# Patient Record
Sex: FEMALE | Race: WHITE
[De-identification: names, ages, dates, MRNs, and addresses within clinical notes are randomized per-mention and may not be internally consistent; named-entity substitution may affect disease eponyms.]

---

## 2019-11-21 NOTE — REP
Portable chest x-ray:  Single view.

 

History:  Chest pain.

 

Comparison chest x-ray:  January 18, 2018.

 

Findings:  There is moderate cardiac enlargement again noted.  There is some

linear fibrosis in the left perihilar region and left base unchanged.  Chronic

increased markings seen in the right base as well.  No acute infiltrate is seen.

Pulmonary vasculature is cephalized.  No pleural effusion seen.

 

Impression:

 

Moderate cardiac enlargement.  Left perihilar and bibasilar linear scarring.

Pulmonary vascular cephalization.  No evidence of pulmonary edema or pleural

effusion.

 

 

Electronically Signed by

Sp Antonio MD 11/21/2019 11:31 A

## 2019-11-21 NOTE — REP
CT pulmonary angiogram:  With IV contrast.

 

History: Chest pain and shortness of breath.

 

Comparison studies: Comparison study January 18, 2018.

 

Contrast dose:  75 ML of Isovue 370 are administered intravenously.

 

CT technique:  Helical scanning is acquired and overlapping 1.5 mm and contiguous

3 mm axial images are reformatted.  In addition, maximum intensity projection and

multiplanar re-formation images are generated in sagittal and coronal imaging

projections.

 

CT pulmonary angiographic findings: There is good opacification of the pulmonary

arterial tree.  There is no CT evidence of pulmonary embolism.  No evidence of

aortic aneurysm or dissection is seen.  There is no pleural or pericardial

effusion.  There is a band of linear atelectasis anteriorly in the left upper

lobe.  There is a similar linear band of atelectasis in the right middle lobe.

No other pulmonary opacity is seen.  There is linear fibrosis in the left lateral

pleural angle posteriorly.  There are stable bilateral hilar lymph nodes.  No

mediastinal adenopathy is seen.  There is an aorticopulmonary window region lymph

node which is unchanged showing short axis dimension of 8 mm.  No bony

destructive lesion is appreciated. The thyroid gland is moderately enlarged and

virtually envelops the trachea circumferentially above the thoracic inlet.  This

appears to be unchanged.

 

There are clips in the gallbladder fossa.  There is a low-density nodule in the

left adrenal gland measuring 3.2 x 2.2 cm.  This appears to be unchanged from

prior study of January 18, 2018.

 

Impression:

 

No CT evidence of pulmonary embolism.  Cardiomegaly is observed unchanged.  There

are bilateral discoid atelectatic changes.  Moderate goiter is seen at the

thoracic inlet nearly surrounding the trachea unchanged.  Scattered stable hilar

and mediastinal lymph nodes.  No acute infiltrate.

 

 

Electronically Signed by

Sp Antonio MD 11/21/2019 06:43 P

## 2019-11-22 NOTE — ECGEPIP
[facility] - ED

                                       

                                       Test Date:    2019

Pat Name:     JOSEPH BENTLEY            Department:   

Patient ID:   [id number]                 Room:         -

Gender:       Female                   Technician:   SVETA

:          1955               Requested By: TA CH

Order Number: [id number]     Reading MD:   Yoli Zavala

                                 Measurements

Intervals                              Axis          

Rate:         61                       P:            65

KY:           192                      QRS:          132

QRSD:         172                      T:            43

QT:           471                                    

QTc:          478                                    

                           Interpretive Statements

SINUS RHYTHM

INDETERMINATE AXIS

RIGHT BUNDLE BRANCH BLOCK

PRIOR ATRIAL FIBRILLATION 18

Electronically Signed on 2019 13:01:40 EST by Yoli Zavala

## 2019-11-22 NOTE — ED PDOC
Post-Departure Follow-Up


dr shaikh faxed cat chest for fu mlg Lundborg-Gray,Maja MD          Nov 22, 2019 10:37

## 2019-11-22 NOTE — ECGEPIP
[facility] - ED

                                       

                                       Test Date:    2019

Pat Name:     JOSEPH BENTLEY            Department:   

Patient ID:   [id number]                 Room:         -

Gender:       Female                   Technician:   CT

:          1955               Requested By: PRINCESS ROBERTS

Order Number: [id number]     Reading MD:   Yoli Zavala

                                 Measurements

Intervals                              Axis          

Rate:         58                       P:            64

W[place]:           187                      QRS:          60

QRSD:         166                      T:            3

QT:           458                                    

QTc:          451                                    

                           Interpretive Statements

SINUS BRADYCARDIA WITH OCCASIONAL VENTRICULAR PREMATURE COMPLEXES

INDETERMINATE AXIS

RIGHT BUNDLE BRANCH BLOCK

PROLONGED QTC COMPARED 19

Electronically Signed on 2019 13:07:18 EST by Yoli Zavala

## 2020-03-25 NOTE — REP
Clinical: Lower extremity pain and swelling .

 

Technique:  Gray scale and color Doppler evaluation of the bilateral lower

extremities using linear high frequency transducer.

 

Findings:

Ultrasound examination of the right and left lower extremity deep venous

structures from the common femoral vein to the popliteal vein demonstrates normal

compressibility flow and wave patterns in response to respiration and

augmentation.  There is no evidence for deep venous thrombosis.

 

Incidental right Parsons's cyst measures 5.5 x 1.8 x 4.8 cm.

 

Impression:

1.  No evidence for deep venous thrombosis bilateral lower extremities .

2.  Right Baker's cyst.

 

 

Electronically Signed by

Jeremy Goncalves MD 03/25/2020 11:40 A

## 2020-03-25 NOTE — ECGEPIP
[facility] - ED

                                       

                                       Test Date:    2020

Pat Name:     JOSEPH BENTLEY            Department:   

Patient ID:   [id number]                 Room:         -

Gender:       Female                   Technician:   [facility]

:          1955               Requested By: SARAH JOHNSTON

Order Number: [id number]     Reading MD:   Michele Nava

                                 Measurements

Intervals                              Axis          

Rate:         62                       P:            76

AL:           178                      QRS:          33

QRSD:         165                      T:            9

QT:           453                                    

QTc:          462                                    

                           Interpretive Statements

SINUS RHYTHM

INDETERMINATE AXIS

RIGHT BUNDLE BRANCH BLOCK

SIMILAR TO 19

Electronically Signed on 3- 22:29:58 EDT by Michele Nava

## 2020-06-29 NOTE — REPVR
PROCEDURE INFORMATION: 

Exam: MR Right Upper Extremity Joint Without Contrast; Shoulder 

Exam date and time: 6/29/2020 6:10 PM 

Age: 65 years old 

Clinical indication: Other: Pain and fever; Patient HX: Best images possible, 

attempted multiple times; Additional info: Pain right shoulder/fever 



TECHNIQUE: 

Imaging protocol: MR of the Right upper extremity without contrast. Exam 

focused on the shoulder. 

3D rendering: MIP and/or 3D reconstructed images were created by the 

technologist. 



COMPARISON: 

CR Shoulder 1 view 6/29/2020 4:44 PM 



FINDINGS: 

Bones and cartilage: No acute fracture. No dislocation. No high-grade chondral 

defects are seen. Minimal cystic change and marrow edema in the greater 

tuberosity.

Joint spaces: No joint effusion. 

Glenoid labrum: There is a multi directional inferior labral tear from 4:00 to 

7:00 o'clock. Posterior superior labrum is diminutive. 



Supraspinatus tendon: There is a tear of the posterior supraspinatus there is a 

full-thickness insertional tear of the mid to posterior supraspinatus tendon, 

with up to 14 mm of retraction. This tear measures 9 mm in anterior-posterior 

dimension. 

Infraspinatus tendon: Unremarkable. No evidence of tear. 

Subscapularis tendon: Unremarkable. No evidence of tear. 

Teres minor tendon: Unremarkable. No evidence of tear. 

Tendon of biceps brachii: Unremarkable. No evidence of tear. 



Glenohumeral ligaments: Edematous shoulder joint capsule. There is at least 

partial tearing of the glenoid attachment of the anterior band inferior 

glenohumeral ligament and humeral attachment of the posterior band inferior 

glenohumeral ligament.



Muscles: There is edema along the deep margin of the subscapularis muscle and 

less so within the remainder of the muscle fibers. This could be from a 

low-grade muscle strain, although myositis could also cause this appearance. 

Less extensive edema is seen in the remainder of the rotator cuff musculature. 

Minimal edema is seen in the deltoid muscle. There is also additional edema 

which is mild in the teres major and latissimus dorsi muscles, and small amount 

of fluid tracking along the fascial planes between rotator cuff musculature as 

well as around the teres major and latissimus dorsi muscles. 

Soft tissues: There is a large complex shoulder joint effusion with extensive 

debris and severe synovitis. There is also a large amount of complex fluid with 

debris and septations in the subacromial/subdeltoid bursa as well as the 

subcoracoid bursa, distending these bursae, communicating through the 

supraspinatus tendon tear. Fluid tracks in a geyser phenomenon into the 

acromioclavicular joint, and there is capsular edema at the acromioclavicular 

joint. 





IMPRESSION: 

1. Large complex shoulder joint effusion with debris and septations, with 

similar complex fluid distending the subacromial/subdeltoid and subcoracoid 

bursitis. Findings could be related to the full-thickness supraspinatus 

insertional tendon tear with intra-articular and bursal blood products. 

However, in the setting of fever and shoulder pain, concern is raised for 

septic arthritis and septic bursitis, which cannot be excluded. Recommend 

joint/bursal aspiration.

2. Multifocal muscle edema about the shoulder joint, most pronounced in the 

deep fibers of the subscapularis muscle, and also including the teres major and 

latissimus dorsi muscles. Small amount of fluid tracking along fascial plane 

surrounding these muscles. Findings could be from myositis and a component of 

nonspecific fasciitis. However, muscle strains could alternatively cause this 

appearance.

3. At least partial tears of the anterior and posterior bands of the inferior 

glenohumeral ligament. 

4. Complex inferior labral tear. 

5. Complex bursal fluid appears to track into the acromioclavicular joint with 

implied disruption of at least the inferior acromioclavicular ligament.



THIS REPORT CONTAINS FINDINGS THAT MAY BE CRITICAL TO PATIENT CARE. The 

findings were verbally communicated via telephone conference with AIXA Valdez, at 7:19 PM EDT on 6/29/2020. The findings were acknowledged and 

understood. 



Electronically signed by: Kenyatta Fields On 06/29/2020  19:43:02 PM

## 2020-06-29 NOTE — REP
Clinical:  Pain.  Trauma.

 

Technique:  Internal rotation, external rotation, and Y view of the right

shoulder.

 

Findings:

Mild/moderate arthritic degenerative changes include cortical irregularity and

spurring at the acromioclavicular joint as well as blunting and calcification to

the glenoid rim with osteophyte along the inferior border.  The subacromial space

is normal.  No acute fracture or dislocation.

 

Impression:

Mild/moderate arthritic degenerative changes.  No obvious acute fracture or

dislocation.

 

 

Electronically Signed by

Jeremy Goncalves MD 06/29/2020 01:09 P

## 2020-06-29 NOTE — REP
Clinical: Right upper extremity pain and swelling with history of deep venous

thrombosis .

 

Technique:  Gray scale and color Doppler evaluation right upper extremity using

linear high frequency transducer.

 

Findings:

Ultrasound examination of the right upper extremity deep venous structures

demonstrates normal jugular, subclavian, axillary, brachial, basilic, and

cephalic veins. There is no evidence for deep venous thrombosis.

 

Impression:

No evidence for deep venous thrombosis.

 

 

Electronically Signed by

Jeremy Goncalves MD 06/29/2020 02:34 P

## 2020-06-29 NOTE — REP
Clinical:  Trauma.

 

Technique:  Single axillary view of the right shoulder.

 

Findings:

Glenohumeral joint is in normal position.  No obvious acute fracture dislocation

identified.

 

Impression:

No acute fracture or dislocation appreciated.

 

 

Electronically Signed by

Jeremy Goncalves MD 06/29/2020 04:57 P

## 2020-06-30 NOTE — HPE
DATE OF ADMISSION:  06/29/2020

 

CHIEF COMPLAINT:  Right shoulder pain.

 

HISTORY OF PRESENT ILLNESS:  This 65-year-old female complains of five days'

history of right shoulder pain.  There is no dislocation.  There is no recent

trauma.  No recent penetrating injuries to the shoulder or breaks in the skin.

No recent other painful joints.  She feels like it is increasing pain with

movement.  It is minimal pain at rest, just a dull ache but with moving it is

quite severe, painful more in the anterior aspect of the shoulder.  She feels

otherwise well.  She did not even know that she had fevers before presenting to a

minor treatment clinic and being referred her for a fever of 100.8 apparently

according to the emergency department (ED) provider.  She feels well aside from

she had a bad migraine last Thursday which is now about four days ago.  She has

not had any other chills, nausea, vomiting, dysuria, or any other symptoms beyond

right shoulder pain.  She has never had anything like this in the past.

 

PAST MEDICAL HISTORY:  Includes hypertension, left leg deep vein thrombosis

(DVT).  She has a number of vein surgeries in the lower extremity and she is on

warfarin chronically for this.

 

MEDICATIONS:  She takes warfarin 2.5 mg three times a week and on the alternate

days 5 mg.  She is also on flecainide, furosemide.

 

ALLERGIES:  IBUPROFEN.

 

PAST SURGICAL HISTORY:  She has had what sounds like varicose vein surgery.

 

SOCIAL HISTORY:  She is a retired certified nursing assistant (CNA).  She was

born in [place].   unfortunately passed away three and a half years ago.

She smokes five cigarettes a day.  She does not use IV drugs or street drugs.

She consumes wine occasionally for alcohol.

 

PHYSICAL EXAMINATION:

This is a 65-year-old female.  She looks overall in no acute distress.

Her temperature here in the emergency department is 99.9 and 99.3.  Blood

pressure 165/92.  Pulse rate 88, highest pulse 105.  Respiratory rate 16.

Saturating 95% on room air.

Inspection of her upper extremities was normal aside from the right shoulder.

There are no other painful joints to palpation or range of motion testing.

In terms of her right upper extremity, there appears to be some mild to moderate

fullness of the anterior aspect of right shoulder.  This is painful to palpate.

This is not warm or hot to touch.  She does have some mild warmth in her right

hand.  She has no pain along the clavicle.  There is some moderate pain

posteriorly in the shoulder as well.  There is no pain down at the elbow.

Forearm compartments are soft.  She has normal sensation and motor function to

the MRU and AIN/PIN nerve distributions of the right hand.  Strong radial pulse,

pulse regular.

Active range of motion is quite painful for her in terms of the anterior aspect

of the shoulder.  There is no obvious pain with micromotion.  I am able to

internally and externally rotate the shoulder 15-20 degrees.  However, beyond 20

degrees of external rotation, it is exquisitely tender in the anterior aspect of

the shoulder.  She is not able to forward elevate.  This is also painful for

her.

 

IMAGING STUDIES:  Right shoulder AP, internal and external rotation view plus

scapular Y and axillary lateral which I have ordered in addition show what

appears to be some mild pseud subluxation inferiorly in the shoulder but no

obvious shoulder joint dislocation.  There is no obvious fracture.

 

Ultrasound was negative for DVT of the upper extremity.

 

LABORATORY DATA:  White blood cell count 13.3.  ESR over 140.  Chemistry revealed

CRP of 10.  Platelets 217.  There is no differential for the white blood cell

count unfortunately.  Her PT was 33.6 and INR was elevated at 3.31.

 

ASSESSMENT AND PLAN:  This is a 65-year-old female with increased fevers, white

count, and inflammatory markers.  The concern is certainly that she could have a

superficial or deep infection in the joint of her right shoulder.  However, there

is no obvious redness or warmth of the shoulder on examination and her

international normalized ratio (INR) is quite elevated at 3.31.  Also on the

differential would be an acute spontaneous anticoagulant-induced hematoma of the

right shoulder that could be presenting in a similar fashion either deep inside

the joint or superficially anteriorly.  Overall, I have a low to moderate

suspicion that this is truly a septic joint given my clinical examination and to

acutely intervene with an urgent irrigation and debridement with her INR being

over 3, may also be extremely dangerous in this patient.  In addition, performing

a stat aspiration of her shoulder may simply exacerbate a hematoma and cause her

more problems.  As such, my impression is that the best next investigation would

in fact be a stat MRI of her right shoulder.  I have asked the provider taking

care of her to order this test and had her put it into the computer on a stat

basis.  I will follow this test up myself as well as asking her to call me as

soon as the test has been performed so that I can review the results and see the

radiologist's interpretation as well on an urgent timeline.

## 2020-06-30 NOTE — IPN
DATE:  06/29/2020

 

CHIEF COMPLAINT:  Followup right shoulder stat MRI.

 

HISTORY OF PRESENT ILLNESS: 65-year-old female who presented with gradual

worsening of right shoulder pain.  She had elevated inflammatory markers and low

grade fever.  She also had elevated INR 3.3.  So I ordered a stat CT scan to rule

out spontaneous hematoma of her shoulder versus septic arthritis/infection.

 

MRI was read by myself as well as radiologist on call.  This shows large complex

shoulder joint effusion and debris and septations with similar complex with a

distended subacromial, subdeltoid, subcoracoid bursa.  There is also multifocal

muscle edema about the shoulder joint most pronounced in the deep fibers the

subscapularis muscle traction also including the teres major and the latissimus

dorsi muscle.  Findings could be from myositis of bone with nonspecific

fasciitis.

 

ASSESSMENT/PLAN:   This 65-year-old female I am quite concerned that she could

have a septic arthritis.  I think it is wise, despite her elevated INR, to go

ahead with stat emergency irrigation debridement and to minimize the potential

risk further hematoma or bleeding or her shoulder.  I have communicated this to

Dr. Mcgregor, the anesthetist on call.  He has reviewed her chart.  She does have

history of multiple pulmonary embolus in the past.  I also spoke to the

pharmacist on call, Zach, about potentially considering Kcentra prothrombin

complex concentrate for immediate reversal of her high INR.  It was decided in

consultation with the anesthetists and the pharmacist on call that given her past

history of multiple pulmonary embolisms and the procoagulant effect of this

medication, this would likely be unwise and more prudent to proceed with IV

vitamin K 5 mg which I have ordered on STAT timeline.  We will recheck the INR

prior to surgery.  I have let the operating room nurses know that this is a stat

emergency case. I have given them the name.  I have talked to the patient.   I

explained pros, cons, risks, benefits of doing nothing versus going to the

surgery for irrigation and debridement of her shoulder.   She will need cultures

drawn,  admission the hospital, possible  intravenous antibiotics.  Risks of

surgery include but not limited to infection pain, stiffness, weakness, damage to

surrounding structures, neurovascular injury, higher than normal rate of

bleeding, anesthetic complications, blood clots, death and other risks as well as

need for further surgery.   She wished to go ahead and signed a consent for

surgery and we will proceed to the operating room (OR).

## 2020-06-30 NOTE — RO
DATE OF PROCEDURE:   06/29/2020

 

PREOPERATIVE DIAGNOSIS:  Right shoulder infection.

 

POSTOPERATIVE DIAGNOSIS:  Right shoulder infection.

 

PLANNED PROCEDURE:  Right shoulder arthroscopic irrigation, debridement and

perioperative cultures.

 

PROCEDURE:   Right shoulder arthroscopic irrigation, debridement and

perioperative cultures.

 

SURGEON:  Dr. Lorenzo Anton

 

ASSISTANT:

 

ANESTHESIA:

 

OPERATIVE PREAMBLE:   This is a 65-year-old female who had right shoulder pain

increasing over 5 days.  She had a low grade fever and positive white count

inflammatory markers.  MRI demonstrated intra-articular fluid collection.  She

had elevated INR.  We gave vitamin K IV 5 mg and proceeded to surgery.  I

reiterated the risks in preoperative holding, marked upper extremity and

proceeded to surgery.

 

DESCRIPTION OF PROCEDURE:  Patient was brought to operating theater.  Antibiotics

were held until after all cultures were obtained and sent.  The patient was

administered general anesthetic.  They placed her right lateral decubitus with a

beanbag positioner.  Axillary roll was used.  All bony prominences were padded.

Sequential compression devices (SCDs) were used.   The limb was prepped  and

draped in the usual sterile fashion allowing over 3 minutes prep solution drying

time.  15 pounds tourniquet traction was used at the arm at 45 degrees of

abduction.  Preoperative time-out was performed to confirm the patient site and

the surgery.

 

I began by using an 18-gauge spinal needle to obtain culture fluid through the

rotator interval intra-articularly in the joint.  I obtained about 7 mL of

viscous straw-colored fluid with some small aspects of white material.  This was

sent for  stat Gram stain, culture and sensitivities, aerobics and anaerobics and

cell count.  These were in three separate bottles.

 

I then inserted the arthroscope into the intra-articular portion the right

shoulder.  Thorough diagnostic arthroscopy was performed.  There is moderate

synovitis in the rotator interval.  The culture tissue samples taken from the

rotator interval.  There is moderate osteoarthritis glenohumeral joint.  Moderate

fraying of the labrum.  Biceps tendon appeared intact.  There is undersurface

anterior tearing of the supraspinatus tendon, full-thickness partial with for

length of about 1.5 cm.  Subscapularis tendon appeared normal.  Rotator interval

was completely debrided as was some mild to moderate synovitis just superior to

the biceps tendon origin.

 

I then inserted the arthroscope in the subacromial space.  Again there was

moderate bursitis.  This was thoroughly debrided.  I brought in the shaver

through a lateral portal.  I performed a complete bursectomy.  I sent bursal

tissue, tissue samples.

 

Scope was withdrawn.  Wound cleaned with wet and dry dressing.  Portal sites

closed with interrupted #3-0 Monocryl sutures and Steri-Strips applied with

nonstick dressing,  4 x 8 gauze, ABD and overwrapped with cloth tape.  The

patient's upper extremity was placed into a sling.

 

The patient was woken up from general anesthetic, transferred off the operating

table and taken postanesthetic care unit stable condition.  Two grams IV Ancef

administered after cultures were sent.

 

PLAN:  The patient is being admitted to the hospital by the hospitalist service.

Consult infectious disease.  Followup on the cultures.  We will continue on

intravenous Ancef every 8 hours 2 grams until cultures return.

## 2020-07-01 NOTE — HPEPDOC
[facility] Medical History & Physical


Date of Admission


2020


Date of Service:  2020


Attending Physician:  Radha Quick MD





History and Physical





CONSULT: Medical management 





HISTORY OF PRESENT ILLNESS:


Patient is a 65-year-old female with past medical history of hypertension, 

atrial fibrillation, GERD, history of pulmonary emboli, history of deep vein 

thrombosis who presented to [facility] emergency room on 2020 after having 

worsening right shoulder pain for 5 days. Pain was described as constant, sharp 

, 10/10, new to patient.  She attempted to place hot and cold packs to help; 

however, nothing relieved the pain at home. Pain was worse with movement, 

improved with rest. The patient denied any recent trauma to the shoulder, other 

tender joints or history of joint pain like this. She went to urgent care first 

who then sent her to the emergency room for a fever 100.8F.  In the ER, VS 

showed showed she was hypertensive and fever as high as 100.5F. Shoulder MRI 

showed large complex shoulder joint effusion with debris and septations, with  

similar complex fluid distending the subacromial/subdeltoid and subcoracoid 

bursitis. Findings were possibly related to the full-thickness supraspinatus 


insertional tendon tear with intra-articular and bursal blood products. But in 

the setting of fever and shoulder pain, concern was raised for septic arthritis 

and septic bursitis. Case was discussed with orthopedic surgery and patient was 

taken to OR for right shoulder arthroscopic irregation, debridement. Cultures 

and pathology was sent. Medicine was consulted to help manage. 





Upon initial evaluation of patient's labs on post-op day 2, she was found to 

have Gram positive cocci in clusters in 2 sets of blood cultures. HR was 

slightly elevated at 108, afebrile since admission. Patient's CRP and ESR were 

slightly improved from day of admission at 10 and 127. Post operative pain was 

controlled. The patient denied fevers, chills, n/v but admitted to continued 

lethargy. On exam, no murmur was appreciated on exam. Echocardiogram was ordered

and patient was started on Vancomycin while all culture results were pending. 

She was switched to telemetry floor. 





ROS: 


Negative except for what is mentioned above. 





PAST MEDICAL HISTORY:


1. HTN


2. atrial fibrillation 


3. GERD


4. Hx of PE


5. Hx of DVTs


6. obesity 


7. Osteoarthritis 





PAST SURGICAL HISTORY:


1. Right shoulder surgery 


2. Vein stripping in bilateral lower ext 


3. Cholecystectomy 


4. Tonsillectomy 





SOCIAL HISTORY:


Smoker 6-7 cigarettes/day, 40 years. Social alcohol use, denies illicit drug 

use. Lives locally alone. Uses a walker to ambulate, she has no stairs in home. 

Full code. 





FAMILY HISTORY:


Father: Lung disease.  at 74 y/o 


Mother: Angina, CVA, HTN.  74 y/o 





ALLERGIES: Please see below.





CURRENT MEDICATIONS: 


Please see below 





PHYSICAL EXAMINATION: 


VITAL SIGNS: Please see below 


CONSTITUTIONAL: No acute distress, resting comfortably, AAO x 3


EYES: PERRLA, EOM intact


HENT, MOUTH: Normocephalic, atraumatic, moist mucous membranes 


NECK: SUPPLE, no JVD, no lymphadenopathy, no carotid bruit


CV: tachycardic, irregularly irregular rhythm, S1S2 normal, no 

murmurs/rubs/gallops


RESPIRATORY: Clear to auscultation bilaterally, no rales/rhonchi/wheezes


GI:  obese abdomen, BS positive in 4 quadrants, soft, nontender, nondistended, 

no rebound or guarding, no organomegaly


: Deferred


MUSCULOSKELETAL:Decreased ROM of right shoulder, painful to touch, multiple 

bruised areas with well healed incision sites- almost unnoticeable.  No 

cyanosis, clubbing,  joint deformity, extremity edema


INTEGUMENTARY: Discoloration of bilateral lower ext, chronic.  Intact, no 

rashes, no lesions, no erythema


NEUROLOGIC: Cranial Nerves II-XII are intact, no focal deficits


PSYCHIATRIC: Mood and affect are normal 





LABORATORY DATA: 


Please see below 





IMAGIN/29/20: 


1. Large complex shoulder joint effusion with debris and septations, with 


similar complex fluid distending the subacromial/subdeltoid and subcoracoid 


bursitis. Findings could be related to the full-thickness supraspinatus 


insertional tendon tear with intra-articular and bursal blood products. 


However, in the setting of fever and shoulder pain, concern is raised for 


septic arthritis and septic bursitis, which cannot be excluded. Recommend 


joint/bursal aspiration.


2. Multifocal muscle edema about the shoulder joint, most pronounced in the 


deep fibers of the subscapularis muscle, and also including the teres major and 


latissimus dorsi muscles. Small amount of fluid tracking along fascial plane 


surrounding these muscles. Findings could be from myositis and a component of 


nonspecific fasciitis. However, muscle strains could alternatively cause this 


appearance.


3. At least partial tears of the anterior and posterior bands of the inferior 


glenohumeral ligament. 


4. Complex inferior labral tear. 


5. Complex bursal fluid appears to track into the acromioclavicular joint with 


implied disruption of at least the inferior acromioclavicular ligament.





ASSESSMENT: 66 y/o F admitted for right shoulder pain ruling out septic joint, 

sepsis, gram positive cocci bacteremia. 





PLAN:  


1. Right shoulder pain r/o septic joint, post-op day 2 arthroscopic irrigation, 

debridement


-Pain mod-severe with movement of shoulder


-ESR, CRP slightly improved 


-Shoulder fluid cultures (bacterial and fungal) pending. Blood cultures: gram + 

cocci in clusters. 


-Switched to Vancomycin IV, f/u culture results and sensitivities. 


-Pain regimen PRN, PT/OT, can remove sling


-Ortho primary 





2. Gram positive cocci bacteremia, resolved sepsis.


-HR >100, WBC on admission elevated. LA wnl


-At this time, UA neg, no open skin ulcers/wounds and no respiratory concerns. 

Cannot r/o right shoulder as primary source of infection.  


-F/u results on cultures and sensitivities, echocardiogram to r/o endocarditis


-C/w vancomycin for now. 





3. Atrial fibrillation, controlled. 


-restarting home coumadin, -Bridge with lovenox 1 mg/kg Q12 hrs, c/w BB 





4. HTN. 


-C/w home medications. 





5. Hx of DVT, PE


-INR subtherapeutic today


-Resuming home coumadin, bridging with lovenox Q12H


-Daily INR 





6. GERD. 


-C/w home med





7. DVT px. 


-Lovenox, coumadin.





Vital Signs





Vital Signs








  Date Time  Temp Pulse Resp B/P (MAP) Pulse Ox O2 Delivery O2 Flow Rate FiO2


 


20 11:21 97.3 71 16 150/72 (98) 97 Room Air  


 


20 07:44       1.0 











Laboratory Data


Labs 24H


Laboratory Tests 2


20 22:06: 


Prothrombin Time 16.8H, Prothromb Time International Ratio 1.39


20 05:29: 


Prothrombin Time 16.4H, Prothromb Time International Ratio 1.35


20 09:34: 


Immature Granulocyte % (Auto) 0.4, Neutrophils (%) (Auto) 77.8H, Lymphocytes (%)

(Auto) 13.1L, Monocytes (%) (Auto) 8.3H, Eosinophils (%) (Auto) 0.2, Basophils 

(%) (Auto) 0.2, Neutrophils # (Auto) 7.3, Lymphocytes # (Auto) 1.2L, Monocytes #

(Auto) 0.8, Eosinophils # (Auto) 0.0, Basophils # (Auto) 0.0, Nucleated Red 

Blood Cells % (auto) 0.0, Erythrocyte Sedimentation Rate 127H, Anion Gap 6L, 

Glomerular Filtration Rate > 60.0, Calcium Level 9.8, Total Bilirubin 0.5, 

Aspartate Amino Transf (AST/SGOT) 26, Alanine Aminotransferase (ALT/SGPT) 18, 

Alkaline Phosphatase 95, C-Reactive Protein, Quantitative 9.88H, Total Protein 

6.8, Albumin 2.2L, Albumin/Globulin Ratio 0.5L


20 09:51: Lactic Acid Level 1.7


CBC/BMP


Laboratory Tests


20 09:34








Microbiology





Microbiology


20 Anaerobic Culture, Received


          Pending


20 Gram Stain - Final, Resulted


          


20 Body Fluid Culture, Resulted


          Pending


20 Respiratory Virus Panel (PCR) (OMAR) - Final, Complete


          


20 Blood Culture - Preliminary, Resulted


          


20 Blood Culture - Preliminary, Resulted





Home Medications


Scheduled


Aspirin (Ecotrin) 81 Mg Tablet.dr, 81 MG PO DAILY


Atorvastatin Calcium (Atorvastatin Calcium) 20 Mg Tablet, 20 MG PO QPM


Digoxin (Digoxin) 250 Mcg Tablet, 250 MCG PO QPM


Famotidine (Famotidine) 40 Mg Tablet, 40 MG PO QPM


Flecainide Acetate (Flecainide Acetate) 100 Mg Tablet, 100 MG PO BID


Fluoxetine Hcl (Fluoxetine HCl) 20 Mg Capsule, 20 MG PO DAILY


Furosemide (Furosemide) 20 Mg Tablet, 20 MG PO DAILY


Guaifenesin (Mucus Relief) 400 Mg Tablet, 400 MG PO QHS


Hydrochlorothiazide (Hydrochlorothiazide) 25 Mg Tablet, 12.5 MG PO DAILY


Methimazole (Methimazole) 10 Mg Tablet, 10 MG PO DAILY


Metoprolol Tartrate (Metoprolol Tartrate) 25 Mg Tablet, 25 MG PO BID


Multivitamins (Thera M Plus Tablet) 1 Each Tablet, 1 TAB PO DAILY


Ranitidine Hcl (Ranitidine HCl) 150 Mg Tablet, 1 TAB PO BID


Vitamin E (Vitamin E) 400 Unit Capsule, 400 UNIT PO DAILY


Warfarin Sodium (Warfarin Sodium) 5 Mg Tablet, 5 MG PO 3XW


   MONDAY, WEDNESDAY AND FRIDAY AT QPM 





Scheduled PRN


Acetaminophen (Tylenol Extra Strength) 500 Mg Tablet, 1,000 MG PO Q6H PRN for 

PAIN / FEVER





Miscellaneous Medications


Warfarin Sodium (Warfarin Sodium) 5 Mg Tablet, 2.5 MG PO


   TUESDAY, THURSDAY, SATURDAY AND  AT QPM 





Allergies


Coded Allergies:  


     ibuprofen (Verified  Adverse Reaction, Mild, GI, 20)





A-FIB/CHADSVASC


A-FIB History


Current/History of A-Fib/PAF?:  Yes


Current PO Anticoag Therapy:  Yes





Age/Risk Factor Scoring


CHADSVASC:  








CHADSVASC Response (Comments) Value


 


Age Risk Factor Age 65-74 years old 1


 


Gender Risk Factor Female 1


 


Hx of CHF No 0


 


Hx of HTN Yes 1


 


Hx of Stroke/TIA/or VTE No 0


 


Hx of Diabetes No 0


 


Hx of Vascular Disease No 0


 


Total  3











Treatment


Treatment ordered:  Warfarin











Radha Quick MD             2020 13:01

## 2020-07-01 NOTE — IPN
DATE:  07/01/2020

 

CHIEF COMPLAINT:

Postoperative day 1.5 right shoulder irrigation and debridement for possible

infection.

 

HISTORY OF PRESENT ILLNESS::

This 65-year-old female presented with painful and swollen right shoulder.  She

had an elevated white blood cell count and inflammatory markers.  It was decided

to go ahead with irrigation and debridement for risk of a septic shoulder.  She

is doing well on the de jesus.  No concerns or complaints from her or the nursing

staff.  There is still pain to the right shoulder with movement and difficulty

lifting the arm.

 

PHYSICAL EXAMINATION:

Well-appearing 65-year-old female.  She is sitting upright.  She appears

comfortable.  Bulky dressing was taken down.  Portal sites are well apposed with

the Steri-Strips.  No redness, swelling or drainage.  There is still pain with

internal and external rotation of the arm.  Normal sensation and motor function

if the hand and MRU and AIN/PIN nerve distributions.  Strong radial pulse.  Hand

is warm and well perfused.

 

LABORATORY EXAMINATION:

Repeat white blood cell count is pending as are inflammatory markers.  INR was

1.35.

 

Microbiology:  Her blood cultures were positive for gram-positive cocci in

clusters.  Gram stain shows a few WBCs, a few RBCs.  No organisms seen.  Lab

results due in 2-7 days.  Anaerobic culture is pending.

 

Pathology:  The tissue samples are also pending.

 

ASSESSMENT/PLAN:

65-year-old female.  We will keep her on intravenous cefazolin until final

cultures are back or until her inflammatory markers are trending down.  Will

repeat the CBC with white blood cell count and differential as well as CRP today

to ensure clinical response.  We will follow up on final cultures and pathology.

We will start her back on warfarin.  I have asked again the hospitalist service

to assess this patient.  I did talk to Dr. Valadez initially within 20 minutes

of completing the operation.  However, unfortunately, they have not yet put any

notes in the chart, so again I have asked Leonor, our nurse practitioner, to reach

out to them today this morning.  She has had pulmonary embolisms in the past so

she is at high risk and we will place her back on her anticoagulation as soon as

possible.  Overall, my clinical sense is that this is still a moderate chance of

having been an infection, but there is still also a chance this could have been a

spontaneous hematoma in her shoulder that resulted in a synovitis and acute

irritation of the shoulder.  The patient can discontinue the sling.  They should

not shower over top of the incision.  They will keep it clean and dry and I

changed the bulky dressing myself today.  Additionally, ideally one would be able

to obtain an infectious disease consult for this woman; however, Dr. Richards is away

this week and we will contact her at earliest possible time for her opinion as

well.

## 2020-07-02 NOTE — ECHO
DATE OF PROCEDURE:  07/01/2020

 

HEIGHT:  66 inches.

 

WEIGHT: 222 pounds.

 

BODY SURFACE AREA:  2.1 meters squared.

 

REFERRING PHYSICIAN:  Dr. Radha Quick.

 

INDICATIONS:  Sepsis.

 

MEASUREMENTS:

2D Measurements:

RV - 5.2 cm

LV - 5.3 cm

Septum - 1.3 cm

Posterior wall - 1.3 cm

Aortic root - 3.5 cm

LA - 4.2 cm

LVEF - 70%

 

Doppler Measurements:

AV - 1.91 meters per second

LVOT - 1.12 - meters per second

LVOT diameter 2.1 cm

MV - E, 85, A - 93, E/A ratio 0.9

Early mitral deceleration time -180 milliseconds

E prime medial  - 5

A prime medial - 7.8

E prime lateral -15

Average E/E prime ratio - 8.5/PCWP - 12.4 mmHg

PV - 1.0 meters per second

Pulmonary artery acceleration time - 124 milliseconds

PASP 26 mmHg

IVC - 2.0 cm

 

COMMENTS:

Normal sinus rhythm with occasional frequent PACs.  Right bundle branch block.

 

Slightly challenging study in light of the patient's body habitus but

diagnostically useful information was still obtained.

 

M-mode and two-dimensional echocardiography was performed with pulsed, continuous

wave, color flow and tissue Doppler studies.

 

Normal left ventricular size with mild symmetrical hypertrophy.  Somewhat

hypokinetic to akinetic septal motion yet preserved global resting systolic

function.

 

Mildly dilated left atrium with grade 1 LV diastolic dysfunction but current

estimated mean left atrial pressure within normal limits.

 

At least mild to moderately dilated right heart chambers with normal wall motion

and current estimated pulmonary arterial pressure upper limits of normal.

 

Normal IVC size and collapse against an elevated central venous pressure.

 

Normal aortic diameters.

 

Mild aortic valvular sclerosis without apparent functional abnormality.

 

Mild degenerative changes of the mitral valvular apparatus with no more than

trace insufficiency.

 

Normal appearing tricuspid valve with trace insufficiency.

 

No separate intracardiac mass or pedunculated vegetation could be visualized.

Could not rule out a small sessile vegetation on her mitral or aortic valves.

 

No pericardial effusion.

 

If cardiac source of her infection is seriously suspect, a transesophageal

echocardiogram would be advised to further define mitral aortic valvular

appearance.

## 2020-07-02 NOTE — IPNPDOC
Date Seen


The patient was seen on 7/2/20.





Progress Note


SUBJECTIVE: 


No acute complaints overnight. Staph hominis sensitive to Vancomycin growing 

from 1 set blood cultures, second set pending. Afebrile, WBC wnl. Denies chest 

pain, fevers, chills, n/v/d, shortness of breath. 





OBJECTIVE: 





VITAL SIGNS: 


Please see below





PHYSICAL EXAMINATION: 


VITAL SIGNS: Please see below 


CONSTITUTIONAL: No acute distress, resting comfortably, AAO x 3


EYES: PERRLA, EOM intact


HENT, MOUTH: Normocephalic, atraumatic, moist mucous membranes 


NECK: SUPPLE, no JVD, no lymphadenopathy, no carotid bruit


CV: tachycardic, irregularly irregular rhythm, S1S2 normal, no mu

rmurs/rubs/gallops


RESPIRATORY: Clear to auscultation bilaterally, no rales/rhonchi/wheezes


GI:  obese abdomen, BS positive in 4 quadrants, soft, nontender, nondistended, 

no rebound or guarding, no organomegaly


: Deferred


MUSCULOSKELETAL:Decreased ROM of right shoulder, painful to touch, multiple 

bruised areas with well healed incision sites- almost unnoticeable.  No 

cyanosis, clubbing,  joint deformity, extremity edema


INTEGUMENTARY: Discoloration of bilateral lower ext, chronic.  Intact, no 

rashes, no lesions, no erythema


NEUROLOGIC: Cranial Nerves II-XII are intact, no focal deficits


PSYCHIATRIC: Mood and affect are normal 





LABORATORY DATA: 


Please see below 





MICROBIOLOGY:


Right shoulder intraarticular fluid aerobic GS: No organisms seen 


Right shoulder intraarticular fluid aerobic Cx:  No growth 





Right shoulder intraarticular fluid anaerobic GS  and Cx: pending 





BCx- Gram pos cocci in clusters 


BCx- Staph hominis sensitive to Vancomycin 





IMAGING: 





Echo: 


EF 70%


Normal sinus rhythm with occasional frequent PACs.  Right bundle branch block.





Slightly challenging study in light of the patient's body habitus but


diagnostically useful information was still obtained.


 


M-mode and two-dimensional echocardiography was performed with pulsed, 

continuous


wave, color flow and tissue Doppler studies.


 


Normal left ventricular size with mild symmetrical hypertrophy.  Somewhat


hypokinetic to akinetic septal motion yet preserved global resting systolic


function.


 


Mildly dilated left atrium with grade 1 LV diastolic dysfunction but current


estimated mean left atrial pressure within normal limits.


 


At least mild to moderately dilated right heart chambers with normal wall motion


and current estimated pulmonary arterial pressure upper limits of normal.


 


Normal IVC size and collapse against an elevated central venous pressure.


 


Normal aortic diameters.


 


Mild aortic valvular sclerosis without apparent functional abnormality.


 


Mild degenerative changes of the mitral valvular apparatus with no more than


trace insufficiency.


 


Normal appearing tricuspid valve with trace insufficiency.


 


No separate intracardiac mass or pedunculated vegetation could be visualized.


Could not rule out a small sessile vegetation on her mitral or aortic valves.


 


No pericardial effusion.


 


If cardiac source of her infection is seriously suspect, a transesophageal


echocardiogram would be advised to further define mitral aortic valvular


appearance.





ASSESSMENT: 64 y/o F admitted for right shoulder pain ruling out septic joint, 

sepsis, gram positive cocci bacteremia. 





PLAN:  


1. Right shoulder pain r/o septic joint, post-op day 3 arthroscopic irrigation, 

debridement


-Pain mod-severe with movement of shoulder


-ESR, CRP further improved 


-Blood culture x1 : Staph hominis sensitive to Vancomycin. Other micro above 

PENDING 


-C/w Vancomycin IV (Day 2)


-Pain regimen PRN, PT/OT


-Ortho primary 





2. Gram positive cocci bacteremia. Cannot r/o right shoulder as primary source 

of infection and cannot r/o endocarditis with abnormal echo above.


-One culture Staph Hominis sensitive to Vancomycin (Day 2) 


-HR wnl, WBC wnl


-At this time, UA neg, no open skin ulcers/wounds and no respiratory concerns. 


-Echo above. 


-Discussed echo with Dr. Chahal who suggests discussing OLLIE with Dr. Lutz 

after weekend when he returns. At that time infectious disease will hopefully be

back and we can touch base with them as well to discuss case (already consulted 

by primary ortho team)


-F/u results on cultures and sensitivities, repeating blood cultures 7/3/20 (48 

hrs after initiation of Vancomycin)


-C/w vancomycin for now. 





3. Atrial fibrillation, controlled. 


-INR 1.9, goal 2-3


-Bridge with lovenox 1 mg/kg Q12 hrs until INR therapeutic, C/w coumadin,  BB 





4. HTN. 


-C/w home medications. 





5. Hx of DVT, PE


-INR subtherapeutic today


-C/w coumadin, bridging with lovenox Q12H


-Daily INR 





6. GERD. 


-C/w home med





7. DVT px. 


-Lovenox, coumadin.





VS, I&O, 24H, Fishbone


Vital Signs/I&O





Vital Signs








  Date Time  Temp Pulse Resp B/P (MAP) Pulse Ox O2 Delivery O2 Flow Rate FiO2


 


7/2/20 14:00 98.0 73 18 135/81 (99) 100 Room Air  


 


6/30/20 07:44       1.0 














I&O- Last 24 Hours up to 6 AM 


 


 7/2/20





 06:00


 


Intake Total 1740 ml


 


Output Total 300 ml


 


Balance 1440 ml











Laboratory Data


24H LABS


Laboratory Tests 2


7/2/20 06:02: 


Nucleated Red Blood Cells % (auto) 0.0, Erythrocyte Sedimentation Rate 126H, 

Anion Gap 5L, Glomerular Filtration Rate > 60.0, Calcium Level 9.7, Total 

Bilirubin 0.9#, Aspartate Amino Transf (AST/SGOT) 28, Alanine Aminotransferase 

(ALT/SGPT) 18, Alkaline Phosphatase 97, C-Reactive Protein, Quantitative 7.65H, 

Total Protein 6.7, Albumin 2.2L, Albumin/Globulin Ratio 0.5L


7/2/20 09:52: Vancomycin Level Trough 20.7H


7/2/20 11:51: 


Prothrombin Time 21.7H, Prothromb Time International Ratio 1.92


CBC/BMP


Laboratory Tests


7/2/20 06:02








Microbiology





Microbiology


6/29/20 Anaerobic Culture, Received


          Pending


6/29/20 Gram Stain - Final, Complete


          


6/29/20 Body Fluid Culture - Final, Complete


          


6/29/20 Respiratory Virus Panel (PCR) ([facility]) - Final, Complete


          


6/29/20 Blood Culture - Final, Complete


          Staphylococcus Hominis Ssp Guero


6/29/20 Blood Culture - Preliminary, Resulted





Current Medications





Current Medications








 Medications


  (Trade)  Dose


 Ordered  Sig/Roro


 Route


 PRN Reason  Start Time


 Stop Time Status Last Admin


Dose Admin


 


 Acetaminophen


  (Tylenol Tab)  650 mg  Q4HP  PRN


 PO


 MILD PAIN (PS 1-4)  6/30/20 00:30


    7/2/20 08:15





 


 Aspirin


  (Ecotrin)  81 mg  DAILY


 PO


   7/1/20 09:00


    7/2/20 08:15





 


 Atorvastatin


 Calcium


  (Lipitor)  20 mg  DAILY@1800


 PO


   6/29/20 18:00


    7/1/20 17:41





 


 Cefazolin Sodium/


 Dextrose 2 gm/IV


 Miscellaneous


 Supplies  50 ml @ 75


 mls/hr  Q8H


 IV


   6/30/20 06:00


 7/1/20 09:32 DC 7/1/20 05:29





 


 Docusate Sodium


  (Colace)  100 mg  BID


 PO


   7/2/20 09:00


     





 


 Emollient Cream


  (Vanicream)  to legs


 and feet  BID


 TOP


   7/2/20 09:00


    7/2/20 12:41





 


 Enoxaparin Sodium


  (Lovenox)  100 mg  Q12H


 SC


   7/2/20 08:00


    7/2/20 08:15





 


 Fentanyl Citrate


  (Sublimaze)  25 mcg  Q5MP  PRN


 IV


 PAIN LEVEL 5-10  6/30/20 00:30


 6/30/20 01:29 DC  





 


 Furosemide


  (Lasix)  20 mg  DAILY


 PO


   6/30/20 09:00


    7/2/20 08:16





 


 Home Med


  (Med Rec


 Complete!)    ASDIRECTED


 XX


   6/29/20 21:00


 6/29/20 21:01 DC  





 


 Hydrochlorothiazide


  (Hydrodiuril)  12.5 mg  DAILY


 PO


   6/30/20 09:00


    7/2/20 08:16





 


 Lactated Ringer's  1,000 ml @ 


 100 mls/hr  Q10H


 IV


   6/30/20 00:30


 6/30/20 01:29 DC  





 


 Lactated Ringer's  1,000 ml @ 


 125 mls/hr  Q8H


 IV


   6/30/20 00:30


 6/30/20 06:08 DC 6/30/20 05:51





 


 Methimazole


  (Tapazole)  10 mg  DAILY


 PO


   6/30/20 09:00


    7/2/20 08:16





 


 Metoprolol


 Tartrate


  (Lopressor)  25 mg  BID


 PO


   6/29/20 21:00


 6/30/20 02:31 DC  





 


 Metoprolol


 Tartrate


  (Lopressor)  25 mg  BID


 PO


   6/30/20 09:00


    7/2/20 08:16





 


 Morphine Sulfate


  (Morphine


 Sulfate Inj)  2 mg  Q4HP  PRN


 IV


 MODERATE PAIN (PS 5-7)  6/30/20 00:30


    7/1/20 23:57





 


 Multivitamins


  (Theragram-M)  1 tab  DAILY


 PO


   6/30/20 09:00


    7/2/20 08:16





 


 Nystatin


  (Mycostatin


 Powder, Nystop)  to groin


 and under


 breasts  BID


 TOP


   7/2/20 09:00


    7/2/20 12:41





 


 Ondansetron HCl


  (ZOFRAN


 INJection)  4 mg  Q4HP  PRN


 IV


 NAUSEA OR VOMITING  6/30/20 00:30


 6/30/20 01:29 DC  





 


 Ondansetron HCl


  (ZOFRAN


 INJection)  4 mg  Q4HP  PRN


 IV


 NAUSEA  6/30/20 00:30


     





 


 Oxycodone HCl


  (Roxicodone,


 Oxyir)  5 mg  ASDIRECTED  PRN


 PO


 PAIN LEVEL 1-4  6/30/20 00:30


 6/30/20 01:29 DC  





 


 Oxycodone/


 Acetaminophen


  (Percocet 5mg/


 325mg Tablet)  1 tab  Q4HP  PRN


 PO


 SEVERE PAIN (PS 8-10)  6/30/20 00:30


    7/1/20 21:19





 


 Phytonadione 5 mg/


 Sodium Chloride  50.5 ml @ 


 101 mls/hr  STAT  STAT


 IV


   6/29/20 20:40


 6/29/20 21:09 DC 6/29/20 21:16





 


 Senna


  (Senokot)  2 tab  QHS


 PO


   7/2/20 21:00


     





 


 Vancomycin HCl


 500 mg/Dextrose  110 ml @ 


 110 mls/hr  Q12H


 IV


   7/2/20 14:00


    7/2/20 13:53





 


 Vancomycin HCl


 750 mg/IV


 Miscellaneous


 Supplies 1 each/


 Dextrose  275 ml @ 


 275 mls/hr  Q12H


 IV


   7/2/20 13:00


    7/2/20 12:41





 


 Vancomycin HCl


 1000 mg/IV


 Miscellaneous


 Supplies 1 each/


 Dextrose  270 ml @ 


 270 mls/hr  Q8H


 IV


   7/1/20 11:00


 7/2/20 10:38 DC 7/2/20 03:18





 


 Warfarin Sodium


  (Coumadin)  2.5 mg  SuTuThSa@1700


 PO


   7/2/20 17:00


     





 


 Warfarin Sodium


  (Coumadin)  5 mg  MoWeFr@1700


 PO


   7/1/20 17:00


    7/1/20 17:40














Allergies


Coded Allergies:  


     ibuprofen (Verified  Adverse Reaction, Mild, GI, 6/29/20)











Radha Quick MD             Jul 2, 2020 16:48

## 2020-07-03 NOTE — PHACANCOPD
PHARMACY VANCOMYCIN DOSING


Pt Demographics


Demographics


Patient Age:65 , Weight:104.200  , Gender: female


Adjusted Body Weight


Date: 7/3/20, Adjusted Body Weight:  Kg





Vancomycin


Vancomycin indication:  GRAM + BACTEREMIA


Vancomycin Target Ranges:  15-20 mcg/ml


Vancomycin Load Y/N:  Yes


Load Dose Date Time


Vancomycin Load Dose:         Date:         Time:


Vancomycin Dose


Date: 7/3/20. Current Vancomycin Dose:


Intermittent Dosing?:  No





Labs


Labs











Item Value  Date Time


 


White Blood Count 6.9 10^3/uL 7/2/20 0602


 


White Blood Count 9.4 10^3/uL 7/1/20 0934


 


White Blood Count 6.0 10^3/uL 7/3/20 0631


 


Creatinine 0.80 MG/DL 7/1/20 0934


 


Creatinine 0.65 MG/DL 7/2/20 0602


 


Creatinine 0.67 MG/DL 7/3/20 0631


 


Vancomycin Level Trough 20.7 UG/ML H 7/2/20 0952


 


Vancomycin Level Trough 15.9 UG/ML 7/3/20 1205








Micro





Microbiology


7/3/20 Blood Culture, Received


         Pending


7/3/20 Blood Culture, Received


         Pending


6/29/20 Anaerobic Culture, Received


          Pending


6/29/20 Gram Stain - Final, Complete


          


6/29/20 Body Fluid Culture - Final, Complete


          


6/29/20 Respiratory Virus Panel (PCR) ([facility]) - Final, Complete


          


6/29/20 Blood Culture - Final, Complete


          Staphylococcus Hominis Ssp Guero


6/29/20 Blood Culture - Final, Complete


          Micrococcus Luteus


Creatinine Clearance


Date:7/3/20. Creatinine Clearance: [83.6ML/MIN].





Assessment and Plan


Maintaining Current Dose?:  Yes


Reason for dose change:  No Dose Change





Pharmacist Note


Pharmacist Note


Date: 7/3/20. Pharmacist note: Pt trough came back @ 15.9mcg/ml. We will 

continue 1.25g vancomycin IV every 12 hours. We will continue to monitor and 

adjust the dose as needed.











[facility]      Jul 3, 2020 13:00

## 2020-07-03 NOTE — IPN
DATE:  07/03/2020

 

CHIEF COMPLAINT:

Postoperative day four right shoulder irrigation and debridement.

 

HISTORY OF PRESENT ILLNESS:

65-year-old female who had spontaneous onset of right shoulder pain.  I performed

irrigation and debridement  and sent fluid and tissue cultures.  Those have been

negative so far.  The plan is for possible transesophageal echocardiogram (OLLIE)

as transthoracic echocardiogram was unable to rule out infective endocarditis.

She feels well today.  Pain is settling down.

 

PHYSICAL EXAMINATION:

Vital signs stable.  Temperature 98.1.

She is awake and alert.  She looks well.

Dressings is on her right shoulder.  No strike through.  Normal sensation and

strong radial pulse.  There is still some pain with motion of the shoulder.

 

LABORATORY DATA:

White blood cell count yesterday 6.0.  .  Anaerobic culture of the

shoulder pending.  Blood culture growing Staphylococcus hominis.  Gram stain of

the shoulder fluid is negative.

 

ASSESSMENT/PLAN:

This 65-year-old female seems to be having a good response to vancomycin.  Per

the hospitalist managing this patient, they recommend staying on the vancomycin

as her blood cultures have been positive and the TTE was unable to rule out

infected carditis.  The patient will remain in the hospital on IV antibiotics

until at least Monday of next week, likely requiring a OLLIE as well as

consultation from Dr. Richards, the infectious disease specialist.  I will follow her

along intermittently.  I also recommend physical therapy for active-assisted and

passive range of motion of her right shoulder.  I instructed the patient on

different exercises that she can do as well.

## 2020-07-03 NOTE — IPNPDOC
Date Seen


The patient was seen on 7/3/20.





Progress Note


SUBJECTIVE: 


No acute complaints overnight. Staph hominis, micrococcus now growing in second 

set. Repeat BCx drawn this AM after 48 hrs on Vancomycin. Afebrile, WBC wnl. 

Denies chest pain, fevers, chills, n/v/d, shortness of breath. 





OBJECTIVE: 





VITAL SIGNS: 


Please see below





PHYSICAL EXAMINATION: 


VITAL SIGNS: Please see below 


CONSTITUTIONAL: No acute distress, resting comfortably, AAO x 3


EYES: PERRLA, EOM intact


HENT, MOUTH: Normocephalic, atraumatic, moist mucous membranes 


NECK: SUPPLE, no JVD, no lymphadenopathy, no carotid bruit


CV: tachycardic, irregularly irregular rhythm, S1S2 normal, no 

murmurs/rubs/gallops


RESPIRATORY: Clear to auscultation bilaterally, no rales/rhonchi/wheezes


GI:  obese abdomen, BS positive in 4 quadrants, soft, nontender, nondistended, 

no rebound or guarding, no organomegaly


: Deferred


MUSCULOSKELETAL:Decreased ROM of right shoulder, painful to touch, multiple 

bruised areas with well healed incision sites- almost unnoticeable.  No 

cyanosis, clubbing,  joint deformity, extremity edema


INTEGUMENTARY: Discoloration of bilateral lower ext, chronic.  Intact, no 

rashes, no lesions, no erythema


NEUROLOGIC: Cranial Nerves II-XII are intact, no focal deficits


PSYCHIATRIC: Mood and affect are normal 





LABORATORY DATA: 


Please see below 





MICROBIOLOGY:


Right shoulder intraarticular fluid aerobic GS: No organisms seen 


Right shoulder intraarticular fluid aerobic Cx:  NG





Right shoulder intraarticular fluid anaerobic GS  and Cx: pending 





BCx- Micrococcus sp


BCx- Staph hominis sensitive to Vancomycin 





IMAGING: 





Echo: 


EF 70%


Normal sinus rhythm with occasional frequent PACs.  Right bundle branch block.





Slightly challenging study in light of the patient's body habitus but


diagnostically useful information was still obtained.


 


M-mode and two-dimensional echocardiography was performed with pulsed, 

continuous


wave, color flow and tissue Doppler studies.


 


Normal left ventricular size with mild symmetrical hypertrophy.  Somewhat


hypokinetic to akinetic septal motion yet preserved global resting systolic


function.


 


Mildly dilated left atrium with grade 1 LV diastolic dysfunction but current


estimated mean left atrial pressure within normal limits.


 


At least mild to moderately dilated right heart chambers with normal wall motion


and current estimated pulmonary arterial pressure upper limits of normal.


 


Normal IVC size and collapse against an elevated central venous pressure.


 


Normal aortic diameters.


 


Mild aortic valvular sclerosis without apparent functional abnormality.


 


Mild degenerative changes of the mitral valvular apparatus with no more than


trace insufficiency.


 


Normal appearing tricuspid valve with trace insufficiency.


 


No separate intracardiac mass or pedunculated vegetation could be visualized.


Could not rule out a small sessile vegetation on her mitral or aortic valves.


 


No pericardial effusion.


 


If cardiac source of her infection is seriously suspect, a transesophageal


echocardiogram would be advised to further define mitral aortic valvular


appearance.





ASSESSMENT: 66 y/o F admitted for right shoulder pain ruling out septic joint, 

sepsis, gram positive cocci bacteremia. 





PLAN:  


1. Right shoulder pain r/o septic joint, post-op day 3 arthroscopic irrigation, 

debridement


-Pain mod-severe with movement of shoulder


-ESR, CRP further improved 


-Blood culture x1 : Staph hominis, micrococcus sensitive to Vancomycin. 


-C/w Vancomycin IV (Day 2)


-Pain regimen PRN, PT/OT


-Ortho primary 





2. Micrococcus, Staph hominis bacteremia. Cannot r/o right shoulder as primary 

source of infection and cannot r/o endocarditis with abnormal echo above.


-One culture Staph Hominis sensitive to Vancomycin (Day 2) 


-HR wnl, WBC wnl


-At this time, UA neg, no open skin ulcers/wounds and no respiratory concerns. 


-Echo above. 


-Discussed echo with Dr. Chahal who suggests discussing OLLIE with Dr. Lutz 

after weekend when he returns. At that time infectious disease will hopefully be

back and we can touch base with them as well to discuss case (already consulted 

by primary ortho team)


-F/u results on cultures and sensitivities, repeating blood cultures 7/3/20 (48 

hrs after initiation of Vancomycin)


-C/w vancomycin for now. 


-ID consulted to see next week 





3. Atrial fibrillation, controlled. 


-INR 2.04 today, goal 2-3


-D/c lovenox 1 mg/kg Q12 hrs


-C/w coumadin,  BB 





4. HTN. 


-C/w home medications. 





5. Hx of DVT, PE


-INR therapeutic today


-C/w coumadin


-Daily INR 





6. GERD. 


-C/w home med





7. DVT px. 


-Lovenox, coumadin.





VS, I&O, 24H, Fishbone


Vital Signs/I&O





Vital Signs








  Date Time  Temp Pulse Resp B/P (MAP) Pulse Ox O2 Delivery O2 Flow Rate FiO2


 


7/3/20 14:00 98.0 76 17 106/64 (78) 99 Room Air  


 


6/30/20 07:44       1.0 














I&O- Last 24 Hours up to 6 AM 


 


 7/3/20





 06:00


 


Intake Total 2360 ml


 


Output Total 1550 ml


 


Balance 810 ml











Laboratory Data


24H LABS


Laboratory Tests 2


7/3/20 06:31: 


Nucleated Red Blood Cells % (auto) 0.0, Erythrocyte Sedimentation Rate 126H, 

Anion Gap 7L, Glomerular Filtration Rate > 60.0, Calcium Level 9.5, Total Biliru

bin 0.4#, Aspartate Amino Transf (AST/SGOT) 22, Alanine Aminotransferase 

(ALT/SGPT) 17, Alkaline Phosphatase 97, C-Reactive Protein, Quantitative 4.92H, 

Total Protein 6.6, Albumin 2.2L, Albumin/Globulin Ratio 0.5L


7/3/20 08:46: 


Prothrombin Time 22.8H, Prothromb Time International Ratio 2.04


7/3/20 12:05: Vancomycin Level Trough 15.9


CBC/BMP


Laboratory Tests


7/3/20 06:31








Microbiology





Microbiology


7/3/20 Blood Culture, Received


         Pending


7/3/20 Blood Culture, Received


         Pending


6/29/20 Anaerobic Culture, Received


          Pending


6/29/20 Gram Stain - Final, Complete


          


6/29/20 Body Fluid Culture - Final, Complete


          


6/29/20 Respiratory Virus Panel (PCR) ([facility]) - Final, Complete


          


6/29/20 Blood Culture - Final, Complete


          Staphylococcus Hominis Ssp Guero


6/29/20 Blood Culture - Final, Complete


          Micrococcus Luteus





Current Medications





Current Medications








 Medications


  (Trade)  Dose


 Ordered  Sig/Roro


 Route


 PRN Reason  Start Time


 Stop Time Status Last Admin


Dose Admin


 


 Acetaminophen


  (Tylenol Tab)  650 mg  Q4HP  PRN


 PO


 MILD PAIN (PS 1-4)  6/30/20 00:30


    7/2/20 08:15





 


 Aspirin


  (Ecotrin)  81 mg  DAILY


 PO


   7/1/20 09:00


    7/3/20 10:04





 


 Atorvastatin


 Calcium


  (Lipitor)  20 mg  DAILY@1800


 PO


   6/29/20 18:00


    7/3/20 17:00





 


 Cefazolin Sodium/


 Dextrose 2 gm/IV


 Miscellaneous


 Supplies  50 ml @ 75


 mls/hr  Q8H


 IV


   6/30/20 06:00


 7/1/20 09:32 DC 7/1/20 05:29





 


 Docusate Sodium


  (Colace)  100 mg  BID


 PO


   7/2/20 09:00


    7/3/20 10:04





 


 Emollient Cream


  (Vanicream)  to legs


 and feet  BID


 TOP


   7/2/20 09:00


    7/3/20 10:07





 


 Enoxaparin Sodium


  (Lovenox)  100 mg  Q12H


 SC


   7/2/20 08:00


    7/3/20 10:22





 


 Fentanyl Citrate


  (Sublimaze)  25 mcg  Q5MP  PRN


 IV


 PAIN LEVEL 5-10  6/30/20 00:30


 6/30/20 01:29 DC  





 


 Furosemide


  (Lasix)  20 mg  DAILY


 PO


   6/30/20 09:00


    7/3/20 10:06





 


 Home Med


  (Med Rec


 Complete!)    ASDIRECTED


 XX


   6/29/20 21:00


 6/29/20 21:01 DC  





 


 Hydrochlorothiazide


  (Hydrodiuril)  12.5 mg  DAILY


 PO


   6/30/20 09:00


    7/3/20 10:05





 


 Lactated Ringer's  1,000 ml @ 


 100 mls/hr  Q10H


 IV


   6/30/20 00:30


 6/30/20 01:29 DC  





 


 Lactated Ringer's  1,000 ml @ 


 125 mls/hr  Q8H


 IV


   6/30/20 00:30


 6/30/20 06:08 DC 6/30/20 05:51





 


 Methimazole


  (Tapazole)  10 mg  DAILY


 PO


   6/30/20 09:00


    7/3/20 10:06





 


 Metoprolol


 Tartrate


  (Lopressor)  25 mg  BID


 PO


   6/29/20 21:00


 6/30/20 02:31 DC  





 


 Metoprolol


 Tartrate


  (Lopressor)  25 mg  BID


 PO


   6/30/20 09:00


    7/3/20 10:05





 


 Morphine Sulfate


  (Morphine


 Sulfate Inj)  2 mg  Q4HP  PRN


 IV


 MODERATE PAIN (PS 5-7)  6/30/20 00:30


    7/2/20 21:45





 


 Multivitamins


  (Theragram-M)  1 tab  DAILY


 PO


   6/30/20 09:00


    7/3/20 10:04





 


 Nystatin


  (Mycostatin


 Powder, Nystop)  to groin


 and under


 breasts  BID


 TOP


   7/2/20 09:00


    7/3/20 10:07





 


 Ondansetron HCl


  (ZOFRAN


 INJection)  4 mg  Q4HP  PRN


 IV


 NAUSEA OR VOMITING  6/30/20 00:30


 6/30/20 01:29 DC  





 


 Ondansetron HCl


  (ZOFRAN


 INJection)  4 mg  Q4HP  PRN


 IV


 NAUSEA  6/30/20 00:30


     





 


 Oxycodone HCl


  (Roxicodone,


 Oxyir)  5 mg  ASDIRECTED  PRN


 PO


 PAIN LEVEL 1-4  6/30/20 00:30


 6/30/20 01:29 DC  





 


 Oxycodone/


 Acetaminophen


  (Percocet 5mg/


 325mg Tablet)  1 tab  Q4HP  PRN


 PO


 SEVERE PAIN (PS 8-10)  6/30/20 00:30


    7/2/20 20:39





 


 Phytonadione 5 mg/


 Sodium Chloride  50.5 ml @ 


 101 mls/hr  STAT  STAT


 IV


   6/29/20 20:40


 6/29/20 21:09 DC 6/29/20 21:16





 


 Senna


  (Senokot)  2 tab  QHS


 PO


   7/2/20 21:00


    7/2/20 20:36





 


 Vancomycin HCl


 500 mg/Dextrose  110 ml @ 


 110 mls/hr  Q12H


 IV


   7/2/20 14:00


    7/3/20 15:10





 


 Vancomycin HCl


 750 mg/IV


 Miscellaneous


 Supplies 1 each/


 Dextrose  275 ml @ 


 275 mls/hr  Q12H


 IV


   7/2/20 13:00


    7/3/20 14:09





 


 Vancomycin HCl


 1000 mg/IV


 Miscellaneous


 Supplies 1 each/


 Dextrose  270 ml @ 


 270 mls/hr  Q8H


 IV


   7/1/20 11:00


 7/2/20 10:38 DC 7/2/20 03:18





 


 Warfarin Sodium


  (Coumadin)  2.5 mg  SuTuThSa@1700


 PO


   7/2/20 17:00


    7/2/20 17:19





 


 Warfarin Sodium


  (Coumadin)  5 mg  MoWeFr@1700


 PO


   7/1/20 17:00


    7/3/20 16:59














Allergies


Coded Allergies:  


     ibuprofen (Verified  Adverse Reaction, Mild, GI, 6/29/20)











Radha Quick MD             Jul 3, 2020 17:33

## 2020-07-04 NOTE — IPNPDOC
Date Seen


The patient was seen on 7/4/20.





Progress Note


SUBJECTIVE: 


No acute complaints overnight. BP elevated at 190-180 on multiple readings this 

afternoon, given tylenol and hydralazine 20 mg. PT states to have a lot of 

stress at home. Afebrile, WBC wnl. Denies chest pain, fevers, chills, n/v/d, 

shortness of breath. 





OBJECTIVE: 





VITAL SIGNS: 


Please see below





PHYSICAL EXAMINATION: 


VITAL SIGNS: Please see below 


CONSTITUTIONAL: No acute distress, resting comfortably, AAO x 3


EYES: PERRLA, EOM intact


HENT, MOUTH: Normocephalic, atraumatic, moist mucous membranes 


NECK: SUPPLE, no JVD, no lymphadenopathy, no carotid bruit


CV: tachycardic, irregularly irregular rhythm, S1S2 normal, no 

murmurs/rubs/gallops


RESPIRATORY: Clear to auscultation bilaterally, no rales/rhonchi/wheezes


GI:  obese abdomen, BS positive in 4 quadrants, soft, nontender, nondistended, 

no rebound or guarding, no organomegaly


: Deferred


MUSCULOSKELETAL:Decreased ROM of right shoulder, painful to touch still but 

decreased compared to 7/3/20, multiple bruised areas with well healed incision 

sites- almost unnoticeable.  No cyanosis, clubbing,  joint deformity, extremity 

edema


INTEGUMENTARY: Discoloration of bilateral lower ext, chronic.  Intact, no 

rashes, no lesions, no erythema


NEUROLOGIC: Cranial Nerves II-XII are intact, no focal deficits


PSYCHIATRIC: Mood and affect are normal 





LABORATORY DATA: 


Please see below 





MICROBIOLOGY:


Right shoulder intraarticular fluid aerobic GS: No organisms seen 


Right shoulder intraarticular fluid aerobic Cx:  NG





Right shoulder intraarticular fluid anaerobic GS  and Cx: pending 





BCx #1- Micrococcus sp


BCx #2- Staph hominis sensitive to Vancomycin 





Repeat BCx x 2 sets 7/3/20: NG at 24 hrs 





IMAGING: 





Echo: 


EF 70%


Normal sinus rhythm with occasional frequent PACs.  Right bundle branch block.





Slightly challenging study in light of the patient's body habitus but


diagnostically useful information was still obtained.


 


M-mode and two-dimensional echocardiography was performed with pulsed, 

continuous


wave, color flow and tissue Doppler studies.


 


Normal left ventricular size with mild symmetrical hypertrophy.  Somewhat


hypokinetic to akinetic septal motion yet preserved global resting systolic


function.


 


Mildly dilated left atrium with grade 1 LV diastolic dysfunction but current


estimated mean left atrial pressure within normal limits.


 


At least mild to moderately dilated right heart chambers with normal wall motion


and current estimated pulmonary arterial pressure upper limits of normal.


 


Normal IVC size and collapse against an elevated central venous pressure.


 


Normal aortic diameters.


 


Mild aortic valvular sclerosis without apparent functional abnormality.


 


Mild degenerative changes of the mitral valvular apparatus with no more than


trace insufficiency.


 


Normal appearing tricuspid valve with trace insufficiency.


 


No separate intracardiac mass or pedunculated vegetation could be visualized.


Could not rule out a small sessile vegetation on her mitral or aortic valves.


 


No pericardial effusion.


 


If cardiac source of her infection is seriously suspect, a transesophageal


echocardiogram would be advised to further define mitral aortic valvular


appearance.





ASSESSMENT: 64 y/o F admitted for right shoulder pain ruling out septic joint, 

sepsis, gram positive cocci bacteremia. 





PLAN:  


1. Right shoulder pain r/o septic joint, post-op day 6 arthroscopic irrigation, 

debridement


-Pain mod-severe with movement of shoulder


-ESR, CRP further improved 


-Blood culture x1 : Staph hominis, micrococcus sensitive to Vancomycin. 


-C/w Vancomycin IV (Day 3)


-Pain regimen PRN, PT/OT


-Ortho primary 





2. Micrococcus, Staph hominis bacteremia. Cannot r/o right shoulder as primary 

source of infection and cannot r/o endocarditis with abnormal echo above.


-Repeat BCx from 7/3/20 NG at 24 hrs


-See "Micro" above 


-Echo above and abnormal


-Discussed echo with Dr. Chahal who suggests discussing OLLIE with Dr. Lutz 

after July 4th holiday weekend when he returns. At that time infectious disease 

will hopefully be back and we can touch base with Dr. Richards as well to discuss 

case (already consulted by primary ortho team)


-F/u results on cultures and sensitivities


-C/w vancomycin (Day 3) for now. 


-ID consulted





3. Atrial fibrillation, controlled. 


-INR 2.34 today, goal 2-3


-C/w coumadin,  BB 





4. HTN. 


-C/w home medications. 





5. Hx of DVT, PE


-INR therapeutic today


-C/w coumadin


-Daily INR 





6. GERD. 


-C/w home med





7. DVT px. 


-Lovenox, coumadin.





VS, I&O, 24H, Fishbone


Vital Signs/I&O





Vital Signs








  Date Time  Temp Pulse Resp B/P (MAP) Pulse Ox O2 Delivery O2 Flow Rate FiO2


 


7/4/20 15:39    182/90    


 


7/4/20 14:00 97.8 77 18  99 Room Air  


 


6/30/20 07:44       1.0 














I&O- Last 24 Hours up to 6 AM 


 


 7/4/20





 06:00


 


Intake Total 2230 ml


 


Output Total 3420 ml


 


Balance -1190 ml











Laboratory Data


24H LABS


Laboratory Tests 2


7/4/20 06:05: 


Nucleated Red Blood Cells % (auto) 0.0, Erythrocyte Sedimentation Rate 126H, 

Prothrombin Time 25.5H, Prothromb Time International Ratio 2.34, Anion Gap 6L, 

Glomerular Filtration Rate > 60.0, Calcium Level 10.0, Total Bilirubin 0.5, 

Aspartate Amino Transf (AST/SGOT) 24, Alanine Aminotransferase (ALT/SGPT) 19, 

Alkaline Phosphatase 97, C-Reactive Protein, Quantitative 3.72H, Total Protein 

6.9, Albumin 2.3L, Albumin/Globulin Ratio 0.5L


CBC/BMP


Laboratory Tests


7/4/20 06:05








Microbiology





Microbiology


7/3/20 Blood Culture - Preliminary, Resulted


         No growth after 24 hours . All specim...


7/3/20 Blood Culture - Preliminary, Resulted


         No growth after 24 hours . All specim...


6/29/20 Anaerobic Culture - Final, Complete


          


6/29/20 Gram Stain - Final, Complete


          


6/29/20 Body Fluid Culture - Final, Complete


          


6/29/20 Respiratory Virus Panel (PCR) ([facility]) - Final, Complete


          


6/29/20 Blood Culture - Final, Complete


          Staphylococcus Hominis Ssp Guero


6/29/20 Blood Culture - Final, Complete


          Micrococcus Luteus





Current Medications





Current Medications








 Medications


  (Trade)  Dose


 Ordered  Sig/Roro


 Route


 PRN Reason  Start Time


 Stop Time Status Last Admin


Dose Admin


 


 Acetaminophen


  (Tylenol Tab)  650 mg  Q4HP  PRN


 PO


 MILD PAIN (PS 1-4)  6/30/20 00:30


    7/4/20 15:20





 


 Aspirin


  (Ecotrin)  81 mg  DAILY


 PO


   7/1/20 09:00


    7/4/20 09:07





 


 Atorvastatin


 Calcium


  (Lipitor)  20 mg  DAILY@1800


 PO


   6/29/20 18:00


    7/3/20 17:00





 


 Cefazolin Sodium/


 Dextrose 2 gm/IV


 Miscellaneous


 Supplies  50 ml @ 75


 mls/hr  Q8H


 IV


   6/30/20 06:00


 7/1/20 09:32 DC 7/1/20 05:29





 


 Docusate Sodium


  (Colace)  100 mg  BID


 PO


   7/2/20 09:00


    7/4/20 09:06





 


 Emollient Cream


  (Vanicream)  to legs


 and feet  BID


 TOP


   7/2/20 09:00


    7/4/20 09:05





 


 Enoxaparin Sodium


  (Lovenox)  100 mg  Q12H


 SC


   7/2/20 08:00


 7/3/20 17:34 DC 7/3/20 10:22





 


 Fentanyl Citrate


  (Sublimaze)  25 mcg  Q5MP  PRN


 IV


 PAIN LEVEL 5-10  6/30/20 00:30


 6/30/20 01:29 DC  





 


 Furosemide


  (Lasix)  20 mg  DAILY


 PO


   6/30/20 09:00


    7/4/20 09:07





 


 Home Med


  (Med Rec


 Complete!)    ASDIRECTED


 XX


   6/29/20 21:00


 6/29/20 21:01 DC  





 


 Hydrochlorothiazide


  (Hydrodiuril)  12.5 mg  DAILY


 PO


   6/30/20 09:00


    7/4/20 09:06





 


 Lactated Ringer's  1,000 ml @ 


 100 mls/hr  Q10H


 IV


   6/30/20 00:30


 6/30/20 01:29 DC  





 


 Lactated Ringer's  1,000 ml @ 


 125 mls/hr  Q8H


 IV


   6/30/20 00:30


 6/30/20 06:08 DC 6/30/20 05:51





 


 Methimazole


  (Tapazole)  10 mg  DAILY


 PO


   6/30/20 09:00


    7/4/20 09:06





 


 Metoprolol


 Tartrate


  (Lopressor)  25 mg  BID


 PO


   6/29/20 21:00


 6/30/20 02:31 DC  





 


 Metoprolol


 Tartrate


  (Lopressor)  25 mg  BID


 PO


   6/30/20 09:00


    7/4/20 09:07





 


 Morphine Sulfate


  (Morphine


 Sulfate Inj)  2 mg  Q4HP  PRN


 IV


 MODERATE PAIN (PS 5-7)  6/30/20 00:30


    7/2/20 21:45





 


 Multivitamins


  (Theragram-M)  1 tab  DAILY


 PO


   6/30/20 09:00


    7/4/20 09:06





 


 Nystatin


  (Mycostatin


 Powder, Nystop)  to groin


 and under


 breasts  BID


 TOP


   7/2/20 09:00


    7/4/20 09:06





 


 Ondansetron HCl


  (ZOFRAN


 INJection)  4 mg  Q4HP  PRN


 IV


 NAUSEA OR VOMITING  6/30/20 00:30


 6/30/20 01:29 DC  





 


 Ondansetron HCl


  (ZOFRAN


 INJection)  4 mg  Q4HP  PRN


 IV


 NAUSEA  6/30/20 00:30


     





 


 Oxycodone HCl


  (Roxicodone,


 Oxyir)  5 mg  ASDIRECTED  PRN


 PO


 PAIN LEVEL 1-4  6/30/20 00:30


 6/30/20 01:29 DC  





 


 Oxycodone/


 Acetaminophen


  (Percocet 5mg/


 325mg Tablet)  1 tab  Q4HP  PRN


 PO


 SEVERE PAIN (PS 8-10)  6/30/20 00:30


    7/2/20 20:39





 


 Phytonadione 5 mg/


 Sodium Chloride  50.5 ml @ 


 101 mls/hr  STAT  STAT


 IV


   6/29/20 20:40


 6/29/20 21:09 DC 6/29/20 21:16





 


 Senna


  (Senokot)  2 tab  QHS


 PO


   7/2/20 21:00


    7/2/20 20:36





 


 Vancomycin HCl


 500 mg/Dextrose  110 ml @ 


 110 mls/hr  Q12H


 IV


   7/2/20 14:00


    7/4/20 14:41





 


 Vancomycin HCl


 750 mg/IV


 Miscellaneous


 Supplies 1 each/


 Dextrose  275 ml @ 


 275 mls/hr  Q12H


 IV


   7/2/20 13:00


    7/4/20 13:13





 


 Vancomycin HCl


 1000 mg/IV


 Miscellaneous


 Supplies 1 each/


 Dextrose  270 ml @ 


 270 mls/hr  Q8H


 IV


   7/1/20 11:00


 7/2/20 10:38 DC 7/2/20 03:18





 


 Warfarin Sodium


  (Coumadin)  2.5 mg  SuTuThSa@1700


 PO


   7/2/20 17:00


    7/2/20 17:19





 


 Warfarin Sodium


  (Coumadin)  5 mg  MoWeFr@1700


 PO


   7/1/20 17:00


    7/3/20 16:59














Allergies


Coded Allergies:  


     ibuprofen (Verified  Adverse Reaction, Mild, GI, 6/29/20)











Radha Quick MD             Jul 4, 2020 16:05

## 2020-07-05 NOTE — IPNPDOC
Text Note


Date of Service


The patient was seen on 7/5/20.





NOTE


SUBJECTIVE: Complained of headache this am after waking up which had resolved by

late morning with tylenol and ibuprofen. BP better controlled today Afebrile, 

WBC wnl. Denies chest pain, fevers, chills, n/v/d, shortness of breath. 





PHYSICAL EXAMINATION: 


VITAL SIGNS: Please see below 


CONSTITUTIONAL: No acute distress, resting comfortably, AAO x 3


EYES: PERRLA, EOM intact


HENT, MOUTH: Normocephalic, atraumatic, moist mucous membranes 


NECK: SUPPLE, no JVD, no lymphadenopathy, no carotid bruit


CV: tachycardic, irregularly irregular rhythm, S1S2 normal, no murmurs/rubs/gal

lops


RESPIRATORY: Clear to auscultation bilaterally, no rales/rhonchi/wheezes


GI:  obese abdomen, BS positive in 4 quadrants, soft, nontender, nondistended, 

no rebound or guarding, no organomegaly


MUSCULOSKELETAL:Decreased ROM of right shoulder, painful to touch still but 

better, multiple bruised areas with well healed incision sites- almost 

unnoticeable.  No cyanosis, clubbing,  joint deformity, extremity edema


INTEGUMENTARY: Discoloration of bilateral lower ext, chronic statsis changes.  

Intact, no rashes, no lesions, no erythema


NEUROLOGIC: Cranial Nerves II-XII are intact, no focal deficits


PSYCHIATRIC: Mood and affect are normal 





LABORATORY DATA: 


Please see below 





MICROBIOLOGY:


Right shoulder intraarticular fluid aerobic GS: No organisms seen 


Right shoulder intraarticular fluid aerobic Cx:  NG





Right shoulder intraarticular fluid anaerobic GS  and Cx: pending 





BCx #1- Micrococcus sp


BCx #2- Staph hominis sensitive to Vancomycin 





Repeat BCx x 2 sets 7/3/20: NG at 24 hrs 





IMAGING: 





Echo: 


EF 70%


Normal sinus rhythm with occasional frequent PACs.  Right bundle branch block.





Slightly challenging study in light of the patient's body habitus but


diagnostically useful information was still obtained.


 


M-mode and two-dimensional echocardiography was performed with pulsed, c

ontinuous


wave, color flow and tissue Doppler studies.


 


Normal left ventricular size with mild symmetrical hypertrophy.  Somewhat


hypokinetic to akinetic septal motion yet preserved global resting systolic


function.


 


Mildly dilated left atrium with grade 1 LV diastolic dysfunction but current


estimated mean left atrial pressure within normal limits.


 


At least mild to moderately dilated right heart chambers with normal wall motion


and current estimated pulmonary arterial pressure upper limits of normal.


 


Normal IVC size and collapse against an elevated central venous pressure.


 


Normal aortic diameters.


 


Mild aortic valvular sclerosis without apparent functional abnormality.


 


Mild degenerative changes of the mitral valvular apparatus with no more than


trace insufficiency.


 


Normal appearing tricuspid valve with trace insufficiency.


 


No separate intracardiac mass or pedunculated vegetation could be visualized.


Could not rule out a small sessile vegetation on her mitral or aortic valves.


 


No pericardial effusion.


 


If cardiac source of her infection is seriously suspect, a transesophageal


echocardiogram would be advised to further define mitral aortic valvular


appearance.





ASSESSMENT:  F65-year-old female with past medical history of hypertension, 

atrial fibrillation, GERD, history of pulmonary emboli, history of deep vein 

thrombosis who presented to [facility] emergency room on 06/30/2020 after having 

worsening right shoulder pain for 5 days.  Admitted for right shoulder pain 

ruling out septic joint, sepsis, gram positive cocci bacteremia. 





Right shoulder pain r/o septic joint, post-op day 7 arthroscopic irrigation, 

debridement


-Pain mod-with movement of shoulder able to move more doing shoulder exercises. 


-ESR, CRP further improved 


-Blood culture x1 : Staph hominis, micrococcus sensitive to Vancomycin. 


-C/w Vancomycin IV (Day 4)


-Pain regimen PRN, PT/OT


-Ortho 





Micrococcus, Staph hominis bacteremia. Cannot r/o right shoulder as primary 

source of infection and cannot r/o endocarditis with abnormal echo above.


-Repeat BCx from 7/3/20 NG at 24 hrs


-See "Micro" above 


-Echo above and abnormal


-Discussed echo with Dr. Chahal who suggests discussing OLLIE with Dr. Lutz 

after July 4th holiday weekend when he returns. At that time infectious disease 

will hopefully be back and we can touch base with Dr. Richards as well to discuss 

case (already consulted by primary ortho team)


-F/u results on cultures and sensitivities


-C/w vancomycin (Day 3) for now. 


-ID consulted





Atrial fibrillation, controlled. 


-INR 2.34 today, goal 2-3


-C/w coumadin, flecainide and digoxin,  BB 





HTN. 


-C/w metoprolol. 





Hx of DVT, PE


-INR therapeutic today


-C/w coumadin


-Daily INR 





GERD. 


-C/w home med





DVT px. 


-Lovenox, coumadin.





VS,Fishbone, I+O


VS, Fishbone, I+O


Laboratory Tests


7/5/20 06:26











Vital Signs








  Date Time  Temp Pulse Resp B/P (MAP) Pulse Ox O2 Delivery O2 Flow Rate FiO2


 


7/5/20 09:59  83  163/82    


 


7/5/20 06:00 98.7  17  96 Room Air  


 


6/30/20 07:44       1.0 














I&O- Last 24 Hours up to 6 AM 


 


 7/5/20





 06:00


 


Intake Total 900 ml


 


Output Total 3000 ml


 


Balance -2100 ml

















VIVI MERCEDES MD                    Jul 5, 2020 12:33

## 2020-07-07 NOTE — CR
DATE OF CONSULTATION:  2020

 

Asked to consult by hospitalist for management of antibiotic in a patient with

right shoulder pain and bursitis.

 

HISTORY OF PRESENT ILLNESS:  Mrs. Becerril is a 65-year-old, morbidly obese female

who presented to [facility] Emergency Room on 2020 with

worsening right shoulder pain for 5 days duration.  The patient denied any trauma

to the right shoulder.  She has never had surgery to the right shoulder and no

foreign bodies.  The patient attempted to place hot and cold compresses and took

some pain medication without improvement.

 

On arrival to the emergency room (ER), she was noted to have a fever of 100.5.

Shoulder MRI showed a large complex joint effusion with debris and septation with

similar fluid down into subacromial, subdeltoid, and subcoracoid bursa.  There

was also a full-thickness supraspinatus insertion tendon tear.  The patient was

taken to the operating room (OR) by Dr. Anton as there was some concern of

septic arthritis, although clinically the shoulder did not look like it was a

septic joint.

 

Cultures were sent and were negative aerobically and anaerobically.  She had two

sets of blood cultures.  One had Staphylococcus hominis and the second one had

micrococcus which were both contaminants.  The patient was treated with 24 hours

of IV cefazolin perioperatively and then has been on vancomycin since then.

 

Her C-reactive protein (CRP) has improved from 10 to 2.80.  Erythrocyte

sedimentation rate (ESR) was over 140.  She had an echocardiogram, which showed

no vegetation.

 

Intraoperatively, the report was reviewed and there was description of

straw-colored fluid that was drained from the joint and sent for culture.  There

was moderate osteoarthritis and bursitis and therefore, a bursectomy was done by

Dr. Anton.

 

The patient is anxious to go home to take care of her cat and dog.  She feels

much better.

 

PAST MEDICAL HISTORY:  Significant for hypertension, atrial fibrillation,

gastroesophageal reflux disease, history of pulmonary embolism and deep venous

thrombosis (DVT) on Coumadin, obesity, severe osteoarthritis of both knees,

severe varicose veins of both knees.

 

PAST SURGICAL HISTORY:  Right shoulder surgery done by Dr. Anton, vein

stripping in bilateral lower extremities, cholecystectomy, tonsillectomy.

 

SOCIAL HISTORY:  Smokes seven cigarettes per day for the past 40 years.  Social

alcohol use.  Denies illicit drug use.  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

to retire due to varicose veins.  She is a , lost her  about 3 years

ago.  She has a cat and a dog.  Has never been able to have any children.

 

FAMILY HISTORY:  Father with lung disease,  at 75.  Mother with heart

disease,  at 75.

 

ALLERGIES:  IBUPROFEN.

 

MEDICATIONS:

- vancomycin 1 gram IV every 12 hours

- Tylenol 1000 mg by mouth twice a day

- aspirin 325 mg by mouth daily

- Colace 100 mg by mouth twice a day

- warfarin 2.5 alternating with 5 mg every other day

- hydrochlorothiazide 12.5 mg by mouth daily

- atorvastatin 20 mg by mouth daily

- metoprolol 25 mg by mouth twice a day

- nystatin to groin topical twice a day

- famotidine 40 mg by mouth every evening

- Percocet 2 tablets by mouth every 6 as needed

- Pneumovax was given on 2020

- lisinopril 20 mg by mouth given once

 

LABORATORY DATA:  White count 8.3, hemoglobin 10.6, hematocrit 33.1, platelets

238.  , down from over 140.

 

Sodium 140, potassium 4.1, chloride 105, bicarbonate 28, BUN 16, creatinine 0.81,

glucose 99, uric acid 4.9, calcium 10.6.  CRP 2.84, down from 10.

 

Microbiology:  Blood culture on 2020 at 14:13 was Micrococcus luteus, on

2020 at 14:40 it was Staphylococcus hominis.  Two more sets done on

2020 were no growth after 72 hours.  Aerobic and anaerobic cultures of the

right shoulder were negative.  Cell count was not done and crystals were not

sent.

 

PHYSICAL EXAMINATION:

She is a pleasant, healthy-looking female, obese, in no acute distress,

ambulating independently.

Heart:  Normal S1, S2.  No murmurs, rubs or gallops.

Lungs are clear.  No wheezes, rales or rhonchi.

Abdomen:  Morbidly obese, soft, nontender.

Back:  No costovertebral angle (CVA) or lumbosacral tenderness.

Extremities:  Large varicose vein tortuous with bilateral venous stasis changes

and no open ulcers.  No clubbing, cyanosis, +1 ankle edema.

Right shoulder has ecchymosis at the incision site, but she has at least 85

degrees range of motion on her own.  There is bruising but no warmth or

cellulitis.  Laparoscopic surgical scars are noted.

 

IMAGING:  Shoulder MRI done on 2020 showed a tear of the posterior

supraspinatus, large complex shoulder joint effusion with extensive debris and

severe sinusitis, large amount of septation, complex labral tear, complex bursal

fluid tracking to the acromioclavicular joint, at least partial tears of anterior

and posterior bands of the inferior glenohumeral segment.  Multifocal muscle

edema about the shoulder joint.  This is most consistent with myositis.  Muscle

strain could also be this appearance.

 

IMPRESSION:  65-year-old female admitted with severe right shoulder pain and MRI

finding consistent with a large effusion.  Cultures were negative.  The patient

had slightly elevated inflammatory markers, including high ESR and CRP which have

clinically improved.  She had a low grade temperature of 100.5 for 1 day and

white count of 13.21 on admission but these have resolved.  The patient has been

on IV vancomycin since 2020 for 6 days and 24 hours of IV cefazolin

perioperatively.  Clinically, this is not consistent with a septic joint.  Blood

cultures were not consistent with septic arthritis.  These are two different

pathogens that are skin contaminants.  The fact that the joint fluid did not grow

any bacteria makes it also unlikely that it was a septic joint.  The patient does

not have risk for Lyme disease.  She has an indoor cat and a dog but she does not

walk to the dog.  So, Lyme disease would be unlikely.  She could have a

mechanical trauma with a large extremity joint effusion versus a crystal induced

bursitis, arthritis.

 

PLAN:  Discontinue IV vancomycin.  I would suggest switching her to doxycycline

100 mg by mouth twice a day for 2 weeks.  If joint pain worsens, may consider a

short course of prednisone for 5 days.  The patient will followup in my office in

2 weeks.  I have ordered for Lyme serology just for completeness, although my

suspicion is very low.  The case has been discussed with Dr. Hutchinson, who agrees on

discharging the patient home today, to followup in clinic in 2 weeks.

## 2020-07-18 NOTE — DSES
DATE OF ADMISSION:  06/30/2020

DATE OF DISCHARGE:  07/06/2020

 

ADMITTING DIAGNOSIS:

Possible right shoulder joint infection.

 

OTHER DIAGNOSES:

1.  Hypertension.

2.  History of left leg deep vein thrombosis.

3.  A number of surgeries to the venous system in the lower extremities.

The patient taking Coumadin.

 

DISCHARGE DIAGNOSIS:

Severe right shoulder pain, status post irrigation and debridement with

perioperative cultures that were negative.

 

HISTORY:  The patient is a 65-year-old female who complained of right shoulder

pain for 5 days without an inciting injury.  Pain was increasing with movement.

She did present to the emergency room, where it was noted that she had a

low-grade fever with elevated inflammatory markers.  She consented for an

elective irrigation and debridement of her right shoulder with perioperative

labs.

 

OPERATION PERFORMED:  Right shoulder irrigation and debridement with

perioperative labs.

 

HOSPITAL COURSE:  The patient underwent a right shoulder arthroscopic irrigation

and debridement with perioperative cultures for suspected right shoulder

infection.  Surgery was done under general anesthesia and was uneventful.  The

patient was hospitalized for intravenous (IV) antibiotics and close followup.

She was discharged on oral pain medications and will resume her preoperative

medications and diet.  She has been consulted by the infectious disease doctor

and switched from intravenous (IV) antibiotics to oral antibiotics.  Therapy was

recommended for active assisted and passive range of motion of the right

shoulder.  She will followup in our office in approximately 7-10 days for

re-evaluation, sooner if there is any increase in symptoms.  In the meantime, 
she

will be followed by infectious disease.  Please see medical records for

additional details.

ESSIE

## 2020-12-11 NOTE — REP
INDICATION:

pain and swelling, history of dvt



COMPARISON:

Bilateral lower extremity deep vein ultrasound dated 03/25/2020.



TECHNIQUE:

Multiple real-time and Doppler ultrasonographic images.



FINDINGS:

The deep veins demonstrate normal compression, normal Doppler color flow and normal

Doppler waveforms with respiration augmentation from the popliteal vein to the common

femoral vein.  Evaluation of the distal femoral vein is technically difficult because

of patient pain.



IMPRESSION:

There is no deep vein thrombus in the  left lower extremity





<Electronically signed by Madhu Valles > 12/11/20 [id number]

## 2021-02-01 ENCOUNTER — HOSPITAL ENCOUNTER (OUTPATIENT)
Dept: HOSPITAL 53 - M LAB REF | Age: 66
End: 2021-02-01
Attending: INTERNAL MEDICINE
Payer: MEDICARE

## 2021-02-01 DIAGNOSIS — E83.52: Primary | ICD-10-CM

## 2021-02-22 ENCOUNTER — HOSPITAL ENCOUNTER (EMERGENCY)
Dept: HOSPITAL 53 - M ED | Age: 66
Discharge: HOME | End: 2021-02-22
Payer: MEDICARE

## 2021-02-22 VITALS — WEIGHT: 222.67 LBS | BODY MASS INDEX: 35.79 KG/M2 | HEIGHT: 66 IN

## 2021-02-22 VITALS — SYSTOLIC BLOOD PRESSURE: 173 MMHG | DIASTOLIC BLOOD PRESSURE: 97 MMHG

## 2021-02-22 DIAGNOSIS — Z88.6: ICD-10-CM

## 2021-02-22 DIAGNOSIS — Z86.718: ICD-10-CM

## 2021-02-22 DIAGNOSIS — F32.9: ICD-10-CM

## 2021-02-22 DIAGNOSIS — F41.9: ICD-10-CM

## 2021-02-22 DIAGNOSIS — M54.5: ICD-10-CM

## 2021-02-22 DIAGNOSIS — R07.9: ICD-10-CM

## 2021-02-22 DIAGNOSIS — I80.01: Primary | ICD-10-CM

## 2021-02-22 DIAGNOSIS — I48.91: ICD-10-CM

## 2021-02-22 DIAGNOSIS — Z79.899: ICD-10-CM

## 2021-02-22 DIAGNOSIS — K21.9: ICD-10-CM

## 2021-02-22 DIAGNOSIS — G43.909: ICD-10-CM

## 2021-02-22 DIAGNOSIS — F17.200: ICD-10-CM

## 2021-02-22 DIAGNOSIS — Z79.01: ICD-10-CM

## 2021-02-22 DIAGNOSIS — I10: ICD-10-CM

## 2021-02-22 DIAGNOSIS — I83.813: ICD-10-CM

## 2021-02-22 LAB
APTT BLD: 35.2 SECONDS (ref 24.2–38.5)
BASOPHILS # BLD AUTO: 0 10^3/UL (ref 0–0.2)
BASOPHILS NFR BLD AUTO: 0.5 % (ref 0–1)
BUN SERPL-MCNC: 29 MG/DL (ref 7–18)
CALCIUM SERPL-MCNC: 10.8 MG/DL (ref 8.8–10.2)
CHLORIDE SERPL-SCNC: 105 MEQ/L (ref 98–107)
CO2 SERPL-SCNC: 26 MEQ/L (ref 21–32)
CREAT SERPL-MCNC: 0.92 MG/DL (ref 0.55–1.3)
CRP SERPL-MCNC: 0.52 MG/DL (ref 0–0.3)
EOSINOPHIL # BLD AUTO: 0.1 10^3/UL (ref 0–0.5)
EOSINOPHIL NFR BLD AUTO: 1.4 % (ref 0–3)
ERYTHROCYTE [SEDIMENTATION RATE] IN BLOOD BY WESTERGREN METHOD: 108 MM/HR (ref 0–30)
GFR SERPL CREATININE-BSD FRML MDRD: > 60 ML/MIN/{1.73_M2} (ref 45–?)
GLUCOSE SERPL-MCNC: 99 MG/DL (ref 70–100)
HCT VFR BLD AUTO: 39.3 % (ref 36–47)
HGB BLD-MCNC: 12.4 G/DL (ref 12–15.5)
INR PPP: 1.05
LYMPHOCYTES # BLD AUTO: 1.2 10^3/UL (ref 1.5–5)
LYMPHOCYTES NFR BLD AUTO: 27.8 % (ref 24–44)
MCH RBC QN AUTO: 31.3 PG (ref 27–33)
MCHC RBC AUTO-ENTMCNC: 31.6 G/DL (ref 32–36.5)
MCV RBC AUTO: 99.2 FL (ref 80–96)
MONOCYTES # BLD AUTO: 0.3 10^3/UL (ref 0–0.8)
MONOCYTES NFR BLD AUTO: 7.4 % (ref 2–8)
NEUTROPHILS # BLD AUTO: 2.7 10^3/UL (ref 1.5–8.5)
NEUTROPHILS NFR BLD AUTO: 62.7 % (ref 36–66)
PLATELET # BLD AUTO: 193 10^3/UL (ref 150–450)
POTASSIUM SERPL-SCNC: 4.9 MEQ/L (ref 3.5–5.1)
PROTHROMBIN TIME: 13.9 SECONDS (ref 12.5–14.3)
RBC # BLD AUTO: 3.96 10^6/UL (ref 4–5.4)
SODIUM SERPL-SCNC: 139 MEQ/L (ref 136–145)
WBC # BLD AUTO: 4.3 10^3/UL (ref 4–10)

## 2021-02-22 NOTE — CCD
Summarization Of Episode

                             Created on: 2021



JUANY BENTLEY

External Reference #: [id number]

: 1955

Sex: Female



Demographics





                          Address                   [address]

[place]  [address]

 

                          Home Phone                [phone]

 

                          Preferred Language        English

 

                          Marital Status            

 

                          Islam Affiliation     [place]

 

                          Race                      White

 

                          Ethnic Group              Not  or 





Author





                          Author                    [facility]

 

                          Organization              [facility]

 

                          Address                   Unknown

 

                          Phone                     Unavailable







Support





                Name            Relationship    Address         Phone

 

                    MARJAN BIRD     Next Of Kin         [address]

[place]  [address]                    [phone]

 

                DISABLED        Next Of Kin     Unknown         Unavailable

 

                    ALFRED MOSLEY    Next Of Kin         [address]  [address]                    [phone]







Care Team Providers





                    Care Team Member Name Role                Phone

 

                    David,  Hansa FNP Unavailable         Unavailable

 

                    David,  Hansa FNP Unavailable         Unavailable

 

                    David,  Hansa FNP Unavailable         Unavailable

 

                    David,  Hansa FNP Unavailable         Unavailable

 

                    David,  Hansa FNP Unavailable         Unavailable

 

                    David,  Hansa FNP Unavailable         Unavailable

 

                    David,  Hansa FNP Unavailable         Unavailable

 

                    David,  Hansa FNP Unavailable         Unavailable

 

                    David,  Hansa FNP Unavailable         Unavailable

 

                    David,  Hansa FNP Unavailable         Unavailable

 

                    David,  Hansa FNP Unavailable         Unavailable

 

                    David,  Hansa FNP Unavailable         Unavailable

 

                    David,  Hansa FNP Unavailable         Unavailable

 

                    David,  Hansa FNP Unavailable         Unavailable

 

                    David,  Hansa FNP Unavailable         Unavailable

 

                    David,  Hansa FNP Unavailable         Unavailable

 

                    David,  Hansa FNP Unavailable         Unavailable

 

                    David,  Hansa FNP Unavailable         Unavailable

 

                    David,  Hansa FNP Unavailable         Unavailable

 

                    David,  Hansa FNP Unavailable         Unavailable

 

                    David,  Hansa FNP Unavailable         Unavailable

 

                    David,  Hansa FNP Unavailable         Unavailable

 

                    David,  Hansa FNP Unavailable         Unavailable

 

                    David,  Hansa FNP Unavailable         Unavailable

 

                    David,  Hansa FNP Unavailable         Unavailable

 

                    David,  Hansa FNP Unavailable         Unavailable

 

                    David,  Hansa FNP Unavailable         Unavailable

 

                    ABELARDO Regalado MD Unavailable         Unavailable

 

                    ABELARDO Regalado MD Unavailable         Unavailable

 

                    ABELARDO Regalado MD Unavailable         Unavailable

 

                    ABELARDO Regalado MD Unavailable         Unavailable

 

                    ABELARDO Regalado MD Unavailable         Unavailable

 

                    SabineABELARDO MD Unavailable         Unavailable

 

                    SabineABELARDO fucsh MD Unavailable         Unavailable

 

                    SabineABELARDO MD Unavailable         Unavailable

 

                    SabineABELARDO MD Unavailable         Unavailable

 

                    SabineABELARDO MD Unavailable         Unavailable

 

                    SabineABELARDO MD Unavailable         Unavailable

 

                    SabineABELARDO MD Unavailable         Unavailable

 

                    SabineABELARDO MD Unavailable         Unavailable

 

                    SabineABELARDO MD Unavailable         Unavailable

 

                    SabineABELARDO xavier MD Unavailable         Unavailable

 

                    SabineABELARDO MD Unavailable         Unavailable

 

                    SabineABELARDO MD Unavailable         Unavailable

 

                    SabineABELARDO MD Unavailable         Unavailable

 

                    SabineABELARDO fuchs MD Unavailable         Unavailable

 

                    SabineABELARDO MD Unavailable         Unavailable

 

                    SabineABELARDO MD Unavailable         Unavailable

 

                    SabineABELARDO MD Unavailable         Unavailable

 

                    SabineABELARDO xavier MD Unavailable         Unavailable

 

                    SabineABELARDO MD Unavailable         Unavailable

 

                    ABELARDO Regalado MD Unavailable         Unavailable

 

                    SabineABELARDO fuchs MD Unavailable         Unavailable

 

                    ABELARDO Regalado MD Unavailable         Unavailable

 

                    ABELARDO Regalado MD Unavailable         Unavailable

 

                    ABELARDO Regalado MD Unavailable         Unavailable

 

                    ABELARDO Regalado MD Unavailable         Unavailable

 

                    ABELARDO Regalado MD Unavailable         Unavailable

 

                    ABELARDO Regalado MD Unavailable         Unavailable

 

                    ABELARDO Regalado MD Unavailable         Unavailable

 

                    ABELARDO Regalado MD Unavailable         Unavailable

 

                    ABELARDO Regalado MD Unavailable         Unavailable

 

                    ABELARDO Regalado MD Unavailable         Unavailable

 

                    ABELARDO Regalado MD Unavailable         Unavailable

 

                    ABELARDO Regalado MD Unavailable         Unavailable

 

                    ABELARDO Regalado MD Unavailable         Unavailable

 

                    ABELARDO Regalado MD Unavailable         Unavailable

 

                    ABELARDO Regalado MD Unavailable         Unavailable

 

                    ABELARDO Regalado MD Unavailable         Unavailable

 

                    ABELARDO Regalado MD Unavailable         Unavailable

 

                    ABELARDO Regalado MD Unavailable         Unavailable

 

                    ABELARDO Regalado MD Unavailable         Unavailable

 

                    ABELARDO Regalado MD Unavailable         Unavailable

 

                    ABELARDO Regalado MD Unavailable         Unavailable

 

                    ABELARDO Regalado MD Unavailable         Unavailable

 

                    ABELARDO Regalado MD Unavailable         Unavailable

 

                    ABELARDO Regalado MD Unavailable         Unavailable

 

                    ABELARDO Regalado MD Unavailable         Unavailable

 

                    ABELARDO Regalado MD Unavailable         Unavailable

 

                    ABELARDO Regalado MD Unavailable         Unavailable

 

                    ABELARDO Regalado MD Unavailable         Unavailable

 

                    ABELARDO Regalado MD Unavailable         Unavailable

 

                    Sabine, M Charmaine MD Unavailable         Unavailable

 

                    ABELARDO Regalado MD Unavailable         Unavailable

 

                    ABELARDO Regalado MD Unavailable         Unavailable

 

                    ABELARDO Regalado MD Unavailable         Unavailable

 

                    ABELARDO Regalado MD Unavailable         Unavailable

 

                    ABELARDO Regalado MD Unavailable         Unavailable

 

                    ABELARDO Regalado MD Unavailable         Unavailable

 

                    ABELARDO Regalado MD Unavailable         Unavailable

 

                    ABELARDO Regalado MD Unavailable         Unavailable

 

                    ABELARDO Regalado MD Unavailable         Unavailable

 

                    ABELARDO Regalado MD Unavailable         Unavailable

 

                    ABELARDO Regalado MD Unavailable         Unavailable

 

                    ABELARDO Regalado MD Unavailable         Unavailable

 

                    ABELARDO Regalado MD Unavailable         Unavailable

 

                    ABELARDO Regalado MD Unavailable         Unavailable

 

                    ABELARDO Regalado MD Unavailable         Unavailable

 

                    ABELARDO Regalado MD Unavailable         Unavailable

 

                    ABELARDO Regalado MD Unavailable         Unavailable

 

                    ABELARDO Regalado MD Unavailable         Unavailable

 

                    ABELARDO Regalado MD Unavailable         Unavailable

 

                    ABELARDO Regalado MD Unavailable         Unavailable

 

                    ABELARDO Regalado MD Unavailable         Unavailable

 

                    ABELARDO Regalado MD Unavailable         Unavailable

 

                    Alireza, L Mony PA Unavailable         Unavailable

 

                    Alireza, L Mony PA Unavailable         Unavailable

 

                    Alireza, L Mony PA Unavailable         Unavailable

 

                    Alireza, L Mony PA Unavailable         Unavailable

 

                    Alireza, L Mony PA Unavailable         Unavailable

 

                    Alireza, L Mony PA Unavailable         Unavailable

 

                    Alireza, L Mony PA Unavailable         Unavailable

 

                    Alireza, L Mony PA Unavailable         Unavailable

 

                    Alireza, L Mony PA Unavailable         Unavailable

 

                    Alireza, L Mony PA Unavailable         Unavailable

 

                    Alireza, L Mony PA Unavailable         Unavailable

 

                    Alireza, L Mony PA Unavailable         Unavailable

 

                    Alireza, L Mony PA Unavailable         Unavailable

 

                    Alireza, L Mony PA Unavailable         Unavailable

 

                    Alireza, L Mony PA Unavailable         Unavailable

 

                    Alireza, L Mony PA Unavailable         Unavailable

 

                    Alireza, L Mony PA Unavailable         Unavailable

 

                    Alireza, L Mony PA Unavailable         Unavailable

 

                    Alireza, L Mony PA Unavailable         Unavailable

 

                    Alireza, L Mony PA Unavailable         Unavailable

 

                    Alireza, L Mony PA Unavailable         Unavailable

 

                    Alireza, L Mony PA Unavailable         Unavailable

 

                    Alireza, L Mony PA Unavailable         Unavailable

 

                    EMILY Huitron MD   Unavailable         Unavailable

 

                    EMILY Huitron MD   Unavailable         Unavailable

 

                    EMILY Huitron MD   Unavailable         Unavailable

 

                    EMILY Huitron MD   Unavailable         Unavailable

 

                    EMILY Huitron MD   Unavailable         Unavailable

 

                    EMILY Huitron MD   Unavailable         Unavailable

 

                    EMILY Huitron MD   Unavailable         Unavailable

 

                    EMILY Huitron MD   Unavailable         Unavailable

 

                    EMILY Huitron MD   Unavailable         Unavailable

 

                    MEILY Huitron MD   Unavailable         Unavailable

 

                    Fish, B Uzair PURCELL   Unavailable         Unavailable

 

                    Fish, B Uzair PURCELL   Unavailable         Unavailable

 

                    Fish, B Uzair PURCELL   Unavailable         Unavailable

 

                    Fish, B Uzair PURCELL   Unavailable         Unavailable

 

                    Fish, B Uzair PURCELL   Unavailable         Unavailable

 

                    Fish, B Uzair PURCELL   Unavailable         Unavailable

 

                    Fish, B Uzair PURCELL   Unavailable         Unavailable

 

                    Fish, B Uzair PURCELL   Unavailable         Unavailable

 

                    Fish, B Uzair PURCELL   Unavailable         Unavailable

 

                    Fish, B Uzair PURCELL   Unavailable         Unavailable

 

                    Fish, B Uzair PURCELL   Unavailable         Unavailable

 

                    Fish, B Uzair PURCELL   Unavailable         Unavailable

 

                    Fish, B Uzair PURCELL   Unavailable         Unavailable

 

                    Fish, B Uzair PURCELL   Unavailable         Unavailable

 

                    Fish, B Uzair PURCELL   Unavailable         Unavailable

 

                    Fish, B Uzair PURCELL   Unavailable         Unavailable

 

                    Fish, B Uzair PURCELL   Unavailable         Unavailable

 

                    Fish, B Uzair PURCELL   Unavailable         Unavailable

 

                    Fish, B Uzair PURCELL   Unavailable         Unavailable

 

                    Fish, B Uzair PURCELL   Unavailable         Unavailable

 

                    Fish, B Uzair PURCELL   Unavailable         Unavailable

 

                    Fish, B Uzair PURCELL   Unavailable         Unavailable

 

                    Fish, B Uzair PURCELL   Unavailable         Unavailable

 

                    Fish, B Uzair PURCELL   Unavailable         Unavailable

 

                    Fish, B Uzair PURCELL   Unavailable         Unavailable

 

                    Fish, B Uzair PURCELL   Unavailable         Unavailable

 

                    Fish, B Uzair PURCELL   Unavailable         Unavailable

 

                    Fish, B Uzair PURCELL   Unavailable         Unavailable

 

                    Fish, B Uzair PURCELL   Unavailable         Unavailable

 

                    Fish, B Uzair PURCELL   Unavailable         Unavailable

 

                    Fish, B Uzair PURCELL   Unavailable         Unavailable

 

                    Fish, B Uzair PURCELL   Unavailable         Unavailable

 

                    Fish, B Uzair PURCELL   Unavailable         Unavailable

 

                    Fish, B Uzair PURCELL   Unavailable         Unavailable

 

                    Fish, B Uzair PURCELL   Unavailable         Unavailable

 

                    Fish, B Uzair PURCELL   Unavailable         Unavailable

 

                    Fish, B Uzair PURCELL   Unavailable         Unavailable

 

                    Fish, B Uzair PURCELL   Unavailable         Unavailable

 

                    Fish, B Uzair PURCELL   Unavailable         Unavailable

 

                    Fish, B Uzair PURCELL   Unavailable         Unavailable

 

                    Fish, B Uzair PURCELL   Unavailable         Unavailable

 

                    Fish, B Uzair PURCELL   Unavailable         Unavailable

 

                    Fish, B Uzair PURCELL   Unavailable         Unavailable

 

                    DORA DE LEON MD  Unavailable         Unavailable

 

                    DORA DE LEON MD  Unavailable         Unavailable

 

                    DORA DE LEON MD  Unavailable         Unavailable

 

                    DORA DE LEON MD  Unavailable         Unavailable

 

                    DORA DE LEON MD  Unavailable         Unavailable

 

                    DORA DE LEON MD  Unavailable         Unavailable

 

                    DORA DE LEON MD  Unavailable         Unavailable

 

                    DORA DE LEON MD  Unavailable         Unavailable

 

                    DORA DE LEON MD  Unavailable         Unavailable

 

                    DORA DE LEON MD  Unavailable         Unavailable

 

                    DORA DE LEON MD  Unavailable         Unavailable

 

                    DORA DE LEON MD  Unavailable         Unavailable

 

                    DORA DE LEON MD  Unavailable         Unavailable

 

                    DORA DE LEON MD  Unavailable         Unavailable

 

                    DORA DE LEON MD  Unavailable         Unavailable

 

                    DORA DE LEON MD  Unavailable         Unavailable

 

                    DORA DE LEON MD  Unavailable         Unavailable

 

                    DORA DE LEON MD  Unavailable         Unavailable

 

                    DORA DE LEON MD  Unavailable         Unavailable

 

                    DORA DE LEON MD  Unavailable         Unavailable

 

                    DE LEONDORA RENE MD  Unavailable         Unavailable

 

                    DORA DE LEON MD  Unavailable         Unavailable

 

                    DE LEONDORA RENE MD  Unavailable         Unavailable

 

                    DE LEONDORA RENE MD  Unavailable         Unavailable

 

                    DE LEONDORA RENE MD  Unavailable         Unavailable

 

                    DE LEONDORA RENE MD  Unavailable         Unavailable

 

                    DE LEONDORA RENE MD  Unavailable         Unavailable

 

                    DE LEONDORA RENE MD  Unavailable         Unavailable

 

                    DE LEONDORA MD  Unavailable         Unavailable

 

                    DORA DE LEON MD  Unavailable         Unavailable

 

                    DE LEONDORA RENE MD  Unavailable         Unavailable

 

                    DE LEONDORA RENE MD  Unavailable         Unavailable

 

                    DE LEONDORA RENE MD  Unavailable         Unavailable

 

                    DE LEONDORA RENE MD  Unavailable         Unavailable

 

                    DE LEONDORA RENE MD  Unavailable         Unavailable

 

                    DE LEONDORA RENE MD  Unavailable         Unavailable

 

                    DE LEONDORA RENE MD  Unavailable         Unavailable

 

                    DE LEONDORA RENE MD  Unavailable         Unavailable

 

                    DE LEONDORA RENE MD  Unavailable         Unavailable

 

                    DE LEONDORA RENE MD  Unavailable         Unavailable

 

                    DE LEONDORA RENE MD  Unavailable         Unavailable

 

                    DE LEONDORA RENE MD  Unavailable         Unavailable

 

                    DE LEONDORA RENE MD  Unavailable         Unavailable

 

                    DE LEONDORA RENE MD  Unavailable         Unavailable

 

                    DORA DE LEON MD  Unavailable         Unavailable

 

                    DE LEONDORA RENE MD  Unavailable         Unavailable

 

                    DE LEONDORA RENE MD  Unavailable         Unavailable

 

                    DE LEONDORA RENE MD  Unavailable         Unavailable

 

                    DE LEONDORA RENE MD  Unavailable         Unavailable

 

                    DORA DE LEON MD  Unavailable         Unavailable

 

                    DE LEONDORA RENE MD  Unavailable         Unavailable

 

                    DORA DE LEON MD  Unavailable         Unavailable

 

                    DORA DE LEON MD  Unavailable         Unavailable

 

                    DE LEONDORA RENE MD  Unavailable         Unavailable

 

                    DE LEONDORA RENE MD  Unavailable         Unavailable

 

                    DE LEONDORA RENE MD  Unavailable         Unavailable

 

                    DE LEONDORA RENE MD  Unavailable         Unavailable

 

                    Carlton,  Juany FNP Unavailable         Unavailable

 

                    Carlton,  Juany FNP Unavailable         Unavailable

 

                    Carlton,  Juany FNP Unavailable         Unavailable

 

                    Carlton,  Juany FNP Unavailable         Unavailable

 

                    Carlton,  Juany FNP Unavailable         Unavailable

 

                    Carlton,  Juany FNP Unavailable         Unavailable

 

                    Carlton,  Juany FNP Unavailable         Unavailable

 

                    Carlton,  Juany FNP Unavailable         Unavailable

 

                    Carlton,  Juany FNP Unavailable         Unavailable

 

                    Carlton,  Juany FNP Unavailable         Unavailable

 

                    Carlton,  Juany FNP Unavailable         Unavailable

 

                    Carlton,  Juany FNP Unavailable         Unavailable

 

                    Carlton,  Juany FNP Unavailable         Unavailable

 

                    Carlton,  Juany FNP Unavailable         Unavailable

 

                    Carlton,  Juany FNP Unavailable         Unavailable

 

                    Carlton,  Juany FNP Unavailable         Unavailable

 

                    Carlton,  Juany FNP Unavailable         Unavailable

 

                    Carlton,  Juany FNP Unavailable         Unavailable

 

                    Carlton,  Juany FNP Unavailable         Unavailable

 

                    Carlton,  Juany FNP Unavailable         Unavailable

 

                    Carlton,  Juany FNP Unavailable         Unavailable

 

                    Carlton,  Juany FNP Unavailable         Unavailable

 

                    Carlton,  Juany FNP Unavailable         Unavailable

 

                    Carlton,  Juany FNP Unavailable         Unavailable

 

                    Carlton,  Juany FNP Unavailable         Unavailable

 

                    Carlton,  Juany FNP Unavailable         Unavailable

 

                    Carlton,  Juany FNP Unavailable         Unavailable

 

                    Carlton,  Juany FNP Unavailable         Unavailable

 

                    Carlton,  Juany FNP Unavailable         Unavailable

 

                    Carlton,  Juany FNP Unavailable         Unavailable

 

                    Carlton,  Juany FNP Unavailable         Unavailable

 

                    Carlton,  Juany FNP Unavailable         Unavailable

 

                    Carlton,  Juany FNP Unavailable         Unavailable

 

                    Carlton,  Juany FNP Unavailable         Unavailable

 

                    Carlton,  Juany FNP Unavailable         Unavailable

 

                    Carlton,  Juany FNP Unavailable         Unavailable

 

                    Carlton,  Juany FNP Unavailable         Unavailable

 

                    Carlton,  Juany FNP Unavailable         Unavailable

 

                    Carlton,  Juany FNP Unavailable         Unavailable

 

                    Carlton,  Juany FNP Unavailable         Unavailable

 

                    Carlton,  Juany FNP Unavailable         Unavailable

 

                    Carlton,  Juany FNP Unavailable         Unavailable

 

                    Carlton,  Juany FNP Unavailable         Unavailable

 

                    Carlton,  Juany FNP Unavailable         Unavailable

 

                    Carlton,  Juany FNP Unavailable         Unavailable

 

                    Carlton,  Juany FNP Unavailable         Unavailable

 

                    Carlton,  Juany FNP Unavailable         Unavailable

 

                    Carlton,  Juany FNP Unavailable         Unavailable

 

                    Carlton,  Juany FNP Unavailable         Unavailable

 

                    Carlton,  Juany FNP Unavailable         Unavailable

 

                    Carlton,  Juany FNP Unavailable         Unavailable

 

                    Carlton,  Juany FNP Unavailable         Unavailable

 

                    Carlton,  Juany FNP Unavailable         Unavailable



                                  



Re-disclosure Warning

          The records that you are about to access may contain information from 
federally-assisted alcohol or drug abuse programs. If such information is 
present, then the following federally mandated warning applies: This information
has been disclosed to you from records protected by federal confidentiality 
rules (42 CFR part 2). The federal rules prohibit you from making any further 
disclosure of this information unless further disclosure is expressly permitted 
by the written consent of the person to whom it pertains or as otherwise 
permitted by 42 CFR part 2. A general authorization for the release of medical 
or other information is NOT sufficient for this purpose. The Federal rules 
restrict any use of the information to criminally investigate or prosecute any 
alcohol or drug abuse patient.The records that you are about to access may 
contain highly sensitive health information, the redisclosure of which is 
protected by Article 27-F of the [facility] law. If you 
continue you may have access to information: Regarding HIV / AIDS; Provided by 
facilities licensed or operated by the [facility] Office of Mental Health; 
or Provided by the [facility] Office for People With Developmental 
Disabilities. If such information is present, then the following [place] 
mandated warning applies: This information has been disclosed to you from 
confidential records which are protected by state law. State law prohibits you 
from making any further disclosure of this information without the specific 
written consent of the person to whom it pertains, or as otherwise permitted by 
law. Any unauthorized further disclosure in violation of state law may result in
a fine or FDC sentence or both. A general authorization for the release of 
medical or other information is NOT sufficient authorization for further disc
losure.                                                                         
    



Family History

          



             Family Member Name Family Member Gender Family Member Status Date o

f Status 

Description                             Data Source(s)

 

           Unknown    Male       Problem                          [facility] ([facility] )

 

           Unknown    Male       Problem                          [facility] ([facility])



                                                                                
                 



Encounters

          



           Encounter  Providers  Location   Date       Indications Data Source(s

)

 

                Outpatient      Attender: Charmaine Melchor  12:00:00 PM 

EST                                                 [facility] ([facility]

)

 

                Outpatient      Attender: Charmaine Melchor 10

/ 10:30:00 AM 

EDT                                                 [facility] ([facility]

)

 

                Outpatient      Attender: Hansa Melchor  10:00:00 AM 

EDT                                                 [facility] ([facility]

)

 

           Outpatient Attender: Mony KRUSE Main Office 2020 12:45:0

0 PM EDT            

[facility] ([facility])

 

                Outpatient      Attender: Charmaine Melchor  11:00:00 AM 

EDT                                                 [facility] ([facility]

)

 

             Outpatient   Attender: Uzair Huitron MD Physical Therapy 2020 0

4:00:00 PM EDT 

                                        [facility] ([facility])

 

                Outpatient      Attender: Charmaine Melchor  02:30:00 PM 

EDT                                                 [facility] ([facility]

)

 

                Outpatient      Attender: Charmaine Matiasall  09:00:00 AM 

EDT                                                 [facility] ([facility]

)

 

           Outpatient Attender: KARTIK DE LEON MD Main Office 2020 12:45:00 

PM EDT            

[facility] ([facility])

 

                Outpatient      Attender: Juany [facility] Evelina Melchor 0

2020 09:30:00 AM

EST                                                 [facility] ([facility]

)



                                                                                
                                                                                
                



Immunizations

          



             Vaccine      Date         Status       Description  Data Source(s)

 

                          Influenza, injectable, MDCK, preservative free, bruno

valent 10/15/2020 12:10:00

PM EDT              completed                               [facility])

 

                          Shingrix Zoster Vaccine (HZV), Recombinant, Subunit, A

djuvanted 2020 

12:55:00 PM EST     completed                               [facility] ([facility])

 

                          VARICELLA-ZOSTER VIRUS GLYCOPROTEIN E,REC/AS01B ADJUVA

NT/PF 2020 12:00:00 

AM EST              completed                               [facility]



                                                                                
                           



Medications

          



          Medication Brand Name Start Date Product Form Dose      Route     Admi

nistrative 

Instructions Pharmacy Instructions Status     Indications Reaction   Description

 Data 

Source(s)

 

                    24 HR Bupropion Hydrochloride 150 MG Extended Release Oral T

ablet BUPROPION HCL       

2021 12:00:00 AM EST tablet extended release 24 hr 30                     

         TAKE ONE TABLET BY

MOUTH EVERY MORNING TAKE ONE TABLET BY MOUTH EVERY MORNING SOLD: 2021     

                      

                                        [facility]

 

          5 mg                2021 12:00:00 AM EST tablet    60           

       TAKE ONE TABLET BY MOUTH TWICE A 

DAY        TAKE ONE TABLET BY MOUTH TWICE A DAY SOLD: 2021                

                  [facility]

 

                          24 HR Bupropion Hydrochloride 150 MG Extended Release 

Oral Tablet Bupropion 

Hydrochloride ER (XL) 2021 12:00:00 AM EST             ORAL              a

ctive                   

[facility] ([facility])

 

          5 mg                2020 12:00:00 AM EST tablet    30           

       TAKE ONE TABLET BY MOUTH TWICE A 

DAY        TAKE ONE TABLET BY MOUTH TWICE A DAY SOLD: 2020                

                  [facility]

 

          5 mg                2020 12:00:00 AM EST tablet    30           

       TAKE ONE TABLET BY MOUTH TWICE A 

DAY        TAKE ONE TABLET BY MOUTH TWICE A DAY SOLD: 2021                

                  [facility]

 

           Cephalexin 500 MG Oral Capsule CEPHALEXIN 2020 12:00:00 AM EST 

capsule    30         

                          TAKE ONE CAPSULE BY MOUTH THREE TIMES A DAY TAKE ONE C

APSULE BY MOUTH THREE 

TIMES A DAY  SOLD: 2020                                        [facility]

s

 

          apixaban 5 MG Oral Tablet [Eliquis] Eliquis   2020 12:00:00 AM E

ST                     ORAL      

                      active                                      [facility]

 

       Administration Of Flu Vaccine        10/15/2020 12:00:00 AM EDT          

                          completed  

                                                            [facility]

[facility])

 

                                        Medication administered onsite 

 

          20 mg               2020 12:00:00 AM EDT tablet    90           

       TAKE ONE TABLET BY MOUTH EVERY 

MORNING    TAKE ONE TABLET BY MOUTH EVERY MORNING SOLD: 2021              

                    [facility]

 

          5 mg                2020 12:00:00 AM EDT tablet    30           

       TAKE ONE-HALF TABLET BY MOUTH 

EVERY DAY  TAKE ONE-HALF TABLET BY MOUTH EVERY DAY SOLD: 2020             

                     [facility]

 

          20 mg               2020 12:00:00 AM EDT tablet    90           

       TAKE ONE TABLET BY MOUTH EVERY 

MORNING    TAKE ONE TABLET BY MOUTH EVERY MORNING SOLD: 2020              

                    [facility]

 

          5 mg                2020 12:00:00 AM EDT tablet    30           

       TAKE 1/2 TABLET BY MOUTH ONCE 

DAILY      TAKE 1/2 TABLET BY MOUTH ONCE DAILY SOLD: 2020                 

                 [facility]

 

          5 mg                2020 12:00:00 AM EDT tablet    30           

       TAKE 1/2 TABLET BY MOUTH ONCE 

DAILY      TAKE 1/2 TABLET BY MOUTH ONCE DAILY SOLD: 10/24/2020                 

                 [facility]

 

          100 mg              2020 12:00:00 AM EDT capsule   28           

       TAKE ONE CAPSULE BY MOUTH TWICE

 A DAY FOR 14 DAYS        TAKE ONE CAPSULE BY MOUTH TWICE A DAY FOR 14 DAYS SOLD

: 

2020                                                      [facility]

 

          40 mg               2020 12:00:00 AM EDT tablet    30           

       TAKE ONE TABLET BY MOUTH AT 

BEDTIME    TAKE ONE TABLET BY MOUTH AT BEDTIME SOLD: 10/24/2020                 

                 [facility]

 

          40 mg               2020 12:00:00 AM EDT tablet    30           

       TAKE ONE TABLET BY MOUTH AT 

BEDTIME    TAKE ONE TABLET BY MOUTH AT BEDTIME SOLD: 2021                 

                 [facility]

 

          40 mg               2020 12:00:00 AM EDT tablet    30           

       TAKE ONE TABLET BY MOUTH AT 

BEDTIME    TAKE ONE TABLET BY MOUTH AT BEDTIME SOLD: 2020                 

                 [facility]

 

          20 mg               2020 12:00:00 AM EDT tablet    30           

       TAKE ONE TABLET BY MOUTH EVERY 

MORNING    TAKE ONE TABLET BY MOUTH EVERY MORNING SOLD: 2020              

                    [facility]

 

        Furosemide 20 MG Oral Tablet Furosemide 2020 12:00:00 AM EDT      

           ORAL                    

active                                                          [facility] ([facility])

 

          20 mg               2020 12:00:00 AM EDT tablet    60           

       TAKE TWO TABLETS BY MOUTH AT 

BEDTIME    TAKE TWO TABLETS BY MOUTH AT BEDTIME SOLD: 2020                

                  [facility]

 

                    Digoxin 0.25 MG Oral Tablet 250 mcg (0.25 mg) DIGOXIN       

      2020 12:00:00 AM EDT

             tablet       90                        TAKE ONE TABLET BY MOUTH JAMES

 DAY TAKE ONE TABLET BY MOUTH EVERY 

DAY          SOLD: 2021                                        [facility]

s

 

          250 mcg (0.25 mg)           2020 12:00:00 AM EDT tablet    90   

               TAKE ONE TABLET BY 

MOUTH EVERY DAY TAKE ONE TABLET BY MOUTH EVERY DAY SOLD: 2020             

                     [facility]

 

                    Digoxin 0.25 MG Oral Tablet 250 mcg (0.25 mg) DIGOXIN       

      2020 12:00:00 AM EDT

             tablet       90                        TAKE ONE TABLET BY MOUTH JAMES

 DAY TAKE ONE TABLET BY MOUTH EVERY 

DAY          SOLD: 2020                                        [facility]

[facility]

 

        Methimazole 5 MG Oral Tablet Methimazole 2020 12:00:00 AM EDT     

            ORAL             

             completed                                           [facility] ([facility]

[facility])

 

          5 mg                2020 12:00:00 AM EDT tablet    40           

       TAKE 1 TABLET BY MOUTH DAILY 

EXCEPT MONDAY, WEDNESDAY & FRIDAY TAKE 2 TABLETS TAKE 1 TABLET BY MOUTH DAILY 

EXCEPT MONDAY, WEDNESDAY & FRIDAY TAKE 2 TABLETS SOLD: 2020               

                         [facility]

 

        Methimazole 5 MG Oral Tablet Methimazole 2020 12:00:00 AM EDT     

            ORAL             

             active                                              [facility] ([facility]

 

                    Hydrochlorothiazide 25 MG Oral Tablet Hydrochlorothiazide  12:00:00 AM

 EDT                 ORAL                 completed                      [facility] 

([facility])

 

          25 mg               2020 12:00:00 AM EDT tablet    30           

       TAKE ONE TABLET BY MOUTH EVERY 

DAY        TAKE ONE TABLET BY MOUTH EVERY DAY SOLD: 2020                  

                [facility]

 

     Shingrix Shingrix 2020 12:00:00 AM EST                          activ

e                [facility] 

([facility])

 

        Digoxin 0.25 MG Oral Tablet [Digox] Digox   2020 12:00:00 AM EST  

               ORAL            

             active                                              [facility] ([facility])

 

          5 mg                2020 12:00:00 AM EST tablet    23           

       TAKE ONE TABLET BY MOUTH EVERY DAY

 EXCEPT 1/2 TABLET ON TUESDAY, THURSDAY, SATURDAY AND  TAKE ONE TABLET BY 

MOUTH EVERY DAY EXCEPT 1/2 TABLET ON TUESDAY, THURSDAY, SATURDAY AND  

SOLD: 2020                                                 [facility]

 

          40 mg               2019 12:00:00 AM EST tablet    30           

       TAKE ONE TABLET BY MOUTH EVERY 

DAY AT BEDTIME  TAKE ONE TABLET BY MOUTH EVERY DAY AT BEDTIME SOLD: 2020  

               

                                                    [facility]

 

          40 mg               2019 12:00:00 AM EST tablet    30           

       TAKE ONE TABLET BY MOUTH EVERY 

DAY AT BEDTIME  TAKE ONE TABLET BY MOUTH EVERY DAY AT BEDTIME SOLD: 2020  

               

                                                    [facility]

 

          10 mg               2019 12:00:00 AM EST tablet    90           

       TAKE ONE TABLET BY MOUTH EVERY 

DAY        TAKE ONE TABLET BY MOUTH EVERY DAY SOLD: 10/24/2020                  

                [facility]

 

          10 mg               2019 12:00:00 AM EST tablet    90           

       TAKE ONE TABLET BY MOUTH EVERY 

DAY        TAKE ONE TABLET BY MOUTH EVERY DAY SOLD: 03/10/2020                  

                [facility]

 

                atorvastatin 20 MG Oral Tablet ATORVASTATIN CALCIUM 2019 1

2:00:00 AM EST 

tablet          90                              TAKE ONE TABLET BY MOUTH EVERY D

AY TAKE ONE TABLET BY MOUTH EVERY 

DAY          SOLD: 2020                                        [facility]

s

 

                atorvastatin 20 MG Oral Tablet ATORVASTATIN CALCIUM 2019 1

2:00:00 AM EST 

tablet          90                              TAKE ONE TABLET BY MOUTH EVERY D

AY TAKE ONE TABLET BY MOUTH EVERY 

DAY          SOLD: 2020                                        [facility]

s

 

          100 mg              2019 12:00:00 AM EST tablet    180          

       TAKE ONE TABLET BY MOUTH TWICE 

A DAY      TAKE ONE TABLET BY MOUTH TWICE A DAY SOLD: 10/24/2020                

                  [facility]

 

          25 mg               2019 12:00:00 AM EST tablet    180          

       TAKE ONE TABLET BY MOUTH TWICE A

 DAY       TAKE ONE TABLET BY MOUTH TWICE A DAY SOLD: 03/10/2020                

                  [facility]

 

          25 mg               2019 12:00:00 AM EST tablet    180          

       TAKE ONE TABLET BY MOUTH TWICE A

 DAY       TAKE ONE TABLET BY MOUTH TWICE A DAY SOLD: 10/24/2020                

                  [facility]

 

          25 mg               2019 12:00:00 AM EST tablet    180          

       TAKE ONE TABLET BY MOUTH TWICE A

 DAY       TAKE ONE TABLET BY MOUTH TWICE A DAY SOLD: 2020                

                  [facility]

 

          100 mg              2019 12:00:00 AM EST tablet    180          

       TAKE ONE TABLET BY MOUTH TWICE 

A DAY      TAKE ONE TABLET BY MOUTH TWICE A DAY SOLD: 2020                

                  [facility]

 

          100 mg              2019 12:00:00 AM EST tablet    180          

       TAKE ONE TABLET BY MOUTH TWICE 

A DAY      TAKE ONE TABLET BY MOUTH TWICE A DAY SOLD: 03/10/2020                

                  [facility]

 

          5 mg                09/10/2019 12:00:00 AM EDT tablet    30           

       TAKE 1 TABLET BY MOUTH DAILY ON 

MONDAY, WEDNESDAY AND FRIDAY AND 1/2 TABLET DAILY ON TUESDAY, THURSDAY, SATURDAY
 AND                              TAKE 1 TABLET BY MOUTH DAILY ON MONDAY, 

WEDNESDAY AND FRIDAY AND 1/2

 TABLET DAILY ON TUESDAY, THURSDAY, SATURDAY AND  SOLD: 06/15/2020        

                                

[facility]

 

          5 mg                09/10/2019 12:00:00 AM EDT tablet    30           

       TAKE 1 TABLET BY MOUTH DAILY ON 

MONDAY, WEDNESDAY AND FRIDAY AND 1/2 TABLET DAILY ON TUESDAY, THURSDAY, SATURDAY
 AND                              TAKE 1 TABLET BY MOUTH DAILY ON MONDAY, 

WEDNESDAY AND FRIDAY AND 1/2

 TABLET DAILY ON TUESDAY, THURSDAY, SATURDAY AND  SOLD: 2020        

                                

[facility]

 

          5 mg                09/10/2019 12:00:00 AM EDT tablet    30           

       TAKE 1 TABLET BY MOUTH DAILY ON 

MONDAY, WEDNESDAY AND FRIDAY AND 1/2 TABLET DAILY ON TUESDAY, THURSDAY, SATURDAY
 AND                              TAKE 1 TABLET BY MOUTH DAILY ON MONDAY, 

WEDNESDAY AND FRIDAY AND 1/2

 TABLET DAILY ON TUESDAY, THURSDAY, SATURDAY AND  SOLD: 2020        

                                

[facility]



                                                                                
                                                                                
                                                                                
                                                                                
                                                                                
                                                                                
                                      



Insurance Providers

          



             Payer name   Policy type / Coverage type Policy ID    Covered party

 ID Covered 

party's relationship to [place] Policy [place]             Plan Information

 

          MEDICARE            [id number]           SP                  [id number]

R49

 

           FOR LIFE           [id number]           [facility]                 [id number]

[id number]

 

          MEDICARE  C         [id number]           S                   [id number]

R49

 

           FOR LIFE O         [id number]           S                   [id number]

[id number]

 

          [employer]  For Life Medigap Part B [id number]           Family Depende

nt           [id number]

 

          Medicare [employer] Medicare Primary [id number]           Self 

               [id number]

 

          [place] [employer] Medigap Part B [id number]           Family Dep

endent           [id number]

 

          [place]are [place] Primary [id number]           Self          

      [id number]

 

          [place] [employer] Medigap Part B [id number]           Family Dep

endent           [id number]

 

          [place] Medicare Primary [id number]           Self          

      [id number]

 

          [employer]  For Life Medigap Part B [id number]           Family Depende

nt           [id number]

 

          Medicare [employer] Medicare Primary [id number]           Self 

               [id number]

 

          [place] [employer] Medigap Part B [id number]           Family Dep

endent           [id number]

 

          Medicare [place] Medicare Primary [id number]           Self          

      [id number]

 

          [employer]  For Life Medigap Part B [id number]           Family Depende

nt           [id number]

 

          Medicare [employer] Medicare Primary [id number]           Self 

               [id number]

 

          [employer]PS  For Life Medigap Part B [id number]           Family Depende

nt           [id number]

 

          Medicare [employer] Medicare Primary [id number]           Self 

               [id number]

 

          [employer]  For Life Medigap Part B [id number]           Family Depende

nt           [id number]

 

          Medicare [employer] Medicare Primary [id number]           Self 

               [id number]

 

          [employer]PS  For Life Medigap Part B [id number]           Family Depende

nt           [id number]

 

          Medicare [employer] Medicare Primary [id number]           Self 

               [id number]

 

          [employer]PS  For Life Medigap Part B [id number]           Family Depende

nt           [id number]

 

          Medicare [employer] Medicare Primary [id number]           Self 

               [id number]

 

          [employer]PS  For Life Medigap Part B [id number]           Family Depende

nt           [id number]

 

          Medicare [employer] Medicare Primary [id number]           Self 

               [id number]

 

          [employer]  For Life Medigap Part B [id number]           Family Depende

nt           [id number]

 

          Medicare [employer] Medicare Primary [id number]           Self  

              [id number]

 

          MEDICARE            [id number]           SP                  [id number]

A

 

          MEDICARE  C         [id number]           S                   [id number]

A

 

          [employer] C         [id number]           S              

     [id number]

 

          WPS  For Life Medigap Part B [id number]           Family Depende

nt           [id number]

 

          Medicare [employer] Medicare Primary [id number]           Self  

              [id number]

 

          [employer]PS  For Life Medigap Part B [id number]           Family Depende

nt           [id number]

 

          Medicare [employer] Medicare Primary [id number]           Self  

              [id number]

 

          [employer]PS  For Life Medigap Part B [id number]           Family Depende

nt           [id number]

 

          Medicare [employer] Medicare Primary [id number]           Self  

              [id number]

 

          WPS  For Life Medigap Part B [id number]           Family Depende

nt           [id number]

 

          Medicare [employer] Medicare Primary [id number]           Self  

              [id number]



                                                                                
                                                                                
                                                                                
                                                                                
                                                                                
                                                                    



Problems, Conditions, and Diagnoses

          



           Code       Display Name Description Problem Type Effective Dates Data

 Source(s)

 

             [id number]     Essential hypertension Essential hypertension Problem 

     2020 

12:00:00 AM EDT                         [facility] ([facility] )

 

             [id number]     Mitral valve disorder Mitral valve disorder Problem   

   2020 12:00:00

 AM EDT                                 [facility] ([facility])

 

                    [id number]           Benign hypertensive heart disease with c

ongestive cardiac failure 

Benign hypertensive heart disease with congestive cardiac failure Problem       

            

2020 12:00:00 AM EDT              [facility] ([facility])

 

                [id number]        Chronic pulmonary heart disease Chronic pulmonar

y heart disease Problem

                          2020 12:00:00 AM EDT [facility]

 

                [id number]       Chronic diastolic heart failure Chronic diastoli

c heart failure 

Problem                   2020 12:00:00 AM EDT [facility] ([facility])



                                                                                
                                                          



Surgeries/Procedures

          



             Procedure    Description  Date         Indications  Data Source(s)

 

                    Brief Emotional/Behav Assessment W/ Scoring Doc Per Standard

 Inst                     10/29/2020 

12:00:00 AM EDT                                     [facility] ([facility]

)

 

             ECG ROUTINE ECG W/LEAST 12 LDS W/I&R              2020 12:00:

00 AM EDT              [facility] 

([facility])

 

             ARTHROCENTESIS ASPIR&/INJECTION MAJOR JT/BURSA               12:00:00 AM EDT              

[facility] ([facility] )

 

             ARTHROCENTESIS ASPIR&/INJECTION MAJOR JT/BURSA              05/15/2

020 12:00:00 AM EDT              

[facility] ([facility])

 

             ARTHROCENTESIS ASPIR&/INJECTION MAJOR JT/BURSA               12:00:00 AM EDT              

[facility] ([facility] )

 

             ECG ROUTINE ECG W/LEAST 12 LDS W/I&R              2020 12:00:

00 AM EDT              [facility] 

([facility])

 

             ECHO TTHRC R-T 2D W/WOM-MODE COMPL SPEC&COLR DOP               12:00:00 AM EST              

[facility] ([facility])



                                                                                
                                                                    



Results

          



                    ID                  Date                Data Source

 

                    [id number]          2021 01:39:00 PM EST [facility] ([facility])









          Name      Value     Range     Interpretation Code Description Data Milly

rce(s) Supporting 

Document(s)

 

           Parathyrin.intact [Mass/volume] in Serum or Plasma 26.3 pg/mL 18.5-88

.0                        

[facility] ([facility]            









                    ID                  Date                Data Source

 

                    [id number]          2021 01:39:00 PM EST [facility] ([facility])









          Name      Value     Range     Interpretation Code Description Data Milly

rce(s) Supporting 

Document(s)

 

           Calcidiol [Mass/volume] in Serum or Plasma 32.7       24.0-80.0      

                  [facility]                             

 

                                        This test was performed using [employer] I

P Vitamin D immunoassay kit.  Values 

obtained with different assay methods should not be used interchangeably.

 









                    ID                  Date                Data Source

 

                    [id number]          2021 01:39:00 PM EST [facility] ([facility])









          Name      Value     Range     Interpretation Code Description Data Milly

rce(s) Supporting 

Document(s)

 

                          Thyrotropin [Units/volume] in Serum or Plasma by Detec

tion limit <= 0.05 mIU/L 

0.54 uIU/mL  0.36-3.74                              [facility] ([facility]

)  









                    ID                  Date                Data Source

 

                    [id number]          2021 01:39:00 PM EST [facility] ([facility])









          Name      Value     Range     Interpretation Code Description Data Milly

rce(s) Supporting 

Document(s)

 

           Glucose [Mass/volume] in Serum or Plasma 98 mg/dL   74-99            

                [facility] ([facility])                              

 

                                        100-125 mg/dL     PRE-DIABETES/FASTING

>126 mg/dL          DIABETES/FASTING

 

 

           Urea nitrogen [Mass/volume] in Serum or Plasma 49 mg/dL   7-18       

                      [facility] 

([facility])                   

 

                                        NOTE:

BUN,CALCIUM VERIFIED





 

 

          Creatinine 1.2 mg/dL 0.6-1.3                       [facility] ([facility]

[facility])  

 

           Sodium [Moles/volume] in Serum or Plasma 142 meq/L  136-145          

                [facility] ([facility])                             

 

           Chloride [Moles/volume] in Serum or Plasma 104 meq/L           

                  [facility] 

([facility])                   

 

           Potassium [Moles/volume] in Serum or Plasma 4.5 meq/L  3.5-5.1       

                   [facility] 

([facility])                   

 

           Carbon dioxide, total [Moles/volume] in Serum or Plasma 23 meq/L   21

-32                            

[facility] ([facility])            

 

                                        Glomerular filtration rate/1.73 sq M pre

dicted among non-blacks [Volume 

Rate/Area] in Serum or Plasma by Creatinine-based formula (MDRD) 45 mL/min      

                                  

                          [facility] ([facility])  

 

           Calcium [Mass/volume] in Serum or Plasma 10.6 mg/dL 8.5-10.1         

                [facility] 

([facility])                   

 

                                        Glomerular filtration rate/1.73 sq M pre

dicted among blacks [Volume Rate/Area] 

in Serum or Plasma by Creatinine-based formula (MDRD) 55 mL/min                 

                          [facility] 

([facility])                   

 

                                        <content>CHRONIC KIDNEY DISEASE STAGING 

PER 

NKF</content><br/><content></content><br/><content>STAGE I & II      GFR >= 60  
     NORMAL TO MILDLY DECREASED</content><br/><content>STAGE III          GFR 
30-59          MODERATELY DECREASED</content><br/><content>STAGE IV           
GFR 15-29         SEVERELY DECREASED</content><br/><content>STAGE V            
GFR <15            VERY LITTLE GFR LEFT</content><br/><content>ESRD             
   GFR <15            ON RRT</content><br/><content></content> 









                    ID                  Date                Data Source

 

                    [id number]          2021 01:39:00 PM EST [facility] ([facility])









          Name      Value     Range     Interpretation Code Description Data Milly

rce(s) Supporting 

Document(s)

 

           Leukocytes [#/volume] in Blood by Automated count 5.8 x10*3/UL 4.1-10

.9                         

[facility] ([facility])            

 

           Erythrocytes [#/volume] in Blood by Automated count 4.01 x10*6/UL 4.2

0-6.30                        

[facility] ([facility])            

 

           Hemoglobin [Mass/volume] in Blood 13.1 g/dL  12.0-18.0               

         [facility] ([facility])                              

 

           Hematocrit [Volume Fraction] of Blood by Automated count 37.5 %     3

7.0-51.0                        

[facility] ([facility])            

 

          MCH       32.6 pg   26.0-32.0                     [facility])  

 

          MCHC      34.9 g/dL 31.0-38.0                     [facility] ([facility])  

 

          MCV       93.4 fL   80.0-97.0                     [facility] ([facility])  

 

           Platelets [#/volume] in Blood by Automated count 191 x10*3/-440

                          [facility]

 ([facility])                  

 

          MPV       8.6 FL    7.8-11.0                      [facility] ([facility])  

 

           Erythrocyte distribution width [Ratio] by Automated count 13.8 %     

11.6-13.7                        

[facility] ([facility])            

 

          Mid %     6.7 %     1.7-9.3                       [facility] ([facility])  

 

          Lymph %   24.4 %    10.0-58.5                     [facility] ([facility])  

 

          Mid #     0.4 x10*3/UL 0.1-0.6                       [facility] ([facility])  

 

          Neut %    68.9 %    37.0-92.0                     [facility] ([facility])  

 

          Lymph #   1.4 x10*3/UL 0.6-4.1                       [facility] ([facility])  

 

          Neut #    4.0 x10*3/UL 2.0-7.8                       [facility] ([facility])  









                    ID                  Date                Data Source

 

                    [id number]          2020 11:57:00 AM EST [facility] ([facility])









          Name      Value     Range     Interpretation Code Description Data Milly

rce(s) Supporting 

Document(s)

 

          Inr       6.49                Above upper panic limits           [facility]

T ([facility]  

 

                                        THERAPUTIC HUMAN INR VALUES

INDICATIONS                      NORMAL RANGES

PROPHYLAXIS/TREATMENT OF:

VENOUS THROMBOSIS                2.0-3.0

PULMONARY EMBOLISM               2.0-3.0

PREVENTION OF SYSTEMIC EMBOLISM FROM:

TISSUE HEART VALVES              2.0-3.0

ACUTE MYOCARDIAL INFARCTION      2.0-3.0

VALVULAR HEART DISEASE           2.0-3.0

ATRIAL FIBRILLATION              2.0-3.0

MECHANICAL VALVES(HIGH RISK)     2.5-3.5

RECURRENT MYOCARDIAL INFARCTION  2.5-3.5

 

 

          Prothrombin Time 58.4 s    12.5-14.3                     [facility] ([facility])  









                    ID                  Date                Data Source

 

                    [id number]          2020 11:56:00 AM EST [facility] ([facility])









          Name      Value     Range     Interpretation Code Description Data Milly

rce(s) Supporting 

Document(s)

 

           Leukocytes [#/volume] in Blood by Automated count 6.0 x10*3/UL 4.1-10

.9                         

[facility] ([facility])            

 

           Hemoglobin [Mass/volume] in Blood 13.0 g/dL  12.0-18.0               

         [facility] ([facility])                              

 

           Hematocrit [Volume Fraction] of Blood by Automated count 37.5 %     3

7.0-51.0                        

[facility] ([facility])            

 

           Erythrocytes [#/volume] in Blood by Automated count 4.11 x10*6/UL 4.2

0-6.30                        

[facility] ([facility])            

 

          MCV       91.1 fL   80.0-97.0                     [facility] ([facility])  

 

          MCH       31.6 pg   26.0-32.0                     [facility] ([facility])  

 

          MCHC      34.7 g/dL 31.0-38.0                     [facility] ([facility])  

 

           Platelets [#/volume] in Blood by Automated count 243 x10*3/-440

                          [facility]

 ([facility])                  

 

          MPV       8.3 FL    7.8-11.0                      [facility] ([facility])  

 

           Erythrocyte distribution width [Ratio] by Automated count 14.2 %     

11.6-13.7                        

[facility] ([facility])            

 

          Lymph %   21.2 %    10.0-58.5                     [facility] ([facility])  

 

          Mid %     5.8 %     1.7-9.3                       [facility] ([facility])  

 

          Neut %    73.0 %    37.0-92.0                     [facility] ([facility])  

 

          Neut #    4.4 x10*3/UL 2.0-7.8                       [facility] ([facility])  

 

          Lymph #   1.2 x10*3/UL 0.6-4.1                       [facility] ([facility])  

 

          Mid #     0.4 x10*3/UL 0.1-0.6                       [facility] ([facility])  









                    ID                  Date                Data Source

 

                    [id number]          2020 11:29:00 AM EST [facility] ([facility])









          Name      Value     Range     Interpretation Code Description Data Milly

rce(s) Supporting 

Document(s)

 

           INR in Platelet poor plasma by Coagulation assay 7.9                 

                        [facility] ([facility]                              









                    ID                  Date                Data Source

 

                    [id number]          2020 10:58:00 AM EST [facility] ([facility])









          Name      Value     Range     Interpretation Code Description Data Milly

rce(s) Supporting 

Document(s)

 

           Glucose [Mass/volume] in Serum or Plasma 100 mg/dL  74-99            

                [facility] ([facility])                              

 

                                        100-125 mg/dL     PRE-DIABETES/FASTING

>126 mg/dL          DIABETES/FASTING

 

 

           Urea nitrogen [Mass/volume] in Serum or Plasma 17 mg/dL   7-18       

                      [facility] 

([facility]                   

 

          Creatinine 0.7 mg/dL 0.6-1.3                       [facility] ([facility]

[facility])  

 

           Sodium [Moles/volume] in Serum or Plasma 141 meq/L  136-145          

                [facility] ([facility])                             

 

           Carbon dioxide, total [Moles/volume] in Serum or Plasma 24 meq/L   21

-32                            

[facility] ([facility])            

 

           Potassium [Moles/volume] in Serum or Plasma 4.4 meq/L  3.5-5.1       

                   [facility] 

([facility])                   

 

           Chloride [Moles/volume] in Serum or Plasma 103 meq/L           

                  [facility] 

([facility]                   

 

                                        Glomerular filtration rate/1.73 sq M pre

dicted among non-blacks [Volume 

Rate/Area] in Serum or Plasma by Creatinine-based formula (MDRD) Laboratory test

result                                              [facility] ([facility]

)  

 

                                        Glomerular filtration rate/1.73 sq M pre

dicted among blacks [Volume Rate/Area] 

in Serum or Plasma by Creatinine-based formula (MDRD) Laboratory test result    

                  

                                        [facility] ([facility])  

 

                                        <content>CHRONIC KIDNEY DISEASE STAGING 

PER 

NKF</content><br/><content></content><br/><content>STAGE I & II      GFR >= 60  
     NORMAL TO MILDLY DECREASED</content><br/><content>STAGE III          GFR 
30-59          MODERATELY DECREASED</content><br/><content>STAGE IV           
GFR 15-29         SEVERELY DECREASED</content><br/><content>STAGE V            
GFR <15            VERY LITTLE GFR LEFT</content><br/><content>ESRD             
   GFR <15            ON RRT</content><br/><content></content> 

 

           Calcium [Mass/volume] in Serum or Plasma 10.2 mg/dL 8.5-10.1         

                [facility] 

([facility]                   

 

                                        NOTE:

RESULT VERIFIED.





 









                    ID                  Date                Data Source

 

                    [id number]          2020 11:20:00 AM EST [facility] ([facility])









          Name      Value     Range     Interpretation Code Description Data Milly

rce(s) Supporting 

Document(s)

 

           INR in Platelet poor plasma by Coagulation assay 3.7                 

                        [facility] ([facility])                              









                    ID                  Date                Data Source

 

                    [id number]          10/29/2020 01:07:00 PM EDT [facility] ([facility])









          Name      Value     Range     Interpretation Code Description Data Milly

rce(s) Supporting 

Document(s)

 

           INR in Platelet poor plasma by Coagulation assay 2.7                 

                        [facility] ([facility])                              









                    ID                  Date                Data Source

 

                    [id number]          10/29/2020 11:26:00 AM EDT [facility] ([facility])









          Name      Value     Range     Interpretation Code Description Data Milly

rce(s) Supporting 

Document(s)

 

           Digoxin [Mass/volume] in Serum or Plasma 0.6 ng/mL  0.5-2.0          

                [facility] ([facility])                             









                    ID                  Date                Data Source

 

                    [id number]            10/29/2020 11:26:00 AM EDT [facility] ([facility])









          Name      Value     Range     Interpretation Code Description Data Milly

rce(s) Supporting 

Document(s)

 

           Digoxin [Mass/volume] in Serum or Plasma 0.6 ng/mL  0.5-2.0          

                [facility] 

([facility])           









                    ID                  Date                Data Source

 

                    [id number]          10/29/2020 11:25:00 AM EDT [facility] ([facility])









          Name      Value     Range     Interpretation Code Description Data Milly

rce(s) Supporting 

Document(s)

 

           Hemoglobin [Mass/volume] in Blood 13.8 g/dL  12.0-18.0               

         [facility]                              

 

           Erythrocytes [#/volume] in Blood by Automated count 4.36 x10*6/UL 4.2

0-6.30                        

[facility] ([facility]            

 

           Leukocytes [#/volume] in Blood by Automated count 6.2 x10*3/UL 4.1-10

.9                         

[facility] ([facility])            

 

          MCH       31.6 pg   26.0-32.0                     [facility] ([facility])  

 

           Hematocrit [Volume Fraction] of Blood by Automated count 40.7 %     3

7.0-51.0                        

[facility] ([facility]            

 

          MCV       93.2 fL   80.0-97.0                     [facility] ([facility])  

 

          MCHC      34.0 g/dL 31.0-38.0                     [facility] ([facility])  

 

           Erythrocyte distribution width [Ratio] by Automated count 14.5 %     

11.6-13.7                        

[facility] ([facility])            

 

          Lymph %   19.8 %    10.0-58.5                     [facility] ([facility])  

 

           Platelets [#/volume] in Blood by Automated count 236 x10*3/-440

                          [facility]

 ([facility])                  

 

          MPV       7.8 FL    7.8-11.0                      [facility] ([facility])  

 

          Lymph #   1.2 x10*3/UL 0.6-4.1                       [facility] ([facility])  

 

          Mid %     5.7 %     1.7-9.3                       [facility] ([facility])  

 

          Neut %    74.5 %    37.0-92.0                     [facility] ([facility])  

 

          Neut #    4.6 x10*3/UL 2.0-7.8                       [facility] ([facility])  

 

          Mid #     0.4 x10*3/UL 0.1-0.6                       [facility] ([facility])  









                    ID                  Date                Data Source

 

                    [id number]          10/29/2020 11:25:00 AM EDT [facility] ([facility])









          Name      Value     Range     Interpretation Code Description Data Milly

rce(s) Supporting 

Document(s)

 

          Magnesium 2.1 mg/dL 1.8-2.4                       [facility] ([facility])  









                    ID                  Date                Data Source

 

                    [id number]          10/29/2020 11:25:00 AM EDT [facility] ([facility])









          Name      Value     Range     Interpretation Code Description Data Milly

rce(s) Supporting 

Document(s)

 

           Glucose [Mass/volume] in Serum or Plasma 101 mg/dL  74-99            

                [facility] ([facility])                              

 

                                        100-125 mg/dL     PRE-DIABETES/FASTING

>126 mg/dL          DIABETES/FASTING

 

 

          Creatinine 0.7 mg/dL 0.6-1.3                       [facility] ([facility])  

 

           Urea nitrogen [Mass/volume] in Serum or Plasma 17 mg/dL   7-18       

                      [facility] 

([facility])                   

 

           Chloride [Moles/volume] in Serum or Plasma 104 meq/L           

                  [facility] 

([facility])                   

 

           Potassium [Moles/volume] in Serum or Plasma 4.0 meq/L  3.5-5.1       

                   [facility] 

([facility])                   

 

           Sodium [Moles/volume] in Serum or Plasma 143 meq/L  136-145          

                [facility] ([facility])                             

 

           Calcium [Mass/volume] in Serum or Plasma 10.7 mg/dL 8.5-10.1         

                [facility] 

([facility]                   

 

                                        NOTE:

RESULT VERIFIED.





 

 

                                        Glomerular filtration rate/1.73 sq M pre

dicted among non-blacks [Volume 

Rate/Area] in Serum or Plasma by Creatinine-based formula (MDRD) Laboratory test

result                                              [facility] ([facility]

)  

 

           Carbon dioxide, total [Moles/volume] in Serum or Plasma 30 meq/L   21

-32                            

[facility] ([facility])            

 

                                        Glomerular filtration rate/1.73 sq M pre

dicted among blacks [Volume Rate/Area] 

in Serum or Plasma by Creatinine-based formula (MDRD) Laboratory test result    

                  

                                        [facility] ([facility])  

 

                                        <content>CHRONIC KIDNEY DISEASE STAGING 

PER 

NKF</content><br/><content></content><br/><content>STAGE I & II      GFR >= 60  
     NORMAL TO MILDLY DECREASED</content><br/><content>STAGE III          GFR 
30-59          MODERATELY DECREASED</content><br/><content>STAGE IV           
GFR 15-29         SEVERELY DECREASED</content><br/><content>STAGE V            
GFR <15            VERY LITTLE GFR LEFT</content><br/><content>ESRD             
   GFR <15            ON RRT</content><br/><content></content> 









                    ID                  Date                Data Source

 

                    [id number]          10/29/2020 11:25:00 AM EDT [facility]









          Name      Value     Range     Interpretation Code Description Data Milly

rce(s) Supporting 

Document(s)

 

                          Thyrotropin [Units/volume] in Serum or Plasma by Detec

tion limit <= 0.05 mIU/L 

0.16 uIU/mL  0.36-3.74                              [facility] ([facility]

)  









                    ID                  Date                Data Source

 

                    [id number]          10/29/2020 11:25:00 AM EDT [facility])









          Name      Value     Range     Interpretation Code Description Data Milly

rce(s) Supporting 

Document(s)

 

          Urine Color Laboratory test result                               [facility]

T ([facility])  

 

          Urine PH  6.5 units 5.0-9.0                       [facility])  

 

          Urine Appearance Laboratory test result                               

[facility] ([facility])  

 

          Urine Leukocytes Laboratory test result                               

[facility] ([facility])  

 

          Specific gravity of Urine 1.010     1.005-1.030                     [facility]

[facility] ([facility])  

 

          Urine Blood Laboratory test result                               [facility]

T ([facility])  

 

           Glucose [Presence] in Urine Laboratory test result                   

               [facility] ([facility])                              

 

          Urine Protein Laboratory test result 0-0                           MED

ENT ([facility])  

 

          Urine Nitrite Laboratory test result                               MED

ENT ([facility])  

 

          Urine Ketone Laboratory test result                               [facility]

NT ([facility])  

 

          Urine Urobilinogen 0.2 mg/dL 0.2-1.0                       [facility] ([facility])  

 

           Bilirubin.total [Mass/volume] in Serum or Plasma Laboratory test resu

lt                                  

[facility] ([facility])            









                    ID                  Date                Data Source

 

                    [id number]            10/29/2020 11:25:00 AM EDT [facility] ([facility])









          Name      Value     Range     Interpretation Code Description Data Milly

rce(s) Supporting 

Document(s)

 

           Leukocytes [#/volume] in Blood by Automated count 6.2 x10*3/UL 4.1-10

.9                         

[facility] ([facility])    

 

           Erythrocytes [#/volume] in Blood by Automated count 4.36 x10*6/UL 4.2

0-6.30                        

[facility] ([facility])    

 

           Hemoglobin [Mass/volume] in Blood 13.8 g/dL  12.0-18.0               

         [facility] ([facility])                       

 

          MCV       93.2 fL   80.0-97.0                     [facility] ([facility]  

 

           Hematocrit [Volume Fraction] of Blood by Automated count 40.7 %     3

7.0-51.0                        

[facility] ([facility])    

 

          MCH       31.6 pg   26.0-32.0                     [facility] ([facility]

[facility])  

 

          MCHC      34.0 g/dL 31.0-38.0                     [facility] ([facility]

[facility]  

 

           Erythrocyte distribution width [Ratio] by Automated count 14.5 %     

11.6-13.7                        

[facility] ([facility])    

 

           Platelets [#/volume] in Blood by Automated count 236 x10*3/-440

                          [facility]

 ([facility])          

 

          Mid %     5.7 %     1.7-9.3                       [facility] ([facility]

[facility])  

 

           Lymphocytes/100 leukocytes in Blood by Automated count 19.8 %     10.

0-58.5                        

[facility] ([facility])    

 

             Platelet mean volume [Entitic volume] in Blood by Archie 7.8 FL

       7.8-11.0                   

                          [facility] ([facility])  

 

          Neut %    74.5 %    37.0-92.0                     [facility] ([facility]

[facility])  

 

          Mid #     0.4 x10*3/UL 0.1-0.6                       [facility]  

 

          Lymph #   1.2 x10*3/UL 0.6-4.1                       [facility])  

 

           Neutrophils [#/volume] in Semen by Manual count 4.6 x10*3/UL 2.0-7.8 

                         [facility] 

([facility])           









                    ID                  Date                Data Source

 

                    [id number]          10/29/2020 11:24:00 AM EDT [facility] ([facility])









          Name      Value     Range     Interpretation Code Description Data Milly

rce(s) Supporting 

Document(s)

 

           Thyroxine (T4) free [Mass/volume] in Serum or Plasma 1.33 ng/dL 0.76-

1.46                        

[facility] ([facility])            









                    ID                  Date                Data Source

 

                    [id number]          10/26/2020 11:44:00 AM EDT [facility] ([facility])









          Name      Value     Range     Interpretation Code Description Data Milly

rce(s) Supporting 

Document(s)

 

           INR in Platelet poor plasma by Coagulation assay 1.2                 

                        [facility] ([facility])                              









                    ID                  Date                Data Source

 

                    [id number]          10/22/2020 12:43:00 PM EDT [facility] ([facility])









          Name      Value     Range     Interpretation Code Description Data Milly

rce(s) Supporting 

Document(s)

 

           INR in Platelet poor plasma by Coagulation assay 7.2                 

                        [facility] ([facility])                              









                    ID                  Date                Data Source

 

                    [id number]          10/22/2020 11:33:00 AM EDT [facility] ([facility])









          Name      Value     Range     Interpretation Code Description Data Milly

rce(s) Supporting 

Document(s)

 

          Prothrombin Time 49.4 s    12.5-14.3                     [facility])  

 

          Inr       5.24                Above upper panic limits           [facility]

T ([facility])  

 

                                        THERAPUTIC HUMAN INR VALUES

INDICATIONS                      NORMAL RANGES

PROPHYLAXIS/TREATMENT OF:

VENOUS THROMBOSIS                2.0-3.0

PULMONARY EMBOLISM               2.0-3.0

PREVENTION OF SYSTEMIC EMBOLISM FROM:

TISSUE HEART VALVES              2.0-3.0

ACUTE MYOCARDIAL INFARCTION      2.0-3.0

VALVULAR HEART DISEASE           2.0-3.0

ATRIAL FIBRILLATION              2.0-3.0

MECHANICAL VALVES(HIGH RISK)     2.5-3.5

RECURRENT MYOCARDIAL INFARCTION  2.5-3.5

 









                    ID                  Date                Data Source

 

                    [id number]          10/22/2020 11:33:00 AM EDT [facility] ([facility])









          Name      Value     Range     Interpretation Code Description Data Milly

rce(s) Supporting 

Document(s)

 

           Erythrocytes [#/volume] in Blood by Automated count 4.37 x10*6/UL 4.2

0-6.30                        

[facility] ([facility])            

 

           Leukocytes [#/volume] in Blood by Automated count 6.6 x10*3/UL 4.1-10

.9                         

[facility] ([facility])            

 

          MCV       91.9 fL   80.0-97.0                     [facility] ([facility]

[facility])  

 

           Hemoglobin [Mass/volume] in Blood 13.7 g/dL  12.0-18.0               

         [facility]                              

 

           Hematocrit [Volume Fraction] of Blood by Automated count 40.2 %     3

7.0-51.0                        

[facility] ([facility]            

 

           Erythrocyte distribution width [Ratio] by Automated count 13.7 %     

11.6-13.7                        

[facility] ([facility])            

 

          MCHC      34.2 g/dL 31.0-38.0                     [facility] ([facility]

[facility])  

 

          MCH       31.4 pg   26.0-32.0                     [facility] ([facility]

[facility])  

 

          Lymph %   16.8 %    10.0-58.5                     [facility] ([facility])  

 

           Platelets [#/volume] in Blood by Automated count 251 x10*3/-440

                          [facility]

 ([facility])                  

 

          MPV       8.3 FL    7.8-11.0                      [facility] ([facility]

[facility])  

 

          Lymph #   1.1 x10*3/UL 0.6-4.1                       [facility] ([facility])  

 

          Mid %     4.7 %     1.7-9.3                       [facility]  

 

          Neut %    78.5 %    37.0-92.0                     [facility] ([facility]

[facility])  

 

          Neut #    5.2 x10*3/UL 2.0-7.8                       [facility] ([facility])  

 

          Mid #     0.3 x10*3/UL 0.1-0.6                       [facility] ([facility])  









                    ID                  Date                Data Source

 

                    [id number]          10/15/2020 12:07:00 PM EDT [facility] ([facility])









          Name      Value     Range     Interpretation Code Description Data Milly

rce(s) Supporting 

Document(s)

 

           INR in Platelet poor plasma by Coagulation assay 1.3                 

                        [facility] ([facility])                              









                    ID                  Date                Data Source

 

                    [id number]          2020 12:44:00 PM EDT [facility] ([facility])









          Name      Value     Range     Interpretation Code Description Data Milly

rce(s) Supporting 

Document(s)

 

           INR in Platelet poor plasma by Coagulation assay 3.7                 

                        [facility] ([facility])                              









                    ID                  Date                Data Source

 

                    [id number]          2020 03:27:00 PM EDT [facility] ([facility])









          Name      Value     Range     Interpretation Code Description Data Milly

rce(s) Supporting 

Document(s)

 

           INR in Platelet poor plasma by Coagulation assay 1.3                 

                        [facility] ([facility])                              









                    ID                  Date                Data Source

 

                    [id number]          2020 01:48:00 PM EDT [facility] ([facility])









          Name      Value     Range     Interpretation Code Description Data Milly

rce(s) Supporting 

Document(s)

 

           INR in Platelet poor plasma by Coagulation assay 2.6                 

                        [facility] ([facility])                              









                    ID                  Date                Data Source

 

                    [id number]          2020 11:57:00 AM [facility]









          Name      Value     Range     Interpretation Code Description Data Milly

rce(s) Supporting 

Document(s)

 

          Inr       4.98                                    [facility] (Former[facility] named [facility])  

 

                                        THERAPUTIC HUMAN INR VALUES

INDICATIONS                      NORMAL RANGES

PROPHYLAXIS/TREATMENT OF:

VENOUS THROMBOSIS                2.0-3.0

PULMONARY EMBOLISM               2.0-3.0

PREVENTION OF SYSTEMIC EMBOLISM FROM:

TISSUE HEART VALVES              2.0-3.0

ACUTE MYOCARDIAL INFARCTION      2.0-3.0

VALVULAR HEART DISEASE           2.0-3.0

ATRIAL FIBRILLATION              2.0-3.0

MECHANICAL VALVES(HIGH RISK)     2.5-3.5

RECURRENT MYOCARDIAL INFARCTION  2.5-3.5

 

 

          Prothrombin Time 47.4 s    12.5-14.3                     [facility] ([facility])  









                    ID                  Date                Data Source

 

                    [id number]          2020 11:56:00 AM EDT [facility] ([facility])









          Name      Value     Range     Interpretation Code Description Data Milly

rce(s) Supporting 

Document(s)

 

           Hematocrit [Volume Fraction] of Blood by Automated count 28.7 %     3

7.0-51.0                        

[facility] ([facility]            

 

           Erythrocytes [#/volume] in Blood by Automated count 3.00 x10*6/UL 4.2

0-6.30                        

[facility] ([facility])            

 

           Hemoglobin [Mass/volume] in Blood 10.1 g/dL  12.0-18.0               

         [facility] ([facility])                              

 

                                        NOTE:

RESULT VERIFIED.





 

 

           Leukocytes [#/volume] in Blood by Automated count 8.4 x10*3/UL 4.1-10

.9                         

[facility] ([facility])            

 

          MCH       33.8 pg   26.0-32.0                     [facility] ([facility])  

 

          MCHC      35.3 g/dL 31.0-38.0                     [facility] ([facility])  

 

          MCV       95.7 fL   80.0-97.0                     [facility] ([facility])  

 

           Platelets [#/volume] in Blood by Automated count 289 x10*3/-440

                          [facility]

 ([facility]                  

 

          MPV       7.4 FL    7.8-11.0                      [facility] ([facility]

[facility])  

 

           Erythrocyte distribution width [Ratio] by Automated count 13.2 %     

11.6-13.7                        

[facility] ([facility])            

 

          Neut %    79.2 %    37.0-92.0                     [facility] ([facility]

[facility])  

 

          Mid %     6.2 %     1.7-9.3                       [facility]  

 

          Lymph #   1.2 x10*3/UL 0.6-4.1                       [facility] ([facility])  

 

          Lymph %   14.6 %    10.0-58.5                     [facility] ([facility]

[facility])  

 

          Mid #     0.6 x10*3/UL 0.1-0.6                       [facility] ([facility])  

 

          Neut #    6.6 x10*3/UL 2.0-7.8                       [facility] ([facility])  









                    ID                  Date                Data Source

 

                    [id number]          2020 10:42:00 AM EDT [facility]









          Name      Value     Range     Interpretation Code Description Data Milly

rce(s) Supporting 

Document(s)

 

           INR in Platelet poor plasma by Coagulation assay 8.0                 

                        [facility]                              









                    ID                  Date                Data Source

 

                    [id number]          2020 10:31:00 AM EDT [facility]









          Name      Value     Range     Interpretation Code Description Data Milly

rce(s) Supporting 

Document(s)

 

          Prothrombin Time 59.3 s    11.8-14.0                     [facility]  

 

          Inr       6.73                Above upper panic limits           [facility]

T ([facility])  

 

                                        THERAPUTIC HUMAN INR VALUES

INDICATIONS                      NORMAL RANGES

PROPHYLAXIS/TREATMENT OF:

VENOUS THROMBOSIS                2.0-3.0

PULMONARY EMBOLISM               2.0-3.0

PREVENTION OF SYSTEMIC EMBOLISM FROM:

TISSUE HEART VALVES              2.0-3.0

ACUTE MYOCARDIAL INFARCTION      2.0-3.0

VALVULAR HEART DISEASE           2.0-3.0

ATRIAL FIBRILLATION              2.0-3.0

MECHANICAL VALVES(HIGH RISK)     2.5-3.5

RECURRENT MYOCARDIAL INFARCTION  2.5-3.5

 









                    ID                  Date                Data Source

 

                    [id number]          2020 10:06:00 AM EDT [facility]









          Name      Value     Range     Interpretation Code Description Data Milly

rce(s) Supporting 

Document(s)

 

                          Thyrotropin [Units/volume] in Serum or Plasma by Detec

tion limit <= 0.05 mIU/L 

0.16 uIU/mL  0.36-3.74                              [facility] ([facility]

)  









                    ID                  Date                Data Source

 

                    [id number]          2020 10:06:00 AM EDT [facility] ([facility])









          Name      Value     Range     Interpretation Code Description Data Milly

rce(s) Supporting 

Document(s)

 

           Glucose [Mass/volume] in Serum or Plasma 99 mg/dL   74-99            

                [facility] ([facility])                              

 

                                        100-125 mg/dL     PRE-DIABETES/FASTING

>126 mg/dL          DIABETES/FASTING

 

 

           Urea nitrogen [Mass/volume] in Serum or Plasma 25 mg/dL   7-18       

                      [facility] 

([facility])                   

 

           Chloride [Moles/volume] in Serum or Plasma 107 meq/L           

                  [facility] 

([facility])                   

 

          Creatinine 0.8 mg/dL 0.6-1.3                       [facility] ([facility])  

 

           Potassium [Moles/volume] in Serum or Plasma 4.6 meq/L  3.5-5.1       

                   [facility] 

([facility])                   

 

           Sodium [Moles/volume] in Serum or Plasma 143 meq/L  136-145          

                [facility] ([facility])                             

 

           Carbon dioxide, total [Moles/volume] in Serum or Plasma 26 meq/L   21

-32                            

[facility] ([facility])            

 

                                        Glomerular filtration rate/1.73 sq M pre

dicted among non-blacks [Volume 

Rate/Area] in Serum or Plasma by Creatinine-based formula (MDRD) Laboratory test

result                                              [facility] ([facility]

)  

 

           Calcium [Mass/volume] in Serum or Plasma 10.0 mg/dL 8.5-10.1         

                [facility] 

([facility])                   

 

                                        Glomerular filtration rate/1.73 sq M pre

dicted among blacks [Volume Rate/Area] 

in Serum or Plasma by Creatinine-based formula (MDRD) Laboratory test result    

                  

                                        [facility] ([facility])  

 

                                        <content>CHRONIC KIDNEY DISEASE STAGING 

PER 

NKF</content><br/><content></content><br/><content>STAGE I & II      GFR >= 60  
     NORMAL TO MILDLY DECREASED</content><br/><content>STAGE III          GFR 
30-59          MODERATELY DECREASED</content><br/><content>STAGE IV           
GFR 15-29         SEVERELY DECREASED</content><br/><content>STAGE V            
GFR <15            VERY LITTLE GFR LEFT</content><br/><content>ESRD             
   GFR <15            ON RRT</content><br/><content></content> 









                    ID                  Date                Data Source

 

                    [id number]          2020 10:06:00 AM EDT [facility] ([facility])









          Name      Value     Range     Interpretation Code Description Data Milly

rce(s) Supporting 

Document(s)

 

           Leukocytes [#/volume] in Blood by Automated count 8.1 x10*3/UL 4.1-10

.9                         

[facility] ([facility])            

 

           Erythrocytes [#/volume] in Blood by Automated count 2.84 x10*6/UL 4.2

0-6.30                        

[facility] ([facility])            

 

          MCV       96.3 fL   80.0-97.0                     [facility] ([facility]

[facility])  

 

           Hematocrit [Volume Fraction] of Blood by Automated count 27.3 %     3

7.0-51.0                        

[facility] ([facility])            

 

           Hemoglobin [Mass/volume] in Blood 9.5 g/dL   12.0-18.0               

         [facility] ([facility]                              

 

                                        NOTE:

RESULT VERIFIED.





 

 

           Erythrocyte distribution width [Ratio] by Automated count 13.4 %     

11.6-13.7                        

[facility] ([facility])            

 

          MCHC      34.9 g/dL 31.0-38.0                     [facility] ([facility]

[facility])  

 

          MCH       33.6 pg   26.0-32.0                     [facility] ([facility]

[facility])  

 

          MPV       7.9 FL    7.8-11.0                      [facility] ([facility]

[facility])  

 

          Lymph %   14.4 %    10.0-58.5                     [facility] ([facility]

[facility])  

 

           Platelets [#/volume] in Blood by Automated count 281 x10*3/-440

                          [facility]

 ([facility])                  

 

          Lymph #   1.1 x10*3/UL 0.6-4.1                       [facility] ([facility])  

 

          Mid #     0.5 x10*3/UL 0.1-0.6                       [facility] ([facility])  

 

          Neut %    80.1 %    37.0-92.0                     [facility] ([facility]

[facility])  

 

          Mid %     5.5 %     1.7-9.3                       [facility] ([facility]

[facility])  

 

          Neut #    6.5 x10*3/UL 2.0-7.8                       [facility] ([facility])  









                    ID                  Date                Data Source

 

                    [id number]          2020 08:39:00 PM EDT [facility]









          Name      Value     Range     Interpretation Code Description Data Milly

rce(s) Supporting 

Document(s)

 

          Respiratory Panel Laboratory test result                              

 [facility]  

 

                                        This respiratory PCR panel detects Influ

chalo A H1, H3 and

                                        2009 H1 viruses, Influenza B virus, Resp

iratory Syncytial Virus,

Human metapneumovirus, Parainfluenza virus 1, 2, 3 and 4, Adenovirus,

Rhinovirus/Enterovirus, Coronavirus HKU1, NL63, OC43, 229E and

SARS-CoV-2 (COVID 19), Bordetella pertussis, Bordetella parapertussis,

Mycoplasma pneumoniae and Chlamydia pneumoniae.



NEGATIVE by MULTIPLEXED NUCLEIC ACID PCR

SARS-CoV-2 (COVID 19)         NEGATIVE - SARS-CoV-2 (COVID19)



 









                    ID                  Date                Data Source

 

                    [id number]          2020 11:38:00 AM [facility]T [facility]









          Name      Value     Range     Interpretation Code Description Data Milly

rce(s) Supporting 

Document(s)

 

           INR in Platelet poor plasma by Coagulation assay 2.9                 

                        [facility] ([facility])                              









                    ID                  Date                Data Source

 

                    [id number]          2020 04:00:00 PM EDT [facility]









          Name      Value     Range     Interpretation Code Description Data Milly

rce(s) Supporting 

Document(s)

 

           INR in Platelet poor plasma by Coagulation assay 2.7                 

                        [facility] ([facility]                              









                    ID                  Date                Data Source

 

                    [id number]          2020 02:20:00 PM EDT [facility]









          Name      Value     Range     Interpretation Code Description Data Milly

rce(s) Supporting 

Document(s)

 

          Magnesium 2.0 mg/dL 1.8-2.4                       [facility]  









                    ID                  Date                Data Source

 

                    [id number]          2020 02:20:00 PM EDT [facility]









          Name      Value     Range     Interpretation Code Description Data Milly

rce(s) Supporting 

Document(s)

 

           Digoxin [Mass/volume] in Serum or Plasma 0.4 ng/mL  0.5-2.0          

                [facility] ([facility])                             









                    ID                  Date                Data Source

 

                    [id number]          2020 02:20:00 PM EDT [facility] ([facility])









          Name      Value     Range     Interpretation Code Description Data Milly

rce(s) Supporting 

Document(s)

 

                          Thyrotropin [Units/volume] in Serum or Plasma by Detec

tion limit <= 0.05 mIU/L 

3.16 uIU/mL  0.36-3.74                              [facility] ([facility]

)  









                    ID                  Date                Data Source

 

                    [id number]          2020 02:20:00 PM EDT [facility] ([facility])









          Name      Value     Range     Interpretation Code Description Data Milly

rce(s) Supporting 

Document(s)

 

           Glucose [Mass/volume] in Serum or Plasma 110 mg/dL  74-99            

                [facility] ([facility]                              

 

                                        100-125 mg/dL     PRE-DIABETES/FASTING

>126 mg/dL          DIABETES/FASTING

 

 

           Urea nitrogen [Mass/volume] in Serum or Plasma 29 mg/dL   7-18       

                      [facility] 

([facility])                   

 

           Potassium [Moles/volume] in Serum or Plasma 4.9 meq/L  3.5-5.1       

                   [facility] 

([facility])                   

 

          Creatinine 1.1 mg/dL 0.6-1.3                       [facility] ([facility]

[facility])  

 

           Sodium [Moles/volume] in Serum or Plasma 139 meq/L  136-145          

                [facility] ([facility]                             

 

           Chloride [Moles/volume] in Serum or Plasma 101 meq/L           

                  [facility] 

([facility])                   

 

           Carbon dioxide, total [Moles/volume] in Serum or Plasma 25 meq/L   21

-32                            

[facility] ([facility])            

 

                                        Glomerular filtration rate/1.73 sq M pre

dicted among non-blacks [Volume 

Rate/Area] in Serum or Plasma by Creatinine-based formula (MDRD) 50 mL/min      

                                  

                          [facility] ([facility])  

 

           Calcium [Mass/volume] in Serum or Plasma 10.5 mg/dL 8.5-10.1         

                [facility] 

([facility])                   

 

                                        NOTE:

RESULT VERIFIED.





 

 

                                        Glomerular filtration rate/1.73 sq M pre

dicted among blacks [Volume Rate/Area] 

in Serum or Plasma by Creatinine-based formula (MDRD) Laboratory test result    

                  

                                        [facility] ([facility])  

 

                                        <content>CHRONIC KIDNEY DISEASE STAGING 

PER 

NKF</content><br/><content></content><br/><content>STAGE I & II      GFR >= 60  
     NORMAL TO MILDLY DECREASED</content><br/><content>STAGE III          GFR 
30-59          MODERATELY DECREASED</content><br/><content>STAGE IV           
GFR 15-29         SEVERELY DECREASED</content><br/><content>STAGE V            
GFR <15            VERY LITTLE GFR LEFT</content><br/><content>ESRD             
   GFR <15            ON RRT</content><br/><content></content> 









                    ID                  Date                Data Source

 

                    [id number]            2020 08:05:00 AM EDT [facility]









          Name      Value     Range     Interpretation Code Description Data Milly

rce(s) Supporting 

Document(s)

 

          Glucose   110                               [facility]  

 

          Creatinine 1.1       0.6-1.3                       [facility]  

 

          Blood Urea Nitrogen 29        7-18                          [facility] ([facility])  

 

          Sodium    139       136-145                       [facility]  

 

          Potassium 4.9       3.5-5.1                       [facility]  

 

          Carbon Dioxide 25        21-32                         [facility] ([facility]  

 

          Chloride  101                               [facility]  

 

                                        Glomerular filtration rate/1.73 sq M.pre

dicted [Volume Rate/Area] in Serum or 

Plasma by Creatinine-based formula (MDRD) 50                                    

              [facility] ([facility]                       

 

          Calcium   10.5      8.5-10.1                      [facility] ([facility]  









                    ID                  Date                Data Source

 

                    [id number]          2020 02:36:00 PM EDT [facility] ([facility])









          Name      Value     Range     Interpretation Code Description Data Milly

rce(s) Supporting 

Document(s)

 

           INR in Platelet poor plasma by Coagulation assay 3.4                 

                        [facility] ([facility]                              









                    ID                  Date                Data Source

 

                    [id number]          2020 02:08:00 PM EDT [facility] ([facility])









          Name      Value     Range     Interpretation Code Description Data Milly

rce(s) Supporting 

Document(s)

 

           INR in Platelet poor plasma by Coagulation assay 2.9                 

                        [facility] ([facility]                              









                    ID                  Date                Data Source

 

                    [id number]          2020 [facility]47[facility]









          Name      Value     Range     Interpretation Code Description Data Milly

rce(s) Supporting 

Document(s)

 

           INR in Platelet poor plasma by Coagulation assay 1.3                 

                        [facility]                              









                    ID                  Date                Data Source

 

                    [id number]          2020 10:59:00 [facility]









          Name      Value     Range     Interpretation Code Description Data Milly

rce(s) Supporting 

Document(s)

 

                          C reactive protein [Mass/volume] in Serum or Plasma by

 High sensitivity method 

Laboratory test result 0.00-0.30                              [facility]  









                    ID                  Date                Data Source

 

                    [id number]          2020 10:59:00 AM [facility] ([facility])









          Name      Value     Range     Interpretation Code Description Data Milly

rce(s) Supporting 

Document(s)

 

          Glucose, Fasting 91 mg/dL                          [facility]  

 

          Creatinine For GFR 0.64 mg/dL 0.55-1.30                     [facility]  

 

          Blood Urea Nitrogen 16 mg/dL  7-18                          [facility]  

 

           Glomerular Filtration Rate Laboratory test result                    

              [facility] ([facility])                              

 

                                        <content>Units are mL/min/1.73 

m2</content><br/><content></content><br/><content>Chronic Kidney Disease Staging
 per NKF:</content><br/><content></content><br/><content>Stage I & II   GFR >=60
       Normal to Mildly Decreased</content><br/><content>Stage III      GFR 30-
59      Moderately Decreased</content><br/><content>Stage IV       GFR 15-29    
  Severely Decreased</content><br/><content>Stage V        GFR <15        Very 
Little GFR Left</content><br/><content>ESRD           GFR <15 on 
RRT</content><br/><content></content> 

 

          Sodium Level 142 meq/L 136-145                       [facility])  

 

          Potassium Serum 4.7 meq/L 3.5-5.1                       [facility] ([facility])  

 

          Carbon Dioxide Level 26 meq/L  21-32                         [facility]  

 

          Chloride Level 108 meq/L                         [facility] ([facility])  

 

          Anion Gap 8 meq/L   8-16                          [facility])  

 

          Calcium Level 9.8 mg/dL 8.8-10.2                      [facility] ([facility])  









                    ID                  Date                Data Source

 

                    [id number]          2020 10:59:00 AM EDT [facility] ([facility])









          Name      Value     Range     Interpretation Code Description Data Milly

rce(s) Supporting 

Document(s)

 

           Erythrocyte sedimentation rate by Westergren method 78 mm/hr   0-30  

                           [facility] 

([facility])                   









                    ID                  Date                Data Source

 

                    [id number]          2020 10:59:00 AM EDT [facility] ([facility])









          Name      Value     Range     Interpretation Code Description Data Milly

rce(s) Supporting 

Document(s)

 

          Red Blood Count 3.74 10   4.00-5.40                     [facility] ([facility])  

 

          Hemoglobin 12.1 g/dL 12.0-15.5                     [facility] ([facility])  

 

          White Blood Count 5.4 10    4.0-10.0                      [facility] ([facility])  

 

          Mean Corpuscular Volume 102.7 fl  80.0-96.0                     [facility]

 ([facility])  

 

          Mean Corpuscular HGB Conc 31.5 g/dL 32.0-36.5                     [facility]

[facility] ([facility]) 

 

 

          Mean Corpuscular Hemoglobin 32.4 pg   27.0-33.0                     [facility]

[facility] ([facility]) 

 

 

          Hematocrit 38.4 %    36.0-47.0                     [facility]

[facility])  

 

          Neutrophils % 66.1 %    36.0-66.0                     [facility] ([facility])  

 

          Platelet Count, Automated 204 10    150-450                       [facility]

[facility] ([facility])  

 

          Red Cell Distribution Width 13.4 %    11.5-14.5                     [facility]

[facility] ([facility])  

 

          Baso %    0.4 %     0.0-1.0                       [facility]  

 

          Mono %    7.7 %     0.0-5.0                       [facility])  

 

          Eos %     2.2 %     0.0-3.0                       [facility])  

 

          Lymph %   23.2 %    24.0-44.0                     [facility])  

 

          Nucleated Red Blood Cell % 0.0 %     0-0                           [facility]

[facility] ([facility])  

 

          Immature Granulocyte % 0.4 %     0-3.0                         [facility] 

([facility])  

 

          Neutrophils # 3.5 10    1.5-8.5                       [facility] ([facility])  

 

          Eos #     0.1 10    0.0-0.5                       [facility])  

 

          Lymph #   1.2 10    1.5-5.0                       [facility])  

 

          Mono #    0.4 10    0.0-0.8                       [facility])  

 

          Baso #    0.0 10    0.0-0.2                       [facility] ([facility])  









                    ID                  Date                Data Source

 

                    [id number]          2020 10:59:00 AM EDT [facility] ([facility])









          Name      Value     Range     Interpretation Code Description Data Milly

rce(s) Supporting 

Document(s)

 

           aPTT in Blood by Coagulation assay 43.9 s     25.0-38.4              

          [facility]                              









                    ID                  Date                Data Source

 

                    [id number]          2020 10:59:00 AM EDT [facility] ([facility])









          Name      Value     Range     Interpretation Code Description Data Milly

rce(s) Supporting 

Document(s)

 

          Inr       3.68                                    [facility] ([facility])  

 

                                        THERAPUTIC HUMAN INR VALUES

INDICATIONS                      NORMAL RANGES

PROPHYLAXIS/TREATMENT OF:

VENOUS THROMBOSIS                2.0-3.0

PULMONARY EMBOLISM               2.0-3.0

PREVENTION OF SYSTEMIC EMBOLISM FROM:

TISSUE HEART VALVES              2.0-3.0

ACUTE MYOCARDIAL INFARCTION      2.0-3.0

VALVULAR HEART DISEASE           2.0-3.0

ATRIAL FIBRILLATION              2.0-3.0

MECHANICAL VALVES(HIGH RISK)     2.5-3.5

RECURRENT MYOCARDIAL INFARCTION  2.5-3.5

 

 

          Prothrombin Time 36.6 s    11.8-14.0                     [facility]  









                    ID                  Date                Data Source

 

                    [id number]            2020 01:54:00 PM EDT [facility]









          Name      Value     Range     Interpretation Code Description Data Milly

rce(s) Supporting 

Document(s)

 

                          Natriuretic peptide.B prohormone N-Terminal [Mass/volu

me] in Serum or Plasma 388

 pg/mL       0-287                                  [facility]  

 

                                        <content>The following cut-points have b

een suggested for 

the</content><br/><content>use of proBNP for the diagnostic evaluation of 
heart</content><br/><content>failure (HF) in patients with acute dy
spnea:</content><br/><content>Modality                     Age           Optimal
 Cut</content><br/><content>(years)            
Point</content><br/><content>---------------------------------------------------

---</content><br/><content>Diagnosis (rule in HF)        <50            450 
pg/mL</content><br/><content>50 - 75            900 
pg/mL</content><br/><content>>75           1800 pg/mL</content><br/><content>
Exclusion (rule out HF)  Age independent     300 
pg/mL</content><br/><content></content> 

 

           Flecainide [Mass/volume] in Serum or Plasma 0.21 ug/mL 0.20-1.00     

                   [facility] 

([facility])           

 

                                        This test was developed and its performa

nce characteristics

determined by [facility]. It has not been cleared or approved

by the Food and Drug Administration.

Detection Limit = 0.10

 

 

           Laboratory test finding (navigational concept) Laboratory test result

                                  

[facility] ([facility])    









                    ID                  Date                Data Source

 

                    [id number]         2020 02:05:00 PM EDT [facility]









          Name      Value     Range     Interpretation Code Description Data Milly

rce(s) Supporting 

Document(s)

 

          NT-proBNP 388 pg/mL 0-287     Above high normal           [facility]    

 

                                                       The following cut-points 

have been suggested for the             

  use of proBNP for the diagnostic evaluation of heart               failure 
(HF) in patients with acute dyspnea:                Modality                    
 Age           Optimal Cut                                          (years)     
       Point               
------------------------------------------------------               Diagnosis 
(rule in HF)        <50            450 pg/mL                                    
     50 - 75            900 pg/mL                                             >
75           1800 pg/mL               Exclusion (rule out HF)  Age independent  
   300 pg/mL 









                    ID                  Date                Data Source

 

                    [id number]         2020 08:05:00 PM EDT [facility]









          Name      Value     Range     Interpretation Code Description Data Milly

rce(s) Supporting 

Document(s)

 

          Flecainide (Tambocor(TM)), S 0.21 ug/mL 0.20-1.00                     

[facility]    

 

                                        This test was developed and its performa

nce characteristicsdetermined by 

[facility]. It has not been cleared or approvedby the Food and Drug Administration.
                                               Detection Limit = 0.10 









                    ID                  Date                Data Source

 

                    [id number]          2020 11:23:00 AM EST [facility] ([facility])









          Name      Value     Range     Interpretation Code Description Data Milly

rce(s) Supporting 

Document(s)

 

           INR in Platelet poor plasma by Coagulation assay 2.6                 

                        [facility] ([facility]                              









                    ID                  Date                Data Source

 

                    [id number]          2020 11:33:00 AM EST [facility] ([facility])









          Name      Value     Range     Interpretation Code Description Data Milly

rce(s) Supporting 

Document(s)

 

           INR in Platelet poor plasma by Coagulation assay 2.2                 

                        [facility] ([facility]                              









                    ID                  Date                Data Source

 

                    [id number]          2020 11:32:00 AM EST [facility] ([facility])









          Name      Value     Range     Interpretation Code Description Data Milly

rce(s) Supporting 

Document(s)

 

           INR in Platelet poor plasma by Coagulation assay 1.9                 

                        [facility] ([facility]                              









                    ID                  Date                Data Source

 

                    [id number]          2020 11:31:00 AM EST [facility] ([facility])









          Name      Value     Range     Interpretation Code Description Data Milly

rce(s) Supporting 

Document(s)

 

           INR in Platelet poor plasma by Coagulation assay 1.7                 

                        [facility] ([facility]                              









                    ID                  Date                Data Source

 

                    [id number]          2020 01:15:00 PM EST [facility] ([facility])









          Name      Value     Range     Interpretation Code Description Data Milly

rce(s) Supporting 

Document(s)

 

           INR in Platelet poor plasma by Coagulation assay 1.8                 

                        [facility] ([facility]                              









                    ID                  Date                Data Source

 

                    [id number]          2020 11:41:00 AM EST [facility] ([facility])









          Name      Value     Range     Interpretation Code Description Data Milly

rce(s) Supporting 

Document(s)

 

                          Thyrotropin [Units/volume] in Serum or Plasma by Detec

tion limit <= 0.05 mIU/L 

1.37 uIU/mL  0.36-3.74                              [facility] ([facility]

)  

 

           Thyroxine (T4) free [Mass/volume] in Serum or Plasma 1.32 ng/dL 0.76-

1.46                        

[facility] ([facility])            









                    ID                  Date                Data Source

 

                    [id number]          2020 11:41:00 AM EST [facility] ([facility])









          Name      Value     Range     Interpretation Code Description Data Milly

rce(s) Supporting 

Document(s)

 

           Triglyceride [Mass/volume] in Serum or Plasma 63 mg/dL         

                     [facility] 

([facility])                   

 

           Cholesterol [Mass/volume] in Serum or Plasma 130 mg/dL  131-200      

                    [facility] 

([facility])                   

 

                    Cholesterol in LDL [Mass/volume] in Serum or Plasma by calcu

lation 59 CALC             

                                          [facility] ([facility])  

 

           Cholesterol in HDL [Mass/volume] in Serum or Plasma 58 mg/dL   35-60 

                           [facility] 

([facility])                   









                    ID                  Date                Data Source

 

                    [id number]          2020 11:41:00 AM EST [facility] ([facility])









          Name      Value     Range     Interpretation Code Description Data Milly

rce(s) Supporting 

Document(s)

 

          Magnesium 2.1 mg/dL 1.8-2.4                       [facility] ([facility])  









                    ID                  Date                Data Source

 

                    [id number]          2020 11:41:00 AM EST [facility] ([facility])









          Name      Value     Range     Interpretation Code Description Data Milly

rce(s) Supporting 

Document(s)

 

           Glucose [Mass/volume] in Serum or Plasma 90 mg/dL   74-99            

                [facility] ([facility])                              

 

                                        100-125 mg/dL     PRE-DIABETES/FASTING

>126 mg/dL          DIABETES/FASTING

 

 

          Creatinine 0.8 mg/dL 0.6-1.3                       [facility] ([facility]

[facility])  

 

           Urea nitrogen [Mass/volume] in Serum or Plasma 17 mg/dL   7-18       

                      [facility] 

([facility])                   

 

           Carbon dioxide, total [Moles/volume] in Serum or Plasma 28 meq/L   21

-32                            

[facility] ([facility])            

 

           Chloride [Moles/volume] in Serum or Plasma 104 meq/L           

                  [facility] 

([facility])                   

 

           Sodium [Moles/volume] in Serum or Plasma 142 meq/L  136-145          

                [facility] ([facility])                             

 

           Potassium [Moles/volume] in Serum or Plasma 4.9 meq/L  3.5-5.1       

                   [facility] 

([facility])                   

 

           Calcium [Mass/volume] in Serum or Plasma 10.7 mg/dL 8.5-10.1         

                [facility] 

([facility])                   

 

                                        NOTE:

RESULT VERIFIED.





 

 

          Total Bilirubin 0.5 mg/dL 0.2-1.0                       [facility]  

 

                    Alkaline phosphatase isoenzyme [Units/volume] in Serum or Pl

asma 82 mg/dL            

                                                [facility] ([facility])  

 

                          Aspartate aminotransferase [Enzymatic activity/volume]

 in Serum or Plasma 23 U/L

             15-37                                  [facility]

)  

 

           Albumin [Mass/volume] in Serum or Plasma 3.3 g/dL   3.4-5.0          

                [facility] ([facility])                              

 

                                        NOTE:

RESULT VERIFIED.





 

 

                    Alanine aminotransferase [Enzymatic activity/volume] in Seru

m or Plasma 20 U/L              

12-78                                           [facility]  

 

                                        Glomerular filtration rate/1.73 sq M pre

dicted among blacks [Volume Rate/Area] 

in Serum or Plasma by Creatinine-based formula (MDRD) Laboratory test result    

                  

                                        [facility]  

 

                                        <content>CHRONIC KIDNEY DISEASE STAGING 

PER 

NKF</content><br/><content></content><br/><content>STAGE I & II      GFR >= 60  
     NORMAL TO MILDLY DECREASED</content><br/><content>STAGE III          GFR 
30-59          MODERATELY DECREASED</content><br/><content>STAGE IV           
GFR 15-29         SEVERELY DECREASED</content><br/><content>STAGE V            
GFR <15            VERY LITTLE GFR LEFT</content><br/><content>ESRD             
   GFR <15            ON RRT</content><br/><content></content> 

 

                                        Glomerular filtration rate/1.73 sq M pre

dicted among non-blacks [Volume 

Rate/Area] in Serum or Plasma by Creatinine-based formula (MDRD) Laboratory test

result                                              [facility] ([facility]

)  

 

           Proteinase 3 Ab [Units/volume] in Serum 8.3 g/dL   6.4-8.2           

               [facility]                              

 

                                        NOTE:

RESULT VERIFIED.





 

 

          A/G Ratio 0.66 CALC 1.00-1.90                     [facility] ([facility])  









                    ID                  Date                Data Source

 

                    [id number]          2020 11:40:00 AM EST [facility] ([facility]









          Name      Value     Range     Interpretation Code Description Data Milly

rce(s) Supporting 

Document(s)

 

           Parathyrin.intact [Mass/volume] in Serum or Plasma 26.6 pg/mL 18.5-88

.0                        

[facility] ([facility]            









                    ID                  Date                Data Source

 

                    [id number]          2020 11:40:00 AM EST [facility]









          Name      Value     Range     Interpretation Code Description Data Milly

rce(s) Supporting 

Document(s)

 

           Calcidiol [Mass/volume] in Serum or Plasma 33.5       24.0-80.0      

                  [facility]                             

 

                                        This test was performed using [employer] I

P Vitamin D immunoassay kit.  Values 

obtained with different assay methods should not be used interchangeably.

 









                    ID                  Date                Data Source

 

                    [id number]          2020 10:40:00 [facility]









          Name      Value     Range     Interpretation Code Description Data Milly

rce(s) Supporting 

Document(s)

 

           INR in Platelet poor plasma by Coagulation assay 3.2                 

                        [facility]                              









                    ID                  Date                Data Source

 

                    [id number]          2020 11:19:00 AM EST [facility]









          Name      Value     Range     Interpretation Code Description Data Milly

rce(s) Supporting 

Document(s)

 

           INR in Platelet poor plasma by Coagulation assay 3.1                 

                        [facility] ([facility])                              









                    ID                  Date                Data Source

 

                    [id number]          2019 02:33:00 PM EST [facility] ([facility])









          Name      Value     Range     Interpretation Code Description Data Milly

rce(s) Supporting 

Document(s)

 

           INR in Platelet poor plasma by Coagulation assay 1.6                 

                        [facility] ([facility])                              







                                        Procedure

 

                                          



                                                                                
                                                                                
                                                                                
                                                                                
                                                                                
                                                                                
                                                                                
                                                                                
                                                                                
                                                                    



Vital Signs

          



                    ID                  Date                Data Source

 

                    UNK                                      









           Name       Value      Range      Interpretation Code Description Data

 Source(s)

 

           Body mass index (BMI) [Ratio] 35.7 kg/m2                       35.7 k

g/m2 [facility] ([facility]

 

           Body weight 221.00 [lb_av]                       221.00 [lb_av] [facility]

T ([facility])

 

           Body height 66 [in_i]                        66 [in_i]  [facility]

 

                                        5'6" 

 

           Heart rate 60 /min                          60 /min    [facility] ([facility])

 

           Diastolic blood pressure 70 mm[Hg]                        70 mm[Hg]  

[facility] ([facility])

 

                                        RT Arm 

 

           Systolic blood pressure 148 mm[Hg]                       148 mm[Hg] [facility] ([facility])

 

                                        RT Arm 

 

           Body weight 226.00 [lb_av]                       226.00 [facility]av[facility]

 

           Body mass index (BMI) [Ratio] 36.5 kg/m2                       36.5 k

g/m2 [facility] ([facility]

 

           Body weight 226.00 [lb_av]                       226.00 [lb_av] [facility] ([facility]

 

           Body height 66 [in_i]                        66 [in_i]  [facility]

 

                                        5'6" 

 

           Diastolic blood pressure 90 mm[Hg]                        90 mm[Hg]  

[facility] ([facility])

 

           Systolic blood pressure 138 mm[Hg]                       138 mm[Hg] [facility] ([facility])

 

           Body weight 217.00 [lb_av]                       217.00 [lb_av] [facility]

T ([facility])

 

           Body weight 220.00 [lb_av]                       220.00 [lb_av] [facility]

T ([facility])

 

           Body weight 216.00 [lb_av]                       216.00 [lb_av] [facility]

T ([facility])

 

           Body weight 222.00 [lb_av]                       222.00 [lb_av] [facility]

T ([facility])

 

           Body mass index (BMI) [Ratio] 37.6 kg/m2                       37.6 k

g/m2 [facility]

 

           Body weight 222.50 [lb_av]                       222.50 [lb_av] [facility]

T ([facility])

 

           Body height 64.50 [in_i]                       64.50 [in_i] [facility]

 

                                        5'4.50" 

 

           Heart rate 76 /min                          76 /min    [facility]

 

           Diastolic blood pressure 90 mm[Hg]                        90 mm[Hg]  

[facility] ([facility])

 

                                        LT Arm 

 

           Systolic blood pressure 132 mm[Hg]                       132 mm[Hg] [facility] ([facility])

 

                                        LT Arm 

 

           Diastolic blood pressure--sitting 74 mm[Hg]                        74

 mm[Hg]  [facility] ([facility])

 

                                        large cuff, Ra 

 

           Systolic blood pressure--sitting 126 mm[Hg]                       126

 mm[Hg] [facility] ([facility]

 

                                        large cuff, Ra 

 

           Heart rate 67 /min                          67 /min    [facility]

 

           Body mass index (BMI) [Ratio] 38.1 kg/m2                       38.1 k

g/m2 [facility]

 

           Body height 65 [in_i]                        65 [in_i]  [facility]

 

                                        5'5" 

 

           Body weight 229.00 [lb_av]                       229.00 [lb_av] [facility]

T ([facility])

 

           Oxygen saturation in Arterial blood by Pulse oximetry 98 %           

                  98 %       [facility]

 

           Body weight 231.00 [lb_av]                       231.00 [lb_av] [facility]

T ([facility])

 

           Heart rate 69 /min                          69 /min    [facility]

 

           Diastolic blood pressure 72 mm[Hg]                        72 mm[Hg]  

[facility]

 

           Systolic blood pressure 138 mm[Hg]                       138 mm[Hg] [facility] ([facility])

 

           Body weight 231.00 [lb_av]                       231.00 [lb_av] MED

T ([facility])

 

           Body mass index (BMI) [Ratio] 38.4 kg/m2                       38.4 k

g/m2 [facility] ([facility]

 

           Body weight 227.12 [lb_av]                       227.12 [lb_av] [facility]

T ([facility])

 

           Body height 64.50 [in_i]                       64.50 [in_i] [facility] ([facility]

[facility])

 

                                        5'4.50" 

 

           Heart rate 68 /min                          68 /min    [facility] ([facility])

 

           Diastolic blood pressure 56 mm[Hg]                        56 mm[Hg]  

[facility] ([facility])

 

                                        RT Arm 

 

           Systolic blood pressure 114 mm[Hg]                       114 mm[Hg] [facility]

 

                                        RT Arm 

 

           Body mass index (BMI) [Ratio] 38.5 kg/m2                       38.5 k

g/m2 [facility] ([facility]

 

           Body weight 228.00 [lb_av]                       228.00 [lb_av] [facility]

T ([facility])

 

           Body height 64.50 [in_i]                       64.50 [in_i] [facility] ([facility]

[facility])

 

                                        5'4.50" 

 

           Body mass index (BMI) [Ratio] 39.3 kg/m2                       39.3 k

g/m2 [facility] ([facility]

 

           Body weight 232.38 [lb_av]                       232.38 [lb_av] [facility]

T ([facility])

 

           Body height 64.50 [in_i]                       64.50 [in_i] [facility] ([facility]

[facility])

 

                                        5'4.50" 

 

           Heart rate 56 /min                          56 /min    [facility] ([facility])

 

           Diastolic blood pressure 80 mm[Hg]                        80 mm[Hg]  

[facility]

 

                                        RT Arm 

 

           Systolic blood pressure 122 mm[Hg]                       122 mm[Hg] [facility]

[facility]

 

                                        RT Arm 

 

           Diastolic blood pressure--supine 78 mm[Hg]                        78 

mm[Hg]  [facility] ([facility]

 

           Systolic blood pressure--supine 144 mm[Hg]                       144 

mm[Hg] [facility] ([facility]

 

           Diastolic blood pressure--sitting 76 mm[Hg]                        76

 mm[Hg]  [facility] ([facility])

 

                                        large cuff, Ra 

 

           Systolic blood pressure--sitting 142 mm[Hg]                       142

 mm[Hg] [facility] ([facility])

 

                                        large cuff, Ra 

 

           Respiratory rate 18 /min                          18 /min    [facility] (

[facility])

 

           Heart rate 66 /min                          66 /min    [facility] ([facility])

 

                                        regular with occasional irregularity 

 

           Body mass index (BMI) [Ratio] 38.1 kg/m2                       38.1 k

g/m2 [facility] ([facility])

 

           Body height 65 [in_i]                        65 [in_i]  [facility] ([facility])

 

                                        5'5" 

 

           Body weight 229.00 [lb_av]                       229.00 [lb_av] [facility]

T ([facility])

 

           Body weight 229.00 [lb_av]                       229.00 [lb_av] [facility]

T ([facility])

 

           Body weight 227.00 [lb_av]                       227.00 [lb_av] [facility]

T ([facility])

 

           Body weight 231.00 [lb_av]                       231.00 [lb_av] [facility]

T ([facility])

 

           Heart rate 66 /min                          66 /min    [facility] ([facility])

 

           Diastolic blood pressure 80 mm[Hg]                        80 mm[Hg]  

[facility] ([facility])

 

           Systolic blood pressure 122 mm[Hg]                       122 mm[Hg] M

[facility] ([facility])

 

           Body weight 233.00 [lb_av]                       233.00 [lb_av] [facility]

T ([facility])

## 2021-02-22 NOTE — CCD
Continuity of Care Document (CCD)

                             Created on: 2021



Juany Becerril

External Reference #: [id number]

: 1955

Sex: Female



Demographics





                          Address                   [address]

[place]  [address]

 

                          Home Phone                [phone]

 

                          Preferred Language        Unknown

 

                          Marital Status            Unknown

 

                          Nondenominational Affiliation     Unknown

 

                          Race                      White

 

                          Ethnic Group              Not  or 





Author





                          Author                    Juany Regalado M.D.

 

                          Organization              Unknown

 

                          Address                   [address]

[place]  [address]



 

                          Phone                     [phone]







Care Team Providers





                    Care Team Member Name Role                Phone

 

                    Yuri Maier MD       AUTM                [phone]

 

                    [facility]  AUTM                [phone]

 

                    John Lutz MD AUTM                Unavailable







Problems





                    Active Problems     Provider            Date

 

                    Chronic atrial fibrillation Protime             Onset: 

 

                    Essential hypertension Juany Vincent FNP Onset: 2017

 

                    Deep venous thrombosis of lower extremity Juany Vincent FN

P Onset: 2017

 

                    Pure hypercholesterolemia Juany Vincent FNP Onset: 

017

 

                    Anxiety state       Juany Vincent FNP Onset: 2017

 

                    Chronic pulmonary embolism Juany Vincent FNP Onset: 2017

 

                    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 

2017

 

                    Gastroesophageal reflux disease Juany Vincent FNP Onset: 1

2017

 

                    Scoliosis deformity of spine Juany Vincent FNP Onset: 2017

 

                    Chronic tension-type headache Juany Vincent FNP Onset: 2017

 

                    Varicose veins of lower extremity Jauny Vincent FNP Onset:

 2017

 

                    Tobacco user        Juany Vincent FNP Onset: 2017

 

                    Hypercalcemia       Juany Vincent FNP Onset: 2017







Social History





                Type            Date            Description     Comments

 

                Birth Sex                       Unknown          

 

                ETOH Use                        Consumes 3 glasses of wine per w

Kiana  

 

                Tobacco Use     Start: Unknown  Patient is a current smoker, smo

kes every day STARTED

AGE 30 1-3 CIGARETTES A DAY 







Allergies, Adverse Reactions, Alerts





             Active Allergies Reaction     Severity     Comments     Date

 

             Ibuprofen    STOMACH UPSET HEADACHE              motrin       10/17

/2017

 

             Latex        rash, itches Mild                      10/17/2018







Medications





           Active Medications SIG        Qnty       Indications Ordering Provide

r Date

 

                                        Bupropion Hydrochloride ER (XL)         

            150mg Tablets ER 24HR       

                take 1 tablet by mouth in the morning 30tabs                    

      Charmaine Regalado M.D.                                    2021

 

                          Eliquis                     5mg Tablets               

    take one tablet by 

mouth twice a day 60tabs                          Charmaine Regalado M.D. 2020

 

                          Furosemide                     20mg Tablets           

        1 by mouth every 

am              90tabs                          Charmaine Regalado M.D. 20

20

 

                          Methimazole                     5mg Tablets           

        1 by mouth every 

day except m w f take 2 40tabs                          Charmaine Regalado M.D. 

2020

 

                          Shingrix                     50mcg/0.5ML Suspension Re

c                   

administer at pharmacy 1units                          Juany Vincent FNP 

 

                          Famotidine                     40mg Tablets           

        1 by mouth every 

day             30tabs                          Juany Vincent FNP 2019

 

                          Voltaren                     1% Gel                   

apply up to 4 grams three 

times a day to affected knee as needed 100gm                           ARDEN Vincent FNP 2018

 

                          Flecainide Acetate                     100mg Tablets  

                 Take One 

Tablet By Mouth Twice A Day 180tabs                         Juany Vincent FNP 

10/17/2017

 

                          Atorvastatin Calcium                     20mg Tablets 

                  take one

tablet by mouth every day 90tabs                          Juany Vincent FNP 10

/

 

                          Metoprolol Tartrate                     25mg Tablets  

                 Take One 

Tablet By Mouth Twice A Day 180tabs                         Juany Vincent FNP 

10/17/2017

 

                                        Womens 50+ Multi Vitamin & Mineral Formu

la                      Tablets         

             1 every day PO Vit D=[id number] RZ=817                           Juany Vincent FNP 10/17/2017

 

                                        Calcium 600/Vitamin D3                  

   600-800mg-Unit Tablets               

             1 by mouth qd                           Juany Vincent FNP 10/17/2

017

 

                          Mucus Relief DM                     20-400mg Tablets  

                 1 Q 4HRS 

prn                                             Juany Vincent FNP 10/17/2017

 

                                        Acetaminophen Extra Strength            

         500mg Tablets                  

             take 2 tabs every 6 hours as needed                           Juany Fung FNP 10/17/2017

 

                          Vitamin E                     400Unit Capsules        

           1 by mouth 

every day                                       Juany Vincent FNP 10/17/2017

 

                          Aspirin Adult Low Dose                     81mg Tablet

s                    1 

by mouth every day                                 Juany Vincent FNP 10/17/201

7

 

             Digoxin                     250mcg Tablets                   1 po q

d                                Unknown

                                        







Medications Administered in Office





           Medication SIG        Qnty       Indications Ordering Provider Date

 

                          Administration Of Flu Vaccine                      Inj

ection                    

                                                Charmaine Regalado M.D. 10/15/20

20

 

                          Administration Of Flu Vaccine                      Inj

ection                    

                                                Juany Vincent FNP 10/17/2019

 

                          Administration Of Flu Vaccine                      Inj

ection                    

                                                Juany Vincent FNP 10/17/2018

 

                          Administration Of Flu Vaccine                      Inj

ection                    

                                                Juany Vincent FNP 10/17/2017







Immunizations





             CPT Code     Status       Date         Vaccine      Lot #

 

                [id number]           Given           10/15/2020      Influenza Vaccin

e Quadrivalent Preser/Antibiotic Free Im 

Use                                     [id number]

 

                [id number]           Given           2020      Shingrix Zoster 

Vaccine (HZV), Recombinant, Subunit, 

Adjuvanted                               

 

                [id number]           Given           10/17/2019      Influenza Vaccin

e Quadrivalent Preser/Antibiotic Free Im 

Use                                     [id number]

 

                [id number]           Given           10/17/2018      Influenza Virus 

Vaccine, Quadrivalent (Cciiv4), Derived 

From Cell                               [id number]

 

             50015        Given        2018   Pneumovax 23 [id number]

 

                [id number]           Given           10/17/2017      Influenza Vaccin

e Quadrivalent Preser/Antibiotic Free Im 

Use                                     [id number]







Vital Signs





                Date            Vital           Result          Comment

 

                2021  1:08pm BP Systolic     148 mmHg        RT Arm

 

                    BP Diastolic        70 mmHg             RT Arm

 

                    Heart Rate          60 /min              

 

                    Height              66 inches           5'6"

 

                    Weight              221.00 lb            

 

                    BMI (Body Mass Index) 35.7 kg/m2           

 

                2020 11:19am Weight          226.00 lb        







Results





        Test    Acquired Date Facility Test    Result  H/L     Range   Note

 

                    Complete Blood Count 2021          [facility]

s, pc

: Dr Simon Hoyt

[place], [place] [address] [phone] WBC        5.8 x10*3/UL            4.1 - 10.9  

 

             RBC          4.01 x10*6/UL Low          4.20 - 6.30   

 

             Hemoglobin   13.1 g/dL                 12.0 - 18.0   

 

             Hematocrit   37.5 %                    37.0 - 51.0   

 

             MCV          93.4 fL                   80.0 - 97.0   

 

             MCH          32.6 pg      High         26.0 - 32.0   

 

             MCHC         34.9 g/dL                 31.0 - 38.0   

 

             RDW          13.8 %       High         11.6 - 13.7   

 

             PLT          191 x10*3/UL              140 - 440     

 

             MPV          8.6 FL                    7.8 - 11.0    

 

             Lymph %      24.4 %                    10.0 - 58.5   

 

             Mid %        6.7 %                     1.7 - 9.3     

 

             Neut %       68.9 %                    37.0 - 92.0   

 

             Lymph #      1.4 x10*3/UL              0.6 - 4.1     

 

             Mid #        0.4 x10*3/UL              0.1 - 0.6     

 

             Neut #       4.0 x10*3/UL              2.0 - 7.8     

 

                    Basic Metabolic Panel 2021          [facility]

, pc

: Dr Simon Hoyt

[place] [address] [phone] Glucose    98 mg/dL              74 - 99    1

 

             BUN          49 mg/dL     High         7 - 18       2

 

             Creatinine   1.2 mg/dL                 0.6 - 1.3     

 

             Sodium       142 mEq/L                 136 - 145     

 

             Potassium    4.5 mEq/L                 3.5 - 5.1     

 

             Chloride     104 mEq/L                 98 - 107      

 

             Carbon Dioxide 23 mEq/L                  21 - 32       

 

             Calcium      10.6 mg/dL   High         8.5 - 10.1    

 

             GFR  45 mL/min    Low          >60           

 

             GFR African American 55 mL/min    Low          >60          3

 

                    Laboratory test finding 2021          [facility]

[facility], 

: Dr Simon Hoyt

[place] [address] [phone] Thyroid Stimulating Hormone 0.54 uIU/mL            0.3

6 - 3.74  

 

                    Laboratory test finding 2021          [facility]

[facility], 

: Dr Simon Hoyt

[place] [address] [phone] Vitamin D 25-Hydroxy 32.7                  24.0 - 80.0

 4

 

                    Prothrombin Time/Inr 2020          [facility]

enter

                                        [address] [place], [place] [address] [phone] Prothrombin Time 58.4 seconds High       12.5-14.3   

 

             Inr          6.49         Critical high              5

 

                    Complete Blood Count 2020          [facility]

s, pc

: Dr Simon Hoyt

[place] [address]

           [phone] WBC        6.0 x10*3/UL            4.1 - 10.9  

 

             RBC          4.11 x10*6/UL Low          4.20 - 6.30   

 

             Hemoglobin   13.0 g/dL                 12.0 - 18.0   

 

             Hematocrit   37.5 %                    37.0 - 51.0   

 

             MCV          91.1 fL                   80.0 - 97.0   

 

             MCH          31.6 pg                   26.0 - 32.0   

 

             MCHC         34.7 g/dL                 31.0 - 38.0   

 

             RDW          14.2 %       High         11.6 - 13.7   

 

             PLT          243 x10*3/UL              140 - 440     

 

             MPV          8.3 FL                    7.8 - 11.0    

 

             Lymph %      21.2 %                    10.0 - 58.5   

 

             Mid %        5.8 %                     1.7 - 9.3     

 

             Neut %       73.0 %                    37.0 - 92.0   

 

             Lymph #      1.2 x10*3/UL              0.6 - 4.1     

 

             Mid #        0.4 x10*3/UL              0.1 - 0.6     

 

             Neut #       4.4 x10*3/UL              2.0 - 7.8     

 

                    Laboratory test finding 2020          [facility]

             Inr          7.9                                     

 

                    Basic Metabolic Panel 2020          [facility]

ts pc

: Dr Simon Hoyt

[place] [address]

           [phone] Glucose    100 mg/dL  High       74 - 99    6

 

             BUN          17 mg/dL                  7 - 18        

 

             Creatinine   0.7 mg/dL                 0.6 - 1.3     

 

             Sodium       141 mEq/L                 136 - 145     

 

             Potassium    4.4 mEq/L                 3.5 - 5.1     

 

             Chloride     103 mEq/L                 98 - 107      

 

             Carbon Dioxide 24 mEq/L                  21 - 32       

 

             Calcium      10.2 mg/dL   High         8.5 - 10.1   7

 

             GFR  >= 60 mL/min              >60           

 

             GFR African American >= 60 mL/min              >60          8

 

                    Laboratory test finding 2020          [facility]

             Inr          3.7                                     

 

                    Laboratory test finding 10/29/2020          [facility]

ists, 

: Dr Simon Hoyt

[place], [place] [id number] [phone] T4 Free    1.33 ng/dL            0.76 - 1.46  

 

                    Ua Dipstick Only    10/29/2020          [facility]

, 

: Dr Simon Hoyt

[place], [place] [id number] [phone] Urine Color YELLOW                Yellow      

 

             Urine Appearance CLEAR                     Clear         

 

             Urine PH     6.5 units                 5.0 - 9.0     

 

             Urine Specific Gravity 1.010                     1.005 - 1.030  

 

             Urine Leukocytes NEGATIVE                  Negative      

 

             Urine Blood  NEGATIVE                  Negative      

 

             Urine Protein TRACE                     Negative -Trace  

 

             Urine Glucose NEGATIVE mg/dL              Negative      

 

             Urine Nitrite NEGATIVE                  Negative      

 

             Urine Ketone NEGATIVE mg/dL              Negative      

 

             Urine Bilirubin NEGATIVE                  Negative      

 

             Urine Urobilinogen 0.2 mg/dL                 0.2 - 1.0     

 

                    Laboratory test finding 10/29/2020          [facility]

[facility] 

: Dr Simon Hoyt

[place] [address] [phone] Thyroid Stimulating Hormone 0.16 uIU/mL Low        0.3

6 - 3.74  

 

                    Basic Metabolic Panel 10/29/2020          [facility]

ts, pc

: Dr Simon Hoyt

[place] [id number] [phone] Glucose    101 mg/dL  High       74 - 99    9

 

             BUN          17 mg/dL                  7 - 18        

 

             Creatinine   0.7 mg/dL                 0.6 - 1.3     

 

             Sodium       143 mEq/L                 136 - 145     

 

             Potassium    4.0 mEq/L                 3.5 - 5.1     

 

             Chloride     104 mEq/L                 98 - 107      

 

             Carbon Dioxide 30 mEq/L                  21 - 32       

 

             Calcium      10.7 mg/dL   High         8.5 - 10.1   10

 

             GFR  >= 60 mL/min              >60           

 

             GFR African American >= 60 mL/min              >60          11

 

                    Laboratory test finding 10/29/2020          [facility] [facility]

[facility] 

: Dr Simon Hoyt

[place] [address] [phone] Magnesium  2.1 mg/dL             1.8 - 2.4   

 

                    Complete Blood Count 10/29/2020          [facility]

s, 

: Dr Simon Hoyt

[place] [address] [phone] WBC        6.2 x10*3/UL            4.1 - 10.9  

 

             RBC          4.36 x10*6/UL              4.20 - 6.30   

 

             Hemoglobin   13.8 g/dL                 12.0 - 18.0   

 

             Hematocrit   40.7 %                    37.0 - 51.0   

 

             MCV          93.2 fL                   80.0 - 97.0   

 

             MCH          31.6 pg                   26.0 - 32.0   

 

             MCHC         34.0 g/dL                 31.0 - 38.0   

 

             RDW          14.5 %       High         11.6 - 13.7   

 

             PLT          236 x10*3/UL              140 - 440     

 

             MPV          7.8 FL                    7.8 - 11.0    

 

             Lymph %      19.8 %                    10.0 - 58.5   

 

             Mid %        5.7 %                     1.7 - 9.3     

 

             Neut %       74.5 %                    37.0 - 92.0   

 

             Lymph #      1.2 x10*3/UL              0.6 - 4.1     

 

             Mid #        0.4 x10*3/UL              0.1 - 0.6     

 

             Neut #       4.6 x10*3/UL              2.0 - 7.8     

 

                    Laboratory test finding 10/29/2020          [facility]

                                        [address] [place] [address]

           [phone] Digoxin Level 0.6 NG/ML  Normal     0.5-2.0     

 

                    Laboratory test finding 10/29/2020          [facility]

             Inr          2.7                                     

 

                    Laboratory test finding 10/26/2020          [facility]

             Inr          1.2                                     

 

                    Laboratory test finding 10/22/2020          [facility]

             Inr          7.2                                     

 

                    Complete Blood Count 10/22/2020          [facility]

s pc

: Dr Simon Hoyt

[place] [address]

           [phone] WBC        6.6 x10*3/UL            4.1 - 10.9  

 

             RBC          4.37 x10*6/UL              4.20 - 6.30   

 

             Hemoglobin   13.7 g/dL                 12.0 - 18.0   

 

             Hematocrit   40.2 %                    37.0 - 51.0   

 

             MCV          91.9 fL                   80.0 - 97.0   

 

             MCH          31.4 pg                   26.0 - 32.0   

 

             MCHC         34.2 g/dL                 31.0 - 38.0   

 

             RDW          13.7 %                    11.6 - 13.7   

 

             PLT          251 x10*3/UL              140 - 440     

 

             MPV          8.3 FL                    7.8 - 11.0    

 

             Lymph %      16.8 %                    10.0 - 58.5   

 

             Mid %        4.7 %                     1.7 - 9.3     

 

             Neut %       78.5 %                    37.0 - 92.0   

 

             Lymph #      1.1 x10*3/UL              0.6 - 4.1     

 

             Mid #        0.3 x10*3/UL              0.1 - 0.6     

 

             Neut #       5.2 x10*3/UL              2.0 - 7.8     

 

                    Prothrombin Time/Inr 10/22/2020          [facility]

enter

                                        [address] [place] [id number] [phone] Prothrombin Time 49.4 seconds High       12.5-14.3   

 

             Inr          5.24         Critical high              12

 

                    Laboratory test finding 10/15/2020          [facility]

             Inr          1.3                                     

 

                    Laboratory test finding 2020          [facility]

             Inr          3.7                                     

 

                    Laboratory test finding 2020          [facility]

             Inr          1.3                                     







                          1                         100-125 mg/dL     PRE-DIABET

ES/FASTING

>126 mg/dL          DIABETES/FASTING



 

                          2                         NOTE:

BUN,CALCIUM VERIFIED







 

                          3                         CHRONIC KIDNEY DISEASE STAGI

NG PER NKF



STAGE I & II      GFR >= 60        NORMAL TO MILDLY DECREASED

STAGE III          GFR 30-59          MODERATELY DECREASED

STAGE IV           GFR 15-29         SEVERELY DECREASED

STAGE V            GFR <15            VERY LITTLE GFR LEFT

ESRD                 GFR <15            ON RRT



 

                          4                         This test was performed IES Vitamin D immunoassay kit.  Values 

obtained with different assay methods should not be used interchangeably.



 

                          5                         THERAPUTIC HUMAN INR VALUES

INDICATIONS                      NORMAL RANGES

PROPHYLAXIS/TREATMENT OF:

VENOUS THROMBOSIS                2.0-3.0

PULMONARY EMBOLISM               2.0-3.0

PREVENTION OF SYSTEMIC EMBOLISM FROM:

TISSUE HEART VALVES              2.0-3.0

ACUTE MYOCARDIAL INFARCTION      2.0-3.0

VALVULAR HEART DISEASE           2.0-3.0

ATRIAL FIBRILLATION              2.0-3.0

MECHANICAL VALVES(HIGH RISK)     2.5-3.5

RECURRENT MYOCARDIAL INFARCTION  2.5-3.5



 

                          6                         100-125 mg/dL     PRE-DIABET

ES/FASTING

>126 mg/dL          DIABETES/FASTING



 

                          7                         NOTE:

RESULT VERIFIED.







 

                          8                         CHRONIC KIDNEY DISEASE STAGI

NG PER NKF



STAGE I & II      GFR >= 60        NORMAL TO MILDLY DECREASED

STAGE III          GFR 30-59          MODERATELY DECREASED

STAGE IV           GFR 15-29         SEVERELY DECREASED

STAGE V            GFR <15            VERY LITTLE GFR LEFT

ESRD                 GFR <15            ON RRT



 

                          9                         100-125 mg/dL     PRE-DIABET

ES/FASTING

>126 mg/dL          DIABETES/FASTING



 

                          10                        NOTE:

RESULT VERIFIED.







 

                          11                        CHRONIC KIDNEY DISEASE STAGI

NG PER NKF



STAGE I & II      GFR >= 60        NORMAL TO MILDLY DECREASED

STAGE III          GFR 30-59          MODERATELY DECREASED

STAGE IV           GFR 15-29         SEVERELY DECREASED

STAGE V            GFR <15            VERY LITTLE GFR LEFT

ESRD                 GFR <15            ON RRT



 

                          12                        THERAPUTIC HUMAN INR VALUES

INDICATIONS                      NORMAL RANGES

PROPHYLAXIS/TREATMENT OF:

VENOUS THROMBOSIS                2.0-3.0

PULMONARY EMBOLISM               2.0-3.0

PREVENTION OF SYSTEMIC EMBOLISM FROM:

TISSUE HEART VALVES              2.0-3.0

ACUTE MYOCARDIAL INFARCTION      2.0-3.0

VALVULAR HEART DISEASE           2.0-3.0

ATRIAL FIBRILLATION              2.0-3.0

MECHANICAL VALVES(HIGH RISK)     2.5-3.5

RECURRENT MYOCARDIAL INFARCTION  2.5-3.5









Procedures





                Date            Code            Description     Status

 

                    10/29/2020          15832               Brief Emotional/Beha

v Assessment W/ Scoring Doc Per Standard 

Inst                                    Completed







Medical Devices





                                        Description

 

                                        No Information Available







Encounters





           Type       Date       Location   Provider   Dx         Diagnosis

 

                Office Visit    2021  1:00p [place] [facility]                      I48.20                    Chronic atrial fibrillation,

 unspecified

 

                          Z79.01                    Long term (current) use of a

nticoagulants

 

                          I10                       Essential (primary) hyperten

darci

 

                          E05.90                    Thyrotoxicosis, unsp without

 thyrotoxic crisis or storm

 

                          K21.9                     Gastro-esophageal reflux dis

ease without esophagitis

 

                          I82.502                   Chronic embolism and thombos

 unsp deep veins of l low extrem

 

                          M15.9                     Polyosteoarthritis, unspecif

ied

 

                          F17.210                   Nicotine dependence, cigaret

svitlana, uncomplicated

 

                          R32                       Unspecified urinary incontin

ence

 

                          G31.84                    Mild cognitive impairment, s

o stated

 

                          E78.00                    Pure hypercholesterolemia, u

nspecified

 

                          E55.9                     Vitamin D deficiency, unspec

ified

 

                          J44.9                     Chronic obstructive pulmonar

y disease, unspecified

 

                Office Visit    10/29/2020 10:30a [place] [facility] M[facility]                      I48.20                    Chronic atrial fibrillation,

 unspecified

 

                          Z79.01                    Long term (current) use of a

nticoagulants

 

                          F32.89                    Other specified depressive e

pisodes

 

                          I10                       Essential (primary) hyperten

darci

 

                          E05.90                    Thyrotoxicosis, unsp without

 thyrotoxic crisis or storm

 

                          K21.9                     Gastro-esophageal reflux dis

ease without esophagitis

 

                          I82.502                   Chronic embolism and thombos

 unsp deep veins of l low extrem

 

                          M19.90                    Unspecified osteoarthritis, 

unspecified site

 

                          F17.210                   Nicotine dependence, cigaret

svitlana, uncomplicated

 

                          J44.9                     Chronic obstructive pulmonar

y disease, unspecified

 

                          R32                       Unspecified urinary incontin

ence

 

                          G31.84                    Mild cognitive impairment, s

o stated







Assessments





                Date            Code            Description     Provider

 

                2021      I48.20          Chronic atrial fibrillation, uns

pecified Charmaine Regalado M.D.

 

                2021      Z79.01          Long term (current) use of antic

oagulants Charmaine Regalado M.D.

 

                2021      I10             Essential (primary) hypertension

 Charmaine Regalado M.D.

 

                2021      E05.90          Thyrotoxicosis, unspecified with

out thyrotoxic crisis or sto 

Charmaine Regalado M.D.

 

                2021      K21.9           Gastro-esophageal reflux disease

 without esophagitis Charmaine Regalado M.D.

 

                    2021          I82.502             Chronic embolism and

 thrombosis of unspecified deep veins of 

left lower extremity                    Charmaine Regalado M.D.

 

                2021      M15.9           Polyosteoarthritis, unspecified 

Charmaine Regalado M.D.

 

                2021      F17.210         Nicotine dependence, cigarettes,

 uncomplicated Charmaine Regalado M.D.

 

                2021      R32             Unspecified urinary incontinence

 Charmaine Regalado M.D.

 

                2021      G31.84          Mild cognitive impairment, so st

ated Charmaine Regalado M.D.

 

                2021      E78.00          Pure hypercholesterolemia, unspe

cified Charmaine Regalado M.D.

 

                2021      E55.9           Vitamin D deficiency, unspecifie

d Charmaine Regalado M.D.

 

                2021      J44.9           Chronic obstructive pulmonary di

sease, unspecified Charmaine Regalado M.D.

 

                2020      I10             Essential (primary) hypertension

 Charmaine Regalado M.D.

 

                2020      K21.9           Gastro-esophageal reflux disease

 without esophagitis Charmaine Regalado M.D.

 

                2020      F17.210         Nicotine dependence, cigarettes,

 uncomplicated Charmaine Regalado M.D.

 

                2020      J44.9           Chronic obstructive pulmonary di

sease, unspecified Charmaine Regalado M.D.

 

                2020      Z51.81          Encounter for therapeutic drug l

evel monitoring Ashley Irwin, 

[facility]

 

                2020      Z51.81          Encounter for therapeutic drug l

evel monitoring Protime

 

                2020      I48.20          Chronic atrial fibrillation, uns

pecified Ashley Irwin, [facility]

 

                2020      I48.20          Chronic atrial fibrillation, uns

pecified Protime

 

                2020      Z79.01          Long term (current) use of antic

oagulants Ashley Irwin, [facility]

 

                2020      Z79.01          Long term (current) use of antic

oagulants Protime

 

                2020      I48.20          Chronic atrial fibrillation, uns

pecminoo Regalado M.D.

 

                2020      I48.20          Chronic atrial fibrillation, uns

pecified Lab Schedule

 

                2020      I10             Essential (primary) hypertension

 Charmaine Regalado M.D.

 

                2020      I10             Essential (primary) hypertension

 Lab Schedule

 

                2020      Z51.81          Encounter for therapeutic drug l

evel monitoring Ashley Irwin 

[facility]

 

                2020      Z51.81          Encounter for therapeutic drug l

evel monitoring Charmaine Regalado M.D.

 

                2020      Z51.81          Encounter for therapeutic drug l

evel monitoring Protime

 

                2020      I48.20          Chronic atrial fibrillation, uns

pecified Ashley Irwin [facility]

 

                2020      I48.20          Chronic atrial fibrillation, uns

pecminoo Regalado M.D.

 

                2020      Z51.81          Encounter for therapeutic drug l

evel monitoring Lab Schedule

 

                2020      Z79.01          Long term (current) use of antic

oagulants Ashley Irwin [facility]

 

                2020      Z79.01          Long term (current) use of antic

oagulants Charmaine Regalado M.D.

 

                2020      I48.20          Chronic atrial fibrillation, uns

pecified Protime

 

                2020      I48.20          Chronic atrial fibrillation, uns

pecified Lab Schedule

 

                2020      Z79.01          Long term (current) use of antic

oagulants Lab Schedule

 

                2020      I10             Essential (primary) hypertension

 Charmaine Regalado M.D.

 

                2020      K21.9           Gastro-esophageal reflux disease

 without esophagitis Charmaine Regalado M.D.

 

                2020      F17.210         Nicotine dependence, cigarettes,

 uncomplicated Charmaine Regalado M.D.

 

                2020      J44.9           Chronic obstructive pulmonary di

sease, unspecified Charmaine Regalado M.D.

 

                2020      Z51.81          Encounter for therapeutic drug l

evel monitoring Ashley Irwin, 

[facility]

 

                2020      Z51.81          Encounter for therapeutic drug l

evel monitoring Protime

 

                2020      I48.20          Chronic atrial fibrillation, uns

pecified Ashley Irwin, [facility]

 

                2020      I48.20          Chronic atrial fibrillation, uns

pecified Protime

 

                2020      Z79.01          Long term (current) use of antic

oagulants Ashley Irwin, [facility]

 

                2020      Z79.01          Long term (current) use of antic

oagulants Protime

 

                10/29/2020      Z51.81          Encounter for therapeutic drug l

evel monitoring Ashley Irwin, 

[facility]

 

                10/29/2020      I48.20          Chronic atrial fibrillation, uns

pecified Charmaine Regalado M.D.

 

                10/29/2020      Z51.81          Encounter for therapeutic drug l

evel monitoring Protime

 

                10/29/2020      Z79.01          Long term (current) use of antic

oagulants Charmaine Regalado M.D.

 

                10/29/2020      I48.20          Chronic atrial fibrillation, uns

pecified VICKIE Martinez

 

                10/29/2020      F32.89          Other specified depressive episo

emil Charmaine Regalado M.D.

 

                10/29/2020      I10             Essential (primary) hypertension

 Charmaine Regalado M.D.

 

                10/29/2020      Z79.01          Long term (current) use of antic

oagulants VICKIE Martinez

 

                10/29/2020      E05.90          Thyrotoxicosis, unspecified with

out thyrotoxic crisis or sto 

Charmaine Regalado M.D.

 

                10/29/2020      I48.20          Chronic atrial fibrillation, uns

pecified Protime

 

                10/29/2020      K21.9           Gastro-esophageal reflux disease

 without esophagitis Charmaine Regalado M.D.

 

                    10/29/2020          I82.502             Chronic embolism and

 thrombosis of unspecified deep veins of 

left lower extremity                    Charmaine Regalado M.D.

 

                10/29/2020      Z79.01          Long term (current) use of antic

oagulants Protime

 

                10/29/2020      M19.90          Unspecified osteoarthritis, unsp

ecified site Charmaine Regalado M.D.

 

                10/29/2020      F17.210         Nicotine dependence, cigarettes,

 uncomplicated Charmaine Regalado M.D.

 

                10/29/2020      J44.9           Chronic obstructive pulmonary di

sease, unspecified Charmaine Regalado M.D.

 

                10/29/2020      R32             Unspecified urinary incontinence

 Charmaine Regalado M.D.

 

                10/29/2020      G31.84          Mild cognitive impairment, so st

ated Charmaine Regalado M.D.

 

                10/26/2020      Z51.81          Encounter for therapeutic drug l

evel monitoring Ashley Irwin 

[facility]

 

                10/26/2020      Z51.81          Encounter for therapeutic drug l

evel monitoring Protime

 

                10/26/2020      I48.20          Chronic atrial fibrillation, uns

pecified Ashley Irwin [facility]

 

                10/26/2020      I48.20          Chronic atrial fibrillation, uns

pecified Protime

 

                10/26/2020      Z79.01          Long term (current) use of antic

oagulants Ashley Irwin [facility]

 

                10/26/2020      Z79.01          Long term (current) use of antic

oagulants Protime

 

                10/23/2020      I10             Essential (primary) hypertension

 Charmaine Regalado M.D.

 

                10/23/2020      K21.9           Gastro-esophageal reflux disease

 without esophagitis Charmaine Regalado M.D.

 

                10/23/2020      F17.210         Nicotine dependence, cigarettes,

 uncomplicated Charmaine Regalado M.D.

 

                10/23/2020      J44.9           Chronic obstructive pulmonary di

sease, unspecified Charmaine Regalado M.D.

 

                10/22/2020      Z79.01          Long term (current) use of antic

oagulants Charmaine Regalado M.D.

 

                10/22/2020      Z79.01          Long term (current) use of antic

oagulants Lab Schedule

 

                10/22/2020      I48.20          Chronic atrial fibrillation, uns

pecified Charmaine Regalado M.D.

 

                10/22/2020      Z51.81          Encounter for therapeutic drug l

evel monitoring Ashley Oriana Yates, 

FNP

 

                10/22/2020      I48.20          Chronic atrial fibrillation, uns

pecified Lab Schedule

 

                10/22/2020      Z51.81          Encounter for therapeutic drug l

evel monitoring Protime

 

                10/22/2020      I48.20          Chronic atrial fibrillation, uns

pecified Ashley Oriana Yates, [facility]

 

                10/22/2020      I48.20          Chronic atrial fibrillation, uns

pecified Protime

 

                10/22/2020      Z79.01          Long term (current) use of antic

oagulants Ashley Oriana Yates, FNP

 

                10/22/2020      Z79.01          Long term (current) use of antic

oagulants Protime

 

                10/15/2020      Z51.81          Encounter for therapeutic drug l

evel monitoring Ashley Oriana Yates, 

P

 

                10/15/2020      Z51.81          Encounter for therapeutic drug l

evel monitoring Protime

 

                10/15/2020      I48.20          Chronic atrial fibrillation, uns

pecified Ashley Oriana Pine, P

 

                10/15/2020      I48.20          Chronic atrial fibrillation, uns

pecified Protime

 

                10/15/2020      Z79.01          Long term (current) use of antic

oagulants Ashley Irwin, FNP

 

                10/15/2020      Z79.01          Long term (current) use of antic

oagulants Protime

 

                10/15/2020      Z23             Encounter for immunization Charmaine Regalado M.D.

 

                10/15/2020      Z23             Encounter for immunization Proti

me

 

                2020      I48.20          Chronic atrial fibrillation, uns

pecified Ashley Oriana Yates, [facility]

 

                2020      I48.20          Chronic atrial fibrillation, uns

pecified Protime

 

                2020      Z79.01          Long term (current) use of antic

oagulants Ashley Irwin, FN

 

                2020      Z79.01          Long term (current) use of antic

oagulants Protime

 

                2020      Z51.81          Encounter for therapeutic drug l

evel monitoring Ashley Irwin, 

FN

 

                2020      Z51.81          Encounter for therapeutic drug l

evel monitoring Protime

 

                2020      I10             Essential (primary) hypertension

 Charmaine Regalado M.D.

 

                2020      F17.210         Nicotine dependence, cigarettes,

 uncomplicated Charmaine Regalado M.D.

 

                2020      E78.00          Pure hypercholesterolemia, unspe

cified Charmaine Regalado M.D.

 

                2020      I48.20          Chronic atrial fibrillation, uns

pecified Charmaine Regalado M.D.

 

                2020      I48.20          Chronic atrial fibrillation, uns

pecified Ashley Irwin, [facility]

 

                2020      I48.20          Chronic atrial fibrillation, uns

pecified Protime

 

                2020      Z79.01          Long term (current) use of antic

oagulants Ashley Irwin, [facility]

 

                2020      Z79.01          Long term (current) use of antic

oagulants Protime

 

                2020      Z51.81          Encounter for therapeutic drug l

evel monitoring Ashley Irwin, 

[facility]

 

                2020      Z51.81          Encounter for therapeutic drug l

evel monitoring Protime

 

                2020      I10             Essential (primary) hypertension

 Charmaine Regalado M.D.

 

                2020      F17.210         Nicotine dependence, cigarettes,

 uncomplicated Charmaine Regalado M.D.

 

                2020      E78.00          Pure hypercholesterolemia, unspe

cified Charmaine Regalado M.D.

 

                2020      I48.20          Chronic atrial fibrillation, uns

pecified Charmaine Regalado M.D.







Plan of Treatment

Future Appointment(s):* 2021  2:30 pm - [facility] [facility]

2021 - Charmaine Regalado M.D.* I48.20 Chronic atrial fibrillation, 
  unspecified

* Z79.01 Long term (current) use of anticoagulants

* I10 Essential (primary) hypertension

* E05.90 Thyrotoxicosis, unspecified without thyrotoxic crisis or sto

* K21.9 Gastro-esophageal reflux disease without esophagitis

* I82.502 Chronic embolism and thrombosis of unspecified deep veins of left 
  lower extremity

* M15.9 Polyosteoarthritis, unspecified

* F17.210 Nicotine dependence, cigarettes, uncomplicated

* R32 Unspecified urinary incontinence

* G31.84 Mild cognitive impairment, so stated

* E78.00 Pure hypercholesterolemia, unspecified

* E55.9 Vitamin D deficiency, unspecified

* J44.9 Chronic obstructive pulmonary disease, unspecified

* All * New Medication:* Bupropion Hydrochloride ER (XL) 150 mg - take 1 tablet 
  by mouth in the morning



* Comments:* 13. Depression. Patient given phone number for [facility]. I have also placed her on Bupropion  daily. I will do a 
  follow up in 6 weeks. Support offered, encouragement given.14. Health 
  Maintenance. COVID vaccine safety reviewed. Mammogram and bone density order 
  is given with instructions to call phone number.  Because of difficulty with 
  her compliance with scripts I have asked her to try to use a Mail order which 
  she believes she has through [facility].  She will contact us with 
  that phone number.









Functional Status





                                        Description

 

                                        No Information Available







Mental Status





                                        Description

 

                                        No Information Available







Referrals





                Refer to      Reason for Referral Status          Appt Date

 

                    [facility] CONSULT FOR BILAT KNEE PAIN A

[employer]  

Created                                 

 

                                        [address] [place] [address]

 

                                        [phone]

 

                                          

 

                Rosey Vidal MD CONSULT FOR URINARY INCONTINENCE  Sent      

      

 

                                        [facility]

 

                                        [address] [address]

 

                                        [phone]

 

                                          

 

                [facility] CONSULT FOR RT TRIGGER THUMB  Sen

t            

 

                                        [address] [place] [address]

 

                                        [phone]

## 2021-02-22 NOTE — CCD
Summarization Of Episode

                             Created on: 2021



JUANY BENTLEY

External Reference #: [id number]

: 1955

Sex: Female



Demographics





                          Address                   [address]

[place]  [address]

 

                          Home Phone                [phone]

 

                          Preferred Language        English

 

                          Marital Status            

 

                          Confucianist Affiliation     [place]

 

                          Race                      White

 

                          Ethnic Group              Not  or 





Author





                          Author                    [facility]

 

                          Organization              [facility]

 

                          Address                   Unknown

 

                          Phone                     Unavailable







Support





                Name            Relationship    Address         Phone

 

                    MARJAN BIRD     Next Of Kin         [address]

[place]  [address]                    [phone]

 

                DISABLED        Next Of Kin     Unknown         Unavailable

 

                    ALFRED MOSLEY    Next Of Kin         [address] [place]  [address]                    [phone]







Care Team Providers





                    Care Team Member Name Role                Phone

 

                    David,  Hansa FNP Unavailable         Unavailable

 

                    David,  Hansa FNP Unavailable         Unavailable

 

                    David,  Hansa FNP Unavailable         Unavailable

 

                    David,  Hansa FNP Unavailable         Unavailable

 

                    David,  Hansa FNP Unavailable         Unavailable

 

                    David,  Hansa FNP Unavailable         Unavailable

 

                    David,  Hansa FNP Unavailable         Unavailable

 

                    David,  Hansa FNP Unavailable         Unavailable

 

                    David,  Hansa FNP Unavailable         Unavailable

 

                    David,  Hansa FNP Unavailable         Unavailable

 

                    David,  Hansa FNP Unavailable         Unavailable

 

                    David,  Hansa FNP Unavailable         Unavailable

 

                    David,  Hansa FNP Unavailable         Unavailable

 

                    David,  Hansa FNP Unavailable         Unavailable

 

                    David,  Hansa FNP Unavailable         Unavailable

 

                    David,  Hansa FNP Unavailable         Unavailable

 

                    David,  Hansa FNP Unavailable         Unavailable

 

                    David,  Hansa FNP Unavailable         Unavailable

 

                    David,  Hansa FNP Unavailable         Unavailable

 

                    David,  Hansa FNP Unavailable         Unavailable

 

                    David,  Hansa FNP Unavailable         Unavailable

 

                    David,  Hansa FNP Unavailable         Unavailable

 

                    David,  Hansa FNP Unavailable         Unavailable

 

                    David,  Hansa FNP Unavailable         Unavailable

 

                    David,  Hansa FNP Unavailable         Unavailable

 

                    David,  Hansa FNP Unavailable         Unavailable

 

                    David,  Hansa FNP Unavailable         Unavailable

 

                    ABELARDO Regalado MD Unavailable         Unavailable

 

                    ABELARDO Regalado MD Unavailable         Unavailable

 

                    ABELARDO Regalado MD Unavailable         Unavailable

 

                    ABELARDO Regalado MD Unavailable         Unavailable

 

                    ABELARDO Regalado MD Unavailable         Unavailable

 

                    SabineABELARDO fuchs MD Unavailable         Unavailable

 

                    SabineABELARDO fuchs MD Unavailable         Unavailable

 

                    SabineABELARDO fuchs MD Unavailable         Unavailable

 

                    SabineABELARDO MD Unavailable         Unavailable

 

                    SabineABELARDO MD Unavailable         Unavailable

 

                    SabineABELARDO MD Unavailable         Unavailable

 

                    SabineABELARDO MD Unavailable         Unavailable

 

                    ABELARDO Regalado MD Unavailable         Unavailable

 

                    SabineABELARDO xavier MD Unavailable         Unavailable

 

                    ABELARDO Regalado MD Unavailable         Unavailable

 

                    SabineABELARDO MD Unavailable         Unavailable

 

                    SabineABELARDO MD Unavailable         Unavailable

 

                    SabineABELARDO MD Unavailable         Unavailable

 

                    ABELARDO Regalado MD Unavailable         Unavailable

 

                    SabineABELARDO MD Unavailable         Unavailable

 

                    SabineABELARDO xavier MD Unavailable         Unavailable

 

                    SabineABELARDO MD Unavailable         Unavailable

 

                    SabineABELARDO xavier MD Unavailable         Unavailable

 

                    SabineABELARDO xavier MD Unavailable         Unavailable

 

                    ABELARDO Regalado MD Unavailable         Unavailable

 

                    ABELARDO Regalado MD Unavailable         Unavailable

 

                    ABELARDO Regalado MD Unavailable         Unavailable

 

                    ABELARDO Regalado MD Unavailable         Unavailable

 

                    ABELARDO Regalado MD Unavailable         Unavailable

 

                    ABELARDO Regalado MD Unavailable         Unavailable

 

                    ABELARDO Regalado MD Unavailable         Unavailable

 

                    ABELARDO Regalado MD Unavailable         Unavailable

 

                    ABELARDO Regalado MD Unavailable         Unavailable

 

                    ABELARDO Regalado MD Unavailable         Unavailable

 

                    ABELARDO Regalado MD Unavailable         Unavailable

 

                    ABELARDO Regalado MD Unavailable         Unavailable

 

                    ABELARDO Regalado MD Unavailable         Unavailable

 

                    ABELARDO Regalado MD Unavailable         Unavailable

 

                    ABELARDO Regalado MD Unavailable         Unavailable

 

                    ABELARDO Regalado MD Unavailable         Unavailable

 

                    ABELARDO Regalado MD Unavailable         Unavailable

 

                    ABELARDO Regalado MD Unavailable         Unavailable

 

                    ABELARDO Regalado MD Unavailable         Unavailable

 

                    ABELARDO Regalado MD Unavailable         Unavailable

 

                    ABELARDO Regalado MD Unavailable         Unavailable

 

                    ABELARDO Regalado MD Unavailable         Unavailable

 

                    ABELARDO Regalado MD Unavailable         Unavailable

 

                    ABELARDO Regalado MD Unavailable         Unavailable

 

                    ABELARDO Regalado MD Unavailable         Unavailable

 

                    ABELARDO Regalado MD Unavailable         Unavailable

 

                    ABELARDO Regalado MD Unavailable         Unavailable

 

                    ABELARDO Regalado MD Unavailable         Unavailable

 

                    ABELARDO Regalado MD Unavailable         Unavailable

 

                    ABELARDO Regalado MD Unavailable         Unavailable

 

                    ABELARDO Regalado MD Unavailable         Unavailable

 

                    Sabine, M Charmaine MD Unavailable         Unavailable

 

                    ABELARDO Regalado MD Unavailable         Unavailable

 

                    ABELARDO Regalado MD Unavailable         Unavailable

 

                    ABELARDO Regalado MD Unavailable         Unavailable

 

                    ABELARDO Regalado MD Unavailable         Unavailable

 

                    ABELARDO Regalado MD Unavailable         Unavailable

 

                    ABELARDO Regalado MD Unavailable         Unavailable

 

                    ABELARDO Regalado MD Unavailable         Unavailable

 

                    ABELARDO Regalado MD Unavailable         Unavailable

 

                    ABELARDO Regalado MD Unavailable         Unavailable

 

                    ABELARDO Regalado MD Unavailable         Unavailable

 

                    ABELARDO Regalado MD Unavailable         Unavailable

 

                    ABELARDO Regalado MD Unavailable         Unavailable

 

                    ABELARDO Regalado MD Unavailable         Unavailable

 

                    ABELARDO Regalado MD Unavailable         Unavailable

 

                    ABELARDO Regalado MD Unavailable         Unavailable

 

                    ABELARDO Regalado MD Unavailable         Unavailable

 

                    ABELARDO Regalado MD Unavailable         Unavailable

 

                    ABELARDO Regalado MD Unavailable         Unavailable

 

                    ABELARDO Regalado MD Unavailable         Unavailable

 

                    ABELARDO Regalado MD Unavailable         Unavailable

 

                    ABELARDO Regalado MD Unavailable         Unavailable

 

                    ABELARDO Regalado MD Unavailable         Unavailable

 

                    Alireza, L Mony PA Unavailable         Unavailable

 

                    Alireza, L Mony PA Unavailable         Unavailable

 

                    Alireza, L Mony PA Unavailable         Unavailable

 

                    Alireza, L Mony PA Unavailable         Unavailable

 

                    Alireza, L Mony PA Unavailable         Unavailable

 

                    Alireza, L Mony PA Unavailable         Unavailable

 

                    Alireza, L Mnoy PA Unavailable         Unavailable

 

                    Alireza, L Mony PA Unavailable         Unavailable

 

                    Alireza, L Mony PA Unavailable         Unavailable

 

                    Alireza, L Mony PA Unavailable         Unavailable

 

                    Alireza, L Mony PA Unavailable         Unavailable

 

                    Alireza, L Mony PA Unavailable         Unavailable

 

                    Alireza, L Mony PA Unavailable         Unavailable

 

                    Alireza, L Mony PA Unavailable         Unavailable

 

                    Alireza, L Mony PA Unavailable         Unavailable

 

                    Alireza, L Mony PA Unavailable         Unavailable

 

                    Alireza, L Mony PA Unavailable         Unavailable

 

                    Alireza, L Mony PA Unavailable         Unavailable

 

                    Alireza, L Mony PA Unavailable         Unavailable

 

                    Alireza, L Mony PA Unavailable         Unavailable

 

                    Alireza, L Mony PA Unavailable         Unavailable

 

                    Alireza, L Mony PA Unavailable         Unavailable

 

                    Alireza, L Mony PA Unavailable         Unavailable

 

                    EMILY Huitron MD   Unavailable         Unavailable

 

                    EMILY Huitron MD   Unavailable         Unavailable

 

                    EMILY Huitron MD   Unavailable         Unavailable

 

                    EMILY Huitron MD   Unavailable         Unavailable

 

                    EMILY Huitron MD   Unavailable         Unavailable

 

                    EMILY Huitron MD   Unavailable         Unavailable

 

                    EMILY Huitron MD   Unavailable         Unavailable

 

                    EMILY Huitron MD   Unavailable         Unavailable

 

                    EMILY Huitron MD   Unavailable         Unavailable

 

                    EMILY Huitron MD   Unavailable         Unavailable

 

                    Fish, B Uzair PURCELL   Unavailable         Unavailable

 

                    Fish, B Uzair PURCELL   Unavailable         Unavailable

 

                    Fish, B Uzair PURCELL   Unavailable         Unavailable

 

                    Fish, B Uzair PURCELL   Unavailable         Unavailable

 

                    Fish, B Uzair PURCELL   Unavailable         Unavailable

 

                    Fish, B Uzair PURCELL   Unavailable         Unavailable

 

                    Fish, B Uzair PURCELL   Unavailable         Unavailable

 

                    Fish, B Uzair PURCELL   Unavailable         Unavailable

 

                    Fish, B Uzair PURCELL   Unavailable         Unavailable

 

                    Fish, B Uzair PURCELL   Unavailable         Unavailable

 

                    Fish, B Uzair PURCELL   Unavailable         Unavailable

 

                    Fish, B Uzair PURCELL   Unavailable         Unavailable

 

                    Fish, B Uzair PURCELL   Unavailable         Unavailable

 

                    Fish, B Uzair PURCELL   Unavailable         Unavailable

 

                    Fish, B Uzair PURCELL   Unavailable         Unavailable

 

                    Fish, B Uzair PURCELL   Unavailable         Unavailable

 

                    Fish, B Uzair PURCELL   Unavailable         Unavailable

 

                    Fish, B Uzair PURCELL   Unavailable         Unavailable

 

                    Fish, B Uzair PURCELL   Unavailable         Unavailable

 

                    Fish, B Uzair PURCELL   Unavailable         Unavailable

 

                    Fish, B Uzair PURCELL   Unavailable         Unavailable

 

                    Fish, B Uzair PURCELL   Unavailable         Unavailable

 

                    Fish, B Uzair PURCELL   Unavailable         Unavailable

 

                    Fish, B Uzair PURCELL   Unavailable         Unavailable

 

                    Fish, B Uzair PURCELL   Unavailable         Unavailable

 

                    Fish, B Uzair PURCELL   Unavailable         Unavailable

 

                    Fish, B Uzair PURCELL   Unavailable         Unavailable

 

                    Fish, B Uzair PURCELL   Unavailable         Unavailable

 

                    Fish, B Uzair PURCELL   Unavailable         Unavailable

 

                    Fish, B Uzair PURCELL   Unavailable         Unavailable

 

                    Fish, B Uzair PURCELL   Unavailable         Unavailable

 

                    Fish, B Uzair PURCELL   Unavailable         Unavailable

 

                    Fish, B Uzair PURCELL   Unavailable         Unavailable

 

                    Fish, B Uzair PURCELL   Unavailable         Unavailable

 

                    Fish, B Uzair PURCELL   Unavailable         Unavailable

 

                    Fish, B Uzair PURCELL   Unavailable         Unavailable

 

                    Fish, B Uzair PURCELL   Unavailable         Unavailable

 

                    Fish, B Uzair PURCELL   Unavailable         Unavailable

 

                    Fish, B Uzair PURCELL   Unavailable         Unavailable

 

                    Fish, B Uazir PURCELL   Unavailable         Unavailable

 

                    Fish, B Uzair PURCELL   Unavailable         Unavailable

 

                    Fish, B Uzair PURCELL   Unavailable         Unavailable

 

                    Fish, B Uzair PURCELL   Unavailable         Unavailable

 

                    DORA DE LEON MD  Unavailable         Unavailable

 

                    DORA DE LEON MD  Unavailable         Unavailable

 

                    DORA DE LEON MD  Unavailable         Unavailable

 

                    DORA DE LEON MD  Unavailable         Unavailable

 

                    DORA DE LEON MD  Unavailable         Unavailable

 

                    DORA DE LEON MD  Unavailable         Unavailable

 

                    DORA DE LEON MD  Unavailable         Unavailable

 

                    DORA DE LEON MD  Unavailable         Unavailable

 

                    DORA DE LEON MD  Unavailable         Unavailable

 

                    DORA DE LEON MD  Unavailable         Unavailable

 

                    DORA DE LEON MD  Unavailable         Unavailable

 

                    DORA DE LEON MD  Unavailable         Unavailable

 

                    DORA DE LEON MD  Unavailable         Unavailable

 

                    DORA DE LEON MD  Unavailable         Unavailable

 

                    DORA DE LEON MD  Unavailable         Unavailable

 

                    DORA DE LEON MD  Unavailable         Unavailable

 

                    DORA DE LEON MD  Unavailable         Unavailable

 

                    DORA DE LEON MD  Unavailable         Unavailable

 

                    DORA DE LEON MD  Unavailable         Unavailable

 

                    DORA DE LEON MD  Unavailable         Unavailable

 

                    DE LEONDORA RENE MD  Unavailable         Unavailable

 

                    DE LEONDORA RENE MD  Unavailable         Unavailable

 

                    DE LEONDORA RENE MD  Unavailable         Unavailable

 

                    DE LEONDORA RENE MD  Unavailable         Unavailable

 

                    DE LEONDORA RENE MD  Unavailable         Unavailable

 

                    DE LEONDORA RENE MD  Unavailable         Unavailable

 

                    DE LEONDORA RENE MD  Unavailable         Unavailable

 

                    DE LEONDORA RENE MD  Unavailable         Unavailable

 

                    DE LEONDORA MD  Unavailable         Unavailable

 

                    DE LEONDORA RENE MD  Unavailable         Unavailable

 

                    DE LEONDORA RENE MD  Unavailable         Unavailable

 

                    DE LEONDORA RENE MD  Unavailable         Unavailable

 

                    DE LEONDORA RENE MD  Unavailable         Unavailable

 

                    DE LEONDORA RENE MD  Unavailable         Unavailable

 

                    DE LEONDORA RENE MD  Unavailable         Unavailable

 

                    DE LEONDORA RENE MD  Unavailable         Unavailable

 

                    DE LEONDORA RENE MD  Unavailable         Unavailable

 

                    DE LEONDORA RENE MD  Unavailable         Unavailable

 

                    DE ELONDORA RENE MD  Unavailable         Unavailable

 

                    DE LEONDORA RENE MD  Unavailable         Unavailable

 

                    DE LEONDORA RENE MD  Unavailable         Unavailable

 

                    DE LEONDORA RENE MD  Unavailable         Unavailable

 

                    DE LEONDORA RENE MD  Unavailable         Unavailable

 

                    DE LEONDORA RENE MD  Unavailable         Unavailable

 

                    DORA DE LEON MD  Unavailable         Unavailable

 

                    DE LEONDORA RENE MD  Unavailable         Unavailable

 

                    DE LEONDORA RENE MD  Unavailable         Unavailable

 

                    DE LEONDORA RENE MD  Unavailable         Unavailable

 

                    DE LEONDORA RENE MD  Unavailable         Unavailable

 

                    DORA DE LEON MD  Unavailable         Unavailable

 

                    DE LEONDORA RENE MD  Unavailable         Unavailable

 

                    DORA DE LEON MD  Unavailable         Unavailable

 

                    DORA DE LEON MD  Unavailable         Unavailable

 

                    DE LEONDORA RENE MD  Unavailable         Unavailable

 

                    DE LEONDORA RENE MD  Unavailable         Unavailable

 

                    DE LEONDORA RENE MD  Unavailable         Unavailable

 

                    DE LEONDORA RENE MD  Unavailable         Unavailable

 

                    Carlton,  Juany FNP Unavailable         Unavailable

 

                    Carlton,  Juany FNP Unavailable         Unavailable

 

                    Carlton,  Juany FNP Unavailable         Unavailable

 

                    Carlton,  Juany FNP Unavailable         Unavailable

 

                    Carlton,  Juany FNP Unavailable         Unavailable

 

                    Carlton,  Juany FNP Unavailable         Unavailable

 

                    Carlton,  Juany FNP Unavailable         Unavailable

 

                    Carlton,  Juany FNP Unavailable         Unavailable

 

                    Carlton,  Juany FNP Unavailable         Unavailable

 

                    Carlton,  Juany FNP Unavailable         Unavailable

 

                    Carlton,  Juany FNP Unavailable         Unavailable

 

                    Carlton,  Juany FNP Unavailable         Unavailable

 

                    Carlton,  Juany FNP Unavailable         Unavailable

 

                    Carlton,  Juany FNP Unavailable         Unavailable

 

                    Carlton,  Juany FNP Unavailable         Unavailable

 

                    Carlton,  Juany FNP Unavailable         Unavailable

 

                    Carlton,  Juany FNP Unavailable         Unavailable

 

                    Carlton,  Juany FNP Unavailable         Unavailable

 

                    Carlton,  Juany FNP Unavailable         Unavailable

 

                    Carlton,  Juany FNP Unavailable         Unavailable

 

                    Carlton,  Juany FNP Unavailable         Unavailable

 

                    Carlton,  Juany FNP Unavailable         Unavailable

 

                    Carlton,  Juany FNP Unavailable         Unavailable

 

                    Carlton,  Juany FNP Unavailable         Unavailable

 

                    Carlton,  Juany FNP Unavailable         Unavailable

 

                    Carlton,  Juany FNP Unavailable         Unavailable

 

                    Carlton,  Juany FNP Unavailable         Unavailable

 

                    Carlton,  Juany FNP Unavailable         Unavailable

 

                    Carlton,  Juany FNP Unavailable         Unavailable

 

                    Carlton,  Juany FNP Unavailable         Unavailable

 

                    Carlton,  Juany FNP Unavailable         Unavailable

 

                    Carlton,  Juany FNP Unavailable         Unavailable

 

                    Carlton,  Juany FNP Unavailable         Unavailable

 

                    Carlton,  Juany FNP Unavailable         Unavailable

 

                    Carlton,  Juany FNP Unavailable         Unavailable

 

                    Carlton,  Juany FNP Unavailable         Unavailable

 

                    Carlton,  Juany FNP Unavailable         Unavailable

 

                    Carlton,  Juany FNP Unavailable         Unavailable

 

                    Carlton,  Juany FNP Unavailable         Unavailable

 

                    Carlton,  Juany FNP Unavailable         Unavailable

 

                    Carlton,  Juany FNP Unavailable         Unavailable

 

                    Carlton,  Juany FNP Unavailable         Unavailable

 

                    Carlton,  Juany FNP Unavailable         Unavailable

 

                    Carlton,  Juany FNP Unavailable         Unavailable

 

                    Carlton,  Juany FNP Unavailable         Unavailable

 

                    Carlton,  Juany FNP Unavailable         Unavailable

 

                    Carlton,  Juany FNP Unavailable         Unavailable

 

                    Carlton,  Juany FNP Unavailable         Unavailable

 

                    Carlton,  Juany FNP Unavailable         Unavailable

 

                    Carlton,  Juany FNP Unavailable         Unavailable

 

                    Carlton,  Juany FNP Unavailable         Unavailable

 

                    Carlton,  Juany FNP Unavailable         Unavailable

 

                    Carlton,  Juany FNP Unavailable         Unavailable



                                  



Re-disclosure Warning

          The records that you are about to access may contain information from 
federally-assisted alcohol or drug abuse programs. If such information is 
present, then the following federally mandated warning applies: This information
has been disclosed to you from records protected by federal confidentiality 
rules (42 CFR part 2). The federal rules prohibit you from making any further 
disclosure of this information unless further disclosure is expressly permitted 
by the written consent of the person to whom it pertains or as otherwise 
permitted by 42 CFR part 2. A general authorization for the release of medical 
or other information is NOT sufficient for this purpose. The Federal rules 
restrict any use of the information to criminally investigate or prosecute any 
alcohol or drug abuse patient.The records that you are about to access may 
contain highly sensitive health information, the redisclosure of which is 
protected by Article 27-F of the [facility] Public Health law. If you 
continue you may have access to information: Regarding HIV / AIDS; Provided by 
facilities licensed or operated by the [facility] Office of Mental Health; 
or Provided by the [facility] Office for People With Developmental 
Disabilities. If such information is present, then the following [place] 
mandated warning applies: This information has been disclosed to you from 
confidential records which are protected by state law. State law prohibits you 
from making any further disclosure of this information without the specific 
written consent of the person to whom it pertains, or as otherwise permitted by 
law. Any unauthorized further disclosure in violation of state law may result in
a fine or [facility] sentence or both. A general authorization for the release of 
medical or other information is NOT sufficient authorization for further disc
losure.                                                                         
    



Family History

          



             Family Member Name Family Member Gender Family Member Status Date o

f Status 

Description                             Data Source(s)

 

           Unknown    Male       Problem                          [facility] ([facility] )

 

           Unknown    Male       Problem                          [facility] ([facility])



                                                                                
                 



Encounters

          



           Encounter  Providers  Location   Date       Indications Data Source(s

)

 

                Outpatient      Attender: Charmaine Melchor  12:00:00 PM 

EST                                                 [facility] ([facility]

)

 

                Outpatient      Attender: Charmaine Melchor 10

/ 10:30:00 AM 

EDT                                                 [facility] ([facility]

)

 

                Outpatient      Attender: Hansa Melchor  10:00:00 AM 

EDT                                                 [facility] ([facility]

)

 

           Outpatient Attender: Mony KRUSE Main Office 2020 12:45:0

0 PM EDT            

[facility] ([facility])

 

                Outpatient      Attender: Charmaine Melchor  11:00:00 AM 

EDT                                                 [facility] ([facility]

)

 

             Outpatient   Attender: Uzair Huitron MD Physical Therapy 2020 0

4:00:00 PM EDT 

                                        [facility] ([facility])

 

                Outpatient      Attender: Charmaine Melchor  02:30:00 PM 

EDT                                                 [facility] ([facility]

)

 

                Outpatient      Attender: Charmaine Melchor  09:00:00 AM 

EDT                                                 [facility] ([facility]

)

 

           Outpatient Attender: KARTIK DE LEON MD Main Office 2020 12:45:00 

PM EDT            

[facility] ([facility])

 

                Outpatient      Attender: [facility] Evelina Melchor 0

2020 09:30:00 AM

EST                                                 [facility] ([facility]

)



                                                                                
                                                                                
                



Immunizations

          



             Vaccine      Date         Status       Description  Data Source(s)

 

                          Influenza, injectable, MDCK, preservative free, bruno

valent 10/15/2020 12:10:00

PM EDT              completed                               [facility] ([facility]

[facility])

 

                          Shingrix Zoster Vaccine (HZV), Recombinant, Subunit, A

djuvanted 2020 

12:55:00 PM EST     completed                               [facility] ([facility])

 

                          VARICELLA-ZOSTER VIRUS GLYCOPROTEIN E,REC/AS01B ADJUVA

NT/PF 2020 12:00:00 

AM EST              completed                               [facility]



                                                                                
                           



Medications

          



          Medication Brand Name Start Date Product Form Dose      Route     Admi

nistrative 

Instructions Pharmacy Instructions Status     Indications Reaction   Description

 Data 

Source(s)

 

                    24 HR Bupropion Hydrochloride 150 MG Extended Release Oral T

ablet BUPROPION HCL       

2021 12:00:00 AM EST tablet extended release 24 hr 30                     

         TAKE ONE TABLET BY

MOUTH EVERY MORNING TAKE ONE TABLET BY MOUTH EVERY MORNING SOLD: 2021     

                      

                                        [facility]

 

          5 mg                2021 12:00:00 AM EST tablet    60           

       TAKE ONE TABLET BY MOUTH TWICE A 

DAY        TAKE ONE TABLET BY MOUTH TWICE A DAY SOLD: 2021                

                  [facility]

 

                          24 HR Bupropion Hydrochloride 150 MG Extended Release 

Oral Tablet Bupropion 

Hydrochloride ER (XL) 2021 12:00:00 AM EST             ORAL              a

ctive                   

[facility] ([facility])

 

          5 mg                2020 12:00:00 AM EST tablet    30           

       TAKE ONE TABLET BY MOUTH TWICE A 

DAY        TAKE ONE TABLET BY MOUTH TWICE A DAY SOLD: 2020                

                  [facility]

 

          5 mg                2020 12:00:00 AM EST tablet    30           

       TAKE ONE TABLET BY MOUTH TWICE A 

DAY        TAKE ONE TABLET BY MOUTH TWICE A DAY SOLD: 2021                

                  [facility]

 

           Cephalexin 500 MG Oral Capsule CEPHALEXIN 2020 12:00:00 AM EST 

capsule    30         

                          TAKE ONE CAPSULE BY MOUTH THREE TIMES A DAY TAKE ONE C

APSULE BY MOUTH THREE 

TIMES A DAY  SOLD: 2020                                        [facility]

s

 

          apixaban 5 MG Oral Tablet [Eliquis] Eliquis   2020 12:00:00 AM E

ST                     ORAL      

                      active                                      [facility] ([facility]

 

       Administration Of Flu Vaccine        10/15/2020 12:00:00 AM EDT          

                          completed  

                                                            [facility] ([facility]

[facility])

 

                                        Medication administered onsite 

 

          20 mg               2020 12:00:00 AM EDT tablet    90           

       TAKE ONE TABLET BY MOUTH EVERY 

MORNING    TAKE ONE TABLET BY MOUTH EVERY MORNING SOLD: 2021              

                    [facility]

 

          5 mg                2020 12:00:00 AM EDT tablet    30           

       TAKE ONE-HALF TABLET BY MOUTH 

EVERY DAY  TAKE ONE-HALF TABLET BY MOUTH EVERY DAY SOLD: 2020             

                     [facility]

 

          20 mg               2020 12:00:00 AM EDT tablet    90           

       TAKE ONE TABLET BY MOUTH EVERY 

MORNING    TAKE ONE TABLET BY MOUTH EVERY MORNING SOLD: 2020              

                    [facility]

 

          5 mg                2020 12:00:00 AM EDT tablet    30           

       TAKE 1/2 TABLET BY MOUTH ONCE 

DAILY      TAKE 1/2 TABLET BY MOUTH ONCE DAILY SOLD: 2020                 

                 [facility]

 

          5 mg                2020 12:00:00 AM EDT tablet    30           

       TAKE 1/2 TABLET BY MOUTH ONCE 

DAILY      TAKE 1/2 TABLET BY MOUTH ONCE DAILY SOLD: 10/24/2020                 

                 [facility]

 

          100 mg              2020 12:00:00 AM EDT capsule   28           

       TAKE ONE CAPSULE BY MOUTH TWICE

 A DAY FOR 14 DAYS        TAKE ONE CAPSULE BY MOUTH TWICE A DAY FOR 14 DAYS SOLD

: 

2020                                                      [facility]

 

          40 mg               2020 12:00:00 AM EDT tablet    30           

       TAKE ONE TABLET BY MOUTH AT 

BEDTIME    TAKE ONE TABLET BY MOUTH AT BEDTIME SOLD: 10/24/2020                 

                 [facility]

 

          40 mg               2020 12:00:00 AM EDT tablet    30           

       TAKE ONE TABLET BY MOUTH AT 

BEDTIME    TAKE ONE TABLET BY MOUTH AT BEDTIME SOLD: 2021                 

                 [facility]

 

          40 mg               2020 12:00:00 AM EDT tablet    30           

       TAKE ONE TABLET BY MOUTH AT 

BEDTIME    TAKE ONE TABLET BY MOUTH AT BEDTIME SOLD: 2020                 

                 [facility]

 

          20 mg               2020 12:00:00 AM EDT tablet    30           

       TAKE ONE TABLET BY MOUTH EVERY 

MORNING    TAKE ONE TABLET BY MOUTH EVERY MORNING SOLD: 2020              

                    [facility]

 

        Furosemide 20 MG Oral Tablet Furosemide 2020 12:00:00 AM EDT      

           ORAL                    

active                                                          [facility] ([facility]

[facility])

 

          20 mg               2020 12:00:00 AM EDT tablet    60           

       TAKE TWO TABLETS BY MOUTH AT 

BEDTIME    TAKE TWO TABLETS BY MOUTH AT BEDTIME SOLD: 2020                

                  [facility]

 

                    Digoxin 0.25 MG Oral Tablet 250 mcg (0.25 mg) DIGOXIN       

      2020 12:00:00 AM EDT

             tablet       90                        TAKE ONE TABLET BY MOUTH JAMES

 DAY TAKE ONE TABLET BY MOUTH EVERY 

DAY          SOLD: 2021                                        [facility]

s

 

          250 mcg (0.25 mg)           2020 12:00:00 AM EDT tablet    90   

               TAKE ONE TABLET BY 

MOUTH EVERY DAY TAKE ONE TABLET BY MOUTH EVERY DAY SOLD: 2020             

                     [facility]

 

                    Digoxin 0.25 MG Oral Tablet 250 mcg (0.25 mg) DIGOXIN       

      2020 12:00:00 AM EDT

             tablet       90                        TAKE ONE TABLET BY MOUTH JAMES

 DAY TAKE ONE TABLET BY MOUTH EVERY 

DAY          SOLD: 2020                                        [facility]

s

 

        Methimazole 5 MG Oral Tablet Methimazole 2020 12:00:00 AM EDT     

            ORAL             

             completed                                           [facility] ([facility]

[facility])

 

          5 mg                2020 12:00:00 AM EDT tablet    40           

       TAKE 1 TABLET BY MOUTH DAILY 

EXCEPT MONDAY, WEDNESDAY & FRIDAY TAKE 2 TABLETS TAKE 1 TABLET BY MOUTH DAILY 

EXCEPT MONDAY, WEDNESDAY & FRIDAY TAKE 2 TABLETS SOLD: 2020               

                         [facility]

 

        Methimazole 5 MG Oral Tablet Methimazole 2020 12:00:00 AM EDT     

            ORAL             

             active                                              [facility] ([facility]

[facility])

 

                    Hydrochlorothiazide 25 MG Oral Tablet Hydrochlorothiazide  12:00:00 AM

 EDT                 ORAL                 completed                      [facility] 

([facility])

 

          25 mg               2020 12:00:00 AM EDT tablet    30           

       TAKE ONE TABLET BY MOUTH EVERY 

DAY        TAKE ONE TABLET BY MOUTH EVERY DAY SOLD: 2020                  

                [facility]

 

     Shingrix Shingrix 2020 12:00:00 AM EST                          activ

e                [facility] 

([facility])

 

        Digoxin 0.25 MG Oral Tablet [Digox] Digox   2020 12:00:00 AM EST  

               ORAL            

             active                                              [facility]

 

          5 mg                2020 12:00:00 AM EST tablet    23           

       TAKE ONE TABLET BY MOUTH EVERY DAY

 EXCEPT 1/2 TABLET ON TUESDAY, THURSDAY, SATURDAY AND  TAKE ONE TABLET BY 

MOUTH EVERY DAY EXCEPT 1/2 TABLET ON TUESDAY, THURSDAY, SATURDAY AND  

SOLD: 2020                                                 [facility]

 

          40 mg               2019 12:00:00 AM EST tablet    30           

       TAKE ONE TABLET BY MOUTH EVERY 

DAY AT BEDTIME  TAKE ONE TABLET BY MOUTH EVERY DAY AT BEDTIME SOLD: 2020  

               

                                                    [facility]

 

          40 mg               2019 12:00:00 AM EST tablet    30           

       TAKE ONE TABLET BY MOUTH EVERY 

DAY AT BEDTIME  TAKE ONE TABLET BY MOUTH EVERY DAY AT BEDTIME SOLD: 2020  

               

                                                    [facility]

 

          10 mg               2019 12:00:00 AM EST tablet    90           

       TAKE ONE TABLET BY MOUTH EVERY 

DAY        TAKE ONE TABLET BY MOUTH EVERY DAY SOLD: 10/24/2020                  

                [facility]

 

          10 mg               2019 12:00:00 AM EST tablet    90           

       TAKE ONE TABLET BY MOUTH EVERY 

DAY        TAKE ONE TABLET BY MOUTH EVERY DAY SOLD: 03/10/2020                  

                [facility]

 

                atorvastatin 20 MG Oral Tablet ATORVASTATIN CALCIUM 2019 1

2:00:00 AM EST 

tablet          90                              TAKE ONE TABLET BY MOUTH EVERY D

AY TAKE ONE TABLET BY MOUTH EVERY 

DAY          SOLD: 2020                                        [facility]

s

 

                atorvastatin 20 MG Oral Tablet ATORVASTATIN CALCIUM 2019 1

2:00:00 AM EST 

tablet          90                              TAKE ONE TABLET BY MOUTH EVERY D

AY TAKE ONE TABLET BY MOUTH EVERY 

DAY          SOLD: 2020                                        [facility]

s

 

          100 mg              2019 12:00:00 AM EST tablet    180          

       TAKE ONE TABLET BY MOUTH TWICE 

A DAY      TAKE ONE TABLET BY MOUTH TWICE A DAY SOLD: 10/24/2020                

                  [facility]

 

          25 mg               2019 12:00:00 AM EST tablet    180          

       TAKE ONE TABLET BY MOUTH TWICE A

 DAY       TAKE ONE TABLET BY MOUTH TWICE A DAY SOLD: 03/10/2020                

                  [facility]

 

          25 mg               2019 12:00:00 AM EST tablet    180          

       TAKE ONE TABLET BY MOUTH TWICE A

 DAY       TAKE ONE TABLET BY MOUTH TWICE A DAY SOLD: 10/24/2020                

                  [facility]

 

          25 mg               2019 12:00:00 AM EST tablet    180          

       TAKE ONE TABLET BY MOUTH TWICE A

 DAY       TAKE ONE TABLET BY MOUTH TWICE A DAY SOLD: 2020                

                  [facility]

 

          100 mg              2019 12:00:00 AM EST tablet    180          

       TAKE ONE TABLET BY MOUTH TWICE 

A DAY      TAKE ONE TABLET BY MOUTH TWICE A DAY SOLD: 2020                

                  [facility]

 

          100 mg              2019 12:00:00 AM EST tablet    180          

       TAKE ONE TABLET BY MOUTH TWICE 

A DAY      TAKE ONE TABLET BY MOUTH TWICE A DAY SOLD: 03/10/2020                

                  [facility]

 

          5 mg                09/10/2019 12:00:00 AM EDT tablet    30           

       TAKE 1 TABLET BY MOUTH DAILY ON 

MONDAY, WEDNESDAY AND FRIDAY AND 1/2 TABLET DAILY ON TUESDAY, THURSDAY, SATURDAY
 AND                              TAKE 1 TABLET BY MOUTH DAILY ON MONDAY, 

WEDNESDAY AND FRIDAY AND 1/2

 TABLET DAILY ON TUESDAY, THURSDAY, SATURDAY AND  SOLD: 06/15/2020        

                                

[facility]

 

          5 mg                09/10/2019 12:00:00 AM EDT tablet    30           

       TAKE 1 TABLET BY MOUTH DAILY ON 

MONDAY, WEDNESDAY AND FRIDAY AND 1/2 TABLET DAILY ON TUESDAY, THURSDAY, SATURDAY
 AND                              TAKE 1 TABLET BY MOUTH DAILY ON MONDAY, 

WEDNESDAY AND FRIDAY AND 1/2

 TABLET DAILY ON TUESDAY, THURSDAY, SATURDAY AND  SOLD: 2020        

                                

[facility]

 

          5 mg                09/10/2019 12:00:00 AM EDT tablet    30           

       TAKE 1 TABLET BY MOUTH DAILY ON 

MONDAY, WEDNESDAY AND FRIDAY AND 1/2 TABLET DAILY ON TUESDAY, THURSDAY, SATURDAY
 AND                              TAKE 1 TABLET BY MOUTH DAILY ON MONDAY, 

WEDNESDAY AND FRIDAY AND 1/2

 TABLET DAILY ON TUESDAY, THURSDAY, SATURDAY AND  SOLD: 2020        

                                

[facility]



                                                                                
                                                                                
                                                                                
                                                                                
                                                                                
                                                                                
                                      



Insurance Providers

          



             Payer name   Policy type / Coverage type Policy ID    Covered party

 ID Covered 

party's relationship to [place] Policy [place]             Plan Information

 

          MEDICARE            [id number]           SP                  [id number]

R49

 

           FOR LIFE           [id number]           [facility]                 [phone]

[id number]

 

          MEDICARE  C         [id number]           S                   [id number]

[id number]

 

           FOR LIFE O         [id number]           S                   [id number]

[id number]

 

          [employer]  For Life Medigap Part B [id number]           Family Depende

nt           [id number]

 

          Medicare [employer] Medicare Primary [id number]           Self 

               [id number]

 

          [place] [employer] Medigap Part B [id number]           Family Dep

endent           [id number]

 

          [place]are [place] Primary [id number]           Self          

      [id number]

 

          [place] [employer] Medigap Part B [id number]           Family Dep

endent           [id number]

 

          [place] Medicare Primary [id number]           Self          

      [id number]

 

          [employer]  For Life Medigap Part B [id number]           Family Depende

nt           [id number]

 

          Medicare [employer] Medicare Primary [id number]           Self 

               [id number]

 

          [place] [employer] Medigap Part B [id number]           Family Dep

endent           [id number]

 

          [place]are [place] Medicare Primary [id number]           Self          

      [id number]

 

          [employer]  For Life Medigap Part B [id number]           Family Depende

nt           [id number]

 

          Medicare [employer] Medicare Primary [id number]           Self 

               [id number]

 

          [employer]  For Life Medigap Part B [id number]           Family Depende

nt           [id number]

 

          Medicare [employer] Medicare Primary [id number]           Self 

               [id number]

 

          [employer]  For Life Medigap Part B [id number]           Family Depende

nt           [id number]

 

          Medicare [employer] Medicare Primary [id number]           Self 

               [id number]

 

          [employer]  For Life Medigap Part B [id number]           Family Depende

nt           [id number]

 

          Medicare [employer] Medicare Primary [id number]           Self 

               [id number]

 

          [employer]  For Life Medigap Part B [id number]           Family Depende

nt           [id number]

 

          Medicare [employer] Medicare Primary [id number]           Self 

               [id number]

 

          [employer]PS  For Life Medigap Part B [id number]           Family Depende

nt           [id number]

 

          Medicare [employer] Medicare Primary [id number]           Self 

               [id number]

 

          [employer]  For Life Medigap Part B [id number]           Family Depende

nt           [id number]

 

          Medicare [employer] Medicare Primary [id number]           Self  

              [id number]

 

          MEDICARE            [id number]           SP                  [id number]

A

 

          MEDICARE  C         [id number]           S                   [id number]

A

 

          [employer] C         [id number]           S              

     [id number]

 

          WPS  For Life Medigap Part B [id number]           Family Depende

nt           [id number]

 

          Medicare [employer] Medicare Primary [id number]           Self  

              [id number]

 

          [employer]  For Life Medigap Part B [id number]           Family Depende

nt           [id number]

 

          Medicare [employer] Medicare Primary [id number]           Self  

              [id number]

 

          [employer]PS  For Life Medigap Part B [id number]           Family Depende

nt           [id number]

 

          Medicare [employer] Medicare Primary [id number]           Self  

              [id number]

 

          WPS  For Life Medigap Part B [id number]           Family Depende

nt           [id number]

 

          Medicare [employer] Medicare Primary [id number]           Self  

              [id number]



                                                                                
                                                                                
                                                                                
                                                                                
                                                                                
                                                                    



Problems, Conditions, and Diagnoses

          



           Code       Display Name Description Problem Type Effective Dates Data

 Source(s)

 

             [id number]     Essential hypertension Essential hypertension Problem 

     2020 

12:00:00 AM EDT                         [facility] ([facility] )

 

             [id number]     Mitral valve disorder Mitral valve disorder Problem   

   2020 12:00:00

 AM EDT                                 [facility] ([facility])

 

                    [id number]           Benign hypertensive heart disease with c

ongestive cardiac failure 

Benign hypertensive heart disease with congestive cardiac failure Problem       

            

2020 12:00:00 AM EDT              [facility] ([facility])

 

                [id number]        Chronic pulmonary heart disease Chronic pulmonar

y heart disease Problem

                          2020 12:00:00 AM EDT [facility]

 

                [id number]       Chronic diastolic heart failure Chronic diastoli

c heart failure 

Problem                   2020 12:00:00 AM EDT [facility]



                                                                                
                                                          



Surgeries/Procedures

          



             Procedure    Description  Date         Indications  Data Source(s)

 

                    Brief Emotional/Behav Assessment W/ Scoring Doc Per Standard

 Inst                     10/29/2020 

12:00:00 AM EDT                                     [facility] ([facility]

)

 

             ECG ROUTINE ECG W/LEAST 12 LDS W/I&R              2020 12:00:

00 AM EDT              [facility] 

([facility])

 

             ARTHROCENTESIS ASPIR&/INJECTION MAJOR JT/BURSA               12:00:00 AM EDT              

[facility] ([facility] )

 

             ARTHROCENTESIS ASPIR&/INJECTION MAJOR JT/BURSA              05/15/2

020 12:00:00 AM EDT              

[facility] ([facility])

 

             ARTHROCENTESIS ASPIR&/INJECTION MAJOR JT/BURSA               12:00:00 AM EDT              

[facility] ([facility] )

 

             ECG ROUTINE ECG W/LEAST 12 LDS W/I&R              2020 12:00:

00 AM EDT              [facility] 

([facility])

 

             ECHO TTHRC R-T 2D W/WOM-MODE COMPL SPEC&COLR DOP               12:00:00 AM EST              

[facility] ([facility])



                                                                                
                                                                    



Results

          



                    ID                  Date                Data Source

 

                    [id number]          2021 01:39:00 PM [facility]









          Name      Value     Range     Interpretation Code Description Data Milly

rce(s) Supporting 

Document(s)

 

           Parathyrin.intact [Mass/volume] in Serum or Plasma 26.3 pg/mL 18.5-88

.0                        

[facility] ([facility])            









                    ID                  Date                Data Source

 

                    [id number]          2021 01:39:00 PM EST [facility] ([facility])









          Name      Value     Range     Interpretation Code Description Data Milly

rce(s) Supporting 

Document(s)

 

           Calcidiol [Mass/volume] in Serum or Plasma 32.7       24.0-80.0      

                  [facility] ([facility])                             

 

                                        This test was performed using [employer] I

P Vitamin D immunoassay kit.  Values 

obtained with different assay methods should not be used interchangeably.

 









                    ID                  Date                Data Source

 

                    [id number]          2021 01:39:00 PM EST [facility] ([facility])









          Name      Value     Range     Interpretation Code Description Data Milly

rce(s) Supporting 

Document(s)

 

                          Thyrotropin [Units/volume] in Serum or Plasma by Detec

tion limit <= 0.05 mIU/L 

0.54 uIU/mL  0.36-3.74                              [facility] ([facility]

)  









                    ID                  Date                Data Source

 

                    [id number]          2021 01:39:00 PM EST [facility] ([facility])









          Name      Value     Range     Interpretation Code Description Data Milly

rce(s) Supporting 

Document(s)

 

           Glucose [Mass/volume] in Serum or Plasma 98 mg/dL   74-99            

                [facility] ([facility])                              

 

                                        100-125 mg/dL     PRE-DIABETES/FASTING

>126 mg/dL          DIABETES/FASTING

 

 

           Urea nitrogen [Mass/volume] in Serum or Plasma 49 mg/dL   7-18       

                      [facility] 

([facility])                   

 

                                        NOTE:

BUN,CALCIUM VERIFIED





 

 

          Creatinine 1.2 mg/dL 0.6-1.3                       [facility]  

 

           Sodium [Moles/volume] in Serum or Plasma 142 meq/L  136-145          

                [facility] ([facility])                             

 

           Chloride [Moles/volume] in Serum or Plasma 104 meq/L           

                  [facility] 

([facility])                   

 

           Potassium [Moles/volume] in Serum or Plasma 4.5 meq/L  3.5-5.1       

                   [facility] 

([facility])                   

 

           Carbon dioxide, total [Moles/volume] in Serum or Plasma 23 meq/L   21

-32                            

[facility] ([facility])            

 

                                        Glomerular filtration rate/1.73 sq M pre

dicted among non-blacks [Volume 

Rate/Area] in Serum or Plasma by Creatinine-based formula (MDRD) 45 mL/min      

                                  

                          [facility] ([facility])  

 

           Calcium [Mass/volume] in Serum or Plasma 10.6 mg/dL 8.5-10.1         

                [facility] 

([facility])                   

 

                                        Glomerular filtration rate/1.73 sq M pre

dicted among blacks [Volume Rate/Area] 

in Serum or Plasma by Creatinine-based formula (MDRD) 55 mL/min                 

                          [facility] 

([facility])                   

 

                                        <content>CHRONIC KIDNEY DISEASE STAGING 

PER 

NKF</content><br/><content></content><br/><content>STAGE I & II      GFR >= 60  
     NORMAL TO MILDLY DECREASED</content><br/><content>STAGE III          GFR 
30-59          MODERATELY DECREASED</content><br/><content>STAGE IV           
GFR 15-29         SEVERELY DECREASED</content><br/><content>STAGE V            
GFR <15            VERY LITTLE GFR LEFT</content><br/><content>ESRD             
   GFR <15            ON RRT</content><br/><content></content> 









                    ID                  Date                Data Source

 

                    [id number]          2021 01:39:00 PM EST [facility] ([facility])









          Name      Value     Range     Interpretation Code Description Data Milly

rce(s) Supporting 

Document(s)

 

           Leukocytes [#/volume] in Blood by Automated count 5.8 x10*3/UL 4.1-10

.9                         

[facility] ([facility])            

 

           Erythrocytes [#/volume] in Blood by Automated count 4.01 x10*6/UL 4.2

0-6.30                        

[facility] ([facility])            

 

           Hemoglobin [Mass/volume] in Blood 13.1 g/dL  12.0-18.0               

         [facility] ([facility])                              

 

           Hematocrit [Volume Fraction] of Blood by Automated count 37.5 %     3

7.0-51.0                        

[facility] ([facility])            

 

          MCH       32.6 pg   26.0-32.0                     [facility] ([facility])  

 

          MCHC      34.9 g/dL 31.0-38.0                     [facility] ([facility])  

 

          MCV       93.4 fL   80.0-97.0                     [facility] ([facility])  

 

           Platelets [#/volume] in Blood by Automated count 191 x10*3/-440

                          [facility]

 ([facility])                  

 

          MPV       8.6 FL    7.8-11.0                      [facility] ([facility]

[facility])  

 

           Erythrocyte distribution width [Ratio] by Automated count 13.8 %     

11.6-13.7                        

[facility] ([facility])            

 

          Mid %     6.7 %     1.7-9.3                       [facility] ([facility]

[facility])  

 

          Lymph %   24.4 %    10.0-58.5                     [facility] ([facility])  

 

          Mid #     0.4 x10*3/UL 0.1-0.6                       [facility] ([facility])  

 

          Neut %    68.9 %    37.0-92.0                     [facility] ([facility])  

 

          Lymph #   1.4 x10*3/UL 0.6-4.1                       [facility] ([facility])  

 

          Neut #    4.0 x10*3/UL 2.0-7.8                       [facility] ([facility])  









                    ID                  Date                Data Source

 

                    [id number]          2020 11:57:00 AM EST [facility] ([facility])









          Name      Value     Range     Interpretation Code Description Data Milly

rce(s) Supporting 

Document(s)

 

          Inr       6.49                Above upper panic limits           [facility]

T ([facility])  

 

                                        THERAPUTIC HUMAN INR VALUES

INDICATIONS                      NORMAL RANGES

PROPHYLAXIS/TREATMENT OF:

VENOUS THROMBOSIS                2.0-3.0

PULMONARY EMBOLISM               2.0-3.0

PREVENTION OF SYSTEMIC EMBOLISM FROM:

TISSUE HEART VALVES              2.0-3.0

ACUTE MYOCARDIAL INFARCTION      2.0-3.0

VALVULAR HEART DISEASE           2.0-3.0

ATRIAL FIBRILLATION              2.0-3.0

MECHANICAL VALVES(HIGH RISK)     2.5-3.5

RECURRENT MYOCARDIAL INFARCTION  2.5-3.5

 

 

          Prothrombin Time 58.4 s    12.5-14.3                     [facility] ([facility])  









                    ID                  Date                Data Source

 

                    [id number]          2020 11:56:00 AM EST [facility] ([facility])









          Name      Value     Range     Interpretation Code Description Data Milly

rce(s) Supporting 

Document(s)

 

           Leukocytes [#/volume] in Blood by Automated count 6.0 x10*3/UL 4.1-10

.9                         

[facility] ([facility])            

 

           Hemoglobin [Mass/volume] in Blood 13.0 g/dL  12.0-18.0               

         [facility] ([facility])                              

 

           Hematocrit [Volume Fraction] of Blood by Automated count 37.5 %     3

7.0-51.0                        

[facility] ([facility])            

 

           Erythrocytes [#/volume] in Blood by Automated count 4.11 x10*6/UL 4.2

0-6.30                        

[facility] ([facility])            

 

          MCV       91.1 fL   80.0-97.0                     [facility]  

 

          MCH       31.6 pg   26.0-32.0                     [facility] ([facility]

[facility])  

 

          MCHC      34.7 g/dL 31.0-38.0                     [facility] ([facility])  

 

           Platelets [#/volume] in Blood by Automated count 243 x10*3/-440

                          [facility]

 ([facility])                  

 

          MPV       8.3 FL    7.8-11.0                      [facility] ([facility])  

 

           Erythrocyte distribution width [Ratio] by Automated count 14.2 %     

11.6-13.7                        

[facility] ([facility])            

 

          Lymph %   21.2 %    10.0-58.5                     [facility] ([facility]

[facility])  

 

          Mid %     5.8 %     1.7-9.3                       [facility] ([facility])  

 

          Neut %    73.0 %    37.0-92.0                     [facility] ([facility])  

 

          Neut #    4.4 x10*3/UL 2.0-7.8                       [facility] ([facility])  

 

          Lymph #   1.2 x10*3/UL 0.6-4.1                       [facility] ([facility])  

 

          Mid #     0.4 x10*3/UL 0.1-0.6                       [facility] ([facility])  









                    ID                  Date                Data Source

 

                    [id number]          2020 11:29:00 AM EST [facility] ([facility])









          Name      Value     Range     Interpretation Code Description Data Milly

rce(s) Supporting 

Document(s)

 

           INR in Platelet poor plasma by Coagulation assay 7.9                 

                        [facility] ([facility]                              









                    ID                  Date                Data Source

 

                    [id number]          2020 10:58:00 AM EST [facility] ([facility])









          Name      Value     Range     Interpretation Code Description Data Milly

rce(s) Supporting 

Document(s)

 

           Glucose [Mass/volume] in Serum or Plasma 100 mg/dL  74-99            

                [facility] ([facility])                              

 

                                        100-125 mg/dL     PRE-DIABETES/FASTING

>126 mg/dL          DIABETES/FASTING

 

 

           Urea nitrogen [Mass/volume] in Serum or Plasma 17 mg/dL   7-18       

                      [facility] 

([facility])                   

 

          Creatinine 0.7 mg/dL 0.6-1.3                       [facility]  

 

           Sodium [Moles/volume] in Serum or Plasma 141 meq/L  136-145          

                [facility] ([facility])                             

 

           Carbon dioxide, total [Moles/volume] in Serum or Plasma 24 meq/L   21

-32                            

[facility] ([facility])            

 

           Potassium [Moles/volume] in Serum or Plasma 4.4 meq/L  3.5-5.1       

                   [facility] 

([facility])                   

 

           Chloride [Moles/volume] in Serum or Plasma 103 meq/L           

                  [facility] 

([facility])                   

 

                                        Glomerular filtration rate/1.73 sq M pre

dicted among non-blacks [Volume 

Rate/Area] in Serum or Plasma by Creatinine-based formula (MDRD) Laboratory test

result                                              [facility] ([facility]

)  

 

                                        Glomerular filtration rate/1.73 sq M pre

dicted among blacks [Volume Rate/Area] 

in Serum or Plasma by Creatinine-based formula (MDRD) Laboratory test result    

                  

                                        [facility] ([facility])  

 

                                        <content>CHRONIC KIDNEY DISEASE STAGING 

PER 

NKF</content><br/><content></content><br/><content>STAGE I & II      GFR >= 60  
     NORMAL TO MILDLY DECREASED</content><br/><content>STAGE III          GFR 
30-59          MODERATELY DECREASED</content><br/><content>STAGE IV           
GFR 15-29         SEVERELY DECREASED</content><br/><content>STAGE V            
GFR <15            VERY LITTLE GFR LEFT</content><br/><content>ESRD             
   GFR <15            ON RRT</content><br/><content></content> 

 

           Calcium [Mass/volume] in Serum or Plasma 10.2 mg/dL 8.5-10.1         

                [facility] 

([facility])                   

 

                                        NOTE:

RESULT VERIFIED.





 









                    ID                  Date                Data Source

 

                    [id number]          2020 11:20:00 AM EST [facility] ([facility])









          Name      Value     Range     Interpretation Code Description Data Milly

rce(s) Supporting 

Document(s)

 

           INR in Platelet poor plasma by Coagulation assay 3.7                 

                        [facility] ([facility])                              









                    ID                  Date                Data Source

 

                    [id number]          10/29/2020 01:07:00 PM EDT [facility] ([facility])









          Name      Value     Range     Interpretation Code Description Data Milly

rce(s) Supporting 

Document(s)

 

           INR in Platelet poor plasma by Coagulation assay 2.7                 

                        [facility] ([facility])                              









                    ID                  Date                Data Source

 

                    [id number]          10/29/2020 11:26:00 AM EDT [facility] ([facility])









          Name      Value     Range     Interpretation Code Description Data Milly

rce(s) Supporting 

Document(s)

 

           Digoxin [Mass/volume] in Serum or Plasma 0.6 ng/mL  0.5-2.0          

                [facility] ([facility])                             









                    ID                  Date                Data Source

 

                    [id number]            10/29/2020 11:26:00 AM EDT [facility] ([facility])









          Name      Value     Range     Interpretation Code Description Data Milly

rce(s) Supporting 

Document(s)

 

           Digoxin [Mass/volume] in Serum or Plasma 0.6 ng/mL  0.5-2.0          

                [facility] 

([facility])           









                    ID                  Date                Data Source

 

                    [id number]          10/29/2020 11:25:00 AM EDT [facility] ([facility])









          Name      Value     Range     Interpretation Code Description Data Milly

rce(s) Supporting 

Document(s)

 

           Hemoglobin [Mass/volume] in Blood 13.8 g/dL  12.0-18.0               

         [facility] ([facility]                              

 

           Erythrocytes [#/volume] in Blood by Automated count 4.36 x10*6/UL 4.2

0-6.30                        

[facility] ([facility])            

 

           Leukocytes [#/volume] in Blood by Automated count 6.2 x10*3/UL 4.1-10

.9                         

[facility] ([facility])            

 

          MCH       31.6 pg   26.0-32.0                     [facility] ([facility])  

 

           Hematocrit [Volume Fraction] of Blood by Automated count 40.7 %     3

7.0-51.0                        

[facility] ([facility])            

 

          MCV       93.2 fL   80.0-97.0                     [facility] ([facility])  

 

          MCHC      34.0 g/dL 31.0-38.0                     [facility] ([facility])  

 

           Erythrocyte distribution width [Ratio] by Automated count 14.5 %     

11.6-13.7                        

[facility] ([facility])            

 

          Lymph %   19.8 %    10.0-58.5                     [facility] ([facility]

[facility])  

 

           Platelets [#/volume] in Blood by Automated count 236 x10*3/-440

                          [facility]

 ([facility])                  

 

          MPV       7.8 FL    7.8-11.0                      [facility] ([facility])  

 

          Lymph #   1.2 x10*3/UL 0.6-4.1                       [facility] ([facility])  

 

          Mid %     5.7 %     1.7-9.3                       [facility] ([facility]

[facility])  

 

          Neut %    74.5 %    37.0-92.0                     [facility] ([facility])  

 

          Neut #    4.6 x10*3/UL 2.0-7.8                       [facility] ([facility])  

 

          Mid #     0.4 x10*3/UL 0.1-0.6                       [facility] ([facility])  









                    ID                  Date                Data Source

 

                    [id number]          10/29/2020 11:25:00 AM EDT [facility] ([facility])









          Name      Value     Range     Interpretation Code Description Data Milly

rce(s) Supporting 

Document(s)

 

          Magnesium 2.1 mg/dL 1.8-2.4                       [facility] ([facility])  









                    ID                  Date                Data Source

 

                    [id number]          10/29/2020 11:25:00 AM EDT [facility] ([facility])









          Name      Value     Range     Interpretation Code Description Data Milly

rce(s) Supporting 

Document(s)

 

           Glucose [Mass/volume] in Serum or Plasma 101 mg/dL  74-99            

                [facility] ([facility]                              

 

                                        100-125 mg/dL     PRE-DIABETES/FASTING

>126 mg/dL          DIABETES/FASTING

 

 

          Creatinine 0.7 mg/dL 0.6-1.3                       [facility] ([facility])  

 

           Urea nitrogen [Mass/volume] in Serum or Plasma 17 mg/dL   7-18       

                      [facility] 

([facility])                   

 

           Chloride [Moles/volume] in Serum or Plasma 104 meq/L           

                  [facility] 

([facility])                   

 

           Potassium [Moles/volume] in Serum or Plasma 4.0 meq/L  3.5-5.1       

                   [facility] 

([facility])                   

 

           Sodium [Moles/volume] in Serum or Plasma 143 meq/L  136-145          

                [facility] ([facility]                             

 

           Calcium [Mass/volume] in Serum or Plasma 10.7 mg/dL 8.5-10.1         

                [facility] 

([facility])                   

 

                                        NOTE:

RESULT VERIFIED.





 

 

                                        Glomerular filtration rate/1.73 sq M pre

dicted among non-blacks [Volume 

Rate/Area] in Serum or Plasma by Creatinine-based formula (MDRD) Laboratory test

result                                              [facility] ([facility]

)  

 

           Carbon dioxide, total [Moles/volume] in Serum or Plasma 30 meq/L   21

-32                            

[facility] ([facility])            

 

                                        Glomerular filtration rate/1.73 sq M pre

dicted among blacks [Volume Rate/Area] 

in Serum or Plasma by Creatinine-based formula (MDRD) Laboratory test result    

                  

                                        [facility] ([facility])  

 

                                        <content>CHRONIC KIDNEY DISEASE STAGING 

PER 

NKF</content><br/><content></content><br/><content>STAGE I & II      GFR >= 60  
     NORMAL TO MILDLY DECREASED</content><br/><content>STAGE III          GFR 
30-59          MODERATELY DECREASED</content><br/><content>STAGE IV           
GFR 15-29         SEVERELY DECREASED</content><br/><content>STAGE V            
GFR <15            VERY LITTLE GFR LEFT</content><br/><content>ESRD             
   GFR <15            ON RRT</content><br/><content></content> 









                    ID                  Date                Data Source

 

                    [id number]          10/29/2020 11:25:00 AM EDT [facility] ([facility])









          Name      Value     Range     Interpretation Code Description Data Milly

rce(s) Supporting 

Document(s)

 

                          Thyrotropin [Units/volume] in Serum or Plasma by Detec

tion limit <= 0.05 mIU/L 

0.16 uIU/mL  0.36-3.74                              [facility] ([facility]

)  









                    ID                  Date                Data Source

 

                    [id number]          10/29/2020 11:25:00 AM EDT [facility])









          Name      Value     Range     Interpretation Code Description Data Milly

rce(s) Supporting 

Document(s)

 

          Urine Color Laboratory test result                               [facility]

T ([facility])  

 

          Urine PH  6.5 units 5.0-9.0                       [facility])  

 

          Urine Appearance Laboratory test result                               

[facility] ([facility])  

 

          Urine Leukocytes Laboratory test result                               

[facility] ([facility])  

 

          Specific gravity of Urine 1.010     1.005-1.030                     ME

DENT ([facility])  

 

          Urine Blood Laboratory test result                               MEDEN

T ([facility])  

 

           Glucose [Presence] in Urine Laboratory test result                   

               [facility] ([facility])                              

 

          Urine Protein Laboratory test result 0-0                           MED

ENT ([facility])  

 

          Urine Nitrite Laboratory test result                               MED

ENT ([facility])  

 

          Urine Ketone Laboratory test result                               MEDE

NT ([facility])  

 

          Urine Urobilinogen 0.2 mg/dL 0.2-1.0                       [facility] ([facility])  

 

           Bilirubin.total [Mass/volume] in Serum or Plasma Laboratory test resu

lt                                  

[facility] ([facility])            









                    ID                  Date                Data Source

 

                    [id number]            10/29/2020 11:25:00 AM EDT [facility] ([facility])









          Name      Value     Range     Interpretation Code Description Data Milly

rce(s) Supporting 

Document(s)

 

           Leukocytes [#/volume] in Blood by Automated count 6.2 x10*3/UL 4.1-10

.9                         

[facility] ([facility])    

 

           Erythrocytes [#/volume] in Blood by Automated count 4.36 x10*6/UL 4.2

0-6.30                        

[facility] ([facility])    

 

           Hemoglobin [Mass/volume] in Blood 13.8 g/dL  12.0-18.0               

         [facility] ([facility])                       

 

          MCV       93.2 fL   80.0-97.0                     [facility] ([facility]

[facility])  

 

           Hematocrit [Volume Fraction] of Blood by Automated count 40.7 %     3

7.0-51.0                        

[facility] ([facility])    

 

          MCH       31.6 pg   26.0-32.0                     [facility] ([facility]

[facility])  

 

          MCHC      34.0 g/dL 31.0-38.0                     [facility] ([facility]  

 

           Erythrocyte distribution width [Ratio] by Automated count 14.5 %     

11.6-13.7                        

[facility] ([facility])    

 

           Platelets [#/volume] in Blood by Automated count 236 x10*3/-440

                          MED[facility]

 ([facility])          

 

          Mid %     5.7 %     1.7-9.3                       [facility] ([facility]iology A

[facility])  

 

           Lymphocytes/100 leukocytes in Blood by Automated count 19.8 %     10.

0-58.5                        

[facility] ([facility])    

 

             Platelet mean volume [Entitic volume] in Blood by Archie 7.8 FL

       7.8-11.0                   

                          [facility] ([facility])  

 

          Neut %    74.5 %    37.0-92.0                     [facility] ([facility]iology [facility]

[facility])  

 

          Mid #     0.4 x10*3/UL 0.1-0.6                       [facility]  

 

          Lymph #   1.2 x10*3/UL 0.6-4.1                       [facility]

[facility])  

 

           Neutrophils [#/volume] in [place] by Manual count 4.6 x10*3/UL 2.0-7.8 

                         [facility] 

([facility])           









                    ID                  Date                Data Source

 

                    [id number]          10/29/2020 11:24:00 AM EDT [facility] ([facility])









          Name      Value     Range     Interpretation Code Description Data Milly

rce(s) Supporting 

Document(s)

 

           Thyroxine (T4) free [Mass/volume] in Serum or Plasma 1.33 ng/dL 0.76-

1.46                        

[facility] ([facility])            









                    ID                  Date                Data Source

 

                    [id number]          10/26/2020 11:44:00 AM EDT [facility] ([facility])









          Name      Value     Range     Interpretation Code Description Data Milly

rce(s) Supporting 

Document(s)

 

           INR in Platelet poor plasma by Coagulation assay 1.2                 

                        [facility] ([facility])                              









                    ID                  Date                Data Source

 

                    [id number]          10/22/2020 12:43:00 PM EDT [facility] ([facility])









          Name      Value     Range     Interpretation Code Description Data Milly

rce(s) Supporting 

Document(s)

 

           INR in Platelet poor plasma by Coagulation assay 7.2                 

                        [facility] ([facility])                              









                    ID                  Date                Data Source

 

                    [id number]          10/22/2020 11:33:00 AM EDT [facility] ([facility])









          Name      Value     Range     Interpretation Code Description Data Milly

rce(s) Supporting 

Document(s)

 

          Prothrombin Time 49.4 s    12.5-14.3                     [facility] ([facility])  

 

          Inr       5.24                Above upper panic limits           MEDEN

T ([facility])  

 

                                        THERAPUTIC HUMAN INR VALUES

INDICATIONS                      NORMAL RANGES

PROPHYLAXIS/TREATMENT OF:

VENOUS THROMBOSIS                2.0-3.0

PULMONARY EMBOLISM               2.0-3.0

PREVENTION OF SYSTEMIC EMBOLISM FROM:

TISSUE HEART VALVES              2.0-3.0

ACUTE MYOCARDIAL INFARCTION      2.0-3.0

VALVULAR HEART DISEASE           2.0-3.0

ATRIAL FIBRILLATION              2.0-3.0

MECHANICAL VALVES(HIGH RISK)     2.5-3.5

RECURRENT MYOCARDIAL INFARCTION  2.5-3.5

 









                    ID                  Date                Data Source

 

                    [id number]          10/22/2020 11:33:00 AM EDT [facility] ([facility])









          Name      Value     Range     Interpretation Code Description Data Milly

rce(s) Supporting 

Document(s)

 

           Erythrocytes [#/volume] in Blood by Automated count 4.37 x10*6/UL 4.2

0-6.30                        

[facility] ([facility]            

 

           Leukocytes [#/volume] in Blood by Automated count 6.6 x10*3/UL 4.1-10

.9                         

[facility] ([facility])            

 

          MCV       91.9 fL   80.0-97.0                     [facility] ([facility]

[facility])  

 

           Hemoglobin [Mass/volume] in Blood 13.7 g/dL  12.0-18.0               

         [facility] ([facility]                              

 

           Hematocrit [Volume Fraction] of Blood by Automated count 40.2 %     3

7.0-51.0                        

[facility] ([facility]            

 

           Erythrocyte distribution width [Ratio] by Automated count 13.7 %     

11.6-13.7                        

[facility] ([facility])            

 

          MCHC      34.2 g/dL 31.0-38.0                     [facility] ([facility]

[facility])  

 

          MCH       31.4 pg   26.0-32.0                     [facility] ([facility]

[facility])  

 

          Lymph %   16.8 %    10.0-58.5                     [facility] ([facility])  

 

           Platelets [#/volume] in Blood by Automated count 251 x10*3/-440

                          MED[facility]

 ([facility])                  

 

          MPV       8.3 FL    7.8-11.0                      [facility] ([facility]

[facility]  

 

          Lymph #   1.1 x10*3/UL 0.6-4.1                       [facility] ([facility])  

 

          Mid %     4.7 %     1.7-9.3                       [facility] ([facility]  

 

          Neut %    78.5 %    37.0-92.0                     [facility] ([facility]  

 

          Neut #    5.2 x10*3/UL 2.0-7.8                       [facility] ([facility])  

 

          Mid #     0.3 x10*3/UL 0.1-0.6                       [facility] ([facility])  









                    ID                  Date                Data Source

 

                    [id number]          10/15/2020 12:07:00 PM EDT [facility] ([facility])









          Name      Value     Range     Interpretation Code Description Data Milly

rce(s) Supporting 

Document(s)

 

           INR in Platelet poor plasma by Coagulation assay 1.3                 

                        [facility] ([facility])                              









                    ID                  Date                Data Source

 

                    [id number]          2020 12:44:00 PM EDT [facility] ([facility])









          Name      Value     Range     Interpretation Code Description Data Milly

rce(s) Supporting 

Document(s)

 

           INR in Platelet poor plasma by Coagulation assay 3.7                 

                        [facility] ([facility])                              









                    ID                  Date                Data Source

 

                    [id number]          2020 03:27:00 PM EDT [facility]









          Name      Value     Range     Interpretation Code Description Data Milly

rce(s) Supporting 

Document(s)

 

           INR in Platelet poor plasma by Coagulation assay 1.3                 

                        [facility] ([facility]                              









                    ID                  Date                Data Source

 

                    [id number]          2020 01:48:00 PM EDT [facility] ([facility])









          Name      Value     Range     Interpretation Code Description Data Milly

rce(s) Supporting 

Document(s)

 

           INR in Platelet poor plasma by Coagulation assay 2.6                 

                        [facility] ([facility]                              









                    ID                  Date                Data Source

 

                    [id number]          2020 11:57:00 AM EDT [facility] ([facility])









          Name      Value     Range     Interpretation Code Description Data Milly

rce(s) Supporting 

Document(s)

 

          Inr       4.98                                    [facility] ([facility]

[facility])  

 

                                        THERAPUTIC HUMAN INR VALUES

INDICATIONS                      NORMAL RANGES

PROPHYLAXIS/TREATMENT OF:

VENOUS THROMBOSIS                2.0-3.0

PULMONARY EMBOLISM               2.0-3.0

PREVENTION OF SYSTEMIC EMBOLISM FROM:

TISSUE HEART VALVES              2.0-3.0

ACUTE MYOCARDIAL INFARCTION      2.0-3.0

VALVULAR HEART DISEASE           2.0-3.0

ATRIAL FIBRILLATION              2.0-3.0

MECHANICAL VALVES(HIGH RISK)     2.5-3.5

RECURRENT MYOCARDIAL INFARCTION  2.5-3.5

 

 

          Prothrombin Time 47.4 s    12.5-14.3                     [facility] ([facility])  









                    ID                  Date                Data Source

 

                    [id number]          2020 11:56:00 AM EDT [facility] ([facility])









          Name      Value     Range     Interpretation Code Description Data Milly

rce(s) Supporting 

Document(s)

 

           Hematocrit [Volume Fraction] of Blood by Automated count 28.7 %     3

7.0-51.0                        

[facility] ([facility]            

 

           Erythrocytes [#/volume] in Blood by Automated count 3.00 x10*6/UL 4.2

0-6.30                        

[facility] ([facility]            

 

           Hemoglobin [Mass/volume] in Blood 10.1 g/dL  12.0-18.0               

         [facility] ([facility]                              

 

                                        NOTE:

RESULT VERIFIED.





 

 

           Leukocytes [#/volume] in Blood by Automated count 8.4 x10*3/UL 4.1-10

.9                         

[facility] ([facility]            

 

          MCH       33.8 pg   26.0-32.0                     [facility] ([facility])  

 

          MCHC      35.3 g/dL 31.0-38.0                     [facility] ([facility])  

 

          MCV       95.7 fL   80.0-97.0                     [facility] ([facility])  

 

           Platelets [#/volume] in Blood by Automated count 289 x10*3/-440

                          [facility]

 ([facility])                  

 

          MPV       7.4 FL    7.8-11.0                      [facility] ([facility])  

 

           Erythrocyte distribution width [Ratio] by Automated count 13.2 %     

11.6-13.7                        

[facility] ([facility]            

 

          Neut %    79.2 %    37.0-92.0                     [facility] ([facility]

[facility])  

 

          Mid %     6.2 %     1.7-9.3                       [facility]  

 

          Lymph #   1.2 x10*3/UL 0.6-4.1                       [facility] ([facility])  

 

          Lymph %   14.6 %    10.0-58.5                     [facility] ([facility]

[facility]  

 

          Mid #     0.6 x10*3/UL 0.1-0.6                       [facility] ([facility])  

 

          Neut #    6.6 x10*3/UL 2.0-7.8                       [facility] ([facility])  









                    ID                  Date                Data Source

 

                    [id number]          2020 10:42:00 AM EDT [facility]









          Name      Value     Range     Interpretation Code Description Data Milly

rce(s) Supporting 

Document(s)

 

           INR in Platelet poor plasma by Coagulation assay 8.0                 

                        [facility] ([facility]                              









                    ID                  Date                Data Source

 

                    [id number]          2020 10:31:00 AM EDT [facility] ([facility])









          Name      Value     Range     Interpretation Code Description Data Milly

rce(s) Supporting 

Document(s)

 

          Prothrombin Time 59.3 s    11.8-14.0                     [facility] ([facility]  

 

          Inr       6.73                Above upper panic limits           MEDEN

T ([facility])  

 

                                        THERAPUTIC HUMAN INR VALUES

INDICATIONS                      NORMAL RANGES

PROPHYLAXIS/TREATMENT OF:

VENOUS THROMBOSIS                2.0-3.0

PULMONARY EMBOLISM               2.0-3.0

PREVENTION OF SYSTEMIC EMBOLISM FROM:

TISSUE HEART VALVES              2.0-3.0

ACUTE MYOCARDIAL INFARCTION      2.0-3.0

VALVULAR HEART DISEASE           2.0-3.0

ATRIAL FIBRILLATION              2.0-3.0

MECHANICAL VALVES(HIGH RISK)     2.5-3.5

RECURRENT MYOCARDIAL INFARCTION  2.5-3.5

 









                    ID                  Date                Data Source

 

                    [id number]          2020 10:06:00 AM EDT [facility]









          Name      Value     Range     Interpretation Code Description Data Milly

rce(s) Supporting 

Document(s)

 

                          Thyrotropin [Units/volume] in Serum or Plasma by Detec

tion limit <= 0.05 mIU/L 

0.16 uIU/mL  0.36-3.74                              [facility] ([facility]

)  









                    ID                  Date                Data Source

 

                    [id number]          2020 10:06:00 AM EDT [facility] ([facility])









          Name      Value     Range     Interpretation Code Description Data Milly

rce(s) Supporting 

Document(s)

 

           Glucose [Mass/volume] in Serum or Plasma 99 mg/dL   74-99            

                [facility] ([facility])                              

 

                                        100-125 mg/dL     PRE-DIABETES/FASTING

>126 mg/dL          DIABETES/FASTING

 

 

           Urea nitrogen [Mass/volume] in Serum or Plasma 25 mg/dL   7-18       

                      [facility] 

([facility])                   

 

           Chloride [Moles/volume] in Serum or Plasma 107 meq/L           

                  [facility] 

([facility])                   

 

          Creatinine 0.8 mg/dL 0.6-1.3                       [facility] ([facility]  

 

           Potassium [Moles/volume] in Serum or Plasma 4.6 meq/L  3.5-5.1       

                   [facility] 

([facility])                   

 

           Sodium [Moles/volume] in Serum or Plasma 143 meq/L  136-145          

                [facility] ([facility]                             

 

           Carbon dioxide, total [Moles/volume] in Serum or Plasma 26 meq/L   21

-32                            

[facility] ([facility])            

 

                                        Glomerular filtration rate/1.73 sq M pre

dicted among non-blacks [Volume 

Rate/Area] in Serum or Plasma by Creatinine-based formula (MDRD) Laboratory test

result                                              [facility] ([facility]

)  

 

           Calcium [Mass/volume] in Serum or Plasma 10.0 mg/dL 8.5-10.1         

                [facility] 

([facility])                   

 

                                        Glomerular filtration rate/1.73 sq M pre

dicted among blacks [Volume Rate/Area] 

in Serum or Plasma by Creatinine-based formula (MDRD) Laboratory test result    

                  

                                        [facility] ([facility])  

 

                                        <content>CHRONIC KIDNEY DISEASE STAGING 

PER 

NKF</content><br/><content></content><br/><content>STAGE I & II      GFR >= 60  
     NORMAL TO MILDLY DECREASED</content><br/><content>STAGE III          GFR 
30-59          MODERATELY DECREASED</content><br/><content>STAGE IV           
GFR 15-29         SEVERELY DECREASED</content><br/><content>STAGE V            
GFR <15            VERY LITTLE GFR LEFT</content><br/><content>ESRD             
   GFR <15            ON RRT</content><br/><content></content> 









                    ID                  Date                Data Source

 

                    [id number]          2020 10:06:00 AM EDT [facility] ([facility])









          Name      Value     Range     Interpretation Code Description Data Milly

rce(s) Supporting 

Document(s)

 

           Leukocytes [#/volume] in Blood by Automated count 8.1 x10*3/UL 4.1-10

.9                         

[facility] ([facility])            

 

           Erythrocytes [#/volume] in Blood by Automated count 2.84 x10*6/UL 4.2

0-6.30                        

[facility] ([facility])            

 

          MCV       96.3 fL   80.0-97.0                     [facility] ([facility]

[facility])  

 

           Hematocrit [Volume Fraction] of Blood by Automated count 27.3 %     3

7.0-51.0                        

[facility] ([facility])            

 

           Hemoglobin [Mass/volume] in Blood 9.5 g/dL   12.0-18.0               

         [facility] ([facility]                              

 

                                        NOTE:

RESULT VERIFIED.





 

 

           Erythrocyte distribution width [Ratio] by Automated count 13.4 %     

11.6-13.7                        

[facility] ([facility])            

 

          MCHC      34.9 g/dL 31.0-38.0                     [facility] ([facility]  

 

          MCH       33.6 pg   26.0-32.0                     [facility] ([facility]

[facility])  

 

          MPV       7.9 FL    7.8-11.0                      [facility] ([facility]  

 

          Lymph %   14.4 %    10.0-58.5                     [facility] ([facility]

[facility])  

 

           Platelets [#/volume] in Blood by Automated count 281 x10*3/-440

                          [facility]

 ([facility])                  

 

          Lymph #   1.1 x10*3/UL 0.6-4.1                       [facility] ([facility])  

 

          Mid #     0.5 x10*3/UL 0.1-0.6                       [facility] ([facility])  

 

          Neut %    80.1 %    37.0-92.0                     [facility] ([facility]

[facility]  

 

          Mid %     5.5 %     1.7-9.3                       [facility] ([facility]  

 

          Neut #    6.5 x10*3/UL 2.0-7.8                       [facility] ([facility])  









                    ID                  Date                Data Source

 

                    [id number]          2020 08:39:00 PM EDT [facility]









          Name      Value     Range     Interpretation Code Description Data Milly

rce(s) Supporting 

Document(s)

 

          Respiratory Panel Laboratory test result                              

 [facility] ([facility]  

 

                                        This respiratory PCR panel detects Influ

chalo A H1, H3 and

                                        2009 H1 viruses, Influenza B virus, Resp

iratory Syncytial Virus,

Human metapneumovirus, Parainfluenza virus 1, 2, 3 and 4, Adenovirus,

Rhinovirus/Enterovirus, Coronavirus HKU1, NL63, OC43, 229E and

SARS-CoV-2 (COVID 19), Bordetella pertussis, Bordetella parapertussis,

Mycoplasma pneumoniae and Chlamydia pneumoniae.



NEGATIVE by MULTIPLEXED NUCLEIC ACID PCR

SARS-CoV-2 (COVID 19)         NEGATIVE - SARS-CoV-2 (COVID19)



 









                    ID                  Date                Data Source

 

                    [id number]          2020 11:38:00 AM EDT [facility]









          Name      Value     Range     Interpretation Code Description Data Milly

rce(s) Supporting 

Document(s)

 

           INR in Platelet poor plasma by Coagulation assay 2.9                 

                        [facility] ([facility]                              









                    ID                  Date                Data Source

 

                    [id number]          2020 04:00:00 PM EDT [facility]









          Name      Value     Range     Interpretation Code Description Data Milly

rce(s) Supporting 

Document(s)

 

           INR in Platelet poor plasma by Coagulation assay 2.7                 

                        [facility] ([facility]                              









                    ID                  Date                Data Source

 

                    [id number]          2020 02:20:00 PM EDT [facility]









          Name      Value     Range     Interpretation Code Description Data Milly

rce(s) Supporting 

Document(s)

 

          Magnesium 2.0 mg/dL 1.8-2.4                       [facility]  









                    ID                  Date                Data Source

 

                    [id number]          2020 02:20:00 PM EDT [facility]









          Name      Value     Range     Interpretation Code Description Data Milly

rce(s) Supporting 

Document(s)

 

           Digoxin [Mass/volume] in Serum or Plasma 0.4 ng/mL  0.5-2.0          

                [facility] ([facility])                             









                    ID                  Date                Data Source

 

                    [id number]          2020 02:20:00 PM EDT [facility] ([facility])









          Name      Value     Range     Interpretation Code Description Data Milly

rce(s) Supporting 

Document(s)

 

                          Thyrotropin [Units/volume] in Serum or Plasma by Detec

tion limit <= 0.05 mIU/L 

3.16 uIU/mL  0.36-3.74                              [facility] ([facility]

)  









                    ID                  Date                Data Source

 

                    [id number]          2020 02:20:00 PM EDT [facility] ([facility])









          Name      Value     Range     Interpretation Code Description Data Milly

rce(s) Supporting 

Document(s)

 

           Glucose [Mass/volume] in Serum or Plasma 110 mg/dL  74-99            

                [facility] ([facility])                              

 

                                        100-125 mg/dL     PRE-DIABETES/FASTING

>126 mg/dL          DIABETES/FASTING

 

 

           Urea nitrogen [Mass/volume] in Serum or Plasma 29 mg/dL   7-18       

                      [facility] 

([facility])                   

 

           Potassium [Moles/volume] in Serum or Plasma 4.9 meq/L  3.5-5.1       

                   [facility] 

([facility])                   

 

          Creatinine 1.1 mg/dL 0.6-1.3                       [facility]  

 

           Sodium [Moles/volume] in Serum or Plasma 139 meq/L  136-145          

                [facility] ([facility])                             

 

           Chloride [Moles/volume] in Serum or Plasma 101 meq/L           

                  [facility] 

([facility])                   

 

           Carbon dioxide, total [Moles/volume] in Serum or Plasma 25 meq/L   21

-32                            

[facility] ([facility])            

 

                                        Glomerular filtration rate/1.73 sq M pre

dicted among non-blacks [Volume 

Rate/Area] in Serum or Plasma by Creatinine-based formula (MDRD) 50 mL/min      

                                  

                          [facility] ([facility])  

 

           Calcium [Mass/volume] in Serum or Plasma 10.5 mg/dL 8.5-10.1         

                [facility] 

([facility])                   

 

                                        NOTE:

RESULT VERIFIED.





 

 

                                        Glomerular filtration rate/1.73 sq M pre

dicted among blacks [Volume Rate/Area] 

in Serum or Plasma by Creatinine-based formula (MDRD) Laboratory test result    

                  

                                        [facility] ([facility])  

 

                                        <content>CHRONIC KIDNEY DISEASE STAGING 

PER 

NKF</content><br/><content></content><br/><content>STAGE I & II      GFR >= 60  
     NORMAL TO MILDLY DECREASED</content><br/><content>STAGE III          GFR 
30-59          MODERATELY DECREASED</content><br/><content>STAGE IV           
GFR 15-29         SEVERELY DECREASED</content><br/><content>STAGE V            
GFR <15            VERY LITTLE GFR LEFT</content><br/><content>ESRD             
   GFR <15            ON RRT</content><br/><content></content> 









                    ID                  Date                Data Source

 

                    [id number]            2020 08:05:00 AM EDT [facility] ([facility])









          Name      Value     Range     Interpretation Code Description Data Milly

rce(s) Supporting 

Document(s)

 

          Glucose   110                               [facility] ([facility]  

 

          Creatinine 1.1       0.6-1.3                       [facility] ([facility]  

 

          Blood Urea Nitrogen 29        7-18                          [facility] ([facility]  

 

          Sodium    139       136-145                       [facility] ([facility]  

 

          Potassium 4.9       3.5-5.1                       [facility] ([facility]  

 

          Carbon Dioxide 25        21-32                         [facility] ([facility]  

 

          Chloride  101                               [facility] ([facility]  

 

                                        Glomerular filtration rate/1.73 sq M.pre

dicted [Volume Rate/Area] in Serum or 

Plasma by Creatinine-based formula (MDRD) 50                                    

              [facility] ([facility])                       

 

          Calcium   10.5      8.5-10.1                      [facility] ([facility]  









                    ID                  Date                Data Source

 

                    [id number]          2020 02:36:00 PM EDT [facility] ([facility])









          Name      Value     Range     Interpretation Code Description Data Milly

rce(s) Supporting 

Document(s)

 

           INR in Platelet poor plasma by Coagulation assay 3.4                 

                        [facility] ([facility])                              









                    ID                  Date                Data Source

 

                    [id number]          2020 02:08:00 PM EDT [facility] ([facility])









          Name      Value     Range     Interpretation Code Description Data Milly

rce(s) Supporting 

Document(s)

 

           INR in Platelet poor plasma by Coagulation assay 2.9                 

                        [facility] ([facility])                              









                    ID                  Date                Data Source

 

                    [id number]          [facility]









          Name      Value     Range     Interpretation Code Description Data Milly

rce(s) Supporting 

Document(s)

 

           INR in Platelet poor plasma by Coagulation assay 1.3                 

                        [facility] ([facility] 

[facility]                              









                    ID                  Date                Data Source

 

                    [id number]          2020 10:59:00 AM EDT [facility]









          Name      Value     Range     Interpretation Code Description Data Milly

rce(s) Supporting 

Document(s)

 

                          C reactive protein [Mass/volume] in Serum or Plasma by

 High sensitivity method 

Laboratory test result 0.00-0.30                              [facility] ([facility])  









                    ID                  Date                Data Source

 

                    [id number]          [facility]59[facility]









          Name      Value     Range     Interpretation Code Description Data Milly

rce(s) Supporting 

Document(s)

 

          Glucose, Fasting 91 mg/dL                          [facility] ([place]

[place] [facility]  

 

          Creatinine For GFR 0.64 mg/dL 0.55-1.30                     [facility]  

 

          Blood Urea Nitrogen 16 mg/dL  7-18                          [facility]  

 

           Glomerular Filtration Rate Laboratory test result                    

              [facility] ([facility])                              

 

                                        <content>Units are mL/min/1.73 

m2</content><br/><content></content><br/><content>Chronic Kidney Disease Staging
 per NKF:</content><br/><content></content><br/><content>Stage I & II   GFR >=60
       Normal to Mildly Decreased</content><br/><content>Stage III      GFR 30-
59      Moderately Decreased</content><br/><content>Stage IV       GFR 15-29    
  Severely Decreased</content><br/><content>Stage V        GFR <15        Very 
Little GFR Left</content><br/><content>ESRD           GFR <15 on 
RRT</content><br/><content></content> 

 

          Sodium Level 142 meq/L 136-145                       [facility] ([facility])  

 

          Potassium Serum 4.7 meq/L 3.5-5.1                       [facility]  

 

          Carbon Dioxide Level 26 meq/L  21-32                         [facility]  

 

          Chloride Level 108 meq/L                         [facility] ([facility])  

 

          Anion Gap 8 meq/L   8-16                          [facility] ([place] [facility]

[facility])  

 

          Calcium Level 9.8 mg/dL 8.8-10.2                      [facility] ([facility])  









                    ID                  Date                Data Source

 

                    [id number]          2020 10:59:00 AM EDT [facility] ([facility])









          Name      Value     Range     Interpretation Code Description Data Milly

rce(s) Supporting 

Document(s)

 

           Erythrocyte sedimentation rate by Westergren method 78 mm/hr   0-30  

                           [facility] 

([facility])                   









                    ID                  Date                Data Source

 

                    [id number]          2020 10:59:00 AM EDT [facility] ([facility])









          Name      Value     Range     Interpretation Code Description Data Milly

rce(s) Supporting 

Document(s)

 

          Red Blood Count 3.74 10   4.00-5.40                     [facility] ([facility])  

 

          Hemoglobin 12.1 g/dL 12.0-15.5                     [facility] ([place] [facility]

[facility])  

 

          White Blood Count 5.4 10    4.0-10.0                      [facility] ([facility]  

 

          Mean Corpuscular Volume 102.7 fl  80.0-96.0                     [facility]

 ([facility])  

 

          Mean Corpuscular HGB Conc 31.5 g/dL 32.0-36.5                     [facility]

[facility] ([facility]) 

 

 

          Mean Corpuscular Hemoglobin 32.4 pg   27.0-33.0                     ME

[facility] ([facility]) 

 

 

          Hematocrit 38.4 %    36.0-47.0                     [facility] ([place] [facility]

[facility])  

 

          Neutrophils % 66.1 %    36.0-66.0                     [facility] ([facility])  

 

          Platelet Count, Automated 204 10    150-450                       [facility]

[facility] ([facility])  

 

          Red Cell Distribution Width 13.4 %    11.5-14.5                     ME

[facility] ([facility])  

 

          Baso %    0.4 %     0.0-1.0                       [facility] ([place] [facility]

[facility])  

 

          Mono %    7.7 %     0.0-5.0                       [facility] ([place] [facility]

[facility])  

 

          Eos %     2.2 %     0.0-3.0                       [facility] ([place] [facility]

[facility])  

 

          Lymph %   23.2 %    24.0-44.0                     [facility] ([place] [facility]

[facility])  

 

          Nucleated Red Blood Cell % 0.0 %     0-0                           MED

[facility] ([facility])  

 

          Immature Granulocyte % 0.4 %     0-3.0                         [facility] 

([facility])  

 

          Neutrophils # 3.5 10    1.5-8.5                       [facility] ([facility])  

 

          Eos #     0.1 10    0.0-0.5                       [facility] ([place] [facility]

[facility])  

 

          Lymph #   1.2 10    1.5-5.0                       [facility] ([place] [facility]

[facility])  

 

          Mono #    0.4 10    0.0-0.8                       [facility] ([place] [facility]

[facility])  

 

          Baso #    0.0 10    0.0-0.2                       [facility] ([facility])  









                    ID                  Date                Data Source

 

                    [id number]          2020 10:59:00 AM EDT [facility] ([facility])









          Name      Value     Range     Interpretation Code Description Data Milly

rce(s) Supporting 

Document(s)

 

           aPTT in Blood by Coagulation assay 43.9 s     25.0-38.4              

          [facility] ([place] 

[facility]                              









                    ID                  Date                Data Source

 

                    [id number]          2020 10:59:00 AM EDT [facility] ([facility])









          Name      Value     Range     Interpretation Code Description Data Milly

rce(s) Supporting 

Document(s)

 

          Inr       3.68                                    [facility] ([facility])  

 

                                        THERAPUTIC HUMAN INR VALUES

INDICATIONS                      NORMAL RANGES

PROPHYLAXIS/TREATMENT OF:

VENOUS THROMBOSIS                2.0-3.0

PULMONARY EMBOLISM               2.0-3.0

PREVENTION OF SYSTEMIC EMBOLISM FROM:

TISSUE HEART VALVES              2.0-3.0

ACUTE MYOCARDIAL INFARCTION      2.0-3.0

VALVULAR HEART DISEASE           2.0-3.0

ATRIAL FIBRILLATION              2.0-3.0

MECHANICAL VALVES(HIGH RISK)     2.5-3.5

RECURRENT MYOCARDIAL INFARCTION  2.5-3.5

 

 

          Prothrombin Time 36.6 s    11.8-14.0                     [facility] ([place]

[place] [facility])  









                    ID                  Date                Data Source

 

                    [id number]            2020 01:54:00 PM EDT [facility]









          Name      Value     Range     Interpretation Code Description Data Milly

rce(s) Supporting 

Document(s)

 

                          Natriuretic peptide.B prohormone N-Terminal [Mass/volu

me] in Serum or Plasma 388

 pg/mL       0-287                                  [facility] ([facility]  

 

                                        <content>The following cut-points have b

een suggested for 

the</content><br/><content>use of proBNP for the diagnostic evaluation of 
heart</content><br/><content>failure (HF) in patients with acute dy
spnea:</content><br/><content>Modality                     Age           Optimal
 Cut</content><br/><content>(years)            
Point</content><br/><content>---------------------------------------------------

---</content><br/><content>Diagnosis (rule in HF)        <50            450 
pg/mL</content><br/><content>50 - 75            900 
pg/mL</content><br/><content>>75           1800 pg/mL</content><br/><content>
Exclusion (rule out HF)  Age independent     300 
pg/mL</content><br/><content></content> 

 

           Flecainide [Mass/volume] in Serum or Plasma 0.21 ug/mL 0.20-1.00     

                   [facility] 

([facility])           

 

                                        This test was developed and its performa

nce characteristics

determined by [facility]. It has not been cleared or approved

by the Food and Drug Administration.

Detection Limit = 0.10

 

 

           Laboratory test finding (navigational concept) Laboratory test result

                                  

[facility] ([facility])    









                    ID                  Date                Data Source

 

                    [id number]         2020 02:05:00 PM EDT [facility]









          Name      Value     Range     Interpretation Code Description Data Milly

rce(s) Supporting 

Document(s)

 

          NT-proBNP 388 pg/mL 0-287     Above high normal           [facility]    

 

                                                       The following cut-points 

have been suggested for the             

  use of proBNP for the diagnostic evaluation of heart               failure 
(HF) in patients with acute dyspnea:                Modality                    
 Age           Optimal Cut                                          (years)     
       Point               
------------------------------------------------------               Diagnosis 
(rule in HF)        <50            450 pg/mL                                    
     50 - 75            900 pg/mL                                             >
75           1800 pg/mL               Exclusion (rule out HF)  Age independent  
   300 pg/mL 









                    ID                  Date                Data Source

 

                    [id number]         2020 08:05:00 PM EDT [facility]









          Name      Value     Range     Interpretation Code Description Data Milly

rce(s) Supporting 

Document(s)

 

          Flecainide (Tambocor(TM)), S 0.21 ug/mL 0.20-1.00                     

[facility]    

 

                                        This test was developed and its performa

nce characteristicsdetermined by 

[facility]. It has not been cleared or approvedby the Food and Drug Administration.
                                               Detection Limit = 0.10 









                    ID                  Date                Data Source

 

                    [id number]          2020 11:23:00 AM EST [facility] ([facility])









          Name      Value     Range     Interpretation Code Description Data Milly

rce(s) Supporting 

Document(s)

 

           INR in Platelet poor plasma by Coagulation assay 2.6                 

                        [facility] ([facility])                              









                    ID                  Date                Data Source

 

                    [id number]          2020 11:33:00 AM EST [facility] ([facility])









          Name      Value     Range     Interpretation Code Description Data Milly

rce(s) Supporting 

Document(s)

 

           INR in Platelet poor plasma by Coagulation assay 2.2                 

                        [facility] ([facility])                              









                    ID                  Date                Data Source

 

                    [id number]          2020 11:32:00 AM EST [facility] ([facility])









          Name      Value     Range     Interpretation Code Description Data Milly

rce(s) Supporting 

Document(s)

 

           INR in Platelet poor plasma by Coagulation assay 1.9                 

                        [facility] ([facility]                              









                    ID                  Date                Data Source

 

                    [id number]          2020 11:31:00 AM EST [facility] ([facility])









          Name      Value     Range     Interpretation Code Description Data Milly

rce(s) Supporting 

Document(s)

 

           INR in Platelet poor plasma by Coagulation assay 1.7                 

                        [facility] ([facility]                              









                    ID                  Date                Data Source

 

                    [id number]          2020 01:15:00 PM EST [facility] ([facility])









          Name      Value     Range     Interpretation Code Description Data Milly

rce(s) Supporting 

Document(s)

 

           INR in Platelet poor plasma by Coagulation assay 1.8                 

                        [facility] ([facility]                              









                    ID                  Date                Data Source

 

                    [id number]          2020 11:41:00 AM EST [facility] ([facility])









          Name      Value     Range     Interpretation Code Description Data Milly

rce(s) Supporting 

Document(s)

 

                          Thyrotropin [Units/volume] in Serum or Plasma by Detec

tion limit <= 0.05 mIU/L 

1.37 uIU/mL  0.36-3.74                              [facility] ([facility]

)  

 

           Thyroxine (T4) free [Mass/volume] in Serum or Plasma 1.32 ng/dL 0.76-

1.46                        

[facility] ([facility])            









                    ID                  Date                Data Source

 

                    [id number]          2020 11:41:00 AM EST [facility] ([facility])









          Name      Value     Range     Interpretation Code Description Data Milly

rce(s) Supporting 

Document(s)

 

           Triglyceride [Mass/volume] in Serum or Plasma 63 mg/dL         

                     [facility] 

([facility])                   

 

           Cholesterol [Mass/volume] in Serum or Plasma 130 mg/dL  131-200      

                    [facility] 

([facility])                   

 

                    Cholesterol in LDL [Mass/volume] in Serum or Plasma by calcu

lation 59 CALC             

                                          [facility] ([facility])  

 

           Cholesterol in HDL [Mass/volume] in Serum or Plasma 58 mg/dL   35-60 

                           [facility] 

([facility])                   









                    ID                  Date                Data Source

 

                    [id number]          2020 11:41:00 AM EST [facility] ([facility])









          Name      Value     Range     Interpretation Code Description Data Milly

rce(s) Supporting 

Document(s)

 

          Magnesium 2.1 mg/dL 1.8-2.4                       [facility] ([place] [facility])  









                    ID                  Date                Data Source

 

                    [id number]          2020 11:41:00 AM EST [facility] ([facility])









          Name      Value     Range     Interpretation Code Description Data Milly

rce(s) Supporting 

Document(s)

 

           Glucose [Mass/volume] in Serum or Plasma 90 mg/dL   74-99            

                [facility] ([facility]                              

 

                                        100-125 mg/dL     PRE-DIABETES/FASTING

>126 mg/dL          DIABETES/FASTING

 

 

          Creatinine 0.8 mg/dL 0.6-1.3                       [facility] ([facility]

[facility])  

 

           Urea nitrogen [Mass/volume] in Serum or Plasma 17 mg/dL   7-18       

                      [facility] 

([facility])                   

 

           Carbon dioxide, total [Moles/volume] in Serum or Plasma 28 meq/L   21

-32                            

[facility] ([facility])            

 

           Chloride [Moles/volume] in Serum or Plasma 104 meq/L           

                  [facility] 

([facility])                   

 

           Sodium [Moles/volume] in Serum or Plasma 142 meq/L  136-145          

                [facility] ([facility]                             

 

           Potassium [Moles/volume] in Serum or Plasma 4.9 meq/L  3.5-5.1       

                   [facility] 

([facility])                   

 

           Calcium [Mass/volume] in Serum or Plasma 10.7 mg/dL 8.5-10.1         

                [facility] 

([facility])                   

 

                                        NOTE:

RESULT VERIFIED.





 

 

          Total Bilirubin 0.5 mg/dL 0.2-1.0                       [facility] ([facility])  

 

                    Alkaline phosphatase isoenzyme [Units/volume] in Serum or Pl

asma 82 mg/dL            

                                                [facility] ([facility])  

 

                          Aspartate aminotransferase [Enzymatic activity/volume]

 in Serum or Plasma 23 U/L

             15-37                                  [facility] ([facility]

)  

 

           Albumin [Mass/volume] in Serum or Plasma 3.3 g/dL   3.4-5.0          

                [facility] ([facility])                              

 

                                        NOTE:

RESULT VERIFIED.





 

 

                    Alanine aminotransferase [Enzymatic activity/volume] in Seru

m or Plasma 20 U/L              

12-78                                           [facility] ([facility])  

 

                                        Glomerular filtration rate/1.73 sq M pre

dicted among blacks [Volume Rate/Area] 

in Serum or Plasma by Creatinine-based formula (MDRD) Laboratory test result    

                  

                                        [facility] ([facility])  

 

                                        <content>CHRONIC KIDNEY DISEASE STAGING 

PER 

NKF</content><br/><content></content><br/><content>STAGE I & II      GFR >= 60  
     NORMAL TO MILDLY DECREASED</content><br/><content>STAGE III          GFR 
30-59          MODERATELY DECREASED</content><br/><content>STAGE IV           
GFR 15-29         SEVERELY DECREASED</content><br/><content>STAGE V            
GFR <15            VERY LITTLE GFR LEFT</content><br/><content>ESRD             
   GFR <15            ON RRT</content><br/><content></content> 

 

                                        Glomerular filtration rate/1.73 sq M pre

dicted among non-blacks [Volume 

Rate/Area] in Serum or Plasma by Creatinine-based formula (MDRD) Laboratory test

result                                              [facility] ([facility]

)  

 

           Proteinase 3 Ab [Units/volume] in Serum 8.3 g/dL   6.4-8.2           

               [facility] ([facility]                              

 

                                        NOTE:

RESULT VERIFIED.





 

 

          A/G Ratio 0.66 CALC 1.00-1.90                     [facility]  









                    ID                  Date                Data Source

 

                    [id number]          2020 11:40:00 AM EST [facility] ([facility])









          Name      Value     Range     Interpretation Code Description Data Milly

rce(s) Supporting 

Document(s)

 

           Parathyrin.intact [Mass/volume] in Serum or Plasma 26.6 pg/mL 18.5-88

.0                        

[facility] ([facility])            









                    ID                  Date                Data Source

 

                    [id number]          2020 11:40:00 AM EST [facility] ([facility])









          Name      Value     Range     Interpretation Code Description Data Milly

rce(s) Supporting 

Document(s)

 

           Calcidiol [Mass/volume] in Serum or Plasma 33.5       24.0-80.0      

                  [facility] ([facility]                             

 

                                        This test was performed using [employer] I

P Vitamin D immunoassay kit.  Values 

obtained with different assay methods should not be used interchangeably.

 









                    ID                  Date                Data Source

 

                    [id number]          2020 10:40:00 [facility]









          Name      Value     Range     Interpretation Code Description Data Milly

rce(s) Supporting 

Document(s)

 

           INR in Platelet poor plasma by Coagulation assay 3.2                 

                        [facility] ([facility]                              









                    ID                  Date                Data Source

 

                    [id number]          2020 11:19:00 AM EST [facility] ([facility])









          Name      Value     Range     Interpretation Code Description Data Milly

rce(s) Supporting 

Document(s)

 

           INR in Platelet poor plasma by Coagulation assay 3.1                 

                        [facility] ([facility])                              









                    ID                  Date                Data Source

 

                    [id number]          2019 02:33:00 PM EST [facility] ([facility])









          Name      Value     Range     Interpretation Code Description Data Milly

rce(s) Supporting 

Document(s)

 

           INR in Platelet poor plasma by Coagulation assay 1.6                 

                        [facility] ([facility])                              







                                        Procedure

 

                                          



                                                                                
                                                                                
                                                                                
                                                                                
                                                                                
                                                                                
                                                                                
                                                                                
                                                                                
                                                                    



Vital Signs

          



                    ID                  Date                Data Source

 

                    UNK                                      









           Name       Value      Range      Interpretation Code Description Data

 Source(s)

 

           Body mass index (BMI) [Ratio] 35.7 kg/m2                       35.7 k

g/m2 [facility] ([facility])

 

           Body weight 221.00 [lb_av]                       221.00 [lb_av] [facility]

T ([facility])

 

           Body height 66 [in_i]                        66 [in_i]  [facility] ([place]

[facility]

 

                                        5'6" 

 

           Heart rate 60 /min                          60 /min    [facility] ([facility])

 

           Diastolic blood pressure 70 mm[Hg]                        70 mm[Hg]  

[facility] ([facility])

 

                                        RT Arm 

 

           Systolic blood pressure 148 mm[Hg]                       148 mm[Hg] [facility] ([facility])

 

                                        RT Arm 

 

           Body weight 226.00 [lb_av]                       226.00 [lb_av] [facility]

 

           Body mass index (BMI) [Ratio] 36.5 kg/m2                       36.5 k

g/m2 [facility] ([facility])

 

           Body weight 226.00 [lb_av]                       226.00 [lb_av] [facility] ([facility]

 

           Body height 66 [in_i]                        66 [in_i]  [facility] ([place]

[facility]

 

                                        5'6" 

 

           Diastolic blood pressure 90 mm[Hg]                        90 mm[Hg]  

[facility] ([facility])

 

           Systolic blood pressure 138 mm[Hg]                       138 mm[Hg] [facility] ([facility])

 

           Body weight 217.00 [lb_av]                       217.00 [lb_av] [facility]

T ([facility])

 

           Body weight 220.00 [lb_av]                       220.00 [lb_av] [facility]

T ([facility])

 

           Body weight 216.00 [lb_av]                       216.00 [lb_av] [facility]

T ([facility])

 

           Body weight 222.00 [lb_av]                       222.00 [lb_av] [facility]

T ([facility])

 

           Body mass index (BMI) [Ratio] 37.6 kg/m2                       37.6 k

g/m2 [facility]

 

           Body weight 222.50 [lb_av]                       222.50 [lb_av] [facility]

T ([facility])

 

           Body height 64.50 [in_i]                       64.50 [in_i] [facility] ([facility])

 

                                        5'4.50" 

 

           Heart rate 76 /min                          76 /min    [facility]

 

           Diastolic blood pressure 90 mm[Hg]                        90 mm[Hg]  

[facility] ([facility])

 

                                        LT Arm 

 

           Systolic blood pressure 132 mm[Hg]                       132 mm[Hg] [facility] ([facility])

 

                                        LT Arm 

 

           Diastolic blood pressure--sitting 74 mm[Hg]                        74

 mm[Hg]  [facility] ([facility])

 

                                        large cuff, Ra 

 

           Systolic blood pressure--sitting 126 mm[Hg]                       126

 mm[Hg] [facility] ([facility])

 

                                        large cuff, Ra 

 

           Heart rate 67 /min                          67 /min    [facility]

 

           Body mass index (BMI) [Ratio] 38.1 kg/m2                       38.1 k

g/m2 [facility]

 

           Body height 65 [in_i]                        65 [in_i]  [facility]

[facility])

 

                                        5'5" 

 

           Body weight 229.00 [lb_av]                       229.00 [lb_av] [facility]

T ([facility])

 

           Oxygen saturation in Arterial blood by Pulse oximetry 98 %           

                  98 %       [facility] 

([facility]

 

           Body weight 231.00 [lb_av]                       231.00 [lb_av] [facility]

T ([facility])

 

           Heart rate 69 /min                          69 /min    [facility]

 

           Diastolic blood pressure 72 mm[Hg]                        72 mm[Hg]  

[facility]

 

           Systolic blood pressure 138 mm[Hg]                       138 mm[Hg] [facility] ([facility])

 

           Body weight 231.00 [lb_av]                       231.00 [lb_av] [facility]

T ([facility])

 

           Body mass index (BMI) [Ratio] 38.4 kg/m2                       38.4 k

g/m2 [facility] ([facility])

 

           Body weight 227.12 [lb_av]                       227.12 [lb_av] [facility]

T ([facility])

 

           Body height 64.50 [in_i]                       64.50 [in_i] [facility] ([facility]

[facility])

 

                                        5'4.50" 

 

           Heart rate 68 /min                          68 /min    [facility] ([facility])

 

           Diastolic blood pressure 56 mm[Hg]                        56 mm[Hg]  

[facility] ([facility])

 

                                        RT Arm 

 

           Systolic blood pressure 114 mm[Hg]                       114 mm[Hg] M

[facility]

 

                                        RT Arm 

 

           Body mass index (BMI) [Ratio] 38.5 kg/m2                       38.5 k

g/m2 [facility] ([facility]

 

           Body weight 228.00 [lb_av]                       228.00 [lb_av] [facility]

T ([facility])

 

           Body height 64.50 [in_i]                       64.50 [in_i] [facility] ([facility]

[facility])

 

                                        5'4.50" 

 

           Body mass index (BMI) [Ratio] 39.3 kg/m2                       39.3 k

g/m2 [facility] ([facility])

 

           Body weight 232.38 [lb_av]                       232.38 [lb_av] [facility]

T ([facility])

 

           Body height 64.50 [in_i]                       64.50 [in_i] [facility] ([facility]

[facility])

 

                                        5'4.50" 

 

           Heart rate 56 /min                          56 /min    [facility] ([facility])

 

           Diastolic blood pressure 80 mm[Hg]                        80 mm[Hg]  

[facility]

 

                                        RT Arm 

 

           Systolic blood pressure 122 mm[Hg]                       122 mm[Hg] M

[facility]

 

                                        RT Arm 

 

           Diastolic blood pressure--supine 78 mm[Hg]                        78 

mm[Hg]  [facility] ([facility]

 

           Systolic blood pressure--supine 144 mm[Hg]                       144 

mm[Hg] [facility] ([facility]

 

           Diastolic blood pressure--sitting 76 mm[Hg]                        76

 mm[Hg]  [facility] ([facility])

 

                                        large cuff, Ra 

 

           Systolic blood pressure--sitting 142 mm[Hg]                       142

 mm[Hg] [facility] ([facility])

 

                                        large cuff, Ra 

 

           Respiratory rate 18 /min                          18 /min    [facility] (

[facility])

 

           Heart rate 66 /min                          66 /min    [facility] ([facility])

 

                                        regular with occasional irregularity 

 

           Body mass index (BMI) [Ratio] 38.1 kg/m2                       38.1 k

g/m2 [facility] ([facility])

 

           Body height 65 [in_i]                        65 [in_i]  [facility] ([facility]

[facility])

 

                                        5'5" 

 

           Body weight 229.00 [lb_av]                       229.00 [lb_av] [facility]

T ([facility])

 

           Body weight 229.00 [lb_av]                       229.00 [lb_av] [facility]

T ([facility])

 

           Body weight 227.00 [lb_av]                       227.00 [lb_av] [facility]

T ([facility])

 

           Body weight 231.00 [lb_av]                       231.00 [lb_av] [facility]

T ([facility])

 

           Heart rate 66 /min                          66 /min    [facility] ([facility])

 

           Diastolic blood pressure 80 mm[Hg]                        80 mm[Hg]  

[facility] ([facility])

 

           Systolic blood pressure 122 mm[Hg]                       122 mm[Hg] M

[facility] ([facility])

 

           Body weight 233.00 [lb_av]                       233.00 [lb_av] [facility]

T ([facility])

## 2021-02-22 NOTE — REP
INDICATION:

R/O DVT



COMPARISON:

None.



TECHNIQUE:

Real time compression and duplex Doppler interrogation of the left lower extremity

deep venous system is performed.



FINDINGS:

The left common femoral, superficial femoral and popliteal veins are fully

compressible with transducer pressure and demonstrate normal spontaneous and phasic

flow, without evidence of deep venous thrombosis.



IMPRESSION:

No evidence of deep venous thrombosis of the left lower extremity femoral popliteal

venous system.





<Electronically signed by Madhu Victor > 02/22/21 0822

## 2021-02-22 NOTE — CCD
Continuity of Care Document (CCD)

                             Created on: 2021



Juany Becerril

External Reference #: [id number]

: 1955

Sex: Female



Demographics





                          Address                   [address]

[place]  [address]

 

                          Home Phone                [phone]

 

                          Preferred Language        Unknown

 

                          Marital Status            Unknown

 

                          Taoism Affiliation     Unknown

 

                          Race                      White

 

                          Ethnic Group              Not  or 





Author





                          Author                    Juany Regalado M.D.

 

                          Organization              Unknown

 

                          Address                   [address] [facility] [address]

[place]  [address]



 

                          Phone                     [phone]







Care Team Providers





                    Care Team Member Name Role                Phone

 

                    Yuri Maier MD       AUTM                [phone]

 

                    [facility]  AUTM                [phone]

 

                    John Lutz MD AUTM                Unavailable







Problems





                    Active Problems     Provider            Date

 

                    Chronic atrial fibrillation Protime             Onset: 

 

                    Essential hypertension Juany Vincent FNP Onset: 2017

 

                    Deep venous thrombosis of lower extremity Juany Vincent FN

P Onset: 2017

 

                    Pure hypercholesterolemia Juany Vincent FNP Onset: 

017

 

                    Anxiety state       Juany Vincent FNP Onset: 2017

 

                    Chronic pulmonary embolism Juany Vincent FNP Onset: 2017

 

                    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 

2017

 

                    Gastroesophageal reflux disease Juany Vincent FNP Onset: 1

2017

 

                    Scoliosis deformity of spine Juany Vincent FNP Onset: 2017

 

                    Chronic tension-type headache Juany Vincent FNP Onset: 2017

 

                    Varicose veins of lower extremity Juany Vincent FNP Onset:

 2017

 

                    Tobacco user        Juany Vincent FNP Onset: 2017

 

                    Hypercalcemia       Juany Vincent FNP Onset: 2017







Social History





                Type            Date            Description     Comments

 

                Birth Sex                       Unknown          

 

                ETOH Use                        Consumes 3 glasses of wine per w

[place]  

 

                Tobacco Use     Start: Unknown  Patient is a current smoker, smo

kes every day STARTED

AGE 30 1-3 CIGARETTES A DAY 







Allergies, Adverse Reactions, Alerts





             Active Allergies Reaction     Severity     Comments     Date

 

             Ibuprofen    STOMACH UPSET HEADACHE              motrin       10/17

/2017

 

             Latex        rash, itches Mild                      10/17/2018







Medications





           Active Medications SIG        Qnty       Indications Ordering Provide

r Date

 

                                        Bupropion Hydrochloride ER (XL)         

            150mg Tablets ER 24HR       

                take 1 tablet by mouth in the morning 30tabs                    

      Charmaine Regalado M.D.                                    2021

 

                          Eliquis                     5mg Tablets               

    take one tablet by 

mouth twice a day 60tabs                          Charmaine Regalado M.D. 2020

 

                          Furosemide                     20mg Tablets           

        1 by mouth every 

am              90tabs                          Charmaine Regalado M.D. 20

20

 

                          Methimazole                     5mg Tablets           

        1 by mouth every 

day except m w f take 2 40tabs                          Charmaine Regalado M.D. 

2020

 

                          Shingrix                     50mcg/0.5ML Suspension Re

c                   

administer at pharmacy 1units                          Juany Vincent FNP 

 

                          Famotidine                     40mg Tablets           

        1 by mouth every 

day             30tabs                          Juany Vincent FNP 2019

 

                          Voltaren                     1% Gel                   

apply up to 4 grams three 

times a day to affected knee as needed 100gm                           ARDEN Vincent FNP 2018

 

                          Flecainide Acetate                     100mg Tablets  

                 Take One 

Tablet By Mouth Twice A Day 180tabs                         Juany Vincent FNP 

10/17/2017

 

                          Atorvastatin Calcium                     20mg Tablets 

                  take one

tablet by mouth every day 90tabs                          Jauny Vincent FNP 10

/

 

                          Metoprolol Tartrate                     25mg Tablets  

                 Take One 

Tablet By Mouth Twice A Day 180tabs                         Juany Vincent FNP 

10/17/2017

 

                                        Womens 50+ Multi Vitamin & Mineral Formu

la                      Tablets         

             1 every day PO Vit V=1867Fr GI=517                           Juany Vincent FNP 10/17/2017

 

                                        Calcium 600/Vitamin D3                  

   600-800mg-Unit Tablets               

             1 by mouth qd                           Juany Vincent FNP 10/17/2

017

 

                          Mucus Relief DM                     20-400mg Tablets  

                 1 Q 4HRS 

prn                                             Juany Vincent FNP 10/17/2017

 

                                        Acetaminophen Extra Strength            

         500mg Tablets                  

             take 2 tabs every 6 hours as needed                           Juany Fung FNP 10/17/2017

 

                          Vitamin E                     400Unit Capsules        

           1 by mouth 

every day                                       Juany Vincent FNP 10/17/2017

 

                          Aspirin Adult Low Dose                     81mg Tablet

s                    1 

by mouth every day                                 Juany Vincent FNP 10/17/201

7

 

             Digoxin                     250mcg Tablets                   1 po q

d                                Unknown

                                        







Medications Administered in Office





           Medication SIG        Qnty       Indications Ordering Provider Date

 

                          Administration Of Flu Vaccine                      Inj

ection                    

                                                Charmaine Regalado M.D. 10/15/20

20

 

                          Administration Of Flu Vaccine                      Inj

ection                    

                                                Juany Vincent FNP 10/17/2019

 

                          Administration Of Flu Vaccine                      Inj

ection                    

                                                Juany Vincent FNP 10/17/2018

 

                          Administration Of Flu Vaccine                      Inj

ection                    

                                                Juany Vincent FNP 10/17/2017







Immunizations





             CPT Code     Status       Date         Vaccine      Lot #

 

                [id number]           Given           10/15/2020      Influenza Vaccin

e Quadrivalent Preser/Antibiotic Free Im 

Use                                     [id number]

 

                [id number]           Given           2020      Shingrix Zoster 

Vaccine (HZV), Recombinant, Subunit, 

Adjuvanted                               

 

                [id number]           Given           10/17/2019      Influenza Vaccin

e Quadrivalent Preser/Antibiotic Free Im 

Use                                     [id number]

 

                [id number]           Given           10/17/2018      Influenza Virus 

Vaccine, Quadrivalent (Cciiv4), Derived 

From Cell                               [id number]

 

             42298        Given        2018   Pneumovax 23 [id number]

 

                30136           Given           10/17/2017      Influenza Vaccin

e Quadrivalent Preser/Antibiotic Free Im 

Use                                     [id number]







Vital Signs





                Date            Vital           Result          Comment

 

                2021  1:08pm BP Systolic     148 mmHg        RT Arm

 

                    BP Diastolic        70 mmHg             RT Arm

 

                    Heart Rate          60 /min              

 

                    Height              66 inches           5'6"

 

                    Weight              221.00 lb            

 

                    BMI (Body Mass Index) 35.7 kg/m2           

 

                2020 11:19am Weight          226.00 lb        







Results





        Test    Acquired Date Facility Test    Result  H/L     Range   Note

 

                    Laboratory test finding 2021          [facility]

pia meza

: Dr Simon Hoyt

[place] [id number] [phone] TSH        <pending>                         

 

                    Prothrombin Time/Inr 2020          [facility]

enter

                                        [address] [place] [address]

           [phone] Prothrombin Time 58.4 seconds High       12.5-14.3   

 

             Inr          6.49         Critical high              1

 

                    Complete Blood Count 2020          [facility]

s, pc

: Dr Simon Hoyt

[place] [address]

           [phone] WBC        6.0 x10*3/UL            4.1 - 10.9  

 

             RBC          4.11 x10*6/UL Low          4.20 - 6.30   

 

             Hemoglobin   13.0 g/dL                 12.0 - 18.0   

 

             Hematocrit   37.5 %                    37.0 - 51.0   

 

             MCV          91.1 fL                   80.0 - 97.0   

 

             MCH          31.6 pg                   26.0 - 32.0   

 

             MCHC         34.7 g/dL                 31.0 - 38.0   

 

             RDW          14.2 %       High         11.6 - 13.7   

 

             PLT          243 x10*3/UL              140 - 440     

 

             MPV          8.3 FL                    7.8 - 11.0    

 

             Lymph %      21.2 %                    10.0 - 58.5   

 

             Mid %        5.8 %                     1.7 - 9.3     

 

             Neut %       73.0 %                    37.0 - 92.0   

 

             Lymph #      1.2 x10*3/UL              0.6 - 4.1     

 

             Mid #        0.4 x10*3/UL              0.1 - 0.6     

 

             Neut #       4.4 x10*3/UL              2.0 - 7.8     

 

                    Laboratory test finding 2020          [facility]

             Inr          7.9                                     

 

                    Basic Metabolic Panel 2020          [facility]

[facility]

: Dr Simon Hoyt

[place] [address]

           [phone] Glucose    100 mg/dL  High       74 - 99    2

 

             BUN          17 mg/dL                  7 - 18        

 

             Creatinine   0.7 mg/dL                 0.6 - 1.3     

 

             Sodium       141 mEq/L                 136 - 145     

 

             Potassium    4.4 mEq/L                 3.5 - 5.1     

 

             Chloride     103 mEq/L                 98 - 107      

 

             Carbon Dioxide 24 mEq/L                  21 - 32       

 

             Calcium      10.2 mg/dL   High         8.5 - 10.1   3

 

             GFR  >= 60 mL/min              >60           

 

             GFR African American >= 60 mL/min              >60          4

 

                    Laboratory test finding 2020          [facility]

             Inr          3.7                                     

 

                    Laboratory test finding 10/29/2020          [facility]

der[facility] 

: Dr Simon Hoyt

[place] [address] [phone] T4 Free    1.33 ng/dL            0.76 - 1.46  

 

                    Ua Dipstick Only    [facility]29/2020          [facility]

, 

: Dr Simon Hoyt

[place] [address] [phone] Urine Color YELLOW                Yellow      

 

             Urine Appearance CLEAR                     Clear         

 

             Urine PH     6.5 units                 5.0 - 9.0     

 

             Urine Specific Gravity 1.010                     1.005 - 1.030  

 

             Urine Leukocytes NEGATIVE                  Negative      

 

             Urine Blood  NEGATIVE                  Negative      

 

             Urine Protein TRACE                     Negative -Trace  

 

             Urine Glucose NEGATIVE mg/dL              Negative      

 

             Urine Nitrite NEGATIVE                  Negative      

 

             Urine Ketone NEGATIVE mg/dL              Negative      

 

             Urine Bilirubin NEGATIVE                  Negative      

 

             Urine Urobilinogen 0.2 mg/dL                 0.2 - 1.0     

 

                    Laboratory test finding 10/29/2020          [facility]

der[facility] 

: Dr Simon Hoyt

[place] [address] [phone] Thyroid Stimulating Hormone 0.16 uIU/mL Low        0.3

6 - 3.74  

 

                    Basic Metabolic Panel 10/29/2020          [facility]

ts, pc

: Dr Simon Hoyt

[place] [address] [phone] Glucose    101 mg/dL  High       74 - 99    5

 

             BUN          17 mg/dL                  7 - 18        

 

             Creatinine   0.7 mg/dL                 0.6 - 1.3     

 

             Sodium       143 mEq/L                 136 - 145     

 

             Potassium    4.0 mEq/L                 3.5 - 5.1     

 

             Chloride     104 mEq/L                 98 - 107      

 

             Carbon Dioxide 30 mEq/L                  21 - 32       

 

             Calcium      10.7 mg/dL   High         8.5 - 10.1   6

 

             GFR  >= 60 mL/min              >60           

 

             GFR African American >= 60 mL/min              >60          7

 

                    Laboratory test finding 10/29/2020          [facility]

[facility] 

: Dr Simon Hoyt

[place] [address] [phone] Magnesium  2.1 mg/dL             1.8 - 2.4   

 

                    Complete Blood Count 10/29/2020          [facility]

s, [facility]

: [place] [address] [phone] WBC        6.2 x10*3/UL            4.1 - 10.9  

 

             RBC          4.36 x10*6/UL              4.20 - 6.30   

 

             Hemoglobin   13.8 g/dL                 12.0 - 18.0   

 

             Hematocrit   40.7 %                    37.0 - 51.0   

 

             MCV          93.2 fL                   80.0 - 97.0   

 

             MCH          31.6 pg                   26.0 - 32.0   

 

             MCHC         34.0 g/dL                 31.0 - 38.0   

 

             RDW          14.5 %       High         11.6 - 13.7   

 

             PLT          236 x10*3/UL              140 - 440     

 

             MPV          7.8 FL                    7.8 - 11.0    

 

             Lymph %      19.8 %                    10.0 - 58.5   

 

             Mid %        5.7 %                     1.7 - 9.3     

 

             Neut %       74.5 %                    37.0 - 92.0   

 

             Lymph #      1.2 x10*3/UL              0.6 - 4.1     

 

             Mid #        0.4 x10*3/UL              0.1 - 0.6     

 

             Neut #       4.6 x10*3/UL              2.0 - 7.8     

 

                    Laboratory test finding 10/29/2020          [facility]

                                        [address] [place] [address]

           [phone] Digoxin Level 0.6 NG/ML  Normal     0.5-2.0     

 

                    Laboratory test finding 10/29/2020          [facility]

             Inr          2.7                                     

 

                    Laboratory test finding 10/26/2020          [facility]

             Inr          1.2                                     

 

                    Laboratory test finding 10/22/2020          [facility]

             Inr          7.2                                     

 

                    Complete Blood Count 10/22/2020          [facility]

s, pc

: Dr Simon Hoyt

[place] [address]

           [phone] WBC        6.6 x10*3/UL            4.1 - 10.9  

 

             RBC          4.37 x10*6/UL              4.20 - 6.30   

 

             Hemoglobin   13.7 g/dL                 12.0 - 18.0   

 

             Hematocrit   40.2 %                    37.0 - 51.0   

 

             MCV          91.9 fL                   80.0 - 97.0   

 

             MCH          31.4 pg                   26.0 - 32.0   

 

             MCHC         34.2 g/dL                 31.0 - 38.0   

 

             RDW          13.7 %                    11.6 - 13.7   

 

             PLT          251 x10*3/UL              140 - 440     

 

             MPV          8.3 FL                    7.8 - 11.0    

 

             Lymph %      16.8 %                    10.0 - 58.5   

 

             Mid %        4.7 %                     1.7 - 9.3     

 

             Neut %       78.5 %                    37.0 - 92.0   

 

             Lymph #      1.1 x10*3/UL              0.6 - 4.1     

 

             Mid #        0.3 x10*3/UL              0.1 - 0.6     

 

             Neut #       5.2 x10*3/UL              2.0 - 7.8     

 

                    Prothrombin Time/Inr 10/22/2020          [facility]

enter

                                        [address] [place] [address] [phone] Prothrombin Time 49.4 seconds High       12.5-14.3   

 

             Inr          5.24         Critical high              8

 

                    Laboratory test finding 10/15/2020          [facility]

             Inr          1.3                                     

 

                    Laboratory test finding 2020          [facility]

             Inr          3.7                                     

 

                    Laboratory test finding 2020          [facility]

             Inr          1.3                                     







                          1                         THERAPUTIC HUMAN INR VALUES

INDICATIONS                      NORMAL RANGES

PROPHYLAXIS/TREATMENT OF:

VENOUS THROMBOSIS                2.0-3.0

PULMONARY EMBOLISM               2.0-3.0

PREVENTION OF SYSTEMIC EMBOLISM FROM:

TISSUE HEART VALVES              2.0-3.0

ACUTE MYOCARDIAL INFARCTION      2.0-3.0

VALVULAR HEART DISEASE           2.0-3.0

ATRIAL FIBRILLATION              2.0-3.0

MECHANICAL VALVES(HIGH RISK)     2.5-3.5

RECURRENT MYOCARDIAL INFARCTION  2.5-3.5



 

                          2                         100-125 mg/dL     PRE-DIABET

ES/FASTING

>126 mg/dL          DIABETES/FASTING



 

                          3                         NOTE:

RESULT VERIFIED.







 

                          4                         CHRONIC KIDNEY DISEASE STAGI

NG PER NKF



STAGE I & II      GFR >= 60        NORMAL TO MILDLY DECREASED

STAGE III          GFR 30-59          MODERATELY DECREASED

STAGE IV           GFR 15-29         SEVERELY DECREASED

STAGE V            GFR <15            VERY LITTLE GFR LEFT

ESRD                 GFR <15            ON RRT



 

                          5                         100-125 mg/dL     PRE-DIABET

ES/FASTING

>126 mg/dL          DIABETES/FASTING



 

                          6                         NOTE:

RESULT VERIFIED.







 

                          7                         CHRONIC KIDNEY DISEASE STAGI

NG PER NKF



STAGE I & II      GFR >= 60        NORMAL TO MILDLY DECREASED

STAGE III          GFR 30-59          MODERATELY DECREASED

STAGE IV           GFR 15-29         SEVERELY DECREASED

STAGE V            GFR <15            VERY LITTLE GFR LEFT

ESRD                 GFR <15            ON RRT



 

                          8                         THERAPUTIC HUMAN INR VALUES

INDICATIONS                      NORMAL RANGES

PROPHYLAXIS/TREATMENT OF:

VENOUS THROMBOSIS                2.0-3.0

PULMONARY EMBOLISM               2.0-3.0

PREVENTION OF SYSTEMIC EMBOLISM FROM:

TISSUE HEART VALVES              2.0-3.0

ACUTE MYOCARDIAL INFARCTION      2.0-3.0

VALVULAR HEART DISEASE           2.0-3.0

ATRIAL FIBRILLATION              2.0-3.0

MECHANICAL VALVES(HIGH RISK)     2.5-3.5

RECURRENT MYOCARDIAL INFARCTION  2.5-3.5









Procedures





                Date            Code            Description     Status

 

                    10/29/2020          50450               Brief Emotional/Beha

v Assessment W/ Scoring Doc Per Standard 

Inst                                    Completed







Medical Devices





                                        Description

 

                                        No Information Available







Encounters





           Type       Date       Location   Provider   Dx         Diagnosis

 

                Office Visit    10/29/2020 10:30a [facility] Neal Regalado M.D.                      I48.20                    Chronic atrial fibrillation,

 unspecified

 

                          Z79.01                    Long term (current) use of a

nticoagulants

 

                          F32.89                    Other specified depressive e

pisodes

 

                          I10                       Essential (primary) hyperten

darci

 

                          E05.90                    Thyrotoxicosis, unsp without

 thyrotoxic crisis or storm

 

                          K21.9                     Gastro-esophageal reflux dis

ease without esophagitis

 

                          I82.502                   Chronic embolism and thombos

 unsp deep veins of l low extrem

 

                          M19.90                    Unspecified osteoarthritis, 

unspecified site

 

                          F17.210                   Nicotine dependence, cigaret

svitlana, uncomplicated

 

                          J44.9                     Chronic obstructive pulmonar

y disease, unspecified

 

                          R32                       Unspecified urinary incontin

ence

 

                          G31.84                    Mild cognitive impairment, s

o stated







Assessments





                Date            Code            Description     Provider

 

                2020      I10             Essential (primary) hypertension

 Charmaine Regalado M.D.

 

                2020      K21.9           Gastro-esophageal reflux disease

 without esophagitis Charmaine Regalado M.D.

 

                2020      F17.210         Nicotine dependence, cigarettes,

 uncomplicated Charmaine Regalado M.D.

 

                2020      J44.9           Chronic obstructive pulmonary di

sease, unspecified Charmaine Regalado M.D.

 

                2020      Z51.81          Encounter for therapeutic drug l

evel monitoring VICKIE Martinez

 

                2020      Z51.81          Encounter for therapeutic drug l

evel monitoring Protime

 

                2020      I48.20          Chronic atrial fibrillation, uns

pecified VICKIE Martinez

 

                2020      I48.20          Chronic atrial fibrillation, uns

pecified Protime

 

                2020      Z79.01          Long term (current) use of antic

oagulants VICKIE Martinez

 

                2020      Z79.01          Long term (current) use of antic

oagulants Protime

 

                2020      I48.20          Chronic atrial fibrillation, uns

pecified Charmaine Regalado M.D.

 

                2020      I48.20          Chronic atrial fibrillation, uns

pecified Lab Schedule

 

                2020      I10             Essential (primary) hypertension

 Charmaine Regalado M.D.

 

                2020      I10             Essential (primary) hypertension

 Lab Schedule

 

                2020      Z51.81          Encounter for therapeutic drug l

evel monitoring Ashley Irwin, 

[facility]

 

                2020      Z51.81          Encounter for therapeutic drug l

evel monitoring Charmaine Regalado M.D.

 

                2020      Z51.81          Encounter for therapeutic drug l

evel monitoring Protime

 

                2020      I48.20          Chronic atrial fibrillation, uns

pecified Ashley Irwin, [facility]

 

                2020      I48.20          Chronic atrial fibrillation, uns

pecified Charmaine Regalado M.D.

 

                2020      Z51.81          Encounter for therapeutic drug l

evel monitoring Lab Schedule

 

                2020      Z79.01          Long term (current) use of antic

oagulants Ashley Irwin, [facility]

 

                2020      Z79.01          Long term (current) use of antic

oagulants Charmaine Regalado M.D.

 

                2020      I48.20          Chronic atrial fibrillation, uns

pecified Protime

 

                2020      I48.20          Chronic atrial fibrillation, uns

pecified Lab Schedule

 

                2020      Z79.01          Long term (current) use of antic

oagulants Lab Schedule

 

                2020      I10             Essential (primary) hypertension

 Charmaine Regalado M.D.

 

                2020      K21.9           Gastro-esophageal reflux disease

 without esophagitis Charmaine Regalado M.D.

 

                2020      F17.210         Nicotine dependence, cigarettes,

 uncomplicated Charmaine Regalado M.D.

 

                2020      J44.9           Chronic obstructive pulmonary di

sease, unspecified Charmaine Regalado M.D.

 

                2020      Z51.81          Encounter for therapeutic drug l

evel monitoring Ashley Irwin, 

[facility]

 

                2020      Z51.81          Encounter for therapeutic drug l

evel monitoring Protime

 

                2020      I48.20          Chronic atrial fibrillation, uns

pecified Ashley Irwin, [facility]

 

                2020      I48.20          Chronic atrial fibrillation, uns

pecified Protime

 

                2020      Z79.01          Long term (current) use of antic

oagulants Ashley Irwin, [facility]

 

                2020      Z79.01          Long term (current) use of antic

oagulants Protime

 

                10/29/2020      Z51.81          Encounter for therapeutic drug l

evel monitoring Ashley Irwin, 

[facility]

 

                10/29/2020      I48.20          Chronic atrial fibrillation, uns

pecified Charmaine Regalado M.D.

 

                10/29/2020      Z51.81          Encounter for therapeutic drug l

evel monitoring Protime

 

                10/29/2020      Z79.01          Long term (current) use of antic

oagulants Charmaine Regalado M.D.

 

                10/29/2020      I48.20          Chronic atrial fibrillation, uns

pecified Ashley Irwin, [facility]

 

                10/29/2020      F32.89          Other specified depressive episo

emil Charmaine Regalado M.D.

 

                10/29/2020      I10             Essential (primary) hypertension

 Charmaine Regalado M.D.

 

                10/29/2020      Z79.01          Long term (current) use of antic

oagulants Ashley Irwin, [facility]

 

                10/29/2020      E05.90          Thyrotoxicosis, unspecified with

out thyrotoxic crisis or sto 

Charmaine Regalado M.D.

 

                10/29/2020      I48.20          Chronic atrial fibrillation, uns

pecified Protime

 

                10/29/2020      K21.9           Gastro-esophageal reflux disease

 without esophagitis Charmaine Regalado M.D.

 

                    10/29/2020          I82.502             Chronic embolism and

 thrombosis of unspecified deep veins of 

left lower extremity                    Charmaine Regalado M.D.

 

                10/29/2020      Z79.01          Long term (current) use of antic

oagulants Protime

 

                10/29/2020      M19.90          Unspecified osteoarthritis, unsp

ecified site Charmaine Regalado M.D.

 

                10/29/2020      F17.210         Nicotine dependence, cigarettes,

 uncomplicated Charmaine Regalaod M.D.

 

                10/29/2020      J44.9           Chronic obstructive pulmonary di

sease, unspecified Charmaine Regalado M.D.

 

                10/29/2020      R32             Unspecified urinary incontinence

 Charmaine Regalado M.D.

 

                10/29/2020      G31.84          Mild cognitive impairment, so st

ated Charmaine Regalado M.D.

 

                10/26/2020      Z51.81          Encounter for therapeutic drug l

evel monitoring Ashley Irwin, 

[facility]

 

                10/26/2020      Z51.81          Encounter for therapeutic drug l

evel monitoring Protime

 

                10/26/2020      I48.20          Chronic atrial fibrillation, uns

pecified Ashley Irwin, [facility]

 

                10/26/2020      I48.20          Chronic atrial fibrillation, uns

pecified Protime

 

                10/26/2020      Z79.01          Long term (current) use of antic

oagulants Ashley Irwin, [facility]

 

                10/26/2020      Z79.01          Long term (current) use of antic

oagulants Protime

 

                10/23/2020      I10             Essential (primary) hypertension

 Charmaine Regalado M.D.

 

                10/23/2020      K21.9           Gastro-esophageal reflux disease

 without esophagitis Charmaine Regalado M.D.

 

                10/23/2020      F17.210         Nicotine dependence, cigarettes,

 uncomplicated Charmaine Regalado M.D.

 

                10/23/2020      J44.9           Chronic obstructive pulmonary di

sease, unspecified Charmaine Regalado M.D.

 

                10/22/2020      Z79.01          Long term (current) use of antic

oagulants Charmaine Regalado M.D.

 

                10/22/2020      Z79.01          Long term (current) use of antic

oagulants Lab Schedule

 

                10/22/2020      I48.20          Chronic atrial fibrillation, uns

pecminoo Regalado M.D.

 

                10/22/2020      Z51.81          Encounter for therapeutic drug l

evel monitoring Ashley Irwin, 

[facility]

 

                10/22/2020      I48.20          Chronic atrial fibrillation, uns

pecified Lab Schedule

 

                10/22/2020      Z51.81          Encounter for therapeutic drug l

evel monitoring Protime

 

                10/22/2020      I48.20          Chronic atrial fibrillation, uns

pecified Ashley Irwin [facility]

 

                10/22/2020      I48.20          Chronic atrial fibrillation, uns

pecified Protime

 

                10/22/2020      Z79.01          Long term (current) use of antic

oagulants Ashley Le [place], FNP

 

                10/22/2020      Z79.01          Long term (current) use of antic

oagulants Protime

 

                10/15/2020      Z51.81          Encounter for therapeutic drug l

evel monitoring Ashley Le [place], 

FNP

 

                10/15/2020      Z51.81          Encounter for therapeutic drug l

evel monitoring Protime

 

                10/15/2020      I48.20          Chronic atrial fibrillation, uns

pecified Ashley [place], FNP

 

                10/15/2020      I48.20          Chronic atrial fibrillation, uns

pecified Protime

 

                10/15/2020      Z79.01          Long term (current) use of antic

oagulants Ashley Le Cycl[place], [facility]

 

                10/15/2020      Z79.01          Long term (current) use of antic

oagulants Protime

 

                10/15/2020      Z23             Encounter for immunization Charmaine Regalado M.D.

 

                10/15/2020      Z23             Encounter for immunization Proti

me

 

                2020      I48.20          Chronic atrial fibrillation, uns

pecified Ashley [place], [facility]

 

                2020      I48.20          Chronic atrial fibrillation, uns

pecified Protime

 

                2020      Z79.01          Long term (current) use of antic

oagulants Ashley Le [place], [facility]

 

                2020      Z79.01          Long term (current) use of antic

oagulants Protime

 

                2020      Z51.81          Encounter for therapeutic drug l

evel monitoring Ashley [place], 

[facility]

 

                2020      Z51.81          Encounter for therapeutic drug l

evel monitoring Protime

 

                2020      I10             Essential (primary) hypertension

 Charmaine Regalado M.D.

 

                2020      F17.210         Nicotine dependence, cigarettes,

 uncomplicated Charmaine Regalado M.D.

 

                2020      E78.00          Pure hypercholesterolemia, unspe

cified Charmaine Regalado M.D.

 

                2020      I48.20          Chronic atrial fibrillation, uns

pecminoo Regalado M.D.

 

                2020      I48.20          Chronic atrial fibrillation, uns

pecified Ashley Irwin, FN

 

                2020      I48.20          Chronic atrial fibrillation, uns

pecified Protime

 

                2020      Z79.01          Long term (current) use of antic

oagulants Ashley Irwin, FN

 

                2020      Z79.01          Long term (current) use of antic

oagulants Protime

 

                2020      Z51.81          Encounter for therapeutic drug l

evel monitoring Ashley Irwin, 

[facility]

 

                2020      Z51.81          Encounter for therapeutic drug l

evel monitoring Protime

 

                2020      I10             Essential (primary) hypertension

 Charmaine Regalado M.D.

 

                2020      F17.210         Nicotine dependence, cigarettes,

 uncomplicated Charmaine Regalado M.D.

 

                2020      E78.00          Pure hypercholesterolemia, unspe

cified Charmaine Regalado M.D.

 

                2020      I48.20          Chronic atrial fibrillation, uns

pecified Charmaine Regalado M.D.







Plan of Treatment

10/29/2020 - Charmaine Regalado M.D.* I48.20 Chronic atrial fibrillation, 
  unspecified

* Z79.01 Long term (current) use of anticoagulants

* F32.89 Other specified depressive episodes

* I10 Essential (primary) hypertension

* E05.90 Thyrotoxicosis, unspecified without thyrotoxic crisis or sto

* K21.9 Gastro-esophageal reflux disease without esophagitis

* I82.502 Chronic embolism and thrombosis of unspecified deep veins of left 
  lower extremity

* M19.90 Unspecified osteoarthritis, unspecified site

* F17.210 Nicotine dependence, cigarettes, uncomplicated

* J44.9 Chronic obstructive pulmonary disease, unspecified

* R32 Unspecified urinary incontinence

* G31.84 Mild cognitive impairment, so stated

* All * Comments:* 13. Hyperlipidemia. Continues on Atorvastatin.14. Depression.
  Onset with loss of her , lack of support. 15. Health Maintenance. 
  Medicare Wellness paperwork reviewed. She is current smoker, Check list 
  reviewed. CAGE reviewed. PHQ9, Cognitive screen, [facility]. She has had the flu shot. She will update the tetanus with injury. I
  have encouraged her to get her second Shingrix shot. She has had a pneumonia 
  shot in 2018. Will need a Booster 5 years out. Also consider Prevnar. She has 
  never had a mammogram, bone density very remote. Both are ordered









Functional Status





                                        Description

 

                                        No Information Available







Mental Status





                                        Description

 

                                        No Information Available







Referrals





                Refer to      Reason for Referral Status          Appt Date

 

                Rosey Vidal MD CONSULT FOR URINARY INCONTINENCE  Sent      

      

 

                                        [facility]

 

                                        [address], [place] [address] [phone]

 

                                          

 

                [facility] CONSULT FOR RT TRIGGER THUMB  Sen

t            

 

                                        [address] [place]  [address] [phone]

## 2021-02-22 NOTE — CCD
Continuity of Care Document (CCD)

                             Created on: 2021



Juany Becerril

External Reference #: [id number]

: 1955

Sex: Female



Demographics





                          Address                   [address] [address]

[place]  [address]

 

                          Home Phone                [phone]

 

                          Preferred Language        Unknown

 

                          Marital Status            Unknown

 

                          Caodaism Affiliation     Unknown

 

                          Race                      White

 

                          Ethnic Group              Not  or 





Author





                          Author                    Juany Regalado M.D.

 

                          Organization              Unknown

 

                          Address                   [address] [facility] [address]

[place]  [address]



 

                          Phone                     [phone]







Care Team Providers





                    Care Team Member Name Role                Phone

 

                    Yuri Maier MD       AUTM                [phone]

 

                    [facility]  AUTM                [phone]

 

                    John Lutz MD AUTM                Unavailable







Problems





                    Active Problems     Provider            Date

 

                    Chronic atrial fibrillation Protime             Onset: 

 

                    Essential hypertension Juany Vincent FNP Onset: 2017

 

                    Deep venous thrombosis of lower extremity Juany Vincent FN

P Onset: 2017

 

                    Pure hypercholesterolemia Juany Vincent FNP Onset: 

017

 

                    Anxiety state       Juany Vincent FNP Onset: 2017

 

                    Chronic pulmonary embolism Juany Vincent FNP Onset: 2017

 

                    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 

2017

 

                    Gastroesophageal reflux disease Juany Vincent FNP Onset: 1

2017

 

                    Scoliosis deformity of spine Juany Vincent FNP Onset: 2017

 

                    Chronic tension-type headache Juany Vincent FNP Onset: 2017

 

                    Varicose veins of lower extremity Juany Vincent FNP Onset:

 2017

 

                    Tobacco user        Juany Vincent FNP Onset: 2017

 

                    Hypercalcemia       Juany Vincent FNP Onset: 2017







Social History





                Type            Date            Description     Comments

 

                Birth Sex                       Unknown          

 

                ETOH Use                        Consumes 3 glasses of wine per w

[place]  

 

                Tobacco Use     Start: Unknown  Patient is a current smoker, smo

kes every day STARTED

AGE 30 1-3 CIGARETTES A DAY 







Allergies, Adverse Reactions, Alerts





             Active Allergies Reaction     Severity     Comments     Date

 

             Ibuprofen    STOMACH UPSET HEADACHE              motrin       10/17

/2017

 

             Latex        rash, itches Mild                      10/17/2018







Medications





           Active Medications SIG        Qnty       Indications Ordering Provide

r Date

 

                                        Bupropion Hydrochloride ER (XL)         

            150mg Tablets ER 24HR       

                take 1 tablet by mouth in the morning 30tabs                    

      Charmaine Regalado M.D.                                    2021

 

                          Eliquis                     5mg Tablets               

    take one tablet by 

mouth twice a day 60tabs                          Charmaine Regalado M.D. 2020

 

                          Furosemide                     20mg Tablets           

        1 by mouth every 

am              90tabs                          Charmaine Regalado M.D. 20

20

 

                          Methimazole                     5mg Tablets           

        1 by mouth every 

day except m w f take 2 40tabs                          Charmaine Regalado M.D. 

2020

 

                          Shingrix                     50mcg/0.5ML Suspension Re

c                   

administer at pharmacy 1units                          Juany Vincent FNP 

 

                          Famotidine                     40mg Tablets           

        1 by mouth every 

day             30tabs                          Juany Vincent FNP 2019

 

                          Voltaren                     1% Gel                   

apply up to 4 grams three 

times a day to affected knee as needed 100gm                           ARDEN Vincent FNP 2018

 

                          Flecainide Acetate                     100mg Tablets  

                 Take One 

Tablet By Mouth Twice A Day 180tabs                         Juany Vincent FNP 

10/17/2017

 

                          Atorvastatin Calcium                     20mg Tablets 

                  take one

tablet by mouth every day 90tabs                          Juany Vincent FNP 10

/

 

                          Metoprolol Tartrate                     25mg Tablets  

                 Take One 

Tablet By Mouth Twice A Day 180tabs                         Juany Vincent FNP 

10/17/2017

 

                                        Womens 50+ Multi Vitamin & Mineral Formu

la                      Tablets         

             1 every day PO Vit S=1513Hv FX=607                           Juany Vincent FNP 10/17/2017

 

                                        Calcium 600/Vitamin D3                  

   600-800mg-Unit Tablets               

             1 by mouth qd                           Juany Vincent FNP 10/17/2

017

 

                          Mucus Relief DM                     20-400mg Tablets  

                 1 Q 4HRS 

prn                                             Juany Vincent FNP 10/17/2017

 

                                        Acetaminophen Extra Strength            

         500mg Tablets                  

             take 2 tabs every 6 hours as needed                           Juany Fung FNP 10/17/2017

 

                          Vitamin E                     400Unit Capsules        

           1 by mouth 

every day                                       Juany Vincent FNP 10/17/2017

 

                          Aspirin Adult Low Dose                     81mg Tablet

s                    1 

by mouth every day                                 Juany Vincent FNP 10/17/201

7

 

             Digoxin                     250mcg Tablets                   1 po q

d                                Unknown

                                        







Medications Administered in Office





           Medication SIG        Qnty       Indications Ordering Provider Date

 

                          Administration Of Flu Vaccine                      Inj

ection                    

                                                Charmaine Regalado M.D. 10/15/20

20

 

                          Administration Of Flu Vaccine                      Inj

ection                    

                                                Juany Vincent FNP 10/17/2019

 

                          Administration Of Flu Vaccine                      Inj

ection                    

                                                Juany Vincent FNP 10/17/2018

 

                          Administration Of Flu Vaccine                      Inj

ection                    

                                                Juany Vincent FNP 10/17/2017







Immunizations





             CPT Code     Status       Date         Vaccine      Lot #

 

                [id number]           Given           10/15/2020      Influenza Vaccin

e Quadrivalent Preser/Antibiotic Free Im 

Use                                     [id number]

 

                [id number]           Given           2020      Shingrix Zoster 

Vaccine (HZV), Recombinant, Subunit, 

Adjuvanted                               

 

                [id number]           Given           10/17/2019      Influenza Vaccin

e Quadrivalent Preser/Antibiotic Free Im 

Use                                     [id number]

 

                [id number]           Given           10/17/2018      Influenza Virus 

Vaccine, Quadrivalent (Cciiv4), Derived 

From Cell                               [id number]

 

             20289        Given        2018   Pneumovax 23 [id number]

 

                32170           Given           10/17/2017      Influenza Vaccin

e Quadrivalent Preser/Antibiotic Free Im 

Use                                     [id number]







Vital Signs





                Date            Vital           Result          Comment

 

                2021  1:08pm BP Systolic     148 mmHg        RT Arm

 

                    BP Diastolic        70 mmHg             RT Arm

 

                    Heart Rate          60 /min              

 

                    Height              66 inches           5'6"

 

                    Weight              221.00 lb            

 

                    BMI (Body Mass Index) 35.7 kg/m2           

 

                2020 11:19am Weight          226.00 lb        







Results





        Test    Acquired Date Facility Test    Result  H/L     Range   Note

 

                    Complete Blood Count 2021          [facility]

s, pc

: Dr Simon Hoyt

[place], [place] [id number] [phone] WBC        5.8 x10*3/UL            4.1 - 10.9  

 

             RBC          4.01 x10*6/UL Low          4.20 - 6.30   

 

             Hemoglobin   13.1 g/dL                 12.0 - 18.0   

 

             Hematocrit   37.5 %                    37.0 - 51.0   

 

             MCV          93.4 fL                   80.0 - 97.0   

 

             MCH          32.6 pg      High         26.0 - 32.0   

 

             MCHC         34.9 g/dL                 31.0 - 38.0   

 

             RDW          13.8 %       High         11.6 - 13.7   

 

             PLT          191 x10*3/UL              140 - 440     

 

             MPV          8.6 FL                    7.8 - 11.0    

 

             Lymph %      24.4 %                    10.0 - 58.5   

 

             Mid %        6.7 %                     1.7 - 9.3     

 

             Neut %       68.9 %                    37.0 - 92.0   

 

             Lymph #      1.4 x10*3/UL              0.6 - 4.1     

 

             Mid #        0.4 x10*3/UL              0.1 - 0.6     

 

             Neut #       4.0 x10*3/UL              2.0 - 7.8     

 

                    Basic Metabolic Panel 2021          [facility]

, pc

: Dr Simon Hoyt

[place] [address] [phone] Glucose    98 mg/dL              74 - 99    1

 

             BUN          49 mg/dL     High         7 - 18       2

 

             Creatinine   1.2 mg/dL                 0.6 - 1.3     

 

             Sodium       142 mEq/L                 136 - 145     

 

             Potassium    4.5 mEq/L                 3.5 - 5.1     

 

             Chloride     104 mEq/L                 98 - 107      

 

             Carbon Dioxide 23 mEq/L                  21 - 32       

 

             Calcium      10.6 mg/dL   High         8.5 - 10.1    

 

             GFR  45 mL/min    Low          >60           

 

             GFR African American 55 mL/min    Low          >60          3

 

                    Laboratory test finding 2021          [place] [facility]

[facility], 

: Dr Simon Hoyt

[place] [address] [phone] Thyroid Stimulating Hormone 0.54 uIU/mL            0.3

6 - 3.74  

 

                    Laboratory test finding 2021          [facility] [place] [facility]

[facility], 

: Dr Simon Hoyt

[place] [address] [phone] Vitamin D 25-Hydroxy 32.7                  24.0 - 80.0

 4

 

                    Laboratory test finding 2021          [facility]

                                        [address] [place] [address] [phone] PTH Intact 26.3 pg/mL Normal     18.5-88.0   

 

                    Prothrombin Time/Inr 2020          [facility]

enter

                                        [address] [place] [address] [phone] Prothrombin Time 58.4 seconds High       12.5-14.3   

 

             Inr          6.49         Critical high              5

 

                    Complete Blood Count 2020          [facility] [place] [facility]

s, pc

: Dr Simon Hoyt

[place] [address] [phone] WBC        6.0 x10*3/UL            4.1 - 10.9  

 

             RBC          4.11 x10*6/UL Low          4.20 - 6.30   

 

             Hemoglobin   13.0 g/dL                 12.0 - 18.0   

 

             Hematocrit   37.5 %                    37.0 - 51.0   

 

             MCV          91.1 fL                   80.0 - 97.0   

 

             MCH          31.6 pg                   26.0 - 32.0   

 

             MCHC         34.7 g/dL                 31.0 - 38.0   

 

             RDW          14.2 %       High         11.6 - 13.7   

 

             PLT          243 x10*3/UL              140 - 440     

 

             MPV          8.3 FL                    7.8 - 11.0    

 

             Lymph %      21.2 %                    10.0 - 58.5   

 

             Mid %        5.8 %                     1.7 - 9.3     

 

             Neut %       73.0 %                    37.0 - 92.0   

 

             Lymph #      1.2 x10*3/UL              0.6 - 4.1     

 

             Mid #        0.4 x10*3/UL              0.1 - 0.6     

 

             Neut #       4.4 x10*3/UL              2.0 - 7.8     

 

                    Laboratory test finding 2020          [facility]

             Inr          7.9                                     

 

                    Basic Metabolic Panel 2020          [facility] [place] [facility]

ts, pc

: Dr Simon Hoyt

[place] [address] [phone] Glucose    100 mg/dL  High       74 - 99    6

 

             BUN          17 mg/dL                  7 - 18        

 

             Creatinine   0.7 mg/dL                 0.6 - 1.3     

 

             Sodium       141 mEq/L                 136 - 145     

 

             Potassium    4.4 mEq/L                 3.5 - 5.1     

 

             Chloride     103 mEq/L                 98 - 107      

 

             Carbon Dioxide 24 mEq/L                  21 - 32       

 

             Calcium      10.2 mg/dL   High         8.5 - 10.1   7

 

             GFR  >= 60 mL/min              >60           

 

             GFR African American >= 60 mL/min              >60          8

 

                    Laboratory test finding 2020          [facility]

             Inr          3.7                                     

 

                    Laboratory test finding 10/29/2020          [facility], 

: Dr Simon Hoyt

[place] [id number] [phone] T4 Free    1.33 ng/dL            0.76 - 1.46  

 

                    Ua Dipstick Only    10/29/2020          [facility]

, 

: Dr Simon Hoyt

[place] [id number] [phone] Urine Color YELLOW                Yellow      

 

             Urine Appearance CLEAR                     Clear         

 

             Urine PH     6.5 units                 5.0 - 9.0     

 

             Urine Specific Gravity 1.010                     1.005 - 1.030  

 

             Urine Leukocytes NEGATIVE                  Negative      

 

             Urine Blood  NEGATIVE                  Negative      

 

             Urine Protein TRACE                     Negative -Trace  

 

             Urine Glucose NEGATIVE mg/dL              Negative      

 

             Urine Nitrite NEGATIVE                  Negative      

 

             Urine Ketone NEGATIVE mg/dL              Negative      

 

             Urine Bilirubin NEGATIVE                  Negative      

 

             Urine Urobilinogen 0.2 mg/dL                 0.2 - 1.0     

 

                    Laboratory test finding 10/29/2020          [facility]

: Dr Simon Hoyt

[place] [id number] [phone] Thyroid Stimulating Hormone 0.16 uIU/mL Low        0.3

6 - 3.74  

 

                    Basic Metabolic Panel 10/29/2020          [facility], 

: Dr Simon Hoyt

[place] [id number] [phone] Glucose    101 mg/dL  High       74 - 99    9

 

             BUN          17 mg/dL                  7 - 18        

 

             Creatinine   0.7 mg/dL                 0.6 - 1.3     

 

             Sodium       143 mEq/L                 136 - 145     

 

             Potassium    4.0 mEq/L                 3.5 - 5.1     

 

             Chloride     104 mEq/L                 98 - 107      

 

             Carbon Dioxide 30 mEq/L                  21 - 32       

 

             Calcium      10.7 mg/dL   High         8.5 - 10.1   10

 

             GFR  >= 60 mL/min              >60           

 

             GFR African American >= 60 mL/min              >60          11

 

                    Laboratory test finding 10/29/2020          [facility]

: Dr Simon Hoyt

[place] [id number] [phone] Magnesium  2.1 mg/dL             1.8 - 2.4   

 

                    Complete Blood Count 10/29/2020          [facility] [place] [facility]

s, pc

: Dr Simon Hoyt

[place] [address] [phone] WBC        6.2 x10*3/UL            4.1 - 10.9  

 

             RBC          4.36 x10*6/UL              4.20 - 6.30   

 

             Hemoglobin   13.8 g/dL                 12.0 - 18.0   

 

             Hematocrit   40.7 %                    37.0 - 51.0   

 

             MCV          93.2 fL                   80.0 - 97.0   

 

             MCH          31.6 pg                   26.0 - 32.0   

 

             MCHC         34.0 g/dL                 31.0 - 38.0   

 

             RDW          14.5 %       High         11.6 - 13.7   

 

             PLT          236 x10*3/UL              140 - 440     

 

             MPV          7.8 FL                    7.8 - 11.0    

 

             Lymph %      19.8 %                    10.0 - 58.5   

 

             Mid %        5.7 %                     1.7 - 9.3     

 

             Neut %       74.5 %                    37.0 - 92.0   

 

             Lymph #      1.2 x10*3/UL              0.6 - 4.1     

 

             Mid #        0.4 x10*3/UL              0.1 - 0.6     

 

             Neut #       4.6 x10*3/UL              2.0 - 7.8     

 

                    Laboratory test finding 10/29/2020          [facility]

                                        [address] [place] [address] [phone] Digoxin Level 0.6 NG/ML  Normal     0.5-2.0     

 

                    Laboratory test finding 10/29/2020          [facility]

             Inr          2.7                                     

 

                    Laboratory test finding 10/26/2020          [facility]

             Inr          1.2                                     

 

                    Laboratory test finding 10/22/2020          [facility]

             Inr          7.2                                     

 

                    Complete Blood Count 10/22/2020          [facility] [place] [facility]

s, pc

: Dr Simon Hoyt

[place] [address] [phone] WBC        6.6 x10*3/UL            4.1 - 10.9  

 

             RBC          4.37 x10*6/UL              4.20 - 6.30   

 

             Hemoglobin   13.7 g/dL                 12.0 - 18.0   

 

             Hematocrit   40.2 %                    37.0 - 51.0   

 

             MCV          91.9 fL                   80.0 - 97.0   

 

             MCH          31.4 pg                   26.0 - 32.0   

 

             MCHC         34.2 g/dL                 31.0 - 38.0   

 

             RDW          13.7 %                    11.6 - 13.7   

 

             PLT          251 x10*3/UL              140 - 440     

 

             MPV          8.3 FL                    7.8 - 11.0    

 

             Lymph %      16.8 %                    10.0 - 58.5   

 

             Mid %        4.7 %                     1.7 - 9.3     

 

             Neut %       78.5 %                    37.0 - 92.0   

 

             Lymph #      1.1 x10*3/UL              0.6 - 4.1     

 

             Mid #        0.3 x10*3/UL              0.1 - 0.6     

 

             Neut #       5.2 x10*3/UL              2.0 - 7.8     

 

                    Prothrombin Time/Inr 10/22/2020          [facility]

enter

                                        [address] [place] [address] [phone] Prothrombin Time 49.4 seconds High       12.5-14.3   

 

             Inr          5.24         Critical high              12

 

                    Laboratory test finding 10/15/2020          [facility]

             Inr          1.3                                     

 

                    Laboratory test finding 2020          [facility]

             Inr          3.7                                     

 

                    Laboratory test finding 2020          [facility]

             Inr          1.3                                     







                          1                         100-125 mg/dL     PRE-DIABET

ES/FASTING

>126 mg/dL          DIABETES/FASTING



 

                          2                         NOTE:

BUN,CALCIUM VERIFIED







 

                          3                         CHRONIC KIDNEY DISEASE STAGI

NG PER NKF



STAGE I & II      GFR >= 60        NORMAL TO MILDLY DECREASED

STAGE III          GFR 30-59          MODERATELY DECREASED

STAGE IV           GFR 15-29         SEVERELY DECREASED

STAGE V            GFR <15            VERY LITTLE GFR LEFT

ESRD                 GFR <15            ON RRT



 

                          4                         This test was performed Emme E2MSin

g "[employer] Vitamin D immunoassay kit.  Values 

obtained with different assay methods should not be used interchangeably.



 

                          5                         THERAPUTIC HUMAN INR VALUES

INDICATIONS                      NORMAL RANGES

PROPHYLAXIS/TREATMENT OF:

VENOUS THROMBOSIS                2.0-3.0

PULMONARY EMBOLISM               2.0-3.0

PREVENTION OF SYSTEMIC EMBOLISM FROM:

TISSUE HEART VALVES              2.0-3.0

ACUTE MYOCARDIAL INFARCTION      2.0-3.0

VALVULAR HEART DISEASE           2.0-3.0

ATRIAL FIBRILLATION              2.0-3.0

MECHANICAL VALVES(HIGH RISK)     2.5-3.5

RECURRENT MYOCARDIAL INFARCTION  2.5-3.5



 

                          6                         100-125 mg/dL     PRE-DIABET

ES/FASTING

>126 mg/dL          DIABETES/FASTING



 

                          7                         NOTE:

RESULT VERIFIED.







 

                          8                         CHRONIC KIDNEY DISEASE STAGI

NG PER NKF



STAGE I & II      GFR >= 60        NORMAL TO MILDLY DECREASED

STAGE III          GFR 30-59          MODERATELY DECREASED

STAGE IV           GFR 15-29         SEVERELY DECREASED

STAGE V            GFR <15            VERY LITTLE GFR LEFT

ESRD                 GFR <15            ON RRT



 

                          9                         100-125 mg/dL     PRE-DIABET

ES/FASTING

>126 mg/dL          DIABETES/FASTING



 

                          10                        NOTE:

RESULT VERIFIED.







 

                          11                        CHRONIC KIDNEY DISEASE STAGI

NG PER NKF



STAGE I & II      GFR >= 60        NORMAL TO MILDLY DECREASED

STAGE III          GFR 30-59          MODERATELY DECREASED

STAGE IV           GFR 15-29         SEVERELY DECREASED

STAGE V            GFR <15            VERY LITTLE GFR LEFT

ESRD                 GFR <15            ON RRT



 

                          12                        THERAPUTIC HUMAN INR VALUES

INDICATIONS                      NORMAL RANGES

PROPHYLAXIS/TREATMENT OF:

VENOUS THROMBOSIS                2.0-3.0

PULMONARY EMBOLISM               2.0-3.0

PREVENTION OF SYSTEMIC EMBOLISM FROM:

TISSUE HEART VALVES              2.0-3.0

ACUTE MYOCARDIAL INFARCTION      2.0-3.0

VALVULAR HEART DISEASE           2.0-3.0

ATRIAL FIBRILLATION              2.0-3.0

MECHANICAL VALVES(HIGH RISK)     2.5-3.5

RECURRENT MYOCARDIAL INFARCTION  2.5-3.5









Procedures





                Date            Code            Description     Status

 

                    10/29/2020          52579               Brief Emotional/Beha

v Assessment W/ Scoring Doc Per Standard 

Inst                                    Completed







Medical Devices





                                        Description

 

                                        No Information Available







Encounters





           Type       Date       Location   Provider   Dx         Diagnosis

 

                Office Visit    2021  1:00p [place]                      I48.20                    Chronic atrial fibrillation,

 unspecified

 

                          Z79.01                    Long term (current) use of a

nticoagulants

 

                          I10                       Essential (primary) hyperten

darci

 

                          E05.90                    Thyrotoxicosis, unsp without

 thyrotoxic crisis or storm

 

                          K21.9                     Gastro-esophageal reflux dis

ease without esophagitis

 

                          I82.502                   Chronic embolism and thombos

 unsp deep veins of l low extrem

 

                          M15.9                     Polyosteoarthritis, unspecif

ied

 

                          F17.210                   Nicotine dependence, cigaret

svitlana, uncomplicated

 

                          R32                       Unspecified urinary incontin

ence

 

                          G31.84                    Mild cognitive impairment, s

o stated

 

                          E78.00                    Pure hypercholesterolemia, u

nspecified

 

                          E55.9                     Vitamin D deficiency, unspec

ified

 

                          J44.9                     Chronic obstructive pulmonar

y disease, unspecified

 

                Office Visit    10/29/2020 10:30a [place] [place] [facility] M.D.                      I48.20                    Chronic atrial fibrillation,

 unspecified

 

                          Z79.01                    Long term (current) use of a

nticoagulants

 

                          F32.89                    Other specified depressive e

pisodes

 

                          I10                       Essential (primary) hyperten

darci

 

                          E05.90                    Thyrotoxicosis, unsp without

 thyrotoxic crisis or storm

 

                          K21.9                     Gastro-esophageal reflux dis

ease without esophagitis

 

                          I82.502                   Chronic embolism and thombos

 unsp deep veins of l low extrem

 

                          M19.90                    Unspecified osteoarthritis, 

unspecified site

 

                          F17.210                   Nicotine dependence, cigaret

svitlana, uncomplicated

 

                          J44.9                     Chronic obstructive pulmonar

y disease, unspecified

 

                          R32                       Unspecified urinary incontin

ence

 

                          G31.84                    Mild cognitive impairment, s

o stated







Assessments





                Date            Code            Description     Provider

 

                2021      I48.20          Chronic atrial fibrillation, uns

pecified Charmaine Regalado M.D.

 

                2021      Z79.01          Long term (current) use of antic

oagulants Charmaine Regalado M.D.

 

                2021      I10             Essential (primary) hypertension

 Charmaine Regalado M.D.

 

                2021      E05.90          Thyrotoxicosis, unspecified with

out thyrotoxic crisis or sto 

hCarmaine Regalado M.D.

 

                2021      K21.9           Gastro-esophageal reflux disease

 without esophagitis Charmaine Regalado M.D.

 

                    2021          I82.502             Chronic embolism and

 thrombosis of unspecified deep veins of 

left lower extremity                    Charmaine Regalado M.D.

 

                2021      M15.9           Polyosteoarthritis, unspecified 

Charmaine Regalado M.D.

 

                2021      F17.210         Nicotine dependence, cigarettes,

 uncomplicated Charmaine Regalado M.D.

 

                2021      R32             Unspecified urinary incontinence

 Charmaine Regalado M.D.

 

                2021      G31.84          Mild cognitive impairment, so st

ated Charmaine Regalado M.D.

 

                2021      E78.00          Pure hypercholesterolemia, unspe

cified Charmaine Regalado M.D.

 

                2021      E55.9           Vitamin D deficiency, unspecifie

d Charmaine Regalado M.D.

 

                2021      J44.9           Chronic obstructive pulmonary di

sease, unspecified Charmaine Regalado M.D.

 

                2020      I10             Essential (primary) hypertension

 Charmaine Regalado M.D.

 

                2020      K21.9           Gastro-esophageal reflux disease

 without esophagitis Charmaine Regalado M.D.

 

                2020      F17.210         Nicotine dependence, cigarettes,

 uncomplicated Charmaine Regalado M.D.

 

                2020      J44.9           Chronic obstructive pulmonary di

sease, unspecified Charmaine Regalado M.D.

 

                2020      Z51.81          Encounter for therapeutic drug l

evel monitoring Ashley Irwin 

[facility]

 

                2020      Z51.81          Encounter for therapeutic drug l

evel monitoring Protime

 

                2020      I48.20          Chronic atrial fibrillation, uns

pecified Ashley Irwin, [facility]

 

                2020      I48.20          Chronic atrial fibrillation, uns

pecified Protime

 

                2020      Z79.01          Long term (current) use of antic

oagulants Ashley Irwin, [facility]

 

                2020      Z79.01          Long term (current) use of antic

oagulants Protime

 

                2020      I48.20          Chronic atrial fibrillation, uns

pecified Charmaine Regalado M.D.

 

                2020      I48.20          Chronic atrial fibrillation, uns

pecified Lab Schedule

 

                2020      I10             Essential (primary) hypertension

 Charmaine Regalado M.D.

 

                2020      I10             Essential (primary) hypertension

 Lab Schedule

 

                2020      Z51.81          Encounter for therapeutic drug l

evel monitoring Ashley Irwin 

[facility]

 

                2020      Z51.81          Encounter for therapeutic drug l

evel monitoring Charmaine Regalado M.D.

 

                2020      Z51.81          Encounter for therapeutic drug l

evel monitoring Protime

 

                2020      I48.20          Chronic atrial fibrillation, uns

pecified Ashley Irwin, [facility]

 

                2020      I48.20          Chronic atrial fibrillation, uns

pecminoo Regalado M.D.

 

                2020      Z51.81          Encounter for therapeutic drug l

evel monitoring Lab Schedule

 

                2020      Z79.01          Long term (current) use of antic

oagulants Ashley Irwin, [facility]

 

                2020      Z79.01          Long term (current) use of antic

oagulants Charmaine Regalado M.D.

 

                2020      I48.20          Chronic atrial fibrillation, uns

pecified Protime

 

                2020      I48.20          Chronic atrial fibrillation, uns

pecified Lab Schedule

 

                2020      Z79.01          Long term (current) use of antic

oagulants Lab Schedule

 

                2020      I10             Essential (primary) hypertension

 Charmaine Regalado M.D.

 

                2020      K21.9           Gastro-esophageal reflux disease

 without esophagitis Charmaine Regalado M.D.

 

                2020      F17.210         Nicotine dependence, cigarettes,

 uncomplicated Charmaine Regalado M.D.

 

                2020      J44.9           Chronic obstructive pulmonary di

sease, unspecified Charmaine Regalado M.D.

 

                2020      Z51.81          Encounter for therapeutic drug l

evel monitoring Ashley Irwin, 

[facility]

 

                2020      Z51.81          Encounter for therapeutic drug l

evel monitoring Protime

 

                2020      I48.20          Chronic atrial fibrillation, uns

pecified Ashley Irwin, [facility]

 

                2020      I48.20          Chronic atrial fibrillation, uns

pecified Protime

 

                2020      Z79.01          Long term (current) use of antic

oagulants Ashley Irwin, [facility]

 

                2020      Z79.01          Long term (current) use of antic

oagulants Protime

 

                10/29/2020      Z51.81          Encounter for therapeutic drug l

evel monitoring Ashley Irwin, 

[facility]

 

                10/29/2020      I48.20          Chronic atrial fibrillation, uns

pecified Charmaine Regalado M.D.

 

                10/29/2020      Z51.81          Encounter for therapeutic drug l

evel monitoring Protime

 

                10/29/2020      Z79.01          Long term (current) use of antic

oagulants Charmaine Regalado M.D.

 

                10/29/2020      I48.20          Chronic atrial fibrillation, uns

pecified Ashley Irwin, [facility]

 

                10/29/2020      F32.89          Other specified depressive episo

emil Charmaine Regalado M.D.

 

                10/29/2020      I10             Essential (primary) hypertension

 Charmaine Regalado M.D.

 

                10/29/2020      Z79.01          Long term (current) use of antic

oagulants ANITA Martinez

 

                10/29/2020      E05.90          Thyrotoxicosis, unspecified with

out thyrotoxic crisis or sto 

Charmaine Regalado M.D.

 

                10/29/2020      I48.20          Chronic atrial fibrillation, uns

pecified Protime

 

                10/29/2020      K21.9           Gastro-esophageal reflux disease

 without esophagitis Charmaine Regalado M.D.

 

                    10/29/2020          I82.502             Chronic embolism and

 thrombosis of unspecified deep veins of 

left lower extremity                    Charmaine Regalado M.D.

 

                10/29/2020      Z79.01          Long term (current) use of antic

oagulants Protime

 

                10/29/2020      M19.90          Unspecified osteoarthritis, unsp

ecified site Charmaine Regalado M.D.

 

                10/29/2020      F17.210         Nicotine dependence, cigarettes,

 uncomplicated Charmaine Regalado M.D.

 

                10/29/2020      J44.9           Chronic obstructive pulmonary di

sease, unspecified Charmaine Regalado M.D.

 

                10/29/2020      R32             Unspecified urinary incontinence

 Charmaine Regalado M.D.

 

                10/29/2020      G31.84          Mild cognitive impairment, so st

ated Charmaine Regalado M.D.

 

                10/26/2020      Z51.81          Encounter for therapeutic drug l

evel monitoring VICKIE Martinez

 

                10/26/2020      Z51.81          Encounter for therapeutic drug l

evel monitoring Protime

 

                10/26/2020      I48.20          Chronic atrial fibrillation, uns

pecified VICKIE Martinez

 

                10/26/2020      I48.20          Chronic atrial fibrillation, uns

pecified Protime

 

                10/26/2020      Z79.01          Long term (current) use of antic

oagulants VICKIE Martinez

 

                10/26/2020      Z79.01          Long term (current) use of antic

oagulants Protime

 

                10/23/2020      I10             Essential (primary) hypertension

 Charmaine Regalado M.D.

 

                10/23/2020      K21.9           Gastro-esophageal reflux disease

 without esophagitis Charmaine Regalado M.D.

 

                10/23/2020      F17.210         Nicotine dependence, cigarettes,

 uncomplicated Charmaine Regalado M.D.

 

                10/23/2020      J44.9           Chronic obstructive pulmonary di

sease, unspecified Charmaine Regalado M.D.

 

                10/22/2020      Z79.01          Long term (current) use of antic

oagulants Charmaine Regalado M.D.

 

                10/22/2020      Z79.01          Long term (current) use of antic

oagulants Lab Schedule

 

                10/22/2020      I48.20          Chronic atrial fibrillation, uns

pecified Charmaine Regalado M.D.

 

                10/22/2020      Z51.81          Encounter for therapeutic drug l

evel monitoring Ashley Oriana Yates, 

[facility]

 

                10/22/2020      I48.20          Chronic atrial fibrillation, uns

pecified Lab Schedule

 

                10/22/2020      Z51.81          Encounter for therapeutic drug l

evel monitoring Protime

 

                10/22/2020      I48.20          Chronic atrial fibrillation, uns

pecified Ashley Oriana Yates, [facility]

 

                10/22/2020      I48.20          Chronic atrial fibrillation, uns

pecified Protime

 

                10/22/2020      Z79.01          Long term (current) use of antic

oagulants Ashley Irwin, [facility]

 

                10/22/2020      Z79.01          Long term (current) use of antic

oagulants Protime

 

                10/15/2020      Z51.81          Encounter for therapeutic drug l

evel monitoring Ashley Oriana Yates, 

[facility]

 

                10/15/2020      Z51.81          Encounter for therapeutic drug l

evel monitoring Protime

 

                10/15/2020      I48.20          Chronic atrial fibrillation, uns

pecified Ashley Oriana Yates, [facility]

 

                10/15/2020      I48.20          Chronic atrial fibrillation, uns

pecified Protime

 

                10/15/2020      Z79.01          Long term (current) use of antic

oagulants Ashley Irwin, [facility]

 

                10/15/2020      Z79.01          Long term (current) use of antic

oagulants Protime

 

                10/15/2020      Z23             Encounter for immunization Charmaine Regalado M.D.

 

                10/15/2020      Z23             Encounter for immunization Proti

me

 

                2020      I48.20          Chronic atrial fibrillation, uns

pecified Ashley Irwin, [facility]

 

                2020      I48.20          Chronic atrial fibrillation, uns

pecified Protime

 

                2020      Z79.01          Long term (current) use of antic

oagulants Ashley Gastelum Trudy, [facility]

 

                2020      Z79.01          Long term (current) use of antic

oagulants Protime

 

                2020      Z51.81          Encounter for therapeutic drug l

evel monitoring Ashley Irwin, 

[facility]

 

                2020      Z51.81          Encounter for therapeutic drug l

evel monitoring Protime

 

                2020      I10             Essential (primary) hypertension

 Charmaine Regalado M.D.

 

                2020      F17.210         Nicotine dependence, cigarettes,

 uncomplicated Charmaine Regalado M.D.

 

                2020      E78.00          Pure hypercholesterolemia, unspe

cified Charmaine Regalado M.D.

 

                2020      I48.20          Chronic atrial fibrillation, uns

pecminoo Regalado M.D.

 

                2020      I48.20          Chronic atrial fibrillation, uns

pecified Ashley Irwin, [facility]

 

                2020      I48.20          Chronic atrial fibrillation, uns

pecified Protime

 

                2020      Z79.01          Long term (current) use of antic

oagulants Ashley Irwin, [facility]

 

                2020      Z79.01          Long term (current) use of antic

oagulants Protime

 

                2020      Z51.81          Encounter for therapeutic drug l

evel monitoring Ashley Irwin, 

[facility]

 

                2020      Z51.81          Encounter for therapeutic drug l

evel monitoring Protime

 

                2020      I10             Essential (primary) hypertension

 Charmaine Regalado M.D.

 

                2020      F17.210         Nicotine dependence, cigarettes,

 uncomplicated Charmaine Regalado M.D.

 

                2020      E78.00          Pure hypercholesterolemia, unspe

cified Charmaine Regalado M.D.

 

                2020      I48.20          Chronic atrial fibrillation, uns

pecminoo Regalado M.D.







Plan of Treatment

Future Appointment(s):* 2021  2:30 pm - Charmaine Regalado M.D. at 
  [facility]

2021 - Charmaine Regalado M.D.* I48.20 Chronic atrial fibrillation, 
  unspecified

* Z79.01 Long term (current) use of anticoagulants

* I10 Essential (primary) hypertension

* E05.90 Thyrotoxicosis, unspecified without thyrotoxic crisis or sto

* K21.9 Gastro-esophageal reflux disease without esophagitis

* I82.502 Chronic embolism and thrombosis of unspecified deep veins of left 
  lower extremity

* M15.9 Polyosteoarthritis, unspecified

* F17.210 Nicotine dependence, cigarettes, uncomplicated

* R32 Unspecified urinary incontinence

* G31.84 Mild cognitive impairment, so stated

* E78.00 Pure hypercholesterolemia, unspecified

* E55.9 Vitamin D deficiency, unspecified

* J44.9 Chronic obstructive pulmonary disease, unspecified

* All * New Medication:* Bupropion Hydrochloride ER (XL) 150 mg - take 1 tablet 
  by mouth in the morning



* Comments:* 13. Depression. Patient given phone number for [facility]. I have also placed her on Bupropion  daily. I will do a 
  follow up in 6 weeks. Support offered, encouragement given.14. Health 
  Maintenance. COVID vaccine safety reviewed. Mammogram and bone density order 
  is given with instructions to call phone number.  Because of difficulty with 
  her compliance with scripts I have asked her to try to use a Mail order which 
  she believes she has through [facility].  She will contact us with 
  that phone number.









Functional Status





                                        Description

 

                                        No Information Available







Mental Status





                                        Description

 

                                        No Information Available







Referrals





                Refer to Dr     Reason for Referral Status          Appt Date

 

                    [facility] CONSULT FOR BILAT KNEE PAIN A

[employer]  

Created                                 

 

                                        [address] [place] [address]

 

                                        [phone]

 

                                          

 

                Rosey Vidal MD CONSULT FOR URINARY INCONTINENCE  Sent      

      

 

                                        [facility]

 

                                        [address] [address]

 

                                        [phone]

 

                                          

 

                [facility] CONSULT FOR RT TRIGGER THUMB  Sen

t            

 

                                        [address] [place] [address]

 

                                        [phone]

## 2021-02-22 NOTE — CCD
Continuity of Care Document (CCD)

                             Created on: 2021



Juany Becerril

External Reference #: [id number]

: 1955

Sex: Female



Demographics





                          Address                   [address]

[place]  [address]

 

                          Home Phone                [phone]

 

                          Preferred Language        Unknown

 

                          Marital Status            Unknown

 

                          Yarsanism Affiliation     Unknown

 

                          Race                      White

 

                          Ethnic Group              Not  or 





Author





                          Author                    Juany Regalado M.D.

 

                          Organization              Unknown

 

                          Address                   [address]

[place]  [address]



 

                          Phone                     [phone]







Care Team Providers





                    Care Team Member Name Role                Phone

 

                    Yuri Maier MD       AUTM                [phone]

 

                    [facility]  AUTM                [phone]

 

                    John Lutz MD AUTM                Unavailable







Problems





                    Active Problems     Provider            Date

 

                    Chronic atrial fibrillation Protime             Onset: 

 

                    Essential hypertension Juany Vincent FNP Onset: 2017

 

                    Deep venous thrombosis of lower extremity Juany Vincent FN

P Onset: 2017

 

                    Pure hypercholesterolemia Juany Vincent FNP Onset: 

017

 

                    Anxiety state       Juany Vincent FNP Onset: 2017

 

                    Chronic pulmonary embolism Juany Vincent FNP Onset: 2017

 

                    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 

2017

 

                    Gastroesophageal reflux disease Juany Vincent FNP Onset: 1

2017

 

                    Scoliosis deformity of spine Juany Vincent FNP Onset: 2017

 

                    Chronic tension-type headache Juany Vincent FNP Onset: 2017

 

                    Varicose veins of lower extremity Juany Vincent FNP Onset:

 2017

 

                    Tobacco user        Juany Vincent FNP Onset: 2017

 

                    Hypercalcemia       Juany Vincent FNP Onset: 2017







Social History





                Type            Date            Description     Comments

 

                Birth Sex                       Unknown          

 

                ETOH Use                        Consumes 3 glasses of wine per w

[place]  

 

                Tobacco Use     Start: Unknown  Patient is a current smoker, smo

kes every day STARTED

AGE 30 1-3 CIGARETTES A DAY 







Allergies, Adverse Reactions, Alerts





             Active Allergies Reaction     Severity     Comments     Date

 

             Ibuprofen    STOMACH UPSET HEADACHE              motrin       10/17

/2017

 

             Latex        rash, itches Mild                      10/17/2018







Medications





           Active Medications SIG        Qnty       Indications Ordering Provide

r Date

 

                                        Bupropion Hydrochloride ER (XL)         

            150mg Tablets ER 24HR       

                take 1 tablet by mouth in the morning 30tabs                    

      Charmaine Regaaldo M.D.                                    2021

 

                          Eliquis                     5mg Tablets               

    take one tablet by 

mouth twice a day 60tabs                          Charmaine Regalado M.D. 2020

 

                          Furosemide                     20mg Tablets           

        1 by mouth every 

am              90tabs                          Charmaine Regalado M.D. 20

20

 

                          Methimazole                     5mg Tablets           

        1 by mouth every 

day except m w f take 2 40tabs                          Charmaine Regalado M.D. 

2020

 

                          Shingrix                     50mcg/0.5ML Suspension Re

c                   

administer at pharmacy 1units                          Juany Vincent FNP 

 

                          Famotidine                     40mg Tablets           

        1 by mouth every 

day             30tabs                          Juany Vincent FNP 2019

 

                          Voltaren                     1% Gel                   

apply up to 4 grams three 

times a day to affected knee as needed 100gm                           ARDEN Vincent FNP 2018

 

                          Flecainide Acetate                     100mg Tablets  

                 Take One 

Tablet By Mouth Twice A Day 180tabs                         Juany Vincent FNP 

10/17/2017

 

                          Atorvastatin Calcium                     20mg Tablets 

                  take one

tablet by mouth every day 90tabs                          Juany Vincent FNP 10

/

 

                          Metoprolol Tartrate                     25mg Tablets  

                 Take One 

Tablet By Mouth Twice A Day 180tabs                         Juany Vincent FNP 

10/17/2017

 

                                        Womens 50+ Multi Vitamin & Mineral Formu

la                      Tablets         

             1 every day PO Vit M=[id number] OP=816                           Juany Vincent FNP 10/17/2017

 

                                        Calcium 600/Vitamin D3                  

   600-800mg-Unit Tablets               

             1 by mouth qd                           Juany Vincent FNP 10/17/2

017

 

                          Mucus Relief DM                     20-400mg Tablets  

                 1 Q 4HRS 

prn                                             Juany Vicnent FNP 10/17/2017

 

                                        Acetaminophen Extra Strength            

         500mg Tablets                  

             take 2 tabs every 6 hours as needed                           Juany Fung FNP 10/17/2017

 

                          Vitamin E                     400Unit Capsules        

           1 by mouth 

every day                                       Juany Vincent FNP 10/17/2017

 

                          Aspirin Adult Low Dose                     81mg Tablet

s                    1 

by mouth every day                                 Juany Vincent FNP 10/17/201

7

 

             Digoxin                     250mcg Tablets                   1 po q

d                                Unknown

                                        







Medications Administered in Office





           Medication SIG        Qnty       Indications Ordering Provider Date

 

                          Administration Of Flu Vaccine                      Inj

ection                    

                                                Charmaine Regalado M.D. 10/15/20

20

 

                          Administration Of Flu Vaccine                      Inj

ection                    

                                                Juany Vincent FNP 10/17/2019

 

                          Administration Of Flu Vaccine                      Inj

ection                    

                                                Juany Vincent FNP 10/17/2018

 

                          Administration Of Flu Vaccine                      Inj

ection                    

                                                Juany Vincent FNP 10/17/2017







Immunizations





             CPT Code     Status       Date         Vaccine      Lot #

 

                [id number]           Given           10/15/2020      Influenza Vaccin

e Quadrivalent Preser/Antibiotic Free Im 

Use                                     [id number]

 

                [id number]           Given           2020      Shingrix Zoster 

Vaccine (HZV), Recombinant, Subunit, 

Adjuvanted                               

 

                80649           Given           10/17/2019      Influenza Vaccin

e Quadrivalent Preser/Antibiotic Free Im 

Use                                     [id number]

 

                [id number]           Given           10/17/2018      Influenza Virus 

Vaccine, Quadrivalent (Cciiv4), Derived 

From Cell                               162922

 

             17877        Given        2018   Pneumovax 23 [id number]

 

                79320           Given           10/17/2017      Influenza Vaccin

e Quadrivalent Preser/Antibiotic Free Im 

Use                                     [id number]







Vital Signs





                Date            Vital           Result          Comment

 

                2021  1:08pm BP Systolic     148 mmHg        RT Arm

 

                    BP Diastolic        70 mmHg             RT Arm

 

                    Heart Rate          60 /min              

 

                    Height              66 inches           5'6"

 

                    Weight              221.00 lb            

 

                    BMI (Body Mass Index) 35.7 kg/m2           

 

                2020 11:19am Weight          226.00 lb        







Results





        Test    Acquired Date Facility Test    Result  H/L     Range   Note

 

                    Complete Blood Count 2021          [facility]

s, pc

: Dr Simon Hoyt

[place], [place] [id number] [phone] WBC        5.8 x10*3/UL            4.1 - 10.9  

 

             RBC          4.01 x10*6/UL Low          4.20 - 6.30   

 

             Hemoglobin   13.1 g/dL                 12.0 - 18.0   

 

             Hematocrit   37.5 %                    37.0 - 51.0   

 

             MCV          93.4 fL                   80.0 - 97.0   

 

             MCH          32.6 pg      High         26.0 - 32.0   

 

             MCHC         34.9 g/dL                 31.0 - 38.0   

 

             RDW          13.8 %       High         11.6 - 13.7   

 

             PLT          191 x10*3/UL              140 - 440     

 

             MPV          8.6 FL                    7.8 - 11.0    

 

             Lymph %      24.4 %                    10.0 - 58.5   

 

             Mid %        6.7 %                     1.7 - 9.3     

 

             Neut %       68.9 %                    37.0 - 92.0   

 

             Lymph #      1.4 x10*3/UL              0.6 - 4.1     

 

             Mid #        0.4 x10*3/UL              0.1 - 0.6     

 

             Neut #       4.0 x10*3/UL              2.0 - 7.8     

 

                    Basic Metabolic Panel 2021          [place] [facility]

, pc

: Dr Simon Hoyt

[place] [address] [phone] Glucose    98 mg/dL              74 - 99    1

 

             BUN          49 mg/dL     High         7 - 18       2

 

             Creatinine   1.2 mg/dL                 0.6 - 1.3     

 

             Sodium       142 mEq/L                 136 - 145     

 

             Potassium    4.5 mEq/L                 3.5 - 5.1     

 

             Chloride     104 mEq/L                 98 - 107      

 

             Carbon Dioxide 23 mEq/L                  21 - 32       

 

             Calcium      10.6 mg/dL   High         8.5 - 10.1    

 

             GFR  45 mL/min    Low          >60           

 

             GFR African American 55 mL/min    Low          >60          3

 

                    Laboratory test finding 2021          [place] [facility]

[facility], 

: Dr Simon Hoyt

[place] [address] [phone] Thyroid Stimulating Hormone 0.54 uIU/mL            0.3

6 - 3.74  

 

                    Laboratory test finding 2021          [place] [facility]

[facility], 

: Dr Simon Hoyt

[place] [address] [phone] Vitamin D 25-Hydroxy 32.7                  24.0 - 80.0

 4

 

                    Laboratory test finding 2021          [facility]

                                        [address] [place] [address] [phone] PTH Intact 26.3 pg/mL Normal     18.5-88.0   

 

                    Prothrombin Time/Inr 2020          [facility]

enter

                                        [address] [place] [address] [phone] Prothrombin Time 58.4 seconds High       12.5-14.3   

 

             Inr          6.49         Critical high              5

 

                    Complete Blood Count 2020          [place] [facility]

s, pc

: Dr Simon Hoyt

[place] [address] [phone] WBC        6.0 x10*3/UL            4.1 - 10.9  

 

             RBC          4.11 x10*6/UL Low          4.20 - 6.30   

 

             Hemoglobin   13.0 g/dL                 12.0 - 18.0   

 

             Hematocrit   37.5 %                    37.0 - 51.0   

 

             MCV          91.1 fL                   80.0 - 97.0   

 

             MCH          31.6 pg                   26.0 - 32.0   

 

             MCHC         34.7 g/dL                 31.0 - 38.0   

 

             RDW          14.2 %       High         11.6 - 13.7   

 

             PLT          243 x10*3/UL              140 - 440     

 

             MPV          8.3 FL                    7.8 - 11.0    

 

             Lymph %      21.2 %                    10.0 - 58.5   

 

             Mid %        5.8 %                     1.7 - 9.3     

 

             Neut %       73.0 %                    37.0 - 92.0   

 

             Lymph #      1.2 x10*3/UL              0.6 - 4.1     

 

             Mid #        0.4 x10*3/UL              0.1 - 0.6     

 

             Neut #       4.4 x10*3/UL              2.0 - 7.8     

 

                    Laboratory test finding 2020          [facility]

             Inr          7.9                                     

 

                    Basic Metabolic Panel 2020          [place] [facility]

ts, pc

: Dr Simon Hoyt

[place] [address] [phone] Glucose    100 mg/dL  High       74 - 99    6

 

             BUN          17 mg/dL                  7 - 18        

 

             Creatinine   0.7 mg/dL                 0.6 - 1.3     

 

             Sodium       141 mEq/L                 136 - 145     

 

             Potassium    4.4 mEq/L                 3.5 - 5.1     

 

             Chloride     103 mEq/L                 98 - 107      

 

             Carbon Dioxide 24 mEq/L                  21 - 32       

 

             Calcium      10.2 mg/dL   High         8.5 - 10.1   7

 

             GFR  >= 60 mL/min              >60           

 

             GFR African American >= 60 mL/min              >60          8

 

                    Laboratory test finding 2020          [facility]

             Inr          3.7                                     

 

                    Laboratory test finding 10/29/2020          [facility], 

: Dr Simon Hoyt

[place] [id number] [phone] T4 Free    1.33 ng/dL            0.76 - 1.46  

 

                    Ua Dipstick Only    10/29/2020          [facility]

, 

: Dr Simon Hoyt

[place] [id number] [phone] Urine Color YELLOW                Yellow      

 

             Urine Appearance CLEAR                     Clear         

 

             Urine PH     6.5 units                 5.0 - 9.0     

 

             Urine Specific Gravity 1.010                     1.005 - 1.030  

 

             Urine Leukocytes NEGATIVE                  Negative      

 

             Urine Blood  NEGATIVE                  Negative      

 

             Urine Protein TRACE                     Negative -Trace  

 

             Urine Glucose NEGATIVE mg/dL              Negative      

 

             Urine Nitrite NEGATIVE                  Negative      

 

             Urine Ketone NEGATIVE mg/dL              Negative      

 

             Urine Bilirubin NEGATIVE                  Negative      

 

             Urine Urobilinogen 0.2 mg/dL                 0.2 - 1.0     

 

                    Laboratory test finding 10/29/2020          [facility]

: Dr Simon Hoyt

[place] [id number] [phone] Thyroid Stimulating Hormone 0.16 uIU/mL Low        0.3

6 - 3.74  

 

                    Basic Metabolic Panel 10/29/2020          [facility] [place]note 2], 

: Dr Simon Hoyt

[place] [id number] [phone] Glucose    101 mg/dL  High       74 - 99    9

 

             BUN          17 mg/dL                  7 - 18        

 

             Creatinine   0.7 mg/dL                 0.6 - 1.3     

 

             Sodium       143 mEq/L                 136 - 145     

 

             Potassium    4.0 mEq/L                 3.5 - 5.1     

 

             Chloride     104 mEq/L                 98 - 107      

 

             Carbon Dioxide 30 mEq/L                  21 - 32       

 

             Calcium      10.7 mg/dL   High         8.5 - 10.1   10

 

             GFR  >= 60 mL/min              >60           

 

             GFR African American >= 60 mL/min              >60          11

 

                    Laboratory test finding 10/29/2020          [facility]

: Dr Simon Hoyt

[place] [id number] [phone] Magnesium  2.1 mg/dL             1.8 - 2.4   

 

                    Complete Blood Count 10/29/2020          [place] [facility]

s, pc

: Dr Simon Hoyt

[place] [address] [phone] WBC        6.2 x10*3/UL            4.1 - 10.9  

 

             RBC          4.36 x10*6/UL              4.20 - 6.30   

 

             Hemoglobin   13.8 g/dL                 12.0 - 18.0   

 

             Hematocrit   40.7 %                    37.0 - 51.0   

 

             MCV          93.2 fL                   80.0 - 97.0   

 

             MCH          31.6 pg                   26.0 - 32.0   

 

             MCHC         34.0 g/dL                 31.0 - 38.0   

 

             RDW          14.5 %       High         11.6 - 13.7   

 

             PLT          236 x10*3/UL              140 - 440     

 

             MPV          7.8 FL                    7.8 - 11.0    

 

             Lymph %      19.8 %                    10.0 - 58.5   

 

             Mid %        5.7 %                     1.7 - 9.3     

 

             Neut %       74.5 %                    37.0 - 92.0   

 

             Lymph #      1.2 x10*3/UL              0.6 - 4.1     

 

             Mid #        0.4 x10*3/UL              0.1 - 0.6     

 

             Neut #       4.6 x10*3/UL              2.0 - 7.8     

 

                    Laboratory test finding 10/29/2020          [facility]

                                        [address] [place] [address] [phone] Digoxin Level 0.6 NG/ML  Normal     0.5-2.0     

 

                    Laboratory test finding 10/29/2020          [facility]

             Inr          2.7                                     

 

                    Laboratory test finding 10/26/2020          [facility]

             Inr          1.2                                     

 

                    Laboratory test finding 10/22/2020          [facility]

             Inr          7.2                                     

 

                    Complete Blood Count 10/22/2020          [place] [facility]

s, pc

: Dr Simon Hoyt

[place] [address] [phone] WBC        6.6 x10*3/UL            4.1 - 10.9  

 

             RBC          4.37 x10*6/UL              4.20 - 6.30   

 

             Hemoglobin   13.7 g/dL                 12.0 - 18.0   

 

             Hematocrit   40.2 %                    37.0 - 51.0   

 

             MCV          91.9 fL                   80.0 - 97.0   

 

             MCH          31.4 pg                   26.0 - 32.0   

 

             MCHC         34.2 g/dL                 31.0 - 38.0   

 

             RDW          13.7 %                    11.6 - 13.7   

 

             PLT          251 x10*3/UL              140 - 440     

 

             MPV          8.3 FL                    7.8 - 11.0    

 

             Lymph %      16.8 %                    10.0 - 58.5   

 

             Mid %        4.7 %                     1.7 - 9.3     

 

             Neut %       78.5 %                    37.0 - 92.0   

 

             Lymph #      1.1 x10*3/UL              0.6 - 4.1     

 

             Mid #        0.3 x10*3/UL              0.1 - 0.6     

 

             Neut #       5.2 x10*3/UL              2.0 - 7.8     

 

                    Prothrombin Time/Inr 10/22/2020          [facility]

enter

                                        [address] [place] [address] [phone] Prothrombin Time 49.4 seconds High       12.5-14.3   

 

             Inr          5.24         Critical high              12

 

                    Laboratory test finding 10/15/2020          [facility]

             Inr          1.3                                     

 

                    Laboratory test finding 2020          [facility]

             Inr          3.7                                     

 

                    Laboratory test finding 2020          [facility]

             Inr          1.3                                     







                          1                         100-125 mg/dL     PRE-DIABET

ES/FASTING

>126 mg/dL          DIABETES/FASTING



 

                          2                         NOTE:

BUN,CALCIUM VERIFIED







 

                          3                         CHRONIC KIDNEY DISEASE STAGI

NG PER NKF



STAGE I & II      GFR >= 60        NORMAL TO MILDLY DECREASED

STAGE III          GFR 30-59          MODERATELY DECREASED

STAGE IV           GFR 15-29         SEVERELY DECREASED

STAGE V            GFR <15            VERY LITTLE GFR LEFT

ESRD                 GFR <15            ON RRT



 

                          4                         This test was performed [employer]

g [employer] Vitamin D immunoassay kit.  Values 

obtained with different assay methods should not be used interchangeably.



 

                          5                         THERAPUTIC HUMAN INR VALUES

INDICATIONS                      NORMAL RANGES

PROPHYLAXIS/TREATMENT OF:

VENOUS THROMBOSIS                2.0-3.0

PULMONARY EMBOLISM               2.0-3.0

PREVENTION OF SYSTEMIC EMBOLISM FROM:

TISSUE HEART VALVES              2.0-3.0

ACUTE MYOCARDIAL INFARCTION      2.0-3.0

VALVULAR HEART DISEASE           2.0-3.0

ATRIAL FIBRILLATION              2.0-3.0

MECHANICAL VALVES(HIGH RISK)     2.5-3.5

RECURRENT MYOCARDIAL INFARCTION  2.5-3.5



 

                          6                         100-125 mg/dL     PRE-DIABET

ES/FASTING

>126 mg/dL          DIABETES/FASTING



 

                          7                         NOTE:

RESULT VERIFIED.







 

                          8                         CHRONIC KIDNEY DISEASE STAGI

NG PER NKF



STAGE I & II      GFR >= 60        NORMAL TO MILDLY DECREASED

STAGE III          GFR 30-59          MODERATELY DECREASED

STAGE IV           GFR 15-29         SEVERELY DECREASED

STAGE V            GFR <15            VERY LITTLE GFR LEFT

ESRD                 GFR <15            ON RRT



 

                          9                         100-125 mg/dL     PRE-DIABET

ES/FASTING

>126 mg/dL          DIABETES/FASTING



 

                          10                        NOTE:

RESULT VERIFIED.







 

                          11                        CHRONIC KIDNEY DISEASE STAGI

NG PER NKF



STAGE I & II      GFR >= 60        NORMAL TO MILDLY DECREASED

STAGE III          GFR 30-59          MODERATELY DECREASED

STAGE IV           GFR 15-29         SEVERELY DECREASED

STAGE V            GFR <15            VERY LITTLE GFR LEFT

ESRD                 GFR <15            ON RRT



 

                          12                        THERAPUTIC HUMAN INR VALUES

INDICATIONS                      NORMAL RANGES

PROPHYLAXIS/TREATMENT OF:

VENOUS THROMBOSIS                2.0-3.0

PULMONARY EMBOLISM               2.0-3.0

PREVENTION OF SYSTEMIC EMBOLISM FROM:

TISSUE HEART VALVES              2.0-3.0

ACUTE MYOCARDIAL INFARCTION      2.0-3.0

VALVULAR HEART DISEASE           2.0-3.0

ATRIAL FIBRILLATION              2.0-3.0

MECHANICAL VALVES(HIGH RISK)     2.5-3.5

RECURRENT MYOCARDIAL INFARCTION  2.5-3.5









Procedures





                Date            Code            Description     Status

 

                    10/29/2020          07276               Brief Emotional/Beha

v Assessment W/ Scoring Doc Per Standard 

Inst                                    Completed







Medical Devices





                                        Description

 

                                        No Information Available







Encounters





           Type       Date       Location   Provider   Dx         Diagnosis

 

                Office Visit    2021  1:00p [place] [facility] M.D.                      I48.20                    Chronic atrial fibrillation,

 unspecified

 

                          Z79.01                    Long term (current) use of a

nticoagulants

 

                          I10                       Essential (primary) hyperten

darci

 

                          E05.90                    Thyrotoxicosis, unsp without

 thyrotoxic crisis or storm

 

                          K21.9                     Gastro-esophageal reflux dis

ease without esophagitis

 

                          I82.502                   Chronic embolism and thombos

 unsp deep veins of l low extrem

 

                          M15.9                     Polyosteoarthritis, unspecif

ied

 

                          F17.210                   Nicotine dependence, cigaret

svitlana, uncomplicated

 

                          R32                       Unspecified urinary incontin

ence

 

                          G31.84                    Mild cognitive impairment, s

o stated

 

                          E78.00                    Pure hypercholesterolemia, u

nspecified

 

                          E55.9                     Vitamin D deficiency, unspec

ified

 

                          J44.9                     Chronic obstructive pulmonar

y disease, unspecified

 

                Office Visit    10/29/2020 10:30a [place] [facility] me[facility] M.D.                      I48.20                    Chronic atrial fibrillation,

 unspecified

 

                          Z79.01                    Long term (current) use of a

nticoagulants

 

                          F32.89                    Other specified depressive e

pisodes

 

                          I10                       Essential (primary) hyperten

darci

 

                          E05.90                    Thyrotoxicosis, unsp without

 thyrotoxic crisis or storm

 

                          K21.9                     Gastro-esophageal reflux dis

ease without esophagitis

 

                          I82.502                   Chronic embolism and thombos

 unsp deep veins of l low extrem

 

                          M19.90                    Unspecified osteoarthritis, 

unspecified site

 

                          F17.210                   Nicotine dependence, cigaret

svitlana, uncomplicated

 

                          J44.9                     Chronic obstructive pulmonar

y disease, unspecified

 

                          R32                       Unspecified urinary incontin

ence

 

                          G31.84                    Mild cognitive impairment, s

o stated







Assessments





                Date            Code            Description     Provider

 

                2021      I48.20          Chronic atrial fibrillation, uns

pecified Charmaine Regalado M.D.

 

                2021      Z79.01          Long term (current) use of antic

oagulants Charmaine Regalado M.D.

 

                2021      I10             Essential (primary) hypertension

 Charmaine Regalado M.D.

 

                2021      E05.90          Thyrotoxicosis, unspecified with

out thyrotoxic crisis or sto 

Charmaine Regalado M.D.

 

                2021      K21.9           Gastro-esophageal reflux disease

 without esophagitis Charmaine Regalado M.D.

 

                    2021          I82.502             Chronic embolism and

 thrombosis of unspecified deep veins of 

left lower extremity                    Charmaine Regalado M.D.

 

                2021      M15.9           Polyosteoarthritis, unspecified 

Charmaine Regalado M.D.

 

                2021      F17.210         Nicotine dependence, cigarettes,

 uncomplicated Charmaine Regalado M.D.

 

                2021      R32             Unspecified urinary incontinence

 Charmaine Regalado M.D.

 

                2021      G31.84          Mild cognitive impairment, so st

ated Charmaine Regalado M.D.

 

                2021      E78.00          Pure hypercholesterolemia, unspe

cified Charmaine Regalado M.D.

 

                2021      E55.9           Vitamin D deficiency, unspecifie

d Charmaine Regalado M.D.

 

                2021      J44.9           Chronic obstructive pulmonary di

sease, unspecified Charmaine Regalado M.D.

 

                2020      I10             Essential (primary) hypertension

 Charmaine Regalado M.D.

 

                2020      K21.9           Gastro-esophageal reflux disease

 without esophagitis Charmaine Regalado M.D.

 

                2020      F17.210         Nicotine dependence, cigarettes,

 uncomplicated Charmaine Regalado M.D.

 

                2020      J44.9           Chronic obstructive pulmonary di

sease, unspecified Charmaine Regalado M.D.

 

                2020      Z51.81          Encounter for therapeutic drug l

evel monitoring Ashley Irwin 

[facility]

 

                2020      Z51.81          Encounter for therapeutic drug l

evel monitoring Protime

 

                2020      I48.20          Chronic atrial fibrillation, uns

pecified Ashley Irwin, [facility]

 

                2020      I48.20          Chronic atrial fibrillation, uns

pecified Protime

 

                2020      Z79.01          Long term (current) use of antic

oagulants Ashley Irwin, [facility]

 

                2020      Z79.01          Long term (current) use of antic

oagulants Protime

 

                2020      I48.20          Chronic atrial fibrillation, uns

pecified Charmaine Regalado M.D.

 

                2020      I48.20          Chronic atrial fibrillation, uns

pecified Lab Schedule

 

                2020      I10             Essential (primary) hypertension

 Charmaine Regalado M.D.

 

                2020      I10             Essential (primary) hypertension

 Lab Schedule

 

                2020      Z51.81          Encounter for therapeutic drug l

evel monitoring Ashley Irwin 

[facility]

 

                2020      Z51.81          Encounter for therapeutic drug l

evel monitoring Charmaine Regalado M.D.

 

                2020      Z51.81          Encounter for therapeutic drug l

evel monitoring Protime

 

                2020      I48.20          Chronic atrial fibrillation, uns

pecified Ashley Irwin, [facility]

 

                2020      I48.20          Chronic atrial fibrillation, uns

pecminoo Regalado M.D.

 

                2020      Z51.81          Encounter for therapeutic drug l

evel monitoring Lab Schedule

 

                2020      Z79.01          Long term (current) use of antic

oagulants Ashley Irwin, [facility]

 

                2020      Z79.01          Long term (current) use of antic

oagulants Charmaine Regalado M.D.

 

                2020      I48.20          Chronic atrial fibrillation, uns

pecified Protime

 

                2020      I48.20          Chronic atrial fibrillation, uns

pecified Lab Schedule

 

                2020      Z79.01          Long term (current) use of antic

oagulants Lab Schedule

 

                2020      I10             Essential (primary) hypertension

 Charmaine Regalado M.D.

 

                2020      K21.9           Gastro-esophageal reflux disease

 without esophagitis Charmaine Regalado M.D.

 

                2020      F17.210         Nicotine dependence, cigarettes,

 uncomplicated Charmaine Regalado M.D.

 

                2020      J44.9           Chronic obstructive pulmonary di

sease, unspecified Charmaine Regalado M.D.

 

                2020      Z51.81          Encounter for therapeutic drug l

evel monitoring Ashley Irwin, 

[facility]

 

                2020      Z51.81          Encounter for therapeutic drug l

evel monitoring Protime

 

                2020      I48.20          Chronic atrial fibrillation, uns

pecified Ashley Irwin, [facility]

 

                2020      I48.20          Chronic atrial fibrillation, uns

pecified Protime

 

                2020      Z79.01          Long term (current) use of antic

oagulants Ashley Irwin, [facility]

 

                2020      Z79.01          Long term (current) use of antic

oagulants Protime

 

                10/29/2020      Z51.81          Encounter for therapeutic drug l

evel monitoring Ashley Irwin, 

[facility]

 

                10/29/2020      I48.20          Chronic atrial fibrillation, uns

pecified Charmaine Regalado M.D.

 

                10/29/2020      Z51.81          Encounter for therapeutic drug l

evel monitoring Protime

 

                10/29/2020      Z79.01          Long term (current) use of antic

oagulants Charmaine Regalado M.D.

 

                10/29/2020      I48.20          Chronic atrial fibrillation, uns

pecified Ashley Irwin, [facility]

 

                10/29/2020      F32.89          Other specified depressive episo

emil Charmaine Regalado M.D.

 

                10/29/2020      I10             Essential (primary) hypertension

 Charmaine Regalado M.D.

 

                10/29/2020      Z79.01          Long term (current) use of antic

oagulants ANITA Martinez

 

                10/29/2020      E05.90          Thyrotoxicosis, unspecified with

out thyrotoxic crisis or sto 

Charmaine Regalado M.D.

 

                10/29/2020      I48.20          Chronic atrial fibrillation, uns

pecified Protime

 

                10/29/2020      K21.9           Gastro-esophageal reflux disease

 without esophagitis Charmaine Regalado M.D.

 

                    10/29/2020          I82.502             Chronic embolism and

 thrombosis of unspecified deep veins of 

left lower extremity                    Charmaine Regalado M.D.

 

                10/29/2020      Z79.01          Long term (current) use of antic

oagulants Protime

 

                10/29/2020      M19.90          Unspecified osteoarthritis, unsp

ecified site Charmaine Regalado M.D.

 

                10/29/2020      F17.210         Nicotine dependence, cigarettes,

 uncomplicated Charmaine Regalado M.D.

 

                10/29/2020      J44.9           Chronic obstructive pulmonary di

sease, unspecified Charmaine Regalado M.D.

 

                10/29/2020      R32             Unspecified urinary incontinence

 Charmaine Regalado M.D.

 

                10/29/2020      G31.84          Mild cognitive impairment, so st

ated Charmaine Regalado M.D.

 

                10/26/2020      Z51.81          Encounter for therapeutic drug l

evel monitoring VICKIE Martinez

 

                10/26/2020      Z51.81          Encounter for therapeutic drug l

evel monitoring Protime

 

                10/26/2020      I48.20          Chronic atrial fibrillation, uns

pecified VICKIE Martinez

 

                10/26/2020      I48.20          Chronic atrial fibrillation, uns

pecified Protime

 

                10/26/2020      Z79.01          Long term (current) use of antic

oagulants VICKIE Martinez

 

                10/26/2020      Z79.01          Long term (current) use of antic

oagulants Protime

 

                10/23/2020      I10             Essential (primary) hypertension

 Charmaine Regalado M.D.

 

                10/23/2020      K21.9           Gastro-esophageal reflux disease

 without esophagitis Charmaine Regalado M.D.

 

                10/23/2020      F17.210         Nicotine dependence, cigarettes,

 uncomplicated Charmaine Regalado M.D.

 

                10/23/2020      J44.9           Chronic obstructive pulmonary di

sease, unspecified Charmaine Regalado M.D.

 

                10/22/2020      Z79.01          Long term (current) use of antic

oagulants Charmaine Regalado M.D.

 

                10/22/2020      Z79.01          Long term (current) use of antic

oagulants Lab Schedule

 

                10/22/2020      I48.20          Chronic atrial fibrillation, uns

pecified Charmaine Regalado M.D.

 

                10/22/2020      Z51.81          Encounter for therapeutic drug l

evel monitoring Ashley Oriana Yates, 

[facility]

 

                10/22/2020      I48.20          Chronic atrial fibrillation, uns

pecified Lab Schedule

 

                10/22/2020      Z51.81          Encounter for therapeutic drug l

evel monitoring Protime

 

                10/22/2020      I48.20          Chronic atrial fibrillation, uns

pecified Ashley Oriana Yates, [facility]

 

                10/22/2020      I48.20          Chronic atrial fibrillation, uns

pecified Protime

 

                10/22/2020      Z79.01          Long term (current) use of antic

oagulants Ashley Irwin, [facility]

 

                10/22/2020      Z79.01          Long term (current) use of antic

oagulants Protime

 

                10/15/2020      Z51.81          Encounter for therapeutic drug l

evel monitoring Ashley Oriana Yates, 

[facility]

 

                10/15/2020      Z51.81          Encounter for therapeutic drug l

evel monitoring Protime

 

                10/15/2020      I48.20          Chronic atrial fibrillation, uns

pecified Ashley Oriana Yates, [facility]

 

                10/15/2020      I48.20          Chronic atrial fibrillation, uns

pecified Protime

 

                10/15/2020      Z79.01          Long term (current) use of antic

oagulants Ashley Irwin, [facility]

 

                10/15/2020      Z79.01          Long term (current) use of antic

oagulants Protime

 

                10/15/2020      Z23             Encounter for immunization Charmaine Regalado M.D.

 

                10/15/2020      Z23             Encounter for immunization Proti

me

 

                2020      I48.20          Chronic atrial fibrillation, uns

pecified Ashley Irwin, [facility]

 

                2020      I48.20          Chronic atrial fibrillation, uns

pecified Protime

 

                2020      Z79.01          Long term (current) use of antic

oagulants Ashley Gastelum Trudy, [facility]

 

                2020      Z79.01          Long term (current) use of antic

oagulants Protime

 

                2020      Z51.81          Encounter for therapeutic drug l

evel monitoring Ashley Irwin, 

[facility]

 

                2020      Z51.81          Encounter for therapeutic drug l

evel monitoring Protime

 

                2020      I10             Essential (primary) hypertension

 Charmaine Regalado M.D.

 

                2020      F17.210         Nicotine dependence, cigarettes,

 uncomplicated Charmaine Regalado M.D.

 

                2020      E78.00          Pure hypercholesterolemia, unspe

cified Charmaine Regalado M.D.

 

                2020      I48.20          Chronic atrial fibrillation, uns

pecminoo Regalado M.D.

 

                2020      I48.20          Chronic atrial fibrillation, uns

pecified Ashley Irwin, [facility]

 

                2020      I48.20          Chronic atrial fibrillation, uns

pecified Protime

 

                2020      Z79.01          Long term (current) use of antic

oagulants Ashley Irwin, [facility]

 

                2020      Z79.01          Long term (current) use of antic

oagulants Protime

 

                2020      Z51.81          Encounter for therapeutic drug l

evel monitoring Ashley Irwin, 

[facility]

 

                2020      Z51.81          Encounter for therapeutic drug l

evel monitoring Protime

 

                2020      I10             Essential (primary) hypertension

 Charmaine Regalado M.D.

 

                2020      F17.210         Nicotine dependence, cigarettes,

 uncomplicated Charmaine Regalado M.D.

 

                2020      E78.00          Pure hypercholesterolemia, unspe

cified Charmaine Regalado M.D.

 

                2020      I48.20          Chronic atrial fibrillation, uns

pecminoo Regalado M.D.







Plan of Treatment

Future Appointment(s):* 2021  2:30 pm - Charmaine Regalado M.D. at 
  [facility]

2021 - Charmaine Regalado M.D.* I48.20 Chronic atrial fibrillation, 
  unspecified

* Z79.01 Long term (current) use of anticoagulants

* I10 Essential (primary) hypertension

* E05.90 Thyrotoxicosis, unspecified without thyrotoxic crisis or sto

* K21.9 Gastro-esophageal reflux disease without esophagitis

* I82.502 Chronic embolism and thrombosis of unspecified deep veins of left 
  lower extremity

* M15.9 Polyosteoarthritis, unspecified

* F17.210 Nicotine dependence, cigarettes, uncomplicated

* R32 Unspecified urinary incontinence

* G31.84 Mild cognitive impairment, so stated

* E78.00 Pure hypercholesterolemia, unspecified

* E55.9 Vitamin D deficiency, unspecified

* J44.9 Chronic obstructive pulmonary disease, unspecified

* All * New Medication:* Bupropion Hydrochloride ER (XL) 150 mg - take 1 tablet 
  by mouth in the morning



* Comments:* 13. Depression. Patient given phone number for [facility]. I have also placed her on Bupropion  daily. I will do a 
  follow up in 6 weeks. Support offered, encouragement given.14. Health 
  Maintenance. COVID vaccine safety reviewed. Mammogram and bone density order 
  is given with instructions to call phone number.  Because of difficulty with 
  her compliance with scripts I have asked her to try to use a Mail order which 
  she believes she has through [facility].  She will contact us with 
  that phone number.









Functional Status





                                        Description

 

                                        No Information Available







Mental Status





                                        Description

 

                                        No Information Available







Referrals





                Refer to Dr     Reason for Referral Status          Appt Date

 

                    [facility] CONSULT FOR BILAT KNEE PAIN A

ND TRIGGER [employer]INGERS  

Created                                 

 

                                        [address] [place] [address]

 

                                        [phone]

 

                                          

 

                Rosey Vidal MD CONSULT FOR URINARY INCONTINENCE  Sent      

      

 

                                        [facility]

 

                                        [address] [address]

 

                                        [phone]

 

                                          

 

                [facility] CONSULT FOR RT TRIGGER THUMB  Sen

t            

 

                                        [address] [place] [address]

 

                                        [phone]

## 2021-03-02 ENCOUNTER — HOSPITAL ENCOUNTER (OUTPATIENT)
Dept: HOSPITAL 53 - M RAD | Age: 66
End: 2021-03-02
Attending: STUDENT IN AN ORGANIZED HEALTH CARE EDUCATION/TRAINING PROGRAM
Payer: MEDICARE

## 2021-03-02 DIAGNOSIS — I65.29: Primary | ICD-10-CM

## 2021-03-02 NOTE — REP
INDICATION:

STENOSIS



COMPARISON:

None.



TECHNIQUE:

Gray scale and color Doppler evaluation using linear high frequency transducer

Findings:



FINDINGS:

Two-dimensional gray scale and color images demonstrate minimal intimal thickening and

trace calcified atherosclerotic plaquing with essentially normal arterial lumen and no

evidence of discrete stenosis or occlusion.  Normal laminar flow is maintained.  Color

Doppler interrogation demonstrates arterial wave patterns with elements of spectral

broadening.  Normal flow direction is appreciated in the bilateral vertebral arteries.



ICA peak systolic velocity:  Right 54.1 cm/s; Left 64.0 cm/s

ICA diastolic velocity:  Right 15.8 cm/s; Left 17.5 cm/s

ECA peak systolic velocity:  Right 61.7 cm/s; Left 66.1 cm/s

CCA peak systolic velocity:  Right 67.2 cm/s; Left 71.4 cm/s

ICA/CCA ratio:  Right 0.81 cm/s; Left 0.90 cm/s



IMPRESSION:

No hemodynamically significant areas of narrowing or stenosis appreciated.

Based on set standards narrowing falls within the less than 50% range.





<Electronically signed by Jeremy Goncalves > 03/02/21 [id number]

## 2021-03-16 ENCOUNTER — HOSPITAL ENCOUNTER (OUTPATIENT)
Dept: HOSPITAL 53 - M LAB REF | Age: 66
End: 2021-03-16
Attending: INTERNAL MEDICINE
Payer: MEDICARE

## 2021-03-16 DIAGNOSIS — E83.52: Primary | ICD-10-CM

## 2021-03-16 LAB — PROT SERPL-MCNC: 8.2 GM/DL (ref 6.4–8.2)

## 2021-03-17 LAB
ALBUMIN MFR UR ELPH: 41.3 % (ref 55.8–66.1)
ALBUMIN SERPL-MCNC: 3.39 GM/DL (ref 3.29–5.55)
ALPHA1 GLOB MFR SERPL ELPH: 3.8 % (ref 2.9–4.9)
ALPHA1 GLOB SERPL ELPH-MCNC: 0.31 GM/DL (ref 0.17–0.41)
ALPHA2 GLOB SERPL ELPH-MCNC: 0.73 GM/DL (ref 0.42–0.99)
ALPHA2 GLOB SERPL ELPH-MCNC: 8.9 % (ref 7.1–11.8)
B-GLOBULIN SERPL ELPH-MCNC: 0.48 GM/DL (ref 0.28–0.6)
BETA1 GLOB MFR SERPL ELPH: 5.8 % (ref 4.7–7.2)
BETA2 GLOB MFR SERPL ELPH: 35.2 % (ref 3.2–6.5)
BETA2 GLOB SERPL ELPH-MCNC: 2.89 GM/DL (ref 0.19–0.55)
GAMMA GLOB 24H MFR UR ELPH: 5 % (ref 11.1–18.8)
GAMMA GLOB SERPL ELPH-MCNC: 0.41 GM/DL (ref 0.65–1.58)
PROT PATTERN SERPL ELPH-IMP: (no result)

## 2021-03-18 ENCOUNTER — HOSPITAL ENCOUNTER (OUTPATIENT)
Dept: HOSPITAL 53 - M WHC | Age: 66
End: 2021-03-18
Attending: INTERNAL MEDICINE
Payer: MEDICARE

## 2021-03-18 DIAGNOSIS — Z12.31: Primary | ICD-10-CM

## 2021-03-18 DIAGNOSIS — M85.89: ICD-10-CM

## 2021-03-18 NOTE — DEXAMM
INDICATION:

M85.80 DISORDER OF BONE.



COMPARISON:

None.



TECHNIQUE:

Bone density was measured using dual-energy x-ray absorptionmetry (DEXA).



FINDINGS:

AP SPINE L1-L4

BMD 1.259 g/cm2

Young Adult T-Score 0.5

Age Matched Z-Score 2.1.



LT FEMUR, TOTAL

BMD 0.835 g/cm2

Young Adult T-Score -1.4

Age Matched Z-Score -0.1.



LT NECK

BMD 0.990 g/cm2

Young Adult T-Score -0.3

Age Matched Z-Score 1.1.



RT FEMUR, TOTAL

BMD 0.819 g/cm2

Young Adult T-Score -1.5

Age Matched Z-Score -0.3.



RT NECK

BMD 0.901 g/cm2

Young Adult T-Score -1.0

Age Matched Z-Score 0.5.



IMPRESSION:

There is normal bone density of the spine.



There is low bone density of the left hip.





There is low bone density of the right hip.



FOLLOW-UP:

Recommendation for the next bone density exam: 2 years.





<Electronically signed by Barrington Antonio > 03/18/21 [id number]

## 2021-03-19 LAB
IGA SERPL-MCNC: (no result) MG/DL
IMMUNOTYPING SERUM LAMBDA: (no result)
IT SERUM INTERPRETATION: (no result)

## 2021-04-15 ENCOUNTER — HOSPITAL ENCOUNTER (EMERGENCY)
Dept: HOSPITAL 53 - M ED | Age: 66
Discharge: HOME | End: 2021-04-15
Payer: MEDICARE

## 2021-04-15 VITALS — HEIGHT: 66 IN | WEIGHT: 208.34 LBS | BODY MASS INDEX: 33.48 KG/M2

## 2021-04-15 VITALS — DIASTOLIC BLOOD PRESSURE: 72 MMHG | SYSTOLIC BLOOD PRESSURE: 160 MMHG

## 2021-04-15 DIAGNOSIS — Z88.6: ICD-10-CM

## 2021-04-15 DIAGNOSIS — K21.9: ICD-10-CM

## 2021-04-15 DIAGNOSIS — I48.91: ICD-10-CM

## 2021-04-15 DIAGNOSIS — I45.10: Primary | ICD-10-CM

## 2021-04-15 DIAGNOSIS — Z86.718: ICD-10-CM

## 2021-04-15 DIAGNOSIS — R42: ICD-10-CM

## 2021-04-15 DIAGNOSIS — Z79.899: ICD-10-CM

## 2021-04-15 DIAGNOSIS — E07.9: ICD-10-CM

## 2021-04-15 DIAGNOSIS — I10: ICD-10-CM

## 2021-04-15 DIAGNOSIS — Z79.82: ICD-10-CM

## 2021-04-15 DIAGNOSIS — F17.200: ICD-10-CM

## 2021-04-15 LAB
ALBUMIN SERPL BCG-MCNC: 2.9 GM/DL (ref 3.2–5.2)
ALT SERPL W P-5'-P-CCNC: 40 IU/L (ref 0–32)
BASOPHILS # BLD AUTO: 0 10^3/UL (ref 0–0.2)
BASOPHILS NFR BLD AUTO: 0.3 % (ref 0–1)
BILIRUB CONJ SERPL-MCNC: 0.2 MG/DL (ref 0–0.2)
BILIRUB SERPL-MCNC: 0.4 MG/DL (ref 0.2–1)
BUN SERPL-MCNC: 26 MG/DL (ref 7–18)
CALCIUM SERPL-MCNC: 10.1 MG/DL (ref 8.8–10.2)
CHLORIDE SERPL-SCNC: 108 MEQ/L (ref 98–107)
CK MB CFR.DF SERPL CALC: 4.54
CK MB SERPL-MCNC: < 1 NG/ML (ref ?–3.6)
CK SERPL-CCNC: 22 U/L (ref 26–192)
CO2 SERPL-SCNC: 31 MMOL/L (ref 20–29)
CREAT SERPL-MCNC: 0.94 MG/DL (ref 0.55–1.3)
DIGOXIN SERPL-MCNC: 1.6 NG/ML (ref 0.5–2)
EOSINOPHIL # BLD AUTO: 0.1 10^3/UL (ref 0–0.5)
EOSINOPHIL NFR BLD AUTO: 2 % (ref 0–3)
GFR SERPL CREATININE-BSD FRML MDRD: > 60 ML/MIN/{1.73_M2} (ref 45–?)
GLUCOSE SERPL-MCNC: 99 MG/DL (ref 70–100)
HCT VFR BLD AUTO: 41.1 % (ref 36–47)
HGB BLD-MCNC: 12.9 G/DL (ref 12–15.5)
LIPASE SERPL-CCNC: 128 U/L (ref 73–393)
LYMPHOCYTES # BLD AUTO: 1.3 10^3/UL (ref 1.5–5)
LYMPHOCYTES NFR BLD AUTO: 21.1 % (ref 24–44)
MCH RBC QN AUTO: 32.3 PG (ref 27–33)
MCHC RBC AUTO-ENTMCNC: 31.4 G/DL (ref 32–36.5)
MCV RBC AUTO: 102.8 FL (ref 80–96)
MONOCYTES # BLD AUTO: 0.5 10^3/UL (ref 0–0.8)
MONOCYTES NFR BLD AUTO: 7.8 % (ref 2–8)
NEUTROPHILS # BLD AUTO: 4.1 10^3/UL (ref 1.5–8.5)
NEUTROPHILS NFR BLD AUTO: 68.5 % (ref 36–66)
NT-PRO BNP: 340 PG/ML (ref ?–125)
PLATELET # BLD AUTO: 163 10^3/UL (ref 150–450)
POTASSIUM SERPL-SCNC: 4.5 MEQ/L (ref 3.5–5.1)
PROT SERPL-MCNC: 7.8 GM/DL (ref 6.4–8.2)
RBC # BLD AUTO: 4 10^6/UL (ref 4–5.4)
SODIUM SERPL-SCNC: 142 MEQ/L (ref 136–145)
T4 FREE SERPL-MCNC: 1.25 NG/DL (ref 0.76–1.46)
TROPONIN I SERPL-MCNC: < 0.02 NG/ML (ref ?–0.1)
TSH SERPL DL<=0.005 MIU/L-ACNC: 0.19 UIU/ML (ref 0.36–3.74)
WBC # BLD AUTO: 6 10^3/UL (ref 4–10)

## 2021-04-16 LAB
25(OH)D3 SERPL-MCNC: 35.3 NG/ML (ref 30–100)
PTH-INTACT SERPL-MCNC: 45.4 PG/ML (ref 18.5–88)

## 2021-04-16 NOTE — ECGEPIP
[facility] - ED

                                       

                                       Test Date:    2021-04-15

Pat Name:     JOSEPH BENTLEY            Department:   

Patient ID:   [id number]                 Room:         -

Gender:       Female                   Technician:   rs

:          1955               Requested By: TA JOHNSTON

Order Number: [id number]     Reading MD:   Yoli Zavala

                                 Measurements

Intervals                              Axis          

Rate:         66                       P:            49

LA:           200                      QRS:          86

QRSD:         170                      T:            0

QT:           446                                    

QTc:          467                                    

                           Interpretive Statements

Normal sinus rhythm

Right bundle branch block

similar 3/25/20

Electronically Signed on 2021 19:46:57 EDT by Yoli Zavala

## 2021-04-22 ENCOUNTER — HOSPITAL ENCOUNTER (OUTPATIENT)
Dept: HOSPITAL 53 - M LAB REF | Age: 66
End: 2021-04-22
Attending: INTERNAL MEDICINE
Payer: MEDICARE

## 2021-04-22 DIAGNOSIS — I48.20: Primary | ICD-10-CM

## 2021-05-13 ENCOUNTER — HOSPITAL ENCOUNTER (OUTPATIENT)
Dept: HOSPITAL 53 - M RAD | Age: 66
End: 2021-05-13
Attending: INTERNAL MEDICINE
Payer: MEDICARE

## 2021-05-13 DIAGNOSIS — I82.90: Primary | ICD-10-CM

## 2021-05-13 DIAGNOSIS — E04.2: ICD-10-CM

## 2021-05-13 DIAGNOSIS — I65.23: ICD-10-CM

## 2021-05-13 DIAGNOSIS — E27.9: ICD-10-CM

## 2021-05-13 DIAGNOSIS — M89.8X8: ICD-10-CM

## 2021-05-13 DIAGNOSIS — K57.30: ICD-10-CM

## 2021-05-13 DIAGNOSIS — M50.30: ICD-10-CM

## 2021-05-13 NOTE — REPVR
PROCEDURE INFORMATION: 

Exam: CT Neck Without Contrast 

Exam date and time: 5/13/2021 6:07 PM 

Age: 65 years old 

Clinical indication: Other: Cea increasing myeloma 



TECHNIQUE: 

Imaging protocol: Computed tomography images of the neck without contrast. 

Radiation optimization: All CT scans at this facility use at least one of these 

dose optimization techniques: automated exposure control; mA and/or kV 

adjustment per patient size (includes targeted exams where dose is matched to 

clinical indication); or iterative reconstruction. 



COMPARISON: 

US Duplex,carotid (complete) 3/2/2021 2:48 PM 



FINDINGS: 

Limitations: Evaluation of the neck is limited without IV contrast. 



Nasopharynx: Unremarkable. 

Oropharynx: Unremarkable. No significant tonsillar enlargement. 

Hypopharynx: Unremarkable. 

Larynx: Unremarkable. Normal epiglottis. 

Retropharyngeal space: Unremarkable. 

Submandibular/Parotid glands: Normal. Glands are normal in size. 

Thyroid: An enlarged multinodular goiter is present. 

Lymph nodes: Unremarkable. No lymphadenopathy. 



Trachea: Visualized trachea is unremarkable. 

Lungs: Unremarkable as visualized. 

Bones/joints: Moderate degenerative changes of the cervical spine are present. 

There is no moderate/severe spinal canal stenosis at C4-C5 and C5-C6. There is 

mild reversal of the normal cervical lordosis. Minor anterolisthesis of C2 on 

C3 is noted. 

Vasculature: Atherosclerotic calcifications are present at the carotid 

bifurcations. 

Soft tissues: Unremarkable. No significant soft tissue swelling. 



IMPRESSION: 

1. Limited examination of the neck without IV contrast 

2. No acute abnormality. 

3. Enlarged multinodular goiter 

4. Chronic findings as discussed above. 



Electronically signed by: Kodi Lopez On 05/13/2021  18:41:39 PM

## 2021-05-14 NOTE — REP
INDICATION:

CEA INCREASING MYELOMA



COMPARISON:

11/21/2019 the latest prior



TECHNIQUE:

Limited noncontrast enhanced standard helical technique



FINDINGS:

There is bulky enlargement of the thyroid gland status quo.  Borderline but stable

mediastinal lymph nodes are again noted.  Borderline or mildly enlarged hilar lymph

nodes cannot be ruled out since no intravenous contrast was administered.  There are

no pleural or pericardial effusions.  There is no significant change in appearance of

the imaged osseous structures.



Evaluation of the lung fields shows no new abnormal nodules, masses, or opacities.

Asymmetric pleural thickening is again seen in the left lower lobe status quo.



IMPRESSION:

Stable CT examination the chest as described above.





<Electronically signed by Urban Washington > 05/14/21 1038

## 2021-05-14 NOTE — REP
INDICATION:

CEA INCREASING MYELOMA.



COMPARISON:

None



TECHNIQUE:

Standard helical technique without intravenous contrast administration or oral bowel

preparatory contrast administration



FINDINGS:

Limited evaluation of the solid intra-abdominal organs show no gross abnormalities.

Hepatic and splenic densities are within normal limits.  The patient is status post

cholecystectomy.  Limited evaluation of the pancreas shows no gross abnormalities.

There is a 3.5 cm sized low-density oval-shaped mass arising from the left adrenal

gland which has consistently low Hounsfield unit readings consistent with benignity.

There is mild bilateral perinephric stranding.  Seen arising from the inferior pole of

the lateral cortex of the right kidney there is a 1.3 cm sized smoothly marginated

area of increased density likely a hyperdense cyst.  Limited evaluation of the

abdominal aorta and para-aortic regions show no gross abnormalities.  Limited

evaluation of the bowel loops and the mesenteries shows descending colon and sigmoid

colon diverticulosis.  There is no free fluid or free air.



Bone window technique throughout the examination shows a sclerotic but somewhat

irregular 1.8 cm sized lesion in the L3 vertebral body.  Impression



IMPRESSION:

1. L3 vertebral body bone lesion as described above. Pre and post gadolinium enhanced

MRI is recommended.

2. Benign left adrenal gland mass as described above.

3. Likely benign renal findings as described above, however, since are no priors

comparison follow-up with pre and post contrast enhanced examination is recommended.

4. Other findings as described above





<Electronically signed by Urban Washington > 05/14/21 1011

## 2021-06-21 ENCOUNTER — HOSPITAL ENCOUNTER (OUTPATIENT)
Dept: HOSPITAL 53 - M RAD | Age: 66
End: 2021-06-21
Attending: PHYSICIAN ASSISTANT
Payer: MEDICARE

## 2021-06-21 DIAGNOSIS — I83.813: Primary | ICD-10-CM

## 2021-06-21 DIAGNOSIS — I87.2: ICD-10-CM

## 2021-06-21 NOTE — REP
INDICATION:

VARICOSE VEIN JOHANA LEG W/ PAIN VVI.



COMPARISON:

None.



TECHNIQUE:

Multiple ultrasonographic images of the deep venous structures of the bilateral lower

extremity were obtained from the inguinal ligament to the ankle. Venous compression

techniques, color doppler imaging, and augmentation techniques were also obtained

where appropriate.  As per the ACR guidelines the anterior tibial vein can not be

effectively evaluated.  Only compression techniques in the calf on the peroneal and

posterior tibial veins was attempted/performed.



FINDINGS:

There is no abnormal echogenic material seen within any of the visualized deep venous

structures that would suggest acute thrombosis.  Coaptation is unremarkable

throughout.  Doppler interrogation shows an expected response to respiratory

variability and augmentation in the thigh.  Compression techniques in the calf were

obtainable.  The color flow images show what appears to be a normal vascular pattern

throughout the thigh.



On the right:



Reflux was seen in the common femoral vein.

An anterior accessory greater saphenous vein was present with reflux of 4.2 seconds

duration

Reflux was seen in the greater saphenous vein at the saphenous femoral junction the AP

dimension of which measured 9.8 mm

Secondary to a vein stripping procedure the greater saphenous vein was not

identifiable in the mid thigh or knee

Reflux was seen in the superficial femoral vein proximally and in the midportion

No reflux was seen in the distal superficial femoral vein

No reflux was seen in the popliteal vein

No reflux was seen in the lesser saphenous vein the AP dimension of which measured 3.4

mm.



On the left:



Reflux was seen in the common femoral vein.

An anterior accessory greater saphenous vein is present and reflux was noted of 2.5

seconds duration the AP dimension of which measured between 9 and 4 mm

Reflux was seen in the greater saphenous vein at the saphenous femoral junction the AP

dimension of which measured 8.5 mm

Secondary to previous vein stripping procedure the greater saphenous vein was removed

at the mid thigh and knee

Reflux was seen in the proximal and mid superficial femoral vein

No reflux was seen in the distal superficial femoral vein

No reflux was seen in the popliteal vein

No reflux was seen in the lesser saphenous vein



IMPRESSION:

There is no ultrasonographic evidence of deep venous thrombosis involving any of the

visualized deep venous structures of the bilateral  lower extremity as described

above.  Due to technical parameters calf vein DVT can not be ruled out.



Bilateral deep vein reflux and other findings as described above.





<Electronically signed by Urban Washington > 06/21/21 [id number] as above

## 2021-07-02 ENCOUNTER — HOSPITAL ENCOUNTER (OUTPATIENT)
Dept: HOSPITAL 53 - M LAB REF | Age: 66
End: 2021-07-02
Attending: INTERNAL MEDICINE
Payer: MEDICARE

## 2021-07-02 DIAGNOSIS — C90.00: Primary | ICD-10-CM

## 2021-07-14 ENCOUNTER — HOSPITAL ENCOUNTER (OUTPATIENT)
Dept: HOSPITAL 53 - M IRPRO | Age: 66
End: 2021-07-14
Attending: INTERNAL MEDICINE
Payer: MEDICARE

## 2021-07-14 VITALS — SYSTOLIC BLOOD PRESSURE: 154 MMHG | DIASTOLIC BLOOD PRESSURE: 72 MMHG

## 2021-07-14 DIAGNOSIS — C90.00: Primary | ICD-10-CM

## 2021-07-14 LAB
BASOPHILS # BLD AUTO: 0 10^3/UL (ref 0–0.2)
BASOPHILS NFR BLD AUTO: 0.3 % (ref 0–1)
EOSINOPHIL # BLD AUTO: 0.1 10^3/UL (ref 0–0.5)
EOSINOPHIL NFR BLD AUTO: 1.5 % (ref 0–3)
HCT VFR BLD AUTO: 39.4 % (ref 36–47)
HGB BLD-MCNC: 12.7 G/DL (ref 12–15.5)
LYMPHOCYTES # BLD AUTO: 1.4 10^3/UL (ref 1.5–5)
LYMPHOCYTES NFR BLD AUTO: 19.9 % (ref 24–44)
MCH RBC QN AUTO: 33 PG (ref 27–33)
MCHC RBC AUTO-ENTMCNC: 32.2 G/DL (ref 32–36.5)
MCV RBC AUTO: 102.3 FL (ref 80–96)
MONOCYTES # BLD AUTO: 0.4 10^3/UL (ref 0–0.8)
MONOCYTES NFR BLD AUTO: 6 % (ref 2–8)
NEUTROPHILS # BLD AUTO: 4.9 10^3/UL (ref 1.5–8.5)
NEUTROPHILS NFR BLD AUTO: 72.2 % (ref 36–66)
PLATELET # BLD AUTO: 187 10^3/UL (ref 150–450)
RBC # BLD AUTO: 3.85 10^6/UL (ref 4–5.4)
WBC # BLD AUTO: 6.8 10^3/UL (ref 4–10)

## 2021-07-14 NOTE — REP
INDICATION:

EVAL FOR MULTIPLE MYELOMA.



COMPARISON:

None.



TECHNIQUE:

The procedure was performed under the direct supervision of Dr. Antonio.



The risks and benefits of the procedure were explained to the patient and informed

consent was obtained.



The left iliac bone was localized using CT guidance.  The skin was prepped and draped

in a sterile fashion.  1% lidocaine was used as a local anesthetic.  Using CT guidance

an 11 gauge bone biopsy system was inserted and 9 cc of marrow fluid was withdrawn.

One core sample was then obtained.



The patient tolerated the procedure well and there were no immediate complications.

After the appropriate amount to monitor convalescence the patient was discharged from

the department.



FINDINGS:

None



IMPRESSION:

CT-guided left iliac bone marrow biopsy.



<Electronically signed by Stanislaw Robles > 07/14/21 1622

<Electronically signed by Barrington Antonio > 07/14/21 162

## 2021-07-30 ENCOUNTER — HOSPITAL ENCOUNTER (OUTPATIENT)
Dept: HOSPITAL 53 - M RAD | Age: 66
End: 2021-07-30
Attending: INTERNAL MEDICINE
Payer: MEDICARE

## 2021-07-30 DIAGNOSIS — M51.26: ICD-10-CM

## 2021-07-30 DIAGNOSIS — M47.816: ICD-10-CM

## 2021-07-30 DIAGNOSIS — C90.00: Primary | ICD-10-CM

## 2021-07-30 DIAGNOSIS — M51.36: ICD-10-CM

## 2021-07-30 DIAGNOSIS — M89.8X8: ICD-10-CM

## 2021-07-30 PROCEDURE — 72158 MRI LUMBAR SPINE W/O & W/DYE: CPT

## 2021-07-30 NOTE — REPVR
PROCEDURE INFORMATION: 

Exam: MR Lumbar Spine Without and With Contrast 

Exam date and time: 7/30/2021 6:49 PM 

Age: 66 years old 

Clinical indication: Condition or disease; Bone lesion, lumbosacral; Patient 

HX: Vertebral lesions 



TECHNIQUE: 

Imaging protocol: Multiplanar magnetic resonance images of the lumbar spine 

without and with intravenous contrast. 

Contrast material: PROHANCE; Contrast volume: 20 ml; Contrast route: 

INTRAVENOUS (IV);  



COMPARISON: 

CT ABD PELVIS W/O CONTRAST 5/13/2021 6:05 PM 



FINDINGS: 



Vertebrae: Degenerative disc disease and facet arthrosis is present throughout 

the lumbar spine. Small vertebral hemangiomas within T12 , L3 and S1. There is 

a low signal/signal void lesion measuring approximately 1.5 cm long by 1.5 cm 

AP x 1.1 cm transverse within the right side of the L3 vertebral body. This 

corresponds to the densely sclerotic lesion seen on prior CT scan performed on 

05/13/2021.  The findings are consistent with a large bone island. A sclerotic 

metastatic lesion is unlikely. If the patient has a known underlying malignancy 

with a propensity to metastasized to bone then further evaluation with 

radionuclide bone scan is recommended. If the patient has no underlying known 

malignancy then follow-up imaging (MRI or CT) in 6 months is recommended to 

confirm stability. 



Spinal cord: The lower thoracic cord has a normal appearance and the conus 

terminates at the level of the T12 inferior endplate. 



L1-L2: Minimal retrolisthesis of L1. Mild posterior broad-based disc bulge. 

Bilateral facet hypertrophy and ligamentum flavum thickening. Mild spinal 

stenosis. 

L2-L3: Minimal retrolisthesis of L2. Posterior broad-based disc protrusion. 

Bilateral facet hypertrophy and ligamentum flavum thickening. Moderate severe 

spinal stenosis. 

L3-L4: Posterior broad-based disc protrusion, bilateral facet hypertrophy and 

ligamentum flavum thickening. Moderate spinal stenosis. 

L4-L5: Grade 1 anterolisthesis of L4 with mild disc uncovering. No focal disc 

protrusion. Bilateral facet hypertrophy and ligamentum flavum thickening. Mild 

to moderate spinal stenosis. 

L5-S1: No disc protrusion or spinal stenosis. Bilateral facet hypertrophy. 



Soft tissues: Unremarkable. 



IMPRESSION: 

1. Degenerative spondylosis of the lumbar spine with varying degrees of spinal 

stenosis as described above. 

2. Low signal/signal void lesion measuring 15 mm within the L3 vertebral body 

consistent with a large bone island. A sclerotic metastatic lesion is unlikely. 

If the patient has a known underlying malignancy with a propensity to 

metastasized to bone then further evaluation with radionuclide bone scan is 

recommended. If the patient has no underlying known malignancy then follow-up 

imaging (MRI or CT) in 6 months is recommended to confirm stability. 



Electronically signed by: Tarik Buenrostro On 07/30/2021  20:33:03 PM

## 2021-10-06 ENCOUNTER — HOSPITAL ENCOUNTER (OUTPATIENT)
Dept: HOSPITAL 53 - M LAB REF | Age: 66
End: 2021-10-06
Attending: PHYSICIAN ASSISTANT
Payer: MEDICARE
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
## 2021-10-06 DIAGNOSIS — S81.802A: Primary | ICD-10-CM
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
## 2021-10-25 ENCOUNTER — HOSPITAL ENCOUNTER (EMERGENCY)
Dept: HOSPITAL 53 - M ED | Age: 66
Discharge: HOME | End: 2021-10-25
Payer: MEDICARE

## 2021-10-25 VITALS — DIASTOLIC BLOOD PRESSURE: 97 MMHG | SYSTOLIC BLOOD PRESSURE: 128 MMHG

## 2021-10-25 VITALS — WEIGHT: 220.46 LBS | BODY MASS INDEX: 35.43 KG/M2 | HEIGHT: 66 IN

## 2021-10-25 DIAGNOSIS — I50.9: ICD-10-CM

## 2021-10-25 DIAGNOSIS — F32.9: ICD-10-CM

## 2021-10-25 DIAGNOSIS — F17.200: ICD-10-CM

## 2021-10-25 DIAGNOSIS — I45.10: ICD-10-CM

## 2021-10-25 DIAGNOSIS — Z79.82: ICD-10-CM

## 2021-10-25 DIAGNOSIS — Z79.01: ICD-10-CM

## 2021-10-25 DIAGNOSIS — I25.2: ICD-10-CM

## 2021-10-25 DIAGNOSIS — I48.91: Primary | ICD-10-CM

## 2021-10-25 DIAGNOSIS — K21.9: ICD-10-CM

## 2021-10-25 DIAGNOSIS — I10: ICD-10-CM

## 2021-10-25 DIAGNOSIS — Z79.899: ICD-10-CM

## 2021-10-25 DIAGNOSIS — Z88.6: ICD-10-CM

## 2021-10-25 DIAGNOSIS — I51.7: ICD-10-CM

## 2021-10-25 LAB
ALBUMIN SERPL BCG-MCNC: 2.8 GM/DL (ref 3.2–5.2)
ALT SERPL W P-5'-P-CCNC: 28 U/L (ref 12–78)
BASOPHILS # BLD AUTO: 0 10^3/UL (ref 0–0.2)
BASOPHILS NFR BLD AUTO: 0.3 % (ref 0–1)
BILIRUB CONJ SERPL-MCNC: 0.4 MG/DL (ref 0–0.2)
BILIRUB SERPL-MCNC: 0.8 MG/DL (ref 0.2–1)
BUN SERPL-MCNC: 18 MG/DL (ref 7–18)
CALCIUM SERPL-MCNC: 10.7 MG/DL (ref 8.8–10.2)
CHLORIDE SERPL-SCNC: 111 MEQ/L (ref 98–107)
CK MB CFR.DF SERPL CALC: 3.85
CK MB SERPL-MCNC: 1 NG/ML (ref ?–3.6)
CK SERPL-CCNC: 26 U/L (ref 26–192)
CO2 SERPL-SCNC: 22 MEQ/L (ref 21–32)
CREAT SERPL-MCNC: 0.94 MG/DL (ref 0.55–1.3)
DIGOXIN SERPL-MCNC: 0.5 NG/ML (ref 0.5–2)
EOSINOPHIL # BLD AUTO: 0.1 10^3/UL (ref 0–0.5)
EOSINOPHIL NFR BLD AUTO: 1.1 % (ref 0–3)
GFR SERPL CREATININE-BSD FRML MDRD: > 60 ML/MIN/{1.73_M2} (ref 45–?)
GLUCOSE SERPL-MCNC: 106 MG/DL (ref 70–100)
HCT VFR BLD AUTO: 41.3 % (ref 36–47)
HGB BLD-MCNC: 12.9 G/DL (ref 12–15.5)
LYMPHOCYTES # BLD AUTO: 0.9 10^3/UL (ref 1.5–5)
LYMPHOCYTES NFR BLD AUTO: 13.8 % (ref 24–44)
MCH RBC QN AUTO: 31.3 PG (ref 27–33)
MCHC RBC AUTO-ENTMCNC: 31.2 G/DL (ref 32–36.5)
MCV RBC AUTO: 100.2 FL (ref 80–96)
MONOCYTES # BLD AUTO: 0.3 10^3/UL (ref 0–0.8)
MONOCYTES NFR BLD AUTO: 4.8 % (ref 2–8)
NEUTROPHILS # BLD AUTO: 5 10^3/UL (ref 1.5–8.5)
NEUTROPHILS NFR BLD AUTO: 79.5 % (ref 36–66)
NT-PRO BNP: 5817 PG/ML (ref ?–125)
PLATELET # BLD AUTO: 202 10^3/UL (ref 150–450)
POTASSIUM SERPL-SCNC: 4.6 MEQ/L (ref 3.5–5.1)
PROT SERPL-MCNC: 7.9 GM/DL (ref 6.4–8.2)
RBC # BLD AUTO: 4.12 10^6/UL (ref 4–5.4)
RSV RNA NPH QL NAA+PROBE: NEGATIVE
SODIUM SERPL-SCNC: 140 MEQ/L (ref 136–145)
T4 SERPL-MCNC: 10.1 UG/DL (ref 4.5–12)
TROPONIN I SERPL-MCNC: 0.02 NG/ML (ref ?–0.1)
TSH SERPL DL<=0.005 MIU/L-ACNC: 0.05 UIU/ML (ref 0.36–3.74)
WBC # BLD AUTO: 6.3 10^3/UL (ref 4–10)

## 2021-10-25 NOTE — CCD
Continuity of Care Document (CCD)

                             Created on: 2021



Juany Becerril

External Reference #: [id number]

: 1955

Sex: Female



Demographics





                          Address                   [address] [place] [address]  [address]

 

                          Home Phone                [phone]

 

                          Preferred Language        Unknown

 

                          Marital Status            

 

                          Jewish Affiliation     Unknown

 

                          Race                      White

 

                          Ethnic Group              Not  or 





Author





                          Organization              Unknown

 

                          Address                   Unknown

 

                          Phone                     Unavailable







Care Team Providers





                    Care Team Member Name Role                Phone

 

                    Charmaine Regalado MD  AUTM                [phone]

 

                    Lupe Arredondo MD AUTM                [phone]







Problems





                    Active Problems     Provider            Date

 

                    Precordial pain     Jermaine Chahal MD  Onset: 2019

 

                    Dyspnea             Jermaine Chahal MD  Onset: 2019

 

                    Palpitations        Jermaine Chahal MD  Onset: 2019

 

                    Paroxysmal atrial fibrillation Jermaine Chahal MD  Onset: 

 

                    Electrocardiogram abnormal Jermaine Chahal MD  Onset: 2019

 

                    Cardiomegaly        Jermaine Chahal MD  Onset: 2019

 

                    Right bundle branch block Jermaine Chahal MD  Onset: 

019

 

                    Dietary management surveillance Jermaine Chahal MD  Onset: 1

2019

 

                    Obstructive sleep apnea syndrome Jermaine Chahal MD  Onset: 

2019

 

                    Syncope and collapse Jermaine Chahal MD  Onset: 2019

 

                    Chronic diastolic heart failure Jermaine Chahal MD  Onset: 0

3/09/2020

 

                    Chronic pulmonary heart disease Jermaine Chahal MD  Onset: 0

3/09/2020

 

                          Benign hypertensive heart disease with congestive card

iac failure Jermaine Chahal MD                              Onset: 2020

 

                    Mitral valve disorder Jermaine Chahal MD  Onset: 2020

 

                    Carotid artery occlusion [facility] Onset: 

 

                    Mixed hyperlipidemia [facility] Onset: 

 

                    Deep venous thrombosis of lower extremity [facility] Onset: 

2021







Social History





                Type            Date            Description     Comments

 

                Birth Sex                       Unknown          

 

                ETOH Use                        Occasionally consumes wine  

 

                Tobacco Use     Start: Unknown  Patient is a current smoker, smo

kes every day Started

smoking at age 27, smokes 3 cigarettes a day 

 

                Smoking Status  Reviewed: 21 Patient is a current smoker, 

smokes every day 

Started smoking at age 27, smokes 3 cigarettes a day 

 

                Exercise Type/Frequency                 Walks daily     in good 

weather  

 

                Exercise Type/Frequency                 Does housework daily  

 

                Exercise Limitations                 Back Pain        

 

                Exercise Limitations                 Orthopedic Problem Knee gary

n 

 

                Exercise Limitations                 Claudication     







Allergies, Adverse Reactions, Alerts





             Active Allergies Criticality  Reaction | Severity Comments     Date

 

             Motrin       Unable to assess criticality              GI Upset    

 2019







Medications





           Active Medications SIG        Qnty       Indications Ordering Provide

r Date

 

                          Eliquis                     5mg Tablets               

    1 by mouth twice a day

                                                Charmaine Regalado MD 2021

 

                    Digox                     250mcg Tablets                   1

 by mouth every day 

90tabs                                  Jermaine Chahal MD  2020

 

                          Famotidine                     40mg Tablets           

        1 tablet daily at 

bedtime         30tabs          R07.2           Jermaine Chahal MD 2019

 

                          Acetaminophen                     500mg Tablets       

            1-2 by mouth 

every 6 hours as needed                                 Unknown         20

19

 

                          Atorvastatin Calcium                     20mg Tablets 

                  1 by 

mouth every night at bedtime                                 Unknown         2019

 

                One Daily                      Tablets                   1 by mo

uth every day                 

                          Unknown                   2019

 

                          Methimazole                     10mg Tablets          

         1 by mouth every 

morning                                         Unknown         2019

 

                          Vitamin E Blend                     400Unit Capsules  

                 1 by 

mouth every day                                 Unknown         2019

 

                          Flecainide Acetate                     100mg Tablets  

                 take one 

tablet by mouth twice a day 180tabs                         Jermaine Chahal MD 1

2019

 

                          Aspirin 81                     81mg Tablets DR        

           1 by mouth 

every day                                       Unknown         2019

 

                          Metoprolol Tartrate                     25mg Tablets  

                 1 by 

mouth twice a day                                 Unknown         2019

 

                          Mucus Relief DM                     20-400mg Tablets  

                 1 tab po 

at bedtime                                      Unknown         2019

 

                          Furosemide                     20mg Tablets           

        1 by mouth once a 

day                                             Unknown         







Immunizations





                                        Description

 

                                        No Information Available







Vital Signs





                Date            Vital           Result          Comment

 

                2021  1:02pm Weight          220.00 lb        

 

                    Height              65 inches           5'5"

 

                    BMI (Body Mass Index) 36.6 kg/m2           

 

                    Heart Rate          65 /min              

 

                    BP Systolic Sitting 136 mmHg            Ra, large cuff

 

                    BP Diastolic Sitting 84 mmHg             Ra, large cuff

 

                2020 12:43pm Weight          229.00 lb        

 

                    Height              65 inches           5'5"

 

                    BMI (Body Mass Index) 38.1 kg/m2           

 

                    Heart Rate          67 /min              

 

                    BP Systolic Sitting 126 mmHg            large cuff, Ra

 

                    BP Diastolic Sitting 74 mmHg             large cuff, Ra







Results





        Test    Acquired Date Facility Test    Result  H/L     Range   Note

 

                    CBC without Differential 2021          [facility] - not inter

faced

           (315)-   - White Blood Count 6.8                   5.0-10.0    

 

             Red Blood Count 3.85         Low          4.00-5.40     

 

             Platelets    187                       172-450       

 

             Hemoglobin   12.7                                    

 

             Hematocrit   39.4                                    

 

                    CBC without Differential 2021          [facility] - not inter

faced

           (315)-   - White Blood Count 6.2                   5.0-10.0    

 

             Red Blood Count 3.76         Low          4.00-5.40     

 

             Platelets    199                       172-450       

 

             Hemoglobin   12.5                                    

 

             Hematocrit   39.0                                    

 

                    CMP                 2021          [facility] - not interfaced

           (315)-   - Albumin Serum/Plasma 3.0                               

 

             Alt - SGPT   19                                      

 

             Calcium Ser/Plasma Mass/Vol 10.2                                   

 

 

             Carbon Dioxide Ser/Plasm 27                                      

 

             Chloride Serum/Plasma 107                                     

 

             Alkaline Phosphatase 77                                      

 

             Potassium    4.7                                     

 

             Protein Total 8.3                                     

 

             Sodium       137                                     

 

             Ast - Sgot   23                                      

 

             BUN - Urea Nitrogen 28                                      

 

             Glucose      98                                

 

             Creatinine For GFR 0.90                                    

 

                    Complete Blood Count 2021          [facility]

rt

             WBC          8.4 x10*3/UL              4.1-10.9      

 

             RBC          4.20 x10*6/UL              4.20-6.30     

 

             Hemoglobin   14.0 g/dL                 12.0-18.0     

 

             Hematocrit   40.3 %                    37.0-51.0     

 

             MCV          95.8 fL                   80.0-97.0     

 

             MCH          33.3 pg      High         26.0-32.0     

 

             MCHC         34.7 g/dL                 31.0-38.0     

 

             RDW          13.3 %                    11.6-13.7     

 

             PLT          207 x10*3/UL              140-440       

 

             MPV          8.0 FL                    7.8-11.0      

 

             Lymph %      17.5 %                    10.0-58.5     

 

             Mid %        4.8 %                     1.7-9.3       

 

             Neut %       77.7 %                    37.0-92.0     

 

             Lymph #      1.4 x10*3/UL              0.6-4.1       

 

             Mid #        0.5 x10*3/UL              0.1-0.6       

 

             Neut #       6.5 x10*3/UL              2.0-7.8       

 

                    Basic Metabolic Panel 2021          [facility]

ort

             Glucose      70 mg/dL     Low          74-99        1

 

             BUN          23 mg/dL     High         7-18          

 

             Creatinine   0.9 mg/dL                 0.6-1.3       

 

             Sodium       144 mEq/L                 136-145       

 

             Potassium    3.8 mEq/L                 3.5-5.1       

 

             Chloride     104 mEq/L                         

 

             Carbon Dioxide 32 mEq/L                  21-32         

 

             Calcium      10.1 mg/dL                8.5-10.1      

 

             GFR  >= 60 mL/min                             

 

             GFR African American >= 60 mL/min                            2

 

                    Lipid Profile       2021          [facility]

t

             Cholesterol  132 mg/dL                 131-200       

 

             Triglycerides 72 mg/dL                          

 

             HDL Cholesterol 54 mg/dL                  35-60         

 

             LDL (Calculated) 64 CALC                           

 

                    Laboratory test finding 2021          [facility] I

mport

             Thyroid Stimulating Hormone 0.32 uIU/mL  Low          0.36-3.74    

 

 

             Digoxin Level 0.3 ng/mL    Low          0.5-2.0      3







                          1                         100-125 mg/dL     PRE-DIABET

ES/FASTING

>126 mg/dL          DIABETES/FASTING





 

                          2                         CHRONIC KIDNEY DISEASE STAGI

NG PER NKF



STAGE I & II      GFR >= 60        NORMAL TO MILDLY DECREASED

STAGE III          GFR 30-59          MODERATELY DECREASED

STAGE IV           GFR 15-29         SEVERELY DECREASED

STAGE V            GFR <15            VERY LITTLE GFR LEFT

ESRD                 GFR <15            ON RRT





 

                          3                         note:<nlbl:demographic_chang

ed>











Procedures





                Date            Code            Description     Status

 

                2021      63138           Office/Outpatient Established Lo

w MDM 20-29 Min Completed

 

                2021      99765           ECG 12-Lead     Completed







Medical Devices





                                        Description

 

                                        No Information Available







Encounters





           Type       Date       Location   Provider   Dx         Diagnosis

 

           Office Visit 2021 12:45p Main Office [facility] I48

.0      

Paroxysmal atrial fibrillation

 

                          I50.32                    Chronic diastolic (congestiv

e) heart failure

 

                          I11.0                     Hypertensive heart disease w

ith heart failure

 

                          I27.81                    Cor pulmonale (chronic)

 

                          I65.29                    Occlusion and stenosis of un

specified carotid artery

 

                          I34.0                     Nonrheumatic mitral (valve) 

insufficiency

 

                          E78.2                     Mixed hyperlipidemia

 

                          I82.503                   Chronic emblsm and thombos u

nsp deep veins of low extrm, bi

 

                          R94.31                    Abnormal electrocardiogram [

ECG] [EKG]

 

                          Z71.3                     Dietary counseling and surve

illance







Assessments





                Date            Code            Description     Provider

 

                2021      I48.0           Paroxysmal atrial fibrillation C

[facility]

 

                2021      I50.32          Chronic diastolic (congestive) h

eart failure [facility]

 

                2021      I11.0           Hypertensive heart disease with 

heart failure [facility]

 

                2021      I27.81          Cor pulmonale (chronic) [facility]

 

                2021      I65.29          Occlusion and stenosis of unspec

ified carotid artery [facility]

 

                2021      I34.0           Nonrheumatic mitral (valve) insu

fficiency [facility]

 

                2021      E78.2           Mixed hyperlipidemia [facility]

 

                    2021          I82.503             Chronic embolism and

 thrombosis of unspecified deep veins of 

lower extremity, bilateral              [facility]

 

                2021      R94.31          Abnormal electrocardiogram [ECG]

 [EKG] [facility]

 

                2021      Z71.3           Dietary counseling and surveilla

nce [facility]







Plan of Treatment

Future Appointment(s):* 2021  1:30 pm - [facility] at Main 
  Office

2021 - [facility]* I48.0 Paroxysmal atrial fibrillation* 
  Recommendations:* Continue Eliquis, digoxin, flecainide, and metoprolol at the
   current dosages Patient agreeable to contact us with more frequent episodes 
  of tachycardia or palpitations, discussed Holter monitor therapy with patient 
  at this time she would like to refrain further evaluation





* I50.32 Chronic diastolic (congestive) heart failure* Recommendations:* 
  Continue digoxin, furosemide, and metoprolol at the current dosages Please 
  alert our office with a weight gain of more than 3 pounds, onset of shortness 
  of breath, or lower extremity edema





* I11.0 Hypertensive heart disease with heart failure* New Labs:* BMP, Ordered: 
  21



* Recommendations:* Continue metoprolol at the current dosage Advised patient to
   monitor blood pressures at home and to alert our office with readings >140/>
  90





* I27.81 Cor pulmonale (chronic)* Recommendations:* No medication changes made 
  today Patient agreeable to contact us with the onset of any shortness of 
  breath or edema





* I65.29 Occlusion and stenosis of unspecified carotid artery* Recommendations:
  * Carotid ultrasound ordered for further evaluation Advised patient to seek 
  emergency medical attention if she develops any further lateralizing 
  neurologic symptoms





* I34.0 Nonrheumatic mitral (valve) insufficiency* Recommendations:* No further 
  evaluation is needed at this time





* E78.2 Mixed hyperlipidemia* New Labs:* Lipid Panel, Ordered: 21



* Recommendations:* Please obtain fasting labs Continue atorvastatin at the 
  current dosage





* I82.503 Chronic embolism and thrombosis of unspecified deep veins of lower 
  extremity, bilateral* New Labs:* Protein C & S Activity Panel, Ordered: 
  21

* Factor V Leiden, Ordered: 21

* CBC W/Auto Differential, Ordered: 21



* Referral:* Lupe Arredondo MD, Hematology & Oncology/Phy



* Recommendations:* Referral made to Dr. Arredondo for further evaluation of 
  patient's probable coagulopathy Please obtain labs, to patient's knowledge she
   has never been assessed for protein C or protein S deficiency or for factor V
   Leiden mutation Advised patient to seek emergency medical attention if she 
  does believe she is having another DVT  or if she develops symptoms of 
  shortness of breath, hemoptysis, or chest pain





* R94.31 Abnormal electrocardiogram [ECG] [EKG]* Recommendations:* No further 
  evaluation is needed at this time.





* Z71.3 Dietary counseling and surveillance* Recommendations:* Recommended for 
  patient to follow a more whole food diet. Advised patient to avoid overly 
  processed foods and packaged foods. Advised patient to avoid sodas, juices and
   other liquid calories. Recommended at least 30 minutes of exercise 3 days a 
  week.





* All * Follow up:* Follow up in 6 months









Functional Status





                Functional Condition Comment         Date            Status

 

                Independent with all ADL's                                 Activ

e

 

                Requires assistance with ambulating                             

    Active







Mental Status





                                        Description

 

                                        No Information Available







Referrals





                Refer to      Reason for Referral Status          Appt Date

 

                          Lupe Arredondo MD    Please evaluate and treat th

is patient for an extensive 

history of repetitive, unprovoked deep vein thrombosis.  To her knowledge, she 
has never been evaluated by a hematologist in the past.  Patient is relatively 
new to [facility] and may have seen a provider in [place].  I have ordered 
basic labs, including BMP, CBC, protein C&S, as well as factor V Leiden that we 
will forward to your office as they become available to us.  Thank you for your 
care of this patient.     Patient Declined          2021

 

                                        [facility] - Oncology

 

                                        [address] [place], [place] [id number] [phone]

## 2021-10-25 NOTE — CCD
Continuity of Care Document (CCD)

                             Created on: 2021



Juany Becerril

External Reference #: [id number]

: 1955

Sex: Female



Demographics





                          Address                   [address] [place] [address]  [id number]

 

                          Home Phone                [phone]

 

                          Preferred Language        Unknown

 

                          Marital Status            

 

                          Jewish Affiliation     Unknown

 

                          Race                      White

 

                          Ethnic Group              Not  or 





Author





                          Author                    Juany CACERES PA

 

                          Organization              Unknown

 

                          Address                   [address] [place] [address], [address]

[place]  [address]



 

                          Phone                     [phone]







Care Team Providers





                    Care Team Member Name Role                Phone

 

                    Charmaine Regalado MD  AUTM                [phone]

 

                    Lupe Arredondo MD AUTM                [phone]







Problems





                    Active Problems     Provider            Date

 

                    Precordial pain     Jermaine Chahal MD  Onset: 2019

 

                    Dyspnea             Jermaine Chahal MD  Onset: 2019

 

                    Palpitations        Jermaine Chahal MD  Onset: 2019

 

                    Paroxysmal atrial fibrillation Jermaine Chahal MD  Onset: 

 

                    Electrocardiogram abnormal Jermaine Chahal MD  Onset: 2019

 

                    Cardiomegaly        Jermaine Chahal MD  Onset: 2019

 

                    Right bundle branch block Jermaine Chahal MD  Onset: 

019

 

                    Dietary management surveillance Jermaine Chahal MD  Onset: 1

2019

 

                    Obstructive sleep apnea syndrome Jermaine Chahal MD  Onset: 

2019

 

                    Syncope and collapse Jermaine Chahal MD  Onset: 2019

 

                    Chronic diastolic heart failure Jermaine Chahal MD  Onset: 0

3/09/2020

 

                    Chronic pulmonary heart disease Jermaine Chahal MD  Onset: 0

3/09/2020

 

                          Benign hypertensive heart disease with congestive card

iac failure Jermaine Chahal MD                              Onset: 2020

 

                    Mitral valve disorder Jermaine Chahal MD  Onset: 2020

 

                    Chronic cor pulmonale AIXA Dennis Onset: 20

21

 

                    Mixed hyperlipidemia AIXA Dennis Onset: 

1

 

                    Deep venous thrombosis of lower extremity AIXA Dennis Onset: 

2021

 

                    Carotid artery occlusion AIXA Dennis Onset: 







Social History





                Type            Date            Description     Comments

 

                Birth Sex                       Unknown          

 

                ETOH Use                        Occasionally consumes wine  

 

                Tobacco Use     Start: Unknown  Patient is a current smoker, smo

kes every day Started

smoking at age 27, smokes 3 cigarettes a day 

 

                Smoking Status  Reviewed: 21 Patient is a current smoker, 

smokes every day 

Started smoking at age 27, smokes 3 cigarettes a day 

 

                Exercise Type/Frequency                 Walks daily     in good 

weather  

 

                Exercise Type/Frequency                 Does housework daily  

 

                Exercise Limitations                 Back Pain        

 

                Exercise Limitations                 Orthopedic Problem Knee gary

n 

 

                Exercise Limitations                 Claudication     







Allergies, Adverse Reactions, Alerts





             Active Allergies Criticality  Reaction | Severity Comments     Date

 

             Motrin       Unable to assess criticality              GI Upset    

 2019







Medications





           Active Medications SIG        Qnty       Indications Ordering Provide

r Date

 

                          Eliquis                     5mg Tablets               

    1 by mouth twice a day

                                                Charmaine Regalado MD 2021

 

                    Digox                     250mcg Tablets                   1

 by mouth every day 

90tabs                                  Jermaine Chahal MD  2020

 

                          Famotidine                     40mg Tablets           

        1 tablet daily at 

bedtime         30tabs          R07.2           Jermaine Chahal MD 2019

 

                          Acetaminophen                     500mg Tablets       

            1-2 by mouth 

every 6 hours as needed                                 Unknown         20

19

 

                          Atorvastatin Calcium                     20mg Tablets 

                  1 by 

mouth every night at bedtime                                 Unknown         2019

 

                One Daily                      Tablets                   1 by mo

uth every day                 

                          Unknown                   2019

 

                          Methimazole                     10mg Tablets          

         1 by mouth every 

morning                                         Unknown         2019

 

                          Vitamin E Blend                     400Unit Capsules  

                 1 by 

mouth every day                                 Unknown         2019

 

                          Flecainide Acetate                     100mg Tablets  

                 take one 

tablet by mouth twice a day 180tabs                         Jermaine Chahal MD 1

2019

 

                          Aspirin 81                     81mg Tablets DR        

           1 by mouth 

every day                                       Unknown         2019

 

                          Metoprolol Tartrate                     25mg Tablets  

                 1 by 

mouth twice a day                                 Unknown         2019

 

                          Mucus Relief DM                     20-400mg Tablets  

                 1 tab po 

at bedtime                                      Unknown         2019

 

                          Furosemide                     20mg Tablets           

        1 by mouth once a 

day                                             Unknown         







Immunizations





                                        Description

 

                                        No Information Available







Vital Signs





                Date            Vital           Result          Comment

 

                2021  1:02pm Weight          220.00 lb        

 

                    Height              65 inches           5'5"

 

                    BMI (Body Mass Index) 36.6 kg/m2           

 

                    Heart Rate          65 /min              

 

                    BP Systolic Sitting 136 mmHg            Ra, large cuff

 

                    BP Diastolic Sitting 84 mmHg             Ra, large cuff

 

                2020 12:43pm Weight          229.00 lb        

 

                    Height              65 inches           5'5"

 

                    BMI (Body Mass Index) 38.1 kg/m2           

 

                    Heart Rate          67 /min              

 

                    BP Systolic Sitting 126 mmHg            large cuff, Ra

 

                    BP Diastolic Sitting 74 mmHg             large cuff, Ra







Results





        Test    Acquired Date Facility Test    Result  H/L     Range   Note

 

                    CBC without Differential 2021          [facility] - not inter

faced

           (315)-   - White Blood Count 6.8                   5.0-10.0    

 

             Red Blood Count 3.85         Low          4.00-5.40     

 

             Platelets    187                       172-450       

 

             Hemoglobin   12.7                                    

 

             Hematocrit   39.4                                    

 

                    CBC without Differential 2021          [facility] - not inter

faced

           (315)-   - White Blood Count 6.2                   5.0-10.0    

 

             Red Blood Count 3.76         Low          4.00-5.40     

 

             Platelets    199                       172-450       

 

             Hemoglobin   12.5                                    

 

             Hematocrit   39.0                                    

 

                    CMP                 2021          [facility] - not interfaced

           (315)-   - Albumin Serum/Plasma 3.0                               

 

             Alt - SGPT   19                                      

 

             Calcium Ser/Plasma Mass/Vol 10.2                                   

 

 

             Carbon Dioxide Ser/Plasm 27                                      

 

             Chloride Serum/Plasma 107                                     

 

             Alkaline Phosphatase 77                                      

 

             Potassium    4.7                                     

 

             Protein Total 8.3                                     

 

             Sodium       137                                     

 

             Ast - Sgot   23                                      

 

             BUN - Urea Nitrogen 28                                      

 

             Glucose      98                                

 

             Creatinine For GFR 0.90                                    

 

                    Complete Blood Count 2021          [facility]

rt

             WBC          8.4 x10*3/UL              4.1-10.9      

 

             RBC          4.20 x10*6/UL              4.20-6.30     

 

             Hemoglobin   14.0 g/dL                 12.0-18.0     

 

             Hematocrit   40.3 %                    37.0-51.0     

 

             MCV          95.8 fL                   80.0-97.0     

 

             MCH          33.3 pg      High         26.0-32.0     

 

             MCHC         34.7 g/dL                 31.0-38.0     

 

             RDW          13.3 %                    11.6-13.7     

 

             PLT          207 x10*3/UL              140-440       

 

             MPV          8.0 FL                    7.8-11.0      

 

             Lymph %      17.5 %                    10.0-58.5     

 

             Mid %        4.8 %                     1.7-9.3       

 

             Neut %       77.7 %                    37.0-92.0     

 

             Lymph #      1.4 x10*3/UL              0.6-4.1       

 

             Mid #        0.5 x10*3/UL              0.1-0.6       

 

             Neut #       6.5 x10*3/UL              2.0-7.8       

 

                    Basic Metabolic Panel 2021          [facility]

ort

             Glucose      70 mg/dL     Low          74-99        1

 

             BUN          23 mg/dL     High         7-18          

 

             Creatinine   0.9 mg/dL                 0.6-1.3       

 

             Sodium       144 mEq/L                 136-145       

 

             Potassium    3.8 mEq/L                 3.5-5.1       

 

             Chloride     104 mEq/L                         

 

             Carbon Dioxide 32 mEq/L                  21-32         

 

             Calcium      10.1 mg/dL                8.5-10.1      

 

             GFR  >= 60 mL/min                             

 

             GFR African American >= 60 mL/min                            2

 

                    Lipid Profile       2021          [facility]

t

             Cholesterol  132 mg/dL                 131-200       

 

             Triglycerides 72 mg/dL                          

 

             HDL Cholesterol 54 mg/dL                  35-60         

 

             LDL (Calculated) 64 CALC                           

 

                    Laboratory test finding 2021          [facility] I

mport

             Thyroid Stimulating Hormone 0.32 uIU/mL  Low          0.36-3.74    

 

 

             Digoxin Level 0.3 ng/mL    Low          0.5-2.0      3







                          1                         100-125 mg/dL     PRE-DIABET

ES/FASTING

>126 mg/dL          DIABETES/FASTING





 

                          2                         CHRONIC KIDNEY DISEASE STAGI

NG PER NKF



STAGE I & II      GFR >= 60        NORMAL TO MILDLY DECREASED

STAGE III          GFR 30-59          MODERATELY DECREASED

STAGE IV           GFR 15-29         SEVERELY DECREASED

STAGE V            GFR <15            VERY LITTLE GFR LEFT

ESRD                 GFR <15            ON RRT





 

                          3                         note:<nlbl:demographic_chang

ed>











Procedures





                Date            Code            Description     Status

 

                2021      12387           Office/Outpatient Established Mo

d MDM 30-39 Min Completed

 

                2021      98167           ECG 12-Lead     Completed

 

                2021      48226           Office/Outpatient Established Mo

d MDM 30-39 Min Completed

 

                2021      27678           ECG 12-Lead     Completed







Medical Devices





                                        Description

 

                                        No Information Available







Encounters





                                        Description

 

                                        No Information Available







Assessments





                Date            Code            Description     Provider

 

                2021      I48.0           Paroxysmal atrial fibrillation C

AIXA Pablo

 

                2021      I50.32          Chronic diastolic (congestive) h

eart failure AIXA Dennis

 

                2021      I11.0           Hypertensive heart disease with 

heart failure Hortencia Pruett, PA

 

                2021      I27.81          Cor pulmonale (chronic) Isabelr

kang Pruett, PA

 

                2021      I65.29          Occlusion and stenosis of unspec

ified carotid artery Hortencia Pruett, PA

 

                2021      I34.0           Nonrheumatic mitral (valve) insu

fficiency Hortencia Pruett, 

PA

 

                2021      E78.2           Mixed hyperlipidemia Hortencia Pruett, PA

 

                    2021          I82.503             Chronic embolism and

 thrombosis of unspecified deep veins of 

lower extremity, bilateral              Hortencia Pruett, PA

 

                2021      R94.31          Abnormal electrocardiogram [ECG]

 [EKG] Hortencia Pruett, PA

 

                2021      Z71.3           Dietary counseling and surveilla

nce Hortencia Pruett PA

 

                2021      I48.0           Paroxysmal atrial fibrillation C

rick Pruett, PA

 

                2021      I50.32          Chronic diastolic (congestive) h

eart failure Hortencia Pruett, PA

 

                2021      I11.0           Hypertensive heart disease with 

heart failure Hortencia Pruett, PA

 

                2021      I27.81          Cor pulmonale (chronic) Miguel Pruett, PA

 

                2021      I65.29          Occlusion and stenosis of unspec

ified carotid artery Hortencia Pruett, PA

 

                2021      I34.0           Nonrheumatic mitral (valve) insu

fficiency Hortencia Pruett, 

[place]

 

                2021      E78.2           Mixed hyperlipidemia Hortencia Pruett, PA

 

                    2021          I82.503             Chronic embolism and

 thrombosis of unspecified deep veins of 

lower extremity, bilateral              Hortencia Pruett, PA

 

                2021      R94.31          Abnormal electrocardiogram [ECG]

 [EKG] Hortencia Pruett, PA

 

                2021      Z71.3           Dietary counseling and surveilla

AIXA Cervantes







Plan of Treatment

2021 - AIXA Dennis* I48.0 Paroxysmal atrial fibrillation* 
  Recommendations:* Continue Eliquis, digoxin, flecainide, and metoprolol at the
   current dosages Patient agreeable to contact us with more frequent episodes 
  of tachycardia or palpitations, discussed Holter monitor therapy with patient 
  at this time she would like to refrain further evaluation





* I50.32 Chronic diastolic (congestive) heart failure* Recommendations:* 
  Continue digoxin, furosemide, and metoprolol at the current dosages Please 
  alert our office with a weight gain of more than 3 pounds, onset of shortness 
  of breath, or lower extremity edema





* I11.0 Hypertensive heart disease with heart failure* Recommendations:* 
  Continue metoprolol at the current dosage Advised patient to monitor blood 
  pressures at home and to alert our office with readings >140/>90





* I27.81 Cor pulmonale (chronic)* Recommendations:* No medication changes made 
  today Patient agreeable to contact us with the onset of any shortness of 
  breath or edema





* I65.29 Occlusion and stenosis of unspecified carotid artery* Recommendations:
  * Carotid ultrasound ordered for further evaluation Advised patient to seek 
  emergency medical attention if she develops any further lateralizing 
  neurologic symptoms





* I34.0 Nonrheumatic mitral (valve) insufficiency* Recommendations:* No further 
  evaluation is needed at this time





* E78.2 Mixed hyperlipidemia* Recommendations:* Please obtain fasting labs 
  Continue atorvastatin at the current dosage





* I82.503 Chronic embolism and thrombosis of unspecified deep veins of lower 
  extremity, bilateral* Referral:* Lupe Arredondo MD, Hematology & 
  Oncology/y



* Recommendations:* Referral made to Dr. Arredondo for further evaluation of 
  patient's probable coagulopathy Please obtain labs, to patient's knowledge she
   has never been assessed for protein C or protein S deficiency or for factor V
   Leiden mutation Advised patient to seek emergency medical attention if she 
  does believe she is having another DVT  or if she develops symptoms of 
  shortness of breath, hemoptysis, or chest pain





* R94.31 Abnormal electrocardiogram [ECG] [EKG]* Recommendations:* No further 
  evaluation is needed at this time.





* Z71.3 Dietary counseling and surveillance* Recommendations:* Recommended for 
  patient to follow a more whole food diet. Advised patient to avoid overly 
  processed foods and packaged foods. Advised patient to avoid sodas, juices and
   other liquid calories. Recommended at least 30 minutes of exercise 3 days a 
  week.





* All * Follow up:* Follow up in 6 months









Functional Status





                Functional Condition Comment         Date            Status

 

                Independent with all ADL's                                 Activ

e

 

                Requires assistance with ambulating                             

    Active







Mental Status





                                        Description

 

                                        No Information Available







Referrals





                                        Description

 

                                        No Information Available

## 2021-10-25 NOTE — CCD
Summarization Of Episode

                             Created on: 10/25/2021



BENTLEYJOSEPH LINA

External Reference #: [id number]

: 1955

Sex: Undifferentiated



Demographics





                          Address                   [address]

[place]  [address]

 

                          Home Phone                [phone]

 

                          Preferred Language        English

 

                          Marital Status            Unknown

 

                          Pentecostalism Affiliation     Unknown

 

                          Race                      Unknown

 

                          Ethnic Group              Not  or 





Author





                          Author                    [facility]

 

                          Organization              [facility]

 

                          Address                   Unknown

 

                          Phone                     Unavailable







Support





                Name            Relationship    Address         Phone

 

                RETIRED         Next Of Kin     Unknown         [phone]

 

                    ABBCESS,  DREAD     Next Of Kin         [address]

[place]  [address]                    [phone]

 

                    MARJAN VALLADARES     Next Of Kin         [address]

[place]  [address]                    [phone]

 

                    MARJAN BIRD     Next Of Kin         [address]

[place]  [address]                    [phone]

 

                DISABLED        Next Of Kin     Unknown         Unavailable

 

                    ALFRED MOSLEY    Next Of Kin         [address] [place]  [address]                    [phone]

 

                    Abbcess,  Dread     ECON                [address]

[place]  [address]                    Unavailable







Care Team Providers





                    Care Team Member Name Role                Phone

 

                    JHONNY Meeks MD Unavailable         Unavailable

 

                    JHONNY Meeks MD Unavailable         Unavailable

 

                    JHONNY Meeks MD Unavailable         Unavailable

 

                    JHONNY Meeks MD Unavailable         Unavailable

 

                    JHONNY Meeks MD Unavailable         Unavailable

 

                    JHONNY Meeks MD Unavailable         Unavailable

 

                    JHONNY Meeks MD Unavailable         Unavailable

 

                    JHONNY Meeks MD Unavailable         Unavailable

 

                    JHONNY Meeks MD Unavailable         Unavailable

 

                    JHONNY Meeks MD Unavailable         Unavailable

 

                    JHONNY Meeks MD Unavailable         Unavailable

 

                    Fish, B Uzair PURCELL   Unavailable         Unavailable

 

                    Fish, B Uzair PURCELL   Unavailable         Unavailable

 

                    Fish, B Uzair PURCELL   Unavailable         Unavailable

 

                    Fish, B Uzair PURCELL   Unavailable         Unavailable

 

                    Fish, B Uzair PURCELL   Unavailable         Unavailable

 

                    Fish, B Uzair PURCELL   Unavailable         Unavailable

 

                    Fish, B Uzair PURCELL   Unavailable         Unavailable

 

                    Fish, B Uzair PURCELL   Unavailable         Unavailable

 

                    Fish, B Uzair PURCELL   Unavailable         Unavailable

 

                    Fish, B Uzair PURCELL   Unavailable         Unavailable

 

                    Fish, B Uzair PURCELL   Unavailable         Unavailable

 

                    Fish, B Uzair PURCELL   Unavailable         Unavailable

 

                    Fish, B Uzair PURCELL   Unavailable         Unavailable

 

                    Fish, B Uzair PURCELL   Unavailable         Unavailable

 

                    Fish, B Uzair PURCELL   Unavailable         Unavailable

 

                    Fish, B Uzair PURCELL   Unavailable         Unavailable

 

                    Fish, B Uzair PURCELL   Unavailable         Unavailable

 

                    Fish, B Uzair PURCELL   Unavailable         Unavailable

 

                    Fish, B Uzair PURCELL   Unavailable         Unavailable

 

                    Fish, B Uzair PURCELL   Unavailable         Unavailable

 

                    Fish, B Uzair PURCELL   Unavailable         Unavailable

 

                    Fish, B Uzair PURCELL   Unavailable         Unavailable

 

                    Fish, B Uzair PURCELL   Unavailable         Unavailable

 

                    Fish, B Uzair PURCELL   Unavailable         Unavailable

 

                    Fish, B Uzair PURCELL   Unavailable         Unavailable

 

                    Fish, B Uzair PURCELL   Unavailable         Unavailable

 

                    Fish, B Uzair PURCELL   Unavailable         Unavailable

 

                    Fish, B Uzair PURCELL   Unavailable         Unavailable

 

                    Fish, B Uzair PURCELL   Unavailable         Unavailable

 

                    Fish, B Uzair PURCELL   Unavailable         Unavailable

 

                    Fish, B Uzair PURCELL   Unavailable         Unavailable

 

                    Fish, B Uzair PURCELL   Unavailable         Unavailable

 

                    Fish, B Uzair PURCELL   Unavailable         Unavailable

 

                    Fish, B Uzair PURCELL   Unavailable         Unavailable

 

                    Fish, B Uzair PURCELL   Unavailable         Unavailable

 

                    Fish, B Uzair PURCELL   Unavailable         Unavailable

 

                    Fish, B Uzair PURCELL   Unavailable         Unavailable

 

                    Fish, B Uzair PURCELL   Unavailable         Unavailable

 

                    Fish, B Uzair PURCELL   Unavailable         Unavailable

 

                    Fish, B Uzair PURCELL   Unavailable         Unavailable

 

                    Fish, B Uzair PURCELL   Unavailable         Unavailable

 

                    Fish, B Uzair PURCELL   Unavailable         Unavailable

 

                    Fish, B Uzair PURCELL   Unavailable         Unavailable

 

                    Fish, B Uzair PURCELL   Unavailable         Unavailable

 

                    Fish, B Uzair PURCELL   Unavailable         Unavailable

 

                    Fish, B Uzair PURCELL   Unavailable         Unavailable

 

                    Fish, B Uzair PURCELL   Unavailable         Unavailable

 

                    Fish, B Uzair PURCELL   Unavailable         Unavailable

 

                    Fish, B Uzair PURCELL   Unavailable         Unavailable

 

                    Fish, B Uzair PURCELL   Unavailable         Unavailable

 

                    Fish, B Uzair PURCELL   Unavailable         Unavailable

 

                    Fish, B Uzair PURCELL   Unavailable         Unavailable

 

                    Fish, B Uzair PURCELL   Unavailable         Unavailable

 

                    Fish, B Uzair PURCELL   Unavailable         Unavailable

 

                    Fish, B Uzair PURCELL   Unavailable         Unavailable

 

                    Fish, B Uzair PURCELL   Unavailable         Unavailable

 

                    Fish, Lina Josefina MPAS, PA-C Unavailable         Unavailabl

e

 

                    Fish, Lina Josefina MPAS, PA-C Unavailable         Unavailabl

e

 

                    Fish, Lina Josefina MPAS, PA-C Unavailable         Unavailabl

e

 

                    Fish, Lina Josefina MPAS, PA-C Unavailable         Unavailabl

e

 

                    Fish, Lina Josefina MPAS, PA-C Unavailable         Unavailabl

e

 

                    Fish, Lina Josefina MPAS, PA-C Unavailable         Unavailabl

e

 

                    Fish, Lina Josefina MPAS, PA-C Unavailable         Unavailabl

e

 

                    Fish, Lina Josefina MPAS, PA-C Unavailable         Unavailabl

e

 

                    Fish, Lina Josefina MPAS, PA-C Unavailable         Unavailabl

e

 

                    Fish, Lina Josefina MPAS, PA-C Unavailable         Unavailabl

e

 

                    Fish, Lina Josefina MPAS, PA-C Unavailable         Unavailabl

e

 

                    Fish, Lina Josefina MPAS, PA-C Unavailable         Unavailabl

e

 

                    Fish, Lina Josefina MPAS, PA-C Unavailable         Unavailabl

e

 

                    Fish, Lina Josefina MPAS, PA-C Unavailable         Unavailabl

e

 

                    Fish, Lina Josefina MPAS, PA-C Unavailable         Unavailabl

e

 

                    Fish, Lina Josefina MPAS, PA-C Unavailable         Unavailabl

e

 

                    Fish, Lina Josefina MPAS, PA-C Unavailable         Unavailabl

e

 

                    Fish, Lina Josefina MPAS, PA-C Unavailable         Unavailabl

e

 

                    Fish, Lina Josefina MPAS, PA-C Unavailable         Unavailabl

e

 

                    Fish, Lina Josefina MPAS, PA-C Unavailable         Unavailabl

e

 

                    Fish, Lina Josefina MPAS, PA-C Unavailable         Unavailabl

e

 

                    Fish, Lina Josefina MPAS, PA-C Unavailable         Unavailabl

e

 

                    Fish, Lina Josefina MPAS, PA-C Unavailable         Unavailabl

e

 

                    Fish, Lina Josefina MPAS, PA-C Unavailable         Unavailabl

e

 

                    Fish, Lina Josefina MPAS, PA-C Unavailable         Unavailabl

e

 

                    Fish, Lina Josefina MPAS, PA-C Unavailable         Unavailabl

e

 

                    Fish, Lina Josefina MPAS, PA-C Unavailable         Unavailabl

e

 

                    Fish, Lina Josefina MPAS, PA-C Unavailable         Unavailabl

e

 

                    Fish, Lina Josefina MPAS, PA-C Unavailable         Unavailabl

e

 

                    Fish, Lina Josefina MPAS, PA-C Unavailable         Unavailabl

e

 

                    Fish, Lina Josefina MPAS, PA-C Unavailable         Unavailabl

e

 

                    Fish, Lina Josefina MPAS, PA-C Unavailable         Unavailabl

e

 

                    Fish, Lina Josefina MPAS, PA-C Unavailable         Unavailabl

e

 

                    Fish, Lina Josefina MPAS, PA-C Unavailable         Unavailabl

e

 

                    Fish, Lina Josefina MPAS, PA-C Unavailable         Unavailabl

e

 

                    Fish, Lina Josefina MPAS, PA-C Unavailable         Unavailabl

e

 

                    PICKJHONNY EVANS JR, PA-C Unavailable         Unavailable

 

                    PICKERAL JHONNY HERNANDEZ PA-C Unavailable         Unavailable

 

                    PICKERAL JHONNY HERNANDEZ PA-C Unavailable         Unavailable

 

                    PICKERAL JRJHONNY PA-C Unavailable         Unavailable

 

                    PICKERAL JHONNY HERNANDEZ PA-C Unavailable         Unavailable

 

                    JHONNY ESQUEDA JR, PA-C Unavailable         Unavailable

 

                    JHONNY ESQUEDA JR, PA-C Unavailable         Unavailable

 

                    PICKERAL JRJHONNY PA-C Unavailable         Unavailable

 

                    PICKERAL JRJHONNY PA-C Unavailable         Unavailable

 

                    PICKERAL JR, J ALBERTO PA-C Unavailable         Unavailable

 

                    PICKERAL JR, J ALBERTO PA-C Unavailable         Unavailable

 

                    PICKERAL JR, J ALBERTO PA-C Unavailable         Unavailable

 

                    PICKERAL JR, J ALBERTO PA-C Unavailable         Unavailable

 

                    PICKERAL JR, J ALBERTO PA-C Unavailable         Unavailable

 

                    PICKERAL JR, J ALBERTO PA-C Unavailable         Unavailable

 

                    PICKERAL JR, J ALBERTO PA-C Unavailable         Unavailable

 

                    PICKERAL JR, J ALBERTO PA-C Unavailable         Unavailable

 

                    PICKERAL JR, J ALBERTO PA-C Unavailable         Unavailable

 

                    PICKERAL JR, J ALBERTO PA-C Unavailable         Unavailable

 

                    PICKERAL JR, J ALBERTO PA-C Unavailable         Unavailable

 

                    PICKERAL JR, J ALBERTO PA-C Unavailable         Unavailable

 

                    PICKERAL JR, J ALBERTO PA-C Unavailable         Unavailable

 

                    PICKERAL JR, J ALBERTO PA-C Unavailable         Unavailable

 

                    PICKERAL JR, J ALBERTO PA-C Unavailable         Unavailable

 

                    PICKERAL JR, J ALBERTO PA-C Unavailable         Unavailable

 

                    PICKERAL JR, J ALBERTO PA-C Unavailable         Unavailable

 

                    PICKERAL JR, J ALBERTO PA-C Unavailable         Unavailable

 

                    ABELARDO Regalado MD Unavailable         Unavailable

 

                    ABELARDO Regalado MD Unavailable         Unavailable

 

                    ABELARDO Regalado MD Unavailable         Unavailable

 

                    ABELARDO Regalado MD Unavailable         Unavailable

 

                    ABELARDO Regalado MD Unavailable         Unavailable

 

                    ABELARDO Regalado MD Unavailable         Unavailable

 

                    ABELARDO Regalado MD Unavailable         Unavailable

 

                    ABELARDO Regalado MD Unavailable         Unavailable

 

                    ABELARDO Regalado MD Unavailable         Unavailable

 

                    ABELARDO Regalado MD Unavailable         Unavailable

 

                    ABELARDO Regalado MD Unavailable         Unavailable

 

                    ABELARDO Regalado MD Unavailable         Unavailable

 

                    ABELARDO Regalado MD Unavailable         Unavailable

 

                    ABELARDO Regalado MD Unavailable         Unavailable

 

                    ABELARDO Regalado MD Unavailable         Unavailable

 

                    ABELARDO Regalado MD Unavailable         Unavailable

 

                    ABELARDO Regalado MD Unavailable         Unavailable

 

                    ABELARDO Regalado MD Unavailable         Unavailable

 

                    ABELARDO Regalado MD Unavailable         Unavailable

 

                    ABELARDO Regalado MD Unavailable         Unavailable

 

                    ABELARDO Regalado MD Unavailable         Unavailable

 

                    ABELARDO Regalado MD Unavailable         Unavailable

 

                    ABELARDO Regalado MD Unavailable         Unavailable

 

                    ABELARDO Regalado MD Unavailable         Unavailable

 

                    ABELARDO Regalado MD Unavailable         Unavailable

 

                    ABELARDO Regalado MD Unavailable         Unavailable

 

                    ABELARDO Regaldao MD Unavailable         Unavailable

 

                    ABELARDO Regalado MD Unavailable         Unavailable

 

                    ABELARDO Regalado MD Unavailable         Unavailable

 

                    ABELARDO Regalado MD Unavailable         Unavailable

 

                    ABELARDO Regalado MD Unavailable         Unavailable

 

                    SabineABELARDO MD Unavailable         Unavailable

 

                    SabineABELARDO fuchs MD Unavailable         Unavailable

 

                    SabineABELARDO MD Unavailable         Unavailable

 

                    SabineABELARDO MD Unavailable         Unavailable

 

                    SabineABELARDO MD Unavailable         Unavailable

 

                    SabineABELARDO MD Unavailable         Unavailable

 

                    SabineABELARDO MD Unavailable         Unavailable

 

                    SabineABELARDO MD Unavailable         Unavailable

 

                    SabineABELARDO MD Unavailable         Unavailable

 

                    SabineABELARDO xavier MD Unavailable         Unavailable

 

                    SabineABELARDO MD Unavailable         Unavailable

 

                    SabineABELARDO MD Unavailable         Unavailable

 

                    SabineABELARDO MD Unavailable         Unavailable

 

                    SabineABELARDO fuchs MD Unavailable         Unavailable

 

                    SabineABELARDO MD Unavailable         Unavailable

 

                    SabineABELARDO MD Unavailable         Unavailable

 

                    SabineABELARDO MD Unavailable         Unavailable

 

                    SabineABELARDO xavier MD Unavailable         Unavailable

 

                    SabineABELARDO MD Unavailable         Unavailable

 

                    ABELARDO Regalado MD Unavailable         Unavailable

 

                    SabineABELARDO fuchs MD Unavailable         Unavailable

 

                    ABELARDO Regalado MD Unavailable         Unavailable

 

                    ABELARDO Regalado MD Unavailable         Unavailable

 

                    ABELARDO Regalado MD Unavailable         Unavailable

 

                    ABELARDO Regalado MD Unavailable         Unavailable

 

                    ABELARDO Regalado MD Unavailable         Unavailable

 

                    ABELARDO Regalado MD Unavailable         Unavailable

 

                    ABELARDO Regalado MD Unavailable         Unavailable

 

                    ABELARDO Regalado MD Unavailable         Unavailable

 

                    ABELARDO Regalado MD Unavailable         Unavailable

 

                    ABELARDO Regalado MD Unavailable         Unavailable

 

                    ABELARDO Regalado MD Unavailable         Unavailable

 

                    ABELARDO Regalado MD Unavailable         Unavailable

 

                    ABELARDO Regalado MD Unavailable         Unavailable

 

                    ABELARDO Regalado MD Unavailable         Unavailable

 

                    ABELARDO Regalado MD Unavailable         Unavailable

 

                    ABELARDO Regalado MD Unavailable         Unavailable

 

                    ABELARDO Regalado MD Unavailable         Unavailable

 

                    ABELARDO Regalado MD Unavailable         Unavailable

 

                    ABELARDO Regalado MD Unavailable         Unavailable

 

                    ABELARDO Regalado MD Unavailable         Unavailable

 

                    ABELARDO Regalado MD Unavailable         Unavailable

 

                    ABELARDO Regalado MD Unavailable         Unavailable

 

                    ABELARDO Regalado MD Unavailable         Unavailable

 

                    ABELARDO Regalado MD Unavailable         Unavailable

 

                    ABELARDO Regalado MD Unavailable         Unavailable

 

                    ABELARDO Regalado MD Unavailable         Unavailable

 

                    ABELARDO Regalado MD Unavailable         Unavailable

 

                    ABELARDO Regalado MD Unavailable         Unavailable

 

                    ABELARDO Regalado MD Unavailable         Unavailable

 

                    Sabine, ABELARDO Montano MD Unavailable         Unavailable

 

                    Sabine, ABELARDO Montano MD Unavailable         Unavailable

 

                    Sabine, ABELARDO Montano MD Unavailable         Unavailable

 

                    AFSHAN, L ERICK PA Unavailable         Unavailable

 

                    AFSHAN, L ERICK PA Unavailable         Unavailable

 

                    AFSHAN, L ERICK PA Unavailable         Unavailable

 

                    AFSHAN, L ERICK PA Unavailable         Unavailable

 

                    AFSHAN, L ERICK PA Unavailable         Unavailable

 

                    AFSHAN, L ERICK PA Unavailable         Unavailable

 

                    AFSHAN, L ERICK PA Unavailable         Unavailable

 

                    AFSHAN, L ERICK PA Unavailable         Unavailable

 

                    AFSHAN, L ERICK PA Unavailable         Unavailable

 

                    AFSHAN, L ERICK PA Unavailable         Unavailable

 

                    AFSHAN, L ERICK PA Unavailable         Unavailable

 

                    AFSHAN, L ERICK PA Unavailable         Unavailable

 

                    AFSHAN, L ERICK PA Unavailable         Unavailable

 

                    AFSHAN, L ERICK PA Unavailable         Unavailable

 

                    AFSHAN, L ERICK PA Unavailable         Unavailable

 

                    AFSHAN, L ERICK PA Unavailable         Unavailable

 

                    ADJAPONG,  ROSA    Unavailable         Unavailable

 

                    Jimenez, L Kanchan RPA Unavailable         Unavailable

 

                    Jimenez, L Kanchan RPA Unavailable         Unavailable

 

                    Jimenez, L Kanchan RPA Unavailable         Unavailable

 

                    Jimenez, L Kanchan RPA Unavailable         Unavailable

 

                    Jimenez, L Kanchan RPA Unavailable         Unavailable

 

                    Jimenez, L Kanchan RPA Unavailable         Unavailable

 

                    Jimenez, L Kanchan RPA Unavailable         Unavailable

 

                    Jimenez, L Kanchan RPA Unavailable         Unavailable

 

                    Jimenez, L Kanchan RPA Unavailable         Unavailable

 

                    Jimenez, L Kanchan RPA Unavailable         Unavailable

 

                    Jimenez, L Kanchan RPA Unavailable         Unavailable

 

                    Jimenez, L Kanchan RPA Unavailable         Unavailable

 

                    Jimenez, L Kanchan RPA Unavailable         Unavailable

 

                    Jimenez, L Kanchan RPA Unavailable         Unavailable

 

                    Jimenez, L Kanchan RPA Unavailable         Unavailable

 

                    Jimenez, L Kanchan RPA Unavailable         Unavailable

 

                    Jimenez, L Kanchan RPA Unavailable         Unavailable

 

                    Jimenez, L Kanchan RPA Unavailable         Unavailable

 

                    Jimenez, L Kanchan RPA Unavailable         Unavailable

 

                    Jimenez, L Kanchan RPA Unavailable         Unavailable

 

                    Jimenez, L Kanchan RPA Unavailable         Unavailable

 

                    Jimenez, L Kanchan RPA Unavailable         Unavailable

 

                    Jimenez, L Kanchan RPA Unavailable         Unavailable

 

                    Jimenez, L Kanchan RPA Unavailable         Unavailable

 

                    Jimenez, L Kanchan RPA Unavailable         Unavailable

 

                    Jimenez, L Kanchan RPA Unavailable         Unavailable

 

                    Jimenez, L Kanchan RPA Unavailable         Unavailable

 

                    Jimenez, L Kanchan RPA Unavailable         Unavailable

 

                    Jimenez, L Kanchan RPA Unavailable         Unavailable

 

                    Jimenez, L Kanchan RPA Unavailable         Unavailable

 

                    Jimenez, L Kanchan RPA Unavailable         Unavailable

 

                    Jimenez, L Kanchan RPA Unavailable         Unavailable



                                  



Re-disclosure Warning

    



Allergies and Adverse Reactions

          



           Type       Description Substance  Reaction   Status     Data Source(s

)

 

           Drug Allergy Drug Allergy NKDA                             [facility] ([facility] )



                                                                                
       



Family History

          



             Family Member Name Family Member Gender Family Member Status Date o

f Status 

Description                             Data Source(s)

 

           Unknown    Male       Problem                          [facility] ([facility]

 

           Unknown    Male       Problem                          [facility] ([facility])



                                                                                
                 



Encounters

          



           Encounter  Providers  Location   Date       Indications Data Source(s

)

 

                Unknown                         [address] [facility], 

[place] [address] 10/21/2021 12:00:00 AM 

EDT                                                 eCW1 ([facility])

 

                Outpatient      Attender: ALBERTO Melchor 1

 02:20:00 PM

EDT                                                 MEDENT ([facility]

)

 

                Outpatient      Admitter: ROSA ADJAPONGReferrer: ROSA ADJAPONG

                 2021 

12:00:00 AM EDT           Multiple myeloma not having achieved remission [facility]

 

                                        Multiple myeloma not having achieved rem

ission 

 

                Outpatient      Attender: Kanchan Jimenez RPA Sabrina/[place]/Ravindra/R

eindl 2021 

10:15:00 AM EDT                                     MEDENT ([facility]

 

                Outpatient      Attender: Charmaine Melchor  01:30:00 PM 

EDT                                                 MEDENT ([facility]

)

 

                Outpatient      Attender: Kanchan Jimenez RPA Sabrina/[place]/Ravindra/R

eindl 06/10/2021 

10:45:00 AM EDT                                     MEDENT ([facility]

 

                Outpatient      Attender: Charmaine Melchor  01:30:00 PM 

EDT                                                 MEDENT ([facility]

)

 

                Outpatient      Attender: Ra Meeks MD Physical Therapy  10:30:00 AM 

EDT                                                 MEDENT ([facility]

 

                Office Visit    Attender: Josefina VIGIL PA-C Physical Therapy

 2021 

01:15:00 PM EDT                                     MEDENT ([facility])

 

             Outpatient   Attender: Uzair Huitron MD Physical Therapy 2021 1

2:45:00 PM EDT 

                                        MEDENT ([facility])

 

                Outpatient      Attender: Josefina VIGIL PA-C Physical Therapy

 2021 11:15:00 

AM EDT                                              MEDENT ([facility]

 

                Outpatient      Attender: Charmaine Melchor  02:30:00 PM 

EDT                                                 MEDENT ([facility]

)

 

                Outpatient      Attender: Kanchan Jimenez RPA [place]/Ravindra/R

eindl 2021 

09:15:00 AM EST                                     MEDENT ([facility]

 

             Outpatient   Attender: ERICK KRUSE Main Office  2021 1

1:45:00 AM EST  

                                        MEDENT ([facility])

 

                Outpatient      Attender: Charmaine Melchor  12:00:00 PM 

EST                                                 MEDENT ([facility]

)

 

                Outpatient      Attender: Charmaine Melchor 10

/ 10:30:00 AM 

EDT                                                 MEDENT ([facility]

)



                                                                                
                                                                                
                                                                            



Immunizations

          



             Vaccine      Date         Status       Description  Data Source(s)

 

                          Influenza, injectable, MDCK, preservative free, bruno

valent 10/06/2021 02:31:00

PM EDT              completed                               [facility])

 

                          Influenza, injectable, MDCK, preservative free, bruno

valent 10/15/2020 12:10:00

PM EDT              completed                               MEDENT ([facility])



                                                                                
                 



Medications

          



          Medication Brand Name Start Date Product Form Dose      Route     Admi

nistrative 

Instructions Pharmacy Instructions Status     Indications Reaction   Description

 Data 

Source(s)

 

           Cephalexin 500 MG Oral Capsule CEPHALEXIN 10/20/2021 12:00:00 AM EDT 

capsule    21         

                          TAKE ONE CAPSULE BY MOUTH THREE TIMES A DAY FOR 7 DAYS

 TAKE ONE CAPSULE BY 

MOUTH THREE TIMES A DAY FOR 7 DAYS SOLD: 10/20/2021                             

           [facility]

 

           Cephalexin 500 MG Oral Capsule CEPHALEXIN 10/06/2021 12:00:00 AM EDT 

capsule    21         

                          TAKE ONE CAPSULE BY MOUTH THREE TIMES A DAY FOR 7 DAYS

 TAKE ONE CAPSULE BY 

MOUTH THREE TIMES A DAY FOR 7 DAYS SOLD: 10/06/2021                             

           [facility]

 

        Cephalexin 500 MG Oral Capsule Cephalexin 10/06/2021 12:00:00 AM EDT    

             ORAL            

             active                                              MEDENT ([facility])

 

       Administration Of Flu Vaccine        10/06/2021 12:00:00 AM EDT          

                          completed  

                                                            MEDENT ([facility])

 

                                        Medication administered onsite 

 

                    2 ML Sodium Hyaluronate 10 MG/ML Prefilled Syringe [Euflexxa

] Euflexxa            

2021 12:00:00 AM EDT                                    active            

          MEDENT ([facility]

 

                atorvastatin 20 MG Oral Tablet ATORVASTATIN CALCIUM 2021 1

2:00:00 AM EDT 

tablet          90                              TAKE ONE TABLET BY MOUTH EVERY D

AY TAKE ONE TABLET BY MOUTH EVERY 

DAY          SOLD: 2021                                        [facility]

s

 

          25 mg               2021 12:00:00 AM EDT tablet    180          

       TAKE ONE TABLET BY MOUTH TWICE A

 DAY       TAKE ONE TABLET BY MOUTH TWICE A DAY SOLD: 10/22/2021                

                  [facility]

 

                atorvastatin 20 MG Oral Tablet ATORVASTATIN CALCIUM 2021 1

2:00:00 AM EDT 

tablet          90                              TAKE ONE TABLET BY MOUTH EVERY D

AY TAKE ONE TABLET BY MOUTH EVERY 

DAY          SOLD: 10/22/2021                                        [facility]

s

 

          25 mg               2021 12:00:00 AM EDT tablet    180          

       TAKE ONE TABLET BY MOUTH TWICE A

 DAY       TAKE ONE TABLET BY MOUTH TWICE A DAY SOLD: 2021                

                  [facility]

 

          100 mg              2021 12:00:00 AM EDT tablet    180          

       TAKE ONE TABLET BY MOUTH TWICE 

A DAY      TAKE ONE TABLET BY MOUTH TWICE A DAY SOLD: 2021                

                  [facility]

 

          Famotidine 20 MG Oral Tablet FAMOTIDINE 2021 12:00:00 AM EDT tab

let    90                  

TAKE ONE TABLET BY MOUTH EVERY DAY TAKE ONE TABLET BY MOUTH EVERY DAY SOLD: 

10/22/2021                                                      [facility]

 

                          Acetaminophen 250 MG / Aspirin 250 MG / Caffeine 65 MG

 Oral Tablet [Excedrin] 

Excedrin Migraine 2021 12:00:00 AM EDT                               activ

e                   MEDENT 

([facility])

 

          20 mg               2021 12:00:00 AM EDT tablet    180          

       TAKE ONE TABLET BY MOUTH TWICE A

 DAY       TAKE ONE TABLET BY MOUTH TWICE A DAY SOLD: 10/22/2021                

                  [facility]

 

          20 mg               2021 12:00:00 AM EDT tablet    90           

       TAKE ONE TABLET BY MOUTH EVERY 

DAY        TAKE ONE TABLET BY MOUTH EVERY DAY SOLD: 2021                  

                [facility]

 

          20 mg               2021 12:00:00 AM EDT tablet    180          

       TAKE ONE TABLET BY MOUTH TWICE A

 DAY       TAKE ONE TABLET BY MOUTH TWICE A DAY SOLD: 2021                

                  [facility]

 

          1 %                 2021 12:00:00 AM EDT gel       100          

       APPLY UP TO 4 GRAMS THREE TIMES A DAY

 TO AFFECTED KNEE AS NEEDED             APPLY UP TO 4 GRAMS THREE TIMES A DAY TO

 AFFECTED 

KNEE AS NEEDED SOLD: 2021                                        [facility]

 

        Famotidine 20 MG Oral Tablet Famotidine 2021 12:00:00 AM EDT      

           ORAL                    

active                                                          MEDENT ([facility])

 

        Furosemide 20 MG Oral Tablet Furosemide 2021 12:00:00 AM EDT      

           ORAL                    

active                                                          MEDENT ([facility])

 

          5 mg                03/15/2021 12:00:00 AM EDT tablet    40           

       TAKE ONE TABLET BY MOUTH EVERY 

DAY, EXCEPT TAKE 2 TABLETS ON MONDAY, WEDNESDAY AND FRIDAY TAKE ONE TABLET BY 

MOUTH EVERY DAY, EXCEPT TAKE 2 TABLETS ON MONDAY, WEDNESDAY AND FRIDAY SOLD: 

2021                                                      [facility]

 

          apixaban 5 MG Oral Tablet [Eliquis] Eliquis   2021 12:00:00 AM E

ST                     ORAL      

                      active                                      MEDENT ([facility]

 

                    24 HR Bupropion Hydrochloride 150 MG Extended Release Oral T

ablet BUPROPION HCL       

2021 12:00:00 AM EST tablet extended release 24 hr 30                     

         TAKE ONE TABLET BY

 MOUTH EVERY MORNING TAKE ONE TABLET BY MOUTH EVERY MORNING SOLD: 2021    

              

                                                    [facility]

 

          5 mg                2021 12:00:00 AM EST tablet    60           

       TAKE ONE TABLET BY MOUTH TWICE A 

DAY        TAKE ONE TABLET BY MOUTH TWICE A DAY SOLD: 2021                

                  [facility]

 

                    24 HR Bupropion Hydrochloride 150 MG Extended Release Oral T

ablet BUPROPION HCL       

2021 12:00:00 AM EST tablet extended release 24 hr 30                     

         TAKE ONE TABLET BY

 MOUTH EVERY MORNING TAKE ONE TABLET BY MOUTH EVERY MORNING SOLD: 2021    

              

                                                    [facility]

 

          5 mg                2021 12:00:00 AM EST tablet    60           

       TAKE ONE TABLET BY MOUTH TWICE A 

DAY        TAKE ONE TABLET BY MOUTH TWICE A DAY SOLD: 2021                

                  [facility]

 

                          24 HR Bupropion Hydrochloride 150 MG Extended Release 

Oral Tablet Bupropion 

Hydrochloride ER (XL) 2021 12:00:00 AM EST             ORAL              a

ctive                   

MEDENT ([facility])

 

                          24 HR Bupropion Hydrochloride 150 MG Extended Release 

Oral Tablet Bupropion 

Hydrochloride ER (XL) 2021 12:00:00 AM EST             ORAL              a

ctive                   

MEDENT ([facility])

 

          5 mg                2020 12:00:00 AM EST tablet    30           

       TAKE ONE TABLET BY MOUTH TWICE A 

DAY        TAKE ONE TABLET BY MOUTH TWICE A DAY SOLD: 2020                

                  [facility]

 

          5 mg                2020 12:00:00 AM EST tablet    30           

       TAKE ONE TABLET BY MOUTH TWICE A 

DAY        TAKE ONE TABLET BY MOUTH TWICE A DAY SOLD: 2021                

                  [facility]

 

           Cephalexin 500 MG Oral Capsule CEPHALEXIN 2020 12:00:00 AM EST 

capsule    30         

                          TAKE ONE CAPSULE BY MOUTH THREE TIMES A DAY TAKE ONE C

APSULE BY MOUTH THREE 

TIMES A DAY  SOLD: 2020                                        [facility]

s

 

          apixaban 5 MG Oral Tablet [Eliquis] Eliquis   2020 12:00:00 AM E

ST                     ORAL      

                      active                                      MEDENT ([facility])

 

          apixaban 5 MG Oral Tablet [Eliquis] Eliquis   2020 12:00:00 AM E

ST                     ORAL      

                      active                                      MEDENT ([facility])

 

       Administration Of Flu Vaccine        10/15/2020 12:00:00 AM EDT          

                          completed  

                                                            [facility])

 

                                        Medication administered onsite 

 

          20 mg               2020 12:00:00 AM EDT tablet    90           

       TAKE ONE TABLET BY MOUTH EVERY 

MORNING    TAKE ONE TABLET BY MOUTH EVERY MORNING SOLD: 2021              

                    [facility]

 

          5 mg                2020 12:00:00 AM EDT tablet    30           

       TAKE 1/2 TABLET BY MOUTH ONCE 

DAILY      TAKE 1/2 TABLET BY MOUTH ONCE DAILY SOLD: 10/24/2020                 

                 [facility]

 

          40 mg               2020 12:00:00 AM EDT tablet    30           

       TAKE ONE TABLET BY MOUTH AT 

BEDTIME    TAKE ONE TABLET BY MOUTH AT BEDTIME SOLD: 10/24/2020                 

                 [facility]

 

          40 mg               2020 12:00:00 AM EDT tablet    30           

       TAKE ONE TABLET BY MOUTH AT 

BEDTIME    TAKE ONE TABLET BY MOUTH AT BEDTIME SOLD: 2021                 

                 [facility]

 

          Famotidine 40 MG Oral Tablet FAMOTIDINE 2020 12:00:00 AM EDT tab

let    30                  

TAKE ONE TABLET BY MOUTH AT BEDTIME TAKE ONE TABLET BY MOUTH AT BEDTIME SOLD: 

2021                                                      [facility]

 

                    Digoxin 0.25 MG Oral Tablet 250 mcg (0.25 mg) DIGOXIN       

      2020 12:00:00 AM EDT

             tablet       90                        TAKE ONE TABLET BY MOUTH JAMES

 DAY TAKE ONE TABLET BY MOUTH EVERY 

DAY          SOLD: 2021                                        [facility]

s

 

          10 mg               2019 12:00:00 AM EST tablet    90           

       TAKE ONE TABLET BY MOUTH EVERY 

DAY        TAKE ONE TABLET BY MOUTH EVERY DAY SOLD: 10/24/2020                  

                [facility]

 

          100 mg              2019 12:00:00 AM EST tablet    180          

       TAKE ONE TABLET BY MOUTH TWICE 

A DAY      TAKE ONE TABLET BY MOUTH TWICE A DAY SOLD: 10/24/2020                

                  [facility]

 

          25 mg               2019 12:00:00 AM EST tablet    180          

       TAKE ONE TABLET BY MOUTH TWICE A

 DAY       TAKE ONE TABLET BY MOUTH TWICE A DAY SOLD: 10/24/2020                

                  [facility]



                                                                                
                                                                                
                                                                                
                                                                                
                                                                                
                                                                    



Insurance Providers

          



             Payer name   Policy type / Coverage type Policy ID    Covered party

 ID Covered 

party's relationship to morataya Policy [place]             Plan Information

 

                [employer] (hospitals) [facility] Part B  [id number]       

[id number] Family Dependent                        

[id number]

 

                [employer] (hospitals) [facility] Part B  [id number]       

[id number] Family Dependent                        

[id number]

 

                [employer] [facility]) [facility] Part B  [id number]       

[id number] Family Dependent                        

[id number]

 

                Medicare [employer]l [employer] Medicare Primary [id number]      

[id number] Self                                    

[id number]

 

                Medicare Natl Govt [facility] Medicare Primary [id number]     

[id number] Self                                    

[id number]

 

                Medicare [employer] Medicare Primary [id number]     

[id number] Self                                    

[id number]

 

                Medicare Counts include 234 beds at the [facility] Govt [facility] Medicare Primary [id number]     

[id number] Self                                    

[id number]

 

          MEDICARE  A         [id number]           Self                [id number]

[id number]

 

                Medicare Natl Govt [facility] Medicare Primary [id number]      

[id number] Self                                    

[id number]

 

                Medicare Natl Govt Serv Medicare Primary [id number]     

[id number] Self                                    

[id number]

 

                Medicare [employer]l Gov[employer] Medicare Primary [id number]     

[id number] Self                                    

[id number]

 

                [employer]are [employer] Medicare Primary [id number]     

[id number] Self                                    

[id number]

 

                Medicare Nat[facility] Medicare Primary [id number]     

[id number] Self                                    

[id number]

 

                Medicare Nat[facility] Medicare Primary [id number]      

[id number] Self                                    

[id number]

 

                Medicare Nat[employer] [facility] Medicare Primary [id number]     

[id number] Self                                    

[id number]

 

                Medicare Nat[facility] Medicare Primary [id number]      

[id number] Self                                    

[id number]

 

                Medicare Natl [facility] Medicare Primary [id number]     

[id number] Self                              

      [id number]

 

                Medicare Natl [employer]t Serv[employer] Medicare Primary [id number]      

[id number] Self                                    

[id number]

 

                Medicare Nat[facility] Medicare Primary [id number]     

[id number] Self                                    

[id number]

 

                Medicare [employer] Medicare Primary [id number]     

[id number] Self                                    

[id number]

 

           FOR LIFE U         [id number]           Self                [id number]

[id number]

 

                [employer]  [employer] Life Medigap Part B  [id number]       

[id number] Family Dependent                        

[id number]

 

           FOR LIFE           [id number]           SP                  [id number]

[id number]

 

          MEDICARE  C         [id number] S                   [id number]

[id number]

 

           FOR LIFE O         [id number] S                   [id number]

[id number]

 

                [employer]  For Life Medigap Part B  [id number]       

[id number] Family Dependent                  

      [id number]

 

                [employer]  For Life Medigap Part B  [id number]       

[id number] Family Dependent                        

[id number]

 

                [employer]  For Life Medigap Part B  [id number]       

[id number] Family Dependent                        

[id number]

 

                [employer]  For Life Medigap Part B  [id number]       

[id number] Family Dependent                        

[id number]

 

                [employer]  For Life Medigap Part B  [id number]       

[id number] Family Dependent                        

[id number]

 

                [employer]  For Life Medigap Part B  [id number]       

[id number] Family Dependent                        

[id number]

 

                [employer]  For Life Medigap Part B  [id number]       

[id number] Family Dependent                        

[id number]

 

                [employer]  For Life Medigap Part B  [id number]       

[id number] Family Dependent                        

[id number]

 

                [employer]  For Life Medigap Part B  [id number]       

[id number] Family Dependent                        

[id number]

 

          MEDICARE            [id number]           SP                  [id number]

A

 

          MEDICARE  C         [id number] S                   [id number]

A

 

          S [employer], [facility] C         [id number] S              

     [id number]

 

                [employer]  For Life Medigap Part B  [id number]       

[id number] Family Dependent                        

[id number]

 

          MEDICARE            [id number]           SP                  [id number]

[id number]

 

                [employer]PS  For Life Medigap Part B  [id number]       

[id number] Family Dependent                        

[id number]

 

                [employer]PS  For Life Medigap Part B  [id number]       

[id number] Family Dependent                        

[id number]



                                                                                
                                                                                
                                                                                
                                                                                
                                                                                
                                                                                
        



Problems, Conditions, and Diagnoses

          



           Code       Display Name Description Problem Type Effective Dates Data

 Source(s)

 

                    C90.00              Multiple myeloma not having achieved rem

ission Multiple myeloma not 

having achieved remission Diagnosis           2021 01:43:00 PM EDT [facility]

 

                    I87.312             [id number]     Chronic venous hyper

tension (idiopathic) with ulcer of 

left lower extremity Problem             10/21/2021 12:00:00 AM EDT eCW1 ([facility])

 

                    L97.822             [id number]            Non-pressure chronic

 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

 fat layer exposed  Problem             10/21/2021 12:00:00 AM EDT eCW1 ([facility])

 

             I27.81       Chronic cor pulmonale Chronic cor pulmonale Problem   

   2021 12:00:00 

AM EDT                                  MEDENT ([facility])

 

             I65.29       Carotid artery occlusion Carotid artery occlusion Prob

sam      2021 

12:00:00 AM EST                         MEDENT ([facility])

 

                    I82.503             Deep venous thrombosis of lower extremit

y Deep venous thrombosis of 

lower extremity     Problem             2021 12:00:00 AM EST MEDENT ([facility]

[facility]

 [facility])

 

             E78.2        Mixed hyperlipidemia Mixed hyperlipidemia Problem     

 2021 12:00:00 AM 

EST                                     [facility] ([facility])



                                                                                
                                                                              



Surgeries/Procedures

          



             Procedure    Description  Date         Indications  Data Source(s)

 

             OFFICE OUTPATIENT VISIT 15 MINUTES              10/06/2021 12:00:00

 AM EDT              MEDENT 

([facility])

 

             ECG ROUTINE ECG W/LEAST 12 LDS W/I&R              2021 12:00:

00 AM EDT              [facility] 

([facility])

 

             OFFICE OUTPATIENT VISIT 25 MINUTES              2021 12:00:00

 AM EDT              [facility] 

([facility])

 

             ECG ROUTINE ECG W/LEAST 12 LDS W/I&R              2021 12:00:

00 AM EDT              [facility] 

([facility])

 

             OFFICE OUTPATIENT VISIT 25 MINUTES              2021 12:00:00

 AM EDT              [facility] 

([facility])

 

             Chronic Care Management Services Ea Addl 20 Min              2021 12:00:00 AM EDT              

MED[facility] ([facility])

 

                    Chronic Care MGMT 20 Mins Clinical Staff Time Per Calendar M

[facility]                     2021 

12:00:00 AM EDT                                     [facility] ([facility]

)

 

             Complex Chronic Care Management SVC 1St 60 Min              

021 12:00:00 AM EDT              

MED[facility] ([facility])

 

             Complex Chronic Care MGMT Service Ea Addl 30 Min               12:00:00 AM EDT              

MED[facility] ([facility])

 

             OFFICE OUTPATIENT VISIT 25 MINUTES              2021 12:00:00

 AM EDT              MEDENT 

([facility], )

 

             Complex Chronic Care Management SVC 1St 60 Min              

021 12:00:00 AM EDT              

MED[facility] ([facility])

 

             Complex Chronic Care MGMT Service Ea Addl 30 Min               12:00:00 AM EDT              

MED[facility] ([facility])

 

             OFFICE OUTPATIENT VISIT 25 MINUTES              2021 12:00:00

 AM EDT              MED[facility] 

([facility])

 

             OFFICE OUTPATIENT VISIT 25 MINUTES              06/10/2021 12:00:00

 AM EDT              MED[facility] 

([facility], )

 

             Complex Chronic Care Management SVC 1St 60 Min              

021 12:00:00 AM EDT              

MED[facility] ([facility])

 

             Complex Chronic Care MGMT Service Ea Addl 30 Min               12:00:00 AM EDT              

[facility] ([facility])

 

             Complex Chronic Care Management SVC 1St 60 Min              

021 12:00:00 AM EDT              

[facility] ([facility])

 

             Complex Chronic Care MGMT Service Ea Addl 30 Min               12:00:00 AM EDT              

MED[facility] ([facility])

 

             OFFICE OUTPATIENT VISIT 25 MINUTES              2021 12:00:00

 AM EDT              MED[facility] 

([facility])

 

             ARTHROCENTESIS ASPIR&/INJECTION MAJOR JT/BURSA              

021 12:00:00 AM EDT              

MED[facility] ([facility] )

 

             RADEX SHOULDER COMPLETE MINIMUM 2 VIEWS              2021 12:

00:00 AM EDT              MEDENT 

([facility] )

 

             ARTHROCENTESIS ASPIR&/INJECTION MAJOR JT/BURSA              

021 12:00:00 AM EDT              

MED[facility] ([facility])

 

             Complex Chronic Care Management SVC 1St 60 Min              

021 12:00:00 AM EDT              

MED[facility] ([facility])

 

             Complex Chronic Care MGMT Service Ea Addl 30 Min               12:00:00 AM EDT              

MED[facility] ([facility])

 

             INJECTION 1 TENDON SHEATH/LIGAMENT APONEUROSIS              

021 12:00:00 AM EDT              

MED[facility] ([facility])

 

             ARTHROCENTESIS ASPIR&/INJECTION MAJOR JT/BURSA              

021 12:00:00 AM EDT              

MED[facility] ([facility])

 

             RADIOLOGIC EXAM KNEE COMPLETE 4/MORE VIEWS              2021 

12:00:00 AM EDT              MED[facility]

 ([facility])

 

             RADIOLOGIC EXAM KNEE COMPLETE 4/MORE VIEWS              2021 

12:00:00 AM EDT              MED[facility]

 ([facility])

 

             Mammogram                 2021 12:00:00 AM EDT              M

EDENT ([facility])

 

             Bone Mineral Density Test              2021 12:00:00 AM EDT  

            [facility] ([facility])

 

                    Brief Emotional/Behav Assessment W/ Scoring Doc Per Standard

 Inst                     2021 

12:00:00 AM EDT                                     [facility] ([facility]

)

 

             OFFICE OUTPATIENT VISIT 25 MINUTES              2021 12:00:00

 AM EDT              [facility] 

([facility])

 

             OFFICE OUTPATIENT NEW 45 MINUTES              2021 12:00:00 A

M EST              MEDENT 

([facility], )

 

             ECG ROUTINE ECG W/LEAST 12 LDS W/I&R              2021 12:00:

00 AM EST              [facility] 

([facility])

 

             OFFICE OUTPATIENT VISIT 15 MINUTES              2021 12:00:00

 AM EST              [facility] 

([facility])

 

             Complex Chronic Care Management SVC 1St 60 Min              

021 12:00:00 AM EST              

[facility] ([facility])

 

             Complex Chronic Care MGMT Service Ea Addl 30 Min               12:00:00 AM EST              

[facility] ([facility])

 

             OFFICE OUTPATIENT VISIT 25 MINUTES              2021 12:00:00

 AM EST              [facility] 

([facility])

 

             Complex Chronic Care Management SVC 1St 60 Min              

020 12:00:00 AM EST              

[facility] ([facility])

 

             Complex Chronic Care MGMT Service Ea Addl 30 Min               12:00:00 AM EST              

[facility] ([facility])

 

                    Brief Emotional/Behav Assessment W/ Scoring Doc Per Standard

 Inst                     10/29/2020 

12:00:00 AM EDT                                     [facility] ([facility]

)



                                                                                
                                                                                
                                                                                
                                                                                
                                                                                
                                                                                
        



Results

          



                    ID                  Date                Data Source

 

                    [id number]          10/06/2021 02:57:00 PM EDT [facility] ([facility])









          Name      Value     Range     Interpretation Code Description Data Milly

rce(s) Supporting 

Document(s)

 

                Bacteria identified in Wound shallow by [facility] culture Laborator

y test result                 

                                        MED[facility] ([facility])  









                    ID                  Date                Data Source

 

                    [id number]            2021 12:29:00 PM EDT [facility] ([facility]

[facility])









          Name      Value     Range     Interpretation Code Description Data Milly

rce(s) Supporting 

Document(s)

 

          White Blood Count 6.8       5.0-10.0                      [facility] ([facility])  

 

          Platelets 187       172-450                       [facility] ([facility]

[facility])  

 

          Red Blood Count 3.85      4.00-5.40                     [facility] ([facility]

[facility])  

 

          Hemoglobin 12.7                                    [facility] ([facility]  

 

          Hematocrit 39.4                                    [facility] ([facility]  









                    ID                  Date                Data Source

 

                    [id number]          2021 12:21:00 PM EDT [facility] ([facility])









          Name      Value     Range     Interpretation Code Description Data Milly

rce(s) Supporting 

Document(s)

 

          White Blood Count 6.8 10    4.0-10.0                      [facility] ([facility])  

 

          Red Blood Count 3.85 10   4.00-5.40                     [facility] ([facility])  

 

          Mean Corpuscular Volume 102.3 fl  80.0-96.0                     MED[facility]

 ([facility])  

 

          Hemoglobin 12.7 g/dL 12.0-15.5                     [facility]  

 

          Hematocrit 39.4 %    36.0-47.0                     [facility]  

 

          Mean Corpuscular Hemoglobin 33.0 pg   27.0-33.0                     ME

DENT ([facility]) 

 

 

          Mean Corpuscular HGB Conc 32.2 g/dL 32.0-36.5                     MEDE

NT ([facility]) 

 

 

          Red Cell Distribution Width 12.3 %    11.5-14.5                     ME

DENT ([facility])  

 

          Platelet Count, Automated 187 10    150-450                       MED[facility]

NT ([facility])  

 

          Neutrophils % 72.2 %    36.0-66.0                     [facility] ([facility])  

 

          Lymph %   19.9 %    24.0-44.0                     [facility]  

 

          Mono %    6.0 %     2.0-8.0                       [facility]  

 

          Eos %     1.5 %     0.0-3.0                       [facility]  

 

          Baso %    0.3 %     0.0-1.0                       [facility]  

 

          Immature Granulocyte % 0.1 %     0-3.0                         [facility] 

([facility])  

 

          Nucleated Red Blood Cell % 0.0 %     0-0                           MED

ENT ([facility])  

 

          Lymph #   1.4 10    1.5-5.0                       [facility]  

 

          Neutrophils # 4.9 10    1.5-8.5                       [facility] ([facility]  

 

          Mono #    0.4 10    0.0-0.8                       [facility]  

 

          Eos #     0.1 10    0.0-0.5                       [facility]  

 

          Baso #    0.0 10    0.0-0.2                       [facility]  









                    ID                  Date                Data Source

 

                    [id number]           2021 05:25:00 PM EDT [facility]

 

                                        Hematopathology Report See Addendum [facility]

wName: JOSEPH BENTLEYMRN: 

[id number] Number: [id number] Date: 2021 00:00Received Date: 
7/15/2021 13:57Physician(s): ROSA ROCHA MD ADJAPONG, OPOKU,[facility] 
[facility]) ReceivedA: Bone Marrow, Flow Cytometry; 
RECEIVED 1 GREEN TOP BM, 2 ASP AND 1 PBSMEAR (1 EXTRA EDTA BM SENT TO 
MOLECULAR)Clinical HistoryMultiple myeloma.TEST REQUESTED/PERFORMED: Flow 
cytometry analysis DiagnosisFlow cytometry of bone marrow: Dilute specimen with 
small population oflambda restricted plasma cells (1% on bone marrow aspirate), 
consistentwith plasma cell neoplasm. Correlation with full bone marrow biopsy 
isrecommended. FISH study is pending. Halie Gentile M.D.;Resident 
PathologistElectronically Signed By ROSALINDA MARIE M.D. Attending Pathologist  
117:25:03The attending pathologist named above attests that he/she has 
personallyreviewed the relevant preparation(s) for the specimen(s) and rendered 
thefinal diagnosis. Addendum     2021     FISH identified gain of 1q, 
trisomy 5, and monosomy 13 in a plasmacell-enriched population. FISH was 
negative for gain/loss of chromosome 7and 9, deletion of IgH and TP53, and an 
IgH rearrangement. See [id number] of 1q and deletion of 13q are associated 
with progression. [id number] is generally associated with an intermediate risk 
and 1q gain isassociated with high risk.     Addendum Electronically Signed By: 
         Nadege Eddy M.D.          2021 11:16  ProceduresFlow Cytometry 
    Date Ordered:7/15/2021     Status:   Signed Out2021 
InterpretationPERIPHERAL BLOOD: [facility] 
(21)WBC           6.8     K/uLRBC          *3.85     M/uLHgb           12.7
     g/dLHct           39.4     %MCV          *102.3     fLMCH           33.0   
  pgMCHC           32.2     g/dLRDW           12.3     %Platelets      187     
K/ulDifferential Count (automated):72.2  % Neutrophils 1.5  % Eosinophils 0.3  %
 Jjvbdygcm33.9  % Lymphocytes 0.1  % Atypical Lymphocytes 6.0  % 
Monocytes-------100.0 %     A peripheral blood film is reviewed.  BONE MARROW 
ASPIRATE:Differential Count (100 cells): 6  % Erythroid Precursors 2  % N. 
Myelocytes 1  % N. Metamyelocytes and Band Forms80  % Neutrophils 4  % 
Eosinophils 1  % Basophils 5  % Lymphocytes 1  % Plasma Cells--------100 %  
Morphology: Dilute sample.Lymphoid Panel: Jone Harrington 21 1829 [id number] 
following markers were assayed: CD45 (gate), CD2, CD3, CD4, CD5, CD7,CD8, CD10, 
CD19, CD20, CD33, CD34, CD38, CD56, CD57, CD64, , ,HLA-DR, Kappa, and 
Lambda.#events: [id number]: 98%Flow Cytometry Differential 
(CD45/SSC)Lymphocyte [place]: 15%CD45 dim [place]: 1%Monocyte [place]: 4%Granulocyte 
[place]: 73%Nucleated/Erythroid [place]: 2%The lymphocyte gate showsB-cells (CD19): 
14%T-cells (CD3): 75%NK-cells (CD3-/CD56+): 9%Kappa/Lambda Ratio: 2.9CD4/CD8 
Ratio: 1.8Results: (expressed as % of lymphocyte gate)T-cell Markers: CD2 = 80, 
CD3 = 75, CD3/CD4 = 48, CD3/CD8 = 27, CD5 = 74,CD7 = 75, CD3/57 = 8B-cell yelena
ers: Kappa = 9, Lambda = 3, CD19 = 14, CD20 = 16, CD19/10 = 1,CD19/CD5 = 3, 
CD38/CD20 = 15Light chain as % of B-Cells: CD19/Kappa = 54, CD19/Lambda = 
17CD19/CD5/Kappa = 4, CD19/CD5/Lambda = 5CD19/CD10/Kappa = 7, CD19/CD10/Lambda =
 1NK cell Markers: CD56 = 12, CD57 = 13Other Markers: CD10 = 1, CD38 = 
63Results: (expressed as % of CD45 dim gate)T-cell Markers: CD2 = 27, CD3 = 8, 
CD3/CD4 = 2, CD3/CD8 = 4, CD5 = 10, CD7= 26, CD3/57 = 2B-cell markers: Kappa = 
29, Lambda = 0, CD19 = 16, CD20 = 14, CD19/10 =12, CD19/CD5 = 3, CD38/CD20 = 
11Light chain as % of B-Cells: CD19/Kappa = 0, CD19/Lambda = 0CD19/CD10/Kappa = 
6, CD19/CD10/Lambda = 2NK cell Markers: CD56 = 16, CD57 = 13Basophil Markers: 
 (HLA-DR-) = 19Other Markers: CD10 = 25, CD38 = 85, CD33 = 47, CD34 = 23, 
CD64 = 20, = 5,  = 56, HLA-DR = 57Myeloma Panel for Jone Harrington  
 [id number] following markers were assayed: CD45 (cell gate),  
(plasma cellgate), CD19, CD20, CD38, CD56, cytoplasmic Kappa, and cytoplasmic La
mbda.(Please note, that Cytoplasmic Kappa and Cytoplasmic Lambda are used 
todetect plasma cells, while surface Kappa and Lambda are for 
lymphocytes).#events: [id number]: Routinely a minimum of 500,000 events are 
collected in each paneltube. Due to sample cellularity and/or processing this 
number was notachievable for this sample.Flow Cytometry Differential:Lymphocyte 
[place]: 13%CD45 dim [place]: 2%Monocyte [place]: 4%Granulocyte [place]: 74%NRBC [place]: 
3% Plasma Cell [place]: 0%Results: (expressed as % of lymphocyte gate)B-Cells 
(CD19): 14% CD19 = 14, CD20 = 14Light chain as % of B-Cells: Cytoplasmic Kappa/
CD20 = 49, CytoplasmicLambda/CD20 = 26Results: (expressed as % of total + 
plasma cell gate)CD38 = 64, CD56 = 40, CD38/56 = 37, CD19 = 12, CD20 = 
5Cytoplasmic Kappa/ = 0, Cytoplasmic Lambda/ = 67    Results-
CommentsLymphocytes consist predominantly of T cells with normal expression of 
panT-cell markers and a normal CD4/CD8 ratio, normal proportions of NK 
andcytotoxic T cells, and polyclonal B cells.CD34+ blasts comprise fewer than 1%
 of cells studied. Blasts, monocytes,and granulocytes show no definitive 
immunophenotypic aberrancies.Plasma cell-associated markers show very small 
population of lambdarestricted plasma cells comprising less than 1% of cells, 
mostly negativefor CD19 and positive for CD56. Procedure Electronically Signed 
By:ROSALINDA MARIE M.D.2021 This report may include one or more 
immunohistochemical stain/fluorochromeconjugated monoclonal antibody results 
that use analyte specific reagents.All positive and negative controls have been 
reviewed by the attendingpathologist and are satisfactory. The tests were 
developed and theirperformance characteristics determined by [facility] 
department.They have not been cleared or approved by the US Food and DrugAdminis
tration. The FDA has determined that such clearance or approval isnot necessary.
 









          Name      Value     Range     Interpretation Code Description Data Milly

rce(s) Supporting 

Document(s)

 

                                                                       









                    ID                  Date                Data Source

 

                    [id number]            2021 01:04:00 PM [facility]

 

                                        Cytogenetics ReportName: JOSEPH BENTLEYMRN: [id number] Number: [id number]

[id number] Date: 2021 00:00Received Date: 7/15/2021 13:50Physician(s): 
ROSA ROCHA MD ADJAPONG, OPOKU, MDSpecimen(s) ReceivedA: Bone Marrow - Karyo 
and Multiple Myel  FISHClinical History66-year-old patient with multiple 
myeloma.TEST REQUESTED/PERFORMED:  Chromosome analysis and fluorescence in 
situhybridization (FISH)  DiagnosisFISH identified 11% of nuclei with gain of 
1q; 11% with trisomy 5; and 10%with monosomy 13 in this plasma cell-enriched 
population of cells. FISHwas negative for gains or losses of chromosomes 7 and 
9; deletions ww93k33.3 (IgH), and 17p13 (TP53); and an IgH rearrangement.Gains 
of chromosome 1q in plasma cell dyscrasia/multiple myeloma occur inup to 40% of 
cases, are secondary aberrations and are associated withprogression. 13q 
deletions are detected in approximately 50% of MM and arealso secondary events, 
although their presence in monoclonal gammopathy ofuncertain significance (MGUS)
 suggests they occur early in diseaseprogression. While deletion 13q is 
generally associated with anintermediate risk, 1q gain is often characterized as
 high risk (1-3).Please correlate with the concurrent Hematopathology report, 
[id number]. References:1.          Kailee GALLEGOS. 1q rearrangements in multiple 
myeloma. AtlasGenet Cytogenet Oncol Haematol. 
1998;2(3):86-87.http://AtlasGeneticsOncology.org/Anomalies/5jKL4911YL0456.html. 
Lastvisited .2.     Maryam & Blake. Mature B- and T-cell neoplasms 
and Hodgkinlymphoma. In Cancer Cytogenetics: Chromosomal and Molecular 
GeneticAberrations of Tumor Cells, 4th Edition. Nataliya & Ty, eds. [employer] 
[employer]. 2015. 3.     Wilda LOVE. Multiple myeloma: 2020 update on 
diagnosis,risk-stratification and management. Am J Hematol. 2020 95(11):1444.  
Electronically Signed By Lorenzo Robles, PhD Attending Pathologist 2021 
13:04:45Gross DescriptionFluorescence in situ Hybridization (FISH)multiple 
myeloma FISH panel:nucish(STHR7Hn0,CKS1Bx
3)[],(5p15.31,EGR1)x3[],(K63S662,13q34)x1[10/100],(IgHx2)[],(T

P53,D17Z1)x2[98/100]     CEP 7/Y7C790 (7q31):nuc maggie(D7Z1,F6Y986)x2[98/100] CEP 
9:nuc maggie(D9Z1x2)[98/100]DescriptionA plasma cell-enriched population evaluated 
by FISH: gain of 1q (11%);trisomy 5 (11%); monosomy 13 (10%); negative for gains
 or losses ofchromosomes 7 and 9; deletions IgH, and TP53; and IgH 
rearrangement.         
________________________________________________________________________________

_____________Test Data:Fluorescence in situ Hybridization (FISH):  Enriched 
Plasma Cells     Probe Normal Cut-off Nuclei  Analyzed FISH SIGNAL PATTERNS     
    Normal Abnormal NKCS      *LSI CDKN2C (1p22.3) G  LSI CKS1B (1q21.3) O 3% 
100 2G2O:  84% 2G3O:  11% 5%       *LSI 5p15.31 G  LSI EGR1 (5q31.2) R 3% 100 
2G2R:  89% 3G3R:  11% 0%  *CEP 7 (D7Z1) G  LSI T6I191 (7q31) R 3% 100 2G2R:  98%
 0% 2%       **CEP 9 (D9Z1) G 3%100   2% 0% 2%  *LSI Z79A017 (13q14.2) O  
     LSI 13q34 G 3% 100     2O2% 1O1G:   10% 3%       *LSI IgH(14q32) Y   
     BA 3% 100    2Y:  97% 0% 3%       *LSI TP53 (17p13.1) O  CEP 17 (D17Z1) G 
3% 100 2O2% 0% 2% Vendor:  *[employer]  Probes:  LSI: Locus Specific 
Probe;  CEP: Centromeric Probe;  BA: BreakApartFluorochromes/ Signals:  G - 
Green;  O  orange;  R  Red;  Y - yellow(fusion) NKCS:  Signals with No Known 
Clinical SignificanceDisclaimer: The plasma cells evaluated in this study were 
isolated fromthe bone marrow mixed cell population utilizing immunomagnetic 
cellseparation. This technology significantly enriches the test cellpopulation 
for plasma cells, thereby improving FISH detection of anomaliesassociated with 
plasma cell myeloma. However, as this is a selectivepopulation, the true in vivo
 frequencies of the anomalies identifiedcannot be determined. The FISH test was 
developed and its performance wasvalidated by the Cytogenetics section of the 
Department of ClinicalPathology. This test has not been cleared or approved by 
the U. S. Foodand Drug Administration (FDA). The FDA has determined that such 
approvalis not necessary. However, the procedure is considered 
investigational,and should not be used as the sole criteria for diagnosis.   









          Name      Value     Range     Interpretation Code Description Data Milly

rce(s) Supporting 

Document(s)

 

                                                                       









                    ID                  Date                Data Source

 

                    [id number]            2021 12:28:00 PM EDT [facility] ([facility])









          Name      Value     Range     Interpretation Code Description Data Milly

rce(s) Supporting 

Document(s)

 

           Albumin [Mass/volume] in Serum or Plasma 3.0                         

                [facility] ([facility])                       

 

           Calcium [Mass/volume] in Serum or Plasma 10.2                        

                [facility] ([facility]                       

 

             Alanine aminotransferase [Enzymatic activity/volume] in Serum or Pl

asma 19                                     

                          [facility] ([facility])  

 

           Carbon dioxide, total [Moles/volume] in Serum or Plasma 27           

                               [facility] 

([facility])           

 

           Chloride [Moles/volume] in Serum or Plasma 107                       

                  [facility] ([facility])                       

 

           Potassium [Moles/volume] in Serum or Plasma 4.7                      

                   [facility] ([facility] [facility])                       

 

           Alkaline phosphatase [Enzymatic activity/volume] in Serum or Plasma 7

7                                          

[facility] ([facility])    

 

          Sodium    137                                     [facility] ([facility]

[facility])  

 

           Protein [Mass/volume] in Serum or Plasma 8.3                         

                [facility] ([facility])                       

 

                Aspartate aminotransferase [Enzymatic activity/volume] in Serum 

or Plasma 23                              

                                        [facility] ([facility])  

 

           Urea nitrogen [Mass/volume] in Serum or Plasma 28                    

                      [facility] ([facility])                       

 

          Creatinine For GFR 0.90                                    [facility] ([facility]

[facility])  

 

          Glucose   98                                [facility]  









                    ID                  Date                Data Source

 

                    [id number]            2021 12:28:00 PM EDT [facility] ([facility]i

[facility])









          Name      Value     Range     Interpretation Code Description Data Milly

rce(s) Supporting 

Document(s)

 

          White Blood Count 6.2       5.0-10.0                      [facility] ([facility])  

 

          Red Blood Count 3.76      4.00-5.40                     [facility] ([facility]io

[facility])  

 

          Hemoglobin 12.5                                    [facility] ([facility])  

 

          Platelets 199       172-450                       [facility]  

 

          Hematocrit 39.0                                    [facility] ([facility])  









                    ID                  Date                Data Source

 

                    [id number]          2021 12:03:00 PM EDT [facility] ([facility])









          Name      Value     Range     Interpretation Code Description Data Milly

rce(s) Supporting 

Document(s)

 

          Inr       1.04                                    [facility] [facility]  

 

                                        THERAPUTIC HUMAN INR VALUES

INDICATIONS                      NORMAL RANGES

PROPHYLAXIS/TREATMENT OF:

VENOUS THROMBOSIS                2.0-3.0

PULMONARY EMBOLISM               2.0-3.0

PREVENTION OF SYSTEMIC EMBOLISM FROM:

TISSUE HEART VALVES              2.0-3.0

ACUTE MYOCARDIAL INFARCTION      2.0-3.0

VALVULAR HEART DISEASE           2.0-3.0

ATRIAL FIBRILLATION              2.0-3.0

MECHANICAL VALVES(HIGH RISK)     2.5-3.5

RECURRENT MYOCARDIAL INFARCTION  2.5-3.5

 

 

          Prothrombin Time 13.8 s    12.5-14.3                     [facility] ([facility])  

 

          Partial Thromboplastin Time 30.6 s    24.2-38.5                     ME

DENT ([facility])  









                    ID                  Date                Data Source

 

                    [id number]          2021 10:38:00 AM EDT [facility] ([facility])









          Name      Value     Range     Interpretation Code Description Data Milly

rce(s) Supporting 

Document(s)

 

          Blood Urea Nitrogen 28 mg/dL  7-18                          [facility] ([facility])  

 

          Glucose, Fasting 98 mg/dL                          [facility] ([facility])  

 

          Creatinine For GFR 0.90 mg/dL 0.55-1.30                     [facility] ([facility])  

 

          Sodium Level 137 meq/L 136-145                       [facility] ([place]

 [facility])  

 

           Glomerular Filtration Rate Laboratory test result                    

              [facility] ([place] 

[facility])                              

 

                                        <content>Units are mL/min/1.73 

m2</content><br/><content></content><br/><content>Chronic Kidney Disease Staging
 per NKF:</content><br/><content></content><br/><content>Stage I & II   GFR >=60
       Normal to Mildly Decreased</content><br/><content>Stage III      GFR 30-
59      Moderately Decreased</content><br/><content>Stage IV       GFR 15-29    
  Severely Decreased</content><br/><content>Stage V        GFR <15        Very 
Little GFR Left</content><br/><content>ESRD           GFR <15 on 
RRT</content><br/><content></content> 

 

          Potassium Serum 4.7 meq/L 3.5-5.1                       [facility] ([facility])  

 

          Anion Gap 3 meq/L   8-16                          [facility] ([facility])  

 

          Carbon Dioxide Level 27 meq/L  21-32                         [facility] ([facility])  

 

          Chloride Level 107 meq/L                         [facility] ([facility])  

 

          Ast/Sgot  23 U/L    7-37                          [facility] ([facility])  

 

          Calcium Level 10.2 mg/dL 8.8-10.2                      [facility] ([facility])  

 

          Bilirubin,Total 0.5 mg/dL 0.2-1.0                       [facility] ([facility])  

 

          Alkaline Phosphatase 77 U/L                            [facility] ([facility])  

 

          Alt/SGPT  19 U/L    12-78                         [facility] ([facility])  

 

          Albumin   3.0 GM/DL 3.2-5.2                       [facility]  

 

          Total Protein 8.3 GM/DL 6.4-8.2                       [facility] ([facility])  

 

          Albumin/Globulin Ratio 0.6       1.2-2.2                       [facility] 

([place] [place] [facility])  









                    ID                  Date                Data Source

 

                    [id number]          2021 10:38:00 AM EDT [facility]









          Name      Value     Range     Interpretation Code Description Data Milly

rce(s) Supporting 

Document(s)

 

          White Blood Count 6.2 10    4.0-10.0                      [facility] ([facility])  

 

          Red Blood Count 3.76 10   4.00-5.40                     [facility] ([facility])  

 

          Mean Corpuscular Volume 103.7 fl  80.0-96.0                     [facility]

 ([facility])  

 

          Hemoglobin 12.5 g/dL 12.0-15.5                     [facility] ([place] [facility]

[facility])  

 

          Hematocrit 39.0 %    36.0-47.0                     [facility] ([place] [facility])  

 

          Mean Corpuscular Hemoglobin 33.2 pg   27.0-33.0                     ME

DENT ([facility]) 

 

 

          Mean Corpuscular HGB Conc 32.1 g/dL 32.0-36.5                     [facility]

NT ([facility] [place] [facility]) 

 

 

          Red Cell Distribution Width 12.9 %    11.5-14.5                     ME

DENT ([facility])  

 

          Neutrophils % 74.5 %    36.0-66.0                     [facility] ([facility]

[facility])  

 

          Lymph %   16.4 %    24.0-44.0                     [facility] ([place] [facility])  

 

          Platelet Count, Automated 199 10    150-450                       [facility]

NT ([facility] [place] [facility])  

 

          Mono %    7.0 %     2.0-8.0                       [facility] ([place] [facility])  

 

          Eos %     1.1 %     0.0-3.0                       [facility] ([place] [facility])  

 

          Nucleated Red Blood Cell % 0.0 %     0-0                           MED

ENT ([facility])  

 

          Immature Granulocyte % 0.5 %     0-3.0                         [facility] 

([facility] [place] [facility])  

 

          Baso %    0.5 %     0.0-1.0                       [facility] ([place] [facility]

[facility])  

 

          Neutrophils # 4.6 10    1.5-8.5                       [facility] ([facility]

[facility])  

 

          Mono #    0.4 10    0.0-0.8                       [facility] ([place] [facility]

[facility])  

 

          Lymph #   1.0 10    1.5-5.0                       [facility] ([place] [facility])  

 

          Eos #     0.1 10    0.0-0.5                       [facility] ([place] [facility])  

 

          Baso #    0.0 10    0.0-0.2                       [facility] ([place] [facility]

[facility])  









                    ID                  Date                Data Source

 

                    [id number]          2021 02:06:00 PM EDT [facility] ([facility])









          Name      Value     Range     Interpretation Code Description Data Milly

rce(s) Supporting 

Document(s)

 

                          Thyrotropin [Units/volume] in Serum or Plasma by Detec

tion limit <= 0.05 mIU/L 

0.19 uIU/mL  0.36-3.74                              [facility] ([facility] [place] [facility]

)  









                    ID                  Date                Data Source

 

                    [id number]          2021 02:06:00 PM EDT [facility] ([facility])









          Name      Value     Range     Interpretation Code Description Data Milly

rce(s) Supporting 

Document(s)

 

           Glucose [Mass/volume] in Serum or Plasma 100 mg/dL  74-99            

                [facility] ([place] 

[facility])                              

 

                                        100-125 mg/dL     PRE-DIABETES/FASTING

>126 mg/dL          DIABETES/FASTING

 

 

           Urea nitrogen [Mass/volume] in Serum or Plasma 18 mg/dL   7-18       

                      [facility] 

([facility])                   

 

           Sodium [Moles/volume] in Serum or Plasma 141 meq/L  136-145          

                [facility] ([place]

 [facility])                             

 

          Creatinine 0.8 mg/dL 0.6-1.3                       [facility] ([facility]  

 

           Carbon dioxide, total [Moles/volume] in Serum or Plasma 29 meq/L   21

-32                            

[facility] ([facility])            

 

           Potassium [Moles/volume] in Serum or Plasma 3.8 meq/L  3.5-5.1       

                   [facility] 

([facility])                   

 

           Chloride [Moles/volume] in Serum or Plasma 104 meq/L           

                  [facility] 

([facility])                   

 

                                        Glomerular filtration rate/1.73 sq M pre

dicted among non-blacks [Volume 

Rate/Area] in Serum or Plasma by Creatinine-based formula (MDRD) Laboratory test

result                                              [facility] ([facility]

)  

 

           Calcium [Mass/volume] in Serum or Plasma 10.8 mg/dL 8.5-10.1         

                [facility] 

([facility] [place] [facility])                   

 

                                        NOTE:

CALCIUM,TSH VERIFIED





 

 

                                        Glomerular filtration rate/1.73 sq M pre

dicted among blacks [Volume Rate/Area] 

in Serum or Plasma by Creatinine-based formula (MDRD) Laboratory test result    

                  

                                        [facility] ([place] [facility]  

 

                                        <content>CHRONIC KIDNEY DISEASE STAGING 

PER 

NKF</content><br/><content></content><br/><content>STAGE I & II      GFR >= 60  
     NORMAL TO MILDLY DECREASED</content><br/><content>STAGE III          GFR 
30-59          MODERATELY DECREASED</content><br/><content>STAGE IV           
GFR 15-29         SEVERELY DECREASED</content><br/><content>STAGE V            
GFR <15            VERY LITTLE GFR LEFT</content><br/><content>ESRD             
   GFR <15            ON RRT</content><br/><content></content> 









                    ID                  Date                Data Source

 

                    [id number]          2021 02:06:00 PM EDT [facility] ([facility])









          Name      Value     Range     Interpretation Code Description Data Milly

rce(s) Supporting 

Document(s)

 

           Erythrocyte sedimentation rate by Westergren method 25 mm/hr   0-15  

                           [facility] 

([place] [facility]                   









                    ID                  Date                Data Source

 

                    [id number]          2021 02:06:00 PM EDT [facility]









          Name      Value     Range     Interpretation Code Description Data Milly

rce(s) Supporting 

Document(s)

 

           Erythrocytes [#/volume] in Blood by Automated count 4.11 x10*6/UL 4.2

0-6.30                        

[facility] [facility]            

 

           Leukocytes [#/volume] in Blood by Automated count 5.9 x10*3/UL 4.1-10

.9                         

[facility] ([facility] [facility])            

 

           Hemoglobin [Mass/volume] in Blood 13.6 g/dL  12.0-18.0               

         [facility] ([place] 

[facility])                              

 

          MCV       97.4 fL   80.0-97.0                     [facility] [facility])  

 

           Hematocrit [Volume Fraction] of Blood by Automated count 40.0 %     3

7.0-51.0                        

[facility] ([place] [facility])            

 

          MCH       33.1 pg   26.0-32.0                     [facility] [facility])  

 

           Erythrocyte distribution width [Ratio] by Automated count 13.2 %     

11.6-13.7                        

[facility] ([facility] [place] [facility])            

 

          MCHC      34.0 g/dL 31.0-38.0                     [facility] ([place] [facility]

[facility])  

 

          MPV       8.1 FL    7.8-11.0                      [facility] ([place] [facility]

[facility])  

 

          Lymph %   25.6 %    10.0-58.5                     [facility] ([facility])  

 

           Platelets [#/volume] in Blood by Automated count 209 x10*3/-440

                          [facility]

 ([facility] [place] [facility])                  

 

          Mid %     7.5 %     1.7-9.3                       [facility] ([place] [facility]

[facility])  

 

          Lymph #   1.5 x10*3/UL 0.6-4.1                       [facility] ([place]

 [facility])  

 

          Neut %    66.9 %    37.0-92.0                     [facility] ([place] [facility]

[facility])  

 

          Neut #    3.9 x10*3/UL 2.0-7.8                       [facility] ([place]

 [facility])  

 

          Mid #     0.5 x10*3/UL 0.1-0.6                       [facility] ([place]

 [facility])  









                    ID                  Date                Data Source

 

                    [id number]          2021 01:37:00 PM EDT [facility] ([facility])









          Name      Value     Range     Interpretation Code Description Data Milly

rce(s) Supporting 

Document(s)

 

           Digoxin [Mass/volume] in Serum or Plasma 0.3 ng/mL  0.5-2.0          

                [facility] ([place]

 [facility])                             

 

                                        <content>note:<nlbl:[email or web address]_changed>

</content><br/><content></content> 









                    ID                  Date                Data Source

 

                    [id number]          2021 01:37:00 PM EDT [facility] ([facility])









          Name      Value     Range     Interpretation Code Description Data Milly

rce(s) Supporting 

Document(s)

 

                          Thyrotropin [Units/volume] in Serum or Plasma by Detec

tion limit <= 0.05 mIU/L 

0.32 uIU/mL  0.36-3.74                              [facility] ([facility] [place] [facility]

)  









                    ID                  Date                Data Source

 

                    [id number]          2021 01:37:00 PM EDT [facility] ([facility])









          Name      Value     Range     Interpretation Code Description Data Milly

rce(s) Supporting 

Document(s)

 

           Cholesterol [Mass/volume] in Serum or Plasma 132 mg/dL  131-200      

                    [facility] 

([place] [facility])                   

 

           Cholesterol in HDL [Mass/volume] in Serum or Plasma 54 mg/dL   35-60 

                           [facility] 

([place] [facility])                   

 

                    Cholesterol in LDL [Mass/volume] in Serum or Plasma by calcu

lation 64 CALC             

                                          [facility] ([place] [facility])  

 

           Triglyceride [Mass/volume] in Serum or Plasma 72 mg/dL         

                     [facility] 

([place] [facility])                   









                    ID                  Date                Data Source

 

                    [id number]          2021 01:37:00 PM EDT [facility] ([facility])









          Name      Value     Range     Interpretation Code Description Data Milly

rce(s) Supporting 

Document(s)

 

           Glucose [Mass/volume] in Serum or Plasma 70 mg/dL   74-99            

                [facility] ([place] 

[facility])                              

 

                                        100-125 mg/dL     PRE-DIABETES/FASTING

>126 mg/dL          DIABETES/FASTING

 

 

           Urea nitrogen [Mass/volume] in Serum or Plasma 23 mg/dL   7-18       

                      [facility] 

([place] [facility])                   

 

           Sodium [Moles/volume] in Serum or Plasma 144 meq/L  136-145          

                [facility] ([place]

 [facility])                             

 

          Creatinine 0.9 mg/dL 0.6-1.3                       [facility] ([facility]

[facility])  

 

           Carbon dioxide, total [Moles/volume] in Serum or Plasma 32 meq/L   21

-32                            

[facility] ([place] [place] [facility])            

 

           Potassium [Moles/volume] in Serum or Plasma 3.8 meq/L  3.5-5.1       

                   [facility] 

([place] [place] [facility])                   

 

           Chloride [Moles/volume] in Serum or Plasma 104 meq/L           

                  [facility] 

([place] [facility])                   

 

                                        Glomerular filtration rate/1.73 sq M pre

dicted among blacks [Volume Rate/Area] 

in Serum or Plasma by Creatinine-based formula (MDRD) Laboratory test result    

                  

                                        [facility] ([place] [facility]  

 

                                        <content>CHRONIC KIDNEY DISEASE STAGING 

PER 

NKF</content><br/><content></content><br/><content>STAGE I & II      GFR >= 60  
     NORMAL TO MILDLY DECREASED</content><br/><content>STAGE III          GFR 
30-59          MODERATELY DECREASED</content><br/><content>STAGE IV           
GFR 15-29         SEVERELY DECREASED</content><br/><content>STAGE V            
GFR <15            VERY LITTLE GFR LEFT</content><br/><content>ESRD             
   GFR <15            ON RRT</content><br/><content></content> 

 

                                        Glomerular filtration rate/1.73 sq M pre

dicted among non-blacks [Volume 

Rate/Area] in Serum or Plasma by Creatinine-based formula (MDRD) Laboratory test

result                                              [facility] ([place] [facility]

)  

 

           Calcium [Mass/volume] in Serum or Plasma 10.1 mg/dL 8.5-10.1         

                [facility] 

([place] [facility]                   









                    ID                  Date                Data Source

 

                    [id number]          2021 01:37:00 PM EDT [facility]









          Name      Value     Range     Interpretation Code Description Data Milly

rce(s) Supporting 

Document(s)

 

           Erythrocyte sedimentation rate by Westergren method 45 mm/hr   0-15  

                           [facility] 

([place] [facility]                   

 

          Magnesium 1.8 mg/dL 1.8-2.4                       [facility] [facility])  









                    ID                  Date                Data Source

 

                    [id number]          2021 01:37:00 PM EDT [facility]









          Name      Value     Range     Interpretation Code Description Data Milly

rce(s) Supporting 

Document(s)

 

           Leukocytes [#/volume] in Blood by Automated count 8.4 x10*3/UL 4.1-10

.9                         

[facility] ([place] [facility]            

 

           Erythrocytes [#/volume] in Blood by Automated count 4.20 x10*6/UL 4.2

0-6.30                        

[facility] ([facility] [place] [facility])            

 

           Hemoglobin [Mass/volume] in Blood 14.0 g/dL  12.0-18.0               

         [facility] 

[facility]                              

 

           Hematocrit [Volume Fraction] of Blood by Automated count 40.3 %     3

7.0-51.0                        

[facility] ([place] [facility])            

 

          MCV       95.8 fL   80.0-97.0                     [facility] ([place] [facility]

[facility])  

 

          MCH       33.3 pg   26.0-32.0                     [facility] ([place] [facility]

[facility])  

 

           Erythrocyte distribution width [Ratio] by Automated count 13.3 %     

11.6-13.7                        

[facility] ([place] [facility])            

 

          MCHC      34.7 g/dL 31.0-38.0                     [facility] ([place] [facility]  

 

          Lymph %   17.5 %    10.0-58.5                     [facility] [facility]  

 

           Platelets [#/volume] in Blood by Automated count 207 x10*3/-440

                          [facility]

 ([facility] [place] [facility])                  

 

          MPV       8.0 FL    7.8-11.0                      [facility] [facility]  

 

          Neut %    77.7 %    37.0-92.0                     [facility] [facility]  

 

          Mid %     4.8 %     1.7-9.3                       [facility] ([place] [facility]

[facility])  

 

          Lymph #   1.4 x10*3/UL 0.6-4.1                       [facility] ([place]

 [facility])  

 

          Mid #     0.5 x10*3/UL 0.1-0.6                       [facility] ([facility])  

 

          Neut #    6.5 x10*3/UL 2.0-7.8                       [facility] ([facility])  









                    ID                  Date                Data Source

 

                    [id number]            2021 01:37:00 PM EDT [facility]









          Name      Value     Range     Interpretation Code Description Data Milly

rce(s) Supporting 

Document(s)

 

                          Thyrotropin [Units/volume] in Serum or Plasma by Detec

tion limit <= 0.05 mIU/L 

0.32 uIU/mL  0.36-3.74                              [facility] ([facility]

[facility])  

 

           Digoxin [Mass/volume] in Serum or Plasma 0.3 ng/mL  0.5-2.0          

                [facility] 

([facility])           

 

                                        

<content>note:<nlbl:demographic_changed></content><br/><content></content><br/><

content></content> 









                    ID                  Date                Data Source

 

                    [id number]            2021 01:37:00 PM EDT [facility] ([facility])









          Name      Value     Range     Interpretation Code Description Data Milly

rce(s) Supporting 

Document(s)

 

           Cholesterol [Mass/volume] in Serum or Plasma 132 mg/dL  131-200      

                    [facility] 

([facility])           

 

           Triglyceride [Mass/volume] in Serum or Plasma 72 mg/dL         

                     [facility] 

([facility])           

 

           Cholesterol in HDL [Mass/volume] in Serum or Plasma 54 mg/dL   35-60 

                           [facility] 

([facility])           

 

                    Cholesterol in LDL [Mass/volume] in Serum or Plasma by calcu

lation 64 CALC             

                                          [facility] ([facility])  









                    ID                  Date                Data Source

 

                    [id number]            2021 01:37:00 PM EDT [facility]









          Name      Value     Range     Interpretation Code Description Data Milly

rce(s) Supporting 

Document(s)

 

           Glucose [Mass/volume] in Serum or Plasma 70 mg/dL   74-99            

                [facility] ([facility])                       

 

                                        100-125 mg/dL     PRE-DIABETES/FASTING

>126 mg/dL          DIABETES/FASTING



 

 

           Urea nitrogen [Mass/volume] in Serum or Plasma 23 mg/dL   7-18       

                      [facility] 

([facility])           

 

          Creatinine 0.9 mg/dL 0.6-1.3                       [facility] ([facility])  

 

           Sodium [Moles/volume] in Serum or Plasma 144 meq/L  136-145          

                [facility] 

([facility])           

 

           Potassium [Moles/volume] in Serum or Plasma 3.8 meq/L  3.5-5.1       

                   [facility] 

([facility])           

 

           Carbon dioxide, total [Moles/volume] in Serum or Plasma 32 meq/L   21

-32                            

[facility] ([facility])    

 

           Chloride [Moles/volume] in Serum or Plasma 104 meq/L           

                  [facility] 

([facility])           

 

           Calcium [Mass/volume] in Serum or Plasma 10.1 mg/dL 8.5-10.1         

                [facility] 

([facility])           

 

                                        Glomerular filtration rate/1.73 sq M pre

dicted among non-blacks [Volume 

Rate/Area] in Serum or Plasma by Creatinine-based formula (MDRD) Laboratory test

result                                              [facility] ([facility])  

 

                                        Glomerular filtration rate/1.73 sq M pre

dicted among blacks [Volume Rate/Area] 

in Serum or Plasma by Creatinine-based formula (MDRD) Laboratory test result    

                  

                                        [facility] ([facility])  

 

                                        <content>CHRONIC KIDNEY DISEASE STAGING 

PER 

NKF</content><br/><content></content><br/><content>STAGE I & II      GFR >= 60  
     NORMAL TO MILDLY DECREASED</content><br/><content>STAGE III          GFR 
30-59          MODERATELY DECREASED</content><br/><content>STAGE IV           
GFR 15-29         SEVERELY DECREASED</content><br/><content>STAGE V            
GFR <15            VERY LITTLE GFR LEFT</content><br/><content>ESRD             
   GFR <15            ON 
RRT</content><br/><content></content><br/><content></content> 









                    ID                  Date                Data Source

 

                    [id number]            2021 01:37:00 PM EDT [facility] ([facility])









          Name      Value     Range     Interpretation Code Description Data Milly

rce(s) Supporting 

Document(s)

 

           Leukocytes [#/volume] in Blood by Automated count 8.4 x10*3/UL 4.1-10

.9                         

[facility] ([facility])    

 

           Erythrocytes [#/volume] in Blood by Automated count 4.20 x10*6/UL 4.2

0-6.30                        

[facility] ([facility])    

 

           Hemoglobin [Mass/volume] in Blood 14.0 g/dL  12.0-18.0               

         [facility] ([facility])                       

 

           Hematocrit [Volume Fraction] of Blood by Automated count 40.3 %     3

7.0-51.0                        

[facility] ([facility])    

 

          MCV       95.8 fL   80.0-97.0                     [facility] ([facility]

[facility])  

 

          MCH       33.3 pg   26.0-32.0                     [facility]  

 

           Erythrocyte distribution width [Ratio] by Automated count 13.3 %     

11.6-13.7                        

[facility] ([facility])    

 

          MCHC      34.7 g/dL 31.0-38.0                     [facility]  

 

           Platelets [#/volume] in Blood by Automated count 207 x10*3/-440

                          [facility]

 ([facility])          

 

             Platelet mean volume [Entitic volume] in Blood by Archie 8.0 FL

       7.8-11.0                   

                          [facility] ([facility])  

 

           Lymphocytes/100 leukocytes in Blood by Automated count 17.5 %     10.

0-58.5                        

[facility] ([facility])    

 

          Mid %     4.8 %     1.7-9.3                       [facility] ([facility]

[facility])  

 

          Lymph #   1.4 x10*3/UL 0.6-4.1                       [facility] ([facility]

y [facility])  

 

          Neut %    77.7 %    37.0-92.0                     [facility] ([facility]

[facility])  

 

           Neutrophils [#/volume] in Semen by Manual count 6.5 x10*3/UL 2.0-7.8 

                         [facility] 

([facility])           

 

          Mid #     0.5 x10*3/UL 0.1-0.6                       [facility] ([facility]

[facility])  









                    ID                  Date                Data Source

 

                    [id number]          2021 02:59:00 PM EDT [facility] ([facility])









          Name      Value     Range     Interpretation Code Description Data Milly

rce(s) Supporting 

Document(s)

 

           Free Lambda Light Chains Serum 517.2 mg/L 5.7-26.3                   

      [facility] ([facility])                              

 

           Free Kappa Light Chains Serum 6.4 mg/L   3.3-19.4                    

     [facility] ([facility])                              

 

          Kappa/Lambda Ratio Serum 0.01      0.26-1.65                     MED

T ([facility])  









                    ID                  Date                Data Source

 

                    [id number]          2021 02:59:00 PM EDT [facility]









          Name      Value     Range     Interpretation Code Description Data Milly

rce(s) Supporting 

Document(s)

 

           Ferritin [Mass/volume] in Serum or Plasma 128 ng/mL  8-252           

                 [facility]                              

 

                                        <content>note:<nlbl:demographic_changed>

</content><br/><content></content> 

 

           Beta-2-Microglobulin [Mass/volume] in Serum or Plasma 3.7 mg/L   0.6-

2.4                          

[facility] ([facility])            

 

                                        Siemens Immulite 2000 Immunochemiluminom

etric assay (ICMA)

                                        .

Values obtained with different assay methods or kits cannot

be used interchangeably. Results cannot be interpreted as

absolute evidence of the presence or absence of malignant

disease.

Performed at:   - [facility]  [id number]

: Araceli B Reyes MD, Phone:  [phone]

Performed at:  [address]  [id number]

[id number]

: Barak Archibald MD, Phone:  [phone]

 









                    ID                  Date                Data Source

 

                    [id number]          2021 02:59:00 PM EDT [facility] ([facility])









          Name      Value     Range     Interpretation Code Description Data Milly

rce(s) Supporting 

Document(s)

 

           Immunotyping Serum Iga Laboratory test result                        

          [facility] ([facility])                              

 

           Immunotyping Serum Lambda Laboratory test result                     

             [facility] ([facility])                              

 

           Laboratory test finding (navigational concept) Laboratory test result

                                  

[facility] ([facility])            

 

                                        REV'D BY CHARLES ROCHA

 

 

           It Serum Interpretation Laboratory test result                       

           [facility] ([facility])                              

 

                                        MONOCLONAL IGA,LAMBDA

 









                    ID                  Date                Data Source

 

                    [id number]          2021 02:59:00 PM EDT [facility] ([facility])









          Name      Value     Range     Interpretation Code Description Data Milly

rce(s) Supporting 

Document(s)

 

          Immunoglobulin G 510 mg/dL [phone]                      [facility] ([facility])  

 

          Immunoglobulin A 2640.0 mg/dL                         [facility]  

 

           Immunoglobulin M Laboratory test result                        

    [facility] ([facility])

                                         









                    ID                  Date                Data Source

 

                    [id number]          2021 02:59:00 PM EDT [facility] ([facility])









          Name      Value     Range     Interpretation Code Description Data Milly

rce(s) Supporting 

Document(s)

 

          Iron (Fe) 131 ug/dL                         [facility] ([facility])  

 

          Total Iron Binding Capacity 305 ug/dL 250-450                       ME

DENT ([facility]) 

 

 

          Percent Saturation 43.0 %    13.2-45.0                     [facility] ([facility])  









                    ID                  Date                Data Source

 

                    [id number]          2021 02:59:00 PM EDT [facility]









          Name      Value     Range     Interpretation Code Description Data Milly

rce(s) Supporting 

Document(s)

 

          Glucose, Fasting 97 mg/dL                          [facility]  

 

          Blood Urea Nitrogen 24 mg/dL  7-18                          [facility] ([facility])  

 

          Creatinine For GFR 0.85 mg/dL 0.55-1.30                     [facility] ([facility])  

 

           Glomerular Filtration Rate Laboratory test result                    

              [facility] ([facility])                              

 

                                        <content>Units are mL/min/1.73 

m2</content><br/><content></content><br/><content>Chronic Kidney Disease Staging
 per NKF:</content><br/><content></content><br/><content>Stage I & II   GFR >=60
       Normal to Mildly Decreased</content><br/><content>Stage III      GFR 30-
59      Moderately Decreased</content><br/><content>Stage IV       GFR 15-29    
  Severely Decreased</content><br/><content>Stage V        GFR <15        Very 
Little GFR Left</content><br/><content>ESRD           GFR <15 on 
RRT</content><br/><content></content> 

 

          Sodium Level 139 meq/L 136-145                       [facility] ([facility])  

 

          Carbon Dioxide Level 26 meq/L  21-32                         [facility] ([facility])  

 

          Potassium Serum 4.6 meq/L 3.5-5.1                       [facility] ([facility])  

 

          Chloride Level 106 meq/L                         [facility] ([facility])  

 

          Calcium Level 11.8 mg/dL 8.8-10.2                      [facility] ([facility])  

 

          Anion Gap 7 meq/L   8-16                          [facility] ([place] [facility])  

 

          Alt/SGPT  44 U/L    12-78                         [facility] ([place] [facility]

[facility])  

 

          Alkaline Phosphatase 116 U/L                           [facility] ([facility])  

 

          Ast/Sgot  33 U/L    7-37                          [facility] ([place] [facility])  

 

          Bilirubin,Total 0.6 mg/dL 0.2-1.0                       [facility] ([facility])  

 

          Total Protein 8.9 GM/DL 6.4-8.2                       [facility] ([facility])  

 

          Albumin   3.2 GM/DL 3.2-5.2                       [facility] ([place] [facility])  

 

          Albumin/Globulin Ratio 0.6       1.2-2.2                       [facility] 

([facility])  









                    ID                  Date                Data Source

 

                    [id number]          2021 02:59:00 PM EDT [facility] ([facility])









          Name      Value     Range     Interpretation Code Description Data Milly

rce(s) Supporting 

Document(s)

 

           Erythrocyte sedimentation rate by Westergren method 68 mm/hr   0-30  

                           [facility] 

([facility])                   









                    ID                  Date                Data Source

 

                    [id number]          2021 02:59:00 PM EDT [facility] ([facility])









          Name      Value     Range     Interpretation Code Description Data Milly

rce(s) Supporting 

Document(s)

 

          White Blood Count 7.6 10    4.0-10.0                      [facility] ([facility])  

 

          Red Blood Count 4.29 10   4.00-5.40                     [facility] ([facility])  

 

          Hemoglobin 13.9 g/dL 12.0-15.5                     [facility] [facility]  

 

          Hematocrit 43.3 %    36.0-47.0                     [facility] ([place] [facility]

[facility])  

 

          Mean Corpuscular Hemoglobin 32.4 pg   27.0-33.0                     ME

DENT ([facility]) 

 

 

          Mean Corpuscular HGB Conc 32.1 g/dL 32.0-36.5                     MEDE

NT ([facility]) 

 

 

          Mean Corpuscular Volume 100.9 fl  80.0-96.0                     [facility]

 ([facility])  

 

          Red Cell Distribution Width 13.1 %    11.5-14.5                     ME

DENT ([facility])  

 

          Platelet Count, Automated 175 10    150-450                       MEDE

NT ([facility])  

 

          Neutrophils % 73.1 %    36.0-66.0                     [facility] ([facility])  

 

          Lymph %   18.1 %    24.0-44.0                     [facility] ([facility] [place] [facility]

[facility])  

 

          Eos %     1.3 %     0.0-3.0                       [facility] ([facility] [place] [facility])  

 

          Mono %    6.8 %     2.0-8.0                       [facility] ([facility] [place] [facility]  

 

          Baso %    0.3 %     0.0-1.0                       [facility] ([facility] [place] [facility]

[facility])  

 

          Immature Granulocyte % 0.4 %     0-3.0                         [facility] 

([facility])  

 

          Nucleated Red Blood Cell % 0.0 %     0-0                           MED

ENT ([facility])  

 

          Neutrophils # 5.6 10    1.5-8.5                       [facility] ([facility])  

 

          Lymph #   1.4 10    1.5-5.0                       [facility] ([facility] [facility]

[facility])  

 

          Mono #    0.5 10    0.0-0.8                       [facility] ([facility] [place] [facility])  

 

          Eos #     0.1 10    0.0-0.5                       [facility] ([place] [facility])  

 

          Baso #    0.0 10    0.0-0.2                       [facility] ([place] [facility]

[facility])  









                    ID                  Date                Data Source

 

                    [id number]          2021 02:59:00 PM EDT [facility] ([facility])









          Name      Value     Range     Interpretation Code Description Data Milly

rce(s) Supporting 

Document(s)

 

          Appearance, Urine Laboratory test result                              

 [facility]  

 

          PH,Urine  5.0 units 5.0-9.0                       [facility] ([place] [facility]

[facility])  

 

          Color, Urine Laboratory test result                               MEDE

NT ([facility])  

 

          Protein, Urine Auto Laboratory test result                            

   [facility]  

 

           Specific Gravity Urine Auto 1.028      1.002-1.035                   

    [facility]

                                         

 

          Ketone, Urine Auto Laboratory test result                             

  [facility]  

 

           Glucose, Urine (Ua) Auto Laboratory test result                      

            [facility]                              

 

          Urobilinogen, Urine Auto 4.0 mg/dL 0.0-2.0                       [facility]

T ([facility]  

 

           Leukocyte Esterase, Urine Auto Laboratory test result                

                  [facility] ([facility])                              

 

          Nitrite, Urine Auto Laboratory test result                            

   [facility] ([facility])  

 

           Bilirubin, Urine Auto Laboratory test result                         

         [facility] ([facility])

                                         

 

          Blood, Urine Blood Laboratory test result                             

  [facility]  

 

          WBC, Urine Auto 2 /HPF    0-3                           MED[facility] ([facility])  

 

          Squamous Epithelial Cell Ur AU 0 /HPF    0-6                          

 MED[facility] ([facility])  

 

          RBC, Urine Auto 2 /HPF    0-3                           [facility] ([facility])  

 

          Bacteria, Urine Auto Laboratory test result                           

    [facility] ([facility]) 

 

 

          Mucus, Urine Laboratory test result                               MEDE

NT ([facility])  

 

          Hyaline Cast, Urine Auto 0 /LPF    0-1                           MEDEN

T ([facility])  









                    ID                  Date                Data Source

 

                    [id number]              2021 02:43:00 PM EDT [facility] ([facility])









          Name      Value     Range     Interpretation Code Description Data Milly

rce(s) Supporting 

Document(s)

 

           3D Bi-Lateral Mammogram Laboratory test result                       

           [facility] ([facility])                              

 

          Dexa/Bone Density Laboratory test result                              

 [facility] ([facility])  









                    ID                  Date                Data Source

 

                    [id number]          2021 02:26:00 PM EDT [facility] ([facility])









          Name      Value     Range     Interpretation Code Description Data Milly

rce(s) Supporting 

Document(s)

 

          Albumin % 41.3 %    55.8-66.1                     [facility] ([place] [facility]

[facility])  

 

          Alpha-1-Globulin % 3.8 %     2.9-4.9                       [facility]  

 

          Alpha-2-Globulins % 8.9 %     7.1-11.8                      [facility] ([facility])  

 

          Beta-1-Globulins % 5.8 %     4.7-7.2                       [facility]  

 

          Beta-2-Globulins % 35.2 %    3.2-6.5                       [facility] ([facility])  

 

          Gamma Globulin % 5.0 %     11.1-18.8                     [facility] ([place]

[place] [facility]  

 

          Albumin   3.39 GM/DL 3.29-5.55                     [facility] ([place] [facility]

[facility])  

 

          Alpha-1-Globulins 0.31 GM/DL 0.17-0.41                     [facility] [facility])  

 

          Alpha-2-Globulins 0.73 GM/DL 0.42-0.99                     [facility] ([facility]  

 

          Beta-1-Globulins 0.48 GM/DL 0.28-0.60                     [facility] ([facility]  

 

          Beta-2-Globulins 2.89 GM/DL 0.19-0.55                     [facility] ([facility])  

 

          Total Protein 8.2 GM/DL 6.4-8.2                       [facility] ([facility])  

 

          Gamma Globulins 0.41 GM/DL 0.65-1.58                     [facility] ([place]

[facility])  

 

          Spep Interpretation Laboratory test result                            

   [facility] ([facility])  

 

                                        M-SPIKE NOTED IN BETA 2 REGION.

CONCENTRATION =   2.85    GM/DL

SUGGEST SERUM AND URINE IMMUNOTYPING.

 

 

           Laboratory test finding (navigational concept) Laboratory test result

                                  

[facility] ([facility])            

 

                                        REV'D BY O AJ

 









                    ID                  Date                Data Source

 

                    [id number]          2021 02:26:00 PM EDT [facility]









          Name      Value     Range     Interpretation Code Description Data Milly

rce(s) Supporting 

Document(s)

 

           Immunotyping Serum Iga Laboratory test result                        

          [facility] ([facility])                              

 

           Immunotyping Serum Lambda Laboratory test result                     

             [facility] ([facility])                              

 

           It Serum Interpretation Laboratory test result                       

           [facility] [facility]                              

 

                                        MONOCLONAL IGA LAMBDA

 

 

           Laboratory test finding (navigational concept) Laboratory test result

                                  

[facility] ([facility]            









                    ID                  Date                Data Source

 

                    [id number]          2021 02:25:00 PM EDT [facility] ([facility]









          Name      Value     Range     Interpretation Code Description Data Milly

rce(s) Supporting 

Document(s)

 

           Glucose [Mass/volume] in Serum or Plasma 124 mg/dL  74-99            

                [facility] ([facility])                              

 

                                        100-125 mg/dL     PRE-DIABETES/FASTING

>126 mg/dL          DIABETES/FASTING

 

 

           Urea nitrogen [Mass/volume] in Serum or Plasma 19 mg/dL   7-18       

                      [facility] 

([facility])                   

 

          Creatinine 0.7 mg/dL 0.6-1.3                       [facility] [facility])  

 

           Sodium [Moles/volume] in Serum or Plasma 143 meq/L  136-145          

                [facility] ([facility])                             

 

           Chloride [Moles/volume] in Serum or Plasma 103 meq/L           

                  [facility] 

([facility])                   

 

           Potassium [Moles/volume] in Serum or Plasma 3.8 meq/L  3.5-5.1       

                   [facility] 

([facility])                   

 

           Carbon dioxide, total [Moles/volume] in Serum or Plasma 29 meq/L   21

-32                            

[facility] ([facility])            

 

           Calcium [Mass/volume] in Serum or Plasma 10.2 mg/dL 8.5-10.1         

                [facility] 

([facility])                   

 

                                        NOTE:

RESULT VERIFIED.





 

 

                                        Glomerular filtration rate/1.73 sq M pre

dicted among non-blacks [Volume 

Rate/Area] in Serum or Plasma by Creatinine-based formula (MDRD) Laboratory test

result                                              [facility] ([facility]

)  

 

                                        Glomerular filtration rate/1.73 sq M pre

dicted among blacks [Volume Rate/Area] 

in Serum or Plasma by Creatinine-based formula (MDRD) Laboratory test result    

                  

                                        [facility] ([facility])  

 

                                        <content>CHRONIC KIDNEY DISEASE STAGING 

PER 

NKF</content><br/><content></content><br/><content>STAGE I & II      GFR >= 60  
     NORMAL TO MILDLY DECREASED</content><br/><content>STAGE III          GFR 
30-59          MODERATELY DECREASED</content><br/><content>STAGE IV           
GFR 15-29         SEVERELY DECREASED</content><br/><content>STAGE V            
GFR <15            VERY LITTLE GFR LEFT</content><br/><content>ESRD             
   GFR <15            ON RRT</content><br/><content></content> 









                    ID                  Date                Data Source

 

                    [id number]          2021 11:17:00 AM EST [facility] ([facility])









          Name      Value     Range     Interpretation Code Description Data Milly

rce(s) Supporting 

Document(s)

 

           Erythrocyte sedimentation rate by Westergren method 108 mm/hr  0-30  

                           [facility] 

([facility])                   









                    ID                  Date                Data Source

 

                    [id number]          2021 11:17:00 AM EST [facility] ([facility])









          Name      Value     Range     Interpretation Code Description Data Milly

rce(s) Supporting 

Document(s)

 

          White Blood Count 4.3 10    4.0-10.0                      [facility] ([facility])  

 

          Hematocrit 39.3 %    36.0-47.0                     [facility]  

 

          Hemoglobin 12.4 g/dL 12.0-15.5                     [facility] ([facility])  

 

          Red Blood Count 3.96 10   4.00-5.40                     [facility] ([facility])  

 

          Mean Corpuscular Hemoglobin 31.3 pg   27.0-33.0                     [facility]

[facility] ([facility]) 



 

          Mean Corpuscular Volume 99.2 fl   80.0-96.0                     [facility]

 ([facility])  

 

          Mean Corpuscular HGB Conc 31.6 g/dL 32.0-36.5                     [facility]

[facility] ([facility]) 



 

          Red Cell Distribution Width 13.2 %    11.5-14.5                     [facility]

[facility] ([facility])  

 

          Platelet Count, Automated 193 10    150-450                       [facility]

[facility] ([facility])  

 

          Neutrophils % 62.7 %    36.0-66.0                     [facility] ([facility])  

 

          Mono %    7.4 %     2.0-8.0                       [facility] [facility]  

 

          Lymph %   27.8 %    24.0-44.0                     [facility] [facility]  

 

          Baso %    0.5 %     0.0-1.0                       [facility] [facility]  

 

          Eos %     1.4 %     0.0-3.0                       [facility] [facility]  

 

          Neutrophils # 2.7 10    1.5-8.5                       [facility] ([facility])  

 

          Nucleated Red Blood Cell % 0.0 %     0-0                           MED

ENT ([facility])  

 

          Immature Granulocyte % 0.2 %     0-3.0                         [facility] 

([facility])  

 

          Mono #    0.3 10    0.0-0.8                       [facility] [facility]  

 

          Lymph #   1.2 10    1.5-5.0                       [facility] [facility]  

 

          Baso #    0.0 10    0.0-0.2                       [facility] [facility]  

 

          Eos #     0.1 10    0.0-0.5                       [facility] [facility]  









                    ID                  Date                Data Source

 

                    [id number]          2021 11:17:00 AM EST [facility] ([facility])









          Name      Value     Range     Interpretation Code Description Data Milly

rce(s) Supporting 

Document(s)

 

                          C reactive protein [Mass/volume] in Serum or Plasma by

 High sensitivity method 

0.52 mg/dL   0.00-0.30                              [facility] ([facility]

)  









                    ID                  Date                Data Source

 

                    [id number]          2021 11:17:00 AM EST [facility] ([facility])









          Name      Value     Range     Interpretation Code Description Data Milly

rce(s) Supporting 

Document(s)

 

          Glucose, Fasting 99 mg/dL                          [facility] ([place]

[place] [facility])  

 

          Creatinine For GFR 0.92 mg/dL 0.55-1.30                     [facility] ([facility])  

 

          Blood Urea Nitrogen 29 mg/dL  7-18                          [facility] ([facility])  

 

          Sodium Level 139 meq/L 136-145                       [facility] ([facility])  

 

           Glomerular Filtration Rate Laboratory test result                    

              [facility] ([facility])                              

 

                                        <content>Units are mL/min/1.73 

m2</content><br/><content></content><br/><content>Chronic Kidney Disease Staging
 per NKF:</content><br/><content></content><br/><content>Stage I & II   GFR >=60
       Normal to Mildly Decreased</content><br/><content>Stage III      GFR 30-
59      Moderately Decreased</content><br/><content>Stage IV       GFR 15-29    
  Severely Decreased</content><br/><content>Stage V        GFR <15        Very 
Little GFR Left</content><br/><content>ESRD           GFR <15 on 
RRT</content><br/><content></content> 

 

          Potassium Serum 4.9 meq/L 3.5-5.1                       [facility] ([facility])  

 

          Chloride Level 105 meq/L                         [facility] ([facility])  

 

          Carbon Dioxide Level 26 meq/L  21-32                         [facility] ([facility])  

 

          Anion Gap 8 meq/L   8-16                          [facility] [facility]  

 

          Calcium Level 10.8 mg/dL 8.8-10.2                      [facility] ([facility])  









                    ID                  Date                Data Source

 

                    [id number]          2021 11:17:00 AM EST [facility] ([facility])









          Name      Value     Range     Interpretation Code Description Data Milly

rce(s) Supporting 

Document(s)

 

           aPTT in Blood by Coagulation assay 35.2 s     24.2-38.5              

          [facility] ([facility])                              









                    ID                  Date                Data Source

 

                    [id number]          2021 11:17:00 AM EST [facility] ([facility])









          Name      Value     Range     Interpretation Code Description Data Milly

rce(s) Supporting 

Document(s)

 

          Inr       1.05                                    [facility] [facility]  

 

                                        THERAPUTIC HUMAN INR VALUES

INDICATIONS                      NORMAL RANGES

PROPHYLAXIS/TREATMENT OF:

VENOUS THROMBOSIS                2.0-3.0

PULMONARY EMBOLISM               2.0-3.0

PREVENTION OF SYSTEMIC EMBOLISM FROM:

TISSUE HEART VALVES              2.0-3.0

ACUTE MYOCARDIAL INFARCTION      2.0-3.0

VALVULAR HEART DISEASE           2.0-3.0

ATRIAL FIBRILLATION              2.0-3.0

MECHANICAL VALVES(HIGH RISK)     2.5-3.5

RECURRENT MYOCARDIAL INFARCTION  2.5-3.5

 

 

          Prothrombin Time 13.9 s    12.5-14.3                     [facility] ([place]

[facility])  









                    ID                  Date                Data Source

 

                    [id number]          2021 01:39:00 PM EST [facility] ([facility])









          Name      Value     Range     Interpretation Code Description Data Milly

rce(s) Supporting 

Document(s)

 

           Parathyrin.intact [Mass/volume] in Serum or Plasma 26.3 pg/mL 18.5-88

.0                        

[facility] ([facility])            









                    ID                  Date                Data Source

 

                    [id number]          2021 01:39:00 PM EST [facility] ([facility])









          Name      Value     Range     Interpretation Code Description Data Milly

rce(s) Supporting 

Document(s)

 

           Calcidiol [Mass/volume] in Serum or Plasma 32.7       24.0-80.0      

                  [facility] ([place]

 [facility]                             

 

                                        This test was performed using [employer] I

P Vitamin D immunoassay kit.  Values 

obtained with different assay methods should not be used interchangeably.

 









                    ID                  Date                Data Source

 

                    [id number]          2021 01:39:00 PM EST [facility] ([facility])









          Name      Value     Range     Interpretation Code Description Data Milly

rce(s) Supporting 

Document(s)

 

                          Thyrotropin [Units/volume] in Serum or Plasma by Detec

tion limit <= 0.05 mIU/L 

0.54 uIU/mL  0.36-3.74                              [facility] ([facility]

)  









                    ID                  Date                Data Source

 

                    [id number]          2021 01:39:00 PM EST [facility] ([facility])









          Name      Value     Range     Interpretation Code Description Data Milly

rce(s) Supporting 

Document(s)

 

           Glucose [Mass/volume] in Serum or Plasma 98 mg/dL   74-99            

                [facility] ([facility])                              

 

                                        100-125 mg/dL     PRE-DIABETES/FASTING

>126 mg/dL          DIABETES/FASTING

 

 

           Urea nitrogen [Mass/volume] in Serum or Plasma 49 mg/dL   7-18       

                      [facility] 

([facility])                   

 

                                        NOTE:

BUN,CALCIUM VERIFIED





 

 

           Potassium [Moles/volume] in Serum or Plasma 4.5 meq/L  3.5-5.1       

                   [facility] 

([facility])                   

 

          Creatinine 1.2 mg/dL 0.6-1.3                       [facility] ([facility]

[facility])  

 

           Sodium [Moles/volume] in Serum or Plasma 142 meq/L  136-145          

                [facility] ([place]

 [facility])                             

 

           Chloride [Moles/volume] in Serum or Plasma 104 meq/L           

                  [facility] 

([facility])                   

 

           Carbon dioxide, total [Moles/volume] in Serum or Plasma 23 meq/L   21

-32                            

[facility] ([facility])            

 

                                        Glomerular filtration rate/1.73 sq M pre

dicted among non-blacks [Volume 

Rate/Area] in Serum or Plasma by Creatinine-based formula (MDRD) 45 mL/min      

                                  

                          [facility] ([facility])  

 

           Calcium [Mass/volume] in Serum or Plasma 10.6 mg/dL 8.5-10.1         

                [facility] 

([facility])                   

 

                                        Glomerular filtration rate/1.73 sq M pre

dicted among blacks [Volume Rate/Area] 

in Serum or Plasma by Creatinine-based formula (MDRD) 55 mL/min                 

                          [facility] 

([facility]                   

 

                                        <content>CHRONIC KIDNEY DISEASE STAGING 

PER 

NKF</content><br/><content></content><br/><content>STAGE I & II      GFR >= 60  
     NORMAL TO MILDLY DECREASED</content><br/><content>STAGE III          GFR 
30-59          MODERATELY DECREASED</content><br/><content>STAGE IV           
GFR 15-29         SEVERELY DECREASED</content><br/><content>STAGE V            
GFR <15            VERY LITTLE GFR LEFT</content><br/><content>ESRD             
   GFR <15            ON RRT</content><br/><content></content> 









                    ID                  Date                Data Source

 

                    [id number]          2021 01:39:00 PM EST [facility] ([facility])









          Name      Value     Range     Interpretation Code Description Data Milly

rce(s) Supporting 

Document(s)

 

           Hemoglobin [Mass/volume] in Blood 13.1 g/dL  12.0-18.0               

         [facility] ([facility])                              

 

           Leukocytes [#/volume] in Blood by Automated count 5.8 x10*3/UL 4.1-10

.9                         

[facility] ([facility])            

 

           Erythrocytes [#/volume] in Blood by Automated count 4.01 x10*6/UL 4.2

0-6.30                        

[facility] ([facility])            

 

           Hematocrit [Volume Fraction] of Blood by Automated count 37.5 %     3

7.0-51.0                        

[facility] ([facility])            

 

          MCV       93.4 fL   80.0-97.0                     [facility] ([place] [facility]

[facility])  

 

          MCH       32.6 pg   26.0-32.0                     [facility] ([facility])  

 

          MCHC      34.9 g/dL 31.0-38.0                     [facility] ([facility])  

 

           Platelets [#/volume] in Blood by Automated count 191 x10*3/-440

                          [facility]

 ([facility])                  

 

          MPV       8.6 FL    7.8-11.0                      [facility] ([facility])  

 

           Erythrocyte distribution width [Ratio] by Automated count 13.8 %     

11.6-13.7                        

[facility] ([facility])            

 

          Mid %     6.7 %     1.7-9.3                       [facility] ([place] [facility]

[facility])  

 

          Lymph %   24.4 %    10.0-58.5                     [facility] ([facility])  

 

          Mid #     0.4 x10*3/UL 0.1-0.6                       [facility] ([place]

 [facility])  

 

          Neut %    68.9 %    37.0-92.0                     [facility] ([place] [facility]

[facility])  

 

          Lymph #   1.4 x10*3/UL 0.6-4.1                       [facility] ([place] [place]

 [facility])  

 

          Neut #    4.0 x10*3/UL 2.0-7.8                       [facility] ([place]

 [facility])  









                    ID                  Date                Data Source

 

                    [id number]          2020 11:57:00 AM EST [facility] ([facility])









          Name      Value     Range     Interpretation Code Description Data Milly

rce(s) Supporting 

Document(s)

 

          Inr       6.49                Above upper panic limits           [facility]

T ([facility]  

 

                                        THERAPUTIC HUMAN INR VALUES

INDICATIONS                      NORMAL RANGES

PROPHYLAXIS/TREATMENT OF:

VENOUS THROMBOSIS                2.0-3.0

PULMONARY EMBOLISM               2.0-3.0

PREVENTION OF SYSTEMIC EMBOLISM FROM:

TISSUE HEART VALVES              2.0-3.0

ACUTE MYOCARDIAL INFARCTION      2.0-3.0

VALVULAR HEART DISEASE           2.0-3.0

ATRIAL FIBRILLATION              2.0-3.0

MECHANICAL VALVES(HIGH RISK)     2.5-3.5

RECURRENT MYOCARDIAL INFARCTION  2.5-3.5

 

 

          Prothrombin Time 58.4 s    12.5-14.3                     [facility] ([place]

[facility])  









                    ID                  Date                Data Source

 

                    [id number]          2020 11:56:00 AM EST [facility] ([facility])









          Name      Value     Range     Interpretation Code Description Data Milly

rce(s) Supporting 

Document(s)

 

           Leukocytes [#/volume] in Blood by Automated count 6.0 x10*3/UL 4.1-10

.9                         

[facility] ([facility])            

 

           Hemoglobin [Mass/volume] in Blood 13.0 g/dL  12.0-18.0               

         [facility] ([facility]                              

 

           Erythrocytes [#/volume] in Blood by Automated count 4.11 x10*6/UL 4.2

0-6.30                        

[facility] ([facility])            

 

          MCV       91.1 fL   80.0-97.0                     [facility] ([place] [facility]

[facility])  

 

           Hematocrit [Volume Fraction] of Blood by Automated count 37.5 %     3

7.0-51.0                        

[facility] ([facility])            

 

          MCH       31.6 pg   26.0-32.0                     [facility] ([place] [facility]

[facility])  

 

          MCHC      34.7 g/dL 31.0-38.0                     [facility] ([facility])  

 

           Erythrocyte distribution width [Ratio] by Automated count 14.2 %     

11.6-13.7                        

[facility] ([facility])            

 

           Platelets [#/volume] in Blood by Automated count 243 x10*3/-440

                          [facility]

 ([facility])                  

 

          MPV       8.3 FL    7.8-11.0                      [facility] ([place] [facility])  

 

          Lymph %   21.2 %    10.0-58.5                     [facility] ([place] [facility])  

 

          Mid %     5.8 %     1.7-9.3                       [facility] [facility])  

 

          Lymph #   1.2 x10*3/UL 0.6-4.1                       [facility] ([place]

 [facility]  

 

          Neut %    73.0 %    37.0-92.0                     [facility] [facility])  

 

          Neut #    4.4 x10*3/UL 2.0-7.8                       [facility] ([place] [place]

 [facility])  

 

          Mid #     0.4 x10*3/UL 0.1-0.6                       [facility] ([place]

 [facility])  









                    ID                  Date                Data Source

 

                    [id number]          2020 11:29:00 AM EST [facility] ([facility])









          Name      Value     Range     Interpretation Code Description Data Milly

rce(s) Supporting 

Document(s)

 

           INR in Platelet poor plasma by Coagulation assay 7.9                 

                        [facility] ([facility] [place] 

[facility])                              









                    ID                  Date                Data Source

 

                    [id number]          2020 10:58:00 AM EST [facility] ([facility])









          Name      Value     Range     Interpretation Code Description Data Milly

rce(s) Supporting 

Document(s)

 

           Glucose [Mass/volume] in Serum or Plasma 100 mg/dL  74-99            

                [facility] ([facility])                              

 

                                        100-125 mg/dL     PRE-DIABETES/FASTING

>126 mg/dL          DIABETES/FASTING

 

 

           Urea nitrogen [Mass/volume] in Serum or Plasma 17 mg/dL   7-18       

                      [facility] 

([facility])                   

 

          Creatinine 0.7 mg/dL 0.6-1.3                       [facility] ([facility]

n[facility])  

 

           Sodium [Moles/volume] in Serum or Plasma 141 meq/L  136-145          

                [facility] ([facility])                             

 

           Potassium [Moles/volume] in Serum or Plasma 4.4 meq/L  3.5-5.1       

                   [facility] 

([facility])                   

 

           Chloride [Moles/volume] in Serum or Plasma 103 meq/L           

                  [facility] 

([facility])                   

 

           Carbon dioxide, total [Moles/volume] in Serum or Plasma 24 meq/L   21

-32                            

[facility] ([facility])            

 

           Calcium [Mass/volume] in Serum or Plasma 10.2 mg/dL 8.5-10.1         

                [facility] 

([facility]                   

 

                                        NOTE:

RESULT VERIFIED.





 

 

                                        Glomerular filtration rate/1.73 sq M pre

dicted among non-blacks [Volume 

Rate/Area] in Serum or Plasma by Creatinine-based formula (MDRD) Laboratory test

result                                              [facility] ([facility]

)  

 

                                        Glomerular filtration rate/1.73 sq M pre

dicted among blacks [Volume Rate/Area] 

in Serum or Plasma by Creatinine-based formula (MDRD) Laboratory test result    

                  

                                        [facility] ([facility])  

 

                                        <content>CHRONIC KIDNEY DISEASE STAGING 

PER 

NKF</content><br/><content></content><br/><content>STAGE I & II      GFR >= 60  
     NORMAL TO MILDLY DECREASED</content><br/><content>STAGE III          GFR 
30-59          MODERATELY DECREASED</content><br/><content>STAGE IV           
GFR 15-29         SEVERELY DECREASED</content><br/><content>STAGE V            
GFR <15            VERY LITTLE GFR LEFT</content><br/><content>ESRD             
   GFR <15            ON RRT</content><br/><content></content> 









                    ID                  Date                Data Source

 

                    [id number]          2020 11:20:00 AM EST [facility]









          Name      Value     Range     Interpretation Code Description Data Milly

rce(s) Supporting 

Document(s)

 

           INR in Platelet poor plasma by Coagulation assay 3.7                 

                        [facility]                              









                    ID                  Date                Data Source

 

                    [id number]          10/29/2020 01:07:00 PM EDT [facility]









          Name      Value     Range     Interpretation Code Description Data Milly

rce(s) Supporting 

Document(s)

 

           INR in Platelet poor plasma by Coagulation assay 2.7                 

                        [facility]                              









                    ID                  Date                Data Source

 

                    [id number]          10/29/2020 11:26:00 AM EDT [facility]









          Name      Value     Range     Interpretation Code Description Data Milly

rce(s) Supporting 

Document(s)

 

           Digoxin [Mass/volume] in Serum or Plasma 0.6 ng/mL  0.5-2.0          

                [facility] ([facility])                             









                    ID                  Date                Data Source

 

                    [id number]            10/29/2020 11:26:00 AM EDT [facility] ([facility])









          Name      Value     Range     Interpretation Code Description Data Milly

rce(s) Supporting 

Document(s)

 

           Digoxin [Mass/volume] in Serum or Plasma 0.6 ng/mL  0.5-2.0          

                [facility] 

([facility])           









                    ID                  Date                Data Source

 

                    [id number]          10/29/2020 11:25:00 AM EDT [facility] ([facility]









          Name      Value     Range     Interpretation Code Description Data Milly

rce(s) Supporting 

Document(s)

 

          Urine PH  6.5 units 5.0-9.0                       [facility] ([facility] [facility])  

 

          Urine Appearance Laboratory test result                               

[facility] ([facility])  

 

          Urine Color Laboratory test result                               [facility]

T ([facility]  

 

          Urine Leukocytes Laboratory test result                               

[facility] ([facility]  

 

          Specific gravity of Urine 1.010     1.005-1.030                     ME

[facility] ([facility]  

 

          Urine Protein Laboratory test result 0-0                           MED

ENT ([facility])  

 

          Urine Blood Laboratory test result                               [facility]

T ([facility])  

 

          Urine Nitrite Laboratory test result                               MED

ENT ([facility])  

 

           Glucose [Presence] in Urine Laboratory test result                   

               [facility] ([facility])                              

 

          Urine Ketone Laboratory test result                               [facility]

NT ([facility]  

 

          Urine Urobilinogen 0.2 mg/dL 0.2-1.0                       [facility] ([facility])  

 

           Bilirubin.total [Mass/volume] in Serum or Plasma Laboratory test resu

lt                                  

[facility] ([facility])            









                    ID                  Date                Data Source

 

                    [id number]          10/29/2020 11:25:00 AM EDT [facility] ([facility])









          Name      Value     Range     Interpretation Code Description Data Milly

rce(s) Supporting 

Document(s)

 

                          Thyrotropin [Units/volume] in Serum or Plasma by Detec

tion limit <= 0.05 mIU/L 

0.16 uIU/mL  0.36-3.74                              [facility] ([facility]

)  









                    ID                  Date                Data Source

 

                    [id number]          10/29/2020 11:25:00 AM EDT [facility] ([facility])









          Name      Value     Range     Interpretation Code Description Data Milly

rce(s) Supporting 

Document(s)

 

           Urea nitrogen [Mass/volume] in Serum or Plasma 17 mg/dL   7-18       

                      [facility] 

([facility])                   

 

           Glucose [Mass/volume] in Serum or Plasma 101 mg/dL  74-99            

                [facility] ([facility])                              

 

                                        100-125 mg/dL     PRE-DIABETES/FASTING

>126 mg/dL          DIABETES/FASTING

 

 

          Creatinine 0.7 mg/dL 0.6-1.3                       [facility] ([facility]

[facility])  

 

           Sodium [Moles/volume] in Serum or Plasma 143 meq/L  136-145          

                [facility] ([facility])                             

 

           Chloride [Moles/volume] in Serum or Plasma 104 meq/L           

                  [facility] 

([facility])                   

 

           Potassium [Moles/volume] in Serum or Plasma 4.0 meq/L  3.5-5.1       

                   [facility] 

([facility])                   

 

           Carbon dioxide, total [Moles/volume] in Serum or Plasma 30 meq/L   21

-32                            

[facility] ([facility])            

 

           Calcium [Mass/volume] in Serum or Plasma 10.7 mg/dL 8.5-10.1         

                [facility] 

([facility])                   

 

                                        NOTE:

RESULT VERIFIED.





 

 

                                        Glomerular filtration rate/1.73 sq M pre

dicted among non-blacks [Volume 

Rate/Area] in Serum or Plasma by Creatinine-based formula (MDRD) Laboratory test

result                                              [facility] ([facility]

)  

 

                                        Glomerular filtration rate/1.73 sq M pre

dicted among blacks [Volume Rate/Area] 

in Serum or Plasma by Creatinine-based formula (MDRD) Laboratory test result    

                  

                                        [facility] ([facility]  

 

                                        <content>CHRONIC KIDNEY DISEASE STAGING 

PER 

NKF</content><br/><content></content><br/><content>STAGE I & II      GFR >= 60  
     NORMAL TO MILDLY DECREASED</content><br/><content>STAGE III          GFR 
30-59          MODERATELY DECREASED</content><br/><content>STAGE IV           
GFR 15-29         SEVERELY DECREASED</content><br/><content>STAGE V            
GFR <15            VERY LITTLE GFR LEFT</content><br/><content>ESRD             
   GFR <15            ON RRT</content><br/><content></content> 









                    ID                  Date                Data Source

 

                    [id number]          10/29/2020 11:25:00 AM EDT [facility] ([facility])









          Name      Value     Range     Interpretation Code Description Data Milly

rce(s) Supporting 

Document(s)

 

          Magnesium 2.1 mg/dL 1.8-2.4                       [facility] ([place] [facility]

[facility])  









                    ID                  Date                Data Source

 

                    [id number]          10/29/2020 11:25:00 AM EDT [facility] ([facility])









          Name      Value     Range     Interpretation Code Description Data Milly

rce(s) Supporting 

Document(s)

 

           Erythrocytes [#/volume] in Blood by Automated count 4.36 x10*6/UL 4.2

0-6.30                        

[facility] ([facility])            

 

           Leukocytes [#/volume] in Blood by Automated count 6.2 x10*3/UL 4.1-10

.9                         

[facility] ([facility])            

 

           Hemoglobin [Mass/volume] in Blood 13.8 g/dL  12.0-18.0               

         [facility] ([facility])                              

 

           Hematocrit [Volume Fraction] of Blood by Automated count 40.7 %     3

7.0-51.0                        

[facility] ([facility])            

 

          MCH       31.6 pg   26.0-32.0                     [facility]  

 

          MCV       93.2 fL   80.0-97.0                     [facility] [facility]  

 

          MCHC      34.0 g/dL 31.0-38.0                     [facility] [place] [facility]  

 

           Platelets [#/volume] in Blood by Automated count 236 x10*3/-440

                          [facility]

 ([facility])                  

 

           Erythrocyte distribution width [Ratio] by Automated count 14.5 %     

11.6-13.7                        

[facility] ([facility])            

 

          Lymph %   19.8 %    10.0-58.5                     [facility] [place] [facility]  

 

          MPV       7.8 FL    7.8-11.0                      [facility]  

 

          Mid %     5.7 %     1.7-9.3                       [facility] [place] [facility]  

 

          Neut %    74.5 %    37.0-92.0                     [facility] ([place] [facility]

[facility])  

 

          Lymph #   1.2 x10*3/UL 0.6-4.1                       [facility] ([facility])  

 

          Neut #    4.6 x10*3/UL 2.0-7.8                       [facility] ([facility])  

 

          Mid #     0.4 x10*3/UL 0.1-0.6                       [facility] ([facility])  









                    ID                  Date                Data Source

 

                    [id number]            10/29/2020 11:25:00 AM EDT [facility] ([facility])









          Name      Value     Range     Interpretation Code Description Data Milly

rce(s) Supporting 

Document(s)

 

           Leukocytes [#/volume] in Blood by Automated count 6.2 x10*3/UL 4.1-10

.9                         

[facility] ([facility])    

 

           Hematocrit [Volume Fraction] of Blood by Automated count 40.7 %     3

7.0-51.0                        

[facility] ([facility])    

 

           Erythrocytes [#/volume] in Blood by Automated count 4.36 x10*6/UL 4.2

0-6.30                        

[facility] ([facility])    

 

           Hemoglobin [Mass/volume] in Blood 13.8 g/dL  12.0-18.0               

         [facility] ([facility])                       

 

          MCHC      34.0 g/dL 31.0-38.0                     [facility] ([facility]  

 

          MCV       93.2 fL   80.0-97.0                     [facility] ([facility]  

 

          MCH       31.6 pg   26.0-32.0                     [facility] ([facility]  

 

           Erythrocyte distribution width [Ratio] by Automated count 14.5 %     

11.6-13.7                        

[facility] ([facility])    

 

           Platelets [#/volume] in Blood by Automated count 236 x10*3/-440

                          [facility]

 ([facility])          

 

             Platelet mean volume [Entitic volume] in Blood by Archie 7.8 FL

       7.8-11.0                   

                          [facility] ([facility])  

 

          Neut %    74.5 %    37.0-92.0                     [facility] ([facility]  

 

          Mid %     5.7 %     1.7-9.3                       [facility] ([facility]  

 

           Lymphocytes/100 leukocytes in Blood by Automated count 19.8 %     10.

0-58.5                        

[facility] ([facility])    

 

           Neutrophils [#/volume] in Semen by Manual count 4.6 x10*3/UL 2.0-7.8 

                         [facility] 

([facility])           

 

          Mid #     0.4 x10*3/UL 0.1-0.6                       [facility] ([facility]

[facility])  

 

          Lymph #   1.2 x10*3/UL 0.6-4.1                       [facility] ([facility]

[facility])  









                    ID                  Date                Data Source

 

                    [id number]          10/29/2020 11:24:00 AM EDT [facility] ([facility])









          Name      Value     Range     Interpretation Code Description Data Milly

rce(s) Supporting 

Document(s)

 

           Thyroxine (T4) free [Mass/volume] in Serum or Plasma 1.33 ng/dL 0.76-

1.46                        

[facility] ([facility]            









                    ID                  Date                Data Source

 

                    [id number]          10/26/2020 11:44:00 AM EDT [facility] ([facility])









          Name      Value     Range     Interpretation Code Description Data Milly

rce(s) Supporting 

Document(s)

 

           INR in Platelet poor plasma by Coagulation assay 1.2                 

                        [facility] ([facility])                              









                    ID                  Date                Data Source

 

                    [id number]          10/22/2020 12:43:00 PM EDT [facility] ([facility])









          Name      Value     Range     Interpretation Code Description Data Milly

rce(s) Supporting 

Document(s)

 

           INR in Platelet poor plasma by Coagulation assay 7.2                 

                        [facility] ([facility]                              









                    ID                  Date                Data Source

 

                    [id number]          10/22/2020 11:33:00 AM EDT [facility] ([facility]









          Name      Value     Range     Interpretation Code Description Data Milly

rce(s) Supporting 

Document(s)

 

          Prothrombin Time 49.4 s    12.5-14.3                     [facility] ([place]

[facility]  

 

          Inr       5.24                Above upper panic limits           [facility]

T ([facility]  

 

                                        THERAPUTIC HUMAN INR VALUES

INDICATIONS                      NORMAL RANGES

PROPHYLAXIS/TREATMENT OF:

VENOUS THROMBOSIS                2.0-3.0

PULMONARY EMBOLISM               2.0-3.0

PREVENTION OF SYSTEMIC EMBOLISM FROM:

TISSUE HEART VALVES              2.0-3.0

ACUTE MYOCARDIAL INFARCTION      2.0-3.0

VALVULAR HEART DISEASE           2.0-3.0

ATRIAL FIBRILLATION              2.0-3.0

MECHANICAL VALVES(HIGH RISK)     2.5-3.5

RECURRENT MYOCARDIAL INFARCTION  2.5-3.5

 









                    ID                  Date                Data Source

 

                    [id number]          10/22/2020 11:33:00 AM EDT [facility] ([facility])









          Name      Value     Range     Interpretation Code Description Data Milly

rce(s) Supporting 

Document(s)

 

           Erythrocytes [#/volume] in Blood by Automated count 4.37 x10*6/UL 4.2

0-6.30                        

[facility] ([facility])            

 

           Leukocytes [#/volume] in Blood by Automated count 6.6 x10*3/UL 4.1-10

.9                         

[facility] ([facility])            

 

           Hemoglobin [Mass/volume] in Blood 13.7 g/dL  12.0-18.0               

         [facility] ([facility])                              

 

           Hematocrit [Volume Fraction] of Blood by Automated count 40.2 %     3

7.0-51.0                        

[facility] ([facility])            

 

          MCV       91.9 fL   80.0-97.0                     [facility] ([place] [facility]

[facility])  

 

          MCH       31.4 pg   26.0-32.0                     [facility] ([facility])  

 

          MCHC      34.2 g/dL 31.0-38.0                     [facility] ([facility])  

 

           Erythrocyte distribution width [Ratio] by Automated count 13.7 %     

11.6-13.7                        

[facility] ([facility])            

 

           Platelets [#/volume] in Blood by Automated count 251 x10*3/-440

                          [facility]

 ([facility])                  

 

          MPV       8.3 FL    7.8-11.0                      [facility] ([place] [facility]

[facility])  

 

          Lymph %   16.8 %    10.0-58.5                     [facility] ([facility])  

 

          Mid %     4.7 %     1.7-9.3                       [facility] ([place] [facility]

[facility])  

 

          Lymph #   1.1 x10*3/UL 0.6-4.1                       [facility] ([facility])  

 

          Neut %    78.5 %    37.0-92.0                     [facility] ([place] [facility]

[facility])  

 

          Mid #     0.3 x10*3/UL 0.1-0.6                       [facility] ([facility])  

 

          Neut #    5.2 x10*3/UL 2.0-7.8                       [facility] ([facility])  









                    ID                  Date                Data Source

 

                    [id number]          10/15/2020 12:07:[facility]









          Name      Value     Range     Interpretation Code Description Data Milly

rce(s) Supporting 

Document(s)

 

           INR in Platelet poor plasma by Coagulation assay 1.3                 

                        [facility] ([facility])                              









                    ID                  Date                Data Source

 

                    [id number]          2020 12:44:00 PM [facility]









          Name      Value     Range     Interpretation Code Description Data Milly

rce(s) Supporting 

Document(s)

 

           INR in Platelet poor plasma by Coagulation assay 3.7                 

                        [facility] ([facility])                              









                    ID                  Date                Data Source

 

                    [id number]          2020 03:27:00 PM [facility]T [facility]









          Name      Value     Range     Interpretation Code Description Data Milly

rce(s) Supporting 

Document(s)

 

           INR in Platelet poor plasma by Coagulation assay 1.3                 

                        [facility] ([facility])                              







                                        Procedure

 

                                          



                                                                                
                                                                                
                                                                                
                                                                                
                                                                                
                                                                                
                                                                                
                                                                                
                                                                                
                                                                    



Social History

          



           Code       Duration   Value      Status     Description Data Source(s

)

 

           Smoking    10/21/2021 12:00:00 AM EDT Current Smoker completed  Curre

nt Smoker [id number] 

([facility])



                                                                                
                  



Vital Signs

          



                    ID                  Date                Data Source

 

                    UNK                                      









           Name       Value      Range      Interpretation Code Description Data

 Source(s)

 

           Systolic blood pressure 122 mm[Hg]                       122 mm[Hg] [facility] ([facility])

 

           Diastolic blood pressure 76 mm[Hg]                        76 mm[Hg]  

[facility] ([facility])

 

           Heart rate 86 /min                          86 /min    [facility] ([facility])

 

           Body height 66 [in_i]                        66 [in_i]  [facility] ([facility]

 

                                        5'6" 

 

           Body weight 223.00 [lb_av]                       223.00 [lb_av] [facility]

T ([facility])

 

           Body mass index (BMI) [Ratio] 36.0 kg/m2                       36.0 k

g/m2 [facility] ([facility]

 

           Systolic blood pressure 124 mm[Hg]                       124 mm[Hg] [facility] ([facility])

 

           Diastolic blood pressure 68 mm[Hg]                        68 mm[Hg]  

[facility] ([facility])

 

           Heart rate 86 /min                          86 /min    [facility] ([facility])

 

           Body height 66 [in_i]                        66 [in_i]  [facility] ([facility]

 

                                        5'6" 

 

           Body weight 217.00 [lb_av]                       217.00 [lb_av] [facility]

T ([facility])

 

           Body mass index (BMI) [Ratio] 35.0 kg/m2                       35.0 k

g/m2 [facility] ([facility]

 

           Systolic blood pressure 114 mm[Hg]                       114 mm[Hg] [facility] ([facility])

 

           Diastolic blood pressure 76 mm[Hg]                        76 mm[Hg]  

[facility] ([facility])

 

           Body height 66 [in_i]                        66 [in_i]  [facility] ([facility])

 

                                        5'6" 

 

           Body weight 221.50 [lb_av]                       221.50 [lb_av] [facility]

T ([facility])

 

           Body mass index (BMI) [Ratio] 35.7 kg/m2                       35.7 k

g/m2 [facility] ([facility])

 

           Ideal body weight 130 [lb_av]                       130 [facility]

 

           Body weight 100.472 kg                       100.472 kg [facility] ([facility])

 

           Body surface area Derived from formula 2.09 m2                       

   2.09 m2    [facility] ([facility])

 

           Diastolic blood pressure 88 mm[Hg]                        88 mm[facility]Hg[facility]  

[facility]

 

                                        RT Arm 

 

           Systolic blood pressure 136 mm[Hg]                       136 mm[Hg] M

[facility]

 

                                        RT Arm 

 

           Heart rate 120 /min                         120 /min   [facility] ([facility])

 

           Body height 66 [in_i]                        66 [in_i]  [facility] ([facility]

 

                                        5'6" 

 

           Body weight 223.50 [lb_av]                       223.50 [lb_av] [facility]

T ([facility])

 

           Body mass index (BMI) [Ratio] 36.1 kg/m2                       36.1 k

g[facility]m2 [facility]

 

           Ideal body weight 130 [lb_av]                       130 [lb_av] [facility]

T ([facility])

 

           Body height 66 [in_i]                        66 [in_i]  [facility] ([facility])

 

                                        5'6" 

 

           Body weight 102.060 kg                       102.060 kg [facility] ([facility])

 

           Body mass index (BMI) [Ratio] 36.3 kg/m2                       36.3 k

g/m2 [facility] ([facility])

 

           Body surface area Derived from formula 2.10 m2                       

   2.10 m2    [facility] ([facility])

 

           Diastolic blood pressure 77 mm[Hg]                        77 mm[Hg]  

[facility] ([facility])

 

           Body weight 225.00 [lb_av]                       225.00 [lb_av] [facility]

T ([facility])

 

           Systolic blood pressure 145 mm[Hg]                       145 mm[Hg] M

[facility]

 

           Systolic blood pressure 148 mm[Hg]                       148 mm[Hg] M

[facility]

 

                                        RT Arm 

 

           Diastolic blood pressure 72 mm[Hg]                        72 mm[Hg]  

[facility] ([facility])

 

                                        RT Arm 

 

           Systolic blood pressure 140 mm[Hg]                       140 mm[Hg] M

[facility] ([facility])

 

           Diastolic blood pressure 70 mm[Hg]                        70 mm[Hg]  

[facility] ([facility])

 

           Heart rate 80 /min                          80 /min    [facility] ([facility])

 

           Body height 66 [in_i]                        66 [in_i]  [facility] ([facility]

 

                                        5'6" 

 

           Body weight 219.00 [lb_av]                       219.00 [lb_av] [facility]

T ([facility])

 

           Body mass index (BMI) [Ratio] 35.3 kg/m2                       35.3 k

g[facility]m2 [facility]

 

           Body height 66 [in_i]                        66 [in_i]  [facility] ([facility] )

 

                                        5'6" 

 

           Body weight 190.00 [lb_av]                       190.00 [lb_av] [facility]

T ([facility] 

)

 

           Body mass index (BMI) [Ratio] 30.7 kg/m2                       30.7 k

g/m2 [facility] ([facility]

 

           Body mass index (BMI) [Ratio] 35.7 kg/m2                       35.7 k

g/m2 [facility] ([facility]

 

           Body temperature 96.1 [degF]                       96.1 [degF] [facility]

 ([facility] 

)

 

           Body height 64.5 [in_i]                       64.5 [in_i] [facility] ([facility]

 

                                        5'4.50" 

 

           Body weight 211.50 [lb_av]                       211.50 [lb_av] [facility]

T ([facility] 

)

 

           Systolic blood pressure 142 mm[Hg]                       142 mm[Hg] M

[facility] ([facility])

 

           Body weight 222.00 [lb_av]                       222.00 [lb_av] [facility]

T ([facility])

 

           Systolic blood pressure 154 mm[Hg]                       154 mm[Hg] M

[facility] ([facility])

 

                                        RT Arm 

 

           Diastolic blood pressure 78 mm[Hg]                        78 mm[Hg]  

[facility] ([facility])

 

                                        RT Arm 

 

           Diastolic blood pressure 80 mm[Hg]                        80 mm[Hg]  

[facility] ([facility])

 

           Heart rate 96 /min                          96 /min    [facility] ([facility])

 

           Body height 66 [in_i]                        66 [in_i]  [facility]

 

                                        5'6" 

 

           Body mass index (BMI) [Ratio] 35.8 kg/m2                       35.8 k

g/m2 [facility] ([facility]

 

           Systolic blood pressure 164 mm[Hg]                       164 mm[Hg] M

[facility]

 

           Diastolic blood pressure 90 mm[Hg]                        90 mm[Hg]  

[facility] ([facility], )

 

           Body height 66 [in_i]                        66 [in_i]  [facility]

 

                                        5'6" 

 

           Body weight 225.00 [lb_av]                       225.00 [lb_av] [facility]

T ([facility])

 

           Body mass index (BMI) [Ratio] 36.3 kg/m2                       36.3 k

g/m2 [facility]

 

           Ideal body weight 130 [lb_av]                       130 [lb_av] [facility]

T ([facility])

 

           Body weight 102.060 kg                       102.060 kg [facility] ([facility])

 

           Body surface area Derived from formula 2.10 m2                       

   2.10 m2    [facility] ([facility], )

 

           Body weight 220.00 [lb_av]                       220.00 [lb_av] [facility]

T ([facility])

 

           Body height 65 [in_i]                        65 [in_i]  [facility]

 

                                        5'5" 

 

           Body mass index (BMI) [Ratio] 36.6 kg/m2                       36.6 k

g/m2 [facility] ([facility]

 

           Heart rate 65 /min                          65 /min    [facility] ([facility]

 

           Systolic blood pressure--sitting 136 mm[Hg]                       136

 mm[Hg] [facility] ([facility]

 

                                        Ra, large cuff 

 

           Diastolic blood pressure--sitting 84 mm[Hg]                        84

 mm[Hg]  [facility] ([facility]

 

                                        Ra, large cuff 

 

           Body mass index (BMI) [Ratio] 35.7 kg/m2                       35.7 k

g/m2 [facility] ([facility]

 

           Body height 66 [in_i]                        66 [in_i]  [facility] ([facility])

 

                                        5'6" 

 

           Body weight 221.00 [lb_av]                       221.00 [lb_av] [facility]

T ([facility])

 

           Diastolic blood pressure 70 mm[Hg]                        70 mm[Hg]  

[facility] ([facility])

 

                                        RT Arm 

 

           Heart rate 60 /min                          60 /min    [facility] ([facility])

 

           Body height 66 [in_i]                        66 [in_i]  [facility]

 

                                        5'6" 

 

           Body mass index (BMI) [Ratio] 35.7 kg/m2                       35.7 k

g/m2 [facility] ([facility]

 

           Systolic blood pressure 148 mm[Hg]                       148 mm[Hg] 

[facility]

 

                                        RT Arm 

 

           Body weight 221.00 [lb_av]                       221.00 [lb_av] [facility]

T ([facility])

 

           Body weight 226.00 [lb_av]                       226.00 [lb_av] [facility]

T ([facility])

 

           Body mass index (BMI) [Ratio] 36.5 kg/m2                       36.5 k

g/m2 [facility] ([facility]

 

           Systolic blood pressure 138 mm[Hg]                       138 mm[Hg] 

[facility] ([facility])

 

           Body weight 226.00 [lb_av]                       226.00 [lb_av] [facility]

T ([facility])

 

           Diastolic blood pressure 90 mm[Hg]                        90 mm[Hg]  

[facility] ([facility])

 

           Body height 66 [in_i]                        66 [in_i]  [facility]

 

                                        5'6" 

 

           Body weight 217.00 [lb_av]                       217.00 [lb_av] [facility]

T ([facility])

 

           Body weight 220.00 [lb_av]                       220.00 [lb_av] [facility]

T ([facility])

 

           Body weight 216.00 [lb_av]                       216.00 [lb_av] [facility]

T ([facility])

## 2021-10-25 NOTE — CCD
Continuity of Care Document (CCD)

                             Created on: 10/06/2021



Juany Beecrril

External Reference #: [id number]

: 1955

Sex: Female



Demographics





                          Address                   [address]

[place]  [address]

 

                          Home Phone                [phone]

 

                          Preferred Language        Unknown

 

                          Marital Status            Unknown

 

                          Jain Affiliation     Unknown

 

                          Race                      White

 

                          Ethnic Group              Not  or 





Author





                          Author                    Juany ESQUEDA PA

 

                          Organization              Unknown

 

                          Address                   [address]

[place]  [address]



 

                          Phone                     [phone]







Care Team Providers





                    Care Team Member Name Role                Phone

 

                    Yuri Maier MD       AUTM                [phone]

 

                    [facility]  AUTM                [phone]

 

                    John Lutz MD AUTM                Unavailable

 

                    [facility] AUTM                [phone]

 

                    Hansa Hernandez FNP  AUTM                [phone]







Problems





                    Active Problems     Provider            Date

 

                    Chronic atrial fibrillation Protime             Onset: 

 

                    Essential hypertension Juany Vincent FNP Onset: 2017

 

                    Deep venous thrombosis of lower extremity Juany Vincent FN P Onset: 2017

 

                    Pure hypercholesterolemia Juany Vincent FNP Onset: 

017

 

                    Anxiety state       Juany Vincent FNP Onset: 2017

 

                    Chronic pulmonary embolism Juany Vincent FNP Onset: 2017

 

                    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 

2017

 

                    Gastroesophageal reflux disease Juany Vincent FNP Onset: 1

2017

 

                    Scoliosis deformity of spine Juany Vincent FNP Onset: 2017

 

                    Chronic tension-type headache Juany Vincent FNP Onset: 2017

 

                    Varicose veins of lower extremity Juany Vincent FNP Onset:

 2017

 

                    Tobacco user        Juany Vincent FNP Onset: 2017

 

                    Hypercalcemia       Juany Vincent FNP Onset: 2017







Social History





                Type            Date            Description     Comments

 

                Birth Sex                       Unknown          

 

                ETOH Use                        Consumes 3 glasses of wine per w

[place]  

 

                Tobacco Use     Start: Unknown  Patient is a current smoker, smo

kes every day STARTED

AGE 30 1-3 CIGARETTES A DAY 







Allergies and adverse reactions





             Active Allergies Criticality  Reaction | Severity Comments     Date

 

             Ibuprofen    Unable to assess criticality STOMACH UPSET HEADACHE mo

david       10/17/2017

 

             Latex        Unable to assess criticality rash, itches | Mild      

        10/17/2018







Medications





           Active Medications SIG        Qnty       Indications Ordering Provide

r Date

 

                          Cephalexin                     500mg Capsules         

          take one tablet 

by mouth 3 times daily x 7 days 21caps                          Julio kothari JR PA 10/06/2021

 

                          Excedrin Migraine                     [phone] Tab

lets                   1 

as needed h/a as needed mdd=1                                 Charmaine Regalado M.D. 2021

 

                          Famotidine                     20mg Tablets           

        1 by mouth every 

day             90tabs                          Charmaine Regalado M.D. 20

21

 

                          Furosemide                     20mg Tablets           

        1 by mouth twice a

day             180tabs                         Charmaine Regalado M.D. 20

21

 

                                        Bupropion Hydrochloride ER (XL)         

            150mg Tablets ER 24HR       

                take 1 tablet by mouth in the morning 90tabs                    

      Charmaine Regalado M.D.                                    2021

 

                          Eliquis                     5mg Tablets               

    take one tablet by 

mouth twice a day 180tabs                         Charmaine Regalado M.D. 2020

 

                          Methimazole                     5mg Tablets           

        2 every day by 

mouth           180tabs                         Charmaine Regalado M.D. 20

20

 

                          Shingrix                     50mcg/0.5ML Suspension Re

c                   

administer at pharmacy 1units                          Juany Vincent FNP 

 

                          Voltaren                     1% Gel                   

apply up to 4 grams three 

times a day to affected knee as needed 100gm                           Charmaine Regalado M.D. 

2018

 

                          Aspirin Adult Low Dose                     81mg Tablet

s DR                   1 

by mouth every day                                 Juany Vincent FNP 10/17/201

7

 

                                        Acetaminophen Extra Strength            

         500mg Tablets                  

             take 2 tabs every 6 hours as needed                           Juany Fung FNP 10/17/2017

 

                                        Womens 50+ Multi Vitamin & Mineral Formu

la                      Tablets         

             1 every day PO Vit [id number]                           Juany Vincent FNP 10/17/2017

 

                          Metoprolol Tartrate                     25mg Tablets  

                 Take One 

Tablet By Mouth Twice A Day 180tabs                         ABELARDO Saleem 10/17/2017

 

                          Atorvastatin Calcium                     20mg Tablets 

                  take one

tablet by mouth every day 90tabs                          JANE Saleem 10/17/2017

 

                          Flecainide Acetate                     100mg Tablets  

                 take one 

tablet by mouth twice a day 180tabs                         ABELARDO Saleem 10/17/2017

 

                          Digoxin                     250mcg Tablets            

       1 by mouth every 

day             90tabs                          Charmaine Regalado M.D. 







Medications Administered in Office





           Medication SIG        Qnty       Indications Ordering Provider Date

 

                          Administration Of Flu Vaccine                      Inj

diyaion                    

                                                Charmaine Regalado M.D. 10/15/20

20

 

                          Administration Of Flu Vaccine                      Inj

ection                    

                                                Juany Vincent FNP 10/17/2019

 

                          Administration Of Flu Vaccine                      Inj

ection                    

                                                Juany Vincent FNP 10/17/2018

 

                          Administration Of Flu Vaccine                      Inj

ection                    

                                                Juany Vincent FNP 10/17/2017







Immunizations





             CPT Code     Status       Date         Vaccine      Lot #

 

                [id number]           Given           10/06/2021      Influenza Vaccin

e Quadrivalent Preser/Antibiotic Free Im 

Use                                     332274

 

                [id number]           Given           10/15/2020      Influenza Vaccin

e Quadrivalent Preser/Antibiotic Free Im 

Use                                     [id number]

 

                [id number]           Given           2020      Shingrix Zoster 

Vaccine (HZV), Recombinant, Subunit, 

Adjuvanted                               

 

                [id number]           Given           10/17/2019      Influenza Vaccin

e Quadrivalent Preser/Antibiotic Free Im 

Use                                     [id number]

 

                [id number]           Given           10/17/2018      Influenza Virus 

Vaccine, Quadrivalent (Cciiv4), Derived 

From Cell                               [id number]

 

             38353        Given        2018   Pneumovax 23 [id number]

 

                48537           Given           10/17/2017      Influenza Vaccin

e Quadrivalent Preser/Antibiotic Free Im 

Use                                     [id number]







Vital Signs





                Date            Vital           Result          Comment

 

                10/06/2021  2:17pm BP Systolic     124 mmHg         

 

                    BP Diastolic        68 mmHg              

 

                    Heart Rate          86 /min              

 

                    Height              66 inches           5'6"

 

                    Weight              217.00 lb            

 

                    BMI (Body Mass Index) 35.0 kg/m2           

 

                2021  1:31pm BP Systolic     136 mmHg        RT Arm

 

                    BP Diastolic        88 mmHg             RT Arm

 

                    Heart Rate          120 /min             

 

                    Height              66 inches           5'6"

 

                    Weight              223.50 lb            

 

                    BMI (Body Mass Index) 36.1 kg/m2           







Results





        Test    Acquired Date Facility Test    Result  H/L     Range   Note

 

                    Laboratory test finding 10/06/2021          [facility]

                                        [address] [place] [address] [phone] Wound Culture <pending>                         

 

                    CBC With Differential 2021          [facility]

                                        [address] [place] [address] [phone] White Blood Count 6.8 10     Normal     4.0-10.0    

 

             Red Blood Count 3.85 10      Low          4.00-5.40     

 

             Hemoglobin   12.7 g/dL    Normal       12.0-15.5     

 

             Hematocrit   39.4 %       Normal       36.0-47.0     

 

             Mean Corpuscular Volume 102.3 fl     High         80.0-96.0     

 

             Mean Corpuscular Hemoglobin 33.0 pg      Normal       27.0-33.0    

 

 

             Mean Corpuscular HGB Conc 32.2 g/dL    Normal       32.0-36.5     

 

             Red Cell Distribution Width 12.3 %       Normal       11.5-14.5    

 

 

             Platelet Count, Automated 187 10       Normal       150-450       

 

             Neutrophils % 72.2 %       High         36.0-66.0     

 

             Lymph %      19.9 %       Low          24.0-44.0     

 

             Mono %       6.0 %        Normal       2.0-8.0       

 

             Eos %        1.5 %        Normal       0.0-3.0       

 

             Baso %       0.3 %        Normal       0.0-1.0       

 

             Immature Granulocyte % 0.1 %        Normal       0-3.0         

 

             Nucleated Red Blood Cell % 0.0 %        Normal       0-0           

 

             Neutrophils # 4.9 10       Normal       1.5-8.5       

 

             Lymph #      1.4 10       Low          1.5-5.0       

 

             Mono #       0.4 10       Normal       0.0-0.8       

 

             Eos #        0.1 10       Normal       0.0-0.5       

 

             Baso #       0.0 10       Normal       0.0-0.2       

 

                    PT & Aptt           2021          [facility]

nter

                                        [address] [place] [address] [phone] Prothrombin Time 13.8 seconds Normal     12.5-14.3   

 

             Inr          1.04         Normal                    1

 

             Partial Thromboplastin Time 30.6 seconds Normal       24.2-38.5    

 

 

                    CBC With Differential 2021          [address] [address] [phone] White Blood Count 6.2 10     Normal     4.0-10.0    

 

             Red Blood Count 3.76 10      Low          4.00-5.40     

 

             Hemoglobin   12.5 g/dL    Normal       12.0-15.5     

 

             Hematocrit   39.0 %       Normal       36.0-47.0     

 

             Mean Corpuscular Volume 103.7 fl     High         80.0-96.0     

 

             Mean Corpuscular Hemoglobin 33.2 pg      High         27.0-33.0    

 

 

             Mean Corpuscular HGB Conc 32.1 g/dL    Normal       32.0-36.5     

 

             Red Cell Distribution Width 12.9 %       Normal       11.5-14.5    

 

 

             Platelet Count, Automated 199 10       Normal       150-450       

 

             Neutrophils % 74.5 %       High         36.0-66.0     

 

             Lymph %      16.4 %       Low          24.0-44.0     

 

             Mono %       7.0 %        Normal       2.0-8.0       

 

             Eos %        1.1 %        Normal       0.0-3.0       

 

             Baso %       0.5 %        Normal       0.0-1.0       

 

             Immature Granulocyte % 0.5 %        Normal       0-3.0         

 

             Nucleated Red Blood Cell % 0.0 %        Normal       0-0           

 

             Neutrophils # 4.6 10       Normal       1.5-8.5       

 

             Lymph #      1.0 10       Low          1.5-5.0       

 

             Mono #       0.4 10       Normal       0.0-0.8       

 

             Eos #        0.1 10       Normal       0.0-0.5       

 

             Baso #       0.0 10       Normal       0.0-0.2       

 

                    Comprehensive Metabolic Profil 2021          [address] [address] [phone] Glucose, Fasting 98 mg/dL   Normal           

 

             Blood Urea Nitrogen 28 mg/dL     High         7-18          

 

             Creatinine For GFR 0.90 mg/dL   Normal       0.55-1.30     

 

             Glomerular Filtration Rate > 60.0       Normal       >45          2

 

             Sodium Level 137 mEq/L    Normal       136-145       

 

             Potassium Serum 4.7 mEq/L    Normal       3.5-5.1       

 

             Chloride Level 107 mEq/L    Normal               

 

             Carbon Dioxide Level 27 mEq/L     Normal       21-32         

 

             Anion Gap    3 mEq/L      Low          8-16          

 

             Calcium Level 10.2 mg/dL   Normal       8.8-10.2      

 

             Ast/Sgot     23 U/L       Normal       7-37          

 

             Alt/SGPT     19 U/L       Normal       12-78         

 

             Alkaline Phosphatase 77 U/L       Normal               

 

             Bilirubin,Total 0.5 mg/dL    Normal       0.2-1.0       

 

             Total Protein 8.3 GM/DL    High         6.4-8.2       

 

             Albumin      3.0 GM/DL    Low          3.2-5.2       

 

             Albumin/Globulin Ratio 0.6          Low          1.2-2.2       

 

                    Complete Blood Count 2021          [facility]

s, [facility]

: Dr Simon Hoyt

[place] [address] [phone] WBC        5.9 x10*3/UL            4.1 - 10.9  

 

             RBC          4.11 x10*6/UL Low          4.20 - 6.30   

 

             Hemoglobin   13.6 g/dL                 12.0 - 18.0   

 

             Hematocrit   40.0 %                    37.0 - 51.0   

 

             MCV          97.4 fL      High         80.0 - 97.0   

 

             MCH          33.1 pg      High         26.0 - 32.0   

 

             MCHC         34.0 g/dL                 31.0 - 38.0   

 

             RDW          13.2 %                    11.6 - 13.7   

 

             PLT          209 x10*3/UL              140 - 440     

 

             MPV          8.1 FL                    7.8 - 11.0    

 

             Lymph %      25.6 %                    10.0 - 58.5   

 

             Mid %        7.5 %                     1.7 - 9.3     

 

             Neut %       66.9 %                    37.0 - 92.0   

 

             Lymph #      1.5 x10*3/UL              0.6 - 4.1     

 

             Mid #        0.5 x10*3/UL              0.1 - 0.6     

 

             Neut #       3.9 x10*3/UL              2.0 - 7.8     

 

                    Laboratory test finding 2021          [facility] [place]

[facility], [facility]

: Dr Simon Hoyt

[place] [address] [phone] Sed Rate   25 mm/hr   High       0 - 15      

 

                    Basic Metabolic Panel 2021          [facility]

ts, pc

: Dr Simon Hoyt

[place] [address] [phone] Glucose    100 mg/dL  High       74 - 99    3

 

             BUN          18 mg/dL                  7 - 18        

 

             Creatinine   0.8 mg/dL                 0.6 - 1.3     

 

             Sodium       141 mEq/L                 136 - 145     

 

             Potassium    3.8 mEq/L                 3.5 - 5.1     

 

             Chloride     104 mEq/L                 98 - 107      

 

             Carbon Dioxide 29 mEq/L                  21 - 32       

 

             Calcium      10.8 mg/dL   High         8.5 - 10.1   4

 

             GFR  >= 60 mL/min              >60           

 

             GFR African American >= 60 mL/min              >60          5

 

                    Laboratory test finding 2021          [place] [facility]

pia me[facility]

: Dr Simon Hoyt

[place] [address]

           [phone] Thyroid Stimulating Hormone 0.19 uIU/mL Low        0.3

6 - 3.74  

 

                    [facility]id Profile       2021          [facility]

: Dr Simon Hoyt

[place] [address]

           [phone] Cholesterol 132 mg/dL             131 - 200   

 

             Triglycerides 72 mg/dL                  30 - 150      

 

             HDL Cholesterol 54 mg/dL                  35 - 60       

 

             LDL (Calculated) 64 CALC                   50 - 159      

 

                    Laboratory test finding 2021          [address] [id number] [phone] Digoxin Level 0.3 NG/ML  Low        0.5-2.0    6

 

                    Laboratory test finding 2021          [place] [facility]

pia me[facility]

: Dr Simon Hoyt

[place], [place] [id number] [phone] Thyroid Stimulating Hormone 0.32 uIU/mL Low        0.3

6 - 3.74  

 

                    Basic Metabolic Panel 2021          [facility]

ts, pc

: Dr Simon Hoyt

[place], [place] [id number] [phone] Glucose    70 mg/dL   Low        74 - 99    7

 

             BUN          23 mg/dL     High         7 - 18        

 

             Creatinine   0.9 mg/dL                 0.6 - 1.3     

 

             Sodium       144 mEq/L                 136 - 145     

 

             Potassium    3.8 mEq/L                 3.5 - 5.1     

 

             Chloride     104 mEq/L                 98 - 107      

 

             Carbon Dioxide 32 mEq/L                  21 - 32       

 

             Calcium      10.1 mg/dL                8.5 - 10.1    

 

             GFR  >= 60 mL/min              >60           

 

             GFR African American >= 60 mL/min              >60          8

 

                    Laboratory test finding 2021          [facility] [place] [facility]

pia [facility]

: Dr Simon Hoyt

[place] [address] [phone] Sed Rate   45 mm/hr   High       0 - 15      

 

             Magnesium    1.8 mg/dL                 1.8 - 2.4     

 

                    Complete Blood Count 2021          [facility]

: Dr Simon Hoyt

[place] [address] [phone] WBC        8.4 x10*3/UL            4.1 - 10.9  

 

             RBC          4.20 x10*6/UL              4.20 - 6.30   

 

             Hemoglobin   14.0 g/dL                 12.0 - 18.0   

 

             Hematocrit   40.3 %                    37.0 - 51.0   

 

             MCV          95.8 fL                   80.0 - 97.0   

 

             MCH          33.3 pg      High         26.0 - 32.0   

 

             MCHC         34.7 g/dL                 31.0 - 38.0   

 

             RDW          13.3 %                    11.6 - 13.7   

 

             PLT          207 x10*3/UL              140 - 440     

 

             MPV          8.0 FL                    7.8 - 11.0    

 

             Lymph %      17.5 %                    10.0 - 58.5   

 

             Mid %        4.8 %                     1.7 - 9.3     

 

             Neut %       77.7 %                    37.0 - 92.0   

 

             Lymph #      1.4 x10*3/UL              0.6 - 4.1     

 

             Mid #        0.5 x10*3/UL              0.1 - 0.6     

 

             Neut #       6.5 x10*3/UL              2.0 - 7.8     

 

                    Ua Routine          2021          [facility]

nter

                                        [address] [place] [address] [phone] Appearance, Urine CLEAR      Normal     Clear       

 

             Color, Urine ABHAY        Normal       Yellow        

 

             PH,Urine     5.0 units    Normal       5.0-9.0       

 

             Specific Gravity Urine Auto 1.028        Normal       1.002-1.035  

 

 

             Protein, Urine Auto 2+ mg/dL     High         Negative      

 

             Glucose, Urine (Ua) Auto NEGATIVE mg/dL Normal       Negative      

 

             Ketone, Urine Auto TRACE mg/dL  High         Negative      

 

             Urobilinogen, Urine Auto 4.0 mg/dL    High         0.0-2.0       

 

             Bilirubin, Urine Auto NEGATIVE     Normal       Negative      

 

             Nitrite, Urine Auto NEGATIVE     Normal       Negative      

 

             Leukocyte Esterase, Urine Auto NEGATIVE     Normal       Negative  

    

 

             Blood, Urine Blood NEGATIVE     Normal       Negative      

 

             WBC, Urine Auto 2 /HPF       Normal       0-3           

 

             RBC, Urine Auto 2 /HPF       Normal       0-3           

 

             Bacteria, Urine Auto 1+           High         Negative      

 

             Squamous Epithelial Cell Ur AU 0 /HPF       Normal       0-6       

    

 

             Mucus, Urine SMALL        Normal       Negative      

 

             Hyaline Cast, Urine Auto 0 /LPF       Normal       0-1           

 

                    CBC With Differential 2021          [address] [address] [phone] White Blood Count 7.6 10     Normal     4.0-10.0    

 

             Red Blood Count 4.29 10      Normal       4.00-5.40     

 

             Hemoglobin   13.9 g/dL    Normal       12.0-15.5     

 

             Hematocrit   43.3 %       Normal       36.0-47.0     

 

             Mean Corpuscular Volume 100.9 fl     High         80.0-96.0     

 

             Mean Corpuscular Hemoglobin 32.4 pg      Normal       27.0-33.0    

 

 

             Mean Corpuscular HGB Conc 32.1 g/dL    Normal       32.0-36.5     

 

             Red Cell Distribution Width 13.1 %       Normal       11.5-14.5    

 

 

             Platelet Count, Automated 175 10       Normal       150-450       

 

             Neutrophils % 73.1 %       High         36.0-66.0     

 

             Lymph %      18.1 %       Low          24.0-44.0     

 

             Mono %       6.8 %        Normal       2.0-8.0       

 

             Eos %        1.3 %        Normal       0.0-3.0       

 

             Baso %       0.3 %        Normal       0.0-1.0       

 

             Immature Granulocyte % 0.4 %        Normal       0-3.0         

 

             Nucleated Red Blood Cell % 0.0 %        Normal       0-0           

 

             Neutrophils # 5.6 10       Normal       1.5-8.5       

 

             Lymph #      1.4 10       Low          1.5-5.0       

 

             Mono #       0.5 10       Normal       0.0-0.8       

 

             Eos #        0.1 10       Normal       0.0-0.5       

 

             Baso #       0.0 10       Normal       0.0-0.2       

 

                    Laboratory test finding 2021          [facility]

                                        [address] [place] [address] [phone] Erythrocyte Sedimentation Rate 68 mm/hr   High       0

-30        

 

                    Comprehensive Metabolic Profil 2021          [address] [place] [address] [phone] Glucose, Fasting 97 mg/dL   Normal           

 

             Blood Urea Nitrogen 24 mg/dL     High         7-18          

 

             Creatinine For GFR 0.85 mg/dL   Normal       0.55-1.30     

 

             Glomerular Filtration Rate > 60.0       Normal       >45          9

 

             Sodium Level 139 mEq/L    Normal       136-145       

 

             Potassium Serum 4.6 mEq/L    Normal       3.5-5.1       

 

             Chloride Level 106 mEq/L    Normal               

 

             Carbon Dioxide Level 26 mEq/L     Normal       21-32         

 

             Anion Gap    7 mEq/L      Low          8-16          

 

             Calcium Level 11.8 mg/dL   High         8.8-10.2      

 

             Ast/Sgot     33 U/L       Normal       7-37          

 

             Alt/SGPT     44 U/L       Normal       12-78         

 

             Alkaline Phosphatase 116 U/L      Normal               

 

             Bilirubin,Total 0.6 mg/dL    Normal       0.2-1.0       

 

             Total Protein 8.9 GM/DL    High         6.4-8.2       

 

             Albumin      3.2 GM/DL    Normal       3.2-5.2       

 

             Albumin/Globulin Ratio 0.6          Low          1.2-2.2       

 

                    Total Iron Binding Capacit 2021          [id number] [phone] Iron (Fe)  131 g/dL Normal           

 

             Total Iron Binding Capacity 305 g/dL   Normal       250-450      

 

 

             Percent Saturation 43.0 %       Normal       13.2-45.0     

 

                    Immunoglobulin G,A,M 2021          [address] [address] [phone] Immunoglobulin A 2640.0 mg/dL High             

 

             Immunoglobulin G 510 mg/dL    Low          681-1648      

 

             Immunoglobulin M < 5.3 mg/dL  Low                  

 

                    Immunotyping (Immunofixation) Serum  (If 2021         

 [address] [address] [phone] Immunotyping Serum Iga ABNORMAL   High       Normal   

   

 

             Immunotyping Serum Lambda ABNORMAL     High         Normal        

 

             It Serum Interpretation SEE COMMENT  Normal                    10

 

             Its Pathologist Review REV'D BY O ADJAP <SEE NOTE>  Normal         

           11

 

                    Laboratory test finding 2021          [address] [address] [phone] Ferritin   128 NG/ML  Normal     8-252      12

 

             Beta 2 Microglobulin 3.7 mg/L     High         0.6-2.4      13

 

                    Free Kappa & Lambda LT Chains 2021          [id number] [place] [id number] [phone] Free Dubberly Light Chains Serum 6.4 mg/L   Normal     3.

3-19.4    

 

             Free Lambda Light Chains Serum 517.2 mg/L   High         5.7-26.3  

    

 

             Kappa/Lambda Ratio Serum 0.01         Low          0.26-1.65     







                          1                         THERAPUTIC HUMAN INR VALUES

INDICATIONS                      NORMAL RANGES

PROPHYLAXIS/TREATMENT OF:

VENOUS THROMBOSIS                2.0-3.0

PULMONARY EMBOLISM               2.0-3.0

PREVENTION OF SYSTEMIC EMBOLISM FROM:

TISSUE HEART VALVES              2.0-3.0

ACUTE MYOCARDIAL INFARCTION      2.0-3.0

VALVULAR HEART DISEASE           2.0-3.0

ATRIAL FIBRILLATION              2.0-3.0

MECHANICAL VALVES(HIGH RISK)     2.5-3.5

RECURRENT MYOCARDIAL INFARCTION  2.5-3.5



 

                          2                         Units are mL/min/1.73 m2



Chronic Kidney Disease Staging per NKF:



Stage I & II   GFR >=60       Normal to Mildly Decreased

Stage III      GFR 30-59      Moderately Decreased

Stage IV       GFR 15-29      Severely Decreased

Stage V        GFR <15        Very Little GFR Left

ESRD           GFR <15 on RRT



 

                          3                         100-125 mg/dL     PRE-DIABET

ES/FASTING

>126 mg/dL          DIABETES/FASTING



 

                          4                         NOTE:

CALCIUM,TSH VERIFIED







 

                          5                         CHRONIC KIDNEY DISEASE STAGI

NG PER NKF



STAGE I & II      GFR >= 60        NORMAL TO MILDLY DECREASED

STAGE III          GFR 30-59          MODERATELY DECREASED

STAGE IV           GFR 15-29         SEVERELY DECREASED

STAGE V            GFR <15            VERY LITTLE GFR LEFT

ESRD                 GFR <15            ON RRT



 

                          6                         note:<nlbl:demographic_chang

ed>



 

                          7                         100-125 mg/dL     PRE-DIABET

ES/FASTING

>126 mg/dL          DIABETES/FASTING



 

                          8                         CHRONIC KIDNEY DISEASE STAGI

NG PER NKF



STAGE I & II      GFR >= 60        NORMAL TO MILDLY DECREASED

STAGE III          GFR 30-59          MODERATELY DECREASED

STAGE IV           GFR 15-29         SEVERELY DECREASED

STAGE V            GFR <15            VERY LITTLE GFR LEFT

ESRD                 GFR <15            ON RRT



 

                          9                         Units are mL/min/1.73 m2



Chronic Kidney Disease Staging per NKF:



Stage I & II   GFR >=60       Normal to Mildly Decreased

Stage III      GFR 30-59      Moderately Decreased

Stage IV       GFR 15-29      Severely Decreased

Stage V        GFR <15        Very Little GFR Left

ESRD           GFR <15 on RRT



 

                          10                        MONOCLONAL IGA,LAMBDA



 

                          11                        REV'D BY CHARLES ROCHA



 

                          12                        note:<nlbl:demographic_chang

ed>



 

                          13                        [employer] Immulite 2000 Immuno

chemiluminometric assay (ICMA)

                                        .

Values obtained with different assay methods or kits cannot

be used interchangeably. Results cannot be interpreted as

absolute evidence of the presence or absence of malignant

disease.

Performed at:   - [facility] [address]  [id number]

: Araceli B Reyes MD, Phone:  [phone]

Performed at:   - [facility] [address]  [id number]

61

: Barak Archibald MD, Phone:  [phone]









Procedures





                Date            Code            Description     Status

 

                    2021          94119               Chronic Care MGMT 20

 Mins Clinical Staff Time Per Calendar 

Month                                   Completed

 

                2021      96778           Chronic Care Management Services

 Ea Addl 20 Min Completed

 

                2021      40345           Complex Chronic Care MGMT Servic

e Ea Addl 30 Min Completed

 

                2021      46770           Complex Chronic Care Management 

SVC 1St 60 Min Completed

 

                2021      87124           Complex Chronic Care MGMT Servic

e Ea Addl 30 Min Completed

 

                2021      87030           Complex Chronic Care Management 

SVC [address] 60 Min Completed

 

                2021      17446           Office/Outpatient Established Mo

d MDM 30-39 Min Completed

 

                2021      33254           Complex Chronic Care MGMT Servic

e Ea Addl 30 Min Completed

 

                2021      47584           Complex Chronic Care Management 

SVC [address] 60 Min Completed

 

                2021      03362           Complex Chronic Care MGMT Servic

e Ea Addl 30 Min Completed

 

                2021      75557           Complex Chronic Care Management 

SVC [address] 60 Min Completed

 

                2021      61189           Office/Outpatient Established Mo

d MDM 30-39 Min Completed

 

                2021      [id number]       Bone Mineral Density Test Comple

veronika

 

                2021      [id number]        Mammogram       Completed







[employer]





                                        Description

 

                                        No Information Available







Encounters





           Type       Date       Location   Provider   Dx         Diagnosis

 

                Office Visit    2021  1:30p [facility] Regalado M.[facility].                      I48.20                    Chronic atrial fibrillation,

 unspecified

 

                          Z79.01                    Long term (current) use of a

nticoagulants

 

                          R60.9                     Edema, unspecified

 

                          I10                       Essential (primary) hyperten

darci

 

                          E05.90                    Thyrotoxicosis, unsp without

 thyrotoxic crisis or storm

 

                          I82.502                   Chronic embolism and thombos

 unsp deep veins of l low extrem

 

                          F17.210                   Nicotine dependence, cigaret

svitlana, uncomplicated

 

                          F32.89                    Other specified depressive e

pisodes

 

                          D47.2                     Monoclonal gammopathy

 

                          K21.9                     Gastro-esophageal reflux dis

ease without esophagitis

 

                          M19.90                    Unspecified osteoarthritis, 

unspecified site

 

                          E78.00                    Pure hypercholesterolemia, u

nspecified

 

                Office Visit    2021  1:30p [facility] Neal Regalado M.D.                      I48.20                    Chronic atrial fibrillation,

 unspecified

 

                          I10                       Essential (primary) hyperten

darci

 

                          E05.90                    Thyrotoxicosis, unsp without

 thyrotoxic crisis or storm

 

                          I82.502                   Chronic embolism and thombos

 unsp deep veins of l low extrem

 

                          Z79.01                    Long term (current) use of a

nticoagulants

 

                          F17.210                   Nicotine dependence, cigaret

svitlana, uncomplicated

 

                          F32.89                    Other specified depressive e

pisodes

 

                          E83.52                    Hypercalcemia

 

                          D47.2                     Monoclonal gammopathy

 

                          K21.9                     Gastro-esophageal reflux dis

ease without esophagitis

 

                          M15.9                     Polyosteoarthritis, unspecif

ied

 

                          E78.00                    Pure hypercholesterolemia, u

nspecified







Assessments





                Date            Code            Description     Provider

 

                10/06/2021      S81.802A        Unspecified open wound, left low

er leg, initial encounter 

AIXA Hoffmann JR

 

                2021      I10             Essential (primary) hypertension

 Charmaine Regalado M.D.

 

                2021      K21.9           Gastro-esophageal reflux disease

 without esophagitis Charmaine Regalado M.D.

 

                2021      E78.00          Pure hypercholesterolemia, unspe

cified Charmaine Regalado M.D.

 

                2021      I10             Essential (primary) hypertension

 Charmaine Regalado M.D.

 

                2021      K21.9           Gastro-esophageal reflux disease

 without esophagitis Charmaine Regalado M.D.

 

                2021      E78.00          Pure hypercholesterolemia, unspe

cified Charmaine Regalado M.D.

 

                2021      M15.9           Polyosteoarthritis, unspecified 

Charmaine Regalado M.D.

 

                2021      I10             Essential (primary) hypertension

 Charmaine Regalado M.D.

 

                2021      K21.9           Gastro-esophageal reflux disease

 without esophagitis Charmaine Regalado M.D.

 

                    2021          I82.502             Chronic embolism and

 thrombosis of unspecified deep veins of 

left lower extremity                    Charmaine Regalado M.D.

 

                2021      I48.20          Chronic atrial fibrillation, uns

pecified Charmaine Regalado M.D.

 

                2021      Z79.01          Long term (current) use of antic

oagulants Charmaine Regalado M.D.

 

                2021      R60.9           Edema, unspecified Charmaine guido M.D.

 

                2021      I10             Essential (primary) hypertension

 Charmaine Regalado M.D.

 

                2021      E05.90          Thyrotoxicosis, unspecified with

out thyrotoxic crisis or sto 

Charmaine Regalado M.D.

 

                    2021          I82.502             Chronic embolism and

 thrombosis of unspecified deep veins of 

left lower extremity                    Charmaine Regalado M.D.

 

                2021      F17.210         Nicotine dependence, cigarettes,

 uncomplicated Charmaine Regalado M.D.

 

                2021      F32.89          Other specified depressive episo

emil Charmaine Regalado M.D.

 

                2021      D47.2           Monoclonal gammopathy Charmaine duron M.D.

 

                2021      K21.9           Gastro-esophageal reflux disease

 without esophagitis Charmaine Regalado M.D.

 

                2021      M19.90          Unspecified osteoarthritis, unsp

ecified site Charmaine Regalado M.D.

 

                2021      E78.00          Pure hypercholesterolemia, unspe

cified Charmaine Regalado M.D.

 

                2021      I10             Essential (primary) hypertension

 Charmaine Regalado M.D.

 

                2021      K21.9           Gastro-esophageal reflux disease

 without esophagitis Charmaine Regalado M.D.

 

                2021      E78.00          Pure hypercholesterolemia, unspe

cified Charmaine Regalado M.D.

 

                2021      I48.20          Chronic atrial fibrillation, uns

pecminoo Regalado M.D.

 

                2021      F17.210         Nicotine dependence, cigarettes,

 uncomplicated Charmaine Regalado M.D.

 

                2021      K21.9           Gastro-esophageal reflux disease

 without esophagitis Charmaine Regalado M.D.

 

                2021      E78.00          Pure hypercholesterolemia, unspe

cisonia Regalado M.D.

 

                2021      I48.20          Chronic atrial fibrillation, uns

el Regalado M.D.

 

                2021      I10             Essential (primary) hypertension

 Charmaine Regalado M.D.

 

                2021      E05.90          Thyrotoxicosis, unspecified with

out thyrotoxic crisis or sto 

Charmaine Regalado M.D.

 

                    2021          I82.502             Chronic embolism and

 thrombosis of unspecified deep veins of 

left lower extremity                    Charmaine Regalado M.D.

 

                2021      Z79.01          Long term (current) use of antic

oagulants Charmaine Regalado M.D.

 

                2021      F17.210         Nicotine dependence, cigarettes,

 uncomplicated Charmaine Regalado M.D.

 

                2021      F32.89          Other specified depressive episo

emil Charmaine Regalado M.D.

 

                2021      E83.52          Hypercalcemia   Charmaine xavier M.D.

 

                2021      D47.2           Monoclonal gammopathy Charmaine duron M.D.

 

                2021      K21.9           Gastro-esophageal reflux disease

 without esophagitis Charmaine Regalado M.D.

 

                2021      M15.9           Polyosteoarthritis, unspecified 

Charmaine Regalado M.D.

 

                2021      E78.00          Pure hypercholesterolemia, unspe

cified Charmaine Regalado M.D.







Plan of Treatment

Future Appointment(s):* 2021  3:15 pm - [facility]

10/06/2021 - AIXA Hoffmann JR* S81.802A Unspecified open wound, left 
  lower leg, initial encounter

* All * New Medication:* Cephalexin 500 mg - take one tablet by mouth 3 times 
  daily x 7 days









Functional Status





                                        Description

 

                                        No Information Available







Mental Status





                                        Description

 

                                        No Information Available







Referrals





                                        Description

 

                                        No Information Available

## 2021-10-25 NOTE — CCD
Continuity of Care Document (CCD)

                             Created on: 2021



Juany Becerril

External Reference #: [id number]

: 1955

Sex: Female



Demographics





                          Address                   [address] [place] [address]  [id number]

 

                          Home Phone                [phone]

 

                          Preferred Language        Unknown

 

                          Marital Status            

 

                          Mormonism Affiliation     Unknown

 

                          Race                      White

 

                          Ethnic Group              Not  or 





Author





                          Organization              Unknown

 

                          Address                   Unknown

 

                          Phone                     Unavailable







Care Team Providers





                    Care Team Member Name Role                Phone

 

                    Charmaine Regalado MD  AUTM                [phone]

 

                    Lupe Arredondo MD AUTM                [phone]







Problems





                    Active Problems     Provider            Date

 

                    Precordial pain     Jermaine Chahal MD  Onset: 2019

 

                    Dyspnea             Jermaine Chahal MD  Onset: 2019

 

                    Palpitations        Jermaine Chahal MD  Onset: 2019

 

                    Paroxysmal atrial fibrillation Jermaine Chahal MD  Onset: 

 

                    Electrocardiogram abnormal Jermaine Chahal MD  Onset: 2019

 

                    Cardiomegaly        Jermaine Chahal MD  Onset: 2019

 

                    Right bundle branch block Jermaine Chahal MD  Onset: 

019

 

                    Dietary management surveillance Jermaine Chahal MD  Onset: 1

2019

 

                    Obstructive sleep apnea syndrome Jermaine Chahal MD  Onset: 

2019

 

                    Syncope and collapse Jermaine Chahal MD  Onset: 2019

 

                    Chronic diastolic heart failure Jermaine Chahal MD  Onset: 0

3/09/2020

 

                    Chronic pulmonary heart disease Jermaine Chahal MD  Onset: 0

3/09/2020

 

                          Benign hypertensive heart disease with congestive card

iac failure Jermaine Chahal MD                              Onset: 2020

 

                    Mitral valve disorder Jermaine Chahal MD  Onset: 2020

 

                    Carotid artery occlusion [facility] Onset: 

 

                    Mixed hyperlipidemia [facility] Onset: 

 

                    Deep venous thrombosis of lower extremity [facility] Onset: 

2021







Social History





                Type            Date            Description     Comments

 

                Birth Sex                       Unknown          

 

                ETOH Use                        Occasionally consumes wine  

 

                Tobacco Use     Start: Unknown  Patient is a current smoker, smo

kes every day Started

smoking at age 27, smokes 3 cigarettes a day 

 

                Smoking Status  Reviewed: 21 Patient is a current smoker, 

smokes every day 

Started smoking at age 27, smokes 3 cigarettes a day 

 

                Exercise Type/Frequency                 Walks daily     in good 

weather  

 

                Exercise Type/Frequency                 Does housework daily  

 

                Exercise Limitations                 Back Pain        

 

                Exercise Limitations                 Orthopedic Problem Knee gary

n 

 

                Exercise Limitations                 Claudication     







Allergies, Adverse Reactions, Alerts





             Active Allergies Criticality  Reaction | Severity Comments     Date

 

             Motrin       Unable to assess criticality              GI Upset    

 2019







Medications





           Active Medications SIG        Qnty       Indications Ordering Provide

r Date

 

                          Eliquis                     5mg Tablets               

    1 by mouth twice a day

                                                Charmaine Regalado MD 2021

 

                    Digox                     250mcg Tablets                   1

 by mouth every day 

90tabs                                  Jermaine Chahal MD  2020

 

                          Famotidine                     40mg Tablets           

        1 tablet daily at 

bedtime         30tabs          R07.2           Jermaine Chahal MD 2019

 

                          Acetaminophen                     500mg Tablets       

            1-2 by mouth 

every 6 hours as needed                                 Unknown         20

19

 

                          Atorvastatin Calcium                     20mg Tablets 

                  1 by 

mouth every night at bedtime                                 Unknown         2019

 

                One Daily                      Tablets                   1 by mo

uth every day                 

                          Unknown                   2019

 

                          Methimazole                     10mg Tablets          

         1 by mouth every 

morning                                         Unknown         2019

 

                          Vitamin E Blend                     400Unit Capsules  

                 1 by 

mouth every day                                 Unknown         2019

 

                          Flecainide Acetate                     100mg Tablets  

                 take one 

tablet by mouth twice a day 180tabs                         Jermaine Chahal MD 1

2019

 

                          Aspirin 81                     81mg Tablets DR        

           1 by mouth 

every day                                       Unknown         2019

 

                          Metoprolol Tartrate                     25mg Tablets  

                 1 by 

mouth twice a day                                 Unknown         2019

 

                          Mucus Relief DM                     20-400mg Tablets  

                 1 tab po 

at bedtime                                      Unknown         2019

 

                          Furosemide                     20mg Tablets           

        1 by mouth once a 

day                                             Unknown         







Immunizations





                                        Description

 

                                        No Information Available







Vital Signs





                Date            Vital           Result          Comment

 

                2021  1:02pm Weight          220.00 lb        

 

                    Height              65 inches           5'5"

 

                    BMI (Body Mass Index) 36.6 kg/m2           

 

                    Heart Rate          65 /min              

 

                    BP Systolic Sitting 136 mmHg            Ra, large cuff

 

                    BP Diastolic Sitting 84 mmHg             Ra, large cuff

 

                2020 12:43pm Weight          229.00 lb        

 

                    Height              65 inches           5'5"

 

                    BMI (Body Mass Index) 38.1 kg/m2           

 

                    Heart Rate          67 /min              

 

                    BP Systolic Sitting 126 mmHg            large cuff, Ra

 

                    BP Diastolic Sitting 74 mmHg             large cuff, Ra







Results





        Test    Acquired Date Facility Test    Result  H/L     Range   Note

 

                    CBC without Differential 2021          [facility] - not inter

faced

           (315)-   - White Blood Count 6.8                   5.0-10.0    

 

             Red Blood Count 3.85         Low          4.00-5.40     

 

             Platelets    187                       172-450       

 

             Hemoglobin   12.7                                    

 

             Hematocrit   39.4                                    

 

                    CBC without Differential 2021          [facility] - not inter

faced

           (315)-   - White Blood Count 6.2                   5.0-10.0    

 

             Red Blood Count 3.76         Low          4.00-5.40     

 

             Platelets    199                       172-450       

 

             Hemoglobin   12.5                                    

 

             Hematocrit   39.0                                    

 

                    CMP                 2021          [facility] - not interfaced

           (315)-   - Albumin Serum/Plasma 3.0                               

 

             Alt - SGPT   19                                      

 

             Calcium Ser/Plasma Mass/Vol 10.2                                   

 

 

             Carbon Dioxide Ser/Plasm 27                                      

 

             Chloride Serum/Plasma 107                                     

 

             Alkaline Phosphatase 77                                      

 

             Potassium    4.7                                     

 

             Protein Total 8.3                                     

 

             Sodium       137                                     

 

             Ast - Sgot   23                                      

 

             BUN - Urea Nitrogen 28                                      

 

             Glucose      98                                

 

             Creatinine For GFR 0.90                                    

 

                    Complete Blood Count 2021          [facility]

rt

             WBC          8.4 x10*3/UL              4.1-10.9      

 

             RBC          4.20 x10*6/UL              4.20-6.30     

 

             Hemoglobin   14.0 g/dL                 12.0-18.0     

 

             Hematocrit   40.3 %                    37.0-51.0     

 

             MCV          95.8 fL                   80.0-97.0     

 

             MCH          33.3 pg      High         26.0-32.0     

 

             MCHC         34.7 g/dL                 31.0-38.0     

 

             RDW          13.3 %                    11.6-13.7     

 

             PLT          207 x10*3/UL              140-440       

 

             MPV          8.0 FL                    7.8-11.0      

 

             Lymph %      17.5 %                    10.0-58.5     

 

             Mid %        4.8 %                     1.7-9.3       

 

             Neut %       77.7 %                    37.0-92.0     

 

             Lymph #      1.4 x10*3/UL              0.6-4.1       

 

             Mid #        0.5 x10*3/UL              0.1-0.6       

 

             Neut #       6.5 x10*3/UL              2.0-7.8       

 

                    Basic Metabolic Panel 2021          [facility]

ort

             Glucose      70 mg/dL     Low          74-99        1

 

             BUN          23 mg/dL     High         7-18          

 

             Creatinine   0.9 mg/dL                 0.6-1.3       

 

             Sodium       144 mEq/L                 136-145       

 

             Potassium    3.8 mEq/L                 3.5-5.1       

 

             Chloride     104 mEq/L                         

 

             Carbon Dioxide 32 mEq/L                  21-32         

 

             Calcium      10.1 mg/dL                8.5-10.1      

 

             GFR  >= 60 mL/min                             

 

             GFR African American >= 60 mL/min                            2

 

                    Lipid Profile       2021          [facility]

t

             Cholesterol  132 mg/dL                 131-200       

 

             Triglycerides 72 mg/dL                          

 

             HDL Cholesterol 54 mg/dL                  35-60         

 

             LDL (Calculated) 64 CALC                           

 

                    Laboratory test finding 2021          [facility] I

mport

             Thyroid Stimulating Hormone 0.32 uIU/mL  Low          0.36-3.74    

 

 

             Digoxin Level 0.3 ng/mL    Low          0.5-2.0      3







                          1                         100-125 mg/dL     PRE-DIABET

ES/FASTING

>126 mg/dL          DIABETES/FASTING





 

                          2                         CHRONIC KIDNEY DISEASE STAGI

NG PER NKF



STAGE I & II      GFR >= 60        NORMAL TO MILDLY DECREASED

STAGE III          GFR 30-59          MODERATELY DECREASED

STAGE IV           GFR 15-29         SEVERELY DECREASED

STAGE V            GFR <15            VERY LITTLE GFR LEFT

ESRD                 GFR <15            ON RRT





 

                          3                         note:<nlbl:demographic_chang

ed>











Procedures





                Date            Code            Description     Status

 

                2021      72831           Office/Outpatient Established Lo

w MDM 20-29 Min Completed

 

                2021      51279           ECG 12-Lead     Completed







Medical Devices





                                        Description

 

                                        No Information Available







Encounters





           Type       Date       Location   Provider   Dx         Diagnosis

 

           Office Visit 2021 12:45p Main Office [facility] I[facility]

.0      

Paroxysmal atrial fibrillation

 

                          I50.32                    Chronic diastolic (congestiv

e) heart failure

 

                          I11.0                     Hypertensive heart disease w

ith heart failure

 

                          I27.81                    Cor pulmonale (chronic)

 

                          I65.29                    Occlusion and stenosis of un

specified carotid artery

 

                          I34.0                     Nonrheumatic mitral (valve) 

insufficiency

 

                          E78.2                     Mixed hyperlipidemia

 

                          I82.503                   Chronic emblsm and thombos u

nsp deep veins of low extrm, bi

 

                          R94.31                    Abnormal electrocardiogram [

ECG] [EKG]

 

                          Z71.3                     Dietary counseling and surve

illance







Assessments





                Date            Code            Description     Provider

 

                2021      I48.0           Paroxysmal atrial fibrillation C

[facility]

 

                2021      I50.32          Chronic diastolic (congestive) h

eart failure [facility]

 

                2021      I11.0           Hypertensive heart disease with 

heart failure [facility]

 

                2021      I27.81          Cor pulmonale (chronic) [facility]

 

                2021      I65.29          Occlusion and stenosis of unspec

ified carotid artery [facility]

 

                2021      I34.0           Nonrheumatic mitral (valve) insu

fficiency [facility]

 

                2021      E78.2           Mixed hyperlipidemia [facility]

 

                    2021          I82.503             Chronic embolism and

 thrombosis of unspecified deep veins of 

lower extremity, bilateral              [facility]

 

                2021      R94.31          Abnormal electrocardiogram [ECG]

 [EKG] [facility]

 

                2021      Z71.3           Dietary counseling and surveilla

nce [facility]







Plan of Treatment

Future Appointment(s):* 2021  1:30 pm - [facility] at Main 
  Office

2021 - [facility]* I48.0 Paroxysmal atrial fibrillation* 
  Recommendations:* Continue Eliquis, digoxin, flecainide, and metoprolol at the
   current dosages Patient agreeable to contact us with more frequent episodes 
  of tachycardia or palpitations, discussed Holter monitor therapy with patient 
  at this time she would like to refrain further evaluation





* I50.32 Chronic diastolic (congestive) heart failure* Recommendations:* 
  Continue digoxin, furosemide, and metoprolol at the current dosages Please 
  alert our office with a weight gain of more than 3 pounds, onset of shortness 
  of breath, or lower extremity edema





* I11.0 Hypertensive heart disease with heart failure* New Labs:* BMP, Ordered: 
  21



* Recommendations:* Continue metoprolol at the current dosage Advised patient to
   monitor blood pressures at home and to alert our office with readings >140/>
  90





* I27.81 Cor pulmonale (chronic)* Recommendations:* No medication changes made 
  today Patient agreeable to contact us with the onset of any shortness of 
  breath or edema





* I65.29 Occlusion and stenosis of unspecified carotid artery* Recommendations:
  * Carotid ultrasound ordered for further evaluation Advised patient to seek 
  emergency medical attention if she develops any further lateralizing 
  neurologic symptoms





* I34.0 Nonrheumatic mitral (valve) insufficiency* Recommendations:* No further 
  evaluation is needed at this time





* E78.2 Mixed hyperlipidemia* New Labs:* Lipid Panel, Ordered: 21



* Recommendations:* Please obtain fasting labs Continue atorvastatin at the 
  current dosage





* I82.503 Chronic embolism and thrombosis of unspecified deep veins of lower 
  extremity, bilateral* New Labs:* Protein C & S Activity Panel, Ordered: 
  21

* Factor V Leiden, Ordered: 21

* CBC W/Auto Differential, Ordered: 21



* Referral:* Lupe Arredondo MD, Hematology & Oncology/Phy



* Recommendations:* Referral made to Dr. Arredondo for further evaluation of 
  patient's probable coagulopathy Please obtain labs, to patient's knowledge she
   has never been assessed for protein C or protein S deficiency or for factor V
   Leiden mutation Advised patient to seek emergency medical attention if she 
  does believe she is having another DVT  or if she develops symptoms of 
  shortness of breath, hemoptysis, or chest pain





* R94.31 Abnormal electrocardiogram [ECG] [EKG]* Recommendations:* No further 
  evaluation is needed at this time.





* Z71.3 Dietary counseling and surveillance* Recommendations:* Recommended for 
  patient to follow a more whole food diet. Advised patient to avoid overly 
  processed foods and packaged foods. Advised patient to avoid sodas, juices and
   other liquid calories. Recommended at least 30 minutes of exercise 3 days a 
  week.





* All * Follow up:* Follow up in 6 months









Functional Status





                Functional Condition Comment         Date            Status

 

                Independent with all ADL's                                 Activ

e

 

                Requires assistance with ambulating                             

    Active







Mental Status





                                        Description

 

                                        No Information Available







Referrals





                Refer to      Reason for Referral Status          Appt Date

 

                          Lupe Arredondo MD    Please evaluate and treat th

is patient for an extensive 

history of repetitive, unprovoked deep vein thrombosis.  To her knowledge, she 
has never been evaluated by a hematologist in the past.  Patient is relatively 
new to [facility] and may have seen a provider in [place].  I have ordered 
basic labs, including BMP, CBC, protein C&S, as well as factor V Leiden that we 
will forward to your office as they become available to us.  Thank you for your 
care of this patient.     Patient Declined          2021

 

                                        [facility] - On[facility]

 

                                        [address] [place], [place] [address] [phone]

## 2021-10-25 NOTE — CCD
Summarization of Episode Note

                             Created on: 10/22/2021



Miss. JOSEPH BENTLEY

External Reference #: [id number]

: 1955

Sex: Female



Demographics





                          Address                   [address]

[place]  [address]

 

                          Home Phone                [phone]

 

                          Preferred Language        Unknown

 

                          Marital Status            Unknown

 

                          Congregational Affiliation     Unknown

 

                          Race                      White

 

                          Ethnic Group              Not  or 





Author





                          Author                    [facility]

ems

 

                          Organization              [facility]

ems

 

                          Address                   Unknown

 

                          Phone                     Unavailable







Support





                Name            Relationship    Address         Phone

 

                    JOSEPH BENTLEY      GUAR                [address]

[place]  [address]                    [phone]

 

                    Abbcess, Ketty      ECON                [address]

[place]  [address]                    [phone]







Care Team Providers





                    Care Team Member Name Role                Phone

 

                    Nainyanni  Jermaine  Unavailable         [phone]







PROBLEMS





          Type      Condition ICD9-CM Code TEO16-MT Code Onset Dates Condition S

tatus W/U 

Status              Risk                SNOMED Code         Notes

 

                          Problem                   Non-pressure chronic ulcer o

f other part of left lower leg with fat 

layer exposed         L97.822         Active  confirmed         [id number]  

 

                          Problem                   Chronic venous hypertension 

(idiopathic) with ulcer of left lower 

extremity         I87.312         Active  confirmed         [id number]  







ALLERGIES





                    Allergen (clinical drug ingredient) Drug/Non Drug Allergy do

cumented on EMR 

Reaction            Allergy Type        Onset Date          Status

 

                      Motrin     Dyspnea    Drug Allergy            Active







ENCOUNTERS from 1955 to 2021-10-21





             Encounter    Location     Date         Provider     Diagnosis

 

                    [facility]     [address] [phone] [place] [phone] 21 Oct, 

2021                      James Stillerman           







IMMUNIZATIONS

No Information



SOCIAL HISTORY

Tobacco Use:



                    Social History Observation Description         Date

 

                    Details (start date - stop date) Current Smoker       



Sex Assigned At Birth:



                          Social History Observation Description

 

                          Sex Assigned At Birth     Unknown



Education:



                    Question            Answer              Notes

 

                    Level of Education: Finished High School  



Hinduism:



                    Question            Answer              Notes

 

                    Hinduism                                Religious



Sexual Hx:



                    Question            Answer              Notes

 

                    Had sex in the last 12 months (vaginal, oral, or anal)? No  

                 

 

                    LMP:                                  

 

                    Have you ever had an STD? No                   



Tobacco Use:



                    Question            Answer              Notes

 

                    Are you a:          current smoker       

 

                    How many cigarettes a day do you smoke?                     

2-3 CIGARETTES/DAY







REASON FOR REFERRAL

No Information



VITAL SIGNS

No information



MEDICATIONS





           Medication SIG (Take, Route, Frequency, Duration) Notes      Start Da

te End Date   

Status

 

                    Metoprolol Tartrate 100 MG 1 tablet with food Orally Twice a

 day for 30 day(s)  

                                                            Active

 

           Cephalexin 500 MG 1 tablet Orally Four times a day for 5 day(s)      

                            Active

 

           Eliquis 5 MG as directed Orally                                  Acti

ve







PROCEDURES

No Information



RESULTS

No Results



REASON FOR VISIT

tachycardia



MEDICAL (GENERAL) HISTORY





                    Type                Description         Date

 

                    Medical History     ATRIAL FIBRILLATION  

 

                    Medical History     DVT IN LEFT LEG      

 

                    Medical History     x3 PULMONARY EMBOLISM, MOST RECENT  

 

 

                    Medical History     HYPERTENSION         

 

                    Surgical History    GALLBLADDER REMOVAL  

 

                    Surgical History    STRIPPING LIGATION IN BILATERAL LEGS  

 

                    Surgical History    TONSILLECTOMY        

 

                    Surgical History    RIGHT SHOULDER SURGERY  

 

                    Hospitalization History HOSPITALIZATION AFTER SHOULDER SURGE

RY  







Goals Section

No Information



Health Concerns

No Information



MEDICAL EQUIPMENT

No Information



MENTAL STATUS

No Information



FUNCTIONAL STATUS

No Information



ASSESSMENTS

No Information



PLAN OF TREATMENT

Next Appt



                                        Details

 

                                        Provider Name:Roya Bonds, 2021-

10-28 09:45:00 AM, [address], 

[phone], [place], [address], [phone]







Insurance Providers





             Payer Name   Payer Address Payer Phone  Insured Name Patient Relati

onship to 

Insured                   Coverage Start Date       Coverage End Date

 

                 [employer] [address]  [facility] [id number] [phone]

    JOSEPH BENTLEY              self                                     

 

                MEDICARE Part A and B [address]  [facility] [id number] 

[phone]    

JOSEPH BENTLEY self

## 2021-10-25 NOTE — REP
INDICATION:

DYSPNEA/COUGH.



COMPARISON:

11/21/2019.



TECHNIQUE:

Single portable AP view of the chest was performed.



FINDINGS:

There is stable moderate cardiomegaly.  There is stable cephalization of the pulmonary

vasculature with stable prominence of interstitial markings.  There is no new

infiltrate.  The mediastinal silhouette is unchanged.



IMPRESSION:

No acute pulmonary disease.Stable cardiomegaly and parenchymal changes.





<Electronically signed by Madhu Victor > 10/25/21 [id number]

## 2021-10-25 NOTE — CCD
Continuity of Care Document (CCD)

                             Created on: 10/22/2021



Juany Becerirl

External Reference #: [id number]

: 1955

Sex: Female



Demographics





                          Address                   [address] [facility]

[place]  [address]

 

                          Home Phone                [phone]

 

                          Preferred Language        Unknown

 

                          Marital Status            Unknown

 

                          Church Affiliation     Unknown

 

                          Race                      White

 

                          Ethnic Group              Not  or 





Author





                          Author                    Juany ESQUEDA PA

 

                          Organization              Unknown

 

                          Address                   [address] [facility] [address]

[place]  [address]



 

                          Phone                     [phone]







Care Team Providers





                    Care Team Member Name Role                Phone

 

                    Yuri Maier MD       AUTM                [phone]

 

                    [facility]  AUTM                [phone]

 

                    John Lutz MD AUTM                Unavailable

 

                    [facility] Hematology/Oncology AUTM                [phone]

 

                    Hansa Hernandez FNP  AUTM                [phone]







Problems





                    Active Problems     Provider            Date

 

                    Chronic atrial fibrillation Protime             Onset: 

 

                    Essential hypertension Juany Vincent FNP Onset: 2017

 

                    Deep venous thrombosis of lower extremity Juany Vincent FN P Onset: 2017

 

                    Pure hypercholesterolemia Juany Vincent FNP Onset: 

017

 

                    Anxiety state       Juany Vincent FNP Onset: 2017

 

                    Chronic pulmonary embolism Juany Vincent FNP Onset: 2017

 

                    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 

2017

 

                    Gastroesophageal reflux disease Juany Vincent FNP Onset: 1

2017

 

                    Scoliosis deformity of spine Juany Vincent FNP Onset: 2017

 

                    Chronic tension-type headache Juany Vincent FNP Onset: 2017

 

                    Varicose veins of lower extremity Juany Vincent FNP Onset:

 2017

 

                    Tobacco user        Juany Vincent FNP Onset: 2017

 

                    Hypercalcemia       Juany Vincent FNP Onset: 2017







Social History





                Type            Date            Description     Comments

 

                Birth Sex                       Unknown          

 

                ETOH Use                        Consumes 3 glasses of wine per w

[place]  

 

                Tobacco Use     Start: Unknown  Patient is a current smoker, smo

kes every day STARTED

AGE 30 1-3 CIGARETTES A DAY 







Allergies and adverse reactions





             Active Allergies Criticality  Reaction | Severity Comments     Date

 

             Ibuprofen    Unable to assess criticality STOMACH UPSET HEADACHE mo

david       10/17/2017

 

             Latex        Unable to assess criticality rash, itches | Mild      

        10/17/2018







Medications





           Active Medications SIG        Qnty       Indications Ordering Provide

r Date

 

                          Cephalexin                     500mg Capsules         

          take one tablet 

by mouth 3 times daily x 7 days 21caps                          Julio kothari JR PA 10/06/2021

 

                          Excedrin Migraine                     [id number] Tab

lets                   1 

as needed h/a as needed mdd=1                                 Charmaine Regalado M.D. 2021

 

                          Famotidine                     20mg Tablets           

        1 by mouth every 

day             90tabs                          Charmaine Regalado M.D. 20

21

 

                          Furosemide                     20mg Tablets           

        1 by mouth twice a

day             180tabs                         Charmaine Regalado M.D. 20

21

 

                                        Bupropion Hydrochloride ER (XL)         

            150mg Tablets ER 24HR       

                take 1 tablet by mouth in the morning 90tabs                    

      Charmaine Regalado M.D.                                    2021

 

                          Eliquis                     5mg Tablets               

    take one tablet by 

mouth twice a day 180tabs                         Charmaine Regalado M.D. 2020

 

                          Methimazole                     5mg Tablets           

        2 every day by 

mouth           180tabs                         Charmaine Regalado M.D. 20

20

 

                          Shingrix                     50mcg/0.5ML Suspension Re

c                   

administer at pharmacy 1units                          Juany Vincent FNP 

 

                          Voltaren                     1% Gel                   

apply up to 4 grams three 

times a day to affected knee as needed 100gm                           Charmaine Regalado M.D. 

2018

 

                          Aspirin Adult Low Dose                     81mg Tablet

s DR                   1 

by mouth every day                                 Juany Vincent FNP 10/17/201

7

 

                                        Acetaminophen Extra Strength            

         500mg Tablets                  

             take 2 tabs every 6 hours as needed                           Juany Fung FNP 10/17/2017

 

                                        Womens 50+ Multi Vitamin & Mineral Formu

la                      Tablets         

             1 every day PO Vit [id number] DB=588                           Juany Vincent FNP 10/17/2017

 

                          Metoprolol Tartrate                     25mg Tablets  

                 Take One 

Tablet By Mouth Twice A Day 180tabs                         ABELARDO Saleem 10/17/2017

 

                          Atorvastatin Calcium                     20mg Tablets 

                  take one

tablet by mouth every day 90tabs                          JANE Saleem 10/17/2017

 

                          Flecainide Acetate                     100mg Tablets  

                 take one 

tablet by mouth twice a day 180tabs                         ABELARDO Saleem 10/17/2017

 

                          Digoxin                     250mcg Tablets            

       1 by mouth every 

day             90tabs                          Charmaine Regalado M.D. 







Medications Administered in Office





           Medication SIG        Qnty       Indications Ordering Provider Date

 

                          Administration Of Flu Vaccine                      Inj

ection                    

                                                AIXA Hoffmann JR 10/06/2

021

 

                          Administration Of Flu Vaccine                      Inj

karen Regalado M.D. 10/15/20

20

 

                          Administration Of Flu Vaccine                      Inj

diyaion                    

                                                Juany Vincent FNP 10/17/2019

 

                          Administration Of Flu Vaccine                      Inj

ection                    

                                                Juany Vincent FNP 10/17/2018

 

                          Administration Of Flu Vaccine                      Inj

Juany Alonso FNP 10/17/2017







Immunizations





             CPT Code     Status       Date         Vaccine      Lot #

 

                [id number]           Given           10/06/2021      Influenza Vaccin

e Quadrivalent Preser/Antibiotic Free Im 

Use                                     [id number]

 

                11799           Given           10/15/2020      Influenza Vaccin

e Quadrivalent Preser/Antibiotic Free Im 

Use                                     [id number]

 

                [id number]           Given           2020      Shingrix Zoster 

Vaccine (HZV), Recombinant, Subunit, 

Adjuvanted                               

 

                49861           Given           10/17/2019      Influenza Vaccin

e Quadrivalent Preser/Antibiotic Free Im 

Use                                     [id number]

 

                06964           Given           10/17/2018      Influenza Virus 

Vaccine, Quadrivalent (Cciiv4), Derived 

From Cell                               [id number]

 

             50322        Given        2018   Pneumovax 23 [id number]

 

                60095           Given           10/17/2017      Influenza Vaccin

e Quadrivalent Preser/Antibiotic Free Im 

Use                                     [id number]







Vital Signs





                Date            Vital           Result          Comment

 

                10/19/2021  3:12pm BP Systolic     122 mmHg         

 

                    BP Diastolic        76 mmHg              

 

                    Heart Rate          86 /min              

 

                    Height              66 inches           5'6"

 

                    Weight              223.00 lb            

 

                    BMI (Body Mass Index) 36.0 kg/m2           

 

                10/06/2021  2:17pm BP Systolic     124 mmHg         

 

                    BP Diastolic        68 mmHg              

 

                    Heart Rate          86 /min              

 

                    Height              66 inches           5'6"

 

                    Weight              217.00 lb            

 

                    BMI (Body Mass Index) 35.0 kg/m2           







Results





        Test    Acquired Date Facility Test    Result  H/L     Range   Note

 

                    CBC With Differential 2021          [facility]

                                        [address] [place] [address] [phone] White Blood Count 6.8 10     Normal     4.0-10.0    

 

             Red Blood Count 3.85 10      Low          4.00-5.40     

 

             Hemoglobin   12.7 g/dL    Normal       12.0-15.5     

 

             Hematocrit   39.4 %       Normal       36.0-47.0     

 

             Mean Corpuscular Volume 102.3 fl     High         80.0-96.0     

 

             Mean Corpuscular Hemoglobin 33.0 pg      Normal       27.0-33.0    

 

 

             Mean Corpuscular HGB Conc 32.2 g/dL    Normal       32.0-36.5     

 

             Red Cell Distribution Width 12.3 %       Normal       11.5-14.5    

 

 

             Platelet Count, Automated 187 10       Normal       150-450       

 

             Neutrophils % 72.2 %       High         36.0-66.0     

 

             Lymph %      19.9 %       Low          24.0-44.0     

 

             Mono %       6.0 %        Normal       2.0-8.0       

 

             Eos %        1.5 %        Normal       0.0-3.0       

 

             Baso %       0.3 %        Normal       0.0-1.0       

 

             Immature Granulocyte % 0.1 %        Normal       0-3.0         

 

             Nucleated Red Blood Cell % 0.0 %        Normal       0-0           

 

             Neutrophils # 4.9 10       Normal       1.5-8.5       

 

             Lymph #      1.4 10       Low          1.5-5.0       

 

             Mono #       0.4 10       Normal       0.0-0.8       

 

             Eos #        0.1 10       Normal       0.0-0.5       

 

             Baso #       0.0 10       Normal       0.0-0.2       

 

                    PT & Aptt           2021          [facility]

nter

                                        [address], [place] [id number] [phone] Prothrombin Time 13.8 seconds Normal     12.5-14.3   

 

             Inr          1.04         Normal                    1

 

             Partial Thromboplastin Time 30.6 seconds Normal       24.2-38.5    

 

 

                    CBC With Differential 2021          [address] [id number] [phone] White Blood Count 6.2 10     Normal     4.0-10.0    

 

             Red Blood Count 3.76 10      Low          4.00-5.40     

 

             Hemoglobin   12.5 g/dL    Normal       12.0-15.5     

 

             Hematocrit   39.0 %       Normal       36.0-47.0     

 

             Mean Corpuscular Volume 103.7 fl     High         80.0-96.0     

 

             Mean Corpuscular Hemoglobin 33.2 pg      High         27.0-33.0    

 

 

             Mean Corpuscular HGB Conc 32.1 g/dL    Normal       32.0-36.5     

 

             Red Cell Distribution Width 12.9 %       Normal       11.5-14.5    

 

 

             Platelet Count, Automated 199 10       Normal       150-450       

 

             Neutrophils % 74.5 %       High         36.0-66.0     

 

             Lymph %      16.4 %       Low          24.0-44.0     

 

             Mono %       7.0 %        Normal       2.0-8.0       

 

             Eos %        1.1 %        Normal       0.0-3.0       

 

             Baso %       0.5 %        Normal       0.0-1.0       

 

             Immature Granulocyte % 0.5 %        Normal       0-3.0         

 

             Nucleated Red Blood Cell % 0.0 %        Normal       0-0           

 

             Neutrophils # 4.6 10       Normal       1.5-8.5       

 

             Lymph #      1.0 10       Low          1.5-5.0       

 

             Mono #       0.4 10       Normal       0.0-0.8       

 

             Eos #        0.1 10       Normal       0.0-0.5       

 

             Baso #       0.0 10       Normal       0.0-0.2       

 

                    Comprehensive Metabolic Profil 2021          [address] [id number] [phone] Glucose, Fasting 98 mg/dL   Normal           

 

             Blood Urea Nitrogen 28 mg/dL     High         7-18          

 

             Creatinine For GFR 0.90 mg/dL   Normal       0.55-1.30     

 

             Glomerular Filtration Rate > 60.0       Normal       >45          2

 

             Sodium Level 137 mEq/L    Normal       136-145       

 

             Potassium Serum 4.7 mEq/L    Normal       3.5-5.1       

 

             Chloride Level 107 mEq/L    Normal               

 

             Carbon Dioxide Level 27 mEq/L     Normal       21-32         

 

             Anion Gap    3 mEq/L      Low          8-16          

 

             Calcium Level 10.2 mg/dL   Normal       8.8-10.2      

 

             Ast/Sgot     23 U/L       Normal       7-37          

 

             Alt/SGPT     19 U/L       Normal       12-78         

 

             Alkaline Phosphatase 77 U/L       Normal               

 

             Bilirubin,Total 0.5 mg/dL    Normal       0.2-1.0       

 

             Total Protein 8.3 GM/DL    High         6.4-8.2       

 

             Albumin      3.0 GM/DL    Low          3.2-5.2       

 

             Albumin/Globulin Ratio 0.6          Low          1.2-2.2       

 

                    Complete Blood Count 2021          [facility]

[facility], [facility]

: Dr Simon Hoyt

[place] [address] [phone] WBC        5.9 x10*3/UL            4.1 - 10.9  

 

             RBC          4.11 x10*6/UL Low          4.20 - 6.30   

 

             Hemoglobin   13.6 g/dL                 12.0 - 18.0   

 

             Hematocrit   40.0 %                    37.0 - 51.0   

 

             MCV          97.4 fL      High         80.0 - 97.0   

 

             MCH          33.1 pg      High         26.0 - 32.0   

 

             MCHC         34.0 g/dL                 31.0 - 38.0   

 

             RDW          13.2 %                    11.6 - 13.7   

 

             PLT          209 x10*3/UL              140 - 440     

 

             MPV          8.1 FL                    7.8 - 11.0    

 

             Lymph %      25.6 %                    10.0 - 58.5   

 

             Mid %        7.5 %                     1.7 - 9.3     

 

             Neut %       66.9 %                    37.0 - 92.0   

 

             Lymph #      1.5 x10*3/UL              0.6 - 4.1     

 

             Mid #        0.5 x10*3/UL              0.1 - 0.6     

 

             Neut #       3.9 x10*3/UL              2.0 - 7.8     

 

                    Laboratory test finding 2021          [facility]

[facility]

: Dr Simon Hoyt

[place] [address] [phone] Sed Rate   25 mm/hr   High       0 - 15      

 

                    Basic Metabolic Panel 2021          [facility]

[facility], [facility]

: Dr Simon Hoyt

[place] [address] [phone] Glucose    100 mg/dL  High       74 - 99    3

 

             BUN          18 mg/dL                  7 - 18        

 

             Creatinine   0.8 mg/dL                 0.6 - 1.3     

 

             Sodium       141 mEq/L                 136 - 145     

 

             Potassium    3.8 mEq/L                 3.5 - 5.1     

 

             Chloride     104 mEq/L                 98 - 107      

 

             Carbon Dioxide 29 mEq/L                  21 - 32       

 

             Calcium      10.8 mg/dL   High         8.5 - 10.1   4

 

             GFR  >= 60 mL/min              >60           

 

             GFR African American >= 60 mL/min              >60          5

 

                    Laboratory test finding 2021          [facility]

[facility]

: Dr Simon Hoyt

[place] [id number] [phone] Thyroid Stimulating Hormone 0.19 uIU/mL Low        0.3

6 - 3.74  







                          1                         THERAPUTIC HUMAN INR VALUES

INDICATIONS                      NORMAL RANGES

PROPHYLAXIS/TREATMENT OF:

VENOUS THROMBOSIS                2.0-3.0

PULMONARY EMBOLISM               2.0-3.0

PREVENTION OF SYSTEMIC EMBOLISM FROM:

TISSUE HEART VALVES              2.0-3.0

ACUTE MYOCARDIAL INFARCTION      2.0-3.0

VALVULAR HEART DISEASE           2.0-3.0

ATRIAL FIBRILLATION              2.0-3.0

MECHANICAL VALVES(HIGH RISK)     2.5-3.5

RECURRENT MYOCARDIAL INFARCTION  2.5-3.5



 

                          2                         Units are mL/min/1.73 m2



Chronic Kidney Disease Staging per [facility]:



Stage I & II   GFR >=60       Normal to Mildly Decreased

Stage III      GFR 30-59      Moderately Decreased

Stage IV       GFR 15-29      Severely Decreased

Stage V        GFR <15        Very Little GFR Left

ESRD           GFR <15 on RRT



 

                          3                         100-125 mg/dL     PRE-DIABET

ES/FASTING

>126 mg/dL          DIABETES/FASTING



 

                          4                         NOTE:

CALCIUM,TSH VERIFIED







 

                          5                         CHRONIC KIDNEY DISEASE STAGI

NG PER NK[facility]



STAGE I & II      GFR >= 60        NORMAL TO MILDLY DECREASED

STAGE III          GFR 30-59          MODERATELY DECREASED

STAGE IV           GFR 15-29         SEVERELY DECREASED

STAGE V            GFR <15            VERY LITTLE GFR LEFT

ESRD                 GFR <15            ON RRT









Procedures





                Date            Code            Description     Status

 

                10/06/2021      66423           Office/Outpatient Established Lo

w MDM 20-29 Min Completed

 

                    2021          65435               Chronic Care MGMT 20

 Mins Clinical Staff Time Per Calendar 

Month                                   Completed

 

                2021      29923           Chronic Care Management Services

 Ea Addl 20 Min Completed

 

                2021      78792           Complex Chronic Care MGMT Servic

e Ea Addl 30 Min Completed

 

                2021      54194           Complex Chronic Care Management 

SVC 1St 60 Min Completed

 

                2021      80097           Complex Chronic Care MGMT Servic

e Ea Addl 30 Min Completed

 

                2021      19019           Complex Chronic Care Management 

SVC 1St 60 Min Completed

 

                2021      02930           Office/Outpatient Established Mo

d MDM 30-39 Min Completed

 

                2021      26573           Complex Chronic Care MGMT Servic

e Ea Addl 30 Min Completed

 

                2021      18320           Complex Chronic Care Management 

SVC 1St 60 Min Completed

 

                2021      32876           Complex Chronic Care MGMT Servic

e Ea Addl 30 Min Completed

 

                2021      19597           Complex Chronic Care Management 

SVC 1St 60 Min Completed

 

                2021      [id number]       Bone Mineral Density Test Comple

veronika

 

                2021      [id number]        Mammogram       Completed







Medical Devices





                                        Description

 

                                        No Information Available







Encounters





           Type       Date       Location   Provider   Dx         Diagnosis

 

                Office Visit    10/06/2021  2:20p [facility]CAIXA Harkins JR                        S81.802A                  Unspecified open wound, left

 lower leg, initial encounter

 

                          Z23                       Encounter for immunization

 

                Office Visit    2021  1:30p [facility]au Regalado M.D.                      I48.20                    Chronic atrial fibrillation,

 unspecified

 

                          Z79.01                    Long term (current) use of a

nticoagulants

 

                          R60.9                     Edema, unspecified

 

                          I10                       Essential (primary) hyperten

darci

 

                          E05.90                    Thyrotoxicosis, unsp without

 thyrotoxic crisis or storm

 

                          I82.502                   Chronic embolism and thombos

 unsp deep veins of l low extrem

 

                          F17.210                   Nicotine dependence, cigaret

svitlana, uncomplicated

 

                          F32.89                    Other specified depressive e

pisodes

 

                          D47.2                     Monoclonal gammopathy

 

                          K21.9                     Gastro-esophageal reflux dis

ease without esophagitis

 

                          M19.90                    Unspecified osteoarthritis, 

unspecified site

 

                          E78.00                    Pure hypercholesterolemia, u

nspecified







Assessments





                Date            Code            Description     Provider

 

                10/06/2021      S81.802A        Unspecified open wound, left low

er leg, initial encounter 

AIXA Hoffmann JR

 

                10/06/2021      Z23             Encounter for immunization AIXA Joseph JR

 

                2021      I10             Essential (primary) hypertension

 Charmaine Regalado M.D.

 

                2021      K21.9           Gastro-esophageal reflux disease

 without esophagitis Charmaine Regalado M.D.

 

                2021      E78.00          Pure hypercholesterolemia, unspe

cified Charmaine Regalado M.D.

 

                2021      I10             Essential (primary) hypertension

 Charmaine Regalado M.D.

 

                2021      K21.9           Gastro-esophageal reflux disease

 without esophagitis Charmaine Regalado M.D.

 

                2021      E78.00          Pure hypercholesterolemia, unspe

cified Charmaine Regalado M.D.

 

                2021      M15.9           Polyosteoarthritis, unspecified 

Charmaine Regalado M.D.

 

                2021      I10             Essential (primary) hypertension

 Charmaine Regalado M.D.

 

                2021      K21.9           Gastro-esophageal reflux disease

 without esophagitis Charmaine Regalado M.D.

 

                    2021          I82.502             Chronic embolism and

 thrombosis of unspecified deep veins of 

left lower extremity                    Charmaine Regalado M.D.

 

                2021      I48.20          Chronic atrial fibrillation, uns

pecminoo Regalado M.D.

 

                2021      Z79.01          Long term (current) use of antic

oagulants Charmaine Regalado M.D.

 

                2021      R60.9           Edema, unspecified Charmaine guido M.D.

 

                2021      I10             Essential (primary) hypertension

 Charmaine Regalado M.D.

 

                2021      E05.90          Thyrotoxicosis, unspecified with

out thyrotoxic crisis or sto 

Charmaine Regalado M.D.

 

                    2021          I82.502             Chronic embolism and

 thrombosis of unspecified deep veins of 

left lower extremity                    Charmaine Regalado M.D.

 

                2021      F17.210         Nicotine dependence, cigarettes,

 uncomplicated Charmaine Regalado M.D.

 

                2021      F32.89          Other specified depressive episo

emil Charmaine Regalado M.D.

 

                2021      D47.2           Monoclonal gammopathy Charmaine duron M.D.

 

                2021      K21.9           Gastro-esophageal reflux disease

 without esophagitis Charmaine Regalado M.D.

 

                2021      M19.90          Unspecified osteoarthritis, unsp

ecified site Charmaine Regalado M.D.

 

                2021      E78.00          Pure hypercholesterolemia, unspe

cified Charmaine Regalado M.D.

 

                2021      I10             Essential (primary) hypertension

 Charmaine Regalado M.D.

 

                2021      K21.9           Gastro-esophageal reflux disease

 without esophagitis Charmaine Regalado M.D.

 

                2021      E78.00          Pure hypercholesterolemia, unspe

cified Charmaine Regalado M.D.

 

                2021      I48.20          Chronic atrial fibrillation, uns

pecified Charmaine Regalado M.D.

 

                2021      F17.210         Nicotine dependence, cigarettes,

 uncomplicated Charmaine Regalado M.D.

 

                2021      K21.9           Gastro-esophageal reflux disease

 without esophagitis Charmaine Regalado M.D.

 

                2021      E78.00          Pure hypercholesterolemia, unspe

cified Charmaine Regalado M.D.







Plan of Treatment

Future Appointment(s):* 2021  3:15 pm - Charmaine Regalado M.D. at 
  [facility]





Functional Status





                                        Description

 

                                        No Information Available







Mental Status





                                        Description

 

                                        No Information Available







Referrals





                Refer to Dr     Reason for Referral Status          Appt Date

 

                          Stillerman,James MD       STAT CONSULT FOR OPEN WOUND 

LT LEG PLEASE LET OFFICE KNOW 

WHEN APPT IS MADE         Created                   

 

                                        [facility]

 

                                        [address] [place]  [address]

 

                                        [phone]

 

                                          

 

                Rock Werner MD CONSULT FOR PVD WITH OPEN WOUND LT LEG  Patient

 Declined 

10/20/2021

 

                                        [facility] [address] ,[address]

 

                                        [facility] [address]

 

                                        [phone]

## 2021-10-25 NOTE — ECGEPIP
[facility] - ED

                                       

                                       Test Date:    2021-10-25

Pat Name:     JOSEPH BENTLEY            Department:   

Patient ID:   [id number]                 Room:         -

Gender:       Female                   Technician:   SIDNEYDOMINIC

:          1955               Requested By: TA JOHNSTON

Order Number: [id number]     Reading MD:   Yoli Zavala

                                 Measurements

Intervals                              Axis          

Rate:         96                       P:            

FL:                                    QRS:          94

QRSD:         150                      T:            -24

QT:           384                                    

QTc:          485                                    

                           Interpretive Statements

Atrial fibrillation

Right bundle branch block

T wave abnormality, consider ischemia

increased rate/rhythm change 4/15/21

Electronically Signed on 10- 18:30:12 EDT by Yoli Zavala

## 2021-10-25 NOTE — CCD
Continuity of Care Document (CCD)

                             Created on: 2021



Juany Becerril

External Reference #: [id number]

: 1955

Sex: Female



Demographics





                          Address                   [address] [place] [address]  [id number]

 

                          Home Phone                [phone]

 

                          Preferred Language        Unknown

 

                          Marital Status            

 

                          Protestant Affiliation     Unknown

 

                          Race                      White

 

                          Ethnic Group              Not  or 





Author





                          Organization              Unknown

 

                          Address                   Unknown

 

                          Phone                     Unavailable







Care Team Providers





                    Care Team Member Name Role                Phone

 

                    Charmaine Regalado MD  AUTM                [phone]

 

                    Lupe Arredondo MD AUTM                [phone]







Problems





                    Active Problems     Provider            Date

 

                    Precordial pain     Jermaine Chahal MD  Onset: 2019

 

                    Dyspnea             Jermaine Chahal MD  Onset: 2019

 

                    Palpitations        Jermaine Chahal MD  Onset: 2019

 

                    Paroxysmal atrial fibrillation Jermaine Chahal MD  Onset: 

 

                    Electrocardiogram abnormal Jermaine Chahal MD  Onset: 2019

 

                    Cardiomegaly        Jermaine Chahal MD  Onset: 2019

 

                    Right bundle branch block Jermaine Chahal MD  Onset: 

019

 

                    Dietary management surveillance Jermaine Chahal MD  Onset: 1

2019

 

                    Obstructive sleep apnea syndrome Jermaine Chahal MD  Onset: 

2019

 

                    Syncope and collapse Jermaine Chahal MD  Onset: 2019

 

                    Chronic diastolic heart failure Jermaine Chahal MD  Onset: 0

3/09/2020

 

                    Chronic pulmonary heart disease Jermaine Chahal MD  Onset: 0

3/09/2020

 

                          Benign hypertensive heart disease with congestive card

iac failure Jermaine Chahal MD                              Onset: 2020

 

                    Mitral valve disorder Jermaine Chahal MD  Onset: 2020

 

                    Carotid artery occlusion [facility] Onset: 

 

                    Mixed hyperlipidemia [facility] Onset: 

 

                    Deep venous thrombosis of lower extremity [facility] Onset: 

2021







Social History





                Type            Date            Description     Comments

 

                Birth Sex                       Unknown          

 

                ETOH Use                        Occasionally consumes wine  

 

                Tobacco Use     Start: Unknown  Patient is a current smoker, smo

kes every day Started

smoking at age 27, smokes 3 cigarettes a day 

 

                Smoking Status  Reviewed: 21 Patient is a current smoker, 

smokes every day 

Started smoking at age 27, smokes 3 cigarettes a day 

 

                Exercise Type/Frequency                 Walks daily     in good 

weather  

 

                Exercise Type/Frequency                 Does housework daily  

 

                Exercise Limitations                 Back Pain        

 

                Exercise Limitations                 Orthopedic Problem Knee gary

n 

 

                Exercise Limitations                 Claudication     







Allergies, Adverse Reactions, Alerts





             Active Allergies Criticality  Reaction | Severity Comments     Date

 

             Motrin       Unable to assess criticality              GI Upset    

 2019







Medications





           Active Medications SIG        Qnty       Indications Ordering Provide

r Date

 

                          Eliquis                     5mg Tablets               

    1 by mouth twice a day

                                                Charmaine Regalado MD 2021

 

                    Digox                     250mcg Tablets                   1

 by mouth every day 

90tabs                                  Jermaine Chahal MD  2020

 

                          Famotidine                     40mg Tablets           

        1 tablet daily at 

bedtime         30tabs          R07.2           Jermaine Chahal MD 2019

 

                          Acetaminophen                     500mg Tablets       

            1-2 by mouth 

every 6 hours as needed                                 Unknown         20

19

 

                          Atorvastatin Calcium                     20mg Tablets 

                  1 by 

mouth every night at bedtime                                 Unknown         2019

 

                One Daily                      Tablets                   1 by mo

uth every day                 

                          Unknown                   2019

 

                          Methimazole                     10mg Tablets          

         1 by mouth every 

morning                                         Unknown         2019

 

                          Vitamin E Blend                     400Unit Capsules  

                 1 by 

mouth every day                                 Unknown         2019

 

                          Flecainide Acetate                     100mg Tablets  

                 take one 

tablet by mouth twice a day 180tabs                         Jermaine Chahal MD 1

2019

 

                          Aspirin 81                     81mg Tablets DR        

           1 by mouth 

every day                                       Unknown         2019

 

                          Metoprolol Tartrate                     25mg Tablets  

                 1 by 

mouth twice a day                                 Unknown         2019

 

                          Mucus Relief DM                     20-400mg Tablets  

                 1 tab po 

at bedtime                                      Unknown         2019

 

                          Furosemide                     20mg Tablets           

        1 by mouth once a 

day                                             Unknown         







Immunizations





                                        Description

 

                                        No Information Available







Vital Signs





                Date            Vital           Result          Comment

 

                2021  1:02pm Weight          220.00 lb        

 

                    Height              65 inches           5'5"

 

                    BMI (Body Mass Index) 36.6 kg/m2           

 

                    Heart Rate          65 /min              

 

                    BP Systolic Sitting 136 mmHg            Ra, large cuff

 

                    BP Diastolic Sitting 84 mmHg             Ra, large cuff

 

                2020 12:43pm Weight          229.00 lb        

 

                    Height              65 inches           5'5"

 

                    BMI (Body Mass Index) 38.1 kg/m2           

 

                    Heart Rate          67 /min              

 

                    BP Systolic Sitting 126 mmHg            large cuff, Ra

 

                    BP Diastolic Sitting 74 mmHg             large cuff, Ra







Results





        Test    Acquired Date Facility Test    Result  H/L     Range   Note

 

                    CBC without Differential 2021          [facility] - not inter

faced

           (315)-   - White Blood Count 6.8                   5.0-10.0    

 

             Red Blood Count 3.85         Low          4.00-5.40     

 

             Platelets    187                       172-450       

 

             Hemoglobin   12.7                                    

 

             Hematocrit   39.4                                    

 

                    CBC without Differential 2021          [facility] - not inter

faced

           (315)-   - White Blood Count 6.2                   5.0-10.0    

 

             Red Blood Count 3.76         Low          4.00-5.40     

 

             Platelets    199                       172-450       

 

             Hemoglobin   12.5                                    

 

             Hematocrit   39.0                                    

 

                    CMP                 2021          [facility] - not interfaced

           (315)-   - Albumin Serum/Plasma 3.0                               

 

             Alt - SGPT   19                                      

 

             Calcium Ser/Plasma Mass/Vol 10.2                                   

 

 

             Carbon Dioxide Ser/Plasm 27                                      

 

             Chloride Serum/Plasma 107                                     

 

             Alkaline Phosphatase 77                                      

 

             Potassium    4.7                                     

 

             Protein Total 8.3                                     

 

             Sodium       137                                     

 

             Ast - Sgot   23                                      

 

             BUN - Urea Nitrogen 28                                      

 

             Glucose      98                                

 

             Creatinine For GFR 0.90                                    

 

                    Complete Blood Count 2021          [facility]

rt

             WBC          8.4 x10*3/UL              4.1-10.9      

 

             RBC          4.20 x10*6/UL              4.20-6.30     

 

             Hemoglobin   14.0 g/dL                 12.0-18.0     

 

             Hematocrit   40.3 %                    37.0-51.0     

 

             MCV          95.8 fL                   80.0-97.0     

 

             MCH          33.3 pg      High         26.0-32.0     

 

             MCHC         34.7 g/dL                 31.0-38.0     

 

             RDW          13.3 %                    11.6-13.7     

 

             PLT          207 x10*3/UL              140-440       

 

             MPV          8.0 FL                    7.8-11.0      

 

             Lymph %      17.5 %                    10.0-58.5     

 

             Mid %        4.8 %                     1.7-9.3       

 

             Neut %       77.7 %                    37.0-92.0     

 

             Lymph #      1.4 x10*3/UL              0.6-4.1       

 

             Mid #        0.5 x10*3/UL              0.1-0.6       

 

             Neut #       6.5 x10*3/UL              2.0-7.8       

 

                    Basic Metabolic Panel 2021          [facility]

ort

             Glucose      70 mg/dL     Low          74-99        1

 

             BUN          23 mg/dL     High         7-18          

 

             Creatinine   0.9 mg/dL                 0.6-1.3       

 

             Sodium       144 mEq/L                 136-145       

 

             Potassium    3.8 mEq/L                 3.5-5.1       

 

             Chloride     104 mEq/L                         

 

             Carbon Dioxide 32 mEq/L                  21-32         

 

             Calcium      10.1 mg/dL                8.5-10.1      

 

             GFR  >= 60 mL/min                             

 

             GFR African American >= 60 mL/min                            2

 

                    Lipid Profile       2021          [facility]

t

             Cholesterol  132 mg/dL                 131-200       

 

             Triglycerides 72 mg/dL                          

 

             HDL Cholesterol 54 mg/dL                  35-60         

 

             LDL (Calculated) 64 CALC                           

 

                    Laboratory test finding 2021          [facility] I

mport

             Thyroid Stimulating Hormone 0.32 uIU/mL  Low          0.36-3.74    

 

 

             Digoxin Level 0.3 ng/mL    Low          0.5-2.0      3







                          1                         100-125 mg/dL     PRE-DIABET

ES/FASTING

>126 mg/dL          DIABETES/FASTING





 

                          2                         CHRONIC KIDNEY DISEASE STAGI

NG PER NKF



STAGE I & II      GFR >= 60        NORMAL TO MILDLY DECREASED

STAGE III          GFR 30-59          MODERATELY DECREASED

STAGE IV           GFR 15-29         SEVERELY DECREASED

STAGE V            GFR <15            VERY LITTLE GFR LEFT

ESRD                 GFR <15            ON RRT





 

                          3                         note:<nlbl:demographic_chang

ed>











Procedures





                Date            Code            Description     Status

 

                2021      25967           Office/Outpatient Established Lo

w MDM 20-29 Min Completed

 

                2021      12981           ECG 12-Lead     Completed







Medical Devices





                                        Description

 

                                        No Information Available







Encounters





           Type       Date       Location   Provider   Dx         Diagnosis

 

           Office Visit 2021 12:45p Main Office [facility] I48

.0      

Paroxysmal atrial fibrillation

 

                          I50.32                    Chronic diastolic (congestiv

e) heart failure

 

                          I11.0                     Hypertensive heart disease w

ith heart failure

 

                          I27.81                    Cor pulmonale (chronic)

 

                          I65.29                    Occlusion and stenosis of un

specified carotid artery

 

                          I34.0                     Nonrheumatic mitral (valve) 

insufficiency

 

                          E78.2                     Mixed hyperlipidemia

 

                          I82.503                   Chronic emblsm and thombos u

nsp deep veins of low extrm, bi

 

                          R94.31                    Abnormal electrocardiogram [

ECG] [EKG]

 

                          Z71.3                     Dietary counseling and surve

illance







Assessments





                Date            Code            Description     Provider

 

                2021      I48.0           Paroxysmal atrial fibrillation C

[facility] Pablo

 

                2021      I50.32          Chronic diastolic (congestive) h

eart failure [facility]

 

                2021      I11.0           Hypertensive heart disease with 

heart failure [facility]

 

                2021      I27.81          Cor pulmonale (chronic) [facility]en

 

                2021      I65.29          Occlusion and stenosis of unspec

ified carotid artery [facility] Dennis

 

                2021      I34.0           Nonrheumatic mitral (valve) insu

fficiency [facility] Dennis

 

                2021      E78.2           Mixed hyperlipidemia [facility] Centeno

 

                    2021          I82.503             Chronic embolism and

 thrombosis of unspecified deep veins of 

lower extremity, bilateral              [facility]

 

                2021      R94.31          Abnormal electrocardiogram [ECG]

 [EKG] [facility]

 

                2021      Z71.3           Dietary counseling and surveilla

nce [facility]







Plan of Treatment

Future Appointment(s):* 2021  1:30 pm - [facility] at Main 
  Office

2021 - [facility]* I48.0 Paroxysmal atrial fibrillation* 
  Recommendations:* Continue Eliquis, digoxin, flecainide, and metoprolol at the
   current dosages Patient agreeable to contact us with more frequent episodes 
  of tachycardia or palpitations, discussed Holter monitor therapy with patient 
  at this time she would like to refrain further evaluation





* I50.32 Chronic diastolic (congestive) heart failure* Recommendations:* 
  Continue digoxin, furosemide, and metoprolol at the current dosages Please 
  alert our office with a weight gain of more than 3 pounds, onset of shortness 
  of breath, or lower extremity edema





* I11.0 Hypertensive heart disease with heart failure* New Labs:* BMP, Ordered: 
  21



* Recommendations:* Continue metoprolol at the current dosage Advised patient to
   monitor blood pressures at home and to alert our office with readings >140/>
  90





* I27.81 Cor pulmonale (chronic)* Recommendations:* No medication changes made 
  today Patient agreeable to contact us with the onset of any shortness of 
  breath or edema





* I65.29 Occlusion and stenosis of unspecified carotid artery* Recommendations:
  * Carotid ultrasound ordered for further evaluation Advised patient to seek 
  emergency medical attention if she develops any further lateralizing 
  neurologic symptoms





* I34.0 Nonrheumatic mitral (valve) insufficiency* Recommendations:* No further 
  evaluation is needed at this time





* E78.2 Mixed hyperlipidemia* New Labs:* Lipid Panel, Ordered: 21



* Recommendations:* Please obtain fasting labs Continue atorvastatin at the 
  current dosage





* I82.503 Chronic embolism and thrombosis of unspecified deep veins of lower 
  extremity, bilateral* New Labs:* Protein C & S Activity Panel, Ordered: 
  21

* Factor V Leiden, Ordered: 21

* CBC W/Auto Differential, Ordered: 21



* Referral:* Lupe Arredondo MD, Hematology & Oncology/Phy



* Recommendations:* Referral made to Dr. Arredondo for further evaluation of 
  patient's probable coagulopathy Please obtain labs, to patient's knowledge she
   has never been assessed for protein C or protein S deficiency or for factor V
   Leiden mutation Advised patient to seek emergency medical attention if she 
  does believe she is having another DVT  or if she develops symptoms of 
  shortness of breath, hemoptysis, or chest pain





* R94.31 Abnormal electrocardiogram [ECG] [EKG]* Recommendations:* No further 
  evaluation is needed at this time.





* Z71.3 Dietary counseling and surveillance* Recommendations:* Recommended for 
  patient to follow a more whole food diet. Advised patient to avoid overly 
  processed foods and packaged foods. Advised patient to avoid sodas, juices and
   other liquid calories. Recommended at least 30 minutes of exercise 3 days a 
  week.





* All * Follow up:* Follow up in 6 months









Functional Status





                Functional Condition Comment         Date            Status

 

                Independent with all ADL's                                 Activ

e

 

                Requires assistance with ambulating                             

    Active







Mental Status





                                        Description

 

                                        No Information Available







Referrals





                Refer to      Reason for Referral Status          Appt Date

 

                          Lupe Arredondo MD    Please evaluate and treat th

is patient for an extensive 

history of repetitive, unprovoked deep vein thrombosis.  To her knowledge, she 
has never been evaluated by a hematologist in the past.  Patient is relatively 
new to [facility] and may have seen a provider in [place].  I have ordered 
basic labs, including BMP, CBC, protein C&S, as well as factor V Leiden that we 
will forward to your office as they become available to us.  Thank you for your 
care of this patient.     Patient Declined          2021

 

                                        [facility] On[facility]

 

                                        [address] [place], [place] [id number] [phone]

## 2021-10-25 NOTE — CCD
Summarization Of Episode

                             Created on: 10/25/2021



BENTLEYJOSEPH LINA

External Reference #: [id number]

: 1955

Sex: Undifferentiated



Demographics





                          Address                   [address]

[place]  [address]

 

                          Home Phone                [phone]

 

                          Preferred Language        English

 

                          Marital Status            Unknown

 

                          [employer]ian Affiliation     Unknown

 

                          Race                      Unknown

 

                          Ethnic Group              Not  or 





Author





                          Author                    [facility]

 

                          Organization              [facility]

 

                          Address                   Unknown

 

                          Phone                     Unavailable







Support





                Name            Relationship    Address         Phone

 

                RETIRED         Next Of Kin     Unknown         [phone]

 

                    ABBCESS,  DREAD     Next Of Kin         [address]

[place]  [address]                    [phone]

 

                    MARJAN VALLADARES     Next Of Kin         [address]

[place]  [address]                    [phone]

 

                    MARJAN BIRD     Next Of Kin         [address]

[place]  [address]                    [phone]

 

                DISABLED        Next Of Kin     Unknown         Unavailable

 

                    ALFRED MOSLEY    Next Of Kin         [address] [place]  [address]                    [phone]

 

                    Abbcess,  Dread     ECON                [address]

[place]  [address]                    Unavailable







Care Team Providers





                    Care Team Member Name Role                Phone

 

                    JHONNY Meeks MD Unavailable         Unavailable

 

                    JHONNY Meeks MD Unavailable         Unavailable

 

                    JHONNY Meeks MD Unavailable         Unavailable

 

                    JHONNY Meeks MD Unavailable         Unavailable

 

                    JHONNY Meeks MD Unavailable         Unavailable

 

                    JHONNY Meeks MD Unavailable         Unavailable

 

                    JHONNY Meeks MD Unavailable         Unavailable

 

                    JHONNY Meeks MD Unavailable         Unavailable

 

                    JHONNY Meeks MD Unavailable         Unavailable

 

                    JHONNY Meeks MD Unavailable         Unavailable

 

                    JHONNY Meeks MD Unavailable         Unavailable

 

                    Fish, B Uzair PURCELL   Unavailable         Unavailable

 

                    Fish, B Uzair PURCELL   Unavailable         Unavailable

 

                    Fish, B Uzair PURCELL   Unavailable         Unavailable

 

                    Fish, B Uzair PURCELL   Unavailable         Unavailable

 

                    Fish, B Uzair PURCELL   Unavailable         Unavailable

 

                    Fish, B Uzair PURCELL   Unavailable         Unavailable

 

                    Fish, B Uzair PURCELL   Unavailable         Unavailable

 

                    Fish, B Uzair PURCELL   Unavailable         Unavailable

 

                    Fish, B Uzair PURCELL   Unavailable         Unavailable

 

                    Fish, B Uzair PURCELL   Unavailable         Unavailable

 

                    Fish, B Uzair PURCELL   Unavailable         Unavailable

 

                    Fish, B Uzair PURCELL   Unavailable         Unavailable

 

                    Fish, B Uzair PURCELL   Unavailable         Unavailable

 

                    Fish, B Uzair PURCELL   Unavailable         Unavailable

 

                    Fish, B Uzair PURCELL   Unavailable         Unavailable

 

                    Fish, B Uzair PURCELL   Unavailable         Unavailable

 

                    Fish, B Uzair PURCELL   Unavailable         Unavailable

 

                    Fish, B Uzair PURCELL   Unavailable         Unavailable

 

                    Fish, B Uzair PURCELL   Unavailable         Unavailable

 

                    Fish, B Uzair PURCELL   Unavailable         Unavailable

 

                    Fish, B Uzair PURCELL   Unavailable         Unavailable

 

                    Fish, B Uzair PURCELL   Unavailable         Unavailable

 

                    Fish, B Uzair PURCELL   Unavailable         Unavailable

 

                    Fish, B Uzair PURCELL   Unavailable         Unavailable

 

                    Fish, B Uzair PURCELL   Unavailable         Unavailable

 

                    Fish, B Uzair PURCELL   Unavailable         Unavailable

 

                    Fish, B Uzair PURCELL   Unavailable         Unavailable

 

                    Fish, B Uzair PURCELL   Unavailable         Unavailable

 

                    Fish, B Uzair PURCELL   Unavailable         Unavailable

 

                    Fish, B Uzair PURCELL   Unavailable         Unavailable

 

                    Fish, B Uzair PURCELL   Unavailable         Unavailable

 

                    Fish, B Uzair PURCELL   Unavailable         Unavailable

 

                    Fish, B Uzair PURCELL   Unavailable         Unavailable

 

                    Fish, B Uzair PURCELL   Unavailable         Unavailable

 

                    Fish, B Uzair PURCELL   Unavailable         Unavailable

 

                    Fish, B Uzair PURCELL   Unavailable         Unavailable

 

                    Fish, B Uzair PURCELL   Unavailable         Unavailable

 

                    Fish, B Uzair PURCELL   Unavailable         Unavailable

 

                    Fish, B Uzair PURCELL   Unavailable         Unavailable

 

                    Fish, B Uzair PURCELL   Unavailable         Unavailable

 

                    Fish, B Uzair PURCELL   Unavailable         Unavailable

 

                    Fish, B Uzair PURCELL   Unavailable         Unavailable

 

                    Fish, B Uzair PURCELL   Unavailable         Unavailable

 

                    Fish, B Uzair PURCELL   Unavailable         Unavailable

 

                    Fish, B Uzair PURCELL   Unavailable         Unavailable

 

                    Fish, B Uzair PURCELL   Unavailable         Unavailable

 

                    Fish, B Uzair PURCELL   Unavailable         Unavailable

 

                    Fish, B Uzair PURCELL   Unavailable         Unavailable

 

                    Fish, B Uzair PURCELL   Unavailable         Unavailable

 

                    Fish, B Uzair PURCELL   Unavailable         Unavailable

 

                    Fish, B Uzair PURCELL   Unavailable         Unavailable

 

                    Fish, B Uzair PURCELL   Unavailable         Unavailable

 

                    Fish, B Uzair PURCELL   Unavailable         Unavailable

 

                    Fish, B Uzair PURCELL   Unavailable         Unavailable

 

                    Fish, B Uzair PURCELL   Unavailable         Unavailable

 

                    Fish, B Uzair PURCELL   Unavailable         Unavailable

 

                    Fish, Lina Josefina MPAS, PA-C Unavailable         Unavailabl

e

 

                    Fish, Lnia Josefina MPAS, PA-C Unavailable         Unavailabl

e

 

                    Fish, Lina Josefina MPAS, PA-C Unavailable         Unavailabl

e

 

                    Fish, Lina Josefina MPAS, PA-C Unavailable         Unavailabl

e

 

                    Fish, Lina Josefina MPAS, PA-C Unavailable         Unavailabl

e

 

                    Fish, Lina Josefina MPAS, PA-C Unavailable         Unavailabl

e

 

                    Fish, Lina Josefina MPAS, PA-C Unavailable         Unavailabl

e

 

                    Fish, Lina Josefina MPAS, PA-C Unavailable         Unavailabl

e

 

                    Fish, Lina Josefina MPAS, PA-C Unavailable         Unavailabl

e

 

                    Fish, Lina Josefina MPAS, PA-C Unavailable         Unavailabl

e

 

                    Fish, Lina Josefina MPAS, PA-C Unavailable         Unavailabl

e

 

                    Fish, Lina Josefina MPAS, PA-C Unavailable         Unavailabl

e

 

                    Fish, Lina Josefina MPAS, PA-C Unavailable         Unavailabl

e

 

                    Fish, Lina Josefina MPAS, PA-C Unavailable         Unavailabl

e

 

                    Fish, Lina Josefina MPAS, PA-C Unavailable         Unavailabl

e

 

                    Fish, Lina Josefina MPAS, PA-C Unavailable         Unavailabl

e

 

                    Fish, Lina Josefina MPAS, PA-C Unavailable         Unavailabl

e

 

                    Fish, Lina Josefina MPAS, PA-C Unavailable         Unavailabl

e

 

                    Fish, Lina Josefina MPAS, PA-C Unavailable         Unavailabl

e

 

                    Fish, Lina Josefina MPAS, PA-C Unavailable         Unavailabl

e

 

                    Fish, Lina Josefina MPAS, PA-C Unavailable         Unavailabl

e

 

                    Fish, Lina Josefina MPAS, PA-C Unavailable         Unavailabl

e

 

                    Fish, Lina Josefina MPAS, PA-C Unavailable         Unavailabl

e

 

                    Fish, Lina Josefina MPAS, PA-C Unavailable         Unavailabl

e

 

                    Fish, Lina Josefina MPAS, PA-C Unavailable         Unavailabl

e

 

                    Fish, Lina Josefnia MPAS, PA-C Unavailable         Unavailabl

e

 

                    Fish, Lina Josefina MPAS, PA-C Unavailable         Unavailabl

e

 

                    Fish, Lina Josefina MPAS, PA-C Unavailable         Unavailabl

e

 

                    Fish, Lina Josefina MPAS, PA-C Unavailable         Unavailabl

e

 

                    Fish, Lina Josefina MPAS, PA-C Unavailable         Unavailabl

e

 

                    Fish, Lina Josefina MPAS, PA-C Unavailable         Unavailabl

e

 

                    Fish, Lina Josefina MPAS, PA-C Unavailable         Unavailabl

e

 

                    Fish, Lina Josefina MPAS, PA-C Unavailable         Unavailabl

e

 

                    Fish, Lina Josefina MPAS, PA-C Unavailable         Unavailabl

e

 

                    Fish, Lina Josefina MPAS, PA-C Unavailable         Unavailabl

e

 

                    Fish, Lina Josefina MPAS, PA-C Unavailable         Unavailabl

e

 

                    PICKJHONNY EVANS JR, PA-C Unavailable         Unavailable

 

                    PICKERAL JHONNY HERNANDEZ PA-C Unavailable         Unavailable

 

                    PICKERAL JHONNY HERNANDEZ PA-C Unavailable         Unavailable

 

                    PICKERAL JRJHONNY PA-C Unavailable         Unavailable

 

                    PICKERAL JHONNY HERNANDEZ PA-C Unavailable         Unavailable

 

                    JHONNY ESQUEDA JR, PA-C Unavailable         Unavailable

 

                    JHONNY ESQUEDA JR, PA-C Unavailable         Unavailable

 

                    PICKERAL JRJHONNY PA-C Unavailable         Unavailable

 

                    PICKERAL JRJHONNY PA-C Unavailable         Unavailable

 

                    PICKERAL JR, J ALBERTO PA-C Unavailable         Unavailable

 

                    PICKERAL JR, J ALBERTO PA-C Unavailable         Unavailable

 

                    PICKERAL JR, J ALBERTO PA-C Unavailable         Unavailable

 

                    PICKERAL JR, J ALBERTO PA-C Unavailable         Unavailable

 

                    PICKERAL JR, J ALBERTO PA-C Unavailable         Unavailable

 

                    PICKERAL JR, J ALBERTO PA-C Unavailable         Unavailable

 

                    PICKERAL JR, J ALBERTO PA-C Unavailable         Unavailable

 

                    PICKERAL JR, J ALBERTO PA-C Unavailable         Unavailable

 

                    PICKERAL JR, J ALBERTO PA-C Unavailable         Unavailable

 

                    PICKERAL JR, J ALBERTO PA-C Unavailable         Unavailable

 

                    PICKERAL JR, J ALBERTO PA-C Unavailable         Unavailable

 

                    PICKERAL JR, J ALBERTO PA-C Unavailable         Unavailable

 

                    PICKERAL JR, J ALBERTO PA-C Unavailable         Unavailable

 

                    PICKERAL JR, J ALBERTO PA-C Unavailable         Unavailable

 

                    PICKERAL JR, J ALBERTO PA-C Unavailable         Unavailable

 

                    PICKERAL JR, J ALBERTO PA-C Unavailable         Unavailable

 

                    PICKERAL JR, J ALBERTO PA-C Unavailable         Unavailable

 

                    PICKERAL JR, J ALBERTO PA-C Unavailable         Unavailable

 

                    ABELARDO Regalado MD Unavailable         Unavailable

 

                    ABELARDO Regalado MD Unavailable         Unavailable

 

                    ABELARDO Regalado MD Unavailable         Unavailable

 

                    ABELARDO Regalado MD Unavailable         Unavailable

 

                    ABELARDO Regalado MD Unavailable         Unavailable

 

                    ABELARDO Regalado MD Unavailable         Unavailable

 

                    ABELARDO Regalado MD Unavailable         Unavailable

 

                    ABELARDO Regalado MD Unavailable         Unavailable

 

                    ABELARDO Regalado MD Unavailable         Unavailable

 

                    ABELARDO Regalado MD Unavailable         Unavailable

 

                    ABELARDO Regalado MD Unavailable         Unavailable

 

                    ABELARDO Regalado MD Unavailable         Unavailable

 

                    ABELARDO Regalado MD Unavailable         Unavailable

 

                    ABELARDO Regalado MD Unavailable         Unavailable

 

                    ABELARDO Regalado MD Unavailable         Unavailable

 

                    ABELARDO Regalado MD Unavailable         Unavailable

 

                    ABELARDO Regalado MD Unavailable         Unavailable

 

                    ABELARDO Regalado MD Unavailable         Unavailable

 

                    ABELARDO Regalado MD Unavailable         Unavailable

 

                    ABELARDO Regalado MD Unavailable         Unavailable

 

                    ABELARDO Regalado MD Unavailable         Unavailable

 

                    ABELARDO Regalado MD Unavailable         Unavailable

 

                    ABELARDO Regalado MD Unavailable         Unavailable

 

                    ABELARDO Regalado MD Unavailable         Unavailable

 

                    ABELARDO Regalado MD Unavailable         Unavailable

 

                    ABELARDO Regalado MD Unavailable         Unavailable

 

                    ABELARDO Regalado MD Unavailable         Unavailable

 

                    ABELARDO Regalado MD Unavailable         Unavailable

 

                    ABELARDO Regalado MD Unavailable         Unavailable

 

                    ABELARDO Regalado MD Unavailable         Unavailable

 

                    ABELARDO Regalado MD Unavailable         Unavailable

 

                    SabineABELARDO MD Unavailable         Unavailable

 

                    SabineABELARDO fuchs MD Unavailable         Unavailable

 

                    SabineABELARDO MD Unavailable         Unavailable

 

                    SabineABELARDO MD Unavailable         Unavailable

 

                    SabineABELARDO MD Unavailable         Unavailable

 

                    SabineABELARDO MD Unavailable         Unavailable

 

                    SabineABELARDO MD Unavailable         Unavailable

 

                    SabineABELARDO MD Unavailable         Unavailable

 

                    SabineABELARDO MD Unavailable         Unavailable

 

                    SabineABELARDO xavier MD Unavailable         Unavailable

 

                    SabineABELARDO MD Unavailable         Unavailable

 

                    SabineABELARDO MD Unavailable         Unavailable

 

                    SabineABELARDO MD Unavailable         Unavailable

 

                    SabineABELARDO fuchs MD Unavailable         Unavailable

 

                    SabineABELARDO MD Unavailable         Unavailable

 

                    SabineABELARDO MD Unavailable         Unavailable

 

                    SabineABELARDO MD Unavailable         Unavailable

 

                    SabineABELARDO xavier MD Unavailable         Unavailable

 

                    SabineABELARDO MD Unavailable         Unavailable

 

                    ABELARDO Regalado MD Unavailable         Unavailable

 

                    SabineABELARDO fuchs MD Unavailable         Unavailable

 

                    ABELARDO Regalado MD Unavailable         Unavailable

 

                    ABELARDO Regalado MD Unavailable         Unavailable

 

                    ABELARDO Regalado MD Unavailable         Unavailable

 

                    ABELARDO Regalado MD Unavailable         Unavailable

 

                    ABELARDO Regalado MD Unavailable         Unavailable

 

                    ABELARDO Regalado MD Unavailable         Unavailable

 

                    ABELARDO Regalado MD Unavailable         Unavailable

 

                    ABELARDO Regalado MD Unavailable         Unavailable

 

                    ABELARDO Regalado MD Unavailable         Unavailable

 

                    ABELARDO Regalado MD Unavailable         Unavailable

 

                    ABELARDO Regalado MD Unavailable         Unavailable

 

                    ABELARDO Regalado MD Unavailable         Unavailable

 

                    ABELARDO Regalado MD Unavailable         Unavailable

 

                    ABELARDO Regalado MD Unavailable         Unavailable

 

                    ABELARDO Regalado MD Unavailable         Unavailable

 

                    ABELARDO Regalado MD Unavailable         Unavailable

 

                    ABELARDO Regalado MD Unavailable         Unavailable

 

                    ABELARDO Regalado MD Unavailable         Unavailable

 

                    ABELARDO Rgealado MD Unavailable         Unavailable

 

                    ABELARDO Regalado MD Unavailable         Unavailable

 

                    ABELARDO Regalado MD Unavailable         Unavailable

 

                    ABELARDO Regalado MD Unavailable         Unavailable

 

                    ABELARDO Regalado MD Unavailable         Unavailable

 

                    ABELARDO Regalado MD Unavailable         Unavailable

 

                    ABELARDO Regalado MD Unavailable         Unavailable

 

                    ABELARDO Regalado MD Unavailable         Unavailable

 

                    ABELARDO Regalado MD Unavailable         Unavailable

 

                    ABELARDO Regalado MD Unavailable         Unavailable

 

                    ABELARDO Regalado MD Unavailable         Unavailable

 

                    Sabine, ABELARDO Montano MD Unavailable         Unavailable

 

                    Sabine, ABELARDO Montano MD Unavailable         Unavailable

 

                    Sabine, ABELARDO Montano MD Unavailable         Unavailable

 

                    AFSHAN, L ERICK PA Unavailable         Unavailable

 

                    AFSHAN, L ERICK PA Unavailable         Unavailable

 

                    AFSHAN, L ERICK PA Unavailable         Unavailable

 

                    AFSHAN, L ERICK PA Unavailable         Unavailable

 

                    AFSHAN, L ERICK PA Unavailable         Unavailable

 

                    AFSHAN, L ERICK PA Unavailable         Unavailable

 

                    AFSHAN, L ERICK PA Unavailable         Unavailable

 

                    AFSHAN, L ERICK PA Unavailable         Unavailable

 

                    AFSHAN, L ERICK PA Unavailable         Unavailable

 

                    AFSHAN, L ERICK PA Unavailable         Unavailable

 

                    AFSHAN, L ERICK PA Unavailable         Unavailable

 

                    AFSHAN, L ERICK PA Unavailable         Unavailable

 

                    AFSHAN, L ERICK PA Unavailable         Unavailable

 

                    AFSHAN, L ERICK PA Unavailable         Unavailable

 

                    AFSHAN, L ERICK PA Unavailable         Unavailable

 

                    AFSHAN, L ERICK PA Unavailable         Unavailable

 

                    ADJAPONG,  ROSA    Unavailable         Unavailable

 

                    Jimenez, L Kanchan RPA Unavailable         Unavailable

 

                    Jimenez, L Kanchan RPA Unavailable         Unavailable

 

                    Jimenez, L Kanchan RPA Unavailable         Unavailable

 

                    Jimenez, L Kanchan RPA Unavailable         Unavailable

 

                    Jimenez, L Kanchan RPA Unavailable         Unavailable

 

                    Jimenez, L Kanchan RPA Unavailable         Unavailable

 

                    Jimenez, L Kanchan RPA Unavailable         Unavailable

 

                    Jimenez, L Kanchan RPA Unavailable         Unavailable

 

                    Jimenez, L Kanchan RPA Unavailable         Unavailable

 

                    Jimenez, L Kanchan RPA Unavailable         Unavailable

 

                    Jimenez, L Kanchan RPA Unavailable         Unavailable

 

                    Jimenez, L Kanchan RPA Unavailable         Unavailable

 

                    Jimenez, L Kanchan RPA Unavailable         Unavailable

 

                    Jimenez, L Kanchan RPA Unavailable         Unavailable

 

                    Jimenez, L Kanchan RPA Unavailable         Unavailable

 

                    Jimenez, L Kanchan RPA Unavailable         Unavailable

 

                    Jimenez, L Kanchan RPA Unavailable         Unavailable

 

                    Jimenez, L Kanchan RPA Unavailable         Unavailable

 

                    Jimenez, L Kanchan RPA Unavailable         Unavailable

 

                    Jimenez, L Kanchan RPA Unavailable         Unavailable

 

                    Jimenez, L Kanchan RPA Unavailable         Unavailable

 

                    Jimenez, L Kanchan RPA Unavailable         Unavailable

 

                    Jimenez, L Kanchan RPA Unavailable         Unavailable

 

                    Jimenez, L Kanchan RPA Unavailable         Unavailable

 

                    Jimenez, L Kanchan RPA Unavailable         Unavailable

 

                    Jimenez, L Kanchan RPA Unavailable         Unavailable

 

                    Jimenez, L Kanchan RPA Unavailable         Unavailable

 

                    Jimenez, L Kanchan RPA Unavailable         Unavailable

 

                    Jimenez, L Kanchan RPA Unavailable         Unavailable

 

                    Jimenez, L Kanchan RPA Unavailable         Unavailable

 

                    Jimenez, L Kanchan RPA Unavailable         Unavailable

 

                    Jimenez, L Kanchan RPA Unavailable         Unavailable



                                  



Re-disclosure Warning

          The records that you are about to access may contain information from 
federally-assisted alcohol or drug abuse programs. If such information is 
present, then the following federally mandated warning applies: This information
has been disclosed to you from records protected by federal confidentiality 
rules (42 CFR part 2). The federal rules prohibit you from making any further 
disclosure of this information unless further disclosure is expressly permitted 
by the written consent of the person to whom it pertains or as otherwise 
permitted by 42 CFR part 2. A general authorization for the release of medical 
or other information is NOT sufficient for this purpose. The Federal rules 
restrict any use of the information to criminally investigate or prosecute any 
alcohol or drug abuse patient.The records that you are about to access may 
contain highly sensitive health information, the redisclosure of which is 
protected by Article 27-F of the [facility] Public Health law. If you 
continue you may have access to information: Regarding HIV / AIDS; Provided by 
facilities licensed or operated by the [facility] Office of Mental Health; 
or Provided by the [facility] Office for People With Developmental 
Disabilities. If such information is present, then the following [place] 
mandated warning applies: This information has been disclosed to you from 
confidential records which are protected by state law. State law prohibits you 
from making any further disclosure of this information without the specific 
written consent of the person to whom it pertains, or as otherwise permitted by 
law. Any unauthorized further disclosure in violation of state law may result in
a fine or intermediate sentence or both. A general authorization for the release of 
medical or other information is NOT sufficient authorization for further disc
losure.                                                                         
    



Allergies and Adverse Reactions

          



           Type       Description Substance  Reaction   Status     Data Source(s

)

 

           Drug Allergy Drug Allergy NKDA                             [facility] ([facility] )



                                                                                
       



Family History

          



             Family Member Name Family Member Gender Family Member Status Date o

f Status 

Description                             Data Source(s)

 

           Unknown    Male       Problem                          [facility] ([facility]

 

           Unknown    Male       Problem                          [facility] ([facility])



                                                                                
                 



Encounters

          



           Encounter  Providers  Location   Date       Indications Data Source(s

)

 

                Unknown                         [address] [facility], 

[place] [address] 10/21/2021 12:00:00 AM 

EDT                                                 eCW1 ([facility])

 

                Outpatient      Attender: ALBERTO Melchor 1

 02:20:00 PM

EDT                                                 [facility] ([facility]

)

 

                Outpatient      Admitter: ROSA ADJAPONGReferrer: ROSA ADJAPONG

                 2021 

12:00:00 AM EDT           Multiple myeloma not having achieved remission [facility]

 

                                        Multiple myeloma not having achieved rem

ission 

 

                Outpatient      Attender: Kanchan Jimenez RPA Sabrina/Waldo/Ravindra/R

eindl 2021 

10:15:00 AM EDT                                     [facility] ([facility]

 

                Outpatient      Attender: Charmaine Melchor  01:30:00 PM 

EDT                                                 [facility] ([facility]

)

 

                Outpatient      Attender: Kanchan Jimenez RPA Sabrina/Waldo/Ravindra/R

eindl 06/10/2021 

10:45:00 AM EDT                                     [facility] ([facility]

 

                Outpatient      Attender: Charmaine Melchor  01:30:00 PM 

EDT                                                 [facility] ([facility]

)

 

                Outpatient      Attender: Ra Meeks MD Physical Therapy  10:30:00 AM 

EDT                                                 [facility] ([facility]

 

                Office Visit    Attender: Josefina VIGIL PA-C Physical Therapy

 2021 

01:15:00 PM EDT                                     [facility] ([facility]

 

             Outpatient   Attender: Uzair Huitron MD Physical Therapy 2021 1

2:45:00 PM EDT 

                                        [facility] ([facility])

 

                Outpatient      Attender: Josefina VIGIL PA-C Physical Therapy

 2021 11:15:00 

AM EDT                                              [facility] ([facility]

 

                Outpatient      Attender: Charmaine Melchor  02:30:00 PM 

EDT                                                 [facility] ([facility]

)

 

                Outpatient      Attender: Kanchan Jimenez RPA Sabrina/Waldo/Ravindra/R

eindl 2021 

09:15:00 AM EST                                     [facility] ([facility]

 

             Outpatient   Attender: ERICK KRUSE Main Office  2021 1

1:45:00 AM EST  

                                        [facility] ([facility])

 

                Outpatient      Attender: Charmaine Melchor  12:00:00 PM 

EST                                                 [facility] ([facility]

)

 

                Outpatient      Attender: Charmaine Melchor 10

/ 10:30:00 AM 

EDT                                                 [facility] ([facility]

)



                                                                                
                                                                                
                                                                            



Immunizations

          



             Vaccine      Date         Status       Description  Data Source(s)

 

                          Influenza, injectable, MDCK, preservative free, bruno

valent 10/06/2021 02:31:00

PM EDT              completed                               [facility])

 

                          Influenza, injectable, MDCK, preservative free, bruno

valent 10/15/2020 12:10:00

PM EDT              completed                               [facility] ([facility])



                                                                                
                 



Medications

          



          Medication Brand Name Start Date Product Form Dose      Route     Admi

nistrative 

Instructions Pharmacy Instructions Status     Indications Reaction   Description

 Data 

Source(s)

 

           Cephalexin 500 MG Oral Capsule CEPHALEXIN 10/20/2021 12:00:00 AM EDT 

capsule    21         

                          TAKE ONE CAPSULE BY MOUTH THREE TIMES A DAY FOR 7 DAYS

 TAKE ONE CAPSULE BY 

MOUTH THREE TIMES A DAY FOR 7 DAYS SOLD: 10/20/2021                             

           [facility]

 

           Cephalexin 500 MG Oral Capsule CEPHALEXIN 10/06/2021 12:00:00 AM EDT 

capsule    21         

                          TAKE ONE CAPSULE BY MOUTH THREE TIMES A DAY FOR 7 DAYS

 TAKE ONE CAPSULE BY 

MOUTH THREE TIMES A DAY FOR 7 DAYS SOLD: 10/06/2021                             

           [facility]

 

        Cephalexin 500 MG Oral Capsule Cephalexin 10/06/2021 12:00:00 AM EDT    

             ORAL            

             active                                              [facility] ([facility])

 

       Administration Of Flu Vaccine        10/06/2021 12:00:00 AM EDT          

                          completed  

                                                            [facility] ([facility])

 

                                        Medication administered onsite 

 

                    2 ML Sodium Hyaluronate 10 MG/ML Prefilled Syringe [Euflexxa

] Euflexxa            

2021 12:00:00 AM EDT                                    active            

          [facility] ([facility]

 

                atorvastatin 20 MG Oral Tablet ATORVASTATIN CALCIUM 2021 1

2:00:00 AM EDT 

tablet          90                              TAKE ONE TABLET BY MOUTH EVERY D

AY TAKE ONE TABLET BY MOUTH EVERY 

DAY          SOLD: 2021                                        [facility]

s

 

          25 mg               2021 12:00:00 AM EDT tablet    180          

       TAKE ONE TABLET BY MOUTH TWICE A

 DAY       TAKE ONE TABLET BY MOUTH TWICE A DAY SOLD: 10/22/2021                

                  [facility]

 

                atorvastatin 20 MG Oral Tablet ATORVASTATIN CALCIUM 2021 1

2:00:00 AM EDT 

tablet          90                              TAKE ONE TABLET BY MOUTH EVERY D

AY TAKE ONE TABLET BY MOUTH EVERY 

DAY          SOLD: 10/22/2021                                        [facility]

[facility]

 

          25 mg               2021 12:00:00 AM EDT tablet    180          

       TAKE ONE TABLET BY MOUTH TWICE A

 DAY       TAKE ONE TABLET BY MOUTH TWICE A DAY SOLD: 2021                

                  [facility]

 

          100 mg              2021 12:00:00 AM EDT tablet    180          

       TAKE ONE TABLET BY MOUTH TWICE 

A DAY      TAKE ONE TABLET BY MOUTH TWICE A DAY SOLD: 2021                

                  [facility]

 

          Famotidine 20 MG Oral Tablet FAMOTIDINE 2021 12:00:00 AM EDT tab

let    90                  

TAKE ONE TABLET BY MOUTH EVERY DAY TAKE ONE TABLET BY MOUTH EVERY DAY SOLD: 

10/22/2021                                                      [facility]

 

                          Acetaminophen 250 MG / Aspirin 250 MG / Caffeine 65 MG

 Oral Tablet [Excedrin] 

Excedrin Migraine 2021 12:00:00 AM EDT                               activ

e                   [facility] 

([facility])

 

          20 mg               2021 12:00:00 AM EDT tablet    180          

       TAKE ONE TABLET BY MOUTH TWICE A

 DAY       TAKE ONE TABLET BY MOUTH TWICE A DAY SOLD: 10/22/2021                

                  [facility]

 

          20 mg               2021 12:00:00 AM EDT tablet    90           

       TAKE ONE TABLET BY MOUTH EVERY 

DAY        TAKE ONE TABLET BY MOUTH EVERY DAY SOLD: 2021                  

                [facility]

 

          20 mg               2021 12:00:00 AM EDT tablet    180          

       TAKE ONE TABLET BY MOUTH TWICE A

 DAY       TAKE ONE TABLET BY MOUTH TWICE A DAY SOLD: 2021                

                  [facility]

 

          1 %                 2021 12:00:00 AM EDT gel       100          

       APPLY UP TO 4 GRAMS THREE TIMES A DAY

 TO AFFECTED KNEE AS NEEDED             APPLY UP TO 4 GRAMS THREE TIMES A DAY TO

 AFFECTED 

KNEE AS NEEDED SOLD: 2021                                        [facility]

 

        Famotidine 20 MG Oral Tablet Famotidine 2021 12:00:00 AM EDT      

           ORAL                    

active                                                          [facility] ([facility])

 

        Furosemide 20 MG Oral Tablet Furosemide 2021 12:00:00 AM EDT      

           ORAL                    

active                                                          [facility] ([facility])

 

          5 mg                03/15/2021 12:00:00 AM EDT tablet    40           

       TAKE ONE TABLET BY MOUTH EVERY 

DAY, EXCEPT TAKE 2 TABLETS ON MONDAY, WEDNESDAY AND FRIDAY TAKE ONE TABLET BY 

MOUTH EVERY DAY, EXCEPT TAKE 2 TABLETS ON MONDAY, WEDNESDAY AND FRIDAY SOLD: 

2021                                                      [facility]

 

          apixaban 5 MG Oral Tablet [Eliquis] Eliquis   2021 12:00:00 AM E

ST                     ORAL      

                      active                                      [facility] ([facility]

 

                    24 HR Bupropion Hydrochloride 150 MG Extended Release Oral T

ablet BUPROPION HCL       

2021 12:00:00 AM EST tablet extended release 24 hr 30                     

         TAKE ONE TABLET BY

 MOUTH EVERY MORNING TAKE ONE TABLET BY MOUTH EVERY MORNING SOLD: 2021    

              

                                                    [facility]

 

          5 mg                2021 12:00:00 AM EST tablet    60           

       TAKE ONE TABLET BY MOUTH TWICE A 

DAY        TAKE ONE TABLET BY MOUTH TWICE A DAY SOLD: 2021                

                  [facility]

 

                    24 HR Bupropion Hydrochloride 150 MG Extended Release Oral T

ablet BUPROPION HCL       

2021 12:00:00 AM EST tablet extended release 24 hr 30                     

         TAKE ONE TABLET BY

 MOUTH EVERY MORNING TAKE ONE TABLET BY MOUTH EVERY MORNING SOLD: 2021    

              

                                                    [facility]

 

          5 mg                2021 12:00:00 AM EST tablet    60           

       TAKE ONE TABLET BY MOUTH TWICE A 

DAY        TAKE ONE TABLET BY MOUTH TWICE A DAY SOLD: 2021                

                  [facility]

 

                          24 HR Bupropion Hydrochloride 150 MG Extended Release 

Oral Tablet Bupropion 

Hydrochloride ER (XL) 2021 12:00:00 AM EST             ORAL              a

ctive                   

[facility] ([facility])

 

                          24 HR Bupropion Hydrochloride 150 MG Extended Release 

Oral Tablet Bupropion 

Hydrochloride ER (XL) 2021 12:00:00 AM EST             ORAL              a

ctive                   

[facility] ([facility])

 

          5 mg                2020 12:00:00 AM EST tablet    30           

       TAKE ONE TABLET BY MOUTH TWICE A 

DAY        TAKE ONE TABLET BY MOUTH TWICE A DAY SOLD: 2020                

                  [facility]

 

          5 mg                2020 12:00:00 AM EST tablet    30           

       TAKE ONE TABLET BY MOUTH TWICE A 

DAY        TAKE ONE TABLET BY MOUTH TWICE A DAY SOLD: 2021                

                  [facility]

 

           Cephalexin 500 MG Oral Capsule CEPHALEXIN 2020 12:00:00 AM EST 

capsule    30         

                          TAKE ONE CAPSULE BY MOUTH THREE TIMES A DAY TAKE ONE C

APSULE BY MOUTH THREE 

TIMES A DAY  SOLD: 2020                                        [facility]

[facility]

 

          apixaban 5 MG Oral Tablet [Eliquis] Eliquis   2020 12:00:00 AM E

ST                     ORAL      

                      active                                      [facility] ([facility])

 

          apixaban 5 MG Oral Tablet [Eliquis] Eliquis   2020 12:00:00 AM E

ST                     ORAL      

                      active                                      [facility] ([facility])

 

       Administration Of Flu Vaccine        10/15/2020 12:00:00 AM EDT          

                          completed  

                                                            [facility] ([facility])

 

                                        Medication administered onsite 

 

          20 mg               2020 12:00:00 AM EDT tablet    90           

       TAKE ONE TABLET BY MOUTH EVERY 

MORNING    TAKE ONE TABLET BY MOUTH EVERY MORNING SOLD: 2021              

                    [facility]

 

          5 mg                2020 12:00:00 AM EDT tablet    30           

       TAKE 1/2 TABLET BY MOUTH ONCE 

DAILY      TAKE 1/2 TABLET BY MOUTH ONCE DAILY SOLD: 10/24/2020                 

                 [facility]

 

          40 mg               2020 12:00:00 AM EDT tablet    30           

       TAKE ONE TABLET BY MOUTH AT 

BEDTIME    TAKE ONE TABLET BY MOUTH AT BEDTIME SOLD: 10/24/2020                 

                 [facility]

 

          40 mg               2020 12:00:00 AM EDT tablet    30           

       TAKE ONE TABLET BY MOUTH AT 

BEDTIME    TAKE ONE TABLET BY MOUTH AT BEDTIME SOLD: 2021                 

                 [facility]

 

          Famotidine 40 MG Oral Tablet FAMOTIDINE 2020 12:00:00 AM EDT tab

let    30                  

TAKE ONE TABLET BY MOUTH AT BEDTIME TAKE ONE TABLET BY MOUTH AT BEDTIME SOLD: 

2021                                                      [facility]

 

                    Digoxin 0.25 MG Oral Tablet 250 mcg (0.25 mg) DIGOXIN       

      2020 12:00:00 AM EDT

             tablet       90                        TAKE ONE TABLET BY MOUTH JAMES

 DAY TAKE ONE TABLET BY MOUTH EVERY 

DAY          SOLD: 2021                                        [facility]

[facility]

 

          10 mg               2019 12:00:00 AM EST tablet    90           

       TAKE ONE TABLET BY MOUTH EVERY 

DAY        TAKE ONE TABLET BY MOUTH EVERY DAY SOLD: 10/24/2020                  

                [facility]

 

          100 mg              2019 12:00:00 AM EST tablet    180          

       TAKE ONE TABLET BY MOUTH TWICE 

A DAY      TAKE ONE TABLET BY MOUTH TWICE A DAY SOLD: 10/24/2020                

                  [facility]

 

          25 mg               2019 12:00:00 AM EST tablet    180          

       TAKE ONE TABLET BY MOUTH TWICE A

 DAY       TAKE ONE TABLET BY MOUTH TWICE A DAY SOLD: 10/24/2020                

                  [facility]



                                                                                
                                                                                
                                                                                
                                                                                
                                                                                
                                                                    



Insurance Providers

          



             Payer name   Policy type / Coverage type Policy ID    Covered party

 ID Covered 

party's relationship to morataya Policy [place]             Plan Information

 

                [place]) [facility] Part B  [id number]       

[id number] Family Dependent                        

[id number]

 

                [place] [employer] ([facility]) [facility] Part B  [id number]       

[id number] Family Dependent                        

[id number]

 

                [place] [employer] [facility]) [facility] Part B  [id number]       

[id number] Family Dependent                        

[id number]

 

                Medicare [employer] Medicare Primary [id number]      

[id number] Self                                    

[id number]

 

                Medicare Natl Govt [facility] Medicare Primary [id number]     

[id number] Self                                    

[id number]

 

                Medicare [employer] Medicare Primary [id number]     

[id number] Self                                    

[id number]

 

                Medicare [facility] Govt [facility] Medicare Primary [id number]     

[id number] Self                                    

[id number]

 

          MEDICARE  A         [id number]           Self                [id number]

[id number]

 

                Medicare Natl Govt [facility] Medicare Primary [id number]      

[id number] Self                                    

[id number]

 

                Medicare Natl Govt Serv Medicare Primary [id number]     

[id number] Self                                    

[id number]

 

                Medicare Natl Govt Serv Medicare Primary [id number]     

[id number] Self                                    

[id number]

 

                Medicare [employer] Medicare Primary [id number]     

[id number] Self                                    

[id number]

 

                [facility] Primary [id number]     

[id number] Self                                    

[id number]

 

                [facility] Medicare Primary [id number]      

[id number] Self                                    

[id number]

 

                [facility]are [facility] Medicare Primary [id number]     

[id number] Self                                    

[id number]

 

                Medicare [facility] Medicare Primary [id number]      

[id number] Self                                    

[id number]

 

                Medicare [facility] Medicare Primary [id number]     

[id number] Self                              

      [id number]

 

                Medicare Natl Govt Servic Medicare Primary [id number]      

[id number] Self                                    

[id number]

 

                Medicare [facility] Medicare Primary [id number]     

[id number] Self                                    

[id number]

 

                Medicare [employer] Medicare Primary [id number]     

[id number] Self                                    

[id number]

 

           FOR LIFE U         [id number]           Self                [id number]

[id number]

 

                [employer]  For Life Medigap Part B  [id number]       

[id number] Family Dependent                        

[id number]

 

           FOR LIFE           [id number]           SP                  [id number]

[id number]

 

          MEDICARE  C         [id number] S                   [id number]

[id number]

 

           FOR LIFE O         [id number] S                   [id number]

[id number]

 

                [employer]  For Life Medigap Part B  [id number]       

[id number] Family Dependent                  

      [id number]

 

                [employer]  For Life Medigap Part B  [id number]       

[id number] Family Dependent                        

[id number]

 

                [employer]  For Life Medigap Part B  [id number]       

[id number] Family Dependent                        

[id number]

 

                [employer]  For Life Medigap Part B  [id number]       

[id number] Family Dependent                        

[id number]

 

                [employer]  For Life Medigap Part B  [id number]       

[id number] Family Dependent                        

[id number]

 

                [employer]  For Life Medigap Part B  [id number]       

[id number] Family Dependent                        

[id number]

 

                [employer]  For Life Medigap Part B  [id number]       

[id number] Family Dependent                        

[id number]

 

                [employer]  For Life Medigap Part B  [id number]       

[id number] Family Dependent                        

[id number]

 

                [employer]  For Life Medigap Part B  [id number]       

[id number] Family Dependent                        

[id number]

 

          MEDICARE            [id number]           SP                  [id number]

A

 

          MEDICARE  C         [id number] S                   [id number]

A

 

          S [employer], [facility] C         [id number] S              

     [id number]

 

                [employer]  For Life Medigap Part B  [id number]       

[id number] Family Dependent                        

[id number]

 

          MEDICARE            [id number]           SP                  [id number]

[id number]

 

                [employer]  For Life Medigap Part B  [id number]       

[id number] Family Dependent                        

[id number]

 

                [employer]  For Life Medigap Part B  [id number]       

[id number] Family Dependent                        

[id number]



                                                                                
                                                                                
                                                                                
                                                                                
                                                                                
                                                                                
        



Problems, Conditions, and Diagnoses

          



           Code       Display Name Description Problem Type Effective Dates Data

 Source(s)

 

                    C90.00              Multiple myeloma not having achieved rem

ission Multiple myeloma not 

having achieved remission Diagnosis           2021 01:43:00 PM EDT [facility]

 

                    I87.312             [id number]     Chronic venous hyper

tension (idiopathic) with ulcer of 

left lower extremity Problem             10/21/2021 12:00:00 AM EDT eCW1 ([facility])

 

                    L97.822             [id number]            Non-pressure chronic

 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

 fat layer exposed  Problem             10/21/2021 12:00:00 AM EDT eCW1 ([facility])

 

             I27.81       Chronic cor pulmonale Chronic cor pulmonale Problem   

   2021 12:00:00 

AM EDT                                  MEDENT ([facility])

 

             I65.29       Carotid artery occlusion Carotid artery occlusion Prob

sam      2021 

12:00:00 AM EST                         MED[facility] ([facility])

 

                    I82.503             Deep venous thrombosis of lower extremit

y Deep venous thrombosis of 

lower extremity     Problem             2021 12:00:00 AM EST [facility] ([facility])

 

             E78.2        Mixed hyperlipidemia Mixed hyperlipidemia Problem     

 2021 12:00:00 AM 

EST                                     MEDENT ([facility])



                                                                                
                                                                              



Surgeries/Procedures

          



             Procedure    Description  Date         Indications  Data Source(s)

 

             OFFICE OUTPATIENT VISIT 15 MINUTES              10/06/2021 12:00:00

 AM EDT              MEDENT 

([facility])

 

             ECG ROUTINE ECG W/LEAST 12 LDS W/I&R              2021 12:00:

00 AM EDT              MEDENT 

([facility])

 

             OFFICE OUTPATIENT VISIT 25 MINUTES              2021 12:00:00

 AM EDT              MEDENT 

([facility])

 

             ECG ROUTINE ECG W/LEAST 12 LDS W/I&R              2021 12:00:

00 AM EDT              MEDENT 

([facility])

 

             OFFICE OUTPATIENT VISIT 25 MINUTES              2021 12:00:00

 AM EDT              MEDENT 

([facility])

 

             Chronic Care Management Services Ea Addl 20 Min              2021 12:00:00 AM EDT              

MEDENT ([facility])

 

                    Chronic Care MGMT 20 Mins Clinical Staff Time Per Calendar M

[facility]                     2021 

12:00:00 AM EDT                                     MEDENT ([facility]

)

 

             Complex Chronic Care Management SVC 1St 60 Min              

021 12:00:00 AM EDT              

MEDENT ([facility])

 

             Complex Chronic Care MGMT Service Ea Addl 30 Min               12:00:00 AM EDT              

MEDENT ([facility])

 

             OFFICE OUTPATIENT VISIT 25 MINUTES              2021 12:00:00

 AM EDT              MEDENT 

([facility], )

 

             Complex Chronic Care Management SVC 1St 60 Min              

021 12:00:00 AM EDT              

MEDENT ([facility])

 

             Complex Chronic Care MGMT Service Ea Addl 30 Min               12:00:00 AM EDT              

MEDENT ([facility])

 

             OFFICE OUTPATIENT VISIT 25 MINUTES              2021 12:00:00

 AM EDT              MEDENT 

([facility])

 

             OFFICE OUTPATIENT VISIT 25 MINUTES              06/10/2021 12:00:00

 AM EDT              MEDENT 

([facility], )

 

             Complex Chronic Care Management SVC 1St 60 Min              

021 12:00:00 AM EDT              

MEDENT ([facility])

 

             Complex Chronic Care MGMT Service Ea Addl 30 Min               12:00:00 AM EDT              

MEDENT ([facility])

 

             Complex Chronic Care Management SVC 1St 60 Min              

021 12:00:00 AM EDT              

MEDENT ([facility])

 

             Complex Chronic Care MGMT Service Ea Addl 30 Min               12:00:00 AM EDT              

MEDENT ([facility])

 

             OFFICE OUTPATIENT VISIT 25 MINUTES              2021 12:00:00

 AM EDT              MEDENT 

([facility])

 

             ARTHROCENTESIS ASPIR&/INJECTION MAJOR JT/BURSA              

021 12:00:00 AM EDT              

MEDENT ([facility] )

 

             RADEX SHOULDER COMPLETE MINIMUM 2 VIEWS              2021 12:

00:00 AM EDT              MEDENT 

([facility] )

 

             ARTHROCENTESIS ASPIR&/INJECTION MAJOR JT/BURSA              

021 12:00:00 AM EDT              

MEDENT ([facility])

 

             Complex Chronic Care Management SVC 1St 60 Min              

021 12:00:00 AM EDT              

MEDENT ([facility])

 

             Complex Chronic Care MGMT Service Ea Addl 30 Min               12:00:00 AM EDT              

MEDENT ([facility])

 

             INJECTION 1 TENDON SHEATH/LIGAMENT APONEUROSIS              

021 12:00:00 AM EDT              

MEDENT ([facility])

 

             ARTHROCENTESIS ASPIR&/INJECTION MAJOR JT/BURSA              

021 12:00:00 AM EDT              

MEDENT ([facility])

 

             RADIOLOGIC EXAM KNEE COMPLETE 4/MORE VIEWS              2021 

12:00:00 AM EDT              MEDENT

 ([facility])

 

             RADIOLOGIC EXAM KNEE COMPLETE 4/MORE VIEWS              2021 

12:00:00 AM EDT              MEDENT

 ([facility])

 

             Mammogram                 2021 12:00:00 AM EDT              M

EDENT ([facility])

 

             Bone Mineral Density Test              2021 12:00:00 AM EDT  

            MEDENT ([facility])

 

                    Brief Emotional/Behav Assessment W/ Scoring Doc Per Standard

 Inst                     2021 

12:00:00 AM EDT                                     MEDENT ([facility]

)

 

             OFFICE OUTPATIENT VISIT 25 MINUTES              2021 12:00:00

 AM EDT              MEDENT 

([facility])

 

             OFFICE OUTPATIENT NEW 45 MINUTES              2021 12:00:00 A

M EST              MEDENT 

([facility], )

 

             ECG ROUTINE ECG W/LEAST 12 LDS W/I&R              2021 12:00:

00 AM EST              MEDENT 

([facility])

 

             OFFICE OUTPATIENT VISIT 15 MINUTES              2021 12:00:00

 AM EST              MEDENT 

([facility])

 

             Complex Chronic Care Management SVC 1St 60 Min              

021 12:00:00 AM EST              

MEDENT ([facility])

 

             Complex Chronic Care MGMT Service Ea Addl 30 Min               12:00:00 AM EST              

MEDENT ([facility])

 

             OFFICE OUTPATIENT VISIT 25 MINUTES              2021 12:00:00

 AM EST              MEDENT 

([facility])

 

             Complex Chronic Care Management SVC 1St 60 Min              

020 12:00:00 AM EST              

MEDENT ([facility])

 

             Complex Chronic Care MGMT Service Ea Addl 30 Min               12:00:00 AM EST              

MEDENT ([facility])

 

                    Brief Emotional/Behav Assessment W/ Scoring Doc Per Standard

 Inst                     10/29/2020 

12:00:00 AM EDT                                     MEDENT ([facility]

)



                                                                                
                                                                                
                                                                                
                                                                                
                                                                                
                                                                                
        



Results

          



                    ID                  Date                Data Source

 

                    [id number]          10/06/2021 02:57:00 PM EDT MED[facility] ([facility])









          Name      Value     Range     Interpretation Code Description Data Milly

rce(s) Supporting 

Document(s)

 

                Bacteria identified in Wound shallow by Aerobe culture Laborator

y test result                 

                                        MEDENT ([facility])  









                    ID                  Date                Data Source

 

                    [id number]            2021 12:29:00 PM EDT [facility] ([facility])









          Name      Value     Range     Interpretation Code Description Data Milly

rce(s) Supporting 

Document(s)

 

          White Blood Count 6.8       5.0-10.0                      MED[facility] ([facility])  

 

          Platelets 187       172-450                       [facility] ([facility]  

 

          Red Blood Count 3.85      4.00-5.40                     [facility] ([facility])  

 

          Hemoglobin 12.7                                    [facility] ([facility]  

 

          Hematocrit 39.4                                    [facility] ([facility]  









                    ID                  Date                Data Source

 

                    [id number]          2021 12:21:00 PM EDT MED[facility] ([facility])









          Name      Value     Range     Interpretation Code Description Data Milly

rce(s) Supporting 

Document(s)

 

          White Blood Count 6.8 10    4.0-10.0                      MEDENT ([facility])  

 

          Red Blood Count 3.85 10   4.00-5.40                     MEDENT ([facility])  

 

          Mean Corpuscular Volume 102.3 fl  80.0-96.0                     MEDENT

 ([facility])  

 

          Hemoglobin 12.7 g/dL 12.0-15.5                     [facility]  

 

          Hematocrit 39.4 %    36.0-47.0                     [facility]  

 

          Mean Corpuscular Hemoglobin 33.0 pg   27.0-33.0                     ME

DENT ([facility]) 

 

 

          Mean Corpuscular HGB Conc 32.2 g/dL 32.0-36.5                     MEDE

NT ([facility]) 

 

 

          Red Cell Distribution Width 12.3 %    11.5-14.5                     ME

DENT ([facility])  

 

          Platelet Count, Automated 187 10    150-450                       MEDE

NT ([facility])  

 

          Neutrophils % 72.2 %    36.0-66.0                     MEDENT ([facility])  

 

          Lymph %   19.9 %    24.0-44.0                     [facility]  

 

          Mono %    6.0 %     2.0-8.0                       [facility]  

 

          Eos %     1.5 %     0.0-3.0                       [facility]  

 

          Baso %    0.3 %     0.0-1.0                       [facility]  

 

          Immature Granulocyte % 0.1 %     0-3.0                         MED[facility] 

([facility])  

 

          Nucleated Red Blood Cell % 0.0 %     0-0                           MED

ENT ([facility])  

 

          Lymph #   1.4 10    1.5-5.0                       [facility]  

 

          Neutrophils # 4.9 10    1.5-8.5                       [facility] ([facility]  

 

          Mono #    0.4 10    0.0-0.8                       [facility]  

 

          Eos #     0.1 10    0.0-0.5                       [facility]  

 

          Baso #    0.0 10    0.0-0.2                       [facility]  









                    ID                  Date                Data Source

 

                    [id number]           2021 05:25:00 PM EDT [facility]

 

                                        Hematopathology Report See Addendum [facility]

wName: JOSEPH BENTLEYMRN: 

[id number] Number: [id number] Date: 2021 00:00Received Date: 
7/15/2021 13:57Physician(s): ROSA ROCHA MD ADJAPONG, OPOKU,[facility] 
[facility]) ReceivedA: Bone Marrow, Flow Cytometry; 
RECEIVED 1 GREEN TOP BM, 2 ASP AND 1 PBSMEAR (1 EXTRA EDTA BM SENT TO 
MOLECULAR)Clinical HistoryMultiple myeloma.TEST REQUESTED/PERFORMED: Flow 
cytometry analysis DiagnosisFlow cytometry of bone marrow: Dilute specimen with 
small population oflambda restricted plasma cells (1% on bone marrow aspirate), 
consistentwith plasma cell neoplasm. Correlation with full bone marrow biopsy 
isrecommended. FISH study is pending. Halie Gentile M.D.;Resident 
PathologistElectronically Signed By ROSALINDA MARIE M.D. Attending Pathologist  
117:25:03The attending pathologist named above attests that he/she has 
personallyreviewed the relevant preparation(s) for the specimen(s) and rendered 
thefinal diagnosis. Addendum     2021     FISH identified gain of 1q, 
trisomy 5, and monosomy 13 in a plasmacell-enriched population. FISH was 
negative for gain/loss of chromosome 7and 9, deletion of IgH and TP53, and an 
IgH rearrangement. See [id number] of 1q and deletion of 13q are associated 
with progression. Ipsdxocv15a is generally associated with an intermediate risk 
and 1q gain isassociated with high risk.     Addendum Electronically Signed By: 
         Nadege Eddy M.D.          2021 11:16  ProceduresFlow Cytometry 
    Date Ordered:7/15/2021     Status:   Signed Out2021 
InterpretationPERIPHERAL BLOOD: CBC performed at [facility] 
(21)WBC           6.8     K/uLRBC          *3.85     M/uLHgb           12.7
     g/dLHct           39.4     %MCV          *102.3     fLMCH           33.0   
  pgMCHC           32.2     g/dLRDW           12.3     %Platelets      187     
K/ulDifferential Count (automated):72.2  % Neutrophils 1.5  % Eosinophils 0.3  %
 Dxzogrbkd06.9  % Lymphocytes 0.1  % Atypical Lymphocytes 6.0  % 
Monocytes-------100.0 %     A peripheral blood film is reviewed.  BONE MARROW 
ASPIRATE:Differential Count (100 cells): 6  % Erythroid Precursors 2  % N. 
Myelocytes 1  % N. Metamyelocytes and Band Forms80  % Neutrophils 4  % 
Eosinophils 1  % Basophils 5  % Lymphocytes 1  % Plasma Cells--------100 %  
Morphology: Dilute sample.Lymphoid Panel: Jone Harrington 21 1829 [id number] 
following markers were assayed: CD45 (gate), CD2, CD3, CD4, CD5, CD7,CD8, CD10, 
CD19, CD20, CD33, CD34, CD38, CD56, CD57, CD64, , ,HLA-DR, Kappa, and 
Lambda.#events: [id number]: 98%Flow Cytometry Differential 
(CD45/SSC)Lymphocyte Rugby: 15%CD45 dim Rugby: 1%Monocyte Rugby: 4%Granulocyte 
Rugby: 73%Nucleated/Erythroid Rugby: 2%The lymphocyte gate showsB-cells (CD19): 
14%T-cells (CD3): 75%NK-cells (CD3-/CD56+): 9%Kappa/Lambda Ratio: 2.9CD4/CD8 
Ratio: 1.8Results: (expressed as % of lymphocyte gate)T-cell Markers: CD2 = 80, 
CD3 = 75, CD3/CD4 = 48, CD3/CD8 = 27, CD5 = 74,CD7 = 75, CD3/57 = 8B-cell yelena
ers: Kappa = 9, Lambda = 3, CD19 = 14, CD20 = 16, CD19/10 = 1,CD19/CD5 = 3, 
CD38/CD20 = 15Light chain as % of B-Cells: CD19/Kappa = 54, CD19/Lambda = 
17CD19/CD5/Kappa = 4, CD19/CD5/Lambda = 5CD19/CD10/Kappa = 7, CD19/CD10/Lambda =
 1NK cell Markers: CD56 = 12, CD57 = 13Other Markers: CD10 = 1, CD38 = 
63Results: (expressed as % of CD45 dim gate)T-cell Markers: CD2 = 27, CD3 = 8, 
CD3/CD4 = 2, CD3/CD8 = 4, CD5 = 10, CD7= 26, CD3/57 = 2B-cell markers: Kappa = 
29, Lambda = 0, CD19 = 16, CD20 = 14, CD19/10 =12, CD19/CD5 = 3, CD38/CD20 = 
11Light chain as % of B-Cells: CD19/Kappa = 0, CD19/Lambda = 0CD19/CD10/Kappa = 
6, CD19/CD10/Lambda = 2NK cell Markers: CD56 = 16, CD57 = 13Basophil Markers: 
 (HLA-DR-) = 19Other Markers: CD10 = 25, CD38 = 85, CD33 = 47, CD34 = 23, 
CD64 = 20, = 5,  = 56, HLA-DR = 57Myeloma Panel for Jone Harrington  
 [id number] following markers were assayed: CD45 (cell gate),  
(plasma cellgate), CD19, CD20, CD38, CD56, cytoplasmic Kappa, and cytoplasmic La
mbda.(Please note, that Cytoplasmic Kappa and Cytoplasmic Lambda are used 
todetect plasma cells, while surface Kappa and Lambda are for 
lymphocytes).#events: [id number]: Routinely a minimum of 500,000 events are 
collected in each paneltube. Due to sample cellularity and/or processing this 
number was notachievable for this sample.Flow Cytometry Differential:Lymphocyte 
Rugby: 13%CD45 dim Rugby: 2%Monocyte Rugby: 4%Granulocyte Rugby: 74%NRBC Rugby: 
3% Plasma Cell Rugby: 0%Results: (expressed as % of lymphocyte gate)B-Cells 
(CD19): 14% CD19 = 14, CD20 = 14Light chain as % of B-Cells: Cytoplasmic Kappa/
CD20 = 49, CytoplasmicLambda/CD20 = 26Results: (expressed as % of total + 
plasma cell gate)CD38 = 64, CD56 = 40, CD38/56 = 37, CD19 = 12, CD20 = 
5Cytoplasmic Kappa/ = 0, Cytoplasmic Lambda/ = 67    Results-
CommentsLymphocytes consist predominantly of T cells with normal expression of 
panT-cell markers and a normal CD4/CD8 ratio, normal proportions of NK 
andcytotoxic T cells, and polyclonal B cells.CD34+ blasts comprise fewer than 1%
 of cells studied. Blasts, monocytes,and granulocytes show no definitive 
immunophenotypic aberrancies.Plasma cell-associated markers show very small 
population of lambdarestricted plasma cells comprising less than 1% of cells, 
mostly negativefor CD19 and positive for CD56. Procedure Electronically Signed 
By:ROSALINDA MARIE M.D.2021 This report may include one or more 
immunohistochemical stain/fluorochromeconjugated monoclonal antibody results 
that use analyte specific reagents.All positive and negative controls have been 
reviewed by the attendingpathologist and are satisfactory. The tests were 
developed and theirperformance characteristics determined by [facility]ology 
department.They have not been cleared or approved by the US Food and DrugAdminis
tration. The FDA has determined that such clearance or approval isnot necessary.
 









          Name      Value     Range     Interpretation Code Description Data Milly

rce(s) Supporting 

Document(s)

 

                                                                       









                    ID                  Date                Data Source

 

                    [id number]            2021 01:04:00 PM [facility]

 

                                        Cytogenetics ReportName: JOSEPH BENTLEYMRN: [id number] Number: [id number]

[id number] Date: 2021 00:00Received Date: 7/15/2021 13:50Physician(s): 
ROSA ROCHA MD ADJAPONG, OPOKU, MDSpecimen(s) ReceivedA: Bone Marrow - Karyo 
and Multiple Myel  FISHClinical History66-year-old patient with multiple 
myeloma.TEST REQUESTED/PERFORMED:  Chromosome analysis and fluorescence in 
situhybridization (FISH)  DiagnosisFISH identified 11% of nuclei with gain of 
1q; 11% with trisomy 5; and 10%with monosomy 13 in this plasma cell-enriched 
population of cells. FISHwas negative for gains or losses of chromosomes 7 and 
9; deletions id97v26.3 (IgH), and 17p13 (TP53); and an IgH rearrangement.Gains 
of chromosome 1q in plasma cell dyscrasia/multiple myeloma occur inup to 40% of 
cases, are secondary aberrations and are associated withprogression. 13q 
deletions are detected in approximately 50% of MM and arealso secondary events, 
although their presence in monoclonal gammopathy ofuncertain significance (MGUS)
 suggests they occur early in diseaseprogression. While deletion 13q is 
generally associated with anintermediate risk, 1q gain is often characterized as
 high risk (1-3).Please correlate with the concurrent Hematopathology report, 
[id number]. References:1.          Kailee GALLEGOS. 1q rearrangements in multiple 
myeloma. AtlasGenet Cytogenet Oncol Haematol. 
1998;2(3):86-87.http://[email or web address]/[email or web address].html. 
Lastvisited .2.     Maryam & Blake. Mature B- and T-cell neoplasms 
and Hodgkinlymphoma. In Cancer Cytogenetics: Chromosomal and Molecular 
GeneticAberrations of Tumor Cells, 4th Edition. Nataliya & Ty, eds. [employer] 
[employer]. 2015. 3.     Wilda LOVE. Multiple myeloma: 2020 update on 
diagnosis,risk-stratification and management. Am J Hematol. 2020 95(11):1444.  
Electronically Signed By Lorenzo Robles, PhD Attending Pathologist 2021 
13:04:45Gross DescriptionFluorescence in situ Hybridization (FISH)multiple 
myeloma FISH panel:nucish(XSOD2Fk4,CKS1Bx
3)[],(5p15.31,EGR1)x3[],(G58Y198,13q34)x1[10/100],(IgHx2)[],(T

P53,D17Z1)x2[98/100]     CEP 7/H0H737 (7q31):nuc maggie(D7Z1,G4N649)x2[98/100] CEP 
9:nuc maggie(D9Z1x2)[98/100]DescriptionA plasma cell-enriched population evaluated 
by FISH: gain of 1q (11%);trisomy 5 (11%); monosomy 13 (10%); negative for gains
 or losses ofchromosomes 7 and 9; deletions IgH, and TP53; and IgH 
rearrangement.         
________________________________________________________________________________

_____________Test Data:Fluorescence in situ Hybridization (FISH):  Enriched 
Plasma Cells     Probe Normal Cut-off Nuclei  Analyzed FISH SIGNAL PATTERNS     
    Normal Abnormal NKCS      *LSI CDKN2C (1p22.3) G  LSI CKS1B (1q21.3) O 3% 
100 2G2O:  84% 2G3O:  11% 5%       *LSI 5p15.31 G  LSI EGR1 (5q31.2) R 3% 100 
2G2R:  89% 3G3R:  11% 0%  *CEP 7 (D7Z1) G  LSI S6Y467 (7q31) R 3% 100 2G2R:  98%
 0% 2%       **CEP 9 (D9Z1) G 3%100   2% 0% 2%  *LSI B33N923 (13q14.2) O  
     LSI 13q34 G 3% 100     2O2% 1O1G:   10% 3%       *LSI IgH(14q32) Y   
     BA 3% 100    2Y:  97% 0% 3%       *LSI TP53 (17p13.1) O  CEP 17 (D17Z1) G 
3% 100 2O2% 0% 2% Vendor:  *[employer]  Probes:  LSI: Locus Specific 
Probe;  CEP: Centromeric Probe;  BA: BreakApartFluorochromes/ Signals:  G - 
Green;  O  orange;  R  Red;  Y - yellow(fusion) NKCS:  Signals with No Known 
Clinical SignificanceDisclaimer: The plasma cells evaluated in this study were 
isolated fromthe bone marrow mixed cell population utilizing immunomagnetic 
cellseparation. This technology significantly enriches the test cellpopulation 
for plasma cells, thereby improving FISH detection of anomaliesassociated with 
plasma cell myeloma. However, as this is a selectivepopulation, the true in vivo
 frequencies of the anomalies identifiedcannot be determined. The FISH test was 
developed and its performance wasvalidated by the Cytogenetics section of the 
Department of ClinicalPathology. This test has not been cleared or approved by 
the U. S. Foodand Drug Administration (FDA). The FDA has determined that such 
approvalis not necessary. However, the procedure is considered 
investigational,and should not be used as the sole criteria for diagnosis.   









          Name      Value     Range     Interpretation Code Description Data Milly

rce(s) Supporting 

Document(s)

 

                                                                       









                    ID                  Date                Data Source

 

                    [id number]            2021 12:28:00 PM EDT [facility] ([facility])









          Name      Value     Range     Interpretation Code Description Data Milly

rce(s) Supporting 

Document(s)

 

           Albumin [Mass/volume] in Serum or Plasma 3.0                         

                [facility] ([facility])                       

 

           Calcium [Mass/volume] in Serum or Plasma 10.2                        

                [facility] ([facility])                       

 

             Alanine aminotransferase [Enzymatic activity/volume] in Serum or Pl

asma 19                                     

                          [facility] ([facility])  

 

           Carbon dioxide, total [Moles/volume] in Serum or Plasma 27           

                               [facility] 

([facility])           

 

           Chloride [Moles/volume] in Serum or Plasma 107                       

                  [facility] ([facility])                       

 

           Potassium [Moles/volume] in Serum or Plasma 4.7                      

                   [facility] ([facility])                       

 

           Alkaline phosphatase [Enzymatic activity/volume] in Serum or Plasma 7

7                                          

[facility] ([facility])    

 

          Sodium    137                                     [facility] ([facility]

[facility])  

 

           Protein [Mass/volume] in Serum or Plasma 8.3                         

                [facility] ([facility])                       

 

                Aspartate aminotransferase [Enzymatic activity/volume] in Serum 

or Plasma 23                              

                                        [facility] ([facility])  

 

           Urea nitrogen [Mass/volume] in Serum or Plasma 28                    

                      [facility] ([facility])                       

 

          Creatinine For GFR 0.90                                    [facility] ([facility]

[facility])  

 

          Glucose   98                                [facility]  









                    ID                  Date                Data Source

 

                    [id number]            2021 12:28:00 PM EDT [facility] ([facility]i

[facility])









          Name      Value     Range     Interpretation Code Description Data Milly

rce(s) Supporting 

Document(s)

 

          White Blood Count 6.2       5.0-10.0                      [facility] (Card

[facility])  

 

          Red Blood Count 3.76      4.00-5.40                     [facility] ([facility]io

[facility])  

 

          Hemoglobin 12.5                                    [facility] ([facility])  

 

          Platelets 199       172-450                       [facility] ([facility]  

 

          Hematocrit 39.0                                    [facility] ([facility])  









                    ID                  Date                Data Source

 

                    [id number]          2021 12:03:00 PM EDT [facility] ([facility])









          Name      Value     Range     Interpretation Code Description Data Milly

rce(s) Supporting 

Document(s)

 

          Inr       1.04                                    [facility]  

 

                                        THERAPUTIC HUMAN INR VALUES

INDICATIONS                      NORMAL RANGES

PROPHYLAXIS/TREATMENT OF:

VENOUS THROMBOSIS                2.0-3.0

PULMONARY EMBOLISM               2.0-3.0

PREVENTION OF SYSTEMIC EMBOLISM FROM:

TISSUE HEART VALVES              2.0-3.0

ACUTE MYOCARDIAL INFARCTION      2.0-3.0

VALVULAR HEART DISEASE           2.0-3.0

ATRIAL FIBRILLATION              2.0-3.0

MECHANICAL VALVES(HIGH RISK)     2.5-3.5

RECURRENT MYOCARDIAL INFARCTION  2.5-3.5

 

 

          Prothrombin Time 13.8 s    12.5-14.3                     [facility] ([facility])  

 

          Partial Thromboplastin Time 30.6 s    24.2-38.5                     ME

DENT ([facility])  









                    ID                  Date                Data Source

 

                    [id number]          2021 10:38:00 AM EDT [facility] ([facility])









          Name      Value     Range     Interpretation Code Description Data Milly

rce(s) Supporting 

Document(s)

 

          Blood Urea Nitrogen 28 mg/dL  7-18                          [facility] ([facility])  

 

          Glucose, Fasting 98 mg/dL                          [facility] ([facility])  

 

          Creatinine For GFR 0.90 mg/dL 0.55-1.30                     [facility] ([facility])  

 

          Sodium Level 137 meq/L 136-145                       [facility] ([facility])  

 

           Glomerular Filtration Rate Laboratory test result                    

              [facility] ([facility])                              

 

                                        <content>Units are mL/min/1.73 

m2</content><br/><content></content><br/><content>Chronic Kidney Disease Staging
 per NKF:</content><br/><content></content><br/><content>Stage I & II   GFR >=60
       Normal to Mildly Decreased</content><br/><content>Stage III      GFR 30-
59      Moderately Decreased</content><br/><content>Stage IV       GFR 15-29    
  Severely Decreased</content><br/><content>Stage V        GFR <15        Very 
Little GFR Left</content><br/><content>ESRD           GFR <15 on 
RRT</content><br/><content></content> 

 

          Potassium Serum 4.7 meq/L 3.5-5.1                       [facility] ([facility])  

 

          Anion Gap 3 meq/L   8-16                          [facility] ([facility])  

 

          Carbon Dioxide Level 27 meq/L  21-32                         [facility] ([facility])  

 

          Chloride Level 107 meq/L                         [facility] ([facility])  

 

          Ast/Sgot  23 U/L    7-37                          [facility] ([facility])  

 

          Calcium Level 10.2 mg/dL 8.8-10.2                      [facility] ([facility])  

 

          Bilirubin,Total 0.5 mg/dL 0.2-1.0                       [facility] ([facility])  

 

          Alkaline Phosphatase 77 U/L                            [facility] ([facility])  

 

          Alt/SGPT  19 U/L    12-78                         [facility] ([facility])  

 

          Albumin   3.0 GM/DL 3.2-5.2                       [facility]  

 

          Total Protein 8.3 GM/DL 6.4-8.2                       [facility] ([facility])  

 

          Albumin/Globulin Ratio 0.6       1.2-2.2                       [facility] 

([facility])  









                    ID                  Date                Data Source

 

                    [id number]          2021 10:38:00 AM EDT [facility]









          Name      Value     Range     Interpretation Code Description Data Milly

rce(s) Supporting 

Document(s)

 

          White Blood Count 6.2 10    4.0-10.0                      [facility] ([facility])  

 

          Red Blood Count 3.76 10   4.00-5.40                     [facility] ([facility])  

 

          Mean Corpuscular Volume 103.7 fl  80.0-96.0                     [facility]

 ([facility])  

 

          Hemoglobin 12.5 g/dL 12.0-15.5                     [facility]  

 

          Hematocrit 39.0 %    36.0-47.0                     [facility] [facility]  

 

          Mean Corpuscular Hemoglobin 33.2 pg   27.0-33.0                     ME

DENT ([facility]) 

 

 

          Mean Corpuscular HGB Conc 32.1 g/dL 32.0-36.5                     [facility]

[facility] ([facility]) 

 

 

          Red Cell Distribution Width 12.9 %    11.5-14.5                     ME

DENT ([facility])  

 

          Neutrophils % 74.5 %    36.0-66.0                     [facility] ([facility]

[facility])  

 

          Lymph %   16.4 %    24.0-44.0                     [facility] ([place] [facility])  

 

          Platelet Count, Automated 199 10    150-450                       [facility]

NT ([facility])  

 

          Mono %    7.0 %     2.0-8.0                       [facility] [facility])  

 

          Eos %     1.1 %     0.0-3.0                       [facility] [facility]  

 

          Nucleated Red Blood Cell % 0.0 %     0-0                           MED

ENT ([facility])  

 

          Immature Granulocyte % 0.5 %     0-3.0                         [facility] 

([facility])  

 

          Baso %    0.5 %     0.0-1.0                       [facility] [facility])  

 

          Neutrophils # 4.6 10    1.5-8.5                       [facility] ([facility]

[facility])  

 

          Mono #    0.4 10    0.0-0.8                       [facility] ([place] [facility])  

 

          Lymph #   1.0 10    1.5-5.0                       [facility] [facility])  

 

          Eos #     0.1 10    0.0-0.5                       [facility] [facility])  

 

          Baso #    0.0 10    0.0-0.2                       [facility] ([place] [facility])  









                    ID                  Date                Data Source

 

                    [id number]          2021 02:06:00 PM EDT [facility] ([facility])









          Name      Value     Range     Interpretation Code Description Data Milly

rce(s) Supporting 

Document(s)

 

                          Thyrotropin [Units/volume] in Serum or Plasma by Detec

tion limit <= 0.05 mIU/L 

0.19 uIU/mL  0.36-3.74                              [facility] ([facility]

)  









                    ID                  Date                Data Source

 

                    [id number]          2021 02:06:00 PM EDT [facility] ([facility])









          Name      Value     Range     Interpretation Code Description Data Milly

rce(s) Supporting 

Document(s)

 

           Glucose [Mass/volume] in Serum or Plasma 100 mg/dL  74-99            

                [facility] ([facility])                              

 

                                        100-125 mg/dL     PRE-DIABETES/FASTING

>126 mg/dL          DIABETES/FASTING

 

 

           Urea nitrogen [Mass/volume] in Serum or Plasma 18 mg/dL   7-18       

                      [facility] 

([facility])                   

 

           Sodium [Moles/volume] in Serum or Plasma 141 meq/L  136-145          

                [facility] ([facility])                             

 

          Creatinine 0.8 mg/dL 0.6-1.3                       [facility]  

 

           Carbon dioxide, total [Moles/volume] in Serum or Plasma 29 meq/L   21

-32                            

[facility] ([facility])            

 

           Potassium [Moles/volume] in Serum or Plasma 3.8 meq/L  3.5-5.1       

                   [facility] 

([facility])                   

 

           Chloride [Moles/volume] in Serum or Plasma 104 meq/L           

                  [facility] 

([facility])                   

 

                                        Glomerular filtration rate/1.73 sq M pre

dicted among non-blacks [Volume 

Rate/Area] in Serum or Plasma by Creatinine-based formula (MDRD) Laboratory test

result                                              [facility] ([facility]

)  

 

           Calcium [Mass/volume] in Serum or Plasma 10.8 mg/dL 8.5-10.1         

                [facility] 

([facility])                   

 

                                        NOTE:

CALCIUM,TSH VERIFIED





 

 

                                        Glomerular filtration rate/1.73 sq M pre

dicted among blacks [Volume Rate/Area] 

in Serum or Plasma by Creatinine-based formula (MDRD) Laboratory test result    

                  

                                        [facility]  

 

                                        <content>CHRONIC KIDNEY DISEASE STAGING 

PER 

NKF</content><br/><content></content><br/><content>STAGE I & II      GFR >= 60  
     NORMAL TO MILDLY DECREASED</content><br/><content>STAGE III          GFR 
30-59          MODERATELY DECREASED</content><br/><content>STAGE IV           
GFR 15-29         SEVERELY DECREASED</content><br/><content>STAGE V            
GFR <15            VERY LITTLE GFR LEFT</content><br/><content>ESRD             
   GFR <15            ON RRT</content><br/><content></content> 









                    ID                  Date                Data Source

 

                    [id number]          2021 02:06:00 PM EDT [facility] ([facility])









          Name      Value     Range     Interpretation Code Description Data Milly

rce(s) Supporting 

Document(s)

 

           Erythrocyte sedimentation rate by Westergren method 25 mm/hr   0-15  

                           [facility] 

([facility]                   









                    ID                  Date                Data Source

 

                    [id number]          2021 02:06:00 PM [facility]T [facility]









          Name      Value     Range     Interpretation Code Description Data Milly

rce(s) Supporting 

Document(s)

 

           Erythrocytes [#/volume] in Blood by Automated count 4.11 x10*6/UL 4.2

0-6.30                        

[facility]            

 

           Leukocytes [#/volume] in Blood by Automated count 5.9 x10*3/UL 4.1-10

.9                         

[facility] ([facility])            

 

           Hemoglobin [Mass/volume] in Blood 13.6 g/dL  12.0-18.0               

         [facility] ([facility])                              

 

          MCV       97.4 fL   80.0-97.0                     [facility] [facility]  

 

           Hematocrit [Volume Fraction] of Blood by Automated count 40.0 %     3

7.0-51.0                        

[facility] ([facility])            

 

          MCH       33.1 pg   26.0-32.0                     [facility] [facility]  

 

           Erythrocyte distribution width [Ratio] by Automated count 13.2 %     

11.6-13.7                        

[facility] ([facility])            

 

          MCHC      34.0 g/dL 31.0-38.0                     [facility] ([place] [facility])  

 

          MPV       8.1 FL    7.8-11.0                      [facility] ([place] [facility])  

 

          Lymph %   25.6 %    10.0-58.5                     [facility] ([facility])  

 

           Platelets [#/volume] in Blood by Automated count 209 x10*3/-440

                          [facility]

 ([facility])                  

 

          Mid %     7.5 %     1.7-9.3                       [facility] ([place] [facility])  

 

          Lymph #   1.5 x10*3/UL 0.6-4.1                       [facility] ([facility])  

 

          Neut %    66.9 %    37.0-92.0                     [facility] ([place] [facility])  

 

          Neut #    3.9 x10*3/UL 2.0-7.8                       [facility] ([facility])  

 

          Mid #     0.5 x10*3/UL 0.1-0.6                       [facility] ([facility])  









                    ID                  Date                Data Source

 

                    [id number]          2021 01:37:00 PM EDT [facility] ([facility])









          Name      Value     Range     Interpretation Code Description Data Milly

rce(s) Supporting 

Document(s)

 

           Digoxin [Mass/volume] in Serum or Plasma 0.3 ng/mL  0.5-2.0          

                [facility] ([facility])                             

 

                                        <content>note:<nlbl:demographic_changed>

</content><br/><content></content> 









                    ID                  Date                Data Source

 

                    [id number]          2021 01:37:00 PM EDT [facility] ([facility])









          Name      Value     Range     Interpretation Code Description Data Milly

rce(s) Supporting 

Document(s)

 

                          Thyrotropin [Units/volume] in Serum or Plasma by Detec

tion limit <= 0.05 mIU/L 

0.32 uIU/mL  0.36-3.74                              [facility] ([facility]

)  









                    ID                  Date                Data Source

 

                    [id number]          2021 01:37:00 PM EDT [facility] ([facility])









          Name      Value     Range     Interpretation Code Description Data Milly

rce(s) Supporting 

Document(s)

 

           Cholesterol [Mass/volume] in Serum or Plasma 132 mg/dL  131-200      

                    [facility] 

([facility])                   

 

           Cholesterol in HDL [Mass/volume] in Serum or Plasma 54 mg/dL   35-60 

                           [facility] 

([facility])                   

 

                    Cholesterol in LDL [Mass/volume] in Serum or Plasma by calcu

lation 64 CALC             

                                          [facility] ([facility])  

 

           Triglyceride [Mass/volume] in Serum or Plasma 72 mg/dL         

                     [facility] 

([facility])                   









                    ID                  Date                Data Source

 

                    [id number]          2021 01:37:00 PM EDT [facility] ([facility])









          Name      Value     Range     Interpretation Code Description Data Milly

rce(s) Supporting 

Document(s)

 

           Glucose [Mass/volume] in Serum or Plasma 70 mg/dL   74-99            

                [facility] ([facility])                              

 

                                        100-125 mg/dL     PRE-DIABETES/FASTING

>126 mg/dL          DIABETES/FASTING

 

 

           Urea nitrogen [Mass/volume] in Serum or Plasma 23 mg/dL   7-18       

                      [facility] 

([facility])                   

 

           Sodium [Moles/volume] in Serum or Plasma 144 meq/L  136-145          

                [facility] ([facility])                             

 

          Creatinine 0.9 mg/dL 0.6-1.3                       [facility] ([facility]

[facility])  

 

           Carbon dioxide, total [Moles/volume] in Serum or Plasma 32 meq/L   21

-32                            

[facility] ([facility])            

 

           Potassium [Moles/volume] in Serum or Plasma 3.8 meq/L  3.5-5.1       

                   [facility] 

([facility])                   

 

           Chloride [Moles/volume] in Serum or Plasma 104 meq/L           

                  [facility] 

([facility])                   

 

                                        Glomerular filtration rate/1.73 sq M pre

dicted among blacks [Volume Rate/Area] 

in Serum or Plasma by Creatinine-based formula (MDRD) Laboratory test result    

                  

                                        [facility]  

 

                                        <content>CHRONIC KIDNEY DISEASE STAGING 

PER 

NKF</content><br/><content></content><br/><content>STAGE I & II      GFR >= 60  
     NORMAL TO MILDLY DECREASED</content><br/><content>STAGE III          GFR 
30-59          MODERATELY DECREASED</content><br/><content>STAGE IV           
GFR 15-29         SEVERELY DECREASED</content><br/><content>STAGE V            
GFR <15            VERY LITTLE GFR LEFT</content><br/><content>ESRD             
   GFR <15            ON RRT</content><br/><content></content> 

 

                                        Glomerular filtration rate/1.73 sq M pre

dicted among non-blacks [Volume 

Rate/Area] in Serum or Plasma by Creatinine-based formula (MDRD) Laboratory test

result                                              [facility] ([facility]

)  

 

           Calcium [Mass/volume] in Serum or Plasma 10.1 mg/dL 8.5-10.1         

                [facility] 

([facility]                   









                    ID                  Date                Data Source

 

                    [id number]          2021 01:37:00 PM EDT [facility]









          Name      Value     Range     Interpretation Code Description Data Milly

rce(s) Supporting 

Document(s)

 

           Erythrocyte sedimentation rate by Westergren method 45 mm/hr   0-15  

                           [facility] 

([facility]                   

 

          Magnesium 1.8 mg/dL 1.8-2.4                       [facility] [facility]  









                    ID                  Date                Data Source

 

                    [id number]          2021 01:37:00 PM EDT [facility]









          Name      Value     Range     Interpretation Code Description Data Milly

rce(s) Supporting 

Document(s)

 

           Leukocytes [#/volume] in Blood by Automated count 8.4 x10*3/UL 4.1-10

.9                         

[facility] ([facility]            

 

           Erythrocytes [#/volume] in Blood by Automated count 4.20 x10*6/UL 4.2

0-6.30                        

[facility] ([facility])            

 

           Hemoglobin [Mass/volume] in Blood 14.0 g/dL  12.0-18.0               

         [facility]                              

 

           Hematocrit [Volume Fraction] of Blood by Automated count 40.3 %     3

7.0-51.0                        

[facility] ([facility])            

 

          MCV       95.8 fL   80.0-97.0                     [facility] ([place] [facility]

[facility])  

 

          MCH       33.3 pg   26.0-32.0                     [facility] ([facility]

[facility])  

 

           Erythrocyte distribution width [Ratio] by Automated count 13.3 %     

11.6-13.7                        

MEDENT ([facility])            

 

          MCHC      34.7 g/dL 31.0-38.0                     [facility] ([facility]

[facility]  

 

          Lymph %   17.5 %    10.0-58.5                     [facility] ([facility]

[facility]  

 

           Platelets [#/volume] in Blood by Automated count 207 x10*3/-440

                          MEDENT

 ([facility])                  

 

          MPV       8.0 FL    7.8-11.0                      [facility] ([facility]

[facility]  

 

          Neut %    77.7 %    37.0-92.0                     [facility] ([facility]

[facility]  

 

          Mid %     4.8 %     1.7-9.3                       MEDENT ([facility]

[facility])  

 

          Lymph #   1.4 x10*3/UL 0.6-4.1                       [facility] ([facility])  

 

          Mid #     0.5 x10*3/UL 0.1-0.6                       [facility] ([facility])  

 

          Neut #    6.5 x10*3/UL 2.0-7.8                       [facility] ([facility])  









                    ID                  Date                Data Source

 

                    [id number]            2021 01:37:00 PM EDT [facility] ([facility])









          Name      Value     Range     Interpretation Code Description Data Milly

rce(s) Supporting 

Document(s)

 

                          Thyrotropin [Units/volume] in Serum or Plasma by Detec

tion limit <= 0.05 mIU/L 

0.32 uIU/mL  0.36-3.74                              MEDENT ([facility]

[facility])  

 

           Digoxin [Mass/volume] in Serum or Plasma 0.3 ng/mL  0.5-2.0          

                MEDENT 

([facility])           

 

                                        

<content>note:<nlbl:demographic_changed></content><br/><content></content><br/><

content></content> 









                    ID                  Date                Data Source

 

                    [id number]            2021 01:37:00 PM EDT MEDENT ([facility])









          Name      Value     Range     Interpretation Code Description Data Milly

rce(s) Supporting 

Document(s)

 

           Cholesterol [Mass/volume] in Serum or Plasma 132 mg/dL  131-200      

                    MEDENT 

([facility])           

 

           Triglyceride [Mass/volume] in Serum or Plasma 72 mg/dL         

                     MEDENT 

([facility])           

 

           Cholesterol in HDL [Mass/volume] in Serum or Plasma 54 mg/dL   35-60 

                           MEDENT 

([facility])           

 

                    Cholesterol in LDL [Mass/volume] in Serum or Plasma by calcu

lation 64 CALC             

                                          MEDENT ([facility])  









                    ID                  Date                Data Source

 

                    [id number]            2021 01:37:00 PM EDT MED[facility] ([facility])









          Name      Value     Range     Interpretation Code Description Data Milly

rce(s) Supporting 

Document(s)

 

           Glucose [Mass/volume] in Serum or Plasma 70 mg/dL   74-99            

                MEDENT ([facility])                       

 

                                        100-125 mg/dL     PRE-DIABETES/FASTING

>126 mg/dL          DIABETES/FASTING



 

 

           Urea nitrogen [Mass/volume] in Serum or Plasma 23 mg/dL   7-18       

                      MEDENT 

([facility])           

 

          Creatinine 0.9 mg/dL 0.6-1.3                       MEDENT ([facility])  

 

           Sodium [Moles/volume] in Serum or Plasma 144 meq/L  136-145          

                MEDENT 

([facility])           

 

           Potassium [Moles/volume] in Serum or Plasma 3.8 meq/L  3.5-5.1       

                   MEDENT 

([facility])           

 

           Carbon dioxide, total [Moles/volume] in Serum or Plasma 32 meq/L   21

-32                            

MEDENT ([facility])    

 

           Chloride [Moles/volume] in Serum or Plasma 104 meq/L           

                  MEDENT 

([facility])           

 

           Calcium [Mass/volume] in Serum or Plasma 10.1 mg/dL 8.5-10.1         

                MEDENT 

([facility])           

 

                                        Glomerular filtration rate/1.73 sq M pre

dicted among non-blacks [Volume 

Rate/Area] in Serum or Plasma by Creatinine-based formula (MDRD) Laboratory test

result                                              MEDENT ([facility])  

 

                                        Glomerular filtration rate/1.73 sq M pre

dicted among blacks [Volume Rate/Area] 

in Serum or Plasma by Creatinine-based formula (MDRD) Laboratory test result    

                  

                                        MEDENT ([facility])  

 

                                        <content>CHRONIC KIDNEY DISEASE STAGING 

PER 

NKF</content><br/><content></content><br/><content>STAGE I & II      GFR >= 60  
     NORMAL TO MILDLY DECREASED</content><br/><content>STAGE III          GFR 
30-59          MODERATELY DECREASED</content><br/><content>STAGE IV           
GFR 15-29         SEVERELY DECREASED</content><br/><content>STAGE V            
GFR <15            VERY LITTLE GFR LEFT</content><br/><content>ESRD             
   GFR <15            ON 
RRT</content><br/><content></content><br/><content></content> 









                    ID                  Date                Data Source

 

                    [id number]            2021 01:37:00 PM EDT MEDENT ([facility]

[facility])









          Name      Value     Range     Interpretation Code Description Data Milly

rce(s) Supporting 

Document(s)

 

           Leukocytes [#/volume] in Blood by Automated count 8.4 x10*3/UL 4.1-10

.9                         

MEDENT ([facility])    

 

           Erythrocytes [#/volume] in Blood by Automated count 4.20 x10*6/UL 4.2

0-6.30                        

MEDENT ([facility])    

 

           Hemoglobin [Mass/volume] in Blood 14.0 g/dL  12.0-18.0               

         MEDENT ([facility])                       

 

           Hematocrit [Volume Fraction] of Blood by Automated count 40.3 %     3

7.0-51.0                        

MEDENT ([facility])    

 

          MCV       95.8 fL   80.0-97.0                     MEDENT ([facility]

[facility])  

 

          MCH       33.3 pg   26.0-32.0                     MED[facility] ([facility]  

 

           Erythrocyte distribution width [Ratio] by Automated count 13.3 %     

11.6-13.7                        

MEDENT ([facility])    

 

          MCHC      34.7 g/dL 31.0-38.0                     MED[facility] ([facility]  

 

           Platelets [#/volume] in Blood by Automated count 207 x10*3/-440

                          MEDENT

 ([facility])          

 

             Platelet mean volume [Entitic volume] in Blood by Archie 8.0 FL

       7.8-11.0                   

                          MEDENT ([facility])  

 

           Lymphocytes/100 leukocytes in Blood by Automated count 17.5 %     10.

0-58.5                        

MEDENT ([facility])    

 

          Mid %     4.8 %     1.7-9.3                       MEDENT ([facility]

[facility])  

 

          Lymph #   1.4 x10*3/UL 0.6-4.1                       MEDENT ([facility]

[facility])  

 

          Neut %    77.7 %    37.0-92.0                     MEDENT ([facility])  

 

           Neutrophils [#/volume] in Semen by Manual count 6.5 x10*3/UL 2.0-7.8 

                         MEDENT 

([facility])           

 

          Mid #     0.5 x10*3/UL 0.1-0.6                       MEDENT ([facility]

[facility])  









                    ID                  Date                Data Source

 

                    [id number]          2021 02:59:00 PM EDT MEDENT ([facility])









          Name      Value     Range     Interpretation Code Description Data Milly

rce(s) Supporting 

Document(s)

 

           Free Lambda Light Chains Serum 517.2 mg/L 5.7-26.3                   

      MED[facility] ([facility])                              

 

           Free Kappa Light Chains Serum 6.4 mg/L   3.3-19.4                    

     MED[facility] ([facility])                              

 

          Kappa/Lambda Ratio Serum 0.01      0.26-1.65                     MED

T ([facility])  









                    ID                  Date                Data Source

 

                    [id number]          2021 02:59:00 PM EDT [facility]









          Name      Value     Range     Interpretation Code Description Data Milly

rce(s) Supporting 

Document(s)

 

           Ferritin [Mass/volume] in Serum or Plasma 128 ng/mL  8-252           

                 [facility]                              

 

                                        <content>note:<nlbl:demographic_changed>

</content><br/><content></content> 

 

           Beta-2-Microglobulin [Mass/volume] in Serum or Plasma 3.7 mg/L   0.6-

2.4                          

MEDENT ([facility])            

 

                                        Siemens Immulite 2000 Immunochemiluminom

etric assay (ICMA)

                                        .

Values obtained with different assay methods or kits cannot

be used interchangeably. Results cannot be interpreted as

absolute evidence of the presence or absence of malignant

disease.

Performed at:   - [facility]  [id number]

: Araceli B Reyes MD, Phone:  [phone]

Performed at:  [address]  [id number]

[id number]

: Barak Archibald MD, Phone:  [phone]

 









                    ID                  Date                Data Source

 

                    [id number]          2021 02:59:00 PM EDT MEDENT ([facility])









          Name      Value     Range     Interpretation Code Description Data Milly

rce(s) Supporting 

Document(s)

 

           Immunotyping Serum Iga Laboratory test result                        

          MED[facility] ([facility])                              

 

           Immunotyping Serum Lambda Laboratory test result                     

             [facility] ([facility]                              

 

           Laboratory test finding (navigational concept) Laboratory test result

                                  

MEDENT ([facility])            

 

                                        REV'D BY CHARLES ROCHA

 

 

           It Serum Interpretation Laboratory test result                       

           [facility] ([facility])                              

 

                                        MONOCLONAL IGA,LAMBDA

 









                    ID                  Date                Data Source

 

                    [id number]          2021 02:59:00 PM EDT MEDENT ([facility])









          Name      Value     Range     Interpretation Code Description Data Milly

rce(s) Supporting 

Document(s)

 

          Immunoglobulin G 510 mg/dL [phone]                      [facility] ([facility])  

 

          Immunoglobulin A 2640.0 mg/dL                         [facility]  

 

           Immunoglobulin M Laboratory test result                        

    MEDENT ([facility])

                                         









                    ID                  Date                Data Source

 

                    [id number]          2021 02:59:00 PM EDT MEDENT ([facility])









          Name      Value     Range     Interpretation Code Description Data Milly

rce(s) Supporting 

Document(s)

 

          Iron (Fe) 131 ug/dL                         [facility] ([facility])  

 

          Total Iron Binding Capacity 305 ug/dL 250-450                       ME

DENT ([facility]) 

 

 

          Percent Saturation 43.0 %    13.2-45.0                     MEDENT ([facility])  









                    ID                  Date                Data Source

 

                    [id number]          2021 02:59:00 PM EDT [facility]









          Name      Value     Range     Interpretation Code Description Data Milly

rce(s) Supporting 

Document(s)

 

          Glucose, Fasting 97 mg/dL                          [facility] ([facility])  

 

          Blood Urea Nitrogen 24 mg/dL  7-18                          MEDENT ([facility])  

 

          Creatinine For GFR 0.85 mg/dL 0.55-1.30                     MEDENT ([facility])  

 

           Glomerular Filtration Rate Laboratory test result                    

              [facility] ([facility])                              

 

                                        <content>Units are mL/min/1.73 

m2</content><br/><content></content><br/><content>Chronic Kidney Disease Staging
 per NKF:</content><br/><content></content><br/><content>Stage I & II   GFR >=60
       Normal to Mildly Decreased</content><br/><content>Stage III      GFR 30-
59      Moderately Decreased</content><br/><content>Stage IV       GFR 15-29    
  Severely Decreased</content><br/><content>Stage V        GFR <15        Very 
Little GFR Left</content><br/><content>ESRD           GFR <15 on 
RRT</content><br/><content></content> 

 

          Sodium Level 139 meq/L 136-145                       [facility] ([facility])  

 

          Carbon Dioxide Level 26 meq/L  21-32                         MEDENT ([facility])  

 

          Potassium Serum 4.6 meq/L 3.5-5.1                       MEDENT ([facility])  

 

          Chloride Level 106 meq/L                         MEDENT ([facility])  

 

          Calcium Level 11.8 mg/dL 8.8-10.2                      MEDENT ([facility])  

 

          Anion Gap 7 meq/L   8-16                          [facility] ([place] [facility]

[facility])  

 

          Alt/SGPT  44 U/L    12-78                         [facility] ([facility] [facility]

[facility])  

 

          Alkaline Phosphatase 116 U/L                           MEDENT ([facility])  

 

          Ast/Sgot  33 U/L    7-37                          MED[facility] ([place] [facility]

[facility])  

 

          Bilirubin,Total 0.6 mg/dL 0.2-1.0                       MEDENT ([facility])  

 

          Total Protein 8.9 GM/DL 6.4-8.2                       MEDENT ([facility])  

 

          Albumin   3.2 GM/DL 3.2-5.2                       [facility] ([place] [facility]

[facility])  

 

          Albumin/Globulin Ratio 0.6       1.2-2.2                       MEDENT 

([facility])  









                    ID                  Date                Data Source

 

                    [id number]          2021 02:59:00 PM EDT MEDENT ([facility])









          Name      Value     Range     Interpretation Code Description Data Milly

rce(s) Supporting 

Document(s)

 

           Erythrocyte sedimentation rate by Westergren method 68 mm/hr   0-30  

                           MEDENT 

([facility])                   









                    ID                  Date                Data Source

 

                    [id number]          2021 02:59:00 PM EDT MEDENT ([facility])









          Name      Value     Range     Interpretation Code Description Data Milly

rce(s) Supporting 

Document(s)

 

          White Blood Count 7.6 10    4.0-10.0                      MEDENT ([facility])  

 

          Red Blood Count 4.29 10   4.00-5.40                     MEDENT ([facility])  

 

          Hemoglobin 13.9 g/dL 12.0-15.5                     [facility]  

 

          Hematocrit 43.3 %    36.0-47.0                     [facility] ([facility] [facility]

[facility])  

 

          Mean Corpuscular Hemoglobin 32.4 pg   27.0-33.0                     ME

DENT ([facility]) 

 

 

          Mean Corpuscular HGB Conc 32.1 g/dL 32.0-36.5                     MEDE

NT ([facility]) 

 

 

          Mean Corpuscular Volume 100.9 fl  80.0-96.0                     MEDENT

 ([facility])  

 

          Red Cell Distribution Width 13.1 %    11.5-14.5                     ME

DENT ([facility])  

 

          Platelet Count, Automated 175 10    150-450                       MEDE

NT ([facility])  

 

          Neutrophils % 73.1 %    36.0-66.0                     MEDENT ([facility])  

 

          Lymph %   18.1 %    24.0-44.0                     [facility] ([facility])  

 

          Eos %     1.3 %     0.0-3.0                       [facility] ([facility])  

 

          Mono %    6.8 %     2.0-8.0                       [facility] ([facility])  

 

          Baso %    0.3 %     0.0-1.0                       [facility] ([facility])  

 

          Immature Granulocyte % 0.4 %     0-3.0                         MEDENT 

([facility])  

 

          Nucleated Red Blood Cell % 0.0 %     0-0                           MED

ENT ([facility])  

 

          Neutrophils # 5.6 10    1.5-8.5                       MEDENT ([facility])  

 

          Lymph #   1.4 10    1.5-5.0                       [facility] ([facility] [facility]

[facility])  

 

          Mono #    0.5 10    0.0-0.8                       [facility] ([facility]

[facility])  

 

          Eos #     0.1 10    0.0-0.5                       [facility] ([facility]

[facility])  

 

          Baso #    0.0 10    0.0-0.2                       [facility] ([facility]

[facility])  









                    ID                  Date                Data Source

 

                    [id number]          2021 02:59:00 PM EDT MEDENT ([facility])









          Name      Value     Range     Interpretation Code Description Data Milly

rce(s) Supporting 

Document(s)

 

          Appearance, Urine Laboratory test result                              

 [facility]  

 

          PH,Urine  5.0 units 5.0-9.0                       [facility] ([facility] [facility]

[facility])  

 

          Color, Urine Laboratory test result                               MEDE

NT ([facility])  

 

          Protein, Urine Auto Laboratory test result                            

   [facility]  

 

           Specific Gravity Urine Auto 1.028      1.002-1.035                   

    [facility]

                                         

 

          Ketone, Urine Auto Laboratory test result                             

  [facility]  

 

           Glucose, Urine (Ua) Auto Laboratory test result                      

            [facility]                              

 

          Urobilinogen, Urine Auto 4.0 mg/dL 0.0-2.0                       MEDEN

[facility]  

 

           Leukocyte Esterase, Urine Auto Laboratory test result                

                  [facility] ([facility]                              

 

          Nitrite, Urine Auto Laboratory test result                            

   MEDENT ([facility])  

 

           Bilirubin, Urine Auto Laboratory test result                         

         MEDENT ([facility])

                                         

 

          Blood, Urine Blood Laboratory test result                             

  [facility]  

 

          WBC, Urine Auto 2 /HPF    0-3                           MEDENT ([facility])  

 

          Squamous Epithelial Cell Ur AU 0 /HPF    0-6                          

 MEDENT ([facility])  

 

          RBC, Urine Auto 2 /HPF    0-3                           MEDENT ([facility])  

 

          Bacteria, Urine Auto Laboratory test result                           

    MEDENT ([facility]) 

 

 

          Mucus, Urine Laboratory test result                               MEDE

NT ([facility])  

 

          Hyaline Cast, Urine Auto 0 /LPF    0-1                           MEDEN

T ([facility])  









                    ID                  Date                Data Source

 

                    [id number]              2021 02:43:00 PM EDT MEDENT ([facility])









          Name      Value     Range     Interpretation Code Description Data Milly

rce(s) Supporting 

Document(s)

 

           3D Bi-Lateral Mammogram Laboratory test result                       

           [facility] ([facility]                              

 

          Dexa/Bone Density Laboratory test result                              

 MED[facility] ([facility])  









                    ID                  Date                Data Source

 

                    [id number]          2021 02:26:00 PM EDT MEDENT ([facility])









          Name      Value     Range     Interpretation Code Description Data Milly

rce(s) Supporting 

Document(s)

 

          Albumin % 41.3 %    55.8-66.1                     [facility] ([facility])  

 

          Alpha-1-Globulin % 3.8 %     2.9-4.9                       MEDENT ([facility]  

 

          Alpha-2-Globulins % 8.9 %     7.1-11.8                      [facility] ([facility])  

 

          Beta-1-Globulins % 5.8 %     4.7-7.2                       [facility] ([facility]  

 

          Beta-2-Globulins % 35.2 %    3.2-6.5                       [facility] ([facility])  

 

          Gamma Globulin % 5.0 %     11.1-18.8                     [facility] ([place]

[place] [facility])  

 

          Albumin   3.39 GM/DL 3.29-5.55                     [facility] ([facility])  

 

          Alpha-1-Globulins 0.31 GM/DL 0.17-0.41                     [facility] ([facility]  

 

          Alpha-2-Globulins 0.73 GM/DL 0.42-0.99                     [facility] ([facility]  

 

          Beta-1-Globulins 0.48 GM/DL 0.28-0.60                     [facility] ([facility]  

 

          Beta-2-Globulins 2.89 GM/DL 0.19-0.55                     [facility] ([facility])  

 

          Total Protein 8.2 GM/DL 6.4-8.2                       [facility] ([facility])  

 

          Gamma Globulins 0.41 GM/DL 0.65-1.58                     [facility] ([place]

[place] [facility])  

 

          Spep Interpretation Laboratory test result                            

   [facility] ([facility])  

 

                                        M-SPIKE NOTED IN BETA 2 REGION.

CONCENTRATION =   2.85    GM/DL

SUGGEST SERUM AND URINE IMMUNOTYPING.

 

 

           Laboratory test finding (navigational concept) Laboratory test result

                                  

[facility] ([facility])            

 

                                        REV'D BY O AJ

 









                    ID                  Date                Data Source

 

                    [id number]          2021 02:26:00 PM EDT [facility]









          Name      Value     Range     Interpretation Code Description Data Milly

rce(s) Supporting 

Document(s)

 

           Immunotyping Serum Iga Laboratory test result                        

          [facility] ([facility])                              

 

           Immunotyping Serum Lambda Laboratory test result                     

             [facility] ([facility])                              

 

           It Serum Interpretation Laboratory test result                       

           [facility]                              

 

                                        MONOCLONAL IGA LAMBDA

 

 

           Laboratory test finding (navigational concept) Laboratory test result

                                  

[facility] ([facility]            









                    ID                  Date                Data Source

 

                    [id number]          2021 02:25:00 PM EDT [facility] ([facility]









          Name      Value     Range     Interpretation Code Description Data Milly

rce(s) Supporting 

Document(s)

 

           Glucose [Mass/volume] in Serum or Plasma 124 mg/dL  74-99            

                [facility] ([facility])                              

 

                                        100-125 mg/dL     PRE-DIABETES/FASTING

>126 mg/dL          DIABETES/FASTING

 

 

           Urea nitrogen [Mass/volume] in Serum or Plasma 19 mg/dL   7-18       

                      [facility] 

([facility])                   

 

          Creatinine 0.7 mg/dL 0.6-1.3                       [facility])  

 

           Sodium [Moles/volume] in Serum or Plasma 143 meq/L  136-145          

                [facility] ([facility])                             

 

           Chloride [Moles/volume] in Serum or Plasma 103 meq/L           

                  [facility] 

([facility])                   

 

           Potassium [Moles/volume] in Serum or Plasma 3.8 meq/L  3.5-5.1       

                   [facility] 

([facility])                   

 

           Carbon dioxide, total [Moles/volume] in Serum or Plasma 29 meq/L   21

-32                            

[facility] ([facility])            

 

           Calcium [Mass/volume] in Serum or Plasma 10.2 mg/dL 8.5-10.1         

                [facility] 

([facility])                   

 

                                        NOTE:

RESULT VERIFIED.





 

 

                                        Glomerular filtration rate/1.73 sq M pre

dicted among non-blacks [Volume 

Rate/Area] in Serum or Plasma by Creatinine-based formula (MDRD) Laboratory test

result                                              [facility] ([facility]

)  

 

                                        Glomerular filtration rate/1.73 sq M pre

dicted among blacks [Volume Rate/Area] 

in Serum or Plasma by Creatinine-based formula (MDRD) Laboratory test result    

                  

                                        [facility] ([facility])  

 

                                        <content>CHRONIC KIDNEY DISEASE STAGING 

PER 

NKF</content><br/><content></content><br/><content>STAGE I & II      GFR >= 60  
     NORMAL TO MILDLY DECREASED</content><br/><content>STAGE III          GFR 
30-59          MODERATELY DECREASED</content><br/><content>STAGE IV           
GFR 15-29         SEVERELY DECREASED</content><br/><content>STAGE V            
GFR <15            VERY LITTLE GFR LEFT</content><br/><content>ESRD             
   GFR <15            ON RRT</content><br/><content></content> 









                    ID                  Date                Data Source

 

                    [id number]          2021 11:17:00 AM EST [facility] ([facility])









          Name      Value     Range     Interpretation Code Description Data Milly

rce(s) Supporting 

Document(s)

 

           Erythrocyte sedimentation rate by Westergren method 108 mm/hr  0-30  

                           [facility] 

([facility])                   









                    ID                  Date                Data Source

 

                    [id number]          2021 11:17:00 AM EST [facility] ([facility])









          Name      Value     Range     Interpretation Code Description Data Milly

rce(s) Supporting 

Document(s)

 

          White Blood Count 4.3 10    4.0-10.0                      [facility] ([facility])  

 

          Hematocrit 39.3 %    36.0-47.0                     [facility]  

 

          Hemoglobin 12.4 g/dL 12.0-15.5                     [facility] ([facility])  

 

          Red Blood Count 3.96 10   4.00-5.40                     [facility] ([facility])  

 

          Mean Corpuscular Hemoglobin 31.3 pg   27.0-33.0                     [facility]

[facility] ([facility]) 



 

          Mean Corpuscular Volume 99.2 fl   80.0-96.0                     [facility]

 ([facility])  

 

          Mean Corpuscular HGB Conc 31.6 g/dL 32.0-36.5                     [facility]

[facility] ([facility]) 



 

          Red Cell Distribution Width 13.2 %    11.5-14.5                     [facility]

[facility] ([facility])  

 

          Platelet Count, Automated 193 10    150-450                       [facility]

[facility] ([facility])  

 

          Neutrophils % 62.7 %    36.0-66.0                     [facility] ([facility])  

 

          Mono %    7.4 %     2.0-8.0                       [facility] [facility])  

 

          Lymph %   27.8 %    24.0-44.0                     [facility] [facility]  

 

          Baso %    0.5 %     0.0-1.0                       [facility] [facility])  

 

          Eos %     1.4 %     0.0-3.0                       [facility] [facility]  

 

          Neutrophils # 2.7 10    1.5-8.5                       [facility] ([facility])  

 

          Nucleated Red Blood Cell % 0.0 %     0-0                           [facility]

[facility] ([facility])  

 

          Immature Granulocyte % 0.2 %     0-3.0                         [facility] 

([facility])  

 

          Mono #    0.3 10    0.0-0.8                       [facility])  

 

          Lymph #   1.2 10    1.5-5.0                       [facility] [facility]  

 

          Baso #    0.0 10    0.0-0.2                       [facility] [facility]  

 

          Eos #     0.1 10    0.0-0.5                       [facility] [facility]  









                    ID                  Date                Data Source

 

                    [id number]          2021 11:17:00 AM EST [facility] ([facility])









          Name      Value     Range     Interpretation Code Description Data Milly

rce(s) Supporting 

Document(s)

 

                          C reactive protein [Mass/volume] in Serum or Plasma by

 High sensitivity method 

0.52 mg/dL   0.00-0.30                              [facility] ([facility]

)  









                    ID                  Date                Data Source

 

                    [id number]          2021 11:17:00 AM EST [facility] ([facility])









          Name      Value     Range     Interpretation Code Description Data Milly

rce(s) Supporting 

Document(s)

 

          Glucose, Fasting 99 mg/dL                          [facility] ([place]

[place] [facility])  

 

          Creatinine For GFR 0.92 mg/dL 0.55-1.30                     [facility] ([facility])  

 

          Blood Urea Nitrogen 29 mg/dL  7-18                          [facility] ([facility])  

 

          Sodium Level 139 meq/L 136-145                       [facility] ([facility])  

 

           Glomerular Filtration Rate Laboratory test result                    

              [facility] ([facility])                              

 

                                        <content>Units are mL/min/1.73 

m2</content><br/><content></content><br/><content>Chronic Kidney Disease Staging
 per NKF:</content><br/><content></content><br/><content>Stage I & II   GFR >=60
       Normal to Mildly Decreased</content><br/><content>Stage III      GFR 30-
59      Moderately Decreased</content><br/><content>Stage IV       GFR 15-29    
  Severely Decreased</content><br/><content>Stage V        GFR <15        Very 
Little GFR Left</content><br/><content>ESRD           GFR <15 on 
RRT</content><br/><content></content> 

 

          Potassium Serum 4.9 meq/L 3.5-5.1                       [facility] ([facility])  

 

          Chloride Level 105 meq/L                         [facility] ([facility])  

 

          Carbon Dioxide Level 26 meq/L  21-32                         [facility] ([facility])  

 

          Anion Gap 8 meq/L   8-16                          [facility] [facility])  

 

          Calcium Level 10.8 mg/dL 8.8-10.2                      [facility] ([facility])  









                    ID                  Date                Data Source

 

                    [id number]          2021 11:17:00 AM EST [facility] ([facility])









          Name      Value     Range     Interpretation Code Description Data Milly

rce(s) Supporting 

Document(s)

 

           aPTT in Blood by Coagulation assay 35.2 s     24.2-38.5              

          [facility] ([facility])                              









                    ID                  Date                Data Source

 

                    [id number]          2021 11:17:00 AM EST [facility] ([facility])









          Name      Value     Range     Interpretation Code Description Data Milly

rce(s) Supporting 

Document(s)

 

          Inr       1.05                                    [facility] ([place] [facility]  

 

                                        THERAPUTIC HUMAN INR VALUES

INDICATIONS                      NORMAL RANGES

PROPHYLAXIS/TREATMENT OF:

VENOUS THROMBOSIS                2.0-3.0

PULMONARY EMBOLISM               2.0-3.0

PREVENTION OF SYSTEMIC EMBOLISM FROM:

TISSUE HEART VALVES              2.0-3.0

ACUTE MYOCARDIAL INFARCTION      2.0-3.0

VALVULAR HEART DISEASE           2.0-3.0

ATRIAL FIBRILLATION              2.0-3.0

MECHANICAL VALVES(HIGH RISK)     2.5-3.5

RECURRENT MYOCARDIAL INFARCTION  2.5-3.5

 

 

          Prothrombin Time 13.9 s    12.5-14.3                     [facility] ([place]

[place] [facility])  









                    ID                  Date                Data Source

 

                    [id number]          2021 01:39:00 PM EST [facility] ([facility])









          Name      Value     Range     Interpretation Code Description Data Milly

rce(s) Supporting 

Document(s)

 

           Parathyrin.intact [Mass/volume] in Serum or Plasma 26.3 pg/mL 18.5-88

.0                        

[facility] ([facility])            









                    ID                  Date                Data Source

 

                    [id number]          2021 01:39:00 PM EST [facility] ([facility])









          Name      Value     Range     Interpretation Code Description Data Milly

rce(s) Supporting 

Document(s)

 

           Calcidiol [Mass/volume] in Serum or Plasma 32.7       24.0-80.0      

                  [facility] ([facility]                             

 

                                        This test was performed using [employer] I

P Vitamin D immunoassay kit.  Values 

obtained with different assay methods should not be used interchangeably.

 









                    ID                  Date                Data Source

 

                    [id number]          2021 01:39:00 PM EST [facility] ([facility])









          Name      Value     Range     Interpretation Code Description Data Milly

rce(s) Supporting 

Document(s)

 

                          Thyrotropin [Units/volume] in Serum or Plasma by Detec

tion limit <= 0.05 mIU/L 

0.54 uIU/mL  0.36-3.74                              [facility] ([facility]

)  









                    ID                  Date                Data Source

 

                    [id number]          2021 01:39:00 PM EST [facility] ([facility])









          Name      Value     Range     Interpretation Code Description Data Milly

rce(s) Supporting 

Document(s)

 

           Glucose [Mass/volume] in Serum or Plasma 98 mg/dL   74-99            

                [facility] ([facility])                              

 

                                        100-125 mg/dL     PRE-DIABETES/FASTING

>126 mg/dL          DIABETES/FASTING

 

 

           Urea nitrogen [Mass/volume] in Serum or Plasma 49 mg/dL   7-18       

                      [facility] 

([facility])                   

 

                                        NOTE:

BUN,CALCIUM VERIFIED





 

 

           Potassium [Moles/volume] in Serum or Plasma 4.5 meq/L  3.5-5.1       

                   [facility] 

([facility])                   

 

          Creatinine 1.2 mg/dL 0.6-1.3                       [facility] ([facility]

[facility])  

 

           Sodium [Moles/volume] in Serum or Plasma 142 meq/L  136-145          

                [facility] ([facility]

 [facility])                             

 

           Chloride [Moles/volume] in Serum or Plasma 104 meq/L           

                  [facility] 

([facility])                   

 

           Carbon dioxide, total [Moles/volume] in Serum or Plasma 23 meq/L   21

-32                            

[facility] ([facility])            

 

                                        Glomerular filtration rate/1.73 sq M pre

dicted among non-blacks [Volume 

Rate/Area] in Serum or Plasma by Creatinine-based formula (MDRD) 45 mL/min      

                                  

                          [facility] ([facility])  

 

           Calcium [Mass/volume] in Serum or Plasma 10.6 mg/dL 8.5-10.1         

                [facility] 

([facility])                   

 

                                        Glomerular filtration rate/1.73 sq M pre

dicted among blacks [Volume Rate/Area] 

in Serum or Plasma by Creatinine-based formula (MDRD) 55 mL/min                 

                          [facility] 

[facility]                   

 

                                        <content>CHRONIC KIDNEY DISEASE STAGING 

PER 

NKF</content><br/><content></content><br/><content>STAGE I & II      GFR >= 60  
     NORMAL TO MILDLY DECREASED</content><br/><content>STAGE III          GFR 
30-59          MODERATELY DECREASED</content><br/><content>STAGE IV           
GFR 15-29         SEVERELY DECREASED</content><br/><content>STAGE V            
GFR <15            VERY LITTLE GFR LEFT</content><br/><content>ESRD             
   GFR <15            ON RRT</content><br/><content></content> 









                    ID                  Date                Data Source

 

                    [id number]          2021 01:39:00 PM EST [facility] ([facility])









          Name      Value     Range     Interpretation Code Description Data Milly

rce(s) Supporting 

Document(s)

 

           Hemoglobin [Mass/volume] in Blood 13.1 g/dL  12.0-18.0               

         [facility] ([facility])                              

 

           Leukocytes [#/volume] in Blood by Automated count 5.8 x10*3/UL 4.1-10

.9                         

[facility] ([facility])            

 

           Erythrocytes [#/volume] in Blood by Automated count 4.01 x10*6/UL 4.2

0-6.30                        

[facility] ([facility])            

 

           Hematocrit [Volume Fraction] of Blood by Automated count 37.5 %     3

7.0-51.0                        

[facility] ([facility])            

 

          MCV       93.4 fL   80.0-97.0                     [facility] ([place] [facility]

[facility])  

 

          MCH       32.6 pg   26.0-32.0                     [facility] ([facility])  

 

          MCHC      34.9 g/dL 31.0-38.0                     [facility] ([facility])  

 

           Platelets [#/volume] in Blood by Automated count 191 x10*3/-440

                          [facility]

 ([facility])                  

 

          MPV       8.6 FL    7.8-11.0                      [facility] ([facility])  

 

           Erythrocyte distribution width [Ratio] by Automated count 13.8 %     

11.6-13.7                        

[facility] ([facility])            

 

          Mid %     6.7 %     1.7-9.3                       [facility] ([place] [facility]

[facility])  

 

          Lymph %   24.4 %    10.0-58.5                     [facility] ([facility])  

 

          Mid #     0.4 x10*3/UL 0.1-0.6                       [facility] ([facility])  

 

          Neut %    68.9 %    37.0-92.0                     [facility] ([place] [facility]

[facility])  

 

          Lymph #   1.4 x10*3/UL 0.6-4.1                       [facility] ([facility])  

 

          Neut #    4.0 x10*3/UL 2.0-7.8                       [facility] ([facility])  









                    ID                  Date                Data Source

 

                    [id number]          2020 11:57:00 AM EST [facility] ([facility])









          Name      Value     Range     Interpretation Code Description Data Milly

rce(s) Supporting 

Document(s)

 

          Inr       6.49                Above upper panic limits           [facility]

[facility] ([facility]  

 

                                        THERAPUTIC HUMAN INR VALUES

INDICATIONS                      NORMAL RANGES

PROPHYLAXIS/TREATMENT OF:

VENOUS THROMBOSIS                2.0-3.0

PULMONARY EMBOLISM               2.0-3.0

PREVENTION OF SYSTEMIC EMBOLISM FROM:

TISSUE HEART VALVES              2.0-3.0

ACUTE MYOCARDIAL INFARCTION      2.0-3.0

VALVULAR HEART DISEASE           2.0-3.0

ATRIAL FIBRILLATION              2.0-3.0

MECHANICAL VALVES(HIGH RISK)     2.5-3.5

RECURRENT MYOCARDIAL INFARCTION  2.5-3.5

 

 

          Prothrombin Time 58.4 s    12.5-14.3                     [facility] ([place]

[place] [facility])  









                    ID                  Date                Data Source

 

                    [id number]          2020 11:56:00 AM EST [facility] ([facility])









          Name      Value     Range     Interpretation Code Description Data Milly

rce(s) Supporting 

Document(s)

 

           Leukocytes [#/volume] in Blood by Automated count 6.0 x10*3/UL 4.1-10

.9                         

[facility] ([facility])            

 

           Hemoglobin [Mass/volume] in Blood 13.0 g/dL  12.0-18.0               

         [facility] ([facility])                              

 

           Erythrocytes [#/volume] in Blood by Automated count 4.11 x10*6/UL 4.2

0-6.30                        

[facility] ([facility])            

 

          MCV       91.1 fL   80.0-97.0                     [facility] ([place] [facility]

[facility])  

 

           Hematocrit [Volume Fraction] of Blood by Automated count 37.5 %     3

7.0-51.0                        

[facility] ([facility])            

 

          MCH       31.6 pg   26.0-32.0                     [facility] ([place] [facility]

[facility])  

 

          MCHC      34.7 g/dL 31.0-38.0                     [facility] ([facility])  

 

           Erythrocyte distribution width [Ratio] by Automated count 14.2 %     

11.6-13.7                        

[facility] ([facility])            

 

           Platelets [#/volume] in Blood by Automated count 243 x10*3/-440

                          [facility]

 ([facility])                  

 

          MPV       8.3 FL    7.8-11.0                      [facility] ([place] [facility]

[facility])  

 

          Lymph %   21.2 %    10.0-58.5                     [facility] [facility])  

 

          Mid %     5.8 %     1.7-9.3                       [facility] [facility])  

 

          Lymph #   1.2 x10*3/UL 0.6-4.1                       [facility])  

 

          Neut %    73.0 %    37.0-92.0                     [facility] [facility])  

 

          Neut #    4.4 x10*3/UL 2.0-7.8                       [facility] ([facility])  

 

          Mid #     0.4 x10*3/UL 0.1-0.6                       [facility] ([facility])  









                    ID                  Date                Data Source

 

                    [id number]          2020 11:29:00 AM EST [facility] ([facility])









          Name      Value     Range     Interpretation Code Description Data Milly

rce(s) Supporting 

Document(s)

 

           INR in Platelet poor plasma by Coagulation assay 7.9                 

                        [facility] ([facility])                              









                    ID                  Date                Data Source

 

                    [id number]          2020 10:58:00 AM EST [facility] ([facility])









          Name      Value     Range     Interpretation Code Description Data Milly

rce(s) Supporting 

Document(s)

 

           Glucose [Mass/volume] in Serum or Plasma 100 mg/dL  74-99            

                [facility] ([facility])                              

 

                                        100-125 mg/dL     PRE-DIABETES/FASTING

>126 mg/dL          DIABETES/FASTING

 

 

           Urea nitrogen [Mass/volume] in Serum or Plasma 17 mg/dL   7-18       

                      [facility] 

([facility])                   

 

          Creatinine 0.7 mg/dL 0.6-1.3                       [facility] ([facility]

[facility])  

 

           Sodium [Moles/volume] in Serum or Plasma 141 meq/L  136-145          

                [facility] ([facility])                             

 

           Potassium [Moles/volume] in Serum or Plasma 4.4 meq/L  3.5-5.1       

                   [facility] 

([facility])                   

 

           Chloride [Moles/volume] in Serum or Plasma 103 meq/L           

                  [facility] 

([facility])                   

 

           Carbon dioxide, total [Moles/volume] in Serum or Plasma 24 meq/L   21

-32                            

[facility] ([facility])            

 

           Calcium [Mass/volume] in Serum or Plasma 10.2 mg/dL 8.5-10.1         

                [facility] 

([facility]                   

 

                                        NOTE:

RESULT VERIFIED.





 

 

                                        Glomerular filtration rate/1.73 sq M pre

dicted among non-blacks [Volume 

Rate/Area] in Serum or Plasma by Creatinine-based formula (MDRD) Laboratory test

result                                              [facility] ([facility]

)  

 

                                        Glomerular filtration rate/1.73 sq M pre

dicted among blacks [Volume Rate/Area] 

in Serum or Plasma by Creatinine-based formula (MDRD) Laboratory test result    

                  

                                        [facility] ([facility])  

 

                                        <content>CHRONIC KIDNEY DISEASE STAGING 

PER 

NKF</content><br/><content></content><br/><content>STAGE I & II      GFR >= 60  
     NORMAL TO MILDLY DECREASED</content><br/><content>STAGE III          GFR 
30-59          MODERATELY DECREASED</content><br/><content>STAGE IV           
GFR 15-29         SEVERELY DECREASED</content><br/><content>STAGE V            
GFR <15            VERY LITTLE GFR LEFT</content><br/><content>ESRD             
   GFR <15            ON RRT</content><br/><content></content> 









                    ID                  Date                Data Source

 

                    [id number]          2020 11:20:00 AM EST [facility]









          Name      Value     Range     Interpretation Code Description Data Milly

rce(s) Supporting 

Document(s)

 

           INR in Platelet poor plasma by Coagulation assay 3.7                 

                        [facility] 

[facility]                              









                    ID                  Date                Data Source

 

                    [id number]          10/29/2020 01:07:00 PM EDT [facility]









          Name      Value     Range     Interpretation Code Description Data Milly

rce(s) Supporting 

Document(s)

 

           INR in Platelet poor plasma by Coagulation assay 2.7                 

                        [facility] 

[facility]                              









                    ID                  Date                Data Source

 

                    [id number]          10/29/2020 11:26:00 AM EDT [facility]









          Name      Value     Range     Interpretation Code Description Data Milly

rce(s) Supporting 

Document(s)

 

           Digoxin [Mass/volume] in Serum or Plasma 0.6 ng/mL  0.5-2.0          

                [facility] ([facility])                             









                    ID                  Date                Data Source

 

                    [id number]            10/29/2020 11:26:00 AM EDT [facility] ([facility])









          Name      Value     Range     Interpretation Code Description Data Milly

rce(s) Supporting 

Document(s)

 

           Digoxin [Mass/volume] in Serum or Plasma 0.6 ng/mL  0.5-2.0          

                [facility] 

([facility])           









                    ID                  Date                Data Source

 

                    [id number]          10/29/2020 11:25:00 AM EDT [facility] ([facility]









          Name      Value     Range     Interpretation Code Description Data Milly

rce(s) Supporting 

Document(s)

 

          Urine PH  6.5 units 5.0-9.0                       [facility]  

 

          Urine Appearance Laboratory test result                               

[facility] ([facility])  

 

          Urine Color Laboratory test result                               [facility]

T ([facility]  

 

          Urine Leukocytes Laboratory test result                               

[facility] ([facility]  

 

          Specific gravity of Urine 1.010     1.005-1.030                     [facility]

[facility] ([facility]  

 

          Urine Protein Laboratory test result 0-0                           MED

ENT ([facility])  

 

          Urine Blood Laboratory test result                               [facility]

T ([facility])  

 

          Urine Nitrite Laboratory test result                               MED

ENT ([facility])  

 

           Glucose [Presence] in Urine Laboratory test result                   

               [facility] ([facility])                              

 

          Urine Ketone Laboratory test result                               [facility]

[facility] ([facility]  

 

          Urine Urobilinogen 0.2 mg/dL 0.2-1.0                       [facility] ([facility])  

 

           Bilirubin.total [Mass/volume] in Serum or Plasma Laboratory test resu

lt                                  

[facility] ([facility])            









                    ID                  Date                Data Source

 

                    [id number]          10/29/2020 11:25:00 AM EDT [facility] ([facility])









          Name      Value     Range     Interpretation Code Description Data Milly

rce(s) Supporting 

Document(s)

 

                          Thyrotropin [Units/volume] in Serum or Plasma by Detec

tion limit <= 0.05 mIU/L 

0.16 uIU/mL  0.36-3.74                              [facility] ([facility]

)  









                    ID                  Date                Data Source

 

                    [id number]          10/29/2020 11:25:00 AM EDT [facility] ([facility])









          Name      Value     Range     Interpretation Code Description Data Milly

rce(s) Supporting 

Document(s)

 

           Urea nitrogen [Mass/volume] in Serum or Plasma 17 mg/dL   7-18       

                      [facility] 

([facility])                   

 

           Glucose [Mass/volume] in Serum or Plasma 101 mg/dL  74-99            

                [facility] ([facility])                              

 

                                        100-125 mg/dL     PRE-DIABETES/FASTING

>126 mg/dL          DIABETES/FASTING

 

 

          Creatinine 0.7 mg/dL 0.6-1.3                       [facility] ([facility]

[facility])  

 

           Sodium [Moles/volume] in Serum or Plasma 143 meq/L  136-145          

                [facility] ([facility])                             

 

           Chloride [Moles/volume] in Serum or Plasma 104 meq/L           

                  [facility] 

([facility])                   

 

           Potassium [Moles/volume] in Serum or Plasma 4.0 meq/L  3.5-5.1       

                   [facility] 

([facility])                   

 

           Carbon dioxide, total [Moles/volume] in Serum or Plasma 30 meq/L   21

-32                            

[facility] ([facility])            

 

           Calcium [Mass/volume] in Serum or Plasma 10.7 mg/dL 8.5-10.1         

                [facility] 

([facility])                   

 

                                        NOTE:

RESULT VERIFIED.





 

 

                                        Glomerular filtration rate/1.73 sq M pre

dicted among non-blacks [Volume 

Rate/Area] in Serum or Plasma by Creatinine-based formula (MDRD) Laboratory test

result                                              [facility] ([facility]

)  

 

                                        Glomerular filtration rate/1.73 sq M pre

dicted among blacks [Volume Rate/Area] 

in Serum or Plasma by Creatinine-based formula (MDRD) Laboratory test result    

                  

                                        [facility] ([facility]  

 

                                        <content>CHRONIC KIDNEY DISEASE STAGING 

PER 

NKF</content><br/><content></content><br/><content>STAGE I & II      GFR >= 60  
     NORMAL TO MILDLY DECREASED</content><br/><content>STAGE III          GFR 
30-59          MODERATELY DECREASED</content><br/><content>STAGE IV           
GFR 15-29         SEVERELY DECREASED</content><br/><content>STAGE V            
GFR <15            VERY LITTLE GFR LEFT</content><br/><content>ESRD             
   GFR <15            ON RRT</content><br/><content></content> 









                    ID                  Date                Data Source

 

                    [id number]          10/29/2020 11:25:00 AM EDT [facility] ([facility])









          Name      Value     Range     Interpretation Code Description Data Milly

rce(s) Supporting 

Document(s)

 

          Magnesium 2.1 mg/dL 1.8-2.4                       [facility])  









                    ID                  Date                Data Source

 

                    [id number]          10/29/2020 11:25:00 AM EDT [facility] ([facility])









          Name      Value     Range     Interpretation Code Description Data Milly

rce(s) Supporting 

Document(s)

 

           Erythrocytes [#/volume] in Blood by Automated count 4.36 x10*6/UL 4.2

0-6.30                        

[facility] ([facility])            

 

           Leukocytes [#/volume] in Blood by Automated count 6.2 x10*3/UL 4.1-10

.9                         

[facility] ([facility])            

 

           Hemoglobin [Mass/volume] in Blood 13.8 g/dL  12.0-18.0               

         [facility] ([facility])                              

 

           Hematocrit [Volume Fraction] of Blood by Automated count 40.7 %     3

7.0-51.0                        

[facility] ([facility])            

 

          MCH       31.6 pg   26.0-32.0                     [facility]  

 

          MCV       93.2 fL   80.0-97.0                     [facility]  

 

          MCHC      34.0 g/dL 31.0-38.0                     [facility]  

 

           Platelets [#/volume] in Blood by Automated count 236 x10*3/-440

                          [facility]

 ([facility])                  

 

           Erythrocyte distribution width [Ratio] by Automated count 14.5 %     

11.6-13.7                        

[facility] ([facility])            

 

          Lymph %   19.8 %    10.0-58.5                     [facility]  

 

          MPV       7.8 FL    7.8-11.0                      [facility]  

 

          Mid %     5.7 %     1.7-9.3                       [facility]  

 

          Neut %    74.5 %    37.0-92.0                     [facility])  

 

          Lymph #   1.2 x10*3/UL 0.6-4.1                       [facility] ([facility])  

 

          Neut #    4.6 x10*3/UL 2.0-7.8                       [facility] ([facility])  

 

          Mid #     0.4 x10*3/UL 0.1-0.6                       [facility] ([facility])  









                    ID                  Date                Data Source

 

                    [id number]            10/29/2020 11:25:00 AM EDT [facility] ([facility])









          Name      Value     Range     Interpretation Code Description Data Milly

rce(s) Supporting 

Document(s)

 

           Leukocytes [#/volume] in Blood by Automated count 6.2 x10*3/UL 4.1-10

.9                         

[facility] ([facility])    

 

           Hematocrit [Volume Fraction] of Blood by Automated count 40.7 %     3

7.0-51.0                        

[facility] ([facility])    

 

           Erythrocytes [#/volume] in Blood by Automated count 4.36 x10*6/UL 4.2

0-6.30                        

[facility] ([facility])    

 

           Hemoglobin [Mass/volume] in Blood 13.8 g/dL  12.0-18.0               

         [facility] ([facility])                       

 

          MCHC      34.0 g/dL 31.0-38.0                     [facility] ([facility]  

 

          MCV       93.2 fL   80.0-97.0                     [facility] ([facility]  

 

          MCH       31.6 pg   26.0-32.0                     [facility] ([facility]  

 

           Erythrocyte distribution width [Ratio] by Automated count 14.5 %     

11.6-13.7                        

[facility] ([facility])    

 

           Platelets [#/volume] in Blood by Automated count 236 x10*3/-440

                          [facility]

 ([facility])          

 

             Platelet mean volume [Entitic volume] in Blood by Archie 7.8 FL

       7.8-11.0                   

                          [facility] ([facility])  

 

          Neut %    74.5 %    37.0-92.0                     [facility] ([facility]  

 

          Mid %     5.7 %     1.7-9.3                       [facility] ([facility])  

 

           Lymphocytes/100 leukocytes in Blood by Automated count 19.8 %     10.

0-58.5                        

[facility] ([facility])    

 

           Neutrophils [#/volume] in [place]en by Manual count 4.6 x10*3/UL 2.0-7.8 

                         [facility] 

([facility])           

 

          Mid #     0.4 x10*3/UL 0.1-0.6                       [facility] (Cardiolog

y [facility])  

 

          Lymph #   1.2 x10*3/UL 0.6-4.1                       [facility] ([facility]

[facility])  









                    ID                  Date                Data Source

 

                    [id number]          10/29/2020 11:24:00 AM EDT [facility] ([facility])









          Name      Value     Range     Interpretation Code Description Data Milly

rce(s) Supporting 

Document(s)

 

           Thyroxine (T4) free [Mass/volume] in Serum or Plasma 1.33 ng/dL 0.76-

1.46                        

[facility] ([facility]            









                    ID                  Date                Data Source

 

                    [id number]          10/26/2020 11:44:00 AM EDT [facility] ([facility])









          Name      Value     Range     Interpretation Code Description Data Milly

rce(s) Supporting 

Document(s)

 

           INR in Platelet poor plasma by Coagulation assay 1.2                 

                        [facility] ([facility])                              









                    ID                  Date                Data Source

 

                    [id number]          10/22/2020 12:43:00 PM EDT [facility] ([facility])









          Name      Value     Range     Interpretation Code Description Data Milly

rce(s) Supporting 

Document(s)

 

           INR in Platelet poor plasma by Coagulation assay 7.2                 

                        [facility] ([facility]                              









                    ID                  Date                Data Source

 

                    [id number]          10/22/2020 11:33:00 AM EDT [facility] ([facility]









          Name      Value     Range     Interpretation Code Description Data Milly

rce(s) Supporting 

Document(s)

 

          Prothrombin Time 49.4 s    12.5-14.3                     [facility] ([facility])  

 

          Inr       5.24                Above upper panic limits           [facility]

T ([facility]  

 

                                        THERAPUTIC HUMAN INR VALUES

INDICATIONS                      NORMAL RANGES

PROPHYLAXIS/TREATMENT OF:

VENOUS THROMBOSIS                2.0-3.0

PULMONARY EMBOLISM               2.0-3.0

PREVENTION OF SYSTEMIC EMBOLISM FROM:

TISSUE HEART VALVES              2.0-3.0

ACUTE MYOCARDIAL INFARCTION      2.0-3.0

VALVULAR HEART DISEASE           2.0-3.0

ATRIAL FIBRILLATION              2.0-3.0

MECHANICAL VALVES(HIGH RISK)     2.5-3.5

RECURRENT MYOCARDIAL INFARCTION  2.5-3.5

 









                    ID                  Date                Data Source

 

                    [id number]          10/22/2020 11:33:00 AM EDT [facility] ([facility])









          Name      Value     Range     Interpretation Code Description Data Milly

rce(s) Supporting 

Document(s)

 

           Erythrocytes [#/volume] in Blood by Automated count 4.37 x10*6/UL 4.2

0-6.30                        

[facility] ([facility])            

 

           Leukocytes [#/volume] in Blood by Automated count 6.6 x10*3/UL 4.1-10

.9                         

[facility] ([facility])            

 

           Hemoglobin [Mass/volume] in Blood 13.7 g/dL  12.0-18.0               

         [facility] ([facility])                              

 

           Hematocrit [Volume Fraction] of Blood by Automated count 40.2 %     3

7.0-51.0                        

[facility] ([facility])            

 

          MCV       91.9 fL   80.0-97.0                     [facility] ([facility]

[facility])  

 

          MCH       31.4 pg   26.0-32.0                     [facility] ([facility])  

 

          MCHC      34.2 g/dL 31.0-38.0                     [facility] ([facility])  

 

           Erythrocyte distribution width [Ratio] by Automated count 13.7 %     

11.6-13.7                        

[facility] ([facility])            

 

           Platelets [#/volume] in Blood by Automated count 251 x10*3/-440

                          [facility]

 ([facility])                  

 

          MPV       8.3 FL    7.8-11.0                      [facility] ([facility]

[facility])  

 

          Lymph %   16.8 %    10.0-58.5                     [facility] ([facility])  

 

          Mid %     4.7 %     1.7-9.3                       [facility] ([facility]

[facility])  

 

          Lymph #   1.1 x10*3/UL 0.6-4.1                       [facility] ([facility])  

 

          Neut %    78.5 %    37.0-92.0                     [facility] ([facility]

[facility])  

 

          Mid #     0.3 x10*3/UL 0.1-0.6                       [facility] ([facility])  

 

          Neut #    5.2 x10*3/UL 2.0-7.8                       [facility] ([facility])  









                    ID                  Date                Data Source

 

                    [id number]          10/15/2020 12:07:00 PM [facility]









          Name      Value     Range     Interpretation Code Description Data Milly

rce(s) Supporting 

Document(s)

 

           INR in Platelet poor plasma by Coagulation assay 1.3                 

                        [facility] ([facility]                              









                    ID                  Date                Data Source

 

                    [id number]          2020 12:44:00 PM [facility]









          Name      Value     Range     Interpretation Code Description Data Milly

rce(s) Supporting 

Document(s)

 

           INR in Platelet poor plasma by Coagulation assay 3.7                 

                        [facility] ([facility]                              









                    ID                  Date                Data Source

 

                    [id number]          2020 03:27:00 PM [facility]T [facility]









          Name      Value     Range     Interpretation Code Description Data Milly

rce(s) Supporting 

Document(s)

 

           INR in Platelet poor plasma by Coagulation assay 1.3                 

                        [facility] ([facility])                              







                                        Procedure

 

                                          



                                                                                
                                                                                
                                                                                
                                                                                
                                                                                
                                                                                
                                                                                
                                                                                
                                                                                
                                                                    



Social History

          



           Code       Duration   Value      Status     Description Data Source(s

)

 

           Smoking    10/21/2021 12:00:00 AM EDT Current Smoker completed  Curre

nt Smoker [id number] 

([facility])



                                                                                
                  



Vital Signs

          



                    ID                  Date                Data Source

 

                    UNK                                      









           Name       Value      Range      Interpretation Code Description Data

 Source(s)

 

           Systolic blood pressure 122 mm[Hg]                       122 mm[Hg] [facility] ([facility])

 

           Diastolic blood pressure 76 mm[Hg]                        76 mm[Hg]  

[facility] ([facility])

 

           Heart rate 86 /min                          86 /min    [facility] ([facility])

 

           Body height 66 [in_i]                        66 [in_i]  [facility] ([facility]

 

                                        5'6" 

 

           Body weight 223.00 [lb_av]                       223.00 [lb_av] [facility]

T ([facility])

 

           Body mass index (BMI) [Ratio] 36.0 kg/m2                       36.0 k

g/m2 [facility] ([facility]

 

           Systolic blood pressure 124 mm[Hg]                       124 mm[Hg] [facility] ([facility])

 

           Diastolic blood pressure 68 mm[Hg]                        68 mm[Hg]  

[facility] ([facility])

 

           Heart rate 86 /min                          86 /min    [facility] ([facility])

 

           Body height 66 [in_i]                        66 [in_i]  [facility] ([facility]

 

                                        5'6" 

 

           Body weight 217.00 [lb_av]                       217.00 [lb_av] [facility]

T ([facility])

 

           Body mass index (BMI) [Ratio] 35.0 kg/m2                       35.0 k

g/m2 [facility] ([facility]

 

           Systolic blood pressure 114 mm[Hg]                       114 mm[Hg] M

[facility] ([facility])

 

           Diastolic blood pressure 76 mm[Hg]                        76 mm[Hg]  

[facility] ([facility])

 

           Body height 66 [in_i]                        66 [in_i]  [facility] ([facility])

 

                                        5'6" 

 

           Body weight 221.50 [lb_av]                       221.50 [lb_av] [facility]

T ([facility])

 

           Body mass index (BMI) [Ratio] 35.7 kg/m2                       35.7 k

g/m2 [facility] ([facility])

 

           I[facility] body weight 130 [facility]]                       [facility]

 

           Body weight 100.472 kg                       100.472 kg [facility] ([facility])

 

           Body surface area Derived from formula 2.09 m2                       

   2.09 m2    [facility] ([facility])

 

           Systolic blood pressure 136 mm[Hg]                       136 mm[Hg] M

[facility] ([facility])

 

                                        RT Arm 

 

           Diastolic blood pressure 88 mm[Hg]                        88 mm[Hg]  

[facility] ([facility])

 

                                        RT Arm 

 

           Heart rate 120 /min                         120 /min   [facility] ([facility])

 

           Body height 66 [in_i]                        66 [in_i]  [facility] ([facility]

 

                                        5'6" 

 

           Body weight 223.50 [lb_av]                       223.50 [lb_av] [facility]

T ([facility])

 

           Body mass index (BMI) [Ratio] 36.1 kg/m2                       36.1 k

g/m2 [facility] ([facility]

 

           Body height 66 [in_i]                        66 [in_i]  [facility] ([facility])

 

                                        5'6" 

 

           Ideal body weight 130 [lb_av]                       130 [lb_av] [facility]

T ([facility])

 

           Body weight 102.060 kg                       102.060 kg [facility] ([facility])

 

           Diastolic blood pressure 77 mm[Hg]                        77 mm[Hg]  

[facility] ([facility])

 

           Body weight 225.00 [lb_av]                       225.00 [lb_av] [facility]

T ([facility])

 

           Body mass index (BMI) [Ratio] 36.3 kg/m2                       36.3 k

g/m2 [facility] ([facility])

 

           Body surface area Derived from formula 2.10 m2                       

   2.10 m2    [facility] ([facility])

 

           Systolic blood pressure 145 mm[Hg]                       145 mm[Hg] M

[facility] ([facility])

 

           Systolic blood pressure 148 mm[Hg]                       148 [facility]

 

                                        RT Arm 

 

           Diastolic blood pressure 72 mm[Hg]                        72 mm[facility]Hg[facility]  

[facility]

 

                                        RT Arm 

 

           Systolic blood pressure 140 mm[Hg]                       140 mm[Hg] [facility]

 

           Diastolic blood pressure 70 mm[Hg]                        70 mm[Hg]  

[facility] ([facility])

 

           Heart rate 80 /min                          80 /min    [facility] ([facility])

 

           Body height 66 [in_i]                        66 [in_i]  [facility] ([facility]

 

                                        5'6" 

 

           Body weight 219.00 [lb_av]                       219.00 [lb_av] [facility]

T ([facility])

 

           Body mass index (BMI) [Ratio] 35.3 kg/m2                       35.3 k

g/m2 [facility] ([facility])

 

           Body height 66 [in_i]                        66 [in_i]  [facility] ([facility] )

 

                                        5'6" 

 

           Body weight 190.00 [lb_av]                       190.00 [lb_av] [facility]

T ([facility] 

)

 

           Body mass index (BMI) [Ratio] 30.7 kg/m2                       30.7 k

g/m2 [facility] ([facility]

 

           Body mass index (BMI) [Ratio] 35.7 kg/m2                       35.7 k

g/m2 [facility] ([facility]

 

           Body temperature 96.1 [degF]                       96.1 [degF] [facility]

 ([facility] 

)

 

           Body height 64.5 [in_i]                       64.5 [in_i] [facility] ([facility] )

 

                                        5'4.50" 

 

           Body weight 211.50 [lb_av]                       211.50 [lb_av] [facility]

T ([facility] 

)

 

           Systolic blood pressure 142 mm[Hg]                       142 mm[Hg] M

[facility] ([facility])

 

           Body weight 222.00 [lb_av]                       222.00 [lb_av] [facility]

T ([facility])

 

           Systolic blood pressure 154 mm[Hg]                       154 mm[Hg] M

[facility] ([facility])

 

                                        RT Arm 

 

           Diastolic blood pressure 78 mm[Hg]                        78 mm[Hg]  

[facility] ([facility])

 

                                        RT Arm 

 

           Diastolic blood pressure 80 mm[Hg]                        80 mm[Hg]  

[facility] ([facility])

 

           Heart rate 96 /min                          96 /min    [facility] ([facility])

 

           Body height 66 [in_i]                        66 [in_i]  [facility]

 

                                        5'6" 

 

           Body mass index (BMI) [Ratio] 35.8 kg/m2                       35.8 k

g/m2 [facility] ([facility]

 

           Systolic blood pressure 164 mm[Hg]                       164 mm[facility]Hg] [facility]

 

           Diastolic blood pressure 90 mm[Hg]                        90 mm[Hg]  

[facility] ([facility] )

 

           Body height 66 [in_i]                        66 [in_i]  [facility]

 

                                        5'6" 

 

           Body weight 225.00 [lb_av]                       225.00 [lb_av] [facility]

T ([facility])

 

           Body mass index (BMI) [Ratio] 36.3 kg/m2                       36.3 k

g/m[facility]

 

           Ideal body weight 130 [lb_av]                       130 [lb_av] [facility]

T ([facility])

 

           Body weight 102.060 kg                       102.060 kg [facility] ([facility])

 

           Body surface area Derived from formula 2.10 m2                       

   2.10 m2    [facility] ([facility] )

 

           Body weight 220.00 [lb_av]                       220.00 [lb_av] [facility]

T ([facility])

 

           Body height 65 [in_i]                        65 [in_i]  [facility] ([facility])

 

                                        5'5" 

 

           Body mass index (BMI) [Ratio] 36.6 kg/m2                       36.6 k

g/m2 [facility] ([facility])

 

           Heart rate 65 /min                          65 /min    [facility] ([facility])

 

           Systolic blood pressure--sitting 136 mm[Hg]                       136

 mm[Hg] [facility] ([facility])

 

                                        Ra, large cuff 

 

           Diastolic blood pressure--sitting 84 mm[Hg]                        84

 mm[Hg]  [facility] ([facility])

 

                                        Ra, large cuff 

 

           Body height 66 [in_i]                        66 [in_i]  [facility] ([facility] )

 

                                        5'6" 

 

           Body weight 221.00 [lb_av]                       221.00 [lb_av] [facility]

T ([facility] 

)

 

           Body mass index (BMI) [Ratio] 35.7 kg/m2                       35.7 k

g/m2 [facility] ([facility]

 

           Diastolic blood pressure 70 mm[Hg]                        70 mm[Hg]  

[facility] ([facility])

 

                                        RT Arm 

 

           Heart rate 60 /min                          60 /min    [facility] ([facility])

 

           Body height 66 [in_i]                        66 [in_i]  [facility]

 

                                        5'6" 

 

           Body mass index (BMI) [Ratio] 35.7 kg/m2                       35.7 k

g/m2 [facility] ([facility])

 

           Systolic blood pressure 148 mm[Hg]                       148 mm[Hg] M

[facility] ([facility])

 

                                        RT Arm 

 

           Body weight 221.00 [lb_av]                       221.00 [lb_av] [facility]

T ([facility])

 

           Body weight 226.00 [lb_av]                       226.00 [lb_av] [facility]

T ([facility])

 

           Body mass index (BMI) [Ratio] 36.5 kg/m2                       36.5 k

g/m2 [facility] ([facility]

 

           Systolic blood pressure 138 mm[Hg]                       138 mm[Hg] M

[facility] ([facility])

 

           Diastolic blood pressure 90 mm[Hg]                        90 mm[Hg]  

[facility] ([facility])

 

           Body height 66 [in_i]                        66 [in_i]  [facility]

 

                                        5'6" 

 

           Body weight 226.00 [lb_av]                       226.00 [lb_av] [facility]

T ([facility])

 

           Body weight 217.00 [lb_av]                       217.00 [lb_av] [facility]

T ([facility])

 

           Body weight 220.00 [lb_av]                       220.00 [lb_av] [facility]

T ([facility])

 

           Body weight 216.00 [lb_av]                       216.00 [lb_av] [facility]

T ([facility])

## 2021-10-25 NOTE — CCD
Continuity of Care Document (CCD)

                             Created on: 10/19/2021



Juany Becerril

External Reference #: [id number]

: 1955

Sex: Female



Demographics





                          Address                   [address]

[place]  [address]

 

                          Home Phone                [phone]

 

                          Preferred Language        Unknown

 

                          Marital Status            Unknown

 

                          Sabianism Affiliation     Unknown

 

                          Race                      White

 

                          Ethnic Group              Not  or 





Author





                          Author                    Juany ESQUEDA PA

 

                          Organization              Unknown

 

                          Address                   [address] [address]

[place]  [address]



 

                          Phone                     [phone]







Care Team Providers





                    Care Team Member Name Role                Phone

 

                    Yuri Maier MD       AUTM                [phone]

 

                    [facility]  AUTM                [phone]

 

                    John Lutz MD AUTM                Unavailable

 

                    [facility] Hematology/Oncology AUTM                [phone]

 

                    Hansa Hernandez FNP  AUTM                [phone]







Problems





                    Active Problems     Provider            Date

 

                    Chronic atrial fibrillation Protime             Onset: 

 

                    Essential hypertension Juany Vincent FNP Onset: 2017

 

                    Deep venous thrombosis of lower extremity Juany Vincent FN P Onset: 2017

 

                    Pure hypercholesterolemia Juany Vincent FNP Onset: 

017

 

                    Anxiety state       Juany Vincent FNP Onset: 2017

 

                    Chronic pulmonary embolism Juany Vincent FNP Onset: 2017

 

                    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 

2017

 

                    Gastroesophageal reflux disease Juany Vincent FNP Onset: 1

2017

 

                    Scoliosis deformity of spine Juany Vincent FNP Onset: 2017

 

                    Chronic tension-type headache Juany Vincent FNP Onset: 2017

 

                    Varicose veins of lower extremity Juany Vincent FNP Onset:

 2017

 

                    Tobacco user        Juany Vincent FNP Onset: 2017

 

                    Hypercalcemia       Juany Vincent FNP Onset: 2017







Social History





                Type            Date            Description     Comments

 

                Birth Sex                       Unknown          

 

                ETOH Use                        Consumes 3 glasses of wine per w

[place]  

 

                Tobacco Use     Start: Unknown  Patient is a current smoker, smo

kes every day STARTED

AGE 30 1-3 CIGARETTES A DAY 







Allergies and adverse reactions





             Active Allergies Criticality  Reaction | Severity Comments     Date

 

             Ibuprofen    Unable to assess criticality STOMACH UPSET HEADACHE mo

david       10/17/2017

 

             Latex        Unable to assess criticality rash, itches | Mild      

        10/17/2018







Medications





           Active Medications SIG        Qnty       Indications Ordering Provide

r Date

 

                          Cephalexin                     500mg Capsules         

          take one tablet 

by mouth 3 times daily x 7 days 21caps                          Julio kothari JR PA 10/06/2021

 

                          Excedrin Migraine                     [phone] Tab

lets                   1 

as needed h/a as needed mdd=1                                 Charmaine Regalado M.D. 2021

 

                          Famotidine                     20mg Tablets           

        1 by mouth every 

day             90tabs                          Charmaine Regalado M.D. 20

21

 

                          Furosemide                     20mg Tablets           

        1 by mouth twice a

day             180tabs                         Charmaine Regalado M.D. 20

21

 

                                        Bupropion Hydrochloride ER (XL)         

            150mg Tablets ER 24HR       

                take 1 tablet by mouth in the morning 90tabs                    

      Charmaine Regalado M.D.                                    2021

 

                          Eliquis                     5mg Tablets               

    take one tablet by 

mouth twice a day 180tabs                         Charmaine Regalado M.D. 2020

 

                          Methimazole                     5mg Tablets           

        2 every day by 

mouth           180tabs                         Charmaine Regalado M.D. 20

20

 

                          Shingrix                     50mcg/0.5ML Suspension Re

c                   

administer at pharmacy 1units                          Juany Vincent FNP 

 

                          Voltaren                     1% Gel                   

apply up to 4 grams three 

times a day to affected knee as needed 100gm                           Charmaine Regalado M.D. 

2018

 

                          Aspirin Adult Low Dose                     81mg Tablet

s DR                   1 

by mouth every day                                 Juany Vincent FNP 10/17/201

7

 

                                        Acetaminophen Extra Strength            

         500mg Tablets                  

             take 2 tabs every 6 hours as needed                           Juany Fung FNP 10/17/2017

 

                                        Womens 50+ Multi Vitamin & Mineral Formu

la                      Tablets         

             1 every day PO Vit K=1429Qx OZ=290                           Juany Vincent FNP 10/17/2017

 

                          Metoprolol Tartrate                     25mg Tablets  

                 Take One 

Tablet By Mouth Twice A Day 180tabs                         ABELARDO Saleem 10/17/2017

 

                          Atorvastatin Calcium                     20mg Tablets 

                  take one

tablet by mouth every day 90tabs                          JANE Saleem 10/17/2017

 

                          Flecainide Acetate                     100mg Tablets  

                 take one 

tablet by mouth twice a day 180tabs                         ABELARDO Saleem 10/17/2017

 

                          Digoxin                     250mcg Tablets            

       1 by mouth every 

day             90tabs                          Charmaine Regalado M.D. 







Medications Administered in Office





           Medication SIG        Qnty       Indications Ordering Provider Date

 

                          Administration Of Flu Vaccine                      Inj

ection                    

                                                AIXA Hoffmann JR 10/06/2

021

 

                          Administration Of Flu Vaccine                      Inj

karen Regalado M.D. 10/15/20

20

 

                          Administration Of Flu Vaccine                      Inj

diyaion                    

                                                Juany Vincent FNP 10/17/2019

 

                          Administration Of Flu Vaccine                      Inj

ection                    

                                                Juany Vincent FNP 10/17/2018

 

                          Administration Of Flu Vaccine                      Inj

Juany Alonso FNP 10/17/2017







Immunizations





             CPT Code     Status       Date         Vaccine      Lot #

 

                [id number]           Given           10/06/2021      Influenza Vaccin

e Quadrivalent Preser/Antibiotic Free Im 

Use                                     [id number]

 

                [id number]           Given           10/15/2020      Influenza Vaccin

e Quadrivalent Preser/Antibiotic Free Im 

Use                                     [id number]

 

                [id number]           Given           2020      Shingrix Zoster 

Vaccine (HZV), Recombinant, Subunit, 

Adjuvanted                               

 

                63969           Given           10/17/2019      Influenza Vaccin

e Quadrivalent Preser/Antibiotic Free Im 

Use                                     592003

 

                83780           Given           10/17/2018      Influenza Virus 

Vaccine, Quadrivalent (Cciiv4), Derived 

From Cell                               [id number]

 

             57649        Given        2018   Pneumovax 23 [id number]

 

                86981           Given           10/17/2017      Influenza Vaccin

e Quadrivalent Preser/Antibiotic Free Im 

Use                                     [id number]







Vital Signs





                Date            Vital           Result          Comment

 

                10/19/2021  3:12pm BP Systolic     122 mmHg         

 

                    BP Diastolic        76 mmHg              

 

                    Heart Rate          86 /min              

 

                    Height              66 inches           5'6"

 

                    Weight              223.00 lb            

 

                    BMI (Body Mass Index) 36.0 kg/m2           

 

                10/06/2021  2:17pm BP Systolic     124 mmHg         

 

                    BP Diastolic        68 mmHg              

 

                    Heart Rate          86 /min              

 

                    Height              66 inches           5'6"

 

                    Weight              217.00 lb            

 

                    BMI (Body Mass Index) 35.0 kg/m2           







Results





        Test    Acquired Date Facility Test    Result  H/L     Range   Note

 

                    Laboratory test finding 10/06/2021          [facility]

                                        [address] [place] [address] [phone] Wound Culture <pending>                         

 

                    CBC With Differential 2021          [facility]

                                        [address] [place] [address] [phone] White Blood Count 6.8 10     Normal     4.0-10.0    

 

             Red Blood Count 3.85 10      Low          4.00-5.40     

 

             Hemoglobin   12.7 g/dL    Normal       12.0-15.5     

 

             Hematocrit   39.4 %       Normal       36.0-47.0     

 

             Mean Corpuscular Volume 102.3 fl     High         80.0-96.0     

 

             Mean Corpuscular Hemoglobin 33.0 pg      Normal       27.0-33.0    

 

 

             Mean Corpuscular HGB Conc 32.2 g/dL    Normal       32.0-36.5     

 

             Red Cell Distribution Width 12.3 %       Normal       11.5-14.5    

 

 

             Platelet Count, Automated 187 10       Normal       150-450       

 

             Neutrophils % 72.2 %       High         36.0-66.0     

 

             Lymph %      19.9 %       Low          24.0-44.0     

 

             Mono %       6.0 %        Normal       2.0-8.0       

 

             Eos %        1.5 %        Normal       0.0-3.0       

 

             Baso %       0.3 %        Normal       0.0-1.0       

 

             Immature Granulocyte % 0.1 %        Normal       0-3.0         

 

             Nucleated Red Blood Cell % 0.0 %        Normal       0-0           

 

             Neutrophils # 4.9 10       Normal       1.5-8.5       

 

             Lymph #      1.4 10       Low          1.5-5.0       

 

             Mono #       0.4 10       Normal       0.0-0.8       

 

             Eos #        0.1 10       Normal       0.0-0.5       

 

             Baso #       0.0 10       Normal       0.0-0.2       

 

                    PT & Aptt           2021          [facility]

nter

                                        [address] [place] [address] [phone] Prothrombin Time 13.8 seconds Normal     12.5-14.3   

 

             Inr          1.04         Normal                    1

 

             Partial Thromboplastin Time 30.6 seconds Normal       24.2-38.5    

 

 

                    CBC With Differential 2021          [address] [address] [phone] White Blood Count 6.2 10     Normal     4.0-10.0    

 

             Red Blood Count 3.76 10      Low          4.00-5.40     

 

             Hemoglobin   12.5 g/dL    Normal       12.0-15.5     

 

             Hematocrit   39.0 %       Normal       36.0-47.0     

 

             Mean Corpuscular Volume 103.7 fl     High         80.0-96.0     

 

             Mean Corpuscular Hemoglobin 33.2 pg      High         27.0-33.0    

 

 

             Mean Corpuscular HGB Conc 32.1 g/dL    Normal       32.0-36.5     

 

             Red Cell Distribution Width 12.9 %       Normal       11.5-14.5    

 

 

             Platelet Count, Automated 199 10       Normal       150-450       

 

             Neutrophils % 74.5 %       High         36.0-66.0     

 

             Lymph %      16.4 %       Low          24.0-44.0     

 

             Mono %       7.0 %        Normal       2.0-8.0       

 

             Eos %        1.1 %        Normal       0.0-3.0       

 

             Baso %       0.5 %        Normal       0.0-1.0       

 

             Immature Granulocyte % 0.5 %        Normal       0-3.0         

 

             Nucleated Red Blood Cell % 0.0 %        Normal       0-0           

 

             Neutrophils # 4.6 10       Normal       1.5-8.5       

 

             Lymph #      1.0 10       Low          1.5-5.0       

 

             Mono #       0.4 10       Normal       0.0-0.8       

 

             Eos #        0.1 10       Normal       0.0-0.5       

 

             Baso #       0.0 10       Normal       0.0-0.2       

 

                    Comprehensive Metabolic Profil 2021          [address] [address] [phone] Glucose, Fasting 98 mg/dL   Normal           

 

             Blood Urea Nitrogen 28 mg/dL     High         7-18          

 

             Creatinine For GFR 0.90 mg/dL   Normal       0.55-1.30     

 

             Glomerular Filtration Rate > 60.0       Normal       >45          2

 

             Sodium Level 137 mEq/L    Normal       136-145       

 

             Potassium Serum 4.7 mEq/L    Normal       3.5-5.1       

 

             Chloride Level 107 mEq/L    Normal               

 

             Carbon Dioxide Level 27 mEq/L     Normal       21-32         

 

             Anion Gap    3 mEq/L      Low          8-16          

 

             Calcium Level 10.2 mg/dL   Normal       8.8-10.2      

 

             Ast/Sgot     23 U/L       Normal       7-37          

 

             Alt/SGPT     19 U/L       Normal       12-78         

 

             Alkaline Phosphatase 77 U/L       Normal               

 

             Bilirubin,Total 0.5 mg/dL    Normal       0.2-1.0       

 

             Total Protein 8.3 GM/DL    High         6.4-8.2       

 

             Albumin      3.0 GM/DL    Low          3.2-5.2       

 

             Albumin/Globulin Ratio 0.6          Low          1.2-2.2       

 

                    Complete Blood Count 2021          [facility]

[facility], [facility]

: Dr Simon Hoyt

[place] [address] [phone] WBC        5.9 x10*3/UL            4.1 - 10.9  

 

             RBC          4.11 x10*6/UL Low          4.20 - 6.30   

 

             Hemoglobin   13.6 g/dL                 12.0 - 18.0   

 

             Hematocrit   40.0 %                    37.0 - 51.0   

 

             MCV          97.4 fL      High         80.0 - 97.0   

 

             MCH          33.1 pg      High         26.0 - 32.0   

 

             MCHC         34.0 g/dL                 31.0 - 38.0   

 

             RDW          13.2 %                    11.6 - 13.7   

 

             PLT          209 x10*3/UL              140 - 440     

 

             MPV          8.1 FL                    7.8 - 11.0    

 

             Lymph %      25.6 %                    10.0 - 58.5   

 

             Mid %        7.5 %                     1.7 - 9.3     

 

             Neut %       66.9 %                    37.0 - 92.0   

 

             Lymph #      1.5 x10*3/UL              0.6 - 4.1     

 

             Mid #        0.5 x10*3/UL              0.1 - 0.6     

 

             Neut #       3.9 x10*3/UL              2.0 - 7.8     

 

                    Laboratory test finding 2021          [facility]

[facility]

: [place] [address] [phone] Sed Rate   25 mm/hr   High       0 - 15      

 

                    Basic Metabolic Panel 2021          [facility]

[facility]

: [place] [address] [phone] Glucose    100 mg/dL  High       74 - 99    3

 

             BUN          18 mg/dL                  7 - 18        

 

             Creatinine   0.8 mg/dL                 0.6 - 1.3     

 

             Sodium       141 mEq/L                 136 - 145     

 

             Potassium    3.8 mEq/L                 3.5 - 5.1     

 

             Chloride     104 mEq/L                 98 - 107      

 

             Carbon Dioxide 29 mEq/L                  21 - 32       

 

             Calcium      10.8 mg/dL   High         8.5 - 10.1   4

 

             GFR  >= 60 mL/min              >60           

 

             GFR African American >= 60 mL/min              >60          5

 

                    Laboratory test finding 2021          [facility]

[facility]

: Dr Simon Hoyt

[place] [address] [phone] Thyroid Stimulating Hormone 0.19 uIU/mL Low        0.3

6 - 3.74  

 

                    Complete Blood Count 2021          [facility]

[facility], [facility]

: Dr Simon Hoyt

[place] [address] [phone] WBC        8.4 x10*3/UL            4.1 - 10.9  

 

             RBC          4.20 x10*6/UL              4.20 - 6.30   

 

             Hemoglobin   14.0 g/dL                 12.0 - 18.0   

 

             Hematocrit   40.3 %                    37.0 - 51.0   

 

             MCV          95.8 fL                   80.0 - 97.0   

 

             MCH          33.3 pg      High         26.0 - 32.0   

 

             MCHC         34.7 g/dL                 31.0 - 38.0   

 

             RDW          13.3 %                    11.6 - 13.7   

 

             PLT          207 x10*3/UL              140 - 440     

 

             MPV          8.0 FL                    7.8 - 11.0    

 

             Lymph %      17.5 %                    10.0 - 58.5   

 

             Mid %        4.8 %                     1.7 - 9.3     

 

             Neut %       77.7 %                    37.0 - 92.0   

 

             Lymph #      1.4 x10*3/UL              0.6 - 4.1     

 

             Mid #        0.5 x10*3/UL              0.1 - 0.6     

 

             Neut #       6.5 x10*3/UL              2.0 - 7.8     

 

                    Laboratory test finding 2021          [facility]

[facility]

: Dr Simon Hoyt

[place] [address] [phone] Sed Rate   45 mm/hr   High       0 - 15      

 

             Magnesium    1.8 mg/dL                 1.8 - 2.4     

 

                    Basic Metabolic Panel 2021          [facility]

[facility]

: Dr Simon Hoyt

[place] [address] [phone] Glucose    70 mg/dL   Low        74 - 99    6

 

             BUN          23 mg/dL     High         7 - 18        

 

             Creatinine   0.9 mg/dL                 0.6 - 1.3     

 

             Sodium       144 mEq/L                 136 - 145     

 

             Potassium    3.8 mEq/L                 3.5 - 5.1     

 

             Chloride     104 mEq/L                 98 - 107      

 

             Carbon Dioxide 32 mEq/L                  21 - 32       

 

             Calcium      10.1 mg/dL                8.5 - 10.1    

 

             GFR  >= 60 mL/min              >60           

 

             GFR African American >= 60 mL/min              >60          7

 

                    Lipid Profile       2021          [facility]

, pc

: Dr Simon Hoyt

[place] [address] [phone] Cholesterol 132 mg/dL             131 - 200   

 

             Triglycerides 72 mg/dL                  30 - 150      

 

             HDL Cholesterol 54 mg/dL                  35 - 60       

 

             LDL (Calculated) 64 CALC                   50 - 159      

 

                    Laboratory test finding 2021          [facility]

[facility]

: Dr Simon Hoyt

[place] [address] [phone] Thyroid Stimulating Hormone 0.32 uIU/mL Low        0.3

6 - 3.74  

 

                    Laboratory test finding 2021          [facility]

                                        [address] [place] [address] [phone] Digoxin Level 0.3 NG/ML  Low        0.5-2.0    8







                          1                         THERAPUTIC HUMAN INR VALUES

INDICATIONS                      NORMAL RANGES

PROPHYLAXIS/TREATMENT OF:

VENOUS THROMBOSIS                2.0-3.0

PULMONARY EMBOLISM               2.0-3.0

PREVENTION OF SYSTEMIC EMBOLISM FROM:

TISSUE HEART VALVES              2.0-3.0

ACUTE MYOCARDIAL INFARCTION      2.0-3.0

VALVULAR HEART DISEASE           2.0-3.0

ATRIAL FIBRILLATION              2.0-3.0

MECHANICAL VALVES(HIGH RISK)     2.5-3.5

RECURRENT MYOCARDIAL INFARCTION  2.5-3.5



 

                          2                         Units are mL/min/1.73 m2



Chronic Kidney Disease Staging per NKF:



Stage I & II   GFR >=60       Normal to Mildly Decreased

Stage III      GFR 30-59      Moderately Decreased

Stage IV       GFR 15-29      Severely Decreased

Stage V        GFR <15        Very Little GFR Left

ESRD           GFR <15 on RRT



 

                          3                         100-125 mg/dL     PRE-DIABET

ES/FASTING

>126 mg/dL          DIABETES/FASTING



 

                          4                         NOTE:

CALCIUM,TSH VERIFIED







 

                          5                         CHRONIC KIDNEY DISEASE STAGI

NG PER NKF



STAGE I & II      GFR >= 60        NORMAL TO MILDLY DECREASED

STAGE III          GFR 30-59          MODERATELY DECREASED

STAGE IV           GFR 15-29         SEVERELY DECREASED

STAGE V            GFR <15            VERY LITTLE GFR LEFT

ESRD                 GFR <15            ON RRT



 

                          6                         100-125 mg/dL     PRE-DIABET

ES/FASTING

>126 mg/dL          DIABETES/FASTING



 

                          7                         CHRONIC KIDNEY DISEASE STAGI

NG PER NKF



STAGE I & II      GFR >= 60        NORMAL TO MILDLY DECREASED

STAGE III          GFR 30-59          MODERATELY DECREASED

STAGE IV           GFR 15-29         SEVERELY DECREASED

STAGE V            GFR <15            VERY LITTLE GFR LEFT

ESRD                 GFR <15            ON RRT



 

                          8                         note:<nlbl:[email or web address]

ed>









Procedures





                Date            Code            Description     Status

 

                10/06/2021      14750           Office/Outpatient Established Lo

w MDM 20-29 Min Completed

 

                    2021          98681               Chronic Care MGMT 20

 Mins Clinical Staff Time Per Calendar 

Month                                   Completed

 

                2021      45124           Chronic Care Management Services

 Ea Addl 20 Min Completed

 

                2021      58904           Complex Chronic Care MGMT Servic

e Ea Addl 30 Min Completed

 

                2021      92907           Complex Chronic Care Management 

SVC 1St 60 Min Completed

 

                2021      57833           Complex Chronic Care MGMT Servic

e Ea Addl 30 Min Completed

 

                2021      09200           [facility] 

SVC 1St 60 Min Completed

 

                2021      11599           Office/Outpatient Established Mo

d MDM 30-39 Min Completed

 

                2021      62557           [facility]

e Ea Addl 30 Min Completed

 

                2021      89071           [facility] 

SVC 1St 60 Min Completed

 

                2021      20655           [facility]

e Ea Addl 30 Min Completed

 

                2021      50468           [facility] 

SVC 1St 60 Min Completed

 

                2021      35111           Office/Outpatient Established Mo

d MDM 30-39 Min Completed

 

                2021      [id number]       Bone Mineral Density Test Comple

veronika

 

                2021      [id number]        Mammogram       Completed







Medical Devices





                                        Description

 

                                        No Information Available







Encounters





           Type       Date       Location   Provider   Dx         Diagnosis

 

                Office Visit    10/06/2021  2:20p [place] [facility]                        S81.802A                  Unspecified open wound, left

 lower leg, initial encounter

 

                          Z23                       Encounter for immunization

 

                Office Visit    2021  1:30p [place] HEIDY Jeffers M.D.                      I48.20                    Chronic atrial fibrillation,

 unspecified

 

                          Z79.01                    Long term (current) use of a

nticoagulants

 

                          R60.9                     Edema, unspecified

 

                          I10                       Essential (primary) hyperten

darci

 

                          E05.90                    Thyrotoxicosis, unsp without

 thyrotoxic crisis or storm

 

                          I82.502                   Chronic embolism and thombos

 unsp deep veins of l low extrem

 

                          F17.210                   Nicotine dependence, cigaret

svitlana, uncomplicated

 

                          F32.89                    Other specified depressive e

pisodes

 

                          D47.2                     Monoclonal gammopathy

 

                          K21.9                     Gastro-esophageal reflux dis

ease without esophagitis

 

                          M19.90                    Unspecified osteoarthritis, 

unspecified site

 

                          E78.00                    Pure hypercholesterolemia, u

nspecified

 

                Office Visit    2021  1:30p [place] [facility] meza M.D.                      I48.20                    Chronic atrial fibrillation,

 unspecified

 

                          I10                       Essential (primary) hyperten

darci

 

                          E05.90                    Thyrotoxicosis, unsp without

 thyrotoxic crisis or storm

 

                          I82.502                   Chronic embolism and thombos

 unsp deep veins of l low extrem

 

                          Z79.01                    Long term (current) use of a

nticoagulants

 

                          F17.210                   Nicotine dependence, cigaret

svitlana, uncomplicated

 

                          F32.89                    Other specified depressive e

pisodes

 

                          E83.52                    Hypercalcemia

 

                          D47.2                     Monoclonal gammopathy

 

                          K21.9                     Gastro-esophageal reflux dis

ease without esophagitis

 

                          M15.9                     Polyosteoarthritis, unspecif

ied

 

                          E78.00                    Pure hypercholesterolemia, u

nspecified







Assessments





                Date            Code            Description     Provider

 

                10/06/2021      S81.802A        Unspecified open wound, left low

er leg, initial encounter 

AIXA Hoffmann JR

 

                10/06/2021      Z23             Encounter for immunization AIXA Joseph JR

 

                2021      I10             Essential (primary) hypertension

 Charmaine Regalado M.D.

 

                2021      K21.9           Gastro-esophageal reflux disease

 without esophagitis Charmaine Regalado M.D.

 

                2021      E78.00          Pure hypercholesterolemia, unspe

cified Charmaine Regalado M.D.

 

                2021      I10             Essential (primary) hypertension

 Charmaine Regalado M.D.

 

                2021      K21.9           Gastro-esophageal reflux disease

 without esophagitis Charmaine Regalado M.D.

 

                2021      E78.00          Pure hypercholesterolemia, unspe

cified Charmaine Regalado M.D.

 

                2021      M15.9           Polyosteoarthritis, unspecified 

Charmaine Regalado M.D.

 

                2021      I10             Essential (primary) hypertension

 Charmaine Regalado M.D.

 

                2021      K21.9           Gastro-esophageal reflux disease

 without esophagitis Charmaine Regalado M.D.

 

                    2021          I82.502             Chronic embolism and

 thrombosis of unspecified deep veins of 

left lower extremity                    Charmaine Regalado M.D.

 

                2021      I48.20          Chronic atrial fibrillation, uns

pecminoo Regalado M.D.

 

                2021      Z79.01          Long term (current) use of antic

oagulants Charmaine Regalado M.D.

 

                2021      R60.9           Edema, unspecified Charmaine guido M.D.

 

                2021      I10             Essential (primary) hypertension

 Charmaine Regalado M.D.

 

                2021      E05.90          Thyrotoxicosis, unspecified with

out thyrotoxic crisis or sto 

Charmaine Regalado M.D.

 

                    2021          I82.502             Chronic embolism and

 thrombosis of unspecified deep veins of 

left lower extremity                    Charmaine Regalado M.D.

 

                2021      F17.210         Nicotine dependence, cigarettes,

 uncomplicated Charmaine Regalado M.D.

 

                2021      F32.89          Other specified depressive episo

emil Charmaine Regalado M.D.

 

                2021      D47.2           Monoclonal gammopathy Charmaine duron M.D.

 

                2021      K21.9           Gastro-esophageal reflux disease

 without esophagitis Charmaine Regalado M.D.

 

                2021      M19.90          Unspecified osteoarthritis, unsp

ecified site Charmaine Regalado M.D.

 

                2021      E78.00          Pure hypercholesterolemia, unspe

cified Charmaine Regalado M.D.

 

                2021      I10             Essential (primary) hypertension

 Charmaine Regalado M.D.

 

                2021      K21.9           Gastro-esophageal reflux disease

 without esophagitis Charmaine Regalado M.D.

 

                2021      E78.00          Pure hypercholesterolemia, unspe

cified Charmaine Regalado M.D.

 

                2021      I48.20          Chronic atrial fibrillation, uns

pecified Charmaine Regalado M.D.

 

                2021      F17.210         Nicotine dependence, cigarettes,

 uncomplicated Charmaine Regalado M.D.

 

                2021      K21.9           Gastro-esophageal reflux disease

 without esophagitis Charmaine Regalado M.D.

 

                2021      E78.00          Pure hypercholesterolemia, unspe

cisonia Regalado M.D.

 

                2021      I48.20          Chronic atrial fibrillation, uns

pecminoo Regalado M.D.

 

                2021      I10             Essential (primary) hypertension

 Charmaine Regalado M.D.

 

                2021      E05.90          Thyrotoxicosis, unspecified with

out thyrotoxic crisis or sto 

Charmaine Regalado M.D.

 

                    2021          I82.502             Chronic embolism and

 thrombosis of unspecified deep veins of 

left lower extremity                    Charmaine Regalado M.D.

 

                2021      Z79.01          Long term (current) use of antic

oagulants Charmaine Regalado M.D.

 

                2021      F17.210         Nicotine dependence, cigarettes,

 uncomplicated Charmaine Regalado M.D.

 

                2021      F32.89          Other specified depressive episo

emil Charmaine Regalado M.D.

 

                2021      E83.52          Hypercalcemia   Charmaine xavier M.D.

 

                2021      D47.2           Monoclonal gammopathy Charmaine duron M.D.

 

                2021      K21.9           Gastro-esophageal reflux disease

 without esophagitis Charmaine Regalado M.D.

 

                2021      M15.9           Polyosteoarthritis, unspecified 

Charmaine Regalado M.D.

 

                2021      E78.00          Pure hypercholesterolemia, unspe

cified Charmaine Regalado M.D.







Plan of Treatment

Future Appointment(s):* 2021  3:15 pm - Charmaine Regalado M.D. at 
  [facility]





Functional Status





                                        Description

 

                                        No Information Available







Mental Status





                                        Description

 

                                        No Information Available







Referrals





                Refer to Dr     Reason for Referral Status          Appt Date

 

                          Stillerman,James MD       STAT CONSULT FOR OPEN WOUND 

LT LEG PLEASE LET OFFICE KNOW 

WHEN APPT IS MADE         Created                   

 

                                        [facility]

 

                                        [address]

 

                                        [place]  [address]

 

                                        [phone]

 

                                          

 

                Rock Werner MD CONSULT FOR PVD WITH OPEN WOUND LT LEG  Patient

 Declined 

10/20/2021

 

                                        [facility]

 

                                        [address]

 

                                        [facility] [id number]

 

                                        [phone]

## 2021-10-25 NOTE — CCD
Continuity of Care Document (CCD)

                             Created on: 10/19/2021



Juany Becerril

External Reference #: [id number]

: 1955

Sex: Female



Demographics





                          Address                   [address]

[place]  [address]

 

                          Home Phone                [phone]

 

                          Preferred Language        Unknown

 

                          Marital Status            Unknown

 

                          Mormonism Affiliation     Unknown

 

                          Race                      White

 

                          Ethnic Group              Not  or 





Author





                          Author                    Juany ESQUEDA PA

 

                          Organization              Unknown

 

                          Address                   [address] [facility] [address]

[place]  [address]



 

                          Phone                     [phone]







Care Team Providers





                    Care Team Member Name Role                Phone

 

                    Yuri Maier MD       AUTM                [phone]

 

                    [facility]  AUTM                [phone]

 

                    John Lutz MD AUTM                Unavailable

 

                    [facility] Hematology/Oncology AUTM                [phone]

 

                    Hansa Hernandez FNP  AUTM                [phone]







Problems





                    Active Problems     Provider            Date

 

                    Chronic atrial fibrillation Protime             Onset: 

 

                    Essential hypertension Juany Vincent FNP Onset: 2017

 

                    Deep venous thrombosis of lower extremity Juany Vincent FN P Onset: 2017

 

                    Pure hypercholesterolemia Juany Vincent FNP Onset: 

017

 

                    Anxiety state       Juany Vincent FNP Onset: 2017

 

                    Chronic pulmonary embolism Juany Vincent FNP Onset: 2017

 

                    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 

2017

 

                    Gastroesophageal reflux disease Juany Vincent FNP Onset: 1

2017

 

                    Scoliosis deformity of spine Juany Vincent FNP Onset: 2017

 

                    Chronic tension-type headache Juany Vincent FNP Onset: 2017

 

                    Varicose veins of lower extremity Juany Vincent FNP Onset:

 2017

 

                    Tobacco user        Juany Vincent FNP Onset: 2017

 

                    Hypercalcemia       Juany Vincent FNP Onset: 2017







Social History





                Type            Date            Description     Comments

 

                Birth Sex                       Unknown          

 

                ETOH Use                        Consumes 3 glasses of wine per w

[place]  

 

                Tobacco Use     Start: Unknown  Patient is a current smoker, smo

kes every day STARTED

AGE 30 1-3 CIGARETTES A DAY 







Allergies and adverse reactions





             Active Allergies Criticality  Reaction | Severity Comments     Date

 

             Ibuprofen    Unable to assess criticality STOMACH UPSET HEADACHE mo

david       10/17/2017

 

             Latex        Unable to assess criticality rash, itches | Mild      

        10/17/2018







Medications





           Active Medications SIG        Qnty       Indications Ordering Provide

r Date

 

                          Cephalexin                     500mg Capsules         

          take one tablet 

by mouth 3 times daily x 7 days 21caps                          Julio kothari JR PA 10/06/2021

 

                          Excedrin Migraine                     341-253-22ml Tab

lets                   1 

as needed h/a as needed mdd=1                                 Charmaine Regalado M.D. 2021

 

                          Famotidine                     20mg Tablets           

        1 by mouth every 

day             90tabs                          Charmaine Regalado M.D. 20

21

 

                          Furosemide                     20mg Tablets           

        1 by mouth twice a

day             180tabs                         Charmaine Regalado M.D. 20

21

 

                                        Bupropion Hydrochloride ER (XL)         

            150mg Tablets ER 24HR       

                take 1 tablet by mouth in the morning 90tabs                    

      Charmaine Regalado M.D.                                    2021

 

                          Eliquis                     5mg Tablets               

    take one tablet by 

mouth twice a day 180tabs                         Charmaine Regalado M.D. 2020

 

                          Methimazole                     5mg Tablets           

        2 every day by 

mouth           180tabs                         Charmaine Regalado M.D. 20

20

 

                          Shingrix                     50mcg/0.5ML Suspension Re

c                   

administer at pharmacy 1units                          Juany Vincent FNP 

 

                          Voltaren                     1% Gel                   

apply up to 4 grams three 

times a day to affected knee as needed 100gm                           Charmaine Regalado M.D. 

2018

 

                          Aspirin Adult Low Dose                     81mg Tablet

s DR                   1 

by mouth every day                                 Juany Vincent FNP 10/17/201

7

 

                                        Acetaminophen Extra Strength            

         500mg Tablets                  

             take 2 tabs every 6 hours as needed                           Juany Fung FNP 10/17/2017

 

                                        Womens 50+ Multi Vitamin & Mineral Formu

la                      Tablets         

             1 every day PO Vit S=0688Ma LE=545                           Juany Vincent FNP 10/17/2017

 

                          Metoprolol Tartrate                     25mg Tablets  

                 Take One 

Tablet By Mouth Twice A Day 180tabs                         ABELARDO Saleem 10/17/2017

 

                          Atorvastatin Calcium                     20mg Tablets 

                  take one

tablet by mouth every day 90tabs                          JANE Saleem 10/17/2017

 

                          Flecainide Acetate                     100mg Tablets  

                 take one 

tablet by mouth twice a day 180tabs                         ABELARDO aSleem 10/17/2017

 

                          Digoxin                     250mcg Tablets            

       1 by mouth every 

day             90tabs                          Charmaine Regalado M.D. 







Medications Administered in Office





           Medication SIG        Qnty       Indications Ordering Provider Date

 

                          Administration Of Flu Vaccine                      Inj

ection                    

                                                AIXA Hoffmann JR 10/06/2

021

 

                          Administration Of Flu Vaccine                      Inj

karen Regalado M.D. 10/15/20

20

 

                          Administration Of Flu Vaccine                      Inj

diyaion                    

                                                Juany Vincent FNP 10/17/2019

 

                          Administration Of Flu Vaccine                      Inj

ection                    

                                                Juany Vincent FNP 10/17/2018

 

                          Administration Of Flu Vaccine                      Inj

Juany Alonso FNP 10/17/2017







Immunizations





             CPT Code     Status       Date         Vaccine      Lot #

 

                [id number]           Given           10/06/2021      Influenza Vaccin

e Quadrivalent Preser/Antibiotic Free Im 

Use                                     [id number]

 

                [id number]           Given           10/15/2020      Influenza Vaccin

e Quadrivalent Preser/Antibiotic Free Im 

Use                                     [id number]

 

                [id number]           Given           2020      Shingrix Zoster 

Vaccine (HZV), Recombinant, Subunit, 

Adjuvanted                               

 

                [id number]           Given           10/17/2019      Influenza Vaccin

e Quadrivalent Preser/Antibiotic Free Im 

Use                                     [id number]

 

                29758           Given           10/17/2018      Influenza Virus 

Vaccine, Quadrivalent (Cciiv4), Derived 

From Cell                               [id number]

 

             57285        Given        2018   Pneumovax 23 [id number]

 

                15044           Given           10/17/2017      Influenza Vaccin

e Quadrivalent Preser/Antibiotic Free Im 

Use                                     [id number]







Vital Signs





                Date            Vital           Result          Comment

 

                10/06/2021  2:17pm BP Systolic     124 mmHg         

 

                    BP Diastolic        68 mmHg              

 

                    Heart Rate          86 /min              

 

                    Height              66 inches           5'6"

 

                    Weight              217.00 lb            

 

                    BMI (Body Mass Index) 35.0 kg/m2           

 

                2021  1:31pm BP Systolic     136 mmHg        RT Arm

 

                    BP Diastolic        88 mmHg             RT Arm

 

                    Heart Rate          120 /min             

 

                    Height              66 inches           5'6"

 

                    Weight              223.50 lb            

 

                    BMI (Body Mass Index) 36.1 kg/m2           







Results





        Test    Acquired Date Facility Test    Result  H/L     Range   Note

 

                    Laboratory test finding 10/06/2021          [facility]

                                        [address] [place] [address] [phone] Wound Culture <pending>                         

 

                    CBC With Differential 2021          [facility]

                                        [address] [place] [phone] [phone] White Blood Count 6.8 10     Normal     4.0-10.0    

 

             Red Blood Count 3.85 10      Low          4.00-5.40     

 

             Hemoglobin   12.7 g/dL    Normal       12.0-15.5     

 

             Hematocrit   39.4 %       Normal       36.0-47.0     

 

             Mean Corpuscular Volume 102.3 fl     High         80.0-96.0     

 

             Mean Corpuscular Hemoglobin 33.0 pg      Normal       27.0-33.0    

 

 

             Mean Corpuscular HGB Conc 32.2 g/dL    Normal       32.0-36.5     

 

             Red Cell Distribution Width 12.3 %       Normal       11.5-14.5    

 

 

             Platelet Count, Automated 187 10       Normal       150-450       

 

             Neutrophils % 72.2 %       High         36.0-66.0     

 

             Lymph %      19.9 %       Low          24.0-44.0     

 

             Mono %       6.0 %        Normal       2.0-8.0       

 

             Eos %        1.5 %        Normal       0.0-3.0       

 

             Baso %       0.3 %        Normal       0.0-1.0       

 

             Immature Granulocyte % 0.1 %        Normal       0-3.0         

 

             Nucleated Red Blood Cell % 0.0 %        Normal       0-0           

 

             Neutrophils # 4.9 10       Normal       1.5-8.5       

 

             Lymph #      1.4 10       Low          1.5-5.0       

 

             Mono #       0.4 10       Normal       0.0-0.8       

 

             Eos #        0.1 10       Normal       0.0-0.5       

 

             Baso #       0.0 10       Normal       0.0-0.2       

 

                    PT & Aptt           2021          [facility]

nter

                                        [address] [place] [address] [phone] Prothrombin Time 13.8 seconds Normal     12.5-14.3   

 

             Inr          1.04         Normal                    1

 

             Partial Thromboplastin Time 30.6 seconds Normal       24.2-38.5    

 

 

                    CBC With Differential 2021          [address] [address] [phone] White Blood Count 6.2 10     Normal     4.0-10.0    

 

             Red Blood Count 3.76 10      Low          4.00-5.40     

 

             Hemoglobin   12.5 g/dL    Normal       12.0-15.5     

 

             Hematocrit   39.0 %       Normal       36.0-47.0     

 

             Mean Corpuscular Volume 103.7 fl     High         80.0-96.0     

 

             Mean Corpuscular Hemoglobin 33.2 pg      High         27.0-33.0    

 

 

             Mean Corpuscular HGB Conc 32.1 g/dL    Normal       32.0-36.5     

 

             Red Cell Distribution Width 12.9 %       Normal       11.5-14.5    

 

 

             Platelet Count, Automated 199 10       Normal       150-450       

 

             Neutrophils % 74.5 %       High         36.0-66.0     

 

             Lymph %      16.4 %       Low          24.0-44.0     

 

             Mono %       7.0 %        Normal       2.0-8.0       

 

             Eos %        1.1 %        Normal       0.0-3.0       

 

             Baso %       0.5 %        Normal       0.0-1.0       

 

             Immature Granulocyte % 0.5 %        Normal       0-3.0         

 

             Nucleated Red Blood Cell % 0.0 %        Normal       0-0           

 

             Neutrophils # 4.6 10       Normal       1.5-8.5       

 

             Lymph #      1.0 10       Low          1.5-5.0       

 

             Mono #       0.4 10       Normal       0.0-0.8       

 

             Eos #        0.1 10       Normal       0.0-0.5       

 

             Baso #       0.0 10       Normal       0.0-0.2       

 

                    Comprehensive Metabolic Profil 2021          [address] [address] [phone] Glucose, Fasting 98 mg/dL   Normal           

 

             Blood Urea Nitrogen 28 mg/dL     High         7-18          

 

             Creatinine For GFR 0.90 mg/dL   Normal       0.55-1.30     

 

             Glomerular Filtration Rate > 60.0       Normal       >45          2

 

             Sodium Level 137 mEq/L    Normal       136-145       

 

             Potassium Serum 4.7 mEq/L    Normal       3.5-5.1       

 

             Chloride Level 107 mEq/L    Normal               

 

             Carbon Dioxide Level 27 mEq/L     Normal       21-32         

 

             Anion Gap    3 mEq/L      Low          8-16          

 

             Calcium Level 10.2 mg/dL   Normal       8.8-10.2      

 

             Ast/Sgot     23 U/L       Normal       7-37          

 

             Alt/SGPT     19 U/L       Normal       12-78         

 

             Alkaline Phosphatase 77 U/L       Normal               

 

             Bilirubin,Total 0.5 mg/dL    Normal       0.2-1.0       

 

             Total Protein 8.3 GM/DL    High         6.4-8.2       

 

             Albumin      3.0 GM/DL    Low          3.2-5.2       

 

             Albumin/Globulin Ratio 0.6          Low          1.2-2.2       

 

                    Complete Blood Count 2021          [facility]

s, pc

: Dr Simon Hoyt

[place] [address] [phone] WBC        5.9 x10*3/UL            4.1 - 10.9  

 

             RBC          4.11 x10*6/UL Low          4.20 - 6.30   

 

             Hemoglobin   13.6 g/dL                 12.0 - 18.0   

 

             Hematocrit   40.0 %                    37.0 - 51.0   

 

             MCV          97.4 fL      High         80.0 - 97.0   

 

             MCH          33.1 pg      High         26.0 - 32.0   

 

             MCHC         34.0 g/dL                 31.0 - 38.0   

 

             RDW          13.2 %                    11.6 - 13.7   

 

             PLT          209 x10*3/UL              140 - 440     

 

             MPV          8.1 FL                    7.8 - 11.0    

 

             Lymph %      25.6 %                    10.0 - 58.5   

 

             Mid %        7.5 %                     1.7 - 9.3     

 

             Neut %       66.9 %                    37.0 - 92.0   

 

             Lymph #      1.5 x10*3/UL              0.6 - 4.1     

 

             Mid #        0.5 x10*3/UL              0.1 - 0.6     

 

             Neut #       3.9 x10*3/UL              2.0 - 7.8     

 

                    Laboratory test finding 2021          [facility]

[facility]

: Dr Simon Hoyt

[place] [address] [phone] Sed Rate   25 mm/hr   High       0 - 15      

 

                    Basic Metabolic Panel 2021          [facility]

ts, pc

: Dr Simon Hoyt

[place] [address] [phone] Glucose    100 mg/dL  High       74 - 99    3

 

             BUN          18 mg/dL                  7 - 18        

 

             Creatinine   0.8 mg/dL                 0.6 - 1.3     

 

             Sodium       141 mEq/L                 136 - 145     

 

             Potassium    3.8 mEq/L                 3.5 - 5.1     

 

             Chloride     104 mEq/L                 98 - 107      

 

             Carbon Dioxide 29 mEq/L                  21 - 32       

 

             Calcium      10.8 mg/dL   High         8.5 - 10.1   4

 

             GFR  >= 60 mL/min              >60           

 

             GFR African American >= 60 mL/min              >60          5

 

                    Laboratory test finding 2021          [facility]

[facility]

: Dr Simon Hoyt

[place] [address] [phone] Thyroid Stimulating Hormone 0.19 uIU/mL Low        0.3

6 - 3.74  

 

                    Complete Blood Count 2021          [facility]

[facility], pc

: Dr Simon Hoyt

[place] [id number] [phone] WBC        8.4 x10*3/UL            4.1 - 10.9  

 

             RBC          4.20 x10*6/UL              4.20 - 6.30   

 

             Hemoglobin   14.0 g/dL                 12.0 - 18.0   

 

             Hematocrit   40.3 %                    37.0 - 51.0   

 

             MCV          95.8 fL                   80.0 - 97.0   

 

             MCH          33.3 pg      High         26.0 - 32.0   

 

             MCHC         34.7 g/dL                 31.0 - 38.0   

 

             RDW          13.3 %                    11.6 - 13.7   

 

             PLT          207 x10*3/UL              140 - 440     

 

             MPV          8.0 FL                    7.8 - 11.0    

 

             Lymph %      17.5 %                    10.0 - 58.5   

 

             Mid %        4.8 %                     1.7 - 9.3     

 

             Neut %       77.7 %                    37.0 - 92.0   

 

             Lymph #      1.4 x10*3/UL              0.6 - 4.1     

 

             Mid #        0.5 x10*3/UL              0.1 - 0.6     

 

             Neut #       6.5 x10*3/UL              2.0 - 7.8     

 

                    Laboratory test finding 2021          [facility]

[facility]

: Dr Simon Hoyt

[place] [address] [phone] Sed Rate   45 mm/hr   High       0 - 15      

 

             Magnesium    1.8 mg/dL                 1.8 - 2.4     

 

                    Basic Metabolic Panel 2021          [facility]

[facility], [facility]

: Dr Simon Hoyt

[place] [address] [phone] Glucose    70 mg/dL   Low        74 - 99    6

 

             BUN          23 mg/dL     High         7 - 18        

 

             Creatinine   0.9 mg/dL                 0.6 - 1.3     

 

             Sodium       144 mEq/L                 136 - 145     

 

             Potassium    3.8 mEq/L                 3.5 - 5.1     

 

             Chloride     104 mEq/L                 98 - 107      

 

             Carbon Dioxide 32 mEq/L                  21 - 32       

 

             Calcium      10.1 mg/dL                8.5 - 10.1    

 

             GFR  >= 60 mL/min              >60           

 

             GFR African American >= 60 mL/min              >60          7

 

                    Lipid Profile       2021          [facility]

, pc

: Dr Simon Hoyt

[place] [address] [phone] Cholesterol 132 mg/dL             131 - 200   

 

             Triglycerides 72 mg/dL                  30 - 150      

 

             HDL Cholesterol 54 mg/dL                  35 - 60       

 

             LDL (Calculated) 64 CALC                   50 - 159      

 

                    Laboratory test finding 2021          [facility]

[facility]

: Dr Simon Hoyt

[place] [address] [phone] Thyroid Stimulating Hormone 0.32 uIU/mL Low        0.3

6 - 3.74  

 

                    Laboratory test finding 2021          [facility]

                                        [address] [place] [address] [phone] Digoxin Level 0.3 NG/ML  Low        0.5-2.0    8







                          1                         THERAPUTIC HUMAN INR VALUES

INDICATIONS                      NORMAL RANGES

PROPHYLAXIS/TREATMENT OF:

VENOUS THROMBOSIS                2.0-3.0

PULMONARY EMBOLISM               2.0-3.0

PREVENTION OF SYSTEMIC EMBOLISM FROM:

TISSUE HEART VALVES              2.0-3.0

ACUTE MYOCARDIAL INFARCTION      2.0-3.0

VALVULAR HEART DISEASE           2.0-3.0

ATRIAL FIBRILLATION              2.0-3.0

MECHANICAL VALVES(HIGH RISK)     2.5-3.5

RECURRENT MYOCARDIAL INFARCTION  2.5-3.5



 

                          2                         Units are mL/min/1.73 m2



Chronic Kidney Disease Staging per NKF:



Stage I & II   GFR >=60       Normal to Mildly Decreased

Stage III      GFR 30-59      Moderately Decreased

Stage IV       GFR 15-29      Severely Decreased

Stage V        GFR <15        Very Little GFR Left

ESRD           GFR <15 on RRT



 

                          3                         100-125 mg/dL     PRE-DIABET

ES/FASTING

>126 mg/dL          DIABETES/FASTING



 

                          4                         NOTE:

CALCIUM,TSH VERIFIED







 

                          5                         CHRONIC KIDNEY DISEASE STAGI

NG PER NKF



STAGE I & II      GFR >= 60        NORMAL TO MILDLY DECREASED

STAGE III          GFR 30-59          MODERATELY DECREASED

STAGE IV           GFR 15-29         SEVERELY DECREASED

STAGE V            GFR <15            VERY LITTLE GFR LEFT

ESRD                 GFR <15            ON RRT



 

                          6                         100-125 mg/dL     PRE-DIABET

ES/FASTING

>126 mg/dL          DIABETES/FASTING



 

                          7                         CHRONIC KIDNEY DISEASE STAGI

NG PER NKF



STAGE I & II      GFR >= 60        NORMAL TO MILDLY DECREASED

STAGE III          GFR 30-59          MODERATELY DECREASED

STAGE IV           GFR 15-29         SEVERELY DECREASED

STAGE V            GFR <15            VERY LITTLE GFR LEFT

ESRD                 GFR <15            ON RRT



 

                          8                         note:<nlbl:[email or web address]

ed>









Procedures





                Date            Code            Description     Status

 

                10/06/2021      39779           Office/Outpatient Established Lo

w MDM 20-29 Min Completed

 

                    2021          19451               Chronic Care MGMT 20

 Mins Clinical Staff Time Per Calendar 

Month                                   Completed

 

                2021      81143           Chronic Care Management Services

 Ea Addl 20 Min Completed

 

                2021      94408           Complex Chronic Care MGMT Servic

e Ea Addl 30 Min Completed

 

                2021      65588           Complex Chronic Care Management 

SVC 1[address] 60 Min Completed

 

                2021      78428           Complex Chronic Care MGMT Servic

e Ea Addl 30 Min Completed

 

                2021      58487           Complex Chronic Care Management 

SVC [address] 60 Min Completed

 

                2021      93007           Office/Outpatient Established Mo

d MDM 30-39 Min Completed

 

                2021      02373           [facility]

e Ea Addl 30 Min Completed

 

                2021      19668           Complex Chronic Care Management 

SVC 1St 60 Min Completed

 

                2021      87653           [facility]

e Ea Addl 30 Min Completed

 

                2021      00662           Complex Chronic Care Management 

SVC 1St 60 Min Completed

 

                2021      23815           Office/Outpatient Established Mo

d MDM 30-39 Min Completed

 

                2021      [id number]       Bone Mineral Density Test Comple

veronika

 

                2021      [id number]        Mammogram       Completed







Medical Devices





                                        Description

 

                                        No Information Available







Encounters





           Type       Date       Location   Provider   Dx         Diagnosis

 

                Office Visit    [facility]06/[facility]  [facility]                        S81.802A                  Unspecified open wound, left

 lower leg, initial encounter

 

                          Z23                       Encounter for immunization

 

                Office Visit    2021  1[facility]30p [facility]                      I48.20                    Chronic atrial fibrillation,

 unspecified

 

                          Z79.01                    Long term (current) use of a

nticoagulants

 

                          R60.9                     Edema, unspecified

 

                          I10                       Essential (primary) hyperten

darci

 

                          E05.90                    Thyrotoxicosis, unsp without

 thyrotoxic crisis or storm

 

                          I82.502                   Chronic embolism and thombos

 unsp deep veins of l low extrem

 

                          F17.210                   Nicotine dependence, cigaret

svitlana, uncomplicated

 

                          F32.89                    Other specified depressive e

pisodes

 

                          D47.2                     Monoclonal gammopathy

 

                          K21.9                     Gastro-esophageal reflux dis

ease without esophagitis

 

                          M19.90                    Unspecified osteoarthritis, 

unspecified site

 

                          E78.00                    Pure hypercholesterolemia, u

nspecified

 

                Office Visit    2021  [facility]                      I48.20                    Chronic atrial fibrillation,

 unspecified

 

                          I10                       Essential (primary) hyperten

darci

 

                          E05.90                    Thyrotoxicosis, unsp without

 thyrotoxic crisis or storm

 

                          I82.502                   Chronic embolism and thombos

 unsp deep veins of l low extrem

 

                          Z79.01                    Long term (current) use of a

nticoagulants

 

                          F17.210                   Nicotine dependence, cigaret

svitlana, uncomplicated

 

                          F32.89                    Other specified depressive e

pisodes

 

                          E83.52                    Hypercalcemia

 

                          D47.2                     Monoclonal gammopathy

 

                          K21.9                     Gastro-esophageal reflux dis

ease without esophagitis

 

                          M15.9                     Polyosteoarthritis, unspecif

ied

 

                          E78.00                    Pure hypercholesterolemia, u

nspecified







Assessments





                Date            Code            Description     Provider

 

                10/06/2021      S81.802A        Unspecified open wound, left low

er leg, initial encounter 

AIXA Hoffmann JR

 

                10/06/2021      Z23             Encounter for immunization AIXA Joseph JR

 

                2021      I10             Essential (primary) hypertension

 Charmaine Regalado M.D.

 

                2021      K21.9           Gastro-esophageal reflux disease

 without esophagitis Charmaine Regalado M.D.

 

                2021      E78.00          Pure hypercholesterolemia, unspe

cified Charmaine Regalado M.D.

 

                2021      I10             Essential (primary) hypertension

 Charmaine Regalado M.D.

 

                2021      K21.9           Gastro-esophageal reflux disease

 without esophagitis Charmaine Regalado M.D.

 

                2021      E78.00          Pure hypercholesterolemia, unspe

cified Charmaine Regalado M.D.

 

                2021      M15.9           Polyosteoarthritis, unspecified 

Charmaine Regalado M.D.

 

                2021      I10             Essential (primary) hypertension

 Charmaine Regalado M.D.

 

                2021      K21.9           Gastro-esophageal reflux disease

 without esophagitis Charmaine Regalado M.D.

 

                    2021          I82.502             Chronic embolism and

 thrombosis of unspecified deep veins of 

left lower extremity                    Charmaine Regalado M.D.

 

                2021      I48.20          Chronic atrial fibrillation, uns

pecified Charmaine Regalado M.D.

 

                2021      Z79.01          Long term (current) use of antic

oagulants Charmaine Regalado M.D.

 

                2021      R60.9           Edema, unspecified Charmaine guido M.D.

 

                2021      I10             Essential (primary) hypertension

 Charmaine Regalado M.D.

 

                2021      E05.90          Thyrotoxicosis, unspecified with

out thyrotoxic crisis or sto 

Charmaine Regalado M.D.

 

                    2021          I82.502             Chronic embolism and

 thrombosis of unspecified deep veins of 

left lower extremity                    Charmaine Regalado M.D.

 

                2021      F17.210         Nicotine dependence, cigarettes,

 uncomplicated Charmaine Regalado M.D.

 

                2021      F32.89          Other specified depressive episo

emil Charmaine Regalado M.D.

 

                2021      D47.2           Monoclonal gammopathy Charmaine duron M.D.

 

                2021      K21.9           Gastro-esophageal reflux disease

 without esophagitis Charmaine Regalado M.D.

 

                2021      M19.90          Unspecified osteoarthritis, unsp

ecified site Charmaine Regalado M.D.

 

                2021      E78.00          Pure hypercholesterolemia, unspe

cified Charamine Regalado M.D.

 

                2021      I10             Essential (primary) hypertension

 Charmaine Regalado M.D.

 

                2021      K21.9           Gastro-esophageal reflux disease

 without esophagitis Charmaine Regalado M.D.

 

                2021      E78.00          Pure hypercholesterolemia, unspe

cisonia Regalado M.D.

 

                2021      I48.20          Chronic atrial fibrillation, uns

pecified Charmaine Regalado M.D.

 

                2021      F17.210         Nicotine dependence, cigarettes,

 uncomplicated Charmaine Regalado M.D.

 

                2021      K21.9           Gastro-esophageal reflux disease

 without esophagitis Charmaine Regalado M.D.

 

                2021      E78.00          Pure hypercholesterolemia, unspe

cisonia Regalado M.D.

 

                2021      I48.20          Chronic atrial fibrillation, uns

pecminoo Regalado M.D.

 

                2021      I10             Essential (primary) hypertension

 Charmaine Regalado M.D.

 

                2021      E05.90          Thyrotoxicosis, unspecified with

out thyrotoxic crisis or sto 

Charmaine Regalado M.D.

 

                    2021          I82.502             Chronic embolism and

 thrombosis of unspecified deep veins of 

left lower extremity                    Charmaine Regalado M.D.

 

                2021      Z79.01          Long term (current) use of antic

oagulants Charmaine Regalado M.D.

 

                2021      F17.210         Nicotine dependence, cigarettes,

 uncomplicated Charmaine Regalado M.D.

 

                2021      F32.89          Other specified depressive episo

emil Charmaine Regalado M.D.

 

                2021      E83.52          Hypercalcemia   Charmaine xavier M.D.

 

                2021      D47.2           Monoclonal gammopathy Charmaine duron M.D.

 

                2021      K21.9           Gastro-esophageal reflux disease

 without esophagitis Charmaine Regalado M.D.

 

                2021      M15.9           Polyosteoarthritis, unspecified 

Charmaine Regalado M.D.

 

                2021      E78.00          Pure hypercholesterolemia, unspe

cified Charmaine Regalado M.D.







Plan of Treatment

Future Appointment(s):* 2021  3:15 pm - [facility]

10/06/2021 - AIXA Hoffmann JR* S81.802A Unspecified open wound, left 
  lower leg, initial encounter

* Z23 Encounter for immunization

* All * New Medication:* Cephalexin 500 mg - take one tablet by mouth 3 times 
  daily x 7 days



* Comments:* Total time spent was 43 minutes. Most of this time was spent 
  counseling the patient and dressing the wound.  She verbalized understanding 
  and agreement, She was accompanied by a friend who also verbalized 
  understanding and agreement.Flu shot was given.









Functional Status





                                        Description

 

                                        No Information Available







Mental Status





                                        Description

 

                                        No Information Available







Referrals





                Refer to Dr     Reason for Referral Status          Appt Date

 

                          Stillerman,James MD       STAT CONSULT FOR OPEN WOUND 

LT LEG PLEASE LET OFFICE KNOW 

WHEN APPT IS MADE         Created                   

 

                                        [facility]

 

                                        [address] [place]  [address]

 

                                        [phone]

 

                                          

 

                Rock Werner MD CONSULT FOR PVD WITH OPEN WOUND LT LEG  Patient

 Declined 

10/20/2021

 

                                        [facility]

 

                                        [address] ,[address] [place] [id number]

 

                                        [phone]

## 2021-10-25 NOTE — CCD
Continuity of Care Document (CCD)

                             Created on: 10/06/2021



Juany Becerril

External Reference #: [id number]

: 1955

Sex: Female



Demographics





                          Address                   [address]

[place]  [address]

 

                          Home Phone                [phone]

 

                          Preferred Language        Unknown

 

                          Marital Status            Unknown

 

                          Evangelical Affiliation     Unknown

 

                          Race                      White

 

                          Ethnic Group              Not  or 





Author





                          Author                    Juany ESQUEDA PA

 

                          Organization              Unknown

 

                          Address                   [address] [facility] [facility] [address]

[place]  [address]



 

                          Phone                     [phone]







Care Team Providers





                    Care Team Member Name Role                Phone

 

                    Yuri Maier MD       AUTM                [phone]

 

                    [facility]  AUTM                [phone]

 

                    John Lutz MD AUTM                Unavailable

 

                    [facility] Hematology/Oncology AUTM                [phone]

 

                    Hansa Hernandez FNP  AUTM                [phone]







Problems





                    Active Problems     Provider            Date

 

                    Chronic atrial fibrillation Protime             Onset: 

 

                    Essential hypertension Juany Vincent FNP Onset: 2017

 

                    Deep venous thrombosis of lower extremity Juany Vincent FN P Onset: 2017

 

                    Pure hypercholesterolemia Juany Vincent FNP Onset: 

017

 

                    Anxiety state       Juany Vincent FNP Onset: 2017

 

                    Chronic pulmonary embolism Juany Vincent FNP Onset: 2017

 

                    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 

2017

 

                    Gastroesophageal reflux disease Juany Vincent FNP Onset: 1

2017

 

                    Scoliosis deformity of spine Juany Vincent FNP Onset: 2017

 

                    Chronic tension-type headache Juany Vincent FNP Onset: 2017

 

                    Varicose veins of lower extremity Juany Vincent FNP Onset:

 2017

 

                    Tobacco user        Juany Vincent FNP Onset: 2017

 

                    Hypercalcemia       Juany Vincent FNP Onset: 2017







Social History





                Type            Date            Description     Comments

 

                Birth Sex                       Unknown          

 

                ETOH Use                        Consumes 3 glasses of wine per w

[place]  

 

                Tobacco Use     Start: Unknown  Patient is a current smoker, smo

kes every day STARTED

AGE 30 1-3 CIGARETTES A DAY 







Allergies and adverse reactions





             Active Allergies Criticality  Reaction | Severity Comments     Date

 

             Ibuprofen    Unable to assess criticality STOMACH UPSET HEADACHE mo

david       10/17/2017

 

             Latex        Unable to assess criticality rash, itches | Mild      

        10/17/2018







Medications





           Active Medications SIG        Qnty       Indications Ordering Provide

r Date

 

                          Cephalexin                     500mg Capsules         

          take one tablet 

by mouth 3 times daily x 7 days 21caps                          Julio kothari JR PA 10/06/2021

 

                          Excedrin Migraine                     [phone] Tab

lets                   1 

as needed h/a as needed mdd=1                                 Charmaine Regalado M.D. 2021

 

                          Famotidine                     20mg Tablets           

        1 by mouth every 

day             90tabs                          Charmaine Regalado M.D. 20

21

 

                          Furosemide                     20mg Tablets           

        1 by mouth twice a

day             180tabs                         Charmaine Regalado M.D. 20

21

 

                                        Bupropion Hydrochloride ER (XL)         

            150mg Tablets ER 24HR       

                take 1 tablet by mouth in the morning 90tabs                    

      Charmaine Regalado M.D.                                    2021

 

                          Eliquis                     5mg Tablets               

    take one tablet by 

mouth twice a day 180tabs                         Charmaine Regalado M.D. 2020

 

                          Methimazole                     5mg Tablets           

        2 every day by 

mouth           180tabs                         Charmaine Regalado M.D. 20

20

 

                          Shingrix                     50mcg/0.5ML Suspension Re

c                   

administer at pharmacy 1units                          Juany Vincent FNP 

 

                          Voltaren                     1% Gel                   

apply up to 4 grams three 

times a day to affected knee as needed 100gm                           Charmaine Regalado M.D. 

2018

 

                          Aspirin Adult Low Dose                     81mg Tablet

s DR                   1 

by mouth every day                                 Juany Vincent FNP 10/17/201

7

 

                                        Acetaminophen Extra Strength            

         500mg Tablets                  

             take 2 tabs every 6 hours as needed                           Juany Fung FNP 10/17/2017

 

                                        Womens 50+ Multi Vitamin & Mineral Formu

la                      Tablets         

             1 every day PO Vit D=3366Ho TK=542                           Juany Vincent FNP 10/17/2017

 

                          Metoprolol Tartrate                     25mg Tablets  

                 Take One 

Tablet By Mouth Twice A Day 180tabs                         ABELARDO Saleem 10/17/2017

 

                          Atorvastatin Calcium                     20mg Tablets 

                  take one

tablet by mouth every day 90tabs                          JANE Saleem 10/17/2017

 

                          Flecainide Acetate                     100mg Tablets  

                 take one 

tablet by mouth twice a day 180tabs                         ABELARDO Saleem 10/17/2017

 

                          Digoxin                     250mcg Tablets            

       1 by mouth every 

day             90tabs                          Charmaine Regalado M.D. 







Medications Administered in Office





           Medication SIG        Qnty       Indications Ordering Provider Date

 

                          Administration Of Flu Vaccine                      Inj

diyaion                    

                                                Charmaine Regalado M.D. 10/15/20

20

 

                          Administration Of Flu Vaccine                      Inj

ection                    

                                                Juany Vincent FNP 10/17/2019

 

                          Administration Of Flu Vaccine                      Inj

ection                    

                                                Juany Vincent FNP 10/17/2018

 

                          Administration Of Flu Vaccine                      Inj

ection                    

                                                Juany Vincent FNP 10/17/2017







Immunizations





             CPT Code     Status       Date         Vaccine      Lot #

 

                [id number]           Given           10/06/2021      Influenza Vaccin

e Quadrivalent Preser/Antibiotic Free Im 

Use                                     390253

 

                [id number]           Given           10/15/2020      Influenza Vaccin

e Quadrivalent Preser/Antibiotic Free Im 

Use                                     [id number]

 

                [id number]           Given           2020      Shingrix Zoster 

Vaccine (HZV), Recombinant, Subunit, 

Adjuvanted                               

 

                55537           Given           10/17/2019      Influenza Vaccin

e Quadrivalent Preser/Antibiotic Free Im 

Use                                     044985

 

                84506           Given           10/17/2018      Influenza Virus 

Vaccine, Quadrivalent (Cciiv4), Derived 

From Cell                               [id number]

 

             52048        Given        2018   Pneumovax 23 [id number]

 

                54873           Given           10/17/2017      Influenza Vaccin

e Quadrivalent Preser/Antibiotic Free Im 

Use                                     [id number]







Vital Signs





                Date            Vital           Result          Comment

 

                10/06/2021  2:17pm BP Systolic     124 mmHg         

 

                    BP Diastolic        68 mmHg              

 

                    Heart Rate          86 /min              

 

                    Height              66 inches           5'6"

 

                    Weight              217.00 lb            

 

                    BMI (Body Mass Index) 35.0 kg/m2           

 

                2021  1:31pm BP Systolic     136 mmHg        RT Arm

 

                    BP Diastolic        88 mmHg             RT Arm

 

                    Heart Rate          120 /min             

 

                    Height              66 inches           5'6"

 

                    Weight              223.50 lb            

 

                    BMI (Body Mass Index) 36.1 kg/m2           







Results





        Test    Acquired Date Facility Test    Result  H/L     Range   Note

 

                    Laboratory test finding 10/06/2021          [facility]

                                        [address] [place] [address] [phone] Wound Culture <pending>                         

 

                    CBC With Differential 2021          [facility]

                                        [address] [place] [address] [phone] White Blood Count 6.8 10     Normal     4.0-10.0    

 

             Red Blood Count 3.85 10      Low          4.00-5.40     

 

             Hemoglobin   12.7 g/dL    Normal       12.0-15.5     

 

             Hematocrit   39.4 %       Normal       36.0-47.0     

 

             Mean Corpuscular Volume 102.3 fl     High         80.0-96.0     

 

             Mean Corpuscular Hemoglobin 33.0 pg      Normal       27.0-33.0    

 

 

             Mean Corpuscular HGB Conc 32.2 g/dL    Normal       32.0-36.5     

 

             Red Cell Distribution Width 12.3 %       Normal       11.5-14.5    

 

 

             Platelet Count, Automated 187 10       Normal       150-450       

 

             Neutrophils % 72.2 %       High         36.0-66.0     

 

             Lymph %      19.9 %       Low          24.0-44.0     

 

             Mono %       6.0 %        Normal       2.0-8.0       

 

             Eos %        1.5 %        Normal       0.0-3.0       

 

             Baso %       0.3 %        Normal       0.0-1.0       

 

             Immature Granulocyte % 0.1 %        Normal       0-3.0         

 

             Nucleated Red Blood Cell % 0.0 %        Normal       0-0           

 

             Neutrophils # 4.9 10       Normal       1.5-8.5       

 

             Lymph #      1.4 10       Low          1.5-5.0       

 

             Mono #       0.4 10       Normal       0.0-0.8       

 

             Eos #        0.1 10       Normal       0.0-0.5       

 

             Baso #       0.0 10       Normal       0.0-0.2       

 

                    PT & Aptt           2021          [facility]

nter

                                        [address] [place] [address] [phone] Prothrombin Time 13.8 seconds Normal     12.5-14.3   

 

             Inr          1.04         Normal                    1

 

             Partial Thromboplastin Time 30.6 seconds Normal       24.2-38.5    

 

 

                    CBC With Differential 2021          [address] [address] [phone] White Blood Count 6.2 10     Normal     4.0-10.0    

 

             Red Blood Count 3.76 10      Low          4.00-5.40     

 

             Hemoglobin   12.5 g/dL    Normal       12.0-15.5     

 

             Hematocrit   39.0 %       Normal       36.0-47.0     

 

             Mean Corpuscular Volume 103.7 fl     High         80.0-96.0     

 

             Mean Corpuscular Hemoglobin 33.2 pg      High         27.0-33.0    

 

 

             Mean Corpuscular HGB Conc 32.1 g/dL    Normal       32.0-36.5     

 

             Red Cell Distribution Width 12.9 %       Normal       11.5-14.5    

 

 

             Platelet Count, Automated 199 10       Normal       150-450       

 

             Neutrophils % 74.5 %       High         36.0-66.0     

 

             Lymph %      16.4 %       Low          24.0-44.0     

 

             Mono %       7.0 %        Normal       2.0-8.0       

 

             Eos %        1.1 %        Normal       0.0-3.0       

 

             Baso %       0.5 %        Normal       0.0-1.0       

 

             Immature Granulocyte % 0.5 %        Normal       0-3.0         

 

             Nucleated Red Blood Cell % 0.0 %        Normal       0-0           

 

             Neutrophils # 4.6 10       Normal       1.5-8.5       

 

             Lymph #      1.0 10       Low          1.5-5.0       

 

             Mono #       0.4 10       Normal       0.0-0.8       

 

             Eos #        0.1 10       Normal       0.0-0.5       

 

             Baso #       0.0 10       Normal       0.0-0.2       

 

                    Comprehensive Metabolic Profil 2021          [address] [address] [phone] Glucose, Fasting 98 mg/dL   Normal           

 

             Blood Urea Nitrogen 28 mg/dL     High         7-18          

 

             Creatinine For GFR 0.90 mg/dL   Normal       0.55-1.30     

 

             Glomerular Filtration Rate > 60.0       Normal       >45          2

 

             Sodium Level 137 mEq/L    Normal       136-145       

 

             Potassium Serum 4.7 mEq/L    Normal       3.5-5.1       

 

             Chloride Level 107 mEq/L    Normal               

 

             Carbon Dioxide Level 27 mEq/L     Normal       21-32         

 

             Anion Gap    3 mEq/L      Low          8-16          

 

             Calcium Level 10.2 mg/dL   Normal       8.8-10.2      

 

             Ast/Sgot     23 U/L       Normal       7-37          

 

             Alt/SGPT     19 U/L       Normal       12-78         

 

             Alkaline Phosphatase 77 U/L       Normal               

 

             Bilirubin,Total 0.5 mg/dL    Normal       0.2-1.0       

 

             Total Protein 8.3 GM/DL    High         6.4-8.2       

 

             Albumin      3.0 GM/DL    Low          3.2-5.2       

 

             Albumin/Globulin Ratio 0.6          Low          1.2-2.2       

 

                    Complete Blood Count 2021          [facility]

s, pc

: Dr Simon Hoyt

[place] [address] [phone] WBC        5.9 x10*3/UL            4.1 - 10.9  

 

             RBC          4.11 x10*6/UL Low          4.20 - 6.30   

 

             Hemoglobin   13.6 g/dL                 12.0 - 18.0   

 

             Hematocrit   40.0 %                    37.0 - 51.0   

 

             MCV          97.4 fL      High         80.0 - 97.0   

 

             MCH          33.1 pg      High         26.0 - 32.0   

 

             MCHC         34.0 g/dL                 31.0 - 38.0   

 

             RDW          13.2 %                    11.6 - 13.7   

 

             PLT          209 x10*3/UL              140 - 440     

 

             MPV          8.1 FL                    7.8 - 11.0    

 

             Lymph %      25.6 %                    10.0 - 58.5   

 

             Mid %        7.5 %                     1.7 - 9.3     

 

             Neut %       66.9 %                    37.0 - 92.0   

 

             Lymph #      1.5 x10*3/UL              0.6 - 4.1     

 

             Mid #        0.5 x10*3/UL              0.1 - 0.6     

 

             Neut #       3.9 x10*3/UL              2.0 - 7.8     

 

                    Laboratory test finding 2021          [facility]

[facility]

: Dr Simon Hoyt

[place] [address] [phone] Sed Rate   25 mm/hr   High       0 - 15      

 

                    Basic Metabolic Panel 2021          [facility]

[facility], pc

: Dr Simon Hoyt

[place] [address] [phone] Glucose    100 mg/dL  High       74 - 99    3

 

             BUN          18 mg/dL                  7 - 18        

 

             Creatinine   0.8 mg/dL                 0.6 - 1.3     

 

             Sodium       141 mEq/L                 136 - 145     

 

             Potassium    3.8 mEq/L                 3.5 - 5.1     

 

             Chloride     104 mEq/L                 98 - 107      

 

             Carbon Dioxide 29 mEq/L                  21 - 32       

 

             Calcium      10.8 mg/dL   High         8.5 - 10.1   4

 

             GFR  >= 60 mL/min              >60           

 

             GFR African American >= 60 mL/min              >60          5

 

                    Laboratory test finding 2021          [facility]

pia me[facility]

: Dr Simon Hoyt

[place] [address]

           [phone] Thyroid Stimulating Hormone 0.19 uIU/mL Low        0.3

6 - 3.74  

 

                    Lipid Profile       2021          [facility]

, [facility]

: Dr Simon Hoyt

[place] [address]

           [phone] Cholesterol 132 mg/dL             131 - 200   

 

             Triglycerides 72 mg/dL                  30 - 150      

 

             HDL Cholesterol 54 mg/dL                  35 - 60       

 

             LDL (Calculated) 64 CALC                   50 - 159      

 

                    Laboratory test finding 2021          [address] [id number] [phone] Digoxin Level 0.3 NG/ML  Low        0.5-2.0    6

 

                    Laboratory test finding 2021          [facility]

pia meza

: Dr Simon Hoyt

[place], [place] [id number] [phone] Thyroid Stimulating Hormone 0.32 uIU/mL Low        0.3

6 - 3.74  

 

                    Basic Metabolic Panel 2021          [facility]

[facility], [facility]

: Dr Simon Hoyt

[place], [place] [id number] [phone] Glucose    70 mg/dL   Low        74 - 99    7

 

             BUN          23 mg/dL     High         7 - 18        

 

             Creatinine   0.9 mg/dL                 0.6 - 1.3     

 

             Sodium       144 mEq/L                 136 - 145     

 

             Potassium    3.8 mEq/L                 3.5 - 5.1     

 

             Chloride     104 mEq/L                 98 - 107      

 

             Carbon Dioxide 32 mEq/L                  21 - 32       

 

             Calcium      10.1 mg/dL                8.5 - 10.1    

 

             GFR  >= 60 mL/min              >60           

 

             GFR African American >= 60 mL/min              >60          8

 

                    Laboratory test finding 2021          [facility]

pia me[facility]

: Dr Simon Hoyt

[place] [address] [phone] Sed Rate   45 mm/hr   High       0 - 15      

 

             Magnesium    1.8 mg/dL                 1.8 - 2.4     

 

                    Complete Blood Count 2021          [facility]

: Dr Simon Hoyt

[place] [address] [phone] WBC        8.4 x10*3/UL            4.1 - 10.9  

 

             RBC          4.20 x10*6/UL              4.20 - 6.30   

 

             Hemoglobin   14.0 g/dL                 12.0 - 18.0   

 

             Hematocrit   40.3 %                    37.0 - 51.0   

 

             MCV          95.8 fL                   80.0 - 97.0   

 

             MCH          33.3 pg      High         26.0 - 32.0   

 

             MCHC         34.7 g/dL                 31.0 - 38.0   

 

             RDW          13.3 %                    11.6 - 13.7   

 

             PLT          207 x10*3/UL              140 - 440     

 

             MPV          8.0 FL                    7.8 - 11.0    

 

             Lymph %      17.5 %                    10.0 - 58.5   

 

             Mid %        4.8 %                     1.7 - 9.3     

 

             Neut %       77.7 %                    37.0 - 92.0   

 

             Lymph #      1.4 x10*3/UL              0.6 - 4.1     

 

             Mid #        0.5 x10*3/UL              0.1 - 0.6     

 

             Neut #       6.5 x10*3/UL              2.0 - 7.8     

 

                    Ua Routine          2021          [facility]

nter

                                        [address] [place] [address] [phone] Appearance, Urine CLEAR      Normal     Clear       

 

             Color, Urine ABHAY        Normal       Yellow        

 

             PH,Urine     5.0 units    Normal       5.0-9.0       

 

             Specific Gravity Urine Auto 1.028        Normal       1.002-1.035  

 

 

             Protein, Urine Auto 2+ mg/dL     High         Negative      

 

             Glucose, Urine (Ua) Auto NEGATIVE mg/dL Normal       Negative      

 

             Ketone, Urine Auto TRACE mg/dL  High         Negative      

 

             Urobilinogen, Urine Auto 4.0 mg/dL    High         0.0-2.0       

 

             Bilirubin, Urine Auto NEGATIVE     Normal       Negative      

 

             Nitrite, Urine Auto NEGATIVE     Normal       Negative      

 

             Leukocyte Esterase, Urine Auto NEGATIVE     Normal       Negative  

    

 

             Blood, Urine Blood NEGATIVE     Normal       Negative      

 

             WBC, Urine Auto 2 /HPF       Normal       0-3           

 

             RBC, Urine Auto 2 /HPF       Normal       0-3           

 

             Bacteria, Urine Auto 1+           High         Negative      

 

             Squamous Epithelial Cell Ur AU 0 /HPF       Normal       0-6       

    

 

             Mucus, Urine SMALL        Normal       Negative      

 

             Hyaline Cast, Urine Auto 0 /LPF       Normal       0-1           

 

                    CBC With Differential 2021          [address] [address] [phone] White Blood Count 7.6 10     Normal     4.0-10.0    

 

             Red Blood Count 4.29 10      Normal       4.00-5.40     

 

             Hemoglobin   13.9 g/dL    Normal       12.0-15.5     

 

             Hematocrit   43.3 %       Normal       36.0-47.0     

 

             Mean Corpuscular Volume 100.9 fl     High         80.0-96.0     

 

             Mean Corpuscular Hemoglobin 32.4 pg      Normal       27.0-33.0    

 

 

             Mean Corpuscular HGB Conc 32.1 g/dL    Normal       32.0-36.5     

 

             Red Cell Distribution Width 13.1 %       Normal       11.5-14.5    

 

 

             Platelet Count, Automated 175 10       Normal       150-450       

 

             Neutrophils % 73.1 %       High         36.0-66.0     

 

             Lymph %      18.1 %       Low          24.0-44.0     

 

             Mono %       6.8 %        Normal       2.0-8.0       

 

             Eos %        1.3 %        Normal       0.0-3.0       

 

             Baso %       0.3 %        Normal       0.0-1.0       

 

             Immature Granulocyte % 0.4 %        Normal       0-3.0         

 

             Nucleated Red Blood Cell % 0.0 %        Normal       0-0           

 

             Neutrophils # 5.6 10       Normal       1.5-8.5       

 

             Lymph #      1.4 10       Low          1.5-5.0       

 

             Mono #       0.5 10       Normal       0.0-0.8       

 

             Eos #        0.1 10       Normal       0.0-0.5       

 

             Baso #       0.0 10       Normal       0.0-0.2       

 

                    Laboratory test finding 2021          [facility]

                                        [address] [place] [address] [phone] Erythrocyte Sedimentation Rate 68 mm/hr   High       0

-30        

 

                    Comprehensive Metabolic Profil 2021          [address] [place] [address] [phone] Glucose, Fasting 97 mg/dL   Normal           

 

             Blood Urea Nitrogen 24 mg/dL     High         7-18          

 

             Creatinine For GFR 0.85 mg/dL   Normal       0.55-1.30     

 

             Glomerular Filtration Rate > 60.0       Normal       >45          9

 

             Sodium Level 139 mEq/L    Normal       136-145       

 

             Potassium Serum 4.6 mEq/L    Normal       3.5-5.1       

 

             Chloride Level 106 mEq/L    Normal               

 

             Carbon Dioxide Level 26 mEq/L     Normal       21-32         

 

             Anion Gap    7 mEq/L      Low          8-16          

 

             Calcium Level 11.8 mg/dL   High         8.8-10.2      

 

             Ast/Sgot     33 U/L       Normal       7-37          

 

             Alt/SGPT     44 U/L       Normal       12-78         

 

             Alkaline Phosphatase 116 U/L      Normal               

 

             Bilirubin,Total 0.6 mg/dL    Normal       0.2-1.0       

 

             Total Protein 8.9 GM/DL    High         6.4-8.2       

 

             Albumin      3.2 GM/DL    Normal       3.2-5.2       

 

             Albumin/Globulin Ratio 0.6          Low          1.2-2.2       

 

                    Total Iron Binding Capacit 2021          [address] [id number] [phone] Iron (Fe)  131 g/dL Normal           

 

             Total Iron Binding Capacity 305 g/dL   Normal       250-450      

 

 

             Percent Saturation 43.0 %       Normal       13.2-45.0     

 

                    Immunoglobulin G,A,M 2021          [address] [address] [phone] Immunoglobulin A 2640.0 mg/dL High             

 

             Immunoglobulin G 510 mg/dL    Low          681-1648      

 

             Immunoglobulin M < 5.3 mg/dL  Low                  

 

                    Immunotyping (Immunofixation) Serum  (If 2021         

 [address] [address] [phone] Immunotyping Serum Iga ABNORMAL   High       Normal   

   

 

             Immunotyping Serum Lambda ABNORMAL     High         Normal        

 

             It Serum Interpretation SEE COMMENT  Normal                    10

 

             Its Pathologist Review REV'D BY O ADJAP <SEE NOTE>  Normal         

           11

 

                    Laboratory test finding 2021          [address] [address] [phone] Ferritin   128 NG/ML  Normal     8-252      12

 

             Beta 2 Microglobulin 3.7 mg/L     High         0.6-2.4      13

 

                    Free Kappa & Lambda LT Chains 2021          [address] [place] [id number] [phone] Free [place] Light Chains Serum 6.4 mg/L   Normal     3.

3-19.4    

 

             Free Lambda Light Chains Serum 517.2 mg/L   High         5.7-26.3  

    

 

             Kappa/Lambda Ratio Serum 0.01         Low          0.26-1.65     







                          1                         THERAPUTIC HUMAN INR VALUES

INDICATIONS                      NORMAL RANGES

PROPHYLAXIS/TREATMENT OF:

VENOUS THROMBOSIS                2.0-3.0

PULMONARY EMBOLISM               2.0-3.0

PREVENTION OF SYSTEMIC EMBOLISM FROM:

TISSUE HEART VALVES              2.0-3.0

ACUTE MYOCARDIAL INFARCTION      2.0-3.0

VALVULAR HEART DISEASE           2.0-3.0

ATRIAL FIBRILLATION              2.0-3.0

MECHANICAL VALVES(HIGH RISK)     2.5-3.5

RECURRENT MYOCARDIAL INFARCTION  2.5-3.5



 

                          2                         Units are mL/min/1.73 m2



Chronic Kidney Disease Staging per NKF:



Stage I & II   GFR >=60       Normal to Mildly Decreased

Stage III      GFR 30-59      Moderately Decreased

Stage IV       GFR 15-29      Severely Decreased

Stage V        GFR <15        Very Little GFR Left

ESRD           GFR <15 on RRT



 

                          3                         100-125 mg/dL     PRE-DIABET

ES/FASTING

>126 mg/dL          DIABETES/FASTING



 

                          4                         NOTE:

CALCIUM,TSH VERIFIED







 

                          5                         CHRONIC KIDNEY DISEASE STAGI

NG PER NKF



STAGE I & II      GFR >= 60        NORMAL TO MILDLY DECREASED

STAGE III          GFR 30-59          MODERATELY DECREASED

STAGE IV           GFR 15-29         SEVERELY DECREASED

STAGE V            GFR <15            VERY LITTLE GFR LEFT

ESRD                 GFR <15            ON RRT



 

                          6                         note:<nlbl:demographic_chang

ed>



 

                          7                         100-125 mg/dL     PRE-DIABET

ES/FASTING

>126 mg/dL          DIABETES/FASTING



 

                          8                         CHRONIC KIDNEY DISEASE STAGI

NG PER NKF



STAGE I & II      GFR >= 60        NORMAL TO MILDLY DECREASED

STAGE III          GFR 30-59          MODERATELY DECREASED

STAGE IV           GFR 15-29         SEVERELY DECREASED

STAGE V            GFR <15            VERY LITTLE GFR LEFT

ESRD                 GFR <15            ON RRT



 

                          9                         Units are mL/min/1.73 m2



Chronic Kidney Disease Staging per NKF:



Stage I & II   GFR >=60       Normal to Mildly Decreased

Stage III      GFR 30-59      Moderately Decreased

Stage IV       GFR 15-29      Severely Decreased

Stage V        GFR <15        Very Little GFR Left

ESRD           GFR <15 on RRT



 

                          10                        MONOCLONAL IGA,LAMBDA



 

                          11                        REV'D BY CHARLES ROCHA



 

                          12                        note:<nlbl:demographic_chang

ed>



 

                          13                        [employer] Immulite 2000 Immuno

chemiluminometric assay (ICMA)

                                        .

Values obtained with different assay methods or kits cannot

be used interchangeably. Results cannot be interpreted as

absolute evidence of the presence or absence of malignant

disease.

Performed at:   - [facility] [address]  [id number]

: Araceli B Reyes MD, Phone:  [phone]

Performed at:   - [facility] [address]  [id number]

61

: Barak Archibald MD, Phone:  [phone]









Procedures





                Date            Code            Description     Status

 

                    2021          03858               Chronic Care MGMT 20

 Mins Clinical Staff Time Per Calendar 

Month                                   Completed

 

                2021      17275           Chronic Care Management Services

 Ea Addl 20 Min Completed

 

                2021      09586           Complex Chronic Care MGMT Servic

e Ea Addl 30 Min Completed

 

                2021      37345           Complex Chronic Care Management 

SVC 1[address] 60 Min Completed

 

                2021      64919           Complex Chronic Care MGMT Servic

e Ea Addl 30 Min Completed

 

                2021      90375           Complex Chronic Care Management 

SVC [address] 60 Min Completed

 

                2021      99669           Office/Outpatient Established Mo

d MDM 30-39 Min Completed

 

                2021      07129           Complex Chronic Care MGMT Servic

e Ea Addl 30 Min Completed

 

                2021      00909           Complex Chronic Care Management 

SVC [address] 60 Min Completed

 

                2021      81531           Complex Chronic Care MGMT Servic

e Ea Addl 30 Min Completed

 

                2021      49677           Complex Chronic Care Management 

SVC [address] 60 Min Completed

 

                2021      97914           Office/Outpatient Established Mo

d MDM 30-39 Min Completed

 

                2021      [id number]       Bone Mineral Density Test Comple

veronika

 

                2021      [id number]        Mammogram       Completed







Medical Devices





                                        Description

 

                                        No Information Available







Encounters





           Type       Date       Location   Provider   Dx         Diagnosis

 

                Office Visit    2021  1:30p [facility] M.[facility]                      I48.20                    Chronic atrial fibrillation,

 unspecified

 

                          Z79.01                    Long term (current) use of a

nticoagulants

 

                          R60.9                     Edema, unspecified

 

                          I10                       Essential (primary) hyperten

darci

 

                          E05.90                    Thyrotoxicosis, unsp without

 thyrotoxic crisis or storm

 

                          I82.502                   Chronic embolism and thombos

 unsp deep veins of l low extrem

 

                          F17.210                   Nicotine dependence, cigaret

svitlana, uncomplicated

 

                          F32.89                    Other specified depressive e

pisodes

 

                          D47.2                     Monoclonal gammopathy

 

                          K21.9                     Gastro-esophageal reflux dis

ease without esophagitis

 

                          M19.90                    Unspecified osteoarthritis, 

unspecified site

 

                          E78.00                    Pure hypercholesterolemia, u

nspecified

 

                Office Visit    2021  1:30p [facility] Neal Regalado M.D.                      I48.20                    Chronic atrial fibrillation,

 unspecified

 

                          I10                       Essential (primary) hyperten

darci

 

                          E05.90                    Thyrotoxicosis, unsp without

 thyrotoxic crisis or storm

 

                          I82.502                   Chronic embolism and thombos

 unsp deep veins of l low extrem

 

                          Z79.01                    Long term (current) use of a

nticoagulants

 

                          F17.210                   Nicotine dependence, cigaret

svitlana, uncomplicated

 

                          F32.89                    Other specified depressive e

pisodes

 

                          E83.52                    Hypercalcemia

 

                          D47.2                     Monoclonal gammopathy

 

                          K21.9                     Gastro-esophageal reflux dis

ease without esophagitis

 

                          M15.9                     Polyosteoarthritis, unspecif

ied

 

                          E78.00                    Pure hypercholesterolemia, u

nspecified







Assessments





                Date            Code            Description     Provider

 

                10/06/2021      S81.802A        Unspecified open wound, left low

er leg, initial encounter 

AIXA Hoffmann JR

 

                2021      I10             Essential (primary) hypertension

 Charmaine Regalado M.D.

 

                2021      K21.9           Gastro-esophageal reflux disease

 without esophagitis Charmaine Regalado M.D.

 

                2021      E78.00          Pure hypercholesterolemia, unspe

cified Charmaine Regalado M.D.

 

                2021      I10             Essential (primary) hypertension

 Charmaine Regalado M.D.

 

                2021      K21.9           Gastro-esophageal reflux disease

 without esophagitis Charmaine Regalado M.D.

 

                2021      E78.00          Pure hypercholesterolemia, unspe

cified Charmaine Regalado M.D.

 

                2021      M15.9           Polyosteoarthritis, unspecified 

Charmaine Regalado M.D.

 

                2021      I10             Essential (primary) hypertension

 Charmaine Regalado M.D.

 

                2021      K21.9           Gastro-esophageal reflux disease

 without esophagitis Charmaine Regalado M.D.

 

                    2021          I82.502             Chronic embolism and

 thrombosis of unspecified deep veins of 

left lower extremity                    Charmaine Regalado M.D.

 

                2021      I48.20          Chronic atrial fibrillation, uns

pecified Charmaine Regalado M.D.

 

                2021      Z79.01          Long term (current) use of antic

oagulants Charmaine Regalado M.D.

 

                2021      R60.9           Edema, unspecified Charmaine guido M.D.

 

                2021      I10             Essential (primary) hypertension

 Chamraine Regalado M.D.

 

                2021      E05.90          Thyrotoxicosis, unspecified with

out thyrotoxic crisis or sto 

Charmaine Regalado M.D.

 

                    2021          I82.502             Chronic embolism and

 thrombosis of unspecified deep veins of 

left lower extremity                    Charmaine Regalado M.D.

 

                2021      F17.210         Nicotine dependence, cigarettes,

 uncomplicated Charmaine Regalado M.D.

 

                2021      F32.89          Other specified depressive episo

emil Charmaine Regalado M.D.

 

                2021      D47.2           Monoclonal gammopathy Charmaine duron M.D.

 

                2021      K21.9           Gastro-esophageal reflux disease

 without esophagitis Charmaine Regalado M.D.

 

                2021      M19.90          Unspecified osteoarthritis, unsp

ecified site Charmaine Regalado M.D.

 

                2021      E78.00          Pure hypercholesterolemia, unspe

cified Charmanie Regalado M.D.

 

                2021      I10             Essential (primary) hypertension

 Charmaine Regalado M.D.

 

                2021      K21.9           Gastro-esophageal reflux disease

 without esophagitis Charmaine Regalado M.D.

 

                2021      E78.00          Pure hypercholesterolemia, unspe

cified Charmaine Regalado M.D.

 

                2021      I48.20          Chronic atrial fibrillation, uns

pecminoo Regalado M.D.

 

                2021      F17.210         Nicotine dependence, cigarettes,

 uncomplicated Charmaine Regalado M.D.

 

                2021      K21.9           Gastro-esophageal reflux disease

 without esophagitis Charmaine Regalado M.D.

 

                2021      E78.00          Pure hypercholesterolemia, unspe

cisonia Regalado M.D.

 

                2021      I48.20          Chronic atrial fibrillation, uns

el Regalado M.D.

 

                2021      I10             Essential (primary) hypertension

 Charmaine Regalado M.D.

 

                2021      E05.90          Thyrotoxicosis, unspecified with

out thyrotoxic crisis or sto 

Charmaine Regalado M.D.

 

                    2021          I82.502             Chronic embolism and

 thrombosis of unspecified deep veins of 

left lower extremity                    Charmaine Regalado M.D.

 

                2021      Z79.01          Long term (current) use of antic

oagulants Charmaine Regalado M.D.

 

                2021      F17.210         Nicotine dependence, cigarettes,

 uncomplicated Charmaine Reglaado M.D.

 

                2021      F32.89          Other specified depressive episo

emil Charmaine Regalado M.D.

 

                2021      E83.52          Hypercalcemia   Charmaine xavier M.D.

 

                2021      D47.2           Monoclonal gammopathy Charmaine duron M.D.

 

                2021      K21.9           Gastro-esophageal reflux disease

 without esophagitis Charmaine Regalado M.D.

 

                2021      M15.9           Polyosteoarthritis, unspecified 

Charmaine Regalado M.D.

 

                2021      E78.00          Pure hypercholesterolemia, unspe

cified Charmaine Regalado M.D.







Plan of Treatment

Future Appointment(s):* 2021  3:15 pm - [facility]

10/06/2021 - AIXA Hoffmann JR* S81.802A Unspecified open wound, left 
  lower leg, initial encounter

* All * New Medication:* Cephalexin 500 mg - take one tablet by mouth 3 times 
  daily x 7 days









Functional Status





                                        Description

 

                                        No Information Available







Mental Status





                                        Description

 

                                        No Information Available







Referrals





                                        Description

 

                                        No Information Available

## 2021-10-25 NOTE — CCD
Continuity of Care Document (CCD)

                             Created on: 2021



Juany Becerril

External Reference #: [id number]

: 1955

Sex: Female



Demographics





                          Address                   [address] [place] [address]  [address]

 

                          Home Phone                [phone]

 

                          Preferred Language        Unknown

 

                          Marital Status            

 

                          Adventism Affiliation     Unknown

 

                          Race                      White

 

                          Ethnic Group              Not  or 





Author





                          Author                    Juany PRUETT PA

 

                          Organization              Unknown

 

                          Address                   [address] [place] [address], [address]

[place]  [address]



 

                          Phone                     [phone]







Care Team Providers





                    Care Team Member Name Role                Phone

 

                    Charmaine Regalado MD  AUTM                [phone]

 

                    Lupe Arredondo MD AUTM                [phone]







Problems





                    Active Problems     Provider            Date

 

                    Precordial pain     Jermaine Chahal MD  Onset: 2019

 

                    Dyspnea             Jermaine Chahal MD  Onset: 2019

 

                    Palpitations        Jermaine Chahal MD  Onset: 2019

 

                    Paroxysmal atrial fibrillation Jermaine Chahal MD  Onset: 

 

                    Electrocardiogram abnormal Jermaine Chahal MD  Onset: 2019

 

                    Cardiomegaly        Jermaine Chahal MD  Onset: 2019

 

                    Right bundle branch block Jermaine Chahal MD  Onset: 

019

 

                    Dietary management surveillance Jermaine Chahal MD  Onset: 1

2019

 

                    Obstructive sleep apnea syndrome Jermaine Chahal MD  Onset: 

2019

 

                    Syncope and collapse Jermaine Chahal MD  Onset: 2019

 

                    Chronic diastolic heart failure Jermaine Chahal MD  Onset: 0

3/09/2020

 

                    Chronic pulmonary heart disease Jermaine Chahal MD  Onset: 0

3/09/2020

 

                          Benign hypertensive heart disease with congestive card

iac failure Jermaine Chahal MD                              Onset: 2020

 

                    Mitral valve disorder Jermaine Chahal MD  Onset: 2020

 

                    Chronic cor pulmonale AIXA Dennis Onset: 20

21

 

                    Mixed hyperlipidemia AIXA Dennis Onset: 

1

 

                    Deep venous thrombosis of lower extremity AIXA Dennis Onset: 

2021

 

                    Carotid artery occlusion AIXA Dennis Onset: 







Social History





                Type            Date            Description     Comments

 

                Birth Sex                       Unknown          

 

                ETOH Use                        Occasionally consumes wine  

 

                Tobacco Use     Start: Unknown  Patient is a current smoker, smo

kes every day Started

smoking at age 27, smokes 3 cigarettes a day 

 

                Smoking Status  Reviewed: 21 Patient is a current smoker, 

smokes every day 

Started smoking at age 27, smokes 3 cigarettes a day 

 

                Exercise Type/Frequency                 Walks daily     in good 

weather  

 

                Exercise Type/Frequency                 Does housework daily  

 

                Exercise Limitations                 Back Pain        

 

                Exercise Limitations                 Orthopedic Problem Knee gary

n 

 

                Exercise Limitations                 Claudication     







Allergies, Adverse Reactions, Alerts





             Active Allergies Criticality  Reaction | Severity Comments     Date

 

             Motrin       Unable to assess criticality              GI Upset    

 2019







Medications





           Active Medications SIG        Qnty       Indications Ordering Provide

r Date

 

                          Eliquis                     5mg Tablets               

    1 by mouth twice a day

                                                Charmaine Regalado MD 2021

 

                    Digox                     250mcg Tablets                   1

 by mouth every day 

90tabs                                  Jermaine Chahal MD  2020

 

                          Famotidine                     40mg Tablets           

        1 tablet daily at 

bedtime         30tabs          R07.2           Jermaine Chahal MD 2019

 

                          Acetaminophen                     500mg Tablets       

            1-2 by mouth 

every 6 hours as needed                                 Unknown         20

19

 

                          Atorvastatin Calcium                     20mg Tablets 

                  1 by 

mouth every night at bedtime                                 Unknown         2019

 

                One Daily                      Tablets                   1 by mo

uth every day                 

                          Unknown                   2019

 

                          Methimazole                     10mg Tablets          

         1 by mouth every 

morning                                         Unknown         2019

 

                          Vitamin E Blend                     400Unit Capsules  

                 1 by 

mouth every day                                 Unknown         2019

 

                          Flecainide Acetate                     100mg Tablets  

                 take one 

tablet by mouth twice a day 180tabs                         Jermaine Chahal MD 1

2019

 

                          Aspirin 81                     81mg Tablets DR        

           1 by mouth 

every day                                       Unknown         2019

 

                          Metoprolol Tartrate                     25mg Tablets  

                 1 by 

mouth twice a day                                 Unknown         2019

 

                          Mucus Relief DM                     20-400mg Tablets  

                 1 tab po 

at bedtime                                      Unknown         2019

 

                          Furosemide                     20mg Tablets           

        1 by mouth once a 

day                                             Unknown         







Immunizations





                                        Description

 

                                        No Information Available







Vital Signs





                Date            Vital           Result          Comment

 

                2021  1:02pm Weight          220.00 lb        

 

                    Height              65 inches           5'5"

 

                    BMI (Body Mass Index) 36.6 kg/m2           

 

                    Heart Rate          65 /min              

 

                    BP Systolic Sitting 136 mmHg            Ra, large cuff

 

                    BP Diastolic Sitting 84 mmHg             Ra, large cuff

 

                2020 12:43pm Weight          229.00 lb        

 

                    Height              65 inches           5'5"

 

                    BMI (Body Mass Index) 38.1 kg/m2           

 

                    Heart Rate          67 /min              

 

                    BP Systolic Sitting 126 mmHg            large cuff, Ra

 

                    BP Diastolic Sitting 74 mmHg             large cuff, Ra







Results





        Test    Acquired Date Facility Test    Result  H/L     Range   Note

 

                    CBC without Differential 2021          [facility] - not inter

faced

           (315)-   - White Blood Count 6.8                   5.0-10.0    

 

             Red Blood Count 3.85         Low          4.00-5.40     

 

             Platelets    187                       172-450       

 

             Hemoglobin   12.7                                    

 

             Hematocrit   39.4                                    

 

                    CBC without Differential 2021          [facility] - not inter

faced

           (315)-   - White Blood Count 6.2                   5.0-10.0    

 

             Red Blood Count 3.76         Low          4.00-5.40     

 

             Platelets    199                       172-450       

 

             Hemoglobin   12.5                                    

 

             Hematocrit   39.0                                    

 

                    CMP                 2021          [facility] - not interfaced

           (315)-   - Albumin Serum/Plasma 3.0                               

 

             Alt - SGPT   19                                      

 

             Calcium Ser/Plasma Mass/Vol 10.2                                   

 

 

             Carbon Dioxide Ser/Plasm 27                                      

 

             Chloride Serum/Plasma 107                                     

 

             Alkaline Phosphatase 77                                      

 

             Potassium    4.7                                     

 

             Protein Total 8.3                                     

 

             Sodium       137                                     

 

             Ast - Sgot   23                                      

 

             BUN - Urea Nitrogen 28                                      

 

             Glucose      98                                

 

             Creatinine For GFR 0.90                                    

 

                    Complete Blood Count 2021          [facility]

rt

             WBC          8.4 x10*3/UL              4.1-10.9      

 

             RBC          4.20 x10*6/UL              4.20-6.30     

 

             Hemoglobin   14.0 g/dL                 12.0-18.0     

 

             Hematocrit   40.3 %                    37.0-51.0     

 

             MCV          95.8 fL                   80.0-97.0     

 

             MCH          33.3 pg      High         26.0-32.0     

 

             MCHC         34.7 g/dL                 31.0-38.0     

 

             RDW          13.3 %                    11.6-13.7     

 

             PLT          207 x10*3/UL              140-440       

 

             MPV          8.0 FL                    7.8-11.0      

 

             Lymph %      17.5 %                    10.0-58.5     

 

             Mid %        4.8 %                     1.7-9.3       

 

             Neut %       77.7 %                    37.0-92.0     

 

             Lymph #      1.4 x10*3/UL              0.6-4.1       

 

             Mid #        0.5 x10*3/UL              0.1-0.6       

 

             Neut #       6.5 x10*3/UL              2.0-7.8       

 

                    Basic Metabolic Panel 2021          [facility]

ort

             Glucose      70 mg/dL     Low          74-99        1

 

             BUN          23 mg/dL     High         7-18          

 

             Creatinine   0.9 mg/dL                 0.6-1.3       

 

             Sodium       144 mEq/L                 136-145       

 

             Potassium    3.8 mEq/L                 3.5-5.1       

 

             Chloride     104 mEq/L                         

 

             Carbon Dioxide 32 mEq/L                  21-32         

 

             Calcium      10.1 mg/dL                8.5-10.1      

 

             GFR  >= 60 mL/min                             

 

             GFR African American >= 60 mL/min                            2

 

                    Lipid Profile       2021          [facility]

t

             Cholesterol  132 mg/dL                 131-200       

 

             Triglycerides 72 mg/dL                          

 

             HDL Cholesterol 54 mg/dL                  35-60         

 

             LDL (Calculated) 64 CALC                           

 

                    Laboratory test finding 2021          [facility] I

mport

             Thyroid Stimulating Hormone 0.32 uIU/mL  Low          0.36-3.74    

 

 

             Digoxin Level 0.3 ng/mL    Low          0.5-2.0      3







                          1                         100-125 mg/dL     PRE-DIABET

ES/FASTING

>126 mg/dL          DIABETES/FASTING





 

                          2                         CHRONIC KIDNEY DISEASE STAGI

NG PER NKF



STAGE I & II      GFR >= 60        NORMAL TO MILDLY DECREASED

STAGE III          GFR 30-59          MODERATELY DECREASED

STAGE IV           GFR 15-29         SEVERELY DECREASED

STAGE V            GFR <15            VERY LITTLE GFR LEFT

ESRD                 GFR <15            ON RRT





 

                          3                         note:<nlbl:demographic_chang

ed>











Procedures





                Date            Code            Description     Status

 

                2021      16226           Office/Outpatient Established Mo

d MDM 30-39 Min Completed

 

                2021      74678           ECG 12-Lead     Completed

 

                2021      04231           Office/Outpatient Established Mo

d MDM 30-39 Min Completed

 

                2021      97002           ECG 12-Lead     Completed







Medical Devices





                                        Description

 

                                        No Information Available







Encounters





                                        Description

 

                                        No Information Available







Assessments





                Date            Code            Description     Provider

 

                2021      I48.0           Paroxysmal atrial fibrillation C

AIXA Pablo

 

                2021      I50.32          Chronic diastolic (congestive) h

eart failure AIXA Dennis

 

                2021      I11.0           Hypertensive heart disease with 

heart failure Hortencia Pruett, PA

 

                2021      I27.81          Cor pulmonale (chronic) Isabelr

kang Pruett, PA

 

                2021      I65.29          Occlusion and stenosis of unspec

ified carotid artery Hortencia Pruett, PA

 

                2021      I34.0           Nonrheumatic mitral (valve) insu

fficiency Hortencia Pruett, 

PA

 

                2021      E78.2           Mixed hyperlipidemia Hortencia Pruett, PA

 

                    2021          I82.503             Chronic embolism and

 thrombosis of unspecified deep veins of 

lower extremity, bilateral              Hortencia Pruett, PA

 

                2021      R94.31          Abnormal electrocardiogram [ECG]

 [EKG] Hortencia Pruett, PA

 

                2021      Z71.3           Dietary counseling and surveilla

nce Hortencia Pruett PA

 

                2021      I48.0           Paroxysmal atrial fibrillation C

rick Pruett, PA

 

                2021      I50.32          Chronic diastolic (congestive) h

eart failure Hortencia Pruett, PA

 

                2021      I11.0           Hypertensive heart disease with 

heart failure Hortencia Pruett, PA

 

                2021      I27.81          Cor pulmonale (chronic) Miguel Pruett, PA

 

                2021      I65.29          Occlusion and stenosis of unspec

ified carotid artery Hortencia Pruett, PA

 

                2021      I34.0           Nonrheumatic mitral (valve) insu

fficiency Hortencia Pruett, 

PA

 

                2021      E78.2           Mixed hyperlipidemia Hortencia Pruett, PA

 

                    2021          I82.503             Chronic embolism and

 thrombosis of unspecified deep veins of 

lower extremity, bilateral              Hortencia Pruett, PA

 

                2021      R94.31          Abnormal electrocardiogram [ECG]

 [EKG] Hrotencia Pruett, PA

 

                2021      Z71.3           Dietary counseling and surveilla

AIXA Cervantes







Plan of Treatment

2021 - AIXA Dennis* I48.0 Paroxysmal atrial fibrillation* 
  Recommendations:* Continue Eliquis, digoxin, flecainide, and metoprolol at the
   current dosages Patient agreeable to contact us with more frequent episodes 
  of tachycardia or palpitations, discussed Holter monitor therapy with patient 
  at this time she would like to refrain further evaluation





* I50.32 Chronic diastolic (congestive) heart failure* Recommendations:* 
  Continue digoxin, furosemide, and metoprolol at the current dosages Please 
  alert our office with a weight gain of more than 3 pounds, onset of shortness 
  of breath, or lower extremity edema





* I11.0 Hypertensive heart disease with heart failure* Recommendations:* 
  Continue metoprolol at the current dosage Advised patient to monitor blood 
  pressures at home and to alert our office with readings >140/>90





* I27.81 Cor pulmonale (chronic)* Recommendations:* No medication changes made 
  today Patient agreeable to contact us with the onset of any shortness of 
  breath or edema





* I65.29 Occlusion and stenosis of unspecified carotid artery* Recommendations:
  * Carotid ultrasound ordered for further evaluation Advised patient to seek 
  emergency medical attention if she develops any further lateralizing 
  neurologic symptoms





* I34.0 Nonrheumatic mitral (valve) insufficiency* Recommendations:* No further 
  evaluation is needed at this time





* E78.2 Mixed hyperlipidemia* Recommendations:* Please obtain fasting labs 
  Continue atorvastatin at the current dosage





* I82.503 Chronic embolism and thrombosis of unspecified deep veins of lower 
  extremity, bilateral* Referral:* Lupe Arredondo MD, Hematology & 
  Oncology/y



* Recommendations:* Referral made to Dr. Arredondo for further evaluation of 
  patient's probable coagulopathy Please obtain labs, to patient's knowledge she
   has never been assessed for protein C or protein S deficiency or for factor V
   Leiden mutation Advised patient to seek emergency medical attention if she 
  does believe she is having another DVT  or if she develops symptoms of 
  shortness of breath, hemoptysis, or chest pain





* R94.31 Abnormal electrocardiogram [ECG] [EKG]* Recommendations:* No further 
  evaluation is needed at this time.





* Z71.3 Dietary counseling and surveillance* Recommendations:* Recommended for 
  patient to follow a more whole food diet. Advised patient to avoid overly 
  processed foods and packaged foods. Advised patient to avoid sodas, juices and
   other liquid calories. Recommended at least 30 minutes of exercise 3 days a 
  week.





* All * Follow up:* Follow up in 6 months









Functional Status





                Functional Condition Comment         Date            Status

 

                Independent with all ADL's                                 Activ

e

 

                Requires assistance with ambulating                             

    Active







Mental Status





                                        Description

 

                                        No Information Available







Referrals





                                        Description

 

                                        No Information Available

## 2021-10-25 NOTE — CCD
Continuity of Care Document (CCD)

                             Created on: 10/06/2021



Juany Becerril

External Reference #: [id number]

: 1955

Sex: Female



Demographics





                          Address                   [address]

[place]  [address]

 

                          Home Phone                [phone]

 

                          Preferred Language        Unknown

 

                          Marital Status            Unknown

 

                          Moravian Affiliation     Unknown

 

                          Race                      White

 

                          Ethnic Group              Not  or 





Author





                          Author                    Juany ESQUEDA PA

 

                          Organization              Unknown

 

                          Address                   [address]

[place]  [address]



 

                          Phone                     [phone]







Care Team Providers





                    Care Team Member Name Role                Phone

 

                    Yuri Maier MD       AUTM                [phone]

 

                    [facility]  AUTM                [phone]

 

                    John Lutz MD AUTM                Unavailable

 

                    [facility] Hematology/Oncology AUTM                [phone]

 

                    Hansa Hernandez FNP  AUTM                [phone]







Problems





                    Active Problems     Provider            Date

 

                    Chronic atrial fibrillation Protime             Onset: 

 

                    Essential hypertension Juany Vincent FNP Onset: 2017

 

                    Deep venous thrombosis of lower extremity Juany Vincent FN P Onset: 2017

 

                    Pure hypercholesterolemia Juany Vincent FNP Onset: 

017

 

                    Anxiety state       Juany Vincent FNP Onset: 2017

 

                    Chronic pulmonary embolism Juany Vincent FNP Onset: 2017

 

                    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 

2017

 

                    Gastroesophageal reflux disease Juany Vincent FNP Onset: 1

2017

 

                    Scoliosis deformity of spine Juany Vincent FNP Onset: 2017

 

                    Chronic tension-type headache Juany Vincent FNP Onset: 2017

 

                    Varicose veins of lower extremity Juany Vincent FNP Onset:

 2017

 

                    Tobacco user        Juany Vincent FNP Onset: 2017

 

                    Hypercalcemia       Juany Vincent FNP Onset: 2017







Social History





                Type            Date            Description     Comments

 

                Birth Sex                       Unknown          

 

                ETOH Use                        Consumes 3 glasses of wine per w

[place]  

 

                Tobacco Use     Start: Unknown  Patient is a current smoker, smo

kes every day STARTED

AGE 30 1-3 CIGARETTES A DAY 







Allergies and adverse reactions





             Active Allergies Criticality  Reaction | Severity Comments     Date

 

             Ibuprofen    Unable to assess criticality STOMACH UPSET HEADACHE mo

david       10/17/2017

 

             Latex        Unable to assess criticality rash, itches | Mild      

        10/17/2018







Medications





           Active Medications SIG        Qnty       Indications Ordering Provide

r Date

 

                          Cephalexin                     500mg Capsules         

          take one tablet 

by mouth 3 times daily x 7 days 21caps                          Julio kothari JR PA 10/06/2021

 

                          Excedrin Migraine                     [id number] Tab

lets                   1 

as needed h/a as needed mdd=1                                 Charmaine Regalado M.D. 2021

 

                          Famotidine                     20mg Tablets           

        1 by mouth every 

day             90tabs                          Charmaine Regalado M.D. 20

21

 

                          Furosemide                     20mg Tablets           

        1 by mouth twice a

day             180tabs                         Charmaine Regalado M.D. 20

21

 

                                        Bupropion Hydrochloride ER (XL)         

            150mg Tablets ER 24HR       

                take 1 tablet by mouth in the morning 90tabs                    

      Charmaine Regalado M.D.                                    2021

 

                          Eliquis                     5mg Tablets               

    take one tablet by 

mouth twice a day 180tabs                         Charmaine Regalado M.D. 2020

 

                          Methimazole                     5mg Tablets           

        2 every day by 

mouth           180tabs                         Charmaine Regalado M.D. 20

20

 

                          Shingrix                     50mcg/0.5ML Suspension Re

c                   

administer at pharmacy 1units                          Juany Vincent FNP 

 

                          Voltaren                     1% Gel                   

apply up to 4 grams three 

times a day to affected knee as needed 100gm                           Charmaine Regalado M.D. 

2018

 

                          Aspirin Adult Low Dose                     81mg Tablet

s DR                   1 

by mouth every day                                 Juany Vincent FNP 10/17/201

7

 

                                        Acetaminophen Extra Strength            

         500mg Tablets                  

             take 2 tabs every 6 hours as needed                           Juany Fung FNP 10/17/2017

 

                                        Womens 50+ Multi Vitamin & Mineral Formu

la                      Tablets         

             1 every day PO Vit [id number] OQ=859                           Juany Vincent FNP 10/17/2017

 

                          Metoprolol Tartrate                     25mg Tablets  

                 Take One 

Tablet By Mouth Twice A Day 180tabs                         ABELARDO Saleem 10/17/2017

 

                          Atorvastatin Calcium                     20mg Tablets 

                  take one

tablet by mouth every day 90tabs                          JANE Saleem 10/17/2017

 

                          Flecainide Acetate                     100mg Tablets  

                 take one 

tablet by mouth twice a day 180tabs                         ABELARDO Saleem 10/17/2017

 

                          Digoxin                     250mcg Tablets            

       1 by mouth every 

day             90tabs                          Charmaine Regalado M.D. 







Medications Administered in Office





           Medication SIG        Qnty       Indications Ordering Provider Date

 

                          Administration Of Flu Vaccine                      Inj

diyaion                    

                                                Charmaine Regalado M.D. 10/15/20

20

 

                          Administration Of Flu Vaccine                      Inj

ection                    

                                                Juany Vicnent FNP 10/17/2019

 

                          Administration Of Flu Vaccine                      Inj

ection                    

                                                Juany Vincent FNP 10/17/2018

 

                          Administration Of Flu Vaccine                      Inj

ection                    

                                                Juany Vincent FNP 10/17/2017







Immunizations





             CPT Code     Status       Date         Vaccine      Lot #

 

                [id number]           Given           10/06/2021      Influenza Vaccin

e Quadrivalent Preser/Antibiotic Free Im 

Use                                     [id number]

 

                [id number]           Given           10/15/2020      Influenza Vaccin

e Quadrivalent Preser/Antibiotic Free Im 

Use                                     [id number]

 

                [id number]           Given           2020      Shingrix Zoster 

Vaccine (HZV), Recombinant, Subunit, 

Adjuvanted                               

 

                [id number]           Given           10/17/2019      Influenza Vaccin

e Quadrivalent Preser/Antibiotic Free Im 

Use                                     [id number]

 

                17701           Given           10/17/2018      Influenza Virus 

Vaccine, Quadrivalent (Cciiv4), Derived 

From Cell                               [id number]

 

             [id number]        Given        2018   Pneumovax 23 [id number]

 

                [id number]           Given           10/17/2017      Influenza Vaccin

e Quadrivalent Preser/Antibiotic Free Im 

Use                                     [id number]







Vital Signs





                Date            Vital           Result          Comment

 

                10/06/2021  2:17pm BP Systolic     124 mmHg         

 

                    BP Diastolic        68 mmHg              

 

                    Heart Rate          86 /min              

 

                    Height              66 inches           5'6"

 

                    Weight              217.00 lb            

 

                    BMI (Body Mass Index) 35.0 kg/m2           

 

                2021  1:31pm BP Systolic     136 mmHg        RT Arm

 

                    BP Diastolic        88 mmHg             RT Arm

 

                    Heart Rate          120 /min             

 

                    Height              66 inches           5'6"

 

                    Weight              223.50 lb            

 

                    BMI (Body Mass Index) 36.1 kg/m2           







Results





        Test    Acquired Date Facility Test    Result  H/L     Range   Note

 

                    Laboratory test finding 10/06/2021          [facility]

                                        [address] [place] [address] [phone] Wound Culture <pending>                         

 

                    CBC With Differential 2021          [facility]

                                        [address] [place] [address] [phone] White Blood Count 6.8 10     Normal     4.0-10.0    

 

             Red Blood Count 3.85 10      Low          4.00-5.40     

 

             Hemoglobin   12.7 g/dL    Normal       12.0-15.5     

 

             Hematocrit   39.4 %       Normal       36.0-47.0     

 

             Mean Corpuscular Volume 102.3 fl     High         80.0-96.0     

 

             Mean Corpuscular Hemoglobin 33.0 pg      Normal       27.0-33.0    

 

 

             Mean Corpuscular HGB Conc 32.2 g/dL    Normal       32.0-36.5     

 

             Red Cell Distribution Width 12.3 %       Normal       11.5-14.5    

 

 

             Platelet Count, Automated 187 10       Normal       150-450       

 

             Neutrophils % 72.2 %       High         36.0-66.0     

 

             Lymph %      19.9 %       Low          24.0-44.0     

 

             Mono %       6.0 %        Normal       2.0-8.0       

 

             Eos %        1.5 %        Normal       0.0-3.0       

 

             Baso %       0.3 %        Normal       0.0-1.0       

 

             Immature Granulocyte % 0.1 %        Normal       0-3.0         

 

             Nucleated Red Blood Cell % 0.0 %        Normal       0-0           

 

             Neutrophils # 4.9 10       Normal       1.5-8.5       

 

             Lymph #      1.4 10       Low          1.5-5.0       

 

             Mono #       0.4 10       Normal       0.0-0.8       

 

             Eos #        0.1 10       Normal       0.0-0.5       

 

             Baso #       0.0 10       Normal       0.0-0.2       

 

                    PT & Aptt           2021          [facility]

nter

                                        [address] [place] [address] [phone] Prothrombin Time 13.8 seconds Normal     12.5-14.3   

 

             Inr          1.04         Normal                    1

 

             Partial Thromboplastin Time 30.6 seconds Normal       24.2-38.5    

 

 

                    CBC With Differential 2021          [address] [address] [phone] White Blood Count 6.2 10     Normal     4.0-10.0    

 

             Red Blood Count 3.76 10      Low          4.00-5.40     

 

             Hemoglobin   12.5 g/dL    Normal       12.0-15.5     

 

             Hematocrit   39.0 %       Normal       36.0-47.0     

 

             Mean Corpuscular Volume 103.7 fl     High         80.0-96.0     

 

             Mean Corpuscular Hemoglobin 33.2 pg      High         27.0-33.0    

 

 

             Mean Corpuscular HGB Conc 32.1 g/dL    Normal       32.0-36.5     

 

             Red Cell Distribution Width 12.9 %       Normal       11.5-14.5    

 

 

             Platelet Count, Automated 199 10       Normal       150-450       

 

             Neutrophils % 74.5 %       High         36.0-66.0     

 

             Lymph %      16.4 %       Low          24.0-44.0     

 

             Mono %       7.0 %        Normal       2.0-8.0       

 

             Eos %        1.1 %        Normal       0.0-3.0       

 

             Baso %       0.5 %        Normal       0.0-1.0       

 

             Immature Granulocyte % 0.5 %        Normal       0-3.0         

 

             Nucleated Red Blood Cell % 0.0 %        Normal       0-0           

 

             Neutrophils # 4.6 10       Normal       1.5-8.5       

 

             Lymph #      1.0 10       Low          1.5-5.0       

 

             Mono #       0.4 10       Normal       0.0-0.8       

 

             Eos #        0.1 10       Normal       0.0-0.5       

 

             Baso #       0.0 10       Normal       0.0-0.2       

 

                    Comprehensive Metabolic Profil 2021          [address] [address] [phone] Glucose, Fasting 98 mg/dL   Normal           

 

             Blood Urea Nitrogen 28 mg/dL     High         7-18          

 

             Creatinine For GFR 0.90 mg/dL   Normal       0.55-1.30     

 

             Glomerular Filtration Rate > 60.0       Normal       >45          2

 

             Sodium Level 137 mEq/L    Normal       136-145       

 

             Potassium Serum 4.7 mEq/L    Normal       3.5-5.1       

 

             Chloride Level 107 mEq/L    Normal               

 

             Carbon Dioxide Level 27 mEq/L     Normal       21-32         

 

             Anion Gap    3 mEq/L      Low          8-16          

 

             Calcium Level 10.2 mg/dL   Normal       8.8-10.2      

 

             Ast/Sgot     23 U/L       Normal       7-37          

 

             Alt/SGPT     19 U/L       Normal       12-78         

 

             Alkaline Phosphatase 77 U/L       Normal               

 

             Bilirubin,Total 0.5 mg/dL    Normal       0.2-1.0       

 

             Total Protein 8.3 GM/DL    High         6.4-8.2       

 

             Albumin      3.0 GM/DL    Low          3.2-5.2       

 

             Albumin/Globulin Ratio 0.6          Low          1.2-2.2       

 

                    Complete Blood Count 2021          [facility] [place] [facility]

s, pc

: Dr Simon Hoyt

[place] [address] [phone] WBC        5.9 x10*3/UL            4.1 - 10.9  

 

             RBC          4.11 x10*6/UL Low          4.20 - 6.30   

 

             Hemoglobin   13.6 g/dL                 12.0 - 18.0   

 

             Hematocrit   40.0 %                    37.0 - 51.0   

 

             MCV          97.4 fL      High         80.0 - 97.0   

 

             MCH          33.1 pg      High         26.0 - 32.0   

 

             MCHC         34.0 g/dL                 31.0 - 38.0   

 

             RDW          13.2 %                    11.6 - 13.7   

 

             PLT          209 x10*3/UL              140 - 440     

 

             MPV          8.1 FL                    7.8 - 11.0    

 

             Lymph %      25.6 %                    10.0 - 58.5   

 

             Mid %        7.5 %                     1.7 - 9.3     

 

             Neut %       66.9 %                    37.0 - 92.0   

 

             Lymph #      1.5 x10*3/UL              0.6 - 4.1     

 

             Mid #        0.5 x10*3/UL              0.1 - 0.6     

 

             Neut #       3.9 x10*3/UL              2.0 - 7.8     

 

                    Laboratory test finding 2021          [facility] [place] [facility]

[facility], [facility]

: Dr Simon Hoyt

[place] [address] [phone] Sed Rate   25 mm/hr   High       0 - 15      

 

                    Basic Metabolic Panel 2021          [facility] [place] [facility]

ts, pc

: Dr Simon Hoyt

[place] [address] [phone] Glucose    100 mg/dL  High       74 - 99    3

 

             BUN          18 mg/dL                  7 - 18        

 

             Creatinine   0.8 mg/dL                 0.6 - 1.3     

 

             Sodium       141 mEq/L                 136 - 145     

 

             Potassium    3.8 mEq/L                 3.5 - 5.1     

 

             Chloride     104 mEq/L                 98 - 107      

 

             Carbon Dioxide 29 mEq/L                  21 - 32       

 

             Calcium      10.8 mg/dL   High         8.5 - 10.1   4

 

             GFR  >= 60 mL/min              >60           

 

             GFR African American >= 60 mL/min              >60          5

 

                    Laboratory test finding 2021          [facility] [place] [facility]

pia meza

: Dr Simon Hoyt

[place] [address]

           [phone] Thyroid Stimulating Hormone 0.19 uIU/mL Low        0.3

6 - 3.74  

 

                    Lipid Profile       2021          [facility] [place] [facility]

: Dr Simon Hoyt

[place] [address]

           [phone] Cholesterol 132 mg/dL             131 - 200   

 

             Triglycerides 72 mg/dL                  30 - 150      

 

             HDL Cholesterol 54 mg/dL                  35 - 60       

 

             LDL (Calculated) 64 CALC                   50 - 159      

 

                    Laboratory test finding 2021          [address] [id number] [phone] Digoxin Level 0.3 NG/ML  Low        0.5-2.0    6

 

                    Laboratory test finding 2021          [place] [facility]

pia meza

: Dr Simon Hoyt

[place], [place] [id number] [phone] Thyroid Stimulating Hormone 0.32 uIU/mL Low        0.3

6 - 3.74  

 

                    Basic Metabolic Panel 2021          [facility] [place] [facility]

ts, pc

: Dr Simon Hoyt

[place], [place] [id number] [phone] Glucose    70 mg/dL   Low        74 - 99    7

 

             BUN          23 mg/dL     High         7 - 18        

 

             Creatinine   0.9 mg/dL                 0.6 - 1.3     

 

             Sodium       144 mEq/L                 136 - 145     

 

             Potassium    3.8 mEq/L                 3.5 - 5.1     

 

             Chloride     104 mEq/L                 98 - 107      

 

             Carbon Dioxide 32 mEq/L                  21 - 32       

 

             Calcium      10.1 mg/dL                8.5 - 10.1    

 

             GFR  >= 60 mL/min              >60           

 

             GFR African American >= 60 mL/min              >60          8

 

                    Laboratory test finding 2021          [place] [facility]

pia meza

: Dr Simon Hoyt

[place] [address] [phone] Sed Rate   45 mm/hr   High       0 - 15      

 

             Magnesium    1.8 mg/dL                 1.8 - 2.4     

 

                    Complete Blood Count 2021          [facility]

: Dr Simon Hoyt

[place] [address] [phone] WBC        8.4 x10*3/UL            4.1 - 10.9  

 

             RBC          4.20 x10*6/UL              4.20 - 6.30   

 

             Hemoglobin   14.0 g/dL                 12.0 - 18.0   

 

             Hematocrit   40.3 %                    37.0 - 51.0   

 

             MCV          95.8 fL                   80.0 - 97.0   

 

             MCH          33.3 pg      High         26.0 - 32.0   

 

             MCHC         34.7 g/dL                 31.0 - 38.0   

 

             RDW          13.3 %                    11.6 - 13.7   

 

             PLT          207 x10*3/UL              140 - 440     

 

             MPV          8.0 FL                    7.8 - 11.0    

 

             Lymph %      17.5 %                    10.0 - 58.5   

 

             Mid %        4.8 %                     1.7 - 9.3     

 

             Neut %       77.7 %                    37.0 - 92.0   

 

             Lymph #      1.4 x10*3/UL              0.6 - 4.1     

 

             Mid #        0.5 x10*3/UL              0.1 - 0.6     

 

             Neut #       6.5 x10*3/UL              2.0 - 7.8     

 

                    Ua Routine          2021          [facility]

nter

                                        [address] [place] [address] [phone] Appearance, Urine CLEAR      Normal     Clear       

 

             Color, Urine ABHAY        Normal       Yellow        

 

             PH,Urine     5.0 units    Normal       5.0-9.0       

 

             Specific Gravity Urine Auto 1.028        Normal       1.002-1.035  

 

 

             Protein, Urine Auto 2+ mg/dL     High         Negative      

 

             Glucose, Urine (Ua) Auto NEGATIVE mg/dL Normal       Negative      

 

             Ketone, Urine Auto TRACE mg/dL  High         Negative      

 

             Urobilinogen, Urine Auto 4.0 mg/dL    High         0.0-2.0       

 

             Bilirubin, Urine Auto NEGATIVE     Normal       Negative      

 

             Nitrite, Urine Auto NEGATIVE     Normal       Negative      

 

             Leukocyte Esterase, Urine Auto NEGATIVE     Normal       Negative  

    

 

             Blood, Urine Blood NEGATIVE     Normal       Negative      

 

             WBC, Urine Auto 2 /HPF       Normal       0-3           

 

             RBC, Urine Auto 2 /HPF       Normal       0-3           

 

             Bacteria, Urine Auto 1+           High         Negative      

 

             Squamous Epithelial Cell Ur AU 0 /HPF       Normal       0-6       

    

 

             Mucus, Urine SMALL        Normal       Negative      

 

             Hyaline Cast, Urine Auto 0 /LPF       Normal       0-1           

 

                    CBC With Differential 2021          [address] [address] [phone] White Blood Count 7.6 10     Normal     4.0-10.0    

 

             Red Blood Count 4.29 10      Normal       4.00-5.40     

 

             Hemoglobin   13.9 g/dL    Normal       12.0-15.5     

 

             Hematocrit   43.3 %       Normal       36.0-47.0     

 

             Mean Corpuscular Volume 100.9 fl     High         80.0-96.0     

 

             Mean Corpuscular Hemoglobin 32.4 pg      Normal       27.0-33.0    

 

 

             Mean Corpuscular HGB Conc 32.1 g/dL    Normal       32.0-36.5     

 

             Red Cell Distribution Width 13.1 %       Normal       11.5-14.5    

 

 

             Platelet Count, Automated 175 10       Normal       150-450       

 

             Neutrophils % 73.1 %       High         36.0-66.0     

 

             Lymph %      18.1 %       Low          24.0-44.0     

 

             Mono %       6.8 %        Normal       2.0-8.0       

 

             Eos %        1.3 %        Normal       0.0-3.0       

 

             Baso %       0.3 %        Normal       0.0-1.0       

 

             Immature Granulocyte % 0.4 %        Normal       0-3.0         

 

             Nucleated Red Blood Cell % 0.0 %        Normal       0-0           

 

             Neutrophils # 5.6 10       Normal       1.5-8.5       

 

             Lymph #      1.4 10       Low          1.5-5.0       

 

             Mono #       0.5 10       Normal       0.0-0.8       

 

             Eos #        0.1 10       Normal       0.0-0.5       

 

             Baso #       0.0 10       Normal       0.0-0.2       

 

                    Laboratory test finding 2021          [facility]

                                        [address] [place] [address] [phone] Erythrocyte Sedimentation Rate 68 mm/hr   High       0

-30        

 

                    Comprehensive Metabolic Profil 2021          [id number] [place] [address] [phone] Glucose, Fasting 97 mg/dL   Normal           

 

             Blood Urea Nitrogen 24 mg/dL     High         7-18          

 

             Creatinine For GFR 0.85 mg/dL   Normal       0.55-1.30     

 

             Glomerular Filtration Rate > 60.0       Normal       >45          9

 

             Sodium Level 139 mEq/L    Normal       136-145       

 

             Potassium Serum 4.6 mEq/L    Normal       3.5-5.1       

 

             Chloride Level 106 mEq/L    Normal               

 

             Carbon Dioxide Level 26 mEq/L     Normal       21-32         

 

             Anion Gap    7 mEq/L      Low          8-16          

 

             Calcium Level 11.8 mg/dL   High         8.8-10.2      

 

             Ast/Sgot     33 U/L       Normal       7-37          

 

             Alt/SGPT     44 U/L       Normal       12-78         

 

             Alkaline Phosphatase 116 U/L      Normal               

 

             Bilirubin,Total 0.6 mg/dL    Normal       0.2-1.0       

 

             Total Protein 8.9 GM/DL    High         6.4-8.2       

 

             Albumin      3.2 GM/DL    Normal       3.2-5.2       

 

             Albumin/Globulin Ratio 0.6          Low          1.2-2.2       

 

                    Total Iron Binding Capacit 2021          [place] [id number] [phone] Iron (Fe)  131 g/dL Normal           

 

             Total Iron Binding Capacity 305 g/dL   Normal       250-450      

 

 

             Percent Saturation 43.0 %       Normal       13.2-45.0     

 

                    Immunoglobulin G,A,M 2021          [address] [address] [phone] Immunoglobulin A 2640.0 mg/dL High             

 

             Immunoglobulin G 510 mg/dL    Low          681-1648      

 

             Immunoglobulin M < 5.3 mg/dL  Low                  

 

                    Immunotyping (Immunofixation) Serum  (If 2021         

 [address] [address] [phone] Immunotyping Serum Iga ABNORMAL   High       Normal   

   

 

             Immunotyping Serum Lambda ABNORMAL     High         Normal        

 

             It Serum Interpretation SEE COMMENT  Normal                    10

 

             Its Pathologist Review REV'D BY O ADJAP <SEE NOTE>  Normal         

           11

 

                    Laboratory test finding 2021          [address] [address] [phone] Ferritin   128 NG/ML  Normal     8-252      12

 

             Beta 2 Microglobulin 3.7 mg/L     High         0.6-2.4      13

 

                    Free Kappa & Lambda LT Chains 2021          [id number] [place] [id number] [phone] Free [place] Light Chains Serum 6.4 mg/L   Normal     3.

3-19.4    

 

             Free Lambda Light Chains Serum 517.2 mg/L   High         5.7-26.3  

    

 

             Kappa/Lambda Ratio Serum 0.01         Low          0.26-1.65     







                          1                         THERAPUTIC HUMAN INR VALUES

INDICATIONS                      NORMAL RANGES

PROPHYLAXIS/TREATMENT OF:

VENOUS THROMBOSIS                2.0-3.0

PULMONARY EMBOLISM               2.0-3.0

PREVENTION OF SYSTEMIC EMBOLISM FROM:

TISSUE HEART VALVES              2.0-3.0

ACUTE MYOCARDIAL INFARCTION      2.0-3.0

VALVULAR HEART DISEASE           2.0-3.0

ATRIAL FIBRILLATION              2.0-3.0

MECHANICAL VALVES(HIGH RISK)     2.5-3.5

RECURRENT MYOCARDIAL INFARCTION  2.5-3.5



 

                          2                         Units are mL/min/1.73 m2



Chronic Kidney Disease Staging per NKF:



Stage I & II   GFR >=60       Normal to Mildly Decreased

Stage III      GFR 30-59      Moderately Decreased

Stage IV       GFR 15-29      Severely Decreased

Stage V        GFR <15        Very Little GFR Left

ESRD           GFR <15 on RRT



 

                          3                         100-125 mg/dL     PRE-DIABET

ES/FASTING

>126 mg/dL          DIABETES/FASTING



 

                          4                         NOTE:

CALCIUM,TSH VERIFIED







 

                          5                         CHRONIC KIDNEY DISEASE STAGI

NG PER NKF



STAGE I & II      GFR >= 60        NORMAL TO MILDLY DECREASED

STAGE III          GFR 30-59          MODERATELY DECREASED

STAGE IV           GFR 15-29         SEVERELY DECREASED

STAGE V            GFR <15            VERY LITTLE GFR LEFT

ESRD                 GFR <15            ON RRT



 

                          6                         note:<nlbl:demographic_chang

ed>



 

                          7                         100-125 mg/dL     PRE-DIABET

ES/FASTING

>126 mg/dL          DIABETES/FASTING



 

                          8                         CHRONIC KIDNEY DISEASE STAGI

NG PER NKF



STAGE I & II      GFR >= 60        NORMAL TO MILDLY DECREASED

STAGE III          GFR 30-59          MODERATELY DECREASED

STAGE IV           GFR 15-29         SEVERELY DECREASED

STAGE V            GFR <15            VERY LITTLE GFR LEFT

ESRD                 GFR <15            ON RRT



 

                          9                         Units are mL/min/1.73 m2



Chronic Kidney Disease Staging per NKF:



Stage I & II   GFR >=60       Normal to Mildly Decreased

Stage III      GFR 30-59      Moderately Decreased

Stage IV       GFR 15-29      Severely Decreased

Stage V        GFR <15        Very Little GFR Left

ESRD           GFR <15 on RRT



 

                          10                        MONOCLONAL IGA,LAMBDA



 

                          11                        REV'D BY CHARLES ROCHA



 

                          12                        note:<nlbl:demographic_chang

ed>



 

                          13                        [employer] Immulite 2000 Immuno

chemiluminometric assay (ICMA)

                                        .

Values obtained with different assay methods or kits cannot

be used interchangeably. Results cannot be interpreted as

absolute evidence of the presence or absence of malignant

disease.

Performed at:   - [facility] [address]  [id number]

: Araceli B Reyes MD, Phone:  [phone]

Performed at:   - [facility] [address]  [id number]

61

: Barak Archibald MD, Phone:  [phone]









Procedures





                Date            Code            Description     Status

 

                    2021          81022               Chronic Care MGMT 20

 Mins Clinical Staff Time Per Calendar 

Month                                   Completed

 

                2021      63445           Chronic Care Management Services

 Ea Addl 20 Min Completed

 

                2021      49407           Complex Chronic Care MGMT Servic

e Ea Addl 30 Min Completed

 

                2021      39627           Complex Chronic Care Management 

SVC 1[address] 60 Min Completed

 

                2021      96113           Complex Chronic Care MGMT Servic

e Ea Addl 30 Min Completed

 

                2021      32698           Complex Chronic Care Management 

SVC [address] 60 Min Completed

 

                2021      67153           Office/Outpatient Established Mo

d MDM 30-39 Min Completed

 

                2021      42099           Complex Chronic Care MGMT Servic

e Ea Addl 30 Min Completed

 

                2021      65745           Complex Chronic Care Management 

SVC [address] 60 Min Completed

 

                2021      33819           Complex Chronic Care MGMT Servic

e Ea Addl 30 Min Completed

 

                2021      98266           Complex Chronic Care Management 

SVC [address] 60 Min Completed

 

                2021      38249           Office/Outpatient Established Mo

d MDM 30-39 Min Completed

 

                2021      [id number]       Bone Mineral Density Test Comple

veronika

 

                2021      [id number]        Mammogram       Completed







[employer] Devices





                                        Description

 

                                        No Information Available







Encounters





           Type       Date       Location   Provider   Dx         Diagnosis

 

                Office Visit    2021  1:30p [facility]                      I48.20                    Chronic atrial fibrillation,

 unspecified

 

                          Z79.01                    Long term (current) use of a

nticoagulants

 

                          R60.9                     Edema, unspecified

 

                          I10                       Essential (primary) hyperten

darci

 

                          E05.90                    Thyrotoxicosis, unsp without

 thyrotoxic crisis or storm

 

                          I82.502                   Chronic embolism and thombos

 unsp deep veins of l low extrem

 

                          F17.210                   Nicotine dependence, cigaret

svitlana, uncomplicated

 

                          F32.89                    Other specified depressive e

pisodes

 

                          D47.2                     Monoclonal gammopathy

 

                          K21.9                     Gastro-esophageal reflux dis

ease without esophagitis

 

                          M19.90                    Unspecified osteoarthritis, 

unspecified site

 

                          E78.00                    Pure hypercholesterolemia, u

nspecified

 

                Office Visit    2021  1:30p [facility] Neal Regalado M.D.                      I48.20                    Chronic atrial fibrillation,

 unspecified

 

                          I10                       Essential (primary) hyperten

darci

 

                          E05.90                    Thyrotoxicosis, unsp without

 thyrotoxic crisis or storm

 

                          I82.502                   Chronic embolism and thombos

 unsp deep veins of l low extrem

 

                          Z79.01                    Long term (current) use of a

nticoagulants

 

                          F17.210                   Nicotine dependence, cigaret

svitlana, uncomplicated

 

                          F32.89                    Other specified depressive e

pisodes

 

                          E83.52                    Hypercalcemia

 

                          D47.2                     Monoclonal gammopathy

 

                          K21.9                     Gastro-esophageal reflux dis

ease without esophagitis

 

                          M15.9                     Polyosteoarthritis, unspecif

ied

 

                          E78.00                    Pure hypercholesterolemia, u

nspecified







Assessments





                Date            Code            Description     Provider

 

                10/06/2021      S81.802A        Unspecified open wound, left low

er leg, initial encounter 

AIXA Hoffmann JR

 

                2021      I10             Essential (primary) hypertension

 Charmaine Regalado M.D.

 

                2021      K21.9           Gastro-esophageal reflux disease

 without esophagitis Charmaine Regalado M.D.

 

                2021      E78.00          Pure hypercholesterolemia, unspe

cified Charmaine Regalado M.D.

 

                2021      I10             Essential (primary) hypertension

 Charmaine Regalado M.D.

 

                2021      K21.9           Gastro-esophageal reflux disease

 without esophagitis Charmaine Regalado M.D.

 

                2021      E78.00          Pure hypercholesterolemia, unspe

cified Charmaine Regalado M.D.

 

                2021      M15.9           Polyosteoarthritis, unspecified 

Charmaine Regalado M.D.

 

                2021      I10             Essential (primary) hypertension

 Charmaine Regalado M.D.

 

                2021      K21.9           Gastro-esophageal reflux disease

 without esophagitis Charmaine Regalado M.D.

 

                    2021          I82.502             Chronic embolism and

 thrombosis of unspecified deep veins of 

left lower extremity                    Charmaine Regalado M.D.

 

                2021      I48.20          Chronic atrial fibrillation, uns

pecified Charmaine Regalado M.D.

 

                2021      Z79.01          Long term (current) use of antic

oagulants Charmaine Regalado M.D.

 

                2021      R60.9           Edema, unspecified Charmaine guido M.D.

 

                2021      I10             Essential (primary) hypertension

 Charmaine Regalado M.D.

 

                2021      E05.90          Thyrotoxicosis, unspecified with

out thyrotoxic crisis or sto 

Charmaine Regalado M.D.

 

                    2021          I82.502             Chronic embolism and

 thrombosis of unspecified deep veins of 

left lower extremity                    Charmaine Regalado M.D.

 

                2021      F17.210         Nicotine dependence, cigarettes,

 uncomplicated Charmaine Regalado M.D.

 

                2021      F32.89          Other specified depressive episo

emil Charmaine Regalado M.D.

 

                2021      D47.2           Monoclonal gammopathy Charmaine duron M.D.

 

                2021      K21.9           Gastro-esophageal reflux disease

 without esophagitis Charmaine Regalado M.D.

 

                2021      M19.90          Unspecified osteoarthritis, unsp

ecified site Charmaine Regalado M.D.

 

                2021      E78.00          Pure hypercholesterolemia, unspe

cified Charmaine Regalado M.D.

 

                2021      I10             Essential (primary) hypertension

 Charmaine Regalado M.D.

 

                2021      K21.9           Gastro-esophageal reflux disease

 without esophagitis Charmaine Regalado M.D.

 

                2021      E78.00          Pure hypercholesterolemia, unspe

cified Charmaine Regalado M.D.

 

                2021      I48.20          Chronic atrial fibrillation, uns

pecminoo Regalado M.D.

 

                2021      F17.210         Nicotine dependence, cigarettes,

 uncomplicated Charmaine Regalado M.D.

 

                2021      K21.9           Gastro-esophageal reflux disease

 without esophagitis Charmaine Regalado M.D.

 

                2021      E78.00          Pure hypercholesterolemia, unspe

cisonia Regalado M.D.

 

                2021      I48.20          Chronic atrial fibrillation, uns

el Regalado M.D.

 

                2021      I10             Essential (primary) hypertension

 Charmaine Regalado M.D.

 

                2021      E05.90          Thyrotoxicosis, unspecified with

out thyrotoxic crisis or sto 

Charmaine Regalado M.D.

 

                    2021          I82.502             Chronic embolism and

 thrombosis of unspecified deep veins of 

left lower extremity                    Charmaine Regalado M.D.

 

                2021      Z79.01          Long term (current) use of antic

oagulants Charmaine Regalado M.D.

 

                2021      F17.210         Nicotine dependence, cigarettes,

 uncomplicated Charmaine Regalado M.D.

 

                2021      F32.89          Other specified depressive episo

emil Charmaine Regalado M.D.

 

                2021      E83.52          Hypercalcemia   Charmaine xavier M.D.

 

                2021      D47.2           Monoclonal gammopathy Charmaine duron M.D.

 

                2021      K21.9           Gastro-esophageal reflux disease

 without esophagitis Charmaine Regalado M.D.

 

                2021      M15.9           Polyosteoarthritis, unspecified 

Charmaine Regalado M.D.

 

                2021      E78.00          Pure hypercholesterolemia, unspe

cified Charmaine Regalado M.D.







Plan of Treatment

Future Appointment(s):* 2021  3:15 pm - [facility]

10/06/2021 - AIXA Hoffmann JR* S81.802A Unspecified open wound, left 
  lower leg, initial encounter

* All * New Medication:* Cephalexin 500 mg - take one tablet by mouth 3 times 
  daily x 7 days









Functional Status





                                        Description

 

                                        No Information Available







Mental Status





                                        Description

 

                                        No Information Available







Referrals





                                        Description

 

                                        No Information Available

## 2021-10-26 ENCOUNTER — HOSPITAL ENCOUNTER (INPATIENT)
Dept: HOSPITAL 53 - M ED | Age: 66
LOS: 9 days | Discharge: HOME HEALTH | DRG: 291 | End: 2021-11-04
Attending: GENERAL PRACTICE | Admitting: INTERNAL MEDICINE
Payer: MEDICARE

## 2021-10-26 VITALS — SYSTOLIC BLOOD PRESSURE: 131 MMHG | DIASTOLIC BLOOD PRESSURE: 93 MMHG

## 2021-10-26 DIAGNOSIS — I50.33: ICD-10-CM

## 2021-10-26 DIAGNOSIS — E66.9: ICD-10-CM

## 2021-10-26 DIAGNOSIS — K21.9: ICD-10-CM

## 2021-10-26 DIAGNOSIS — F17.210: ICD-10-CM

## 2021-10-26 DIAGNOSIS — Z79.01: ICD-10-CM

## 2021-10-26 DIAGNOSIS — Z88.6: ICD-10-CM

## 2021-10-26 DIAGNOSIS — Z86.711: ICD-10-CM

## 2021-10-26 DIAGNOSIS — Z79.82: ICD-10-CM

## 2021-10-26 DIAGNOSIS — R41.0: ICD-10-CM

## 2021-10-26 DIAGNOSIS — M19.90: ICD-10-CM

## 2021-10-26 DIAGNOSIS — I11.0: Primary | ICD-10-CM

## 2021-10-26 DIAGNOSIS — I48.20: ICD-10-CM

## 2021-10-26 DIAGNOSIS — Z86.718: ICD-10-CM

## 2021-10-26 DIAGNOSIS — Z79.899: ICD-10-CM

## 2021-10-26 DIAGNOSIS — N17.9: ICD-10-CM

## 2021-10-26 DIAGNOSIS — I25.10: ICD-10-CM

## 2021-10-26 DIAGNOSIS — E05.90: ICD-10-CM

## 2021-10-26 DIAGNOSIS — E78.5: ICD-10-CM

## 2021-10-26 LAB
ALBUMIN SERPL BCG-MCNC: 2.7 GM/DL (ref 3.2–5.2)
ALT SERPL W P-5'-P-CCNC: 27 U/L (ref 12–78)
BILIRUB CONJ SERPL-MCNC: 0.3 MG/DL (ref 0–0.2)
BILIRUB SERPL-MCNC: 0.6 MG/DL (ref 0.2–1)
BUN SERPL-MCNC: 28 MG/DL (ref 7–18)
CALCIUM SERPL-MCNC: 10.7 MG/DL (ref 8.8–10.2)
CHLORIDE SERPL-SCNC: 109 MEQ/L (ref 98–107)
CK MB CFR.DF SERPL CALC: 2.94
CK MB SERPL-MCNC: < 1 NG/ML (ref ?–3.6)
CK SERPL-CCNC: 34 U/L (ref 26–192)
CO2 SERPL-SCNC: 24 MEQ/L (ref 21–32)
CREAT SERPL-MCNC: 1.28 MG/DL (ref 0.55–1.3)
DIGOXIN SERPL-MCNC: 0.4 NG/ML (ref 0.5–2)
EOSINOPHIL NFR BLD MANUAL: 3 % (ref 0–3)
GFR SERPL CREATININE-BSD FRML MDRD: 44.4 ML/MIN/{1.73_M2} (ref 45–?)
GLUCOSE SERPL-MCNC: 85 MG/DL (ref 70–100)
HCT VFR BLD AUTO: 41.2 % (ref 36–47)
HGB BLD-MCNC: 13 G/DL (ref 12–15.5)
LIPASE SERPL-CCNC: 110 U/L (ref 73–393)
LYMPHOCYTES NFR BLD MANUAL: 29 % (ref 16–44)
MCH RBC QN AUTO: 31.3 PG (ref 27–33)
MCHC RBC AUTO-ENTMCNC: 31.6 G/DL (ref 32–36.5)
MCV RBC AUTO: 99.3 FL (ref 80–96)
MONOCYTES NFR BLD MANUAL: 5 % (ref 0–5)
NEUTROPHILS NFR BLD MANUAL: 61 % (ref 28–66)
NT-PRO BNP: 8578 PG/ML (ref ?–125)
PLATELET # BLD AUTO: 215 10^3/UL (ref 150–450)
PLATELET BLD QL SMEAR: NORMAL
POTASSIUM SERPL-SCNC: 4.2 MEQ/L (ref 3.5–5.1)
PROT SERPL-MCNC: 8.4 GM/DL (ref 6.4–8.2)
RBC # BLD AUTO: 4.15 10^6/UL (ref 4–5.4)
SODIUM SERPL-SCNC: 140 MEQ/L (ref 136–145)
TROPONIN I SERPL-MCNC: < 0.02 NG/ML (ref ?–0.1)
TSH SERPL DL<=0.005 MIU/L-ACNC: 0.03 UIU/ML (ref 0.36–3.74)
VARIANT LYMPHS NFR BLD MANUAL: 2 % (ref 0–5)
WBC # BLD AUTO: 6 10^3/UL (ref 4–10)
WBC TOXIC VACUOLES BLD QL SMEAR: (no result)

## 2021-10-26 RX ADMIN — ATORVASTATIN CALCIUM SCH MG: 20 TABLET, FILM COATED ORAL at 21:02

## 2021-10-26 RX ADMIN — FLECAINIDE ACETATE SCH MG: 50 TABLET ORAL at 21:11

## 2021-10-26 RX ADMIN — DIGOXIN SCH MG: 250 TABLET ORAL at 21:01

## 2021-10-26 RX ADMIN — METOPROLOL TARTRATE SCH MG: 25 TABLET, FILM COATED ORAL at 21:02

## 2021-10-26 RX ADMIN — APIXABAN SCH MG: 5 TABLET, FILM COATED ORAL at 21:02

## 2021-10-26 RX ADMIN — FAMOTIDINE SCH MG: 20 TABLET, FILM COATED ORAL at 21:02

## 2021-10-26 NOTE — CCD
Summarization Of Episode

                             Created on: 10/26/2021



BECERRILJUANY LINA

External Reference #: [id number]

: 1955

Sex: Undifferentiated



Demographics





                          Address                   [address]

[place]  [address]

 

                          Home Phone                [phone]

 

                          Preferred Language        English

 

                          Marital Status            Unknown

 

                          Jew Affiliation     Unknown

 

                          Race                      Unknown

 

                          Ethnic Group              Not  or 





Author





                          Author                    [facility]

 

                          Organization              [facility]

 

                          Address                   Unknown

 

                          Phone                     Unavailable







Support





                Name            Relationship    Address         Phone

 

                RE              Next Of Kin     Unknown         Unavailable

 

                RETIRED         Next Of Kin     Unknown         [phone]

 

                    ABBCESS,  DREAD     Next Of Kin         [address]

[place]  [address]                    [phone]

 

                    MARJAN VALLADARES     Next Of Kin         [address]

[place]  [address]                    [phone]

 

                    ELMARJAN WALLS     Next Of Kin         [address]

[place]  [address]                    [phone]

 

                DISABLED        Next Of Kin     Unknown         Unavailable

 

                    ALFRED MOSLEY    Next Of Kin         [address] [place]  [address]                    [phone]

 

                    Abbcess,  Dread     ECON                [address]

[place]  [address]                    Unavailable







Care Team Providers





                    Care Team Member Name Role                Phone

 

                    JHONNY Meeks MD Unavailable         Unavailable

 

                    JHONNY Meeks MD Unavailable         Unavailable

 

                    JHONNY Meeks MD Unavailable         Unavailable

 

                    JHONNY Meeks MD Unavailable         Unavailable

 

                    JHONNY Meeks MD Unavailable         Unavailable

 

                    JHONNY Meeks MD Unavailable         Unavailable

 

                    JHONNY Meeks MD Unavailable         Unavailable

 

                    JHONNY Meeks MD Unavailable         Unavailable

 

                    JHONNY Meeks MD Unavailable         Unavailable

 

                    JHONNY Meeks MD Unavailable         Unavailable

 

                    JHONNY Meeks MD Unavailable         Unavailable

 

                    Fish, B Uzair PURCELL   Unavailable         Unavailable

 

                    Fish, B Uzair PURCELL   Unavailable         Unavailable

 

                    Fish, B Uzair PURCELL   Unavailable         Unavailable

 

                    Fish, B Uzair PURCELL   Unavailable         Unavailable

 

                    Fish, B Uzair PURCELL   Unavailable         Unavailable

 

                    Fish, B Uzair PURCELL   Unavailable         Unavailable

 

                    Fish, B Uzair PURCELL   Unavailable         Unavailable

 

                    Fish, B Uzair PURCELL   Unavailable         Unavailable

 

                    Fish, B Uzair PURCELL   Unavailable         Unavailable

 

                    Fish, B Uzair PURCELL   Unavailable         Unavailable

 

                    Fish, B Uzair PURCELL   Unavailable         Unavailable

 

                    Fish, B Uzair PURCELL   Unavailable         Unavailable

 

                    Fish, B Uzair PURCELL   Unavailable         Unavailable

 

                    Fish, B Uzair PURCELL   Unavailable         Unavailable

 

                    Fish, B Uzair PURCELL   Unavailable         Unavailable

 

                    Fish, B Uzair PURCELL   Unavailable         Unavailable

 

                    Fish, B Uzair PURCELL   Unavailable         Unavailable

 

                    Fish, B Uzair PURCELL   Unavailable         Unavailable

 

                    Fish, B Uzair PURCELL   Unavailable         Unavailable

 

                    Fish, B Uzair PURCELL   Unavailable         Unavailable

 

                    Fish, B Uzair PURCELL   Unavailable         Unavailable

 

                    Fish, B Uzair PURCELL   Unavailable         Unavailable

 

                    Fish, B Uzair PURCELL   Unavailable         Unavailable

 

                    Fish, B Uzair PURCELL   Unavailable         Unavailable

 

                    Fish, B Uzair PURCELL   Unavailable         Unavailable

 

                    Fish, B Uzair PURCELL   Unavailable         Unavailable

 

                    Fish, B Uzair PURCELL   Unavailable         Unavailable

 

                    Fish, B Uzair PURCELL   Unavailable         Unavailable

 

                    Fish, B Uzair PURCELL   Unavailable         Unavailable

 

                    Fish, B Uzair PURCELL   Unavailable         Unavailable

 

                    Fish, B Uzair PURCELL   Unavailable         Unavailable

 

                    Fish, B Uzair PURCELL   Unavailable         Unavailable

 

                    Fish, B Uzair PURCELL   Unavailable         Unavailable

 

                    Fish, B Uzair PURCELL   Unavailable         Unavailable

 

                    Fish, B Uzair PURCELL   Unavailable         Unavailable

 

                    Fish, B Uzair PURCELL   Unavailable         Unavailable

 

                    Fish, B Uzair PURCELL   Unavailable         Unavailable

 

                    Fish, B Uzair PURCELL   Unavailable         Unavailable

 

                    Fish, B Uzair PURCELL   Unavailable         Unavailable

 

                    Fish, B Uzair PURCELL   Unavailable         Unavailable

 

                    Fish, B Uzair PURCELL   Unavailable         Unavailable

 

                    Fish, B Uzair PURCELL   Unavailable         Unavailable

 

                    Fish, B Uzair PURCELL   Unavailable         Unavailable

 

                    Fish, B Uzair PURCELL   Unavailable         Unavailable

 

                    Fish, B Uzair PURCELL   Unavailable         Unavailable

 

                    Fish, B Uzair PURCELL   Unavailable         Unavailable

 

                    Fish, B Uzair PURCELL   Unavailable         Unavailable

 

                    Fish, B Uzair PURCELL   Unavailable         Unavailable

 

                    Fish, B Uzair PURCELL   Unavailable         Unavailable

 

                    Fish, B Uzair PURCELL   Unavailable         Unavailable

 

                    Fish, B Uzair PURCELL   Unavailable         Unavailable

 

                    Fish, B Uzair PURCELL   Unavailable         Unavailable

 

                    Fish, B Uzair PURCELL   Unavailable         Unavailable

 

                    Fish, B Uzair PURCELL   Unavailable         Unavailable

 

                    Fish, B Uzair PURCELL   Unavailable         Unavailable

 

                    Fish, B Uzair PURCELL   Unavailable         Unavailable

 

                    Fish, Lina Josefina MPAS, PA-C Unavailable         Unavailabl

e

 

                    Fish, Lina Josefina MPAS, PA-C Unavailable         Unavailabl

e

 

                    Fish, Lina Josefina MPAS, PA-C Unavailable         Unavailabl

e

 

                    Fish, Lina Josefina MPAS, PA-C Unavailable         Unavailabl

e

 

                    Fish, Lina Josefina MPAS, PA-C Unavailable         Unavailabl

e

 

                    Fish, Lina Josefina MPAS, PA-C Unavailable         Unavailabl

e

 

                    Fish, Lina Josefina MPAS, PA-C Unavailable         Unavailabl

e

 

                    Fish, Lina Josefina MPAS, PA-C Unavailable         Unavailabl

e

 

                    Fish, Lina Josefina MPAS, PA-C Unavailable         Unavailabl

e

 

                    Fish, Lina Josefina MPAS, PA-C Unavailable         Unavailabl

e

 

                    Fish, Lina Josefina MPAS, PA-C Unavailable         Unavailabl

e

 

                    Fish, Lina Josefina MPAS, PA-C Unavailable         Unavailabl

e

 

                    Fish, Lina Josefina MPAS, PA-C Unavailable         Unavailabl

e

 

                    Fish, Lina Josefina MPAS, PA-C Unavailable         Unavailabl

e

 

                    Fish, Lina Josefina MPAS, PA-C Unavailable         Unavailabl

e

 

                    Fish, Lina Josefina MPAS, PA-C Unavailable         Unavailabl

e

 

                    Fish, Lina Josefina MPAS, PA-C Unavailable         Unavailabl

e

 

                    Fish, Lina Josefina MPAS, PA-C Unavailable         Unavailabl

e

 

                    Fish, Lina Josefina MPAS, PA-C Unavailable         Unavailabl

e

 

                    Fish, Lina Josefina MPAS, PA-C Unavailable         Unavailabl

e

 

                    Fish, Lina Josefina MPAS, PA-C Unavailable         Unavailabl

e

 

                    Fish, Lina Josefina MPAS, PA-C Unavailable         Unavailabl

e

 

                    Fish, Lina Josefina MPAS, PA-C Unavailable         Unavailabl

e

 

                    Fish, Lina Josefina MPAS, PA-C Unavailable         Unavailabl

e

 

                    Fish, Lina Josefina MPAS, PA-C Unavailable         Unavailabl

e

 

                    Fish, Lina Josefina MPAS, PA-C Unavailable         Unavailabl

e

 

                    Fish, Lina Josefina MPAS, PA-C Unavailable         Unavailabl

e

 

                    Fish, Lina Josefina MPAS, PA-C Unavailable         Unavailabl

e

 

                    Fish, Lina Josefina MPAS, PA-C Unavailable         Unavailabl

e

 

                    Fish, Lina Josefina MPAS, PA-C Unavailable         Unavailabl

e

 

                    Fish, Lina Josefina MPAS, PA-C Unavailable         Unavailabl

e

 

                    Fish, Lina Josefina MPAS, PA-C Unavailable         Unavailabl

e

 

                    Fish, Lina Josefina MPAS, PA-C Unavailable         Unavailabl

e

 

                    Fish, Lina Josefina MPAS, PA-C Unavailable         Unavailabl

e

 

                    Fish, Lina Josefina MPAS, PA-C Unavailable         Unavailabl

e

 

                    Fish, Lina Josefina MPAS, PA-C Unavailable         Unavailabl

e

 

                    JHONNY ESQUEDA JR, PA-C Unavailable         Unavailable

 

                    PICKERAL JHONNY HERNANDEZ PA-C Unavailable         Unavailable

 

                    PICKERAL JRJHONNY PA-C Unavailable         Unavailable

 

                    PICKERAL JHONNY HERNANDEZ PA-C Unavailable         Unavailable

 

                    JHONNY ESQUEDA JR, PA-C Unavailable         Unavailable

 

                    PICKJHONNY EVANS JR, PA-C Unavailable         Unavailable

 

                    PICKJHONNY EVANS JR, PA-C Unavailable         Unavailable

 

                    PICKERAL JHONNY HERNANDEZ PA-C Unavailable         Unavailable

 

                    PICKERAL JR, J ALBERTO PA-C Unavailable         Unavailable

 

                    PICKERAL JR, J ALBERTO PA-C Unavailable         Unavailable

 

                    PICKERAL JR, J ALBERTO PA-C Unavailable         Unavailable

 

                    PICKERAL JR, J ALBERTO PA-C Unavailable         Unavailable

 

                    PICKERAL JR, J ALBERTO PA-C Unavailable         Unavailable

 

                    PICKERAL JR, J ALBERTO PA-C Unavailable         Unavailable

 

                    PICKERAL JR, J ALBERTO PA-C Unavailable         Unavailable

 

                    PICKERAL JR, J ALBERTO PA-C Unavailable         Unavailable

 

                    PICKERAL JR, J ALBERTO PA-C Unavailable         Unavailable

 

                    PICKERAL JR, J ALBERTO PA-C Unavailable         Unavailable

 

                    PICKERAL JR, J ALBERTO PA-C Unavailable         Unavailable

 

                    PICKERAL JR, J ALBERTO PA-C Unavailable         Unavailable

 

                    PICKERAL JR, J ALBERTO PA-C Unavailable         Unavailable

 

                    PICKERAL JR, J ALBERTO PA-C Unavailable         Unavailable

 

                    PICKERAL JR, J ALBERTO PA-C Unavailable         Unavailable

 

                    PICKERAL JR, J ALBERTO PA-C Unavailable         Unavailable

 

                    PICKERAL JR, J ALBERTO PA-C Unavailable         Unavailable

 

                    PICKERAL JR, J ALBERTO PA-C Unavailable         Unavailable

 

                    PICKERAL JR, J ALBERTO PA-C Unavailable         Unavailable

 

                    ABELARDO Regalado MD Unavailable         Unavailable

 

                    ABELARDO Regalado MD Unavailable         Unavailable

 

                    ABELARDO Regalado MD Unavailable         Unavailable

 

                    ABELARDO Regalado MD Unavailable         Unavailable

 

                    ABELARDO Regalado MD Unavailable         Unavailable

 

                    ABELARDO Regalado MD Unavailable         Unavailable

 

                    ABELARDO Regalado MD Unavailable         Unavailable

 

                    ABELARDO Regalado MD Unavailable         Unavailable

 

                    ABELARDO Regalado MD Unavailable         Unavailable

 

                    ABELARDO Regalado MD Unavailable         Unavailable

 

                    ABELARDO Regalado MD Unavailable         Unavailable

 

                    ABELARDO Regalado MD Unavailable         Unavailable

 

                    ABELARDO Regalado MD Unavailable         Unavailable

 

                    ABELARDO Regalado MD Unavailable         Unavailable

 

                    ABELARDO Regalado MD Unavailable         Unavailable

 

                    ABELARDO Regalado MD Unavailable         Unavailable

 

                    ABELARDO Regalado MD Unavailable         Unavailable

 

                    ABELARDO Regaldao MD Unavailable         Unavailable

 

                    ABELARDO Regalado MD Unavailable         Unavailable

 

                    ABELARDO Regalado MD Unavailable         Unavailable

 

                    ABELARDO Regalado MD Unavailable         Unavailable

 

                    ABELARDO Regalado MD Unavailable         Unavailable

 

                    ABELARDO Regalado MD Unavailable         Unavailable

 

                    ABELARDO Regalado MD Unavailable         Unavailable

 

                    ABELARDO Regalado MD Unavailable         Unavailable

 

                    ABELARDO Regalado MD Unavailable         Unavailable

 

                    ABELARDO Regalado MD Unavailable         Unavailable

 

                    ABELARDO Regalado MD Unavailable         Unavailable

 

                    ABELARDO Regalado MD Unavailable         Unavailable

 

                    ABELARDO Regalado MD Unavailable         Unavailable

 

                    SabineABELARDO MD Unavailable         Unavailable

 

                    SabineABELARDO MD Unavailable         Unavailable

 

                    SabineABELARDO MD Unavailable         Unavailable

 

                    SabineABELARDO MD Unavailable         Unavailable

 

                    SabineABELARDO MD Unavailable         Unavailable

 

                    SabineABELARDO MD Unavailable         Unavailable

 

                    SabineABELARDO MD Unavailable         Unavailable

 

                    SabineABELARDO MD Unavailable         Unavailable

 

                    SabineABELARDO MD Unavailable         Unavailable

 

                    SabineABELARDO MD Unavailable         Unavailable

 

                    SabineABELARDO MD Unavailable         Unavailable

 

                    SabineABELARDO MD Unavailable         Unavailable

 

                    SabineABELARDO MD Unavailable         Unavailable

 

                    SabineABELARDO MD Unavailable         Unavailable

 

                    SabineABELARDO MD Unavailable         Unavailable

 

                    SabineABELARDO MD Unavailable         Unavailable

 

                    SabineABELARDO MD Unavailable         Unavailable

 

                    SabineABELARDO MD Unavailable         Unavailable

 

                    SabineABELARDO MD Unavailable         Unavailable

 

                    SabineAEBLARDO MD Unavailable         Unavailable

 

                    ABELARDO Regalado MD Unavailable         Unavailable

 

                    ABELARDO Regalado MD Unavailable         Unavailable

 

                    ABELARDO Regalado MD Unavailable         Unavailable

 

                    SabineABELARDO xavier MD Unavailable         Unavailable

 

                    ABELARDO Regalado MD Unavailable         Unavailable

 

                    ABELARDO Regalado MD Unavailable         Unavailable

 

                    ABELARDO Regalado MD Unavailable         Unavailable

 

                    ABELARDO Regalado MD Unavailable         Unavailable

 

                    ABELARDO Regalado MD Unavailable         Unavailable

 

                    ABELARDO Regalado MD Unavailable         Unavailable

 

                    ABELARDO Regalado MD Unavailable         Unavailable

 

                    ABELARDO Regalado MD Unavailable         Unavailable

 

                    ABELARDO Reaglado MD Unavailable         Unavailable

 

                    ABELARDO Regalado MD Unavailable         Unavailable

 

                    ABELARDO Regalado MD Unavailable         Unavailable

 

                    ABELARDO Regalado MD Unavailable         Unavailable

 

                    ABELARDO Regalado MD Unavailable         Unavailable

 

                    ABELARDO Regalado MD Unavailable         Unavailable

 

                    ABELARDO Regalado MD Unavailable         Unavailable

 

                    ABELARDO Regalado MD Unavailable         Unavailable

 

                    ABELARDO Regalado MD Unavailable         Unavailable

 

                    ABELARDO Regalado MD Unavailable         Unavailable

 

                    ABELARDO Regalado MD Unavailable         Unavailable

 

                    ABELARDO Regalado MD Unavailable         Unavailable

 

                    SabineABELARDO MD Unavailable         Unavailable

 

                    ABELARDO Regalado MD Unavailable         Unavailable

 

                    ABELARDO Regalado MD Unavailable         Unavailable

 

                    ABELARDO Regalado MD Unavailable         Unavailable

 

                    ABELARDO Regalado MD Unavailable         Unavailable

 

                    SabineABELARDO MD Unavailable         Unavailable

 

                    SabineABELARDO MD Unavailable         Unavailable

 

                    ABELARDO Regalado MD Unavailable         Unavailable

 

                    SabineABELARDO MD Unavailable         Unavailable

 

                    SabineABELARDO MD Unavailable         Unavailable

 

                    AFSHAN, L ERICK PA Unavailable         Unavailable

 

                    AFSHAN, L ERICK PA Unavailable         Unavailable

 

                    AFSHAN, L ERICK PA Unavailable         Unavailable

 

                    AFSHAN, L ERICK PA Unavailable         Unavailable

 

                    AFSHAN, L ERICK PA Unavailable         Unavailable

 

                    AFSHAN, L ERICK PA Unavailable         Unavailable

 

                    AFSHAN, L ERICK PA Unavailable         Unavailable

 

                    AFSHAN, L ERICK PA Unavailable         Unavailable

 

                    AFSHAN, L ERICK PA Unavailable         Unavailable

 

                    AFSHAN, L ERICK PA Unavailable         Unavailable

 

                    AFSHAN, L ERICK PA Unavailable         Unavailable

 

                    AFSHAN, L ERICK PA Unavailable         Unavailable

 

                    AFSHAN, L ERICK PA Unavailable         Unavailable

 

                    AFSHAN, L ERICK PA Unavailable         Unavailable

 

                    AFSHAN, L ERICK PA Unavailable         Unavailable

 

                    AFSHAN, L ERICK PA Unavailable         Unavailable

 

                    ADJAPONG,  ROSA    Unavailable         Unavailable

 

                    Jimenez, L Kanchan RPA Unavailable         Unavailable

 

                    Jimenez, L Kanchan RPA Unavailable         Unavailable

 

                    Jimenez, L Kanchan RPA Unavailable         Unavailable

 

                    Jimenez, L Kanchan RPA Unavailable         Unavailable

 

                    Jimenez, L Kanchan RPA Unavailable         Unavailable

 

                    Jimenez, L Kanchan RPA Unavailable         Unavailable

 

                    Jimenez, L Kanchan RPA Unavailable         Unavailable

 

                    Jimenez, L Kanchna RPA Unavailable         Unavailable

 

                    Jimenez, L Kanchan RPA Unavailable         Unavailable

 

                    Jimenez, L Kanchan RPA Unavailable         Unavailable

 

                    Jimenez, L Kanchan RPA Unavailable         Unavailable

 

                    Jimenez, L Kanchan RPA Unavailable         Unavailable

 

                    Jimenez, L Kanchan RPA Unavailable         Unavailable

 

                    Jimenez, L Kanchan RPA Unavailable         Unavailable

 

                    Jimenez, L Kanchan RPA Unavailable         Unavailable

 

                    Jimenez, L Kanchan RPA Unavailable         Unavailable

 

                    Jimenez, L Kanchan RPA Unavailable         Unavailable

 

                    Jimenez, L Kanchan RPA Unavailable         Unavailable

 

                    Jimenez, L Kanchan RPA Unavailable         Unavailable

 

                    Jimenez, L Kanchan RPA Unavailable         Unavailable

 

                    Jimenez, L Kanchan RPA Unavailable         Unavailable

 

                    Jimenez, L Kanchan RPA Unavailable         Unavailable

 

                    Jimenez, L Kanchan RPA Unavailable         Unavailable

 

                    Jimenez, L Kanchan RPA Unavailable         Unavailable

 

                    Jimenez, L Kanchan RPA Unavailable         Unavailable

 

                    Jimenez, L Kanchan RPA Unavailable         Unavailable

 

                    Jimenez, L Kanchan RPA Unavailable         Unavailable

 

                    Jimenez, L Kanchan RPA Unavailable         Unavailable

 

                    Jimenez, L Kanchan RPA Unavailable         Unavailable

 

                    Jimenez, L Kanchan RPA Unavailable         Unavailable

 

                    Jimenez, L Kanchan RPA Unavailable         Unavailable

 

                    Jimenez, L Kanchan RPA Unavailable         Unavailable



                                  



Re-disclosure Warning

          The records that you are about to access may contain information from 
federally-assisted alcohol or drug abuse programs. If such information is 
present, then the following federally mandated warning applies: This information
has been disclosed to you from records protected by federal confidentiality 
rules (42 CFR part 2). The federal rules prohibit you from making any further 
disclosure of this information unless further disclosure is expressly permitted 
by the written consent of the person to whom it pertains or as otherwise 
permitted by 42 CFR part 2. A general authorization for the release of medical 
or other information is NOT sufficient for this purpose. The Federal rules 
restrict any use of the information to criminally investigate or prosecute any 
alcohol or drug abuse patient.The records that you are about to access may 
contain highly sensitive health information, the redisclosure of which is 
protected by Article 27-F of the [facility] Public Health law. If you 
continue you may have access to information: Regarding HIV / AIDS; Provided by 
facilities licensed or operated by the [facility] Office of Mental Health; 
or Provided by the [facility] Office for People With Developmental 
Disabilities. If such information is present, then the following [place] 
mandated warning applies: This information has been disclosed to you from 
confidential records which are protected by state law. State law prohibits you 
from making any further disclosure of this information without the specific 
written consent of the person to whom it pertains, or as otherwise permitted by 
law. Any unauthorized further disclosure in violation of state law may result in
a fine or correction sentence or both. A general authorization for the release of 
medical or other information is NOT sufficient authorization for further disc
losure.                                                                         
    



Allergies and Adverse Reactions

          



           Type       Description Substance  Reaction   Status     Data Source(s

)

 

           Drug Allergy Drug Allergy NKDA                             [facility] ([facility] )



                                                                                
       



Family History

          



             Family Member Name Family Member Gender Family Member Status Date o

f Status 

Description                             Data Source(s)

 

           Unknown    Male       Problem                          [facility] ([facility])

 

           Unknown    Male       Problem                          [facility] ([facility])



                                                                                
                 



Encounters

          



           Encounter  Providers  Location   Date       Indications Data Source(s

)

 

                Unknown                         [address] [facility]

[place] [address] 10/21/2021 12:00:00 AM 

EDT                                                 eCW1 ([facility])

 

                Outpatient      Attender: ALBERTO Melchro 1

 02:20:00 PM

EDT                                                 [facility] ([facility]

)

 

                Outpatient      Admitter: ROSAEMORY ROCHAReferrer: ROSAEMORY ROCHA

                 2021 

12:00:00 AM EDT           Multiple myeloma not having achieved remission [facility]

 

                                        Multiple myeloma not having achieved rem

ission 

 

                Outpatient      Attender: Kanchan Jimenez RPA Sabrina/Crestline/Ravindra/R

eindl 2021 

10:15:00 AM EDT                                     [facility]

 

                Outpatient      Attender: Charmaine Melchor  01:30:00 PM 

EDT                                                 [facility] ([facility]

)

 

                Outpatient      Attender: Kanchan Jimenez RPA Sabrina/Crestline/Ravindra/R

eindl 06/10/2021 

10:45:00 AM EDT                                     [facility]

 

                Outpatient      Attender: Charmaine Melchor  01:30:00 PM 

EDT                                                 [facility] ([facility]

)

 

                Outpatient      Attender: Ra Meeks MD Physical Therapy  10:30:00 AM 

EDT                                                 [facility] ([facility]

 

                Office Visit    Attender: Josefina VIGIL PA-C Physical Therapy

 2021 

01:15:00 PM EDT                                     [facility]

 

             Outpatient   Attender: Uzair Huitron MD Physical Therapy 2021 1

2:45:00 PM EDT 

                                        [facility] ([facility])

 

                Outpatient      Attender: Josefina VIGIL PA-C Physical Therapy

 2021 11:15:00 

AM EDT                                              [facility]

 

                Outpatient      Attender: Charmaine Melchor  02:30:00 PM 

EDT                                                 [facility] ([facility]

)

 

                Outpatient      Attender: Kanchan Jimenez RPA Sabrina/Crestline/Ravindra/R

eindl 2021 

09:15:00 AM EST                                     [facility]

 

             Outpatient   Attender: ERICK KRUSE Main Office  2021 1

1:45:00 AM EST  

                                        [facility] ([facility])

 

                Outpatient      Attender: Charmaine Matiasall  12:00:00 PM 

EST                                                 [facility] ([facility]

)

 

                Outpatient      Attender: Charmaine Melchor 10

/ 10:30:00 AM 

EDT                                                 [facility] ([facility]

)



                                                                                
                                                                                
                                                                            



Immunizations

          



             Vaccine      Date         Status       Description  Data Source(s)

 

                          Influenza, injectable, MDCK, preservative free, bruno

valent 10/06/2021 02:31:00

PM EDT              completed                               [facility])

 

                          Influenza, injectable, MDCK, preservative free, bruno

valent 10/15/2020 12:10:00

PM EDT              completed                               [facility] ([facility])



                                                                                
                 



Medications

          



          Medication Brand Name Start Date Product Form Dose      Route     Admi

nistrative 

Instructions Pharmacy Instructions Status     Indications Reaction   Description

 Data 

Source(s)

 

           Cephalexin 500 MG Oral Capsule CEPHALEXIN 10/20/2021 12:00:00 AM EDT 

capsule    21         

                          TAKE ONE CAPSULE BY MOUTH THREE TIMES A DAY FOR 7 DAYS

 TAKE ONE CAPSULE BY 

MOUTH THREE TIMES A DAY FOR 7 DAYS SOLD: 10/20/2021                             

           [facility]

 

           Cephalexin 500 MG Oral Capsule CEPHALEXIN 10/06/2021 12:00:00 AM EDT 

capsule    21         

                          TAKE ONE CAPSULE BY MOUTH THREE TIMES A DAY FOR 7 DAYS

 TAKE ONE CAPSULE BY 

MOUTH THREE TIMES A DAY FOR 7 DAYS SOLD: 10/06/2021                             

           [facility]

 

        Cephalexin 500 MG Oral Capsule Cephalexin 10/06/2021 12:00:00 AM EDT    

             ORAL            

             active                                              [facility] ([facility])

 

       Administration Of Flu Vaccine        10/06/2021 12:00:00 AM EDT          

                          completed  

                                                            [facility] ([facility])

 

                                        Medication administered onsite 

 

                    2 ML Sodium Hyaluronate 10 MG/ML Prefilled Syringe [Euflexxa

] Euflexxa            

2021 12:00:00 AM EDT                                    active            

          [facility] ([facility]

 

                atorvastatin 20 MG Oral Tablet ATORVASTATIN CALCIUM 2021 1

2:00:00 AM EDT 

tablet          90                              TAKE ONE TABLET BY MOUTH EVERY D

AY TAKE ONE TABLET BY MOUTH EVERY 

DAY          SOLD: 2021                                        [facility]

[facility]

 

          25 mg               2021 12:00:00 AM EDT tablet    180          

       TAKE ONE TABLET BY MOUTH TWICE A

 DAY       TAKE ONE TABLET BY MOUTH TWICE A DAY SOLD: 10/22/2021                

                  [facility]

 

                atorvastatin 20 MG Oral Tablet ATORVASTATIN CALCIUM 2021 1

2:00:00 AM EDT 

tablet          90                              TAKE ONE TABLET BY MOUTH EVERY D

AY TAKE ONE TABLET BY MOUTH EVERY 

DAY          SOLD: 10/22/2021                                        [facility]

[facility]

 

          25 mg               2021 12:00:00 AM EDT tablet    180          

       TAKE ONE TABLET BY MOUTH TWICE A

 DAY       TAKE ONE TABLET BY MOUTH TWICE A DAY SOLD: 2021                

                  [facility]

 

          100 mg              2021 12:00:00 AM EDT tablet    180          

       TAKE ONE TABLET BY MOUTH TWICE 

A DAY      TAKE ONE TABLET BY MOUTH TWICE A DAY SOLD: 2021                

                  [facility]

 

          Famotidine 20 MG Oral Tablet FAMOTIDINE 2021 12:00:00 AM EDT tab

let    90                  

TAKE ONE TABLET BY MOUTH EVERY DAY TAKE ONE TABLET BY MOUTH EVERY DAY SOLD: 

10/22/2021                                                      [facility]

 

                          Acetaminophen 250 MG / Aspirin 250 MG / Caffeine 65 MG

 Oral Tablet [Excedrin] 

Excedrin Migraine 2021 12:00:00 AM EDT                               activ

e                   [facility] 

([facility])

 

          20 mg               2021 12:00:00 AM EDT tablet    180          

       TAKE ONE TABLET BY MOUTH TWICE A

 DAY       TAKE ONE TABLET BY MOUTH TWICE A DAY SOLD: 10/22/2021                

                  [facility]

 

          20 mg               2021 12:00:00 AM EDT tablet    90           

       TAKE ONE TABLET BY MOUTH EVERY 

DAY        TAKE ONE TABLET BY MOUTH EVERY DAY SOLD: 2021                  

                [facility]

 

          20 mg               2021 12:00:00 AM EDT tablet    180          

       TAKE ONE TABLET BY MOUTH TWICE A

 DAY       TAKE ONE TABLET BY MOUTH TWICE A DAY SOLD: 2021                

                  [facility]

 

          1 %                 2021 12:00:00 AM EDT gel       100          

       APPLY UP TO 4 GRAMS THREE TIMES A DAY

 TO AFFECTED KNEE AS NEEDED             APPLY UP TO 4 GRAMS THREE TIMES A DAY TO

 AFFECTED 

KNEE AS NEEDED SOLD: 2021                                        [facility]

 

        Famotidine 20 MG Oral Tablet Famotidine 2021 12:00:00 AM EDT      

           ORAL                    

active                                                          [facility] ([facility])

 

        Furosemide 20 MG Oral Tablet Furosemide 2021 12:00:00 AM EDT      

           ORAL                    

active                                                          [facility] ([facility])

 

          5 mg                03/15/2021 12:00:00 AM EDT tablet    40           

       TAKE ONE TABLET BY MOUTH EVERY 

DAY, EXCEPT TAKE 2 TABLETS ON MONDAY, WEDNESDAY AND FRIDAY TAKE ONE TABLET BY 

MOUTH EVERY DAY, EXCEPT TAKE 2 TABLETS ON MONDAY, WEDNESDAY AND FRIDAY SOLD: 

2021                                                      [facility]

 

          apixaban 5 MG Oral Tablet [Eliquis] Eliquis   2021 12:00:00 AM E

ST                     ORAL      

                      active                                      [facility] ([facility]

 

                    24 HR Bupropion Hydrochloride 150 MG Extended Release Oral T

ablet BUPROPION HCL       

2021 12:00:00 AM EST tablet extended release 24 hr 30                     

         TAKE ONE TABLET BY

 MOUTH EVERY MORNING TAKE ONE TABLET BY MOUTH EVERY MORNING SOLD: 2021    

              

                                                    [facility]

 

          5 mg                2021 12:00:00 AM EST tablet    60           

       TAKE ONE TABLET BY MOUTH TWICE A 

DAY        TAKE ONE TABLET BY MOUTH TWICE A DAY SOLD: 2021                

                  [facility]

 

                    24 HR Bupropion Hydrochloride 150 MG Extended Release Oral T

ablet BUPROPION HCL       

2021 12:00:00 AM EST tablet extended release 24 hr 30                     

         TAKE ONE TABLET BY

 MOUTH EVERY MORNING TAKE ONE TABLET BY MOUTH EVERY MORNING SOLD: 2021    

              

                                                    [facility]

 

          5 mg                2021 12:00:00 AM EST tablet    60           

       TAKE ONE TABLET BY MOUTH TWICE A 

DAY        TAKE ONE TABLET BY MOUTH TWICE A DAY SOLD: 2021                

                  [facility]

 

                          24 HR Bupropion Hydrochloride 150 MG Extended Release 

Oral Tablet Bupropion 

Hydrochloride ER (XL) 2021 12:00:00 AM EST             ORAL              a

ctive                   

[facility] ([facility])

 

                          24 HR Bupropion Hydrochloride 150 MG Extended Release 

Oral Tablet Bupropion 

Hydrochloride ER (XL) 2021 12:00:00 AM EST             ORAL              a

ctive                   

[facility] ([facility])

 

          5 mg                2020 12:00:00 AM EST tablet    30           

       TAKE ONE TABLET BY MOUTH TWICE A 

DAY        TAKE ONE TABLET BY MOUTH TWICE A DAY SOLD: 2020                

                  [facility]

 

          5 mg                2020 12:00:00 AM EST tablet    30           

       TAKE ONE TABLET BY MOUTH TWICE A 

DAY        TAKE ONE TABLET BY MOUTH TWICE A DAY SOLD: 2021                

                  [facility]

 

           Cephalexin 500 MG Oral Capsule CEPHALEXIN 2020 12:00:00 AM EST 

capsule    30         

                          TAKE ONE CAPSULE BY MOUTH THREE TIMES A DAY TAKE ONE C

APSULE BY MOUTH THREE 

TIMES A DAY  SOLD: 2020                                        [facility]

[facility]

 

          apixaban 5 MG Oral Tablet [Eliquis] Eliquis   2020 12:00:00 AM E

ST                     ORAL      

                      active                                      [facility] ([facility])

 

          apixaban 5 MG Oral Tablet [Eliquis] Eliquis   2020 12:00:00 AM E

ST                     ORAL      

                      active                                      [facility] ([facility])

 

       Administration Of Flu Vaccine        10/15/2020 12:00:00 AM EDT          

                          completed  

                                                            [facility] ([facility]

[facility])

 

                                        Medication administered onsite 

 

          20 mg               2020 12:00:00 AM EDT tablet    90           

       TAKE ONE TABLET BY MOUTH EVERY 

MORNING    TAKE ONE TABLET BY MOUTH EVERY MORNING SOLD: 2021              

                    [facility]

 

          5 mg                2020 12:00:00 AM EDT tablet    30           

       TAKE 1/2 TABLET BY MOUTH ONCE 

DAILY      TAKE 1/2 TABLET BY MOUTH ONCE DAILY SOLD: 10/24/2020                 

                 [facility]

 

          40 mg               2020 12:00:00 AM EDT tablet    30           

       TAKE ONE TABLET BY MOUTH AT 

BEDTIME    TAKE ONE TABLET BY MOUTH AT BEDTIME SOLD: 10/24/2020                 

                 [facility]

 

          40 mg               2020 12:00:00 AM EDT tablet    30           

       TAKE ONE TABLET BY MOUTH AT 

BEDTIME    TAKE ONE TABLET BY MOUTH AT BEDTIME SOLD: 2021                 

                 [facility]

 

          Famotidine 40 MG Oral Tablet FAMOTIDINE 2020 12:00:00 AM EDT tab

let    30                  

TAKE ONE TABLET BY MOUTH AT BEDTIME TAKE ONE TABLET BY MOUTH AT BEDTIME SOLD: 

2021                                                      [facility]

 

                    Digoxin 0.25 MG Oral Tablet 250 mcg (0.25 mg) DIGOXIN       

      2020 12:00:00 AM EDT

             tablet       90                        TAKE ONE TABLET BY MOUTH JAMES

 DAY TAKE ONE TABLET BY MOUTH EVERY 

DAY          SOLD: 2021                                        [facility]

[facility]

 

          10 mg               2019 12:00:00 AM EST tablet    90           

       TAKE ONE TABLET BY MOUTH EVERY 

DAY        TAKE ONE TABLET BY MOUTH EVERY DAY SOLD: 10/24/2020                  

                [facility]

 

          100 mg              2019 12:00:00 AM EST tablet    180          

       TAKE ONE TABLET BY MOUTH TWICE 

A DAY      TAKE ONE TABLET BY MOUTH TWICE A DAY SOLD: 10/24/2020                

                  [facility]

 

          25 mg               2019 12:00:00 AM EST tablet    180          

       TAKE ONE TABLET BY MOUTH TWICE A

 DAY       TAKE ONE TABLET BY MOUTH TWICE A DAY SOLD: 10/24/2020                

                  [facility]



                                                                                
                                                                                
                                                                                
                                                                                
                                                                                
                                                                    



Insurance Providers

          



             Payer name   Policy type / Coverage type Policy ID    Covered party

 ID Covered 

party's relationship to morataya Policy [place]             Plan Information

 

                [employer] ([facility]) [facility] Part B  [id number]       

[id number] Family Dependent                        

[id number]

 

                [employer] hospitals) [facility] Part B  [id number]       

[id number] Family Dependent                        

[id number]

 

                [employer] Part B  [id number]       

[id number] Family Dependent                        

[id number]

 

                Medicare [employer] Medicare Primary [id number]      

[id number] Self                                    

[id number]

 

                Medicare [employer] Gov[employer] [facility] Medicare Primary [id number]     

[id number] Self                                    

[id number]

 

                Medicare [employer] Medicare Primary [id number]     

[id number] Self                                    

[id number]

 

                Medicare [employer] Govt [facility] Medicare Primary [id number]     

[id number] Self                                    

[id number]

 

          MEDICARE  A         [id number]           Self                [id number]

[id number]

 

                Medicare [employer] [facility] Medicare Primary [id number]      

[id number] Self                                    

[id number]

 

                Medicare [employer] Medicare Primary [id number]     

[id number] Self                                    

[id number]

 

                Medicare [employer] Medicare Primary [id number]     

[id number] Self                                    

[id number]

 

                [employer]are [employer] Medicare Primary [id number]     

[id number] Self                                    

[id number]

 

                Medicare [employer]l Govt Serv Medicare Primary [id number]     

[id number] Self                                    

[id number]

 

                Medicare [employer] Medicare Primary [id number]      

[id number] Self                                    

[id number]

 

                Medicare [employer] Serv[employer] Medicare Primary [id number]     

[id number] Self                                    

[id number]

 

                Medicare [employer] Medicare Primary [id number]      

[id number] Self                                    

[id number]

 

                Medicare [employer]l Gov[employer] Medicare Primary [id number]     

[id number] Self                              

      [id number]

 

                Medicare [employer] Serv[employer] Medicare Primary [id number]      

[id number] Self                                    

[id number]

 

                Medicare [employer] Serv[employer] Medicare Primary [id number]     

[id number] Self                                    

[id number]

 

                Medicare [employer] Medicare Primary [id number]     

[id number] Self                                    

[id number]

 

           FOR LIFE U         [id number]           Self                [id number]

[id number]

 

                [employer]  For Life Medigap Part B  [id number]       

[id number] Family Dependent                        

[id number]

 

           FOR LIFE           [id number]           SP                  [id number]

[id number]

 

          MEDICARE  C         [id number] S                   [id number]

[id number]

 

           FOR LIFE O         [id number] S                   [id number]

[id number]

 

                [employer]  For Life Medigap Part B  [id number]       

[id number] Family Dependent                  

      [id number]

 

                [employer]  For Life Medigap Part B  [id number]       

[id number] Family Dependent                        

[id number]

 

                [employer]  For Life Medigap Part B  [id number]       

[id number] Family Dependent                        

[id number]

 

                [employer]  For Life Medigap Part B  [id number]       

[id number] Family Dependent                        

[id number]

 

                [employer]  For Life Medigap Part B  [id number]       

[id number] Family Dependent                        

[id number]

 

                [employer]  For Life Medigap Part B  [id number]       

[id number] Family Dependent                        

[id number]

 

                [employer]  For Life Medigap Part B  [id number]       

[id number] Family Dependent                        

[id number]

 

                [employer]  For Life Medigap Part B  [id number]       

[id number] Family Dependent                        

[id number]

 

                [employer]  For Life Medigap Part B  [id number]       

[id number] Family Dependent                        

[id number]

 

          MEDICARE            [id number]           SP                  [id number]

A

 

          MEDICARE  C         [id number] S                   [id number]

A

 

          S [facility] C         [id number] S              

     [id number]

 

                [employer]  For Life Medigap Part B  [id number]       

[id number] Family Dependent                        

[id number]

 

          MEDICARE            [id number]           SP                  [id number]

[id number]

 

                [employer]  For Life Medigap Part B  [id number]       

[id number] Family Dependent                        

[id number]

 

                [employer]  For Life Medigap Part B  [id number]       

[id number] Family Dependent                        

[id number]



                                                                                
                                                                                
                                                                                
                                                                                
                                                                                
                                                                                
        



Problems, Conditions, and Diagnoses

          



           Code       Display Name Description Problem Type Effective Dates Data

 Source(s)

 

                    C90.00              Multiple myeloma not having achieved rem

ission Multiple myeloma not 

having achieved remission Diagnosis           2021 01:43:00 PM EDT [facility]

 

                    I87.312             [id number]     Chronic venous hyper

tension (idiopathic) with ulcer of 

left lower extremity Problem             10/21/2021 12:00:00 AM EDT eCW1 ([facility])

 

                    L97.822             [id number]            Non-pressure chronic

 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

 fat layer exposed  Problem             10/21/2021 12:00:00 AM EDT eCW1 ([facility])

 

             I27.81       Chronic cor pulmonale Chronic cor pulmonale Problem   

   2021 12:00:00 

AM EDT                                  [facility] ([facility])

 

             I65.29       Carotid artery occlusion Carotid artery occlusion Prob

sam      2021 

12:00:00 AM EST                         [facility] ([facility])

 

                    I82.503             Deep venous thrombosis of lower extremit

y Deep venous thrombosis of 

lower extremity     Problem             2021 12:00:00 AM EST [facility] ([facility])

 

             E78.2        Mixed hyperlipidemia Mixed hyperlipidemia Problem     

 2021 12:00:00 AM 

EST                                     [facility] ([facility])



                                                                                
                                                                              



Surgeries/Procedures

          



             Procedure    Description  Date         Indications  Data Source(s)

 

             OFFICE OUTPATIENT VISIT 15 MINUTES              10/06/2021 12:00:00

 AM EDT              MED[facility] 

([facility])

 

             Complex Chronic Care Management SVC 1St 60 Min              

021 12:00:00 AM EDT              

MED[facility] ([facility])

 

             Complex Chronic Care MGMT Service Ea Addl 30 Min               12:00:00 AM EDT              

MED[facility] ([facility])

 

             ECG ROUTINE ECG W/LEAST 12 LDS W/I&R              2021 12:00:

00 AM EDT              [facility] 

([facility])

 

             OFFICE OUTPATIENT VISIT 25 MINUTES              2021 12:00:00

 AM EDT              [facility] 

([facility])

 

             ECG ROUTINE ECG W/LEAST 12 LDS W/I&R              2021 12:00:

00 AM EDT              [facility] 

([facility])

 

             OFFICE OUTPATIENT VISIT 25 MINUTES              2021 12:00:00

 AM EDT              [facility] 

([facility])

 

             Chronic Care Management Services Ea Addl 20 Min              2021 12:00:00 AM EDT              

MEDENT ([facility])

 

                    Chronic Care MGMT 20 Mins Clinical Staff Time Per Calendar M

[facility]                     2021 

12:00:00 AM EDT                                     MED[facility] ([facility]

)

 

             Complex Chronic Care Management SVC 1St 60 Min              

021 12:00:00 AM EDT              

MED[facility] ([facility])

 

             Complex Chronic Care MGMT Service Ea Addl 30 Min               12:00:00 AM EDT              

MED[facility] ([facility])

 

             OFFICE OUTPATIENT VISIT 25 MINUTES              2021 12:00:00

 AM EDT              MEDENT 

([facility], )

 

             Complex Chronic Care Management SVC 1St 60 Min              

021 12:00:00 AM EDT              

MED[facility] ([facility])

 

             Complex Chronic Care MGMT Service Ea Addl 30 Min               12:00:00 AM EDT              

MED[facility] ([facility])

 

             OFFICE OUTPATIENT VISIT 25 MINUTES              2021 12:00:00

 AM EDT              MED[facility] 

([facility])

 

             OFFICE OUTPATIENT VISIT 25 MINUTES              06/10/2021 12:00:00

 AM EDT              MED[facility] 

([facility], )

 

             Complex Chronic Care Management SVC 1St 60 Min              

021 12:00:00 AM EDT              

MED[facility] ([facility])

 

             Complex Chronic Care MGMT Service Ea Addl 30 Min               12:00:00 AM EDT              

[facility] ([facility])

 

             Complex Chronic Care Management SVC 1St 60 Min              

021 12:00:00 AM EDT              

[facility] ([facility])

 

             Complex Chronic Care MGMT Service Ea Addl 30 Min               12:00:00 AM EDT              

MED[facility] ([facility])

 

             OFFICE OUTPATIENT VISIT 25 MINUTES              2021 12:00:00

 AM EDT              MED[facility] 

([facility])

 

             ARTHROCENTESIS ASPIR&/INJECTION MAJOR JT/BURSA              

021 12:00:00 AM EDT              

MED[facility] ([facility] )

 

             RADEX SHOULDER COMPLETE MINIMUM 2 VIEWS              2021 12:

00:00 AM EDT              MED[facility] 

([facility] )

 

             ARTHROCENTESIS ASPIR&/INJECTION MAJOR JT/BURSA              

021 12:00:00 AM EDT              

MED[facility] ([facility] )

 

             Complex Chronic Care Management SVC 1St 60 Min              

021 12:00:00 AM EDT              

MED[facility] ([facility])

 

             Complex Chronic Care MGMT Service Ea Addl 30 Min               12:00:00 AM EDT              

MED[facility] ([facility])

 

             INJECTION 1 TENDON SHEATH/LIGAMENT APONEUROSIS              

021 12:00:00 AM EDT              

MED[facility] ([facility] )

 

             ARTHROCENTESIS ASPIR&/INJECTION MAJOR JT/BURSA              

021 12:00:00 AM EDT              

MED[facility] ([facility])

 

             RADIOLOGIC EXAM KNEE COMPLETE 4/MORE VIEWS              2021 

12:00:00 AM EDT              MEDENT

 ([facility])

 

             RADIOLOGIC EXAM KNEE COMPLETE 4/MORE VIEWS              2021 

12:00:00 AM EDT              MED[facility]

 ([facility])

 

             Mammogram                 2021 12:00:00 AM EDT              M

EDENT ([facility])

 

             Bone Mineral Density Test              2021 12:00:00 AM EDT  

            [facility] ([facility])

 

                    Brief Emotional/Behav Assessment W/ Scoring Doc Per Standard

 Inst                     2021 

12:00:00 AM EDT                                     [facility] ([facility]

)

 

             OFFICE OUTPATIENT VISIT 25 MINUTES              2021 12:00:00

 AM EDT              MED[facility] 

([facility])

 

             OFFICE OUTPATIENT NEW 45 MINUTES              2021 12:00:00 A

M EST              MEDENT 

([facility], )

 

             ECG ROUTINE ECG W/LEAST 12 LDS W/I&R              2021 12:00:

00 AM EST              [facility] 

([facility])

 

             OFFICE OUTPATIENT VISIT 15 MINUTES              2021 12:00:00

 AM EST              [facility] 

([facility])

 

             Complex Chronic Care Management SVC 1St 60 Min              

021 12:00:00 AM EST              

MED[facility] ([facility])

 

             Complex Chronic Care MGMT Service Ea Addl 30 Min               12:00:00 AM EST              

MED[facility] ([facility])

 

             OFFICE OUTPATIENT VISIT 25 MINUTES              2021 12:00:00

 AM EST              [facility] 

([facility])

 

             Complex Chronic Care Management SVC 1St 60 Min              

020 12:00:00 AM EST              

MED[facility] ([facility])

 

             Complex Chronic Care MGMT Service Ea Addl 30 Min               12:00:00 AM EST              

MED[facility] ([facility])

 

                    Brief Emotional/Behav Assessment W/ Scoring Doc Per Standard

 Inst                     10/29/2020 

12:00:00 AM EDT                                     [facility]

)



                                                                                
                                                                                
                                                                                
                                                                                
                                                                                
                                                                                
                            



Results

          



                    ID                  Date                Data Source

 

                    [id number]          10/25/2021 03:51:00 PM EDT [facility] ([facility])









          Name      Value     Range     Interpretation Code Description Data Milly

rce(s) Supporting 

Document(s)

 

           Influenza A Amplification Laboratory test result                     

             MEDENT ([facility])                              

 

                                        Negative results do not preclude influen

za or RSV virus

infection and should not be used as the sole basis for

treatment or other patient management decisions.

 

 

           Influenza B Amplification Laboratory test result                     

             MED[facility] ([facility])                              

 

                                        Negative results do not preclude influen

za or RSV virus

infection and should not be used as the sole basis for

treatment or other patient management decisions.

 

 

           Laboratory test finding (navigational concept) Laboratory test result

                                  

MEDENT ([facility])            

 

                                        A false negative result may occur if a s

pecimen is

improperly collected, transported or handled. False negative

results may also occur if inadequate numbers of organisms

are present in the specimen.  As with any molecular test,

mutations within the target regions of [employer]

SARS-CoV-2 could affect primer and/or probe binding

resulting in failure to detect the presence of virus.  This

test cannot rule out diseases caused by other bacterial or

viral pathogens.



DISCLAIMER:

Testing was performed using the [employer] SARS-CoV-2 test.

This test was developed and its performance characteristics

determined by [employer]. This test has not been FDA cleared or

approved. This test has been authorized by FDA under an

Emergency Use Authorization (EUA). This test is only

authorized for the duration of time the declaration that

circumstances exist justifying the authorization of the

emergency use of in vitro diagnostic tests for detection of

SARS-CoV-2 virus and/or diagnosis of COVID-19 infection

under section 564(b)(1) of the Act, 21 U.S.C.

                                        360bbb-3(b)(1), unless the authorization

 is terminated or

revoked sooner.

 

 

          RSV Amplification Laboratory test result                              

 MED[facility] ([facility])  

 

                                        Negative results do not preclude influen

za or RSV virus

infection and should not be used as the sole basis for

treatment or other patient management decisions.

 









                    ID                  Date                Data Source

 

                    [id number]          10/25/2021 03:01:00 PM EDT [facility])









          Name      Value     Range     Interpretation Code Description Data Milly

rce(s) Supporting 

Document(s)

 

           Digoxin [Mass/volume] in Serum or Plasma 0.5 ng/mL  0.5-2.0          

                [facility] ([facility])                             

 

                                        <content>note:<nlbl:demographic_changed>

</content><br/><content></content> 

 

                          Natriuretic peptide.B prohormone N-Terminal [Mass/volu

me] in Serum or Plasma 

5817 pg/mL                                          [facility]

)  

 

                                        <content>note:<nlbl:demographic_changed>

</content><br/><content></content> 

 

           Thyroxine (T4) Ab [Units/volume] in Serum 10.1 ug/dL 4.5-12.0        

                 [facility] 

([facility]                   

 

                                        <content>note:<nlbl:demographic_changed>

</content><br/><content></content> 

 

                          Thyrotropin [Units/volume] in Serum or Plasma by Detec

tion limit <= 0.05 mIU/L 

0.051 uIU/ML 0.358-3.740                            [facility] ([facility]

)  

 

                                        <content>note:<nlbl:demographic_changed>

</content><br/><content></content> 









                    ID                  Date                Data Source

 

                    [id number]          10/25/2021 03:01:00 PM [facility]









          Name      Value     Range     Interpretation Code Description Data Milly

rce(s) Supporting 

Document(s)

 

          Glucose, Fasting 106 mg/dL                         [facility] ([facility])  

 

          Creatinine For GFR 0.94 mg/dL 0.55-1.30                     [facility]  

 

          Blood Urea Nitrogen 18 mg/dL  7-18                          [facility] ([facility])  

 

           Glomerular Filtration Rate Laboratory test result                    

              [facility] ([facility])                              

 

                                        <content>Units are mL/min/1.73 

m2</content><br/><content></content><br/><content>Chronic Kidney Disease Staging
 per NKF:</content><br/><content></content><br/><content>Stage I & II   GFR >=60
       Normal to Mildly Decreased</content><br/><content>Stage III      GFR 30-
59      Moderately Decreased</content><br/><content>Stage IV       GFR 15-29    
  Severely Decreased</content><br/><content>Stage V        GFR <15        Very 
Little GFR Left</content><br/><content>ESRD           GFR <15 on 
RRT</content><br/><content></content> 

 

          Sodium Level 140 meq/L 136-145                       [facility] ([facility])  

 

          Chloride Level 111 meq/L                         [facility] ([facility])  

 

          Potassium Serum 4.6 meq/L 3.5-5.1                       [facility] ([facility])  

 

          Anion Gap 7 meq/L   8-16                          [facility] ([facility])  

 

          Carbon Dioxide Level 22 meq/L  21-32                         [facility]  

 

          Calcium Level 10.7 mg/dL 8.8-10.2                      [facility] ([facility])  









                    ID                  Date                Data Source

 

                    [id number]          10/25/2021 03:01:00 PM EDT [facility] ([facility])









          Name      Value     Range     Interpretation Code Description Data Milly

rce(s) Supporting 

Document(s)

 

          Ast/Sgot  26 U/L    7-37                          [facility] ([facility])  

 

          Alt/SGPT  28 U/L    12-78                         [facility] ([facility])  

 

          Bilirubin,Total 0.8 mg/dL 0.2-1.0                       [facility] ([facility])  

 

          Alkaline Phosphatase 105 U/L                           [facility] ([facility])  

 

          Total Protein 7.9 GM/DL 6.4-8.2                       [facility] ([facility])  

 

          Bilirubin,Direct 0.4 mg/dL 0.0-0.2                       [facility]  

 

          Albumin/Globulin Ratio 0.5       1.2-2.2                       [facility] 

([facility])  

 

          Albumin   2.8 GM/DL 3.2-5.2                       [facility] ([facility])  









                    ID                  Date                Data Source

 

                    [id number]          10/25/2021 03:01:00 PM EDT [facility] ([facility])









          Name      Value     Range     Interpretation Code Description Data Milly

rce(s) Supporting 

Document(s)

 

          CPK Creatine Phosphokinase 26 U/L                            MED

ENT ([facility])  

 

          MB/CK Relative Index 3.85                                    [facility]  

 

                                        <content>DIAGNOSIS CRITERIA</content><br

/><content>MMB ng/ml       Relative 

Index (RI)</content><br/><content>NON-AMI               < or = 5               
N/A</content><br/><content>GRAY ZONE              > 5                < or = 
4</content><br/><content>AMI                    > 5                   > 
4</content><br/><content></content> 

 

          CK-MB Value Mass 1.0 ng/mL                               [facility] ([facility])  

 

          Troponin I 0.02 ng/mL                               [facility] ([facility])  

 

                                        <content>Troponin I Reference Interval f

or Siemens Sterling 

LOCI:</content><br/><content></content><br/><content>99th Percentile= 0.00-0.045
 ng/ml</content><br/><content></content><br/><content>Risk 
Stratification:</content><br/><content><= 0.10 ng/ml   Decreased Risk for 
Adverse Clinical</content><br/><content>Events.</content><br/><content>0.10-1.50
 ng/ml   Increased Risk for Adverse Clinical</content><br/><content>Events. 
Evaluation of additional</content><br/><content>criterion and/or repeat testing 
in 2-6</content><br/><content>hours is suggested to rule out 
myocardial</content><br/><content>damage.</content><br/><content>>= 1.50 ng/ml  
 Indicative of Myocardial Injury.</content><br/><content></content> 









                    ID                  Date                Data Source

 

                    [id number]          10/25/2021 03:01:00 PM EDT [facility] ([facility])









          Name      Value     Range     Interpretation Code Description Data Milly

rce(s) Supporting 

Document(s)

 

          White Blood Count 6.3 10    4.0-10.0                      [facility] ([facility])  

 

          Hemoglobin 12.9 g/dL 12.0-15.5                     [facility]  

 

          Red Blood Count 4.12 10   4.00-5.40                     [facility] ([facility])  

 

          Mean Corpuscular Hemoglobin 31.3 pg   27.0-33.0                     ME

DENT ([facility]) 

 

 

          Mean Corpuscular Volume 100.2 fl  80.0-96.0                     [facility]

 ([facility])  

 

          Hematocrit 41.3 %    36.0-47.0                     [facility]  

 

          Red Cell Distribution Width 14.8 %    11.5-14.5                     ME

D[facility] ([facility])  

 

          Mean Corpuscular HGB Conc 31.2 g/dL 32.0-36.5                     [facility]

[facility] ([facility]) 

 

 

          Platelet Count, Automated 202 10    150-450                       [facility]

[facility] ([facility])  

 

          Neutrophils % 79.5 %    36.0-66.0                     [facility] ([facility])  

 

          Lymph %   13.8 %    24.0-44.0                     [facility]  

 

          Eos %     1.1 %     0.0-3.0                       [facility]  

 

          Mono %    4.8 %     2.0-8.0                       [facility]  

 

          Immature Granulocyte % 0.5 %     0-3.0                         [facility] 

([facility])  

 

          Baso %    0.3 %     0.0-1.0                       [facility]  

 

          Neutrophils # 5.0 10    1.5-8.5                       [facility] ([facility])  

 

          Nucleated Red Blood Cell % 0.0 %     0-0                           MED

ENT ([facility])  

 

          Lymph #   0.9 10    1.5-5.0                       [facility]  

 

          Eos #     0.1 10    0.0-0.5                       [facility]  

 

          Baso #    0.0 10    0.0-0.2                       [facility]  

 

          Mono #    0.3 10    0.0-0.8                       [facility]  









                    ID                  Date                Data Source

 

                    [id number]          10/06/2021 02:57:00 PM EDT [facility] ([facility])









          Name      Value     Range     Interpretation Code Description Data Milly

rce(s) Supporting 

Document(s)

 

                Bacteria identified in Wound shallow by [employer] culture [facility]

y test result                 

                                        [facility] ([facility])  









                    ID                  Date                Data Source

 

                    [id number]            2021 12:29:00 PM EDT [facility] ([facility])









          Name      Value     Range     Interpretation Code Description Data Milly

rce(s) Supporting 

Document(s)

 

          White Blood Count 6.8       5.0-10.0                      [facility] ([facility])  

 

          Platelets 187       172-450                       [facility] ([facility]  

 

          Red Blood Count 3.85      4.00-5.40                     [facility] ([facility])  

 

          Hemoglobin 12.7                                    [facility] ([facility]  

 

          Hematocrit 39.4                                    [facility] ([facility]  









                    ID                  Date                Data Source

 

                    [id number]          2021 12:21:00 PM EDT [facility] ([facility])









          Name      Value     Range     Interpretation Code Description Data Milly

rce(s) Supporting 

Document(s)

 

          Red Blood Count 3.85 10   4.00-5.40                     [facility] ([facility])  

 

          White Blood Count 6.8 10    4.0-10.0                      [facility] ([facility])  

 

          Hemoglobin 12.7 g/dL 12.0-15.5                     [facility]  

 

          Hematocrit 39.4 %    36.0-47.0                     [facility] ([facility])  

 

          Mean Corpuscular Volume 102.3 fl  80.0-96.0                     [facility]

 ([facility])  

 

          Mean Corpuscular Hemoglobin 33.0 pg   27.0-33.0                     ME

DENT ([facility]) 

 

 

          Red Cell Distribution Width 12.3 %    11.5-14.5                     ME

DENT ([facility])  

 

          Mean Corpuscular HGB Conc 32.2 g/dL 32.0-36.5                     [facility]

NT ([facility]) 

 

 

          Platelet Count, Automated 187 10    150-450                       [facility]

NT ([facility])  

 

          Neutrophils % 72.2 %    36.0-66.0                     [facility] ([facility])  

 

          Lymph %   19.9 %    24.0-44.0                     [facility]  

 

          Mono %    6.0 %     2.0-8.0                       [facility]  

 

          Eos %     1.5 %     0.0-3.0                       [facility]  

 

          Baso %    0.3 %     0.0-1.0                       [facility]  

 

          Immature Granulocyte % 0.1 %     0-3.0                         [facility] 

([facility])  

 

          Nucleated Red Blood Cell % 0.0 %     0-0                           MED

ENT ([facility])  

 

          Neutrophils # 4.9 10    1.5-8.5                       [facility]  

 

          Lymph #   1.4 10    1.5-5.0                       [facility]  

 

          Mono #    0.4 10    0.0-0.8                       [facility]  

 

          Eos #     0.1 10    0.0-0.5                       [facility]  

 

          Baso #    0.0 10    0.0-0.2                       [facility]  









                    ID                  Date                Data Source

 

                    [id number]           2021 05:25:00 PM EDT [facility]

 

                                        Hematopathology Report See Addendum [facility]

wName: JUANY BECERRILMRN: 

[id number] Number: [id number] Date: 2021 00:00Received Date: 
7/15/2021 13:57Physician(s): ROSA ROCHA MD ADJAPONG, [facility] 
[facility]) ReceivedA: Bone Marrow, Flow Cytometry; 
RECEIVED 1 GREEN TOP BM, 2 ASP AND 1 PBSMEAR (1 EXTRA EDTA BM SENT TO 
MOLECULAR)Clinical HistoryMultiple myeloma.TEST REQUESTED/PERFORMED: Flow 
cytometry analysis DiagnosisFlow cytometry of bone marrow: Dilute specimen with 
small population oflambda restricted plasma cells (1% on bone marrow aspirate), 
consistentwith plasma cell neoplasm. Correlation with full bone marrow biopsy 
isrecommended. FISH study is pending. Halie Gentile M.D.;Resident 
PathologistElectronically Signed By ROSALINDA MARIE M.D. Attending Pathologist  
117:25:03The attending pathologist named above attests that he/she has 
personallyreviewed the relevant preparation(s) for the specimen(s) and rendered 
thefinal diagnosis. Addendum     2021     FISH identified gain of 1q, 
trisomy 5, and monosomy 13 in a plasmacell-enriched population. FISH was 
negative for gain/loss of chromosome 7and 9, deletion of IgH and TP53, and an 
IgH rearrangement. See [id number] of 1q and deletion of 13q are associated 
with progression. Hvhemaeg60x is generally associated with an intermediate risk 
and 1q gain isassociated with high risk.     Addendum Electronically Signed By: 
         Nadege Eddy M.D.          2021 11:16  ProceduresFlow Cytometry 
    Date Ordered:7/15/2021     Status:   Signed Out2021 
InterpretationPERIPHERAL BLOOD: CBC performed at [facility] 
(21)WBC           6.8     K/uLRBC          *3.85     M/uLHgb           12.7
     g/dLHct           39.4     %MCV          *102.3     fLMCH           33.0   
  pgMCHC           32.2     g/dLRDW           12.3     %Platelets      187     
K/ulDifferential Count (automated):72.2  % Neutrophils 1.5  % Eosinophils 0.3  %
 Iezipxxwf82.9  % Lymphocytes 0.1  % Atypical Lymphocytes 6.0  % 
Monocytes-------100.0 %     A peripheral blood film is reviewed.  BONE MARROW 
ASPIRATE:Differential Count (100 cells): 6  % Erythroid Precursors 2  % N. 
Myelocytes 1  % N. Metamyelocytes and Band Forms80  % Neutrophils 4  % 
Eosinophils 1  % Basophils 5  % Lymphocytes 1  % Plasma Cells--------100 %  
Morphology: Dilute sample.Lymphoid Panel: Jone Harrington 21 1829 [id number] 
following markers were assayed: CD45 (gate), CD2, CD3, CD4, CD5, CD7,CD8, CD10, 
CD19, CD20, CD33, CD34, CD38, CD56, CD57, CD64, , ,HLA-DR, Kappa, and 
Lambda.#events: [id number]: 98%Flow Cytometry Differential 
(CD45/SSC)Lymphocyte [place]: 15%CD45 dim [place]: 1%Monocyte [place]: 4%Granulocyte 
[place]: 73%Nucleated/Erythroid [place]: 2%The lymphocyte gate showsB-cells (CD19): 
14%T-cells (CD3): 75%NK-cells (CD3-/CD56+): 9%Kappa/Lambda Ratio: 2.9CD4/CD8 
Ratio: 1.8Results: (expressed as % of lymphocyte gate)T-cell Markers: CD2 = 80, 
CD3 = 75, CD3/CD4 = 48, CD3/CD8 = 27, CD5 = 74,CD7 = 75, CD3/57 = 8B-cell yelena
ers: Kappa = 9, Lambda = 3, CD19 = 14, CD20 = 16, CD19/10 = 1,CD19/CD5 = 3, 
CD38/CD20 = 15Light chain as % of B-Cells: CD19/Kappa = 54, CD19/Lambda = 
17CD19/CD5/Kappa = 4, CD19/CD5/Lambda = 5CD19/CD10/Kappa = 7, CD19/CD10/Lambda =
 1NK cell Markers: CD56 = 12, CD57 = 13Other Markers: CD10 = 1, CD38 = 
63Results: (expressed as % of CD45 dim gate)T-cell Markers: CD2 = 27, CD3 = 8, 
CD3/CD4 = 2, CD3/CD8 = 4, CD5 = 10, CD7= 26, CD3/57 = 2B-cell markers: Kappa = 
29, Lambda = 0, CD19 = 16, CD20 = 14, CD19/10 =12, CD19/CD5 = 3, CD38/CD20 = 
11Light chain as % of B-Cells: CD19/Kappa = 0, CD19/Lambda = 0CD19/CD10/Kappa = 
6, CD19/CD10/Lambda = 2NK cell Markers: CD56 = 16, CD57 = 13Basophil Markers: 
 (HLA-DR-) = 19Other Markers: CD10 = 25, CD38 = 85, CD33 = 47, CD34 = 23, 
CD64 = 20, = 5,  = 56, HLA-DR = 57Myeloma Panel for Becerril Juany MM 
 [id number] following markers were assayed: CD45 (cell gate),  
(plasma cellgate), CD19, CD20, CD38, CD56, cytoplasmic Kappa, and cytoplasmic La
mbda.(Please note, that Cytoplasmic Kappa and Cytoplasmic Lambda are used 
todetect plasma cells, while surface Kappa and Lambda are for 
lymphocytes).#events: [id number]: Routinely a minimum of 500,000 events are 
collected in each paneltube. Due to sample cellularity and/or processing this 
number was notachievable for this sample.Flow Cytometry Differential:Lymphocyte 
[place]: 13%CD45 dim [place]: 2%Monocyte [place]: 4%Granulocyte [place]: 74%NRBC [place]: 
3% Plasma Cell [place]: 0%Results: (expressed as % of lymphocyte gate)B-Cells 
(CD19): 14% CD19 = 14, CD20 = 14Light chain as % of B-Cells: Cytoplasmic Kappa/
CD20 = 49, CytoplasmicLambda/CD20 = 26Results: (expressed as % of total + 
plasma cell gate)CD38 = 64, CD56 = 40, CD38/56 = 37, CD19 = 12, CD20 = 
5Cytoplasmic Kappa/ = 0, Cytoplasmic Lambda/ = 67    Results-
CommentsLymphocytes consist predominantly of T cells with normal expression of 
panT-cell markers and a normal CD4/CD8 ratio, normal proportions of NK 
andcytotoxic T cells, and polyclonal B cells.CD34+ blasts comprise fewer than 1%
 of cells studied. Blasts, monocytes,and granulocytes show no definitive 
immunophenotypic aberrancies.Plasma cell-associated markers show very small 
population of lambdarestricted plasma cells comprising less than 1% of cells, 
mostly negativefor CD19 and positive for CD56. Procedure Electronically Signed 
By:ROSALINDA MARIE M.D.2021 This report may include one or more 
immunohistochemical stain/fluorochromeconjugated monoclonal antibody results 
that use analyte specific reagents.All positive and negative controls have been 
reviewed by the attendingpathologist and are satisfactory. The tests were 
developed and theirperformance characteristics determined by [facility]ology 
department.They have not been cleared or approved by the US Food and DrugAdminis
tration. The FDA has determined that such clearance or approval isnot necessary.
 









          Name      Value     Range     Interpretation Code Description Data Milly

rce(s) Supporting 

Document(s)

 

                                                                       









                    ID                  Date                Data Source

 

                    [id number]            2021 01:04:00 PM [facility]

 

                                        Cytogenetics ReportName: JUANY BECERRILMRN: [id number] Number: [id number]

[id number] Date: 2021 00:00Received Date: 7/15/2021 13:50Physician(s): 
ROSA ROCHA MD ADJAPONG, OPOKU,MDSpecimen(s) ReceivedA: Bone Marrow - Karyo 
and Multiple Myel  FISHClinical History66-year-old patient with multiple 
myeloma.TEST REQUESTED/PERFORMED:  Chromosome analysis and fluorescence in 
situhybridization (FISH)  DiagnosisFISH identified 11% of nuclei with gain of 
1q; 11% with trisomy 5; and 10%with monosomy 13 in this plasma cell-enriched 
population of cells. FISHwas negative for gains or losses of chromosomes 7 and 
9; deletions ka27l92.3 (IgH), and 17p13 (TP53); and an IgH rearrangement.Gains 
of chromosome 1q in plasma cell dyscrasia/multiple myeloma occur inup to 40% of 
cases, are secondary aberrations and are associated withprogression. 13q 
deletions are detected in approximately 50% of MM and arealso secondary events, 
although their presence in monoclonal gammopathy ofuncertain significance (MGUS)
 suggests they occur early in diseaseprogression. While deletion 13q is 
generally associated with anintermediate risk, 1q gain is often characterized as
 high risk (1-3).Please correlate with the concurrent Hematopathology report, 
[id number]. References:1.          Kailee GALLEGOS. 1q rearrangements in multiple 
myeloma. AtlasGenet Cytogenet Oncol Haematol. 
1998;2(3):86-87.http://AtlasGeneticsOncology.org/Anomalies/8iFP2114VW1958.html. 
Lastvisited .2.     Maryam & Blake. Mature B- and T-cell neoplasms 
and Hodgkinlymphoma. In Cancer Cytogenetics: Chromosomal and Molecular 
GeneticAberrations of Tumor Cells, 4th Edition. Nataliya & Ty, eds. [employer] 
[employer]. 2015. 3.     Wilda SV. Multiple myeloma: 2020 update on 
diagnosis,risk-stratification and management. Am J Hematol. 2020 95(11):1444.  
Electronically Signed By Lorenzo Robles, PhD Attending Pathologist 2021 
13:04:45Gross DescriptionFluorescence in situ Hybridization (FISH)multiple 
myeloma FISH panel:nucish(ELUY0Up4,CKS1Bx
3)[],(5p15.31,EGR1)x3[11/100],(I66P035,13q34)x1[10/100],(IgHx2)[97/100],(T

P53,D17Z1)x2[98/100]     CEP 7/K1L152 (7q31):nuc maggie(D7Z1,P1Q079)x2[98/100] CEP 
9:nuc maggie(D9Z1x2)[98/100]DescriptionA plasma cell-enriched population evaluated 
by FISH: gain of 1q (11%);trisomy 5 (11%); monosomy 13 (10%); negative for gains
 or losses ofchromosomes 7 and 9; deletions IgH, and TP53; and IgH 
rearrangement.         
________________________________________________________________________________

_____________Test Data:Fluorescence in situ Hybridization (FISH):  Enriched 
Plasma Cells     Probe Normal Cut-off Nuclei  Analyzed FISH SIGNAL PATTERNS     
    Normal Abnormal NKCS      *LSI CDKN2C (1p22.3) G  LSI CKS1B (1q21.3) O 3% 
100 2G2O:  84% 2G3O:  11% 5%       *LSI 5p15.31 G  LSI EGR1 (5q31.2) R 3% 100 
2G2R:  89% 3G3R:  11% 0%  *CEP 7 (D7Z1) G  LSI E8F057 (7q31) R 3% 100 2G2R:  98%
 0% 2%       **CEP 9 (D9Z1) G 3%100   2% 0% 2%  *LSI N08Q061 (13q14.2) O  
     LSI 13q34 G 3% 100     2O2% 1O1G:   10% 3%       *LSI IgH(14q32) Y   
     BA 3% 100    2Y:  97% 0% 3%       *LSI TP53 (17p13.1) O  CEP 17 (D17Z1) G 
3% 100 2O2% 0% 2% Vendor:  *[employer]  Probes:  LSI: Locus Specific 
Probe;  CEP: Centromeric Probe;  BA: BreakApartFluorochromes/ Signals:  G - 
Green;  O  orange;  R  Red;  Y - yellow(fusion) NKCS:  Signals with No Known 
Clinical SignificanceDisclaimer: The plasma cells evaluated in this study were 
isolated fromthe bone marrow mixed cell population utilizing immunomagnetic 
cellseparation. This technology significantly enriches the test cellpopulation 
for plasma cells, thereby improving FISH detection of anomaliesassociated with 
plasma cell myeloma. However, as this is a selectivepopulation, the true in vivo
 frequencies of the anomalies identifiedcannot be determined. The FISH test was 
developed and its performance wasvalidated by the Cytogenetics section of the 
Department of ClinicalPathology. This test has not been cleared or approved by 
the U. S. Foodand Drug Administration (FDA). The FDA has determined that such 
approvalis not necessary. However, the procedure is considered 
investigational,and should not be used as the sole criteria for diagnosis.   









          Name      Value     Range     Interpretation Code Description Data Milly

rce(s) Supporting 

Document(s)

 

                                                                       









                    ID                  Date                Data Source

 

                    [id number]            2021 12:28:00 PM EDT [facility] ([facility])









          Name      Value     Range     Interpretation Code Description Data Milly

rce(s) Supporting 

Document(s)

 

           Albumin [Mass/volume] in Serum or Plasma 3.0                         

                [facility] ([facility])                       

 

           Calcium [Mass/volume] in Serum or Plasma 10.2                        

                [facility] ([facility])                       

 

             Alanine aminotransferase [Enzymatic activity/volume] in Serum or Pl

asma 19                                     

                          [facility] ([facility])  

 

           Carbon dioxide, total [Moles/volume] in Serum or Plasma 27           

                               [facility] 

([facility])           

 

           Chloride [Moles/volume] in Serum or Plasma 107                       

                  [facility] ([facility])                       

 

           Potassium [Moles/volume] in Serum or Plasma 4.7                      

                   [facility] ([facility])                       

 

           Alkaline phosphatase [Enzymatic activity/volume] in Serum or Plasma 7

7                                          

[facility] ([facility])    

 

          Sodium    137                                     [facility] ([facility]

[facility])  

 

           Protein [Mass/volume] in Serum or Plasma 8.3                         

                [facility] ([facility])                       

 

                Aspartate aminotransferase [Enzymatic activity/volume] in Serum 

or Plasma 23                              

                                        [facility] ([facility])  

 

           Urea nitrogen [Mass/volume] in Serum or Plasma 28                    

                      [facility] ([facility])                       

 

          Creatinine For GFR 0.90                                    [facility] ([facility]

[facility])  

 

          Glucose   98                                [facility]  









                    ID                  Date                Data Source

 

                    [id number]            2021 12:28:00 PM EDT [facility] ([facility]i

[facility])









          Name      Value     Range     Interpretation Code Description Data Milly

rce(s) Supporting 

Document(s)

 

          White Blood Count 6.2       5.0-10.0                      [facility] (Card

[facility])  

 

          Red Blood Count 3.76      4.00-5.40                     [facility] ([facility]

[facility])  

 

          Hemoglobin 12.5                                    [facility] ([facility])  

 

          Platelets 199       172-450                       [facility] ([facility]

[facility])  

 

          Hematocrit 39.0                                    [facility] ([facility])  









                    ID                  Date                Data Source

 

                    [id number]          2021 12:03:00 PM EDT [facility] ([facility])









          Name      Value     Range     Interpretation Code Description Data Milly

rce(s) Supporting 

Document(s)

 

          Prothrombin Time 13.8 s    12.5-14.3                     [facility] ([facility])  

 

          [facility]       1.04                                    [facility]  

 

                                        THERAPUTIC HUMAN INR VALUES

INDICATIONS                      NORMAL RANGES

PROPHYLAXIS/TREATMENT OF:

VENOUS THROMBOSIS                2.0-3.0

PULMONARY EMBOLISM               2.0-3.0

PREVENTION OF SYSTEMIC EMBOLISM FROM:

TISSUE HEART VALVES              2.0-3.0

ACUTE MYOCARDIAL INFARCTION      2.0-3.0

VALVULAR HEART DISEASE           2.0-3.0

ATRIAL FIBRILLATION              2.0-3.0

MECHANICAL VALVES(HIGH RISK)     2.5-3.5

RECURRENT MYOCARDIAL INFARCTION  2.5-3.5

 

 

          Partial Thromboplastin Time 30.6 s    24.2-38.5                     [facility] ([facility])  









                    ID                  Date                Data Source

 

                    [id number]          2021 10:38:00 AM EDT [facility]









          Name      Value     Range     Interpretation Code Description Data Milly

rce(s) Supporting 

Document(s)

 

          Glucose, Fasting 98 mg/dL                          [facility] ([facility])  

 

          Blood Urea Nitrogen 28 mg/dL  7-18                          [facility] ([facility])  

 

           Glomerular Filtration Rate Laboratory test result                    

              [facility] ([facility])                              

 

                                        <content>Units are mL/min/1.73 

m2</content><br/><content></content><br/><content>Chronic Kidney Disease Staging
 per NKF:</content><br/><content></content><br/><content>Stage I & II   GFR >=60
       Normal to Mildly Decreased</content><br/><content>Stage III      GFR 30-
59      Moderately Decreased</content><br/><content>Stage IV       GFR 15-29    
  Severely Decreased</content><br/><content>Stage V        GFR <15        Very 
Little GFR Left</content><br/><content>ESRD           GFR <15 on 
RRT</content><br/><content></content> 

 

          Creatinine For GFR 0.90 mg/dL 0.55-1.30                     [facility] ([facility])  

 

          Sodium Level 137 meq/L 136-145                       [facility] ([facility])  

 

          Chloride Level 107 meq/L                         [facility] ([facility])  

 

          Potassium Serum 4.7 meq/L 3.5-5.1                       [facility] ([facility])  

 

          Carbon Dioxide Level 27 meq/L  21-32                         [facility] ([facility])  

 

          Anion Gap 3 meq/L   8-16                          [facility] ([place] [facility])  

 

          Calcium Level 10.2 mg/dL 8.8-10.2                      [facility] ([facility])  

 

          Ast/Sgot  23 U/L    7-37                          [facility] ([facility] [facility])  

 

          Alkaline Phosphatase 77 U/L                            [facility] ([facility])  

 

          Alt/SGPT  19 U/L    12-78                         [facility] ([facility])  

 

          Bilirubin,Total 0.5 mg/dL 0.2-1.0                       [facility] ([facility])  

 

          Total Protein 8.3 GM/DL 6.4-8.2                       [facility] ([facility])  

 

          Albumin   3.0 GM/DL 3.2-5.2                       [facility] ([place] [facility])  

 

          Albumin/Globulin Ratio 0.6       1.2-2.2                       [facility] 

([facility])  









                    ID                  Date                Data Source

 

                    [id number]          2021 10:38:00 AM EDT [facility] ([facility])









          Name      Value     Range     Interpretation Code Description Data Milly

rce(s) Supporting 

Document(s)

 

          White Blood Count 6.2 10    4.0-10.0                      [facility] ([facility])  

 

          Red Blood Count 3.76 10   4.00-5.40                     [facility] ([facility])  

 

          Hemoglobin 12.5 g/dL 12.0-15.5                     [facility]  

 

          Mean Corpuscular Volume 103.7 fl  80.0-96.0                     [facility]

 ([facility])  

 

          Hematocrit 39.0 %    36.0-47.0                     [facility] ([place] [facility]  

 

          Mean Corpuscular HGB Conc 32.1 g/dL 32.0-36.5                     [facility]

NT ([facility]) 

 

 

          Mean Corpuscular Hemoglobin 33.2 pg   27.0-33.0                     ME

DENT ([facility]) 

 

 

          Platelet Count, Automated 199 10    150-450                       [facility]

NT ([facility])  

 

          Red Cell Distribution Width 12.9 %    11.5-14.5                     ME

DENT ([facility])  

 

          Neutrophils % 74.5 %    36.0-66.0                     [facility] ([facility])  

 

          Lymph %   16.4 %    24.0-44.0                     [facility]  

 

          Mono %    7.0 %     2.0-8.0                       [facility]  

 

          Eos %     1.1 %     0.0-3.0                       [facility] ([facility])  

 

          Baso %    0.5 %     0.0-1.0                       [facility] ([facility]  

 

          Nucleated Red Blood Cell % 0.0 %     0-0                           MED

ENT ([facility])  

 

          Immature Granulocyte % 0.5 %     0-3.0                         [facility] 

([facility])  

 

          Neutrophils # 4.6 10    1.5-8.5                       [facility] ([facility])  

 

          Lymph #   1.0 10    1.5-5.0                       [facility] ([facility] [facility])  

 

          Mono #    0.4 10    0.0-0.8                       [facility] ([place] [facility]

[facility])  

 

          Eos #     0.1 10    0.0-0.5                       [facility] [facility]  

 

          Baso #    0.0 10    0.0-0.2                       [facility] ([place] [facility]

[facility])  









                    ID                  Date                Data Source

 

                    [id number]          2021 02:06:00 PM EDT [facility] ([facility])









          Name      Value     Range     Interpretation Code Description Data Milly

rce(s) Supporting 

Document(s)

 

                          Thyrotropin [Units/volume] in Serum or Plasma by Detec

tion limit <= 0.05 mIU/L 

0.19 uIU/mL  0.36-3.74                              [facility] ([facility]

)  









                    ID                  Date                Data Source

 

                    [id number]          2021 02:06:00 PM EDT [facility] ([facility])









          Name      Value     Range     Interpretation Code Description Data Milly

rce(s) Supporting 

Document(s)

 

           Urea nitrogen [Mass/volume] in Serum or Plasma 18 mg/dL   7-18       

                      [facility] 

([facility])                   

 

           Glucose [Mass/volume] in Serum or Plasma 100 mg/dL  74-99            

                [facility] ([facility])                              

 

                                        100-125 mg/dL     PRE-DIABETES/FASTING

>126 mg/dL          DIABETES/FASTING

 

 

          Creatinine 0.8 mg/dL 0.6-1.3                       [facility] ([facility])  

 

           Sodium [Moles/volume] in Serum or Plasma 141 meq/L  136-145          

                [facility] ([facility])                             

 

           Potassium [Moles/volume] in Serum or Plasma 3.8 meq/L  3.5-5.1       

                   [facility] 

([facility])                   

 

           Calcium [Mass/volume] in Serum or Plasma 10.8 mg/dL 8.5-10.1         

                [facility] 

([facility])                   

 

                                        NOTE:

CALCIUM,TSH VERIFIED





 

 

           Carbon dioxide, total [Moles/volume] in Serum or Plasma 29 meq/L   21

-32                            

[facility] ([facility])            

 

           Chloride [Moles/volume] in Serum or Plasma 104 meq/L           

                  [facility] 

([facility])                   

 

                                        Glomerular filtration rate/1.73 sq M pre

dicted among non-blacks [Volume 

Rate/Area] in Serum or Plasma by Creatinine-based formula (MDRD) Laboratory test

result                                              [facility]

)  

 

                                        Glomerular filtration rate/1.73 sq M pre

dicted among blacks [Volume Rate/Area] 

in Serum or Plasma by Creatinine-based formula (MDRD) Laboratory test result    

                  

                                        [facility]  

 

                                        <content>CHRONIC KIDNEY DISEASE STAGING 

PER 

NKF</content><br/><content></content><br/><content>STAGE I & II      GFR >= 60  
     NORMAL TO MILDLY DECREASED</content><br/><content>STAGE III          GFR 
30-59          MODERATELY DECREASED</content><br/><content>STAGE IV           
GFR 15-29         SEVERELY DECREASED</content><br/><content>STAGE V            
GFR <15            VERY LITTLE GFR LEFT</content><br/><content>ESRD             
   GFR <15            ON RRT</content><br/><content></content> 









                    ID                  Date                Data Source

 

                    [id number]          2021 02:06:00 PM EDT [facility] ([facility])









          Name      Value     Range     Interpretation Code Description Data Milly

rce(s) Supporting 

Document(s)

 

           Erythrocyte sedimentation rate by Westergren method 25 mm/hr   0-15  

                           [facility] 

([facility]                   









                    ID                  Date                Data Source

 

                    [id number]          2021 02:06:00 PM EDT [facility]









          Name      Value     Range     Interpretation Code Description Data Milly

rce(s) Supporting 

Document(s)

 

           Leukocytes [#/volume] in Blood by Automated count 5.9 x10*3/UL 4.1-10

.9                         

[facility] ([facility])            

 

           Erythrocytes [#/volume] in Blood by Automated count 4.11 x10*6/UL 4.2

0-6.30                        

[facility] ([facility])            

 

           Hemoglobin [Mass/volume] in Blood 13.6 g/dL  12.0-18.0               

         [facility] ([facility])                              

 

          MCV       97.4 fL   80.0-97.0                     [facility])  

 

          MCH       33.1 pg   26.0-32.0                     [facility]  

 

           Hematocrit [Volume Fraction] of Blood by Automated count 40.0 %     3

7.0-51.0                        

[facility] ([facility])            

 

           Platelets [#/volume] in Blood by Automated count 209 x10*3/-440

                          [facility]

 ([facility])                  

 

           Erythrocyte distribution width [Ratio] by Automated count 13.2 %     

11.6-13.7                        

[facility] ([facility])            

 

          MCHC      34.0 g/dL 31.0-38.0                     [facility])  

 

          MPV       8.1 FL    7.8-11.0                      [facility] ([facility])  

 

          Lymph %   25.6 %    10.0-58.5                     [facility] ([facility])  

 

          Mid %     7.5 %     1.7-9.3                       [facility] ([facility])  

 

          Neut %    66.9 %    37.0-92.0                     [facility] ([facility])  

 

          Lymph #   1.5 x10*3/UL 0.6-4.1                       [facility] ([facility])  

 

          Mid #     0.5 x10*3/UL 0.1-0.6                       [facility] ([facility])  

 

          Neut #    3.9 x10*3/UL 2.0-7.8                       [facility] ([facility])  









                    ID                  Date                Data Source

 

                    [id number]          2021 01:37:00 PM EDT [facility] ([facility])









          Name      Value     Range     Interpretation Code Description Data Milly

rce(s) Supporting 

Document(s)

 

           Digoxin [Mass/volume] in Serum or Plasma 0.3 ng/mL  0.5-2.0          

                [facility] ([facility])                             

 

                                        <content>note:<nlbl:demographic_changed>

</content><br/><content></content> 









                    ID                  Date                Data Source

 

                    [id number]          2021 01:37:00 PM EDT [facility] ([facility])









          Name      Value     Range     Interpretation Code Description Data Milly

rce(s) Supporting 

Document(s)

 

                          Thyrotropin [Units/volume] in Serum or Plasma by Detec

tion limit <= 0.05 mIU/L 

0.32 uIU/mL  0.36-3.74                              [facility] ([facility]

)  









                    ID                  Date                Data Source

 

                    [id number]          2021 01:37:00 PM EDT [facility] ([facility])









          Name      Value     Range     Interpretation Code Description Data Milly

rce(s) Supporting 

Document(s)

 

           Cholesterol [Mass/volume] in Serum or Plasma 132 mg/dL  131-200      

                    [facility] 

([facility])                   

 

           Cholesterol in HDL [Mass/volume] in Serum or Plasma 54 mg/dL   35-60 

                           [facility] 

([facility])                   

 

                    Cholesterol in LDL [Mass/volume] in Serum or Plasma by calcu

lation 64 CALC             

                                          [facility] ([facility])  

 

           Triglyceride [Mass/volume] in Serum or Plasma 72 mg/dL         

                     [facility] 

([facility])                   









                    ID                  Date                Data Source

 

                    [id number]          2021 01:37:00 PM EDT [facility] ([facility])









          Name      Value     Range     Interpretation Code Description Data Milly

rce(s) Supporting 

Document(s)

 

           Glucose [Mass/volume] in Serum or Plasma 70 mg/dL   74-99            

                [facility] ([facility])                              

 

                                        100-125 mg/dL     PRE-DIABETES/FASTING

>126 mg/dL          DIABETES/FASTING

 

 

           Urea nitrogen [Mass/volume] in Serum or Plasma 23 mg/dL   7-18       

                      [facility] 

([facility])                   

 

           Sodium [Moles/volume] in Serum or Plasma 144 meq/L  136-145          

                [facility] ([facility])                             

 

          Creatinine 0.9 mg/dL 0.6-1.3                       [facility]  

 

           Carbon dioxide, total [Moles/volume] in Serum or Plasma 32 meq/L   21

-32                            

[facility] ([facility])            

 

           Potassium [Moles/volume] in Serum or Plasma 3.8 meq/L  3.5-5.1       

                   [facility] 

([facility])                   

 

           Chloride [Moles/volume] in Serum or Plasma 104 meq/L           

                  [facility] 

([facility])                   

 

                                        Glomerular filtration rate/1.73 sq M pre

dicted among blacks [Volume Rate/Area] 

in Serum or Plasma by Creatinine-based formula (MDRD) Laboratory test result    

                  

                                        [facility]  

 

                                        <content>CHRONIC KIDNEY DISEASE STAGING 

PER 

NKF</content><br/><content></content><br/><content>STAGE I & II      GFR >= 60  
     NORMAL TO MILDLY DECREASED</content><br/><content>STAGE III          GFR 
30-59          MODERATELY DECREASED</content><br/><content>STAGE IV           
GFR 15-29         SEVERELY DECREASED</content><br/><content>STAGE V            
GFR <15            VERY LITTLE GFR LEFT</content><br/><content>ESRD             
   GFR <15            ON RRT</content><br/><content></content> 

 

                                        Glomerular filtration rate/1.73 sq M pre

dicted among non-blacks [Volume 

Rate/Area] in Serum or Plasma by Creatinine-based formula (MDRD) Laboratory test

result                                              [facility] ([facility]

)  

 

           Calcium [Mass/volume] in Serum or Plasma 10.1 mg/dL 8.5-10.1         

                [facility] 

([facility])                   









                    ID                  Date                Data Source

 

                    [id number]          2021 01:37:00 PM EDT [facility]









          Name      Value     Range     Interpretation Code Description Data Milly

rce(s) Supporting 

Document(s)

 

           Erythrocyte sedimentation rate by Westergren method 45 mm/hr   0-15  

                           [facility] 

([facility]                   

 

          Magnesium 1.8 mg/dL 1.8-2.4                       [facility]  









                    ID                  Date                Data Source

 

                    [id number]          2021 01:37:00 PM EDT [facility]









          Name      Value     Range     Interpretation Code Description Data Milly

rce(s) Supporting 

Document(s)

 

           Leukocytes [#/volume] in Blood by Automated count 8.4 x10*3/UL 4.1-10

.9                         

[facility] ([facility])            

 

           Erythrocytes [#/volume] in Blood by Automated count 4.20 x10*6/UL 4.2

0-6.30                        

[facility] ([facility])            

 

           Hemoglobin [Mass/volume] in Blood 14.0 g/dL  12.0-18.0               

         [facility] ([facility])                              

 

           Hematocrit [Volume Fraction] of Blood by Automated count 40.3 %     3

7.0-51.0                        

[facility] ([facility])            

 

          MCV       95.8 fL   80.0-97.0                     [facility]  

 

          MCH       33.3 pg   26.0-32.0                     [facility] ([facility]

[facility])  

 

           Erythrocyte distribution width [Ratio] by Automated count 13.3 %     

11.6-13.7                        

[facility] ([facility])            

 

          MCHC      34.7 g/dL 31.0-38.0                     [facility]  

 

          Lymph %   17.5 %    10.0-58.5                     [facility] ([facility]

[facility])  

 

           Platelets [#/volume] in Blood by Automated count 207 x10*3/-440

                          [facility]

 ([facility])                  

 

          MPV       8.0 FL    7.8-11.0                      [facility]  

 

          Neut %    77.7 %    37.0-92.0                     [facility] ([facility])  

 

          Mid %     4.8 %     1.7-9.3                       [facility] ([facility])  

 

          Lymph #   1.4 x10*3/UL 0.6-4.1                       [facility] ([facility])  

 

          Mid #     0.5 x10*3/UL 0.1-0.6                       [facility] ([facility])  

 

          Neut #    6.5 x10*3/UL 2.0-7.8                       [facility] ([facility])  









                    ID                  Date                Data Source

 

                    [id number]            2021 01:37:00 PM EDT [facility] ([facility])









          Name      Value     Range     Interpretation Code Description Data Milly

rce(s) Supporting 

Document(s)

 

                          Thyrotropin [Units/volume] in Serum or Plasma by Detec

tion limit <= 0.05 mIU/L 

0.32 uIU/mL  0.36-3.74                              [facility]  

 

           Digoxin [Mass/volume] in Serum or Plasma 0.3 ng/mL  0.5-2.0          

                [facility] 

([facility])           

 

                                        

<content>note:<nlbl:demographic_changed></content><br/><content></content><br/><

content></content> 









                    ID                  Date                Data Source

 

                    [id number]            2021 01:37:00 PM EDT [facility] ([facility])









          Name      Value     Range     Interpretation Code Description Data Milly

rce(s) Supporting 

Document(s)

 

           Cholesterol [Mass/volume] in Serum or Plasma 132 mg/dL  131-200      

                    [facility] 

([facility])           

 

           Triglyceride [Mass/volume] in Serum or Plasma 72 mg/dL         

                     [facility] 

([facility])           

 

           Cholesterol in HDL [Mass/volume] in Serum or Plasma 54 mg/dL   35-60 

                           [facility] 

([facility])           

 

                    Cholesterol in LDL [Mass/volume] in Serum or Plasma by calcu

lation 64 CALC             

                                          [facility] ([facility])  









                    ID                  Date                Data Source

 

                    [id number]            2021 01:37:00 PM EDT [facility]









          Name      Value     Range     Interpretation Code Description Data Milly

rce(s) Supporting 

Document(s)

 

           Glucose [Mass/volume] in Serum or Plasma 70 mg/dL   74-99            

                [facility] ([facility]                       

 

                                        100-125 mg/dL     PRE-DIABETES/FASTING

>126 mg/dL          DIABETES/FASTING



 

 

           Urea nitrogen [Mass/volume] in Serum or Plasma 23 mg/dL   7-18       

                      [facility] 

([facility])           

 

          Creatinine 0.9 mg/dL 0.6-1.3                       [facility]  

 

           Sodium [Moles/volume] in Serum or Plasma 144 meq/L  136-145          

                [facility] 

([facility])           

 

           Potassium [Moles/volume] in Serum or Plasma 3.8 meq/L  3.5-5.1       

                   [facility] 

([facility])           

 

           Carbon dioxide, total [Moles/volume] in Serum or Plasma 32 meq/L   21

-32                            

[facility] ([facility])    

 

           Chloride [Moles/volume] in Serum or Plasma 104 meq/L           

                  [facility] 

([facility])           

 

           Calcium [Mass/volume] in Serum or Plasma 10.1 mg/dL 8.5-10.1         

                [facility] 

([facility])           

 

                                        Glomerular filtration rate/1.73 sq M pre

dicted among non-blacks [Volume 

Rate/Area] in Serum or Plasma by Creatinine-based formula (MDRD) Laboratory test

result                                              [facility] ([facility]  

 

                                        Glomerular filtration rate/1.73 sq M pre

dicted among blacks [Volume Rate/Area] 

in Serum or Plasma by Creatinine-based formula (MDRD) Laboratory test result    

                  

                                        [facility] ([facility])  

 

                                        <content>CHRONIC KIDNEY DISEASE STAGING 

PER 

NKF</content><br/><content></content><br/><content>STAGE I & II      GFR >= 60  
     NORMAL TO MILDLY DECREASED</content><br/><content>STAGE III          GFR 
30-59          MODERATELY DECREASED</content><br/><content>STAGE IV           
GFR 15-29         SEVERELY DECREASED</content><br/><content>STAGE V            
GFR <15            VERY LITTLE GFR LEFT</content><br/><content>ESRD             
   GFR <15            ON 
RRT</content><br/><content></content><br/><content></content> 









                    ID                  Date                Data Source

 

                    [id number]            2021 01:37:00 PM EDT [facility] ([facility])









          Name      Value     Range     Interpretation Code Description Data Milly

rce(s) Supporting 

Document(s)

 

           Leukocytes [#/volume] in Blood by Automated count 8.4 x10*3/UL 4.1-10

.9                         

[facility] ([facility])    

 

           Erythrocytes [#/volume] in Blood by Automated count 4.20 x10*6/UL 4.2

0-6.30                        

[facility] ([facility])    

 

           Hemoglobin [Mass/volume] in Blood 14.0 g/dL  12.0-18.0               

         [facility] ([facility])                       

 

           Hematocrit [Volume Fraction] of Blood by Automated count 40.3 %     3

7.0-51.0                        

[facility] ([facility])    

 

          MCV       95.8 fL   80.0-97.0                     [facility] ([facility]

[facility])  

 

          MCH       33.3 pg   26.0-32.0                     [facility] ([facility]

[facility])  

 

           Erythrocyte distribution width [Ratio] by Automated count 13.3 %     

11.6-13.7                        

[facility] ([facility])    

 

          MCHC      34.7 g/dL 31.0-38.0                     [facility] ([facility]

[facility])  

 

           Platelets [#/volume] in Blood by Automated count 207 x10*3/-440

                          [facility]

 ([facility])          

 

             Platelet mean volume [Entitic volume] in Blood by Sarthak-Christos 8.0 FL

       7.8-11.0                   

                          [facility] ([facility])  

 

           Lymphocytes/100 leukocytes in Blood by Automated count 17.5 %     10.

0-58.5                        

[facility] ([facility])    

 

          Mid %     4.8 %     1.7-9.3                       [facility] ([facility]

[facility])  

 

          Lymph #   1.4 x10*3/UL 0.6-4.1                       [facility] ([facility])  

 

          Neut %    77.7 %    37.0-92.0                     [facility] ([facility])  

 

           Neutrophils [#/volume] in Semen by Manual count 6.5 x10*3/UL 2.0-7.8 

                         [facility] 

([facility])           

 

          Mid #     0.5 x10*3/UL 0.1-0.6                       [facility])  









                    ID                  Date                Data Source

 

                    [id number]          2021 02:59:00 PM EDT [facility]









          Name      Value     Range     Interpretation Code Description Data Milly

rce(s) Supporting 

Document(s)

 

           Free Lambda Light Chains Serum 517.2 mg/L 5.7-26.3                   

      [facility] ([facility])                              

 

           Free Kappa Light Chains Serum 6.4 mg/L   3.3-19.4                    

     [facility] ([facility])                              

 

          Kappa/Lambda Ratio Serum 0.01      0.26-1.65                     [facility]  









                    ID                  Date                Data Source

 

                    [id number]          2021 02:59:00 PM EDT [facility]









          Name      Value     Range     Interpretation Code Description Data Milly

rce(s) Supporting 

Document(s)

 

           Ferritin [Mass/volume] in Serum or Plasma 128 ng/mL  8-252           

                 [facility]                              

 

                                        <content>note:<nlbl:demographic_changed>

</content><br/><content></content> 

 

           Beta-2-Microglobulin [Mass/volume] in Serum or Plasma 3.7 mg/L   0.6-

2.4                          

[facility] ([facility]            

 

                                        Siemens Immulite 2000 Immunochemiluminom

etric assay (ICMA)

                                        .

Values obtained with different assay methods or kits cannot

be used interchangeably. Results cannot be interpreted as

absolute evidence of the presence or absence of malignant

disease.

Performed at:  RN - [facility] [address]  [id number]

: Araceli B Reyes MD, Phone:  [phone]

Performed at:   - [facility] [address]  [id number]

[id number]

: Barak Archibald MD, Phone:  [phone]

 









                    ID                  Date                Data Source

 

                    [id number]          2021 02:59:00 PM EDT [facility] ([facility])









          Name      Value     Range     Interpretation Code Description Data Milly

rce(s) Supporting 

Document(s)

 

           Immunotyping Serum Iga Laboratory test result                        

          [facility] ([facility])                              

 

           Immunotyping Serum Lambda Laboratory test result                     

             [facility] ([facility])                              

 

           Laboratory test finding (navigational concept) Laboratory test result

                                  

MED[facility] ([facility])            

 

                                        REV'D BY O ADJAPONG

 

 

           It Serum Interpretation Laboratory test result                       

           [facility] ([facility])                              

 

                                        MONOCLONAL IGA,LAMBDA

 









                    ID                  Date                Data Source

 

                    [id number]          2021 02:59:00 PM EDT [facility] ([facility])









          Name      Value     Range     Interpretation Code Description Data Milly

rce(s) Supporting 

Document(s)

 

          Immunoglobulin G 510 mg/dL [phone]                      [facility] ([facility])  

 

          Immunoglobulin A 2640.0 mg/dL                         [facility] ([facility])  

 

           Immunoglobulin M Laboratory test result                        

    [facility] ([facility])

                                         









                    ID                  Date                Data Source

 

                    [id number]          2021 02:59:00 PM EDT [facility] ([facility])









          Name      Value     Range     Interpretation Code Description Data Milly

rce(s) Supporting 

Document(s)

 

          Iron (Fe) 131 ug/dL                         [facility]  

 

          Total Iron Binding Capacity 305 ug/dL 250-450                       ME

DENT ([facility]) 

 

 

          Percent Saturation 43.0 %    13.2-45.0                     [facility] ([facility])  









                    ID                  Date                Data Source

 

                    [id number]          2021 02:59:00 PM EDT [facility] ([facility])









          Name      Value     Range     Interpretation Code Description Data Milly

rce(s) Supporting 

Document(s)

 

          Glucose, Fasting 97 mg/dL                          [facility] ([facility])  

 

          Blood Urea Nitrogen 24 mg/dL  7-18                          [facility] ([facility])  

 

          Creatinine For GFR 0.85 mg/dL 0.55-1.30                     [facility] ([facility])  

 

           Glomerular Filtration Rate Laboratory test result                    

              [facility] ([facility])                              

 

                                        <content>Units are mL/min/1.73 

m2</content><br/><content></content><br/><content>Chronic Kidney Disease Staging
 per NKF:</content><br/><content></content><br/><content>Stage I & II   GFR >=60
       Normal to Mildly Decreased</content><br/><content>Stage III      GFR 30-
59      Moderately Decreased</content><br/><content>Stage IV       GFR 15-29    
  Severely Decreased</content><br/><content>Stage V        GFR <15        Very 
Little GFR Left</content><br/><content>ESRD           GFR <15 on 
RRT</content><br/><content></content> 

 

          Sodium Level 139 meq/L 136-145                       [facility] ([facility])  

 

          Carbon Dioxide Level 26 meq/L  21-32                         MED[facility] ([facility])  

 

          Potassium Serum 4.6 meq/L 3.5-5.1                       MED[facility] ([facility])  

 

          Chloride Level 106 meq/L                         [facility] ([facility])  

 

          Calcium Level 11.8 mg/dL 8.8-10.2                      [facility] ([facility])  

 

          Anion Gap 7 meq/L   8-16                          [facility])  

 

          Alt/SGPT  44 U/L    12-78                         [facility] ([facility])  

 

          Alkaline Phosphatase 116 U/L                           MED[facility] ([facility])  

 

          Ast/Sgot  33 U/L    7-37                          MED[facility] ([facility])  

 

          Bilirubin,Total 0.6 mg/dL 0.2-1.0                       [facility] ([facility])  

 

          Total Protein 8.9 GM/DL 6.4-8.2                       MED[facility] ([facility])  

 

          Albumin   3.2 GM/DL 3.2-5.2                       [facility] ([facility])  

 

          Albumin/Globulin Ratio 0.6       1.2-2.2                       MED[facility] 

([facility])  









                    ID                  Date                Data Source

 

                    [id number]          2021 02:59:00 PM EDT [facility] ([facility])









          Name      Value     Range     Interpretation Code Description Data Milly

rce(s) Supporting 

Document(s)

 

           Erythrocyte sedimentation rate by Westergren method 68 mm/hr   0-30  

                           MED[facility] 

([facility])                   









                    ID                  Date                Data Source

 

                    [id number]          2021 02:59:00 PM EDT [facility] ([facility])









          Name      Value     Range     Interpretation Code Description Data Milly

rce(s) Supporting 

Document(s)

 

          White Blood Count 7.6 10    4.0-10.0                      MED[facility] ([facility])  

 

          Red Blood Count 4.29 10   4.00-5.40                     MED[facility] ([facility])  

 

          Hemoglobin 13.9 g/dL 12.0-15.5                     MED[facility] ([facility])  

 

          Hematocrit 43.3 %    36.0-47.0                     MED[facility] ([facility])  

 

          Mean Corpuscular Hemoglobin 32.4 pg   27.0-33.0                     ME

DENT ([facility]) 

 

 

          Mean Corpuscular HGB Conc 32.1 g/dL 32.0-36.5                     MEDE

NT ([facility]) 

 

 

          Mean Corpuscular Volume 100.9 fl  80.0-96.0                     MEDENT

 ([facility])  

 

          Red Cell Distribution Width 13.1 %    11.5-14.5                     ME

DENT ([facility])  

 

          Platelet Count, Automated 175 10    150-450                       MEDE

NT ([facility])  

 

          Neutrophils % 73.1 %    36.0-66.0                     [facility] ([facility])  

 

          Lymph %   18.1 %    24.0-44.0                     [facility]  

 

          Eos %     1.3 %     0.0-3.0                       [facility]  

 

          Mono %    6.8 %     2.0-8.0                       [facility]  

 

          Baso %    0.3 %     0.0-1.0                       [facility] ([facility])  

 

          Immature Granulocyte % 0.4 %     0-3.0                         MED[facility] 

([facility])  

 

          Nucleated Red Blood Cell % 0.0 %     0-0                           [facility]  

 

          Neutrophils # 5.6 10    1.5-8.5                       [facility]  

 

          Lymph #   1.4 10    1.5-5.0                       [facility] ([facility])  

 

          Mono #    0.5 10    0.0-0.8                       [facility] ([facility])  

 

          Eos #     0.1 10    0.0-0.5                       [facility] ([facility])  

 

          Baso #    0.0 10    0.0-0.2                       [facility] ([facility])  









                    ID                  Date                Data Source

 

                    [id number]          2021 02:59:00 PM EDT [facility]









          Name      Value     Range     Interpretation Code Description Data Milly

rce(s) Supporting 

Document(s)

 

          Appearance, Urine Laboratory test result                              

 [facility]  

 

          PH,Urine  5.0 units 5.0-9.0                       [facility] ([facility])  

 

          Color, Urine Laboratory test result                               [facility]

[facility]  

 

          Protein, Urine Auto Laboratory test result                            

   [facility]  

 

           Specific Gravity Urine Auto 1.028      1.002-1.035                   

    [facility]

                                         

 

          Ketone, Urine Auto Laboratory test result                             

  [facility]  

 

           Glucose, Urine (Ua) Auto Laboratory test result                      

            [facility]                              

 

          Urobilinogen, Urine Auto 4.0 mg/dL 0.0-2.0                       [facility]

[facility]  

 

           Leukocyte Esterase, Urine Auto Laboratory test result                

                  [facility] ([facility])                              

 

          Nitrite, Urine Auto Laboratory test result                            

   [facility] ([facility])  

 

           Bilirubin, Urine Auto Laboratory test result                         

         [facility]

                                         

 

          Blood, Urine Blood Laboratory test result                             

  [facility]  

 

          WBC, Urine Auto 2 /HPF    0-3                           MED[facility] ([facility])  

 

          Squamous Epithelial Cell Ur AU 0 /HPF    0-6                          

 MED[facility] ([facility])  

 

          RBC, Urine Auto 2 /HPF    0-3                           MED[facility] ([facility]

[facility])  

 

          Bacteria, Urine Auto Laboratory test result                           

    MED[facility] ([facility]) 

 

 

          Mucus, Urine Laboratory test result                               MEDE

NT ([facility])  

 

          Hyaline Cast, Urine Auto 0 /LPF    0-1                           MEDEN

T ([facility])  









                    ID                  Date                Data Source

 

                    [id number]              2021 02:43:00 PM EDT [facility] ([facility])









          Name      Value     Range     Interpretation Code Description Data Milly

rce(s) Supporting 

Document(s)

 

           3D Bi-Lateral Mammogram Laboratory test result                       

           [facility] ([facility])                              

 

          Dexa/Bone Density Laboratory test result                              

 [facility] ([facility])  









                    ID                  Date                Data Source

 

                    [id number]          2021 02:26:00 PM EDT [facility] ([facility])









          Name      Value     Range     Interpretation Code Description Data Milly

rce(s) Supporting 

Document(s)

 

          Albumin % 41.3 %    55.8-66.1                     [facility])  

 

          Alpha-1-Globulin % 3.8 %     2.9-4.9                       [facility] ([facility]  

 

          Alpha-2-Globulins % 8.9 %     7.1-11.8                      [facility] ([facility])  

 

          Beta-1-Globulins % 5.8 %     4.7-7.2                       [facility] ([facility])  

 

          Beta-2-Globulins % 35.2 %    3.2-6.5                       [facility] ([facility])  

 

          Gamma Globulin % 5.0 %     11.1-18.8                     [facility]  

 

          Albumin   3.39 GM/DL 3.29-5.55                     [facility]  

 

          Alpha-1-Globulins 0.31 GM/DL 0.17-0.41                     [facility] ([facility]  

 

          Alpha-2-Globulins 0.73 GM/DL 0.42-0.99                     [facility] ([facility]  

 

          Beta-1-Globulins 0.48 GM/DL 0.28-0.60                     [facility] ([facility]  

 

          Beta-2-Globulins 2.89 GM/DL 0.19-0.55                     [facility] ([facility])  

 

          Total Protein 8.2 GM/DL 6.4-8.2                       [facility] ([facility])  

 

          Gamma Globulins 0.41 GM/DL 0.65-1.58                     [facility]  

 

          Spep Interpretation Laboratory test result                            

   MED[facility] ([facility])  

 

                                        M-SPIKE NOTED IN BETA 2 REGION.

CONCENTRATION =   2.85    GM/DL

SUGGEST SERUM AND URINE IMMUNOTYPING.

 

 

           Laboratory test finding (navigational concept) Laboratory test result

                                  

MED[facility] ([facility])            

 

                                        REV'D BY CHARLES ROCHA

 









                    ID                  Date                Data Source

 

                    [id number]          2021 02:26:00 PM EDT [facility] ([facility])









          Name      Value     Range     Interpretation Code Description Data Milly

rce(s) Supporting 

Document(s)

 

           Immunotyping Serum Iga Laboratory test result                        

          [facility] ([facility]                              

 

           Immunotyping Serum Lambda Laboratory test result                     

             [facility] ([facility]                              

 

           It Serum Interpretation Laboratory test result                       

           [facility] ([facility])                              

 

                                        MONOCLONAL IGA LAMBDA

 

 

           Laboratory test finding (navigational concept) Laboratory test result

                                  

[facility] ([facility])            









                    ID                  Date                Data Source

 

                    [id number]          2021 02:25:00 PM [facility]









          Name      Value     Range     Interpretation Code Description Data Milly

rce(s) Supporting 

Document(s)

 

           Glucose [Mass/volume] in Serum or Plasma 124 mg/dL  74-99            

                [facility] ([facility])                              

 

                                        100-125 mg/dL     PRE-DIABETES/FASTING

>126 mg/dL          DIABETES/FASTING

 

 

           Urea nitrogen [Mass/volume] in Serum or Plasma 19 mg/dL   7-18       

                      [facility] 

([facility])                   

 

          Creatinine 0.7 mg/dL 0.6-1.3                       [facility]  

 

           Sodium [Moles/volume] in Serum or Plasma 143 meq/L  136-145          

                [facility])                             

 

           Chloride [Moles/volume] in Serum or Plasma 103 meq/L           

                  [facility] 

([facility])                   

 

           Potassium [Moles/volume] in Serum or Plasma 3.8 meq/L  3.5-5.1       

                   [facility] 

([facility])                   

 

           Carbon dioxide, total [Moles/volume] in Serum or Plasma 29 meq/L   21

-32                            

[facility] ([facility])            

 

           Calcium [Mass/volume] in Serum or Plasma 10.2 mg/dL 8.5-10.1         

                [facility] 

([facility])                   

 

                                        NOTE:

RESULT VERIFIED.





 

 

                                        Glomerular filtration rate/1.73 sq M pre

dicted among non-blacks [Volume 

Rate/Area] in Serum or Plasma by Creatinine-based formula (MDRD) Laboratory test

result                                              [facility] ([facility]

)  

 

                                        Glomerular filtration rate/1.73 sq M pre

dicted among blacks [Volume Rate/Area] 

in Serum or Plasma by Creatinine-based formula (MDRD) Laboratory test result    

                  

                                        [facility] ([facility])  

 

                                        <content>CHRONIC KIDNEY DISEASE STAGING 

PER 

NKF</content><br/><content></content><br/><content>STAGE I & II      GFR >= 60  
     NORMAL TO MILDLY DECREASED</content><br/><content>STAGE III          GFR 
30-59          MODERATELY DECREASED</content><br/><content>STAGE IV           
GFR 15-29         SEVERELY DECREASED</content><br/><content>STAGE V            
GFR <15            VERY LITTLE GFR LEFT</content><br/><content>ESRD             
   GFR <15            ON RRT</content><br/><content></content> 









                    ID                  Date                Data Source

 

                    [id number]          2021 11:17:00 AM EST [facility] ([facility])









          Name      Value     Range     Interpretation Code Description Data Milly

rce(s) Supporting 

Document(s)

 

           Erythrocyte sedimentation rate by Westergren method 108 mm/hr  0-30  

                           [facility] 

([facility])                   









                    ID                  Date                Data Source

 

                    [id number]          2021 11:17:00 AM EST [facility] ([facility])









          Name      Value     Range     Interpretation Code Description Data Milly

rce(s) Supporting 

Document(s)

 

          White Blood Count 4.3 10    4.0-10.0                      [facility] ([facility])  

 

          Hematocrit 39.3 %    36.0-47.0                     [facility] ([facility]

[facility])  

 

          Hemoglobin 12.4 g/dL 12.0-15.5                     [facility]  

 

          Red Blood Count 3.96 10   4.00-5.40                     [facility] ([facility])  

 

          Mean Corpuscular Hemoglobin 31.3 pg   27.0-33.0                     ME

DENT ([facility]) 



 

          Mean Corpuscular Volume 99.2 fl   80.0-96.0                     [facility]

 ([facility])  

 

          Mean Corpuscular HGB Conc 31.6 g/dL 32.0-36.5                     [facility]

NT ([facility]) 



 

          Red Cell Distribution Width 13.2 %    11.5-14.5                     ME

DENT ([facility])  

 

          Platelet Count, Automated 193 10    150-450                       [facility]

NT ([facility])  

 

          Neutrophils % 62.7 %    36.0-66.0                     [facility] ([facility])  

 

          Mono %    7.4 %     2.0-8.0                       [facility]  

 

          Lymph %   27.8 %    24.0-44.0                     [facility]  

 

          Baso %    0.5 %     0.0-1.0                       [facility]  

 

          Eos %     1.4 %     0.0-3.0                       [facility]  

 

          Neutrophils # 2.7 10    1.5-8.5                       [facility] ([facility])  

 

          Nucleated Red Blood Cell % 0.0 %     0-0                           MED

ENT ([facility])  

 

          Immature Granulocyte % 0.2 %     0-3.0                         [facility] 

([facility])  

 

          Mono #    0.3 10    0.0-0.8                       [facility]  

 

          Lymph #   1.2 10    1.5-5.0                       [facility] [facility]  

 

          Baso #    0.0 10    0.0-0.2                       [facility]  

 

          Eos #     0.1 10    0.0-0.5                       [facility]  









                    ID                  Date                Data Source

 

                    [id number]          2021 11:17:00 AM EST [facility] ([facility])









          Name      Value     Range     Interpretation Code Description Data Milly

rce(s) Supporting 

Document(s)

 

                          C reactive protein [Mass/volume] in Serum or Plasma by

 High sensitivity method 

0.52 mg/dL   0.00-0.30                              [facility] ([facility]

)  









                    ID                  Date                Data Source

 

                    [id number]          2021 11:17:00 AM EST [facility] ([facility])









          Name      Value     Range     Interpretation Code Description Data Milly

rce(s) Supporting 

Document(s)

 

          Glucose, Fasting 99 mg/dL                          [facility] ([facility]  

 

          Creatinine For GFR 0.92 mg/dL 0.55-1.30                     [facility] ([facility])  

 

          Blood Urea Nitrogen 29 mg/dL  7-18                          [facility] ([facility])  

 

          Sodium Level 139 meq/L 136-145                       [facility] ([facility])  

 

           Glomerular Filtration Rate Laboratory test result                    

              [facility] ([facility]                              

 

                                        <content>Units are mL/min/1.73 

m2</content><br/><content></content><br/><content>Chronic Kidney Disease Staging
 per NKF:</content><br/><content></content><br/><content>Stage I & II   GFR >=60
       Normal to Mildly Decreased</content><br/><content>Stage III      GFR 30-
59      Moderately Decreased</content><br/><content>Stage IV       GFR 15-29    
  Severely Decreased</content><br/><content>Stage V        GFR <15        Very 
Little GFR Left</content><br/><content>ESRD           GFR <15 on 
RRT</content><br/><content></content> 

 

          Potassium Serum 4.9 meq/L 3.5-5.1                       [facility] ([facility])  

 

          Chloride Level 105 meq/L                         [facility] ([facility])  

 

          Carbon Dioxide Level 26 meq/L  21-32                         [facility] ([facility])  

 

          Anion Gap 8 meq/L   8-16                          [facility] ([facility])  

 

          Calcium Level 10.8 mg/dL 8.8-10.2                      [facility] ([facility])  









                    ID                  Date                Data Source

 

                    [id number]          2021 11:17:00 AM EST [facility] ([facility])









          Name      Value     Range     Interpretation Code Description Data Milly

rce(s) Supporting 

Document(s)

 

           aPTT in Blood by Coagulation assay 35.2 s     24.2-38.5              

          [facility] ([facility]                              









                    ID                  Date                Data Source

 

                    [id number]          2021 11:17:00 AM EST [facility] ([facility])









          Name      Value     Range     Interpretation Code Description Data Milly

rce(s) Supporting 

Document(s)

 

          In[facility]       1.05                                    [facility] [facility]  

 

                                        THERAPUTIC HUMAN INR VALUES

INDICATIONS                      NORMAL RANGES

PROPHYLAXIS/TREATMENT OF:

VENOUS THROMBOSIS                2.0-3.0

PULMONARY EMBOLISM               2.0-3.0

PREVENTION OF SYSTEMIC EMBOLISM FROM:

TISSUE HEART VALVES              2.0-3.0

ACUTE MYOCARDIAL INFARCTION      2.0-3.0

VALVULAR HEART DISEASE           2.0-3.0

ATRIAL FIBRILLATION              2.0-3.0

MECHANICAL VALVES(HIGH RISK)     2.5-3.5

RECURRENT MYOCARDIAL INFARCTION  2.5-3.5

 

 

          Prothrombin Time 13.9 s    12.5-14.3                     [facility] ([facility])  









                    ID                  Date                Data Source

 

                    [id number]          2021 01:39:00 PM EST [facility] ([facility])









          Name      Value     Range     Interpretation Code Description Data Milly

rce(s) Supporting 

Document(s)

 

           Parathyrin.intact [Mass/volume] in Serum or Plasma 26.3 pg/mL 18.5-88

.0                        

[facility] ([facility])            









                    ID                  Date                Data Source

 

                    [id number]          2021 01:39:00 PM EST [facility] ([facility])









          Name      Value     Range     Interpretation Code Description Data Milly

rce(s) Supporting 

Document(s)

 

           Calcidiol [Mass/volume] in Serum or Plasma 32.7       24.0-80.0      

                  [facility] ([facility])                             

 

                                        This test was performed using [employer] I

P Vitamin D immunoassay kit.  Values 

obtained with different assay methods should not be used interchangeably.

 









                    ID                  Date                Data Source

 

                    [id number]          2021 01:39:00 PM EST [facility] ([facility])









          Name      Value     Range     Interpretation Code Description Data Milly

rce(s) Supporting 

Document(s)

 

                          Thyrotropin [Units/volume] in Serum or Plasma by Detec

tion limit <= 0.05 mIU/L 

0.54 uIU/mL  0.36-3.74                              [facility] ([facility]

)  









                    ID                  Date                Data Source

 

                    [id number]          2021 01:39:00 PM EST [facility] ([facility])









          Name      Value     Range     Interpretation Code Description Data Milly

rce(s) Supporting 

Document(s)

 

           Glucose [Mass/volume] in Serum or Plasma 98 mg/dL   74-99            

                [facility] ([facility])                              

 

                                        100-125 mg/dL     PRE-DIABETES/FASTING

>126 mg/dL          DIABETES/FASTING

 

 

           Urea nitrogen [Mass/volume] in Serum or Plasma 49 mg/dL   7-18       

                      [facility] 

([facility])                   

 

                                        NOTE:

BUN,CALCIUM VERIFIED





 

 

           Potassium [Moles/volume] in Serum or Plasma 4.5 meq/L  3.5-5.1       

                   [facility] 

([facility])                   

 

          Creatinine 1.2 mg/dL 0.6-1.3                       [facility] ([facility]

[facility])  

 

           Sodium [Moles/volume] in Serum or Plasma 142 meq/L  136-145          

                [facility] ([facility])                             

 

           Chloride [Moles/volume] in Serum or Plasma 104 meq/L           

                  [facility] 

([facility])                   

 

           Carbon dioxide, total [Moles/volume] in Serum or Plasma 23 meq/L   21

-32                            

[facility]            

 

                                        Glomerular filtration rate/1.73 sq M pre

dicted among non-blacks [Volume 

Rate/Area] in Serum or Plasma by Creatinine-based formula (MDRD) 45 mL/min      

                                  

                          [facility] ([facility])  

 

           Calcium [Mass/volume] in Serum or Plasma 10.6 mg/dL 8.5-10.1         

                [facility] 

([facility])                   

 

                                        Glomerular filtration rate/1.73 sq M pre

dicted among blacks [Volume Rate/Area] 

in Serum or Plasma by Creatinine-based formula (MDRD) 55 mL/min                 

                          [facility] 

([facility])                   

 

                                        <content>CHRONIC KIDNEY DISEASE STAGING 

PER 

NKF</content><br/><content></content><br/><content>STAGE I & II      GFR >= 60  
     NORMAL TO MILDLY DECREASED</content><br/><content>STAGE III          GFR 
30-59          MODERATELY DECREASED</content><br/><content>STAGE IV           
GFR 15-29         SEVERELY DECREASED</content><br/><content>STAGE V            
GFR <15            VERY LITTLE GFR LEFT</content><br/><content>ESRD             
   GFR <15            ON RRT</content><br/><content></content> 









                    ID                  Date                Data Source

 

                    [id number]          2021 01:39:00 PM EST [facility] ([facility])









          Name      Value     Range     Interpretation Code Description Data Milly

rce(s) Supporting 

Document(s)

 

           Hemoglobin [Mass/volume] in Blood 13.1 g/dL  12.0-18.0               

         [facility] ([facility])                              

 

           Leukocytes [#/volume] in Blood by Automated count 5.8 x10*3/UL 4.1-10

.9                         

[facility] ([facility])            

 

           Erythrocytes [#/volume] in Blood by Automated count 4.01 x10*6/UL 4.2

0-6.30                        

[facility] ([facility])            

 

           Hematocrit [Volume Fraction] of Blood by Automated count 37.5 %     3

7.0-51.0                        

[facility] ([facility])            

 

          MCV       93.4 fL   80.0-97.0                     [facility] ([place] [facility]

[facility])  

 

          MCH       32.6 pg   26.0-32.0                     [facility] ([place] [facility]

[facility])  

 

          MCHC      34.9 g/dL 31.0-38.0                     [facility] ([facility])  

 

           Platelets [#/volume] in Blood by Automated count 191 x10*3/-440

                          [facility]

 ([facility])                  

 

          MPV       8.6 FL    7.8-11.0                      [facility] ([place] [facility]

[facility])  

 

           Erythrocyte distribution width [Ratio] by Automated count 13.8 %     

11.6-13.7                        

[facility] ([facility])            

 

          Mid %     6.7 %     1.7-9.3                       [facility] ([place] [facility]

[facility])  

 

          Lymph %   24.4 %    10.0-58.5                     [facility] ([place] [facility]

[facility])  

 

          Mid #     0.4 x10*3/UL 0.1-0.6                       [facility] ([facility])  

 

          Neut %    68.9 %    37.0-92.0                     [facility] ([place] [facility]

[facility])  

 

          Lymph #   1.4 x10*3/UL 0.6-4.1                       [facility] ([facility])  

 

          Neut #    4.0 x10*3/UL 2.0-7.8                       [facility] ([facility])  









                    ID                  Date                Data Source

 

                    [id number]          2020 11:57:00 AM EST [facility] ([facility])









          Name      Value     Range     Interpretation Code Description Data Milly

rce(s) Supporting 

Document(s)

 

          Inr       6.49                Above upper panic limits           [facility]

[facility]  

 

                                        THERAPUTIC HUMAN INR VALUES

INDICATIONS                      NORMAL RANGES

PROPHYLAXIS/TREATMENT OF:

VENOUS THROMBOSIS                2.0-3.0

PULMONARY EMBOLISM               2.0-3.0

PREVENTION OF SYSTEMIC EMBOLISM FROM:

TISSUE HEART VALVES              2.0-3.0

ACUTE MYOCARDIAL INFARCTION      2.0-3.0

VALVULAR HEART DISEASE           2.0-3.0

ATRIAL FIBRILLATION              2.0-3.0

MECHANICAL VALVES(HIGH RISK)     2.5-3.5

RECURRENT MYOCARDIAL INFARCTION  2.5-3.5

 

 

          Prothrombin Time 58.4 s    12.5-14.3                     [facility])  









                    ID                  Date                Data Source

 

                    [id number]          2020 11:56:00 AM EST [facility]









          Name      Value     Range     Interpretation Code Description Data Milly

rce(s) Supporting 

Document(s)

 

           Leukocytes [#/volume] in Blood by Automated count 6.0 x10*3/UL 4.1-10

.9                         

[facility] ([facility])            

 

           Hemoglobin [Mass/volume] in Blood 13.0 g/dL  12.0-18.0               

         [facility] ([facility])                              

 

           Erythrocytes [#/volume] in Blood by Automated count 4.11 x10*6/UL 4.2

0-6.30                        

[facility] ([facility])            

 

          MCV       91.1 fL   80.0-97.0                     [facility] [facility]  

 

           Hematocrit [Volume Fraction] of Blood by Automated count 37.5 %     3

7.0-51.0                        

[facility] ([facility])            

 

          MCH       31.6 pg   26.0-32.0                     [facility] ([place] [facility]

[facility])  

 

          MCHC      34.7 g/dL 31.0-38.0                     [facility]  

 

           Erythrocyte distribution width [Ratio] by Automated count 14.2 %     

11.6-13.7                        

[facility] ([facility])            

 

           Platelets [#/volume] in Blood by Automated count 243 x10*3/-440

                          [facility]

 ([facility])                  

 

          MPV       8.3 FL    7.8-11.0                      [facility] [facility]  

 

          Lymph %   21.2 %    10.0-58.5                     [facility] ([place] [facility]  

 

          Mid %     5.8 %     1.7-9.3                       [facility] [facility]  

 

          Lymph #   1.2 x10*3/UL 0.6-4.1                       [facility] ([facility])  

 

          Neut %    73.0 %    37.0-92.0                     [facility] ([place] [facility]

[facility])  

 

          Neut #    4.4 x10*3/UL 2.0-7.8                       [facility] ([facility])  

 

          Mid #     0.4 x10*3/UL 0.1-0.6                       [facility] ([facility])  









                    ID                  Date                Data Source

 

                    [id number]          2020 11:29:00 AM EST [facility] ([facility])









          Name      Value     Range     Interpretation Code Description Data Milly

rce(s) Supporting 

Document(s)

 

           INR in Platelet poor plasma by Coagulation assay 7.9                 

                        [facility] ([facility])                              









                    ID                  Date                Data Source

 

                    [id number]          2020 10:58:00 AM EST [facility] ([facility])









          Name      Value     Range     Interpretation Code Description Data Milly

rce(s) Supporting 

Document(s)

 

           Glucose [Mass/volume] in Serum or Plasma 100 mg/dL  74-99            

                [facility] ([facility])                              

 

                                        100-125 mg/dL     PRE-DIABETES/FASTING

>126 mg/dL          DIABETES/FASTING

 

 

           Urea nitrogen [Mass/volume] in Serum or Plasma 17 mg/dL   7-18       

                      [facility] 

([facility])                   

 

          Creatinine 0.7 mg/dL 0.6-1.3                       [facility] ([facility])  

 

           Sodium [Moles/volume] in Serum or Plasma 141 meq/L  136-145          

                [facility] ([facility])                             

 

           Potassium [Moles/volume] in Serum or Plasma 4.4 meq/L  3.5-5.1       

                   [facility] 

([facility])                   

 

           Chloride [Moles/volume] in Serum or Plasma 103 meq/L           

                  [facility] 

([facility])                   

 

           Carbon dioxide, total [Moles/volume] in Serum or Plasma 24 meq/L   21

-32                            

[facility] ([facility])            

 

           Calcium [Mass/volume] in Serum or Plasma 10.2 mg/dL 8.5-10.1         

                [facility] 

([facility])                   

 

                                        NOTE:

RESULT VERIFIED.





 

 

                                        Glomerular filtration rate/1.73 sq M pre

dicted among non-blacks [Volume 

Rate/Area] in Serum or Plasma by Creatinine-based formula (MDRD) Laboratory test

result                                              [facility]

)  

 

                                        Glomerular filtration rate/1.73 sq M pre

dicted among blacks [Volume Rate/Area] 

in Serum or Plasma by Creatinine-based formula (MDRD) Laboratory test result    

                  

                                        [facility] ([facility])  

 

                                        <content>CHRONIC KIDNEY DISEASE STAGING 

PER 

NKF</content><br/><content></content><br/><content>STAGE I & II      GFR >= 60  
     NORMAL TO MILDLY DECREASED</content><br/><content>STAGE III          GFR 
30-59          MODERATELY DECREASED</content><br/><content>STAGE IV           
GFR 15-29         SEVERELY DECREASED</content><br/><content>STAGE V            
GFR <15            VERY LITTLE GFR LEFT</content><br/><content>ESRD             
   GFR <15            ON RRT</content><br/><content></content> 









                    ID                  Date                Data Source

 

                    [id number]          2020 11:20:00 AM EST [facility]









          Name      Value     Range     Interpretation Code Description Data Milly

rce(s) Supporting 

Document(s)

 

           INR in Platelet poor plasma by Coagulation assay 3.7                 

                        [facility]                              









                    ID                  Date                Data Source

 

                    [id number]          10/29/2020 01:07:00 PM EDT [facility]









          Name      Value     Range     Interpretation Code Description Data Milly

rce(s) Supporting 

Document(s)

 

           INR in Platelet poor plasma by Coagulation assay 2.7                 

                        [facility]                              









                    ID                  Date                Data Source

 

                    [id number]          10/29/2020 11:26:00 AM EDT [facility]









          Name      Value     Range     Interpretation Code Description Data Milly

rce(s) Supporting 

Document(s)

 

           Digoxin [Mass/volume] in Serum or Plasma 0.6 ng/mL  0.5-2.0          

                [facility] ([facility])                             









                    ID                  Date                Data Source

 

                    [id number]            10/29/2020 11:26:00 AM EDT [facility] ([facility])









          Name      Value     Range     Interpretation Code Description Data Milly

rce(s) Supporting 

Document(s)

 

           Digoxin [Mass/volume] in Serum or Plasma 0.6 ng/mL  0.5-2.0          

                [facility] 

([facility])           









                    ID                  Date                Data Source

 

                    [id number]          10/29/2020 11:25:00 AM EDT [facility] ([facility])









          Name      Value     Range     Interpretation Code Description Data Milly

rce(s) Supporting 

Document(s)

 

          Urine PH  6.5 units 5.0-9.0                       [facility] ([place] [facility])  

 

          Urine Appearance Laboratory test result                               

[facility] ([facility])  

 

          Urine Color Laboratory test result                               [facility]

T ([facility])  

 

          Urine Leukocytes Laboratory test result                               

[facility] ([facility])  

 

          Specific gravity of Urine 1.010     1.005-1.030                     [facility]

[facility] ([facility])  

 

          Urine Protein Laboratory test result 0-0                           [facility]

[facility] ([facility])  

 

          Urine Blood Laboratory test result                               [facility]

[facility] ([facility])  

 

          Urine Nitrite Laboratory test result                               [facility]

[facility] ([facility])  

 

           Glucose [Presence] in Urine Laboratory test result                   

               [facility] ([facility])                              

 

          Urine Ketone Laboratory test result                               [facility]

[facility] ([facility])  

 

          Urine Urobilinogen 0.2 mg/dL 0.2-1.0                       [facility] ([facility])  

 

           Bilirubin.total [Mass/volume] in Serum or Plasma Laboratory test resu

lt                                  

[facility] ([facility])            









                    ID                  Date                Data Source

 

                    [id number]          10/29/2020 11:25:00 AM EDT [facility] ([facility])









          Name      Value     Range     Interpretation Code Description Data Milly

rce(s) Supporting 

Document(s)

 

                          Thyrotropin [Units/volume] in Serum or Plasma by Detec

tion limit <= 0.05 mIU/L 

0.16 uIU/mL  0.36-3.74                              [facility] ([facility]

)  









                    ID                  Date                Data Source

 

                    [id number]          10/29/2020 11:25:00 AM EDT [facility] ([facility])









          Name      Value     Range     Interpretation Code Description Data Milly

rce(s) Supporting 

Document(s)

 

           Urea nitrogen [Mass/volume] in Serum or Plasma 17 mg/dL   7-18       

                      [facility] 

([facility])                   

 

           Glucose [Mass/volume] in Serum or Plasma 101 mg/dL  74-99            

                [facility] ([facility])                              

 

                                        100-125 mg/dL     PRE-DIABETES/FASTING

>126 mg/dL          DIABETES/FASTING

 

 

          Creatinine 0.7 mg/dL 0.6-1.3                       [facility] ([facility]

[facility])  

 

           Sodium [Moles/volume] in Serum or Plasma 143 meq/L  136-145          

                [facility] ([facility])                             

 

           Chloride [Moles/volume] in Serum or Plasma 104 meq/L           

                  [facility] 

([facility])                   

 

           Potassium [Moles/volume] in Serum or Plasma 4.0 meq/L  3.5-5.1       

                   [facility] 

([facility])                   

 

           Carbon dioxide, total [Moles/volume] in Serum or Plasma 30 meq/L   21

-32                            

[facility] ([facility])            

 

           Calcium [Mass/volume] in Serum or Plasma 10.7 mg/dL 8.5-10.1         

                [facility] 

([facility])                   

 

                                        NOTE:

RESULT VERIFIED.





 

 

                                        Glomerular filtration rate/1.73 sq M pre

dicted among non-blacks [Volume 

Rate/Area] in Serum or Plasma by Creatinine-based formula (MDRD) Laboratory test

result                                              [facility]  

 

                                        Glomerular filtration rate/1.73 sq M pre

dicted among blacks [Volume Rate/Area] 

in Serum or Plasma by Creatinine-based formula (MDRD) Laboratory test result    

                  

                                        [facility]  

 

                                        <content>CHRONIC KIDNEY DISEASE STAGING 

PER 

NKF</content><br/><content></content><br/><content>STAGE I & II      GFR >= 60  
     NORMAL TO MILDLY DECREASED</content><br/><content>STAGE III          GFR 
30-59          MODERATELY DECREASED</content><br/><content>STAGE IV           
GFR 15-29         SEVERELY DECREASED</content><br/><content>STAGE V            
GFR <15            VERY LITTLE GFR LEFT</content><br/><content>ESRD             
   GFR <15            ON RRT</content><br/><content></content> 









                    ID                  Date                Data Source

 

                    [id number]          10/29/2020 11:25:00 AM EDT [facility] ([facility])









          Name      Value     Range     Interpretation Code Description Data Milly

rce(s) Supporting 

Document(s)

 

          Magnesium 2.1 mg/dL 1.8-2.4                       [facility] ([facility])  









                    ID                  Date                Data Source

 

                    [id number]          10/29/2020 11:25:00 AM EDT [facility] ([facility])









          Name      Value     Range     Interpretation Code Description Data Milly

rce(s) Supporting 

Document(s)

 

           Erythrocytes [#/volume] in Blood by Automated count 4.36 x10*6/UL 4.2

0-6.30                        

[facility]            

 

           Leukocytes [#/volume] in Blood by Automated count 6.2 x10*3/UL 4.1-10

.9                         

[facility] ([facility])            

 

           Hemoglobin [Mass/volume] in Blood 13.8 g/dL  12.0-18.0               

         [facility] ([facility])                              

 

           Hematocrit [Volume Fraction] of Blood by Automated count 40.7 %     3

7.0-51.0                        

[facility] ([facility])            

 

          MCH       31.6 pg   26.0-32.0                     [facility] ([place] [facility]

[facility])  

 

          MCV       93.2 fL   80.0-97.0                     [facility] ([facility])  

 

          MCHC      34.0 g/dL 31.0-38.0                     [facility] ([facility])  

 

           Platelets [#/volume] in Blood by Automated count 236 x10*3/-440

                          [facility]

 ([facility])                  

 

           Erythrocyte distribution width [Ratio] by Automated count 14.5 %     

11.6-13.7                        

[facility] ([facility])            

 

          Lymph %   19.8 %    10.0-58.5                     [facility] ([place] [facility]

[facility])  

 

          MPV       7.8 FL    7.8-11.0                      [facility] ([facility]

[facility])  

 

          Mid %     5.7 %     1.7-9.3                       [facility] ([facility]

[facility])  

 

          Neut %    74.5 %    37.0-92.0                     [facility] ([facility]

[facility])  

 

          Lymph #   1.2 x10*3/UL 0.6-4.1                       [facility] ([facility])  

 

          Neut #    4.6 x10*3/UL 2.0-7.8                       [facility] ([facility])  

 

          Mid #     0.4 x10*3/UL 0.1-0.6                       [facility] ([facility])  









                    ID                  Date                Data Source

 

                    [id number]            10/29/2020 11:25:00 AM EDT [facility] ([facility]

[facility])









          Name      Value     Range     Interpretation Code Description Data Milly

rce(s) Supporting 

Document(s)

 

           Leukocytes [#/volume] in Blood by Automated count 6.2 x10*3/UL 4.1-10

.9                         

[facility] ([facility])    

 

           Hematocrit [Volume Fraction] of Blood by Automated count 40.7 %     3

7.0-51.0                        

[facility] ([facility])    

 

           Erythrocytes [#/volume] in Blood by Automated count 4.36 x10*6/UL 4.2

0-6.30                        

[facility] ([facility])    

 

           Hemoglobin [Mass/volume] in Blood 13.8 g/dL  12.0-18.0               

         [facility] ([facility]                       

 

          MCHC      34.0 g/dL 31.0-38.0                     [facility] ([facility]  

 

          MCV       93.2 fL   80.0-97.0                     [facility] ([facility]  

 

          MCH       31.6 pg   26.0-32.0                     [facility] ([facility])  

 

           Erythrocyte distribution width [Ratio] by Automated count 14.5 %     

11.6-13.7                        

[facility] ([facility])    

 

           Platelets [#/volume] in Blood by Automated count 236 x10*3/-440

                          [facility]

 ([facility])          

 

             Platelet mean volume [Entitic volume] in Blood by Archie 7.8 FL

       7.8-11.0                   

                          [facility] ([facility])  

 

          Neut %    74.5 %    37.0-92.0                     [facility] ([facility])  

 

          Mid %     5.7 %     1.7-9.3                       [facility] ([facility]

[facility])  

 

           Lymphocytes/100 leukocytes in Blood by Automated count 19.8 %     10.

0-58.5                        

[facility] ([facility])    

 

           Neutrophils [#/volume] in Semen by Manual count 4.6 x10*3/UL 2.0-7.8 

                         [facility] 

([facility])           

 

          Mid #     0.4 x10*3/UL 0.1-0.6                       [facility]

[facility])  

 

          Lymph #   1.2 x10*3/UL 0.6-4.1                       [facility] (Cardiolog

[facility])  









                    ID                  Date                Data Source

 

                    [id number]          10/29/2020 11:24:00 AM EDT [facility] ([facility])









          Name      Value     Range     Interpretation Code Description Data Milly

rce(s) Supporting 

Document(s)

 

           Thyroxine (T4) free [Mass/volume] in Serum or Plasma 1.33 ng/dL 0.76-

1.46                        

[facility]            









                    ID                  Date                Data Source

 

                    [id number]          10/26/2020 11:44:00 AM EDT [facility] ([facility])









          Name      Value     Range     Interpretation Code Description Data Milly

rce(s) Supporting 

Document(s)

 

           INR in Platelet poor plasma by Coagulation assay 1.2                 

                        [facility] ([facility])                              









                    ID                  Date                Data Source

 

                    [id number]          10/22/2020 12:43:00 PM EDT [facility] ([facility])









          Name      Value     Range     Interpretation Code Description Data Milly

rce(s) Supporting 

Document(s)

 

           INR in Platelet poor plasma by Coagulation assay 7.2                 

                        [facility] ([facility])                              









                    ID                  Date                Data Source

 

                    [id number]          10/22/2020 11:33:00 AM EDT [facility] ([facility])









          Name      Value     Range     Interpretation Code Description Data Milly

rce(s) Supporting 

Document(s)

 

          Prothrombin Time 49.4 s    12.5-14.3                     [facility] ([place]

[facility]  

 

          Inr       5.24                Above upper panic limits           [facility]

[facility] ([facility]  

 

                                        THERAPUTIC HUMAN INR VALUES

INDICATIONS                      NORMAL RANGES

PROPHYLAXIS/TREATMENT OF:

VENOUS THROMBOSIS                2.0-3.0

PULMONARY EMBOLISM               2.0-3.0

PREVENTION OF SYSTEMIC EMBOLISM FROM:

TISSUE HEART VALVES              2.0-3.0

ACUTE MYOCARDIAL INFARCTION      2.0-3.0

VALVULAR HEART DISEASE           2.0-3.0

ATRIAL FIBRILLATION              2.0-3.0

MECHANICAL VALVES(HIGH RISK)     2.5-3.5

RECURRENT MYOCARDIAL INFARCTION  2.5-3.5

 









                    ID                  Date                Data Source

 

                    [id number]          10/22/2020 11:33:00 AM EDT [facility] ([facility])









          Name      Value     Range     Interpretation Code Description Data Milly

rce(s) Supporting 

Document(s)

 

           Erythrocytes [#/volume] in Blood by Automated count 4.37 x10*6/UL 4.2

0-6.30                        

[facility] ([facility])            

 

           Leukocytes [#/volume] in Blood by Automated count 6.6 x10*3/UL 4.1-10

.9                         

[facility] ([facility])            

 

           Hemoglobin [Mass/volume] in Blood 13.7 g/dL  12.0-18.0               

         [facility] ([facility])                              

 

           Hematocrit [Volume Fraction] of Blood by Automated count 40.2 %     3

7.0-51.0                        

[facility] ([facility])            

 

          MCV       91.9 fL   80.0-97.0                     [facility] ([facility]

[facility])  

 

          MCH       31.4 pg   26.0-32.0                     [facility] ([facility]

[facility])  

 

          MCHC      34.2 g/dL 31.0-38.0                     [facility] ([facility])  

 

           Erythrocyte distribution width [Ratio] by Automated count 13.7 %     

11.6-13.7                        

[facility] ([facility])            

 

           Platelets [#/volume] in Blood by Automated count 251 x10*3/-440

                          [facility]

 ([facility])                  

 

          MPV       8.3 FL    7.8-11.0                      [facility] ([facility]

[facility])  

 

          Lymph %   16.8 %    10.0-58.5                     [facility] ([facility]

[facility])  

 

          Mid %     4.7 %     1.7-9.3                       [facility] ([facility]

[facility])  

 

          Lymph #   1.1 x10*3/UL 0.6-4.1                       [facility] ([facility])  

 

          Neut %    78.5 %    37.0-92.0                     [facility] ([facility]

[facility])  

 

          Mid #     0.3 x10*3/UL 0.1-0.6                       [facility] ([facility])  

 

          Neut #    5.2 x10*3/UL 2.0-7.8                       [facility] ([facility])  









                    ID                  Date                Data Source

 

                    [id number]          10/15/2020 12:07:00 PM EDT [facility] ([facility])









          Name      Value     Range     Interpretation Code Description Data Milly

rce(s) Supporting 

Document(s)

 

           INR in Platelet poor plasma by Coagulation assay 1.3                 

                        [facility] ([facility])                              









                    ID                  Date                Data Source

 

                    [id number]          2020 12:44:00 PM EDT [facility] ([facility])









          Name      Value     Range     Interpretation Code Description Data Milly

rce(s) Supporting 

Document(s)

 

           INR in Platelet poor plasma by Coagulation assay 3.7                 

                        [facility] ([facility])                              









                    ID                  Date                Data Source

 

                    [id number]          2020 03:27:00 PM EDT [facility] ([facility])









          Name      Value     Range     Interpretation Code Description Data Milly

rce(s) Supporting 

Document(s)

 

           INR in Platelet poor plasma by Coagulation assay 1.3                 

                        [facility] ([facility])                              







                                        Procedure

 

                                          



                                                                                
                                                                                
                                                                                
                                                                                
                                                                                
                                                                                
                                                                                
                                                                                
                                                                                
                                                                                
                                                



Social History

          



           Code       Duration   Value      Status     Description Data Source(s

)

 

           Smoking    10/21/2021 12:00:00 AM EDT Current Smoker completed  Curre

nt Smoker [id number] 

([facility])



                                                                                
                  



Vital Signs

          



                    ID                  Date                Data Source

 

                    UNK                                      









           Name       Value      Range      Interpretation Code Description Data

 Source(s)

 

           Systolic blood pressure 122 mm[Hg]                       122 mm[Hg] M

[facility] ([facility])

 

           Diastolic blood pressure 76 mm[Hg]                        76 mm[Hg]  

[facility] ([facility])

 

           Heart rate 86 /min                          86 /min    [facility] ([facility])

 

           Body height 66 [in_i]                        66 [in_i]  [facility] ([place]

[facility])

 

                                        5'6" 

 

           Body weight 223.00 [lb_av]                       223.00 [lb_av] [facility]

T ([facility])

 

           Body mass index (BMI) [Ratio] 36.0 kg/m2                       36.0 k

g/m2 [facility] ([facility])

 

           Diastolic blood pressure 68 mm[Hg]                        68 mm[Hg]  

[facility] ([facility])

 

           Heart rate 86 /min                          86 /min    [facility] ([facility])

 

           Systolic blood pressure 124 mm[Hg]                       124 mm[Hg] M

[facility] ([facility])

 

           Body height 66 [in_i]                        66 [in_i]  [facility] ([facility]

 

                                        5'6" 

 

           Body weight 217.00 [lb_av]                       217.00 [lb_av] [facility]

T ([facility])

 

           Body mass index (BMI) [Ratio] 35.0 kg/m2                       35.0 k

g/m2 [facility] ([facility])

 

           Systolic blood pressure 114 mm[Hg]                       114 mm[Hg] [facility] ([facility])

 

           Diastolic blood pressure 76 mm[Hg]                        76 mm[Hg]  

[facility] ([facility])

 

           Body height 66 [in_i]                        66 [in_i]  [facility] ([facility])

 

                                        5'6" 

 

           Body weight 221.50 [lb_av]                       221[facility]50 [facility]

 

           Body mass index (BMI) [Ratio] 35.7 kg/m2                       35.7 k

g/m2 [facility] ([facility])

 

           Ideal body weight 130 [lb_av]                       130 [lb_av] [facility]

 ([facility])

 

           Body weight 100.472 kg                       100.472 kg [facility] ([facility])

 

           Body surface area Derived from formula 2.09 m2                       

   2.09 m2    [facility] ([facility])

 

           Systolic blood pressure 136 mm[Hg]                       136 mm[facility]Hg] [facility]

 

                                        RT Arm 

 

           Diastolic blood pressure 88 mm[Hg]                        88 mm[Hg]  

[facility] ([facility])

 

                                        RT Arm 

 

           Heart rate 120 /min                         120 /min   [facility] ([facility])

 

           Body height 66 [in_i]                        66 [in_i]  [facility]

 

                                        5'6" 

 

           Body weight 223.50 [lb_av]                       223.50 [lb_av] [facility]

T ([facility])

 

           Body mass index (BMI) [Ratio] 36.1 kg/m2                       36.1 k

g/m2 [facility]

 

           Body height 66 [in_i]                        66 [in_i]  [facility] ([facility])

 

                                        5'6" 

 

           Ideal body weight 130 [lb_av]                       130 [lb_av] [facility]

T ([facility])

 

           Body weight 102.060 kg                       102.060 kg [facility] ([facility])

 

           Body mass index (BMI) [Ratio] 36.3 kg/m2                       36.3 k

g/m2 [facility] ([facility])

 

           Body surface area Derived from formula 2.10 m2                       

   2.10 m2    [facility] ([facility])

 

           Diastolic blood pressure 77 mm[Hg]                        77 mm[Hg]  

[facility] ([facility])

 

           Body weight 225.00 [lb_av]                       225.00 [lb_av] [facility]

T ([facility])

 

           Systolic blood pressure 145 mm[Hg]                       145 mm[Hg] M

[facility] ([facility])

 

           Systolic blood pressure 148 mm[Hg]                       148 mm[Hg] M

[facility] ([facility])

 

                                        RT Arm 

 

           Diastolic blood pressure 72 mm[Hg]                        72 mm[Hg]  

[facility] ([facility])

 

                                        RT Arm 

 

           Systolic blood pressure 140 mm[Hg]                       140 mm[Hg] M

[facility] ([facility])

 

           Diastolic blood pressure 70 mm[Hg]                        70 mm[Hg]  

[facility] ([facility])

 

           Heart rate 80 /min                          80 /min    [facility] ([facility])

 

           Body height 66 [in_i]                        66 [in_i]  [facility]

[facility]

 

                                        5'6" 

 

           Body weight 219.00 [lb_av]                       219.00 [lb_av] [facility]

T ([facility])

 

           Body mass index (BMI) [Ratio] 35.3 kg/m2                       35.3 k

g/m2 [facility] ([facility])

 

           Body weight 190.00 [lb_av]                       190.00 [lb_av] [facility]

T ([facility] 

)

 

           Body mass index (BMI) [Ratio] 30.7 kg/m2                       30.7 k

g/m2 [facility]

 

           Body height 66 [in_i]                        66 [in_i]  [facility] ([facility] )

 

                                        5'6" 

 

           Body mass index (BMI) [Ratio] 35.7 kg/m2                       35.7 k

g/m2 [facility] ([facility] )

 

           Body temperature 96.1 [degF]                       96.1 [degF] [facility]

 ([facility] 

)

 

           Body height 64.5 [in_i]                       64.5 [in_i] [facility] ([facility] )

 

                                        5'4.50" 

 

           Body weight 211.50 [lb_av]                       211.50 [lb_av] [facility]

T ([facility] 

)

 

           Systolic blood pressure 142 mm[Hg]                       142 mm[Hg] M

[facility] ([facility])

 

           Body weight 222.00 [lb_av]                       222.00 [lb_av] [facility]

T ([facility])

 

           Systolic blood pressure 154 mm[Hg]                       154 mm[Hg] M

[facility] ([facility])

 

                                        RT Arm 

 

           Diastolic blood pressure 78 mm[Hg]                        78 mm[Hg]  

[facility] ([facility])

 

                                        RT Arm 

 

           Diastolic blood pressure 80 mm[Hg]                        80 mm[Hg]  

[facility] ([facility])

 

           Heart rate 96 /min                          96 /min    [facility] ([facility])

 

           Body height 66 [in_i]                        66 [in_i]  [facility]

[facility]

 

                                        5'6" 

 

           Body mass index (BMI) [Ratio] 35.8 kg/m2                       35.8 k

g/m2 [facility] ([facility])

 

           Systolic blood pressure 164 mm[Hg]                       164 mm[facility]Hg] 

[facility]

 

           Diastolic blood pressure 90 mm[Hg]                        90 mm[Hg]  

[facility] ([facility], )

 

           Body height 66 [in_i]                        66 [in_i]  [facility] ([facility], )

 

                                        5'6" 

 

           Body weight 225.00 [lb_av]                       225.00 [lb_av] [facility]

T ([facility], )

 

           Body mass index (BMI) [Ratio] 36.3 kg/m2                       36.3 k

g/m2 [facility] ([facility], )

 

           Ideal body weight 130 [lb_av]                       130 [lb_av] [facility]

T ([facility], )

 

           Body weight 102.060 kg                       102.060 kg [facility] ([facility])

 

           Body surface area Derived from formula 2.10 m2                       

   2.10 m2    [facility] ([facility])

 

           Body weight 220.00 [lb_av]                       220.00 [lb_av] [facility]

T ([facility])

 

           Body height 65 [in_i]                        65 [in_i]  [facility] ([facility])

 

                                        5'5" 

 

           Body mass index (BMI) [Ratio] 36.6 kg/m2                       36.6 k

g/m2 [facility] ([facility])

 

           Heart rate 65 /min                          65 /min    [facility] ([facility])

 

           Systolic blood pressure--sitting 136 mm[Hg]                       136

 mm[Hg] [facility] ([facility])

 

                                        Ra, large cuff 

 

           Diastolic blood pressure--sitting 84 mm[Hg]                        84

 mm[Hg]  [facility] ([facility])

 

                                        Ra, large cuff 

 

           Body mass index (BMI) [Ratio] 35.7 kg/m2                       35.7 k

g/m2 [facility] ([facility])

 

           Body height 66 [in_i]                        66 [in_i]  [facility] ([facility])

 

                                        5'6" 

 

           Body weight 221.00 [lb_av]                       221.00 [lb_av] [facility]

T ([facility])

 

           Diastolic blood pressure 70 mm[Hg]                        70 mm[Hg]  

[facility] ([facility])

 

                                        RT Arm 

 

           Heart rate 60 /min                          60 /min    [facility] ([facility])

 

           Body height 66 [in_i]                        66 [in_i]  [facility] ([place]

[place] [facility]

 

                                        5'6" 

 

           Body mass index (BMI) [Ratio] 35.7 kg/m2                       35.7 k

g/m2 [facility] ([facility])

 

           Systolic blood pressure 148 mm[Hg]                       148 mm[Hg] M

[facility] ([facility])

 

                                        RT Arm 

 

           Body weight 221.00 [lb_av]                       221.00 [lb_av] [facility]

T ([facility])

 

           Body weight 226.00 [lb_av]                       226.00 [lb_av] [facility]

T ([facility])

 

           Body mass index (BMI) [Ratio] 36.5 kg/m2                       36.5 k

g/m2 [facility] ([facility])

 

           Body weight 226.00 [lb_av]                       226.00 [lb_av] [facility] ([facility])

 

           Systolic blood pressure 138 mm[Hg]                       138 mm[Hg] M

ENT ([facility])

 

           Diastolic blood pressure 90 mm[Hg]                        90 mm[Hg]  

[facility] ([facility])

 

           Body height 66 [in_i]                        66 [in_i]  [facility] ([facility]

 

                                        5'6" 

 

           Body weight 217.00 [lb_av]                       217.00 [lb_av] [facility]

T ([facility])

 

           Body weight 220.00 [lb_av]                       220.00 [lb_av] [facility]

T ([facility])

 

           Body weight 216.00 [lb_av]                       216.00 [lb_av] JO[facility]

T ([facility])

## 2021-10-26 NOTE — ECGEPIP
[facility] - ED

                                       

                                       Test Date:    2021-10-26

Pat Name:     JOSEPH BENTLEY            Department:   

Patient ID:   [id number]                 Room:         -

Gender:       Female                   Technician:   TYE

:          1955               Requested By: TA JOHNSTON

Order Number: [id number]     Reading MD:   Maja Lundborg-Gray

                                 Measurements

Intervals                              Axis          

Rate:         99                       P:            

UT:                                    QRS:          96

QRSD:         162                      T:            -57

QT:           412                                    

QTc:          528                                    

                           Interpretive Statements

Atrial fibrillation

Right bundle branch block

T wave abnormality, consider inferior ischemia

 

 

cw 10/25/21 rate increased

Nonspecific ST T wave changes

Electronically Signed on 10- 16:05:12 EDT by Maja Lundborg-Gray

## 2021-10-26 NOTE — CCD
Continuity of Care Document (CCD)

                             Created on: 10/25/2021



Juany Becerril

External Reference #: [id number]

: 1955

Sex: Female



Demographics





                          Address                   [address]

[place]  [address]

 

                          Home Phone                [phone]

 

                          Preferred Language        Unknown

 

                          Marital Status            Unknown

 

                          Anabaptist Affiliation     Unknown

 

                          Race                      White

 

                          Ethnic Group              Not  or 





Author





                          Author                    Juany Regalado M.D.

 

                          Organization              Unknown

 

                          Address                   [address]

[place]  [address]



 

                          Phone                     [phone]







Care Team Providers





                    Care Team Member Name Role                Phone

 

                    Yuri Maier MD       AUTM                [phone]

 

                    [facility]  AUTM                [phone]

 

                    John Lutz MD AUTM                Unavailable

 

                    [facility] Hematology/Oncology AUTM                [phone]

 

                    Hansa Hernandez FNP  AUTM                [phone]







Problems





                    Active Problems     Provider            Date

 

                    Chronic atrial fibrillation Protime             Onset: 

 

                    Essential hypertension Juany Vincent FNP Onset: 2017

 

                    Deep venous thrombosis of lower extremity Juany Vincent FN P Onset: 2017

 

                    Pure hypercholesterolemia Juany Vincent FNP Onset: 

017

 

                    Anxiety state       Juany Vincent FNP Onset: 2017

 

                    Chronic pulmonary embolism Juany Vincent FNP Onset: 2017

 

                    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 

2017

 

                    Gastroesophageal reflux disease Juany Vincent FNP Onset: 2017

 

                    Scoliosis deformity of spine Juany Vincent FNP Onset: 2017

 

                    Chronic tension-type headache Juany Vincent FNP Onset: 2017

 

                    Varicose veins of lower extremity Juany Vincent FNP Onset:

 2017

 

                    Tobacco user        Juany Vincent FNP Onset: 2017

 

                    Hypercalcemia       Juany Vincent FNP Onset: 2017







Social History





                Type            Date            Description     Comments

 

                Birth Sex                       Unknown          

 

                ETOH Use                        Consumes 3 glasses of wine per w

[place]  

 

                Tobacco Use     Start: Unknown  Patient is a current smoker, smo

kes every day STARTED

AGE 30 1-3 CIGARETTES A DAY 







Allergies and adverse reactions





             Active Allergies Criticality  Reaction | Severity Comments     Date

 

             Ibuprofen    Unable to assess criticality STOMACH UPSET HEADACHE mo

david       10/17/2017

 

             Latex        Unable to assess criticality rash, itches | Mild      

        10/17/2018







Medications





           Active Medications SIG        Qnty       Indications Ordering Provide

r Date

 

                          Cephalexin                     500mg Capsules         

          take one tablet 

by mouth 3 times daily x 7 days 21caps                          Julio kothari JR, PA 10/06/2021

 

                          Excedrin Migraine                     [id number] Tab

lets                   1 

as needed h/a as needed mdd=1                                 Charmaine Regalado M.D. 2021

 

                          Famotidine                     20mg Tablets           

        1 by mouth every 

day             90tabs                          Charmaine Regalado M.D. 20

21

 

                          Furosemide                     20mg Tablets           

        1 by mouth twice a

day             180tabs                         Charmaine Regalado M.D. 20

21

 

                                        Bupropion Hydrochloride ER (XL)         

            150mg Tablets ER 24HR       

                take 1 tablet by mouth in the morning 90tabs                    

      Charmaine Regalado M.D.                                    2021

 

                          Eliquis                     5mg Tablets               

    take one tablet by 

mouth twice a day 180tabs                         Charmaine Regalado M.D. 2020

 

                          Methimazole                     5mg Tablets           

        2 every day by 

mouth           180tabs                         Charmaine Regalado M.D. 20

20

 

                          Shingrix                     50mcg/0.5ML Suspension Re

c                   

administer at pharmacy 1units                          Juany Vincent FNP 

 

                          Voltaren                     1% Gel                   

apply up to 4 grams three 

times a day to affected knee as needed 100gm                           Charmaine Regalado M.D. 

2018

 

                          Aspirin Adult Low Dose                     81mg Tablet

s DR                   1 

by mouth every day                                 Juany Vincent FNP 10/17/201

7

 

                                        Acetaminophen Extra Strength            

         500mg Tablets                  

             take 2 tabs every 6 hours as needed                           Juany Fung FNP 10/17/2017

 

                                        Womens 50+ Multi Vitamin & Mineral Formu

la                      Tablets         

             1 every day PO Vit D=2246Bl UB=661                           Juany Vincent FNP 10/17/2017

 

                          Metoprolol Tartrate                     25mg Tablets  

                 Take One 

Tablet By Mouth Twice A Day 180tabs                         ABELARDO Saleem 10/17/2017

 

                          Atorvastatin Calcium                     20mg Tablets 

                  take one

tablet by mouth every day 90tabs                          JANE Saleem 10/17/2017

 

                          Flecainide Acetate                     100mg Tablets  

                 take one 

tablet by mouth twice a day 180tabs                         ABELARDO Saleem 10/17/2017

 

                          Digoxin                     250mcg Tablets            

       1 by mouth every 

day             90tabs                          Charmaine Regalado M.D. 







Medications Administered in Office





           Medication SIG        Qnty       Indications Ordering Provider Date

 

                          Administration Of Flu Vaccine                      Inj

ection                    

                                                AIXA Hoffmann JR 10/06/2

021

 

                          Administration Of Flu Vaccine                      Inj

diyaion                    

                                                Charmaine Regalado M.D. 10/15/20

20

 

                          Administration Of Flu Vaccine                      Inj

ection                    

                                                Juany Vincent FNP 10/17/2019

 

                          Administration Of Flu Vaccine                      Inj

ection                    

                                                Juany Vincent FNP 10/17/2018

 

                          Administration Of Flu Vaccine                      Inj

Juany Alonso FNP 10/17/2017







Immunizations





             CPT Code     Status       Date         Vaccine      Lot #

 

                [id number]           Given           10/06/2021      Influenza Vaccin

e Quadrivalent Preser/Antibiotic Free Im 

Use                                     169502

 

                [id number]           Given           10/15/2020      Influenza Vaccin

e Quadrivalent Preser/Antibiotic Free Im 

Use                                     [id number]

 

                10229           Given           2020      Shingrix Zoster 

Vaccine (HZV), Recombinant, Subunit, 

Adjuvanted                               

 

                33296           Given           10/17/2019      Influenza Vaccin

e Quadrivalent Preser/Antibiotic Free Im 

Use                                     209274

 

                32889           Given           10/17/2018      Influenza Virus 

Vaccine, Quadrivalent (Cciiv4), Derived 

From Cell                               [id number]

 

             88662        Given        2018   Pneumovax 23 [id number]

 

                05635           Given           10/17/2017      Influenza Vaccin

e Quadrivalent Preser/Antibiotic Free Im 

Use                                     [id number]







Vital Signs





                Date            Vital           Result          Comment

 

                10/19/2021  3:12pm BP Systolic     122 mmHg         

 

                    BP Diastolic        76 mmHg              

 

                    Heart Rate          86 /min              

 

                    Height              66 inches           5'6"

 

                    Weight              223.00 lb            

 

                    BMI (Body Mass Index) 36.0 kg/m2           

 

                10/06/2021  2:17pm BP Systolic     124 mmHg         

 

                    BP Diastolic        68 mmHg              

 

                    Heart Rate          86 /min              

 

                    Height              66 inches           5'6"

 

                    Weight              217.00 lb            

 

                    BMI (Body Mass Index) 35.0 kg/m2           







Results





        Test    Acquired Date Facility Test    Result  H/L     Range   Note

 

                    Influenza A/B RSV Covid Amp 10/25/2021          [address] [address] [phone] Influenza A Amplification NEGATIVE   Normal     Negati

ve   1

 

             Influenza B Amplification NEGATIVE     Normal       Negative     2

 

             RSV Amplification NEGATIVE     Normal       Negative     3

 

             Sars Covid-19 Amplification NEGATIVE     Normal       Negative     

4

 

                    CBC With Differential 10/25/2021          [address] [address] [phone] White Blood Count 6.3 10     Normal     4.0-10.0    

 

             Red Blood Count 4.12 10      Normal       4.00-5.40     

 

             Hemoglobin   12.9 g/dL    Normal       12.0-15.5     

 

             Hematocrit   41.3 %       Normal       36.0-47.0     

 

             Mean Corpuscular Volume 100.2 fl     High         80.0-96.0     

 

             Mean Corpuscular Hemoglobin 31.3 pg      Normal       27.0-33.0    

 

 

             Mean Corpuscular HGB Conc 31.2 g/dL    Low          32.0-36.5     

 

             Red Cell Distribution Width 14.8 %       High         11.5-14.5    

 

 

             Platelet Count, Automated 202 10       Normal       150-450       

 

             Neutrophils % 79.5 %       High         36.0-66.0     

 

             Lymph %      13.8 %       Low          24.0-44.0     

 

             Mono %       4.8 %        Normal       2.0-8.0       

 

             Eos %        1.1 %        Normal       0.0-3.0       

 

             Baso %       0.3 %        Normal       0.0-1.0       

 

             Immature Granulocyte % 0.5 %        Normal       0-3.0         

 

             Nucleated Red Blood Cell % 0.0 %        Normal       0-0           

 

             Neutrophils # 5.0 10       Normal       1.5-8.5       

 

             Lymph #      0.9 10       Low          1.5-5.0       

 

             Mono #       0.3 10       Normal       0.0-0.8       

 

             Eos #        0.1 10       Normal       0.0-0.5       

 

             Baso #       0.0 10       Normal       0.0-0.2       

 

                    Cardiac Marker Panel 10/25/2021          [facility]

enter

                                        [address] [place] [address] [phone] CPK Creatine Phosphokinase 26 U/L     Normal     26-19

2      

 

             CK-MB Value Mass 1.0 NG/ML    Normal       <3.6          

 

             MB/CK Relative Index 3.85         Normal       < Or =4      5

 

             Troponin I   0.02 NG/ML   Normal       < 0.10       6

 

                    Liver Profile       10/25/2021          [facility]

nter

                                        [address] [place] [address] [phone] Ast/Sgot   26 U/L     Normal     7-37        

 

             Alt/SGPT     28 U/L       Normal       12-78         

 

             Alkaline Phosphatase 105 U/L      Normal               

 

             Bilirubin,Total 0.8 mg/dL    Normal       0.2-1.0       

 

             Bilirubin,Direct 0.4 mg/dL    High         0.0-0.2       

 

             Total Protein 7.9 GM/DL    Normal       6.4-8.2       

 

             Albumin      2.8 GM/DL    Low          3.2-5.2       

 

             Albumin/Globulin Ratio 0.5          Low          1.2-2.2       

 

                    Basic Metabolic Profile 10/25/2021          [facility]

                                        [address] [place] [address] [phone] Glucose, Fasting 106 mg/dL  High             

 

             Blood Urea Nitrogen 18 mg/dL     Normal       7-18          

 

             Creatinine For GFR 0.94 mg/dL   Normal       0.55-1.30     

 

             Glomerular Filtration Rate > 60.0       Normal       >45          7

 

             Sodium Level 140 mEq/L    Normal       136-145       

 

             Potassium Serum 4.6 mEq/L    Normal       3.5-5.1       

 

             Chloride Level 111 mEq/L    High                 

 

             Carbon Dioxide Level 22 mEq/L     Normal       21-32         

 

             Anion Gap    7 mEq/L      Low          8-16          

 

             Calcium Level 10.7 mg/dL   High         8.8-10.2      

 

                    Laboratory test finding 10/25/2021          [facility]

                                        [address] [place] [address] [phone] NT-Pro BNP 5817 pg/mL High       <125       8

 

             Digoxin Level 0.5 NG/ML    Normal       0.5-2.0      9

 

             Thyroxine (T4) 10.1 g/dL  Normal       4.5-12.0     10

 

             Thyroid Stimulating Hormone 0.051 uIU/ML Low          0.358-3.740  

11

 

                    CBC With Differential 2021          [facility]

                                        [address] [place] [address] [phone] White Blood Count 6.8 10     Normal     4.0-10.0    

 

             Red Blood Count 3.85 10      Low          4.00-5.40     

 

             Hemoglobin   12.7 g/dL    Normal       12.0-15.5     

 

             Hematocrit   39.4 %       Normal       36.0-47.0     

 

             Mean Corpuscular Volume 102.3 fl     High         80.0-96.0     

 

             Mean Corpuscular Hemoglobin 33.0 pg      Normal       27.0-33.0    

 

 

             Mean Corpuscular HGB Conc 32.2 g/dL    Normal       32.0-36.5     

 

             Red Cell Distribution Width 12.3 %       Normal       11.5-14.5    

 

 

             Platelet Count, Automated 187 10       Normal       150-450       

 

             Neutrophils % 72.2 %       High         36.0-66.0     

 

             Lymph %      19.9 %       Low          24.0-44.0     

 

             Mono %       6.0 %        Normal       2.0-8.0       

 

             Eos %        1.5 %        Normal       0.0-3.0       

 

             Baso %       0.3 %        Normal       0.0-1.0       

 

             Immature Granulocyte % 0.1 %        Normal       0-3.0         

 

             Nucleated Red Blood Cell % 0.0 %        Normal       0-0           

 

             Neutrophils # 4.9 10       Normal       1.5-8.5       

 

             Lymph #      1.4 10       Low          1.5-5.0       

 

             Mono #       0.4 10       Normal       0.0-0.8       

 

             Eos #        0.1 10       Normal       0.0-0.5       

 

             Baso #       0.0 10       Normal       0.0-0.2       

 

                    PT & Aptt           2021          [facility]

nter

                                        [address] [place] [address] [phone] Prothrombin Time 13.8 seconds Normal     12.5-14.3   

 

             Inr          1.04         Normal                    12

 

             Partial Thromboplastin Time 30.6 seconds Normal       24.2-38.5    

 

 

                    CBC With Differential 2021          [facility]

                                        [address] [place] [address] [phone] White Blood Count 6.2 10     Normal     4.0-10.0    

 

             Red Blood Count 3.76 10      Low          4.00-5.40     

 

             Hemoglobin   12.5 g/dL    Normal       12.0-15.5     

 

             Hematocrit   39.0 %       Normal       36.0-47.0     

 

             Mean Corpuscular Volume 103.7 fl     High         80.0-96.0     

 

             Mean Corpuscular Hemoglobin 33.2 pg      High         27.0-33.0    

 

 

             Mean Corpuscular HGB Conc 32.1 g/dL    Normal       32.0-36.5     

 

             Red Cell Distribution Width 12.9 %       Normal       11.5-14.5    

 

 

             Platelet Count, Automated 199 10       Normal       150-450       

 

             Neutrophils % 74.5 %       High         36.0-66.0     

 

             Lymph %      16.4 %       Low          24.0-44.0     

 

             Mono %       7.0 %        Normal       2.0-8.0       

 

             Eos %        1.1 %        Normal       0.0-3.0       

 

             Baso %       0.5 %        Normal       0.0-1.0       

 

             Immature Granulocyte % 0.5 %        Normal       0-3.0         

 

             Nucleated Red Blood Cell % 0.0 %        Normal       0-0           

 

             Neutrophils # 4.6 10       Normal       1.5-8.5       

 

             Lymph #      1.0 10       Low          1.5-5.0       

 

             Mono #       0.4 10       Normal       0.0-0.8       

 

             Eos #        0.1 10       Normal       0.0-0.5       

 

             Baso #       0.0 10       Normal       0.0-0.2       

 

                    Comprehensive Metabolic Profil 2021          [address] [address] [phone] Glucose, Fasting 98 mg/dL   Normal           

 

             Blood Urea Nitrogen 28 mg/dL     High         7-18          

 

             Creatinine For GFR 0.90 mg/dL   Normal       0.55-1.30     

 

             Glomerular Filtration Rate > 60.0       Normal       >45          1

3

 

             Sodium Level 137 mEq/L    Normal       136-145       

 

             Potassium Serum 4.7 mEq/L    Normal       3.5-5.1       

 

             Chloride Level 107 mEq/L    Normal               

 

             Carbon Dioxide Level 27 mEq/L     Normal       21-32         

 

             Anion Gap    3 mEq/L      Low          8-16          

 

             Calcium Level 10.2 mg/dL   Normal       8.8-10.2      

 

             Ast/Sgot     23 U/L       Normal       7-37          

 

             Alt/SGPT     19 U/L       Normal       12-78         

 

             Alkaline Phosphatase 77 U/L       Normal               

 

             Bilirubin,Total 0.5 mg/dL    Normal       0.2-1.0       

 

             Total Protein 8.3 GM/DL    High         6.4-8.2       

 

             Albumin      3.0 GM/DL    Low          3.2-5.2       

 

             Albumin/Globulin Ratio 0.6          Low          1.2-2.2       

 

                    Complete Blood Count 2021          [facility]

[facility], [facility]

: Dr Simon Hoyt

[place] [address] [phone] WBC        5.9 x10*3/UL            4.1 - 10.9  

 

             RBC          4.11 x10*6/UL Low          4.20 - 6.30   

 

             Hemoglobin   13.6 g/dL                 12.0 - 18.0   

 

             Hematocrit   40.0 %                    37.0 - 51.0   

 

             MCV          97.4 fL      High         80.0 - 97.0   

 

             MCH          33.1 pg      High         26.0 - 32.0   

 

             MCHC         34.0 g/dL                 31.0 - 38.0   

 

             RDW          13.2 %                    11.6 - 13.7   

 

             PLT          209 x10*3/UL              140 - 440     

 

             MPV          8.1 FL                    7.8 - 11.0    

 

             Lymph %      25.6 %                    10.0 - 58.5   

 

             Mid %        7.5 %                     1.7 - 9.3     

 

             Neut %       66.9 %                    37.0 - 92.0   

 

             Lymph #      1.5 x10*3/UL              0.6 - 4.1     

 

             Mid #        0.5 x10*3/UL              0.1 - 0.6     

 

             Neut #       3.9 x10*3/UL              2.0 - 7.8     

 

                    Laboratory test finding 2021          [facility]

[facility]

: Dr Simon Hoyt

[place] [address] [phone] Sed Rate   25 mm/hr   High       0 - 15      

 

                    Basic Metabolic Panel 2021          [facility]

[facility], [facility]

: Dr Simon Hoyt

[place] [address] [phone] Glucose    100 mg/dL  High       74 - 99    14

 

             BUN          18 mg/dL                  7 - 18        

 

             Creatinine   0.8 mg/dL                 0.6 - 1.3     

 

             Sodium       141 mEq/L                 136 - 145     

 

             Potassium    3.8 mEq/L                 3.5 - 5.1     

 

             Chloride     104 mEq/L                 98 - 107      

 

             Carbon Dioxide 29 mEq/L                  21 - 32       

 

             Calcium      10.8 mg/dL   High         8.5 - 10.1   15

 

             GFR  >= 60 mL/min              >60           

 

             GFR African American >= 60 mL/min              >60          16

 

                    Laboratory test finding 2021          [facility]

pia meza

: Dr Simon Arellanologg

[place] [address] [phone] Thyroid Stimulating Hormone 0.19 uIU/mL Low        0.3

6 - 3.74  







                          1                         Negative results do not prec

lude influenza or RSV virus

infection and should not be used as the sole basis for

treatment or other patient management decisions.



 

                          2                         Negative results do not prec

lude influenza or RSV virus

infection and should not be used as the sole basis for

treatment or other patient management decisions.



 

                          3                         Negative results do not prec

lude influenza or RSV virus

infection and should not be used as the sole basis for

treatment or other patient management decisions.



 

                          4                         A false negative result may 

occur if a specimen is

improperly collected, transported or handled. False negative

results may also occur if inadequate numbers of organisms

are present in the specimen.  As with any molecular test,

mutations within the target regions of [employer]

SARS-CoV-2 could affect primer and/or probe binding

resulting in failure to detect the presence of virus.  This

test cannot rule out diseases caused by other bacterial or

viral pathogens.



DISCLAIMER:

Testing was performed using the [employer] SARS-CoV-2 test.

This test was developed and its performance characteristics

determined by [facility]. This test has not been FDA cleared or

approved. This test has been authorized by FDA under an

Emergency Use Authorization (EUA). This test is only

authorized for the duration of time the declaration that

circumstances exist justifying the authorization of the

emergency use of in vitro diagnostic tests for detection of

SARS-CoV-2 virus and/or diagnosis of COVID-19 infection

under section 564(b)(1) of the Act, 21 U.S.C.

                                        360bbb-3(b)(1), unless the authorization

 is terminated or

revoked sooner.



 

                          5                         DIAGNOSIS CRITERIA

MMB ng/ml       Relative Index (RI)

NON-AMI               < or = 5               N/A

GRAY ZONE              > 5                < or = 4

AMI                    > 5                   > 4



 

                          6                         Troponin I Reference Interva

l for [employer] LOCI:



                                        99th Percentile= 0.00-0.045 ng/ml



Risk Stratification:

<= 0.10 ng/ml   Decreased Risk for Adverse Clinical

Events.

                                        0.10-1.50 ng/ml   Increased Risk for Adv

erse Clinical

Events. Evaluation of additional

criterion and/or repeat testing in 2-6

hours is suggested to rule out myocardial

damage.

>= 1.50 ng/ml   Indicative of Myocardial Injury.



 

                          7                         Units are mL/min/1.73 m2



Chronic Kidney Disease Staging per NKF:



Stage I & II   GFR >=60       Normal to Mildly Decreased

Stage III      GFR 30-59      Moderately Decreased

Stage IV       GFR 15-29      Severely Decreased

Stage V        GFR <15        Very Little GFR Left

ESRD           GFR <15 on RRT



 

                          8                         note:<nlbl:demographic_[facility]

ed>



 

                          9                         note:<nlbl:demographic_[facility]

ed>



 

                          10                        note:<nlbl:demographic_[facility]

ed>



 

                          11                        note:<nlbl:[facility]

ed>



 

                          12                        THERAPUTIC HUMAN INR VALUES

INDICATIONS                      NORMAL RANGES

PROPHYLAXIS/TREATMENT OF:

VENOUS THROMBOSIS                2.0-3.0

PULMONARY EMBOLISM               2.0-3.0

PREVENTION OF SYSTEMIC EMBOLISM FROM:

TISSUE HEART VALVES              2.0-3.0

ACUTE MYOCARDIAL INFARCTION      2.0-3.0

VALVULAR HEART DISEASE           2.0-3.0

ATRIAL FIBRILLATION              2.0-3.0

MECHANICAL VALVES(HIGH RISK)     2.5-3.5

RECURRENT MYOCARDIAL INFARCTION  2.5-3.5



 

                          13                        Units are mL/min/1.73 m2



Chronic Kidney Disease Staging per NKF:



Stage I & II   GFR >=60       Normal to Mildly Decreased

Stage III      GFR 30-59      Moderately Decreased

Stage IV       GFR 15-29      Severely Decreased

Stage V        GFR <15        Very Little GFR Left

ESRD           GFR <15 on RRT



 

                          14                        100-125 mg/dL     PRE-DIABET

ES/FASTING

>126 mg/dL          DIABETES/FASTING



 

                          15                        NOTE:

CALCIUM,TSH VERIFIED







 

                          16                        CHRONIC KIDNEY DISEASE STAGI

NG PER NKF



STAGE I & II      GFR >= 60        NORMAL TO MILDLY DECREASED

STAGE III          GFR 30-59          MODERATELY DECREASED

STAGE IV           GFR 15-29         SEVERELY DECREASED

STAGE V            GFR <15            VERY LITTLE GFR LEFT

ESRD                 GFR <15            ON RRT









Procedures





                Date            Code            Description     Status

 

                10/06/2021      83077           Office/Outpatient Established Lo

w MDM 20-29 Min Completed

 

                2021      35290           Complex Chronic Care MGMT Servic

e Ea Addl 30 Min Completed

 

                2021      96876           Complex Chronic Care Management 

SVC 1St 60 Min Completed

 

                    2021          50059               Chronic Care MGMT 20

 Mins Clinical Staff Time Per Calendar 

Month                                   Completed

 

                2021      86003           Chronic Care Management Services

 Ea Addl 20 Min Completed

 

                2021      17808           Complex Chronic Care MGMT Servic

e Ea Addl 30 Min Completed

 

                2021      11419           Complex Chronic Care Management 

SVC 1St 60 Min Completed

 

                2021      41001           Complex Chronic Care MGMT Servic

e Ea Addl 30 Min Completed

 

                2021      46584           Complex Chronic Care Management 

SVC 1St 60 Min Completed

 

                2021      56167           Office/Outpatient Established Mo

d MDM 30-39 Min Completed

 

                2021      22538           Complex Chronic Care MGMT Servic

e Ea Addl 30 Min Completed

 

                2021      90821           Complex Chronic Care Management 

SVC 1St 60 Min Completed

 

                2021      [id number]       Bone Mineral Density Test Comple

veronika

 

                2021      [id number]        Mammogram       Completed







Medical Devices





                                        Description

 

                                        No Information Available







Encounters





           Type       Date       Location   Provider   Dx         Diagnosis

 

                Office Visit    10/06/2021  2:20p [facility] Harkins [facility]                        S81.802A                  Unspecified open wound, left

 lower leg, initial encounter

 

                          Z23                       Encounter for immunization

 

                Office Visit    2021  1:30p [facility] Reg[facility] M.[facility]                      I48.20                    Chronic atrial fibrillation,

 unspecified

 

                          Z79.01                    Long term (current) use of a

nticoagulants

 

                          R60.9                     Edema, unspecified

 

                          I10                       Essential (primary) hyperten

darci

 

                          E05.90                    Thyrotoxicosis, unsp without

 thyrotoxic crisis or storm

 

                          I82.502                   Chronic embolism and thombos

 unsp deep veins of l low extrem

 

                          F17.210                   Nicotine dependence, cigaret

svitlana, uncomplicated

 

                          F32.89                    Other specified depressive e

pisodes

 

                          D47.2                     Monoclonal gammopathy

 

                          K21.9                     Gastro-esophageal reflux dis

ease without esophagitis

 

                          M19.90                    Unspecified osteoarthritis, 

unspecified site

 

                          E78.00                    Pure hypercholesterolemia, u

nspecified







Assessments





                Date            Code            Description     Provider

 

                10/06/2021      S81.802A        Unspecified open wound, left low

er leg, initial encounter 

AIXA Hoffmann JR

 

                10/06/2021      Z23             Encounter for immunization AIXA Joseph JR

 

                2021      I10             Essential (primary) hypertension

 Charmaine Regalado M.D.

 

                2021      K21.9           Gastro-esophageal reflux disease

 without esophagitis Charmaine Regalado M.D.

 

                2021      M15.9           Polyosteoarthritis, unspecified 

Charmaine Regalado M.D.

 

                2021      I10             Essential (primary) hypertension

 Charmaine Regalado M.D.

 

                2021      K21.9           Gastro-esophageal reflux disease

 without esophagitis Charmaine Regalado M.D.

 

                2021      E78.00          Pure hypercholesterolemia, unspe

cified Charmaine Regalado M.D.

 

                2021      I10             Essential (primary) hypertension

 Charmaine Regalado M.D.

 

                2021      K21.9           Gastro-esophageal reflux disease

 without esophagitis Charmaine Regalado M.D.

 

                2021      E78.00          Pure hypercholesterolemia, unspe

cisonia Regalado M.D.

 

                2021      M15.9           Polyosteoarthritis, unspecified 

Charmaine Regalado M.D.

 

                2021      I10             Essential (primary) hypertension

 Charmaine Regalado M.D.

 

                2021      K21.9           Gastro-esophageal reflux disease

 without esophagitis Charmaine Regalado M.D.

 

                    2021          I82.502             Chronic embolism and

 thrombosis of unspecified deep veins of 

left lower extremity                    Charmaine Regalado M.D.

 

                2021      I48.20          Chronic atrial fibrillation, uns

pecminoo Regalado M.D.

 

                2021      Z79.01          Long term (current) use of antic

oagulants Charmaine Regalado M.D.

 

                2021      R60.9           Edema, unspecified Charmaine guido M.D.

 

                2021      I10             Essential (primary) hypertension

 Charmaine Regalado M.D.

 

                2021      E05.90          Thyrotoxicosis, unspecified with

out thyrotoxic crisis or sto 

Charmaine Regalado M.D.

 

                    2021          I82.502             Chronic embolism and

 thrombosis of unspecified deep veins of 

left lower extremity                    Charmaine Regalado M.D.

 

                2021      F17.210         Nicotine dependence, cigarettes,

 uncomplicated Charmaine Regalado M.D.

 

                2021      F32.89          Other specified depressive episo

emil Charmaine Regalado M.D.

 

                2021      D47.2           Monoclonal gammopathy Charmaine duron M.D.

 

                2021      K21.9           Gastro-esophageal reflux disease

 without esophagitis Charmaine Regalado M.D.

 

                2021      M19.90          Unspecified osteoarthritis, unsp

ecified site Charmaine Regalado M.D.

 

                2021      E78.00          Pure hypercholesterolemia, unspe

cified Charmaine Regalado M.D.

 

                2021      I10             Essential (primary) hypertension

 Charmaine Regalado M.D.

 

                2021      K21.9           Gastro-esophageal reflux disease

 without esophagitis Charmaine Regalado M.D.

 

                2021      E78.00          Pure hypercholesterolemia, unspe

cified Charmaine Regalado M.D.







Plan of Treatment

Future Appointment(s):* 2021  3:15 pm - Charmaine Regalado M.D. at 
  [facility]





Functional Status





                                        Description

 

                                        No Information Available







Mental Status





                                        Description

 

                                        No Information Available







Referrals





                Refer to Dr     Reason for Referral Status          Appt Date

 

                          Stillerman,James MD       STAT CONSULT FOR OPEN WOUND 

LT LEG PLEASE LET OFFICE KNOW 

WHEN APPT IS MADE         Created                   

 

                                        [facility]

 

                                        [address] [place]  [address]

 

                                        [phone]

 

                                          

 

                Rock Werner MD CONSULT FOR PVD WITH OPEN WOUND LT LEG  Patient

 Declined 

10/20/2021

 

                                        [facility] [address] ,[address] [place] [id number]

 

                                        [phone]

## 2021-10-26 NOTE — REP
INDICATION:

CHEST PAIN.



COMPARISON:

10/25/2021 at 2:56 p.m.



TECHNIQUE:

Portable



FINDINGS:

The technique utilized in obtaining the radiograph has magnified the cardiac

silhouette and accentuated the interstitial markings.



There is global cardiomegaly accentuated by technique status quo.  The interstitial

markings have increased slightly compared to the prior exam no patchy opacities or

pleural effusions have developed.  There is no change in the osseous structures.





IMPRESSION:

Global cardiomegaly with mild interstitial edema suspected.





<Electronically signed by Urban [place] > 10/26/21 [id number]

## 2021-10-26 NOTE — HPEPDOC
[facility] Medical History & Physical


Date of Admission


Oct 26, 2021


Date of Service:  Oct 26, 2021


Attending Physician:  JAMES TRACEY MD





History and Physical


CHIEF COMPLAINT: SOB





HISTORY OF PRESENT ILLNESS:


66-year-old W with past medical history of hypertension, atrial fibrillation, 

GERD, history of pulmonary emboli and DVT on eliquis, who presented to [facility]

emergency dept with SOB and orthopnea for 3 days now. Of note, she was in the ED

yesterday and was discharged home and today she is even more short of breath. 

She otherwise denied john chest pain, palpitations, recent fevers, chills, 

cough, congestion, asymmetric swelling, bleeding in urine, stool or emesis, 

dizziness or recent LOC. In the ER, VS showed showed she was hypertensive to 

157/96, I suspect hypoxemic as she was placed on 2L NC, and afebrile. Workup 

revealed WBC 6, hgb 13, platelets 215, na 140, K 4.2, Cr 1.28, proBNP 8578, TSH 

0.03, LFTs wnl, while CXR showed potential interstitial edema while CTA chest 

was negative for opacities, effusions or PEs. She was given 40mg of IV lasix and

is recommended for admission for CHF exacerbation. Of note, she had a TTE 

recently that showed EF of 70% and grade 1 diastolic dysfunction. 





ROS: 


Negative except for what is mentioned above. 





PAST MEDICAL HISTORY:


1. HTN


2. atrial fibrillation 


3. GERD


4. Hx of PE


5. Hx of DVTs


6. obesity 


7. Osteoarthritis 





PAST SURGICAL HISTORY:


1. Right shoulder surgery 


2. Vein stripping in bilateral lower ext 


3. Cholecystectomy 


4. Tonsillectomy 





SOCIAL HISTORY:


Smoker 6-7 cigarettes/day, 40 years. Social alcohol use, denies illicit drug 

use. Lives locally alone. Uses a walker to ambulate, she has no stairs in home. 

Full code. 





FAMILY HISTORY:


Father: Lung disease.  at 75 years of age


Mother: Angina, CVA, HTN.  75 years of age





ALLERGIES: Please see below.





CURRENT MEDICATIONS: 


Please see below 





PHYSICAL EXAMINATION: 


VITAL SIGNS: Please see below 


CONSTITUTIONAL: No acute distress, resting comfortably, AAO x 3


EYES: PERRLA, EOM intact


HENT, MOUTH: Normocephalic, atraumatic, moist mucous membranes 


NECK: SUPPLE, no JVD, no lymphadenopathy, no carotid bruit


CV: Tachycardic, irregularly irregular rhythm, S1S2 normal, no 

murmurs/rubs/gallops


RESPIRATORY: Clear to auscultation bilaterally, no rales/rhonchi/wheezes


GI:  obese abdomen, BS positive in 4 quadrants, soft, nontender, nondistended, n

o rebound or guarding, no organomegaly


EXT:No cyanosis, clubbing,  joint deformity, extremity edema


INTEGUMENTARY: Chronic hyperpigmentation of bilateral lower ext, chronic.  

Intact, no rashes, no lesions, no erythema


NEUROLOGIC: Cranial Nerves II-XII are intact, no focal deficits


PSYCHIATRIC: Mood and affect are normal 





LABORATORY DATA: reviewed above





IMAGING: reviewed





ASSESSMENT: 


66-year-old W with past medical history of hypertension, atrial fibrillation, 

GERD, history of pulmonary emboli and DVT on eliquis, who presented to [facility]

emergency dept with SOB and orthopnea for 3 days now being admitted for HFpEF 

exacerbation.





PLAN:  





HFpEF exacerbation:


-lasix 40 IV q8H


-2L/24h fluid restriction, 2g sodium


-daily weights


-strict I/Os


-daily BMP


-hold HCTZ while on loop diuretic


-TTE








Chronic atrial fibrillation, HR in low 100s: patient of Dr. Chahal, on 

fleicanide, metop and digoxin at baseline


-continue fleicanide, metop and digoxin 


-telemetry


-daily BMP and mag








HTN


-C/w home metop, hold HCTZ, starting lasix 40 IV Q8H





Graves?


-continue home methimazole


-low TSH, check free T4





Hx of DVT, PE


-continue home eliquis





GERD 


-C/w famotidine





HLD:


-continue atorvastatin





CAD:


-continue ASA 81, statin 





DVT px


-Eliquis





Vital Signs





Vital Signs








  Date Time  Temp Pulse Resp B/P (MAP) Pulse Ox O2 Delivery O2 Flow Rate FiO2


 


10/26/21 16:45  120 21 157/96 (116) 98 Nasal Cannula 2.0 











Laboratory Data


Labs 24H


Laboratory Tests 2


10/26/21 14:34: 


Neutrophils (%) (Auto) , Nucleated Red Blood Cells % (auto) 0.0, Neutrophils 61,

Lymphocytes (Manual) 29, Monocytes (Manual) 5, Eosinophils (Manual) 3, Atypical 

Lymphocytes 2, Toxic Vacuolation 1+, Platelet Estimate NORMAL


10/26/21 14:42: 


Anion Gap 7L, Glomerular Filtration Rate 44.4L, Calcium Level 10.7H, Total 

Bilirubin 0.6, Direct Bilirubin 0.3H, Aspartate Amino Transf (AST/SGOT) 24, 

Alanine Aminotransferase (ALT/SGPT) 27, Alkaline Phosphatase 106, Total Creatine

Kinase 34, Creatine Kinase MB < 1.0, Creatine Kinase MB Relative Index 2.94, 

Troponin I < 0.02, NT-Pro-B-Type Natriuretic Peptide 8578H, Total Protein 8.4H, 

Albumin 2.7L, Albumin/Globulin Ratio 0.5L, Lipase 110, Thyroid Stimulating Horm

one (TSH) 0.034L


CBC/BMP


Laboratory Tests


10/26/21 14:34








10/26/21 14:42








Microbiology





Microbiology


10/26/21 Respiratory Virus Panel (PCR) ([facility]), Received


           Pending





Home Medications


Scheduled


Apixaban (Eliquis) 5 Mg Tablet, 1 TAB PO BID


Aspirin (Ecotrin) 81 Mg Tablet.dr, 81 MG PO DAILY


Atorvastatin Calcium (Atorvastatin Calcium) 20 Mg Tablet, 20 MG PO QPM


Bupropion Hcl (Bupropion Xl) 150 Mg Tab.er.24h, 1 TAB PO DAILY


Digoxin (Digoxin) 250 Mcg Tablet, 250 MCG PO QPM


Famotidine (Famotidine) 40 Mg Tablet, 40 MG PO QPM


Flecainide Acetate (Flecainide Acetate) 100 Mg Tablet, 100 MG PO BID


Fluoxetine Hcl (Fluoxetine HCl) 20 Mg Capsule, 20 MG PO DAILY


Guaifenesin (Mucus Relief) 400 Mg Tablet, 400 MG PO QHS


Hydrochlorothiazide (Hydrochlorothiazide) 25 Mg Tablet, 12.5 MG PO DAILY


Methimazole (Methimazole) 10 Mg Tablet, 10 MG PO DAILY


Metoprolol Tartrate (Metoprolol Tartrate) 25 Mg Tablet, 25 MG PO BID


Multivitamins (Thera M Plus Tablet) 1 Each Tablet, 1 TAB PO DAILY


Vitamin E (Vitamin E) 400 Unit Capsule, 400 UNIT PO DAILY





Scheduled PRN


Acetaminophen (Tylenol Extra Strength) 500 Mg Tablet, 1,000 MG PO Q6H PRN for 

PAIN / FEVER





Allergies


Coded Allergies:  


     ibuprofen (Verified  Adverse Reaction, Mild, GI, 20)





A-FIB/CHADSVASC


A-FIB History


Current/History of A-Fib/PAF?:  Yes


Current PO Anticoag Therapy:  Yes





Treatment


Treatment ordered:  Apixaban











JAMES TRACEY MD   Oct 26, 2021 18:23

## 2021-10-26 NOTE — CCD
Continuity of Care Document (CCD)

                             Created on: 10/25/2021



Juany Becerril

External Reference #: [id number]

: 1955

Sex: Female



Demographics





                          Address                   [address]

[place]  [address]

 

                          Home Phone                [phone]

 

                          Preferred Language        Unknown

 

                          Marital Status            Unknown

 

                          Sabianist Affiliation     Unknown

 

                          Race                      White

 

                          Ethnic Group              Not  or 





Author





                          Author                    Juany Regalado M.D.

 

                          Organization              Unknown

 

                          Address                   [address] [facility] [address]

[place]  [address]



 

                          Phone                     [phone]







Care Team Providers





                    Care Team Member Name Role                Phone

 

                    Yuri Maier MD       AUTM                [phone]

 

                    [facility]  AUTM                [phone]

 

                    John Lutz MD AUTM                Unavailable

 

                    [facility] Hematology/Oncology AUTM                [phone]

 

                    Hansa Hernandez FNP  AUTM                [phone]







Problems





                    Active Problems     Provider            Date

 

                    Chronic atrial fibrillation Protime             Onset: 

 

                    Essential hypertension Juany Vincent FNP Onset: 2017

 

                    Deep venous thrombosis of lower extremity Juany Vincent FN P Onset: 2017

 

                    Pure hypercholesterolemia Juany Vincent FNP Onset: 

017

 

                    Anxiety state       Juany Vincent FNP Onset: 2017

 

                    Chronic pulmonary embolism Juany Vincent FNP Onset: 2017

 

                    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 

2017

 

                    Gastroesophageal reflux disease Juany Vincent FNP Onset: 2017

 

                    Scoliosis deformity of spine Juany Vincent FNP Onset: 2017

 

                    Chronic tension-type headache Juany Vincent FNP Onset: 2017

 

                    Varicose veins of lower extremity Juany Vincent FNP Onset:

 2017

 

                    Tobacco user        Juany Vincent FNP Onset: 2017

 

                    Hypercalcemia       Juany Vincent FNP Onset: 2017







Social History





                Type            Date            Description     Comments

 

                Birth Sex                       Unknown          

 

                ETOH Use                        Consumes 3 glasses of wine per w

[place]  

 

                Tobacco Use     Start: Unknown  Patient is a current smoker, smo

kes every day STARTED

AGE 30 1-3 CIGARETTES A DAY 







Allergies and adverse reactions





             Active Allergies Criticality  Reaction | Severity Comments     Date

 

             Ibuprofen    Unable to assess criticality STOMACH UPSET HEADACHE mo

david       10/17/2017

 

             Latex        Unable to assess criticality rash, itches | Mild      

        10/17/2018







Medications





           Active Medications SIG        Qnty       Indications Ordering Provide

r Date

 

                          Cephalexin                     500mg Capsules         

          take one tablet 

by mouth 3 times daily x 7 days 21caps                          Julio kothari JR, PA 10/06/2021

 

                          Excedrin Migraine                     [id number] Tab

lets                   1 

as needed h/a as needed mdd=1                                 Charmaine Regalado M.D. 2021

 

                          Famotidine                     20mg Tablets           

        1 by mouth every 

day             90tabs                          Charmaine Regalado M.D. 20

21

 

                          Furosemide                     20mg Tablets           

        1 by mouth twice a

day             180tabs                         Charmaine Regalado M.D. 20

21

 

                                        Bupropion Hydrochloride ER (XL)         

            150mg Tablets ER 24HR       

                take 1 tablet by mouth in the morning 90tabs                    

      Charmaine Regalado M.D.                                    2021

 

                          Eliquis                     5mg Tablets               

    take one tablet by 

mouth twice a day 180tabs                         Charmaine Regalado M.D. 2020

 

                          Methimazole                     5mg Tablets           

        2 every day by 

mouth           180tabs                         Charmaine Regalado M.D. 20

20

 

                          Shingrix                     50mcg/0.5ML Suspension Re

c                   

administer at pharmacy 1units                          Juany Vincent FNP 

 

                          Voltaren                     1% Gel                   

apply up to 4 grams three 

times a day to affected knee as needed 100gm                           Charmaine Regalado M.D. 

2018

 

                          Aspirin Adult Low Dose                     81mg Tablet

s DR                   1 

by mouth every day                                 Juany Vincent FNP 10/17/201

7

 

                                        Acetaminophen Extra Strength            

         500mg Tablets                  

             take 2 tabs every 6 hours as needed                           uJany Fung FNP 10/17/2017

 

                                        Womens 50+ Multi Vitamin & Mineral Formu

la                      Tablets         

             1 every day PO Vit [id number] EV=009                           Juany Vincent FNP 10/17/2017

 

                          Metoprolol Tartrate                     25mg Tablets  

                 Take One 

Tablet By Mouth Twice A Day 180tabs                         ABELARDO Saleem 10/17/2017

 

                          Atorvastatin Calcium                     20mg Tablets 

                  take one

tablet by mouth every day 90tabs                          JANE Saleem 10/17/2017

 

                          Flecainide Acetate                     100mg Tablets  

                 take one 

tablet by mouth twice a day 180tabs                         ABELARDO Saleem 10/17/2017

 

                          Digoxin                     250mcg Tablets            

       1 by mouth every 

day             90tabs                          Charmaine Regalado M.D. 







Medications Administered in Office





           Medication SIG        Qnty       Indications Ordering Provider Date

 

                          Administration Of Flu Vaccine                      Inj

ection                    

                                                AIXA Hoffmann JR 10/06/2

021

 

                          Administration Of Flu Vaccine                      Inj

diyaion                    

                                                Charmaine Regalado M.D. 10/15/20

20

 

                          Administration Of Flu Vaccine                      Inj

ection                    

                                                Juany Vincent FNP 10/17/2019

 

                          Administration Of Flu Vaccine                      Inj

ection                    

                                                Juany Vincent FNP 10/17/2018

 

                          Administration Of Flu Vaccine                      Inj

Juany Alonso FNP 10/17/2017







Immunizations





             CPT Code     Status       Date         Vaccine      Lot #

 

                [id number]           Given           10/06/2021      Influenza Vaccin

e Quadrivalent Preser/Antibiotic Free Im 

Use                                     967711

 

                [id number]           Given           10/15/2020      Influenza Vaccin

e Quadrivalent Preser/Antibiotic Free Im 

Use                                     [id number]

 

                [id number]           Given           2020      Shingrix Zoster 

Vaccine (HZV), Recombinant, Subunit, 

Adjuvanted                               

 

                [id number]           Given           10/17/2019      Influenza Vaccin

e Quadrivalent Preser/Antibiotic Free Im 

Use                                     913987

 

                [id number]           Given           10/17/2018      Influenza Virus 

Vaccine, Quadrivalent (Cciiv4), Derived 

From Cell                               267934

 

             79241        Given        2018   Pneumovax 23 [id number]

 

                59613           Given           10/17/2017      Influenza Vaccin

e Quadrivalent Preser/Antibiotic Free Im 

Use                                     [id number]







Vital Signs





                Date            Vital           Result          Comment

 

                10/19/2021  3:12pm BP Systolic     122 mmHg         

 

                    BP Diastolic        76 mmHg              

 

                    Heart Rate          86 /min              

 

                    Height              66 inches           5'6"

 

                    Weight              223.00 lb            

 

                    BMI (Body Mass Index) 36.0 kg/m2           

 

                10/06/2021  2:17pm BP Systolic     124 mmHg         

 

                    BP Diastolic        68 mmHg              

 

                    Heart Rate          86 /min              

 

                    Height              66 inches           5'6"

 

                    Weight              217.00 lb            

 

                    BMI (Body Mass Index) 35.0 kg/m2           







Results





        Test    Acquired Date Facility Test    Result  H/L     Range   Note

 

                    Influenza A/B RSV Covid Amp 10/25/2021          [address] [address] [phone] Influenza A Amplification NEGATIVE   Normal     Negati

ve   1

 

             Influenza B Amplification NEGATIVE     Normal       Negative     2

 

             RSV Amplification NEGATIVE     Normal       Negative     3

 

             Sars Covid-19 Amplification NEGATIVE     Normal       Negative     

4

 

                    CBC With Differential 10/25/2021          [address] [address] [phone] White Blood Count 6.3 10     Normal     4.0-10.0    

 

             Red Blood Count 4.12 10      Normal       4.00-5.40     

 

             Hemoglobin   12.9 g/dL    Normal       12.0-15.5     

 

             Hematocrit   41.3 %       Normal       36.0-47.0     

 

             Mean Corpuscular Volume 100.2 fl     High         80.0-96.0     

 

             Mean Corpuscular Hemoglobin 31.3 pg      Normal       27.0-33.0    

 

 

             Mean Corpuscular HGB Conc 31.2 g/dL    Low          32.0-36.5     

 

             Red Cell Distribution Width 14.8 %       High         11.5-14.5    

 

 

             Platelet Count, Automated 202 10       Normal       150-450       

 

             Neutrophils % 79.5 %       High         36.0-66.0     

 

             Lymph %      13.8 %       Low          24.0-44.0     

 

             Mono %       4.8 %        Normal       2.0-8.0       

 

             Eos %        1.1 %        Normal       0.0-3.0       

 

             Baso %       0.3 %        Normal       0.0-1.0       

 

             Immature Granulocyte % 0.5 %        Normal       0-3.0         

 

             Nucleated Red Blood Cell % 0.0 %        Normal       0-0           

 

             Neutrophils # 5.0 10       Normal       1.5-8.5       

 

             Lymph #      0.9 10       Low          1.5-5.0       

 

             Mono #       0.3 10       Normal       0.0-0.8       

 

             Eos #        0.1 10       Normal       0.0-0.5       

 

             Baso #       0.0 10       Normal       0.0-0.2       

 

                    Cardiac Marker Panel 10/25/2021          [facility]

enter

                                        [address] [place] [address] [phone] CPK Creatine Phosphokinase 26 U/L     Normal     26-19

2      

 

             CK-MB Value Mass 1.0 NG/ML    Normal       <3.6          

 

             MB/CK Relative Index 3.85         Normal       < Or =4      5

 

             Troponin I   0.02 NG/ML   Normal       < 0.10       6

 

                    Liver Profile       10/25/2021          [facility]

nter

                                        [address] [place] [address] [phone] Ast/Sgot   26 U/L     Normal     7-37        

 

             Alt/SGPT     28 U/L       Normal       12-78         

 

             Alkaline Phosphatase 105 U/L      Normal               

 

             Bilirubin,Total 0.8 mg/dL    Normal       0.2-1.0       

 

             Bilirubin,Direct 0.4 mg/dL    High         0.0-0.2       

 

             Total Protein 7.9 GM/DL    Normal       6.4-8.2       

 

             Albumin      2.8 GM/DL    Low          3.2-5.2       

 

             Albumin/Globulin Ratio 0.5          Low          1.2-2.2       

 

                    Basic Metabolic Profile 10/25/2021          [facility]

                                        [address] [place] [address] [phone] Glucose, Fasting 106 mg/dL  High             

 

             Blood Urea Nitrogen 18 mg/dL     Normal       7-18          

 

             Creatinine For GFR 0.94 mg/dL   Normal       0.55-1.30     

 

             Glomerular Filtration Rate > 60.0       Normal       >45          7

 

             Sodium Level 140 mEq/L    Normal       136-145       

 

             Potassium Serum 4.6 mEq/L    Normal       3.5-5.1       

 

             Chloride Level 111 mEq/L    High                 

 

             Carbon Dioxide Level 22 mEq/L     Normal       21-32         

 

             Anion Gap    7 mEq/L      Low          8-16          

 

             Calcium Level 10.7 mg/dL   High         8.8-10.2      

 

                    Laboratory test finding 10/25/2021          [facility]

                                        [address] [place] [address] [phone] NT-Pro BNP 5817 pg/mL High       <125       8

 

             Digoxin Level 0.5 NG/ML    Normal       0.5-2.0      9

 

             Thyroxine (T4) 10.1 g/dL  Normal       4.5-12.0     10

 

             Thyroid Stimulating Hormone 0.051 uIU/ML Low          0.358-3.740  

11

 

                    CBC With Differential 2021          [facility]

                                        [address] [place] [address] [phone] White Blood Count 6.8 10     Normal     4.0-10.0    

 

             Red Blood Count 3.85 10      Low          4.00-5.40     

 

             Hemoglobin   12.7 g/dL    Normal       12.0-15.5     

 

             Hematocrit   39.4 %       Normal       36.0-47.0     

 

             Mean Corpuscular Volume 102.3 fl     High         80.0-96.0     

 

             Mean Corpuscular Hemoglobin 33.0 pg      Normal       27.0-33.0    

 

 

             Mean Corpuscular HGB Conc 32.2 g/dL    Normal       32.0-36.5     

 

             Red Cell Distribution Width 12.3 %       Normal       11.5-14.5    

 

 

             Platelet Count, Automated 187 10       Normal       150-450       

 

             Neutrophils % 72.2 %       High         36.0-66.0     

 

             Lymph %      19.9 %       Low          24.0-44.0     

 

             Mono %       6.0 %        Normal       2.0-8.0       

 

             Eos %        1.5 %        Normal       0.0-3.0       

 

             Baso %       0.3 %        Normal       0.0-1.0       

 

             Immature Granulocyte % 0.1 %        Normal       0-3.0         

 

             Nucleated Red Blood Cell % 0.0 %        Normal       0-0           

 

             Neutrophils # 4.9 10       Normal       1.5-8.5       

 

             Lymph #      1.4 10       Low          1.5-5.0       

 

             Mono #       0.4 10       Normal       0.0-0.8       

 

             Eos #        0.1 10       Normal       0.0-0.5       

 

             Baso #       0.0 10       Normal       0.0-0.2       

 

                    PT & Aptt           2021          [facility]

nter

                                        [address] [place] [address] [phone] Prothrombin Time 13.8 seconds Normal     12.5-14.3   

 

             Inr          1.04         Normal                    12

 

             Partial Thromboplastin Time 30.6 seconds Normal       24.2-38.5    

 

 

                    CBC With Differential 2021          [facility]

                                        [address] [place] [address] [phone] White Blood Count 6.2 10     Normal     4.0-10.0    

 

             Red Blood Count 3.76 10      Low          4.00-5.40     

 

             Hemoglobin   12.5 g/dL    Normal       12.0-15.5     

 

             Hematocrit   39.0 %       Normal       36.0-47.0     

 

             Mean Corpuscular Volume 103.7 fl     High         80.0-96.0     

 

             Mean Corpuscular Hemoglobin 33.2 pg      High         27.0-33.0    

 

 

             Mean Corpuscular HGB Conc 32.1 g/dL    Normal       32.0-36.5     

 

             Red Cell Distribution Width 12.9 %       Normal       11.5-14.5    

 

 

             Platelet Count, Automated 199 10       Normal       150-450       

 

             Neutrophils % 74.5 %       High         36.0-66.0     

 

             Lymph %      16.4 %       Low          24.0-44.0     

 

             Mono %       7.0 %        Normal       2.0-8.0       

 

             Eos %        1.1 %        Normal       0.0-3.0       

 

             Baso %       0.5 %        Normal       0.0-1.0       

 

             Immature Granulocyte % 0.5 %        Normal       0-3.0         

 

             Nucleated Red Blood Cell % 0.0 %        Normal       0-0           

 

             Neutrophils # 4.6 10       Normal       1.5-8.5       

 

             Lymph #      1.0 10       Low          1.5-5.0       

 

             Mono #       0.4 10       Normal       0.0-0.8       

 

             Eos #        0.1 10       Normal       0.0-0.5       

 

             Baso #       0.0 10       Normal       0.0-0.2       

 

                    Comprehensive Metabolic Profil 2021          [address] [address] [phone] Glucose, Fasting 98 mg/dL   Normal           

 

             Blood Urea Nitrogen 28 mg/dL     High         7-18          

 

             Creatinine For GFR 0.90 mg/dL   Normal       0.55-1.30     

 

             Glomerular Filtration Rate > 60.0       Normal       >45          1

3

 

             Sodium Level 137 mEq/L    Normal       136-145       

 

             Potassium Serum 4.7 mEq/L    Normal       3.5-5.1       

 

             Chloride Level 107 mEq/L    Normal               

 

             Carbon Dioxide Level 27 mEq/L     Normal       21-32         

 

             Anion Gap    3 mEq/L      Low          8-16          

 

             Calcium Level 10.2 mg/dL   Normal       8.8-10.2      

 

             Ast/Sgot     23 U/L       Normal       7-37          

 

             Alt/SGPT     19 U/L       Normal       12-78         

 

             Alkaline Phosphatase 77 U/L       Normal               

 

             Bilirubin,Total 0.5 mg/dL    Normal       0.2-1.0       

 

             Total Protein 8.3 GM/DL    High         6.4-8.2       

 

             Albumin      3.0 GM/DL    Low          3.2-5.2       

 

             Albumin/Globulin Ratio 0.6          Low          1.2-2.2       

 

                    Complete Blood Count 2021          [facility]

[facility], pc

: Dr Simon Hoyt

[place] [address] [phone] WBC        5.9 x10*3/UL            4.1 - 10.9  

 

             RBC          4.11 x10*6/UL Low          4.20 - 6.30   

 

             Hemoglobin   13.6 g/dL                 12.0 - 18.0   

 

             Hematocrit   40.0 %                    37.0 - 51.0   

 

             MCV          97.4 fL      High         80.0 - 97.0   

 

             MCH          33.1 pg      High         26.0 - 32.0   

 

             MCHC         34.0 g/dL                 31.0 - 38.0   

 

             RDW          13.2 %                    11.6 - 13.7   

 

             PLT          209 x10*3/UL              140 - 440     

 

             MPV          8.1 FL                    7.8 - 11.0    

 

             Lymph %      25.6 %                    10.0 - 58.5   

 

             Mid %        7.5 %                     1.7 - 9.3     

 

             Neut %       66.9 %                    37.0 - 92.0   

 

             Lymph #      1.5 x10*3/UL              0.6 - 4.1     

 

             Mid #        0.5 x10*3/UL              0.1 - 0.6     

 

             Neut #       3.9 x10*3/UL              2.0 - 7.8     

 

                    Laboratory test finding 2021          [facility]

[facility]

: Dr Simon Hoyt

[place] [address] [phone] Sed Rate   25 mm/hr   High       0 - 15      

 

                    Basic Metabolic Panel 2021          [facility]

[facility], pc

: Dr Simon Hoyt

[place] [address] [phone] Glucose    100 mg/dL  High       74 - 99    14

 

             BUN          18 mg/dL                  7 - 18        

 

             Creatinine   0.8 mg/dL                 0.6 - 1.3     

 

             Sodium       141 mEq/L                 136 - 145     

 

             Potassium    3.8 mEq/L                 3.5 - 5.1     

 

             Chloride     104 mEq/L                 98 - 107      

 

             Carbon Dioxide 29 mEq/L                  21 - 32       

 

             Calcium      10.8 mg/dL   High         8.5 - 10.1   15

 

             GFR  >= 60 mL/min              >60           

 

             GFR African American >= 60 mL/min              >60          16

 

                    Laboratory test finding 2021          [facility]

pia meza

: Dr Simon Arellanologg

[place] [address] [phone] Thyroid Stimulating Hormone 0.19 uIU/mL Low        0.3

6 - 3.74  







                          1                         Negative results do not prec

lude influenza or RSV virus

infection and should not be used as the sole basis for

treatment or other patient management decisions.



 

                          2                         Negative results do not prec

lude influenza or RSV virus

infection and should not be used as the sole basis for

treatment or other patient management decisions.



 

                          3                         Negative results do not prec

lude influenza or RSV virus

infection and should not be used as the sole basis for

treatment or other patient management decisions.



 

                          4                         A false negative result may 

occur if a specimen is

improperly collected, transported or handled. False negative

results may also occur if inadequate numbers of organisms

are present in the specimen.  As with any molecular test,

mutations within the target regions of [employer]

SARS-CoV-2 could affect primer and/or probe binding

resulting in failure to detect the presence of virus.  This

test cannot rule out diseases caused by other bacterial or

viral pathogens.



DISCLAIMER:

Testing was performed using the [facility] SARS-CoV-2 test.

This test was developed and its performance characteristics

determined by [facility]. This test has not been FDA cleared or

approved. This test has been authorized by FDA under an

Emergency Use Authorization (EUA). This test is only

authorized for the duration of time the declaration that

circumstances exist justifying the authorization of the

emergency use of in vitro diagnostic tests for detection of

SARS-CoV-2 virus and/or diagnosis of COVID-19 infection

under section 564(b)(1) of the Act, 21 U.S.C.

                                        360bbb-3(b)(1), unless the authorization

 is terminated or

revoked sooner.



 

                          5                         DIAGNOSIS CRITERIA

MMB ng/ml       Relative Index (RI)

NON-AMI               < or = 5               N/A

GRAY ZONE              > 5                < or = 4

AMI                    > 5                   > 4



 

                          6                         Troponin I Reference Interva

l for [employer] LOCI:



                                        99th Percentile= 0.00-0.045 ng/ml



Risk Stratification:

<= 0.10 ng/ml   Decreased Risk for Adverse Clinical

Events.

                                        0.10-1.50 ng/ml   Increased Risk for Adv

erse Clinical

Events. Evaluation of additional

criterion and/or repeat testing in 2-6

hours is suggested to rule out myocardial

damage.

>= 1.50 ng/ml   Indicative of Myocardial Injury.



 

                          7                         Units are mL/min/1.73 m2



Chronic Kidney Disease Staging per NKF:



Stage I & II   GFR >=60       Normal to Mildly Decreased

Stage III      GFR 30-59      Moderately Decreased

Stage IV       GFR 15-29      Severely Decreased

Stage V        GFR <15        Very Little GFR Left

ESRD           GFR <15 on RRT



 

                          8                         note:<nlbl:demographic_[facility]

ed>



 

                          9                         note:<nlbl:demographic_[facility]

ed>



 

                          10                        note:<nlbl:demographic_[facility]

ed>



 

                          11                        note:<nlbl:[facility]

ed>



 

                          12                        THERAPUTIC HUMAN INR VALUES

INDICATIONS                      NORMAL RANGES

PROPHYLAXIS/TREATMENT OF:

VENOUS THROMBOSIS                2.0-3.0

PULMONARY EMBOLISM               2.0-3.0

PREVENTION OF SYSTEMIC EMBOLISM FROM:

TISSUE HEART VALVES              2.0-3.0

ACUTE MYOCARDIAL INFARCTION      2.0-3.0

VALVULAR HEART DISEASE           2.0-3.0

ATRIAL FIBRILLATION              2.0-3.0

MECHANICAL VALVES(HIGH RISK)     2.5-3.5

RECURRENT MYOCARDIAL INFARCTION  2.5-3.5



 

                          13                        Units are mL/min/1.73 m2



Chronic Kidney Disease Staging per NKF:



Stage I & II   GFR >=60       Normal to Mildly Decreased

Stage III      GFR 30-59      Moderately Decreased

Stage IV       GFR 15-29      Severely Decreased

Stage V        GFR <15        Very Little GFR Left

ESRD           GFR <15 on RRT



 

                          14                        100-125 mg/dL     PRE-DIABET

ES/FASTING

>126 mg/dL          DIABETES/FASTING



 

                          15                        NOTE:

CALCIUM,TSH VERIFIED







 

                          16                        CHRONIC KIDNEY DISEASE STAGI

NG PER NKF



STAGE I & II      GFR >= 60        NORMAL TO MILDLY DECREASED

STAGE III          GFR 30-59          MODERATELY DECREASED

STAGE IV           GFR 15-29         SEVERELY DECREASED

STAGE V            GFR <15            VERY LITTLE GFR LEFT

ESRD                 GFR <15            ON RRT









Procedures





                Date            Code            Description     Status

 

                10/06/2021      81661           Office/Outpatient Established Lo

w MDM 20-29 Min Completed

 

                2021      34645           Complex Chronic Care MGMT Servic

e Ea Addl 30 Min Completed

 

                2021      63166           Complex Chronic Care Management 

SVC 1St 60 Min Completed

 

                    2021          61880               Chronic Care MGMT 20

 Mins Clinical Staff Time Per Calendar 

Month                                   Completed

 

                2021      05426           Chronic Care Management Services

 Ea Addl 20 Min Completed

 

                2021      17462           Complex Chronic Care MGMT Servic

e Ea Addl 30 Min Completed

 

                2021      15896           Complex Chronic Care Management 

SVC 1St 60 Min Completed

 

                2021      23959           Complex Chronic Care MGMT Servic

e Ea Addl 30 Min Completed

 

                2021      70754           Complex Chronic Care Management 

SVC 1St 60 Min Completed

 

                2021      92927           Office/Outpatient Established Mo

d MDM 30-39 Min Completed

 

                2021      13449           Complex Chronic Care MGMT Servic

e Ea Addl 30 Min Completed

 

                2021      09345           Complex Chronic Care Management 

SVC 1St 60 Min Completed

 

                2021      [id number]       Bone Mineral Density Test Comple

veronika

 

                2021      [id number]        Mammogram       Completed







Medical Devices





                                        Description

 

                                        No Information Available







Encounters





           Type       Date       Location   Provider   Dx         Diagnosis

 

                Office Visit    10/06/2021  2:20p [facility]                        S81.802A                  Unspecified open wound, left

 lower leg, initial encounter

 

                          Z23                       Encounter for immunization

 

                Office Visit    2021  1:30p [facility] M[facility]                      I48.20                    Chronic atrial fibrillation,

 unspecified

 

                          Z79.01                    Long term (current) use of a

nticoagulants

 

                          R60.9                     Edema, unspecified

 

                          I10                       Essential (primary) hyperten

darci

 

                          E05.90                    Thyrotoxicosis, unsp without

 thyrotoxic crisis or storm

 

                          I82.502                   Chronic embolism and thombos

 unsp deep veins of l low extrem

 

                          F17.210                   Nicotine dependence, cigaret

svitlana, uncomplicated

 

                          F32.89                    Other specified depressive e

pisodes

 

                          D47.2                     Monoclonal gammopathy

 

                          K21.9                     Gastro-esophageal reflux dis

ease without esophagitis

 

                          M19.90                    Unspecified osteoarthritis, 

unspecified site

 

                          E78.00                    Pure hypercholesterolemia, u

nspecified







Assessments





                Date            Code            Description     Provider

 

                10/06/2021      S81.802A        Unspecified open wound, left low

er leg, initial encounter 

AIXA Hoffmann JR

 

                10/06/2021      Z23             Encounter for immunization AIXA Joseph JR

 

                2021      I10             Essential (primary) hypertension

 Charmaine Regalado M.D.

 

                2021      K21.9           Gastro-esophageal reflux disease

 without esophagitis Charmaine Regalado M.D.

 

                2021      M15.9           Polyosteoarthritis, unspecified 

Charmaine Regalado M.D.

 

                2021      I10             Essential (primary) hypertension

 Charmaine Regalado M.D.

 

                2021      K21.9           Gastro-esophageal reflux disease

 without esophagitis Charmaine Regalado M.D.

 

                2021      E78.00          Pure hypercholesterolemia, unspe

cified Charmaine Regalado M.D.

 

                2021      I10             Essential (primary) hypertension

 Charmaine Regalado M.D.

 

                2021      K21.9           Gastro-esophageal reflux disease

 without esophagitis Charmaine Regalado M.D.

 

                2021      E78.00          Pure hypercholesterolemia, unspe

cisonia Regalado M.D.

 

                2021      M15.9           Polyosteoarthritis, unspecified 

Charmaine Regalado M.D.

 

                2021      I10             Essential (primary) hypertension

 Charmaine Regalado M.D.

 

                2021      K21.9           Gastro-esophageal reflux disease

 without esophagitis Charmaine Regalado M.D.

 

                    2021          I82.502             Chronic embolism and

 thrombosis of unspecified deep veins of 

left lower extremity                    Charmaine Regalado M.D.

 

                2021      I48.20          Chronic atrial fibrillation, uns

pecminoo Regalado M.D.

 

                2021      Z79.01          Long term (current) use of antic

oagulants Charmaine Regalado M.D.

 

                2021      R60.9           Edema, unspecified Charmiane guido M.D.

 

                2021      I10             Essential (primary) hypertension

 Charmaine Regalado M.D.

 

                2021      E05.90          Thyrotoxicosis, unspecified with

out thyrotoxic crisis or sto 

Charmaine Regalado M.D.

 

                    2021          I82.502             Chronic embolism and

 thrombosis of unspecified deep veins of 

left lower extremity                    Charmaine Regalado M.D.

 

                2021      F17.210         Nicotine dependence, cigarettes,

 uncomplicated Charmaine Regalado M.D.

 

                2021      F32.89          Other specified depressive episo

emil Charmaine Regalado M.D.

 

                2021      D47.2           Monoclonal gammopathy Charmaine duron M.D.

 

                2021      K21.9           Gastro-esophageal reflux disease

 without esophagitis Charmaine Regalado M.D.

 

                2021      M19.90          Unspecified osteoarthritis, unsp

ecified site Charmaine Regalado M.D.

 

                2021      E78.00          Pure hypercholesterolemia, unspe

cified Charmaine Regalado M.D.

 

                2021      I10             Essential (primary) hypertension

 Charmaine Regalado M.D.

 

                2021      K21.9           Gastro-esophageal reflux disease

 without esophagitis Charmaine Regalado M.D.

 

                2021      E78.00          Pure hypercholesterolemia, unspe

cified Charmaine Regalado M.D.







Plan of Treatment

Future Appointment(s):* 2021  3:15 pm - Charmaine Regalado M.D. at 
  [facility]





Functional Status





                                        Description

 

                                        No Information Available







Mental Status





                                        Description

 

                                        No Information Available







Referrals





                Refer to Dr     Reason for Referral Status          Appt Date

 

                          Stillerman,James MD       STAT CONSULT FOR OPEN WOUND 

LT LEG PLEASE LET OFFICE KNOW 

WHEN APPT IS MADE         Created                   

 

                                        [facility]

 

                                        [address] [place]  [address]

 

                                        [phone]

 

                                          

 

                Rock Werner MD CONSULT FOR PVD WITH OPEN WOUND LT LEG  Patient

 Declined 

10/20/2021

 

                                        [facility] [address] ,[address] [place] [id number]

 

                                        [phone]

## 2021-10-26 NOTE — CCD
Summarization Of Episode

                             Created on: 10/26/2021



BECERRILJUANY LINA

External Reference #: [id number]

: 1955

Sex: Undifferentiated



Demographics





                          Address                   [address]

[place]  [address]

 

                          Home Phone                [phone]

 

                          Preferred Language        English

 

                          Marital Status            Unknown

 

                          Taoist Affiliation     Unknown

 

                          Race                      Unknown

 

                          Ethnic Group              Not  or 





Author





                          Author                    [facility]

 

                          Organization              [facility]

 

                          Address                   Unknown

 

                          Phone                     Unavailable







Support





                Name            Relationship    Address         Phone

 

                RETIRED         Next Of Kin     Unknown         [phone]

 

                    ABBCESS,  DREAD     Next Of Kin         [address]

[place]  [address]                    [phone]

 

                    MARJAN VALLADARES     Next Of Kin         [address]

[place]  [address]                    [phone]

 

                    MARJAN BIRD     Next Of Kin         [address]

[place]  [address]                    [phone]

 

                DISABLED        Next Of Kin     Unknown         Unavailable

 

                    ALFRED MOSLEY    Next Of Kin         [address] [place]  [address]                    [phone]

 

                    Abbcess,  Dread     ECON                [address]

[place]  [address]                    Unavailable







Care Team Providers





                    Care Team Member Name Role                Phone

 

                    JHONNY Meeks MD Unavailable         Unavailable

 

                    JHONNY Meeks MD Unavailable         Unavailable

 

                    JHONNY Meeks MD Unavailable         Unavailable

 

                    JHONNY Meeks MD Unavailable         Unavailable

 

                    JHONNY Meeks MD Unavailable         Unavailable

 

                    JHONNY Meeks MD Unavailable         Unavailable

 

                    JHONNY Meeks MD Unavailable         Unavailable

 

                    JHONNY Meeks MD Unavailable         Unavailable

 

                    JHONNY Meeks MD Unavailable         Unavailable

 

                    JHONNY Meeks MD Unavailable         Unavailable

 

                    JHONNY Meeks MD Unavailable         Unavailable

 

                    Fish, B Uzair PURCELL   Unavailable         Unavailable

 

                    Fish, B Uzair PURCELL   Unavailable         Unavailable

 

                    Fish, B Uzair PURCELL   Unavailable         Unavailable

 

                    Fish, B Uzair PURCELL   Unavailable         Unavailable

 

                    Fish, B Uzair PURCELL   Unavailable         Unavailable

 

                    Fish, B Uzair PURCELL   Unavailable         Unavailable

 

                    Fish, B Uzair PURCELL   Unavailable         Unavailable

 

                    Fish, B Uzair PURCELL   Unavailable         Unavailable

 

                    Fish, B Uzair PURCELL   Unavailable         Unavailable

 

                    Fish, B Uzair PURCELL   Unavailable         Unavailable

 

                    Fish, B Uzair PURCELL   Unavailable         Unavailable

 

                    Fish, B Uzair PURCELL   Unavailable         Unavailable

 

                    Fish, B Uzair PURCELL   Unavailable         Unavailable

 

                    Fish, B Uzair PURCELL   Unavailable         Unavailable

 

                    Fish, B Uzair PURCELL   Unavailable         Unavailable

 

                    Fish, B Uzair PURCELL   Unavailable         Unavailable

 

                    Fish, B Uzair PURCELL   Unavailable         Unavailable

 

                    Fish, B Uzair PURCELL   Unavailable         Unavailable

 

                    Fish, B Uzair PURCELL   Unavailable         Unavailable

 

                    Fish, B Uzair PURCELL   Unavailable         Unavailable

 

                    Fish, B Uzair PURCELL   Unavailable         Unavailable

 

                    Fish, B Uzair PURCELL   Unavailable         Unavailable

 

                    Fish, B Uzair PURCELL   Unavailable         Unavailable

 

                    Fish, B Uzair PURCELL   Unavailable         Unavailable

 

                    Fish, B Uzair PURCELL   Unavailable         Unavailable

 

                    Fish, B Uzair PURCELL   Unavailable         Unavailable

 

                    Fish, B Uzair PURCELL   Unavailable         Unavailable

 

                    Fish, B Uzair PURCELL   Unavailable         Unavailable

 

                    Fish, B Uzair PURCELL   Unavailable         Unavailable

 

                    Fish, B Uzair PURCELL   Unavailable         Unavailable

 

                    Fish, B Uzair PURCELL   Unavailable         Unavailable

 

                    Fish, B Uzair PURCELL   Unavailable         Unavailable

 

                    Fish, B Uzair PURCELL   Unavailable         Unavailable

 

                    Fish, B Uzair PURCELL   Unavailable         Unavailable

 

                    Fish, B Uzair PURCELL   Unavailable         Unavailable

 

                    Fish, B Uzair PURCELL   Unavailable         Unavailable

 

                    Fish, B Uzair PURCELL   Unavailable         Unavailable

 

                    Fish, B Uzair PURCELL   Unavailable         Unavailable

 

                    Fish, B Uzair PURCELL   Unavailable         Unavailable

 

                    Fish, B Uzair PURCELL   Unavailable         Unavailable

 

                    Fish, B Uzair PURCELL   Unavailable         Unavailable

 

                    Fish, B Uzair PURCELL   Unavailable         Unavailable

 

                    Fish, B Uzair PURCELL   Unavailable         Unavailable

 

                    Fish, B Uzair PURCELL   Unavailable         Unavailable

 

                    Fish, B Uzair PURCELL   Unavailable         Unavailable

 

                    Fish, B Uzair PURCELL   Unavailable         Unavailable

 

                    Fish, B Uzair PURCELL   Unavailable         Unavailable

 

                    Fish, B Uzair PURCELL   Unavailable         Unavailable

 

                    Fish, B Uzair PURCELL   Unavailable         Unavailable

 

                    Fish, B Uzair PURCELL   Unavailable         Unavailable

 

                    Fish, B Uzair PURCELL   Unavailable         Unavailable

 

                    Fish, B Uzair PURCELL   Unavailable         Unavailable

 

                    Fish, B Uzair PURCELL   Unavailable         Unavailable

 

                    Fish, B Uzair PURCELL   Unavailable         Unavailable

 

                    Fish, B Uzair PURCELL   Unavailable         Unavailable

 

                    Fish, B Uzair PURCELL   Unavailable         Unavailable

 

                    Fish, Lina Josefina MPAS, PA-C Unavailable         Unavailabl

e

 

                    Fish, Lina Josefina MPAS, PA-C Unavailable         Unavailabl

e

 

                    Fish, Lina Josefina MPAS, PA-C Unavailable         Unavailabl

e

 

                    Fish, Lina Josefina MPAS, PA-C Unavailable         Unavailabl

e

 

                    Fish, Lina Josefina MPAS, PA-C Unavailable         Unavailabl

e

 

                    Fish, Lina Josefina MPAS, PA-C Unavailable         Unavailabl

e

 

                    Fish, Lina Josefina MPAS, PA-C Unavailable         Unavailabl

e

 

                    Fish, Lina Josefina MPAS, PA-C Unavailable         Unavailabl

e

 

                    Fish, Lina Josefina MPAS, PA-C Unavailable         Unavailabl

e

 

                    Fish, Lina Josefina MPAS, PA-C Unavailable         Unavailabl

e

 

                    Fish, Lina Josefina MPAS, PA-C Unavailable         Unavailabl

e

 

                    Fish, Lina Josefina MPAS, PA-C Unavailable         Unavailabl

e

 

                    Fish, Lina Josefina MPAS, PA-C Unavailable         Unavailabl

e

 

                    Fish, Lina Josefina MPAS, PA-C Unavailable         Unavailabl

e

 

                    Fish, Lina Josefina MPAS, PA-C Unavailable         Unavailabl

e

 

                    Fish, Lina Josefina MPAS, PA-C Unavailable         Unavailabl

e

 

                    Fish, Lina Josefina MPAS, PA-C Unavailable         Unavailabl

e

 

                    Fish, Lina Josefina MPAS, PA-C Unavailable         Unavailabl

e

 

                    Fish, Lina Josefina MPAS, PA-C Unavailable         Unavailabl

e

 

                    Fish, Lina Josefina MPAS, PA-C Unavailable         Unavailabl

e

 

                    Fish, Lina Josefina MPAS, PA-C Unavailable         Unavailabl

e

 

                    Fish, Lina Josefina MPAS, PA-C Unavailable         Unavailabl

e

 

                    Fish, Lina Josefina MPAS, PA-C Unavailable         Unavailabl

e

 

                    Fish, Lina Josefina MPAS, PA-C Unavailable         Unavailabl

e

 

                    Fish, Lina Josefina MPAS, PA-C Unavailable         Unavailabl

e

 

                    Fish, Lina Josefina MPAS, PA-C Unavailable         Unavailabl

e

 

                    Fish, Lina Josefina MPAS, PA-C Unavailable         Unavailabl

e

 

                    Fish, Lina Josefina MPAS, PA-C Unavailable         Unavailabl

e

 

                    Fish, Lina Josefina MPAS, PA-C Unavailable         Unavailabl

e

 

                    Fish, Lina Josefina MPAS, PA-C Unavailable         Unavailabl

e

 

                    Fish, Lina Josefina MPAS, PA-C Unavailable         Unavailabl

e

 

                    Fish, Lina Josefina MPAS, PA-C Unavailable         Unavailabl

e

 

                    Fish, Lina Josefina MPAS, PA-C Unavailable         Unavailabl

e

 

                    Fish, Lina Josefina MPAS, PA-C Unavailable         Unavailabl

e

 

                    Fish, Lina Josefina MPAS, PA-C Unavailable         Unavailabl

e

 

                    Fish, Lina Josefina MPAS, PA-C Unavailable         Unavailabl

e

 

                    PICKJHONNY EVANS JR, PA-C Unavailable         Unavailable

 

                    PICKERAL JHONNY HERNANDEZ PA-C Unavailable         Unavailable

 

                    PICKERAL JHONNY HERNANDEZ PA-C Unavailable         Unavailable

 

                    PICKERAL JRJHONNY PA-C Unavailable         Unavailable

 

                    PICKERAL JHONNY HERNANDEZ PA-C Unavailable         Unavailable

 

                    JHONNY ESQUEDA JR, PA-C Unavailable         Unavailable

 

                    JHONNY ESQUEDA JR, PA-C Unavailable         Unavailable

 

                    PICKERAL JRJHONNY PA-C Unavailable         Unavailable

 

                    PICKERAL JRJHONNY PA-C Unavailable         Unavailable

 

                    PICKERAL JR, J ALBERTO PA-C Unavailable         Unavailable

 

                    PICKERAL JR, J ALBERTO PA-C Unavailable         Unavailable

 

                    PICKERAL JR, J ALBERTO PA-C Unavailable         Unavailable

 

                    PICKERAL JR, J ALBERTO PA-C Unavailable         Unavailable

 

                    PICKERAL JR, J ALBERTO PA-C Unavailable         Unavailable

 

                    PICKERAL JR, J ALBERTO PA-C Unavailable         Unavailable

 

                    PICKERAL JR, J ALBERTO PA-C Unavailable         Unavailable

 

                    PICKERAL JR, J ALBERTO PA-C Unavailable         Unavailable

 

                    PICKERAL JR, J ALBERTO PA-C Unavailable         Unavailable

 

                    PICKERAL JR, J ALBERTO PA-C Unavailable         Unavailable

 

                    PICKERAL JR, J ALBERTO PA-C Unavailable         Unavailable

 

                    PICKERAL JR, J ALBERTO PA-C Unavailable         Unavailable

 

                    PICKERAL JR, J ALBERTO PA-C Unavailable         Unavailable

 

                    PICKERAL JR, J ALBERTO PA-C Unavailable         Unavailable

 

                    PICKERAL JR, J ALBERTO PA-C Unavailable         Unavailable

 

                    PICKERAL JR, J ALBERTO PA-C Unavailable         Unavailable

 

                    PICKERAL JR, J ALBERTO PA-C Unavailable         Unavailable

 

                    PICKERAL JR, J ALBERTO PA-C Unavailable         Unavailable

 

                    ABELARDO Regalado MD Unavailable         Unavailable

 

                    ABELARDO Regalado MD Unavailable         Unavailable

 

                    ABELARDO Regalado MD Unavailable         Unavailable

 

                    ABELARDO Regalado MD Unavailable         Unavailable

 

                    ABELARDO Regalado MD Unavailable         Unavailable

 

                    ABELARDO Regalado MD Unavailable         Unavailable

 

                    ABELARDO Regalado MD Unavailable         Unavailable

 

                    ABELARDO Regalado MD Unavailable         Unavailable

 

                    ABELARDO Regalado MD Unavailable         Unavailable

 

                    ABELARDO Regalado MD Unavailable         Unavailable

 

                    ABELARDO Regalado MD Unavailable         Unavailable

 

                    ABELARDO Regalado MD Unavailable         Unavailable

 

                    ABELARDO Regalado MD Unavailable         Unavailable

 

                    ABELARDO Regalado MD Unavailable         Unavailable

 

                    ABELARDO Regalado MD Unavailable         Unavailable

 

                    ABELARDO Regalado MD Unavailable         Unavailable

 

                    ABELARDO Regalado MD Unavailable         Unavailable

 

                    ABELARDO Regalado MD Unavailable         Unavailable

 

                    ABELARDO Regalado MD Unavailable         Unavailable

 

                    ABELARDO Regalado MD Unavailable         Unavailable

 

                    ABELARDO Regalado MD Unavailable         Unavailable

 

                    ABELARDO Regalado MD Unavailable         Unavailable

 

                    ABELARDO Regalado MD Unavailable         Unavailable

 

                    ABELARDO Regalado MD Unavailable         Unavailable

 

                    ABELARDO Regalado MD Unavailable         Unavailable

 

                    ABELARDO Regalado MD Unavailable         Unavailable

 

                    ABELARDO Regalado MD Unavailable         Unavailable

 

                    ABELARDO Regalado MD Unavailable         Unavailable

 

                    ABELARDO Regalado MD Unavailable         Unavailable

 

                    ABELARDO Regalado MD Unavailable         Unavailable

 

                    ABELARDO Regalado MD Unavailable         Unavailable

 

                    SabineABELARDO MD Unavailable         Unavailable

 

                    SabineABELARDO fuchs MD Unavailable         Unavailable

 

                    SabineABELARDO MD Unavailable         Unavailable

 

                    SabineABELARDO MD Unavailable         Unavailable

 

                    SabineABELARDO MD Unavailable         Unavailable

 

                    SabineABELARDO MD Unavailable         Unavailable

 

                    SabineABELARDO MD Unavailable         Unavailable

 

                    SabineABELARDO MD Unavailable         Unavailable

 

                    SabineABELARDO MD Unavailable         Unavailable

 

                    SabineABELARDO xavier MD Unavailable         Unavailable

 

                    SabineABELARDO MD Unavailable         Unavailable

 

                    SabineABELARDO MD Unavailable         Unavailable

 

                    SabineABELARDO MD Unavailable         Unavailable

 

                    SabineABELARDO fuchs MD Unavailable         Unavailable

 

                    SabineABELARDO MD Unavailable         Unavailable

 

                    SabineABELARDO MD Unavailable         Unavailable

 

                    SabineABELARDO MD Unavailable         Unavailable

 

                    SabineABELARDO xavier MD Unavailable         Unavailable

 

                    SabineABELARDO MD Unavailable         Unavailable

 

                    ABELARDO Regalado MD Unavailable         Unavailable

 

                    SabineABELARDO fuchs MD Unavailable         Unavailable

 

                    ABELARDO Regalado MD Unavailable         Unavailable

 

                    ABELARDO Regalado MD Unavailable         Unavailable

 

                    ABELARDO Regalado MD Unavailable         Unavailable

 

                    ABELARDO Regalado MD Unavailable         Unavailable

 

                    ABELARDO Regalado MD Unavailable         Unavailable

 

                    ABELARDO Regalado MD Unavailable         Unavailable

 

                    ABELARDO Regalado MD Unavailable         Unavailable

 

                    ABELARDO Regalado MD Unavailable         Unavailable

 

                    ABELARDO Regalado MD Unavailable         Unavailable

 

                    ABELARDO Regalado MD Unavailable         Unavailable

 

                    ABELARDO Regalado MD Unavailable         Unavailable

 

                    ABELARDO Regalado MD Unavailable         Unavailable

 

                    ABELARDO Regalado MD Unavailable         Unavailable

 

                    ABELARDO Regalado MD Unavailable         Unavailable

 

                    ABELARDO Regalado MD Unavailable         Unavailable

 

                    ABELARDO Regalado MD Unavailable         Unavailable

 

                    ABELARDO Regalado MD Unavailable         Unavailable

 

                    ABELARDO Regalado MD Unavailable         Unavailable

 

                    ABELARDO Regalado MD Unavailable         Unavailable

 

                    ABELARDO Regalado MD Unavailable         Unavailable

 

                    ABELARDO Regalado MD Unavailable         Unavailable

 

                    ABELARDO Regalado MD Unavailable         Unavailable

 

                    ABELARDO Regalado MD Unavailable         Unavailable

 

                    ABELARDO Regalado MD Unavailable         Unavailable

 

                    ABELARDO Regalado MD Unavailable         Unavailable

 

                    ABELARDO Regalado MD Unavailable         Unavailable

 

                    ABELARDO Regalado MD Unavailable         Unavailable

 

                    ABELARDO Regalado MD Unavailable         Unavailable

 

                    ABELARDO Regalado MD Unavailable         Unavailable

 

                    Sabine, ABELARDO Montano MD Unavailable         Unavailable

 

                    Sabine, ABELARDO Montano MD Unavailable         Unavailable

 

                    Sabine, ABELARDO Montano MD Unavailable         Unavailable

 

                    AFSHAN, L ERICK PA Unavailable         Unavailable

 

                    AFSHAN, L ERICK PA Unavailable         Unavailable

 

                    AFSHAN, L EIRCK PA Unavailable         Unavailable

 

                    AFSHAN, L ERICK PA Unavailable         Unavailable

 

                    AFSHAN, L ERICK PA Unavailable         Unavailable

 

                    AFSHAN, L ERICK PA Unavailable         Unavailable

 

                    AFSHAN, L ERICK PA Unavailable         Unavailable

 

                    AFSHAN, L ERICK PA Unavailable         Unavailable

 

                    AFSHAN, L ERICK PA Unavailable         Unavailable

 

                    FASHAN, L ERICK PA Unavailable         Unavailable

 

                    AFSHAN, L ERICK PA Unavailable         Unavailable

 

                    AFSHAN, L ERICK PA Unavailable         Unavailable

 

                    AFSHAN, L ERICK PA Unavailable         Unavailable

 

                    AFSHAN, L ERICK PA Unavailable         Unavailable

 

                    AFSHAN, L ERICK PA Unavailable         Unavailable

 

                    AFSHAN, L ERICK PA Unavailable         Unavailable

 

                    ADJAPONG,  ROSA    Unavailable         Unavailable

 

                    Jimenez, L Kanchan RPA Unavailable         Unavailable

 

                    Jimenez, L Kanchan RPA Unavailable         Unavailable

 

                    Jimenez, L Kanchan RPA Unavailable         Unavailable

 

                    Jimenez, L Kanchan RPA Unavailable         Unavailable

 

                    Jimenez, L Kanchan RPA Unavailable         Unavailable

 

                    Jimenez, L Kanchan RPA Unavailable         Unavailable

 

                    Jimenez, L Kanchan RPA Unavailable         Unavailable

 

                    Jimenez, L Kanchan RPA Unavailable         Unavailable

 

                    Jimenez, L Kanchan RPA Unavailable         Unavailable

 

                    Jimenez, L Kanchan RPA Unavailable         Unavailable

 

                    Jimenez, L Kanchan RPA Unavailable         Unavailable

 

                    Jimenez, L Kanchan RPA Unavailable         Unavailable

 

                    Jimenez, L Kanchan RPA Unavailable         Unavailable

 

                    Jimenez, L Kanchan RPA Unavailable         Unavailable

 

                    Jimenez, L Kanchan RPA Unavailable         Unavailable

 

                    Jimenez, L Kanchan RPA Unavailable         Unavailable

 

                    Jimenez, L Kanchan RPA Unavailable         Unavailable

 

                    Jimenez, L Kanchan RPA Unavailable         Unavailable

 

                    Jimenez, L Kanchan RPA Unavailable         Unavailable

 

                    Jimenez, L Kanchan RPA Unavailable         Unavailable

 

                    Jimenez, L Kanchan RPA Unavailable         Unavailable

 

                    Jimenez, L Kanchan RPA Unavailable         Unavailable

 

                    Jimenez, L Kanchan RPA Unavailable         Unavailable

 

                    Jimenez, L Kanchan RPA Unavailable         Unavailable

 

                    Jimenez, L Kanchan RPA Unavailable         Unavailable

 

                    Jimenez, L Kanchan RPA Unavailable         Unavailable

 

                    Jimenez, L Kanchan RPA Unavailable         Unavailable

 

                    Jimenez, L Kanchan RPA Unavailable         Unavailable

 

                    Jimenez, L Kanchan RPA Unavailable         Unavailable

 

                    Jimenez, L Kanchan RPA Unavailable         Unavailable

 

                    Jimenez, L Kanchan RPA Unavailable         Unavailable

 

                    Jimenez, L Kanchan RPA Unavailable         Unavailable



                                  



Re-disclosure Warning

          The records that you are about to access may contain information from 
federally-assisted alcohol or drug abuse programs. If such information is 
present, then the following federally mandated warning applies: This information
has been disclosed to you from records protected by federal confidentiality 
rules (42 CFR part 2). The federal rules prohibit you from making any further 
disclosure of this information unless further disclosure is expressly permitted 
by the written consent of the person to whom it pertains or as otherwise 
permitted by 42 CFR part 2. A general authorization for the release of medical 
or other information is NOT sufficient for this purpose. The Federal rules 
restrict any use of the information to criminally investigate or prosecute any 
alcohol or drug abuse patient.The records that you are about to access may 
contain highly sensitive health information, the redisclosure of which is 
protected by Article 27-F of the [facility] Public Health law. If you 
continue you may have access to information: Regarding HIV / AIDS; Provided by 
facilities licensed or operated by the [facility] Office of Mental Health; 
or Provided by the [facility] Office for People With Developmental 
Disabilities. If such information is present, then the following [place] 
mandated warning applies: This information has been disclosed to you from 
confidential records which are protected by state law. State law prohibits you 
from making any further disclosure of this information without the specific 
written consent of the person to whom it pertains, or as otherwise permitted by 
law. Any unauthorized further disclosure in violation of state law may result in
a fine or senior living sentence or both. A general authorization for the release of 
medical or other information is NOT sufficient authorization for further disc
losure.                                                                         
    



Allergies and Adverse Reactions

          



           Type       Description Substance  Reaction   Status     Data Source(s

)

 

           Drug Allergy Drug Allergy NKDA                             [facility] ([facility] )



                                                                                
       



Family History

          



             Family Member Name Family Member Gender Family Member Status Date o

f Status 

Description                             Data Source(s)

 

           Unknown    Male       Problem                          [facility] ([facility]

 

           Unknown    Male       Problem                          [facility] ([facility])



                                                                                
                 



Encounters

          



           Encounter  Providers  Location   Date       Indications Data Source(s

)

 

                Unknown                         [address] [facility], 

[place] [address] 10/21/2021 12:00:00 AM 

EDT                                                 eCW1 ([facility])

 

                Outpatient      Attender: ALBERTO Melchor 1

 02:20:00 PM

EDT                                                 [facility] ([facility]

)

 

                Outpatient      Admitter: ROSA ADJAPONGReferrer: ROSA ADJAPONG

                 2021 

12:00:00 AM EDT           Multiple myeloma not having achieved remission [facility]

 

                                        Multiple myeloma not having achieved rem

ission 

 

                Outpatient      Attender: Kanchan Jimenez RPA Sab[place]/R

eindl 2021 

10:15:00 AM EDT                                     [facility] ([facility]

 

                Outpatient      Attender: Charmaine Melchor  01:30:00 PM 

EDT                                                 [facility] ([facility]

)

 

                Outpatient      Attender: Kanchan Jimenez RPA [place]avindra/R

eindl 06/10/2021 

10:45:00 AM EDT                                     [facility] ([facility]

 

                Outpatient      Attender: Charmaine Melchor  01:30:00 PM 

EDT                                                 [facility] ([facility]

)

 

                Outpatient      Attender: Ra Meeks MD Physical Therapy  10:30:00 AM 

EDT                                                 [facility] ([facility]

 

                Office Visit    Attender: Josefina VIGIL PA-C Physical Therapy

 2021 

01:15:00 PM EDT                                     [facility] ([facility]

 

             Outpatient   Attender: Uzair Huitron MD Physical Therapy 2021 1

2:45:00 PM EDT 

                                        [facility] ([facility])

 

                Outpatient      Attender: Josefina VIGIL PA-C Physical Therapy

 2021 11:15:00 

AM EDT                                              [facility] ([facility]

 

                Outpatient      Attender: Charmaine Melchor  02:30:00 PM 

EDT                                                 [facility] ([facility]

)

 

                Outpatient      Attender: Kanchan Jimenez RPA [place]ind[place]/R

eindl 2021 

09:15:00 AM EST                                     [facility] ([facility]

 

             Outpatient   Attender: ERICK KRUSE Main Office  2021 1

1:45:00 AM EST  

                                        [facility] ([facility])

 

                Outpatient      Attender: Charmaine Melchor  12:00:00 PM 

EST                                                 [facility] ([facility]

)

 

                Outpatient      Attender: Charmaine Melchor 10

/ 10:30:00 AM 

EDT                                                 [facility] ([facility]

)



                                                                                
                                                                                
                                                                            



Immunizations

          



             Vaccine      Date         Status       Description  Data Source(s)

 

                          Influenza, injectable, MDCK, preservative free, bruno

valent 10/06/2021 02:31:00

PM EDT              completed                               [facility] ([place] [facility]

[facility])

 

                          Influenza, injectable, MDCK, preservative free, bruno

valent 10/15/2020 12:10:00

PM EDT              completed                               [facility] ([facility])



                                                                                
                 



Medications

          



          Medication Brand Name Start Date Product Form Dose      Route     Admi

nistrative 

Instructions Pharmacy Instructions Status     Indications Reaction   Description

 Data 

Source(s)

 

           Cephalexin 500 MG Oral Capsule CEPHALEXIN 10/20/2021 12:00:00 AM EDT 

capsule    21         

                          TAKE ONE CAPSULE BY MOUTH THREE TIMES A DAY FOR 7 DAYS

 TAKE ONE CAPSULE BY 

MOUTH THREE TIMES A DAY FOR 7 DAYS SOLD: 10/20/2021                             

           [facility]

 

           Cephalexin 500 MG Oral Capsule CEPHALEXIN 10/06/2021 12:00:00 AM EDT 

capsule    21         

                          TAKE ONE CAPSULE BY MOUTH THREE TIMES A DAY FOR 7 DAYS

 TAKE ONE CAPSULE BY 

MOUTH THREE TIMES A DAY FOR 7 DAYS SOLD: 10/06/2021                             

           [facility]

 

        Cephalexin 500 MG Oral Capsule Cephalexin 10/06/2021 12:00:00 AM EDT    

             ORAL            

             active                                              [facility] ([facility])

 

       Administration Of Flu Vaccine        10/06/2021 12:00:00 AM EDT          

                          completed  

                                                            [facility] ([facility])

 

                                        Medication administered onsite 

 

                    2 ML Sodium Hyaluronate 10 MG/ML Prefilled Syringe [Euflexxa

] Euflexxa            

2021 12:00:00 AM EDT                                    active            

          [facility] ([facility]

 

                atorvastatin 20 MG Oral Tablet ATORVASTATIN CALCIUM 2021 1

2:00:00 AM EDT 

tablet          90                              TAKE ONE TABLET BY MOUTH EVERY D

AY TAKE ONE TABLET BY MOUTH EVERY 

DAY          SOLD: 2021                                        [facility]

s

 

          25 mg               2021 12:00:00 AM EDT tablet    180          

       TAKE ONE TABLET BY MOUTH TWICE A

 DAY       TAKE ONE TABLET BY MOUTH TWICE A DAY SOLD: 10/22/2021                

                  [facility]

 

                atorvastatin 20 MG Oral Tablet ATORVASTATIN CALCIUM 2021 1

2:00:00 AM EDT 

tablet          90                              TAKE ONE TABLET BY MOUTH EVERY D

AY TAKE ONE TABLET BY MOUTH EVERY 

DAY          SOLD: 10/22/2021                                        [facility]

s

 

          25 mg               2021 12:00:00 AM EDT tablet    180          

       TAKE ONE TABLET BY MOUTH TWICE A

 DAY       TAKE ONE TABLET BY MOUTH TWICE A DAY SOLD: 2021                

                  [facility]

 

          100 mg              2021 12:00:00 AM EDT tablet    180          

       TAKE ONE TABLET BY MOUTH TWICE 

A DAY      TAKE ONE TABLET BY MOUTH TWICE A DAY SOLD: 2021                

                  [facility]

 

          Famotidine 20 MG Oral Tablet FAMOTIDINE 2021 12:00:00 AM EDT tab

let    90                  

TAKE ONE TABLET BY MOUTH EVERY DAY TAKE ONE TABLET BY MOUTH EVERY DAY SOLD: 

10/22/2021                                                      [facility]

 

                          Acetaminophen 250 MG / Aspirin 250 MG / Caffeine 65 MG

 Oral Tablet [Excedrin] 

Excedrin Migraine 2021 12:00:00 AM EDT                               activ

e                   [facility] 

([facility])

 

          20 mg               2021 12:00:00 AM EDT tablet    180          

       TAKE ONE TABLET BY MOUTH TWICE A

 DAY       TAKE ONE TABLET BY MOUTH TWICE A DAY SOLD: 10/22/2021                

                  [facility]

 

          20 mg               2021 12:00:00 AM EDT tablet    90           

       TAKE ONE TABLET BY MOUTH EVERY 

DAY        TAKE ONE TABLET BY MOUTH EVERY DAY SOLD: 2021                  

                [facility]

 

          20 mg               2021 12:00:00 AM EDT tablet    180          

       TAKE ONE TABLET BY MOUTH TWICE A

 DAY       TAKE ONE TABLET BY MOUTH TWICE A DAY SOLD: 2021                

                  [facility]

 

          1 %                 2021 12:00:00 AM EDT gel       100          

       APPLY UP TO 4 GRAMS THREE TIMES A DAY

 TO AFFECTED KNEE AS NEEDED             APPLY UP TO 4 GRAMS THREE TIMES A DAY TO

 AFFECTED 

KNEE AS NEEDED SOLD: 2021                                        [facility]

 

        Famotidine 20 MG Oral Tablet Famotidine 2021 12:00:00 AM EDT      

           ORAL                    

active                                                          [facility] ([facility])

 

        Furosemide 20 MG Oral Tablet Furosemide 2021 12:00:00 AM EDT      

           ORAL                    

active                                                          [facility] ([facility])

 

          5 mg                03/15/2021 12:00:00 AM EDT tablet    40           

       TAKE ONE TABLET BY MOUTH EVERY 

DAY, EXCEPT TAKE 2 TABLETS ON MONDAY, WEDNESDAY AND FRIDAY TAKE ONE TABLET BY 

MOUTH EVERY DAY, EXCEPT TAKE 2 TABLETS ON MONDAY, WEDNESDAY AND FRIDAY SOLD: 

2021                                                      [facility]

 

          apixaban 5 MG Oral Tablet [Eliquis] Eliquis   2021 12:00:00 AM E

ST                     ORAL      

                      active                                      [facility] ([facility]

 

                    24 HR Bupropion Hydrochloride 150 MG Extended Release Oral T

ablet BUPROPION HCL       

2021 12:00:00 AM EST tablet extended release 24 hr 30                     

         TAKE ONE TABLET BY

 MOUTH EVERY MORNING TAKE ONE TABLET BY MOUTH EVERY MORNING SOLD: 2021    

              

                                                    [facility]

 

          5 mg                2021 12:00:00 AM EST tablet    60           

       TAKE ONE TABLET BY MOUTH TWICE A 

DAY        TAKE ONE TABLET BY MOUTH TWICE A DAY SOLD: 2021                

                  [facility]

 

                    24 HR Bupropion Hydrochloride 150 MG Extended Release Oral T

ablet BUPROPION HCL       

2021 12:00:00 AM EST tablet extended release 24 hr 30                     

         TAKE ONE TABLET BY

 MOUTH EVERY MORNING TAKE ONE TABLET BY MOUTH EVERY MORNING SOLD: 2021    

              

                                                    [facility]

 

          5 mg                2021 12:00:00 AM EST tablet    60           

       TAKE ONE TABLET BY MOUTH TWICE A 

DAY        TAKE ONE TABLET BY MOUTH TWICE A DAY SOLD: 2021                

                  [facility]

 

                          24 HR Bupropion Hydrochloride 150 MG Extended Release 

Oral Tablet Bupropion 

Hydrochloride ER (XL) 2021 12:00:00 AM EST             ORAL              a

ctive                   

[facility] ([facility])

 

                          24 HR Bupropion Hydrochloride 150 MG Extended Release 

Oral Tablet Bupropion 

Hydrochloride ER (XL) 2021 12:00:00 AM EST             ORAL              a

ctive                   

[facility] ([facility])

 

          5 mg                2020 12:00:00 AM EST tablet    30           

       TAKE ONE TABLET BY MOUTH TWICE A 

DAY        TAKE ONE TABLET BY MOUTH TWICE A DAY SOLD: 2020                

                  [facility]

 

          5 mg                2020 12:00:00 AM EST tablet    30           

       TAKE ONE TABLET BY MOUTH TWICE A 

DAY        TAKE ONE TABLET BY MOUTH TWICE A DAY SOLD: 2021                

                  [facility]

 

           Cephalexin 500 MG Oral Capsule CEPHALEXIN 2020 12:00:00 AM EST 

capsule    30         

                          TAKE ONE CAPSULE BY MOUTH THREE TIMES A DAY TAKE ONE C

APSULE BY MOUTH THREE 

TIMES A DAY  SOLD: 2020                                        [facility]

s

 

          apixaban 5 MG Oral Tablet [Eliquis] Eliquis   2020 12:00:00 AM E

ST                     ORAL      

                      active                                      [facility] ([facility])

 

          apixaban 5 MG Oral Tablet [Eliquis] Eliquis   2020 12:00:00 AM E

ST                     ORAL      

                      active                                      [facility] ([facility])

 

       Administration Of Flu Vaccine        10/15/2020 12:00:00 AM EDT          

                          completed  

                                                            [facility] ([place] [facility]

[facility])

 

                                        Medication administered onsite 

 

          20 mg               2020 12:00:00 AM EDT tablet    90           

       TAKE ONE TABLET BY MOUTH EVERY 

MORNING    TAKE ONE TABLET BY MOUTH EVERY MORNING SOLD: 2021              

                    [facility]

 

          5 mg                2020 12:00:00 AM EDT tablet    30           

       TAKE 1/2 TABLET BY MOUTH ONCE 

DAILY      TAKE 1/2 TABLET BY MOUTH ONCE DAILY SOLD: 10/24/2020                 

                 [facility]

 

          40 mg               2020 12:00:00 AM EDT tablet    30           

       TAKE ONE TABLET BY MOUTH AT 

BEDTIME    TAKE ONE TABLET BY MOUTH AT BEDTIME SOLD: 10/24/2020                 

                 [facility]

 

          40 mg               2020 12:00:00 AM EDT tablet    30           

       TAKE ONE TABLET BY MOUTH AT 

BEDTIME    TAKE ONE TABLET BY MOUTH AT BEDTIME SOLD: 2021                 

                 [facility]

 

          Famotidine 40 MG Oral Tablet FAMOTIDINE 2020 12:00:00 AM EDT tab

let    30                  

TAKE ONE TABLET BY MOUTH AT BEDTIME TAKE ONE TABLET BY MOUTH AT BEDTIME SOLD: 

2021                                                      [facility]

 

                    Digoxin 0.25 MG Oral Tablet 250 mcg (0.25 mg) DIGOXIN       

      2020 12:00:00 AM EDT

             tablet       90                        TAKE ONE TABLET BY MOUTH JAMES

 DAY TAKE ONE TABLET BY MOUTH EVERY 

DAY          SOLD: 2021                                        [facility]

[facility]

 

          10 mg               2019 12:00:00 AM EST tablet    90           

       TAKE ONE TABLET BY MOUTH EVERY 

DAY        TAKE ONE TABLET BY MOUTH EVERY DAY SOLD: 10/24/2020                  

                [facility]

 

          100 mg              2019 12:00:00 AM EST tablet    180          

       TAKE ONE TABLET BY MOUTH TWICE 

A DAY      TAKE ONE TABLET BY MOUTH TWICE A DAY SOLD: 10/24/2020                

                  [facility]

 

          25 mg               2019 12:00:00 AM EST tablet    180          

       TAKE ONE TABLET BY MOUTH TWICE A

 DAY       TAKE ONE TABLET BY MOUTH TWICE A DAY SOLD: 10/24/2020                

                  [facility]



                                                                                
                                                                                
                                                                                
                                                                                
                                                                                
                                                                    



Insurance Providers

          



             Payer name   Policy type / Coverage type Policy ID    Covered party

 ID Covered 

party's relationship to morataya Policy [place]             Plan Information

 

                [place] [employer] [facility]) [facility] Part B  [id number]       

[id number] Family Dependent                        

[id number]

 

                [place] [employer] [place]) [facility] Part B  [id number]       

[id number] Family Dependent                        

[id number]

 

                [place] [employer] [facility]) [facility] Part B  [id number]       

[id number] Family Dependent                        

[id number]

 

                Medicare [employer]l [employer] Medicare Primary [id number]      

[id number] Self                                    

[id number]

 

                Medicare Natl Govt [facility] Medicare Primary [id number]     

[id number] Self                                    

[id number]

 

                Medicare [employer] Medicare Primary [id number]     

[id number] Self                                    

[id number]

 

                Medicare [facility] Govt [facility] Medicare Primary [id number]     

[id number] Self                                    

[id number]

 

          MEDICARE  A         [id number]           Self                [id number]

[id number]

 

                Medicare Natl Govt [facility] Medicare Primary [id number]      

[id number] Self                                    

[id number]

 

                Medicare Natl Govt Serv Medicare Primary [id number]     

[id number] Self                                    

[id number]

 

                Medicare Natl Govt Serv Medicare Primary [id number]     

[id number] Self                                    

[id number]

 

                Medicare [employer] Medicare Primary [id number]     

[id number] Self                                    

[id number]

 

                Medic[facility]l [facility] Medicare Primary [id number]     

[id number] Self                                    

[id number]

 

                Medic[facility] Natl [facility] Medicare Primary [id number]      

[id number] Self                                    

[id number]

 

                Medicare Natl [facility] Medicare Primary [id number]     

[id number] Self                                    

[id number]

 

                Medic[facility] Natl [facility] Medicare Primary [id number]      

[id number] Self                                    

[id number]

 

                Medicare Natl [facility] Medicare Primary [id number]     

[id number] Self                              

      [id number]

 

                Medicare Natl Govt Servic Medicare Primary [id number]      

[id number] Self                                    

[id number]

 

                Medicare Natl [facility] Medicare Primary [id number]     

[id number] Self                                    

[id number]

 

                Medicare [employer] Medicare Primary [id number]     

[id number] Self                                    

[id number]

 

           FOR LIFE U         [id number]           Self                [id number]

[id number]

 

                [employer]  [employer] Life Medigap Part B  [id number]       

[id number] Family Dependent                        

[id number]

 

           FOR LIFE           [id number]           SP                  [id number]

[id number]

 

          MEDICARE  C         [id number] S                   [id number]

[id number]

 

           FOR LIFE O         [id number] S                   [id number]

[id number]

 

                [employer]  For Life Medigap Part B  [id number]       

[id number] Family Dependent                  

      [id number]

 

                [employer]  For Life Medigap Part B  [id number]       

[id number] Family Dependent                        

[id number]

 

                [employer]  For Life Medigap Part B  [id number]       

[id number] Family Dependent                        

[id number]

 

                [employer]  For Life Medigap Part B  [id number]       

[id number] Family Dependent                        

[id number]

 

                [employer]  For Life Medigap Part B  [id number]       

[id number] Family Dependent                        

[id number]

 

                [employer]  For Life Medigap Part B  [id number]       

[id number] Family Dependent                        

[id number]

 

                [employer]  For Life Medigap Part B  [id number]       

[id number] Family Dependent                        

[id number]

 

                [employer]  For Life Medigap Part B  [id number]       

[id number] Family Dependent                        

[id number]

 

                [employer]  For Life Medigap Part B  [id number]       

[id number] Family Dependent                        

[id number]

 

          MEDICARE            [id number]           SP                  [id number]

A

 

          MEDICARE  C         [id number] S                   [id number]

A

 

          S [employer], [facility] C         [id number] S              

     [id number]

 

                [employer]  For Life Medigap Part B  [id number]       

[id number] Family Dependent                        

[id number]

 

          MEDICARE            [id number]           SP                  [id number]

[id number]

 

                [employer]  For Life Medigap Part B  [id number]       

[id number] Family Dependent                        

[id number]

 

                [employer]  For Life Medigap Part B  [id number]       

[id number] Family Dependent                        

[id number]



                                                                                
                                                                                
                                                                                
                                                                                
                                                                                
                                                                                
        



Problems, Conditions, and Diagnoses

          



           Code       Display Name Description Problem Type Effective Dates Data

 Source(s)

 

                    C90.00              Multiple myeloma not having achieved rem

ission Multiple myeloma not 

having achieved remission Diagnosis           2021 01:43:00 PM EDT [facility]

 

                    I87.312             [id number]     Chronic venous hyper

tension (idiopathic) with ulcer of 

left lower extremity Problem             10/21/2021 12:00:00 AM EDT eCW1 ([facility])

 

                    L97.822             [id number]            Non-pressure chronic

 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

 fat layer exposed  Problem             10/21/2021 12:00:00 AM EDT eCW1 ([facility])

 

             I27.81       Chronic cor pulmonale Chronic cor pulmonale Problem   

   2021 12:00:00 

AM EDT                                  MEDENT ([facility])

 

             I65.29       Carotid artery occlusion Carotid artery occlusion Prob

sam      2021 

12:00:00 AM EST                         [facility] ([facility])

 

                    I82.503             Deep venous thrombosis of lower extremit

y Deep venous thrombosis of 

lower extremity     Problem             2021 12:00:00 AM EST [facility] ([facility]

 

             E78.2        Mixed hyperlipidemia Mixed hyperlipidemia Problem     

 2021 12:00:00 AM 

EST                                     [facility] ([facility])



                                                                                
                                                                              



Surgeries/Procedures

          



             Procedure    Description  Date         Indications  Data Source(s)

 

             OFFICE OUTPATIENT VISIT 15 MINUTES              10/06/2021 12:00:00

 AM EDT              MEDENT 

([facility])

 

             Complex Chronic Care Management SVC 1St 60 Min              

021 12:00:00 AM EDT              

MEDENT ([facility])

 

             Complex Chronic Care MGMT Service Ea Addl 30 Min               12:00:00 AM EDT              

MEDENT ([facility])

 

             ECG ROUTINE ECG W/LEAST 12 LDS W/I&R              2021 12:00:

00 AM EDT              MEDENT 

([facility])

 

             OFFICE OUTPATIENT VISIT 25 MINUTES              2021 12:00:00

 AM EDT              MED[facility] 

([facility])

 

             ECG ROUTINE ECG W/LEAST 12 LDS W/I&R              2021 12:00:

00 AM EDT              MEDENT 

([facility])

 

             OFFICE OUTPATIENT VISIT 25 MINUTES              2021 12:00:00

 AM EDT              MEDENT 

([facility])

 

             Chronic Care Management Services Ea Addl 20 Min              2021 12:00:00 AM EDT              

MEDENT ([facility])

 

                    Chronic Care MGMT 20 Mins Clinical Staff Time Per Calendar M

[facility]                     2021 

12:00:00 AM EDT                                     MEDENT ([facility]

)

 

             Complex Chronic Care Management SVC 1St 60 Min              

021 12:00:00 AM EDT              

MEDENT ([facility])

 

             Complex Chronic Care MGMT Service Ea Addl 30 Min               12:00:00 AM EDT              

MEDENT ([facility])

 

             OFFICE OUTPATIENT VISIT 25 MINUTES              2021 12:00:00

 AM EDT              MEDENT 

([facility], )

 

             Complex Chronic Care Management SVC 1St 60 Min              

021 12:00:00 AM EDT              

MEDENT ([facility])

 

             Complex Chronic Care MGMT Service Ea Addl 30 Min               12:00:00 AM EDT              

MEDENT ([facility])

 

             OFFICE OUTPATIENT VISIT 25 MINUTES              2021 12:00:00

 AM EDT              MEDENT 

([facility])

 

             OFFICE OUTPATIENT VISIT 25 MINUTES              06/10/2021 12:00:00

 AM EDT              MEDENT 

([facility], )

 

             Complex Chronic Care Management SVC 1St 60 Min              

021 12:00:00 AM EDT              

MEDENT ([facility])

 

             Complex Chronic Care MGMT Service Ea Addl 30 Min               12:00:00 AM EDT              

MED[facility] ([facility])

 

             Complex Chronic Care Management SVC 1St 60 Min              

021 12:00:00 AM EDT              

MED[facility] ([facility])

 

             Complex Chronic Care MGMT Service Ea Addl 30 Min               12:00:00 AM EDT              

MEDENT ([facility])

 

             OFFICE OUTPATIENT VISIT 25 MINUTES              2021 12:00:00

 AM EDT              MEDENT 

([facility])

 

             ARTHROCENTESIS ASPIR&/INJECTION MAJOR JT/BURSA              

021 12:00:00 AM EDT              

MEDENT ([facility])

 

             RADEX SHOULDER COMPLETE MINIMUM 2 VIEWS              2021 12:

00:00 AM EDT              MEDENT 

([facility])

 

             ARTHROCENTESIS ASPIR&/INJECTION MAJOR JT/BURSA              

021 12:00:00 AM EDT              

MEDENT ([facility])

 

             Complex Chronic Care Management SVC 1St 60 Min              

021 12:00:00 AM EDT              

MEDENT ([facility])

 

             Complex Chronic Care MGMT Service Ea Addl 30 Min               12:00:00 AM EDT              

MED[facility] ([facility])

 

             INJECTION 1 TENDON SHEATH/LIGAMENT APONEUROSIS              

021 12:00:00 AM EDT              

MEDENT ([facility])

 

             ARTHROCENTESIS ASPIR&/INJECTION MAJOR JT/BURSA              

021 12:00:00 AM EDT              

MEDENT ([facility])

 

             RADIOLOGIC EXAM KNEE COMPLETE 4/MORE VIEWS              2021 

12:00:00 AM EDT              MEDENT

 ([facility])

 

             RADIOLOGIC EXAM KNEE COMPLETE 4/MORE VIEWS              2021 

12:00:00 AM EDT              MEDENT

 ([facility])

 

             Mammogram                 2021 12:00:00 AM EDT              M

EDENT ([facility]

 

             Bone Mineral Density Test              2021 12:00:00 AM EDT  

            MED[facility] ([facility])

 

                    Brief Emotional/Behav Assessment W/ Scoring Doc Per Standard

 Inst                     2021 

12:00:00 AM EDT                                     [facility] ([facility]

)

 

             OFFICE OUTPATIENT VISIT 25 MINUTES              2021 12:00:00

 AM EDT              MED[facility] 

([facility]

 

             OFFICE OUTPATIENT NEW 45 MINUTES              2021 12:00:00 A

M EST              MEDENT 

([facility], )

 

             ECG ROUTINE ECG W/LEAST 12 LDS W/I&R              2021 12:00:

00 AM EST              MED[facility] 

([facility])

 

             OFFICE OUTPATIENT VISIT 15 MINUTES              2021 12:00:00

 AM EST              MED[facility] 

([facility])

 

             Complex Chronic Care Management SVC 1St 60 Min              

021 12:00:00 AM EST              

MEDENT ([facility])

 

             Complex Chronic Care MGMT Service Ea Addl 30 Min               12:00:00 AM EST              

[facility] ([facility]

 

             OFFICE OUTPATIENT VISIT 25 MINUTES              2021 12:00:00

 AM EST              MED[facility] 

([facility])

 

             Complex Chronic Care Management SVC 1St 60 Min              

020 12:00:00 AM EST              

MED[facility] ([facility]

 

             Complex Chronic Care MGMT Service Ea Addl 30 Min               12:00:00 AM EST              

[facility] ([facility]

 

                    Brief Emotional/Behav Assessment W/ Scoring Doc Per Standard

 Inst                     10/29/2020 

12:00:00 AM EDT                                     [facility] ([facility]

)



                                                                                
                                                                                
                                                                                
                                                                                
                                                                                
                                                                                
                            



Results

          



                    ID                  Date                Data Source

 

                    [id number]          10/25/2021 03:51:00 PM EDT [facility] ([facility]









          Name      Value     Range     Interpretation Code Description Data Milly

rce(s) Supporting 

Document(s)

 

           Influenza A Amplification Laboratory test result                     

             MEDENT ([facility])                              

 

                                        Negative results do not preclude influen

za or RSV virus

infection and should not be used as the sole basis for

treatment or other patient management decisions.

 

 

           Influenza B Amplification Laboratory test result                     

             MED[facility] ([facility])                              

 

                                        Negative results do not preclude influen

za or RSV virus

infection and should not be used as the sole basis for

treatment or other patient management decisions.

 

 

           Laboratory test finding (navigational concept) Laboratory test result

                                  

MEDENT ([facility])            

 

                                        A false negative result may occur if a s

pecimen is

improperly collected, transported or handled. False negative

results may also occur if inadequate numbers of organisms

are present in the specimen.  As with any molecular test,

mutations within the target regions of Xpert Xpress

SARS-CoV-2 could affect primer and/or probe binding

resulting in failure to detect the presence of virus.  This

test cannot rule out diseases caused by other bacterial or

viral pathogens.



DISCLAIMER:

Testing was performed using the BackType SARS-CoV-2 test.

This test was developed and its performance characteristics

determined by [employer]. This test has not been FDA cleared or

approved. This test has been authorized by FDA under an

Emergency Use Authorization (EUA). This test is only

authorized for the duration of time the declaration that

circumstances exist justifying the authorization of the

emergency use of in vitro diagnostic tests for detection of

SARS-CoV-2 virus and/or diagnosis of COVID-19 infection

under section 564(b)(1) of the Act, 21 U.S.C.

                                        360bbb-3(b)(1), unless the authorization

 is terminated or

revoked sooner.

 

 

          RSV Amplification Laboratory test result                              

 MED[facility] ([facility])  

 

                                        Negative results do not preclude influen

za or RSV virus

infection and should not be used as the sole basis for

treatment or other patient management decisions.

 









                    ID                  Date                Data Source

 

                    [id number]          10/25/2021 03:01:00 PM [facility]









          Name      Value     Range     Interpretation Code Description Data Milly

rce(s) Supporting 

Document(s)

 

           Digoxin [Mass/volume] in Serum or Plasma 0.5 ng/mL  0.5-2.0          

                [facility])                             

 

                                        <content>note:<nlbl:demographic_changed>

</content><br/><content></content> 

 

                          Natriuretic peptide.B prohormone N-Terminal [Mass/volu

me] in Serum or Plasma 

5817 pg/mL                                          [facility] ([facility]

)  

 

                                        <content>note:<nlbl:demographic_changed>

</content><br/><content></content> 

 

           Thyroxine (T4) Ab [Units/volume] in Serum 10.1 ug/dL 4.5-12.0        

                 [facility] 

([facility])                   

 

                                        <content>note:<nlbl:demographic_changed>

</content><br/><content></content> 

 

                          Thyrotropin [Units/volume] in Serum or Plasma by Detec

tion limit <= 0.05 mIU/L 

0.051 uIU/ML 0.358-3.740                            [facility] ([facility]

)  

 

                                        <content>note:<nlbl:demographic_changed>

</content><br/><content></content> 









                    ID                  Date                Data Source

 

                    [id number]          10/25/2021 03:01:00 PM EDT [facility]









          Name      Value     Range     Interpretation Code Description Data Milly

rce(s) Supporting 

Document(s)

 

          Glucose, Fasting 106 mg/dL                         [facility] ([facility])  

 

          Creatinine For GFR 0.94 mg/dL 0.55-1.30                     MED[facility] ([facility])  

 

          Blood Urea Nitrogen 18 mg/dL  7-18                          [facility] ([facility])  

 

           Glomerular Filtration Rate Laboratory test result                    

              [facility] ([facility])                              

 

                                        <content>Units are mL/min/1.73 

m2</content><br/><content></content><br/><content>Chronic Kidney Disease Staging
 per NKF:</content><br/><content></content><br/><content>Stage I & II   GFR >=60
       Normal to Mildly Decreased</content><br/><content>Stage III      GFR 30-
59      Moderately Decreased</content><br/><content>Stage IV       GFR 15-29    
  Severely Decreased</content><br/><content>Stage V        GFR <15        Very 
Little GFR Left</content><br/><content>ESRD           GFR <15 on 
RRT</content><br/><content></content> 

 

          Sodium Level 140 meq/L 136-145                       [facility] ([facility])  

 

          Chloride Level 111 meq/L                         [facility] ([facility])  

 

          Potassium Serum 4.6 meq/L 3.5-5.1                       [facility] ([facility])  

 

          Anion Gap 7 meq/L   8-16                          MED[facility] ([facility])  

 

          Carbon Dioxide Level 22 meq/L  21-32                         [facility] ([facility]  

 

          Calcium Level 10.7 mg/dL 8.8-10.2                      [facility] ([facility])  









                    ID                  Date                Data Source

 

                    [id number]          10/25/2021 03:01:00 PM EDT [facility] ([facility]









          Name      Value     Range     Interpretation Code Description Data Milly

rce(s) Supporting 

Document(s)

 

          Ast/Sgot  26 U/L    7-37                          MED[facility] ([facility])  

 

          Alt/SGPT  28 U/L    12-78                         [facility] ([facility])  

 

          Bilirubin,Total 0.8 mg/dL 0.2-1.0                       [facility] ([facility])  

 

          Alkaline Phosphatase 105 U/L                           MED[facility] ([facility])  

 

          Total Protein 7.9 GM/DL 6.4-8.2                       [facility] ([facility])  

 

          Bilirubin,Direct 0.4 mg/dL 0.0-0.2                       [facility]

[place] [facility]  

 

          Albumin/Globulin Ratio 0.5       1.2-2.2                       [facility] 

([place] [facility]  

 

          Albumin   2.8 GM/DL 3.2-5.2                       MED[facility] ([facility])  









                    ID                  Date                Data Source

 

                    [id number]          10/25/2021 03:01:00 PM EDT [facility] ([facility])









          Name      Value     Range     Interpretation Code Description Data Milly

rce(s) Supporting 

Document(s)

 

          CPK Creatine Phosphokinase 26 U/L                            MED

ENT ([place] [facility])  

 

          MB/CK Relative Index 3.85                                    MED[facility] ([facility]  

 

                                        <content>DIAGNOSIS CRITERIA</content><br

/><content>MMB ng/ml       Relative 

Index (RI)</content><br/><content>NON-AMI               < or = 5               
N/A</content><br/><content>GRAY ZONE              > 5                < or = 
4</content><br/><content>AMI                    > 5                   > 
4</content><br/><content></content> 

 

          CK-MB Value Mass 1.0 ng/mL                               [facility] ([facility])  

 

          Troponin I 0.02 ng/mL                               [facility] ([facility])  

 

                                        <content>Troponin I Reference Interval f

or Siemens Billingsley 

LOCI:</content><br/><content></content><br/><content>99th Percentile= 0.00-0.045
 ng/ml</content><br/><content></content><br/><content>Risk 
Stratification:</content><br/><content><= 0.10 ng/ml   Decreased Risk for 
Adverse Clinical</content><br/><content>Events.</content><br/><content>0.10-1.50
 ng/ml   Increased Risk for Adverse Clinical</content><br/><content>Events. 
Evaluation of additional</content><br/><content>criterion and/or repeat testing 
in 2-6</content><br/><content>hours is suggested to rule out 
myocardial</content><br/><content>damage.</content><br/><content>>= 1.50 ng/ml  
 Indicative of Myocardial Injury.</content><br/><content></content> 









                    ID                  Date                Data Source

 

                    [id number]          10/25/2021 03:01:00 PM EDT [facility] ([facility])









          Name      Value     Range     Interpretation Code Description Data Milly

rce(s) Supporting 

Document(s)

 

          White Blood Count 6.3 10    4.0-10.0                      [facility] ([facility])  

 

          Hemoglobin 12.9 g/dL 12.0-15.5                     [facility] [facility]  

 

          Red Blood Count 4.12 10   4.00-5.40                     [facility] ([facility])  

 

          Mean Corpuscular Hemoglobin 31.3 pg   27.0-33.0                     ME

DENT ([facility]) 

 

 

          Mean Corpuscular Volume 100.2 fl  80.0-96.0                     [facility]

 ([facility])  

 

          Hematocrit 41.3 %    36.0-47.0                     [facility] ([place] [facility]  

 

          Red Cell Distribution Width 14.8 %    11.5-14.5                     ME

DENT ([facility])  

 

          Mean Corpuscular HGB Conc 31.2 g/dL 32.0-36.5                     [facility]

NT ([facility]) 

 

 

          Platelet Count, Automated 202 10    150-450                       [facility]

NT ([facility])  

 

          Neutrophils % 79.5 %    36.0-66.0                     [facility] ([facility])  

 

          Lymph %   13.8 %    24.0-44.0                     [facility] [facility]  

 

          Eos %     1.1 %     0.0-3.0                       [facility] [facility]  

 

          Mono %    4.8 %     2.0-8.0                       [facility] [facility]  

 

          Immature Granulocyte % 0.5 %     0-3.0                         MED[facility] 

([facility])  

 

          Baso %    0.3 %     0.0-1.0                       [facility] [facility]  

 

          Neutrophils # 5.0 10    1.5-8.5                       [facility] ([facility])  

 

          Nucleated Red Blood Cell % 0.0 %     0-0                           MED

ENT ([facility])  

 

          Lymph #   0.9 10    1.5-5.0                       [facility] ([place] [facility])  

 

          Eos #     0.1 10    0.0-0.5                       [facility] ([place] [facility])  

 

          Baso #    0.0 10    0.0-0.2                       [facility] ([place] [facility])  

 

          Mono #    0.3 10    0.0-0.8                       [facility] [facility]  









                    ID                  Date                Data Source

 

                    [id number]          10/06/2021 02:57:00 PM EDT [facility] ([facility])









          Name      Value     Range     Interpretation Code Description Data Mlily

rce(s) Supporting 

Document(s)

 

                Bacteria identified in Wound shallow by [employer] culture Laborator

y test result                 

                                        MEDENT ([facility])  









                    ID                  Date                Data Source

 

                    [id number]            2021 12:29:00 PM EDT [facility] ([facility]









          Name      Value     Range     Interpretation Code Description Data Milly

rce(s) Supporting 

Document(s)

 

          White Blood Count 6.8       5.0-10.0                      [facility] ([facility])  

 

          Platelets 187       172-450                       [facility] ([facility]  

 

          Red Blood Count 3.85      4.00-5.40                     [facility] ([facility])  

 

          Hemoglobin 12.7                                    [facility] ([facility]  

 

          Hematocrit 39.4                                    [facility] ([facility]  









                    ID                  Date                Data Source

 

                    [id number]          2021 12:21:00 PM EDT [facility] ([facility])









          Name      Value     Range     Interpretation Code Description Data Milly

rce(s) Supporting 

Document(s)

 

          Red Blood Count 3.85 10   4.00-5.40                     [facility] ([facility])  

 

          White Blood Count 6.8 10    4.0-10.0                      [facility] ([facility] [facility]  

 

          Hemoglobin 12.7 g/dL 12.0-15.5                     [facility] ([place] [facility]

[facility])  

 

          Hematocrit 39.4 %    36.0-47.0                     [facility] ([facility])  

 

          Mean Corpuscular Volume 102.3 fl  80.0-96.0                     [facility]

 ([place] [facility])  

 

          Mean Corpuscular Hemoglobin 33.0 pg   27.0-33.0                     ME

DENT ([place] [facility]) 

 

 

          Red Cell Distribution Width 12.3 %    11.5-14.5                     ME

DENT ([facility] [place] [facility])  

 

          Mean Corpuscular HGB Conc 32.2 g/dL 32.0-36.5                     [facility]

NT ([facility] [place] [facility]) 

 

 

          Platelet Count, Automated 187 10    150-450                       [facility]

NT ([place] [facility])  

 

          Neutrophils % 72.2 %    36.0-66.0                     [facility] ([facility])  

 

          Lymph %   19.9 %    24.0-44.0                     [facility] ([place] [facility])  

 

          Mono %    6.0 %     2.0-8.0                       [facility] ([place] [facility])  

 

          Eos %     1.5 %     0.0-3.0                       [facility] ([place] [facility])  

 

          Baso %    0.3 %     0.0-1.0                       [facility] ([place] [facility])  

 

          Immature Granulocyte % 0.1 %     0-3.0                         [facility] 

([place] [facility])  

 

          Nucleated Red Blood Cell % 0.0 %     0-0                           MED

ENT ([place] [facility])  

 

          Neutrophils # 4.9 10    1.5-8.5                       [facility]

[facility]  

 

          Lymph #   1.4 10    1.5-5.0                       [facility] ([place] [facility])  

 

          Mono #    0.4 10    0.0-0.8                       [facility] ([place] [facility])  

 

          Eos #     0.1 10    0.0-0.5                       [facility] ([place] [facility])  

 

          Baso #    0.0 10    0.0-0.2                       [facility] ([place] [facility])  









                    ID                  Date                Data Source

 

                    [id number]           2021 05:25:00 PM EDT [facility]

 

                                        Hematopathology Report See Addendum [facility]

wName: JUANY BECERRILMRN: 

[id number] Number: [id number] Date: 2021 00:00Received Date: 
7/15/2021 13:57Physician(s): ROSA ROCHA MD ADJAPONG, OPOKU[facility] [place] 
[facility]) ReceivedA: Bone Marrow, Flow Cytometry; 
RECEIVED 1 GREEN TOP BM, 2 ASP AND 1 PBSMEAR (1 EXTRA EDTA BM SENT TO 
MOLECULAR)Clinical HistoryMultiple myeloma.TEST REQUESTED/PERFORMED: Flow 
cytometry analysis DiagnosisFlow cytometry of bone marrow: Dilute specimen with 
small population oflambda restricted plasma cells (1% on bone marrow aspirate), 
consistentwith plasma cell neoplasm. Correlation with full bone marrow biopsy 
isrecommended. FISH study is pending. Halie Gentile M.D.;Resident 
PathologistElectronically Signed By ROSALINDA MARIE M.D. Attending Pathologist  
117:25:03The attending pathologist named above attests that he/she has 
personallyreviewed the relevant preparation(s) for the specimen(s) and rendered 
thefinal diagnosis. Addendum     2021     FISH identified gain of 1q, 
trisomy 5, and monosomy 13 in a plasmacell-enriched population. FISH was 
negative for gain/loss of chromosome 7and 9, deletion of IgH and TP53, and an 
IgH rearrangement. See HY85-105Ypgu of 1q and deletion of 13q are associated 
with progression. Sjtzuknz60q is generally associated with an intermediate risk 
and 1q gain isassociated with high risk.     Addendum Electronically Signed By: 
         Nadege Eddy M.D.          2021 11:16  ProceduresFlow Cytometry 
    Date Ordered:7/15/2021     Status:   Signed Out2021 
InterpretationPERIPHERAL BLOOD: CBC performed at [facility] 
(21)WBC           6.8     K/uLRBC          *3.85     M/uLHgb           12.7
     g/dLHct           39.4     %MCV          *102.3     fLMCH           33.0   
  pgMCHC           32.2     g/dLRDW           12.3     %Platelets      187     
K/ulDifferential Count (automated):72.2  % Neutrophils 1.5  % Eosinophils 0.3  %
 Umtjclvwo44.9  % Lymphocytes 0.1  % Atypical Lymphocytes 6.0  % 
Monocytes-------100.0 %     A peripheral blood film is reviewed.  BONE MARROW 
ASPIRATE:Differential Count (100 cells): 6  % Erythroid Precursors 2  % N. 
Myelocytes 1  % N. Metamyelocytes and Band Forms80  % Neutrophils 4  % 
Eosinophils 1  % Basophils 5  % Lymphocytes 1  % Plasma Cells--------100 %  
Morphology: Dilute sample.Lymphoid Panel: Jone Harrington  [id number] 
following markers were assayed: CD45 (gate), CD2, CD3, CD4, CD5, CD7,CD8, CD10, 
CD19, CD20, CD33, CD34, CD38, CD56, CD57, CD64, , ,HLA-DR, Kappa, and 
Lambda.#events: [id number]: 98%Flow Cytometry Differential 
(CD45/SSC)Lymphocyte Anamosa: 15%CD45 dim Anamosa: 1%Monocyte Anamosa: 4%Granulocyte 
Anamosa: 73%Nucleated/Erythroid Anamosa: 2%The lymphocyte gate showsB-cells (CD19): 
14%T-cells (CD3): 75%NK-cells (CD3-/CD56+): 9%Kappa/Lambda Ratio: 2.9CD4/CD8 
Ratio: 1.8Results: (expressed as % of lymphocyte gate)T-cell Markers: CD2 = 80, 
CD3 = 75, CD3/CD4 = 48, CD3/CD8 = 27, CD5 = 74,CD7 = 75, CD3/57 = 8B-cell yelena
ers: Kappa = 9, Lambda = 3, CD19 = 14, CD20 = 16, CD19/10 = 1,CD19/CD5 = 3, 
CD38/CD20 = 15Light chain as % of B-Cells: CD19/Kappa = 54, CD19/Lambda = 
17CD19/CD5/Kappa = 4, CD19/CD5/Lambda = 5CD19/CD10/Kappa = 7, CD19/CD10/Lambda =
 1NK cell Markers: CD56 = 12, CD57 = 13Other Markers: CD10 = 1, CD38 = 
63Results: (expressed as % of CD45 dim gate)T-cell Markers: CD2 = 27, CD3 = 8, 
CD3/CD4 = 2, CD3/CD8 = 4, CD5 = 10, CD7= 26, CD3/57 = 2B-cell markers: Kappa = 
29, Lambda = 0, CD19 = 16, CD20 = 14, CD19/10 =12, CD19/CD5 = 3, CD38/CD20 = 
11Light chain as % of B-Cells: CD19/Kappa = 0, CD19/Lambda = 0CD19/CD10/Kappa = 
6, CD19/CD10/Lambda = 2NK cell Markers: CD56 = 16, CD57 = 13Basophil Markers: 
 (HLA-DR-) = 19Other Markers: CD10 = 25, CD38 = 85, CD33 = 47, CD34 = 23, 
CD64 = 20, = 5,  = 56, HLA-DR = 57Myeloma Panel [facility] 
 [id number] following markers were assayed: CD45 (cell gate),  
(plasma cellgate), CD19, CD20, CD38, CD56, cytoplasmic Kappa, and cytoplasmic La
mbda.(Please note, that Cytoplasmic Kappa and Cytoplasmic Lambda are used 
todetect plasma cells, while surface Kappa and Lambda are for 
lymphocytes).#events: [id number]: Routinely a minimum of 500,000 events are 
collected in each paneltube. Due to sample cellularity and/or processing this 
number was notachievable for this sample.Flow Cytometry Differential:Lymphocyte 
Anamosa: 13%CD45 dim Anamosa: 2%Monocyte Anamosa: 4%Granulocyte Anamosa: 74%NRBC Anamosa: 
3% Plasma Cell Anamosa: 0%Results: (expressed as % of lymphocyte gate)B-Cells 
(CD19): 14% CD19 = 14, CD20 = 14Light chain as % of B-Cells: Cytoplasmic Kappa/
CD20 = 49, CytoplasmicLambda/CD20 = 26Results: (expressed as % of total + 
plasma cell gate)CD38 = 64, CD56 = 40, CD38/56 = 37, CD19 = 12, CD20 = 
5Cytoplasmic Kappa/ = 0, Cytoplasmic Lambda/ = 67    Results-
CommentsLymphocytes consist predominantly of T cells with normal expression of 
panT-cell markers and a normal CD4/CD8 ratio, normal proportions of NK 
andcytotoxic T cells, and polyclonal B cells.CD34+ blasts comprise fewer than 1%
 of cells studied. Blasts, monocytes,and granulocytes show no definitive 
immunophenotypic aberrancies.Plasma cell-associated markers show very small 
population of lambdarestricted plasma cells comprising less than 1% of cells, 
mostly negativefor CD19 and positive for CD56. Procedure Electronically Signed 
By:ROSALINDA MARIE M.D.2021 This report may include one or more 
immunohistochemical stain/fluorochromeconjugated monoclonal antibody results 
that use analyte specific reagents.All positive and negative controls have been 
reviewed by the attendingpathologist and are satisfactory. The tests were 
developed and theirperformance characteristics determined by [facility] 
department.They have not been cleared or approved by the US Food and DrugAdminis
tration. The FDA has determined that such clearance or approval isnot necessary.
 









          Name      Value     Range     Interpretation Code Description Data Milly

rce(s) Supporting 

Document(s)

 

                                                                       









                    ID                  Date                Data Source

 

                    [id number]            2021 01:04:00 PM [facility]

 

                                        Cytogenetics ReportName: JUANY BECERRILMRN: [id number] Number: [id number]

[id number] Date: 2021 00:00Received Date: 7/15/2021 13:50Physician(s): 
ADJAPROSA NARANJO MD ADJAPONG, OPOKU, MDSpecimen(s) ReceivedA: Bone Marrow - Karyo 
and Multiple Myel  FISHClinical History66-year-old patient with multiple 
myeloma.TEST REQUESTED/PERFORMED:  Chromosome analysis and fluorescence in 
situhybridization (FISH)  DiagnosisFISH identified 11% of nuclei with gain of 
1q; 11% with trisomy 5; and 10%with monosomy 13 in this plasma cell-enriched 
population of cells. FISHwas negative for gains or losses of chromosomes 7 and 
9; deletions ui83z56.3 (IgH), and 17p13 (TP53); and an IgH rearrangement.Gains 
of chromosome 1q in plasma cell dyscrasia/multiple myeloma occur inup to 40% of 
cases, are secondary aberrations and are associated withprogression. 13q 
deletions are detected in approximately 50% of MM and arealso secondary events, 
although their presence in monoclonal gammopathy ofuncertain significance (MGUS)
 suggests they occur early in diseaseprogression. While deletion 13q is 
generally associated with anintermediate risk, 1q gain is often characterized as
 high risk (1-3).Please correlate with the concurrent Hematopathology report, 
[id number]. References:1.          Kailee GALLEGOS. 1q rearrangements in multiple 
myeloma. AtlasGenet Cytogenet Oncol Haematol. 
1998;2(3):86-87.http://AtlasGeneticsOncology.org/Anomalies/[email or web address].html. 
Lastvisited .2.     Maryam & Blake. Mature B- and T-cell neoplasms 
and Hodgkinlymphoma. In Cancer Cytogenetics: Chromosomal and Molecular 
GeneticAberrations of Tumor Cells, 4th Edition. Nataliya & Ty, eds. [employer] 
[employer]. 2015. 3.     Wilda SV. Multiple myeloma: 2020 update on 
diagnosis,risk-stratification and management. Am J Hematol. 2020 95(11):1444.  
Electronically Signed By Lorenzo Robles, PhD Attending Pathologist 2021 
13:04:45Gross DescriptionFluorescence in situ Hybridization (FISH)multiple 
myeloma FISH panel:nucish(QHUQ4Ak8,CKS1Bx
3)[],(5p15.31,EGR1)x3[],(Z64S328,13q34)x1[10/100],(IgHx2)[97/100],(T

P53,D17Z1)x2[98/100]     CEP 7/M6K684 (7q31):nuc maggie(D7Z1,J7Y731)x2[98/100] CEP 
9:nuc maggie(D9Z1x2)[98/100]DescriptionA plasma cell-enriched population evaluated 
by FISH: gain of 1q (11%);trisomy 5 (11%); monosomy 13 (10%); negative for gains
 or losses ofchromosomes 7 and 9; deletions IgH, and TP53; and IgH 
rearrangement.         
________________________________________________________________________________

_____________Test Data:Fluorescence in situ Hybridization (FISH):  Enriched 
Plasma Cells     Probe Normal Cut-off Nuclei  Analyzed FISH SIGNAL PATTERNS     
    Normal Abnormal NKCS      *LSI CDKN2C (1p22.3) G  LSI CKS1B (1q21.3) O 3% 
100 2G2O:  84% 2G3O:  11% 5%       *LSI 5p15.31 G  LSI EGR1 (5q31.2) R 3% 100 
2G2R:  89% 3G3R:  11% 0%  *CEP 7 (D7Z1) G  LSI J2I604 (7q31) R 3% 100 2G2R:  98%
 0% 2%       **CEP 9 (D9Z1) G 3%100   2% 0% 2%  *LSI P78Y864 (13q14.2) O  
     LSI 13q34 G 3% 100     2O2% 1O1G:   10% 3%       *LSI IgH(14q32) Y   
     BA 3% 100    2Y:  97% 0% 3%       *LSI TP53 (17p13.1) O  CEP 17 (D17Z1) G 
3% 100 2O2% 0% 2% Vendor:  *[employer]  Probes:  LSI: Locus Specific 
Probe;  CEP: Centromeric Probe;  BA: BreakApartFluorochromes/ Signals:  G - 
Green;  O  orange;  R  Red;  Y - yellow(fusion) NKCS:  Signals with No Known 
Clinical SignificanceDisclaimer: The plasma cells evaluated in this study were 
isolated fromthe bone marrow mixed cell population utilizing immunomagnetic 
cellseparation. This technology significantly enriches the test cellpopulation 
for plasma cells, thereby improving FISH detection of anomaliesassociated with 
plasma cell myeloma. However, as this is a selectivepopulation, the true in vivo
 frequencies of the anomalies identifiedcannot be determined. The FISH test was 
developed and its performance wasvalidated by the Cytogenetics section of the 
Department of ClinicalPathology. This test has not been cleared or approved by 
the U. S. Foodand Drug Administration (FDA). The FDA has determined that such 
approvalis not necessary. However, the procedure is considered 
investigational,and should not be used as the sole criteria for diagnosis.   









          Name      Value     Range     Interpretation Code Description Data Milly

rce(s) Supporting 

Document(s)

 

                                                                       









                    ID                  Date                Data Source

 

                    [id number]            2021 12:28:00 PM EDT [facility] ([facility])









          Name      Value     Range     Interpretation Code Description Data Milly

rce(s) Supporting 

Document(s)

 

           Albumin [Mass/volume] in Serum or Plasma 3.0                         

                [facility] ([facility])                       

 

           Calcium [Mass/volume] in Serum or Plasma 10.2                        

                [facility] ([facility])                       

 

             Alanine aminotransferase [Enzymatic activity/volume] in Serum or Pl

asma 19                                     

                          [facility] ([facility])  

 

           Carbon dioxide, total [Moles/volume] in Serum or Plasma 27           

                               [facility] 

([facility])           

 

           Chloride [Moles/volume] in Serum or Plasma 107                       

                  [facility] ([facility] 

[facility])                       

 

           Potassium [Moles/volume] in Serum or Plasma 4.7                      

                   [facility] ([facility])                       

 

           Alkaline phosphatase [Enzymatic activity/volume] in Serum or Plasma 7

7                                          

[facility] ([facility])    

 

          Sodium    137                                     [facility] ([facility]

[facility])  

 

           Protein [Mass/volume] in Serum or Plasma 8.3                         

                [facility] ([facility])                       

 

                Aspartate aminotransferase [Enzymatic activity/volume] in Serum 

or Plasma 23                              

                                        [facility] ([facility])  

 

           Urea nitrogen [Mass/volume] in Serum or Plasma 28                    

                      [facility] ([facility])                       

 

          Creatinine For GFR 0.90                                    [facility] ([facility]

[facility])  

 

          Glucose   98                                [facility]  









                    ID                  Date                Data Source

 

                    [id number]            2021 12:28:00 PM EDT [facility] ([facility])









          Name      Value     Range     Interpretation Code Description Data Milly

rce(s) Supporting 

Document(s)

 

          White Blood Count 6.2       5.0-10.0                      [facility] ([facility])  

 

          Red Blood Count 3.76      4.00-5.40                     [facility] ([facility])  

 

          Hemoglobin 12.5                                    [facility] ([facility])  

 

          Platelets 199       172-450                       [facility] ([facility]

[facility])  

 

          Hematocrit 39.0                                    [facility] ([facility])  









                    ID                  Date                Data Source

 

                    [id number]          2021 12:03:00 PM EDT [facility] ([facility])









          Name      Value     Range     Interpretation Code Description Data Milly

rce(s) Supporting 

Document(s)

 

          Prothrombin Time 13.8 s    12.5-14.3                     [facility]

[place] [facility]  

 

          Inr       1.04                                    [facility] [facility]  

 

                                        THERAPUTIC HUMAN INR VALUES

INDICATIONS                      NORMAL RANGES

PROPHYLAXIS/TREATMENT OF:

VENOUS THROMBOSIS                2.0-3.0

PULMONARY EMBOLISM               2.0-3.0

PREVENTION OF SYSTEMIC EMBOLISM FROM:

TISSUE HEART VALVES              2.0-3.0

ACUTE MYOCARDIAL INFARCTION      2.0-3.0

VALVULAR HEART DISEASE           2.0-3.0

ATRIAL FIBRILLATION              2.0-3.0

MECHANICAL VALVES(HIGH RISK)     2.5-3.5

RECURRENT MYOCARDIAL INFARCTION  2.5-3.5

 

 

          Partial Thromboplastin Time 30.6 s    24.2-38.5                     ME

DENT ([place] [facility])  









                    ID                  Date                Data Source

 

                    [id number]          2021 10:38:00 AM EDT [facility]









          Name      Value     Range     Interpretation Code Description Data Milly

rce(s) Supporting 

Document(s)

 

          Glucose, Fasting 98 mg/dL                          [facility] ([place]

[place] [facility]  

 

          Blood Urea Nitrogen 28 mg/dL  7-18                          [facility] ([facility])  

 

           Glomerular Filtration Rate Laboratory test result                    

              [facility] ([place] 

[facility])                              

 

                                        <content>Units are mL/min/1.73 

m2</content><br/><content></content><br/><content>Chronic Kidney Disease Staging
 per NKF:</content><br/><content></content><br/><content>Stage I & II   GFR >=60
       Normal to Mildly Decreased</content><br/><content>Stage III      GFR 30-
59      Moderately Decreased</content><br/><content>Stage IV       GFR 15-29    
  Severely Decreased</content><br/><content>Stage V        GFR <15        Very 
Little GFR Left</content><br/><content>ESRD           GFR <15 on 
RRT</content><br/><content></content> 

 

          Creatinine For GFR 0.90 mg/dL 0.55-1.30                     [facility] ([facility])  

 

          Sodium Level 137 meq/L 136-145                       [facility] ([place]

 [facility])  

 

          Chloride Level 107 meq/L                         [facility] ([facility])  

 

          Potassium Serum 4.7 meq/L 3.5-5.1                       [facility] ([facility])  

 

          Carbon Dioxide Level 27 meq/L  21-32                         [facility] ([facility])  

 

          Anion Gap 3 meq/L   8-16                          [facility] ([place] [facility]

[facility])  

 

          Calcium Level 10.2 mg/dL 8.8-10.2                      [facility] ([facility])  

 

          Ast/Sgot  23 U/L    7-37                          [facility] ([place] [facility])  

 

          Alkaline Phosphatase 77 U/L                            [facility] ([facility])  

 

          Alt/SGPT  19 U/L    12-78                         [facility] ([place] [facility]

[facility])  

 

          Bilirubin,Total 0.5 mg/dL 0.2-1.0                       [facility] ([facility])  

 

          Total Protein 8.3 GM/DL 6.4-8.2                       [facility] ([facility])  

 

          Albumin   3.0 GM/DL 3.2-5.2                       [facility] ([place] [facility])  

 

          Albumin/Globulin Ratio 0.6       1.2-2.2                       [facility] 

([place] [facility])  









                    ID                  Date                Data Source

 

                    [id number]          2021 10:38:00 AM EDT [facility] ([facility])









          Name      Value     Range     Interpretation Code Description Data Milly

rce(s) Supporting 

Document(s)

 

          White Blood Count 6.2 10    4.0-10.0                      [facility] ([facility])  

 

          Red Blood Count 3.76 10   4.00-5.40                     [facility] ([facility])  

 

          Hemoglobin 12.5 g/dL 12.0-15.5                     [facility] ([place] [facility]

[facility])  

 

          Mean Corpuscular Volume 103.7 fl  80.0-96.0                     [facility]

 ([place] [facility])  

 

          Hematocrit 39.0 %    36.0-47.0                     [facility] ([facility])  

 

          Mean Corpuscular HGB Conc 32.1 g/dL 32.0-36.5                     [facility]

NT ([place] [facility]) 

 

 

          Mean Corpuscular Hemoglobin 33.2 pg   27.0-33.0                     ME

DENT ([place] [facility]) 

 

 

          Platelet Count, Automated 199 10    150-450                       [facility]

NT ([place] [facility])  

 

          Red Cell Distribution Width 12.9 %    11.5-14.5                     ME

DENT ([place] [facility])  

 

          Neutrophils % 74.5 %    36.0-66.0                     [facility] ([facility])  

 

          Lymph %   16.4 %    24.0-44.0                     [facility] ([place] [facility]

[facility])  

 

          Mono %    7.0 %     2.0-8.0                       [facility] ([place] [facility])  

 

          Eos %     1.1 %     0.0-3.0                       [facility] ([place] [facility])  

 

          Baso %    0.5 %     0.0-1.0                       [facility] ([place] [facility])  

 

          Nucleated Red Blood Cell % 0.0 %     0-0                           MED

ENT ([place] [facility])  

 

          Immature Granulocyte % 0.5 %     0-3.0                         [facility] 

([place] [facility])  

 

          Neutrophils # 4.6 10    1.5-8.5                       [facility] ([facility])  

 

          Lymph #   1.0 10    1.5-5.0                       [facility] ([place] [facility])  

 

          Mono #    0.4 10    0.0-0.8                       [facility] ([place] [facility])  

 

          Eos #     0.1 10    0.0-0.5                       [facility] ([place] [facility]

[facility])  

 

          Baso #    0.0 10    0.0-0.2                       [facility] ([place] [facility]

[facility])  









                    ID                  Date                Data Source

 

                    [id number]          2021 02:06:00 PM EDT [facility] ([facility])









          Name      Value     Range     Interpretation Code Description Data Milly

rce(s) Supporting 

Document(s)

 

                          Thyrotropin [Units/volume] in Serum or Plasma by Detec

tion limit <= 0.05 mIU/L 

0.19 uIU/mL  0.36-3.74                              [facility] ([place] [facility]

)  









                    ID                  Date                Data Source

 

                    [id number]          2021 02:06:00 PM EDT [facility] ([facility])









          Name      Value     Range     Interpretation Code Description Data Milly

rce(s) Supporting 

Document(s)

 

           Urea nitrogen [Mass/volume] in Serum or Plasma 18 mg/dL   7-18       

                      [facility] 

([facility])                   

 

           Glucose [Mass/volume] in Serum or Plasma 100 mg/dL  74-99            

                [facility] ([place] 

[facility])                              

 

                                        100-125 mg/dL     PRE-DIABETES/FASTING

>126 mg/dL          DIABETES/FASTING

 

 

          Creatinine 0.8 mg/dL 0.6-1.3                       [facility] ([facility])  

 

           Sodium [Moles/volume] in Serum or Plasma 141 meq/L  136-145          

                [facility] ([facility])                             

 

           Potassium [Moles/volume] in Serum or Plasma 3.8 meq/L  3.5-5.1       

                   [facility] 

([facility])                   

 

           Calcium [Mass/volume] in Serum or Plasma 10.8 mg/dL 8.5-10.1         

                [facility] 

([facility])                   

 

                                        NOTE:

CALCIUM,TSH VERIFIED





 

 

           Carbon dioxide, total [Moles/volume] in Serum or Plasma 29 meq/L   21

-32                            

[facility] ([facility])            

 

           Chloride [Moles/volume] in Serum or Plasma 104 meq/L           

                  [facility] 

([facility])                   

 

                                        Glomerular filtration rate/1.73 sq M pre

dicted among non-blacks [Volume 

Rate/Area] in Serum or Plasma by Creatinine-based formula (MDRD) Laboratory test

result                                              [facility] ([facility]

)  

 

                                        Glomerular filtration rate/1.73 sq M pre

dicted among blacks [Volume Rate/Area] 

in Serum or Plasma by Creatinine-based formula (MDRD) Laboratory test result    

                  

                                        [facility] ([place] [facility]  

 

                                        <content>CHRONIC KIDNEY DISEASE STAGING 

PER 

NKF</content><br/><content></content><br/><content>STAGE I & II      GFR >= 60  
     NORMAL TO MILDLY DECREASED</content><br/><content>STAGE III          GFR 
30-59          MODERATELY DECREASED</content><br/><content>STAGE IV           
GFR 15-29         SEVERELY DECREASED</content><br/><content>STAGE V            
GFR <15            VERY LITTLE GFR LEFT</content><br/><content>ESRD             
   GFR <15            ON RRT</content><br/><content></content> 









                    ID                  Date                Data Source

 

                    [id number]          2021 02:06:00 PM EDT [facility] ([facility])









          Name      Value     Range     Interpretation Code Description Data Milly

rce(s) Supporting 

Document(s)

 

           Erythrocyte sedimentation rate by Westergren method 25 mm/hr   0-15  

                           [facility] 

([place] [facility])                   









                    ID                  Date                Data Source

 

                    [id number]          2021 02:06:00 PM EDT [facility] ([facility])









          Name      Value     Range     Interpretation Code Description Data Milly

rce(s) Supporting 

Document(s)

 

           Leukocytes [#/volume] in Blood by Automated count 5.9 x10*3/UL 4.1-10

.9                         

[facility] ([facility])            

 

           Erythrocytes [#/volume] in Blood by Automated count 4.11 x10*6/UL 4.2

0-6.30                        

[facility] ([facility])            

 

           Hemoglobin [Mass/volume] in Blood 13.6 g/dL  12.0-18.0               

         [facility] ([place] 

[facility])                              

 

          MCV       97.4 fL   80.0-97.0                     [facility] [facility]  

 

          MCH       33.1 pg   26.0-32.0                     [facility] ([place] [facility]

[facility])  

 

           Hematocrit [Volume Fraction] of Blood by Automated count 40.0 %     3

7.0-51.0                        

[facility] ([facility] [place] [facility])            

 

           Platelets [#/volume] in Blood by Automated count 209 x10*3/-440

                          [facility]

 ([facility])                  

 

           Erythrocyte distribution width [Ratio] by Automated count 13.2 %     

11.6-13.7                        

[facility] ([facility])            

 

          MCHC      34.0 g/dL 31.0-38.0                     [facility] ([place] [facility]

[facility])  

 

          MPV       8.1 FL    7.8-11.0                      [facility] ([place] [facility]

[facility])  

 

          Lymph %   25.6 %    10.0-58.5                     [facility] ([place] [facility]

[facility])  

 

          Mid %     7.5 %     1.7-9.3                       [facility] ([place] [facility]

[facility])  

 

          Neut %    66.9 %    37.0-92.0                     [facility] ([facility])  

 

          Lymph #   1.5 x10*3/UL 0.6-4.1                       [facility] ([place]

 [facility])  

 

          Mid #     0.5 x10*3/UL 0.1-0.6                       [facility] ([place]

 [facility])  

 

          Neut #    3.9 x10*3/UL 2.0-7.8                       [facility] ([place]

 [facility])  









                    ID                  Date                Data Source

 

                    [id number]          2021 01:37:00 PM EDT [facility] ([facility])









          Name      Value     Range     Interpretation Code Description Data Milly

rce(s) Supporting 

Document(s)

 

           Digoxin [Mass/volume] in Serum or Plasma 0.3 ng/mL  0.5-2.0          

                [facility] ([place]

 [facility]                             

 

                                        <content>note:<nlbl:demographic_changed>

</content><br/><content></content> 









                    ID                  Date                Data Source

 

                    [id number]          2021 01:37:00 PM EDT [facility] ([facility])









          Name      Value     Range     Interpretation Code Description Data Milly

rce(s) Supporting 

Document(s)

 

                          Thyrotropin [Units/volume] in Serum or Plasma by Detec

tion limit <= 0.05 mIU/L 

0.32 uIU/mL  0.36-3.74                              [facility] ([facility]

)  









                    ID                  Date                Data Source

 

                    [id number]          2021 01:37:00 PM EDT [facility] ([facility])









          Name      Value     Range     Interpretation Code Description Data Milly

rce(s) Supporting 

Document(s)

 

           Cholesterol [Mass/volume] in Serum or Plasma 132 mg/dL  131-200      

                    [facility] 

([facility])                   

 

           Cholesterol in HDL [Mass/volume] in Serum or Plasma 54 mg/dL   35-60 

                           [facility] 

([facility])                   

 

                    Cholesterol in LDL [Mass/volume] in Serum or Plasma by calcu

lation 64 CALC             

                                          [facility] ([facility])  

 

           Triglyceride [Mass/volume] in Serum or Plasma 72 mg/dL         

                     [facility] 

([facility])                   









                    ID                  Date                Data Source

 

                    [id number]          2021 01:37:00 PM EDT [facility] ([facility])









          Name      Value     Range     Interpretation Code Description Data Milly

rce(s) Supporting 

Document(s)

 

           Glucose [Mass/volume] in Serum or Plasma 70 mg/dL   74-99            

                [facility] ([place] 

[facility])                              

 

                                        100-125 mg/dL     PRE-DIABETES/FASTING

>126 mg/dL          DIABETES/FASTING

 

 

           Urea nitrogen [Mass/volume] in Serum or Plasma 23 mg/dL   7-18       

                      [facility] 

([facility])                   

 

           Sodium [Moles/volume] in Serum or Plasma 144 meq/L  136-145          

                [facility] ([place]

 [facility])                             

 

          Creatinine 0.9 mg/dL 0.6-1.3                       [facility] ([facility]

[facility])  

 

           Carbon dioxide, total [Moles/volume] in Serum or Plasma 32 meq/L   21

-32                            

[facility] ([facility])            

 

           Potassium [Moles/volume] in Serum or Plasma 3.8 meq/L  3.5-5.1       

                   [facility] 

([facility])                   

 

           Chloride [Moles/volume] in Serum or Plasma 104 meq/L           

                  [facility] 

([facility])                   

 

                                        Glomerular filtration rate/1.73 sq M pre

dicted among blacks [Volume Rate/Area] 

in Serum or Plasma by Creatinine-based formula (MDRD) Laboratory test result    

                  

                                        [facility] ([place] [facility]  

 

                                        <content>CHRONIC KIDNEY DISEASE STAGING 

PER 

NKF</content><br/><content></content><br/><content>STAGE I & II      GFR >= 60  
     NORMAL TO MILDLY DECREASED</content><br/><content>STAGE III          GFR 
30-59          MODERATELY DECREASED</content><br/><content>STAGE IV           
GFR 15-29         SEVERELY DECREASED</content><br/><content>STAGE V            
GFR <15            VERY LITTLE GFR LEFT</content><br/><content>ESRD             
   GFR <15            ON RRT</content><br/><content></content> 

 

                                        Glomerular filtration rate/1.73 sq M pre

dicted among non-blacks [Volume 

Rate/Area] in Serum or Plasma by Creatinine-based formula (MDRD) Laboratory test

result                                              [facility] ([place] [facility]

)  

 

           Calcium [Mass/volume] in Serum or Plasma 10.1 mg/dL 8.5-10.1         

                [facility] 

([place] [facility]                   









                    ID                  Date                Data Source

 

                    [id number]          2021 01:37:00 PM EDT [facility] ([facility]









          Name      Value     Range     Interpretation Code Description Data Milly

rce(s) Supporting 

Document(s)

 

           Erythrocyte sedimentation rate by Westergren method 45 mm/hr   0-15  

                           [facility] 

([place] [facility])                   

 

          Magnesium 1.8 mg/dL 1.8-2.4                       [facility]  









                    ID                  Date                Data Source

 

                    [id number]          2021 01:37:00 PM EDT [facility] ([facility]









          Name      Value     Range     Interpretation Code Description Data Milly

rce(s) Supporting 

Document(s)

 

           Leukocytes [#/volume] in Blood by Automated count 8.4 x10*3/UL 4.1-10

.9                         

[facility] ([facility])            

 

           Erythrocytes [#/volume] in Blood by Automated count 4.20 x10*6/UL 4.2

0-6.30                        

[facility] ([facility])            

 

           Hemoglobin [Mass/volume] in Blood 14.0 g/dL  12.0-18.0               

         [facility] ([place] 

[facility])                              

 

           Hematocrit [Volume Fraction] of Blood by Automated count 40.3 %     3

7.0-51.0                        

[facility] ([place] [facility]            

 

          MCV       95.8 fL   80.0-97.0                     [facility] ([place] [facility]

[facility])  

 

          MCH       33.3 pg   26.0-32.0                     [facility] ([place] [facility]

[facility])  

 

           Erythrocyte distribution width [Ratio] by Automated count 13.3 %     

11.6-13.7                        

[facility] ([facility] [place] [facility])            

 

          MCHC      34.7 g/dL 31.0-38.0                     [facility] [facility]  

 

          Lymph %   17.5 %    10.0-58.5                     [facility] ([place] [facility]

[facility])  

 

           Platelets [#/volume] in Blood by Automated count 207 x10*3/-440

                          [facility]

 ([facility] [place] [facility])                  

 

          MPV       8.0 FL    7.8-11.0                      [facility] [facility]  

 

          Neut %    77.7 %    37.0-92.0                     [facility] ([place] [facility]

[facility])  

 

          Mid %     4.8 %     1.7-9.3                       [facility] ([place] [facility]

[facility])  

 

          Lymph #   1.4 x10*3/UL 0.6-4.1                       [facility] ([place]

 [facility])  

 

          Mid #     0.5 x10*3/UL 0.1-0.6                       [facility] ([place]

 [facility])  

 

          Neut #    6.5 x10*3/UL 2.0-7.8                       [facility] ([place]

 [facility])  









                    ID                  Date                Data Source

 

                    [id number]            2021 01:37:00 PM EDT [facility] ([facility])









          Name      Value     Range     Interpretation Code Description Data Milly

rce(s) Supporting 

Document(s)

 

                          Thyrotropin [Units/volume] in Serum or Plasma by Detec

tion limit <= 0.05 mIU/L 

0.32 uIU/mL  0.36-3.74                              [facility] ([facility]  

 

           Digoxin [Mass/volume] in Serum or Plasma 0.3 ng/mL  0.5-2.0          

                [facility] 

([facility])           

 

                                        

<content>note:<nlbl:demographic_changed></content><br/><content></content><br/><

content></content> 









                    ID                  Date                Data Source

 

                    [id number]            2021 01:37:00 PM EDT [facility] ([facility])









          Name      Value     Range     Interpretation Code Description Data Milly

rce(s) Supporting 

Document(s)

 

           Cholesterol [Mass/volume] in Serum or Plasma 132 mg/dL  131-200      

                    [facility] 

([facility])           

 

           Triglyceride [Mass/volume] in Serum or Plasma 72 mg/dL         

                     [facility] 

([facility])           

 

           Cholesterol in HDL [Mass/volume] in Serum or Plasma 54 mg/dL   35-60 

                           [facility] 

([facility])           

 

                    Cholesterol in LDL [Mass/volume] in Serum or Plasma by calcu

lation 64 CALC             

                                          [facility] ([facility])  









                    ID                  Date                Data Source

 

                    [id number]            2021 01:37:00 PM EDT [facility] ([facility])









          Name      Value     Range     Interpretation Code Description Data Milly

rce(s) Supporting 

Document(s)

 

           Glucose [Mass/volume] in Serum or Plasma 70 mg/dL   74-99            

                [facility] ([facility])                       

 

                                        100-125 mg/dL     PRE-DIABETES/FASTING

>126 mg/dL          DIABETES/FASTING



 

 

           Urea nitrogen [Mass/volume] in Serum or Plasma 23 mg/dL   7-18       

                      [facility] 

([facility])           

 

          Creatinine 0.9 mg/dL 0.6-1.3                       [facility] ([facility]  

 

           Sodium [Moles/volume] in Serum or Plasma 144 meq/L  136-145          

                [facility] 

([facility])           

 

           Potassium [Moles/volume] in Serum or Plasma 3.8 meq/L  3.5-5.1       

                   [facility] 

([facility])           

 

           Carbon dioxide, total [Moles/volume] in Serum or Plasma 32 meq/L   21

-32                            

[facility] ([facility])    

 

           Chloride [Moles/volume] in Serum or Plasma 104 meq/L           

                  [facility] 

([facility])           

 

           Calcium [Mass/volume] in Serum or Plasma 10.1 mg/dL 8.5-10.1         

                [facility] 

([facility])           

 

                                        Glomerular filtration rate/1.73 sq M pre

dicted among non-blacks [Volume 

Rate/Area] in Serum or Plasma by Creatinine-based formula (MDRD) Laboratory test

result                                              [facility] ([facility])  

 

                                        Glomerular filtration rate/1.73 sq M pre

dicted among blacks [Volume Rate/Area] 

in Serum or Plasma by Creatinine-based formula (MDRD) Laboratory test result    

                  

                                        [facility] ([facility])  

 

                                        <content>CHRONIC KIDNEY DISEASE STAGING 

PER 

NKF</content><br/><content></content><br/><content>STAGE I & II      GFR >= 60  
     NORMAL TO MILDLY DECREASED</content><br/><content>STAGE III          GFR 
30-59          MODERATELY DECREASED</content><br/><content>STAGE IV           
GFR 15-29         SEVERELY DECREASED</content><br/><content>STAGE V            
GFR <15            VERY LITTLE GFR LEFT</content><br/><content>ESRD             
   GFR <15            ON 
RRT</content><br/><content></content><br/><content></content> 









                    ID                  Date                Data Source

 

                    [id number]            2021 01:37:00 PM EDT [facility] ([facility])









          Name      Value     Range     Interpretation Code Description Data Milly

rce(s) Supporting 

Document(s)

 

           Leukocytes [#/volume] in Blood by Automated count 8.4 x10*3/UL 4.1-10

.9                         

[facility] ([facility])    

 

           Erythrocytes [#/volume] in Blood by Automated count 4.20 x10*6/UL 4.2

0-6.30                        

[facility] ([facility])    

 

           Hemoglobin [Mass/volume] in Blood 14.0 g/dL  12.0-18.0               

         [facility] ([facility]iology 

[facility])                       

 

           Hematocrit [Volume Fraction] of Blood by Automated count 40.3 %     3

7.0-51.0                        

[facility] ([facility])    

 

          MCV       95.8 fL   80.0-97.0                     [facility] ([facility]

[facility])  

 

          MCH       33.3 pg   26.0-32.0                     [facility] ([facility]

[facility])  

 

           Erythrocyte distribution width [Ratio] by Automated count 13.3 %     

11.6-13.7                        

[facility] ([facility])    

 

          MCHC      34.7 g/dL 31.0-38.0                     [facility] ([facility]

[facility])  

 

           Platelets [#/volume] in Blood by Automated count 207 x10*3/-440

                          [facility]

 ([facility])          

 

             Platelet mean volume [Entitic volume] in Blood by Archie 8.0 FL

       7.8-11.0                   

                          [facility] ([facility])  

 

           Lymphocytes/100 leukocytes in Blood by Automated count 17.5 %     10.

0-58.5                        

[facility] ([facility])    

 

          Mid %     4.8 %     1.7-9.3                       [facility] ([facility]

[facility])  

 

          Lymph #   1.4 x10*3/UL 0.6-4.1                       [facility] (Cardiolog

y [facility])  

 

          Neut %    77.7 %    37.0-92.0                     [facility] ([facility]

[facility])  

 

           Neutrophils [#/volume] in Semen by Manual count 6.5 x10*3/UL 2.0-7.8 

                         [facility] 

([facility])           

 

          Mid #     0.5 x10*3/UL 0.1-0.6                       [facility])  









                    ID                  Date                Data Source

 

                    [id number]          2021 02:59:00 PM EDT [facility])









          Name      Value     Range     Interpretation Code Description Data Milly

rce(s) Supporting 

Document(s)

 

           Free Lambda Light Chains Serum 517.2 mg/L 5.7-26.3                   

      [facility] ([place] 

[facility]                              

 

           Free Kappa Light Chains Serum 6.4 mg/L   3.3-19.4                    

     [facility] ([place] 

[facility])                              

 

          Kappa/Lambda Ratio Serum 0.01      0.26-1.65                     [facility] ([place] [facility]  









                    ID                  Date                Data Source

 

                    [id number]          2021 02:59:00 PM EDT [facility]









          Name      Value     Range     Interpretation Code Description Data Milly

rce(s) Supporting 

Document(s)

 

           Ferritin [Mass/volume] in Serum or Plasma 128 ng/mL  8-252           

                 [facility] ([place] 

[facility]                              

 

                                        <content>note:<nlbl:demographic_changed>

</content><br/><content></content> 

 

           Beta-2-Microglobulin [Mass/volume] in Serum or Plasma 3.7 mg/L   0.6-

2.4                          

[facility] ([facility])            

 

                                        Siemens Immulite 2000 Immunochemiluminom

etric assay (ICMA)

                                        .

Values obtained with different assay methods or kits cannot

be used interchangeably. Results cannot be interpreted as

absolute evidence of the presence or absence of malignant

disease.

Performed at:   - [facility] [address]  [id number]

: Araceli B Reyes MD, Phone:  [phone]

Performed at:   - [facility] [address]  [id number]

[id number]

: Barak Archibald MD, Phone:  [phone]

 









                    ID                  Date                Data Source

 

                    [id number]          2021 02:59:00 PM EDT [facility] ([facility])









          Name      Value     Range     Interpretation Code Description Data Milly

rce(s) Supporting 

Document(s)

 

           Immunotyping Serum Iga Laboratory test result                        

          [facility] ([place] 

[facility])                              

 

           Immunotyping Serum Lambda Laboratory test result                     

             [facility] ([place] 

[facility])                              

 

           Laboratory test finding (navigational concept) Laboratory test result

                                  

[facility] ([facility])            

 

                                        REV'D BY O ADJAPONG

 

 

           It Serum Interpretation Laboratory test result                       

           [facility] ([place] 

[facility])                              

 

                                        MONOCLONAL IGA,LAMBDA

 









                    ID                  Date                Data Source

 

                    [id number]          2021 02:59:00 PM EDT [facility] ([facility])









          Name      Value     Range     Interpretation Code Description Data Milly

rce(s) Supporting 

Document(s)

 

          Immunoglobulin G 510 mg/dL [phone]                      [facility] ([place]

[place] [facility])  

 

          Immunoglobulin A 2640.0 mg/dL                         [facility] ([facility])  

 

           Immunoglobulin M Laboratory test result                        

    [facility] ([facility])

                                         









                    ID                  Date                Data Source

 

                    [id number]          2021 02:59:00 PM EDT [facility] ([facility])









          Name      Value     Range     Interpretation Code Description Data Milly

rce(s) Supporting 

Document(s)

 

          Iron (Fe) 131 ug/dL                         [facility] ([place] [facility])  

 

          Total Iron Binding Capacity 305 ug/dL 250-450                       ME

DENT ([facility] [place] [facility]) 

 

 

          Percent Saturation 43.0 %    13.2-45.0                     [facility] ([facility])  









                    ID                  Date                Data Source

 

                    [id number]          2021 02:59:00 PM EDT [facility] ([facility])









          Name      Value     Range     Interpretation Code Description Data Milly

rce(s) Supporting 

Document(s)

 

          Glucose, Fasting 97 mg/dL                          [facility] ([place]

[place] [facility])  

 

          Blood Urea Nitrogen 24 mg/dL  7-18                          [facility] ([facility])  

 

          Creatinine For GFR 0.85 mg/dL 0.55-1.30                     [facility] ([facility])  

 

           Glomerular Filtration Rate Laboratory test result                    

              [facility] ([place] 

[facility])                              

 

                                        <content>Units are mL/min/1.73 

m2</content><br/><content></content><br/><content>Chronic Kidney Disease Staging
 per NKF:</content><br/><content></content><br/><content>Stage I & II   GFR >=60
       Normal to Mildly Decreased</content><br/><content>Stage III      GFR 30-
59      Moderately Decreased</content><br/><content>Stage IV       GFR 15-29    
  Severely Decreased</content><br/><content>Stage V        GFR <15        Very 
Little GFR Left</content><br/><content>ESRD           GFR <15 on 
RRT</content><br/><content></content> 

 

          Sodium Level 139 meq/L 136-145                       [facility] ([place]

 [facility])  

 

          Carbon Dioxide Level 26 meq/L  21-32                         [facility] ([facility])  

 

          Potassium Serum 4.6 meq/L 3.5-5.1                       [facility] ([facility])  

 

          Chloride Level 106 meq/L                         [facility] ([facility])  

 

          Calcium Level 11.8 mg/dL 8.8-10.2                      [facility] ([facility])  

 

          Anion Gap 7 meq/L   8-16                          [facility] ([place] [facility]

[facility])  

 

          Alt/SGPT  44 U/L    12-78                         [facility] ([place] [facility]

[facility])  

 

          Alkaline Phosphatase 116 U/L                           [facility] ([facility])  

 

          Ast/Sgot  33 U/L    7-37                          MED[facility] ([facility])  

 

          Bilirubin,Total 0.6 mg/dL 0.2-1.0                       [facility] ([facility])  

 

          Total Protein 8.9 GM/DL 6.4-8.2                       [facility] ([facility])  

 

          Albumin   3.2 GM/DL 3.2-5.2                       [facility] ([place] [facility]

[facility])  

 

          Albumin/Globulin Ratio 0.6       1.2-2.2                       [facility] 

([place] [facility])  









                    ID                  Date                Data Source

 

                    [id number]          2021 02:59:00 PM EDT [facility] ([facility])









          Name      Value     Range     Interpretation Code Description Data Milly

rce(s) Supporting 

Document(s)

 

           Erythrocyte sedimentation rate by Westergren method 68 mm/hr   0-30  

                           [facility] 

([place] [facility])                   









                    ID                  Date                Data Source

 

                    [id number]          2021 02:59:00 PM EDT [facility] ([facility])









          Name      Value     Range     Interpretation Code Description Data Milly

rce(s) Supporting 

Document(s)

 

          White Blood Count 7.6 10    4.0-10.0                      [facility] ([facility])  

 

          Red Blood Count 4.29 10   4.00-5.40                     [facility] ([facility])  

 

          Hemoglobin 13.9 g/dL 12.0-15.5                     [facility] ([facility])  

 

          Hematocrit 43.3 %    36.0-47.0                     [facility] ([facility])  

 

          Mean Corpuscular Hemoglobin 32.4 pg   27.0-33.0                     ME

DENT ([facility] [place] [facility]) 

 

 

          Mean Corpuscular HGB Conc 32.1 g/dL 32.0-36.5                     [facility]

NT ([place] [facility]) 

 

 

          Mean Corpuscular Volume 100.9 fl  80.0-96.0                     [facility]

 ([place] [facility])  

 

          Red Cell Distribution Width 13.1 %    11.5-14.5                     [facility] ([place] [facility])  

 

          Platelet Count, Automated 175 10    150-450                       [facility]

NT ([place] [facility])  

 

          Neutrophils % 73.1 %    36.0-66.0                     [facility] ([facility])  

 

          Lymph %   18.1 %    24.0-44.0                     [facility] ([place] [facility])  

 

          Eos %     1.3 %     0.0-3.0                       [facility] ([place] [facility]

[facility])  

 

          Mono %    6.8 %     2.0-8.0                       [facility] ([place] [facility])  

 

          Baso %    0.3 %     0.0-1.0                       [facility] ([place] [facility]

[facility])  

 

          Immature Granulocyte % 0.4 %     0-3.0                         [facility] 

([place] [facility])  

 

          Nucleated Red Blood Cell % 0.0 %     0-0                           MED

ENT ([place] [facility])  

 

          Neutrophils # 5.6 10    1.5-8.5                       [facility]  

 

          Lymph #   1.4 10    1.5-5.0                       [facility] ([place] [facility]

[facility])  

 

          Mono #    0.5 10    0.0-0.8                       [facility] ([place] [facility]

[facility])  

 

          Eos #     0.1 10    0.0-0.5                       [facility] ([place] [facility]

[facility])  

 

          Baso #    0.0 10    0.0-0.2                       [facility] ([facility])  









                    ID                  Date                Data Source

 

                    [id number]          2021 02:59:00 PM EDT [facility]









          Name      Value     Range     Interpretation Code Description Data Milly

rce(s) Supporting 

Document(s)

 

          Appearance, Urine Laboratory test result                              

 [facility] ([place] [facility]  

 

          PH,Urine  5.0 units 5.0-9.0                       [facility] ([facility])  

 

          Color, Urine Laboratory test result                               [facility]

NT ([place] [facility]  

 

          Protein, Urine Auto Laboratory test result                            

   [facility] ([place] [facility]  

 

           Specific Gravity Urine Auto 1.028      1.002-1.035                   

    [facility] ([place] [facility]

                                         

 

          Ketone, Urine Auto Laboratory test result                             

  [facility] ([place] [facility]  

 

           Glucose, Urine (Ua) Auto Laboratory test result                      

            [facility] ([place] 

[facility]                              

 

          Urobilinogen, Urine Auto 4.0 mg/dL 0.0-2.0                       [facility]

T ([place] [facility]  

 

           Leukocyte Esterase, Urine Auto Laboratory test result                

                  [facility] ([place] 

[facility])                              

 

          Nitrite, Urine Auto Laboratory test result                            

   [facility] ([place] [facility])  

 

           Bilirubin, Urine Auto Laboratory test result                         

         [facility] ([place] [facility])

                                         

 

          Blood, Urine Blood Laboratory test result                             

  [facility] ([place] [facility]  

 

          WBC, Urine Auto 2 /HPF    0-3                           [facility] ([facility])  

 

          Squamous Epithelial Cell Ur AU 0 /HPF    0-6                          

 [facility] ([place] [facility]  

 

          RBC, Urine Auto 2 /HPF    0-3                           [facility] ([facility]

own [facility])  

 

          Bacteria, Urine Auto Laboratory test result                           

    [facility] ([place] [facility]) 

 

 

          Mucus, Urine Laboratory test result                               MED[facility]

NT ([place] [facility])  

 

          Hyaline Cast, Urine Auto 0 /LPF    0-1                           MEDEN

T ([place] [facility])  









                    ID                  Date                Data Source

 

                    [id number]              2021 02:43:00 PM EDT [facility] ([facility])









          Name      Value     Range     Interpretation Code Description Data Milly

rce(s) Supporting 

Document(s)

 

           3D Bi-Lateral Mammogram Laboratory test result                       

           [facility] ([place] 

[facility])                              

 

          Dexa/Bone Density Laboratory test result                              

 [facility] ([place] [facility])  









                    ID                  Date                Data Source

 

                    [id number]          2021 02:26:00 PM EDT [facility] ([facility])









          Name      Value     Range     Interpretation Code Description Data Milly

rce(s) Supporting 

Document(s)

 

          Albumin % 41.3 %    55.8-66.1                     [facility] ([place] [facility]

[facility])  

 

          Alpha-1-Globulin % 3.8 %     2.9-4.9                       [facility] ([facility]  

 

          Alpha-2-Globulins % 8.9 %     7.1-11.8                      [facility] ([facility])  

 

          Beta-1-Globulins % 5.8 %     4.7-7.2                       [facility] ([facility])  

 

          Beta-2-Globulins % 35.2 %    3.2-6.5                       [facility] ([facility])  

 

          Gamma Globulin % 5.0 %     11.1-18.8                     [facility] ([place]

[place] [facility])  

 

          Albumin   3.39 GM/DL 3.29-5.55                     [facility] ([place] [facility]

[facility])  

 

          Alpha-1-Globulins 0.31 GM/DL 0.17-0.41                     [facility] ([facility]  

 

          Alpha-2-Globulins 0.73 GM/DL 0.42-0.99                     [facility]  

 

          Beta-1-Globulins 0.48 GM/DL 0.28-0.60                     [facility] ([facility]  

 

          Beta-2-Globulins 2.89 GM/DL 0.19-0.55                     [facility] ([facility])  

 

          Total Protein 8.2 GM/DL 6.4-8.2                       [facility] ([facility])  

 

          Gamma Globulins 0.41 GM/DL 0.65-1.58                     [facility]

[place] [facility]  

 

          Spep Interpretation Laboratory test result                            

   [facility] ([place] [facility])  

 

                                        M-SPIKE NOTED IN BETA 2 REGION.

CONCENTRATION =   2.85    GM/DL

SUGGEST SERUM AND URINE IMMUNOTYPING.

 

 

           Laboratory test finding (navigational concept) Laboratory test result

                                  

[facility] ([place] [facility])            

 

                                        REV'D BY CHARLES ROCHA

 









                    ID                  Date                Data Source

 

                    [id number]          2021 02:26:00 PM EDT [facility] ([facility])









          Name      Value     Range     Interpretation Code Description Data Milly

rce(s) Supporting 

Document(s)

 

           Immunotyping Serum Iga Laboratory test result                        

          [facility] ([place] 

[facility])                              

 

           Immunotyping Serum Lambda Laboratory test result                     

             [facility] ([place] 

[facility])                              

 

           [facility] Interpretation [facility]                       

           [facility] 

[facility]                              

 

                                        MONOCLONAL IGA LAMBDA

 

 

           Laboratory test finding (navigational concept) Laboratory test result

                                  

[facility] ([place] [facility])            









                    ID                  Date                Data Source

 

                    [id number]          2021 02:25:00 PM EDT [facility] ([facility])









          Name      Value     Range     Interpretation Code Description Data Milly

rce(s) Supporting 

Document(s)

 

           Glucose [Mass/volume] in Serum or Plasma 124 mg/dL  74-99            

                [facility] ([place] 

[facility])                              

 

                                        100-125 mg/dL     PRE-DIABETES/FASTING

>126 mg/dL          DIABETES/FASTING

 

 

           Urea nitrogen [Mass/volume] in Serum or Plasma 19 mg/dL   7-18       

                      [facility] 

([place] [facility])                   

 

          Creatinine 0.7 mg/dL 0.6-1.3                       [facility] ([place] [facility])  

 

           Sodium [Moles/volume] in Serum or Plasma 143 meq/L  136-145          

                [facility] ([place]

 [facility])                             

 

           Chloride [Moles/volume] in Serum or Plasma 103 meq/L           

                  [facility] 

([place] [facility])                   

 

           Potassium [Moles/volume] in Serum or Plasma 3.8 meq/L  3.5-5.1       

                   [facility] 

([place] [facility])                   

 

           Carbon dioxide, total [Moles/volume] in Serum or Plasma 29 meq/L   21

-32                            

[facility] ([place] [facility])            

 

           Calcium [Mass/volume] in Serum or Plasma 10.2 mg/dL 8.5-10.1         

                [facility] 

([place] [facility])                   

 

                                        NOTE:

RESULT VERIFIED.





 

 

                                        Glomerular filtration rate/1.73 sq M pre

dicted among non-blacks [Volume 

Rate/Area] in Serum or Plasma by Creatinine-based formula (MDRD) Laboratory test

result                                              [facility] ([place] [facility]

)  

 

                                        Glomerular filtration rate/1.73 sq M pre

dicted among blacks [Volume Rate/Area] 

in Serum or Plasma by Creatinine-based formula (MDRD) Laboratory test result    

                  

                                        [facility] ([place] [facility])  

 

                                        <content>CHRONIC KIDNEY DISEASE STAGING 

PER 

NKF</content><br/><content></content><br/><content>STAGE I & II      GFR >= 60  
     NORMAL TO MILDLY DECREASED</content><br/><content>STAGE III          GFR 
30-59          MODERATELY DECREASED</content><br/><content>STAGE IV           
GFR 15-29         SEVERELY DECREASED</content><br/><content>STAGE V            
GFR <15            VERY LITTLE GFR LEFT</content><br/><content>ESRD             
   GFR <15            ON RRT</content><br/><content></content> 









                    ID                  Date                Data Source

 

                    [id number]          2021 11:17:00 AM EST [facility] ([facility])









          Name      Value     Range     Interpretation Code Description Data Milly

rce(s) Supporting 

Document(s)

 

           Erythrocyte sedimentation rate by Westergren method 108 mm/hr  0-30  

                           [facility] 

([place] [facility])                   









                    ID                  Date                Data Source

 

                    [id number]          2021 11:17:00 AM EST [facility]









          Name      Value     Range     Interpretation Code Description Data Milly

rce(s) Supporting 

Document(s)

 

          White Blood Count 4.3 10    4.0-10.0                      [facility] ([facility])  

 

          Hematocrit 39.3 %    36.0-47.0                     [facility] ([facility]

[facility])  

 

          Hemoglobin 12.4 g/dL 12.0-15.5                     [facility] ([facility])  

 

          Red Blood Count 3.96 10   4.00-5.40                     [facility] ([facility])  

 

          Mean Corpuscular Hemoglobin 31.3 pg   27.0-33.0                     ME

DENT ([place] [facility]) 



 

          Mean Corpuscular Volume 99.2 fl   80.0-96.0                     [facility]

 ([place] [facility])  

 

          Mean Corpuscular HGB Conc 31.6 g/dL 32.0-36.5                     [facility]

NT ([place] [facility]) 



 

          Red Cell Distribution Width 13.2 %    11.5-14.5                     ME

DENT ([place] [facility])  

 

          Platelet Count, Automated 193 10    150-450                       [facility]

NT ([place] [facility])  

 

          Neutrophils % 62.7 %    36.0-66.0                     [facility] ([facility])  

 

          Mono %    7.4 %     2.0-8.0                       [facility] [facility]  

 

          Lymph %   27.8 %    24.0-44.0                     [facility] [facility]  

 

          Baso %    0.5 %     0.0-1.0                       [facility] [facility]  

 

          Eos %     1.4 %     0.0-3.0                       [facility] [facility]  

 

          Neutrophils # 2.7 10    1.5-8.5                       [facility] ([facility])  

 

          Nucleated Red Blood Cell % 0.0 %     0-0                           MED

ENT ([place] [facility])  

 

          Immature Granulocyte % 0.2 %     0-3.0                         [facility] 

([place] [facility])  

 

          Mono #    0.3 10    0.0-0.8                       [facility] [facility]  

 

          Lymph #   1.2 10    1.5-5.0                       [facility] [facility]  

 

          Baso #    0.0 10    0.0-0.2                       [facility] [facility]  

 

          Eos #     0.1 10    0.0-0.5                       [facility] [facility]  









                    ID                  Date                Data Source

 

                    [id number]          2021 11:17:00 AM EST [facility] ([facility])









          Name      Value     Range     Interpretation Code Description Data Milly

rce(s) Supporting 

Document(s)

 

                          C reactive protein [Mass/volume] in Serum or Plasma by

 High sensitivity method 

0.52 mg/dL   0.00-0.30                              [facility] ([place] [facility]

)  









                    ID                  Date                Data Source

 

                    [id number]          2021 11:17:00 AM EST [facility] ([facility])









          Name      Value     Range     Interpretation Code Description Data Milly

rce(s) Supporting 

Document(s)

 

          Glucose, Fasting 99 mg/dL                          [facility] ([place]

[place] [facility])  

 

          Creatinine For GFR 0.92 mg/dL 0.55-1.30                     [facility] ([facility])  

 

          Blood Urea Nitrogen 29 mg/dL  7-18                          [facility] ([facility])  

 

          Sodium Level 139 meq/L 136-145                       [facility] ([place]

 [facility])  

 

           Glomerular Filtration Rate Laboratory test result                    

              [facility] ([place] 

[facility])                              

 

                                        <content>Units are mL/min/1.73 

m2</content><br/><content></content><br/><content>Chronic Kidney Disease Staging
 per NKF:</content><br/><content></content><br/><content>Stage I & II   GFR >=60
       Normal to Mildly Decreased</content><br/><content>Stage III      GFR 30-
59      Moderately Decreased</content><br/><content>Stage IV       GFR 15-29    
  Severely Decreased</content><br/><content>Stage V        GFR <15        Very 
Little GFR Left</content><br/><content>ESRD           GFR <15 on 
RRT</content><br/><content></content> 

 

          Potassium Serum 4.9 meq/L 3.5-5.1                       [facility] ([facility])  

 

          Chloride Level 105 meq/L                         [facility] ([facility])  

 

          Carbon Dioxide Level 26 meq/L  21-32                         MEDENT ([facility])  

 

          Anion Gap 8 meq/L   8-16                          [facility] ([place] [facility]  

 

          Calcium Level 10.8 mg/dL 8.8-10.2                      MEDENT ([facility])  









                    ID                  Date                Data Source

 

                    [id number]          2021 11:17:00 AM EST MEDENT ([facility])









          Name      Value     Range     Interpretation Code Description Data Milly

rce(s) Supporting 

Document(s)

 

           aPTT in Blood by Coagulation assay 35.2 s     24.2-38.5              

          MEDENT ([place] 

[facility])                              









                    ID                  Date                Data Source

 

                    [id number]          2021 11:17:00 AM EST MEDENT ([facility])









          Name      Value     Range     Interpretation Code Description Data Milly

rce(s) Supporting 

Document(s)

 

          Inr       1.05                                    MEDENT ([place] [facility]

[facility])  

 

                                        THERAPUTIC HUMAN INR VALUES

INDICATIONS                      NORMAL RANGES

PROPHYLAXIS/TREATMENT OF:

VENOUS THROMBOSIS                2.0-3.0

PULMONARY EMBOLISM               2.0-3.0

PREVENTION OF SYSTEMIC EMBOLISM FROM:

TISSUE HEART VALVES              2.0-3.0

ACUTE MYOCARDIAL INFARCTION      2.0-3.0

VALVULAR HEART DISEASE           2.0-3.0

ATRIAL FIBRILLATION              2.0-3.0

MECHANICAL VALVES(HIGH RISK)     2.5-3.5

RECURRENT MYOCARDIAL INFARCTION  2.5-3.5

 

 

          Prothrombin Time 13.9 s    12.5-14.3                     MED[facility] ([place]

[place] [facility])  









                    ID                  Date                Data Source

 

                    [id number]          2021 01:39:00 PM EST MEDENT ([facility])









          Name      Value     Range     Interpretation Code Description Data Milly

rce(s) Supporting 

Document(s)

 

           Parathyrin.intact [Mass/volume] in Serum or Plasma 26.3 pg/mL 18.5-88

.0                        

MEDENT ([place] [facility])            









                    ID                  Date                Data Source

 

                    [id number]          2021 01:39:00 PM EST MEDENT ([facility])









          Name      Value     Range     Interpretation Code Description Data Milly

rce(s) Supporting 

Document(s)

 

           Calcidiol [Mass/volume] in Serum or Plasma 32.7       24.0-80.0      

                  MEDENT ([place]

 [facility])                             

 

                                        This test was performed using [employer] I

P Vitamin D immunoassay kit.  Values 

obtained with different assay methods should not be used interchangeably.

 









                    ID                  Date                Data Source

 

                    [id number]          2021 01:39:00 PM EST MEDENT ([facility])









          Name      Value     Range     Interpretation Code Description Data Milly

rce(s) Supporting 

Document(s)

 

                          Thyrotropin [Units/volume] in Serum or Plasma by Detec

tion limit <= 0.05 mIU/L 

0.54 uIU/mL  0.36-3.74                              MEDENT ([place] [facility]

)  









                    ID                  Date                Data Source

 

                    [id number]          2021 01:39:00 PM EST MEDENT ([facility])









          Name      Value     Range     Interpretation Code Description Data Milly

rce(s) Supporting 

Document(s)

 

           Glucose [Mass/volume] in Serum or Plasma 98 mg/dL   74-99            

                MEDENT ([place] 

[facility])                              

 

                                        100-125 mg/dL     PRE-DIABETES/FASTING

>126 mg/dL          DIABETES/FASTING

 

 

           Urea nitrogen [Mass/volume] in Serum or Plasma 49 mg/dL   7-18       

                      MEDENT 

([place] [facility])                   

 

                                        NOTE:

BUN,CALCIUM VERIFIED





 

 

           Potassium [Moles/volume] in Serum or Plasma 4.5 meq/L  3.5-5.1       

                   MEDENT 

([place] [facility])                   

 

          Creatinine 1.2 mg/dL 0.6-1.3                       MEDENT ([facility]

n[facility])  

 

           Sodium [Moles/volume] in Serum or Plasma 142 meq/L  136-145          

                MEDENT ([place]

 [facility])                             

 

           Chloride [Moles/volume] in Serum or Plasma 104 meq/L           

                  MEDENT 

([place] [facility])                   

 

           Carbon dioxide, total [Moles/volume] in Serum or Plasma 23 meq/L   21

-32                            

MED[facility] ([place] [facility]            

 

                                        Glomerular filtration rate/1.73 sq M pre

dicted among non-blacks [Volume 

Rate/Area] in Serum or Plasma by Creatinine-based formula (MDRD) 45 mL/min      

                                  

                          MEDENT ([place] [facility])  

 

           Calcium [Mass/volume] in Serum or Plasma 10.6 mg/dL 8.5-10.1         

                MEDENT 

([place] [facility]                   

 

                                        Glomerular filtration rate/1.73 sq M pre

dicted among blacks [Volume Rate/Area] 

in Serum or Plasma by Creatinine-based formula (MDRD) 55 mL/min                 

                          MEDENT 

([place] [facility])                   

 

                                        <content>CHRONIC KIDNEY DISEASE STAGING 

PER 

NKF</content><br/><content></content><br/><content>STAGE I & II      GFR >= 60  
     NORMAL TO MILDLY DECREASED</content><br/><content>STAGE III          GFR 
30-59          MODERATELY DECREASED</content><br/><content>STAGE IV           
GFR 15-29         SEVERELY DECREASED</content><br/><content>STAGE V            
GFR <15            VERY LITTLE GFR LEFT</content><br/><content>ESRD             
   GFR <15            ON RRT</content><br/><content></content> 









                    ID                  Date                Data Source

 

                    [id number]          2021 01:39:00 PM EST MEDENT ([facility])









          Name      Value     Range     Interpretation Code Description Data Milly

rce(s) Supporting 

Document(s)

 

           Hemoglobin [Mass/volume] in Blood 13.1 g/dL  12.0-18.0               

         MEDENT ([place] 

[facility])                              

 

           Leukocytes [#/volume] in Blood by Automated count 5.8 x10*3/UL 4.1-10

.9                         

MEDENT ([place] [facility])            

 

           Erythrocytes [#/volume] in Blood by Automated count 4.01 x10*6/UL 4.2

0-6.30                        

MEDENT ([place] [facility])            

 

           Hematocrit [Volume Fraction] of Blood by Automated count 37.5 %     3

7.0-51.0                        

MEDENT ([place] [facility])            

 

          MCV       93.4 fL   80.0-97.0                     MEDENT ([place] [facility]

[facility])  

 

          MCH       32.6 pg   26.0-32.0                     MEDENT ([place] [facility]

[facility])  

 

          MCHC      34.9 g/dL 31.0-38.0                     MED[facility] ([place] [facility]

[facility])  

 

           Platelets [#/volume] in Blood by Automated count 191 x10*3/-440

                          MEDENT

 ([place] [facility])                  

 

          MPV       8.6 FL    7.8-11.0                      MEDENT ([place] [facility]

[facility])  

 

           Erythrocyte distribution width [Ratio] by Automated count 13.8 %     

11.6-13.7                        

MEDENT ([place] [facility])            

 

          Mid %     6.7 %     1.7-9.3                       MEDENT ([place] [facility]

[facility])  

 

          Lymph %   24.4 %    10.0-58.5                     MED[facility] ([place] [facility]

[facility])  

 

          Mid #     0.4 x10*3/UL 0.1-0.6                       MED[facility] ([place]

 [facility])  

 

          Neut %    68.9 %    37.0-92.0                     [facility] ([place] [facility]  

 

          Lymph #   1.4 x10*3/UL 0.6-4.1                       MED[facility] ([place]

 [facility])  

 

          Neut #    4.0 x10*3/UL 2.0-7.8                       MEDENT ([place]

 [facility])  









                    ID                  Date                Data Source

 

                    [id number]          2020 11:57:00 AM EST MEDENT ([facility])









          Name      Value     Range     Interpretation Code Description Data Milly

rce(s) Supporting 

Document(s)

 

          Inr       6.49                Above upper panic limits           MEDEN

T ([place] [facility]  

 

                                        THERAPUTIC HUMAN INR VALUES

INDICATIONS                      NORMAL RANGES

PROPHYLAXIS/TREATMENT OF:

VENOUS THROMBOSIS                2.0-3.0

PULMONARY EMBOLISM               2.0-3.0

PREVENTION OF SYSTEMIC EMBOLISM FROM:

TISSUE HEART VALVES              2.0-3.0

ACUTE MYOCARDIAL INFARCTION      2.0-3.0

VALVULAR HEART DISEASE           2.0-3.0

ATRIAL FIBRILLATION              2.0-3.0

MECHANICAL VALVES(HIGH RISK)     2.5-3.5

RECURRENT MYOCARDIAL INFARCTION  2.5-3.5

 

 

          Prothrombin Time 58.4 s    12.5-14.3                     [facility] ([place]

[place] [facility])  









                    ID                  Date                Data Source

 

                    [id number]          2020 11:56:00 AM EST MED[facility] ([facility]









          Name      Value     Range     Interpretation Code Description Data Milly

rce(s) Supporting 

Document(s)

 

           Leukocytes [#/volume] in Blood by Automated count 6.0 x10*3/UL 4.1-10

.9                         

MEDENT ([place] [facility])            

 

           Hemoglobin [Mass/volume] in Blood 13.0 g/dL  12.0-18.0               

         [facility] ([place] 

[facility]                              

 

           Erythrocytes [#/volume] in Blood by Automated count 4.11 x10*6/UL 4.2

0-6.30                        

MEDENT ([place] [facility])            

 

          MCV       91.1 fL   80.0-97.0                     MEDENT ([facility])  

 

           Hematocrit [Volume Fraction] of Blood by Automated count 37.5 %     3

7.0-51.0                        

MEDENT ([place] [facility])            

 

          MCH       31.6 pg   26.0-32.0                     MEDENT ([place] [facility]

[facility])  

 

          MCHC      34.7 g/dL 31.0-38.0                     MEDENT ([facility])  

 

           Erythrocyte distribution width [Ratio] by Automated count 14.2 %     

11.6-13.7                        

MEDENT ([place] [facility])            

 

           Platelets [#/volume] in Blood by Automated count 243 x10*3/-440

                          MEDENT

 ([place] [facility])                  

 

          MPV       8.3 FL    7.8-11.0                      MED[facility] ([place] [facility]

[facility])  

 

          Lymph %   21.2 %    10.0-58.5                     MED[facility] ([place] [facility]

[facility])  

 

          Mid %     5.8 %     1.7-9.3                       [facility] ([place] [facility]

[facility]  

 

          Lymph #   1.2 x10*3/UL 0.6-4.1                       MED[facility] ([place]

 [facility])  

 

          Neut %    73.0 %    37.0-92.0                     [facility] ([place] [facility]

[facility]  

 

          Neut #    4.4 x10*3/UL 2.0-7.8                       MED[facility] ([place]

 [facility])  

 

          Mid #     0.4 x10*3/UL 0.1-0.6                       MED[facility] ([place]

 [facility])  









                    ID                  Date                Data Source

 

                    [id number]          2020 11:29:00 AM EST MED[facility] ([facility])









          Name      Value     Range     Interpretation Code Description Data Milly

rce(s) Supporting 

Document(s)

 

           INR in Platelet poor plasma by Coagulation assay 7.9                 

                        MEDENT ([place] 

[facility])                              









                    ID                  Date                Data Source

 

                    [id number]          2020 10:58:00 AM EST MED[facility] ([facility])









          Name      Value     Range     Interpretation Code Description Data Milly

rce(s) Supporting 

Document(s)

 

           Glucose [Mass/volume] in Serum or Plasma 100 mg/dL  74-99            

                MED[facility] ([place] 

[facility])                              

 

                                        100-125 mg/dL     PRE-DIABETES/FASTING

>126 mg/dL          DIABETES/FASTING

 

 

           Urea nitrogen [Mass/volume] in Serum or Plasma 17 mg/dL   7-18       

                      MEDENT 

([place] [facility])                   

 

          Creatinine 0.7 mg/dL 0.6-1.3                       MEDENT ([facility])  

 

           Sodium [Moles/volume] in Serum or Plasma 141 meq/L  136-145          

                MED[facility] ([place]

 [facility])                             

 

           Potassium [Moles/volume] in Serum or Plasma 4.4 meq/L  3.5-5.1       

                   MED[facility] 

([place] [facility])                   

 

           Chloride [Moles/volume] in Serum or Plasma 103 meq/L           

                  MED[facility] 

([place] [facility])                   

 

           Carbon dioxide, total [Moles/volume] in Serum or Plasma 24 meq/L   21

-32                            

MEDENT ([place] [facility])            

 

           Calcium [Mass/volume] in Serum or Plasma 10.2 mg/dL 8.5-10.1         

                MEDENT 

([place] [facility])                   

 

                                        NOTE:

RESULT VERIFIED.





 

 

                                        Glomerular filtration rate/1.73 sq M pre

dicted among non-blacks [Volume 

Rate/Area] in Serum or Plasma by Creatinine-based formula (MDRD) Laboratory test

result                                              MEDENT ([place] [facility]

)  

 

                                        Glomerular filtration rate/1.73 sq M pre

dicted among blacks [Volume Rate/Area] 

in Serum or Plasma by Creatinine-based formula (MDRD) Laboratory test result    

                  

                                        MEDENT ([facility])  

 

                                        <content>CHRONIC KIDNEY DISEASE STAGING 

PER 

NKF</content><br/><content></content><br/><content>STAGE I & II      GFR >= 60  
     NORMAL TO MILDLY DECREASED</content><br/><content>STAGE III          GFR 
30-59          MODERATELY DECREASED</content><br/><content>STAGE IV           
GFR 15-29         SEVERELY DECREASED</content><br/><content>STAGE V            
GFR <15            VERY LITTLE GFR LEFT</content><br/><content>ESRD             
   GFR <15            ON RRT</content><br/><content></content> 









                    ID                  Date                Data Source

 

                    [id number]          2020 11:20:00 AM [facility]









          Name      Value     Range     Interpretation Code Description Data Milly

rce(s) Supporting 

Document(s)

 

           INR in Platelet poor plasma by Coagulation assay 3.7                 

                        [facility] ([place] 

[facility]                              









                    ID                  Date                Data Source

 

                    [id number]          10/29/2020 01:07:00 PM EDT [facility] ([facility]









          Name      Value     Range     Interpretation Code Description Data Milly

rce(s) Supporting 

Document(s)

 

           INR in Platelet poor plasma by Coagulation assay 2.7                 

                        [facility] ([place] 

[facility]                              









                    ID                  Date                Data Source

 

                    [id number]          10/29/2020 11:26:00 AM EDT [facility]









          Name      Value     Range     Interpretation Code Description Data Milly

rce(s) Supporting 

Document(s)

 

           Digoxin [Mass/volume] in Serum or Plasma 0.6 ng/mL  0.5-2.0          

                MED[facility] ([place]

 [facility])                             









                    ID                  Date                Data Source

 

                    [id number]            10/29/2020 11:26:00 AM EDT [facility] ([facility])









          Name      Value     Range     Interpretation Code Description Data Milly

rce(s) Supporting 

Document(s)

 

           Digoxin [Mass/volume] in Serum or Plasma 0.6 ng/mL  0.5-2.0          

                MEDENT 

([facility])           









                    ID                  Date                Data Source

 

                    [id number]          10/29/2020 11:25:00 AM EDT MED[facility] ([facility])









          Name      Value     Range     Interpretation Code Description Data Milly

rce(s) Supporting 

Document(s)

 

          Urine PH  6.5 units 5.0-9.0                       [facility] ([place] [facility]

[facility])  

 

          Urine Appearance Laboratory test result                               

MED[facility] ([place] [facility])  

 

          Urine Color Laboratory test result                               MEDEN

T ([place] [facility])  

 

          Urine Leukocytes Laboratory test result                               

MED[facility] ([place] [facility])  

 

          Specific gravity of Urine 1.010     1.005-1.030                     ME

DENT ([place] [facility])  

 

          Urine Protein Laboratory test result 0-0                           MED

ENT ([place] [facility])  

 

          Urine Blood Laboratory test result                               MEDEN

T ([place] [facility])  

 

          Urine Nitrite Laboratory test result                               MED

ENT ([place] [facility])  

 

           Glucose [Presence] in Urine Laboratory test result                   

               MED[facility] ([place] 

[facility])                              

 

          Urine Ketone Laboratory test result                               MEDE

NT ([place] [facility])  

 

          Urine Urobilinogen 0.2 mg/dL 0.2-1.0                       MEDENT ([facility])  

 

           Bilirubin.total [Mass/volume] in Serum or Plasma Laboratory test resu

lt                                  

MEDENT ([place] [facility])            









                    ID                  Date                Data Source

 

                    [id number]          10/29/2020 11:25:00 AM EDT MED[facility] ([facility])









          Name      Value     Range     Interpretation Code Description Data Milly

rce(s) Supporting 

Document(s)

 

                          Thyrotropin [Units/volume] in Serum or Plasma by Detec

tion limit <= 0.05 mIU/L 

0.16 uIU/mL  0.36-3.74                              MEDENT ([place] [facility]

)  









                    ID                  Date                Data Source

 

                    [id number]          10/29/2020 11:25:00 AM EDT MED[facility] ([facility])









          Name      Value     Range     Interpretation Code Description Data Milly

rce(s) Supporting 

Document(s)

 

           Urea nitrogen [Mass/volume] in Serum or Plasma 17 mg/dL   7-18       

                      MED[facility] 

([place] [facility])                   

 

           Glucose [Mass/volume] in Serum or Plasma 101 mg/dL  74-99            

                MED[facility] ([place] 

[facility])                              

 

                                        100-125 mg/dL     PRE-DIABETES/FASTING

>126 mg/dL          DIABETES/FASTING

 

 

          Creatinine 0.7 mg/dL 0.6-1.3                       MEDENT ([facility]

n[facility])  

 

           Sodium [Moles/volume] in Serum or Plasma 143 meq/L  136-145          

                [facility] ([place]

 [facility])                             

 

           Chloride [Moles/volume] in Serum or Plasma 104 meq/L           

                  MED[facility] 

([place] [facility])                   

 

           Potassium [Moles/volume] in Serum or Plasma 4.0 meq/L  3.5-5.1       

                   MED[facility] 

([place] [facility])                   

 

           Carbon dioxide, total [Moles/volume] in Serum or Plasma 30 meq/L   21

-32                            

MEDENT ([place] [facility])            

 

           Calcium [Mass/volume] in Serum or Plasma 10.7 mg/dL 8.5-10.1         

                MEDENT 

([place] [facility])                   

 

                                        NOTE:

RESULT VERIFIED.





 

 

                                        Glomerular filtration rate/1.73 sq M pre

dicted among non-blacks [Volume 

Rate/Area] in Serum or Plasma by Creatinine-based formula (MDRD) Laboratory test

result                                              MED[facility] ([place] [facility]

)  

 

                                        Glomerular filtration rate/1.73 sq M pre

dicted among blacks [Volume Rate/Area] 

in Serum or Plasma by Creatinine-based formula (MDRD) Laboratory test result    

                  

                                        MED[facility] ([place] [facility]  

 

                                        <content>CHRONIC KIDNEY DISEASE STAGING 

PER 

NKF</content><br/><content></content><br/><content>STAGE I & II      GFR >= 60  
     NORMAL TO MILDLY DECREASED</content><br/><content>STAGE III          GFR 
30-59          MODERATELY DECREASED</content><br/><content>STAGE IV           
GFR 15-29         SEVERELY DECREASED</content><br/><content>STAGE V            
GFR <15            VERY LITTLE GFR LEFT</content><br/><content>ESRD             
   GFR <15            ON RRT</content><br/><content></content> 









                    ID                  Date                Data Source

 

                    [id number]          10/29/2020 11:25:00 AM EDT MED[facility] ([facility])









          Name      Value     Range     Interpretation Code Description Data Milly

rce(s) Supporting 

Document(s)

 

          Magnesium 2.1 mg/dL 1.8-2.4                       MED[facility] ([facility])  









                    ID                  Date                Data Source

 

                    [id number]          10/29/2020 11:25:00 AM EDT MED[facility] ([facility])









          Name      Value     Range     Interpretation Code Description Data Milly

rce(s) Supporting 

Document(s)

 

           Erythrocytes [#/volume] in Blood by Automated count 4.36 x10*6/UL 4.2

0-6.30                        

[facility] ([place] [facility]            

 

           Leukocytes [#/volume] in Blood by Automated count 6.2 x10*3/UL 4.1-10

.9                         

[facility] ([place] [facility]            

 

           Hemoglobin [Mass/volume] in Blood 13.8 g/dL  12.0-18.0               

         [facility] ([place] 

[facility]                              

 

           Hematocrit [Volume Fraction] of Blood by Automated count 40.7 %     3

7.0-51.0                        

[facility] ([facility])            

 

          MCH       31.6 pg   26.0-32.0                     MED[facility] ([facility])  

 

          MCV       93.2 fL   80.0-97.0                     [facility] ([facility])  

 

          MCHC      34.0 g/dL 31.0-38.0                     [facility] ([facility])  

 

           Platelets [#/volume] in Blood by Automated count 236 x10*3/-440

                          MED[facility]

 ([place] [facility]                  

 

           Erythrocyte distribution width [Ratio] by Automated count 14.5 %     

11.6-13.7                        

MED[facility] ([facility])            

 

          Lymph %   19.8 %    10.0-58.5                     [facility] ([facility])  

 

          MPV       7.8 FL    7.8-11.0                      [facility] ([place] [facility]

[facility])  

 

          Mid %     5.7 %     1.7-9.3                       [facility] ([place] [facility])  

 

          Neut %    74.5 %    37.0-92.0                     [facility] ([place] [facility])  

 

          Lymph #   1.2 x10*3/UL 0.6-4.1                       [facility] ([facility])  

 

          Neut #    4.6 x10*3/UL 2.0-7.8                       [facility] ([facility])  

 

          Mid #     0.4 x10*3/UL 0.1-0.6                       [facility] ([facility])  









                    ID                  Date                Data Source

 

                    [id number]            10/29/2020 11:25:00 AM EDT MED[facility] ([facility])









          Name      Value     Range     Interpretation Code Description Data Milly

rce(s) Supporting 

Document(s)

 

           Leukocytes [#/volume] in Blood by Automated count 6.2 x10*3/UL 4.1-10

.9                         

MED[facility] ([facility])    

 

           Hematocrit [Volume Fraction] of Blood by Automated count 40.7 %     3

7.0-51.0                        

MED[facility] ([facility])    

 

           Erythrocytes [#/volume] in Blood by Automated count 4.36 x10*6/UL 4.2

0-6.30                        

[facility] ([facility])    

 

           Hemoglobin [Mass/volume] in Blood 13.8 g/dL  12.0-18.0               

         [facility] ([facility])                       

 

          MCHC      34.0 g/dL 31.0-38.0                     [facility] ([facility])  

 

          MCV       93.2 fL   80.0-97.0                     [facility] ([facility]  

 

          MCH       31.6 pg   26.0-32.0                     [facility] ([facility])  

 

           Erythrocyte distribution width [Ratio] by Automated count 14.5 %     

11.6-13.7                        

MED[facility] ([facility])    

 

           Platelets [#/volume] in Blood by Automated count 236 x10*3/-440

                          MED[facility]

 ([facility])          

 

             Platelet mean volume [Entitic volume] in Blood by Archie 7.8 FL

       7.8-11.0                   

                          MED[facility] ([facility])  

 

          Neut %    74.5 %    37.0-92.0                     [facility] ([facility])  

 

          Mid %     5.7 %     1.7-9.3                       [facility] (Cardiology A

[facility])  

 

           Lymphocytes/100 leukocytes in Blood by Automated count 19.8 %     10.

0-58.5                        

[facility] ([facility])    

 

           Neutrophils [#/volume] in Semen by Manual count 4.6 x10*3/UL 2.0-7.8 

                         [facility] 

([facility]iology [facility])           

 

          Mid #     0.4 x10*3/UL 0.1-0.6                       [facility] (Cardiolog

y [facility])  

 

          Lymph #   1.2 x10*3/UL 0.6-4.1                       [facility] (Cardiolog

y [facility])  









                    ID                  Date                Data Source

 

                    [id number]          10/29/2020 11:24:00 AM EDT [facility] ([facility])









          Name      Value     Range     Interpretation Code Description Data Milly

rce(s) Supporting 

Document(s)

 

           Thyroxine (T4) free [Mass/volume] in Serum or Plasma 1.33 ng/dL 0.76-

1.46                        

[facility] ([facility])            









                    ID                  Date                Data Source

 

                    [id number]          10/26/2020 11:44:00 AM EDT [facility] ([facility])









          Name      Value     Range     Interpretation Code Description Data Milly

rce(s) Supporting 

Document(s)

 

           INR in Platelet poor plasma by Coagulation assay 1.2                 

                        [facility] ([facility])                              









                    ID                  Date                Data Source

 

                    [id number]          10/22/2020 12:43:00 PM EDT [facility] ([facility])









          Name      Value     Range     Interpretation Code Description Data Milly

rce(s) Supporting 

Document(s)

 

           INR in Platelet poor plasma by Coagulation assay 7.2                 

                        [facility] ([place] 

[facility])                              









                    ID                  Date                Data Source

 

                    [id number]          10/22/2020 11:33:00 AM EDT [facility] ([facility])









          Name      Value     Range     Interpretation Code Description Data Milly

rce(s) Supporting 

Document(s)

 

          Prothrombin Time 49.4 s    12.5-14.3                     [facility] ([place]

[place] [facility]  

 

          Inr       5.24                Above upper panic limits           MED[facility]

T ([facility])  

 

                                        THERAPUTIC HUMAN INR VALUES

INDICATIONS                      NORMAL RANGES

PROPHYLAXIS/TREATMENT OF:

VENOUS THROMBOSIS                2.0-3.0

PULMONARY EMBOLISM               2.0-3.0

PREVENTION OF SYSTEMIC EMBOLISM FROM:

TISSUE HEART VALVES              2.0-3.0

ACUTE MYOCARDIAL INFARCTION      2.0-3.0

VALVULAR HEART DISEASE           2.0-3.0

ATRIAL FIBRILLATION              2.0-3.0

MECHANICAL VALVES(HIGH RISK)     2.5-3.5

RECURRENT MYOCARDIAL INFARCTION  2.5-3.5

 









                    ID                  Date                Data Source

 

                    [id number]          10/22/2020 11:33:00 AM EDT [facility] ([facility])









          Name      Value     Range     Interpretation Code Description Data Milly

rce(s) Supporting 

Document(s)

 

           Erythrocytes [#/volume] in Blood by Automated count 4.37 x10*6/UL 4.2

0-6.30                        

MED[facility] ([facility])            

 

           Leukocytes [#/volume] in Blood by Automated count 6.6 x10*3/UL 4.1-10

.9                         

[facility] ([facility])            

 

           Hemoglobin [Mass/volume] in Blood 13.7 g/dL  12.0-18.0               

         [facility] ([facility] [place] 

[facility])                              

 

           Hematocrit [Volume Fraction] of Blood by Automated count 40.2 %     3

7.0-51.0                        

[facility] ([facility])            

 

          MCV       91.9 fL   80.0-97.0                     [facility] ([place] [facility]

[facility])  

 

          MCH       31.4 pg   26.0-32.0                     [facility] ([place] [facility]

[facility])  

 

          MCHC      34.2 g/dL 31.0-38.0                     [facility] ([place] [facility]

[facility])  

 

           Erythrocyte distribution width [Ratio] by Automated count 13.7 %     

11.6-13.7                        

[facility] ([facility])            

 

           Platelets [#/volume] in Blood by Automated count 251 x10*3/-440

                          [facility]

 ([facility])                  

 

          MPV       8.3 FL    7.8-11.0                      [facility] [facility]  

 

          Lymph %   16.8 %    10.0-58.5                     [facility] ([place] [facility]  

 

          Mid %     4.7 %     1.7-9.3                       [facility] ([place] [facility]  

 

          Lymph #   1.1 x10*3/UL 0.6-4.1                       [facility] ([facility] [place]

 [facility])  

 

          Neut %    78.5 %    37.0-92.0                     [facility] ([place] [facility]

[facility])  

 

          Mid #     0.3 x10*3/UL 0.1-0.6                       [facility] ([facility])  

 

          Neut #    5.2 x10*3/UL 2.0-7.8                       [facility] ([place]

 [facility])  









                    ID                  Date                Data Source

 

                    [id number]          10/15/2020 12:07:00 PM EDT [facility] ([facility])









          Name      Value     Range     Interpretation Code Description Data Milly

rce(s) Supporting 

Document(s)

 

           INR in Platelet poor plasma by Coagulation assay 1.3                 

                        [facility] ([facility] [place] 

[facility])                              









                    ID                  Date                Data Source

 

                    [id number]          2020 12:44:00 PM EDT [facility] ([facility])









          Name      Value     Range     Interpretation Code Description Data Milly

rce(s) Supporting 

Document(s)

 

           INR in Platelet poor plasma by Coagulation assay 3.7                 

                        [facility] ([place] 

[facility])                              









                    ID                  Date                Data Source

 

                    [id number]          2020 03:27:00 PM EDT [facility] ([facility])









          Name      Value     Range     Interpretation Code Description Data Milly

rce(s) Supporting 

Document(s)

 

           INR in Platelet poor plasma by Coagulation assay 1.3                 

                        [facility] ([facility] [place] 

[facility])                              







                                        Procedure

 

                                          



                                                                                
                                                                                
                                                                                
                                                                                
                                                                                
                                                                                
                                                                                
                                                                                
                                                                                
                                                                                
                                                



Social History

          



           Code       Duration   Value      Status     Description Data Source(s

)

 

           Smoking    10/21/2021 12:00:00 AM EDT Current Smoker completed  Curre

nt Smoker [id number] 

([facility])



                                                                                
                  



Vital Signs

          



                    ID                  Date                Data Source

 

                    UNK                                      









           Name       Value      Range      Interpretation Code Description Data

 Source(s)

 

           Systolic blood pressure 122 mm[Hg]                       122 mm[Hg] M

EDENT ([facility])

 

           Diastolic blood pressure 76 mm[Hg]                        76 mm[Hg]  

[facility] ([facility])

 

           Heart rate 86 /min                          86 /min    [facility] ([facility])

 

           Body height 66 [in_i]                        66 [in_i]  [facility] ([place]

[place] [facility]

 

                                        5'6" 

 

           Body weight 223.00 [lb_av]                       223.00 [lb_av] [facility]

T ([facility])

 

           Body mass index (BMI) [Ratio] 36.0 kg/m2                       36.0 k

[facility] 

[facility]

 

           Diastolic blood pressure 68 mm[Hg]                        68 mm[Hg]  

[facility] ([facility])

 

           Heart rate 86 /min                          86 /min    [facility] ([facility])

 

           Systolic blood pressure 124 mm[Hg]                       124 mm[facility]Hg] [facility] [place] [facility]

 

           Body height 66 [in_i]                        66 [in_i]  [facility] ([place]

[place] [facility]

 

                                        5'6" 

 

           Body weight 217.00 [lb_av]                       217.00 [lb_av] [facility]

T ([facility])

 

           Body mass index (BMI) [Ratio] 35.0 kg/m2                       35.0 k

g/m2 [facility] ([place] 

[facility])

 

           Systolic blood pressure 114 mm[Hg]                       114 mm[Hg] [facility] ([facility])

 

           Diastolic blood pressure 76 mm[Hg]                        76 mm[Hg]  

[facility] ([facility])

 

           Body height 66 [in_i]                        66 [in_i]  [facility] ([facility])

 

                                        5'6" 

 

           Body weight 221.50 [lb_av]                       221.50 [lb_av] [facility]

T ([facility])

 

           Body mass index (BMI) [Ratio] 35.7 kg/m2                       35.7 k

g/m2 [facility] ([facility])

 

           Ideal body weight 130 [lb_av]                       130 [[facility]_av] [facility]

[facility] ([facility])

 

           Body weight 100.472 kg                       100.472 kg [facility] ([facility])

 

           Body surface area Derived from formula 2.09 m2                       

   2.09 m2    [facility] ([facility])

 

           Systolic blood pressure 136 mm[Hg]                       136 mm[Hg] [facility]

[facility]

 

                                        RT Arm 

 

           Diastolic blood pressure 88 mm[Hg]                        88 mm[Hg]  

[facility] ([facility])

 

                                        RT Arm 

 

           Heart rate 120 /min                         120 /min   [facility] ([facility])

 

           Body height 66 [in_i]                        66 [in_i]  [facility] ([place]

[place] [facility]

 

                                        5'6" 

 

           Body weight 223.50 [lb_av]                       223.50 [lb_av] [facility]

T ([facility])

 

           Body mass index (BMI) [Ratio] 36.1 kg/m2                       36.1 k

g/m2 [facility] ([place] 

[facility])

 

           Ideal body weight 130 [lb_av]                       130 [lb_av] [facility]

T ([facility])

 

           Body height 66 [in_i]                        66 [in_i]  [facility] ([facility])

 

                                        5'6" 

 

           Body weight 102.060 kg                       102.060 kg [facility] ([facility])

 

           Body mass index (BMI) [Ratio] 36.3 kg/m2                       36.3 k

g/m2 [facility] ([facility])

 

           Body surface area Derived from formula 2.10 m2                       

   2.10 m2    [facility] ([facility])

 

           Diastolic blood pressure 77 mm[Hg]                        77 mm[Hg]  

[facility] ([facility])

 

           Body weight 225.00 [lb_av]                       225.00 [lb_av] [facility]

T ([facility])

 

           Systolic blood pressure 145 mm[Hg]                       145 mm[Hg] M

[facility]

 

           Systolic blood pressure 148 mm[Hg]                       148 mm[Hg] M

[facility])

 

                                        RT Arm 

 

           Diastolic blood pressure 72 mm[Hg]                        72 mm[Hg]  

[facility] ([facility] [place] [facility])

 

                                        RT Arm 

 

           Systolic blood pressure 140 mm[Hg]                       140 mm[Hg] M

[facility] ([facility] [place] [facility])

 

           Diastolic blood pressure 70 mm[Hg]                        70 mm[Hg]  

[facility] ([facility] [place] [facility])

 

           Heart rate 80 /min                          80 /min    [facility] ([facility])

 

           Body height 66 [in_i]                        66 [in_i]  [facility]

[place] [facility]

 

                                        5'6" 

 

           Body weight 219.00 [lb_av]                       219.00 [lb_av] [facility]

T ([facility])

 

           Body mass index (BMI) [Ratio] 35.3 kg/m2                       35.3 k

g/m2 [facility] ([place] 

[facility])

 

           Body weight 190.00 [lb_av]                       190.00 [lb_av] [facility]

T ([facility] 

)

 

           Body height 66 [in_i]                        66 [in_i]  [facility] ([facility] )

 

                                        5'6" 

 

           Body mass index (BMI) [Ratio] 30.7 kg/m2                       30.7 k

g/m2 [facility] ([facility]

 

           Body mass index (BMI) [Ratio] 35.7 kg/m2                       35.7 k

g/m2 [facility] ([facility] )

 

           Body temperature 96.1 [degF]                       96.1 [degF] [facility]

 ([facility] 

)

 

           Body height 64.5 [in_i]                       64.5 [in_i] [facility] ([facility]

 

                                        5'4.50" 

 

           Body weight 211.50 [lb_av]                       211.50 [lb_av] [facility]

T ([facility])

 

           Systolic blood pressure 142 mm[Hg]                       142 mm[Hg] [facility]

[facility] [place] [facility]

 

           Body weight 222.00 [lb_av]                       222.00 [lb_av] [facility]

T ([facility])

 

           Systolic blood pressure 154 mm[Hg]                       154 mm[Hg] M

[facility] [place] [facility])

 

                                        RT Arm 

 

           Diastolic blood pressure 78 mm[Hg]                        78 mm[Hg]  

[facility] [place] [facility]

 

                                        RT Arm 

 

           Diastolic blood pressure 80 mm[Hg]                        80 mm[Hg]  

[facility] ([facility] [place] [facility])

 

           Heart rate 96 /min                          96 /min    [facility] ([facility])

 

           Body height 66 [in_i]                        66 [in_i]  [facility]

[place] [facility]

 

                                        5'6" 

 

           Body mass index (BMI) [Ratio] 35.8 kg/m2                       35.8 k

g/m2 [facility] 

[facility]

 

           Systolic blood pressure 164 mm[Hg]                       164 mm[Hg] 

[facility] )

 

           Diastolic blood pressure 90 mm[Hg]                        90 mm[Hg]  

[facility] ([facility], )

 

           Body height 66 [in_i]                        66 [in_i]  [facility] ([facility], )

 

                                        5'6" 

 

           Body weight 225.00 [lb_av]                       225.00 [lb_av] [facility]

T ([facility], )

 

           Body mass index (BMI) [Ratio] 36.3 kg/m2                       36.3 k

g/m2 [facility]

 

           Ideal body weight 130 [lb_av]                       130 [lb_av] [facility]

T ([facility], )

 

           Body weight 102.060 kg                       102.060 kg [facility] ([facility], )

 

           Body surface area Derived from formula 2.10 m2                       

   2.10 m2    [facility] ([facility], )

 

           Body weight 220.00 [lb_av]                       220.00 [lb_av] [facility]

T ([facility])

 

           Body height 65 [in_i]                        65 [in_i]  [facility] ([facility]

[facility])

 

                                        5'5" 

 

           Body mass index (BMI) [Ratio] 36.6 kg/m2                       36.6 k

g/m2 [facility] ([facility])

 

           Heart rate 65 /min                          65 /min    [facility] ([facility])

 

           Systolic blood pressure--sitting 136 mm[Hg]                       136

 mm[Hg] [facility] ([facility])

 

                                        Ra, large cuff 

 

           Diastolic blood pressure--sitting 84 mm[Hg]                        84

 mm[Hg]  [facility] ([facility])

 

                                        Ra, large cuff 

 

           Body height 66 [in_i]                        66 [in_i]  [facility] ([facility] )

 

                                        5'6" 

 

           Body weight 221.00 [lb_av]                       221.00 [lb_av] [facility]

T ([facility])

 

           Body mass index (BMI) [Ratio] 35.7 kg/m2                       35.7 k

g/m[facility]

 

           Diastolic blood pressure 70 mm[Hg]                        70 mm[Hg]  

MEDENT ([facility])

 

                                        RT Arm 

 

           Heart rate 60 /min                          60 /min    MEDENT ([facility])

 

           Body height 66 [in_i]                        66 [in_i]  MEDENT ([facility])

 

                                        5'6" 

 

           Body mass index (BMI) [Ratio] 35.7 kg/m2                       35.7 k

g/m2 MEDENT ([facility])

 

           Systolic blood pressure 148 mm[Hg]                       148 mm[Hg] M

EDENT ([facility])

 

                                        RT Arm 

 

           Body weight 221.00 [lb_av]                       221.00 [lb_av] MEDEN

T ([facility])

 

           Body weight 226.00 [lb_av]                       226.00 [lb_av] MEDEN

T ([facility])

 

           Body mass index (BMI) [Ratio] 36.5 kg/m2                       36.5 k

g/m2 MEDENT ([facility])

 

           Systolic blood pressure 138 mm[Hg]                       138 mm[Hg] M

EDENT ([facility])

 

           Body weight 226.00 [lb_av]                       226.00 [lb_av] JOE TRIVEDI ([facility])

 

           Diastolic blood pressure 90 mm[Hg]                        90 mm[Hg]  

SUYAPA ([facility])

 

           Body height 66 [in_i]                        66 [in_i]  SUYAPA ([facility])

 

                                        5'6" 

 

           Body weight 217.00 [lb_av]                       217.00 [lb_av] MEDEN

T ([facility])

 

           Body weight 220.00 [lb_av]                       220.00 [lb_av] MEDEN

T ([facility])

 

           Body weight 216.00 [lb_av]                       216.00 [facility]] [facility]

[facility] ([facility])

## 2021-10-26 NOTE — REP
INDICATION:

SOB; r/o PE



COMPARISON:

Multiple the latest 05/13/2021 a noncontrast enhanced standard CT.



TECHNIQUE:

CT angiography of the chest attention pulmonary arteries after the intravenous

administration of 75 cc Isovue 370.



FINDINGS:

There is excellent visualization of the pulmonary arterial vasculature.  There are no

focal filling defects present that would be considered consistent with acute pulmonary

emboli.  There are no pleural or pericardial effusions.  There is no significant

change in appearance of the imaged upper abdomen or imaged osseous structures.  There

is bilateral low-density adrenal gland thickening status quo.  There is no mediastinal

or hilar adenopathy, however, note is again made of bulky thyromegaly.  There is a

borderline right hilar lymph node with a short axis dimension of 1 cm.  This cannot be

compared to the latest prior exam since no intravenous contrast was administered for

that exam.  When compared to the next latest prior CT of the chest which was obtained

after intravenous contrast this lymph node is unchanged.  That examination is dated

11/21/2019.



Evaluation of the lung fields shows a few scattered bibasilar asymmetric densities

likely subsegmental atelectatic changes.  No new abnormal patchy opacities have

developed.  Scattered subsegmental atelectatic changes are seen throughout the lung

fields.



IMPRESSION:

There is no evidence of acute disease.  Findings as described above.





<Electronically signed by Urban Washington > 10/26/21 [id number]

## 2021-10-27 VITALS — SYSTOLIC BLOOD PRESSURE: 152 MMHG | DIASTOLIC BLOOD PRESSURE: 85 MMHG

## 2021-10-27 VITALS — DIASTOLIC BLOOD PRESSURE: 78 MMHG | SYSTOLIC BLOOD PRESSURE: 125 MMHG

## 2021-10-27 VITALS — DIASTOLIC BLOOD PRESSURE: 70 MMHG | SYSTOLIC BLOOD PRESSURE: 146 MMHG

## 2021-10-27 LAB
BUN SERPL-MCNC: 24 MG/DL (ref 7–18)
CALCIUM SERPL-MCNC: 10.7 MG/DL (ref 8.8–10.2)
CHLORIDE SERPL-SCNC: 106 MEQ/L (ref 98–107)
CO2 SERPL-SCNC: 26 MEQ/L (ref 21–32)
CREAT SERPL-MCNC: 1.09 MG/DL (ref 0.55–1.3)
GFR SERPL CREATININE-BSD FRML MDRD: 53.5 ML/MIN/{1.73_M2} (ref 45–?)
GLUCOSE SERPL-MCNC: 79 MG/DL (ref 70–100)
HCT VFR BLD AUTO: 40.5 % (ref 36–47)
HGB BLD-MCNC: 13 G/DL (ref 12–15.5)
MAGNESIUM SERPL-MCNC: 2.1 MG/DL (ref 1.8–2.4)
MCH RBC QN AUTO: 31.6 PG (ref 27–33)
MCHC RBC AUTO-ENTMCNC: 32.1 G/DL (ref 32–36.5)
MCV RBC AUTO: 98.3 FL (ref 80–96)
PLATELET # BLD AUTO: 180 10^3/UL (ref 150–450)
POTASSIUM SERPL-SCNC: 4 MEQ/L (ref 3.5–5.1)
RBC # BLD AUTO: 4.12 10^6/UL (ref 4–5.4)
SODIUM SERPL-SCNC: 140 MEQ/L (ref 136–145)
T4 FREE SERPL-MCNC: 1.92 NG/DL (ref 0.76–1.46)
WBC # BLD AUTO: 5.4 10^3/UL (ref 4–10)

## 2021-10-27 RX ADMIN — APIXABAN SCH MG: 5 TABLET, FILM COATED ORAL at 20:00

## 2021-10-27 RX ADMIN — METOPROLOL TARTRATE SCH MG: 25 TABLET, FILM COATED ORAL at 20:01

## 2021-10-27 RX ADMIN — ATORVASTATIN CALCIUM SCH MG: 20 TABLET, FILM COATED ORAL at 20:00

## 2021-10-27 RX ADMIN — APIXABAN SCH MG: 5 TABLET, FILM COATED ORAL at 08:42

## 2021-10-27 RX ADMIN — BUPROPION HYDROCHLORIDE SCH MG: 150 TABLET, FILM COATED, EXTENDED RELEASE ORAL at 08:41

## 2021-10-27 RX ADMIN — FLECAINIDE ACETATE SCH MG: 50 TABLET ORAL at 20:02

## 2021-10-27 RX ADMIN — FAMOTIDINE SCH MG: 20 TABLET, FILM COATED ORAL at 20:00

## 2021-10-27 RX ADMIN — FLECAINIDE ACETATE SCH MG: 50 TABLET ORAL at 08:41

## 2021-10-27 RX ADMIN — FUROSEMIDE SCH MG: 10 INJECTION, SOLUTION INTRAMUSCULAR; INTRAVENOUS at 00:16

## 2021-10-27 RX ADMIN — ACETAMINOPHEN PRN MG: 325 TABLET ORAL at 14:16

## 2021-10-27 RX ADMIN — ACETAMINOPHEN PRN MG: 325 TABLET ORAL at 01:54

## 2021-10-27 RX ADMIN — ASPIRIN 81 MG CHEWABLE TABLET SCH MG: 81 TABLET CHEWABLE at 08:42

## 2021-10-27 RX ADMIN — Medication SCH UNITS: at 08:42

## 2021-10-27 RX ADMIN — FUROSEMIDE SCH MG: 10 INJECTION, SOLUTION INTRAMUSCULAR; INTRAVENOUS at 08:41

## 2021-10-27 RX ADMIN — FUROSEMIDE SCH MG: 10 INJECTION, SOLUTION INTRAMUSCULAR; INTRAVENOUS at 15:19

## 2021-10-27 RX ADMIN — DIGOXIN SCH MG: 250 TABLET ORAL at 20:00

## 2021-10-27 RX ADMIN — METOPROLOL TARTRATE SCH MG: 25 TABLET, FILM COATED ORAL at 08:44

## 2021-10-27 NOTE — IPNPDOC
Text Note


Date of Service


The patient was seen on 10/27/21.





NOTE


SUBJECTIVE:


-No acute complaints, remains on 2L NC this morning





OBJECTIVE:


VITAL SIGNS: Please see below 


CONSTITUTIONAL: No acute distress, resting comfortably, AAO x 3


EYES: PERRLA, EOM intact


HENT, MOUTH: Normocephalic, atraumatic, moist mucous membranes 


NECK: SUPPLE, no JVD, no lymphadenopathy, no carotid bruit


CV: Irregularly irregular rhythm, S1S2 normal, no murmurs/rubs/gallops


RESPIRATORY: Clear to auscultation bilaterally, no rales/rhonchi/wheezes


GI:  obese abdomen, BS positive in 4 quadrants, soft, nontender, nondistended, 

no rebound or guarding, no organomegaly


EXT:No cyanosis, clubbing,  joint deformity, extremity edema


INTEGUMENTARY: Chronic hyperpigmentation of bilateral lower ext, chronic.  

Intact, no rashes, no lesions, no erythema


NEUROLOGIC: Cranial Nerves II-XII are intact, no focal deficits


PSYCHIATRIC: Mood and affect are normal 





LABORATORY DATA: reviewed


WBC 5.4


Hgb 13


Platelets 180


na 140


K 4


Cr 0.9


mag 2.1





IMAGING: 





CTA chest:


There is excellent visualization of the pulmonary arterial vasculature.  There 

are no


focal filling defects present that would be considered consistent with acute 

pulmonary


emboli.  There are no pleural or pericardial effusions.  There is no significant


change in appearance of the imaged upper abdomen or imaged osseous structures.  

There


is bilateral low-density adrenal gland thickening status quo.  There is no 

mediastinal


or hilar adenopathy, however, note is again made of bulky thyromegaly.  There is

a


borderline right hilar lymph node with a short axis dimension of 1 cm.  This 

cannot be


compared to the latest prior exam since no intravenous contrast was administered

for


that exam.  When compared to the next latest prior CT of the chest which was 

obtained


after intravenous contrast this lymph node is unchanged.  That examination is 

dated


11/21/2019.





Evaluation of the lung fields shows a few scattered bibasilar asymmetric 

densities


likely subsegmental atelectatic changes.  No new abnormal patchy opacities have


developed.  Scattered subsegmental atelectatic changes are seen throughout the 

lung


fields.





IMPRESSION:


There is no evidence of acute disease.  Findings as described above.





CXR:


The technique utilized in obtaining the radiograph has magnified the cardiac


silhouette and accentuated the interstitial markings.





There is global cardiomegaly accentuated by technique status quo.  The 

interstitial


markings have increased slightly compared to the prior exam no patchy opacities 

or


pleural effusions have developed.  There is no change in the osseous structures.








IMPRESSION:


Global cardiomegaly with mild interstitial edema suspected.





ASSESSMENT: 


66-year-old W with past medical history of hypertension, atrial fibrillation, 

GERD, history of pulmonary emboli and DVT on eliquis, who presented to [facility]

emergency dept with worsening SOB and orthopnea for 3 days and admitted for 

HFpEF exacerbation.





PLAN:  





HFpEF exacerbation:


-continue lasix 40 IV q8H


-2L/24h fluid restriction, 2g sodium


-daily weights


-strict I/Os


-daily BMP


-holding HCTZ while on loop diuretic


-f/u TTE








Chronic atrial fibrillation, HR in low 100s: patient of Dr. Chahal, on 

fleicanide, metop and digoxin at baseline


-continue fleicanide, metop and digoxin 


-telemetry


-daily BMP and mag








HTN


-C/w home metop, holding HCTZ, on lasix 40 IV Q8H





Graves?


-continue home methimazole


-low TSH, f/u free T4





Hx of DVT, PE


-continue home eliquis





GERD 


-C/w famotidine





HLD:


-continue atorvastatin





CAD:


-continue ASA 81, statin 





DVT px


-Eliquis





VS,Fishbone, I+O


VS, Fishbone, I+O


Laboratory Tests


10/26/21 14:34








10/26/21 14:42








10/27/21 05:50











Vital Signs








  Date Time  Temp Pulse Resp B/P (MAP) Pulse Ox O2 Delivery O2 Flow Rate FiO2


 


10/27/21 06:00 97.4 86 18 146/70 (95) 97 Nasal Cannula 2.0 














I&O- Last 24 Hours up to 6 AM 


 


 10/27/21





 06:00


 


Intake Total 180 ml


 


Output Total 1400 ml


 


Balance -1220 ml

















JAMES TRACEY MD   Oct 27, 2021 08:39

## 2021-10-28 VITALS — SYSTOLIC BLOOD PRESSURE: 111 MMHG | DIASTOLIC BLOOD PRESSURE: 95 MMHG

## 2021-10-28 VITALS — DIASTOLIC BLOOD PRESSURE: 66 MMHG | SYSTOLIC BLOOD PRESSURE: 128 MMHG

## 2021-10-28 VITALS — DIASTOLIC BLOOD PRESSURE: 76 MMHG | SYSTOLIC BLOOD PRESSURE: 119 MMHG

## 2021-10-28 LAB
BUN SERPL-MCNC: 32 MG/DL (ref 7–18)
CALCIUM SERPL-MCNC: 10.8 MG/DL (ref 8.8–10.2)
CHLORIDE SERPL-SCNC: 105 MEQ/L (ref 98–107)
CK MB CFR.DF SERPL CALC: 2.13
CK MB SERPL-MCNC: < 1 NG/ML (ref ?–3.6)
CK SERPL-CCNC: 47 U/L (ref 26–192)
CO2 SERPL-SCNC: 28 MEQ/L (ref 21–32)
CREAT SERPL-MCNC: 1.28 MG/DL (ref 0.55–1.3)
GFR SERPL CREATININE-BSD FRML MDRD: 44.4 ML/MIN/{1.73_M2} (ref 45–?)
GLUCOSE SERPL-MCNC: 107 MG/DL (ref 70–100)
HCT VFR BLD AUTO: 40.3 % (ref 36–47)
HGB BLD-MCNC: 13 G/DL (ref 12–15.5)
MAGNESIUM SERPL-MCNC: 2.1 MG/DL (ref 1.8–2.4)
MCH RBC QN AUTO: 31.1 PG (ref 27–33)
MCHC RBC AUTO-ENTMCNC: 32.3 G/DL (ref 32–36.5)
MCV RBC AUTO: 96.4 FL (ref 80–96)
PLATELET # BLD AUTO: 171 10^3/UL (ref 150–450)
POTASSIUM SERPL-SCNC: 3.7 MEQ/L (ref 3.5–5.1)
RBC # BLD AUTO: 4.18 10^6/UL (ref 4–5.4)
SODIUM SERPL-SCNC: 138 MEQ/L (ref 136–145)
TROPONIN I SERPL-MCNC: 0.02 NG/ML (ref ?–0.1)
WBC # BLD AUTO: 5.9 10^3/UL (ref 4–10)

## 2021-10-28 RX ADMIN — Medication SCH UNITS: at 08:03

## 2021-10-28 RX ADMIN — ATORVASTATIN CALCIUM SCH MG: 20 TABLET, FILM COATED ORAL at 20:21

## 2021-10-28 RX ADMIN — FAMOTIDINE SCH MG: 20 TABLET, FILM COATED ORAL at 20:21

## 2021-10-28 RX ADMIN — FLECAINIDE ACETATE SCH MG: 50 TABLET ORAL at 20:21

## 2021-10-28 RX ADMIN — FUROSEMIDE SCH MG: 10 INJECTION, SOLUTION INTRAMUSCULAR; INTRAVENOUS at 00:32

## 2021-10-28 RX ADMIN — DIGOXIN SCH MG: 250 TABLET ORAL at 20:23

## 2021-10-28 RX ADMIN — BUPROPION HYDROCHLORIDE SCH MG: 150 TABLET, FILM COATED, EXTENDED RELEASE ORAL at 08:03

## 2021-10-28 RX ADMIN — FLECAINIDE ACETATE SCH MG: 50 TABLET ORAL at 08:03

## 2021-10-28 RX ADMIN — METOPROLOL TARTRATE SCH MG: 25 TABLET, FILM COATED ORAL at 20:22

## 2021-10-28 RX ADMIN — APIXABAN SCH MG: 5 TABLET, FILM COATED ORAL at 20:21

## 2021-10-28 RX ADMIN — METOPROLOL TARTRATE SCH MG: 25 TABLET, FILM COATED ORAL at 08:07

## 2021-10-28 RX ADMIN — APIXABAN SCH MG: 5 TABLET, FILM COATED ORAL at 08:02

## 2021-10-28 RX ADMIN — ASPIRIN 81 MG CHEWABLE TABLET SCH MG: 81 TABLET CHEWABLE at 08:03

## 2021-10-28 RX ADMIN — FUROSEMIDE SCH MG: 40 TABLET ORAL at 10:10

## 2021-10-28 NOTE — IPNPDOC
Text Note


Date of Service


The patient was seen on 10/28/21.





NOTE


SUBJECTIVE:


-Now on room air, but complaining that she becomes winded with exertion





OBJECTIVE:


VITAL SIGNS: Please see below 


CONSTITUTIONAL: No acute distress, resting comfortably, AAO x 3


EYES: PERRLA, EOM intact


HENT, MOUTH: Normocephalic, atraumatic, moist mucous membranes 


NECK: SUPPLE, no JVD, no lymphadenopathy, no carotid bruit


CV: Irregularly irregular rhythm, S1S2 normal, no murmurs/rubs/gallops


RESPIRATORY: Clear to auscultation bilaterally, no rales/rhonchi/wheezes


GI:  obese abdomen, BS positive in 4 quadrants, soft, nontender, nondistended, 

no rebound or guarding, no organomegaly


EXT:No cyanosis, clubbing,  joint deformity, extremity edema


INTEGUMENTARY: Chronic hyperpigmentation of bilateral lower ext, chronic.  

Intact, no rashes, no lesions, no erythema


NEUROLOGIC: Cranial Nerves II-XII are intact, no focal deficits


PSYCHIATRIC: Mood and affect are normal 





LABORATORY DATA: reviewed


Cr 1.28





IMAGING: 





CTA chest:


There is excellent visualization of the pulmonary arterial vasculature.  There 

are no


focal filling defects present that would be considered consistent with acute 

pulmonary


emboli.  There are no pleural or pericardial effusions.  There is no significant


change in appearance of the imaged upper abdomen or imaged osseous structures.  

There


is bilateral low-density adrenal gland thickening status quo.  There is no 

mediastinal


or hilar adenopathy, however, note is again made of bulky thyromegaly.  There is

a


borderline right hilar lymph node with a short axis dimension of 1 cm.  This 

cannot be


compared to the latest prior exam since no intravenous contrast was administered

for


that exam.  When compared to the next latest prior CT of the chest which was 

obtained


after intravenous contrast this lymph node is unchanged.  That examination is 

dated


11/21/2019.





Evaluation of the lung fields shows a few scattered bibasilar asymmetric 

densities


likely subsegmental atelectatic changes.  No new abnormal patchy opacities have


developed.  Scattered subsegmental atelectatic changes are seen throughout the 

lung


fields.





IMPRESSION:


There is no evidence of acute disease.  Findings as described above.





CXR:


The technique utilized in obtaining the radiograph has magnified the cardiac


silhouette and accentuated the interstitial markings.





There is global cardiomegaly accentuated by technique status quo.  The 

interstitial


markings have increased slightly compared to the prior exam no patchy opacities 

or


pleural effusions have developed.  There is no change in the osseous structures.








IMPRESSION:


Global cardiomegaly with mild interstitial edema suspected.





ASSESSMENT: 


66-year-old W with past medical history of hypertension, atrial fibrillation, 

GERD, history of pulmonary emboli and DVT on eliquis, who presented to [facility]

emergency dept with worsening SOB and orthopnea for 3 days and admitted for 

HFpEF exacerbation.





PLAN:  





HFpEF exacerbation:


-Discontinue lasix 40 IV q8H. Start on lasix 40 PO QD


-2L/24h fluid restriction, 2g sodium


-daily weights


-strict I/Os


-daily BMP


-holding HCTZ while on loop diuretic


-f/u TTE








Chronic atrial fibrillation, HR in low 100s: patient of Dr. Chahal, on 

fleicanide, metop and digoxin at baseline


-continue fleicanide, metop and digoxin 


-telemetry


-daily BMP and mag








HTN


-C/w home metop, holding HCTZ, on lasix 40 IV Q8H





Graves?


-continue home methimazole


-low TSH, f/u free T4





Hx of DVT, PE


-continue home eliquis





GERD 


-C/w famotidine





HLD:


-continue atorvastatin





CAD:


-continue ASA 81, statin 





DVT px


-Eliquis





VS,Fishbone, I+O


VS, Fishbone, I+O


Laboratory Tests


10/28/21 05:37











Vital Signs








  Date Time  Temp Pulse Resp B/P (MAP) Pulse Ox O2 Delivery O2 Flow Rate FiO2


 


10/28/21 06:00 97.6 77 18 119/76 (90) 96 Room Air  


 


10/27/21 14:00        














I&O- Last 24 Hours up to 6 AM 


 


 10/28/21





 06:00


 


Intake Total 1080 ml


 


Output Total 1500 ml


 


Balance -420 ml

















JAMES TRACEY MD   Oct 28, 2021 08:06

## 2021-10-28 NOTE — DS.PDOC
Discharge Summary


General


Date of Admission


Oct 26, 2021 at 17:50


Date of Discharge


10/28/2021


Attending Physician:  JAMES TRACEY MD





Discharge Summary


PROCEDURES PERFORMED DURING STAY: None





ADMITTING DIAGNOSES: 


CHF exacerbation





DISCHARGE DIAGNOSES:


HFpEF exacerbation


Likely chronic COPD, current smoker


HTN


Chronic atrial fibrillation 


GERD


Hx of VTEs on eliquis


[facility] transient delirium


Hypoxemia





COMPLICATIONS/CHIEF COMPLAINT: CHF.





HISTORY OF PRESENT ILLNESS:


66-year-old W with past medical history of hypertension, atrial fibrillation, 

GERD, history of pulmonary emboli and DVT on eliquis, current smoker, who 

presented to [facility] emergency dept with SOB and orthopnea for 3 days. Of 

note, she was in the ED the day prior and was discharged home and on the day of 

admission she is even more short of breath. She otherwise denied john chest 

pain, palpitations, recent fevers, chills, cough, congestion, asymmetric 

swelling, bleeding in urine, stool or emesis, dizziness or recent LOC.








HOSPITAL COURSE:


In the ER, VS showed she was hypertensive to 157/96, I suspect hypoxemic as she 

was placed on 2L NC, and afebrile. Workup revealed WBC 6, hgb 13, platelets 215,

na 140, K 4.2, Cr 1.28, proBNP 8578, TSH 0.03, LFTs wnl, while CXR showed 

potential interstitial edema while CTA chest was negative for opacities, 

effusions or PEs. She was given 40mg of IV lasix admitted for CHF exacerbation. 

Of note, she had a TTE recently that showed EF of 70% and grade 1 diastolic 

dysfunction in 7/2021. She had IV lasix with resolution of her hypoxemia, and 

improvement in exertional dyspnea. Her course was c/b transient hospital 

associated delirium overnight that resolved. She cleared PT/OT and is now being 

discharged home on lasix 20mg PO daily and will follow up with PCP within 1w, 

and follow up with Dr. Stillerman for her LLE wound in the next week as she does

at baseline and is being discharged home with services for concern of med co

mpliance.








DISCHARGE MEDICATIONS: Please see below.


 


ALLERGIES: Please see below.





PHYSICAL EXAMINATION ON DISCHARGE:


VITAL SIGNS: Please see below.


CONSTITUTIONAL: No acute distress, resting comfortably, AAO x 3


EYES: PERRLA, EOM intact


HENT, MOUTH: Normocephalic, atraumatic, moist mucous membranes 


NECK: SUPPLE, no JVD, no lymphadenopathy, no carotid bruit


CV: Irregularly irregular rhythm, S1S2 normal, no murmurs/rubs/gallops


RESPIRATORY: Clear to auscultation bilaterally, no rales/rhonchi/wheezes


GI:  obese abdomen, BS positive in 4 quadrants, soft, nontender, nondistended, 

no rebound or guarding, no organomegaly


EXT:No cyanosis, clubbing,  joint deformity, extremity edema


INTEGUMENTARY: Chronic hyperpigmentation of bilateral lower ext, chronic.  

Intact, no rashes, no lesions, no erythema


NEUROLOGIC: Cranial Nerves II-XII are intact, no focal deficits


PSYCHIATRIC: Mood and affect are normal 





LABORATORY DATA: Please see below.





IMAGING: 





CTA chest:


There is excellent visualization of the pulmonary arterial vasculature.  There 

are no


focal filling defects present that would be considered consistent with acute 

pulmonary


emboli.  There are no pleural or pericardial effusions.  There is no significant


change in appearance of the imaged upper abdomen or imaged osseous structures.  

There


is bilateral low-density adrenal gland thickening status quo.  There is no 

mediastinal


or hilar adenopathy, however, note is again made of bulky thyromegaly.  There is

a


borderline right hilar lymph node with a short axis dimension of 1 cm.  This 

cannot be


compared to the latest prior exam since no intravenous contrast was administered

for


that exam.  When compared to the next latest prior CT of the chest which was 

obtained


after intravenous contrast this lymph node is unchanged.  That examination is 

dated


11/21/2019.





Evaluation of the lung fields shows a few scattered bibasilar asymmetric 

densities


likely subsegmental atelectatic changes.  No new abnormal patchy opacities have


developed.  Scattered subsegmental atelectatic changes are seen throughout the 

lung


fields.





IMPRESSION:


There is no evidence of acute disease.  Findings as described above.





CXR:


The technique utilized in obtaining the radiograph has magnified the cardiac


silhouette and accentuated the interstitial markings.





There is global cardiomegaly accentuated by technique status quo.  The 

interstitial


markings have increased slightly compared to the prior exam no patchy opacities 

or


pleural effusions have developed.  There is no change in the osseous structures.








IMPRESSION:


Global cardiomegaly with mild interstitial edema suspected.





PROGNOSIS: good





ACTIVITY: As tolerated





DIET: 2g sodium





DISCHARGE PLAN: Home with services, with lasix 20mg daily





DISPOSITION: Home





DISCHARGE INSTRUCTIONS:


Home with services, with lasix 20mg daily





ITEMS TO FOLLOWUP ON ON OUTPATIENT:


HFpEF


Wound care clinic follow up





DISCHARGE CONDITION: Stable





TIME SPENT ON DISCHARGE: 45 minutes.





Vital Signs/I&Os





Vital Signs








  Date Time  Temp Pulse Resp B/P (MAP) Pulse Ox O2 Delivery O2 Flow Rate FiO2


 


10/28/21 08:07  93  112/60    


 


10/28/21 06:00 97.6  18  96 Room Air  


 


10/27/21 14:00        














I&O- Last 24 Hours up to 6 AM 


 


 10/28/21





 06:00


 


Intake Total 1080 ml


 


Output Total 1500 ml


 


Balance -420 ml











Laboratory Data


Labs 24H


Laboratory Tests 2


10/28/21 05:37: 


Nucleated Red Blood Cells % (auto) 0.0, Anion Gap 5L, Glomerular Filtration Rate

44.4L, Calcium Level 10.8H, Magnesium Level 2.1, Total Creatine Kinase 47, 

Creatine Kinase MB < 1.0, Creatine Kinase MB Relative Index 2.13, Troponin I 

0.02


10/28/21 08:30: Ammonia 26


CBC/BMP


Laboratory Tests


10/28/21 05:37











Microbiology





Microbiology


10/26/21 Respiratory Virus Panel (PCR) ([facility]) - Final, Complete





Discharge Medications


Scheduled


Apixaban (Eliquis) 5 Mg Tablet, 5 MG PO BID, (Reported)


Aspirin (Ecotrin) 81 Mg Tablet.dr, 81 MG PO DAILY, (Reported)


Atorvastatin Calcium (Atorvastatin Calcium) 20 Mg Tablet, 20 MG PO QPM, (R

eported)


Bupropion Hcl (Bupropion Xl) 150 Mg Tab.er.24h, 1 TAB PO DAILY, (Reported)


Digoxin (Digoxin) 250 Mcg Tablet, 250 MCG PO QPM, (Reported)


Famotidine (Famotidine) 20 Mg Tablet, 20 MG PO BID, (Reported)


Flecainide Acetate (Flecainide Acetate) 100 Mg Tablet, 100 MG PO BID, (Reported)


Fluoxetine Hcl (Fluoxetine HCl) 20 Mg Capsule, 20 MG PO DAILY, (Reported)


Furosemide (Furosemide) 20 Mg Tablet, 1 TAB PO DAILY


Hydrochlorothiazide (Hydrochlorothiazide) 25 Mg Tablet, 25 MG PO DAILY, 

(Reported)


Metoprolol Tartrate (Metoprolol Tartrate) 25 Mg Tablet, 25 MG PO BID, (Reported)


Multivitamins (Thera M Plus Tablet) 1 Each Tablet, 1 TAB PO DAILY, (Reported)


Vitamin E (Vitamin E) 400 Unit Capsule, 400 UNIT PO DAILY, (Reported)





Scheduled PRN


Acetaminophen (Tylenol Extra Strength) 500 Mg Tablet, 1,000 MG PO Q6H PRN for 

PAIN / FEVER, (Reported)





Allergies


Coded Allergies:  


     ibuprofen (Verified  Adverse Reaction, Mild, GI, 6/29/20)











JAMES TRACEY MD   Oct 28, 2021 11:38

## 2021-10-28 NOTE — MHCRPDOC
[facility] Consultation


Consultation


DATE OF CONSULTATION: 10/28/21  





CONSULTATION REQUESTED BY: Vickie Shin MD





REASON FOR CONSULTATION: Agitation.





RELEVANT HISTORY: 66-year-old admitted for congestive heart failure.  Ins 

admission she has been quite confused and agitated, at times being combative 

with the staff and wanting to leave.  Per medical staff she seems clear and more

logical towards beginning of admission, and has become increasingly more 

agitated and disturbed.  Over the past days she has been requesting to leave the

hospital several times and is beginning upset with the staff, she often talks 

about stranger nonsensical events, and is frequently tearful.  Evaluation was 

requested in order to determine if there could be a psychiatric component to her

current presentation or if there could be additional suggestions that might help

the patient.





PAST PSYCHIATRIC HISTORY: History of depression; denies a history of psychiatric

admissions, denies history of suicide attempts or self injury





PAST MEDICAL HISTORY: Congestive heart failure, atrial fibrillation, GERD





FAMILY HISTORY: Unable to focus to provide answers for questions about family 

history





PERSONAL AND SOCIAL HISTORY: The patient was born and raised in [place]. 


Resides in: [place]


Marital Status: W /


Children: Has adult children that do not live nearby and do not participate in 

current care


Employment: Reports employment but was unable to state where





SUBSTANCE ABUSE HISTORY:


Smoking: Denies a history of smoking


ETOH: Denies a history of alcohol consumption


Illicit Drugs: Denies use of other drugs





LEGAL HISTORY: Denies notable legal history.


 


MENTAL STATUS EXAMINATION: 


Patient is a 66-year old female, who is dressed in hospital gown with a ill 

fitting jacket over the top.


Speech is speech was spontaneous, clear, with regular rate, rhythm, and volume.


Language skills are intact.


Thought processes including: Tangential. 


Thought content: Focused on worry about whether or not her dog has been put 

down, believes that the staff at the hospital have told her that her dog was put

down, it anger over not being allowed to return home.


Abstract reasoning, and computation: Does not appear to have abstract reasoning,

difficult time manipulating formation.


Description of associations: Loose.


Description of abnormal or psychotic thoughts: Denies AVH, did not appear 

paranoid.  Did not appear to have delusions about her dog being put down and 

staff at the hospital being responsible for her.


Judgment: Poor.


Insight: Limited by current mental status.


Orientation to time and self, when asked about location she stated that she was 

in a  home (initially described as a place where they bring people and 

dogs to put them down).


Recent and remote memory: 0 of 3 on short recall, remote memory appears to be 

intact based on her ability to tell a logical history prior to hospitalization.


Attention span and concentration: Attention intact, able to perform serial 

sevens without issue, followed the conversation and interview without repetition

of questions.


Fund of knowledge: Appears intact, however executive functioning is impaired was

asked to draw a clock with hands at 2:10, mary our hand at 2:00 and minute hand 

at 10:00; asked to copy interlocking figures, mary both figures together but 

separate, did not complete the connections on the figures.


Mood: "I just want to go home".


Affect: Tearful, labile, congruent with mood and thought content.





DIAGNOSIS:


1.  Delirium


2.  Unspecified depressive disorder


3.  Unspecified neurocognitive disorder, rule out dementia 





PLAN:


1.  Given the patient's poor physical health, on review of her recent EKG she 

has a highly prolonged QTC the use of antipsychotics for management of her 

agitation is not advised.  We recommend use of benzodiazepines at this time, 

have ordered 0.25 mg of Ativan to be given every 4 hours as needed with a max 

dose of 3 in a day.  Would recommend continue to evaluate her, use of 

benzodiazepines in delirious patients may result in increased loss of 

inhibitions and increased agitation.  If an antipsychotic as needed would 

recommend use of 0.5 mg of Haldol given p.o. or IM (do not give IV), with a 

maximum dose of 2 mg in a day, however this will require significant monitoring 

of her QTC as she is on multiple medications that may impact QTC along with 

metabolism of each other.  Other alternatives for sedation would likely warrant 

transfer to an ICU for use of propofol or dexmedetomidine based upon what the 

primary team feels would be most helpful and safest given her current medical 

status..


2.  In order to help reduce incidence of delirium would recommend trying to 

address metabolic, infectious, pain, or other medical conditions which may be 

contributing to delirium.  Would also recommend establishing a set schedule for 

the patient with reduction in noise and interaction to allow patient less of a 

confusing situation.





Vital Signs





Vital Signs








  Date Time  Temp Pulse Resp B/P (MAP) Pulse Ox O2 Delivery O2 Flow Rate FiO2


 


10/28/21 14:00 98.0 60 18 128/66 (86) 98 Room Air  


 


10/27/21 14:00        











Laboratory Data


[facility] Labs


Laboratory Tests 2


10/28/21 05:37: 


Nucleated Red Blood Cells % (auto) 0.0, Anion Gap 5L, Glomerular Filtration Rate

44.4L, Calcium Level 10.8H, Magnesium Level 2.1, Total Creatine Kinase 47, 

Creatine Kinase MB < 1.0, Creatine Kinase MB Relative Index 2.13, Troponin I 

0.02


10/28/21 08:30: Ammonia 26





Home Medications


Current Medications





Current Medications








 Medications


  (Trade)  Dose


 Ordered  Sig/Roro


 Route


 PRN Reason  Start Time


 Stop Time Status Last Admin


Dose Admin


 


 Acetaminophen


  (Tylenol Tab)  650 mg  Q4H  PRN


 PO


 MILD PAIN or TEMP > 101  10/26/21 17:50


    10/27/21 14:16





 


 Apixaban


  (Eliquis)  5 mg  BID


 PO


   10/26/21 21:00


    10/28/21 08:02





 


 Aspirin


  (Aspirin


 Chewable)  81 mg  DAILY


 PO


   10/27/21 09:00


    10/28/21 08:03





 


 Atorvastatin


 Calcium


  (Lipitor)  20 mg  DAILY@2100


 PO


   10/26/21 21:00


    10/27/21 20:00





 


 Bupropion HCl


  (Wellbutrin Xl)  150 mg  DAILY


 PO


   10/27/21 09:00


 10/28/21 12:52 DC 10/28/21 08:03





 


 Bupropion HCl


  (Wellbutrin Xl)  150 mg  DAILY


 PO


   10/29/21 09:00


     





 


 Digoxin


  (Lanoxin)  0.25 mg  QHS


 PO


   10/26/21 21:00


    10/27/21 20:00





 


 Famotidine


  (Pepcid)  40 mg  QHS


 PO


   10/26/21 21:00


    10/27/21 20:00





 


 Flecainide Acetate


  (Tambocor)  100 mg  BID


 PO


   10/26/21 21:00


    10/28/21 08:03





 


 Fluoxetine HCl


  (PROzac)  20 mg  DAILY


 PO


   10/27/21 09:00


    10/28/21 08:03





 


 Furosemide


  (LASIX injection)  40 mg  Q8H


 IV


   10/27/21 00:00


 10/28/21 07:58 DC 10/28/21 00:32





 


 Furosemide


  (Lasix)  40 mg  DAILY


 PO


   10/28/21 09:00


    10/28/21 10:10





 


 Home Med


  (Home Med List


 Complete!)    ASDIRECTED


 XX


   10/26/21 19:10


 10/26/21 19:10 DC  





 


 Magnesium


 Hydroxide


  (Milk Of


 Magnesia)  30 ml  DAILY  PRN


 PO


 CONSTIPATION  10/26/21 17:50


     





 


 Methimazole


  (Tapazole)  10 mg  DAILY


 PO


   10/27/21 09:00


    10/28/21 08:03





 


 Metoprolol


 Tartrate


  (Lopressor)  25 mg  BID


 PO


   10/26/21 21:00


    10/28/21 08:07





 


 Vitamin E


  (Vitamin E)  400 units  DAILY


 PO


   10/27/21 09:00


    10/28/21 08:03











Scheduled


Apixaban (Eliquis) 5 Mg Tablet, 5 MG PO BID, (Reported)


Aspirin (Ecotrin) 81 Mg Tablet.dr, 81 MG PO DAILY, (Reported)


Atorvastatin Calcium (Atorvastatin Calcium) 20 Mg Tablet, 20 MG PO QPM, 

(Reported)


Bupropion Hcl (Bupropion Xl) 150 Mg Tab.er.24h, 1 TAB PO DAILY, (Reported)


Digoxin (Digoxin) 250 Mcg Tablet, 250 MCG PO QPM, (Reported)


Famotidine (Famotidine) 20 Mg Tablet, 20 MG PO BID, (Reported)


Flecainide Acetate (Flecainide Acetate) 100 Mg Tablet, 100 MG PO BID, (Reported)


Fluoxetine Hcl (Fluoxetine HCl) 20 Mg Capsule, 20 MG PO DAILY, (Reported)


Furosemide (Furosemide) 20 Mg Tablet, 1 TAB PO DAILY


Hydrochlorothiazide (Hydrochlorothiazide) 25 Mg Tablet, 25 MG PO DAILY, 

(Reported)


Metoprolol Tartrate (Metoprolol Tartrate) 25 Mg Tablet, 25 MG PO BID, (Reported)


Multivitamins (Thera M Plus Tablet) 1 Each Tablet, 1 TAB PO DAILY, (Reported)


Vitamin E (Vitamin E) 400 Unit Capsule, 400 UNIT PO DAILY, (Reported)





Scheduled PRN


Acetaminophen (Tylenol Extra Strength) 500 Mg Tablet, 1,000 MG PO Q6H PRN for 

PAIN / FEVER, (Reported)





Allergies


Coded Allergies:  


     ibuprofen (Verified  Adverse Reaction, Mild, GI, 20)











LUCILLE BARNETT MD     Oct 28, 2021 17:55

## 2021-10-29 VITALS — DIASTOLIC BLOOD PRESSURE: 95 MMHG | SYSTOLIC BLOOD PRESSURE: 147 MMHG

## 2021-10-29 VITALS — SYSTOLIC BLOOD PRESSURE: 144 MMHG | DIASTOLIC BLOOD PRESSURE: 91 MMHG

## 2021-10-29 VITALS — SYSTOLIC BLOOD PRESSURE: 119 MMHG | DIASTOLIC BLOOD PRESSURE: 82 MMHG

## 2021-10-29 LAB
BUN SERPL-MCNC: 46 MG/DL (ref 7–18)
CALCIUM SERPL-MCNC: 11.2 MG/DL (ref 8.8–10.2)
CHLORIDE SERPL-SCNC: 102 MEQ/L (ref 98–107)
CO2 SERPL-SCNC: 24 MEQ/L (ref 21–32)
CREAT SERPL-MCNC: 2.78 MG/DL (ref 0.55–1.3)
GFR SERPL CREATININE-BSD FRML MDRD: 18.1 ML/MIN/{1.73_M2} (ref 45–?)
GLUCOSE SERPL-MCNC: 113 MG/DL (ref 70–100)
HCT VFR BLD AUTO: 42.6 % (ref 36–47)
HGB BLD-MCNC: 14 G/DL (ref 12–15.5)
MAGNESIUM SERPL-MCNC: 2.1 MG/DL (ref 1.8–2.4)
MCH RBC QN AUTO: 31.8 PG (ref 27–33)
MCHC RBC AUTO-ENTMCNC: 32.9 G/DL (ref 32–36.5)
MCV RBC AUTO: 96.8 FL (ref 80–96)
PLATELET # BLD AUTO: 221 10^3/UL (ref 150–450)
POTASSIUM SERPL-SCNC: 4.3 MEQ/L (ref 3.5–5.1)
RBC # BLD AUTO: 4.4 10^6/UL (ref 4–5.4)
SODIUM SERPL-SCNC: 139 MEQ/L (ref 136–145)
WBC # BLD AUTO: 7.4 10^3/UL (ref 4–10)

## 2021-10-29 RX ADMIN — FUROSEMIDE SCH MG: 40 TABLET ORAL at 09:10

## 2021-10-29 RX ADMIN — BUPROPION HYDROCHLORIDE SCH MG: 150 TABLET, FILM COATED, EXTENDED RELEASE ORAL at 09:11

## 2021-10-29 RX ADMIN — DIGOXIN SCH MG: 250 TABLET ORAL at 20:40

## 2021-10-29 RX ADMIN — APIXABAN SCH MG: 5 TABLET, FILM COATED ORAL at 20:38

## 2021-10-29 RX ADMIN — METOPROLOL TARTRATE SCH MG: 25 TABLET, FILM COATED ORAL at 09:11

## 2021-10-29 RX ADMIN — ATORVASTATIN CALCIUM SCH MG: 20 TABLET, FILM COATED ORAL at 20:38

## 2021-10-29 RX ADMIN — FAMOTIDINE SCH MG: 20 TABLET, FILM COATED ORAL at 20:38

## 2021-10-29 RX ADMIN — APIXABAN SCH MG: 5 TABLET, FILM COATED ORAL at 09:11

## 2021-10-29 RX ADMIN — FLECAINIDE ACETATE SCH MG: 50 TABLET ORAL at 20:38

## 2021-10-29 RX ADMIN — FLECAINIDE ACETATE SCH MG: 50 TABLET ORAL at 09:10

## 2021-10-29 RX ADMIN — Medication SCH UNITS: at 09:10

## 2021-10-29 RX ADMIN — METOPROLOL TARTRATE SCH MG: 25 TABLET, FILM COATED ORAL at 20:40

## 2021-10-29 RX ADMIN — ASPIRIN 81 MG CHEWABLE TABLET SCH MG: 81 TABLET CHEWABLE at 09:10
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
## 2021-10-29 NOTE — IPNPDOC
Text Note


Date of Service


The patient was seen on 10/29/21.





NOTE


Alerted by nursing staff at 0545 that patient had suffered a fall. Apparently, 

staff had just taken vitals on patient and returned to nursing station only for 

them to turn around and notice patient on the floor. Patient is unreliable 

historian at this point but it seems as though patient slipped trying to get our

of bed. Patient had been aggressive, restless, agitated, difficult to redirect 

and noncompliant with staff instruction throughout the night. Staff tell me 

patient was found laying on her left side in no acute distress. I saw and 

examined patient at this time who voices no complaints to me. Physical exam 

performed at this time unremarkable. Vital signs after fall stable.





VS,Fishbone, I+O


VS, Fishbone, I+O





Vital Signs








  Date Time  Temp Pulse Resp B/P (MAP) Pulse Ox O2 Delivery O2 Flow Rate FiO2


 


10/28/21 22:00 98.6 63 18 111/95 (100) 99 Room Air  


 


10/27/21 14:00        














I&O- Last 24 Hours up to 6 AM 


 


 10/29/21





 06:00


 


Intake Total 625 ml


 


Balance 625 ml

















STEVE COE            Oct 29, 2021 05:50

## 2021-10-30 VITALS — SYSTOLIC BLOOD PRESSURE: 114 MMHG | DIASTOLIC BLOOD PRESSURE: 76 MMHG

## 2021-10-30 VITALS — DIASTOLIC BLOOD PRESSURE: 61 MMHG | SYSTOLIC BLOOD PRESSURE: 122 MMHG

## 2021-10-30 LAB
BUN SERPL-MCNC: 43 MG/DL (ref 7–18)
CALCIUM SERPL-MCNC: 10.1 MG/DL (ref 8.8–10.2)
CHLORIDE SERPL-SCNC: 108 MEQ/L (ref 98–107)
CO2 SERPL-SCNC: 23 MEQ/L (ref 21–32)
CREAT SERPL-MCNC: 1.36 MG/DL (ref 0.55–1.3)
GFR SERPL CREATININE-BSD FRML MDRD: 41.4 ML/MIN/{1.73_M2} (ref 45–?)
GLUCOSE SERPL-MCNC: 99 MG/DL (ref 70–100)
HCT VFR BLD AUTO: 38.4 % (ref 36–47)
HGB BLD-MCNC: 12.4 G/DL (ref 12–15.5)
MAGNESIUM SERPL-MCNC: 2.2 MG/DL (ref 1.8–2.4)
MCH RBC QN AUTO: 31.7 PG (ref 27–33)
MCHC RBC AUTO-ENTMCNC: 32.3 G/DL (ref 32–36.5)
MCV RBC AUTO: 98.2 FL (ref 80–96)
PLATELET # BLD AUTO: 176 10^3/UL (ref 150–450)
POTASSIUM SERPL-SCNC: 4.2 MEQ/L (ref 3.5–5.1)
RBC # BLD AUTO: 3.91 10^6/UL (ref 4–5.4)
SODIUM SERPL-SCNC: 140 MEQ/L (ref 136–145)
WBC # BLD AUTO: 5.4 10^3/UL (ref 4–10)

## 2021-10-30 RX ADMIN — FLECAINIDE ACETATE SCH MG: 50 TABLET ORAL at 08:27

## 2021-10-30 RX ADMIN — METOPROLOL TARTRATE SCH MG: 25 TABLET, FILM COATED ORAL at 21:00

## 2021-10-30 RX ADMIN — BUPROPION HYDROCHLORIDE SCH MG: 150 TABLET, FILM COATED, EXTENDED RELEASE ORAL at 08:27

## 2021-10-30 RX ADMIN — FAMOTIDINE SCH MG: 20 TABLET, FILM COATED ORAL at 20:58

## 2021-10-30 RX ADMIN — ATORVASTATIN CALCIUM SCH MG: 20 TABLET, FILM COATED ORAL at 21:00

## 2021-10-30 RX ADMIN — ASPIRIN 81 MG CHEWABLE TABLET SCH MG: 81 TABLET CHEWABLE at 08:27

## 2021-10-30 RX ADMIN — APIXABAN SCH MG: 5 TABLET, FILM COATED ORAL at 20:59

## 2021-10-30 RX ADMIN — Medication SCH UNITS: at 08:29

## 2021-10-30 RX ADMIN — METOPROLOL TARTRATE SCH MG: 25 TABLET, FILM COATED ORAL at 08:29

## 2021-10-30 RX ADMIN — APIXABAN SCH MG: 5 TABLET, FILM COATED ORAL at 08:29

## 2021-10-30 RX ADMIN — DIGOXIN SCH MG: 250 TABLET ORAL at 20:59

## 2021-10-30 RX ADMIN — FLECAINIDE ACETATE SCH MG: 50 TABLET ORAL at 20:59

## 2021-10-30 NOTE — IPNPDOC
Text Note


Date of Service


The patient was seen on 10/30/21.





NOTE


SUBJECTIVE:


-Continues to have confusion, and at this time is pleasant


-Oriented to self, digresses when we are having a conversation with subject that

is not part of the conversation, otherwise knows self, , her history but not 





OBJECTIVE:


VITAL SIGNS: Please see below 


CONSTITUTIONAL: No acute distress, resting comfortably, AAO x 3


EYES: PERRLA, EOM intact


HENT, MOUTH: Normocephalic, atraumatic, moist mucous membranes 


NECK: SUPPLE, no JVD, no lymphadenopathy, no carotid bruit


CV: Irregularly irregular rhythm, S1S2 normal, no murmurs/rubs/gallops


RESPIRATORY: Clear to auscultation bilaterally, no rales/rhonchi/wheezes


GI:  obese abdomen, BS positive in 4 quadrants, soft, nontender, nondistended, 

no rebound or guarding, no organomegaly


EXT:No cyanosis, clubbing,  joint deformity, extremity edema


INTEGUMENTARY: Chronic hyperpigmentation of bilateral lower ext, chronic.  

Intact, no rashes, no lesions, no erythema


NEUROLOGIC: Cranial Nerves II-XII are intact, no focal deficits


PSYCHIATRIC: Mood and affect are normal , AOx1





LABORATORY DATA: reviewed


Cr 1.34





IMAGING: 





CTA chest:


There is excellent visualization of the pulmonary arterial vasculature.  There 

are no


focal filling defects present that would be considered consistent with acute 

pulmonary


emboli.  There are no pleural or pericardial effusions.  There is no significant


change in appearance of the imaged upper abdomen or imaged osseous structures.  

There


is bilateral low-density adrenal gland thickening status quo.  There is no 

mediastinal


or hilar adenopathy, however, note is again made of bulky thyromegaly.  There is

a


borderline right hilar lymph node with a short axis dimension of 1 cm.  This 

cannot be


compared to the latest prior exam since no intravenous contrast was administered

for


that exam.  When compared to the next latest prior CT of the chest which was 

obtained


after intravenous contrast this lymph node is unchanged.  That examination is 

dated


2019.





Evaluation of the lung fields shows a few scattered bibasilar asymmetric 

densities


likely subsegmental atelectatic changes.  No new abnormal patchy opacities have


developed.  Scattered subsegmental atelectatic changes are seen throughout the 

lung


fields.





IMPRESSION:


There is no evidence of acute disease.  Findings as described above.





CXR:


The technique utilized in obtaining the radiograph has magnified the cardiac


silhouette and accentuated the interstitial markings.





There is global cardiomegaly accentuated by technique status quo.  The 

interstitial


markings have increased slightly compared to the prior exam no patchy opacities 

or


pleural effusions have developed.  There is no change in the osseous structures.








IMPRESSION:


Global cardiomegaly with mild interstitial edema suspected.





ASSESSMENT: 


66-year-old W with past medical history of hypertension, atrial fibrillation, 

GERD, history of pulmonary emboli and DVT on eliquis, who presented to [facility]

emergency dept with worsening SOB and orthopnea for 3 days and admitted for 

HFpEF exacerbation with course now c/b delirium and DEZ.





PLAN:  





DEZ:


-recently diuresed and tolerated well, but now confused, stopped diuretics, 

improving


-continue holing loop diuretic and HCTZ





Delirium:


-Has a hx of depression, will continue her prozac and wellbutrin


-consulted psych, PRN ativan ordered, to avoid antipsychotics given prolonged 

QTc on fleicanide, BB and dig. However per psych if absolutely required to give 

IM NOT IV haldol as ordered


-nonfocal for infection concern, UA bland and BCx NGTD. chest imaging was 

without evidence of infection.





HFpEF exacerbation:


-Discontinued lasix, euvolemic


-daily weights


-strict I/Os


-daily BMP


-holding HCTZ


-f/u TTE read








Chronic atrial fibrillation, HR in low 100s: patient of Dr. Chahal, on fleicanid

e, metop and digoxin at baseline


-continue fleicanide, metop and digoxin 


-telemetry


-daily BMP and mag








HTN


-C/w home metop, holding HCTZ





Graves?


-continue home methimazole


-low TSH, f/u free T4





Hx of DVT, PE


-continue home eliquis





GERD 


-C/w famotidine





HLD:


-continue atorvastatin





CAD:


-continue ASA 81, statin 





DVT px


-Eliquis





VS,Fishbone, I+O


VS, Fishbone, I+O


Laboratory Tests


10/30/21 05:39











Vital Signs








  Date Time  Temp Pulse Resp B/P (MAP) Pulse Ox O2 Delivery O2 Flow Rate FiO2


 


10/30/21 08:29  102  138/85    


 


10/30/21 06:00 98.1  17  93 Room Air  


 


10/27/21 14:00        














I&O- Last 24 Hours up to 6 AM 


 


 10/30/21





 06:00


 


Intake Total 1200 ml


 


Output Total 0 ml


 


Balance 1200 ml

















JAMES TRACEY MD   Oct 30, 2021 10:25

## 2021-10-31 VITALS — DIASTOLIC BLOOD PRESSURE: 73 MMHG | SYSTOLIC BLOOD PRESSURE: 126 MMHG

## 2021-10-31 VITALS — DIASTOLIC BLOOD PRESSURE: 78 MMHG | SYSTOLIC BLOOD PRESSURE: 127 MMHG

## 2021-10-31 VITALS — SYSTOLIC BLOOD PRESSURE: 133 MMHG | DIASTOLIC BLOOD PRESSURE: 73 MMHG

## 2021-10-31 LAB
BUN SERPL-MCNC: 32 MG/DL (ref 7–18)
CALCIUM SERPL-MCNC: 10.6 MG/DL (ref 8.8–10.2)
CHLORIDE SERPL-SCNC: 107 MEQ/L (ref 98–107)
CO2 SERPL-SCNC: 25 MEQ/L (ref 21–32)
CREAT SERPL-MCNC: 1.02 MG/DL (ref 0.55–1.3)
GFR SERPL CREATININE-BSD FRML MDRD: 57.7 ML/MIN/{1.73_M2} (ref 45–?)
GLUCOSE SERPL-MCNC: 95 MG/DL (ref 70–100)
HCT VFR BLD AUTO: 38.9 % (ref 36–47)
HGB BLD-MCNC: 12.5 G/DL (ref 12–15.5)
MAGNESIUM SERPL-MCNC: 2.4 MG/DL (ref 1.8–2.4)
MCH RBC QN AUTO: 32.1 PG (ref 27–33)
MCHC RBC AUTO-ENTMCNC: 32.1 G/DL (ref 32–36.5)
MCV RBC AUTO: 99.7 FL (ref 80–96)
PLATELET # BLD AUTO: 182 10^3/UL (ref 150–450)
POTASSIUM SERPL-SCNC: 4.4 MEQ/L (ref 3.5–5.1)
RBC # BLD AUTO: 3.9 10^6/UL (ref 4–5.4)
SODIUM SERPL-SCNC: 139 MEQ/L (ref 136–145)
WBC # BLD AUTO: 5.4 10^3/UL (ref 4–10)

## 2021-10-31 RX ADMIN — Medication SCH UNITS: at 08:27

## 2021-10-31 RX ADMIN — FLECAINIDE ACETATE SCH MG: 50 TABLET ORAL at 08:27

## 2021-10-31 RX ADMIN — METOPROLOL TARTRATE SCH MG: 25 TABLET, FILM COATED ORAL at 21:41

## 2021-10-31 RX ADMIN — APIXABAN SCH MG: 5 TABLET, FILM COATED ORAL at 08:27

## 2021-10-31 RX ADMIN — BUPROPION HYDROCHLORIDE SCH MG: 150 TABLET, FILM COATED, EXTENDED RELEASE ORAL at 08:27

## 2021-10-31 RX ADMIN — ATORVASTATIN CALCIUM SCH MG: 20 TABLET, FILM COATED ORAL at 21:42

## 2021-10-31 RX ADMIN — METOPROLOL TARTRATE SCH MG: 25 TABLET, FILM COATED ORAL at 08:29

## 2021-10-31 RX ADMIN — ASPIRIN 81 MG CHEWABLE TABLET SCH MG: 81 TABLET CHEWABLE at 08:27

## 2021-10-31 RX ADMIN — APIXABAN SCH MG: 5 TABLET, FILM COATED ORAL at 21:41

## 2021-10-31 RX ADMIN — FAMOTIDINE SCH MG: 20 TABLET, FILM COATED ORAL at 21:40

## 2021-10-31 RX ADMIN — FLECAINIDE ACETATE SCH MG: 50 TABLET ORAL at 21:42

## 2021-10-31 RX ADMIN — DIGOXIN SCH MG: 250 TABLET ORAL at 21:57

## 2021-10-31 NOTE — IPNPDOC
Text Note


Date of Service


The patient was seen on 10/31/21.





NOTE


SUBJECTIVE:


-Continues to have confusion, pleasant, thought the overnight nurse allowed to 

keep her dog overnight and was grateful.





OBJECTIVE:


VITAL SIGNS: Please see below 


CONSTITUTIONAL: No acute distress, resting comfortably, AAO x 3


EYES: PERRLA, EOM intact


HENT, MOUTH: Normocephalic, atraumatic, moist mucous membranes 


NECK: SUPPLE, no JVD, no lymphadenopathy, no carotid bruit


CV: Irregularly irregular rhythm, S1S2 normal, no murmurs/rubs/gallops


RESPIRATORY: Clear to auscultation bilaterally, no rales/rhonchi/wheezes


GI:  obese abdomen, BS positive in 4 quadrants, soft, nontender, nondistended, 

no rebound or guarding, no organomegaly


EXT:No cyanosis, clubbing,  joint deformity, extremity edema


INTEGUMENTARY: Chronic hyperpigmentation of bilateral lower ext, chronic.  

Intact, no rashes, no lesions, no erythema


NEUROLOGIC: Cranial Nerves II-XII are intact, no focal deficits


PSYCHIATRIC: Mood and affect are normal , AOx1





LABORATORY DATA: reviewed


Cr 1.02





IMAGING: 





CTA chest:


There is excellent visualization of the pulmonary arterial vasculature.  There 

are no


focal filling defects present that would be considered consistent with acute 

pulmonary


emboli.  There are no pleural or pericardial effusions.  There is no significant


change in appearance of the imaged upper abdomen or imaged osseous structures.  

There


is bilateral low-density adrenal gland thickening status quo.  There is no 

mediastinal


or hilar adenopathy, however, note is again made of bulky thyromegaly.  There is

a


borderline right hilar lymph node with a short axis dimension of 1 cm.  This 

cannot be


compared to the latest prior exam since no intravenous contrast was administered

for


that exam.  When compared to the next latest prior CT of the chest which was 

obtained


after intravenous contrast this lymph node is unchanged.  That examination is 

dated


11/21/2019.





Evaluation of the lung fields shows a few scattered bibasilar asymmetric 

densities


likely subsegmental atelectatic changes.  No new abnormal patchy opacities have


developed.  Scattered subsegmental atelectatic changes are seen throughout the 

lung


fields.





IMPRESSION:


There is no evidence of acute disease.  Findings as described above.





CXR:


The technique utilized in obtaining the radiograph has magnified the cardiac


silhouette and accentuated the interstitial markings.





There is global cardiomegaly accentuated by technique status quo.  The 

interstitial


markings have increased slightly compared to the prior exam no patchy opacities 

or


pleural effusions have developed.  There is no change in the osseous structures.








IMPRESSION:


Global cardiomegaly with mild interstitial edema suspected.





ASSESSMENT: 


66-year-old W with past medical history of hypertension, atrial fibrillation, 

GERD, history of pulmonary emboli and DVT on eliquis, who presented to [facility]

emergency dept with worsening SOB and orthopnea for 3 days and admitted for 

HFpEF exacerbation with course now c/b delirium and DEZ.





PLAN:  





DEZ:


-recently diuresed and tolerated well, but now confused, stopped diuretics, 

improving


-continue holing loop diuretic and HCTZ





Delirium on potential dementia?:


-Has a hx of depression, will continue her prozac and wellbutrin


-consulted psych, PRN ativan ordered, to avoid antipsychotics given prolonged 

QTc on fleicanide, BB and dig. However per psych if absolutely required to give 

IM NOT IV haldol as ordered


-nonfocal for infection concern, UA bland and BCx NGTD. chest imaging was 

without evidence of infection.


-Will get CT head to r/o intracranial pathology though I highly doubt with an o

therwise nonfocal neuro exam





HFpEF exacerbation:


-Discontinued lasix, euvolemic


-daily weights


-strict I/Os


-daily BMP


-holding HCTZ


-f/u TTE read








Chronic atrial fibrillation, HR in low 100s: patient of Dr. Chahal, on 

fleicanide, metop and digoxin at baseline


-continue fleicanide, metop and digoxin 


-telemetry


-daily BMP and mag








HTN


-C/w home metop, holding HCTZ





Graves?


-continue home methimazole


-low TSH, f/u free T4





Hx of DVT, PE


-continue home eliquis





GERD 


-C/w famotidine





HLD:


-continue atorvastatin





CAD:


-continue ASA 81, statin 





DVT px


-Eliquis





VS,Fishbone, I+O


VS, Fishbone, I+O


Laboratory Tests


10/31/21 05:47











Vital Signs








  Date Time  Temp Pulse Resp B/P (MAP) Pulse Ox O2 Delivery O2 Flow Rate FiO2


 


10/31/21 06:00 98.3 60 18 126/73 (90) 95 Room Air  


 


10/27/21 14:00        














I&O- Last 24 Hours up to 6 AM0 


 


 10/31/21





 06:00


 


Intake Total 700 ml


 


Output Total 725 ml


 


Balance -25 ml

















JAMES TRACEY MD   Oct 31, 2021 08:24

## 2021-10-31 NOTE — REP
INDICATION:

AMS.



COMPARISON:

None.



TECHNIQUE:

5 mm contiguous transaxial sections were obtained from the skull base to the cerebral

convexities.



FINDINGS:

The ventricles and sulci are consistent with the patient's age.  There are no

extra-axial fluid collections.  There is no mass effect.  The deep cerebral white

matter is consistent with the patient's age.



The orbital and petrous structures, cerebellopontine angles, and posterior fossa are

unremarkable.



The sella turcica, cavernous, and paracavernous structures are essentially

unremarkable.



The visualized portions of the paranasal sinuses and mastoid air cells are clear.

Images of the skull base show no gross abnormality.





IMPRESSION:

Essentially unremarkable CT examination of the brain.





<Electronically signed by Urban Washington > 10/31/21 0900

## 2021-11-01 VITALS — SYSTOLIC BLOOD PRESSURE: 140 MMHG | DIASTOLIC BLOOD PRESSURE: 80 MMHG

## 2021-11-01 VITALS — DIASTOLIC BLOOD PRESSURE: 78 MMHG | SYSTOLIC BLOOD PRESSURE: 150 MMHG

## 2021-11-01 RX ADMIN — METOPROLOL TARTRATE SCH MG: 25 TABLET, FILM COATED ORAL at 09:54

## 2021-11-01 RX ADMIN — APIXABAN SCH MG: 5 TABLET, FILM COATED ORAL at 09:54

## 2021-11-01 RX ADMIN — APIXABAN SCH MG: 5 TABLET, FILM COATED ORAL at 20:27

## 2021-11-01 RX ADMIN — FLECAINIDE ACETATE SCH MG: 50 TABLET ORAL at 09:54

## 2021-11-01 RX ADMIN — ACETAMINOPHEN PRN MG: 325 TABLET ORAL at 20:29

## 2021-11-01 RX ADMIN — FLECAINIDE ACETATE SCH MG: 50 TABLET ORAL at 20:29

## 2021-11-01 RX ADMIN — DIGOXIN SCH MG: 250 TABLET ORAL at 20:28

## 2021-11-01 RX ADMIN — METOPROLOL TARTRATE SCH MG: 25 TABLET, FILM COATED ORAL at 20:29

## 2021-11-01 RX ADMIN — BUPROPION HYDROCHLORIDE SCH MG: 150 TABLET, FILM COATED, EXTENDED RELEASE ORAL at 09:54

## 2021-11-01 RX ADMIN — Medication SCH UNITS: at 09:54

## 2021-11-01 RX ADMIN — ATORVASTATIN CALCIUM SCH MG: 20 TABLET, FILM COATED ORAL at 20:28

## 2021-11-01 RX ADMIN — FAMOTIDINE SCH MG: 20 TABLET, FILM COATED ORAL at 20:29

## 2021-11-01 RX ADMIN — ASPIRIN 81 MG CHEWABLE TABLET SCH MG: 81 TABLET CHEWABLE at 09:54

## 2021-11-01 NOTE — IPNPDOC
Text Note


Date of Service


The patient was seen on 11/1/21.





NOTE


SUBJECTIVE:


-Continues to have confusion, pleasant





OBJECTIVE:


VITAL SIGNS: Please see below 


CONSTITUTIONAL: No acute distress, resting comfortably, AAO x 3


EYES: PERRLA, EOM intact


HENT, MOUTH: Normocephalic, atraumatic, moist mucous membranes 


NECK: SUPPLE, no JVD, no lymphadenopathy, no carotid bruit


CV: Irregularly irregular rhythm, S1S2 normal, no murmurs/rubs/gallops


RESPIRATORY: Clear to auscultation bilaterally, no rales/rhonchi/wheezes


GI:  obese abdomen, BS positive in 4 quadrants, soft, nontender, nondistended, 

no rebound or guarding, no organomegaly


EXT:No cyanosis, clubbing,  joint deformity, extremity edema


INTEGUMENTARY: Chronic hyperpigmentation of bilateral lower ext, chronic.  

Intact, no rashes, no lesions, no erythema


NEUROLOGIC: Cranial Nerves II-XII are intact, no focal deficits


PSYCHIATRIC: Mood and affect are normal , AOx1 to self





LABORATORY DATA: reviewed


Cr 1.02





IMAGING: 





CTA chest:


There is excellent visualization of the pulmonary arterial vasculature.  There 

are no


focal filling defects present that would be considered consistent with acute 

pulmonary


emboli.  There are no pleural or pericardial effusions.  There is no significant


change in appearance of the imaged upper abdomen or imaged osseous structures.  

There


is bilateral low-density adrenal gland thickening status quo.  There is no 

mediastinal


or hilar adenopathy, however, note is again made of bulky thyromegaly.  There is

a


borderline right hilar lymph node with a short axis dimension of 1 cm.  This 

cannot be


compared to the latest prior exam since no intravenous contrast was administered

for


that exam.  When compared to the next latest prior CT of the chest which was 

obtained


after intravenous contrast this lymph node is unchanged.  That examination is 

dated


11/21/2019.





Evaluation of the lung fields shows a few scattered bibasilar asymmetric 

densities


likely subsegmental atelectatic changes.  No new abnormal patchy opacities have


developed.  Scattered subsegmental atelectatic changes are seen throughout the 

lung


fields.





IMPRESSION:


There is no evidence of acute disease.  Findings as described above.





CXR:


The technique utilized in obtaining the radiograph has magnified the cardiac


silhouette and accentuated the interstitial markings.





There is global cardiomegaly accentuated by technique status quo.  The 

interstitial


markings have increased slightly compared to the prior exam no patchy opacities 

or


pleural effusions have developed.  There is no change in the osseous structures.








IMPRESSION:


Global cardiomegaly with mild interstitial edema suspected.





ASSESSMENT: 


66-year-old W with past medical history of hypertension, atrial fibrillation, 

GERD, history of pulmonary emboli and DVT on eliquis, who presented to [facility]

emergency dept with worsening SOB and orthopnea for 3 days and admitted for 

HFpEF exacerbation with course now c/b delirium and DEZ.





PLAN:  





DEZ:


-recently diuresed and tolerated well, but now confused, stopped diuretics, 

improving


-continue holing loop diuretic and HCTZ





Delirium on potential dementia?:


-Has a hx of depression, will continue her prozac and wellbutrin


-consulted psych, PRN ativan ordered, to avoid antipsychotics given prolonged 

QTc on fleicanide, BB and dig. However per psych if absolutely required to give 

IM NOT IV haldol as ordered


-nonfocal for infection concern, UA bland and BCx NGTD. chest imaging was 

without evidence of infection.


-CT head negative for acute pathology





HFpEF exacerbation:


-Discontinued lasix, euvolemic


-daily weights


-strict I/Os


-daily BMP


-holding HCTZ


-f/u TTE read








Chronic atrial fibrillation, HR in low 100s: patient of Dr. Chahal, on 

fleicanide, metop and digoxin at baseline


-continue fleicanide, metop and digoxin 


-telemetry


-daily BMP and mag








HTN


-C/w home metop, holding HCTZ





Graves?


-continue home methimazole





Hx of DVT, PE


-continue home eliquis





GERD 


-C/w famotidine





HLD:


-continue atorvastatin





CAD:


-continue ASA 81, statin 





DVT px


-Eliquis





VS,Fishbone, I+O


VS, Fishbone, I+O





Vital Signs








  Date Time  Temp Pulse Resp B/P (MAP) Pulse Ox O2 Delivery O2 Flow Rate FiO2


 


11/1/21 09:54  79  138/74    


 


11/1/21 06:35 98.6  18  96 Room Air  


 


10/27/21 14:00        














I&O- Last 24 Hours up to 6 AM 


 


 11/1/21





 06:00


 


Intake Total 340 ml


 


Balance 340 ml

















JAMES TRACEY MD    Nov 1, 2021 10:33

## 2021-11-02 VITALS — SYSTOLIC BLOOD PRESSURE: 146 MMHG | DIASTOLIC BLOOD PRESSURE: 67 MMHG

## 2021-11-02 LAB
BUN SERPL-MCNC: 26 MG/DL (ref 7–18)
CALCIUM SERPL-MCNC: 10.7 MG/DL (ref 8.8–10.2)
CHLORIDE SERPL-SCNC: 107 MEQ/L (ref 98–107)
CO2 SERPL-SCNC: 28 MEQ/L (ref 21–32)
CREAT SERPL-MCNC: 0.93 MG/DL (ref 0.55–1.3)
GFR SERPL CREATININE-BSD FRML MDRD: > 60 ML/MIN/{1.73_M2} (ref 45–?)
GLUCOSE SERPL-MCNC: 101 MG/DL (ref 70–100)
HCT VFR BLD AUTO: 39.2 % (ref 36–47)
HGB BLD-MCNC: 12.2 G/DL (ref 12–15.5)
MCH RBC QN AUTO: 31.4 PG (ref 27–33)
MCHC RBC AUTO-ENTMCNC: 31.1 G/DL (ref 32–36.5)
MCV RBC AUTO: 100.8 FL (ref 80–96)
PLATELET # BLD AUTO: 180 10^3/UL (ref 150–450)
POTASSIUM SERPL-SCNC: 4.4 MEQ/L (ref 3.5–5.1)
RBC # BLD AUTO: 3.89 10^6/UL (ref 4–5.4)
SODIUM SERPL-SCNC: 140 MEQ/L (ref 136–145)
WBC # BLD AUTO: 5.7 10^3/UL (ref 4–10)

## 2021-11-02 RX ADMIN — APIXABAN SCH MG: 5 TABLET, FILM COATED ORAL at 08:38

## 2021-11-02 RX ADMIN — METOPROLOL TARTRATE SCH MG: 25 TABLET, FILM COATED ORAL at 08:37

## 2021-11-02 RX ADMIN — FLECAINIDE ACETATE SCH MG: 50 TABLET ORAL at 08:38

## 2021-11-02 RX ADMIN — Medication SCH UNITS: at 08:38

## 2021-11-02 RX ADMIN — ACETAMINOPHEN PRN MG: 325 TABLET ORAL at 05:06

## 2021-11-02 RX ADMIN — BUPROPION HYDROCHLORIDE SCH MG: 150 TABLET, FILM COATED, EXTENDED RELEASE ORAL at 08:38

## 2021-11-02 RX ADMIN — ASPIRIN 81 MG CHEWABLE TABLET SCH MG: 81 TABLET CHEWABLE at 08:38

## 2021-11-03 VITALS — SYSTOLIC BLOOD PRESSURE: 143 MMHG | DIASTOLIC BLOOD PRESSURE: 80 MMHG

## 2021-11-03 LAB
BUN SERPL-MCNC: 25 MG/DL (ref 7–18)
CALCIUM SERPL-MCNC: 11.1 MG/DL (ref 8.8–10.2)
CHLORIDE SERPL-SCNC: 110 MEQ/L (ref 98–107)
CO2 SERPL-SCNC: 26 MEQ/L (ref 21–32)
CREAT SERPL-MCNC: 1.02 MG/DL (ref 0.55–1.3)
GFR SERPL CREATININE-BSD FRML MDRD: 57.7 ML/MIN/{1.73_M2} (ref 45–?)
GLUCOSE SERPL-MCNC: 96 MG/DL (ref 70–100)
HCT VFR BLD AUTO: 38.8 % (ref 36–47)
HGB BLD-MCNC: 12.2 G/DL (ref 12–15.5)
MCH RBC QN AUTO: 31.4 PG (ref 27–33)
MCHC RBC AUTO-ENTMCNC: 31.4 G/DL (ref 32–36.5)
MCV RBC AUTO: 100 FL (ref 80–96)
PLATELET # BLD AUTO: 174 10^3/UL (ref 150–450)
POTASSIUM SERPL-SCNC: 5 MEQ/L (ref 3.5–5.1)
RBC # BLD AUTO: 3.88 10^6/UL (ref 4–5.4)
SODIUM SERPL-SCNC: 140 MEQ/L (ref 136–145)
WBC # BLD AUTO: 6.3 10^3/UL (ref 4–10)

## 2021-11-03 RX ADMIN — FLECAINIDE ACETATE SCH MG: 50 TABLET ORAL at 20:54

## 2021-11-03 RX ADMIN — ACETAMINOPHEN PRN MG: 325 TABLET ORAL at 20:55

## 2021-11-03 RX ADMIN — FAMOTIDINE SCH MG: 20 TABLET, FILM COATED ORAL at 20:55

## 2021-11-03 RX ADMIN — ATORVASTATIN CALCIUM SCH MG: 20 TABLET, FILM COATED ORAL at 20:55

## 2021-11-03 RX ADMIN — ACETAMINOPHEN PRN MG: 325 TABLET ORAL at 14:59

## 2021-11-03 RX ADMIN — ATORVASTATIN CALCIUM SCH MG: 20 TABLET, FILM COATED ORAL at 02:17

## 2021-11-03 RX ADMIN — APIXABAN SCH MG: 5 TABLET, FILM COATED ORAL at 02:17

## 2021-11-03 RX ADMIN — METOPROLOL TARTRATE SCH MG: 25 TABLET, FILM COATED ORAL at 02:18

## 2021-11-03 RX ADMIN — DIGOXIN SCH MG: 250 TABLET ORAL at 02:21

## 2021-11-03 RX ADMIN — ACETAMINOPHEN PRN MG: 325 TABLET ORAL at 02:18

## 2021-11-03 RX ADMIN — FAMOTIDINE SCH MG: 20 TABLET, FILM COATED ORAL at 02:17

## 2021-11-03 RX ADMIN — METOPROLOL TARTRATE SCH MG: 25 TABLET, FILM COATED ORAL at 20:55

## 2021-11-03 RX ADMIN — DIGOXIN SCH MG: 250 TABLET ORAL at 20:54

## 2021-11-03 RX ADMIN — APIXABAN SCH MG: 5 TABLET, FILM COATED ORAL at 20:54

## 2021-11-03 RX ADMIN — FLECAINIDE ACETATE SCH MG: 50 TABLET ORAL at 10:10

## 2021-11-03 RX ADMIN — Medication SCH UNITS: at 10:11

## 2021-11-03 RX ADMIN — FLECAINIDE ACETATE SCH MG: 50 TABLET ORAL at 02:17

## 2021-11-03 RX ADMIN — METOPROLOL TARTRATE SCH MG: 25 TABLET, FILM COATED ORAL at 10:10

## 2021-11-03 RX ADMIN — ASPIRIN 81 MG CHEWABLE TABLET SCH MG: 81 TABLET CHEWABLE at 10:10

## 2021-11-03 RX ADMIN — BUPROPION HYDROCHLORIDE SCH MG: 150 TABLET, FILM COATED, EXTENDED RELEASE ORAL at 10:10

## 2021-11-03 RX ADMIN — APIXABAN SCH MG: 5 TABLET, FILM COATED ORAL at 10:11

## 2021-11-04 VITALS — DIASTOLIC BLOOD PRESSURE: 72 MMHG | SYSTOLIC BLOOD PRESSURE: 143 MMHG

## 2021-11-04 VITALS — SYSTOLIC BLOOD PRESSURE: 140 MMHG | DIASTOLIC BLOOD PRESSURE: 75 MMHG

## 2021-11-04 LAB
BUN SERPL-MCNC: 23 MG/DL (ref 7–18)
CALCIUM SERPL-MCNC: 11 MG/DL (ref 8.8–10.2)
CHLORIDE SERPL-SCNC: 107 MEQ/L (ref 98–107)
CO2 SERPL-SCNC: 25 MEQ/L (ref 21–32)
CREAT SERPL-MCNC: 0.88 MG/DL (ref 0.55–1.3)
GFR SERPL CREATININE-BSD FRML MDRD: > 60 ML/MIN/{1.73_M2} (ref 45–?)
GLUCOSE SERPL-MCNC: 91 MG/DL (ref 70–100)
HCT VFR BLD AUTO: 38.3 % (ref 36–47)
HGB BLD-MCNC: 12.2 G/DL (ref 12–15.5)
MCH RBC QN AUTO: 31.4 PG (ref 27–33)
MCHC RBC AUTO-ENTMCNC: 31.9 G/DL (ref 32–36.5)
MCV RBC AUTO: 98.7 FL (ref 80–96)
PLATELET # BLD AUTO: 181 10^3/UL (ref 150–450)
POTASSIUM SERPL-SCNC: 4.7 MEQ/L (ref 3.5–5.1)
RBC # BLD AUTO: 3.88 10^6/UL (ref 4–5.4)
SODIUM SERPL-SCNC: 140 MEQ/L (ref 136–145)
WBC # BLD AUTO: 4.8 10^3/UL (ref 4–10)

## 2021-11-04 RX ADMIN — ASPIRIN 81 MG CHEWABLE TABLET SCH MG: 81 TABLET CHEWABLE at 08:01

## 2021-11-04 RX ADMIN — FLECAINIDE ACETATE SCH MG: 50 TABLET ORAL at 08:00

## 2021-11-04 RX ADMIN — METOPROLOL TARTRATE SCH MG: 25 TABLET, FILM COATED ORAL at 08:01

## 2021-11-04 RX ADMIN — BUPROPION HYDROCHLORIDE SCH MG: 150 TABLET, FILM COATED, EXTENDED RELEASE ORAL at 08:00

## 2021-11-04 RX ADMIN — ACETAMINOPHEN PRN MG: 325 TABLET ORAL at 08:03

## 2021-11-04 RX ADMIN — Medication SCH UNITS: at 08:00

## 2021-11-04 RX ADMIN — APIXABAN SCH MG: 5 TABLET, FILM COATED ORAL at 08:00

## 2021-11-04 NOTE — DSES
DISCHARGE SUMMARY



DATE OF ADMISSION:  10/26/2021

DATE OF DISCHARGE:  11/04/2021



PRIMARY DISCHARGE DIAGNOSES:  

1.  Acute congestive heart failure exacerbation with preserved systolic

function.

2.  Acute delirium.

3.  Acute kidney injury.

4.  Chronic atrial fibrillation. 

5.  Hypertension.

6.  Grave's disease.

7.  History of deep venous thrombosis (DVT) and pulmonary embolism (PE).

8.  Gastroesophageal reflux disease (GERD). 

9.  Hyperlipidemia.

10.  Coronary artery disease.



DISCHARGE MEDICATIONS:   

-  Lasix 20 mg daily

-  Tylenol 1 gram every 6 hours as needed

-  Eliquis 5 mg twice a day

-  aspirin 81 mg daily

-  atorvastatin 20 mg in the evening

-  bupropion 150 mg daily

-  digoxin 250 mcg daily

-  famotidine 20 mg twice a day

-  flecainide 100 mg twice a day

-  fluoxetine 20 mg daily

-  metoprolol 25 mg twice a day

-  multivitamin one tablet daily

-  vitamin D 400 units daily



DISCHARGE INSTRUCTIONS:  Immediate followup with cardiology within five days of

discharge for congestive heart failure (CHF) management.



HOSPITAL COURSE:  This is a 66-year-old female admitted on 10/26/2021 with

complaints of shortness of breath without chest pain, palpitations, fever,

chills or cough.  Workup included brain natriuretic peptide (BNP) of 7588. 

Chest x-ray showed pulmonary edema.  Patient was given intravenous Lasix. 

Echocardiogram showed ejection fraction (EF) of 70% with grade 1 diastolic

dysfunction.  Patient's admission weight was 96.4 kilos.  Discharge weight of

95.3 kg.  She was given intravenous Lasix with net negative balance with no

signs of orthostasis.  Patient developed acute kidney injury due to

overdiuresis with creatinine increasing to 2.78 on 10/28/2021.  Lasix had been

discontinued since, with improvement of creatinine to 0.88.  On the day of

discharge, she appeared to be euvolemic.  Mentation improved with treatment for

congestive heart failure.  She was evaluated by a psychiatrist due to severe

agitation and acute delirium, rule out dementia.  Haldol as needed

intramuscular (IM) 0.5 mg by mouth with maximum dose of 2 mg in a day.  QTC was

monitored, as well reorientation and reduction in noise and interactions to

allow for patient being in a less confused state.  Patient was seen by physical

therapy (PT)/occupational therapy (OT).  She was cooperative and subsequently

discharged in stable condition.



PHYSICAL EXAMINATION ON DISCHARGE:   

VITAL SIGNS:  Temperature 98.3, pulse 60, respiratory rate 18, blood pressure

140/75,k 96% on room air.

GENERAL:  Patient is awake, alert, oriented, comfortable, no distress.

LUNGS:  Diminished, but clear to auscultation.  No wheezing or rales.

HEART:  S1, S2.  Irregularly irregular.  No murmurs, rubs or gallops. 

ABDOMEN:  Obese.  Soft, nontender, nondistended.  Positive bowel sounds.

EXTREMITIES:  No cyanosis, clubbing or pitting edema.



DISCHARGE LABORATORY DATA/IMAGING STUDIES/MICROBIOLOGY:  Please see the chart.



TIME SPENT ON DISCHARGE:  30 minutes.

MTDD

## 2021-11-16 ENCOUNTER — HOSPITAL ENCOUNTER (OUTPATIENT)
Dept: HOSPITAL 53 - M LAB REF | Age: 66
End: 2021-11-16
Attending: INTERNAL MEDICINE
Payer: MEDICARE

## 2021-11-16 DIAGNOSIS — I48.20: Primary | ICD-10-CM

## 2021-12-02 ENCOUNTER — HOSPITAL ENCOUNTER (INPATIENT)
Dept: HOSPITAL 53 - M ED | Age: 66
LOS: 4 days | Discharge: HOME | DRG: 291 | End: 2021-12-06
Attending: INTERNAL MEDICINE | Admitting: FAMILY MEDICINE
Payer: MEDICARE

## 2021-12-02 VITALS — HEIGHT: 66 IN | BODY MASS INDEX: 34.55 KG/M2 | WEIGHT: 214.95 LBS

## 2021-12-02 DIAGNOSIS — Z79.899: ICD-10-CM

## 2021-12-02 DIAGNOSIS — F32.9: ICD-10-CM

## 2021-12-02 DIAGNOSIS — Z88.6: ICD-10-CM

## 2021-12-02 DIAGNOSIS — E83.52: ICD-10-CM

## 2021-12-02 DIAGNOSIS — I11.0: Primary | ICD-10-CM

## 2021-12-02 DIAGNOSIS — I50.23: ICD-10-CM

## 2021-12-02 DIAGNOSIS — E04.1: ICD-10-CM

## 2021-12-02 DIAGNOSIS — F17.210: ICD-10-CM

## 2021-12-02 DIAGNOSIS — Z79.82: ICD-10-CM

## 2021-12-02 DIAGNOSIS — E66.9: ICD-10-CM

## 2021-12-02 DIAGNOSIS — I48.20: ICD-10-CM

## 2021-12-02 DIAGNOSIS — I87.312: ICD-10-CM

## 2021-12-02 LAB
BASOPHILS # BLD AUTO: 0 10^3/UL (ref 0–0.2)
BASOPHILS NFR BLD AUTO: 0.4 % (ref 0–1)
EOSINOPHIL # BLD AUTO: 0.1 10^3/UL (ref 0–0.5)
EOSINOPHIL NFR BLD AUTO: 1.8 % (ref 0–3)
HCT VFR BLD AUTO: 42.6 % (ref 36–47)
HGB BLD-MCNC: 13.2 G/DL (ref 12–15.5)
LYMPHOCYTES # BLD AUTO: 1.4 10^3/UL (ref 1.5–5)
LYMPHOCYTES NFR BLD AUTO: 27.8 % (ref 24–44)
MAGNESIUM SERPL-MCNC: 2.3 MG/DL (ref 1.8–2.4)
MCH RBC QN AUTO: 31.7 PG (ref 27–33)
MCHC RBC AUTO-ENTMCNC: 31 G/DL (ref 32–36.5)
MCV RBC AUTO: 102.2 FL (ref 80–96)
MONOCYTES # BLD AUTO: 0.4 10^3/UL (ref 0–0.8)
MONOCYTES NFR BLD AUTO: 7.9 % (ref 2–8)
NEUTROPHILS # BLD AUTO: 3.1 10^3/UL (ref 1.5–8.5)
NEUTROPHILS NFR BLD AUTO: 61.9 % (ref 36–66)
NT-PRO BNP: 4465 PG/ML (ref ?–125)
PLATELET # BLD AUTO: 226 10^3/UL (ref 150–450)
RBC # BLD AUTO: 4.17 10^6/UL (ref 4–5.4)
RSV RNA NPH QL NAA+PROBE: NEGATIVE
WBC # BLD AUTO: 4.9 10^3/UL (ref 4–10)

## 2021-12-02 RX ADMIN — APIXABAN SCH MG: 5 TABLET, FILM COATED ORAL at 22:22

## 2021-12-02 RX ADMIN — METOPROLOL TARTRATE SCH MG: 25 TABLET, FILM COATED ORAL at 22:22

## 2021-12-02 RX ADMIN — FAMOTIDINE SCH MG: 20 TABLET, FILM COATED ORAL at 22:22

## 2021-12-02 RX ADMIN — ATORVASTATIN CALCIUM SCH MG: 20 TABLET, FILM COATED ORAL at 22:22

## 2021-12-02 NOTE — CCD
Continuity of Care Document (CCD)

                             Created on: 10/06/2021



Juany Becerril

External Reference #: [id number]

: 1955

Sex: Female



Demographics





                          Address                   [address]

[place]  [address]

 

                          Home Phone                [phone]

 

                          Preferred Language        Unknown

 

                          Marital Status            Unknown

 

                          Adventism Affiliation     Unknown

 

                          Race                      White

 

                          Ethnic Group              Not  or 





Author





                          Author                    Juany ESQUEDA PA

 

                          Organization              Unknown

 

                          Address                   [address]

[place]  [address]



 

                          Phone                     [phone]







Care Team Providers





                    Care Team Member Name Role                Phone

 

                    Yuri Maier MD       AUTM                [phone]

 

                    [facility]  AUTM                [phone]

 

                    John Lutz MD AUTM                Unavailable

 

                    [facility] Hematology/Oncology AUTM                [phone]

 

                    Hansa Hernandez FNP  AUTM                [phone]







Problems





                    Active Problems     Provider            Date

 

                    Chronic atrial fibrillation Protime             Onset: 

 

                    Essential hypertension Juany Vincent FNP Onset: 2017

 

                    Deep venous thrombosis of lower extremity Juany Vincent FN P Onset: 2017

 

                    Pure hypercholesterolemia Juany Vincent FNP Onset: 

017

 

                    Anxiety state       Juany Vincent FNP Onset: 2017

 

                    Chronic pulmonary embolism Juany Vincent FNP Onset: 2017

 

                    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 

2017

 

                    Gastroesophageal reflux disease Juany Vincent FNP Onset: 1

2017

 

                    Scoliosis deformity of spine Juany Vincent FNP Onset: 2017

 

                    Chronic tension-type headache Juany Vincent FNP Onset: 2017

 

                    Varicose veins of lower extremity Juany Vincent FNP Onset:

 2017

 

                    Tobacco user        Juany Vincent FNP Onset: 2017

 

                    Hypercalcemia       Juany Vincent FNP Onset: 2017







Social History





                Type            Date            Description     Comments

 

                Birth Sex                       Unknown          

 

                ETOH Use                        Consumes 3 glasses of wine per w

[place]  

 

                Tobacco Use     Start: Unknown  Patient is a current smoker, smo

kes every day STARTED

AGE 30 1-3 CIGARETTES A DAY 







Allergies and adverse reactions





             Active Allergies Criticality  Reaction | Severity Comments     Date

 

             Ibuprofen    Unable to assess criticality STOMACH UPSET HEADACHE mo

david       10/17/2017

 

             Latex        Unable to assess criticality rash, itches | Mild      

        10/17/2018







Medications





           Active Medications SIG        Qnty       Indications Ordering Provide

r Date

 

                          Cephalexin                     500mg Capsules         

          take one tablet 

by mouth 3 times daily x 7 days 21caps                          Julio kothari JR PA 10/06/2021

 

                          Excedrin Migraine                     [id number] Tab

lets                   1 

as needed h/a as needed mdd=1                                 Charmaine Regalado M.D. 2021

 

                          Famotidine                     20mg Tablets           

        1 by mouth every 

day             90tabs                          Charmaine Regalado M.D. 20

21

 

                          Furosemide                     20mg Tablets           

        1 by mouth twice a

day             180tabs                         Charmaine Regalado M.D. 20

21

 

                                        Bupropion Hydrochloride ER (XL)         

            150mg Tablets ER 24HR       

                take 1 tablet by mouth in the morning 90tabs                    

      Charmaine Regalado M.D.                                    2021

 

                          Eliquis                     5mg Tablets               

    take one tablet by 

mouth twice a day 180tabs                         Charmaine Regalado M.D. 2020

 

                          Methimazole                     5mg Tablets           

        2 every day by 

mouth           180tabs                         Charmaine Regalado M.D. 20

20

 

                          Shingrix                     50mcg/0.5ML Suspension Re

c                   

administer at pharmacy 1units                          Juany Vincent FNP 

 

                          Voltaren                     1% Gel                   

apply up to 4 grams three 

times a day to affected knee as needed 100gm                           Charmaine Regalado M.D. 

2018

 

                          Aspirin Adult Low Dose                     81mg Tablet

s DR                   1 

by mouth every day                                 Juany Vincent FNP 10/17/201

7

 

                                        Acetaminophen Extra Strength            

         500mg Tablets                  

             take 2 tabs every 6 hours as needed                           Juany Fung FNP 10/17/2017

 

                                        Womens 50+ Multi Vitamin & Mineral Formu

la                      Tablets         

             1 every day PO Vit [id number] KS=123                           Juany Vincent FNP 10/17/2017

 

                          Metoprolol Tartrate                     25mg Tablets  

                 Take One 

Tablet By Mouth Twice A Day 180tabs                         ABELARDO Saleem 10/17/2017

 

                          Atorvastatin Calcium                     20mg Tablets 

                  take one

tablet by mouth every day 90tabs                          JANE Saleem 10/17/2017

 

                          Flecainide Acetate                     100mg Tablets  

                 take one 

tablet by mouth twice a day 180tabs                         ABELARDO Saleem 10/17/2017

 

                          Digoxin                     250mcg Tablets            

       1 by mouth every 

day             90tabs                          Charmaine Regalado M.D. 







Medications Administered in Office





           Medication SIG        Qnty       Indications Ordering Provider Date

 

                          Administration Of Flu Vaccine                      Inj

diyaion                    

                                                Charmaine Regalado M.D. 10/15/20

20

 

                          Administration Of Flu Vaccine                      Inj

ection                    

                                                Juany Vincent FNP 10/17/2019

 

                          Administration Of Flu Vaccine                      Inj

ection                    

                                                Juany Vincent FNP 10/17/2018

 

                          Administration Of Flu Vaccine                      Inj

ection                    

                                                uJany Vincent FNP 10/17/2017







Immunizations





             CPT Code     Status       Date         Vaccine      Lot #

 

                [id number]           Given           10/06/2021      Influenza Vaccin

e Quadrivalent Preser/Antibiotic Free Im 

Use                                     126776

 

                [id number]           Given           10/15/2020      Influenza Vaccin

e Quadrivalent Preser/Antibiotic Free Im 

Use                                     [id number]

 

                15044           Given           2020      Shingrix Zoster 

Vaccine (HZV), Recombinant, Subunit, 

Adjuvanted                               

 

                08730           Given           10/17/2019      Influenza Vaccin

e Quadrivalent Preser/Antibiotic Free Im 

Use                                     [id number]

 

                74330           Given           10/17/2018      Influenza Virus 

Vaccine, Quadrivalent (Cciiv4), Derived 

From Cell                               060928

 

             09930        Given        2018   Pneumovax 23 [id number]

 

                18332           Given           10/17/2017      Influenza Vaccin

e Quadrivalent Preser/Antibiotic Free Im 

Use                                     [id number]







Vital Signs





                Date            Vital           Result          Comment

 

                10/06/2021  2:17pm BP Systolic     124 mmHg         

 

                    BP Diastolic        68 mmHg              

 

                    Heart Rate          86 /min              

 

                    Height              66 inches           5'6"

 

                    Weight              217.00 lb            

 

                    BMI (Body Mass Index) 35.0 kg/m2           

 

                2021  1:31pm BP Systolic     136 mmHg        RT Arm

 

                    BP Diastolic        88 mmHg             RT Arm

 

                    Heart Rate          120 /min             

 

                    Height              66 inches           5'6"

 

                    Weight              223.50 lb            

 

                    BMI (Body Mass Index) 36.1 kg/m2           







Results





        Test    Acquired Date Facility Test    Result  H/L     Range   Note

 

                    Laboratory test finding 10/06/2021          [facility]

                                        [address] [place] [address] [phone] Wound Culture <pending>                         

 

                    CBC With Differential 2021          [facility]

                                        [address] [place] [address] [phone] White Blood Count 6.8 10     Normal     4.0-10.0    

 

             Red Blood Count 3.85 10      Low          4.00-5.40     

 

             Hemoglobin   12.7 g/dL    Normal       12.0-15.5     

 

             Hematocrit   39.4 %       Normal       36.0-47.0     

 

             Mean Corpuscular Volume 102.3 fl     High         80.0-96.0     

 

             Mean Corpuscular Hemoglobin 33.0 pg      Normal       27.0-33.0    

 

 

             Mean Corpuscular HGB Conc 32.2 g/dL    Normal       32.0-36.5     

 

             Red Cell Distribution Width 12.3 %       Normal       11.5-14.5    

 

 

             Platelet Count, Automated 187 10       Normal       150-450       

 

             Neutrophils % 72.2 %       High         36.0-66.0     

 

             Lymph %      19.9 %       Low          24.0-44.0     

 

             Mono %       6.0 %        Normal       2.0-8.0       

 

             Eos %        1.5 %        Normal       0.0-3.0       

 

             Baso %       0.3 %        Normal       0.0-1.0       

 

             Immature Granulocyte % 0.1 %        Normal       0-3.0         

 

             Nucleated Red Blood Cell % 0.0 %        Normal       0-0           

 

             Neutrophils # 4.9 10       Normal       1.5-8.5       

 

             Lymph #      1.4 10       Low          1.5-5.0       

 

             Mono #       0.4 10       Normal       0.0-0.8       

 

             Eos #        0.1 10       Normal       0.0-0.5       

 

             Baso #       0.0 10       Normal       0.0-0.2       

 

                    PT & Aptt           2021          [facility]

nter

                                        [address] [place] [address] [phone] Prothrombin Time 13.8 seconds Normal     12.5-14.3   

 

             Inr          1.04         Normal                    1

 

             Partial Thromboplastin Time 30.6 seconds Normal       24.2-38.5    

 

 

                    CBC With Differential 2021          [address] [address] [phone] White Blood Count 6.2 10     Normal     4.0-10.0    

 

             Red Blood Count 3.76 10      Low          4.00-5.40     

 

             Hemoglobin   12.5 g/dL    Normal       12.0-15.5     

 

             Hematocrit   39.0 %       Normal       36.0-47.0     

 

             Mean Corpuscular Volume 103.7 fl     High         80.0-96.0     

 

             Mean Corpuscular Hemoglobin 33.2 pg      High         27.0-33.0    

 

 

             Mean Corpuscular HGB Conc 32.1 g/dL    Normal       32.0-36.5     

 

             Red Cell Distribution Width 12.9 %       Normal       11.5-14.5    

 

 

             Platelet Count, Automated 199 10       Normal       150-450       

 

             Neutrophils % 74.5 %       High         36.0-66.0     

 

             Lymph %      16.4 %       Low          24.0-44.0     

 

             Mono %       7.0 %        Normal       2.0-8.0       

 

             Eos %        1.1 %        Normal       0.0-3.0       

 

             Baso %       0.5 %        Normal       0.0-1.0       

 

             Immature Granulocyte % 0.5 %        Normal       0-3.0         

 

             Nucleated Red Blood Cell % 0.0 %        Normal       0-0           

 

             Neutrophils # 4.6 10       Normal       1.5-8.5       

 

             Lymph #      1.0 10       Low          1.5-5.0       

 

             Mono #       0.4 10       Normal       0.0-0.8       

 

             Eos #        0.1 10       Normal       0.0-0.5       

 

             Baso #       0.0 10       Normal       0.0-0.2       

 

                    Comprehensive Metabolic Profil 2021          [address] [address] [phone] Glucose, Fasting 98 mg/dL   Normal           

 

             Blood Urea Nitrogen 28 mg/dL     High         7-18          

 

             Creatinine For GFR 0.90 mg/dL   Normal       0.55-1.30     

 

             Glomerular Filtration Rate > 60.0       Normal       >45          2

 

             Sodium Level 137 mEq/L    Normal       136-145       

 

             Potassium Serum 4.7 mEq/L    Normal       3.5-5.1       

 

             Chloride Level 107 mEq/L    Normal               

 

             Carbon Dioxide Level 27 mEq/L     Normal       21-32         

 

             Anion Gap    3 mEq/L      Low          8-16          

 

             Calcium Level 10.2 mg/dL   Normal       8.8-10.2      

 

             Ast/Sgot     23 U/L       Normal       7-37          

 

             Alt/SGPT     19 U/L       Normal       12-78         

 

             Alkaline Phosphatase 77 U/L       Normal               

 

             Bilirubin,Total 0.5 mg/dL    Normal       0.2-1.0       

 

             Total Protein 8.3 GM/DL    High         6.4-8.2       

 

             Albumin      3.0 GM/DL    Low          3.2-5.2       

 

             Albumin/Globulin Ratio 0.6          Low          1.2-2.2       

 

                    Complete Blood Count 2021          [facility]

s, pc

: Dr Simon Hoyt

[place] [address] [phone] WBC        5.9 x10*3/UL            4.1 - 10.9  

 

             RBC          4.11 x10*6/UL Low          4.20 - 6.30   

 

             Hemoglobin   13.6 g/dL                 12.0 - 18.0   

 

             Hematocrit   40.0 %                    37.0 - 51.0   

 

             MCV          97.4 fL      High         80.0 - 97.0   

 

             MCH          33.1 pg      High         26.0 - 32.0   

 

             MCHC         34.0 g/dL                 31.0 - 38.0   

 

             RDW          13.2 %                    11.6 - 13.7   

 

             PLT          209 x10*3/UL              140 - 440     

 

             MPV          8.1 FL                    7.8 - 11.0    

 

             Lymph %      25.6 %                    10.0 - 58.5   

 

             Mid %        7.5 %                     1.7 - 9.3     

 

             Neut %       66.9 %                    37.0 - 92.0   

 

             Lymph #      1.5 x10*3/UL              0.6 - 4.1     

 

             Mid #        0.5 x10*3/UL              0.1 - 0.6     

 

             Neut #       3.9 x10*3/UL              2.0 - 7.8     

 

                    Laboratory test finding 2021          [facility]

ists, [facility]

: Dr Simon Hoyt

[place] [address] [phone] Sed Rate   25 mm/hr   High       0 - 15      

 

                    Basic Metabolic Panel 2021          [facility]

ts, pc

: Dr Simon Hoyt

[place] [address] [phone] Glucose    100 mg/dL  High       74 - 99    3

 

             BUN          18 mg/dL                  7 - 18        

 

             Creatinine   0.8 mg/dL                 0.6 - 1.3     

 

             Sodium       141 mEq/L                 136 - 145     

 

             Potassium    3.8 mEq/L                 3.5 - 5.1     

 

             Chloride     104 mEq/L                 98 - 107      

 

             Carbon Dioxide 29 mEq/L                  21 - 32       

 

             Calcium      10.8 mg/dL   High         8.5 - 10.1   4

 

             GFR  >= 60 mL/min              >60           

 

             GFR African American >= 60 mL/min              >60          5

 

                    Laboratory test finding 2021          [facility]

pia meza

: Dr Simon Hoyt

[place] [address]

           [phone] Thyroid Stimulating Hormone 0.19 uIU/mL Low        0.3

6 - 3.74  

 

                    Lipid Profile       2021          [facility]

[facility]

: Dr Simon Hoyt

[place] [address]

           [phone] Cholesterol 132 mg/dL             131 - 200   

 

             Triglycerides 72 mg/dL                  30 - 150      

 

             HDL Cholesterol 54 mg/dL                  35 - 60       

 

             LDL (Calculated) 64 CALC                   50 - 159      

 

                    Laboratory test finding 2021          [address] [id number] [phone] Digoxin Level 0.3 NG/ML  Low        0.5-2.0    6

 

                    Laboratory test finding 2021          [facility]

pia [facility]

: Dr Simon Hoyt

[place], [place] [id number] [phone] Thyroid Stimulating Hormone 0.32 uIU/mL Low        0.3

6 - 3.74  

 

                    Basic Metabolic Panel 2021          [facility]

ts, pc

: Dr Simon Hyot

[place], [place] [id number] [phone] Glucose    70 mg/dL   Low        74 - 99    7

 

             BUN          23 mg/dL     High         7 - 18        

 

             Creatinine   0.9 mg/dL                 0.6 - 1.3     

 

             Sodium       144 mEq/L                 136 - 145     

 

             Potassium    3.8 mEq/L                 3.5 - 5.1     

 

             Chloride     104 mEq/L                 98 - 107      

 

             Carbon Dioxide 32 mEq/L                  21 - 32       

 

             Calcium      10.1 mg/dL                8.5 - 10.1    

 

             GFR  >= 60 mL/min              >60           

 

             GFR African American >= 60 mL/min              >60          8

 

                    Laboratory test finding 2021          [facility]

pia [facility]

: Dr Simon Hoyt

[place] [address] [phone] Sed Rate   45 mm/hr   High       0 - 15      

 

             Magnesium    1.8 mg/dL                 1.8 - 2.4     

 

                    Complete Blood Count 2021          [facility]

: Dr Simon Hoyt

[place] [address] [phone] WBC        8.4 x10*3/UL            4.1 - 10.9  

 

             RBC          4.20 x10*6/UL              4.20 - 6.30   

 

             Hemoglobin   14.0 g/dL                 12.0 - 18.0   

 

             Hematocrit   40.3 %                    37.0 - 51.0   

 

             MCV          95.8 fL                   80.0 - 97.0   

 

             MCH          33.3 pg      High         26.0 - 32.0   

 

             MCHC         34.7 g/dL                 31.0 - 38.0   

 

             RDW          13.3 %                    11.6 - 13.7   

 

             PLT          207 x10*3/UL              140 - 440     

 

             MPV          8.0 FL                    7.8 - 11.0    

 

             Lymph %      17.5 %                    10.0 - 58.5   

 

             Mid %        4.8 %                     1.7 - 9.3     

 

             Neut %       77.7 %                    37.0 - 92.0   

 

             Lymph #      1.4 x10*3/UL              0.6 - 4.1     

 

             Mid #        0.5 x10*3/UL              0.1 - 0.6     

 

             Neut #       6.5 x10*3/UL              2.0 - 7.8     

 

                    Ua Routine          2021          [facility]

nter

                                        [address] [place] [address] [phone] Appearance, Urine CLEAR      Normal     Clear       

 

             Color, Urine ABHAY        Normal       Yellow        

 

             PH,Urine     5.0 units    Normal       5.0-9.0       

 

             Specific Gravity Urine Auto 1.028        Normal       1.002-1.035  

 

 

             Protein, Urine Auto 2+ mg/dL     High         Negative      

 

             Glucose, Urine (Ua) Auto NEGATIVE mg/dL Normal       Negative      

 

             Ketone, Urine Auto TRACE mg/dL  High         Negative      

 

             Urobilinogen, Urine Auto 4.0 mg/dL    High         0.0-2.0       

 

             Bilirubin, Urine Auto NEGATIVE     Normal       Negative      

 

             Nitrite, Urine Auto NEGATIVE     Normal       Negative      

 

             Leukocyte Esterase, Urine Auto NEGATIVE     Normal       Negative  

    

 

             Blood, Urine Blood NEGATIVE     Normal       Negative      

 

             WBC, Urine Auto 2 /HPF       Normal       0-3           

 

             RBC, Urine Auto 2 /HPF       Normal       0-3           

 

             Bacteria, Urine Auto 1+           High         Negative      

 

             Squamous Epithelial Cell Ur AU 0 /HPF       Normal       0-6       

    

 

             Mucus, Urine SMALL        Normal       Negative      

 

             Hyaline Cast, Urine Auto 0 /LPF       Normal       0-1           

 

                    CBC With Differential 2021          [address] [address] [phone] White Blood Count 7.6 10     Normal     4.0-10.0    

 

             Red Blood Count 4.29 10      Normal       4.00-5.40     

 

             Hemoglobin   13.9 g/dL    Normal       12.0-15.5     

 

             Hematocrit   43.3 %       Normal       36.0-47.0     

 

             Mean Corpuscular Volume 100.9 fl     High         80.0-96.0     

 

             Mean Corpuscular Hemoglobin 32.4 pg      Normal       27.0-33.0    

 

 

             Mean Corpuscular HGB Conc 32.1 g/dL    Normal       32.0-36.5     

 

             Red Cell Distribution Width 13.1 %       Normal       11.5-14.5    

 

 

             Platelet Count, Automated 175 10       Normal       150-450       

 

             Neutrophils % 73.1 %       High         36.0-66.0     

 

             Lymph %      18.1 %       Low          24.0-44.0     

 

             Mono %       6.8 %        Normal       2.0-8.0       

 

             Eos %        1.3 %        Normal       0.0-3.0       

 

             Baso %       0.3 %        Normal       0.0-1.0       

 

             Immature Granulocyte % 0.4 %        Normal       0-3.0         

 

             Nucleated Red Blood Cell % 0.0 %        Normal       0-0           

 

             Neutrophils # 5.6 10       Normal       1.5-8.5       

 

             Lymph #      1.4 10       Low          1.5-5.0       

 

             Mono #       0.5 10       Normal       0.0-0.8       

 

             Eos #        0.1 10       Normal       0.0-0.5       

 

             Baso #       0.0 10       Normal       0.0-0.2       

 

                    Laboratory test finding 2021          [facility]

                                        [address] [place] [address] [phone] Erythrocyte Sedimentation Rate 68 mm/hr   High       0

-30        

 

                    Comprehensive Metabolic Profil 2021          [address] [place] [address] [phone] Glucose, Fasting 97 mg/dL   Normal           

 

             Blood Urea Nitrogen 24 mg/dL     High         7-18          

 

             Creatinine For GFR 0.85 mg/dL   Normal       0.55-1.30     

 

             Glomerular Filtration Rate > 60.0       Normal       >45          9

 

             Sodium Level 139 mEq/L    Normal       136-145       

 

             Potassium Serum 4.6 mEq/L    Normal       3.5-5.1       

 

             Chloride Level 106 mEq/L    Normal               

 

             Carbon Dioxide Level 26 mEq/L     Normal       21-32         

 

             Anion Gap    7 mEq/L      Low          8-16          

 

             Calcium Level 11.8 mg/dL   High         8.8-10.2      

 

             Ast/Sgot     33 U/L       Normal       7-37          

 

             Alt/SGPT     44 U/L       Normal       12-78         

 

             Alkaline Phosphatase 116 U/L      Normal               

 

             Bilirubin,Total 0.6 mg/dL    Normal       0.2-1.0       

 

             Total Protein 8.9 GM/DL    High         6.4-8.2       

 

             Albumin      3.2 GM/DL    Normal       3.2-5.2       

 

             Albumin/Globulin Ratio 0.6          Low          1.2-2.2       

 

                    Total Iron Binding Capacit 2021          [place] [id number] [phone] Iron (Fe)  131 g/dL Normal           

 

             Total Iron Binding Capacity 305 g/dL   Normal       250-450      

 

 

             Percent Saturation 43.0 %       Normal       13.2-45.0     

 

                    Immunoglobulin G,A,M 2021          [address] [address] [phone] Immunoglobulin A 2640.0 mg/dL High             

 

             Immunoglobulin G 510 mg/dL    Low          681-1648      

 

             Immunoglobulin M < 5.3 mg/dL  Low                  

 

                    Immunotyping (Immunofixation) Serum  (If 2021         

 [address] [address] [phone] Immunotyping Serum Iga ABNORMAL   High       Normal   

   

 

             Immunotyping Serum Lambda ABNORMAL     High         Normal        

 

             It Serum Interpretation SEE COMMENT  Normal                    10

 

             Its Pathologist Review REV'D BY O ADJAP <SEE NOTE>  Normal         

           11

 

                    Laboratory test finding 2021          [address] [address] [phone] Ferritin   128 NG/ML  Normal     8-252      12

 

             Beta 2 Microglobulin 3.7 mg/L     High         0.6-2.4      13

 

                    Free Kappa & Lambda LT Chains 2021          [address] [place] [id number] [phone] Free [place] Light Chains Serum 6.4 mg/L   Normal     3.

3-19.4    

 

             Free Lambda Light Chains Serum 517.2 mg/L   High         5.7-26.3  

    

 

             Kappa/Lambda Ratio Serum 0.01         Low          0.26-1.65     







                          1                         THERAPUTIC HUMAN INR VALUES

INDICATIONS                      NORMAL RANGES

PROPHYLAXIS/TREATMENT OF:

VENOUS THROMBOSIS                2.0-3.0

PULMONARY EMBOLISM               2.0-3.0

PREVENTION OF SYSTEMIC EMBOLISM FROM:

TISSUE HEART VALVES              2.0-3.0

ACUTE MYOCARDIAL INFARCTION      2.0-3.0

VALVULAR HEART DISEASE           2.0-3.0

ATRIAL FIBRILLATION              2.0-3.0

MECHANICAL VALVES(HIGH RISK)     2.5-3.5

RECURRENT MYOCARDIAL INFARCTION  2.5-3.5



 

                          2                         Units are mL/min/1.73 m2



Chronic Kidney Disease Staging per NKF:



Stage I & II   GFR >=60       Normal to Mildly Decreased

Stage III      GFR 30-59      Moderately Decreased

Stage IV       GFR 15-29      Severely Decreased

Stage V        GFR <15        Very Little GFR Left

ESRD           GFR <15 on RRT



 

                          3                         100-125 mg/dL     PRE-DIABET

ES/FASTING

>126 mg/dL          DIABETES/FASTING



 

                          4                         NOTE:

CALCIUM,TSH VERIFIED







 

                          5                         CHRONIC KIDNEY DISEASE STAGI

NG PER NKF



STAGE I & II      GFR >= 60        NORMAL TO MILDLY DECREASED

STAGE III          GFR 30-59          MODERATELY DECREASED

STAGE IV           GFR 15-29         SEVERELY DECREASED

STAGE V            GFR <15            VERY LITTLE GFR LEFT

ESRD                 GFR <15            ON RRT



 

                          6                         note:<nlbl:demographic_chang

ed>



 

                          7                         100-125 mg/dL     PRE-DIABET

ES/FASTING

>126 mg/dL          DIABETES/FASTING



 

                          8                         CHRONIC KIDNEY DISEASE STAGI

NG PER NKF



STAGE I & II      GFR >= 60        NORMAL TO MILDLY DECREASED

STAGE III          GFR 30-59          MODERATELY DECREASED

STAGE IV           GFR 15-29         SEVERELY DECREASED

STAGE V            GFR <15            VERY LITTLE GFR LEFT

ESRD                 GFR <15            ON RRT



 

                          9                         Units are mL/min/1.73 m2



Chronic Kidney Disease Staging per NKF:



Stage I & II   GFR >=60       Normal to Mildly Decreased

Stage III      GFR 30-59      Moderately Decreased

Stage IV       GFR 15-29      Severely Decreased

Stage V        GFR <15        Very Little GFR Left

ESRD           GFR <15 on RRT



 

                          10                        MONOCLONAL IGA,LAMBDA



 

                          11                        REV'D BY CHARLES ROCHA



 

                          12                        note:<nlbl:demographic_chang

ed>



 

                          13                        [employer] Immulite 2000 Immuno

chemiluminometric assay (ICMA)

                                        .

Values obtained with different assay methods or kits cannot

be used interchangeably. Results cannot be interpreted as

absolute evidence of the presence or absence of malignant

disease.

Performed at:   - [facility] [address]  [id number]

: Araceli B Reyes MD, Phone:  [phone]

Performed at:   - [facility] [address]  [id number]

61

: Barak Archibald MD, Phone:  [phone]









Procedures





                Date            Code            Description     Status

 

                    2021          41133               Chronic Care MGMT 20

 Mins Clinical Staff Time Per Calendar 

Month                                   Completed

 

                2021      84279           Chronic Care Management Services

 Ea Addl 20 Min Completed

 

                2021      59303           Complex Chronic Care MGMT Servic

e Ea Addl 30 Min Completed

 

                2021      88874           Complex Chronic Care Management 

SVC [address] 60 Min Completed

 

                2021      92863           Complex Chronic Care MGMT Servic

e Ea Addl 30 Min Completed

 

                2021      61970           Complex Chronic Care Management 

SVC [address] 60 Min Completed

 

                2021      58680           Office/Outpatient Established Mo

d MDM 30-39 Min Completed

 

                2021      76699           Complex Chronic Care MGMT Servic

e Ea Addl 30 Min Completed

 

                2021      72645           Complex Chronic Care Management 

SVC [address] 60 Min Completed

 

                2021      44997           Complex Chronic Care MGMT Servic

e Ea Addl 30 Min Completed

 

                2021      11763           Complex Chronic Care Management 

SVC [address] 60 Min Completed

 

                2021      03553           Office/Outpatient Established Mo

d MDM 30-39 Min Completed

 

                2021      [id number]       Bone Mineral Density Test Comple

veronika

 

                2021      [id number]        Mammogram       Completed







[employer]





                                        Description

 

                                        No Information Available







Encounters





           Type       Date       Location   Provider   Dx         Diagnosis

 

                Office Visit    2021  1:30p [facility] Regalado M.D.                      I48.20                    Chronic atrial fibrillation,

 unspecified

 

                          Z79.01                    Long term (current) use of a

nticoagulants

 

                          R60.9                     Edema, unspecified

 

                          I10                       Essential (primary) hyperten

darci

 

                          E05.90                    Thyrotoxicosis, unsp without

 thyrotoxic crisis or storm

 

                          I82.502                   Chronic embolism and thombos

 unsp deep veins of l low extrem

 

                          F17.210                   Nicotine dependence, cigaret

svitlana, uncomplicated

 

                          F32.89                    Other specified depressive e

pisodes

 

                          D47.2                     Monoclonal gammopathy

 

                          K21.9                     Gastro-esophageal reflux dis

ease without esophagitis

 

                          M19.90                    Unspecified osteoarthritis, 

unspecified site

 

                          E78.00                    Pure hypercholesterolemia, u

nspecified

 

                Office Visit    2021  1:30p [facility] Neal Regalado M.D.                      I48.20                    Chronic atrial fibrillation,

 unspecified

 

                          I10                       Essential (primary) hyperten

darci

 

                          E05.90                    Thyrotoxicosis, unsp without

 thyrotoxic crisis or storm

 

                          I82.502                   Chronic embolism and thombos

 unsp deep veins of l low extrem

 

                          Z79.01                    Long term (current) use of a

nticoagulants

 

                          F17.210                   Nicotine dependence, cigaret

svitlana, uncomplicated

 

                          F32.89                    Other specified depressive e

pisodes

 

                          E83.52                    Hypercalcemia

 

                          D47.2                     Monoclonal gammopathy

 

                          K21.9                     Gastro-esophageal reflux dis

ease without esophagitis

 

                          M15.9                     Polyosteoarthritis, unspecif

ied

 

                          E78.00                    Pure hypercholesterolemia, u

nspecified







Assessments





                Date            Code            Description     Provider

 

                10/06/2021      S81.802A        Unspecified open wound, left low

er leg, initial encounter 

AIXA Hoffmann JR

 

                2021      I10             Essential (primary) hypertension

 Charmaine Regalado M.D.

 

                2021      K21.9           Gastro-esophageal reflux disease

 without esophagitis Charmaine Regalado M.D.

 

                2021      E78.00          Pure hypercholesterolemia, unspe

cified Charmaine Regalado M.D.

 

                2021      I10             Essential (primary) hypertension

 Charmaine Regalado M.D.

 

                2021      K21.9           Gastro-esophageal reflux disease

 without esophagitis Charmaine Regalado M.D.

 

                2021      E78.00          Pure hypercholesterolemia, unspe

cified Charmaine Regalado M.D.

 

                2021      M15.9           Polyosteoarthritis, unspecified 

Charmaine Regalado M.D.

 

                2021      I10             Essential (primary) hypertension

 Charmaine Regalado M.D.

 

                2021      K21.9           Gastro-esophageal reflux disease

 without esophagitis Charmaine Regalado M.D.

 

                    2021          I82.502             Chronic embolism and

 thrombosis of unspecified deep veins of 

left lower extremity                    Charmaine Regalado M.D.

 

                2021      I48.20          Chronic atrial fibrillation, uns

pecified Charmaine Regalado M.D.

 

                2021      Z79.01          Long term (current) use of antic

oagulants Charmaine Regalado M.D.

 

                2021      R60.9           Edema, unspecified Charmaine guido M.D.

 

                2021      I10             Essential (primary) hypertension

 Charmaine Regalado M.D.

 

                2021      E05.90          Thyrotoxicosis, unspecified with

out thyrotoxic crisis or sto 

Charmaine Regalado M.D.

 

                    2021          I82.502             Chronic embolism and

 thrombosis of unspecified deep veins of 

left lower extremity                    Charmaine Regalado M.D.

 

                2021      F17.210         Nicotine dependence, cigarettes,

 uncomplicated Charmaine Regalado M.D.

 

                2021      F32.89          Other specified depressive episo

emil Charmaine Regalado M.D.

 

                2021      D47.2           Monoclonal gammopathy Charmaine duron M.D.

 

                2021      K21.9           Gastro-esophageal reflux disease

 without esophagitis Charmaine Regalado M.D.

 

                2021      M19.90          Unspecified osteoarthritis, unsp

ecified site Charmaine Regalado M.D.

 

                2021      E78.00          Pure hypercholesterolemia, unspe

cified Charmaine Regalado M.D.

 

                2021      I10             Essential (primary) hypertension

 Charmaine Regalado M.D.

 

                2021      K21.9           Gastro-esophageal reflux disease

 without esophagitis Charmaine Regalado M.D.

 

                2021      E78.00          Pure hypercholesterolemia, unspe

cified Charmaine Regalado M.D.

 

                2021      I48.20          Chronic atrial fibrillation, uns

pecminoo Regalado M.D.

 

                2021      F17.210         Nicotine dependence, cigarettes,

 uncomplicated Charmaine Regalado M.D.

 

                2021      K21.9           Gastro-esophageal reflux disease

 without esophagitis Charmaine Regalado M.D.

 

                2021      E78.00          Pure hypercholesterolemia, unspe

cisonia Regalado M.D.

 

                2021      I48.20          Chronic atrial fibrillation, uns

el Regalado M.D.

 

                2021      I10             Essential (primary) hypertension

 Charmaine Regalado M.D.

 

                2021      E05.90          Thyrotoxicosis, unspecified with

out thyrotoxic crisis or sto 

Charmaine Regalado M.D.

 

                    2021          I82.502             Chronic embolism and

 thrombosis of unspecified deep veins of 

left lower extremity                    Charmaine Regalado M.D.

 

                2021      Z79.01          Long term (current) use of antic

oagulants Charmaine Regalado M.D.

 

                2021      F17.210         Nicotine dependence, cigarettes,

 uncomplicated Charmaine Regalado M.D.

 

                2021      F32.89          Other specified depressive episo

emil Charmaine Regalado M.D.

 

                2021      E83.52          Hypercalcemia   Charmaine xavier M.D.

 

                2021      D47.2           Monoclonal gammopathy Charmaine duron M.D.

 

                2021      K21.9           Gastro-esophageal reflux disease

 without esophagitis Charmaine Regalado M.D.

 

                2021      M15.9           Polyosteoarthritis, unspecified 

Charmaine Regalado M.D.

 

                2021      E78.00          Pure hypercholesterolemia, unspe

cified Charmaine Regalado M.D.







Plan of Treatment

Future Appointment(s):* 2021  3:15 pm - [facility] at 
  [facility]

10/06/2021 - AIXA Hoffmann JR* S81.802A Unspecified open wound, left 
  lower leg, initial encounter

* All * New Medication:* Cephalexin 500 mg - take one tablet by mouth 3 times 
  daily x 7 days









Functional Status





                                        Description

 

                                        No Information Available







Mental Status





                                        Description

 

                                        No Information Available







Referrals





                                        Description

 

                                        No Information Available

## 2021-12-02 NOTE — CCD
Continuity of Care Document (CCD)

                             Created on: 10/22/2021



Juany Becerril

External Reference #: [id number]

: 1955

Sex: Female



Demographics





                          Address                   [address]

[place]  [address]

 

                          Home Phone                [phone]

 

                          Preferred Language        Unknown

 

                          Marital Status            Unknown

 

                          Protestant Affiliation     Unknown

 

                          Race                      White

 

                          Ethnic Group              Not  or 





Author





                          Author                    Juany ESQUEDA PA

 

                          Organization              Unknown

 

                          Address                   [address] [facility] [address]

[place]  [address]



 

                          Phone                     [phone]







Care Team Providers





                    Care Team Member Name Role                Phone

 

                    Yuri Maier MD       AUTM                [phone]

 

                    [facility]  AUTM                [phone]

 

                    John Lutz MD AUTM                Unavailable

 

                    [facility]/On[facility] AUTM                [phone]

 

                    Hansa Hernandez FNP  AUTM                [phone]







Problems





                    Active Problems     Provider            Date

 

                    Chronic atrial fibrillation Protime             Onset: 

 

                    Essential hypertension Juany Vincent FNP Onset: 2017

 

                    Deep venous thrombosis of lower extremity Juany Vincent FN P Onset: 2017

 

                    Pure hypercholesterolemia Juany Vincent FNP Onset: 

017

 

                    Anxiety state       Juany Vincent FNP Onset: 2017

 

                    Chronic pulmonary embolism Juany Vincent FNP Onset: 2017

 

                    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 

2017

 

                    Gastroesophageal reflux disease Juany Vincent FNP Onset: 1

2017

 

                    Scoliosis deformity of spine Juany Vincent FNP Onset: 2017

 

                    Chronic tension-type headache Juany Vincent FNP Onset: 2017

 

                    Varicose veins of lower extremity Juany Vincent FNP Onset:

 2017

 

                    Tobacco user        Juayn Vincent FNP Onset: 2017

 

                    Hypercalcemia       Juany Vincent FNP Onset: 2017







Social History





                Type            Date            Description     Comments

 

                Birth Sex                       Unknown          

 

                ETOH Use                        Consumes 3 glasses of wine per w

[employer]  

 

                Tobacco Use     Start: Unknown  Patient is a current smoker, smo

kes every day STARTED

AGE 30 1-3 CIGARETTES A DAY 







Allergies and adverse reactions





             Active Allergies Criticality  Reaction | Severity Comments     Date

 

             Ibuprofen    Unable to assess criticality STOMACH UPSET HEADACHE mo

david       10/17/2017

 

             Latex        Unable to assess criticality rash, itches | Mild      

        10/17/2018







Medications





           Active Medications SIG        Qnty       Indications Ordering Provide

r Date

 

                          Cephalexin                     500mg Capsules         

          take one tablet 

by mouth 3 times daily x 7 days 21caps                          Julio kothari JR PA 10/06/2021

 

                          Excedrin Migraine                     [phone] Tab

lets                   1 

as needed h/a as needed mdd=1                                 Charmaine Regalado M.D. 2021

 

                          Famotidine                     20mg Tablets           

        1 by mouth every 

day             90tabs                          Charmaine Regalado M.D. 20

21

 

                          Furosemide                     20mg Tablets           

        1 by mouth twice a

day             180tabs                         Charmaine Regalado M.D. 20

21

 

                                        Bupropion Hydrochloride ER (XL)         

            150mg Tablets ER 24HR       

                take 1 tablet by mouth in the morning 90tabs                    

      Charmaine Regalado M.D.                                    2021

 

                          Eliquis                     5mg Tablets               

    take one tablet by 

mouth twice a day 180tabs                         Charmaine Regalado M.D. 2020

 

                          Methimazole                     5mg Tablets           

        2 every day by 

mouth           180tabs                         Charmaine Regalado M.D. 20

20

 

                          Shingrix                     50mcg/0.5ML Suspension Re

c                   

administer at pharmacy 1units                          Juany Vincent FNP 

 

                          Voltaren                     1% Gel                   

apply up to 4 grams three 

times a day to affected knee as needed 100gm                           Charmaine Regalado M.D. 

2018

 

                          Aspirin Adult Low Dose                     81mg Tablet

s DR                   1 

by mouth every day                                 Juany Vincent FNP 10/17/201

7

 

                                        Acetaminophen Extra Strength            

         500mg Tablets                  

             take 2 tabs every 6 hours as needed                           Juany Fung FNP 10/17/2017

 

                                        Womens 50+ Multi Vitamin & Mineral Formu

la                      Tablets         

             1 every day PO Vit D=0104Pj MB=082                           Juany Vincent FNP 10/17/2017

 

                          Metoprolol Tartrate                     25mg Tablets  

                 Take One 

Tablet By Mouth Twice A Day 180tabs                         ABELARDO Saleem 10/17/2017

 

                          Atorvastatin Calcium                     20mg Tablets 

                  take one

tablet by mouth every day 90tabs                          JANE Saleem 10/17/2017

 

                          Flecainide Acetate                     100mg Tablets  

                 take one 

tablet by mouth twice a day 180tabs                         ABELARDO Saleem 10/17/2017

 

                          Digoxin                     250mcg Tablets            

       1 by mouth every 

day             90tabs                          Charmaine Regalado M.D. 







Medications Administered in Office





           Medication SIG        Qnty       Indications Ordering Provider Date

 

                          Administration Of Flu Vaccine                      Inj

ection                    

                                                AIXA Hoffmann JR 10/06/2

021

 

                          Administration Of Flu Vaccine                      Inj

karen Regalado M.D. 10/15/20

20

 

                          Administration Of Flu Vaccine                      Inj

diyaion                    

                                                Juany Vincent FNP 10/17/2019

 

                          Administration Of Flu Vaccine                      Inj

ection                    

                                                Juany Vincent FNP 10/17/2018

 

                          Administration Of Flu Vaccine                      Inj

Juany Alonso FNP 10/17/2017







Immunizations





             CPT Code     Status       Date         Vaccine      Lot #

 

                [id number]           Given           10/06/2021      Influenza Vaccin

e Quadrivalent Preser/Antibiotic Free Im 

Use                                     [id number]

 

                [id number]           Given           10/15/2020      Influenza Vaccin

e Quadrivalent Preser/Antibiotic Free Im 

Use                                     [id number]

 

                90546           Given           2020      Shingrix Zoster 

Vaccine (HZV), Recombinant, Subunit, 

Adjuvanted                               

 

                78898           Given           10/17/2019      Influenza Vaccin

e Quadrivalent Preser/Antibiotic Free Im 

Use                                     [id number]

 

                50234           Given           10/17/2018      Influenza Virus 

Vaccine, Quadrivalent (Cciiv4), Derived 

From Cell                               [id number]

 

             35240        Given        2018   Pneumovax 23 [id number]

 

                34450           Given           10/17/2017      Influenza Vaccin

e Quadrivalent Preser/Antibiotic Free Im 

Use                                     [id number]







Vital Signs





                Date            Vital           Result          Comment

 

                10/19/2021  3:12pm BP Systolic     122 mmHg         

 

                    BP Diastolic        76 mmHg              

 

                    Heart Rate          86 /min              

 

                    Height              66 inches           5'6"

 

                    Weight              223.00 lb            

 

                    BMI (Body Mass Index) 36.0 kg/m2           

 

                10/06/2021  2:17pm BP Systolic     124 mmHg         

 

                    BP Diastolic        68 mmHg              

 

                    Heart Rate          86 /min              

 

                    Height              66 inches           5'6"

 

                    Weight              217.00 lb            

 

                    BMI (Body Mass Index) 35.0 kg/m2           







Results





        Test    Acquired Date Facility Test    Result  H/L     Range   Note

 

                    CBC With Differential 2021          [facility]

                                        [address] [place] [address] [phone] White Blood Count 6.8 10     Normal     4.0-10.0    

 

             Red Blood Count 3.85 10      Low          4.00-5.40     

 

             Hemoglobin   12.7 g/dL    Normal       12.0-15.5     

 

             Hematocrit   39.4 %       Normal       36.0-47.0     

 

             Mean Corpuscular Volume 102.3 fl     High         80.0-96.0     

 

             Mean Corpuscular Hemoglobin 33.0 pg      Normal       27.0-33.0    

 

 

             Mean Corpuscular HGB Conc 32.2 g/dL    Normal       32.0-36.5     

 

             Red Cell Distribution Width 12.3 %       Normal       11.5-14.5    

 

 

             Platelet Count, Automated 187 10       Normal       150-450       

 

             Neutrophils % 72.2 %       High         36.0-66.0     

 

             Lymph %      19.9 %       Low          24.0-44.0     

 

             Mono %       6.0 %        Normal       2.0-8.0       

 

             Eos %        1.5 %        Normal       0.0-3.0       

 

             Baso %       0.3 %        Normal       0.0-1.0       

 

             Immature Granulocyte % 0.1 %        Normal       0-3.0         

 

             Nucleated Red Blood Cell % 0.0 %        Normal       0-0           

 

             Neutrophils # 4.9 10       Normal       1.5-8.5       

 

             Lymph #      1.4 10       Low          1.5-5.0       

 

             Mono #       0.4 10       Normal       0.0-0.8       

 

             Eos #        0.1 10       Normal       0.0-0.5       

 

             Baso #       0.0 10       Normal       0.0-0.2       

 

                    PT & Aptt           2021          [facility]

nter

                                        [address], [place] [address] [phone] Prothrombin Time 13.8 seconds Normal     12.5-14.3   

 

             Inr          1.04         Normal                    1

 

             Partial Thromboplastin Time 30.6 seconds Normal       24.2-38.5    

 

 

                    CBC With Differential 2021          [address] [id number] [phone] White Blood Count 6.2 10     Normal     4.0-10.0    

 

             Red Blood Count 3.76 10      Low          4.00-5.40     

 

             Hemoglobin   12.5 g/dL    Normal       12.0-15.5     

 

             Hematocrit   39.0 %       Normal       36.0-47.0     

 

             Mean Corpuscular Volume 103.7 fl     High         80.0-96.0     

 

             Mean Corpuscular Hemoglobin 33.2 pg      High         27.0-33.0    

 

 

             Mean Corpuscular HGB Conc 32.1 g/dL    Normal       32.0-36.5     

 

             Red Cell Distribution Width 12.9 %       Normal       11.5-14.5    

 

 

             Platelet Count, Automated 199 10       Normal       150-450       

 

             Neutrophils % 74.5 %       High         36.0-66.0     

 

             Lymph %      16.4 %       Low          24.0-44.0     

 

             Mono %       7.0 %        Normal       2.0-8.0       

 

             Eos %        1.1 %        Normal       0.0-3.0       

 

             Baso %       0.5 %        Normal       0.0-1.0       

 

             Immature Granulocyte % 0.5 %        Normal       0-3.0         

 

             Nucleated Red Blood Cell % 0.0 %        Normal       0-0           

 

             Neutrophils # 4.6 10       Normal       1.5-8.5       

 

             Lymph #      1.0 10       Low          1.5-5.0       

 

             Mono #       0.4 10       Normal       0.0-0.8       

 

             Eos #        0.1 10       Normal       0.0-0.5       

 

             Baso #       0.0 10       Normal       0.0-0.2       

 

                    Comprehensive Metabolic Profil 2021          [address] [id number] [phone] Glucose, Fasting 98 mg/dL   Normal           

 

             Blood Urea Nitrogen 28 mg/dL     High         7-18          

 

             Creatinine For GFR 0.90 mg/dL   Normal       0.55-1.30     

 

             Glomerular Filtration Rate > 60.0       Normal       >45          2

 

             Sodium Level 137 mEq/L    Normal       136-145       

 

             Potassium Serum 4.7 mEq/L    Normal       3.5-5.1       

 

             Chloride Level 107 mEq/L    Normal               

 

             Carbon Dioxide Level 27 mEq/L     Normal       21-32         

 

             Anion Gap    3 mEq/L      Low          8-16          

 

             Calcium Level 10.2 mg/dL   Normal       8.8-10.2      

 

             Ast/Sgot     23 U/L       Normal       7-37          

 

             Alt/SGPT     19 U/L       Normal       12-78         

 

             Alkaline Phosphatase 77 U/L       Normal               

 

             Bilirubin,Total 0.5 mg/dL    Normal       0.2-1.0       

 

             Total Protein 8.3 GM/DL    High         6.4-8.2       

 

             Albumin      3.0 GM/DL    Low          3.2-5.2       

 

             Albumin/Globulin Ratio 0.6          Low          1.2-2.2       

 

                    Complete Blood Count 2021          [facility]

[facility], [facility]

: Dr Simon Hoyt

[place] [address] [phone] WBC        5.9 x10*3/UL            4.1 - 10.9  

 

             RBC          4.11 x10*6/UL Low          4.20 - 6.30   

 

             Hemoglobin   13.6 g/dL                 12.0 - 18.0   

 

             Hematocrit   40.0 %                    37.0 - 51.0   

 

             MCV          97.4 fL      High         80.0 - 97.0   

 

             MCH          33.1 pg      High         26.0 - 32.0   

 

             MCHC         34.0 g/dL                 31.0 - 38.0   

 

             RDW          13.2 %                    11.6 - 13.7   

 

             PLT          209 x10*3/UL              140 - 440     

 

             MPV          8.1 FL                    7.8 - 11.0    

 

             Lymph %      25.6 %                    10.0 - 58.5   

 

             Mid %        7.5 %                     1.7 - 9.3     

 

             Neut %       66.9 %                    37.0 - 92.0   

 

             Lymph #      1.5 x10*3/UL              0.6 - 4.1     

 

             Mid #        0.5 x10*3/UL              0.1 - 0.6     

 

             Neut #       3.9 x10*3/UL              2.0 - 7.8     

 

                    Laboratory test finding 2021          [facility]

[facility]

: Dr Simon Hoyt

[place] [address] [phone] Sed Rate   25 mm/hr   High       0 - 15      

 

                    Basic Metabolic Panel 2021          [facility]

[facility]

: Dr Simon Hoyt

[place] [address] [phone] Glucose    100 mg/dL  High       74 - 99    3

 

             BUN          18 mg/dL                  7 - 18        

 

             Creatinine   0.8 mg/dL                 0.6 - 1.3     

 

             Sodium       141 mEq/L                 136 - 145     

 

             Potassium    3.8 mEq/L                 3.5 - 5.1     

 

             Chloride     104 mEq/L                 98 - 107      

 

             Carbon Dioxide 29 mEq/L                  21 - 32       

 

             Calcium      10.8 mg/dL   High         8.5 - 10.1   4

 

             GFR  >= 60 mL/min              >60           

 

             GFR African American >= 60 mL/min              >60          5

 

                    Laboratory test finding 2021          [facility]

[facility]

: Dr Simon Hoyt

[place] [id number] [phone] Thyroid Stimulating Hormone 0.19 uIU/mL Low        0.3

6 - 3.74  







                          1                         THERAPUTIC HUMAN INR VALUES

INDICATIONS                      NORMAL RANGES

PROPHYLAXIS/TREATMENT OF:

VENOUS THROMBOSIS                2.0-3.0

PULMONARY EMBOLISM               2.0-3.0

PREVENTION OF SYSTEMIC EMBOLISM FROM:

TISSUE HEART VALVES              2.0-3.0

ACUTE MYOCARDIAL INFARCTION      2.0-3.0

VALVULAR HEART DISEASE           2.0-3.0

ATRIAL FIBRILLATION              2.0-3.0

MECHANICAL VALVES(HIGH RISK)     2.5-3.5

RECURRENT MYOCARDIAL INFARCTION  2.5-3.5



 

                          2                         Units are mL/min/1.73 m2



Chronic Kidney Disease Staging per NKF:



Stage I & II   GFR >=60       Normal to Mildly Decreased

Stage III      GFR 30-59      Moderately Decreased

Stage IV       GFR 15-29      Severely Decreased

Stage V        GFR <15        Very Little GFR Left

ESRD           GFR <15 on RRT



 

                          3                         100-125 mg/dL     PRE-DIABET

ES/FASTING

>126 mg/dL          DIABETES/FASTING



 

                          4                         NOTE:

CALCIUM,TSH VERIFIED







 

                          5                         CHRONIC KIDNEY DISEASE STAGI

NG PER NKF



STAGE I & II      GFR >= 60        NORMAL TO MILDLY DECREASED

STAGE III          GFR 30-59          MODERATELY DECREASED

STAGE IV           GFR 15-29         SEVERELY DECREASED

STAGE V            GFR <15            VERY LITTLE GFR LEFT

ESRD                 GFR <15            ON RRT









Procedures





                Date            Code            Description     Status

 

                10/06/2021      65655           Office/Outpatient Established Lo

w MDM 20-29 Min Completed

 

                    2021          31142               Chronic Care MGMT 20

 Mins Clinical Staff Time Per Calendar 

Month                                   Completed

 

                2021      64812           Chronic Care Management Services

 Ea Addl 20 Min Completed

 

                2021      54076           Complex Chronic Care MGMT Servic

e Ea Addl 30 Min Completed

 

                2021      92202           Complex Chronic Care Management 

SVC 1St 60 Min Completed

 

                2021      14783           Complex Chronic Care MGMT Servic

e Ea Addl 30 Min Completed

 

                2021      48386           Complex Chronic Care Management 

SVC 1St 60 Min Completed

 

                2021      22526           Office/Outpatient Established Mo

d MDM 30-39 Min Completed

 

                2021      63449           Complex Chronic Care MGMT Servic

e Ea Addl 30 Min Completed

 

                2021      83528           Complex Chronic Care Management 

SVC 1St 60 Min Completed

 

                2021      54757           Complex Chronic Care MGMT Servic

e Ea Addl 30 Min Completed

 

                2021      03132           Complex Chronic Care Management 

SVC 1St 60 Min Completed

 

                2021      [id number]       Bone Mineral Density Test Comple

veronika

 

                2021      [id number]        Mammogram       Completed







Medical Devices





                                        Description

 

                                        No Information Available







Encounters





           Type       Date       Location   Provider   Dx         Diagnosis

 

                Office Visit    10/06/2021  2:[facility]p [facility]arkins [facility]                        S81.802A                  Unspecified open wound, left

 lower leg, initial encounter

 

                          Z23                       Encounter for immunization

 

                Office Visit    2021  [facility] Regal[facility] M.D[facility]                      I48.20                    Chronic atrial fibrillation,

 unspecified

 

                          Z79.01                    Long term (current) use of a

nticoagulants

 

                          R60.9                     Edema, unspecified

 

                          I10                       Essential (primary) hyperten

darci

 

                          E05.90                    Thyrotoxicosis, unsp without

 thyrotoxic crisis or storm

 

                          I82.502                   Chronic embolism and thombos

 unsp deep veins of l low extrem

 

                          F17.210                   Nicotine dependence, cigaret

svitlana, uncomplicated

 

                          F32.89                    Other specified depressive e

pisodes

 

                          D47.2                     Monoclonal gammopathy

 

                          K21.9                     Gastro-esophageal reflux dis

ease without esophagitis

 

                          M19.90                    Unspecified osteoarthritis, 

unspecified site

 

                          E78.00                    Pure hypercholesterolemia, u

nspecified







Assessments





                Date            Code            Description     Provider

 

                10/06/2021      S81.802A        Unspecified open wound, left low

er leg, initial encounter 

AIXA Hoffmann JR

 

                10/06/2021      Z23             Encounter for immunization AIXA Joseph JR

 

                2021      I10             Essential (primary) hypertension

 Charmaine Regalado M.D.

 

                2021      K21.9           Gastro-esophageal reflux disease

 without esophagitis Charmaine Regalado M.D.

 

                2021      E78.00          Pure hypercholesterolemia, unspe

cified Charmaine Regalado M.D.

 

                2021      I10             Essential (primary) hypertension

 Charmaine Regalado M.D.

 

                2021      K21.9           Gastro-esophageal reflux disease

 without esophagitis Charmaine Regalado M.D.

 

                2021      E78.00          Pure hypercholesterolemia, unspe

cified Charmaine Regalado M.D.

 

                2021      M15.9           Polyosteoarthritis, unspecified 

Charmaine Regalado M.D.

 

                2021      I10             Essential (primary) hypertension

 Charmaine Regalado M.D.

 

                2021      K21.9           Gastro-esophageal reflux disease

 without esophagitis Charmaine Regalado M.D.

 

                    2021          I82.502             Chronic embolism and

 thrombosis of unspecified deep veins of 

left lower extremity                    Charmaine Regalado M.D.

 

                2021      I48.20          Chronic atrial fibrillation, uns

pecminoo Regalado M.D.

 

                2021      Z79.01          Long term (current) use of antic

oagulants Charmaine Regalado M.D.

 

                2021      R60.9           Edema, unspecified Charmaine guido M.D.

 

                2021      I10             Essential (primary) hypertension

 Charmaine Regalado M.D.

 

                2021      E05.90          Thyrotoxicosis, unspecified with

out thyrotoxic crisis or sto 

Charmaine Regalado M.D.

 

                    2021          I82.502             Chronic embolism and

 thrombosis of unspecified deep veins of 

left lower extremity                    Charmaine Regalado M.D.

 

                2021      F17.210         Nicotine dependence, cigarettes,

 uncomplicated Charmaine Regalado M.D.

 

                2021      F32.89          Other specified depressive episo

emil Charmaine Regalado M.D.

 

                2021      D47.2           Monoclonal gammopathy Charmaine duron M.D.

 

                2021      K21.9           Gastro-esophageal reflux disease

 without esophagitis Charmaine Regalado M.D.

 

                2021      M19.90          Unspecified osteoarthritis, unsp

ecified site Charmaine Regalado M.D.

 

                2021      E78.00          Pure hypercholesterolemia, unspe

cified Charmaine Regalado M.D.

 

                2021      I10             Essential (primary) hypertension

 Charmaine Regalado M.D.

 

                2021      K21.9           Gastro-esophageal reflux disease

 without esophagitis Charmaine Regalado M.D.

 

                2021      E78.00          Pure hypercholesterolemia, unspe

cified Charmaine Regalado M.D.

 

                2021      I48.20          Chronic atrial fibrillation, uns

pecified Charmaine Regalado M.D.

 

                2021      F17.210         Nicotine dependence, cigarettes,

 uncomplicated Charmaine Regalado M.D.

 

                2021      K21.9           Gastro-esophageal reflux disease

 without esophagitis Charmaine Regalado M.D.

 

                2021      E78.00          Pure hypercholesterolemia, unspe

cified Charmaine Regalado M.D.







Plan of Treatment

Future Appointment(s):* 2021  3:15 pm - Charmaine Regalado M.D. at 
  [facility]





Functional Status





                                        Description

 

                                        No Information Available







Mental Status





                                        Description

 

                                        No Information Available







Referrals





                Refer to Dr     Reason for Referral Status          Appt Date

 

                          Stillerman,James MD       STAT CONSULT FOR OPEN WOUND 

LT LEG PLEASE LET OFFICE KNOW 

WHEN APPT IS MADE         Created                   

 

                                        [facility]

 

                                        [address] [place]  [address]

 

                                        [phone]

 

                                          

 

                Rock Werner MD CONSULT FOR PVD WITH OPEN WOUND LT LEG  Patient

 Declined 

10/20/2021

 

                                        [facility] [address] ,[address]

 

                                        [facility] [address]

 

                                        [phone]

## 2021-12-02 NOTE — CCD
Continuity of Care Document (CCD)

                             Created on: 10/25/2021



Juany Becerril

External Reference #: [id number]

: 1955

Sex: Female



Demographics





                          Address                   [address]

[place]  [address]

 

                          Home Phone                [phone]

 

                          Preferred Language        Unknown

 

                          Marital Status            Unknown

 

                          [employer] Affiliation     Unknown

 

                          Race                      White

 

                          Ethnic Group              Not  or 





Author





                          Author                    Juany Regalado M.D.

 

                          Organization              Unknown

 

                          Address                   [address]

[place]  [address]



 

                          Phone                     [phone]







Care Team Providers





                    Care Team Member Name Role                Phone

 

                    Yuri Maier MD       AUTM                [phone]

 

                    [facility] Hea  AUTM                [phone]

 

                    John Lutz MD AUTM                Unavailable

 

                    [facility] Hematology/Oncology AUTM                [phone]

 

                    Hansa Hernandez FNP  AUTM                [phone]







Problems





                    Active Problems     Provider            Date

 

                    Chronic atrial fibrillation Protime             Onset: 

 

                    Essential hypertension Juany Vincent FNP Onset: 2017

 

                    Deep venous thrombosis of lower extremity Juany Vincent FN P Onset: 2017

 

                    Pure hypercholesterolemia Juany Vincent FNP Onset: 

017

 

                    Anxiety state       Juany Vincent FNP Onset: 2017

 

                    Chronic pulmonary embolism Juany Vincent FNP Onset: 2017

 

                    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 

2017

 

                    Gastroesophageal reflux disease Juany Vincent FNP Onset: 2017

 

                    Scoliosis deformity of spine Juany Vincent FNP Onset: 2017

 

                    Chronic tension-type headache Juany Vincent FNP Onset: 2017

 

                    Varicose veins of lower extremity Juany Vincent FNP Onset:

 2017

 

                    Tobacco user        Juany Vincent FNP Onset: 2017

 

                    Hypercalcemia       Juany Vincent FNP Onset: 2017







Social History





                Type            Date            Description     Comments

 

                Birth Sex                       Unknown          

 

                ETOH Use                        Consumes 3 glasses of wine per w

[place]  

 

                Tobacco Use     Start: Unknown  Patient is a current smoker, smo

kes every day STARTED

AGE 30 1-3 CIGARETTES A DAY 







Allergies and adverse reactions





             Active Allergies Criticality  Reaction | Severity Comments     Date

 

             Ibuprofen    Unable to assess criticality STOMACH UPSET HEADACHE mo

david       10/17/2017

 

             Latex        Unable to assess criticality rash, itches | Mild      

        10/17/2018







Medications





           Active Medications SIG        Qnty       Indications Ordering Provide

r Date

 

                          Cephalexin                     500mg Capsules         

          take one tablet 

by mouth 3 times daily x 7 days 21caps                          Julio kothari JR, PA 10/06/2021

 

                          Excedrin Migraine                     [id number] Tab

lets                   1 

as needed h/a as needed mdd=1                                 Charmaine Regalado M.D. 2021

 

                          Famotidine                     20mg Tablets           

        1 by mouth every 

day             90tabs                          Charmaine Regalado M.D. 20

21

 

                          Furosemide                     20mg Tablets           

        1 by mouth twice a

day             180tabs                         Charmaine Regalado M.D. 20

21

 

                                        Bupropion Hydrochloride ER (XL)         

            150mg Tablets ER 24HR       

                take 1 tablet by mouth in the morning 90tabs                    

      Charmaine Regalado M.D.                                    2021

 

                          Eliquis                     5mg Tablets               

    take one tablet by 

mouth twice a day 180tabs                         Charmaine Regalado M.D. 2020

 

                          Methimazole                     5mg Tablets           

        2 every day by 

mouth           180tabs                         Charmaine Regalado M.D. 20

20

 

                          Shingrix                     50mcg/0.5ML Suspension Re

c                   

administer at pharmacy 1units                          Juany Vincent FNP 

 

                          Voltaren                     1% Gel                   

apply up to 4 grams three 

times a day to affected knee as needed 100gm                           Charmaine Regalado M.D. 

2018

 

                          Aspirin Adult Low Dose                     81mg Tablet

s DR                   1 

by mouth every day                                 Juany Vincent FNP 10/17/201

7

 

                                        Acetaminophen Extra Strength            

         500mg Tablets                  

             take 2 tabs every 6 hours as needed                           Juany Fung FNP 10/17/2017

 

                                        Womens 50+ Multi Vitamin & Mineral Formu

la                      Tablets         

             1 every day PO Vit [id number] NU=944                           Juany Vincent FNP 10/17/2017

 

                          Metoprolol Tartrate                     25mg Tablets  

                 Take One 

Tablet By Mouth Twice A Day 180tabs                         ABELARDO Saleem 10/17/2017

 

                          Atorvastatin Calcium                     20mg Tablets 

                  take one

tablet by mouth every day 90tabs                          JANE Saleem 10/17/2017

 

                          Flecainide Acetate                     100mg Tablets  

                 take one 

tablet by mouth twice a day 180tabs                         ABELARDO Saleem 10/17/2017

 

                          Digoxin                     250mcg Tablets            

       1 by mouth every 

day             90tabs                          Charmaine Regalado M.D. 







Medications Administered in Office





           Medication SIG        Qnty       Indications Ordering Provider Date

 

                          Administration Of Flu Vaccine                      Inj

ection                    

                                                AIXA Hoffmann JR 10/06/2

021

 

                          Administration Of Flu Vaccine                      Inj

diyaion                    

                                                Charmaine Regalado M.D. 10/15/20

20

 

                          Administration Of Flu Vaccine                      Inj

ection                    

                                                Juany Vincent FNP 10/17/2019

 

                          Administration Of Flu Vaccine                      Inj

ection                    

                                                Juany Vincent FNP 10/17/2018

 

                          Administration Of Flu Vaccine                      Inj

Juany Alonso FNP 10/17/2017







Immunizations





             CPT Code     Status       Date         Vaccine      Lot #

 

                [id number]           Given           10/06/2021      Influenza Vaccin

e Quadrivalent Preser/Antibiotic Free Im 

Use                                     [id number]

 

                [id number]           Given           10/15/2020      Influenza Vaccin

e Quadrivalent Preser/Antibiotic Free Im 

Use                                     [id number]

 

                [id number]           Given           2020      Shingrix Zoster 

Vaccine (HZV), Recombinant, Subunit, 

Adjuvanted                               

 

                [id number]           Given           10/17/2019      Influenza Vaccin

e Quadrivalent Preser/Antibiotic Free Im 

Use                                     [id number]

 

                27511           Given           10/17/2018      Influenza Virus 

Vaccine, Quadrivalent (Cciiv4), Derived 

From Cell                               [id number]

 

             44642        Given        2018   Pneumovax 23 [id number]

 

                39114           Given           10/17/2017      Influenza Vaccin

e Quadrivalent Preser/Antibiotic Free Im 

Use                                     [id number]







Vital Signs





                Date            Vital           Result          Comment

 

                10/19/2021  3:12pm BP Systolic     122 mmHg         

 

                    BP Diastolic        76 mmHg              

 

                    Heart Rate          86 /min              

 

                    Height              66 inches           5'6"

 

                    Weight              223.00 lb            

 

                    BMI (Body Mass Index) 36.0 kg/m2           

 

                10/06/2021  2:17pm BP Systolic     124 mmHg         

 

                    BP Diastolic        68 mmHg              

 

                    Heart Rate          86 /min              

 

                    Height              66 inches           5'6"

 

                    Weight              217.00 lb            

 

                    BMI (Body Mass Index) 35.0 kg/m2           







Results





        Test    Acquired Date Facility Test    Result  H/L     Range   Note

 

                    Influenza A/B RSV Covid Amp 10/25/2021          [address] [address] [phone] Influenza A Amplification NEGATIVE   Normal     Negati

ve   1

 

             Influenza B Amplification NEGATIVE     Normal       Negative     2

 

             RSV Amplification NEGATIVE     Normal       Negative     3

 

             Sars Covid-19 Amplification NEGATIVE     Normal       Negative     

4

 

                    CBC With Differential 10/25/2021          [address] [address] [phone] White Blood Count 6.3 10     Normal     4.0-10.0    

 

             Red Blood Count 4.12 10      Normal       4.00-5.40     

 

             Hemoglobin   12.9 g/dL    Normal       12.0-15.5     

 

             Hematocrit   41.3 %       Normal       36.0-47.0     

 

             Mean Corpuscular Volume 100.2 fl     High         80.0-96.0     

 

             Mean Corpuscular Hemoglobin 31.3 pg      Normal       27.0-33.0    

 

 

             Mean Corpuscular HGB Conc 31.2 g/dL    Low          32.0-36.5     

 

             Red Cell Distribution Width 14.8 %       High         11.5-14.5    

 

 

             Platelet Count, Automated 202 10       Normal       150-450       

 

             Neutrophils % 79.5 %       High         36.0-66.0     

 

             Lymph %      13.8 %       Low          24.0-44.0     

 

             Mono %       4.8 %        Normal       2.0-8.0       

 

             Eos %        1.1 %        Normal       0.0-3.0       

 

             Baso %       0.3 %        Normal       0.0-1.0       

 

             Immature Granulocyte % 0.5 %        Normal       0-3.0         

 

             Nucleated Red Blood Cell % 0.0 %        Normal       0-0           

 

             Neutrophils # 5.0 10       Normal       1.5-8.5       

 

             Lymph #      0.9 10       Low          1.5-5.0       

 

             Mono #       0.3 10       Normal       0.0-0.8       

 

             Eos #        0.1 10       Normal       0.0-0.5       

 

             Baso #       0.0 10       Normal       0.0-0.2       

 

                    Cardiac Marker Panel 10/25/2021          [facility]

enter

                                        [address] [place] [address] [phone] CPK Creatine Phosphokinase 26 U/L     Normal     26-19

2      

 

             CK-MB Value Mass 1.0 NG/ML    Normal       <3.6          

 

             MB/CK Relative Index 3.85         Normal       < Or =4      5

 

             Troponin I   0.02 NG/ML   Normal       < 0.10       6

 

                    Liver Profile       10/25/2021          [facility]

nter

                                        [address] [place] [address] [phone] Ast/Sgot   26 U/L     Normal     7-37        

 

             Alt/SGPT     28 U/L       Normal       12-78         

 

             Alkaline Phosphatase 105 U/L      Normal               

 

             Bilirubin,Total 0.8 mg/dL    Normal       0.2-1.0       

 

             Bilirubin,Direct 0.4 mg/dL    High         0.0-0.2       

 

             Total Protein 7.9 GM/DL    Normal       6.4-8.2       

 

             Albumin      2.8 GM/DL    Low          3.2-5.2       

 

             Albumin/Globulin Ratio 0.5          Low          1.2-2.2       

 

                    Basic Metabolic Profile 10/25/2021          [facility]

                                        [address] [place] [address] [phone] Glucose, Fasting 106 mg/dL  High             

 

             Blood Urea Nitrogen 18 mg/dL     Normal       7-18          

 

             Creatinine For GFR 0.94 mg/dL   Normal       0.55-1.30     

 

             Glomerular Filtration Rate > 60.0       Normal       >45          7

 

             Sodium Level 140 mEq/L    Normal       136-145       

 

             Potassium Serum 4.6 mEq/L    Normal       3.5-5.1       

 

             Chloride Level 111 mEq/L    High                 

 

             Carbon Dioxide Level 22 mEq/L     Normal       21-32         

 

             Anion Gap    7 mEq/L      Low          8-16          

 

             Calcium Level 10.7 mg/dL   High         8.8-10.2      

 

                    Laboratory test finding 10/25/2021          [facility]

                                        [address] [place] [address] [phone] NT-Pro BNP 5817 pg/mL High       <125       8

 

             Digoxin Level 0.5 NG/ML    Normal       0.5-2.0      9

 

             Thyroxine (T4) 10.1 g/dL  Normal       4.5-12.0     10

 

             Thyroid Stimulating Hormone 0.051 uIU/ML Low          0.358-3.740  

11

 

                    CBC With Differential 2021          [facility]

                                        [address] [place] [address] [phone] White Blood Count 6.8 10     Normal     4.0-10.0    

 

             Red Blood Count 3.85 10      Low          4.00-5.40     

 

             Hemoglobin   12.7 g/dL    Normal       12.0-15.5     

 

             Hematocrit   39.4 %       Normal       36.0-47.0     

 

             Mean Corpuscular Volume 102.3 fl     High         80.0-96.0     

 

             Mean Corpuscular Hemoglobin 33.0 pg      Normal       27.0-33.0    

 

 

             Mean Corpuscular HGB Conc 32.2 g/dL    Normal       32.0-36.5     

 

             Red Cell Distribution Width 12.3 %       Normal       11.5-14.5    

 

 

             Platelet Count, Automated 187 10       Normal       150-450       

 

             Neutrophils % 72.2 %       High         36.0-66.0     

 

             Lymph %      19.9 %       Low          24.0-44.0     

 

             Mono %       6.0 %        Normal       2.0-8.0       

 

             Eos %        1.5 %        Normal       0.0-3.0       

 

             Baso %       0.3 %        Normal       0.0-1.0       

 

             Immature Granulocyte % 0.1 %        Normal       0-3.0         

 

             Nucleated Red Blood Cell % 0.0 %        Normal       0-0           

 

             Neutrophils # 4.9 10       Normal       1.5-8.5       

 

             Lymph #      1.4 10       Low          1.5-5.0       

 

             Mono #       0.4 10       Normal       0.0-0.8       

 

             Eos #        0.1 10       Normal       0.0-0.5       

 

             Baso #       0.0 10       Normal       0.0-0.2       

 

                    PT & Aptt           2021          [facility]

nter

                                        [address] [place] [address] [phone] Prothrombin Time 13.8 seconds Normal     12.5-14.3   

 

             Inr          1.04         Normal                    12

 

             Partial Thromboplastin Time 30.6 seconds Normal       24.2-38.5    

 

 

                    CBC With Differential 2021          [facility]

                                        [address] [place] [address] [phone] White Blood Count 6.2 10     Normal     4.0-10.0    

 

             Red Blood Count 3.76 10      Low          4.00-5.40     

 

             Hemoglobin   12.5 g/dL    Normal       12.0-15.5     

 

             Hematocrit   39.0 %       Normal       36.0-47.0     

 

             Mean Corpuscular Volume 103.7 fl     High         80.0-96.0     

 

             Mean Corpuscular Hemoglobin 33.2 pg      High         27.0-33.0    

 

 

             Mean Corpuscular HGB Conc 32.1 g/dL    Normal       32.0-36.5     

 

             Red Cell Distribution Width 12.9 %       Normal       11.5-14.5    

 

 

             Platelet Count, Automated 199 10       Normal       150-450       

 

             Neutrophils % 74.5 %       High         36.0-66.0     

 

             Lymph %      16.4 %       Low          24.0-44.0     

 

             Mono %       7.0 %        Normal       2.0-8.0       

 

             Eos %        1.1 %        Normal       0.0-3.0       

 

             Baso %       0.5 %        Normal       0.0-1.0       

 

             Immature Granulocyte % 0.5 %        Normal       0-3.0         

 

             Nucleated Red Blood Cell % 0.0 %        Normal       0-0           

 

             Neutrophils # 4.6 10       Normal       1.5-8.5       

 

             Lymph #      1.0 10       Low          1.5-5.0       

 

             Mono #       0.4 10       Normal       0.0-0.8       

 

             Eos #        0.1 10       Normal       0.0-0.5       

 

             Baso #       0.0 10       Normal       0.0-0.2       

 

                    Comprehensive Metabolic Profil 2021          [address] [address] [phone] Glucose, Fasting 98 mg/dL   Normal           

 

             Blood Urea Nitrogen 28 mg/dL     High         7-18          

 

             Creatinine For GFR 0.90 mg/dL   Normal       0.55-1.30     

 

             Glomerular Filtration Rate > 60.0       Normal       >45          1

3

 

             Sodium Level 137 mEq/L    Normal       136-145       

 

             Potassium Serum 4.7 mEq/L    Normal       3.5-5.1       

 

             Chloride Level 107 mEq/L    Normal               

 

             Carbon Dioxide Level 27 mEq/L     Normal       21-32         

 

             Anion Gap    3 mEq/L      Low          8-16          

 

             Calcium Level 10.2 mg/dL   Normal       8.8-10.2      

 

             Ast/Sgot     23 U/L       Normal       7-37          

 

             Alt/SGPT     19 U/L       Normal       12-78         

 

             Alkaline Phosphatase 77 U/L       Normal               

 

             Bilirubin,Total 0.5 mg/dL    Normal       0.2-1.0       

 

             Total Protein 8.3 GM/DL    High         6.4-8.2       

 

             Albumin      3.0 GM/DL    Low          3.2-5.2       

 

             Albumin/Globulin Ratio 0.6          Low          1.2-2.2       

 

                    Complete Blood Count 2021          [facility]

s, pc

: Dr Simon Hoyt

[place] [address] [phone] WBC        5.9 x10*3/UL            4.1 - 10.9  

 

             RBC          4.11 x10*6/UL Low          4.20 - 6.30   

 

             Hemoglobin   13.6 g/dL                 12.0 - 18.0   

 

             Hematocrit   40.0 %                    37.0 - 51.0   

 

             MCV          97.4 fL      High         80.0 - 97.0   

 

             MCH          33.1 pg      High         26.0 - 32.0   

 

             MCHC         34.0 g/dL                 31.0 - 38.0   

 

             RDW          13.2 %                    11.6 - 13.7   

 

             PLT          209 x10*3/UL              140 - 440     

 

             MPV          8.1 FL                    7.8 - 11.0    

 

             Lymph %      25.6 %                    10.0 - 58.5   

 

             Mid %        7.5 %                     1.7 - 9.3     

 

             Neut %       66.9 %                    37.0 - 92.0   

 

             Lymph #      1.5 x10*3/UL              0.6 - 4.1     

 

             Mid #        0.5 x10*3/UL              0.1 - 0.6     

 

             Neut #       3.9 x10*3/UL              2.0 - 7.8     

 

                    Laboratory test finding 2021          [facility]

[facility]

: Dr Simon Hoyt

[place] [address] [phone] Sed Rate   25 mm/hr   High       0 - 15      

 

                    Basic Metabolic Panel 2021          [facility]

ts, pc

: Dr Simon Hoyt

[place] [address] [phone] Glucose    100 mg/dL  High       74 - 99    14

 

             BUN          18 mg/dL                  7 - 18        

 

             Creatinine   0.8 mg/dL                 0.6 - 1.3     

 

             Sodium       141 mEq/L                 136 - 145     

 

             Potassium    3.8 mEq/L                 3.5 - 5.1     

 

             Chloride     104 mEq/L                 98 - 107      

 

             Carbon Dioxide 29 mEq/L                  21 - 32       

 

             Calcium      10.8 mg/dL   High         8.5 - 10.1   15

 

             GFR  >= 60 mL/min              >60           

 

             GFR African American >= 60 mL/min              >60          16

 

                    Laboratory test finding 2021          [facility]

pia meza

: Dr Simon Arellanologg

[place] [address] [phone] Thyroid Stimulating Hormone 0.19 uIU/mL Low        0.3

6 - 3.74  







                          1                         Negative results do not prec

lude influenza or RSV virus

infection and should not be used as the sole basis for

treatment or other patient management decisions.



 

                          2                         Negative results do not prec

lude influenza or RSV virus

infection and should not be used as the sole basis for

treatment or other patient management decisions.



 

                          3                         Negative results do not prec

lude influenza or RSV virus

infection and should not be used as the sole basis for

treatment or other patient management decisions.



 

                          4                         A false negative result may 

occur if a specimen is

improperly collected, transported or handled. False negative

results may also occur if inadequate numbers of organisms

are present in the specimen.  As with any molecular test,

mutations within the target regions of [employer]

SARS-CoV-2 could affect primer and/or probe binding

resulting in failure to detect the presence of virus.  This

test cannot rule out diseases caused by other bacterial or

viral pathogens.



DISCLAIMER:

Testing was performed using the [employer] SARS-CoV-2 test.

This test was developed and its performance characteristics

determined by [employer]. This test has not been FDA cleared or

approved. This test has been authorized by FDA under an

Emergency Use Authorization (EUA). This test is only

authorized for the duration of time the declaration that

circumstances exist justifying the authorization of the

emergency use of in vitro diagnostic tests for detection of

SARS-CoV-2 virus and/or diagnosis of COVID-19 infection

under section 564(b)(1) of the Act, 21 U.S.C.

                                        360bbb-3(b)(1), unless the authorization

 is terminated or

revoked sooner.



 

                          5                         DIAGNOSIS CRITERIA

MMB ng/ml       Relative Index (RI)

NON-AMI               < or = 5               N/A

GRAY ZONE              > 5                < or = 4

AMI                    > 5                   > 4



 

                          6                         Troponin I Reference Interva

l for [employer] LOCI:



                                        99th Percentile= 0.00-0.045 ng/ml



Risk Stratification:

<= 0.10 ng/ml   Decreased Risk for Adverse Clinical

Events.

                                        0.10-1.50 ng/ml   Increased Risk for Adv

erse Clinical

Events. Evaluation of additional

criterion and/or repeat testing in 2-6

hours is suggested to rule out myocardial

damage.

>= 1.50 ng/ml   Indicative of Myocardial Injury.



 

                          7                         Units are mL/min/1.73 m2



Chronic Kidney Disease Staging per NKF:



Stage I & II   GFR >=60       Normal to Mildly Decreased

Stage III      GFR 30-59      Moderately Decreased

Stage IV       GFR 15-29      Severely Decreased

Stage V        GFR <15        Very Little GFR Left

ESRD           GFR <15 on RRT



 

                          8                         note:<nlbl:demographic_[facility]

ed>



 

                          9                         note:<nlbl:demographic_[facility]

ed>



 

                          10                        note:<nlbl:demographic_[facility]

ed>



 

                          11                        note:<nlbl:[facility]

ed>



 

                          12                        THERAPUTIC HUMAN INR VALUES

INDICATIONS                      NORMAL RANGES

PROPHYLAXIS/TREATMENT OF:

VENOUS THROMBOSIS                2.0-3.0

PULMONARY EMBOLISM               2.0-3.0

PREVENTION OF SYSTEMIC EMBOLISM FROM:

TISSUE HEART VALVES              2.0-3.0

ACUTE MYOCARDIAL INFARCTION      2.0-3.0

VALVULAR HEART DISEASE           2.0-3.0

ATRIAL FIBRILLATION              2.0-3.0

MECHANICAL VALVES(HIGH RISK)     2.5-3.5

RECURRENT MYOCARDIAL INFARCTION  2.5-3.5



 

                          13                        Units are mL/min/1.73 m2



Chronic Kidney Disease Staging per NKF:



Stage I & II   GFR >=60       Normal to Mildly Decreased

Stage III      GFR 30-59      Moderately Decreased

Stage IV       GFR 15-29      Severely Decreased

Stage V        GFR <15        Very Little GFR Left

ESRD           GFR <15 on RRT



 

                          14                        100-125 mg/dL     PRE-DIABET

ES/FASTING

>126 mg/dL          DIABETES/FASTING



 

                          15                        NOTE:

CALCIUM,TSH VERIFIED







 

                          16                        CHRONIC KIDNEY DISEASE STAGI

NG PER NKF



STAGE I & II      GFR >= 60        NORMAL TO MILDLY DECREASED

STAGE III          GFR 30-59          MODERATELY DECREASED

STAGE IV           GFR 15-29         SEVERELY DECREASED

STAGE V            GFR <15            VERY LITTLE GFR LEFT

ESRD                 GFR <15            ON RRT









Procedures





                Date            Code            Description     Status

 

                10/06/2021      62773           Office/Outpatient Established Lo

w MDM 20-29 Min Completed

 

                2021      61020           Complex Chronic Care MGMT Servic

e Ea Addl 30 Min Completed

 

                2021      61854           Complex Chronic Care Management 

SVC 1St 60 Min Completed

 

                    2021          43050               Chronic Care MGMT 20

 Mins Clinical Staff Time Per Calendar 

Month                                   Completed

 

                2021      10991           Chronic Care Management Services

 Ea Addl 20 Min Completed

 

                2021      58901           Complex Chronic Care MGMT Servic

e Ea Addl 30 Min Completed

 

                2021      54421           Complex Chronic Care Management 

SVC 1St 60 Min Completed

 

                2021      70405           Complex Chronic Care MGMT Servic

e Ea Addl 30 Min Completed

 

                2021      95816           Complex Chronic Care Management 

SVC 1St 60 Min Completed

 

                2021      28449           Office/Outpatient Established Mo

d MDM 30-39 Min Completed

 

                2021      56890           Complex Chronic Care MGMT Servic

e Ea Addl 30 Min Completed

 

                2021      59391           Complex Chronic Care Management 

SVC 1St 60 Min Completed

 

                2021      [id number]       Bone Mineral Density Test Comple

veronika

 

                2021      [id number]        Mammogram       Completed







Medical Devices





                                        Description

 

                                        No Information Available







Encounters





           Type       Date       Location   Provider   Dx         Diagnosis

 

                Office Visit    10/06/2021  2:20p [facility] Harkins [facility]                        S81.802A                  Unspecified open wound, left

 lower leg, initial encounter

 

                          Z23                       Encounter for immunization

 

                Office Visit    2021  1:30p [facility] Regalado M.D.                      I48.20                    Chronic atrial fibrillation,

 unspecified

 

                          Z79.01                    Long term (current) use of a

nticoagulants

 

                          R60.9                     Edema, unspecified

 

                          I10                       Essential (primary) hyperten

darci

 

                          E05.90                    Thyrotoxicosis, unsp without

 thyrotoxic crisis or storm

 

                          I82.502                   Chronic embolism and thombos

 unsp deep veins of l low extrem

 

                          F17.210                   Nicotine dependence, cigaret

svitlana, uncomplicated

 

                          F32.89                    Other specified depressive e

pisodes

 

                          D47.2                     Monoclonal gammopathy

 

                          K21.9                     Gastro-esophageal reflux dis

ease without esophagitis

 

                          M19.90                    Unspecified osteoarthritis, 

unspecified site

 

                          E78.00                    Pure hypercholesterolemia, u

nspecified







Assessments





                Date            Code            Description     Provider

 

                10/06/2021      S81.802A        Unspecified open wound, left low

er leg, initial encounter 

AIXA Hoffmann JR

 

                10/06/2021      Z23             Encounter for immunization AIXA Joseph JR

 

                2021      I10             Essential (primary) hypertension

 Charmaine Regalado M.D.

 

                2021      K21.9           Gastro-esophageal reflux disease

 without esophagitis Charmaine Regalado M.D.

 

                2021      M15.9           Polyosteoarthritis, unspecified 

Charmaine Regalado M.D.

 

                2021      I10             Essential (primary) hypertension

 Charmaine Regalado M.D.

 

                2021      K21.9           Gastro-esophageal reflux disease

 without esophagitis Charmaine Regalado M.D.

 

                2021      E78.00          Pure hypercholesterolemia, unspe

cified Charmaine Regalado M.D.

 

                2021      I10             Essential (primary) hypertension

 Charmaine Regalado M.D.

 

                2021      K21.9           Gastro-esophageal reflux disease

 without esophagitis Charmaine Regalado M.D.

 

                2021      E78.00          Pure hypercholesterolemia, unspe

cisonia Regalado M.D.

 

                2021      M15.9           Polyosteoarthritis, unspecified 

Charmaine Regalado M.D.

 

                2021      I10             Essential (primary) hypertension

 Charmaine Regalado M.D.

 

                2021      K21.9           Gastro-esophageal reflux disease

 without esophagitis Charmaine Regalado M.D.

 

                    2021          I82.502             Chronic embolism and

 thrombosis of unspecified deep veins of 

left lower extremity                    Charmaine Regalado M.D.

 

                2021      I48.20          Chronic atrial fibrillation, uns

pecminoo Regalado M.D.

 

                2021      Z79.01          Long term (current) use of antic

oagulants Charmaine Regalado M.D.

 

                2021      R60.9           Edema, unspecified Charmaine guido M.D.

 

                2021      I10             Essential (primary) hypertension

 Charmaine Regalado M.D.

 

                2021      E05.90          Thyrotoxicosis, unspecified with

out thyrotoxic crisis or sto 

Charmaine Regalado M.D.

 

                    2021          I82.502             Chronic embolism and

 thrombosis of unspecified deep veins of 

left lower extremity                    Charmaine Regalado M.D.

 

                2021      F17.210         Nicotine dependence, cigarettes,

 uncomplicated Charmaine Regalado M.D.

 

                2021      F32.89          Other specified depressive episo

emil Charmaine Regalado M.D.

 

                2021      D47.2           Monoclonal gammopathy Charmaine duron M.D.

 

                2021      K21.9           Gastro-esophageal reflux disease

 without esophagitis Charmaine Regalado M.D.

 

                2021      M19.90          Unspecified osteoarthritis, unsp

ecified site Charmaine Regalado M.D.

 

                2021      E78.00          Pure hypercholesterolemia, unspe

cified Charmaine Regalado M.D.

 

                2021      I10             Essential (primary) hypertension

 Charmaine Regalado M.D.

 

                2021      K21.9           Gastro-esophageal reflux disease

 without esophagitis Charmaine Regalado M.D.

 

                2021      E78.00          Pure hypercholesterolemia, unspe

cified Charmaine Regalado M.D.







Plan of Treatment

Future Appointment(s):* 2021  3:15 pm - Charmaine Regalado M.D. at 
  [facility]





Functional Status





                                        Description

 

                                        No Information Available







Mental Status





                                        Description

 

                                        No Information Available







Referrals





                Refer to Dr     Reason for Referral Status          Appt Date

 

                          Stillerman,James MD       STAT CONSULT FOR OPEN WOUND 

LT LEG PLEASE LET OFFICE KNOW 

WHEN APPT IS MADE         Created                   

 

                                        [facility]

 

                                        [address] [place]  [address]

 

                                        [phone]

 

                                          

 

                Rock Werner MD CONSULT FOR PVD WITH OPEN WOUND LT LEG  Patient

 Declined 

10/20/2021

 

                                        [facility] [address] ,[address] [place] [address]

 

                                        [phone]

## 2021-12-02 NOTE — CCD
Continuity of Care Document (CCD)

                             Created on: 2021



Juany Becerril

External Reference #: [id number]

: 1955

Sex: Female



Demographics





                          Address                   [address] [facility] [address]

[place]  [address]

 

                          Home Phone                [phone]

 

                          Preferred Language        Unknown

 

                          Marital Status            Unknown

 

                          Denominational Affiliation     Unknown

 

                          Race                      White

 

                          Ethnic Group              Not  or 





Author





                          Author                    Juany Regalado M.D.

 

                          Organization              Unknown

 

                          Address                   [address] [facility] [address]

[place]  [address]



 

                          Phone                     [phone]







Care Team Providers





                    Care Team Member Name Role                Phone

 

                    Yuri Maier MD       AUTM                [phone]

 

                    [facility] Hea  AUTM                [phone]

 

                    John Lutz MD AUTM                Unavailable

 

                    [facility] Hematology/Oncology AUTM                [phone]

 

                    Hansa Hernandez FNP  AUTM                [phone]







Problems





                    Active Problems     Provider            Date

 

                    Chronic atrial fibrillation Protime             Onset: 

 

                    Essential hypertension Juany Vincent FNP Onset: 2017

 

                    Deep venous thrombosis of lower extremity Juany Vincent FN P Onset: 2017

 

                    Pure hypercholesterolemia Juany Vincent FNP Onset: 

017

 

                    Anxiety state       Juany Vincent FNP Onset: 2017

 

                    Chronic pulmonary embolism Juany Vincent FNP Onset: 2017

 

                    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 

2017

 

                    Gastroesophageal reflux disease Juany Vincent FNP Onset: 2017

 

                    Scoliosis deformity of spine Juany Vincent FNP Onset: 2017

 

                    Chronic tension-type headache Juany Vinecnt FNP Onset: 2017

 

                    Varicose veins of lower extremity Juany Vincent FNP Onset:

 2017

 

                    Tobacco user        Juany Vincent FNP Onset: 2017

 

                    Hypercalcemia       Juany Vincent FNP Onset: 2017







Social History





                Type            Date            Description     Comments

 

                Birth Sex                       Unknown          

 

                ETOH Use                        Consumes 3 glasses of wine per w

[place]  

 

                Tobacco Use     Start: Unknown  Patient is a current smoker, smo

kes every day STARTED

AGE 30 1-3 CIGARETTES A DAY 







Allergies and adverse reactions





             Active Allergies Criticality  Reaction | Severity Comments     Date

 

             Ibuprofen    Unable to assess criticality STOMACH UPSET HEADACHE mo

david       10/17/2017

 

             Latex        Unable to assess criticality rash, itches | Mild      

        10/17/2018







Medications





           Active Medications SIG        Qnty       Indications Ordering Provide

r Date

 

                          Claritin                     10mg Tablets             

      1 by mouth every 

night at bedtime seasonally                                 ABELARDO Saleem 2021

 

                          Fluoxetine HCL                     20mg Capsules      

             1 by mouth 

every day       90caps                          Charmaine Regalado M.D. 20

21

 

                          Excedrin Migraine                     [phone] Tab

lets                   1 

as needed h/a as needed mdd=1                                 Charmaine Reaglado M.D. 2021

 

                          Famotidine                     20mg Tablets           

        1 by mouth every 

day             90tabs                          Charmaine Regalado M.D. 20

21

 

                    Furosemide                     20mg Tablets                 

  1 by mouth qd       

180tabs                                 Charmaine Regalado M.D. 2021

 

                                        Bupropion Hydrochloride ER (XL)         

            150mg Tablets ER 24HR       

                take 1 tablet by mouth in the morning 90tabs                    

      Charmaine Regalado M.D.                                    2021

 

                          Eliquis                     5mg Tablets               

    take one tablet by 

mouth twice a day 180tabs                         Charmaine Regalado M.D. 2020

 

                          Methimazole                     5mg Tablets           

        2 every day by 

mouth           180tabs                         Charmaine Regalado M.D. 20

20

 

                          Shingrix                     50mcg/0.5ML Suspension Re

c                   

administer at pharmacy 1units                          Juany Vincent FNP 

 

                          Voltaren                     1% Gel                   

apply up to 4 grams three 

times a day to affected knee as needed 100gm                           Charmaine Regalado M.D. 

2018

 

                          Aspirin Adult Low Dose                     81mg Tablet

s DR                   1 

by mouth every day                                 Juany Vincent FNP 10/17/201

7

 

                                        Acetaminophen Extra Strength            

         500mg Tablets                  

             take 2 tabs every 6 hours as needed                           Juany Fung FNP 10/17/2017

 

                                        Womens 50+ Multi Vitamin & Mineral Formu

la                      Tablets         

             1 every day PO Vit [id number] GM=315                           Juany Vincent FNP 10/17/2017

 

                          Metoprolol Tartrate                     25mg Tablets  

                 Take One 

Tablet By Mouth Twice A Day 180tabs                         ABELARDO Saleem 10/17/2017

 

                          Atorvastatin Calcium                     20mg Tablets 

                  take one

tablet by mouth every day 90tabs                          JANE Saleem 10/17/2017

 

                          Flecainide Acetate                     100mg Tablets  

                 take one 

tablet by mouth twice a day 180tabs                         ABELARDO Saleem 10/17/2017

 

                          Digoxin                     250mcg Tablets            

       1 by mouth every 

day             90tabs                          Charmaine Regalado M.D. 

 

                                        History Medications

 

                          Cephalexin                     500mg Capsules         

          take one tablet 

by mouth 3 times daily x 7 days 21caps                          AIXA Laurent JR 10/06/2021 - 

2021







Medications Administered in Office





           Medication SIG        Qnty       Indications Ordering Provider Date

 

                          Administration Of Flu Vaccine                      Inj

ection                    

                                                AIXA Hoffmann JR 10/06/2

021

 

                          Administration Of Flu Vaccine                      Inj

diyaion                    

                                                Charmaine Regalado M.D. 10/15/20

20

 

                          Administration Of Flu Vaccine                      Inj

[facility]                    

                                                Ju[facility] F[facility] 10/17/2019

 

                          Administration Of Flu Vaccine                      Inj

ection                    

                                                Juany Vincent FNP 10/17/2018

 

                          Administration Of Flu Vaccine                      Inj

[facility]                    

                                                Juany Vincent FNP 10/17/2017







Immunizations





             CPT Code     Status       Date         Vaccine      Lot #

 

                [id number]           Given           10/06/2021      Influenza Vaccin

e Quadrivalent Preser/Antibiotic Free Im 

Use                                     [id number]

 

                [id number]           Given           10/15/2020      Influenza Vaccin

e Quadrivalent Preser/Antibiotic Free Im 

Use                                     [id number]

 

                [id number]           Given           2020      Shingrix Zoster 

Vaccine (HZV), Recombinant, Subunit, 

Adjuvanted                               

 

                18647           Given           10/17/2019      Influenza Vaccin

e Quadrivalent Preser/Antibiotic Free Im 

Use                                     [id number]

 

                08864           Given           10/17/2018      Influenza Virus 

Vaccine, Quadrivalent (Cciiv4), Derived 

From Cell                               [id number]

 

             35265        Given        2018   Pneumovax 23 [id number]

 

                18172           Given           10/17/2017      Influenza Vaccin

e Quadrivalent Preser/Antibiotic Free Im 

Use                                     [id number]







Vital Signs





                Date            Vital           Result          Comment

 

                2021 11:38am BP Systolic     112 mmHg        RT Arm

 

                    BP Diastolic        70 mmHg             RT Arm

 

                    Heart Rate          68 /min              

 

                    Height              66 inches           5'6"

 

                    Weight              215.12 lb            

 

                    O2 % BldC Oximetry  96 %                 

 

                    BMI (Body Mass Index) 34.7 kg/m2           

 

                10/19/2021  3:12pm BP Systolic     122 mmHg         

 

                    BP Diastolic        76 mmHg              

 

                    Heart Rate          86 /min              

 

                    Height              66 inches           5'6"

 

                    Weight              223.00 lb            

 

                    BMI (Body Mass Index) 36.0 kg/m2           







Results





        Test    Acquired Date Facility Test    Result  H/L     Range   Note

 

                    Laboratory test finding 2021          [facility]

pia meza

: Dr Simon Hoyt

[place], [place] [id number] [phone] Magnesium  2.0 mg/dL             1.8 - 2.4   

 

                    Comprehensive Chem Profile 2021          [facility]

pia hopson

: Dr Siomn Hoyt

[place] [address] [phone] Glucose    113 mg/dL  High       74 - 99    1

 

             BUN          14 mg/dL                  7 - 18        

 

             Creatinine   1.0 mg/dL                 0.6 - 1.3     

 

             Sodium       145 mEq/L                 136 - 145     

 

             Potassium    4.1 mEq/L                 3.5 - 5.1     

 

             Chloride     105 mEq/L                 98 - 107      

 

             Carbon Dioxide 31 mEq/L                  21 - 32       

 

             Calcium      10.5 mg/dL   High         8.5 - 10.1   2

 

             Alk. Phosphatase 110 mg/dL                 46 - 116      

 

             Total Bilirubin 0.4 mg/dL                 0.2 - 1.0     

 

             Ast (Sgot)   22 U/L                    15 - 37       

 

             Alt (SGPT)   27 U/L                    12 - 78       

 

             Albumin      2.8 g/dL     Low          3.4 - 5.0    3

 

             Total Protein 8.3 g/dL     High         6.4 - 8.2     

 

             A/G Ratio    0.51 CALC    Low          1.00 - 1.90   

 

             GFR  55 mL/min    Low          >60           

 

             GFR African American >= 60 mL/min              >60          4

 

                    Complete Blood Count 2021          [facility]

s, pc

: Dr Simon Hoyt

[place], [place] [id number] [phone] WBC        5.6 x10*3/UL            4.1 - 10.9  

 

             RBC          4.36 x10*6/UL              4.20 - 6.30   

 

             Hemoglobin   14.0 g/dL                 12.0 - 18.0   

 

             Hematocrit   40.9 %                    37.0 - 51.0   

 

             MCV          93.9 fL                   80.0 - 97.0   

 

             MCH          32.0 pg                   26.0 - 32.0   

 

             MCHC         34.1 g/dL                 31.0 - 38.0   

 

             RDW          14.0 %       High         11.6 - 13.7   

 

             PLT          233 x10*3/UL              140 - 440     

 

             MPV          8.9 FL                    7.8 - 11.0    

 

             Lymph %      20.4 %                    10.0 - 58.5   

 

             Mid %        5.2 %                     1.7 - 9.3     

 

             Neut %       74.4 %                    37.0 - 92.0   

 

             Lymph #      1.1 x10*3/UL              0.6 - 4.1     

 

             Mid #        0.3 x10*3/UL              0.1 - 0.6     

 

             Neut #       4.2 x10*3/UL              2.0 - 7.8     

 

                    Laboratory test finding 10/25/2021          [facility]

                                        [address] [place] [address] [phone] NT-Pro BNP 5817 pg/mL High       <125       5

 

             Digoxin Level 0.5 NG/ML    Normal       0.5-2.0      6

 

             Thyroxine (T4) 10.1 g/dL  Normal       4.5-12.0     7

 

             Thyroid Stimulating Hormone 0.051 uIU/ML Low          0.358-3.740  

8

 

             Flecainide   <0.10 ug/mL  Low          0.20-1.00    9

 

                    Basic Metabolic Profile 10/25/2021          [facility]

                                        [address] [place], [place] [id number] [phone] Glucose, Fasting 106 mg/dL  High             

 

             Blood Urea Nitrogen 18 mg/dL     Normal       7-18          

 

             Creatinine For GFR 0.94 mg/dL   Normal       0.55-1.30     

 

             Glomerular Filtration Rate > 60.0       Normal       >45          1

0

 

             Sodium Level 140 mEq/L    Normal       136-145       

 

             Potassium Serum 4.6 mEq/L    Normal       3.5-5.1       

 

             Chloride Level 111 mEq/L    High                 

 

             Carbon Dioxide Level 22 mEq/L     Normal       21-32         

 

             Anion Gap    7 mEq/L      Low          8-16          

 

             Calcium Level 10.7 mg/dL   High         8.8-10.2      

 

                    Liver Profile       10/25/2021          [facility]

nter

                                        [address] [place] [address] [phone] Ast/Sgot   26 U/L     Normal     7-37        

 

             Alt/SGPT     28 U/L       Normal       12-78         

 

             Alkaline Phosphatase 105 U/L      Normal               

 

             Bilirubin,Total 0.8 mg/dL    Normal       0.2-1.0       

 

             Bilirubin,Direct 0.4 mg/dL    High         0.0-0.2       

 

             Total Protein 7.9 GM/DL    Normal       6.4-8.2       

 

             Albumin      2.8 GM/DL    Low          3.2-5.2       

 

             Albumin/Globulin Ratio 0.5          Low          1.2-2.2       

 

                    Cardiac Marker Panel 10/25/2021          [facility]

enter

                                        [address] [place] [address] [phone] CPK Creatine Phosphokinase 26 U/L     Normal     26-19

2      

 

             CK-MB Value Mass 1.0 NG/ML    Normal       <3.6          

 

             MB/CK Relative Index 3.85         Normal       < Or =4      11

 

             Troponin I   0.02 NG/ML   Normal       < 0.10       12

 

                    CBC With Differential 10/25/2021          [facility] [address] [place] [address] [phone] White Blood Count 6.3 10     Normal     4.0-10.0    

 

             Red Blood Count 4.12 10      Normal       4.00-5.40     

 

             Hemoglobin   12.9 g/dL    Normal       12.0-15.5     

 

             Hematocrit   41.3 %       Normal       36.0-47.0     

 

             Mean Corpuscular Volume 100.2 fl     High         80.0-96.0     

 

             Mean Corpuscular Hemoglobin 31.3 pg      Normal       27.0-33.0    

 

 

             Mean Corpuscular HGB Conc 31.2 g/dL    Low          32.0-36.5     

 

             Red Cell Distribution Width 14.8 %       High         11.5-14.5    

 

 

             Platelet Count, Automated 202 10       Normal       150-450       

 

             Neutrophils % 79.5 %       High         36.0-66.0     

 

             Lymph %      13.8 %       Low          24.0-44.0     

 

             Mono %       4.8 %        Normal       2.0-8.0       

 

             Eos %        1.1 %        Normal       0.0-3.0       

 

             Baso %       0.3 %        Normal       0.0-1.0       

 

             Immature Granulocyte % 0.5 %        Normal       0-3.0         

 

             Nucleated Red Blood Cell % 0.0 %        Normal       0-0           

 

             Neutrophils # 5.0 10       Normal       1.5-8.5       

 

             Lymph #      0.9 10       Low          1.5-5.0       

 

             Mono #       0.3 10       Normal       0.0-0.8       

 

             Eos #        0.1 10       Normal       0.0-0.5       

 

             Baso #       0.0 10       Normal       0.0-0.2       

 

                    Influenza A/B RSV Covid Amp 10/25/2021          [place] [id number] [phone] Influenza A Amplification NEGATIVE   Normal     Negati

ve   13

 

             Influenza B Amplification NEGATIVE     Normal       Negative     14

 

             RSV Amplification NEGATIVE     Normal       Negative     15

 

             Sars Covid-19 Amplification NEGATIVE     Normal       Negative     

16

 

                    CBC With Differential 2021          [address] [address] [phone] White Blood Count 6.8 10     Normal     4.0-10.0    

 

             Red Blood Count 3.85 10      Low          4.00-5.40     

 

             Hemoglobin   12.7 g/dL    Normal       12.0-15.5     

 

             Hematocrit   39.4 %       Normal       36.0-47.0     

 

             Mean Corpuscular Volume 102.3 fl     High         80.0-96.0     

 

             Mean Corpuscular Hemoglobin 33.0 pg      Normal       27.0-33.0    

 

 

             Mean Corpuscular HGB Conc 32.2 g/dL    Normal       32.0-36.5     

 

             Red Cell Distribution Width 12.3 %       Normal       11.5-14.5    

 

 

             Platelet Count, Automated 187 10       Normal       150-450       

 

             Neutrophils % 72.2 %       High         36.0-66.0     

 

             Lymph %      19.9 %       Low          24.0-44.0     

 

             Mono %       6.0 %        Normal       2.0-8.0       

 

             Eos %        1.5 %        Normal       0.0-3.0       

 

             Baso %       0.3 %        Normal       0.0-1.0       

 

             Immature Granulocyte % 0.1 %        Normal       0-3.0         

 

             Nucleated Red Blood Cell % 0.0 %        Normal       0-0           

 

             Neutrophils # 4.9 10       Normal       1.5-8.5       

 

             Lymph #      1.4 10       Low          1.5-5.0       

 

             Mono #       0.4 10       Normal       0.0-0.8       

 

             Eos #        0.1 10       Normal       0.0-0.5       

 

             Baso #       0.0 10       Normal       0.0-0.2       

 

                    PT & Aptt           2021          [facility]

nter

                                        [address] [place] [address] [phone] Prothrombin Time 13.8 seconds Normal     12.5-14.3   

 

             Inr          1.04         Normal                    17

 

             Partial Thromboplastin Time 30.6 seconds Normal       24.2-38.5    

 

 

                    CBC With Differential 2021          [address] [address] [phone] White Blood Count 6.2 10     Normal     4.0-10.0    

 

             Red Blood Count 3.76 10      Low          4.00-5.40     

 

             Hemoglobin   12.5 g/dL    Normal       12.0-15.5     

 

             Hematocrit   39.0 %       Normal       36.0-47.0     

 

             Mean Corpuscular Volume 103.7 fl     High         80.0-96.0     

 

             Mean Corpuscular Hemoglobin 33.2 pg      High         27.0-33.0    

 

 

             Mean Corpuscular HGB Conc 32.1 g/dL    Normal       32.0-36.5     

 

             Red Cell Distribution Width 12.9 %       Normal       11.5-14.5    

 

 

             Platelet Count, Automated 199 10       Normal       150-450       

 

             Neutrophils % 74.5 %       High         36.0-66.0     

 

             Lymph %      16.4 %       Low          24.0-44.0     

 

             Mono %       7.0 %        Normal       2.0-8.0       

 

             Eos %        1.1 %        Normal       0.0-3.0       

 

             Baso %       0.5 %        Normal       0.0-1.0       

 

             Immature Granulocyte % 0.5 %        Normal       0-3.0         

 

             Nucleated Red Blood Cell % 0.0 %        Normal       0-0           

 

             Neutrophils # 4.6 10       Normal       1.5-8.5       

 

             Lymph #      1.0 10       Low          1.5-5.0       

 

             Mono #       0.4 10       Normal       0.0-0.8       

 

             Eos #        0.1 10       Normal       0.0-0.5       

 

             Baso #       0.0 10       Normal       0.0-0.2       

 

                    Comprehensive Metabolic Profil 2021          [address] [address] [phone] Glucose, Fasting 98 mg/dL   Normal           

 

             Blood Urea Nitrogen 28 mg/dL     High         7-18          

 

             Creatinine For GFR 0.90 mg/dL   Normal       0.55-1.30     

 

             Glomerular Filtration Rate > 60.0       Normal       >45          1

8

 

             Sodium Level 137 mEq/L    Normal       136-145       

 

             Potassium Serum 4.7 mEq/L    Normal       3.5-5.1       

 

             Chloride Level 107 mEq/L    Normal               

 

             Carbon Dioxide Level 27 mEq/L     Normal       21-32         

 

             Anion Gap    3 mEq/L      Low          8-16          

 

             Calcium Level 10.2 mg/dL   Normal       8.8-10.2      

 

             Ast/Sgot     23 U/L       Normal       7-37          

 

             Alt/SGPT     19 U/L       Normal       12-78         

 

             Alkaline Phosphatase 77 U/L       Normal               

 

             Bilirubin,Total 0.5 mg/dL    Normal       0.2-1.0       

 

             Total Protein 8.3 GM/DL    High         6.4-8.2       

 

             Albumin      3.0 GM/DL    Low          3.2-5.2       

 

             Albumin/Globulin Ratio 0.6          Low          1.2-2.2       

 

                    Complete Blood Count 2021          [facility]

s, pc

: Dr Simon Hoyt

[place], [place] [id number] [phone] WBC        5.9 x10*3/UL            4.1 - 10.9  

 

             RBC          4.11 x10*6/UL Low          4.20 - 6.30   

 

             Hemoglobin   13.6 g/dL                 12.0 - 18.0   

 

             Hematocrit   40.0 %                    37.0 - 51.0   

 

             MCV          97.4 fL      High         80.0 - 97.0   

 

             MCH          33.1 pg      High         26.0 - 32.0   

 

             MCHC         34.0 g/dL                 31.0 - 38.0   

 

             RDW          13.2 %                    11.6 - 13.7   

 

             PLT          209 x10*3/UL              140 - 440     

 

             MPV          8.1 FL                    7.8 - 11.0    

 

             Lymph %      25.6 %                    10.0 - 58.5   

 

             Mid %        7.5 %                     1.7 - 9.3     

 

             Neut %       66.9 %                    37.0 - 92.0   

 

             Lymph #      1.5 x10*3/UL              0.6 - 4.1     

 

             Mid #        0.5 x10*3/UL              0.1 - 0.6     

 

             Neut #       3.9 x10*3/UL              2.0 - 7.8     

 

                    Laboratory test finding 2021          [facility]

[facility]

: Dr Simon Hoyt

[place], [place] [id number] [phone] Sed Rate   25 mm/hr   High       0 - 15      

 

                    Basic Metabolic Panel 2021          [facility]

[facility], pc

: Dr Simon Hoyt

[place] [address] [phone] Glucose    100 mg/dL  High       74 - 99    19

 

             BUN          18 mg/dL                  7 - 18        

 

             Creatinine   0.8 mg/dL                 0.6 - 1.3     

 

             Sodium       141 mEq/L                 136 - 145     

 

             Potassium    3.8 mEq/L                 3.5 - 5.1     

 

             Chloride     104 mEq/L                 98 - 107      

 

             Carbon Dioxide 29 mEq/L                  21 - 32       

 

             Calcium      10.8 mg/dL   High         8.5 - 10.1   20

 

             GFR  >= 60 mL/min              >60           

 

             GFR African American >= 60 mL/min              >60          21

 

                    Laboratory test finding 2021          [facility]

[facility]

: Dr Simon Hoyt

[place] [address] [phone] Thyroid Stimulating Hormone 0.19 uIU/mL Low        0.3

6 - 3.74  







                          1                         100-125 mg/dL     PRE-DIABET

ES/FASTING

>126 mg/dL          DIABETES/FASTING



 

                          2                         NOTE:

RESULT VERIFIED.







 

                          3                         NOTE:

RESULT VERIFIED.







 

                          4                         CHRONIC KIDNEY DISEASE STAGI

NG PER NKF



STAGE I & II      GFR >= 60        NORMAL TO MILDLY DECREASED

STAGE III          GFR 30-59          MODERATELY DECREASED

STAGE IV           GFR 15-29         SEVERELY DECREASED

STAGE V            GFR <15            VERY LITTLE GFR LEFT

ESRD                 GFR <15            ON RRT



 

                          5                         note:<nlbl:demographic_chang

ed>



 

                          6                         note:<nlbl:demographic_chang

ed>



 

                          7                         note:<nlbl:demographic_chang

ed>



 

                          8                         note:<nlbl:demographic_chang

ed>



 

                          9                         This test was developed and 

its performance characteristics

determined by [employer]. It has not been cleared or

approved by the Food and Drug Administration.

Detection Limit = 0.10

Performed at:  [address]  [id number]

61

: Barak Archibald MD, Phone:  [phone]



 

                          10                        Units are mL/min/1.73 m2



Chronic Kidney Disease Staging per NKF:



Stage I & II   GFR >=60       Normal to Mildly Decreased

Stage III      GFR 30-59      Moderately Decreased

Stage IV       GFR 15-29      Severely Decreased

Stage V        GFR <15        Very Little GFR Left

ESRD           GFR <15 on RRT



 

                          11                        DIAGNOSIS CRITERIA

MMB ng/ml       Relative Index (RI)

NON-AMI               < or = 5               N/A

GRAY ZONE              > 5                < or = 4

AMI                    > 5                   > 4



 

                          12                        Troponin I Reference Interva

l for [employer] LOCI:



                                        99th Percentile= 0.00-0.045 ng/ml



Risk Stratification:

<= 0.10 ng/ml   Decreased Risk for Adverse Clinical

Events.

                                        0.10-1.50 ng/ml   Increased Risk for Adv

erse Clinical

Events. Evaluation of additional

criterion and/or repeat testing in 2-6

hours is suggested to rule out myocardial

damage.

>= 1.50 ng/ml   Indicative of Myocardial Injury.



 

                          13                        Negative results do not prec

lude influenza or RSV virus

infection and should not be used as the sole basis for

treatment or other patient management decisions.



 

                          14                        Negative results do not prec

lude influenza or RSV virus

infection and should not be used as the sole basis for

treatment or other patient management decisions.



 

                          15                        Negative results do not prec

lude influenza or RSV virus

infection and should not be used as the sole basis for

treatment or other patient management decisions.



 

                          16                        A false negative result may 

occur if a specimen is

improperly collected, transported or handled. False negative

results may also occur if inadequate numbers of organisms

are present in the specimen.  As with any molecular test,

mutations within the target regions of [employer]

SARS-CoV-2 could affect primer and/or probe binding

resulting in failure to detect the presence of virus.  This

test cannot rule out diseases caused by other bacterial or

viral pathogens.



DISCLAIMER:

Testing was performed using the [employer] SARS-CoV-2 test.

This test was developed and its performance characteristics

determined by [employer]. This test has not been FDA cleared or

approved. This test has been authorized by FDA under an

Emergency Use Authorization (EUA). This test is only

authorized for the duration of time the declaration that

circumstances exist justifying the authorization of the

emergency use of in vitro diagnostic tests for detection of

SARS-CoV-2 virus and/or diagnosis of COVID-19 infection

under section 564(b)(1) of the Act, 21 U.S.C.

                                        360bbb-3(b)(1), unless the authorization

 is terminated or

revoked sooner.



 

                          17                        THERAPUTIC HUMAN INR VALUES

INDICATIONS                      NORMAL RANGES

PROPHYLAXIS/TREATMENT OF:

VENOUS THROMBOSIS                2.0-3.0

PULMONARY EMBOLISM               2.0-3.0

PREVENTION OF SYSTEMIC EMBOLISM FROM:

TISSUE HEART VALVES              2.0-3.0

ACUTE MYOCARDIAL INFARCTION      2.0-3.0

VALVULAR HEART DISEASE           2.0-3.0

ATRIAL FIBRILLATION              2.0-3.0

MECHANICAL VALVES(HIGH RISK)     2.5-3.5

RECURRENT MYOCARDIAL INFARCTION  2.5-3.5



 

                          18                        Units are mL/min/1.73 m2



Chronic Kidney Disease Staging per NKF:



Stage I & II   GFR >=60       Normal to Mildly Decreased

Stage III      GFR 30-59      Moderately Decreased

Stage IV       GFR 15-29      Severely Decreased

Stage V        GFR <15        Very Little GFR Left

ESRD           GFR <15 on RRT



 

                          19                        100-125 mg/dL     PRE-DIABET

ES/FASTING

>126 mg/dL          DIABETES/FASTING



 

                          20                        NOTE:

CALCIUM,TSH VERIFIED







 

                          21                        CHRONIC KIDNEY DISEASE STAGI

NG PER NKF



STAGE I & II      GFR >= 60        NORMAL TO MILDLY DECREASED

STAGE III          GFR 30-59          MODERATELY DECREASED

STAGE IV           GFR 15-29         SEVERELY DECREASED

STAGE V            GFR <15            VERY LITTLE GFR LEFT

ESRD                 GFR <15            ON RRT









Procedures





                Date            Code            Description     Status

 

                10/06/2021      46041           Office/Outpatient Established Lo

w MDM 20-29 Min Completed

 

                2021      34680           Complex Chronic Care MGMT Servic

e Ea Addl 30 Min Completed

 

                2021      08171           Complex Chronic Care Management 

SVC 1St 60 Min Completed

 

                    2021          93774               Chronic Care MGMT 20

 Mins Clinical Staff Time Per Calendar 

Month                                   Completed

 

                2021      23285           Chronic Care Management Services

 Ea Addl 20 Min Completed

 

                2021      06910           Complex Chronic Care MGMT Servic

e Ea Addl 30 Min Completed

 

                2021      76399           Complex Chronic Care Management 

SVC 1St 60 Min Completed

 

                2021      20555           Complex Chronic Care MGMT Servic

e Ea Addl 30 Min Completed

 

                2021      91195           Complex Chronic Care Management 

SVC 1St 60 Min Completed

 

                2021      86855           Office/Outpatient Established Mo

d MDM 30-39 Min Completed

 

                2021      70664           Complex Chronic Care MGMT Servic

e Ea Addl 30 Min Completed

 

                2021      39960           Complex Chronic Care Management 

SVC 1St 60 Min Completed

 

                2021      [id number]       Bone Mineral Density Test Comple

veronika

 

                2021      [id number]        Mammogram       Completed







Medical Devices





                                        Description

 

                                        No Information Available







Encounters





           Type       Date       Location   Provider   Dx         Diagnosis

 

                Office Visit    10/06/2021  2:20p [facility] PAIXA Nicholson JR                        S81.802A                  Unspecified open wound, left

 lower leg, initial encounter

 

                          Z23                       Encounter for immunization

 

                Office Visit    2021  1:30p [facility] PJONH Regalado M.D.                      I48.20                    Chronic atrial fibrillation,

 unspecified

 

                          Z79.01                    Long term (current) use of a

nticoagulants

 

                          R60.9                     Edema, unspecified

 

                          I10                       Essential (primary) hyperten

darci

 

                          E05.90                    Thyrotoxicosis, unsp without

 thyrotoxic crisis or storm

 

                          I82.502                   Chronic embolism and thombos

 unsp deep veins of l low extrem

 

                          F17.210                   Nicotine dependence, cigaret

svitlana, uncomplicated

 

                          F32.89                    Other specified depressive e

pisodes

 

                          D47.2                     Monoclonal gammopathy

 

                          K21.9                     Gastro-esophageal reflux dis

ease without esophagitis

 

                          M19.90                    Unspecified osteoarthritis, 

unspecified site

 

                          E78.00                    Pure hypercholesterolemia, u

nspecified







Assessments





                Date            Code            Description     Provider

 

                2021      I48.20          Chronic atrial fibrillation, uns

pecified Charmaine Regalado M.D.

 

                2021      Z79.01          Long term (current) use of antic

oagulants Charmaine Regalado M.D.

 

                2021      I11.0           Hypertensive heart disease with 

heart failure Charmaine Regalado M.D.

 

                    2021          E05.00              Thyrotoxicosis with 

diffuse goiter without thyrotoxic crisis 

or storm                                Charmaine Regalado M.D.

 

                    2021          I82.502             Chronic embolism and

 thrombosis of unspecified deep veins of 

left lower extremity                    Charmaine Regalado M.D.

 

                2021      F32.89          Other specified depressive episo

emil Charmaine Regalado M.D.

 

                2021      D47.2           Monoclonal gammopathy Charmaine duron M.D.

 

                2021      K21.9           Gastro-esophageal reflux disease

 without esophagitis Charmaine Regalado M.D.

 

                2021      S81.802A        Unspecified open wound, left low

er leg, initial encounter 

Charmaine Regalado M.D.

 

                2021      G31.84          Mild cognitive impairment, so st

ated Charmaine Regalado M.D.

 

                10/06/2021      S81.802A        Unspecified open wound, left low

er leg, initial encounter 

AIXA Hoffmann JR

 

                10/06/2021      Z23             Encounter for immunization [place]

AIXA Estrella JR

 

                2021      I10             Essential (primary) hypertension

 Charmaine Regalado M.D.

 

                2021      K21.9           Gastro-esophageal reflux disease

 without esophagitis Charmaine Regalado M.D.

 

                2021      M15.9           Polyosteoarthritis, unspecified 

Charmaine Regalado M.D.

 

                2021      I10             Essential (primary) hypertension

 Charmaine Regalado M.D.

 

                2021      K21.9           Gastro-esophageal reflux disease

 without esophagitis Charmaine Regalado M.D.

 

                2021      E78.00          Pure hypercholesterolemia, unspe

cified Charmaine Regalado M.D.

 

                2021      I10             Essential (primary) hypertension

 Charmaine Regalado M.D.

 

                2021      K21.9           Gastro-esophageal reflux disease

 without esophagitis Charmaine Regalado M.D.

 

                2021      E78.00          Pure hypercholesterolemia, unspe

cified Charmaine Regalado M.D.

 

                2021      M15.9           Polyosteoarthritis, unspecified 

Charmaine Regalado M.D.

 

                2021      I10             Essential (primary) hypertension

 Charmaine Regalado M.D.

 

                2021      K21.9           Gastro-esophageal reflux disease

 without esophagitis Charmaine Regalado M.D.

 

                    2021          I82.502             Chronic embolism and

 thrombosis of unspecified deep veins of 

left lower extremity                    Charmaine Regalado M.D.

 

                2021      I48.20          Chronic atrial fibrillation, uns

pecified Charmaine Regalado M.D.

 

                2021      Z79.01          Long term (current) use of antic

oagulants Charmaine Regalado M.D.

 

                2021      R60.9           Edema, unspecified Charmaine guido M.D.

 

                2021      I10             Essential (primary) hypertension

 Charmaine Regalado M.D.

 

                2021      E05.90          Thyrotoxicosis, unspecified with

out thyrotoxic crisis or sto 

Charmaine Regalado M.D.

 

                    2021          I82.502             Chronic embolism and

 thrombosis of unspecified deep veins of 

left lower extremity                    Charmaine Regalado M.D.

 

                2021      F17.210         Nicotine dependence, cigarettes,

 uncomplicated Charmaine Regalado M.D.

 

                2021      F32.89          Other specified depressive episo

emil Charmaine Regalado M.D.

 

                2021      D47.2           Monoclonal gammopathy Charmaine duron M.D.

 

                2021      K21.9           Gastro-esophageal reflux disease

 without esophagitis Charmaine Regalado M.D.

 

                2021      M19.90          Unspecified osteoarthritis, unsp

ecified site Charmaine Regalado M.D.

 

                2021      E78.00          Pure hypercholesterolemia, unspe

cified Charmaine Regalado M.D.

 

                2021      I10             Essential (primary) hypertension

 Charmaine Regalado M.D.

 

                2021      K21.9           Gastro-esophageal reflux disease

 without esophagitis Charmaine Regalado M.D.

 

                2021      E78.00          Pure hypercholesterolemia, unspe

cified Charmaine Regalado M.D.







Plan of Treatment

Future Appointment(s):* 2021  3:15 pm - Charmaine Regalado M.D. at 
  [facility]





Functional Status





                                        Description

 

                                        No Information Available







Mental Status





                                        Description

 

                                        No Information Available







Referrals





                Refer to Dr     Reason for Referral Status          Appt Date

 

                          Stillerman,James MD       STAT CONSULT FOR OPEN WOUND 

LT LEG PLEASE LET OFFICE KNOW 

WHEN APPT IS MADE         Created                   

 

                                        [facility]

 

                                        [address]

 

                                        [place], [place]  [id number] [phone]

 

                                          

 

                Rock Werner MD CONSULT FOR PVD WITH OPEN WOUND LT LEG  Patient

 Declined 

10/20/2021

 

                                        [facility]

 

                                        [address] ,[address]

 

                                        [facility] [id number] [phone]

## 2021-12-02 NOTE — CCD
Summarization of Episode Note

                             Created on: 10/22/2021



Miss. JOSEPH BENTLEY

External Reference #: [id number]

: 1955

Sex: Female



Demographics





                          Address                   [address]

[place]  [address]

 

                          Home Phone                [phone]

 

                          Preferred Language        Unknown

 

                          Marital Status            Unknown

 

                          Amish Affiliation     Unknown

 

                          Race                      White

 

                          Ethnic Group              Not  or 





Author





                          Author                    [facility]

ems

 

                          Organization              [facility]

ems

 

                          Address                   Unknown

 

                          Phone                     Unavailable







Support





                Name            Relationship    Address         Phone

 

                    JOSEPH BENTLEY      GUAR                [address]

[place]  [address]                    [phone]

 

                    Abbcess, Ketty      ECON                [address]

[place]  [address]                    [phone]







Care Team Providers





                    Care Team Member Name Role                Phone

 

                    Nainyanni  Jermaine  Unavailable         [phone]







PROBLEMS





          Type      Condition ICD9-CM Code OHW82-WL Code Onset Dates Condition S

tatus W/U 

Status              Risk                SNOMED Code         Notes

 

                          Problem                   Non-pressure chronic ulcer o

f other part of left lower leg with fat 

layer exposed         L97.822         Active  confirmed         [id number]  

 

                          Problem                   Chronic venous hypertension 

(idiopathic) with ulcer of left lower 

extremity         I87.312         Active  confirmed         [id number]  







ALLERGIES





                    Allergen (clinical drug ingredient) Drug/Non Drug Allergy do

cumented on EMR 

Reaction            Allergy Type        Onset Date          Status

 

                      Motrin     Dyspnea    Drug Allergy            Active







ENCOUNTERS from 1955 to 2021-10-21





             Encounter    Location     Date         Provider     Diagnosis

 

                    [facility]     [address] [facility] [phone] [place] [address] 21 Oct, 

2021                      James Stillerman           







IMMUNIZATIONS

No Information



SOCIAL HISTORY

Tobacco Use:



                    Social History Observation Description         Date

 

                    Details (start date - stop date) Current Smoker       



Sex Assigned At Birth:



                          Social History Observation Description

 

                          Sex Assigned At Birth     Unknown



Education:



                    Question            Answer              Notes

 

                    Level of Education: Finished High School  



Religious:



                    Question            Answer              Notes

 

                    Religious                                Rastafarian



Sexual Hx:



                    Question            Answer              Notes

 

                    Had sex in the last 12 months (vaginal, oral, or anal)? No  

                 

 

                    LMP:                                  

 

                    Have you ever had an STD? No                   



Tobacco Use:



                    Question            Answer              Notes

 

                    Are you a:          current smoker       

 

                    How many cigarettes a day do you smoke?                     

2-3 CIGARETTES/DAY







REASON FOR REFERRAL

No Information



VITAL SIGNS

No information



MEDICATIONS





           Medication SIG (Take, Route, Frequency, Duration) Notes      Start Da

te End Date   

Status

 

                    Metoprolol Tartrate 100 MG 1 tablet with food Orally Twice a

 day for 30 day(s)  

                                                            Active

 

           Cephalexin 500 MG 1 tablet Orally Four times a day for 5 day(s)      

                            Active

 

           Eliquis 5 MG as directed Orally                                  Acti

ve







PROCEDURES

No Information



RESULTS

No Results



REASON FOR VISIT

tachycardia



MEDICAL (GENERAL) HISTORY





                    Type                Description         Date

 

                    Medical History     ATRIAL FIBRILLATION  

 

                    Medical History     DVT IN LEFT LEG      

 

                    Medical History     x3 PULMONARY EMBOLISM, MOST RECENT  

 

 

                    Medical History     HYPERTENSION         

 

                    Surgical History    GALLBLADDER REMOVAL  

 

                    Surgical History    STRIPPING LIGATION IN BILATERAL LEGS  

 

                    Surgical History    TONSILLECTOMY        

 

                    Surgical History    RIGHT SHOULDER SURGERY  

 

                    Hospitalization History HOSPITALIZATION AFTER SHOULDER SURGE

RY  







Goals Section

No Information



Health Concerns

No Information



MEDICAL EQUIPMENT

No Information



MENTAL STATUS

No Information



FUNCTIONAL STATUS

No Information



ASSESSMENTS

No Information



PLAN OF TREATMENT

Next Appt



                                        Details

 

                                        Provider Name:Roya Bonds, 2021-

10-28 09:45:00 AM, [address], 

[phone], [place], [address], [phone]







Insurance Providers





             Payer Name   Payer Address Payer Phone  Insured Name Patient Relati

onship to 

Insured                   Coverage Start Date       Coverage End Date

 

                 [employer] [address]  [facility] [id number] [phone]

    JOSEPH BENTLEY              self                                     

 

                MEDICARE Part A and B [address]  [facility] [id number] 

[phone]    

JOSEPH BETNLEY self

## 2021-12-02 NOTE — CCD
Summarization of Episode Note

                             Created on: 2021



Miss. JOSEPH BENTLEY

External Reference #: [id number]

: 1955

Sex: Female



Demographics





                          Address                   [address]

[place]  [address]

 

                          Home Phone                [phone]

 

                          Preferred Language        Unknown

 

                          Marital Status            Unknown

 

                          Anabaptist Affiliation     Unknown

 

                          Race                      White

 

                          Ethnic Group              Not  or 





Author





                          Author                    [facility]

ems

 

                          Organization              [facility]

ems

 

                          Address                   Unknown

 

                          Phone                     Unavailable







Support





                Name            Relationship    Address         Phone

 

                    JOSEPH BENTLEY      GUAR                [address]

[place]  [address]                    [phone]

 

                    Abbcess, Ketty      ECON                [address]

[place]  [address]                    [phone]







Care Team Providers





                    Care Team Member Name Role                Phone

 

                    CammieRosa Maria curryRoya Unavailable         [phone]







PROBLEMS





          Type      Condition ICD9-CM Code ILC50-DV Code Onset Dates Condition S

tatus W/U 

Status              Risk                SNOMED Code         Notes

 

                          Problem                   Non-pressure chronic ulcer o

f other part of left lower leg with fat 

layer exposed         L97.822         Active  confirmed         [id number]  

 

                          Problem                   Chronic venous hypertension 

(idiopathic) with ulcer of left lower 

extremity         I87.312         Active  confirmed         [id number]  







ALLERGIES





                    Allergen (clinical drug ingredient) Drug/Non Drug Allergy do

cumented on EMR 

Reaction            Allergy Type        Onset Date          Status

 

                      Motrin     Dyspnea    Drug Allergy            Active







ENCOUNTERS from 1955 to 2021





             Encounter    Location     Date         Provider     Diagnosis

 

                    [facility]     [address] [phone] [place] [address]                       Roya Bonds          







IMMUNIZATIONS

No Information



SOCIAL HISTORY

Tobacco Use:



                    Social History Observation Description         Date

 

                    Details (start date - stop date) Current Smoker       



Sex Assigned At Birth:



                          Social History Observation Description

 

                          Sex Assigned At Birth     Unknown



Education:



                    Question            Answer              Notes

 

                    Level of Education: Finished High School  



Faith:



                    Question            Answer              Notes

 

                    Faith                                [employer]



Sexual Hx:



                    Question            Answer              Notes

 

                    Had sex in the last 12 months (vaginal, oral, or anal)? No  

                 

 

                    LMP:                                  

 

                    Have you ever had an STD? No                   



Tobacco Use:



                    Question            Answer              Notes

 

                    Are you a:          current smoker       

 

                    How many cigarettes a day do you smoke?                     

2-3 CIGARETTES/DAY







REASON FOR REFERRAL

No Information



VITAL SIGNS

No information



MEDICATIONS





           Medication SIG (Take, Route, Frequency, Duration) Notes      Start Da

te End Date   

Status

 

                    Metoprolol Tartrate 100 MG 1 tablet with food Orally Twice a

 day for 30 day(s)  

                                                            Active

 

           Eliquis 5 MG as directed Orally                                  Acti

ve

 

           Cephalexin 500 MG 1 tablet Orally Four times a day for 5 day(s)      

                            Active







PROCEDURES

No Information



RESULTS

No Results



REASON FOR VISIT

Confirmation [facility] Apt 



MEDICAL (GENERAL) HISTORY





                    Type                Description         Date

 

                    Medical History     ATRIAL FIBRILLATION  

 

                    Medical History     DVT IN LEFT LEG      

 

                    Medical History     x3 PULMONARY EMBOLISM, MOST RECENT  

 

 

                    Medical History     HYPERTENSION         

 

                    Surgical History    GALLBLADDER REMOVAL  

 

                    Surgical History    STRIPPING LIGATION IN BILATERAL LEGS  

 

                    Surgical History    TONSILLECTOMY        

 

                    Surgical History    RIGHT SHOULDER SURGERY  

 

                    Hospitalization History HOSPITALIZATION AFTER SHOULDER SURGE

RY  







Goals Section

No Information



Health Concerns

No Information



MEDICAL EQUIPMENT

No Information



MENTAL STATUS

No Information



FUNCTIONAL STATUS

No Information



ASSESSMENTS

No Information



PLAN OF TREATMENT

Next Appt



                                        Details

 

                                        Provider Name:James Stillerman,  02:30:00 PM, [address], 

[phone], [place], [address], [phone]







Insurance Providers





             Payer Name   Payer Address Payer Phone  Insured Name Patient Relati

onship to 

Insured                   Coverage Start Date       Coverage End Date

 

                MEDICARE Part A and B [address]  [facility] [id number]

[phone]    

JOSEPH BENTLEY self                                     

 

                 [employer] [address]  [facility] [address] [phone]

    JOSEPH BENTLEY              self

## 2021-12-02 NOTE — REPVR
PROCEDURE INFORMATION: 

Exam: CTA Chest With Contrast 

Exam date and time: 12/2/2021 7:00 PM 

Age: 66 years old 

Clinical indication: Pain; Chest pressure; Additional info: Rule out pe 



TECHNIQUE: 

Imaging protocol: Computed tomographic angiography of the chest with contrast. 

3D rendering (Not supervised by radiologist): MIP and/or 3D reconstructed 

images were created by the technologist. 

Radiation optimization: All CT scans at this facility use at least one of these 

dose optimization techniques: automated exposure control; mA and/or kV 

adjustment per patient size (includes targeted exams where dose is matched to 

clinical indication); or iterative reconstruction. 

Contrast material: ISOVUE 370; Contrast volume: 75 ml; Contrast route: 

INTRAVENOUS (IV);  



COMPARISON: 

No relevant prior studies available. 



FINDINGS: 

Pulmonary arteries: There is opacification of the pulmonary arteries with no 

evidence of pulmonary embolus. 

Aorta: Unremarkable. No aortic aneurysm. No aortic dissection. 



Thyroid: There is severe diffuse enlargement of the thyroid probably a multi 

lead nodular goiter. 

Lungs: Discoid atelectasis is noted in the left suprahilar region. 

Pleural spaces: There is no evidence of pneumothorax or pleural effusion. 

Heart: There is moderate cardiomegaly. 

Lymph nodes: There are several small and moderate-sized lymph nodes in the 

mediastinum. Moderate-sized lymph nodes are noted at the right hilum. 



Bones/joints: There is prominent anterior osteophyte formation of the thoracic 

spine. 

Soft tissues: Unremarkable. 



IMPRESSION: 

1. No evidence of pulmonary embolus. 

2. Moderate size mediastinal lymph nodes and right hilar lymph nodes. 

3. Very large goiter. 



Electronically signed by: Que Waddell On 12/02/2021  20:31:51 PM

## 2021-12-02 NOTE — CCD
Continuity of Care Document (CCD)

                             Created on: 2021



Juany Becerril

External Reference #: [id number]

: 1955

Sex: Female



Demographics





                          Address                   [address] [facility]

[place]  [address]

 

                          Home Phone                [phone]

 

                          Preferred Language        Unknown

 

                          Marital Status            Unknown

 

                          Restoration Affiliation     Unknown

 

                          Race                      White

 

                          Ethnic Group              Not  or 





Author





                          Author                    Juany Regalado M.D.

 

                          Organization              Unknown

 

                          Address                   [address] [facility] [address]

[place]  [address]



 

                          Phone                     [phone]







Care Team Providers





                    Care Team Member Name Role                Phone

 

                    Yuri Maier MD       AUTM                [phone]

 

                    [facility]  AUTM                [phone]

 

                    John Lutz MD AUTM                Unavailable

 

                    [facility] Hematology/Oncology AUTM                [phone]

 

                    Hasna Hernandez FNP  AUTM                [phone]







Problems





                    Active Problems     Provider            Date

 

                    Chronic atrial fibrillation Protime             Onset: 

 

                    Essential hypertension Juany Vincent FNP Onset: 2017

 

                    Deep venous thrombosis of lower extremity Juany Vincent FN P Onset: 2017

 

                    Pure hypercholesterolemia Juany Vincent FNP Onset: 

017

 

                    Anxiety state       Juany Vincent FNP Onset: 2017

 

                    Chronic pulmonary embolism Juany Vincent FNP Onset: 2017

 

                    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 

2017

 

                    Gastroesophageal reflux disease Juany Vincent FNP Onset: 2017

 

                    Scoliosis deformity of spine Juany Vincent FNP Onset: 2017

 

                    Chronic tension-type headache Juany Vincent FNP Onset: 2017

 

                    Varicose veins of lower extremity Juany Vincent FNP Onset:

 2017

 

                    Tobacco user        Juany Vincent FNP Onset: 2017

 

                    Hypercalcemia       Juany Vincent FNP Onset: 2017







Social History





                Type            Date            Description     Comments

 

                Birth Sex                       Unknown          

 

                ETOH Use                        Consumes 3 glasses of wine per w

[place]  

 

                Tobacco Use     Start: Unknown  Patient is a current smoker, smo

kes every day STARTED

AGE 30 1-3 CIGARETTES A DAY 







Allergies and adverse reactions





             Active Allergies Criticality  Reaction | Severity Comments     Date

 

             Ibuprofen    Unable to assess criticality STOMACH UPSET HEADACHE mo

david       10/17/2017

 

             Latex        Unable to assess criticality rash, itches | Mild      

        10/17/2018







Medications





           Active Medications SIG        Qnty       Indications Ordering Provide

r Date

 

                          Claritin                     10mg Tablets             

      1 by mouth every 

night at bedtime seasonally                                 ABELARDO Saleem 2021

 

                          Fluoxetine HCL                     20mg Capsules      

             1 by mouth 

every day       90caps                          Charmaine Regalado M.D. 20

21

 

                          Excedrin Migraine                     [phone] Tab

lets                   1 

as needed h/a as needed mdd=1                                 Charmaine Regalado M.D. 2021

 

                          Famotidine                     20mg Tablets           

        1 by mouth every 

day             90tabs                          Charmaine Regalado M.D. 20

21

 

                    Furosemide                     20mg Tablets                 

  1 by mouth qd       

180tabs                                 Charmaine Regalado M.D. 2021

 

                                        Bupropion Hydrochloride ER (XL)         

            150mg Tablets ER 24HR       

                take 1 tablet by mouth in the morning 90tabs                    

      Charmaine Regalado M.D.                                    2021

 

                          Eliquis                     5mg Tablets               

    take one tablet by 

mouth twice a day 180tabs                         Charmaine Regalado M.D. 2020

 

                          Methimazole                     5mg Tablets           

        2 every day by 

mouth           180tabs                         Charmaine Regalado M.D. 20

20

 

                          Shingrix                     50mcg/0.5ML Suspension Re

c                   

administer at pharmacy 1units                          Juany Vincent FNP 

 

                          Voltaren                     1% Gel                   

apply up to 4 grams three 

times a day to affected knee as needed 100gm                           Charmaine Regalado M.D. 

2018

 

                          Aspirin Adult Low Dose                     81mg Tablet

s DR                   1 

by mouth every day                                 Juany Vincent FNP 10/17/201

7

 

                                        Acetaminophen Extra Strength            

         500mg Tablets                  

             take 2 tabs every 6 hours as needed                           Juany Fung FNP 10/17/2017

 

                                        Womens 50+ Multi Vitamin & Mineral Formu

la                      Tablets         

             1 every day PO Vit N=[id number] JB=119                           Juany Vincent FNP 10/17/2017

 

                          Metoprolol Tartrate                     25mg Tablets  

                 Take One 

Tablet By Mouth Twice A Day 180tabs                         ABELARDO Saleem 10/17/2017

 

                          Atorvastatin Calcium                     20mg Tablets 

                  take one

tablet by mouth every day 90tabs                          JANE Saleem 10/17/2017

 

                          Flecainide Acetate                     100mg Tablets  

                 take one 

tablet by mouth twice a day 180tabs                         ABELARDO Saleem 10/17/2017

 

                          Digoxin                     250mcg Tablets            

       1 by mouth every 

day             90tabs                          Charmaine Regalado M.D. 

 

                                        History Medications

 

                          Cephalexin                     500mg Capsules         

          take one tablet 

by mouth 3 times daily x 7 days 21caps                          AIXA Laurent JR 10/06/2021 - 

2021







Medications Administered in Office





           Medication SIG        Qnty       Indications Ordering Provider Date

 

                          Administration Of Flu Vaccine                      Inj

ection                    

                                                AIXA Hoffmann JR 10/06/2

021

 

                          Administration Of Flu Vaccine                      Inj

diyaion                    

                                                Charmaine Regalado M.D. 10/15/20

20

 

                          Administration Of Flu Vaccine                      Inj

[facility]                    

                                                Juany Vincent FNP 10/17/2019

 

                          Administration Of Flu Vaccine                      Inj

ection                    

                                                Juany Vincent FNP 10/17/2018

 

                          Administration Of Flu Vaccine                      Inj

[facility]                    

                                                Juany Vincent FNP 10/17/2017







Immunizations





             CPT Code     Status       Date         Vaccine      Lot #

 

                [id number]           Given           10/06/2021      Influenza Vaccin

e Quadrivalent Preser/Antibiotic Free Im 

Use                                     094640

 

                [id number]           Given           10/15/2020      Influenza Vaccin

e Quadrivalent Preser/Antibiotic Free Im 

Use                                     062789

 

                [id number]           Given           2020      Shingrix Zoster 

Vaccine (HZV), Recombinant, Subunit, 

Adjuvanted                               

 

                91022           Given           10/17/2019      Influenza Vaccin

e Quadrivalent Preser/Antibiotic Free Im 

Use                                     [id number]

 

                77033           Given           10/17/2018      Influenza Virus 

Vaccine, Quadrivalent (Cciiv4), Derived 

From Cell                               704668

 

             27209        Given        2018   Pneumovax 23 [id number]

 

                05188           Given           10/17/2017      Influenza Vaccin

e Quadrivalent Preser/Antibiotic Free Im 

Use                                     [id number]







Vital Signs





                Date            Vital           Result          Comment

 

                2021 11:38am BP Systolic     112 mmHg        RT Arm

 

                    BP Diastolic        70 mmHg             RT Arm

 

                    Heart Rate          68 /min              

 

                    Height              66 inches           5'6"

 

                    Weight              215.12 lb            

 

                    O2 % BldC Oximetry  96 %                 

 

                    BMI (Body Mass Index) 34.7 kg/m2           

 

                10/19/2021  3:12pm BP Systolic     122 mmHg         

 

                    BP Diastolic        76 mmHg              

 

                    Heart Rate          86 /min              

 

                    Height              66 inches           5'6"

 

                    Weight              223.00 lb            

 

                    BMI (Body Mass Index) 36.0 kg/m2           







Results





        Test    Acquired Date Facility Test    Result  H/L     Range   Note

 

                    Laboratory test finding 2021          [facility]

pia meza

: Dr Simon Hoyt

[place], [place] [id number] [phone] Magnesium, Serum <pending>                         

 

                    Laboratory test finding 10/25/2021          [facility]

                                        [address] [place] [address], [place] [address] [phone] NT-Pro BNP 5817 pg/mL High       <125       1

 

             Digoxin Level 0.5 NG/ML    Normal       0.5-2.0      2

 

             Thyroxine (T4) 10.1 g/dL  Normal       4.5-12.0     3

 

             Thyroid Stimulating Hormone 0.051 uIU/ML Low          0.358-3.740  

4

 

             Flecainide   <0.10 ug/mL  Low          0.20-1.00    5

 

                    Basic Metabolic Profile 10/25/2021          [facility]

                                        [address] [place] [address]

           [phone] Glucose, Fasting 106 mg/dL  High             

 

             Blood Urea Nitrogen 18 mg/dL     Normal       7-18          

 

             Creatinine For GFR 0.94 mg/dL   Normal       0.55-1.30     

 

             Glomerular Filtration Rate > 60.0       Normal       >45          6

 

             Sodium Level 140 mEq/L    Normal       136-145       

 

             Potassium Serum 4.6 mEq/L    Normal       3.5-5.1       

 

             Chloride Level 111 mEq/L    High                 

 

             Carbon Dioxide Level 22 mEq/L     Normal       21-32         

 

             Anion Gap    7 mEq/L      Low          8-16          

 

             Calcium Level 10.7 mg/dL   High         8.8-10.2      

 

                    Liver Profile       10/25/2021          [facility]

nter

                                        [address] [place] [address] [phone] Ast/Sgot   26 U/L     Normal     7-37        

 

             Alt/SGPT     28 U/L       Normal       12-78         

 

             Alkaline Phosphatase 105 U/L      Normal               

 

             Bilirubin,Total 0.8 mg/dL    Normal       0.2-1.0       

 

             Bilirubin,Direct 0.4 mg/dL    High         0.0-0.2       

 

             Total Protein 7.9 GM/DL    Normal       6.4-8.2       

 

             Albumin      2.8 GM/DL    Low          3.2-5.2       

 

             Albumin/Globulin Ratio 0.5          Low          1.2-2.2       

 

                    Cardiac Marker Panel 10/25/2021          [facility]

enter

                                        [address] [place] [address] [phone] CPK Creatine Phosphokinase 26 U/L     Normal     26-19

2      

 

             CK-MB Value Mass 1.0 NG/ML    Normal       <3.6          

 

             MB/CK Relative Index 3.85         Normal       < Or =4      7

 

             Troponin I   0.02 NG/ML   Normal       < 0.10       8

 

                    CBC With Differential 10/25/2021          [facility]

                                        [address] [place] [address] [phone] White Blood Count 6.3 10     Normal     4.0-10.0    

 

             Red Blood Count 4.12 10      Normal       4.00-5.40     

 

             Hemoglobin   12.9 g/dL    Normal       12.0-15.5     

 

             Hematocrit   41.3 %       Normal       36.0-47.0     

 

             Mean Corpuscular Volume 100.2 fl     High         80.0-96.0     

 

             Mean Corpuscular Hemoglobin 31.3 pg      Normal       27.0-33.0    

 

 

             Mean Corpuscular HGB Conc 31.2 g/dL    Low          32.0-36.5     

 

             Red Cell Distribution Width 14.8 %       High         11.5-14.5    

 

 

             Platelet Count, Automated 202 10       Normal       150-450       

 

             Neutrophils % 79.5 %       High         36.0-66.0     

 

             Lymph %      13.8 %       Low          24.0-44.0     

 

             Mono %       4.8 %        Normal       2.0-8.0       

 

             Eos %        1.1 %        Normal       0.0-3.0       

 

             Baso %       0.3 %        Normal       0.0-1.0       

 

             Immature Granulocyte % 0.5 %        Normal       0-3.0         

 

             Nucleated Red Blood Cell % 0.0 %        Normal       0-0           

 

             Neutrophils # 5.0 10       Normal       1.5-8.5       

 

             Lymph #      0.9 10       Low          1.5-5.0       

 

             Mono #       0.3 10       Normal       0.0-0.8       

 

             Eos #        0.1 10       Normal       0.0-0.5       

 

             Baso #       0.0 10       Normal       0.0-0.2       

 

                    Influenza A/B RSV Covid Amp 10/25/2021          [address] [address] [phone] Influenza A Amplification NEGATIVE   Normal     Negati

ve   9

 

             Influenza B Amplification NEGATIVE     Normal       Negative     10

 

             RSV Amplification NEGATIVE     Normal       Negative     11

 

             Sars Covid-19 Amplification NEGATIVE     Normal       Negative     

12

 

                    CBC With Differential 2021          [address] [address] [phone] White Blood Count 6.8 10     Normal     4.0-10.0    

 

             Red Blood Count 3.85 10      Low          4.00-5.40     

 

             Hemoglobin   12.7 g/dL    Normal       12.0-15.5     

 

             Hematocrit   39.4 %       Normal       36.0-47.0     

 

             Mean Corpuscular Volume 102.3 fl     High         80.0-96.0     

 

             Mean Corpuscular Hemoglobin 33.0 pg      Normal       27.0-33.0    

 

 

             Mean Corpuscular HGB Conc 32.2 g/dL    Normal       32.0-36.5     

 

             Red Cell Distribution Width 12.3 %       Normal       11.5-14.5    

 

 

             Platelet Count, Automated 187 10       Normal       150-450       

 

             Neutrophils % 72.2 %       High         36.0-66.0     

 

             Lymph %      19.9 %       Low          24.0-44.0     

 

             Mono %       6.0 %        Normal       2.0-8.0       

 

             Eos %        1.5 %        Normal       0.0-3.0       

 

             Baso %       0.3 %        Normal       0.0-1.0       

 

             Immature Granulocyte % 0.1 %        Normal       0-3.0         

 

             Nucleated Red Blood Cell % 0.0 %        Normal       0-0           

 

             Neutrophils # 4.9 10       Normal       1.5-8.5       

 

             Lymph #      1.4 10       Low          1.5-5.0       

 

             Mono #       0.4 10       Normal       0.0-0.8       

 

             Eos #        0.1 10       Normal       0.0-0.5       

 

             Baso #       0.0 10       Normal       0.0-0.2       

 

                    PT & Aptt           2021          [facility]

nter

                                        [address] [place] [address] [phone] Prothrombin Time 13.8 seconds Normal     12.5-14.3   

 

             Inr          1.04         Normal                    13

 

             Partial Thromboplastin Time 30.6 seconds Normal       24.2-38.5    

 

 

                    CBC With Differential 2021          [address] [address] [phone] White Blood Count 6.2 10     Normal     4.0-10.0    

 

             Red Blood Count 3.76 10      Low          4.00-5.40     

 

             Hemoglobin   12.5 g/dL    Normal       12.0-15.5     

 

             Hematocrit   39.0 %       Normal       36.0-47.0     

 

             Mean Corpuscular Volume 103.7 fl     High         80.0-96.0     

 

             Mean Corpuscular Hemoglobin 33.2 pg      High         27.0-33.0    

 

 

             Mean Corpuscular HGB Conc 32.1 g/dL    Normal       32.0-36.5     

 

             Red Cell Distribution Width 12.9 %       Normal       11.5-14.5    

 

 

             Platelet Count, Automated 199 10       Normal       150-450       

 

             Neutrophils % 74.5 %       High         36.0-66.0     

 

             Lymph %      16.4 %       Low          24.0-44.0     

 

             Mono %       7.0 %        Normal       2.0-8.0       

 

             Eos %        1.1 %        Normal       0.0-3.0       

 

             Baso %       0.5 %        Normal       0.0-1.0       

 

             Immature Granulocyte % 0.5 %        Normal       0-3.0         

 

             Nucleated Red Blood Cell % 0.0 %        Normal       0-0           

 

             Neutrophils # 4.6 10       Normal       1.5-8.5       

 

             Lymph #      1.0 10       Low          1.5-5.0       

 

             Mono #       0.4 10       Normal       0.0-0.8       

 

             Eos #        0.1 10       Normal       0.0-0.5       

 

             Baso #       0.0 10       Normal       0.0-0.2       

 

                    Comprehensive Metabolic Profil 2021          [address] [address] [phone] Glucose, Fasting 98 mg/dL   Normal           

 

             Blood Urea Nitrogen 28 mg/dL     High         7-18          

 

             Creatinine For GFR 0.90 mg/dL   Normal       0.55-1.30     

 

             Glomerular Filtration Rate > 60.0       Normal       >45          1

4

 

             Sodium Level 137 mEq/L    Normal       136-145       

 

             Potassium Serum 4.7 mEq/L    Normal       3.5-5.1       

 

             Chloride Level 107 mEq/L    Normal               

 

             Carbon Dioxide Level 27 mEq/L     Normal       21-32         

 

             Anion Gap    3 mEq/L      Low          8-16          

 

             Calcium Level 10.2 mg/dL   Normal       8.8-10.2      

 

             Ast/Sgot     23 U/L       Normal       7-37          

 

             Alt/SGPT     19 U/L       Normal       12-78         

 

             Alkaline Phosphatase 77 U/L       Normal               

 

             Bilirubin,Total 0.5 mg/dL    Normal       0.2-1.0       

 

             Total Protein 8.3 GM/DL    High         6.4-8.2       

 

             Albumin      3.0 GM/DL    Low          3.2-5.2       

 

             Albumin/Globulin Ratio 0.6          Low          1.2-2.2       

 

                    Complete Blood Count 2021          [facility]

s, pc

: Dr Simon Hoyt

[place] [address] [phone] WBC        5.9 x10*3/UL            4.1 - 10.9  

 

             RBC          4.11 x10*6/UL Low          4.20 - 6.30   

 

             Hemoglobin   13.6 g/dL                 12.0 - 18.0   

 

             Hematocrit   40.0 %                    37.0 - 51.0   

 

             MCV          97.4 fL      High         80.0 - 97.0   

 

             MCH          33.1 pg      High         26.0 - 32.0   

 

             MCHC         34.0 g/dL                 31.0 - 38.0   

 

             RDW          13.2 %                    11.6 - 13.7   

 

             PLT          209 x10*3/UL              140 - 440     

 

             MPV          8.1 FL                    7.8 - 11.0    

 

             Lymph %      25.6 %                    10.0 - 58.5   

 

             Mid %        7.5 %                     1.7 - 9.3     

 

             Neut %       66.9 %                    37.0 - 92.0   

 

             Lymph #      1.5 x10*3/UL              0.6 - 4.1     

 

             Mid #        0.5 x10*3/UL              0.1 - 0.6     

 

             Neut #       3.9 x10*3/UL              2.0 - 7.8     

 

                    Laboratory test finding 2021          [facility]

ists, pc

: Dr Simon Hoyt

[place] [address] [phone] Sed Rate   25 mm/hr   High       0 - 15      

 

                    Basic Metabolic Panel 2021          [facility]

ts, pc

: Dr Simon Hoyt

[place] [address] [phone] Glucose    100 mg/dL  High       74 - 99    15

 

             BUN          18 mg/dL                  7 - 18        

 

             Creatinine   0.8 mg/dL                 0.6 - 1.3     

 

             Sodium       141 mEq/L                 136 - 145     

 

             Potassium    3.8 mEq/L                 3.5 - 5.1     

 

             Chloride     104 mEq/L                 98 - 107      

 

             Carbon Dioxide 29 mEq/L                  21 - 32       

 

             Calcium      10.8 mg/dL   High         8.5 - 10.1   16

 

             GFR  >= 60 mL/min              >60           

 

             GFR African American >= 60 mL/min              >60          17

 

                    Laboratory test finding 2021          [facility]

[facility], pc

: Dr Simon Hoyt

[place] [address] [phone] Thyroid Stimulating Hormone 0.19 uIU/mL Low        0.3

6 - 3.74  







                          1                         note:<nlbl:demographic_chang

ed>



 

                          2                         note:<nlbl:demographic_chang

ed>



 

                          3                         note:<nlbl:demographic_chang

ed>



 

                          4                         note:<nlbl:demographic_chang

ed>



 

                          5                         This test was developed and 

its performance characteristics

determined by [employer] [employer] [facility]. It has not been cleared or

approved by the Food and Drug Administration.

Detection Limit = 0.10

Performed at:  [address]  [id number]

61

: Barak Archibald MD, Phone:  [id number]



 

                          6                         Units are mL/min/1.73 m2



Chronic Kidney Disease Staging per NKF:



Stage I & II   GFR >=60       Normal to Mildly Decreased

Stage III      GFR 30-59      Moderately Decreased

Stage IV       GFR 15-29      Severely Decreased

Stage V        GFR <15        Very Little GFR Left

ESRD           GFR <15 on RRT



 

                          7                         DIAGNOSIS CRITERIA

MMB ng/ml       Relative Index (RI)

NON-AMI               < or = 5               N/A

GRAY ZONE              > 5                < or = 4

AMI                    > 5                   > 4



 

                          8                         Troponin I Reference Interva

l for [employer] Vista LOCI:



                                        99th Percentile= 0.00-0.045 ng/ml



Risk Stratification:

<= 0.10 ng/ml   Decreased Risk for Adverse Clinical

Events.

                                        0.10-1.50 ng/ml   Increased Risk for Adv

erse Clinical

Events. Evaluation of additional

criterion and/or repeat testing in 2-6

hours is suggested to rule out myocardial

damage.

>= 1.50 ng/ml   Indicative of Myocardial Injury.



 

                          9                         Negative results do not prec

lude influenza or RSV virus

infection and should not be used as the sole basis for

treatment or other patient management decisions.



 

                          10                        Negative results do not prec

lude influenza or RSV virus

infection and should not be used as the sole basis for

treatment or other patient management decisions.



 

                          11                        Negative results do not prec

lude influenza or RSV virus

infection and should not be used as the sole basis for

treatment or other patient management decisions.



 

                          12                        A false negative result may 

occur if a specimen is

improperly collected, transported or handled. False negative

results may also occur if inadequate numbers of organisms

are present in the specimen.  As with any molecular test,

mutations within the target regions of Xpert Xpress

SARS-CoV-2 could affect primer and/or probe binding

resulting in failure to detect the presence of virus.  This

test cannot rule out diseases caused by other bacterial or

viral pathogens.



DISCLAIMER:

Testing was performed using the [employer] SARS-CoV-2 test.

This test was developed and its performance characteristics

determined by [employer]. This test has not been FDA cleared or

approved. This test has been authorized by FDA under an

Emergency Use Authorization (EUA). This test is only

authorized for the duration of time the declaration that

circumstances exist justifying the authorization of the

emergency use of in vitro diagnostic tests for detection of

SARS-CoV-2 virus and/or diagnosis of COVID-19 infection

under section 564(b)(1) of the Act, 21 U.S.C.

                                        360bbb-3(b)(1), unless the authorization

 is terminated or

revoked sooner.



 

                          13                        THERAPUTIC HUMAN INR VALUES

INDICATIONS                      NORMAL RANGES

PROPHYLAXIS/TREATMENT OF:

VENOUS THROMBOSIS                2.0-3.0

PULMONARY EMBOLISM               2.0-3.0

PREVENTION OF SYSTEMIC EMBOLISM FROM:

TISSUE HEART VALVES              2.0-3.0

ACUTE MYOCARDIAL INFARCTION      2.0-3.0

VALVULAR HEART DISEASE           2.0-3.0

ATRIAL FIBRILLATION              2.0-3.0

MECHANICAL VALVES(HIGH RISK)     2.5-3.5

RECURRENT MYOCARDIAL INFARCTION  2.5-3.5



 

                          14                        Units are mL/min/1.73 m2



Chronic Kidney Disease Staging per NKF:



Stage I & II   GFR >=60       Normal to Mildly Decreased

Stage III      GFR 30-59      Moderately Decreased

Stage IV       GFR 15-29      Severely Decreased

Stage V        GFR <15        Very Little GFR Left

ESRD           GFR <15 on RRT



 

                          15                        100-125 mg/dL     PRE-DIABET

ES/FASTING

>126 mg/dL          DIABETES/FASTING



 

                          16                        NOTE:

CALCIUM,TSH VERIFIED







 

                          17                        CHRONIC KIDNEY DISEASE STAGI

NG PER NKF



STAGE I & II      GFR >= 60        NORMAL TO MILDLY DECREASED

STAGE III          GFR 30-59          MODERATELY DECREASED

STAGE IV           GFR 15-29         SEVERELY DECREASED

STAGE V            GFR <15            VERY LITTLE GFR LEFT

ESRD                 GFR <15            ON RRT









Procedures





                Date            Code            Description     Status

 

                10/06/2021      59159           Office/Outpatient Established Lo

w MDM 20-29 Min Completed

 

                2021      26405           Complex Chronic Care MGMT Servic

e Ea Addl 30 Min Completed

 

                2021      73328           Complex Chronic Care Management 

SVC 1St 60 Min Completed

 

                    2021          60633               Chronic Care MGMT 20

 Mins Clinical Staff Time Per Calendar 

Month                                   Completed

 

                2021      05750           Chronic Care Management Services

 Ea Addl 20 Min Completed

 

                2021      94319           Complex Chronic Care MGMT Servic

e Ea Addl 30 Min Completed

 

                2021      98457           Complex Chronic Care Management 

SVC 1St 60 Min Completed

 

                2021      22164           Complex Chronic Care MGMT Servic

e Ea Addl 30 Min Completed

 

                2021      30161           Complex Chronic Care Management 

SVC 1St 60 Min Completed

 

                2021      05848           Office/Outpatient Established Mo

d MDM 30-39 Min Completed

 

                2021      04926           Complex Chronic Care MGMT Servic

e Ea Addl 30 Min Completed

 

                2021      78873           Complex Chronic Care Management 

SVC 1St 60 Min Completed

 

                2021      [id number]       Bone Mineral Density Test Comple

[facility]

 

                2021      [id number]        Mammogram       Completed







Medical Devices





                                        Description

 

                                        No Information Available







Encounters





           Type       Date       Location   Provider   Dx         Diagnosis

 

                Office Visit    10/06/2021  2:20p [facility] PJON[facility] Del[place] 

[place]                        S81.802A                  Unspecified open wound, left

 lower leg, initial encounter

 

                          Z23                       Encounter for immunization

 

                Office Visit    2021  1:30p [facility] P.SUZANNE Regalado M.D.                      I48.20                    Chronic atrial fibrillation,

 unspecified

 

                          Z79.01                    Long term (current) use of a

nticoagulants

 

                          R60.9                     Edema, unspecified

 

                          I10                       Essential (primary) hyperten

darci

 

                          E05.90                    Thyrotoxicosis, unsp without

 thyrotoxic crisis or storm

 

                          I82.502                   Chronic embolism and thombos

 unsp deep veins of l low extrem

 

                          F17.210                   Nicotine dependence, cigaret

svitlana, uncomplicated

 

                          F32.89                    Other specified depressive e

pisodes

 

                          D47.2                     Monoclonal gammopathy

 

                          K21.9                     Gastro-esophageal reflux dis

ease without esophagitis

 

                          M19.90                    Unspecified osteoarthritis, 

unspecified site

 

                          E78.00                    Pure hypercholesterolemia, u

nspecified







Assessments





                Date            Code            Description     Provider

 

                10/06/2021      S81.802A        Unspecified open wound, left low

er leg, initial encounter 

AIXA Hoffmann JR

 

                10/06/2021      Z23             Encounter for immunization AIXA Joseph JR

 

                2021      I10             Essential (primary) hypertension

 Charmaine Regalado M.D.

 

                2021      K21.9           Gastro-esophageal reflux disease

 without esophagitis Charmaine Regalado M.D.

 

                2021      M15.9           Polyosteoarthritis, unspecified 

Charmaine Regalado M.D.

 

                2021      I10             Essential (primary) hypertension

 Charmaine Regalado M.D.

 

                2021      K21.9           Gastro-esophageal reflux disease

 without esophagitis Charmaine Regalado M.D.

 

                2021      E78.00          Pure hypercholesterolemia, unspe

cified Charmaine Regalado M.D.

 

                2021      I10             Essential (primary) hypertension

 Charmaine Regalado M.D.

 

                2021      K21.9           Gastro-esophageal reflux disease

 without esophagitis Charmaine Regalado M.D.

 

                2021      E78.00          Pure hypercholesterolemia, unspe

cified Charmaine Regalado M.D.

 

                2021      M15.9           Polyosteoarthritis, unspecified 

Charmaine Regalado M.D.

 

                2021      I10             Essential (primary) hypertension

 Charmaine Regalado M.D.

 

                2021      K21.9           Gastro-esophageal reflux disease

 without esophagitis Charmaine Regalado M.D.

 

                    2021          I82.502             Chronic embolism and

 thrombosis of unspecified deep veins of 

left lower extremity                    Charmaine Regalado M.D.

 

                2021      I48.20          Chronic atrial fibrillation, uns

pecified Charmaine Regalado M.D.

 

                2021      Z79.01          Long term (current) use of antic

oagulants Charmaine Regalado M.D.

 

                2021      R60.9           Edema, unspecified Charmaine guido M.D.

 

                2021      I10             Essential (primary) hypertension

 Charmaine Regalado M.D.

 

                2021      E05.90          Thyrotoxicosis, unspecified with

out thyrotoxic crisis or sto 

Charmaine Regalado M.D.

 

                    2021          I82.502             Chronic embolism and

 thrombosis of unspecified deep veins of 

left lower extremity                    Charmaine Regalado M.D.

 

                2021      F17.210         Nicotine dependence, cigarettes,

 uncomplicated Charmaine Regalado M.D.

 

                2021      F32.89          Other specified depressive episo

emil Charmaine Regalado M.D.

 

                2021      D47.2           Monoclonal gammopathy Charmaine duron M.D.

 

                2021      K21.9           Gastro-esophageal reflux disease

 without esophagitis Charmaine Regalado M.D.

 

                2021      M19.90          Unspecified osteoarthritis, unsp

ecified site Charmaine Regalado M.D.

 

                2021      E78.00          Pure hypercholesterolemia, unspe

cified Charmaine Regalado M.D.

 

                2021      I10             Essential (primary) hypertension

 Charmaine Regalado M.D.

 

                2021      K21.9           Gastro-esophageal reflux disease

 without esophagitis Charmaine Regalado M.D.

 

                2021      E78.00          Pure hypercholesterolemia, unspe

cified Charmaine Regalado M.D.







Plan of Treatment

Future Appointment(s):* 2021  3:15 pm - Charmaine Regalado M.D. at 
  [facility]





Functional Status





                                        Description

 

                                        No Information Available







Mental Status





                                        Description

 

                                        No Information Available







Referrals





                Refer to      Reason for Referral Status          Appt Date

 

                          Stillerman,James MD       STAT CONSULT FOR OPEN WOUND 

LT LEG PLEASE LET OFFICE KNOW 

WHEN APPT IS MADE         Created                   

 

                                        [facility]

 

                                        [address]

 

                                        [place]  [address] [phone]

 

                                          

 

                Rock Werner MD CONSULT FOR PVD WITH OPEN WOUND LT LEG  Patient

 Declined 

10/20/2021

 

                                        [facility]

 

                                        [address] ,[address]

 

                                        [facility] [address] [phone]

## 2021-12-02 NOTE — CCD
Summarization of Episode Note

                             Created on: 2021



Miss. JOSEPH BENTLEY

External Reference #: [id number]

: 1955

Sex: Female



Demographics





                          Address                   [address]

[place]  [address]

 

                          Home Phone                [phone]

 

                          Preferred Language        Unknown

 

                          Marital Status            Unknown

 

                          Mormonism Affiliation     Unknown

 

                          Race                      White

 

                          Ethnic Group              Not  or 





Author





                          Author                    [facility] [employer] [facility]

ems

 

                          Organization              [facility]

ems

 

                          Address                   Unknown

 

                          Phone                     Unavailable







Support





                Name            Relationship    Address         Phone

 

                    JOSEPH BENTLEY      GUAR                [address]

[place]  [address]                    [phone]

 

                    Abbcess, Ketty      ECON                [address]

[place]  [address]                    [phone]







Care Team Providers





                    Care Team Member Name Role                Phone

 

                    CammieRosa Maria curryRoya Unavailable         [phone]







PROBLEMS





          Type      Condition ICD9-CM Code LAJ69-YJ Code Onset Dates Condition S

tatus W/U 

Status              Risk                SNOMED Code         Notes

 

                          Problem                   Non-pressure chronic ulcer o

f other part of left lower leg with fat 

layer exposed         L97.822         Active  confirmed         [id number]  

 

                          Problem                   Chronic venous hypertension 

(idiopathic) with ulcer of left lower 

extremity         I87.312         Active  confirmed         [id number]  







ALLERGIES





                    Allergen (clinical drug ingredient) Drug/Non Drug Allergy do

cumented on EMR 

Reaction            Allergy Type        Onset Date          Status

 

                      Motrin     Dyspnea    Drug Allergy            Active







ENCOUNTERS from 1955 to 2021





             Encounter    Location     Date         Provider     Diagnosis

 

                    [facility]     [phone] [place] [address]                       Roya Bonds          







IMMUNIZATIONS

No Information



SOCIAL HISTORY

Tobacco Use:



                    Social History Observation Description         Date

 

                    Details (start date - stop date) Current Smoker       



Sex Assigned At Birth:



                          Social History Observation Description

 

                          Sex Assigned At Birth     Unknown



Education:



                    Question            Answer              Notes

 

                    Level of Education: Finished High School  



Confucianist:



                    Question            Answer              Notes

 

                    Confucianist                                Sikhism



Sexual Hx:



                    Question            Answer              Notes

 

                    Had sex in the last 12 months (vaginal, oral, or anal)? No  

                 

 

                    LMP:                                  

 

                    Have you ever had an STD? No                   



Tobacco Use:



                    Question            Answer              Notes

 

                    Are you a:          current smoker       

 

                    How many cigarettes a day do you smoke?                     

2-3 CIGARETTES/DAY







REASON FOR REFERRAL

No Information



VITAL SIGNS

No information



MEDICATIONS





           Medication SIG (Take, Route, Frequency, Duration) Notes      Start Da

te End Date   

Status

 

                    Metoprolol Tartrate 100 MG 1 tablet with food Orally Twice a

 day for 30 day(s)  

                                                            Active

 

           Eliquis 5 MG as directed Orally                                  Acti

ve

 

           Cephalexin 500 MG 1 tablet Orally Four times a day for 5 day(s)      

                            Active







PROCEDURES

No Information



RESULTS

No Results



REASON FOR VISIT

R/S Appt



MEDICAL (GENERAL) HISTORY





                    Type                Description         Date

 

                    Medical History     ATRIAL FIBRILLATION  

 

                    Medical History     DVT IN LEFT LEG      

 

                    Medical History     x3 PULMONARY EMBOLISM, MOST RECENT  

 

 

                    Medical History     HYPERTENSION         

 

                    Surgical History    GALLBLADDER REMOVAL  

 

                    Surgical History    STRIPPING LIGATION IN BILATERAL LEGS  

 

                    Surgical History    TONSILLECTOMY        

 

                    Surgical History    RIGHT SHOULDER SURGERY  

 

                    Hospitalization History HOSPITALIZATION AFTER SHOULDER SURGE

RY  







Goals Section

No Information



Health Concerns

No Information



MEDICAL EQUIPMENT

No Information



MENTAL STATUS

No Information



FUNCTIONAL STATUS

No Information



ASSESSMENTS

No Information



PLAN OF TREATMENT

Next Appt



                                        Details

 

                                        Provider Name:Roya Bonds,  02:30:00 PM, [address], 

[phone], [place], [address], [phone]







Insurance Providers





             Payer Name   Payer Address Payer Phone  Insured Name Patient Relati

onship to 

Insured                   Coverage Start Date       Coverage End Date

 

                MEDICARE Part A and B [address]  [facility] [id number]

[phone]    

JOSEPH BENTLEY self                                     

 

                 FOR LIFE [address]  [facility] [address] [phone]

    JOSEPH BENTLEY              self

## 2021-12-02 NOTE — CCD
Continuity of Care Document (CCD)

                             Created on: 2021



Juany Becerril

External Reference #: [id number]

: 1955

Sex: Female



Demographics





                          Address                   [address]

[place]  [address]

 

                          Home Phone                [phone]

 

                          Preferred Language        Unknown

 

                          Marital Status            Unknown

 

                          Yazidi Affiliation     Unknown

 

                          Race                      White

 

                          Ethnic Group              Not  or 





Author





                          Author                    Juany ESQUEDA PA

 

                          Organization              Unknown

 

                          Address                   [address] [facility] [facility] [address]

[place]  [address]



 

                          Phone                     [phone]







Care Team Providers





                    Care Team Member Name Role                Phone

 

                    Yuri Maier MD       AUTM                [phone]

 

                    [facility]  AUTM                [phone]

 

                    John Lutz MD AUTM                Unavailable

 

                    [facility]ematology/On[facility] AUTM                [phone]

 

                    Hansa Hernandez FNP  AUTM                [phone]







Problems





                    Active Problems     Provider            Date

 

                    Chronic atrial fibrillation Protime             Onset: 

 

                    Essential hypertension Juany Vincent FNP Onset: 2017

 

                    Deep venous thrombosis of lower extremity Juany Vincent FN P Onset: 2017

 

                    Pure hypercholesterolemia Juany Vincent FNP Onset: 

017

 

                    Anxiety state       Juany Vincent FNP Onset: 2017

 

                    Chronic pulmonary embolism Juany Vincent FNP Onset: 2017

 

                    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 

2017

 

                    Gastroesophageal reflux disease Juany Vincent FNP Onset: 1

2017

 

                    Scoliosis deformity of spine Juany Vincent FNP Onset: 2017

 

                    Chronic tension-type headache Juany Vincent FNP Onset: 2017

 

                    Varicose veins of lower extremity Juany Vincent FNP Onset:

 2017

 

                    Tobacco user        Juany Vincent FNP Onset: 2017

 

                    Hypercalcemia       Juany Vincent FNP Onset: 2017







Social History





                Type            Date            Description     Comments

 

                Birth Sex                       Unknown          

 

                ETOH Use                        Consumes 3 glasses of wine per w

[place]  

 

                Tobacco Use     Start: Unknown  Patient is a current smoker, smo

kes every day STARTED

AGE 30 1-3 CIGARETTES A DAY 







Allergies and adverse reactions





             Active Allergies Criticality  Reaction | Severity Comments     Date

 

             Ibuprofen    Unable to assess criticality STOMACH UPSET HEADACHE mo

david       10/17/2017

 

             Latex        Unable to assess criticality rash, itches | Mild      

        10/17/2018







Medications





           Active Medications SIG        Qnty       Indications Ordering Provide

r Date

 

                          Claritin                     10mg Tablets             

      1 by mouth every 

night at bedtime seasonally                                 ABELARDO Saleem 2021

 

                          Fluoxetine HCL                     20mg Capsules      

             1 by mouth 

every day       90caps                          Charmaine Regalado M.D. 20

21

 

                          Excedrin Migraine                     [id number] Tab

lets                   1 

as needed h/a as needed mdd=1                                 Charmaine Regalado M.D. 2021

 

                          Famotidine                     20mg Tablets           

        1 by mouth every 

day             90tabs                          Charmaine Regalado M.D. 20

21

 

                    Furosemide                     20mg Tablets                 

  1 by mouth qd       

180tabs                                 Charmaine Regalado M.D. 2021

 

                                        Bupropion Hydrochloride ER (XL)         

            150mg Tablets ER 24HR       

                take 1 tablet by mouth in the morning 90tabs                    

      Charmaine Regalado M.D.                                    2021

 

                          Eliquis                     5mg Tablets               

    take one tablet by 

mouth twice a day 180tabs                         Charmaine Regalado M.D. 2020

 

                          Methimazole                     5mg Tablets           

        2 every day by 

mouth           180tabs                         Charmaine Regalado M.D. 20

20

 

                          Shingrix                     50mcg/0.5ML Suspension Re

c                   

administer at pharmacy 1units                          Juany Vincent FNP 

 

                          Voltaren                     1% Gel                   

apply up to 4 grams three 

times a day to affected knee as needed 100gm                           Charmaine Regalado M.D. 

2018

 

                          Aspirin Adult Low Dose                     81mg Tablet

s DR                   1 

by mouth every day                                 Juany Vincent FNP 10/17/201

7

 

                                        Acetaminophen Extra Strength            

         500mg Tablets                  

             take 2 tabs every 6 hours as needed                           Juany Fung FNP 10/17/2017

 

                                        Womens 50+ Multi Vitamin & Mineral Formu

la                      Tablets         

             1 every day PO Vit [id number]                           Juany Vincent FNP 10/17/2017

 

                          Metoprolol Tartrate                     25mg Tablets  

                 Take One 

Tablet By Mouth Twice A Day 180tabs                         ABELARDO Saleem 10/17/2017

 

                          Atorvastatin Calcium                     20mg Tablets 

                  take one

tablet by mouth every day 90tabs                          JANE Saleem 10/17/2017

 

                          Flecainide Acetate                     100mg Tablets  

                 take one 

tablet by mouth twice a day 180tabs                         ABELARDO Saleem 10/17/2017

 

                          Digoxin                     250mcg Tablets            

       1 by mouth every 

day             90tabs                          Charmaine Regalado M.D. 

 

                                        History Medications

 

                          Cephalexin                     500mg Capsules         

          take one tablet 

by mouth 3 times daily x 7 days 21caps                          AIXA Laurent JR 10/06/2021 - 

2021







Medications Administered in Office





           Medication SIG        Qnty       Indications Ordering Provider Date

 

                          Administration Of Flu Vaccine                      Inj

ection                    

                                                AIXA Hoffmann JR 10/06/2

021

 

                          Administration Of Flu Vaccine                      Inj

ection                    

                                                Charmaine Regalado M.D. 10/15/20

20

 

                          Administration Of Flu Vaccine                      Inj

ection                    

                                                Juany Vincent FNP 10/17/2019

 

                          Administration Of Flu Vaccine                      Inj

ection                    

                                                Juany Vincent FNP 10/17/2018

 

                          Administration Of Flu Vaccine                      Inj

ection                    

                                                Juany Vincent FNP 10/17/2017







Immunizations





             CPT Code     Status       Date         Vaccine      Lot #

 

                [id number]           Given           10/06/2021      Influenza Vaccin

e Quadrivalent Preser/Antibiotic Free Im 

Use                                     [id number]

 

                [id number]           Given           10/15/2020      Influenza Vaccin

e Quadrivalent Preser/Antibiotic Free Im 

Use                                     [id number]

 

                24452           Given           2020      Shingrix Zoster 

Vaccine (HZV), Recombinant, Subunit, 

Adjuvanted                               

 

                42417           Given           10/17/2019      Influenza Vaccin

e Quadrivalent Preser/Antibiotic Free Im 

Use                                     [id number]

 

                51560           Given           10/17/2018      Influenza Virus 

Vaccine, Quadrivalent (Cciiv4), Derived 

From Cell                               [id number]

 

             91438        Given        2018   Pneumovax 23 [id number]

 

                93459           Given           10/17/2017      Influenza Vaccin

e Quadrivalent Preser/Antibiotic Free Im 

Use                                     [id number]







Vital Signs





                Date            Vital           Result          Comment

 

                2021 11:38am BP Systolic     112 mmHg        RT Arm

 

                    BP Diastolic        70 mmHg             RT Arm

 

                    Heart Rate          68 /min              

 

                    Height              66 inches           5'6"

 

                    Weight              215.12 lb            

 

                    O2 % BldC Oximetry  96 %                 

 

                    BMI (Body Mass Index) 34.7 kg/m2           

 

                10/19/2021  3:12pm BP Systolic     122 mmHg         

 

                    BP Diastolic        76 mmHg              

 

                    Heart Rate          86 /min              

 

                    Height              66 inches           5'6"

 

                    Weight              223.00 lb            

 

                    BMI (Body Mass Index) 36.0 kg/m2           







Results





        Test    Acquired Date Facility Test    Result  H/L     Range   Note

 

                    Laboratory test finding 2021          [facility]

pia meza

: Dr Simon [place]oy[place]

[place] [address] [phone] Magnesium  2.0 mg/dL             1.8 - 2.4   

 

                    Comprehensive Chem Profile 2021          [facility]

[facility]

: Dr Simon [place]oyt

[place] [address] [phone] Glucose    113 mg/dL  High       74 - 99    1

 

             BUN          14 mg/dL                  7 - 18        

 

             Creatinine   1.0 mg/dL                 0.6 - 1.3     

 

             Sodium       145 mEq/L                 136 - 145     

 

             Potassium    4.1 mEq/L                 3.5 - 5.1     

 

             Chloride     105 mEq/L                 98 - 107      

 

             Carbon Dioxide 31 mEq/L                  21 - 32       

 

             Calcium      10.5 mg/dL   High         8.5 - 10.1   2

 

             Alk. Phosphatase 110 mg/dL                 46 - 116      

 

             Total Bilirubin 0.4 mg/dL                 0.2 - 1.0     

 

             Ast (Sgot)   22 U/L                    15 - 37       

 

             Alt (SGPT)   27 U/L                    12 - 78       

 

             Albumin      2.8 g/dL     Low          3.4 - 5.0    3

 

             Total Protein 8.3 g/dL     High         6.4 - 8.2     

 

             A/G Ratio    0.51 CALC    Low          1.00 - 1.90   

 

             GFR  55 mL/min    Low          >60           

 

             GFR African American >= 60 mL/min              >60          4

 

                    Laboratory test finding 2021          [facility]

                                        [address] [place] [address] [phone] Digoxin Level 0.1 NG/ML  Low        0.5-2.0    5

 

                    Complete Blood Count 2021          [facility]

s, pc

: Dr Simon Hoyt

[place] [address]

           [phone] WBC        5.6 x10*3/UL            4.1 - 10.9  

 

             RBC          4.36 x10*6/UL              4.20 - 6.30   

 

             Hemoglobin   14.0 g/dL                 12.0 - 18.0   

 

             Hematocrit   40.9 %                    37.0 - 51.0   

 

             MCV          93.9 fL                   80.0 - 97.0   

 

             MCH          32.0 pg                   26.0 - 32.0   

 

             MCHC         34.1 g/dL                 31.0 - 38.0   

 

             RDW          14.0 %       High         11.6 - 13.7   

 

             PLT          233 x10*3/UL              140 - 440     

 

             MPV          8.9 FL                    7.8 - 11.0    

 

             Lymph %      20.4 %                    10.0 - 58.5   

 

             Mid %        5.2 %                     1.7 - 9.3     

 

             Neut %       74.4 %                    37.0 - 92.0   

 

             Lymph #      1.1 x10*3/UL              0.6 - 4.1     

 

             Mid #        0.3 x10*3/UL              0.1 - 0.6     

 

             Neut #       4.2 x10*3/UL              2.0 - 7.8     

 

                    Laboratory test finding 10/25/2021          [address] [address] [phone] NT-Pro BNP 5817 pg/mL High       <125       6

 

             Digoxin Level 0.5 NG/ML    Normal       0.5-2.0      7

 

             Thyroxine (T4) 10.1 g/dL  Normal       4.5-12.0     8

 

             Thyroid Stimulating Hormone 0.051 uIU/ML Low          0.358-3.740  

9

 

             Flecainide   <0.10 ug/mL  Low          0.20-1.00    10

 

                    Basic Metabolic Profile 10/25/2021          [address] [address] [phone] Glucose, Fasting 106 mg/dL  High             

 

             Blood Urea Nitrogen 18 mg/dL     Normal       7-18          

 

             Creatinine For GFR 0.94 mg/dL   Normal       0.55-1.30     

 

             Glomerular Filtration Rate > 60.0       Normal       >45          1

1

 

             Sodium Level 140 mEq/L    Normal       136-145       

 

             Potassium Serum 4.6 mEq/L    Normal       3.5-5.1       

 

             Chloride Level 111 mEq/L    High                 

 

             Carbon Dioxide Level 22 mEq/L     Normal       21-32         

 

             Anion Gap    7 mEq/L      Low          8-16          

 

             Calcium Level 10.7 mg/dL   High         8.8-10.2      

 

                    Liver Profile       10/25/2021          [facility]

nter

                                        [address] [place] [address] [phone] Ast/Sgot   26 U/L     Normal     7-37        

 

             Alt/SGPT     28 U/L       Normal       12-78         

 

             Alkaline Phosphatase 105 U/L      Normal               

 

             Bilirubin,Total 0.8 mg/dL    Normal       0.2-1.0       

 

             Bilirubin,Direct 0.4 mg/dL    High         0.0-0.2       

 

             Total Protein 7.9 GM/DL    Normal       6.4-8.2       

 

             Albumin      2.8 GM/DL    Low          3.2-5.2       

 

             Albumin/Globulin Ratio 0.5          Low          1.2-2.2       

 

                    Cardiac Marker Panel 10/25/2021          [facility]

enter

                                        [address] [place] [address] [phone] CPK Creatine Phosphokinase 26 U/L     Normal     26-19

2      

 

             CK-MB Value Mass 1.0 NG/ML    Normal       <3.6          

 

             MB/CK Relative Index 3.85         Normal       < Or =4      12

 

             Troponin I   0.02 NG/ML   Normal       < 0.10       13

 

                    CBC With Differential 10/25/2021          [facility]

                                        [address] [place] [address] [phone] White Blood Count 6.3 10     Normal     4.0-10.0    

 

             Red Blood Count 4.12 10      Normal       4.00-5.40     

 

             Hemoglobin   12.9 g/dL    Normal       12.0-15.5     

 

             Hematocrit   41.3 %       Normal       36.0-47.0     

 

             Mean Corpuscular Volume 100.2 fl     High         80.0-96.0     

 

             Mean Corpuscular Hemoglobin 31.3 pg      Normal       27.0-33.0    

 

 

             Mean Corpuscular HGB Conc 31.2 g/dL    Low          32.0-36.5     

 

             Red Cell Distribution Width 14.8 %       High         11.5-14.5    

 

 

             Platelet Count, Automated 202 10       Normal       150-450       

 

             Neutrophils % 79.5 %       High         36.0-66.0     

 

             Lymph %      13.8 %       Low          24.0-44.0     

 

             Mono %       4.8 %        Normal       2.0-8.0       

 

             Eos %        1.1 %        Normal       0.0-3.0       

 

             Baso %       0.3 %        Normal       0.0-1.0       

 

             Immature Granulocyte % 0.5 %        Normal       0-3.0         

 

             Nucleated Red Blood Cell % 0.0 %        Normal       0-0           

 

             Neutrophils # 5.0 10       Normal       1.5-8.5       

 

             Lymph #      0.9 10       Low          1.5-5.0       

 

             Mono #       0.3 10       Normal       0.0-0.8       

 

             Eos #        0.1 10       Normal       0.0-0.5       

 

             Baso #       0.0 10       Normal       0.0-0.2       

 

                    Influenza A/B RSV Covid Amp 10/25/2021          [address] [address] [phone] Influenza A Amplification NEGATIVE   Normal     Negati

ve   14

 

             Influenza B Amplification NEGATIVE     Normal       Negative     15

 

             RSV Amplification NEGATIVE     Normal       Negative     16

 

             Sars Covid-19 Amplification NEGATIVE     Normal       Negative     

17

 

                    CBC With Differential 2021          [address] [address] [phone] White Blood Count 6.8 10     Normal     4.0-10.0    

 

             Red Blood Count 3.85 10      Low          4.00-5.40     

 

             Hemoglobin   12.7 g/dL    Normal       12.0-15.5     

 

             Hematocrit   39.4 %       Normal       36.0-47.0     

 

             Mean Corpuscular Volume 102.3 fl     High         80.0-96.0     

 

             Mean Corpuscular Hemoglobin 33.0 pg      Normal       27.0-33.0    

 

 

             Mean Corpuscular HGB Conc 32.2 g/dL    Normal       32.0-36.5     

 

             Red Cell Distribution Width 12.3 %       Normal       11.5-14.5    

 

 

             Platelet Count, Automated 187 10       Normal       150-450       

 

             Neutrophils % 72.2 %       High         36.0-66.0     

 

             Lymph %      19.9 %       Low          24.0-44.0     

 

             Mono %       6.0 %        Normal       2.0-8.0       

 

             Eos %        1.5 %        Normal       0.0-3.0       

 

             Baso %       0.3 %        Normal       0.0-1.0       

 

             Immature Granulocyte % 0.1 %        Normal       0-3.0         

 

             Nucleated Red Blood Cell % 0.0 %        Normal       0-0           

 

             Neutrophils # 4.9 10       Normal       1.5-8.5       

 

             Lymph #      1.4 10       Low          1.5-5.0       

 

             Mono #       0.4 10       Normal       0.0-0.8       

 

             Eos #        0.1 10       Normal       0.0-0.5       

 

             Baso #       0.0 10       Normal       0.0-0.2       

 

                    PT & Aptt           2021          [facility] [place] [facility]

nter

                                        [address] [place] [address] [phone] Prothrombin Time 13.8 seconds Normal     12.5-14.3   

 

             Inr          1.04         Normal                    18

 

             Partial Thromboplastin Time 30.6 seconds Normal       24.2-38.5    

 

 

                    CBC With Differential 2021          [facility]

                                        [address] [place] [address] [phone] White Blood Count 6.2 10     Normal     4.0-10.0    

 

             Red Blood Count 3.76 10      Low          4.00-5.40     

 

             Hemoglobin   12.5 g/dL    Normal       12.0-15.5     

 

             Hematocrit   39.0 %       Normal       36.0-47.0     

 

             Mean Corpuscular Volume 103.7 fl     High         80.0-96.0     

 

             Mean Corpuscular Hemoglobin 33.2 pg      High         27.0-33.0    

 

 

             Mean Corpuscular HGB Conc 32.1 g/dL    Normal       32.0-36.5     

 

             Red Cell Distribution Width 12.9 %       Normal       11.5-14.5    

 

 

             Platelet Count, Automated 199 10       Normal       150-450       

 

             Neutrophils % 74.5 %       High         36.0-66.0     

 

             Lymph %      16.4 %       Low          24.0-44.0     

 

             Mono %       7.0 %        Normal       2.0-8.0       

 

             Eos %        1.1 %        Normal       0.0-3.0       

 

             Baso %       0.5 %        Normal       0.0-1.0       

 

             Immature Granulocyte % 0.5 %        Normal       0-3.0         

 

             Nucleated Red Blood Cell % 0.0 %        Normal       0-0           

 

             Neutrophils # 4.6 10       Normal       1.5-8.5       

 

             Lymph #      1.0 10       Low          1.5-5.0       

 

             Mono #       0.4 10       Normal       0.0-0.8       

 

             Eos #        0.1 10       Normal       0.0-0.5       

 

             Baso #       0.0 10       Normal       0.0-0.2       

 

                    Comprehensive Metabolic Profil 2021          [facility]

                                        [address], [place] [id number] [phone] Glucose, Fasting 98 mg/dL   Normal           

 

             Blood Urea Nitrogen 28 mg/dL     High         7-18          

 

             Creatinine For GFR 0.90 mg/dL   Normal       0.55-1.30     

 

             Glomerular Filtration Rate > 60.0       Normal       >45          1

9

 

             Sodium Level 137 mEq/L    Normal       136-145       

 

             Potassium Serum 4.7 mEq/L    Normal       3.5-5.1       

 

             Chloride Level 107 mEq/L    Normal               

 

             Carbon Dioxide Level 27 mEq/L     Normal       21-32         

 

             Anion Gap    3 mEq/L      Low          8-16          

 

             Calcium Level 10.2 mg/dL   Normal       8.8-10.2      

 

             Ast/Sgot     23 U/L       Normal       7-37          

 

             Alt/SGPT     19 U/L       Normal       12-78         

 

             Alkaline Phosphatase 77 U/L       Normal               

 

             Bilirubin,Total 0.5 mg/dL    Normal       0.2-1.0       

 

             Total Protein 8.3 GM/DL    High         6.4-8.2       

 

             Albumin      3.0 GM/DL    Low          3.2-5.2       

 

             Albumin/Globulin Ratio 0.6          Low          1.2-2.2       

 

                    Complete Blood Count 2021          [facility]

s, pc

: Dr Simon [place]oy[place]

[place] [address] [phone] WBC        5.9 x10*3/UL            4.1 - 10.9  

 

             RBC          4.11 x10*6/UL Low          4.20 - 6.30   

 

             Hemoglobin   13.6 g/dL                 12.0 - 18.0   

 

             Hematocrit   40.0 %                    37.0 - 51.0   

 

             MCV          97.4 fL      High         80.0 - 97.0   

 

             MCH          33.1 pg      High         26.0 - 32.0   

 

             MCHC         34.0 g/dL                 31.0 - 38.0   

 

             RDW          13.2 %                    11.6 - 13.7   

 

             PLT          209 x10*3/UL              140 - 440     

 

             MPV          8.1 FL                    7.8 - 11.0    

 

             Lymph %      25.6 %                    10.0 - 58.5   

 

             Mid %        7.5 %                     1.7 - 9.3     

 

             Neut %       66.9 %                    37.0 - 92.0   

 

             Lymph #      1.5 x10*3/UL              0.6 - 4.1     

 

             Mid #        0.5 x10*3/UL              0.1 - 0.6     

 

             Neut #       3.9 x10*3/UL              2.0 - 7.8     

 

                    Laboratory test finding 2021          [place] [facility] 

: Dr Simon Hoyt

[place], [place] [id number] [phone] Sed Rate   25 mm/hr   High       0 - 15      

 

                    Basic Metabolic Panel 2021          [place] [facility]

 pc

: Dr Simon Hoyt

[place] [id number] [phone] Glucose    100 mg/dL  High       74 - 99    20

 

             BUN          18 mg/dL                  7 - 18        

 

             Creatinine   0.8 mg/dL                 0.6 - 1.3     

 

             Sodium       141 mEq/L                 136 - 145     

 

             Potassium    3.8 mEq/L                 3.5 - 5.1     

 

             Chloride     104 mEq/L                 98 - 107      

 

             Carbon Dioxide 29 mEq/L                  21 - 32       

 

             Calcium      10.8 mg/dL   High         8.5 - 10.1   21

 

             GFR  >= 60 mL/min              >60           

 

             GFR African American >= 60 mL/min              >60          22

 

                    Laboratory test finding 2021          [place] [facility] 

: Dr Simon Hoyt

[place] [address] [phone] Thyroid Stimulating Hormone 0.19 uIU/mL Low        0.3

6 - 3.74  







                          1                         100-125 mg/dL     PRE-DIABET

ES/FASTING

>126 mg/dL          DIABETES/FASTING



 

                          2                         NOTE:

RESULT VERIFIED.







 

                          3                         NOTE:

RESULT VERIFIED.







 

                          4                         CHRONIC KIDNEY DISEASE STAGI

NG PER NKF



STAGE I & II      GFR >= 60        NORMAL TO MILDLY DECREASED

STAGE III          GFR 30-59          MODERATELY DECREASED

STAGE IV           GFR 15-29         SEVERELY DECREASED

STAGE V            GFR <15            VERY LITTLE GFR LEFT

ESRD                 GFR <15            ON RRT



 

                          5                         note:<nlbl:demographic_chang

ed>



 

                          6                         note:<nlbl:demographic_chang

ed>



 

                          7                         note:<nlbl:demographic_chang

ed>



 

                          8                         note:<nlbl:demographic_chang

ed>



 

                          9                         note:<nlbl:demographic_chang

ed>



 

                          10                        This test was developed and 

its performance characteristics

determined by [facility]. It has not been cleared or

approved by the Food and Drug Administration.

Detection Limit = 0.10

Performed at:  [address]  [id number]

61

: Barak Archibald MD, Phone:  [phone]



 

                          11                        Units are mL/min/1.73 m2



Chronic Kidney Disease Staging per NKF:



Stage I & II   GFR >=60       Normal to Mildly Decreased

Stage III      GFR 30-59      Moderately Decreased

Stage IV       GFR 15-29      Severely Decreased

Stage V        GFR <15        Very Little GFR Left

ESRD           GFR <15 on RRT



 

                          12                        DIAGNOSIS CRITERIA

MMB ng/ml       Relative Index (RI)

NON-AMI               < or = 5               N/A

GRAY ZONE              > 5                < or = 4

AMI                    > 5                   > 4



 

                          13                        Troponin I Reference Interva

l for [employer] LOCI:



                                        99th Percentile= 0.00-0.045 ng/ml



Risk Stratification:

<= 0.10 ng/ml   Decreased Risk for Adverse Clinical

Events.

                                        0.10-1.50 ng/ml   Increased Risk for Adv

erse Clinical

Events. Evaluation of additional

criterion and/or repeat testing in 2-6

hours is suggested to rule out myocardial

damage.

>= 1.50 ng/ml   Indicative of Myocardial Injury.



 

                          14                        Negative results do not prec

lude influenza or RSV virus

infection and should not be used as the sole basis for

treatment or other patient management decisions.



 

                          15                        Negative results do not prec

lude influenza or RSV virus

infection and should not be used as the sole basis for

treatment or other patient management decisions.



 

                          16                        Negative results do not prec

lude influenza or RSV virus

infection and should not be used as the sole basis for

treatment or other patient management decisions.



 

                          17                        A false negative result may 

occur if a specimen is

improperly collected, transported or handled. False negative

results may also occur if inadequate numbers of organisms

are present in the specimen.  As with any molecular test,

mutations within the target regions of [facility]

SARS-CoV-2 could affect primer and/or probe binding

resulting in failure to detect the presence of virus.  This

test cannot rule out diseases caused by other bacterial or

viral pathogens.



DISCLAIMER:

Testing was performed using the [facility] SARS-CoV-2 test.

This test was developed and its performance characteristics

determined by [facility]. This test has not been FDA cleared or

approved. This test has been authorized by FDA under an

Emergency Use Authorization (EUA). This test is only

authorized for the duration of time the declaration that

circumstances exist justifying the authorization of the

emergency use of in vitro diagnostic tests for detection of

SARS-CoV-2 virus and/or diagnosis of COVID-19 infection

under section 564(b)(1) of the Act, 21 U.S.C.

                                        360bbb-3(b)(1), unless the authorization

 is terminated or

revoked sooner.



 

                          18                        THERAPUTIC HUMAN INR VALUES

INDICATIONS                      NORMAL RANGES

PROPHYLAXIS/TREATMENT OF:

VENOUS THROMBOSIS                2.0-3.0

PULMONARY EMBOLISM               2.0-3.0

PREVENTION OF SYSTEMIC EMBOLISM FROM:

TISSUE HEART VALVES              2.0-3.0

ACUTE MYOCARDIAL INFARCTION      2.0-3.0

VALVULAR HEART DISEASE           2.0-3.0

ATRIAL FIBRILLATION              2.0-3.0

MECHANICAL VALVES(HIGH RISK)     2.5-3.5

RECURRENT MYOCARDIAL INFARCTION  2.5-3.5



 

                          19                        Units are mL/min/1.73 m2



Chronic Kidney Disease Staging per NKF:



Stage I & II   GFR >=60       Normal to Mildly Decreased

Stage III      GFR 30-59      Moderately Decreased

Stage IV       GFR 15-29      Severely Decreased

Stage V        GFR <15        Very Little GFR Left

ESRD           GFR <15 on RRT



 

                          20                        100-125 mg/dL     PRE-DIABET

ES/FASTING

>126 mg/dL          DIABETES/FASTING



 

                          21                        NOTE:

CALCIUM,TSH VERIFIED







 

                          22                        CHRONIC KIDNEY DISEASE STAGI

NG PER NKF



STAGE I & II      GFR >= 60        NORMAL TO MILDLY DECREASED

STAGE III          GFR 30-59          MODERATELY DECREASED

STAGE IV           GFR 15-29         SEVERELY DECREASED

STAGE V            GFR <15            VERY LITTLE GFR LEFT

ESRD                 GFR <15            ON RRT









Procedures





                Date            Code            Description     Status

 

                10/19/2021      41564           Office/Outpatient Established Lo

w MDM 20-29 Min Completed

 

                10/06/2021      13832           Office/Outpatient Established Lo

w MDM 20-29 Min Completed

 

                2021      36593           Complex Chronic Care MGMT Servic

e Ea Addl 30 Min Completed

 

                2021      22266           Complex Chronic Care Management 

SVC 1St 60 Min Completed

 

                2021      35209           Complex Chronic Care MGMT Servic

e Ea Addl 30 Min Completed

 

                2021      69350           Complex Chronic Care Management 

SVC 1St 60 Min Completed

 

                    2021          41764               Chronic Care MGMT 20

 Mins Clinical Staff Time Per Calendar 

Month                                   Completed

 

                2021      21182           Chronic Care Management Services

 Ea Addl 20 Min Completed

 

                2021      76447           Complex Chronic Care MGMT Servic

e Ea Addl 30 Min Completed

 

                2021      12382           Complex Chronic Care Management 

SVC 1St 60 Min Completed

 

                2021      14909           Complex Chronic Care MGMT Servic

e Ea Addl 30 Min Completed

 

                2021      24777           Complex Chronic Care Management 

SVC 1St 60 Min Completed

 

                2021      96817           Office/Outpatient Established Mo

d MDM 30-39 Min Completed

 

                2021      [id number]       Bone Mineral Density Test Comple

veronika

 

                2021      [id number]        Mammogram       Completed







[employer]





                                        Description

 

                                        No Information Available







Encounters





           Type       Date       Location   Provider   Dx         Diagnosis

 

                Office Visit    10/19/2021  3:20p [facility] JR, [facility]                        S81.802D                  Unspecified open wound, left

 lower leg, subsequent encounter

 

                          I11.0                     Hypertensive heart disease w

ith heart failure

 

                Office Visit    10/06/2021  2:20p [facility], [facility]                        S81.802A                  Unspecified open wound, left

 lower leg, initial encounter

 

                          Z23                       Encounter for immunization

 

                Office Visit    2021  1:30p [facility] meza M.D.                      I48.20                    Chronic atrial fibrillation,

 unspecified

 

                          Z79.01                    Long term (current) use of a

nticoagulants

 

                          R60.9                     Edema, unspecified

 

                          I10                       Essential (primary) hyperten

darci

 

                          E05.90                    Thyrotoxicosis, unsp without

 thyrotoxic crisis or storm

 

                          I82.502                   Chronic embolism and thombos

 unsp deep veins of l low extrem

 

                          F17.210                   Nicotine dependence, cigaret

svitlana, uncomplicated

 

                          F32.89                    Other specified depressive e

pisodes

 

                          D47.2                     Monoclonal gammopathy

 

                          K21.9                     Gastro-esophageal reflux dis

ease without esophagitis

 

                          M19.90                    Unspecified osteoarthritis, 

unspecified site

 

                          E78.00                    Pure hypercholesterolemia, u

nspecified







Assessments





                Date            Code            Description     Provider

 

                2021      I48.20          Chronic atrial fibrillation, uns

pecified Charmaine Regalado M.D.

 

                2021      Z79.01          Long term (current) use of antic

oagulants Charmaine Regalado M.D.

 

                2021      I11.0           Hypertensive heart disease with 

heart failure Charmaine Regalado M.D.

 

                    2021          E05.00              Thyrotoxicosis with 

diffuse goiter without thyrotoxic crisis 

or storm                                Charmaine Regalado M.D.

 

                    2021          I82.502             Chronic embolism and

 thrombosis of unspecified deep veins of 

left lower extremity                    Charmaine Regalado M.D.

 

                2021      F32.89          Other specified depressive episo

emil Charmaine Regalado M.D.

 

                2021      D47.2           Monoclonal gammopathy Charmaine duron M.D.

 

                2021      K21.9           Gastro-esophageal reflux disease

 without esophagitis Charmaine Regalado M.D.

 

                2021      S81.802A        Unspecified open wound, left low

er leg, initial encounter 

Charmaine Regalado M.D.

 

                2021      G31.84          Mild cognitive impairment, so st

ated Charmaine Regalado M.D.

 

                    10/19/2021          S81.802D            Unspecified open wou

nd, left lower leg, subsequent encounter

                                        AIXA Hoffmann JR

 

                10/19/2021      I11.0           Hypertensive heart disease with 

heart failure AIXA Hoffmann JR

 

                10/06/2021      S81.802A        Unspecified open wound, left low

er leg, initial encounter 

AIXA Hoffmann JR

 

                10/06/2021      Z23             Encounter for immunization AIXA Joseph JR

 

                2021      I10             Essential (primary) hypertension

 Charmaine Regalado M.D.

 

                2021      K21.9           Gastro-esophageal reflux disease

 without esophagitis Charmaine Regalado M.D.

 

                2021      M15.9           Polyosteoarthritis, unspecified 

Charmaine Regalado M.D.

 

                2021      I10             Essential (primary) hypertension

 Charmaine Regalado M.D.

 

                2021      K21.9           Gastro-esophageal reflux disease

 without esophagitis Charmaine Regalado M.D.

 

                2021      I11.0           Hypertensive heart disease with 

heart failure Charmaine Regalado M.D.

 

                2021      I10             Essential (primary) hypertension

 Charmaine Regalado M.D.

 

                2021      K21.9           Gastro-esophageal reflux disease

 without esophagitis Charmaine Regalado M.D.

 

                2021      E78.00          Pure hypercholesterolemia, unspe

cified Charmaine Regalado M.D.

 

                2021      I10             Essential (primary) hypertension

 Charmaine Regalado M.D.

 

                2021      K21.9           Gastro-esophageal reflux disease

 without esophagitis Charmaine Regalado M.D.

 

                2021      E78.00          Pure hypercholesterolemia, unspe

cified Charmaine Regalado M.D.

 

                2021      M15.9           Polyosteoarthritis, unspecified 

Charmaine Regalado M.D.

 

                2021      I10             Essential (primary) hypertension

 Charmaine Regalado M.D.

 

                2021      K21.9           Gastro-esophageal reflux disease

 without esophagitis Charmaine Regalado M.D.

 

                    2021          I82.502             Chronic embolism and

 thrombosis of unspecified deep veins of 

left lower extremity                    Charmaine Regalado M.D.

 

                2021      I48.20          Chronic atrial fibrillation, uns

pecminoo Regalado M.D.

 

                2021      Z79.01          Long term (current) use of antic

oagulants Charmaine Regalado M.D.

 

                2021      R60.9           Edema, unspecified Charmaine guido M.D.

 

                2021      I10             Essential (primary) hypertension

 Charmaine Regalado M.D.

 

                2021      E05.90          Thyrotoxicosis, unspecified with

out thyrotoxic crisis or sto 

Charmaine Regalado M.D.

 

                    2021          I82.502             Chronic embolism and

 thrombosis of unspecified deep veins of 

left lower extremity                    Charmaine Regalado M.D.

 

                2021      F17.210         Nicotine dependence, cigarettes,

 uncomplicated Charmaine Regalado M.D.

 

                2021      F32.89          Other specified depressive episo

emil Charmaine Regalado M.D.

 

                2021      D47.2           Monoclonal gammopathy Charmaine duron M.D.

 

                2021      K21.9           Gastro-esophageal reflux disease

 without esophagitis Charmaine Regalado M.D.

 

                2021      M19.90          Unspecified osteoarthritis, unsp

ecified site Charmaine Regalado M.D.

 

                2021      E78.00          Pure hypercholesterolemia, unspe

cified Charmaine Regalado M.D.







Plan of Treatment

Future Appointment(s):* 2021 11:00 am - VICKIE Rushing at [facility]

* 2021  3:15 pm - Charmaine Regalado M.D. at [facility]

2021 - Charmaine Regalado M.D.* I48.20 Chronic atrial fibrillation, 
  unspecified

* Z79.01 Long term (current) use of anticoagulants

* I11.0 Hypertensive heart disease with heart failure

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

* I82.502 Chronic embolism and thrombosis of unspecified deep veins of left 
  lower extremity

* F32.89 Other specified depressive episodes

* D47.2 Monoclonal gammopathy

* K21.9 Gastro-esophageal reflux disease without esophagitis

* S81.802A Unspecified open wound, left lower leg, initial encounter

* G31.84 Mild cognitive impairment, so stated

* All * New Medication:* Fluoxetine HCL 20 mg - 1 by mouth every day

* Claritin 10 mg - 1 by mouth every night at bedtime seasonally









Functional Status





                                        Description

 

                                        No Information Available







Mental Status





                                        Description

 

                                        No Information Available







Referrals





                Refer to      Reason for Referral Status          Appt Date

 

                Keli Baker CARDIOLOGY CONSULT FOR CHF,ATRIAL FIB  Created  

       

 

                                        [facility]

 

                                        [address] , [place] [address]

 

                                        [phone]

 

                                          

 

                          Stillerman,James MD       STAT CONSULT FOR OPEN WOUND 

LT LEG PLEASE LET OFFICE KNOW 

WHEN APPT IS MADE         Created                   

 

                                        [facility]

 

                                        [address] [place] [address]

 

                                        [phone]

 

                                          

 

                Rock Werner MD CONSULT FOR PVD WITH OPEN WOUND LT LEG  Patient

 Declined 

10/20/2021

 

                                        [facility] [address] ,[address]

 

                                        [facility] [address] [phone]

## 2021-12-02 NOTE — HPEPDOC
General


Date of Admission


21


Date of Service:  Dec 2, 2021


Chief Complaint


The patient is a 66-year-old female admitted with a reason for visit of Head 

Ache.


Source:  Patient





History of Present Illness


Juany Becerril is a 66-year-old female with significant medical history of A. 

fib, hypertension, bilateral lower extremity venous stasis ulcerations, 

depression, and obesity who presents with complaints of tachycardia from PCP 

office.  Patient reportedly was at PCP office and had heart rate in the 150s.  

She also endorses headache today and does describe some dyspnea on exertion but 

reports this has been progressive with activity over the past few months.  

Patient typically walks with a seated walker. 


Pt denies sinus congestion, sore throat, productive cough, chest pain, n/v/d, 

abdominal pain, weakness, sensory changes or syncope.


Patient reports that she is headache free during exam. 


Patient reports that her bilateral lower extremities are always somewhat swollen

she does not endorse increasing bilateral lower extremity swelling recently.  

She reports that she does at times sleep in a recliner but she reports that that

recliner typically hurts her back and she does not intentionally have to sleep 

with her recliner; she just at times may doze off while watching TV most nights.

 Patient reports that she has been compliant with her medications including 

Lasix which is given for her swelling and Eliquis.  


HR rate upon arrival into the ED was in the 120s A. fib RVR and cardiology was 

consulted.  Patient was given atenolol and heart rate improved.  Patient 

consideration for whether or not she could have been sent home however when she 

was ambulated in the ED she was noted to desat in the 80s.  Patient did have 

some peripheral edema and there is concern regarding new onset CHF.  CTA chest 

completed negative for PE, no pleural effusion or infiltrates. PCR negative.  

BNP elevated 4465 


EKG A. fib. Troponin less than 0.03. 


Patient will be admitted for further evaluation management presenting concerns





Home Medications


Scheduled


Apixaban (Eliquis) 5 Mg Tablet, 5 MG PO BID, (Reported)


Aspirin (Aspirin EC) 81 Mg Tablet.dr, 81 MG PO DAILY, (Reported)


Atorvastatin Calcium (Atorvastatin Calcium) 20 Mg Tablet, 20 MG PO QHS, (Repo

rted)


Bupropion Hcl (Bupropion Xl) 150 Mg Tab.er.24h, 150 MG PO DAILY, (Reported)


Famotidine (Famotidine) 20 Mg Tablet, 20 MG PO QHS, (Reported)


Furosemide (Furosemide) 20 Mg Tablet, 20 MG PO BID, (Reported)


Metoprolol Tartrate (Metoprolol Tartrate) 25 Mg Tablet, 25 MG PO BID, (Reported)


Multivitamins (Thera M Plus Tablet) 1 Each Tablet, 1 TAB PO DAILY, (Reported)





Scheduled PRN


Diclofenac Sodium (Diclofenac Sodium) 1% 100GM Gel..gram., 4 GM TOP TID PRN for 

MILD PAIN (PS 1-4), (Reported)


   APPLY TO AFFECTED KNEE(S) 





Allergies


Coded Allergies:  


     ibuprofen (Verified  Allergy, Unknown, 21)





Past Medical History


Medical History


A. fib, hypertension, described BLE PVD, history of left lower extremity DVT, 

reported left lower extremity venous stasis ulcerations, depression, and obesity


Surgical History


Bilateral lower extremity vein stripping





Family History


Significant Family History:  Heart disease, Hypertension


Mother: Heart disease, hypertension, addiction; 1 sister at [facility]: 

Alzheimer's





Social History


* Smoker:  current smoker (1 cigarette daily patient reports that she has cut 

back)


Alcohol:  occationally (Place of [facility])


Drugs:  denies


Recent Travel/Sick Contacts:  Denies: Recent travel, Recent sick contacts


Pets in the home:  Dog(s) (1 dog)


Psychosocial History:  Depression


Patient reports that she relocated from [place] when her   as she was 

relocating to [place] to be close to his grave.  Patient describes some discord 

with her closest living relative (sister) and due to the constraints had to 

relocate to [place] over [place] where her  is very.  Patient 1 of 7 

siblings and majority of her siblings are spread out throughout the country with

the exception of the one sister whom she does not get along with who is nearby 

and another sister who is at [facility].  Patient does have living 

children located in [place] patient reports that she lives alone but receives 

assistance from 2 neighbors to help her with household chores and walking her 

dog.  Patient is able to drive to her healthcare appointments.  Patient reports 

that she does have a very close friend who does not drive but accompanies her to

doctor's appointments as the friend is very helpful with schedules/asking 

questions at appointments.





A-FIB/CHADSVASC


A-FIB History


Current/History of A-Fib/PAF?:  Yes


Current PO Anticoag Therapy:  Yes





Review of Systems


Constitutional:  Reports: Fatigue; 


   Denies: Chills, Fever, Night Sweats


Eyes:  Denies: Pain, Vision change


ENT:  Denies: Head Aches, Ear Pain, Dysphagia


Skin:  Reports: Other (2 wounds left lower extremity ankle area); 


   Denies: Rash, Lesions, Breakdown


Pulmonary:  Reports: Dyspnea; 


   Denies: Cough


Cardiovascular:  Reports: Edema; 


   Denies: Chest Pain, Palpitations, Orthopnea, Paroxysmal Noc. Dyspnea, Lt 

Headedness


Gastrointestinal:  Denies: Nausea, Vomiting, Abdominal Pain, Diarrhea


Genitourinary:  Denies: Dysuria, Frequency, Incontinence, Retention


Hematologic:  Denies: Bruising, Bleeding Excessively


Musculoskeletal:  Reports: Back Pain; 


   Denies: Neck Pain, Joint Pain, Muscle Pain, Spasms


Neurological:  Denies: Weakness, Numbness, Change in speech, Confusion


Psych:  Reports: Mood Normal; 


   Denies: Depression, Memory Issues


Other systems


LLE wounds: Patient describes circular type wounds that she has been changing 

dressing daily for and seen wound care outpatient for.  Patient reports that she

is prone to lower extremity ulcerations and cellulitis of the affected leg.  One

Wound is described as half dollar sized with serosanguineous drainage and 

additional smaller quarter size wound of distal inner left ankle.





Physical Examination


General Exam:  Positive: Alert, Cooperative, No Acute Distress


Eye Exam:  Positive: PERRLA, Conjunctiva & lids normal, EOMI; 


   Negative: Sclera icteric


ENT Exam:  Positive: Atraumatic, Mucous membr. moist/pink, Pharynx Normal, Other

ENT (+ Goiter)


Neck Exam:  Positive: Supple; 


   Negative: JVD, thyromegaly


Chest Exam:  Positive: Clear to auscultation, Normal air movement


Heart Exam:  Positive: Irregular Rhythm, Normal S1, Normal S2; 


   Negative: Murmurs, Rubs


Abdomen Exam:  Positive: Normal bowel sounds, Soft; 


   Negative: Tenderness, Hepatospenomegaly


Extremity Exam:  Positive: Edema (1+ BLE), Normal pulses; 


   Negative: Clubbing, Cyanosis


Skin Exam:  Positive: Nl turgor and temperature, Other skin issue (Left ankle 

dressing clean dry intact); 


   Negative: Breakdown, Lesion


Neuro Exam:  Positive: Normal Speech, Cranial Nerves 3-12 NL, Reflexes 2+; 


   Negative: Normal Gait


Psych Exam:  Positive: Mental status NL, Mood NL, Oriented x 3





Vital Signs





Vital Signs








  Date Time  Temp Pulse Resp B/P (MAP) Pulse Ox O2 Delivery O2 Flow Rate FiO2


 


21 19:32  99  148/81    


 


21 19:30   18  99 Room Air  


 


21 14:01 96.2       











Laboratory Data


Labs 24H


Laboratory Tests 2


21 16:53: 


Immature Granulocyte % (Auto) 0.2, Neutrophils (%) (Auto) 61.9, Lymphocytes (%) 

(Auto) 27.8, Monocytes (%) (Auto) 7.9, Eosinophils (%) (Auto) 1.8, Basophils (%)

(Auto) 0.4, Neutrophils # (Auto) 3.1, Lymphocytes # (Auto) 1.4L, Monocytes # 

(Auto) 0.4, Eosinophils # (Auto) 0.1, Basophils # (Auto) 0.0, Nucleated Red 

Blood Cells % (auto) 0.0, Magnesium Level 2.3, NT-Pro-B-Type Natriuretic Peptide

4465H


21 18:00: 


POC Glucose (Misc Panel) 98, POC Sodium (Misc Panel) 143, POC Potassium (Misc 

Panel) 4.2, POC Chloride (Misc Panel) 108, POC Total CO2 (Misc Panel) 24.0, POC 

Blood Urea Nitrogen (Misc Panel 25, POC Ionized Calcium (Misc Panel) 5.1, POC 

Creatinine (Misc Panel) 0.9, POC Hematocrit (Misc Panel) 41.0


21 18:14: POC Troponin I (Misc) 0.03


CBC/BMP


Laboratory Tests


21 16:53











 Assessment/Plan


1.  Exertional hypoxia secondary to presumed new onset CHF: BNP 4465


-No known history of CHF; last echo not on file however cardiology had been 

consulted in ED and was able to review their records reportedly confirm no 

history of CHF.


-Monitor fluid balance, I's and O's, urinary output


-Patient appears hypervolemic however there is a component of body habitus that 

could be contributing.


-Plan for diuresis; will increase Lasix 20 mg twice daily that patient typically

takes at home to Lasix 40 mg twice daily


-Monitoring creatinine closely; A.m. labs


-Echo in a.m.


-Consider differential and patient would need to be ambulated again with pulse 

ox prior to d/c





2.  A. fib RVR: Patient presently rate controlled however upon arrival she was 

in the 120s A. fib RVR.  Cardiology consulted by ED and patient was given 

atenolol and heart rate improved to 80s.  Patient also given Lasix 40 mg IV x1. 

Question whether or not overload causing A. fib RVR or if A. fib RVR contributed

to fluid overload.


-Telemetry monitoring, continue home medication


-Check electrolytes and replete if needed





3.  Current daily smoker: No reported history of COPD.  Patient without wheeze. 

It is possible daily smoking underlying COPD could be contributing to exertional

dyspnea/ambulatory desat. CT chest negative for infiltrates, but did show 

discoid atelectasis left suprahilar region and moderate mediastinal lymph nodes 

and right hilar lymph nodes.


-Plan for as needed breathing treatments, IS and monitoring


-Encourage smoking cessation: Patient reports that she has been cutting back she

endorses 1 cigarette today


-Patient notified regarding the size of lymph nodes and suggested continued 

monitoring with PCP





4. LLE wounds: Dressing clean dry intact during exam.  Patient afebrile, no 

leukocytosis.  Left lower extremity without erythema or tenderness concerning of

cellulitis.


-Monitor for signs symptoms infection


-Wound care consult and daily dressing changes


-Encourage patient to continue to follow-up with wound care outpatient upon DC





5.  HTN: Monitor BP in setting of above. Continue home medications once 

reconciled.





6.  Nodular Goiter: As noted on CT chest.  Patient denies history of goiter or 

thyroid disease.  


-Will check thyroid studies and serum iodine


-Consider thyroid US or further w/u


Thyroid disease could explain some of patient's symptoms including dyspnea on 

exertion, intermittent tachycardia, mood imbalance and weight gain that patient 

has been mentioning.





7.  Depression: Patient remarks that she has had a depressive episode for the 

past month.  She reports that she is not taking anything for depression and she 

is hesitant to take mood related medications or opiates given patient's mother 

had a dependence on such medications.  Patient reports that she has made some 

personal adjustments and this has helped improve her mood.  She denies suicidal 

ideation and considers that she is no longer presently depressed.


-Monitor patient mood and encourage expression





8. Back pain: Symptom management/ supportive care. pt reports she typically t

akes Tylenol for this.  





9. Obesity: Complicates care. Also, consideration of lymphedema component to BLE

edema given body habitus. 





DVT Px: Continue Eliquis


CODE STATUS: Full code


Disposition planning: Home pending clinical improvement





Plan / VTE


VTE Prophylaxis Ordered?:  Yes











SELAM GUTIERREZ NP         Dec 2, 2021 21:41

## 2021-12-02 NOTE — CCD
Continuity of Care Document (CCD)

                             Created on: 10/19/2021



Juany Becerril

External Reference #: [id number]

: 1955

Sex: Female



Demographics





                          Address                   [address] [facility] [facility] [address] [facility] [address]

[place]  [address]

 

                          Home Phone                [phone]

 

                          Preferred Language        Unknown

 

                          Marital Status            Unknown

 

                          Mormon Affiliation     Unknown

 

                          Race                      White

 

                          Ethnic Group              Not  or 





Author





                          Author                    Juany ESQUEDA PA

 

                          Organization              Unknown

 

                          Address                   [address] [facility] [address]

[place]  [address]



 

                          Phone                     [phone]







Care Team Providers





                    Care Team Member Name Role                Phone

 

                    Yuri Maier MD       AUTM                [phone]

 

                    [facility]  AUTM                [phone]

 

                    John Lutz MD AUTM                Unavailable

 

                    [facility] Hematology/Oncology AUTM                [phone]

 

                    Hansa Hernandez FNP  AUTM                [phone]







Problems





                    Active Problems     Provider            Date

 

                    Chronic atrial fibrillation Protime             Onset: 

 

                    Essential hypertension Juany Vincent FNP Onset: 2017

 

                    Deep venous thrombosis of lower extremity Juany Vincent FN P Onset: 2017

 

                    Pure hypercholesterolemia Juany Vincent FNP Onset: 

017

 

                    Anxiety state       Juany Vincent FNP Onset: 2017

 

                    Chronic pulmonary embolism Juany Vincent FNP Onset: 2017

 

                    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 

2017

 

                    Gastroesophageal reflux disease Juany Vincent FNP Onset: 1

2017

 

                    Scoliosis deformity of spine Juany Vincent FNP Onset: 2017

 

                    Chronic tension-type headache Juany Vincent FNP Onset: 2017

 

                    Varicose veins of lower extremity Juany Vincent FNP Onset:

 2017

 

                    Tobacco user        Juany Vincent FNP Onset: 2017

 

                    Hypercalcemia       Juany Vincent FNP Onset: 2017







Social History





                Type            Date            Description     Comments

 

                Birth Sex                       Unknown          

 

                ETOH Use                        Consumes 3 glasses of wine per w

[place]  

 

                Tobacco Use     Start: Unknown  Patient is a current smoker, smo

kes every day STARTED

AGE 30 1-3 CIGARETTES A DAY 







Allergies and adverse reactions





             Active Allergies Criticality  Reaction | Severity Comments     Date

 

             Ibuprofen    Unable to assess criticality STOMACH UPSET HEADACHE mo

david       10/17/2017

 

             Latex        Unable to assess criticality rash, itches | Mild      

        10/17/2018







Medications





           Active Medications SIG        Qnty       Indications Ordering Provide

r Date

 

                          Cephalexin                     500mg Capsules         

          take one tablet 

by mouth 3 times daily x 7 days 21caps                          Julio kothari JR PA 10/06/2021

 

                          Excedrin Migraine                     [id number] Tab

lets                   1 

as needed h/a as needed mdd=1                                 Charmaine Regalado M.D. 2021

 

                          Famotidine                     20mg Tablets           

        1 by mouth every 

day             90tabs                          Charmaine Regaaldo M.D. 20

21

 

                          Furosemide                     20mg Tablets           

        1 by mouth twice a

day             180tabs                         Charmaine Regalado M.D. 20

21

 

                                        Bupropion Hydrochloride ER (XL)         

            150mg Tablets ER 24HR       

                take 1 tablet by mouth in the morning 90tabs                    

      Charmaine Regalado M.D.                                    2021

 

                          Eliquis                     5mg Tablets               

    take one tablet by 

mouth twice a day 180tabs                         Charmaine Regalado M.D. 2020

 

                          Methimazole                     5mg Tablets           

        2 every day by 

mouth           180tabs                         Charmaine Regalado M.D. 20

20

 

                          Shingrix                     50mcg/0.5ML Suspension Re

c                   

administer at pharmacy 1units                          Juany Vincent FNP 

 

                          Voltaren                     1% Gel                   

apply up to 4 grams three 

times a day to affected knee as needed 100gm                           Charmaine Regalado M.D. 

2018

 

                          Aspirin Adult Low Dose                     81mg Tablet

s DR                   1 

by mouth every day                                 Juany Vincent FNP 10/17/201

7

 

                                        Acetaminophen Extra Strength            

         500mg Tablets                  

             take 2 tabs every 6 hours as needed                           Juany Fung FNP 10/17/2017

 

                                        Womens 50+ Multi Vitamin & Mineral Formu

la                      Tablets         

             1 every day PO Vit A=1786If PS=223                           Juany Vincent FNP 10/17/2017

 

                          Metoprolol Tartrate                     25mg Tablets  

                 Take One 

Tablet By Mouth Twice A Day 180tabs                         ABELARDO Saleem 10/17/2017

 

                          Atorvastatin Calcium                     20mg Tablets 

                  take one

tablet by mouth every day 90tabs                          JANE Saleem 10/17/2017

 

                          Flecainide Acetate                     100mg Tablets  

                 take one 

tablet by mouth twice a day 180tabs                         ABELARDO Saleem 10/17/2017

 

                          Digoxin                     250mcg Tablets            

       1 by mouth every 

day             90tabs                          Charmaine Regalado M.D. 







Medications Administered in Office





           Medication SIG        Qnty       Indications Ordering Provider Date

 

                          Administration Of Flu Vaccine                      Inj

ection                    

                                                AIXA Hoffmann JR 10/06/2

021

 

                          Administration Of Flu Vaccine                      Inj

karen Regalado M.D. 10/15/20

20

 

                          Administration Of Flu Vaccine                      Inj

diyaion                    

                                                Juany Vincent FNP 10/17/2019

 

                          Administration Of Flu Vaccine                      Inj

ection                    

                                                Juany Vincent FNP 10/17/2018

 

                          Administration Of Flu Vaccine                      Inj

Juany Alonso FNP 10/17/2017







Immunizations





             CPT Code     Status       Date         Vaccine      Lot #

 

                [id number]           Given           10/06/2021      Influenza Vaccin

e Quadrivalent Preser/Antibiotic Free Im 

Use                                     [id number]

 

                [id number]           Given           10/15/2020      Influenza Vaccin

e Quadrivalent Preser/Antibiotic Free Im 

Use                                     [id number]

 

                01170           Given           2020      Shingrix Zoster 

Vaccine (HZV), Recombinant, Subunit, 

Adjuvanted                               

 

                [id number]           Given           10/17/2019      Influenza Vaccin

e Quadrivalent Preser/Antibiotic Free Im 

Use                                     [id number]

 

                57099           Given           10/17/2018      Influenza Virus 

Vaccine, Quadrivalent (Cciiv4), Derived 

From Cell                               [id number]

 

             71743        Given        2018   Pneumovax 23 [id number]

 

                78929           Given           10/17/2017      Influenza Vaccin

e Quadrivalent Preser/Antibiotic Free Im 

Use                                     [id number]







Vital Signs





                Date            Vital           Result          Comment

 

                10/19/2021  3:12pm BP Systolic     122 mmHg         

 

                    BP Diastolic        76 mmHg              

 

                    Heart Rate          86 /min              

 

                    Height              66 inches           5'6"

 

                    Weight              223.00 lb            

 

                    BMI (Body Mass Index) 36.0 kg/m2           

 

                10/06/2021  2:17pm BP Systolic     124 mmHg         

 

                    BP Diastolic        68 mmHg              

 

                    Heart Rate          86 /min              

 

                    Height              66 inches           5'6"

 

                    Weight              217.00 lb            

 

                    BMI (Body Mass Index) 35.0 kg/m2           







Results





        Test    Acquired Date Facility Test    Result  H/L     Range   Note

 

                    Laboratory test finding 10/06/2021          [facility]

                                        [address] [place] [address] [phone] Wound Culture <pending>                         

 

                    CBC With Differential 2021          [facility]

                                        [address] [place] [address] [phone] White Blood Count 6.8 10     Normal     4.0-10.0    

 

             Red Blood Count 3.85 10      Low          4.00-5.40     

 

             Hemoglobin   12.7 g/dL    Normal       12.0-15.5     

 

             Hematocrit   39.4 %       Normal       36.0-47.0     

 

             Mean Corpuscular Volume 102.3 fl     High         80.0-96.0     

 

             Mean Corpuscular Hemoglobin 33.0 pg      Normal       27.0-33.0    

 

 

             Mean Corpuscular HGB Conc 32.2 g/dL    Normal       32.0-36.5     

 

             Red Cell Distribution Width 12.3 %       Normal       11.5-14.5    

 

 

             Platelet Count, Automated 187 10       Normal       150-450       

 

             Neutrophils % 72.2 %       High         36.0-66.0     

 

             Lymph %      19.9 %       Low          24.0-44.0     

 

             Mono %       6.0 %        Normal       2.0-8.0       

 

             Eos %        1.5 %        Normal       0.0-3.0       

 

             Baso %       0.3 %        Normal       0.0-1.0       

 

             Immature Granulocyte % 0.1 %        Normal       0-3.0         

 

             Nucleated Red Blood Cell % 0.0 %        Normal       0-0           

 

             Neutrophils # 4.9 10       Normal       1.5-8.5       

 

             Lymph #      1.4 10       Low          1.5-5.0       

 

             Mono #       0.4 10       Normal       0.0-0.8       

 

             Eos #        0.1 10       Normal       0.0-0.5       

 

             Baso #       0.0 10       Normal       0.0-0.2       

 

                    PT & Aptt           2021          [facility]

nter

                                        [address] [place] [address] [phone] Prothrombin Time 13.8 seconds Normal     12.5-14.3   

 

             Inr          1.04         Normal                    1

 

             Partial Thromboplastin Time 30.6 seconds Normal       24.2-38.5    

 

 

                    CBC With Differential 2021          [address] [address] [phone] White Blood Count 6.2 10     Normal     4.0-10.0    

 

             Red Blood Count 3.76 10      Low          4.00-5.40     

 

             Hemoglobin   12.5 g/dL    Normal       12.0-15.5     

 

             Hematocrit   39.0 %       Normal       36.0-47.0     

 

             Mean Corpuscular Volume 103.7 fl     High         80.0-96.0     

 

             Mean Corpuscular Hemoglobin 33.2 pg      High         27.0-33.0    

 

 

             Mean Corpuscular HGB Conc 32.1 g/dL    Normal       32.0-36.5     

 

             Red Cell Distribution Width 12.9 %       Normal       11.5-14.5    

 

 

             Platelet Count, Automated 199 10       Normal       150-450       

 

             Neutrophils % 74.5 %       High         36.0-66.0     

 

             Lymph %      16.4 %       Low          24.0-44.0     

 

             Mono %       7.0 %        Normal       2.0-8.0       

 

             Eos %        1.1 %        Normal       0.0-3.0       

 

             Baso %       0.5 %        Normal       0.0-1.0       

 

             Immature Granulocyte % 0.5 %        Normal       0-3.0         

 

             Nucleated Red Blood Cell % 0.0 %        Normal       0-0           

 

             Neutrophils # 4.6 10       Normal       1.5-8.5       

 

             Lymph #      1.0 10       Low          1.5-5.0       

 

             Mono #       0.4 10       Normal       0.0-0.8       

 

             Eos #        0.1 10       Normal       0.0-0.5       

 

             Baso #       0.0 10       Normal       0.0-0.2       

 

                    Comprehensive Metabolic Profil 2021          [address] [address] [phone] Glucose, Fasting 98 mg/dL   Normal           

 

             Blood Urea Nitrogen 28 mg/dL     High         7-18          

 

             Creatinine For GFR 0.90 mg/dL   Normal       0.55-1.30     

 

             Glomerular Filtration Rate > 60.0       Normal       >45          2

 

             Sodium Level 137 mEq/L    Normal       136-145       

 

             Potassium Serum 4.7 mEq/L    Normal       3.5-5.1       

 

             Chloride Level 107 mEq/L    Normal               

 

             Carbon Dioxide Level 27 mEq/L     Normal       21-32         

 

             Anion Gap    3 mEq/L      Low          8-16          

 

             Calcium Level 10.2 mg/dL   Normal       8.8-10.2      

 

             Ast/Sgot     23 U/L       Normal       7-37          

 

             Alt/SGPT     19 U/L       Normal       12-78         

 

             Alkaline Phosphatase 77 U/L       Normal               

 

             Bilirubin,Total 0.5 mg/dL    Normal       0.2-1.0       

 

             Total Protein 8.3 GM/DL    High         6.4-8.2       

 

             Albumin      3.0 GM/DL    Low          3.2-5.2       

 

             Albumin/Globulin Ratio 0.6          Low          1.2-2.2       

 

                    Complete Blood Count 2021          [facility]

s, pc

: Dr Simon [place]

[place] [address] [phone] WBC        5.9 x10*3/UL            4.1 - 10.9  

 

             RBC          4.11 x10*6/UL Low          4.20 - 6.30   

 

             Hemoglobin   13.6 g/dL                 12.0 - 18.0   

 

             Hematocrit   40.0 %                    37.0 - 51.0   

 

             MCV          97.4 fL      High         80.0 - 97.0   

 

             MCH          33.1 pg      High         26.0 - 32.0   

 

             MCHC         34.0 g/dL                 31.0 - 38.0   

 

             RDW          13.2 %                    11.6 - 13.7   

 

             PLT          209 x10*3/UL              140 - 440     

 

             MPV          8.1 FL                    7.8 - 11.0    

 

             Lymph %      25.6 %                    10.0 - 58.5   

 

             Mid %        7.5 %                     1.7 - 9.3     

 

             Neut %       66.9 %                    37.0 - 92.0   

 

             Lymph #      1.5 x10*3/UL              0.6 - 4.1     

 

             Mid #        0.5 x10*3/UL              0.1 - 0.6     

 

             Neut #       3.9 x10*3/UL              2.0 - 7.8     

 

                    Laboratory test finding 2021          [facility]

[facility], [facility]

: [place] [address] [phone] Sed Rate   25 mm/hr   High       0 - 15      

 

                    Basic Metabolic Panel 2021          [facility]

ts, pc

: [place] [address] [phone] Glucose    100 mg/dL  High       74 - 99    3

 

             BUN          18 mg/dL                  7 - 18        

 

             Creatinine   0.8 mg/dL                 0.6 - 1.3     

 

             Sodium       141 mEq/L                 136 - 145     

 

             Potassium    3.8 mEq/L                 3.5 - 5.1     

 

             Chloride     104 mEq/L                 98 - 107      

 

             Carbon Dioxide 29 mEq/L                  21 - 32       

 

             Calcium      10.8 mg/dL   High         8.5 - 10.1   4

 

             GFR  >= 60 mL/min              >60           

 

             GFR African American >= 60 mL/min              >60          5

 

                    Laboratory test finding 2021          [facility]

[facility], [facility]

: Dr Simon Hoyt

[place] [address] [phone] Thyroid Stimulating Hormone 0.19 uIU/mL Low        0.3

6 - 3.74  

 

                    Complete Blood Count 2021          [facility]

s, pc

: Dr Simon Hoyt

[place] [address] [phone] WBC        8.4 x10*3/UL            4.1 - 10.9  

 

             RBC          4.20 x10*6/UL              4.20 - 6.30   

 

             Hemoglobin   14.0 g/dL                 12.0 - 18.0   

 

             Hematocrit   40.3 %                    37.0 - 51.0   

 

             MCV          95.8 fL                   80.0 - 97.0   

 

             MCH          33.3 pg      High         26.0 - 32.0   

 

             MCHC         34.7 g/dL                 31.0 - 38.0   

 

             RDW          13.3 %                    11.6 - 13.7   

 

             PLT          207 x10*3/UL              140 - 440     

 

             MPV          8.0 FL                    7.8 - 11.0    

 

             Lymph %      17.5 %                    10.0 - 58.5   

 

             Mid %        4.8 %                     1.7 - 9.3     

 

             Neut %       77.7 %                    37.0 - 92.0   

 

             Lymph #      1.4 x10*3/UL              0.6 - 4.1     

 

             Mid #        0.5 x10*3/UL              0.1 - 0.6     

 

             Neut #       6.5 x10*3/UL              2.0 - 7.8     

 

                    Laboratory test finding 2021          [facility]

[facility]

: Dr Simon Hoyt

[place] [address] [phone] Sed Rate   45 mm/hr   High       0 - 15      

 

             Magnesium    1.8 mg/dL                 1.8 - 2.4     

 

                    Basic Metabolic Panel 2021          [facility]

ts, pc

: Dr Simon Hoyt

[place] [address] [phone] Glucose    70 mg/dL   Low        74 - 99    6

 

             BUN          23 mg/dL     High         7 - 18        

 

             Creatinine   0.9 mg/dL                 0.6 - 1.3     

 

             Sodium       144 mEq/L                 136 - 145     

 

             Potassium    3.8 mEq/L                 3.5 - 5.1     

 

             Chloride     104 mEq/L                 98 - 107      

 

             Carbon Dioxide 32 mEq/L                  21 - 32       

 

             Calcium      10.1 mg/dL                8.5 - 10.1    

 

             GFR  >= 60 mL/min              >60           

 

             GFR African American >= 60 mL/min              >60          7

 

                    Lipid Profile       2021          [facility]

, pc

: Dr Simon Hoyt

[place] [address] [phone] Cholesterol 132 mg/dL             131 - 200   

 

             Triglycerides 72 mg/dL                  30 - 150      

 

             HDL Cholesterol 54 mg/dL                  35 - 60       

 

             LDL (Calculated) 64 CALC                   50 - 159      

 

                    Laboratory test finding 2021          [facility]

[facility], [facility]

: Dr Simon Hoyt

[place] [address] [phone] Thyroid Stimulating Hormone 0.32 uIU/mL Low        0.3

6 - 3.74  

 

                    Laboratory test finding 2021          [facility]

                                        [address] [place] [address] [phone] Digoxin Level 0.3 NG/ML  Low        0.5-2.0    8







                          1                         THERAPUTIC HUMAN INR VALUES

INDICATIONS                      NORMAL RANGES

PROPHYLAXIS/TREATMENT OF:

VENOUS THROMBOSIS                2.0-3.0

PULMONARY EMBOLISM               2.0-3.0

PREVENTION OF SYSTEMIC EMBOLISM FROM:

TISSUE HEART VALVES              2.0-3.0

ACUTE MYOCARDIAL INFARCTION      2.0-3.0

VALVULAR HEART DISEASE           2.0-3.0

ATRIAL FIBRILLATION              2.0-3.0

MECHANICAL VALVES(HIGH RISK)     2.5-3.5

RECURRENT MYOCARDIAL INFARCTION  2.5-3.5



 

                          2                         Units are mL/min/1.73 m2



Chronic Kidney Disease Staging per NKF:



Stage I & II   GFR >=60       Normal to Mildly Decreased

Stage III      GFR 30-59      Moderately Decreased

Stage IV       GFR 15-29      Severely Decreased

Stage V        GFR <15        Very Little GFR Left

ESRD           GFR <15 on RRT



 

                          3                         100-125 mg/dL     PRE-DIABET

ES/FASTING

>126 mg/dL          DIABETES/FASTING



 

                          4                         NOTE:

CALCIUM,TSH VERIFIED







 

                          5                         CHRONIC KIDNEY DISEASE STAGI

NG PER NKF



STAGE I & II      GFR >= 60        NORMAL TO MILDLY DECREASED

STAGE III          GFR 30-59          MODERATELY DECREASED

STAGE IV           GFR 15-29         SEVERELY DECREASED

STAGE V            GFR <15            VERY LITTLE GFR LEFT

ESRD                 GFR <15            ON RRT



 

                          6                         100-125 mg/dL     PRE-DIABET

ES/FASTING

>126 mg/dL          DIABETES/FASTING



 

                          7                         CHRONIC KIDNEY DISEASE STAGI

NG PER NKF



STAGE I & II      GFR >= 60        NORMAL TO MILDLY DECREASED

STAGE III          GFR 30-59          MODERATELY DECREASED

STAGE IV           GFR 15-29         SEVERELY DECREASED

STAGE V            GFR <15            VERY LITTLE GFR LEFT

ESRD                 GFR <15            ON RRT



 

                          8                         note:<nlbl:[email or web address]

ed>









Procedures





                Date            Code            Description     Status

 

                10/06/2021      34387           Office/Outpatient Established Lo

w MDM 20-29 Min Completed

 

                    2021          71473               Chronic Care MGMT 20

 Mins Clinical Staff Time Per Calendar 

Month                                   Completed

 

                2021      74642           Chronic Care Management Services

 Ea Addl 20 Min Completed

 

                2021      69066           Complex Chronic Care MGMT Servic

e Ea Addl 30 Min Completed

 

                2021      08151           Complex Chronic Care Management 

SVC [address] 60 Min Completed

 

                2021      86603           Complex Chronic Care MGMT Servic

e Ea Addl 30 Min Completed

 

                2021      95943           Complex Chronic Care Management 

SVC [address] 60 Min Completed

 

                2021      53217           Office/Outpatient Established Mo

d MDM 30-39 Min Completed

 

                2021      76189           [facility] MGMT Serv[facility]

e Ea Addl 30 Min Completed

 

                2021      07398           Complex Chronic [facility] Management 

SVC 1St 60 Min Completed

 

                2021      06960           [facility] MGMT Servic

e Ea Addl 30 Min Completed

 

                2021      38340           [facility] Management 

SVC 1St 60 Min Completed

 

                2021      70267           Office/Outpatient Established Mo

d MDM 30-39 Min Completed

 

                2021      [id number]       Bone Mineral Density Test Comple

veronika

 

                2021      [id number]        Mammogram       Completed







Medical Devices





                                        Description

 

                                        No Information Available







Encounters





           Type       Date       Location   Provider   Dx         Diagnosis

 

                Office Visit    10/06/2021  2:20p [facility]                        S81.802A                  Unspecified open wound, left

 lower leg, initial encounter

 

                          Z23                       Encounter for immunization

 

                Office Visit    2021  1:30p [facility] Jeffers M.D.                      I48.20                    Chronic atrial fibrillation,

 unspecified

 

                          Z79.01                    Long term (current) use of a

nticoagulants

 

                          R60.9                     Edema, unspecified

 

                          I10                       Essential (primary) hyperten

darci

 

                          E05.90                    Thyrotoxicosis, unsp without

 thyrotoxic crisis or storm

 

                          I82.502                   Chronic embolism and thombos

 unsp deep veins of l low extrem

 

                          F17.210                   Nicotine dependence, cigaret

svitlana, uncomplicated

 

                          F32.89                    Other specified depressive e

pisodes

 

                          D47.2                     Monoclonal gammopathy

 

                          K21.9                     Gastro-esophageal reflux dis

ease without esophagitis

 

                          M19.90                    Unspecified osteoarthritis, 

unspecified site

 

                          E78.00                    Pure hypercholesterolemia, u

nspecified

 

                Office Visit    2021  1:30p [facility] M.D.                      I48.20                    Chronic atrial fibrillation,

 unspecified

 

                          I10                       Essential (primary) hyperten

darci

 

                          E05.90                    Thyrotoxicosis, unsp without

 thyrotoxic crisis or storm

 

                          I82.502                   Chronic embolism and thombos

 unsp deep veins of l low extrem

 

                          Z79.01                    Long term (current) use of a

nticoagulants

 

                          F17.210                   Nicotine dependence, cigaret

svitlana, uncomplicated

 

                          F32.89                    Other specified depressive e

pisodes

 

                          E83.52                    Hypercalcemia

 

                          D47.2                     Monoclonal gammopathy

 

                          K21.9                     Gastro-esophageal reflux dis

ease without esophagitis

 

                          M15.9                     Polyosteoarthritis, unspecif

ied

 

                          E78.00                    Pure hypercholesterolemia, u

nspecified







Assessments





                Date            Code            Description     Provider

 

                10/06/2021      S81.802A        Unspecified open wound, left low

er leg, initial encounter 

AIXA Hoffmann JR

 

                10/06/2021      Z23             Encounter for immunization AIXA Joseph JR

 

                2021      I10             Essential (primary) hypertension

 Charmaine Regalado M.D.

 

                2021      K21.9           Gastro-esophageal reflux disease

 without esophagitis Charmaine Regalado M.D.

 

                2021      E78.00          Pure hypercholesterolemia, unspe

cified Charmaine Regalado M.D.

 

                2021      I10             Essential (primary) hypertension

 Charmaine Regalado M.D.

 

                2021      K21.9           Gastro-esophageal reflux disease

 without esophagitis Charmaine Regalado M.D.

 

                2021      E78.00          Pure hypercholesterolemia, unspe

cified Charmaine Regalado M.D.

 

                2021      M15.9           Polyosteoarthritis, unspecified 

Charmaine Regalado M.D.

 

                2021      I10             Essential (primary) hypertension

 Charmaine Regalado M.D.

 

                2021      K21.9           Gastro-esophageal reflux disease

 without esophagitis Charmaine Regalado M.D.

 

                    2021          I82.502             Chronic embolism and

 thrombosis of unspecified deep veins of 

left lower extremity                    Charmaine Regalado M.D.

 

                2021      I48.20          Chronic atrial fibrillation, uns

pecminoo Regalado M.D.

 

                2021      Z79.01          Long term (current) use of antic

oagulants Charmaine Regalado M.D.

 

                2021      R60.9           Edema, unspecified Charmaine guido M.D.

 

                2021      I10             Essential (primary) hypertension

 Charmaine Regalado M.D.

 

                2021      E05.90          Thyrotoxicosis, unspecified with

out thyrotoxic crisis or sto 

Charmaine Regalado M.D.

 

                    2021          I82.502             Chronic embolism and

 thrombosis of unspecified deep veins of 

left lower extremity                    Charmaine Regalado M.D.

 

                2021      F17.210         Nicotine dependence, cigarettes,

 uncomplicated Charmaine Regalado M.D.

 

                2021      F32.89          Other specified depressive episo

emil Charmaine Regalado M.D.

 

                2021      D47.2           Monoclonal gammopathy Charmaine duron M.D.

 

                2021      K21.9           Gastro-esophageal reflux disease

 without esophagitis Charmaine Regalado M.D.

 

                2021      M19.90          Unspecified osteoarthritis, unsp

ecified site Charmaine Regalado M.D.

 

                2021      E78.00          Pure hypercholesterolemia, unspe

cified Charmaine Regalado M.D.

 

                2021      I10             Essential (primary) hypertension

 Charmaine Regalado M.D.

 

                2021      K21.9           Gastro-esophageal reflux disease

 without esophagitis Charmaine Regalado M.D.

 

                2021      E78.00          Pure hypercholesterolemia, unspe

cified Charmaine Regalado M.D.

 

                2021      I48.20          Chronic atrial fibrillation, uns

pecified Charmaine Regalado M.D.

 

                2021      F17.210         Nicotine dependence, cigarettes,

 uncomplicated Charmaine Regalado M.D.

 

                2021      K21.9           Gastro-esophageal reflux disease

 without esophagitis Charmaine Regalado M.D.

 

                2021      E78.00          Pure hypercholesterolemia, unspe

cisonia Regalado M.D.

 

                2021      I48.20          Chronic atrial fibrillation, uns

pecminoo Regalado M.D.

 

                2021      I10             Essential (primary) hypertension

 Charmaine Regalado M.D.

 

                2021      E05.90          Thyrotoxicosis, unspecified with

out thyrotoxic crisis or sto 

Charmaine Regalado M.D.

 

                    2021          I82.502             Chronic embolism and

 thrombosis of unspecified deep veins of 

left lower extremity                    Charmaine Regalado M.D.

 

                2021      Z79.01          Long term (current) use of antic

oagulants Charmaine Regalado M.D.

 

                2021      F17.210         Nicotine dependence, cigarettes,

 uncomplicated Charmaine Regalado M.D.

 

                2021      F32.89          Other specified depressive episo

emil Charmaine Regalado M.D.

 

                2021      E83.52          Hypercalcemia   Charmaine xavier M.D.

 

                2021      D47.2           Monoclonal gammopathy Charmaine duron M.D.

 

                2021      K21.9           Gastro-esophageal reflux disease

 without esophagitis Charmaine Regalado M.D.

 

                2021      M15.9           Polyosteoarthritis, unspecified 

Charmaine Regalado M.D.

 

                2021      E78.00          Pure hypercholesterolemia, unspe

cified Charmaine Regalado M.D.







Plan of Treatment

Future Appointment(s):* 2021  3:15 pm - Charmaine Regalado M.D. at 
  [facility]





Functional Status





                                        Description

 

                                        No Information Available







Mental Status





                                        Description

 

                                        No Information Available







Referrals





                Refer to Dr     Reason for Referral Status          Appt Date

 

                          Stillerman,James MD       STAT CONSULT FOR OPEN WOUND 

LT LEG PLEASE LET OFFICE KNOW 

WHEN APPT IS MADE         Created                   

 

                                        [facility]

 

                                        [address]

 

                                        [place]  [address]

 

                                        [phone]

 

                                          

 

                Rock Werner MD CONSULT FOR PVD WITH OPEN WOUND LT LEG  Patient

 Declined 

10/20/2021

 

                                        [facility]

 

                                        [address] ,[address]

 

                                        [facility] [address]

 

                                        [phone]

## 2021-12-02 NOTE — CCD
Continuity of Care Document (CCD)

                             Created on: 2021



Juany Becerril

External Reference #: [id number]

: 1955

Sex: Female



Demographics





                          Address                   [address] [place] [address]  [address]

 

                          Home Phone                [phone]

 

                          Preferred Language        Unknown

 

                          Marital Status            

 

                          Advent Affiliation     Unknown

 

                          Race                      White

 

                          Ethnic Group              Not  or 





Author





                          Author                    Juany PRUETT PA

 

                          Organization              Unknown

 

                          Address                   [address] [place] [address], [address]

[place]  [address]



 

                          Phone                     [phone]







Care Team Providers





                    Care Team Member Name Role                Phone

 

                    Charmaine Regalado MD  AUTM                [phone]

 

                    Lupe Arredondo MD AUTM                [phone]







Problems





                    Active Problems     Provider            Date

 

                    Precordial pain     Jermaine Chahal MD  Onset: 2019

 

                    Dyspnea             Jermaine Chahal MD  Onset: 2019

 

                    Palpitations        Jermaine Chahal MD  Onset: 2019

 

                    Paroxysmal atrial fibrillation Jermaine Chahal MD  Onset: 

 

                    Electrocardiogram abnormal Jermaine Chahal MD  Onset: 2019

 

                    Cardiomegaly        Jermaine Chahal MD  Onset: 2019

 

                    Right bundle branch block Jermaine Chahal MD  Onset: 

019

 

                    Dietary management surveillance Jermaine Chahal MD  Onset: 1

2019

 

                    Obstructive sleep apnea syndrome Jermaine Chahal MD  Onset: 

2019

 

                    Syncope and collapse Jermaine Chahal MD  Onset: 2019

 

                    Chronic diastolic heart failure Jermaine Chahal MD  Onset: 0

3/09/2020

 

                    Chronic pulmonary heart disease Jermaine Chahal MD  Onset: 0

3/09/2020

 

                          Benign hypertensive heart disease with congestive card

iac failure Jermaine Chahal MD                              Onset: 2020

 

                    Mitral valve disorder Jermaine Chahal MD  Onset: 2020

 

                    Chronic cor pulmonale AIXA Dennis Onset: 20

21

 

                    Mixed hyperlipidemia AIXA Dennis Onset: 

1

 

                    Deep venous thrombosis of lower extremity AIXA Dennis Onset: 

2021

 

                    Carotid artery occlusion AIXA Dennis Onset: 







Social History





                Type            Date            Description     Comments

 

                Birth Sex                       Unknown          

 

                ETOH Use                        Occasionally consumes wine  

 

                Tobacco Use     Start: Unknown  Patient is a current smoker, smo

kes every day Started

smoking at age 27, smokes 3 cigarettes a day 

 

                Smoking Status  Reviewed: 21 Patient is a current smoker, 

smokes every day 

Started smoking at age 27, smokes 3 cigarettes a day 

 

                Exercise Type/Frequency                 Walks daily     in good 

weather  

 

                Exercise Type/Frequency                 Does housework daily  

 

                Exercise Limitations                 Back Pain        

 

                Exercise Limitations                 Orthopedic Problem Knee gary

n 

 

                Exercise Limitations                 Claudication     







Allergies, Adverse Reactions, Alerts





             Active Allergies Criticality  Reaction | Severity Comments     Date

 

             Motrin       Unable to assess criticality              GI Upset    

 2019







Medications





           Active Medications SIG        Qnty       Indications Ordering Provide

r Date

 

                          Eliquis                     5mg Tablets               

    1 by mouth twice a day

                                                Charmaine Regalado MD 2021

 

                    Digox                     250mcg Tablets                   1

 by mouth every day 

90tabs                                  Jermaine Chahal MD  2020

 

                          Famotidine                     40mg Tablets           

        1 tablet daily at 

bedtime         30tabs          R07.2           Jermaine Chahal MD 2019

 

                          Acetaminophen                     500mg Tablets       

            1-2 by mouth 

every 6 hours as needed                                 Unknown         20

19

 

                          Atorvastatin Calcium                     20mg Tablets 

                  1 by 

mouth every night at bedtime                                 Unknown         2019

 

                One Daily                      Tablets                   1 by mo

uth every day                 

                          Unknown                   2019

 

                          Methimazole                     10mg Tablets          

         1 by mouth every 

morning                                         Unknown         2019

 

                          Vitamin E Blend                     400Unit Capsules  

                 1 by 

mouth every day                                 Unknown         2019

 

                          Flecainide Acetate                     100mg Tablets  

                 take one 

tablet by mouth twice a day 180tabs                         Jermaine Chahal MD 1

2019

 

                          Aspirin 81                     81mg Tablets DR        

           1 by mouth 

every day                                       Unknown         2019

 

                          Metoprolol Tartrate                     25mg Tablets  

                 1 by 

mouth twice a day                                 Unknown         2019

 

                          Mucus Relief DM                     20-400mg Tablets  

                 1 tab po 

at bedtime                                      Unknown         2019

 

                          Furosemide                     20mg Tablets           

        1 by mouth once a 

day                                             Unknown         







Immunizations





                                        Description

 

                                        No Information Available







Vital Signs





                Date            Vital           Result          Comment

 

                2021  1:02pm Weight          220.00 lb        

 

                    Height              65 inches           5'5"

 

                    BMI (Body Mass Index) 36.6 kg/m2           

 

                    Heart Rate          65 /min              

 

                    BP Systolic Sitting 136 mmHg            Ra, large cuff

 

                    BP Diastolic Sitting 84 mmHg             Ra, large cuff

 

                2020 12:43pm Weight          229.00 lb        

 

                    Height              65 inches           5'5"

 

                    BMI (Body Mass Index) 38.1 kg/m2           

 

                    Heart Rate          67 /min              

 

                    BP Systolic Sitting 126 mmHg            large cuff, Ra

 

                    BP Diastolic Sitting 74 mmHg             large cuff, Ra







Results





        Test    Acquired Date Facility Test    Result  H/L     Range   Note

 

                    CBC without Differential 2021          [facility] - not inter

faced

           (315)-   - White Blood Count 6.8                   5.0-10.0    

 

             Red Blood Count 3.85         Low          4.00-5.40     

 

             Platelets    187                       172-450       

 

             Hemoglobin   12.7                                    

 

             Hematocrit   39.4                                    

 

                    CBC without Differential 2021          [facility] - not inter

faced

           (315)-   - White Blood Count 6.2                   5.0-10.0    

 

             Red Blood Count 3.76         Low          4.00-5.40     

 

             Platelets    199                       172-450       

 

             Hemoglobin   12.5                                    

 

             Hematocrit   39.0                                    

 

                    CMP                 2021          [facility] - not interfaced

           (315)-   - Albumin Serum/Plasma 3.0                               

 

             Alt - SGPT   19                                      

 

             Calcium Ser/Plasma Mass/Vol 10.2                                   

 

 

             Carbon Dioxide Ser/Plasm 27                                      

 

             Chloride Serum/Plasma 107                                     

 

             Alkaline Phosphatase 77                                      

 

             Potassium    4.7                                     

 

             Protein Total 8.3                                     

 

             Sodium       137                                     

 

             Ast - Sgot   23                                      

 

             BUN - Urea Nitrogen 28                                      

 

             Glucose      98                                

 

             Creatinine For GFR 0.90                                    

 

                    Complete Blood Count 2021          [facility]

rt

             WBC          8.4 x10*3/UL              4.1-10.9      

 

             RBC          4.20 x10*6/UL              4.20-6.30     

 

             Hemoglobin   14.0 g/dL                 12.0-18.0     

 

             Hematocrit   40.3 %                    37.0-51.0     

 

             MCV          95.8 fL                   80.0-97.0     

 

             MCH          33.3 pg      High         26.0-32.0     

 

             MCHC         34.7 g/dL                 31.0-38.0     

 

             RDW          13.3 %                    11.6-13.7     

 

             PLT          207 x10*3/UL              140-440       

 

             MPV          8.0 FL                    7.8-11.0      

 

             Lymph %      17.5 %                    10.0-58.5     

 

             Mid %        4.8 %                     1.7-9.3       

 

             Neut %       77.7 %                    37.0-92.0     

 

             Lymph #      1.4 x10*3/UL              0.6-4.1       

 

             Mid #        0.5 x10*3/UL              0.1-0.6       

 

             Neut #       6.5 x10*3/UL              2.0-7.8       

 

                    Basic Metabolic Panel 2021          [facility]

ort

             Glucose      70 mg/dL     Low          74-99        1

 

             BUN          23 mg/dL     High         7-18          

 

             Creatinine   0.9 mg/dL                 0.6-1.3       

 

             Sodium       144 mEq/L                 136-145       

 

             Potassium    3.8 mEq/L                 3.5-5.1       

 

             Chloride     104 mEq/L                         

 

             Carbon Dioxide 32 mEq/L                  21-32         

 

             Calcium      10.1 mg/dL                8.5-10.1      

 

             GFR  >= 60 mL/min                             

 

             GFR African American >= 60 mL/min                            2

 

                    Lipid Profile       2021          [facility]

t

             Cholesterol  132 mg/dL                 131-200       

 

             Triglycerides 72 mg/dL                          

 

             HDL Cholesterol 54 mg/dL                  35-60         

 

             LDL (Calculated) 64 CALC                           

 

                    Laboratory test finding 2021          [facility] I

mport

             Thyroid Stimulating Hormone 0.32 uIU/mL  Low          0.36-3.74    

 

 

             Digoxin Level 0.3 ng/mL    Low          0.5-2.0      3







                          1                         100-125 mg/dL     PRE-DIABET

ES/FASTING

>126 mg/dL          DIABETES/FASTING





 

                          2                         CHRONIC KIDNEY DISEASE STAGI

NG PER NKF



STAGE I & II      GFR >= 60        NORMAL TO MILDLY DECREASED

STAGE III          GFR 30-59          MODERATELY DECREASED

STAGE IV           GFR 15-29         SEVERELY DECREASED

STAGE V            GFR <15            VERY LITTLE GFR LEFT

ESRD                 GFR <15            ON RRT





 

                          3                         note:<nlbl:demographic_chang

ed>











Procedures





                Date            Code            Description     Status

 

                2021      58859           Office/Outpatient Established Mo

d MDM 30-39 Min Completed

 

                2021      67935           ECG 12-Lead     Completed

 

                2021      80173           Office/Outpatient Established Mo

d MDM 30-39 Min Completed

 

                2021      40414           ECG 12-Lead     Completed







Medical Devices





                                        Description

 

                                        No Information Available







Encounters





                                        Description

 

                                        No Information Available







Assessments





                Date            Code            Description     Provider

 

                2021      I48.0           Paroxysmal atrial fibrillation C

AIXA Pablo

 

                2021      I50.32          Chronic diastolic (congestive) h

eart failure AIXA Dennis

 

                2021      I11.0           Hypertensive heart disease with 

heart failure Hortencia Pruett, PA

 

                2021      I27.81          Cor pulmonale (chronic) Isabelr

kang Pruett, PA

 

                2021      I65.29          Occlusion and stenosis of unspec

ified carotid artery Hortencia Pruett, PA

 

                2021      I34.0           Nonrheumatic mitral (valve) insu

fficiency Hortencia Pruett, 

PA

 

                2021      E78.2           Mixed hyperlipidemia Hortencia Pruett, PA

 

                    2021          I82.503             Chronic embolism and

 thrombosis of unspecified deep veins of 

lower extremity, bilateral              Hortencia Pruett, PA

 

                2021      R94.31          Abnormal electrocardiogram [ECG]

 [EKG] Hortencia Pruett, PA

 

                2021      Z71.3           Dietary counseling and surveilla

nce Hortencia Pruett PA

 

                2021      I48.0           Paroxysmal atrial fibrillation C

irck Pruett, PA

 

                2021      I50.32          Chronic diastolic (congestive) h

eart failure Hortencia Pruett, PA

 

                2021      I11.0           Hypertensive heart disease with 

heart failure Hortencia Pruett, PA

 

                2021      I27.81          Cor pulmonale (chronic) Miguel Pruett, PA

 

                2021      I65.29          Occlusion and stenosis of unspec

ified carotid artery Hortencia Pruett, PA

 

                2021      I34.0           Nonrheumatic mitral (valve) insu

fficiency Hortencia Pruett, 

PA

 

                2021      E78.2           Mixed hyperlipidemia Hortencia Pruett, PA

 

                    2021          I82.503             Chronic embolism and

 thrombosis of unspecified deep veins of 

lower extremity, bilateral              Hortencia Pruett, PA

 

                2021      R94.31          Abnormal electrocardiogram [ECG]

 [EKG] Hortencia Pruett, PA

 

                2021      Z71.3           Dietary counseling and surveilla

AIXA Cervantes







Plan of Treatment

2021 - AIXA Dennis* I48.0 Paroxysmal atrial fibrillation* 
  Recommendations:* Continue Eliquis, digoxin, flecainide, and metoprolol at the
   current dosages Patient agreeable to contact us with more frequent episodes 
  of tachycardia or palpitations, discussed Holter monitor therapy with patient 
  at this time she would like to refrain further evaluation





* I50.32 Chronic diastolic (congestive) heart failure* Recommendations:* 
  Continue digoxin, furosemide, and metoprolol at the current dosages Please 
  alert our office with a weight gain of more than 3 pounds, onset of shortness 
  of breath, or lower extremity edema





* I11.0 Hypertensive heart disease with heart failure* Recommendations:* 
  Continue metoprolol at the current dosage Advised patient to monitor blood 
  pressures at home and to alert our office with readings >140/>90





* I27.81 Cor pulmonale (chronic)* Recommendations:* No medication changes made 
  today Patient agreeable to contact us with the onset of any shortness of 
  breath or edema





* I65.29 Occlusion and stenosis of unspecified carotid artery* Recommendations:
  * Carotid ultrasound ordered for further evaluation Advised patient to seek 
  emergency medical attention if she develops any further lateralizing 
  neurologic symptoms





* I34.0 Nonrheumatic mitral (valve) insufficiency* Recommendations:* No further 
  evaluation is needed at this time





* E78.2 Mixed hyperlipidemia* Recommendations:* Please obtain fasting labs 
  Continue atorvastatin at the current dosage





* I82.503 Chronic embolism and thrombosis of unspecified deep veins of lower 
  extremity, bilateral* Referral:* Lupe Arredondo MD, Hematology & 
  Oncology/y



* Recommendations:* Referral made to Dr. Arredondo for further evaluation of 
  patient's probable coagulopathy Please obtain labs, to patient's knowledge she
   has never been assessed for protein C or protein S deficiency or for factor V
   Leiden mutation Advised patient to seek emergency medical attention if she 
  does believe she is having another DVT  or if she develops symptoms of 
  shortness of breath, hemoptysis, or chest pain





* R94.31 Abnormal electrocardiogram [ECG] [EKG]* Recommendations:* No further 
  evaluation is needed at this time.





* Z71.3 Dietary counseling and surveillance* Recommendations:* Recommended for 
  patient to follow a more whole food diet. Advised patient to avoid overly 
  processed foods and packaged foods. Advised patient to avoid sodas, juices and
   other liquid calories. Recommended at least 30 minutes of exercise 3 days a 
  week.





* All * Follow up:* Follow up in 6 months









Functional Status





                Functional Condition Comment         Date            Status

 

                Independent with all ADL's                                 Activ

e

 

                Requires assistance with ambulating                             

    Active







Mental Status





                                        Description

 

                                        No Information Available







Referrals





                                        Description

 

                                        No Information Available

## 2021-12-02 NOTE — CCD
Summarization Of Episode

                             Created on: 2021



BECERRILJUANY LINA

External Reference #: [id number]

: 1955

Sex: Undifferentiated



Demographics





                          Address                   [address]

[place]  [address]

 

                          Home Phone                [phone]

 

                          Preferred Language        English

 

                          Marital Status            Unknown

 

                          Samaritan Affiliation     Unknown

 

                          Race                      Unknown

 

                          Ethnic Group              Not  or 





Author





                          Author                    [facility]

 

                          Organization              [facility]

 

                          Address                   Unknown

 

                          Phone                     Unavailable







Support





                Name            Relationship    Address         Phone

 

                DREAD ESPARZA  Next Of Kin     Unknown         [phone]

 

                RE              Next Of Kin     Unknown         Unavailable

 

                RETIRED         Next Of Kin     Unknown         [phone]

 

                    ABBDREAD BETANCOURT     Next Of Kin         [address]

[place]  [address]                    [phone]

 

                    MARJAN VALLADARES     Next Of Kin         [address]

[place]  [address]                    [phone]

 

                    ELMARJAN WALLS     Next Of Kin         [address]

[place]  [address]                    [phone]

 

                DISABLED        Next Of Kin     Unknown         Unavailable

 

                    ALFRED MOSLEY    Next Of Kin         [address] [place]  [address]                    [phone]

 

                    Tamiko Nunezyla     ECON                [address] 

  [address]

[place]  [address]                    Unavailable







Care Team Providers





                    Care Team Member Name Role                Phone

 

                    JHONNY Meeks MD Unavailable         Unavailable

 

                    JHONNY Meeks MD Unavailable         Unavailable

 

                    JHONNY Meeks MD Unavailable         Unavailable

 

                    JHONNY Meeks MD Unavailable         Unavailable

 

                    JHONNY Meeks MD Unavailable         Unavailable

 

                    JHONNY Meeks MD Unavailable         Unavailable

 

                    JHONNY Meeks MD Unavailable         Unavailable

 

                    JHONNY Meeks MD Unavailable         Unavailable

 

                    JHONNY Meeks MD Unavailable         Unavailable

 

                    JHONNY Meeks MD Unavailable         Unavailable

 

                    JHONNY Meeks MD Unavailable         Unavailable

 

                    Fish, B Uzair PURCELL   Unavailable         Unavailable

 

                    Fish, B Uzair PURCELL   Unavailable         Unavailable

 

                    Fish, B Uzair PURCELL   Unavailable         Unavailable

 

                    Fish, B Uzair PURCELL   Unavailable         Unavailable

 

                    Fish, B Uzair PURCELL   Unavailable         Unavailable

 

                    Fish, B Uzair PURCELL   Unavailable         Unavailable

 

                    Fish, B Uzair PURCELL   Unavailable         Unavailable

 

                    Fish, B Uzair PURCELL   Unavailable         Unavailable

 

                    Fish, B Uzair PURCELL   Unavailable         Unavailable

 

                    Fish, B Uzair PURCELL   Unavailable         Unavailable

 

                    Fish, B Uzair PURCELL   Unavailable         Unavailable

 

                    Fish, B Uzair PURCELL   Unavailable         Unavailable

 

                    Fish, B Uzair PURCELL   Unavailable         Unavailable

 

                    Fish, B Uzair PURCELL   Unavailable         Unavailable

 

                    Fish, B Uzair PURCELL   Unavailable         Unavailable

 

                    Fish, B Uzair PURCELL   Unavailable         Unavailable

 

                    Fish, B Uzair PURCELL   Unavailable         Unavailable

 

                    Fish, B Uzair PURCELL   Unavailable         Unavailable

 

                    Fish, B Uzair PURCELL   Unavailable         Unavailable

 

                    Fish, B Uzair PURCELL   Unavailable         Unavailable

 

                    Fish, B Uzair PURCELL   Unavailable         Unavailable

 

                    Fish, B Uzair PURCELL   Unavailable         Unavailable

 

                    Fish, B Uzair PURCELL   Unavailable         Unavailable

 

                    Fish, B Uzair PURCELL   Unavailable         Unavailable

 

                    Fish, B Uzair PURCELL   Unavailable         Unavailable

 

                    Fish, B Uzair PURCELL   Unavailable         Unavailable

 

                    Fish, B Uzair PURCELL   Unavailable         Unavailable

 

                    Fish, B Uzair PURCELL   Unavailable         Unavailable

 

                    Fish, B Uzair PURCELL   Unavailable         Unavailable

 

                    Fish, B Uzair PURCELL   Unavailable         Unavailable

 

                    Fish, B Uzair PURCELL   Unavailable         Unavailable

 

                    Fish, B Uzair PURCELL   Unavailable         Unavailable

 

                    Fish, B Uzair PURCELL   Unavailable         Unavailable

 

                    Fish, B Uzair PURCELL   Unavailable         Unavailable

 

                    Fish, B Uzair PURCELL   Unavailable         Unavailable

 

                    Fish, B Uzair PURCELL   Unavailable         Unavailable

 

                    Fish, B Uzair PURCELL   Unavailable         Unavailable

 

                    Fish, B Uzair PURCELL   Unavailable         Unavailable

 

                    Fish, B Uzair PURCELL   Unavailable         Unavailable

 

                    Fish, B Uzair PURCELL   Unavailable         Unavailable

 

                    Fish, B Uzair PURCELL   Unavailable         Unavailable

 

                    Fish, B Uzair PURCELL   Unavailable         Unavailable

 

                    Fish, B Uzair PURCELL   Unavailable         Unavailable

 

                    Fish, B Uzair PURCELL   Unavailable         Unavailable

 

                    Fish, B Uzair PURCELL   Unavailable         Unavailable

 

                    Fish, B Uzair PURCELL   Unavailable         Unavailable

 

                    Fish, B Uzair PURCELL   Unavailable         Unavailable

 

                    Fish, B Uzair PURCELL   Unavailable         Unavailable

 

                    Fish, B Uzair PURCELL   Unavailable         Unavailable

 

                    Fish, B Uzair PURCELL   Unavailable         Unavailable

 

                    Fish, B Uzair PURCELL   Unavailable         Unavailable

 

                    Fish, B Uzair PURCELL   Unavailable         Unavailable

 

                    Fish, B Uzair PURCELL   Unavailable         Unavailable

 

                    Fish, B Uzair PURCELL   Unavailable         Unavailable

 

                    Fish, B Uzair PURCELL   Unavailable         Unavailable

 

                    Fish, B Uzair PURCELL   Unavailable         Unavailable

 

                    Fish, B Uzair PURCELL   Unavailable         Unavailable

 

                    Fish, Lina Josefina MPAS, PA-C Unavailable         Unavailabl

e

 

                    Fish, Lina Josefina MPAS, PA-C Unavailable         Unavailabl

e

 

                    Fish, Lina Josefina MPAS, PA-C Unavailable         Unavailabl

e

 

                    Fish, Lina Josefina MPAS, PA-C Unavailable         Unavailabl

e

 

                    Fish, Lina Josefina MPAS, PA-C Unavailable         Unavailabl

e

 

                    Fish, Lina Josefina MPAS, PA-C Unavailable         Unavailabl

e

 

                    Fish, Lina Josefina MPAS, PA-C Unavailable         Unavailabl

e

 

                    Fish, Lina Josefina MPAS, PA-C Unavailable         Unavailabl

e

 

                    Fish, Lina Josefina MPAS, PA-C Unavailable         Unavailabl

e

 

                    Fish, Lina Josefina MPAS, PA-C Unavailable         Unavailabl

e

 

                    Fish, Lina Josefina MPAS, PA-C Unavailable         Unavailabl

e

 

                    Fish, Lina Josefina MPAS, PA-C Unavailable         Unavailabl

e

 

                    Fish, Lina Josefina MPAS, PA-C Unavailable         Unavailabl

e

 

                    Fish, Lina Josefina MPAS, PA-C Unavailable         Unavailabl

e

 

                    Fish, Lina Josefina MPAS, PA-C Unavailable         Unavailabl

e

 

                    Fish, Lina Josefina MPAS, PA-C Unavailable         Unavailabl

e

 

                    Fish, Lina Josefina MPAS, PA-C Unavailable         Unavailabl

e

 

                    Fish, Lina Josefina MPAS, PA-C Unavailable         Unavailabl

e

 

                    Fish, Ilna Josefina MPAS, PA-C Unavailable         Unavailabl

e

 

                    Fish, Lina Josefina MPAS, PA-C Unavailable         Unavailabl

e

 

                    Fish, Lina Josefina MPAS, PA-C Unavailable         Unavailabl

e

 

                    Fish, Lina Josefina MPAS, PA-C Unavailable         Unavailabl

e

 

                    Fish, Lina Josefina MPAS, PA-C Unavailable         Unavailabl

e

 

                    Fish, Lina Josefina MPAS, PA-C Unavailable         Unavailabl

e

 

                    Fish, Lina Josefina MPAS, PA-C Unavailable         Unavailabl

e

 

                    Fish, Lina Josefina MPAS, PA-C Unavailable         Unavailabl

e

 

                    Fish, Lina Josefina MPAS, PA-C Unavailable         Unavailabl

e

 

                    Fish, Lina Josefina MPAS, PA-C Unavailable         Unavailabl

e

 

                    Fish, Lina Josefina MPAS, PA-C Unavailable         Unavailabl

e

 

                    Fish, Lina Josefina MPAS, PA-C Unavailable         Unavailabl

e

 

                    Fish, Lina Josefina MPAS, PA-C Unavailable         Unavailabl

e

 

                    Fish, Lina Josefina MPAS, PA-C Unavailable         Unavailabl

e

 

                    Fish, Lina Josefina MPAS, PA-C Unavailable         Unavailabl

e

 

                    Fish, Lina Josefina MPAS, PA-C Unavailable         Unavailabl

e

 

                    Fish, Lina Josefina MPAS, PA-C Unavailable         Unavailabl

e

 

                    Fish, Lina Josefina MPAS, PA-C Unavailable         Unavailabl

e

 

                    PICKJHONNY EVANS JR, PA-C Unavailable         Unavailable

 

                    PICKERAL JR, JHONNY DOMINGUEZ PA-C Unavailable         Unavailable

 

                    PICKERAL JRJHONNY PA-C Unavailable         Unavailable

 

                    PICKERAL JRJHONNY PA-C Unavailable         Unavailable

 

                    PICKERAL JRJHONNY PA-C Unavailable         Unavailable

 

                    PICKERAL JRJHONNY PA-C Unavailable         Unavailable

 

                    PICKERAL JR, J ALBERTO PA-C Unavailable         Unavailable

 

                    PICKERAL JR, J ALBERTO PA-C Unavailable         Unavailable

 

                    PICKERAL JR, J ALBERTO PA-C Unavailable         Unavailable

 

                    PICKERAL JR, J ALBERTO PA-C Unavailable         Unavailable

 

                    PICKERAL JR, J ALBERTO PA-C Unavailable         Unavailable

 

                    PICKERAL JR, J ALBERTO PA-C Unavailable         Unavailable

 

                    PICKERAL JR, J ALBERTO PA-C Unavailable         Unavailable

 

                    PICKERAL JR, J ALBERTO PA-C Unavailable         Unavailable

 

                    PICKERAL JR, J ALBERTO PA-C Unavailable         Unavailable

 

                    PICKERAL JR, J ALBERTO PA-C Unavailable         Unavailable

 

                    PICKERAL JR, J ALBERTO PA-C Unavailable         Unavailable

 

                    PICKERAL JR, J ALBERTO PA-C Unavailable         Unavailable

 

                    PICKERAL JR, J ALBERTO PA-C Unavailable         Unavailable

 

                    PICKERAL JR, J ALBERTO PA-C Unavailable         Unavailable

 

                    PICKERAL JR, J ALBERTO PA-C Unavailable         Unavailable

 

                    PICKERAL JR, J ALBERTO PA-C Unavailable         Unavailable

 

                    PICKERAL JR, J ALBERTO PA-C Unavailable         Unavailable

 

                    PICKERAL JR, J ALBERTO PA-C Unavailable         Unavailable

 

                    PICKERAL JR, J ALBERTO PA-C Unavailable         Unavailable

 

                    PICKERAL JR, J ALBERTO PA-C Unavailable         Unavailable

 

                    PICKERAL JR, J ALBERTO PA-C Unavailable         Unavailable

 

                    ABELARDO Regalado MD Unavailable         Unavailable

 

                    ABELARDO Regalado MD Unavailable         Unavailable

 

                    ABELARDO Regalado MD Unavailable         Unavailable

 

                    ABELARDO Regalado MD Unavailable         Unavailable

 

                    ABELARDO Regalado MD Unavailable         Unavailable

 

                    ABELARDO Regalado MD Unavailable         Unavailable

 

                    ABELARDO Regalado MD Unavailable         Unavailable

 

                    ABELARDO Regalado MD Unavailable         Unavailable

 

                    ABELARDO Regalado MD Unavailable         Unavailable

 

                    ABELARDO Regalado MD Unavailable         Unavailable

 

                    ABELARDO Regalado MD Unavailable         Unavailable

 

                    ABELARDO Regalado MD Unavailable         Unavailable

 

                    ABELARDO Regalado MD Unavailable         Unavailable

 

                    ABELARDO Regalado MD Unavailable         Unavailable

 

                    ABELARDO Regalado MD Unavailable         Unavailable

 

                    ABELARDO Regalado MD Unavailable         Unavailable

 

                    ABELARDO Regalado MD Unavailable         Unavailable

 

                    ABELARDO Regalado MD Unavailable         Unavailable

 

                    ABELARDO Regalado MD Unavailable         Unavailable

 

                    ABELARDO Regalado MD Unavailable         Unavailable

 

                    ABELARDO Regalado MD Unavailable         Unavailable

 

                    ABELARDO Regalado MD Unavailable         Unavailable

 

                    ABELARDO Regalado MD Unavailable         Unavailable

 

                    ABELARDO Regalado MD Unavailable         Unavailable

 

                    ABELARDO Regalado MD Unavailable         Unavailable

 

                    ABELARDO Regalado MD Unavailable         Unavailable

 

                    ABELARDO Regalado MD Unavailable         Unavailable

 

                    ABELARDO Regalado MD Unavailable         Unavailable

 

                    SabineABELARDO MD Unavailable         Unavailable

 

                    SabineABELARDO fuchs MD Unavailable         Unavailable

 

                    SabineABELARDO MD Unavailable         Unavailable

 

                    SabineABELARDO MD Unavailable         Unavailable

 

                    SabineABELARDO MD Unavailable         Unavailable

 

                    SabineABELARDO MD Unavailable         Unavailable

 

                    SabineABELARDO MD Unavailable         Unavailable

 

                    SabineABELARDO MD Unavailable         Unavailable

 

                    SabineABELARDO MD Unavailable         Unavailable

 

                    SabineABELARDO xavier MD Unavailable         Unavailable

 

                    SabineABELARDO MD Unavailable         Unavailable

 

                    SabineABELARDO MD Unavailable         Unavailable

 

                    SabineABELARDO MD Unavailable         Unavailable

 

                    SabineABELARDO fuchs MD Unavailable         Unavailable

 

                    SabineABELARDO MD Unavailable         Unavailable

 

                    SabineABELARDO MD Unavailable         Unavailable

 

                    SabineABELARDO MD Unavailable         Unavailable

 

                    SbaineABELARDO xavier MD Unavailable         Unavailable

 

                    SabineABELARDO MD Unavailable         Unavailable

 

                    ABELARDO Regalado MD Unavailable         Unavailable

 

                    SabineABELARDO fuchs MD Unavailable         Unavailable

 

                    ABELARDO Regalado MD Unavailable         Unavailable

 

                    ABELARDO Regalado MD Unavailable         Unavailable

 

                    ABELARDO Regalado MD Unavailable         Unavailable

 

                    ABELARDO Regalado MD Unavailable         Unavailable

 

                    ABELARDO Regalado MD Unavailable         Unavailable

 

                    ABELARDO Regalado MD Unavailable         Unavailable

 

                    ABELARDO Regalado MD Unavailable         Unavailable

 

                    ABELARDO Regalado MD Unavailable         Unavailable

 

                    ABELARDO Regalado MD Unavailable         Unavailable

 

                    ABELARDO Regalado MD Unavailable         Unavailable

 

                    ABELARDO Regalado MD Unavailable         Unavailable

 

                    ABELARDO Regalado MD Unavailable         Unavailable

 

                    ABELARDO Regalado MD Unavailable         Unavailable

 

                    ABELARDO Regalado MD Unavailable         Unavailable

 

                    ABELARDO Regalado MD Unavailable         Unavailable

 

                    ABELARDO Regalado MD Unavailable         Unavailable

 

                    ABELARDO Regalado MD Unavailable         Unavailable

 

                    ABELARDO Regalado MD Unavailable         Unavailable

 

                    ABELARDO Regalado MD Unavailable         Unavailable

 

                    ABELARDO Regalado MD Unavailable         Unavailable

 

                    ABELARDO Regalado MD Unavailable         Unavailable

 

                    ABELARDO Regalado MD Unavailable         Unavailable

 

                    ABELARDO Regalado MD Unavailable         Unavailable

 

                    ABELARDO Regalado MD Unavailable         Unavailable

 

                    ABELARDO Regaaldo MD Unavailable         Unavailable

 

                    ABELARDO Regalado MD Unavailable         Unavailable

 

                    ABELARDO Regalado MD Unavailable         Unavailable

 

                    ABELARDO Regalado MD Unavailable         Unavailable

 

                    ABELARDO Regalado MD Unavailable         Unavailable

 

                    Sabine, ABELARDO Montano MD Unavailable         Unavailable

 

                    Sabine, ABELARDO Montano MD Unavailable         Unavailable

 

                    Sabine, ABELARDO Montano MD Unavailable         Unavailable

 

                    Sabine, ABELARDO Montano MD Unavailable         Unavailable

 

                    Sabine, ABELARDO Montano MD Unavailable         Unavailable

 

                    Sabine, ABELARDO Montano MD Unavailable         Unavailable

 

                    Sabine, ABELARDO Montano MD Unavailable         Unavailable

 

                    AFSHAN, L ERICK PA Unavailable         Unavailable

 

                    AFSHAN, L ERICK PA Unavailable         Unavailable

 

                    AFSHAN, L ERICK PA Unavailable         Unavailable

 

                    AFSHAN, L ERICK PA Unavailable         Unavailable

 

                    AFSHAN, L ERICK PA Unavailable         Unavailable

 

                    AFSHAN, L ERICK PA Unavailable         Unavailable

 

                    AFSHAN, L ERICK PA Unavailable         Unavailable

 

                    AFSHAN, L ERICK PA Unavailable         Unavailable

 

                    AFSHAN, L ERICK PA Unavailable         Unavailable

 

                    AFSHAN, L ERICK PA Unavailable         Unavailable

 

                    AFSHAN, L ERICK PA Unavailable         Unavailable

 

                    AFSHAN, L ERICK PA Unavailable         Unavailable

 

                    AFSHAN, L ERICK PA Unavailable         Unavailable

 

                    AFSHAN, L ERICK PA Unavailable         Unavailable

 

                    AFSHAN, L ERICK PA Unavailable         Unavailable

 

                    AFSHAN, L ERICK PA Unavailable         Unavailable

 

                    ADJAPONG,  ROSA    Unavailable         Unavailable

 

                    Jimenez, L Kanchan RPA Unavailable         Unavailable

 

                    Jimenez, L Kanchan RPA Unavailable         Unavailable

 

                    Jimenez, L Kanchan RPA Unavailable         Unavailable

 

                    Jimenez, L Kanchan RPA Unavailable         Unavailable

 

                    Jimenez, L Kanchan RPA Unavailable         Unavailable

 

                    Jimenez, L Kanchan RPA Unavailable         Unavailable

 

                    Jimenez, L Kanchan RPA Unavailable         Unavailable

 

                    Jimenez, L Kanchan RPA Unavailable         Unavailable

 

                    Jimenez, L Kanchan RPA Unavailable         Unavailable

 

                    Jimenez, L Kanchan RPA Unavailable         Unavailable

 

                    Jimenez, L Kanchan RPA Unavailable         Unavailable

 

                    Jimenez, L Kanchan RPA Unavailable         Unavailable

 

                    Jimenez, L Kanchan RPA Unavailable         Unavailable

 

                    Jimenez, L Kanchan RPA Unavailable         Unavailable

 

                    Jimenez, L Kanchan RPA Unavailable         Unavailable

 

                    Jimenez, L Kanchan RPA Unavailable         Unavailable

 

                    Jimenez, L Kanchan RPA Unavailable         Unavailable

 

                    Jimenez, L Kanchan RPA Unavailable         Unavailable

 

                    Jimenez, L Kanchan RPA Unavailable         Unavailable

 

                    Jimenez, L Kanchan RPA Unavailable         Unavailable

 

                    Jimenez, L Kanchan RPA Unavailable         Unavailable

 

                    Jimenez, L Kanchan RPA Unavailable         Unavailable

 

                    Jimenez, L Kanchan RPA Unavailable         Unavailable

 

                    Jimenez, L Kanchan RPA Unavailable         Unavailable

 

                    Jimenez, L Kanchan RPA Unavailable         Unavailable

 

                    Jimenez, L Kanchan RPA Unavailable         Unavailable

 

                    Jimenez, L Kanchan RPA Unavailable         Unavailable

 

                    Jimenez, L Kanchan RPA Unavailable         Unavailable

 

                    Jimenez, L Kanchan RPA Unavailable         Unavailable

 

                    Jimenez, L Kanchan RPA Unavailable         Unavailable

 

                    Jimenez, L Kanchan RPA Unavailable         Unavailable

 

                    Jimenez, L Kanchan RPA Unavailable         Unavailable



                                  



Re-disclosure Warning

    



Allergies and Adverse Reactions

          



           Type       Description Substance  Reaction   Status     Data Source(s

)

 

           Drug Allergy Drug Allergy NKDA                             [facility] ([facility]



                                                                                
       



Family History

          



             Family Member Name Family Member Gender Family Member Status Date o

f Status 

Description                             Data Source(s)

 

           Unknown    Male       Problem                          MEDENT ([facility])

 

           Unknown    Male       Problem                          MEDENT ([facility])



                                                                                
                 



Encounters

          



           Encounter  Providers  Location   Date       Indications Data Source(s

)

 

           O          Attender: Charmaine Regalado MD            2021 12:00:00

 AM EST            NDOC ([facility])

 

                                        Patient admitted. 

 

                Unknown                         [address] [facility]

[place] [address] 2021 12:00:00 AM 

EST                                                 eCW1 ([facility]

[facility])

 

                Unknown                         [address] [facility]

[place] [address] 2021 12:00:00 AM 

EST                                                 eCW1 ([facility]

[facility])

 

                Unknown                         [address] [facility]

[place] [address] 2021 12:00:00 AM 

EST                                                 eCW1 ([facility]

[facility])

 

                Unknown                         [address] [facility]

[place] [address] 2021 12:00:00 AM 

EDT                                                 eCW1 ([facility]

[facility])

 

                Outpatient                      [address] [facility]

[place] [address] 10/26/2021 12:00:00 AM

EDT                                                 eCW1 ([facility]

[facility])

 

                (WND ) New Patient 120 Min                 [address] [place] [address] 

10/21/2021 12:00:00 AM EDT                           eCW1 ([facility])

 

                Unknown                         [address] [facility], 

[place] [address] 10/21/2021 12:00:00 AM 

EDT                                                 eCW1 ([facility]

[facility])

 

                Outpatient      Attender: ALBERTO Melchor 1

 03:20:00 PM

EDT                                                 MEDENT ([facility]

)

 

                Outpatient      Attender: ALBERTO Melchor 1

 02:20:00 PM

EDT                                                 MEDENT ([facility]

)

 

                Outpatient      Admitter: ROSA ROCHAReferrer: ROSA ROHCA

                 2021 

12:00:00 AM EDT           Multiple myeloma not having achieved remission [facility]

 

                                        Multiple myeloma not having achieved rem

ission 

 

                Outpatient      Attender: Kanchan Bennett/Shahnaz/Ravindra/EDWARD lockett 2021 

10:15:00 AM EDT                                     MEDENT ([facility]

[facility])

 

                Outpatient      Attender: Charmaine Melchor  01:30:00 PM 

EDT                                                 MEDENT ([facility]

)

 

                Outpatient      Attender: Kanchan Jimenez RPA Sabrina/Ulm/Ravindra/R

eindl 06/10/2021 

10:45:00 AM EDT                                     MEDENT ([facility]

actice, )

 

                Outpatient      Attender: Charmaine Melchor  01:30:00 PM 

EDT                                                 MEDENT ([facility]

)

 

                Outpatient      Attender: Ra Meeks MD Physical Therapy  10:30:00 AM 

EDT                                                 [facility] ([facility]

 

                Office Visit    Attender: Josefina VIGIL PA-C Physical Therapy

 2021 

01:15:00 PM EDT                                     [facility] ([facility] )

 

             Outpatient   Attender: Uzair Huitron MD Physical Therapy 2021 1

2:45:00 PM EDT 

                                        MEDENT ([facility] )

 

                Outpatient      Attender: Josefina VIGIL PA-C Physical Therapy

 2021 11:15:00 

AM EDT                                              [facility]

 

                Outpatient      Attender: Charmaine Melchor  02:30:00 PM 

EDT                                                 MEDENT ([facility]

)

 

                Outpatient      Attender: Kanchan Zamudioang/Ulm/Ravindra/R

eindl 2021 

09:15:00 AM EST                                     MEDENT ([facility]

actice, )

 

             Outpatient   Attender: ERICK KRUSE Main Office  2021 1

1:45:00 AM EST  

                                        MEDENT ([facility])

 

                Outpatient      Attender: Charmaine Melchor  12:00:00 PM 

EST                                                 MEDENT ([facility]

)

 

                Outpatient      Attender: Charmaine Melchor 10

/ 10:30:00 AM 

EDT                                                 MEDENT ([facility]

)



                                                                                
                                                                                
                                                                                
                                                                           



Immunizations

          



             Vaccine      Date         Status       Description  Data Source(s)

 

                          Influenza, injectable, MDCK, preservative free, bruno

valent 10/06/2021 02:31:00

PM EDT              completed                               [facility] ([facility]

 

                          Influenza, injectable, MDCK, preservative free, bruno

valent 10/15/2020 12:10:00

PM EDT              completed                               [facility] ([facility])



                                                                                
                 



Medications

          



          Medication Brand Name Start Date Product Form Dose      Route     Admi

nistrative 

Instructions Pharmacy Instructions Status     Indications Reaction   Description

 Data 

Source(s)

 

           Fluoxetine 20 MG Oral Capsule Fluoxetine HCL 2021 12:00:00 AM E

ST                       ORAL

                              active                                  MEDENT ([facility])

 

           Loratadine 10 MG Oral Tablet [Claritin] Claritin   2021 12:00:0

0 AM EST                       

ORAL                          active                                  MEDENT ([facility])

 

           Cephalexin 500 MG Oral Capsule CEPHALEXIN 10/20/2021 12:00:00 AM EDT 

capsule    21         

                          TAKE ONE CAPSULE BY MOUTH THREE TIMES A DAY FOR 7 DAYS

 TAKE ONE CAPSULE BY 

MOUTH THREE TIMES A DAY FOR 7 DAYS SOLD: 10/20/2021                             

           [facility]

 

           Cephalexin 500 MG Oral Capsule CEPHALEXIN 10/06/2021 12:00:00 AM EDT 

capsule    21         

                          TAKE ONE CAPSULE BY MOUTH THREE TIMES A DAY FOR 7 DAYS

 TAKE ONE CAPSULE BY 

MOUTH THREE TIMES A DAY FOR 7 DAYS SOLD: 10/06/2021                             

           [facility]

 

        Cephalexin 500 MG Oral Capsule Cephalexin 10/06/2021 12:00:00 AM EDT    

             ORAL            

             completed                                           MEDENT ([facility])

 

       Administration Of Flu Vaccine        10/06/2021 12:00:00 AM EDT          

                          completed  

                                                            MED[facility] ([facility])

 

                                        Medication administered onsite 

 

                    2 ML Sodium Hyaluronate 10 MG/ML Prefilled Syringe [Euflexxa

] Euflexxa            

2021 12:00:00 AM EDT                                    active            

          MED[facility] ([facility]

 

                atorvastatin 20 MG Oral Tablet ATORVASTATIN CALCIUM 2021 1

2:00:00 AM EDT 

tablet          90                              TAKE ONE TABLET BY MOUTH EVERY D

AY TAKE ONE TABLET BY MOUTH EVERY 

DAY          SOLD: 2021                                        [facility]

s

 

          25 mg               2021 12:00:00 AM EDT tablet    180          

       TAKE ONE TABLET BY MOUTH TWICE A

 DAY       TAKE ONE TABLET BY MOUTH TWICE A DAY SOLD: 10/22/2021                

                  [facility]

 

                atorvastatin 20 MG Oral Tablet ATORVASTATIN CALCIUM 2021 1

2:00:00 AM EDT 

tablet          90                              TAKE ONE TABLET BY MOUTH EVERY D

AY TAKE ONE TABLET BY MOUTH EVERY 

DAY          SOLD: 10/22/2021                                        [facility]

s

 

          25 mg               2021 12:00:00 AM EDT tablet    180          

       TAKE ONE TABLET BY MOUTH TWICE A

 DAY       TAKE ONE TABLET BY MOUTH TWICE A DAY SOLD: 2021                

                  [facility]

 

          100 mg              2021 12:00:00 AM EDT tablet    180          

       TAKE ONE TABLET BY MOUTH TWICE 

A DAY      TAKE ONE TABLET BY MOUTH TWICE A DAY SOLD: 2021                

                  [facility]

 

          Famotidine 20 MG Oral Tablet FAMOTIDINE 2021 12:00:00 AM EDT tab

let    90                  

TAKE ONE TABLET BY MOUTH EVERY DAY TAKE ONE TABLET BY MOUTH EVERY DAY SOLD: 

10/22/2021                                                      [facility]

 

                          Acetaminophen 250 MG / Aspirin 250 MG / Caffeine 65 MG

 Oral Tablet [Excedrin] 

Excedrin Migraine 2021 12:00:00 AM EDT                               activ

e                   MEDENT 

([facility])

 

          20 mg               2021 12:00:00 AM EDT tablet    180          

       TAKE ONE TABLET BY MOUTH TWICE A

 DAY       TAKE ONE TABLET BY MOUTH TWICE A DAY SOLD: 10/22/2021                

                  [facility]

 

          20 mg               2021 12:00:00 AM EDT tablet    90           

       TAKE ONE TABLET BY MOUTH EVERY 

DAY        TAKE ONE TABLET BY MOUTH EVERY DAY SOLD: 2021                  

                [facility]

 

          20 mg               2021 12:00:00 AM EDT tablet    180          

       TAKE ONE TABLET BY MOUTH TWICE A

 DAY       TAKE ONE TABLET BY MOUTH TWICE A DAY SOLD: 2021                

                  [facility]

 

          1 %                 2021 12:00:00 AM EDT gel       100          

       APPLY UP TO 4 GRAMS THREE TIMES A DAY

 TO AFFECTED KNEE AS NEEDED             APPLY UP TO 4 GRAMS THREE TIMES A DAY TO

 AFFECTED 

KNEE AS NEEDED SOLD: 2021                                        [facility]

 

        Famotidine 20 MG Oral Tablet Famotidine 2021 12:00:00 AM EDT      

           ORAL                    

active                                                          [facility] ([facility]

 

        Furosemide 20 MG Oral Tablet Furosemide 2021 12:00:00 AM EDT      

           ORAL                    

active                                                          MEDENT ([facility]

[facility])

 

          5 mg                03/15/2021 12:00:00 AM EDT tablet    40           

       TAKE ONE TABLET BY MOUTH EVERY 

DAY, EXCEPT TAKE 2 TABLETS ON MONDAY, WEDNESDAY AND FRIDAY TAKE ONE TABLET BY 

MOUTH EVERY DAY, EXCEPT TAKE 2 TABLETS ON MONDAY, WEDNESDAY AND FRIDAY SOLD: 

2021                                                      [facility]

 

          apixaban 5 MG Oral Tablet [Eliquis] Eliquis   2021 12:00:00 AM E

ST                     ORAL      

                      active                                      MEDENT ([facility])

 

                    24 HR Bupropion Hydrochloride 150 MG Extended Release Oral T

ablet BUPROPION HCL       

2021 12:00:00 AM EST tablet extended release 24 hr 30                     

         TAKE ONE TABLET BY

 MOUTH EVERY MORNING TAKE ONE TABLET BY MOUTH EVERY MORNING SOLD: 2021    

              

                                                    [facility]

 

          5 mg                2021 12:00:00 AM EST tablet    60           

       TAKE ONE TABLET BY MOUTH TWICE A 

DAY        TAKE ONE TABLET BY MOUTH TWICE A DAY SOLD: 2021                

                  [facility]

 

                    24 HR Bupropion Hydrochloride 150 MG Extended Release Oral T

ablet BUPROPION HCL       

2021 12:00:00 AM EST tablet extended release 24 hr 30                     

         TAKE ONE TABLET BY

 MOUTH EVERY MORNING TAKE ONE TABLET BY MOUTH EVERY MORNING SOLD: 2021    

              

                                                    [facility]

 

          5 mg                2021 12:00:00 AM EST tablet    60           

       TAKE ONE TABLET BY MOUTH TWICE A 

DAY        TAKE ONE TABLET BY MOUTH TWICE A DAY SOLD: 2021                

                  [facility]

 

                          24 HR Bupropion Hydrochloride 150 MG Extended Release 

Oral Tablet Bupropion 

Hydrochloride ER (XL) 2021 12:00:00 AM EST             ORAL              a

ctive                   

MEDENT ([facility])

 

                          24 HR Bupropion Hydrochloride 150 MG Extended Release 

Oral Tablet Bupropion 

Hydrochloride ER (XL) 2021 12:00:00 AM EST             ORAL              a

ctive                   

MEDENT ([facility])

 

          5 mg                2020 12:00:00 AM EST tablet    30           

       TAKE ONE TABLET BY MOUTH TWICE A 

DAY        TAKE ONE TABLET BY MOUTH TWICE A DAY SOLD: 2020                

                  [facility]

 

          5 mg                2020 12:00:00 AM EST tablet    30           

       TAKE ONE TABLET BY MOUTH TWICE A 

DAY        TAKE ONE TABLET BY MOUTH TWICE A DAY SOLD: 2021                

                  [facility]

 

           Cephalexin 500 MG Oral Capsule CEPHALEXIN 2020 12:00:00 AM EST 

capsule    30         

                          TAKE ONE CAPSULE BY MOUTH THREE TIMES A DAY TAKE ONE C

APSULE BY MOUTH THREE 

TIMES A DAY  SOLD: 2020                                        [facility]

s

 

          apixaban 5 MG Oral Tablet [Eliquis] Eliquis   2020 12:00:00 AM E

ST                     ORAL      

                      active                                      MEDENT ([facility])

 

          apixaban 5 MG Oral Tablet [Eliquis] Eliquis   2020 12:00:00 AM E

ST                     ORAL      

                      active                                      [facility]

 

       Administration Of Flu Vaccine        10/15/2020 12:00:00 AM EDT          

                          completed  

                                                            [facility] ([facility])

 

                                        Medication administered onsite 

 

          20 mg               2020 12:00:00 AM EDT tablet    90           

       TAKE ONE TABLET BY MOUTH EVERY 

MORNING    TAKE ONE TABLET BY MOUTH EVERY MORNING SOLD: 2021              

                    [facility]

 

          5 mg                2020 12:00:00 AM EDT tablet    30           

       TAKE 1/2 TABLET BY MOUTH ONCE 

DAILY      TAKE 1/2 TABLET BY MOUTH ONCE DAILY SOLD: 10/24/2020                 

                 [facility]

 

          40 mg               2020 12:00:00 AM EDT tablet    30           

       TAKE ONE TABLET BY MOUTH AT 

BEDTIME    TAKE ONE TABLET BY MOUTH AT BEDTIME SOLD: 10/24/2020                 

                 [facility]

 

          40 mg               2020 12:00:00 AM EDT tablet    30           

       TAKE ONE TABLET BY MOUTH AT 

BEDTIME    TAKE ONE TABLET BY MOUTH AT BEDTIME SOLD: 2021                 

                 [facility]

 

          Famotidine 40 MG Oral Tablet FAMOTIDINE 2020 12:00:00 AM EDT tab

let    30                  

TAKE ONE TABLET BY MOUTH AT BEDTIME TAKE ONE TABLET BY MOUTH AT BEDTIME SOLD: 

2021                                                      [facility]

 

                    Digoxin 0.25 MG Oral Tablet 250 mcg (0.25 mg) DIGOXIN       

      2020 12:00:00 AM EDT

             tablet       90                        TAKE ONE TABLET BY MOUTH JAMES

 DAY TAKE ONE TABLET BY MOUTH EVERY 

DAY          SOLD: 2021                                        [facility]

s

 

          10 mg               2019 12:00:00 AM EST tablet    90           

       TAKE ONE TABLET BY MOUTH EVERY 

DAY        TAKE ONE TABLET BY MOUTH EVERY DAY SOLD: 10/24/2020                  

                [facility]

 

          100 mg              2019 12:00:00 AM EST tablet    180          

       TAKE ONE TABLET BY MOUTH TWICE 

A DAY      TAKE ONE TABLET BY MOUTH TWICE A DAY SOLD: 10/24/2020                

                  [facility]

 

          25 mg               2019 12:00:00 AM EST tablet    180          

       TAKE ONE TABLET BY MOUTH TWICE A

 DAY       TAKE ONE TABLET BY MOUTH TWICE A DAY SOLD: 10/24/2020                

                  [facility]



                                                                                
                                                                                
                                                                                
                                                                                
                                                                                
                                                                                
        



Insurance Providers

          



             Payer name   Policy type / Coverage type Policy ID    Covered party

 ID Covered 

party's relationship to morataya Policy [place]             Plan Information

 

                [employer] hospitals) [facility] Part B  [id number]       

[id number] Family Dependent                        

[id number]

 

                [employer] hospitals) [facility] Part B  [id number]       

[id number] Family Dependent                        

[id number]

 

                [employer] [facility] Part B  [id number]       

[id number] Family Dependent                        

[id number]

 

          MEDICARE A         [id number]           Self                [id number]

[id number]

 

                Medicare [employer] Medicare Primary [id number]     

[id number] Self                              

      [id number]

 

                Medicare [employer] Medicare Primary [id number]     

[id number] Self                                    

[id number]

 

                Medicare [employer] Medicare Primary [id number]     

[id number] Self                                    

[id number]

 

                Medicare [employer] Medicare Primary [id number]     

[id number] Self                                    

[id number]

 

                Medicare [employer] Medicare Primary [id number]     

[id number] Self                                    

[id number]

 

                Medicare [employer] Medicare Primary [id number]     

[id number] Self                                    

[id number]

 

                Medicare [employer] Medicare Primary [id number]     

[id number] Self                                    

[id number]

 

                Medicare [employer] Medicare Primary [id number]     

[id number] Self                                    

[id number]

 

                Medicare [employer] Medicare Primary [id number]     

[id number] Self                                    

[id number]

 

                Medicare [employer] Medicare Primary [id number]     

[id number] Self                                    

[id number]

 

                [facility] Primary [id number]     

[id number] Self                                    

[id number]

 

                Medicare [employer] Medicare Primary [id number]      

[id number] Self                                    

[id number]

 

                Medicare [employer] Medicare Primary [id number]      

[id number] Self                                    

[id number]

 

                Medicare [facility] Medicare Primary [id number]      

[id number] Self                                    

[id number]

 

                Medicare [employer] Medicare Primary [id number]      

[id number] Self                                    

[id number]

 

                Medicare [facility] Medicare Primary [id number]      

[id number] Self                                    

[id number]

 

                                                                       

 

           FOR LIFE U         [id number]           Self                [id number]

[id number]

 

                [employer]  [employer] Medigap Part B  [id number]       

[id number] Family Dependent                        

[id number]

 

          [employer]         [id number] S              

     [id number]

 

                [employer]  For Life Medigap Part B  [id number]       

[id number] Family Dependent                        

[id number]

 

                [employer]  For Life Medigap Part B  [id number]       

[id number] Family Dependent                        

[id number]

 

                [employer]  For Life Medigap Part B  [id number]       

[id number] Family Dependent                        

[id number]

 

          MEDICARE            [id number]           SP                  [id number]

R49

 

          MEDICARE  C         [id number] S                   [id number]

R49

 

           FOR LIFE O         [id number] S                   [id number]

[id number]

 

                [employer]  For Life Medigap Part B  [id number]       

[id number] Family Dependent                  

      [id number]

 

                [employer]  For Life Medigap Part B  [id number]       

[id number] Family Dependent                        

[id number]

 

          MEDICARE            [id number]           SP                  [id number]

[id number]

 

                [employer]  For Life Medigap Part B  [id number]       

[id number] Family Dependent                        

[id number]

 

                [employer]  For Life Medigap Part B  [id number]       

[id number] Family Dependent                        

[id number]

 

                [employer]  For Life Medigap Part B  [id number]       

[id number] Family Dependent                        

[id number]

 

                [employer]  For Life Medigap Part B  [id number]       

[id number] Family Dependent                        

[id number]

 

           FOR LIFE           [id number]           [facility]                 [id number]

[id number]

 

                [employer]  For Life [employer] Part B  [id number]       

[id number] Family Dependent                        

[id number]

 

                [employer] Part [employer]  [id number]       

[id number] Family Dependent                        

[id number]

 

                [employer] Part B  [id number]       

[id number] Family Dependent                        

[id number]

 

          MEDICARE            [id number]           SP                  [id number]

A

 

          MEDICARE  C         [id number] S                   [id number]

A



                                                                                
                                                                                
                                                                                
                                                                                
                                                                                
                                                                                
                            



Problems, Conditions, and Diagnoses

          



           Code       Display Name Description Problem Type Effective Dates Data

 Source(s)

 

                    Z86.711             Personal history of pulmonary embolism P

ersonal history of pulmonary 

embolism            Diagnosis           2021 12:00:00 AM EST NDOC ([facility]

[facility]

 

                    Z79.01              Long term (current) use of anticoagulant

s Long term (current) use of 

anticoagulants      Diagnosis           2021 12:00:00 AM EST NDOC ([facility])

 

             E78.5        Hyperlipidemia, unspecified Hyperlipidemia, unspecifie

d Diagnosis    

2021 12:00:00 AM EST              NDOC ([facility])

 

                    K21.9               Gastro-esophageal reflux disease without

 esophagitis Gastro-esophageal 

reflux disease without esophagitis Diagnosis           2021 12:00:00 AM ES

T NDOC 

([facility])

 

             D47.2        Monoclonal gammopathy Monoclonal gammopathy Diagnosis 

   2021 12:00:00 

AM EST                                  NDOC ([facility])

 

                    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

ut thyrotoxic crisis or storm 

Thyrotoxicosis with diffuse goiter without thyrotoxic crisis or storm Diagnosis 

                

2021 12:00:00 AM EST              NDOC ([facility])

 

                F32.89          Other specified depressive episodes Other specif

ied depressive episodes 

Diagnosis                 2021 12:00:00 AM EST NDOC ([facility]

)

 

                N17.9           Acute kidney failure, unspecified Acute kidney f

ailure, unspecified 

Diagnosis                 2021 12:00:00 AM EST NDOC ([facility]

)

 

                    G31.84              Mild cognitive impairment, so stated Mil

d cognitive impairment, so stated

                    Diagnosis           2021 12:00:00 AM EST NDOC ([facility])

 

                          I25.10                    Atherosclerotic heart diseas

e of native coronary artery without angina 

pectoris                                Atherosclerotic heart disease of native 

coronary artery without angina 

pectoris            Diagnosis           2021 12:00:00 AM EST NDOC ([facility])

 

             I48.20       I48.20       Chronic atrial fibrillation, unspecified 

Diagnosis    2021 

12:00:00 AM EST                         NDOC ([facility])

 

                          I82.502                   Chronic embolism and thrombo

sis of unspecified deep veins of left lower 

extremity                               Chronic embolism and thrombosis of unspe

cified deep veins of left 

lower extremity     Diagnosis           2021 12:00:00 AM EST NDOC ([facility])

 

                    S81.802D            Unspecified open wound, left lower leg, 

subsequent encounter 

Unspecified open wound, left lower leg, subsequent encounter Diagnosis          

       

2021 12:00:00 AM EST              NDOC ([facility])

 

                    I50.23              Acute on chronic systolic (congestive) h

eart failure Acute on chronic 

systolic (congestive) heart failure Diagnosis           2021 12:00:00 AM E

ST NDOC 

([facility])

 

                    I11.0               Hypertensive heart disease with heart fa

ilure Hypertensive heart disease 

with heart failure  Diagnosis           2021 12:00:00 AM EST NDOC ([facility])

 

                    C90.00              Multiple myeloma not having achieved rem

ission Multiple myeloma not 

having achieved remission Diagnosis           2021 01:43:00 PM EDT [facility]

 

                    I87.312             [id number]     Chronic venous hyper

tension (idiopathic) with ulcer of 

left lower extremity Problem             10/21/2021 12:00:00 AM EDT eCW1 ([facility])

 

                    L97.822             [id number]            Non-pressure chronic

 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

 fat layer exposed  Problem             10/21/2021 12:00:00 AM EDT eCW1 ([facility])

 

             I27.81       Chronic cor pulmonale Chronic cor pulmonale Problem   

   2021 12:00:00 

AM EDT                                  MEDENT ([facility])

 

             I65.29       Carotid artery occlusion Carotid artery occlusion Prob

sam      2021 

12:00:00 AM EST                         MEDENT ([facility])

 

                    I82.503             Deep venous thrombosis of lower extremit

y Deep venous thrombosis of 

lower extremity     Problem             2021 12:00:00 AM EST [facility]

 

             E78.2        Mixed hyperlipidemia Mixed hyperlipidemia Problem     

 2021 12:00:00 AM 

EST                                     MEDENT ([facility])



                                                                                
                                                                                
                                                                                
                                                                    



Surgeries/Procedures

          



             Procedure    Description  Date         Indications  Data Source(s)

 

             FINE NEEDLE ASPIRATION W/O IMAGING GUIDANCE              10/26/2021

 12:00:00 AM EDT              eCW 

([facility])

 

             FINE NEEDLE ASPIRATION W/O IMAGING GUIDANCE              10/21/2021

 12:00:00 AM EDT              eC 

([facility])

 

             OFFICE OUTPATIENT VISIT 15 MINUTES              10/19/2021 12:00:00

 AM EDT              MEDENT 

([facility])

 

             OFFICE OUTPATIENT VISIT 15 MINUTES              10/06/2021 12:00:00

 AM EDT              MEDENT 

([facility])

 

             Complex Chronic Care Management SVC 1St 60 Min              

021 12:00:00 AM EDT              

MEDENT ([facility])

 

             Complex Chronic Care MGMT Service Ea Addl 30 Min               12:00:00 AM EDT              

MEDENT ([facility])

 

             Complex Chronic Care Management SVC 1St 60 Min              

021 12:00:00 AM EDT              

MEDENT ([facility])

 

             Complex Chronic Care MGMT Service Ea Addl 30 Min               12:00:00 AM EDT              

MEDENT ([facility])

 

             ECG ROUTINE ECG W/LEAST 12 LDS W/I&R              2021 12:00:

00 AM EDT              MEDENT 

([facility])

 

             OFFICE OUTPATIENT VISIT 25 MINUTES              2021 12:00:00

 AM EDT              MEDENT 

([facility])

 

             ECG ROUTINE ECG W/LEAST 12 LDS W/I&R              2021 12:00:

00 AM EDT              MEDENT 

([facility])

 

             OFFICE OUTPATIENT VISIT 25 MINUTES              2021 12:00:00

 AM EDT              MEDREBECCA 

([facility])

 

             Chronic Care Management Services Ea Addl 20 Min              2021 12:00:00 AM EDT              

MEDENT ([facility])

 

                    Chronic Care MGMT 20 Mins Clinical Staff Time Per Calendar M

[facility]                     2021 

12:00:00 AM EDT                                     MEDENT ([facility]

)

 

             Complex Chronic Care Management SVC 1St 60 Min              

021 12:00:00 AM EDT              

MEDENT ([facility])

 

             Complex Chronic Care MGMT Service Ea Addl 30 Min               12:00:00 AM EDT              

MEDENT ([facility])

 

             OFFICE OUTPATIENT VISIT 25 MINUTES              2021 12:00:00

 AM EDT              MEDENT 

([facility] )

 

             Complex Chronic Care Management SVC 1St 60 Min              

021 12:00:00 AM EDT              

MEDENT ([facility])

 

             Complex Chronic Care MGMT Service Ea Addl 30 Min               12:00:00 AM EDT              

MEDENT ([facility])

 

             OFFICE OUTPATIENT VISIT 25 MINUTES              2021 12:00:00

 AM EDT              MEDENT 

([facility])

 

             OFFICE OUTPATIENT VISIT 25 MINUTES              06/10/2021 12:00:00

 AM EDT              MEDENT 

([facility], )

 

             Complex Chronic Care Management SVC 1St 60 Min              

021 12:00:00 AM EDT              

MEDENT ([facility])

 

             Complex Chronic Care MGMT Service Ea Addl 30 Min               12:00:00 AM EDT              

MEDENT ([facility])

 

             Complex Chronic Care Management SVC 1St 60 Min              

021 12:00:00 AM EDT              

MEDENT ([facility])

 

             Complex Chronic Care MGMT Service Ea Addl 30 Min               12:00:00 AM EDT              

MEDENT ([facility])

 

             OFFICE OUTPATIENT VISIT 25 MINUTES              2021 12:00:00

 AM EDT              MEDENT 

([facility])

 

             ARTHROCENTESIS ASPIR&/INJECTION MAJOR JT/BURSA              

021 12:00:00 AM EDT              

MEDENT ([facility] )

 

             RADEX SHOULDER COMPLETE MINIMUM 2 VIEWS              2021 12:

00:00 AM EDT              MEDENT 

([facility] )

 

             ARTHROCENTESIS ASPIR&/INJECTION MAJOR JT/BURSA              

021 12:00:00 AM EDT              

MEDENT ([facility] )

 

             Complex Chronic Care Management SVC 1St 60 Min              

021 12:00:00 AM EDT              

MEDENT ([facility])

 

             Complex Chronic Care MGMT Service Ea Addl 30 Min               12:00:00 AM EDT              

MEDENT ([facility])

 

             INJECTION 1 TENDON SHEATH/LIGAMENT APONEUROSIS              

021 12:00:00 AM EDT              

MEDENT ([facility] )

 

             ARTHROCENTESIS ASPIR&/INJECTION MAJOR JT/BURSA              

021 12:00:00 AM EDT              

MEDENT ([facility] )

 

             RADIOLOGIC EXAM KNEE COMPLETE 4/MORE VIEWS              2021 

12:00:00 AM EDT              MEDENT

 ([facility] )

 

             RADIOLOGIC EXAM KNEE COMPLETE 4/MORE VIEWS              2021 

12:00:00 AM EDT              MEDENT

 ([facility])

 

             Mammogram                 2021 12:00:00 AM EDT              M

CYNTHIA ([facility])

 

             Bone Mineral Density Test              2021 12:00:00 AM EDT  

            MEDENT ([facility])

 

                    Brief Emotional/Behav Assessment W/ Scoring Doc Per Standard

 Inst                     2021 

12:00:00 AM EDFAROOQ DALE ([facility]

)

 

             OFFICE OUTPATIENT VISIT 25 MINUTES              2021 12:00:00

 AM EDT              MEDENT 

([facility])

 

             OFFICE OUTPATIENT NEW 45 MINUTES              2021 12:00:00 A

M EST              MEDENT 

([facility] )

 

             ECG ROUTINE ECG W/LEAST 12 LDS W/I&R              2021 12:00:

00 AM EST              MEDENT 

([facility])

 

             OFFICE OUTPATIENT VISIT 15 MINUTES              2021 12:00:00

 AM EST              MEDENT 

([facility])

 

             Complex Chronic Care Management SVC 1St 60 Min              

021 12:00:00 AM EST              

MEDENT ([facility])

 

             Complex Chronic Care MGMT Service Ea Addl 30 Min               12:00:00 AM EST              

MEDENT ([facility])

 

             OFFICE OUTPATIENT VISIT 25 MINUTES              2021 12:00:00

 AM EST              MEDENT 

([facility])

 

             Complex Chronic Care Management SVC 1St 60 Min              

020 12:00:00 AM EST              

MEDENT ([facility])

 

             Complex Chronic Care MGMT Service Ea Addl 30 Min               12:00:00 AM EST              

MEDENT ([facility])

 

                    Brief Emotional/Behav Assessment W/ Scoring Doc Per Standard

 Inst                     10/29/2020 

12:00:00 AM EDT                                     MEDENT ([facility]

)



                                                                                
                                                                                
                                                                                
                                                                                
                                                                                
                                                                                
                                                                              



Results

          



                    ID                  Date                Data Source

 

                    [id number]          2021 12:23:00 PM EST MEDENT ([facility])









          Name      Value     Range     Interpretation Code Description Data Milly

rce(s) Supporting 

Document(s)

 

           Leukocytes [#/volume] in Blood by Automated count 5.6 x10*3/UL 4.1-10

.9                         

[facility] ([facility])            

 

           Erythrocytes [#/volume] in Blood by Automated count 4.36 x10*6/UL 4.2

0-6.30                        

MEDENT ([facility])            

 

           Hematocrit [Volume Fraction] of Blood by Automated count 40.9 %     3

7.0-51.0                        

[facility] ([facility])            

 

           Hemoglobin [Mass/volume] in Blood 14.0 g/dL  12.0-18.0               

         [facility]                              

 

          MCH       32.0 pg   26.0-32.0                     [facility])  

 

          MCV       93.9 fL   80.0-97.0                     [facility] ([facility] [facility])  

 

          MCHC      34.1 g/dL 31.0-38.0                     MEDENT ([facility])  

 

           Platelets [#/volume] in Blood by Automated count 233 x10*3/-440

                          MEDENT

 ([facility])                  

 

           Erythrocyte distribution width [Ratio] by Automated count 14.0 %     

11.6-13.7                        

MEDENT ([facility])            

 

          MPV       8.9 FL    7.8-11.0                      [facility] ([facility] [facility])  

 

          Lymph %   20.4 %    10.0-58.5                     [facility] ([facility] [facility])  

 

          Neut %    74.4 %    37.0-92.0                     [facility])  

 

          Mid %     5.2 %     1.7-9.3                       MEDENT ([facility])  

 

          Mid #     0.3 x10*3/UL 0.1-0.6                       MED[facility] ([facility])  

 

          Lymph #   1.1 x10*3/UL 0.6-4.1                       MED[facility] ([facility])  

 

          Neut #    4.2 x10*3/UL 2.0-7.8                       MEDENT ([facility])  









                    ID                  Date                Data Source

 

                    [id number]          2021 12:23:00 PM EST MEDENT ([facility])









          Name      Value     Range     Interpretation Code Description Data Milly

rce(s) Supporting 

Document(s)

 

           Digoxin [Mass/volume] in Serum or Plasma 0.1 ng/mL  0.5-2.0          

                MEDENT ([facility])                             

 

                                        <content>note:<nlbl:demographic_changed>

</content><br/><content></content> 









                    ID                  Date                Data Source

 

                    [id number]          2021 12:23:00 PM EST MEDENT ([facility])









          Name      Value     Range     Interpretation Code Description Data Milly

rce(s) Supporting 

Document(s)

 

           Glucose [Mass/volume] in Serum or Plasma 113 mg/dL  74-99            

                MEDENT ([facility])                              

 

                                        100-125 mg/dL     PRE-DIABETES/FASTING

>126 mg/dL          DIABETES/FASTING

 

 

           Urea nitrogen [Mass/volume] in Serum or Plasma 14 mg/dL   7-18       

                      MEDENT 

([facility])                   

 

           Sodium [Moles/volume] in Serum or Plasma 145 meq/L  136-145          

                MEDENT ([facility])                             

 

          Creatinine 1.0 mg/dL 0.6-1.3                       MEDENT ([facility]

[facility])  

 

           Potassium [Moles/volume] in Serum or Plasma 4.1 meq/L  3.5-5.1       

                   MEDENT 

([facility])                   

 

           Chloride [Moles/volume] in Serum or Plasma 105 meq/L           

                  MEDENT 

([facility])                   

 

           Calcium [Mass/volume] in Serum or Plasma 10.5 mg/dL 8.5-10.1         

                MEDENT 

([facility])                   

 

                                        NOTE:

RESULT VERIFIED.





 

 

           Carbon dioxide, total [Moles/volume] in Serum or Plasma 31 meq/L   21

-32                            

MEDENT ([facility])            

 

          Total Bilirubin 0.4 mg/dL 0.2-1.0                       MEDENT ([facility])  

 

                    Alkaline phosphatase isoenzyme [Units/volume] in Serum or Pl

asma 110 mg/dL           

                                          MEDENT ([facility])  

 

                          Aspartate aminotransferase [Enzymatic activity/volume]

 in Serum or Plasma 22 U/L

             15-37                                  MEDENT ([facility]

)  

 

                    Alanine aminotransferase [Enzymatic activity/volume] in Seru

m or Plasma 27 U/L              

12-78                                           MEDENT ([facility])  

 

           Proteinase 3 Ab [Units/volume] in Serum 8.3 g/dL   6.4-8.2           

               [facility] ([facility])                              

 

           Albumin [Mass/volume] in Serum or Plasma 2.8 g/dL   3.4-5.0          

                MED[facility] ([facility])                              

 

                                        NOTE:

RESULT VERIFIED.





 

 

                                        Glomerular filtration rate/1.73 sq M pre

dicted among non-blacks [Volume 

Rate/Area] in Serum or Plasma by Creatinine-based formula (MDRD) 55 mL/min      

                                  

                          MEDENT ([facility])  

 

          A/G Ratio 0.51 CALC 1.00-1.90                     [facility]  

 

                                        Glomerular filtration rate/1.73 sq M pre

dicted among blacks [Volume Rate/Area] 

in Serum or Plasma by Creatinine-based formula (MDRD) Laboratory test result    

                  

                                        [facility]  

 

                                        <content>CHRONIC KIDNEY DISEASE STAGING 

PER 

NKF</content><br/><content></content><br/><content>STAGE I & II      GFR >= 60  
     NORMAL TO MILDLY DECREASED</content><br/><content>STAGE III          GFR 
30-59          MODERATELY DECREASED</content><br/><content>STAGE IV           
GFR 15-29         SEVERELY DECREASED</content><br/><content>STAGE V            
GFR <15            VERY LITTLE GFR LEFT</content><br/><content>ESRD             
   GFR <15            ON RRT</content><br/><content></content> 









                    ID                  Date                Data Source

 

                    [id number]          2021 12:23:00 PM EST MEDENT ([facility])









          Name      Value     Range     Interpretation Code Description Data Milly

rce(s) Supporting 

Document(s)

 

          Magnesium 2.0 mg/dL 1.8-2.4                       [facility]  









                    ID                  Date                Data Source

 

                    [id number]            10/26/2021 04:20:00 PM EDT [facility]









          Name      Value     Range     Interpretation Code Description Data Milly

rce(s) Supporting 

Document(s)

 

          SARS-CoV-2 (COVID 19) NEGATIVE - SARS-CoV-2 (COVID19)                 

              [facility]     

 

                                        This lab was ordered by [facility] a

nd reported by [facility].











                    ID                  Date                Data Source

 

                    [id number]          10/25/2021 03:51:00 PM EDT MEDENT ([facility])









          Name      Value     Range     Interpretation Code Description Data Milly

rce(s) Supporting 

Document(s)

 

           Influenza A Amplification Laboratory test result                     

             MEDENT ([facility])                              

 

                                        Negative results do not preclude influen

za or RSV virus

infection and should not be used as the sole basis for

treatment or other patient management decisions.

 

 

           Influenza B Amplification Laboratory test result                     

             MEDENT ([facility]                              

 

                                        Negative results do not preclude influen

za or RSV virus

infection and should not be used as the sole basis for

treatment or other patient management decisions.

 

 

          RSV Amplification Laboratory test result                              

 MEDENT ([facility])  

 

                                        Negative results do not preclude influen

za or RSV virus

infection and should not be used as the sole basis for

treatment or other patient management decisions.

 

 

           Laboratory test finding (navigational concept) Laboratory test result

                                  

MEDENT ([facility])            

 

                                        A false negative result may occur if a s

pecimen is

improperly collected, transported or handled. False negative

results may also occur if inadequate numbers of organisms

are present in the specimen.  As with any molecular test,

mutations within the target regions of Xpert [employer]press

SARS-CoV-2 could affect primer and/or probe binding

resulting in failure to detect the presence of virus.  This

test cannot rule out diseases caused by other bacterial or

viral pathogens.



DISCLAIMER:

Testing was performed using the [employer] SARS-CoV-2 test.

This test was developed and its performance characteristics

determined by [employer]. This test has not been FDA cleared or

approved. This test has been authorized by FDA under an

Emergency Use Authorization (EUA). This test is only

authorized for the duration of time the declaration that

circumstances exist justifying the authorization of the

emergency use of in vitro diagnostic tests for detection of

SARS-CoV-2 virus and/or diagnosis of COVID-19 infection

under section 564(b)(1) of the Act, 21 U.S.C.

                                        360bbb-3(b)(1), unless the authorization

 is terminated or

revoked sooner.

 









                    ID                  Date                Data Source

 

                    [id number]            10/25/2021 03:51:00 PM EDT [facility]









          Name      Value     Range     Interpretation Code Description Data Milly

rce(s) Supporting 

Document(s)

 

                                        SARS coronavirus 2 RNA [Presence] in Res

piratory specimen by ORQUIDEA with probe 

detection  NEGATIVE                                    [facility]      

 

                                        This lab was ordered by [facility] a

nd reported by [facility].

 









                    ID                  Date                Data Source

 

                    [id number]          10/25/2021 03:01:00 PM EDT [facility] ([facility])









          Name      Value     Range     Interpretation Code Description Data Milly

rce(s) Supporting 

Document(s)

 

          Red Blood Count 4.12 10   4.00-5.40                     [facility] ([facility])  

 

          White Blood Count 6.3 10    4.0-10.0                      [facility] ([facility])  

 

          Hemoglobin 12.9 g/dL 12.0-15.5                     [facility] ([place] [facility]  

 

          Mean Corpuscular Volume 100.2 fl  80.0-96.0                     [facility]

 ([facility])  

 

          Hematocrit 41.3 %    36.0-47.0                     [facility] ([place] [facility])  

 

          Mean Corpuscular Hemoglobin 31.3 pg   27.0-33.0                     [facility]

[facility] ([facility]) 

 

 

          Mean Corpuscular HGB Conc 31.2 g/dL 32.0-36.5                     [facility]

[facility] ([facility]) 

 

 

          Red Cell Distribution Width 14.8 %    11.5-14.5                     [facility]

[facility] ([facility])  

 

          Platelet Count, Automated 202 10    150-450                       [facility]

[facility] ([facility] [facility])  

 

          Neutrophils % 79.5 %    36.0-66.0                     [facility] ([facility])  

 

          Lymph %   13.8 %    24.0-44.0                     [facility] ([place] [facility]

[facility])  

 

          Mono %    4.8 %     2.0-8.0                       [facility] [facility]  

 

          Eos %     1.1 %     0.0-3.0                       [facility] [facility]  

 

          Baso %    0.3 %     0.0-1.0                       [facility] [facility]  

 

          Immature Granulocyte % 0.5 %     0-3.0                         [facility] 

([facility] [facility])  

 

          Nucleated Red Blood Cell % 0.0 %     0-0                           [facility]

ENT ([facility] [facility])  

 

          Neutrophils # 5.0 10    1.5-8.5                       [facility] ([facility])  

 

          Mono #    0.3 10    0.0-0.8                       [facility] ([place] [facility]

[facility])  

 

          Lymph #   0.9 10    1.5-5.0                       [facility] [facility]  

 

          Eos #     0.1 10    0.0-0.5                       [facility] [facility]  

 

          Baso #    0.0 10    0.0-0.2                       [facility] ([place] [facility]  









                    ID                  Date                Data Source

 

                    [id number]          10/25/2021 03:01:00 PM EDT [facility] ([facility])









          Name      Value     Range     Interpretation Code Description Data Milly

rce(s) Supporting 

Document(s)

 

          CPK Creatine Phosphokinase 26 U/L                            [facility]

[facility] ([facility] [facility])  

 

          CK-MB Value Mass 1.0 ng/mL                               [facility]

[place] [facility]  

 

          MB/CK Relative Index 3.85                                    [facility] ([facility])  

 

                                        <content>DIAGNOSIS CRITERIA</content><br

/><content>MMB ng/ml       Relative 

Index (RI)</content><br/><content>NON-AMI               < or = 5               
N/A</content><br/><content>GRAY ZONE              > 5                < or = 
4</content><br/><content>AMI                    > 5                   > 
4</content><br/><content></content> 

 

          Troponin I 0.02 ng/mL                               [facility] ([place] 

[facility])  

 

                                        <content>Troponin I Reference Interval f

or Siemens [place] 

LOCI:</content><br/><content></content><br/><content>99th Percentile= 0.00-0.045
 ng/ml</content><br/><content></content><br/><content>Risk 
Stratification:</content><br/><content><= 0.10 ng/ml   Decreased Risk for 
Adverse Clinical</content><br/><content>Events.</content><br/><content>0.10-1.50
 ng/ml   Increased Risk for Adverse Clinical</content><br/><content>Events. 
Evaluation of additional</content><br/><content>criterion and/or repeat testing 
in 2-6</content><br/><content>hours is suggested to rule out 
myocardial</content><br/><content>damage.</content><br/><content>>= 1.50 ng/ml  
 Indicative of Myocardial Injury.</content><br/><content></content> 









                    ID                  Date                Data Source

 

                    [id number]          10/25/2021 03:01:00 PM EDT [facility] ([facility])









          Name      Value     Range     Interpretation Code Description Data Milly

rce(s) Supporting 

Document(s)

 

          Ast/Sgot  26 U/L    7-37                          [facility] ([place] [facility]  

 

          Alt/SGPT  28 U/L    12-78                         [facility] ([place] [facility]  

 

          Alkaline Phosphatase 105 U/L                           [facility] ([facility])  

 

          Bilirubin,Total 0.8 mg/dL 0.2-1.0                       [facility] ([facility])  

 

          Bilirubin,Direct 0.4 mg/dL 0.0-0.2                       [facility]

[place] [facility]  

 

          Total Protein 7.9 GM/DL 6.4-8.2                       [facility] ([facility])  

 

          Albumin   2.8 GM/DL 3.2-5.2                       [facility] ([place] [facility]  

 

          Albumin/Globulin Ratio 0.5       1.2-2.2                       [facility] 

([facility] [facility])  









                    ID                  Date                Data Source

 

                    [id number]          10/25/2021 03:01:00 PM EDT [facility] ([facility])









          Name      Value     Range     Interpretation Code Description Data Milly

rce(s) Supporting 

Document(s)

 

          Glucose, Fasting 106 mg/dL                         [facility] ([place]

[place] [facility])  

 

          Creatinine For GFR 0.94 mg/dL 0.55-1.30                     [facility] ([facility])  

 

          Blood Urea Nitrogen 18 mg/dL  7-18                          [facility] ([facility])  

 

           Glomerular Filtration Rate Laboratory test result                    

              [facility] ([place] 

[facility])                              

 

                                        <content>Units are mL/min/1.73 

m2</content><br/><content></content><br/><content>Chronic Kidney Disease Staging
 per NKF:</content><br/><content></content><br/><content>Stage I & II   GFR >=60
       Normal to Mildly Decreased</content><br/><content>Stage III      GFR 30-
59      Moderately Decreased</content><br/><content>Stage IV       GFR 15-29    
  Severely Decreased</content><br/><content>Stage V        GFR <15        Very 
Little GFR Left</content><br/><content>ESRD           GFR <15 on 
RRT</content><br/><content></content> 

 

          Sodium Level 140 meq/L 136-145                       [facility] ([place]

 [facility])  

 

          Potassium Serum 4.6 meq/L 3.5-5.1                       [facility] ([facility])  

 

          Chloride Level 111 meq/L                         [facility] ([facility])  

 

          Carbon Dioxide Level 22 meq/L  21-32                         [facility] ([facility])  

 

          Anion Gap 7 meq/L   8-16                          [facility] ([place] [facility]  

 

          Calcium Level 10.7 mg/dL 8.8-10.2                      [facility] ([facility])  









                    ID                  Date                Data Source

 

                    [id number]          10/25/2021 03:01:00 PM EDT [facility] ([facility])









          Name      Value     Range     Interpretation Code Description Data Milly

rce(s) Supporting 

Document(s)

 

                          Natriuretic peptide.B prohormone N-Terminal [Mass/volu

me] in Serum or Plasma 

5817 pg/mL                                          [facility] ([facility]

)  

 

                                        <content>note:<nlbl:demographic_changed>

</content><br/><content></content> 

 

           Digoxin [Mass/volume] in Serum or Plasma 0.5 ng/mL  0.5-2.0          

                [facility] ([facility])                             

 

                                        <content>note:<nlbl:demographic_changed>

</content><br/><content></content> 

 

           Thyroxine (T4) Ab [Units/volume] in Serum 10.1 ug/dL 4.5-12.0        

                 [facility] 

([facility])                   

 

                                        <content>note:<nlbl:demographic_changed>

</content><br/><content></content> 

 

                          Thyrotropin [Units/volume] in Serum or Plasma by Detec

tion limit <= 0.05 mIU/L 

0.051 uIU/ML 0.358-3.740                            [facility] ([facility]

)  

 

                                        <content>note:<nlbl:demographic_changed>

</content><br/><content></content> 

 

             Flecainide [Mass/volume] in Serum or Plasma Laboratory test result 

0.20-1.00                  

                          [facility] ([facility])  

 

                                        This test was developed and its performa

nce characteristics

determined by [facility]. It has not been cleared or

approved by the Food and Drug Administration.

Detection Limit = 0.10

Performed at:  [facility]  [id number]

[id number]

: Barak Archibald MD, Phone:  [phone]

 









                    ID                  Date                Data Source

 

                    [id number]          10/06/2021 02:57:00 PM EDT [facility]









          Name      Value     Range     Interpretation Code Description Data Milly

rce(s) Supporting 

Document(s)

 

                Bacteria identified in Wound shallow by Aerobe culture Laborator

y test result                 

                                        [facility] ([facility] [facility])  









                    ID                  Date                Data Source

 

                    [id number]            2021 12:29:00 PM EDT [facility] ([facility])









          Name      Value     Range     Interpretation Code Description Data Milly

rce(s) Supporting 

Document(s)

 

          White Blood Count 6.8       5.0-10.0                      [facility] ([facility])  

 

          Platelets 187       172-450                       [facility] ([facility]  

 

          Red Blood Count 3.85      4.00-5.40                     [facility] ([facility])  

 

          Hemoglobin 12.7                                    [facility] ([facility]  

 

          Hematocrit 39.4                                    [facility] ([facility])  









                    ID                  Date                Data Source

 

                    [id number]          2021 12:21:00 PM EDT [facility] ([facility])









          Name      Value     Range     Interpretation Code Description Data Milly

rce(s) Supporting 

Document(s)

 

          Red Blood Count 3.85 10   4.00-5.40                     [facility] ([facility])  

 

          White Blood Count 6.8 10    4.0-10.0                      [facility] ([facility])  

 

          Hemoglobin 12.7 g/dL 12.0-15.5                     [facility] ([facility])  

 

          Hematocrit 39.4 %    36.0-47.0                     [facility] ([facility])  

 

          Mean Corpuscular Volume 102.3 fl  80.0-96.0                     [facility] [facility]  

 

          Mean Corpuscular Hemoglobin 33.0 pg   27.0-33.0                     [facility]

[facility] ([facility] [facility]) 

 

 

          Mean Corpuscular HGB Conc 32.2 g/dL 32.0-36.5                     [facility]

[facility] ([facility] [facility]) 

 

 

          Red Cell Distribution Width 12.3 %    11.5-14.5                     [facility]

[facility] ([place] [facility])  

 

          Platelet Count, Automated 187 10    150-450                       [facility]

[facility] ([place] [facility])  

 

          Neutrophils % 72.2 %    36.0-66.0                     [facility] ([facility])  

 

          Lymph %   19.9 %    24.0-44.0                     [facility] ([place] [facility]

[facility])  

 

          Mono %    6.0 %     2.0-8.0                       [facility] ([place] [facility]

[facility])  

 

          Eos %     1.5 %     0.0-3.0                       [facility] [facility]

[facility])  

 

          Baso %    0.3 %     0.0-1.0                       [facility] [facility])  

 

          Immature Granulocyte % 0.1 %     0-3.0                         [facility] 

([place] [facility])  

 

          Nucleated Red Blood Cell % 0.0 %     0-0                           [facility]

[facility] ([place] [facility])  

 

          Neutrophils # 4.9 10    1.5-8.5                       [facility]

[facility]  

 

          Lymph #   1.4 10    1.5-5.0                       [facility] ([place] [facility]

[facility])  

 

          Mono #    0.4 10    0.0-0.8                       [facility] [facility])  

 

          Eos #     0.1 10    0.0-0.5                       [facility] [facility]

[facility])  

 

          Baso #    0.0 10    0.0-0.2                       [facility] ([place] [facility]

[facility])  









                    ID                  Date                Data Source

 

                    [id number]           2021 05:25:00 PM EDT [facility]

 

                                        Hematopathology Report See Addendum [facility]

wName: JUANY BECERRILMRN: 

[id number] Number: [id number] Date: 2021 00:00Received Date: 
7/15/2021 13:57Physician(s): ROSA ROCHA MD ADJAPONG[facility] 
[facility]) ReceivedA: Bone Marrow, Flow Cytometry; 
RECEIVED 1 GREEN TOP BM, 2 ASP AND 1 PBSMEAR (1 EXTRA EDTA BM SENT TO 
MOLECULAR)Clinical HistoryMultiple myeloma.TEST REQUESTED/PERFORMED: Flow 
cytometry analysis DiagnosisFlow cytometry of bone marrow: Dilute specimen with 
small population oflambda restricted plasma cells (1% on bone marrow aspirate), 
consistentwith plasma cell neoplasm. Correlation with full bone marrow biopsy 
isrecommended. FISH study is pending. Halie Gentile M.D.;Resident 
PathologistElectronically Signed By ROSALINDA MARIE M.D. Attending Pathologist  
117:25:03The attending pathologist named above attests that he/she has 
personallyreviewed the relevant preparation(s) for the specimen(s) and rendered 
thefinal diagnosis. Addendum     2021     FISH identified gain of 1q, 
trisomy 5, and monosomy 13 in a plasmacell-enriched population. FISH was 
negative for gain/loss of chromosome 7and 9, deletion of IgH and TP53, and an 
IgH rearrangement. See [id number] of 1q and deletion of 13q are associated 
with progression. Xfrysvca75w is generally associated with an intermediate risk 
and 1q gain isassociated with high risk.     Addendum Electronically Signed By: 
         Nadege Eddy M.D.          2021 11:16  ProceduresFlow Cytometry 
    Date Ordered:7/15/2021     Status:   Signed Out2021 
InterpretationPERIPHERAL BLOOD: CBC performed at [facility] 
(21)WBC           6.8     K/uLRBC          *3.85     M/uLHgb           12.7
     g/dLHct           39.4     %MCV          *102.3     fLMCH           33.0   
  pgMCHC           32.2     g/dLRDW           12.3     %Platelets      187     
K/ulDifferential Count (automated):72.2  % Neutrophils 1.5  % Eosinophils 0.3  %
 Tmodgjlmv91.9  % Lymphocytes 0.1  % Atypical Lymphocytes 6.0  % 
Monocytes-------100.0 %     A peripheral blood film is reviewed.  BONE MARROW 
ASPIRATE:Differential Count (100 cells): 6  % Erythroid Precursors 2  % N. 
Myelocytes 1  % N. Metamyelocytes and Band Forms80  % Neutrophils 4  % 
Eosinophils 1  % Basophils 5  % Lymphocytes 1  % Plasma Cells--------100 %  
Morphology: Dilute sample.Lymphoid Panel: Jone Harrington 21 1829 [id number] 
following markers were assayed: CD45 (gate), CD2, CD3, CD4, CD5, CD7,CD8, CD10, 
CD19, CD20, CD33, CD34, CD38, CD56, CD57, CD64, , ,HLA-DR, Kappa, and 
Lambda.#events: [id number]: 98%Flow Cytometry Differential 
(CD45/SSC)Lymphocyte Lapwai: 15%CD45 dim Lapwai: 1%Monocyte Lapwai: 4%Granulocyte 
Lapwai: 73%Nucleated/Erythroid Lapwai: 2%The lymphocyte gate showsB-cells (CD19): 
14%T-cells (CD3): 75%NK-cells (CD3-/CD56+): 9%Kappa/Lambda Ratio: 2.9CD4/CD8 
Ratio: 1.8Results: (expressed as % of lymphocyte gate)T-cell Markers: CD2 = 80, 
CD3 = 75, CD3/CD4 = 48, CD3/CD8 = 27, CD5 = 74,CD7 = 75, CD3/57 = 8B-cell yelena
ers: Kappa = 9, Lambda = 3, CD19 = 14, CD20 = 16, CD19/10 = 1,CD19/CD5 = 3, 
CD38/CD20 = 15Light chain as % of B-Cells: CD19/Kappa = 54, CD19/Lambda = 
17CD19/CD5/Kappa = 4, CD19/CD5/Lambda = 5CD19/CD10/Kappa = 7, CD19/CD10/Lambda =
 1NK cell Markers: CD56 = 12, CD57 = 13Other Markers: CD10 = 1, CD38 = 
63Results: (expressed as % of CD45 dim gate)T-cell Markers: CD2 = 27, CD3 = 8, 
CD3/CD4 = 2, CD3/CD8 = 4, CD5 = 10, CD7= 26, CD3/57 = 2B-cell markers: Kappa = 
29, Lambda = 0, CD19 = 16, CD20 = 14, CD19/10 =12, CD19/CD5 = 3, CD38/CD20 = 
11Light chain as % of B-Cells: CD19/Kappa = 0, CD19/Lambda = 0CD19/CD10/Kappa = 
6, CD19/CD10/Lambda = 2NK cell Markers: CD56 = 16, CD57 = 13Basophil Markers: 
 (HLA-DR-) = 19Other Markers: CD10 = 25, CD38 = 85, CD33 = 47, CD34 = 23, 
CD64 = 20, = 5,  = 56, HLA-DR = 57[facility] 
 [id number] following markers were assayed: CD45 (cell gate),  
(plasma cellgate), CD19, CD20, CD38, CD56, cytoplasmic Kappa, and cytoplasmic La
mbda.(Please note, that Cytoplasmic Kappa and Cytoplasmic Lambda are used 
todetect plasma cells, while surface Kappa and Lambda are for 
lymphocytes).#events: [id number]: Routinely a minimum of 500,000 events are 
collected in each paneltube. Due to sample cellularity and/or processing this 
number was notachievable for this sample.Flow Cytometry Differential:Lymphocyte 
Lapwai: 13%CD45 dim Lapwai: 2%Monocyte Lapwai: 4%Granulocyte Lapwai: 74%NRBC Lapwai: 
3% Plasma Cell Lapwai: 0%Results: (expressed as % of lymphocyte gate)B-Cells 
(CD19): 14% CD19 = 14, CD20 = 14Light chain as % of B-Cells: Cytoplasmic Kappa/
CD20 = 49, CytoplasmicLambda/CD20 = 26Results: (expressed as % of total + 
plasma cell gate)CD38 = 64, CD56 = 40, CD38/56 = 37, CD19 = 12, CD20 = 
5Cytoplasmic Kappa/ = 0, Cytoplasmic Lambda/ = 67    Results-
CommentsLymphocytes consist predominantly of T cells with normal expression of 
panT-cell markers and a normal CD4/CD8 ratio, normal proportions of NK 
andcytotoxic T cells, and polyclonal B cells.CD34+ blasts comprise fewer than 1%
 of cells studied. Blasts, monocytes,and granulocytes show no definitive 
immunophenotypic aberrancies.Plasma cell-associated markers show very small 
population of lambdarestricted plasma cells comprising less than 1% of cells, 
mostly negativefor CD19 and positive for CD56. Procedure Electronically Signed 
By:ROSALINDA MARIE M.D.2021 This report may include one or more 
immunohistochemical stain/fluorochromeconjugated monoclonal antibody results 
that use analyte specific reagents.All positive and negative controls have been 
reviewed by the attendingpathologist and are satisfactory. The tests were 
developed and theirperformance characteristics determined by [facility] 
department.They have not been cleared or approved by the US Food and DrugAdminis
tration. The FDA has determined that such clearance or approval isnot necessary.
 









          Name      Value     Range     Interpretation Code Description Data Milly

rce(s) Supporting 

Document(s)

 

                                                                       









                    ID                  Date                Data Source

 

                    [id number]            2021 01:04:00 PM [facility]

 

                                        Cytogenetics ReportName: JUANY BECERRILMRN: [id number] Number: [id number]

[id number] Date: 2021 00:00Received Date: 7/15/2021 13:50Physician(s): 
ROSA ROCHA MD ADJAPONG, OPOKU,MDSpecimen(s) ReceivedA: Bone Marrow - Karyo 
and Multiple Myel  FISHClinical History66-year-old patient with multiple 
myeloma.TEST REQUESTED/PERFORMED:  Chromosome analysis and fluorescence in 
situhybridization (FISH)  DiagnosisFISH identified 11% of nuclei with gain of 
1q; 11% with trisomy 5; and 10%with monosomy 13 in this plasma cell-enriched 
population of cells. FISHwas negative for gains or losses of chromosomes 7 and 
9; deletions sx42d59.3 (IgH), and 17p13 (TP53); and an IgH rearrangement.Gains 
of chromosome 1q in plasma cell dyscrasia/multiple myeloma occur inup to 40% of 
cases, are secondary aberrations and are associated withprogression. 13q 
deletions are detected in approximately 50% of MM and arealso secondary events, 
although their presence in monoclonal gammopathy ofuncertain significance (MGUS)
 suggests they occur early in diseaseprogression. While deletion 13q is 
generally associated with anintermediate risk, 1q gain is often characterized as
 high risk (1-3).Please correlate with the concurrent Hematopathology report, 
[id number]. References:1.          Kailee GALLEGOS. 1q rearrangements in multiple 
myeloma. [employer] Haematol. 
1998;2(3):86-87.[email or web address]. 
Lastvisited .2.     Maryam & Blake. Mature B- and T-cell neoplasms 
and Hodgkinlymphoma. In Cancer Cytogenetics: Chromosomal and Molecular 
GeneticAberrations of Tumor Cells, 4th Edition. Nataliya & Ty, eds. [employer] 
[employer] 2015. 3.     Wilda SV. Multiple myeloma: 2020 update on 
diagnosis,risk-stratification and management. Am J Hematol. 2020 95(11):1444.  
Electronically Signed By Lorenzo Robles, PhD Attending Pathologist 2021 
13:04:45Gross DescriptionFluorescence in situ Hybridization (FISH)multiple 
myeloma FISH panel:nucish(FWUQ5Qt3,CKS1Bx
3)[],(5p15.31,EGR1)x3[],(S24L308,13q34)x1[10/100],(IgHx2)[97/100],(T

P53,D17Z1)x2[98/100]     CEP 7/T1D205 (7q31):nuc maggie(D7Z1,W6M091)x2[98/100] CEP 
9:nuc maggie(D9Z1x2)[98/100]DescriptionA plasma cell-enriched population evaluated 
by FISH: gain of 1q (11%);trisomy 5 (11%); monosomy 13 (10%); negative for gains
 or losses ofchromosomes 7 and 9; deletions IgH, and TP53; and IgH 
rearrangement.         
________________________________________________________________________________

_____________Test Data:Fluorescence in situ Hybridization (FISH):  Enriched 
Plasma Cells     Probe Normal Cut-off Nuclei  Analyzed FISH SIGNAL PATTERNS     
    Normal Abnormal NKCS      *LSI CDKN2C (1p22.3) G  LSI CKS1B (1q21.3) O 3% 
100 2G2O:  84% 2G3O:  11% 5%       *LSI 5p15.31 G  LSI EGR1 (5q31.2) R 3% 100 
2G2R:  89% 3G3R:  11% 0%  *CEP 7 (D7Z1) G  LSI O7V429 (7q31) R 3% 100 2G2R:  98%
 0% 2%       **CEP 9 (D9Z1) G 3%100   2% 0% 2%  *LSI V05R321 (13q14.2) O  
     LSI 13q34 G 3% 100     2O2% 1O1G:   10% 3%       *LSI IgH(14q32) Y   
     BA 3% 100    2Y:  97% 0% 3%       *LSI TP53 (17p13.1) O  CEP 17 (D17Z1) G 
3% 100 2O2% 0% 2% Vendor:  *[employer]  Probes:  LSI: Locus Specific 
Probe;  CEP: Centromeric Probe;  BA: BreakApartFluorochromes/ Signals:  G - 
Green;  O  orange;  R  Red;  Y - yellow(fusion) NKCS:  Signals with No Known 
Clinical SignificanceDisclaimer: The plasma cells evaluated in this study were 
isolated fromthe bone marrow mixed cell population utilizing immunomagnetic 
cellseparation. This technology significantly enriches the test cellpopulation 
for plasma cells, thereby improving FISH detection of anomaliesassociated with 
plasma cell myeloma. However, as this is a selectivepopulation, the true in vivo
 frequencies of the anomalies identifiedcannot be determined. The FISH test was 
developed and its performance wasvalidated by the Cytogenetics section of the 
Department of ClinicalPathology. This test has not been cleared or approved by 
the U. S. Foodand Drug Administration (FDA). The FDA has determined that such 
approvalis not necessary. However, the procedure is considered 
investigational,and should not be used as the sole criteria for diagnosis.   









          Name      Value     Range     Interpretation Code Description Data Milly

rce(s) Supporting 

Document(s)

 

                                                                       









                    ID                  Date                Data Source

 

                    [id number]            2021 12:28:00 PM EDT [facility] ([facility])









          Name      Value     Range     Interpretation Code Description Data Milly

rce(s) Supporting 

Document(s)

 

           Albumin [Mass/volume] in Serum or Plasma 3.0                         

                [facility] ([facility])                       

 

           Calcium [Mass/volume] in Serum or Plasma 10.2                        

                [facility] ([facility])                       

 

             Alanine aminotransferase [Enzymatic activity/volume] in Serum or Pl

asma 19                                     

                          [facility] ([facility])  

 

           Carbon dioxide, total [Moles/volume] in Serum or Plasma 27           

                               [facility] 

([facility])           

 

           Chloride [Moles/volume] in Serum or Plasma 107                       

                  [facility] ([facility]                       

 

           Potassium [Moles/volume] in Serum or Plasma 4.7                      

                   [facility] ([facility])                       

 

           Alkaline phosphatase [Enzymatic activity/volume] in Serum or Plasma 7

7                                          

[facility] ([facility])    

 

          Sodium    137                                     [facility] ([facility]

[facility])  

 

           Protein [Mass/volume] in Serum or Plasma 8.3                         

                [facility] ([facility]                       

 

                Aspartate aminotransferase [Enzymatic activity/volume] in Serum 

or Plasma 23                              

                                        [facility] ([facility])  

 

           Urea nitrogen [Mass/volume] in Serum or Plasma 28                    

                      [facility] ([facility])                       

 

          Creatinine For GFR 0.90                                    [facility] ([facility]

[facility])  

 

          Glucose   98                                [facility]  









                    ID                  Date                Data Source

 

                    [id number]            2021 12:28:00 PM EDT [facility] ([facility]

[facility])









          Name      Value     Range     Interpretation Code Description Data Milly

rce(s) Supporting 

Document(s)

 

          White Blood Count 6.2       5.0-10.0                      [facility] ([facility])  

 

          Red Blood Count 3.76      4.00-5.40                     [facility] ([facility]

[facility])  

 

          Hemoglobin 12.5                                    [facility] ([facility])  

 

          Platelets 199       172-450                       [facility] ([facility]

[facility])  

 

          Hematocrit 39.0                                    [facility] ([facility])  









                    ID                  Date                Data Source

 

                    [id number]          2021 12:03:00 PM EDT [facility] ([facility])









          Name      Value     Range     Interpretation Code Description Data Milly

rce(s) Supporting 

Document(s)

 

          Prothrombin Time 13.8 s    12.5-14.3                     [facility] ([facility])  

 

          Inr       1.04                                    [facility] [facility]  

 

                                        THERAPUTIC HUMAN INR VALUES

INDICATIONS                      NORMAL RANGES

PROPHYLAXIS/TREATMENT OF:

VENOUS THROMBOSIS                2.0-3.0

PULMONARY EMBOLISM               2.0-3.0

PREVENTION OF SYSTEMIC EMBOLISM FROM:

TISSUE HEART VALVES              2.0-3.0

ACUTE MYOCARDIAL INFARCTION      2.0-3.0

VALVULAR HEART DISEASE           2.0-3.0

ATRIAL FIBRILLATION              2.0-3.0

MECHANICAL VALVES(HIGH RISK)     2.5-3.5

RECURRENT MYOCARDIAL INFARCTION  2.5-3.5

 

 

          Partial Thromboplastin Time 30.6 s    24.2-38.5                     ME

DENT ([facility])  









                    ID                  Date                Data Source

 

                    [id number]          2021 10:38:00 AM EDT [facility] ([facility])









          Name      Value     Range     Interpretation Code Description Data Milly

rce(s) Supporting 

Document(s)

 

          Blood Urea Nitrogen 28 mg/dL  7-18                          [facility] ([facility])  

 

          Glucose, Fasting 98 mg/dL                          [facility] ([facility])  

 

           Glomerular Filtration Rate Laboratory test result                    

              [facility] ([place] 

[facility])                              

 

                                        <content>Units are mL/min/1.73 

m2</content><br/><content></content><br/><content>Chronic Kidney Disease Staging
 per NKF:</content><br/><content></content><br/><content>Stage I & II   GFR >=60
       Normal to Mildly Decreased</content><br/><content>Stage III      GFR 30-
59      Moderately Decreased</content><br/><content>Stage IV       GFR 15-29    
  Severely Decreased</content><br/><content>Stage V        GFR <15        Very 
Little GFR Left</content><br/><content>ESRD           GFR <15 on 
RRT</content><br/><content></content> 

 

          Creatinine For GFR 0.90 mg/dL 0.55-1.30                     [facility] ([facility])  

 

          Sodium Level 137 meq/L 136-145                       [facility] ([place]

 [facility])  

 

          Potassium Serum 4.7 meq/L 3.5-5.1                       [facility] ([facility])  

 

          Carbon Dioxide Level 27 meq/L  21-32                         [facility] ([facility])  

 

          Chloride Level 107 meq/L                         [facility] ([facility])  

 

          Anion Gap 3 meq/L   8-16                          [facility] ([place] [facility]  

 

          Calcium Level 10.2 mg/dL 8.8-10.2                      [facility] ([facility])  

 

          Ast/Sgot  23 U/L    7-37                          [facility] ([place] [facility]  

 

          Alt/SGPT  19 U/L    12-78                         [facility] ([place] [facility]  

 

          Bilirubin,Total 0.5 mg/dL 0.2-1.0                       [facility] ([facility])  

 

          Alkaline Phosphatase 77 U/L                            [facility] ([facility])  

 

          Albumin   3.0 GM/DL 3.2-5.2                       [facility] ([place] [facility]  

 

          Total Protein 8.3 GM/DL 6.4-8.2                       [facility] ([facility])  

 

          Albumin/Globulin Ratio 0.6       1.2-2.2                       [facility] 

([facility] [facility])  









                    ID                  Date                Data Source

 

                    [id number]          2021 10:38:00 AM EDT [facility] ([facility])









          Name      Value     Range     Interpretation Code Description Data Milly

rce(s) Supporting 

Document(s)

 

          White Blood Count 6.2 10    4.0-10.0                      [facility] ([facility])  

 

          Red Blood Count 3.76 10   4.00-5.40                     [facility] ([facility])  

 

          Hemoglobin 12.5 g/dL 12.0-15.5                     [facility] ([place] [facility])  

 

          Hematocrit 39.0 %    36.0-47.0                     [facility] ([place] [facility]

[facility])  

 

          Mean Corpuscular Volume 103.7 fl  80.0-96.0                     [facility]

 ([facility] [facility])  

 

          Mean Corpuscular Hemoglobin 33.2 pg   27.0-33.0                     ME

D[facility] ([facility] [facility]) 

 

 

          Mean Corpuscular HGB Conc 32.1 g/dL 32.0-36.5                     [facility]

NT ([place] [facility]) 

 

 

          Red Cell Distribution Width 12.9 %    11.5-14.5                     [facility]

[facility] ([facility] [facility])  

 

          Neutrophils % 74.5 %    36.0-66.0                     [facility] ([facility])  

 

          Platelet Count, Automated 199 10    150-450                       [facility]

[facility] ([facility] [facility])  

 

          Lymph %   16.4 %    24.0-44.0                     [facility] ([place] [facility]

[facility])  

 

          Mono %    7.0 %     2.0-8.0                       [facility] [facility]  

 

          Eos %     1.1 %     0.0-3.0                       [facility] [facility]  

 

          Immature Granulocyte % 0.5 %     0-3.0                         [facility] 

([facility] [facility])  

 

          Baso %    0.5 %     0.0-1.0                       [facility] [facility]  

 

          Neutrophils # 4.6 10    1.5-8.5                       [facility] ([facility])  

 

          Nucleated Red Blood Cell % 0.0 %     0-0                           MED

ENT ([facility] [facility])  

 

          Lymph #   1.0 10    1.5-5.0                       [facility] ([place] [facility]

[facility])  

 

          Mono #    0.4 10    0.0-0.8                       [facility] ([place] [facility]  

 

          Eos #     0.1 10    0.0-0.5                       [facility] [facility]  

 

          Baso #    0.0 10    0.0-0.2                       [facility] ([place] [facility]  









                    ID                  Date                Data Source

 

                    [id number]          2021 02:06:00 PM EDT [facility] ([facility])









          Name      Value     Range     Interpretation Code Description Data Milly

rce(s) Supporting 

Document(s)

 

                          Thyrotropin [Units/volume] in Serum or Plasma by Detec

tion limit <= 0.05 mIU/L 

0.19 uIU/mL  0.36-3.74                              [facility] ([place] [facility]

)  









                    ID                  Date                Data Source

 

                    [id number]          2021 02:06:00 PM EDT [facility] ([facility])









          Name      Value     Range     Interpretation Code Description Data Milly

rce(s) Supporting 

Document(s)

 

           Urea nitrogen [Mass/volume] in Serum or Plasma 18 mg/dL   7-18       

                      [facility] 

([facility] [facility])                   

 

           Glucose [Mass/volume] in Serum or Plasma 100 mg/dL  74-99            

                [facility] ([place] 

[facility])                              

 

                                        100-125 mg/dL     PRE-DIABETES/FASTING

>126 mg/dL          DIABETES/FASTING

 

 

           Sodium [Moles/volume] in Serum or Plasma 141 meq/L  136-145          

                [facility] ([place]

 [facility])                             

 

           Potassium [Moles/volume] in Serum or Plasma 3.8 meq/L  3.5-5.1       

                   [facility] 

([place] [facility])                   

 

          Creatinine 0.8 mg/dL 0.6-1.3                       [facility] ([facility])  

 

           Carbon dioxide, total [Moles/volume] in Serum or Plasma 29 meq/L   21

-32                            

[facility] ([facility] [facility])            

 

           Chloride [Moles/volume] in Serum or Plasma 104 meq/L           

                  [facility] 

([place] [facility])                   

 

                                        Glomerular filtration rate/1.73 sq M pre

dicted among non-blacks [Volume 

Rate/Area] in Serum or Plasma by Creatinine-based formula (MDRD) Laboratory test

result                                              [facility] ([facility] [facility]

)  

 

           Calcium [Mass/volume] in Serum or Plasma 10.8 mg/dL 8.5-10.1         

                [facility] 

([facility] [facility])                   

 

                                        NOTE:

CALCIUM,TSH VERIFIED





 

 

                                        Glomerular filtration rate/1.73 sq M pre

dicted among blacks [Volume Rate/Area] 

in Serum or Plasma by Creatinine-based formula (MDRD) Laboratory test result    

                  

                                        [facility] [facility]  

 

                                        <content>CHRONIC KIDNEY DISEASE STAGING 

PER 

NKF</content><br/><content></content><br/><content>STAGE I & II      GFR >= 60  
     NORMAL TO MILDLY DECREASED</content><br/><content>STAGE III          GFR 
30-59          MODERATELY DECREASED</content><br/><content>STAGE IV           
GFR 15-29         SEVERELY DECREASED</content><br/><content>STAGE V            
GFR <15            VERY LITTLE GFR LEFT</content><br/><content>ESRD             
   GFR <15            ON RRT</content><br/><content></content> 









                    ID                  Date                Data Source

 

                    [id number]          2021 02:06:00 PM EDT [facility] ([facility])









          Name      Value     Range     Interpretation Code Description Data Milly

rce(s) Supporting 

Document(s)

 

           Erythrocyte sedimentation rate by Westergren method 25 mm/hr   0-15  

                           [facility] 

[facility] [facility]                   









                    ID                  Date                Data Source

 

                    [id number]          2021 02:06:00 PM EDT [facility]









          Name      Value     Range     Interpretation Code Description Data Milly

rce(s) Supporting 

Document(s)

 

           Erythrocytes [#/volume] in Blood by Automated count 4.11 x10*6/UL 4.2

0-6.30                        

[facility] ([place] [facility])            

 

           Leukocytes [#/volume] in Blood by Automated count 5.9 x10*3/UL 4.1-10

.9                         

[facility] ([place] [facility])            

 

           Hematocrit [Volume Fraction] of Blood by Automated count 40.0 %     3

7.0-51.0                        

[facility] ([facility] [facility])            

 

           Hemoglobin [Mass/volume] in Blood 13.6 g/dL  12.0-18.0               

         [facility] ([place] 

[facility])                              

 

          MCV       97.4 fL   80.0-97.0                     [facility] ([place] [facility]

[facility])  

 

          MCH       33.1 pg   26.0-32.0                     [facility] ([place] [facility]

[facility])  

 

          MCHC      34.0 g/dL 31.0-38.0                     [facility] ([facility])  

 

           Platelets [#/volume] in Blood by Automated count 209 x10*3/-440

                          [facility]

 ([place] [facility])                  

 

           Erythrocyte distribution width [Ratio] by Automated count 13.2 %     

11.6-13.7                        

[facility] ([facility] [facility])            

 

          Mid %     7.5 %     1.7-9.3                       [facility] ([place] [facility])  

 

          MPV       8.1 FL    7.8-11.0                      [facility] ([facility])  

 

          Lymph %   25.6 %    10.0-58.5                     [facility] ([facility])  

 

          Lymph #   1.5 x10*3/UL 0.6-4.1                       [facility] ([place]

 [facility])  

 

          Neut %    66.9 %    37.0-92.0                     [facility] ([facility])  

 

          Neut #    3.9 x10*3/UL 2.0-7.8                       [facility] ([place]

 [facility])  

 

          Mid #     0.5 x10*3/UL 0.1-0.6                       [facility] ([place]

 [facility])  









                    ID                  Date                Data Source

 

                    [id number]          2021 01:37:00 PM EDT [facility] ([facility])









          Name      Value     Range     Interpretation Code Description Data Milly

rce(s) Supporting 

Document(s)

 

           Digoxin [Mass/volume] in Serum or Plasma 0.3 ng/mL  0.5-2.0          

                [facility] ([place]

 [facility])                             

 

                                        <content>note:<nlbl:demographic_changed>

</content><br/><content></content> 









                    ID                  Date                Data Source

 

                    [id number]          2021 01:37:00 PM EDT [facility] ([facility])









          Name      Value     Range     Interpretation Code Description Data Milly

rce(s) Supporting 

Document(s)

 

                          Thyrotropin [Units/volume] in Serum or Plasma by Detec

tion limit <= 0.05 mIU/L 

0.32 uIU/mL  0.36-3.74                              [facility] ([place] [facility]

)  









                    ID                  Date                Data Source

 

                    [id number]          2021 01:37:00 PM EDT [facility] ([facility])









          Name      Value     Range     Interpretation Code Description Data Milly

rce(s) Supporting 

Document(s)

 

           Cholesterol [Mass/volume] in Serum or Plasma 132 mg/dL  131-200      

                    [facility] 

([facility] [facility])                   

 

           Cholesterol in HDL [Mass/volume] in Serum or Plasma 54 mg/dL   35-60 

                           [facility] 

([facility] [facility])                   

 

                    Cholesterol in LDL [Mass/volume] in Serum or Plasma by calcu

lation 64 CALC             

                                          [facility] ([place] [facility])  

 

           Triglyceride [Mass/volume] in Serum or Plasma 72 mg/dL         

                     [facility] 

([facility] [facility])                   









                    ID                  Date                Data Source

 

                    [id number]          2021 01:37:00 PM EDT [facility] ([facility])









          Name      Value     Range     Interpretation Code Description Data Milly

rce(s) Supporting 

Document(s)

 

           Glucose [Mass/volume] in Serum or Plasma 70 mg/dL   74-99            

                [facility] ([place] 

[facility])                              

 

                                        100-125 mg/dL     PRE-DIABETES/FASTING

>126 mg/dL          DIABETES/FASTING

 

 

           Urea nitrogen [Mass/volume] in Serum or Plasma 23 mg/dL   7-18       

                      [facility] 

([facility] [facility])                   

 

           Sodium [Moles/volume] in Serum or Plasma 144 meq/L  136-145          

                [facility] ([place]

 [facility])                             

 

          Creatinine 0.9 mg/dL 0.6-1.3                       [facility] ([place] [facility])  

 

           Carbon dioxide, total [Moles/volume] in Serum or Plasma 32 meq/L   21

-32                            

[facility] ([place] [facility])            

 

           Potassium [Moles/volume] in Serum or Plasma 3.8 meq/L  3.5-5.1       

                   [facility] 

([place] [facility])                   

 

           Chloride [Moles/volume] in Serum or Plasma 104 meq/L           

                  [facility] 

([place] [facility])                   

 

                                        Glomerular filtration rate/1.73 sq M pre

dicted among blacks [Volume Rate/Area] 

in Serum or Plasma by Creatinine-based formula (MDRD) Laboratory test result    

                  

                                        [facility] [facility]  

 

                                        <content>CHRONIC KIDNEY DISEASE STAGING 

PER 

NKF</content><br/><content></content><br/><content>STAGE I & II      GFR >= 60  
     NORMAL TO MILDLY DECREASED</content><br/><content>STAGE III          GFR 
30-59          MODERATELY DECREASED</content><br/><content>STAGE IV           
GFR 15-29         SEVERELY DECREASED</content><br/><content>STAGE V            
GFR <15            VERY LITTLE GFR LEFT</content><br/><content>ESRD             
   GFR <15            ON RRT</content><br/><content></content> 

 

                                        Glomerular filtration rate/1.73 sq M pre

dicted among non-blacks [Volume 

Rate/Area] in Serum or Plasma by Creatinine-based formula (MDRD) Laboratory test

result                                              [facility] ([facility]

)  

 

           Calcium [Mass/volume] in Serum or Plasma 10.1 mg/dL 8.5-10.1         

                [facility] 

([facility])                   









                    ID                  Date                Data Source

 

                    [id number]          2021 01:37:00 PM EDT [facility]









          Name      Value     Range     Interpretation Code Description Data Milly

rce(s) Supporting 

Document(s)

 

           Erythrocyte sedimentation rate by Westergren method 45 mm/hr   0-15  

                           [facility] 

([facility]                   

 

          Magnesium 1.8 mg/dL 1.8-2.4                       [facility] [facility]  









                    ID                  Date                Data Source

 

                    [id number]          2021 01:37:00 PM EDT [facility]









          Name      Value     Range     Interpretation Code Description Data Milly

rce(s) Supporting 

Document(s)

 

           Leukocytes [#/volume] in Blood by Automated count 8.4 x10*3/UL 4.1-10

.9                         

[facility] ([facility])            

 

           Erythrocytes [#/volume] in Blood by Automated count 4.20 x10*6/UL 4.2

0-6.30                        

[facility] ([facility])            

 

           Hemoglobin [Mass/volume] in Blood 14.0 g/dL  12.0-18.0               

         [facility]                              

 

           Hematocrit [Volume Fraction] of Blood by Automated count 40.3 %     3

7.0-51.0                        

[facility] ([facility])            

 

          MCV       95.8 fL   80.0-97.0                     [facility] ([place] [facility]  

 

          MCH       33.3 pg   26.0-32.0                     [facility] ([place] [facility]

[facility])  

 

           Erythrocyte distribution width [Ratio] by Automated count 13.3 %     

11.6-13.7                        

[facility] ([facility])            

 

          MCHC      34.7 g/dL 31.0-38.0                     [facility] ([place] [facility]  

 

          Lymph %   17.5 %    10.0-58.5                     [facility] ([place] [facility]

[facility])  

 

           Platelets [#/volume] in Blood by Automated count 207 x10*3/-440

                          [facility]

 ([facility])                  

 

          MPV       8.0 FL    7.8-11.0                      [facility] ([place] [facility]  

 

          Neut %    77.7 %    37.0-92.0                     [facility] ([place] [facility]

[facility])  

 

          Mid %     4.8 %     1.7-9.3                       [facility] ([place] [facility]

[facility])  

 

          Lymph #   1.4 x10*3/UL 0.6-4.1                       [facility] ([facility])  

 

          Mid #     0.5 x10*3/UL 0.1-0.6                       [facility] ([facility])  

 

          Neut #    6.5 x10*3/UL 2.0-7.8                       [facility] ([facility])  









                    ID                  Date                Data Source

 

                    [id number]            2021 01:37:00 PM EDT [facility] ([facility])









          Name      Value     Range     Interpretation Code Description Data Milly

rce(s) Supporting 

Document(s)

 

                          Thyrotropin [Units/volume] in Serum or Plasma by Detec

tion limit <= 0.05 mIU/L 

0.32 uIU/mL  0.36-3.74                              [facility] ([facility])  

 

           Digoxin [Mass/volume] in Serum or Plasma 0.3 ng/mL  0.5-2.0          

                [facility] 

([facility])           

 

                                        

<content>note:<nlbl:demographic_changed></content><br/><content></content><br/><

content></content> 









                    ID                  Date                Data Source

 

                    [id number]            2021 01:37:00 PM EDT [facility] ([facility])









          Name      Value     Range     Interpretation Code Description Data Milly

rce(s) Supporting 

Document(s)

 

           Cholesterol [Mass/volume] in Serum or Plasma 132 mg/dL  131-200      

                    [facility] 

([facility])           

 

           Triglyceride [Mass/volume] in Serum or Plasma 72 mg/dL         

                     [facility] 

([facility])           

 

           Cholesterol in HDL [Mass/volume] in Serum or Plasma 54 mg/dL   35-60 

                           [facility] 

([facility])           

 

                    Cholesterol in LDL [Mass/volume] in Serum or Plasma by calcu

lation 64 CALC             

                                          [facility] ([facility])  









                    ID                  Date                Data Source

 

                    [id number]            2021 01:37:00 PM EDT [facility] ([facility])









          Name      Value     Range     Interpretation Code Description Data Milly

rce(s) Supporting 

Document(s)

 

           Glucose [Mass/volume] in Serum or Plasma 70 mg/dL   74-99            

                [facility] ([facility])                       

 

                                        100-125 mg/dL     PRE-DIABETES/FASTING

>126 mg/dL          DIABETES/FASTING



 

 

           Urea nitrogen [Mass/volume] in Serum or Plasma 23 mg/dL   7-18       

                      [facility] 

([facility])           

 

          Creatinine 0.9 mg/dL 0.6-1.3                       [facility] ([facility]  

 

           Sodium [Moles/volume] in Serum or Plasma 144 meq/L  136-145          

                [facility] 

([facility])           

 

           Potassium [Moles/volume] in Serum or Plasma 3.8 meq/L  3.5-5.1       

                   [facility] 

([facility])           

 

           Carbon dioxide, total [Moles/volume] in Serum or Plasma 32 meq/L   21

-32                            

[facility] ([facility])    

 

           Chloride [Moles/volume] in Serum or Plasma 104 meq/L           

                  [facility] 

([facility])           

 

           Calcium [Mass/volume] in Serum or Plasma 10.1 mg/dL 8.5-10.1         

                [facility] 

([facility])           

 

                                        Glomerular filtration rate/1.73 sq M pre

dicted among non-blacks [Volume 

Rate/Area] in Serum or Plasma by Creatinine-based formula (MDRD) Laboratory test

result                                              [facility] ([facility])  

 

                                        Glomerular filtration rate/1.73 sq M pre

dicted among blacks [Volume Rate/Area] 

in Serum or Plasma by Creatinine-based formula (MDRD) Laboratory test result    

                  

                                        [facility] ([facility])  

 

                                        <content>CHRONIC KIDNEY DISEASE STAGING 

PER 

NKF</content><br/><content></content><br/><content>STAGE I & II      GFR >= 60  
     NORMAL TO MILDLY DECREASED</content><br/><content>STAGE III          GFR 
30-59          MODERATELY DECREASED</content><br/><content>STAGE IV           
GFR 15-29         SEVERELY DECREASED</content><br/><content>STAGE V            
GFR <15            VERY LITTLE GFR LEFT</content><br/><content>ESRD             
   GFR <15            ON 
RRT</content><br/><content></content><br/><content></content> 









                    ID                  Date                Data Source

 

                    [id number]            2021 01:37:00 PM EDT [facility] ([facility])









          Name      Value     Range     Interpretation Code Description Data Milly

rce(s) Supporting 

Document(s)

 

           Leukocytes [#/volume] in Blood by Automated count 8.4 x10*3/UL 4.1-10

.9                         

[facility] ([facility]    

 

           Erythrocytes [#/volume] in Blood by Automated count 4.20 x10*6/UL 4.2

0-6.30                        

[facility] ([facility])    

 

           Hemoglobin [Mass/volume] in Blood 14.0 g/dL  12.0-18.0               

         [facility] (Cardiology 

[facility])                       

 

           Hematocrit [Volume Fraction] of Blood by Automated count 40.3 %     3

7.0-51.0                        

[facility] ([facility])    

 

          MCV       95.8 fL   80.0-97.0                     [facility] ([facility]

[facility])  

 

          MCH       33.3 pg   26.0-32.0                     [facility] ([facility]

[facility])  

 

           Erythrocyte distribution width [Ratio] by Automated count 13.3 %     

11.6-13.7                        

[facility] ([facility])    

 

          MCHC      34.7 g/dL 31.0-38.0                     [facility] (Cardiology A

[facility])  

 

           Platelets [#/volume] in Blood by Automated count 207 x10*3/-440

                          [facility]

 ([facility])          

 

             Platelet mean volume [Entitic volume] in Blood by Archie 8.0 FL

       7.8-11.0                   

                          [facility] ([facility])  

 

           Lymphocytes/100 leukocytes in Blood by Automated count 17.5 %     10.

0-58.5                        

[facility] ([facility])    

 

          Mid %     4.8 %     1.7-9.3                       [facility] ([facility])  

 

          Lymph #   1.4 x10*3/UL 0.6-4.1                       [facility] (Cardiolog

y [facility])  

 

          Neut %    77.7 %    37.0-92.0                     [facility] ([facility]iology A

[facility])  

 

           Neutrophils [#/volume] in Semen by Manual count 6.5 x10*3/UL 2.0-7.8 

                         [facility] 

([facility]iology [facility])           

 

          Mid #     0.5 x10*3/UL 0.1-0.6                       [facility]

[facility])  









                    ID                  Date                Data Source

 

                    [id number]          2021 02:59:00 PM EDT [facility]









          Name      Value     Range     Interpretation Code Description Data Milly

rce(s) Supporting 

Document(s)

 

           Free Lambda Light Chains Serum 517.2 mg/L 5.7-26.3                   

      [facility] ([facility])                              

 

           Free Kappa Light Chains Serum 6.4 mg/L   3.3-19.4                    

     [facility] ([facility]                              

 

          Kappa/Lambda Ratio Serum 0.01      0.26-1.65                     [facility] [facility]  









                    ID                  Date                Data Source

 

                    [id number]          2021 02:59:00 PM EDT [facility]









          Name      Value     Range     Interpretation Code Description Data Mlily

rce(s) Supporting 

Document(s)

 

           Ferritin [Mass/volume] in Serum or Plasma 128 ng/mL  8-252           

                 [facility] ([facility])                              

 

                                        <content>note:<nlbl:demographic_changed>

</content><br/><content></content> 

 

           Beta-2-Microglobulin [Mass/volume] in Serum or Plasma 3.7 mg/L   0.6-

2.4                          

[facility] ([facility])            

 

                                        Siemens Immulite 2000 Immunochemiluminom

etric assay (ICMA)

                                        .

Values obtained with different assay methods or kits cannot

be used interchangeably. Results cannot be interpreted as

absolute evidence of the presence or absence of malignant

disease.

Performed at:  RN - [facility] [address]  [id number]

: Araceli B Reyes MD, Phone:  [phone]

Performed at:   - [facility] [address]  [id number]

[id number]

: Barak Archibald MD, Phone:  [phone]

 









                    ID                  Date                Data Source

 

                    [id number]          2021 02:59:00 PM EDT [facility] ([facility])









          Name      Value     Range     Interpretation Code Description Data Milly

rce(s) Supporting 

Document(s)

 

           Immunotyping Serum Iga Laboratory test result                        

          [facility] ([facility])                              

 

           Immunotyping Serum Lambda Laboratory test result                     

             [facility] ([facility]                              

 

           Laboratory test finding (navigational concept) Laboratory test result

                                  

[facility] ([facility])            

 

                                        REV'D BY O ADJAPONG

 

 

           It Serum Interpretation Laboratory test result                       

           [facility] ([facility])                              

 

                                        MONOCLONAL IGA,LAMBDA

 









                    ID                  Date                Data Source

 

                    [id number]          2021 02:59:00 PM EDT [facility] ([facility])









          Name      Value     Range     Interpretation Code Description Data Milly

rce(s) Supporting 

Document(s)

 

          Immunoglobulin G 510 mg/dL [phone]                      [facility] ([facility])  

 

          Immunoglobulin A 2640.0 mg/dL                         [facility] ([facility])  

 

           Immunoglobulin M Laboratory test result                        

    [facility] ([facility])

                                         









                    ID                  Date                Data Source

 

                    [id number]          2021 02:59:00 PM EDT [facility] ([facility])









          Name      Value     Range     Interpretation Code Description Data Milly

rce(s) Supporting 

Document(s)

 

          Iron (Fe) 131 ug/dL                         [facility] ([place] [facility])  

 

          Total Iron Binding Capacity 305 ug/dL 250-450                       ME

D[facility] ([facility]) 

 

 

          Percent Saturation 43.0 %    13.2-45.0                     [facility] ([facility])  









                    ID                  Date                Data Source

 

                    [id number]          2021 02:59:00 PM EDT [facility] ([facility])









          Name      Value     Range     Interpretation Code Description Data Milly

rce(s) Supporting 

Document(s)

 

          Glucose, Fasting 97 mg/dL                          [facility] ([facility])  

 

          Blood Urea Nitrogen 24 mg/dL  7-18                          [facility] ([facility])  

 

          Creatinine For GFR 0.85 mg/dL 0.55-1.30                     [facility] ([facility])  

 

           Glomerular Filtration Rate Laboratory test result                    

              [facility] ([facility])                              

 

                                        <content>Units are mL/min/1.73 

m2</content><br/><content></content><br/><content>Chronic Kidney Disease Staging
 per NKF:</content><br/><content></content><br/><content>Stage I & II   GFR >=60
       Normal to Mildly Decreased</content><br/><content>Stage III      GFR 30-
59      Moderately Decreased</content><br/><content>Stage IV       GFR 15-29    
  Severely Decreased</content><br/><content>Stage V        GFR <15        Very 
Little GFR Left</content><br/><content>ESRD           GFR <15 on 
RRT</content><br/><content></content> 

 

          Sodium Level 139 meq/L 136-145                       [facility] ([facility]

 [facility])  

 

          Carbon Dioxide Level 26 meq/L  21-32                         [facility] ([facility])  

 

          Potassium Serum 4.6 meq/L 3.5-5.1                       [facility] ([facility])  

 

          Chloride Level 106 meq/L                         [facility] ([facility])  

 

          Calcium Level 11.8 mg/dL 8.8-10.2                      [facility] ([facility])  

 

          Anion Gap 7 meq/L   8-16                          [facility] ([place] [facility]

[facility])  

 

          Alt/SGPT  44 U/L    12-78                         [facility] ([place] [facility]

[facility])  

 

          Alkaline Phosphatase 116 U/L                           [facility] ([facility])  

 

          Ast/Sgot  33 U/L    7-37                          [facility] ([place] [facility]

[facility])  

 

          Bilirubin,Total 0.6 mg/dL 0.2-1.0                       [facility] ([facility])  

 

          Total Protein 8.9 GM/DL 6.4-8.2                       [facility] ([facility])  

 

          Albumin   3.2 GM/DL 3.2-5.2                       [facility] ([place] [facility]

[facility])  

 

          Albumin/Globulin Ratio 0.6       1.2-2.2                       [facility] 

([facility])  









                    ID                  Date                Data Source

 

                    [id number]          2021 02:59:00 PM EDT [facility] ([facility])









          Name      Value     Range     Interpretation Code Description Data Milly

rce(s) Supporting 

Document(s)

 

           Erythrocyte sedimentation rate by Westergren method 68 mm/hr   0-30  

                           [facility] 

([facility])                   









                    ID                  Date                Data Source

 

                    [id number]          2021 02:59:00 PM EDT [facility] ([facility])









          Name      Value     Range     Interpretation Code Description Data Milly

rce(s) Supporting 

Document(s)

 

          White Blood Count 7.6 10    4.0-10.0                      [facility] ([facility])  

 

          Red Blood Count 4.29 10   4.00-5.40                     [facility] ([facility])  

 

          Hemoglobin 13.9 g/dL 12.0-15.5                     [facility] ([place] [facility]

[facility])  

 

          Hematocrit 43.3 %    36.0-47.0                     [facility] ([facility])  

 

          Mean Corpuscular Hemoglobin 32.4 pg   27.0-33.0                     ME

DENT ([facility]) 

 

 

          Mean Corpuscular HGB Conc 32.1 g/dL 32.0-36.5                     [facility]

NT ([facility]) 

 

 

          Mean Corpuscular Volume 100.9 fl  80.0-96.0                     [facility]

 ([facility])  

 

          Red Cell Distribution Width 13.1 %    11.5-14.5                     ME

[facility] ([facility])  

 

          Platelet Count, Automated 175 10    150-450                       [facility]

[facility] ([facility])  

 

          Neutrophils % 73.1 %    36.0-66.0                     [facility] ([facility])  

 

          Lymph %   18.1 %    24.0-44.0                     [facility] ([place] [facility]

[facility])  

 

          Eos %     1.3 %     0.0-3.0                       [facility] ([place] [facility]

[facility])  

 

          Mono %    6.8 %     2.0-8.0                       [facility] ([place] [facility]

[facility])  

 

          Baso %    0.3 %     0.0-1.0                       [facility] ([place] [facility]

[facility])  

 

          Immature Granulocyte % 0.4 %     0-3.0                         [facility] 

([facility])  

 

          Nucleated Red Blood Cell % 0.0 %     0-0                           MED

[facility] ([place] [facility]  

 

          Neutrophils # 5.6 10    1.5-8.5                       [facility]  

 

          Lymph #   1.4 10    1.5-5.0                       [facility] ([place] [facility])  

 

          Mono #    0.5 10    0.0-0.8                       [facility] ([place] [facility])  

 

          Eos #     0.1 10    0.0-0.5                       [facility] ([place] [facility]

[facility])  

 

          Baso #    0.0 10    0.0-0.2                       [facility] ([facility])  









                    ID                  Date                Data Source

 

                    [id number]          2021 02:59:00 PM EDT [facility]









          Name      Value     Range     Interpretation Code Description Data Milly

rce(s) Supporting 

Document(s)

 

          Appearance, Urine Laboratory test result                              

 [facility] ([place] [facility]  

 

          PH,Urine  5.0 units 5.0-9.0                       [facility] ([facility])  

 

          Color, Urine Laboratory test result                               [facility]

[facility] ([place] [facility]  

 

          Protein, Urine Auto Laboratory test result                            

   [facility] [facility]  

 

           Specific Gravity Urine Auto 1.028      1.002-1.035                   

    [facility] ([place] [facility]

                                         

 

          Ketone, Urine Auto Laboratory test result                             

  [facility] [facility]  

 

           Glucose, Urine (Ua) Auto Laboratory test result                      

            [facility] ([place] 

[facility]                              

 

          Urobilinogen, Urine Auto 4.0 mg/dL 0.0-2.0                       [facility]

[facility] ([place] [facility]  

 

           Leukocyte Esterase, Urine Auto Laboratory test result                

                  [facility] ([place] 

[facility])                              

 

          Nitrite, Urine Auto Laboratory test result                            

   [facility] ([facility])  

 

           Bilirubin, Urine Auto Laboratory test result                         

         [facility] [facility]

                                         

 

          Blood, Urine Blood Laboratory test result                             

  [facility] ([facility]  

 

          WBC, Urine Auto 2 /HPF    0-3                           [facility] ([facility])  

 

          Squamous Epithelial Cell Ur AU 0 /HPF    0-6                          

 [facility] ([facility])  

 

          RBC, Urine Auto 2 /HPF    0-3                           [facility] ([facility]

[facility])  

 

          Bacteria, Urine Auto Laboratory test result                           

    [facility] ([facility]) 

 

 

          Mucus, Urine Laboratory test result                               MEDE

NT ([facility])  

 

          Hyaline Cast, Urine Auto 0 /LPF    0-1                           [facility]

T ([facility])  









                    ID                  Date                Data Source

 

                    [id number]              2021 02:43:00 PM EDT [facility] ([facility])









          Name      Value     Range     Interpretation Code Description Data Milly

rce(s) Supporting 

Document(s)

 

           3D Bi-Lateral Mammogram Laboratory test result                       

           [facility] ([place] 

[facility])                              

 

          Dexa/Bone Density Laboratory test result                              

 [facility] ([facility])  









                    ID                  Date                Data Source

 

                    [id number]          2021 02:26:00 PM EDT [facility] ([facility])









          Name      Value     Range     Interpretation Code Description Data Milly

rce(s) Supporting 

Document(s)

 

          Albumin % 41.3 %    55.8-66.1                     [facility] ([place] [facility])  

 

          Alpha-1-Globulin % 3.8 %     2.9-4.9                       [facility] ([facility])  

 

          Alpha-2-Globulins % 8.9 %     7.1-11.8                      [facility] ([facility])  

 

          Beta-1-Globulins % 5.8 %     4.7-7.2                       [facility] ([facility])  

 

          Beta-2-Globulins % 35.2 %    3.2-6.5                       [facility] ([facility])  

 

          Gamma Globulin % 5.0 %     11.1-18.8                     [facility] ([place]

[place] [facility]  

 

          Albumin   3.39 GM/DL 3.29-5.55                     [facility] [facility]  

 

          Alpha-1-Globulins 0.31 GM/DL 0.17-0.41                     [facility] ([facility]  

 

          Alpha-2-Globulins 0.73 GM/DL 0.42-0.99                     [facility] ([facility])  

 

          Beta-1-Globulins 0.48 GM/DL 0.28-0.60                     [facility] ([facility]  

 

          Beta-2-Globulins 2.89 GM/DL 0.19-0.55                     [facility] ([facility])  

 

          Total Protein 8.2 GM/DL 6.4-8.2                       [facility] ([facility])  

 

          Gamma Globulins 0.41 GM/DL 0.65-1.58                     [facility]

[place] [facility]  

 

          Spep Interpretation Laboratory test result                            

   [facility] ([facility] [facility])  

 

                                        M-SPIKE NOTED IN BETA 2 REGION.

CONCENTRATION =   2.85    GM/DL

SUGGEST SERUM AND URINE IMMUNOTYPING.

 

 

           Laboratory test finding (navigational concept) Laboratory test result

                                  

[facility] ([facility] [facility])            

 

                                        REV'D BY CHARLES ROCHA

 









                    ID                  Date                Data Source

 

                    [id number]          2021 02:26:00 PM EDT [facility] ([facility])









          Name      Value     Range     Interpretation Code Description Data Milly

rce(s) Supporting 

Document(s)

 

           Immunotyping Serum Iga Laboratory test result                        

          [facility] ([place] 

[facility]                              

 

           Immunotyping Serum Lambda Laboratory test result                     

             [facility] ([place] 

[facility]                              

 

           It Serum Interpretation Laboratory test result                       

           [facility] ([place] 

[facility])                              

 

                                        MONOCLONAL IGA LAMBDA

 

 

           Laboratory test finding (navigational concept) Laboratory test result

                                  

[facility] ([facility] [facility])            









                    ID                  Date                Data Source

 

                    [id number]          2021 02:25:00 PM [facility]









          Name      Value     Range     Interpretation Code Description Data Milly

rce(s) Supporting 

Document(s)

 

           Glucose [Mass/volume] in Serum or Plasma 124 mg/dL  74-99            

                [facility] ([place] 

[facility])                              

 

                                        100-125 mg/dL     PRE-DIABETES/FASTING

>126 mg/dL          DIABETES/FASTING

 

 

           Urea nitrogen [Mass/volume] in Serum or Plasma 19 mg/dL   7-18       

                      [facility] 

([facility] [facility])                   

 

          Creatinine 0.7 mg/dL 0.6-1.3                       [facility] [facility])  

 

           Sodium [Moles/volume] in Serum or Plasma 143 meq/L  136-145          

                [facility] ([place]

 [facility])                             

 

           Chloride [Moles/volume] in Serum or Plasma 103 meq/L           

                  [facility] 

([facility] [facility])                   

 

           Potassium [Moles/volume] in Serum or Plasma 3.8 meq/L  3.5-5.1       

                   [facility] 

([place] [facility])                   

 

           Carbon dioxide, total [Moles/volume] in Serum or Plasma 29 meq/L   21

-32                            

[facility] ([facility] [facility])            

 

           Calcium [Mass/volume] in Serum or Plasma 10.2 mg/dL 8.5-10.1         

                [facility] 

([facility] [facility])                   

 

                                        NOTE:

RESULT VERIFIED.





 

 

                                        Glomerular filtration rate/1.73 sq M pre

dicted among non-blacks [Volume 

Rate/Area] in Serum or Plasma by Creatinine-based formula (MDRD) Laboratory test

result                                              [facility] ([facility] [facility]

)  

 

                                        Glomerular filtration rate/1.73 sq M pre

dicted among blacks [Volume Rate/Area] 

in Serum or Plasma by Creatinine-based formula (MDRD) Laboratory test result    

                  

                                        [facility] ([facility]  

 

                                        <content>CHRONIC KIDNEY DISEASE STAGING 

PER 

NKF</content><br/><content></content><br/><content>STAGE I & II      GFR >= 60  
     NORMAL TO MILDLY DECREASED</content><br/><content>STAGE III          GFR 
30-59          MODERATELY DECREASED</content><br/><content>STAGE IV           
GFR 15-29         SEVERELY DECREASED</content><br/><content>STAGE V            
GFR <15            VERY LITTLE GFR LEFT</content><br/><content>ESRD             
   GFR <15            ON RRT</content><br/><content></content> 









                    ID                  Date                Data Source

 

                    [id number]          2021 11:17:00 AM EST [facility] ([facility])









          Name      Value     Range     Interpretation Code Description Data Milly

rce(s) Supporting 

Document(s)

 

           Erythrocyte sedimentation rate by Westergren method 108 mm/hr  0-30  

                           [facility] 

([facility] [facility])                   









                    ID                  Date                Data Source

 

                    [id number]          2021 11:17:00 AM EST [facility] ([facility])









          Name      Value     Range     Interpretation Code Description Data Milly

rce(s) Supporting 

Document(s)

 

          White Blood Count 4.3 10    4.0-10.0                      [facility] ([facility])  

 

          Hematocrit 39.3 %    36.0-47.0                     [facility] ([facility]

[facility])  

 

          Hemoglobin 12.4 g/dL 12.0-15.5                     [facility] ([facility])  

 

          Red Blood Count 3.96 10   4.00-5.40                     [facility] ([facility])  

 

          Mean Corpuscular Hemoglobin 31.3 pg   27.0-33.0                     ME

DENT ([facility] [facility]) 



 

          Mean Corpuscular Volume 99.2 fl   80.0-96.0                     [facility]

 ([place] [facility])  

 

          Mean Corpuscular HGB Conc 31.6 g/dL 32.0-36.5                     [facility]

NT ([place] [facility]) 



 

          Red Cell Distribution Width 13.2 %    11.5-14.5                     ME

DENT ([facility] [facility])  

 

          Platelet Count, Automated 193 10    150-450                       [facility]

NT ([place] [facility])  

 

          Neutrophils % 62.7 %    36.0-66.0                     [facility] ([facility])  

 

          Mono %    7.4 %     2.0-8.0                       [facility] [facility]  

 

          Lymph %   27.8 %    24.0-44.0                     [facility] [facility]  

 

          Baso %    0.5 %     0.0-1.0                       [facility] [facility]  

 

          Eos %     1.4 %     0.0-3.0                       [facility] [facility]  

 

          Neutrophils # 2.7 10    1.5-8.5                       [facility] ([facility])  

 

          Nucleated Red Blood Cell % 0.0 %     0-0                           MED

ENT ([facility] [facility])  

 

          Immature Granulocyte % 0.2 %     0-3.0                         [facility] 

([facility] [facility])  

 

          Mono #    0.3 10    0.0-0.8                       [facility] [facility]  

 

          Lymph #   1.2 10    1.5-5.0                       [facility] [facility]  

 

          Baso #    0.0 10    0.0-0.2                       [facility] [facility]  

 

          Eos #     0.1 10    0.0-0.5                       [facility] [facility]  









                    ID                  Date                Data Source

 

                    [id number]          2021 11:17:00 AM EST [facility] ([facility])









          Name      Value     Range     Interpretation Code Description Data Milly

rce(s) Supporting 

Document(s)

 

                          C reactive protein [Mass/volume] in Serum or Plasma by

 High sensitivity method 

0.52 mg/dL   0.00-0.30                              [facility] ([facility] [facility]

)  









                    ID                  Date                Data Source

 

                    [id number]          2021 11:17:00 AM EST [facility] ([facility])









          Name      Value     Range     Interpretation Code Description Data Milly

rce(s) Supporting 

Document(s)

 

          Glucose, Fasting 99 mg/dL                          [facility] ([place]

[place] [facility])  

 

          Creatinine For GFR 0.92 mg/dL 0.55-1.30                     [facility] ([facility])  

 

          Blood Urea Nitrogen 29 mg/dL  7-18                          [facility] ([facility])  

 

          Sodium Level 139 meq/L 136-145                       [facility] ([place]

 [facility]  

 

           Glomerular Filtration Rate Laboratory test result                    

              [facility] ([place] 

[facility])                              

 

                                        <content>Units are mL/min/1.73 

m2</content><br/><content></content><br/><content>Chronic Kidney Disease Staging
 per NKF:</content><br/><content></content><br/><content>Stage I & II   GFR >=60
       Normal to Mildly Decreased</content><br/><content>Stage III      GFR 30-
59      Moderately Decreased</content><br/><content>Stage IV       GFR 15-29    
  Severely Decreased</content><br/><content>Stage V        GFR <15        Very 
Little GFR Left</content><br/><content>ESRD           GFR <15 on 
RRT</content><br/><content></content> 

 

          Potassium Serum 4.9 meq/L 3.5-5.1                       [facility] ([facility])  

 

          Chloride Level 105 meq/L                         [facility] ([facility])  

 

          Carbon Dioxide Level 26 meq/L  21-32                         [facility] ([facility])  

 

          Anion Gap 8 meq/L   8-16                          [facility] ([place]

[facility])  

 

          Calcium Level 10.8 mg/dL 8.8-10.2                      [facility] ([facility])  









                    ID                  Date                Data Source

 

                    [id number]          2021 11:17:00 AM EST [facility] ([facility])









          Name      Value     Range     Interpretation Code Description Data Milly

rce(s) Supporting 

Document(s)

 

           aPTT in Blood by Coagulation assay 35.2 s     24.2-38.5              

          [facility] ([place] 

[facility])                              









                    ID                  Date                Data Source

 

                    [id number]          2021 11:17:00 AM EST [facility] ([facility])









          Name      Value     Range     Interpretation Code Description Data Milly

rce(s) Supporting 

Document(s)

 

          [facility]       1.05                                    [facility] [facility]  

 

                                        THERAPUTIC HUMAN INR VALUES

INDICATIONS                      NORMAL RANGES

PROPHYLAXIS/TREATMENT OF:

VENOUS THROMBOSIS                2.0-3.0

PULMONARY EMBOLISM               2.0-3.0

PREVENTION OF SYSTEMIC EMBOLISM FROM:

TISSUE HEART VALVES              2.0-3.0

ACUTE MYOCARDIAL INFARCTION      2.0-3.0

VALVULAR HEART DISEASE           2.0-3.0

ATRIAL FIBRILLATION              2.0-3.0

MECHANICAL VALVES(HIGH RISK)     2.5-3.5

RECURRENT MYOCARDIAL INFARCTION  2.5-3.5

 

 

          Prothrombin Time 13.9 s    12.5-14.3                     [facility] ([place]

[place] [facility]  









                    ID                  Date                Data Source

 

                    [id number]          2021 01:39:00 PM EST [facility] ([facility])









          Name      Value     Range     Interpretation Code Description Data Milly

rce(s) Supporting 

Document(s)

 

           Parathyrin.intact [Mass/volume] in Serum or Plasma 26.3 pg/mL 18.5-88

.0                        

[facility] ([facility] [facility])            









                    ID                  Date                Data Source

 

                    [id number]          2021 01:39:00 PM EST [facility] ([facility])









          Name      Value     Range     Interpretation Code Description Data Milly

rce(s) Supporting 

Document(s)

 

           Calcidiol [Mass/volume] in Serum or Plasma 32.7       24.0-80.0      

                  [facility] ([place]

 [facility])                             

 

                                        This test was performed using [employer] I

P Vitamin D immunoassay kit.  Values 

obtained with different assay methods should not be used interchangeably.

 









                    ID                  Date                Data Source

 

                    [id number]          2021 01:39:00 PM EST [facility] ([facility])









          Name      Value     Range     Interpretation Code Description Data Milly

rce(s) Supporting 

Document(s)

 

                          Thyrotropin [Units/volume] in Serum or Plasma by Detec

tion limit <= 0.05 mIU/L 

0.54 uIU/mL  0.36-3.74                              [facility] ([place] [facility]

)  









                    ID                  Date                Data Source

 

                    [id number]          2021 01:39:00 PM EST [facility] ([facility])









          Name      Value     Range     Interpretation Code Description Data Milly

rce(s) Supporting 

Document(s)

 

           Glucose [Mass/volume] in Serum or Plasma 98 mg/dL   74-99            

                [facility] ([place] 

[facility])                              

 

                                        100-125 mg/dL     PRE-DIABETES/FASTING

>126 mg/dL          DIABETES/FASTING

 

 

           Urea nitrogen [Mass/volume] in Serum or Plasma 49 mg/dL   7-18       

                      [facility] 

([place] [facility])                   

 

                                        NOTE:

BUN,CALCIUM VERIFIED





 

 

           Potassium [Moles/volume] in Serum or Plasma 4.5 meq/L  3.5-5.1       

                   [facility] 

([place] [facility])                   

 

          Creatinine 1.2 mg/dL 0.6-1.3                       [facility] ([facility]

n[facility])  

 

           Sodium [Moles/volume] in Serum or Plasma 142 meq/L  136-145          

                [facility] ([place]

 [facility]                             

 

           Chloride [Moles/volume] in Serum or Plasma 104 meq/L           

                  [facility] 

([place] [facility])                   

 

           Carbon dioxide, total [Moles/volume] in Serum or Plasma 23 meq/L   21

-32                            

[facility] [facility]            

 

                                        Glomerular filtration rate/1.73 sq M pre

dicted among non-blacks [Volume 

Rate/Area] in Serum or Plasma by Creatinine-based formula (MDRD) 45 mL/min      

                                  

                          [facility] ([place] [facility])  

 

           Calcium [Mass/volume] in Serum or Plasma 10.6 mg/dL 8.5-10.1         

                [facility] 

([place] [facility])                   

 

                                        Glomerular filtration rate/1.73 sq M pre

dicted among blacks [Volume Rate/Area] 

in Serum or Plasma by Creatinine-based formula (MDRD) 55 mL/min                 

                          [facility] 

([place] [facility])                   

 

                                        <content>CHRONIC KIDNEY DISEASE STAGING 

PER 

NKF</content><br/><content></content><br/><content>STAGE I & II      GFR >= 60  
     NORMAL TO MILDLY DECREASED</content><br/><content>STAGE III          GFR 
30-59          MODERATELY DECREASED</content><br/><content>STAGE IV           
GFR 15-29         SEVERELY DECREASED</content><br/><content>STAGE V            
GFR <15            VERY LITTLE GFR LEFT</content><br/><content>ESRD             
   GFR <15            ON RRT</content><br/><content></content> 









                    ID                  Date                Data Source

 

                    [id number]          2021 01:39:00 PM EST [facility] ([facility])









          Name      Value     Range     Interpretation Code Description Data Milly

rce(s) Supporting 

Document(s)

 

           Hemoglobin [Mass/volume] in Blood 13.1 g/dL  12.0-18.0               

         [facility] ([place] 

[facility]                              

 

           Leukocytes [#/volume] in Blood by Automated count 5.8 x10*3/UL 4.1-10

.9                         

[facility] ([place] [facility])            

 

           Erythrocytes [#/volume] in Blood by Automated count 4.01 x10*6/UL 4.2

0-6.30                        

[facility] ([facility] [facility])            

 

           Hematocrit [Volume Fraction] of Blood by Automated count 37.5 %     3

7.0-51.0                        

[facility] ([facility] [facility])            

 

          MCV       93.4 fL   80.0-97.0                     [facility] ([place] [facility]  

 

          MCH       32.6 pg   26.0-32.0                     [facility] ([place] [facility]

[facility])  

 

          MCHC      34.9 g/dL 31.0-38.0                     [facility] ([place] [facility])  

 

           Platelets [#/volume] in Blood by Automated count 191 x10*3/-440

                          [facility]

 ([place] [facility])                  

 

          MPV       8.6 FL    7.8-11.0                      [facility] ([place] [facility])  

 

           Erythrocyte distribution width [Ratio] by Automated count 13.8 %     

11.6-13.7                        

[facility] ([facility] [facility])            

 

          Mid %     6.7 %     1.7-9.3                       [facility] ([place] [facility]  

 

          Lymph %   24.4 %    10.0-58.5                     [facility] ([place] [facility])  

 

          Mid #     0.4 x10*3/UL 0.1-0.6                       [facility] ([place]

 [facility]  

 

          Neut %    68.9 %    37.0-92.0                     [facility] ([place] [facility]  

 

          Lymph #   1.4 x10*3/UL 0.6-4.1                       [facility] ([place]

 [facility]  

 

          Neut #    4.0 x10*3/UL 2.0-7.8                       [facility] ([place]

 [facility])  









                    ID                  Date                Data Source

 

                    [id number]          2020 11:57:00 AM EST [facility] ([facility])









          Name      Value     Range     Interpretation Code Description Data Milly

rce(s) Supporting 

Document(s)

 

          Inr       6.49                Above upper panic limits           [facility]

[facility] [facility]  

 

                                        THERAPUTIC HUMAN INR VALUES

INDICATIONS                      NORMAL RANGES

PROPHYLAXIS/TREATMENT OF:

VENOUS THROMBOSIS                2.0-3.0

PULMONARY EMBOLISM               2.0-3.0

PREVENTION OF SYSTEMIC EMBOLISM FROM:

TISSUE HEART VALVES              2.0-3.0

ACUTE MYOCARDIAL INFARCTION      2.0-3.0

VALVULAR HEART DISEASE           2.0-3.0

ATRIAL FIBRILLATION              2.0-3.0

MECHANICAL VALVES(HIGH RISK)     2.5-3.5

RECURRENT MYOCARDIAL INFARCTION  2.5-3.5

 

 

          Prothrombin Time 58.4 s    12.5-14.3                     [facility] ([place]

[place] [facility])  









                    ID                  Date                Data Source

 

                    [id number]          2020 11:56:00 AM EST [facility]









          Name      Value     Range     Interpretation Code Description Data Milly

rce(s) Supporting 

Document(s)

 

           Leukocytes [#/volume] in Blood by Automated count 6.0 x10*3/UL 4.1-10

.9                         

[facility] ([facility] [facility])            

 

           Hemoglobin [Mass/volume] in Blood 13.0 g/dL  12.0-18.0               

         [facility] 

[facility]                              

 

           Erythrocytes [#/volume] in Blood by Automated count 4.11 x10*6/UL 4.2

0-6.30                        

[facility] ([place] [facility])            

 

          MCV       91.1 fL   80.0-97.0                     [facility] ([place] [facility]

[facility])  

 

           Hematocrit [Volume Fraction] of Blood by Automated count 37.5 %     3

7.0-51.0                        

[facility] ([facility])            

 

          MCH       31.6 pg   26.0-32.0                     [facility] [facility]  

 

          MCHC      34.7 g/dL 31.0-38.0                     [facility] ([facility]  

 

           Erythrocyte distribution width [Ratio] by Automated count 14.2 %     

11.6-13.7                        

[facility] ([facility])            

 

           Platelets [#/volume] in Blood by Automated count 243 x10*3/-440

                          [facility]

 ([facility])                  

 

          MPV       8.3 FL    7.8-11.0                      [facility] ([place] [facility]  

 

          Lymph %   21.2 %    10.0-58.5                     [facility] ([place] [facility]  

 

          Mid %     5.8 %     1.7-9.3                       [facility] ([place] [facility]  

 

          Lymph #   1.2 x10*3/UL 0.6-4.1                       [facility] ([facility])  

 

          Neut %    73.0 %    37.0-92.0                     [facility] ([place] [facility]

[facility])  

 

          Neut #    4.4 x10*3/UL 2.0-7.8                       [facility] ([facility])  

 

          Mid #     0.4 x10*3/UL 0.1-0.6                       [facility] ([facility])  









                    ID                  Date                Data Source

 

                    [id number]          2020 11:29:00 AM EST [facility] ([facility])









          Name      Value     Range     Interpretation Code Description Data Milly

rce(s) Supporting 

Document(s)

 

           INR in Platelet poor plasma by Coagulation assay 7.9                 

                        [facility] ([facility])                              









                    ID                  Date                Data Source

 

                    [id number]          2020 10:58:00 AM EST [facility] ([facility])









          Name      Value     Range     Interpretation Code Description Data Milly

rce(s) Supporting 

Document(s)

 

           Glucose [Mass/volume] in Serum or Plasma 100 mg/dL  74-99            

                [facility] ([facility])                              

 

                                        100-125 mg/dL     PRE-DIABETES/FASTING

>126 mg/dL          DIABETES/FASTING

 

 

           Urea nitrogen [Mass/volume] in Serum or Plasma 17 mg/dL   7-18       

                      [facility] 

([facility])                   

 

          Creatinine 0.7 mg/dL 0.6-1.3                       [facility] ([facility])  

 

           Sodium [Moles/volume] in Serum or Plasma 141 meq/L  136-145          

                [facility] ([facility])                             

 

           Potassium [Moles/volume] in Serum or Plasma 4.4 meq/L  3.5-5.1       

                   [facility] 

([facility])                   

 

           Chloride [Moles/volume] in Serum or Plasma 103 meq/L           

                  [facility] 

([facility])                   

 

           Carbon dioxide, total [Moles/volume] in Serum or Plasma 24 meq/L   21

-32                            

[facility]            

 

           Calcium [Mass/volume] in Serum or Plasma 10.2 mg/dL 8.5-10.1         

                [facility] 

[facility]                   

 

                                        NOTE:

RESULT VERIFIED.





 

 

                                        Glomerular filtration rate/1.73 sq M pre

dicted among non-blacks [Volume 

Rate/Area] in Serum or Plasma by Creatinine-based formula (MDRD) Laboratory test

result                                              [facility] ([facility]

)  

 

                                        Glomerular filtration rate/1.73 sq M pre

dicted among blacks [Volume Rate/Area] 

in Serum or Plasma by Creatinine-based formula (MDRD) Laboratory test result    

                  

                                        [facility] ([facility])  

 

                                        <content>CHRONIC KIDNEY DISEASE STAGING 

PER 

NKF</content><br/><content></content><br/><content>STAGE I & II      GFR >= 60  
     NORMAL TO MILDLY DECREASED</content><br/><content>STAGE III          GFR 
30-59          MODERATELY DECREASED</content><br/><content>STAGE IV           
GFR 15-29         SEVERELY DECREASED</content><br/><content>STAGE V            
GFR <15            VERY LITTLE GFR LEFT</content><br/><content>ESRD             
   GFR <15            ON RRT</content><br/><content></content> 









                    ID                  Date                Data Source

 

                    [id number]          2020 [facility]20[facility]00 [facility]









          Name      Value     Range     Interpretation Code Description Data Milly

rce(s) Supporting 

Document(s)

 

           INR in Platelet poor plasma by Coagulation assay 3.7                 

                        [facility] 

[facility]                              









                    ID                  Date                Data Source

 

                    [id number]          10/29/2020 01:07:00 PM EDT [facility]









          Name      Value     Range     Interpretation Code Description Data Milly

rce(s) Supporting 

Document(s)

 

           INR in Platelet poor plasma by Coagulation assay 2.7                 

                        [facility] 

[facility]                              









                    ID                  Date                Data Source

 

                    [id number]          10/29/2020 11:26:00 AM EDT [facility]









          Name      Value     Range     Interpretation Code Description Data Milly

rce(s) Supporting 

Document(s)

 

           Digoxin [Mass/volume] in Serum or Plasma 0.6 ng/mL  0.5-2.0          

                [facility] ([facility])                             









                    ID                  Date                Data Source

 

                    [id number]            10/29/2020 11:26:00 AM EDT [facility] ([facility]









          Name      Value     Range     Interpretation Code Description Data Milly

rce(s) Supporting 

Document(s)

 

           Digoxin [Mass/volume] in Serum or Plasma 0.6 ng/mL  0.5-2.0          

                [facility] 

([facility])           









                    ID                  Date                Data Source

 

                    [id number]          10/29/2020 11:25:00 AM EDT [facility] ([facility])









          Name      Value     Range     Interpretation Code Description Data Milly

rce(s) Supporting 

Document(s)

 

          Urine PH  6.5 units 5.0-9.0                       [facility] [facility])  

 

          Urine Appearance Laboratory test result                               

[facility] ([facility])  

 

          Urine Color Laboratory test result                               [facility]

T ([facility])  

 

          Urine Leukocytes Laboratory test result                               

[facility] ([facility])  

 

          Specific gravity of Urine 1.010     1.005-1.030                     ME

DENT ([facility])  

 

          Urine Protein Laboratory test result 0-0                           MED

ENT ([facility])  

 

          Urine Blood Laboratory test result                               [facility]

T ([facility])  

 

          Urine Nitrite Laboratory test result                               MED

ENT ([facility])  

 

           Glucose [Presence] in Urine Laboratory test result                   

               [facility] ([facility])                              

 

          Urine Ketone Laboratory test result                               MED[facility]

NT ([facility])  

 

          Urine Urobilinogen 0.2 mg/dL 0.2-1.0                       [facility] ([facility])  

 

           Bilirubin.total [Mass/volume] in Serum or Plasma Laboratory test resu

lt                                  

[facility] ([facility])            









                    ID                  Date                Data Source

 

                    [id number]          10/29/2020 11:25:00 AM EDT [facility]









          Name      Value     Range     Interpretation Code Description Data Milly

rce(s) Supporting 

Document(s)

 

                          Thyrotropin [Units/volume] in Serum or Plasma by Detec

tion limit <= 0.05 mIU/L 

0.16 uIU/mL  0.36-3.74                              [facility] ([facility]

)  









                    ID                  Date                Data Source

 

                    [id number]          10/29/2020 11:25:00 AM EDT [facility] ([facility])









          Name      Value     Range     Interpretation Code Description Data Milly

rce(s) Supporting 

Document(s)

 

           Urea nitrogen [Mass/volume] in Serum or Plasma 17 mg/dL   7-18       

                      [facility] 

([facility])                   

 

           Glucose [Mass/volume] in Serum or Plasma 101 mg/dL  74-99            

                [facility] ([facility])                              

 

                                        100-125 mg/dL     PRE-DIABETES/FASTING

>126 mg/dL          DIABETES/FASTING

 

 

          Creatinine 0.7 mg/dL 0.6-1.3                       [facility] ([facility]

[facility])  

 

           Sodium [Moles/volume] in Serum or Plasma 143 meq/L  136-145          

                [facility] ([facility])                             

 

           Chloride [Moles/volume] in Serum or Plasma 104 meq/L           

                  [facility] 

([facility])                   

 

           Potassium [Moles/volume] in Serum or Plasma 4.0 meq/L  3.5-5.1       

                   [facility] 

([facility])                   

 

           Carbon dioxide, total [Moles/volume] in Serum or Plasma 30 meq/L   21

-32                            

[facility] ([facility])            

 

           Calcium [Mass/volume] in Serum or Plasma 10.7 mg/dL 8.5-10.1         

                [facility] 

([facility])                   

 

                                        NOTE:

RESULT VERIFIED.





 

 

                                        Glomerular filtration rate/1.73 sq M pre

dicted among non-blacks [Volume 

Rate/Area] in Serum or Plasma by Creatinine-based formula (MDRD) Laboratory test

result                                              [facility]

)  

 

                                        Glomerular filtration rate/1.73 sq M pre

dicted among blacks [Volume Rate/Area] 

in Serum or Plasma by Creatinine-based formula (MDRD) Laboratory test result    

                  

                                        [facility]  

 

                                        <content>CHRONIC KIDNEY DISEASE STAGING 

PER 

NKF</content><br/><content></content><br/><content>STAGE I & II      GFR >= 60  
     NORMAL TO MILDLY DECREASED</content><br/><content>STAGE III          GFR 
30-59          MODERATELY DECREASED</content><br/><content>STAGE IV           
GFR 15-29         SEVERELY DECREASED</content><br/><content>STAGE V            
GFR <15            VERY LITTLE GFR LEFT</content><br/><content>ESRD             
   GFR <15            ON RRT</content><br/><content></content> 









                    ID                  Date                Data Source

 

                    [id number]          10/29/2020 11:25:00 AM EDT [facility] ([facility])









          Name      Value     Range     Interpretation Code Description Data Milly

rce(s) Supporting 

Document(s)

 

          Magnesium 2.1 mg/dL 1.8-2.4                       [facility] ([facility])  









                    ID                  Date                Data Source

 

                    [id number]          10/29/2020 11:25:00 AM EDT [facility] ([facility])









          Name      Value     Range     Interpretation Code Description Data Milly

rce(s) Supporting 

Document(s)

 

           Erythrocytes [#/volume] in Blood by Automated count 4.36 x10*6/UL 4.2

0-6.30                        

[facility] ([facility] [facility]            

 

           Leukocytes [#/volume] in Blood by Automated count 6.2 x10*3/UL 4.1-10

.9                         

[facility] ([facility])            

 

           Hemoglobin [Mass/volume] in Blood 13.8 g/dL  12.0-18.0               

         [facility] ([facility])                              

 

           Hematocrit [Volume Fraction] of Blood by Automated count 40.7 %     3

7.0-51.0                        

[facility] ([facility])            

 

          MCH       31.6 pg   26.0-32.0                     [facility] ([place] [facility]

[facility])  

 

          MCV       93.2 fL   80.0-97.0                     [facility] ([place] [facility]

[facility])  

 

          MCHC      34.0 g/dL 31.0-38.0                     [facility] ([facility])  

 

           Platelets [#/volume] in Blood by Automated count 236 x10*3/-440

                          [facility]

 ([facility])                  

 

           Erythrocyte distribution width [Ratio] by Automated count 14.5 %     

11.6-13.7                        

[facility] ([facility])            

 

          Lymph %   19.8 %    10.0-58.5                     [facility] ([place] [facility]

[facility])  

 

          MPV       7.8 FL    7.8-11.0                      [facility] ([place] [facility]

[facility])  

 

          Mid %     5.7 %     1.7-9.3                       [facility] [facility])  

 

          Neut %    74.5 %    37.0-92.0                     [facility] [facility])  

 

          Lymph #   1.2 x10*3/UL 0.6-4.1                       [facility] ([facility])  

 

          Neut #    4.6 x10*3/UL 2.0-7.8                       [facility] ([facility])  

 

          Mid #     0.4 x10*3/UL 0.1-0.6                       [facility] ([facility])  









                    ID                  Date                Data Source

 

                    [id number]            10/29/2020 11:25:00 AM EDT [facility] ([facility])









          Name      Value     Range     Interpretation Code Description Data Milly

rce(s) Supporting 

Document(s)

 

           Leukocytes [#/volume] in Blood by Automated count 6.2 x10*3/UL 4.1-10

.9                         

[facility] ([facility])    

 

           Hematocrit [Volume Fraction] of Blood by Automated count 40.7 %     3

7.0-51.0                        

[facility] ([facility])    

 

           Erythrocytes [#/volume] in Blood by Automated count 4.36 x10*6/UL 4.2

0-6.30                        

[facility] ([facility])    

 

           Hemoglobin [Mass/volume] in Blood 13.8 g/dL  12.0-18.0               

         [facility] ([facility]                       

 

          MCHC      34.0 g/dL 31.0-38.0                     [facility]  

 

          MCV       93.2 fL   80.0-97.0                     [facility]  

 

          MCH       31.6 pg   26.0-32.0                     [facility] ([facility]  

 

           Erythrocyte distribution width [Ratio] by Automated count 14.5 %     

11.6-13.7                        

[facility] ([facility])    

 

           Platelets [#/volume] in Blood by Automated count 236 x10*3/-440

                          [facility]

 ([facility])          

 

             Platelet mean volume [Entitic volume] in Blood by Archie 7.8 FL

       7.8-11.0                   

                          [facility] ([facility])  

 

          Neut %    74.5 %    37.0-92.0                     [facility] ([facility]  

 

          Mid %     5.7 %     1.7-9.3                       [facility] ([facility]

[facility])  

 

           Lymphocytes/100 leukocytes in Blood by Automated count 19.8 %     10.

0-58.5                        

[facility] ([facility])    

 

           Neutrophils [#/volume] in Semen by Manual count 4.6 x10*3/UL 2.0-7.8 

                         [facility] 

([facility])           

 

          Mid #     0.4 x10*3/UL 0.1-0.6                       [facility] (Cardiolog

y [facility])  

 

          Lymph #   1.2 x10*3/UL 0.6-4.1                       [facility] (Cardiolog

y [facility])  









                    ID                  Date                Data Source

 

                    [id number]          10/29/2020 11:24:00 AM EDT [facility] ([facility])









          Name      Value     Range     Interpretation Code Description Data Milly

rce(s) Supporting 

Document(s)

 

           Thyroxine (T4) free [Mass/volume] in Serum or Plasma 1.33 ng/dL 0.76-

1.46                        

[facility] ([facility]            









                    ID                  Date                Data Source

 

                    [id number]          10/26/2020 11:44:00 AM EDT [facility] ([facility])









          Name      Value     Range     Interpretation Code Description Data Milly

rce(s) Supporting 

Document(s)

 

           INR in Platelet poor plasma by Coagulation assay 1.2                 

                        [facility] ([facility])                              









                    ID                  Date                Data Source

 

                    [id number]          10/22/2020 12:43:00 PM EDT [facility] ([facility])









          Name      Value     Range     Interpretation Code Description Data Milly

rce(s) Supporting 

Document(s)

 

           INR in Platelet poor plasma by Coagulation assay 7.2                 

                        [facility] ([facility])                              









                    ID                  Date                Data Source

 

                    [id number]          10/22/2020 11:33:00 AM EDT [facility] ([facility])









          Name      Value     Range     Interpretation Code Description Data Milly

rce(s) Supporting 

Document(s)

 

          Prothrombin Time 49.4 s    12.5-14.3                     [facility] ([place]

[facility]  

 

          Inr       5.24                Above upper panic limits           [facility]

T ([facility])  

 

                                        THERAPUTIC HUMAN INR VALUES

INDICATIONS                      NORMAL RANGES

PROPHYLAXIS/TREATMENT OF:

VENOUS THROMBOSIS                2.0-3.0

PULMONARY EMBOLISM               2.0-3.0

PREVENTION OF SYSTEMIC EMBOLISM FROM:

TISSUE HEART VALVES              2.0-3.0

ACUTE MYOCARDIAL INFARCTION      2.0-3.0

VALVULAR HEART DISEASE           2.0-3.0

ATRIAL FIBRILLATION              2.0-3.0

MECHANICAL VALVES(HIGH RISK)     2.5-3.5

RECURRENT MYOCARDIAL INFARCTION  2.5-3.5

 









                    ID                  Date                Data Source

 

                    [id number]          10/22/2020 11:33:00 AM EDT [facility] ([facility])









          Name      Value     Range     Interpretation Code Description Data Milly

rce(s) Supporting 

Document(s)

 

           Erythrocytes [#/volume] in Blood by Automated count 4.37 x10*6/UL 4.2

0-6.30                        

[facility] ([facility])            

 

           Leukocytes [#/volume] in Blood by Automated count 6.6 x10*3/UL 4.1-10

.9                         

[facility] ([facility])            

 

           Hemoglobin [Mass/volume] in Blood 13.7 g/dL  12.0-18.0               

         [facility] ([facility]                              

 

           Hematocrit [Volume Fraction] of Blood by Automated count 40.2 %     3

7.0-51.0                        

[facility] ([facility])            

 

          MCV       91.9 fL   80.0-97.0                     [facility] ([place] [facility]

[facility])  

 

          MCH       31.4 pg   26.0-32.0                     [facility] ([facility])  

 

          MCHC      34.2 g/dL 31.0-38.0                     [facility] ([facility])  

 

           Erythrocyte distribution width [Ratio] by Automated count 13.7 %     

11.6-13.7                        

[facility] ([facility])            

 

           Platelets [#/volume] in Blood by Automated count 251 x10*3/-440

                          [facility]

 ([facility])                  

 

          MPV       8.3 FL    7.8-11.0                      [facility] ([place] [facility]

[facility])  

 

          Lymph %   16.8 %    10.0-58.5                     [facility] ([place] [facility]

[facility])  

 

          Mid %     4.7 %     1.7-9.3                       [facility] ([facility])  

 

          Lymph #   1.1 x10*3/UL 0.6-4.1                       [facility] ([facility])  

 

          Neut %    78.5 %    37.0-92.0                     [facility] [facility])  

 

          Mid #     0.3 x10*3/UL 0.1-0.6                       [facility] ([facility])  

 

          Neut #    5.2 x10*3/UL 2.0-7.8                       [facility] ([facility])  









                    ID                  Date                Data Source

 

                    [id number]          [facility]2020 12:07:00 [facility]









          Name      Value     Range     Interpretation Code Description Data Milly

rce(s) Supporting 

Document(s)

 

           INR in Platelet poor plasma by Coagulation assay 1.3                 

                        [facility] ([facility])                              







                                        Procedure

 

                                          



                                                                                
                                                                                
                                                                                
                                                                                
                                                                                
                                                                                
                                                                                
                                                                                
                                                                                
                                                                                
                                                                                
        



Social History

          



           Code       Duration   Value      Status     Description Data Source(s

)

 

           Smoking    10/26/2021 12:00:00 AM EDT Current Smoker completed  Curre

nt Smoker eCW1 

([facility])

 

           Smoking    10/26/2021 12:00:00 AM EDT Current Smoker completed  Curre

nt Smoker eCW1 

([facility])

 

           Smoking    10/26/2021 12:00:00 AM EDT Current Smoker completed  Curre

nt Smoker eCW1 

([facility])

 

           Smoking    10/26/2021 12:00:00 AM EDT Current Smoker completed  Curre

nt Smoker eCW1 

([facility])

 

           Smoking    10/26/2021 12:00:00 AM EDT Current Smoker completed  Curre

nt Smoker eCW1 

([facility])

 

           Smoking    10/21/2021 12:00:00 AM EDT Current Smoker completed  Curre

nt Smoker eCW1 

([facility])

 

           Smoking    10/21/2021 12:00:00 AM EDT Current Smoker completed  Curre

nt Smoker eCW1 

([facility])



                                                                                
                                                                              



Vital Signs

          



                    ID                  Date                Data Source

 

                    UNK                                      









           Name       Value      Range      Interpretation Code Description Data

 Source(s)

 

           Systolic blood pressure 124 mm[Hg]                       124 mm[Hg] M

EDENT ([facility])

 

           Diastolic blood pressure 76 mm[Hg]                        76 mm[Hg]  

[facility] ([facility])

 

           Heart rate 88 /min                          88 /min    [facility] ([facility])

 

           Body height 66 [in_i]                        66 [in_i]  [facility] ([facility]

 

                                        5'6" 

 

           Body weight 220.00 [lb_av]                       220.00 [lb_av] MED[facility]

T ([facility])

 

           Body mass index (BMI) [Ratio] 35.5 kg/m2                       35.5 k

g/m2 [facility] ([facility])

 

           Oxygen saturation in Arterial blood by Pulse oximetry 97 %           

                  97 %       [facility] 

([facility])

 

           Systolic blood pressure 112 mm[Hg]                       112 mm[Hg] M

EDENT ([facility])

 

                                        RT Arm 

 

           Diastolic blood pressure 70 mm[Hg]                        70 mm[Hg]  

[facility] ([facility])

 

                                        RT Arm 

 

           Heart rate 68 /min                          68 /min    [facility] ([facility])

 

           Body height 66 [in_i]                        66 [in_i]  [facility] ([facility]

 

                                        5'6" 

 

           Body weight 215.12 [lb_av]                       215.12 [lb_av] MED[facility]

T ([facility])

 

           Oxygen saturation in Arterial blood by Pulse oximetry 96 %           

                  96 %       [facility] 

([facility])

 

           Body mass index (BMI) [Ratio] 34.7 kg/m2                       34.7 k

g/m2 [facility] ([facility])

 

           Body weight 240 [lb_av]                       240 [lb_av] eCW1 ([facility])

 

           Body weight 108.86 kg                        108.86 kg  W1 ([facility])

 

           Body height 65 [in_i]                        65 [in_i]  eCW1 ([facility])

 

           Body mass index (BMI) [Ratio] 39.93 kg/m2                       39.93

 kg/m2 eCW1 ([facility])

 

           Heart rate 70 /min                          70 /min    eCW1 ([facility])

 

           Respiratory rate 18 /min                          18 /min    eCW1 ([facility])

 

           Body temperature 95.2 [degF]                       95.2 [degF] eCW1 (

[facility])

 

           Systolic blood pressure 121 mm[Hg]                       121 mm[Hg] e

CW1 ([facility])

 

           Diastolic blood pressure 84 mm[Hg]                        84 mm[Hg]  

eCW1 ([facility])

 

           Body weight 240 [lb_av]                       240 [lb_av] eCW1 ([facility])

 

           Body weight 108.86 kg                        108.86 kg  eCW1 ([facility])

 

           Body height 65 [in_i]                        65 [in_i]  eCW1 ([facility])

 

           Body mass index (BMI) [Ratio] 39.93 kg/m2                       39.93

 kg/m2 eCW1 ([facility])

 

           Heart rate 153 /min                         153 /min   eCW1 ([facility])

 

           Respiratory rate 18 /min                          18 /min    eCW1 ([facility])

 

           Body temperature 98.2 [degF]                       98.2 [degF] eCW1 (

[facility])

 

           Systolic blood pressure 142 mm[Hg]                       142 mm[Hg] e

CW1 ([facility])

 

           Diastolic blood pressure 96 mm[Hg]                        96 mm[Hg]  

eCW1 ([facility])

 

           Body mass index (BMI) [Ratio] 36.0 kg/m2                       36.0 k

g/m2 [facility] ([facility])

 

           Systolic blood pressure 122 mm[Hg]                       122 mm[Hg] M

EDENT ([facility])

 

           Diastolic blood pressure 76 mm[Hg]                        76 mm[Hg]  

[facility] ([facility])

 

           Heart rate 86 /min                          86 /min    [facility] ([facility])

 

           Body height 66 [in_i]                        66 [in_i]  [facility] ([facility]

 

                                        5'6" 

 

           Body weight 223.00 [lb_av]                       223.00 [lb_av] [facility]

T ([facility])

 

           Body mass index (BMI) [Ratio] 35.0 kg/m2                       35.0 k

g/m2 [facility] ([facility])

 

           Systolic blood pressure 124 mm[Hg]                       124 mm[Hg] M

EDENT ([facility])

 

           Body height 66 [in_i]                        66 [in_i]  [facility] ([facility]

 

                                        5'6" 

 

           Body weight 217.00 [lb_av]                       217.00 [lb_av] MED[facility]

T ([facility])

 

           Diastolic blood pressure 68 mm[Hg]                        68 mm[Hg]  

[facility] ([facility])

 

           Heart rate 86 /min                          86 /min    [facility] ([facility])

 

           Systolic blood pressure 114 mm[Hg]                       114 mm[Hg] M

EDENT ([facility])

 

           Diastolic blood pressure 76 mm[Hg]                        76 mm[Hg]  

[facility] ([facility])

 

           Body height 66 [in_i]                        66 [in_i]  [facility] ([facility])

 

                                        5'6" 

 

           Body weight 221.50 [lb_av]                       221.50 [lb_av] MEDEN

T ([facility])

 

           Body mass index (BMI) [Ratio] 35.7 kg/m2                       35.7 k

g/m2 [facility] ([facility])

 

           Ideal body weight 130 [lb_av]                       130 [facility]

 

           Body weight 100.472 kg                       100.472 kg [facility] ([facility])

 

           Body surface area Derived from formula 2.09 m2                       

   2.09 m2    [facility] ([facility])

 

           Systolic blood pressure 136 mm[Hg]                       136 mm[Hg] M

EDENT ([facility])

 

                                        RT Arm 

 

           Diastolic blood pressure 88 mm[Hg]                        88 mm[Hg]  

[facility] ([facility])

 

                                        RT Arm 

 

           [facility] 120 /min                         120 /[facility]

 

           Body height 66 [in_i]                        66 [in_i]  [facility] ([facility]

 

                                        5'6" 

 

           Body weight 223.50 [lb_av]                       223.50 [lb_av] [facility]

T ([facility])

 

           Body mass index (BMI) [Ratio] 36.1 kg/m2                       36.1 k

g/m2 [facility] ([facility])

 

           Body height 66 [in_i]                        66 [in_i]  [facility] ([facility])

 

                                        5'6" 

 

           Ideal body weight 130 [lb_av]                       130 [lb_av] [facility]

T ([facility])

 

           Body weight 102.060 kg                       102.060 kg [facility] ([facility])

 

           Diastolic blood pressure 77 mm[Hg]                        77 mm[Hg]  

[facility] ([facility])

 

           Body weight 225.00 [lb_av]                       225.00 [lb_av] [facility]

T ([facility])

 

           Body mass index (BMI) [Ratio] 36.3 kg/m2                       36.3 k

g[facility]

 

           Body surface area Derived from formula 2.10 m2                       

   2.10 m2    [facility] ([facility])

 

           Systolic blood pressure 145 mm[Hg]                       145 mm[Hg] [facility]

 

           Systolic blood pressure 148 mm[Hg]                       148 mm[Hg] M

EDENT ([facility])

 

                                        RT Arm 

 

           Diastolic blood pressure 72 mm[Hg]                        72 mm[Hg]  

[facility] ([facility])

 

                                        RT Arm 

 

           Systolic blood pressure 140 mm[Hg]                       140 mm[facility]Hg] [facility]

 

           Diastolic blood pressure 70 mm[Hg]                        70 mm[facility]Hg]  

[facility]

 

           [facility] 80 /min                          80 /min    [facility]

 

           Body height 66 [in_i]                        66 [in_i]  [facility] ([facility]

 

                                        5'6" 

 

           Body weight 219.00 [lb_av]                       219.00 [lb_av] [facility]

T ([facility])

 

           Body mass index (BMI) [Ratio] 35.3 kg/m2                       35.3 k

g/m2 [facility] ([facility]

 

           Body height 66 [in_i]                        66 [in_i]  [facility] ([facility] )

 

                                        5'6" 

 

           Body weight 190.00 [lb_av]                       190.00 [lb_av] [facility]

T ([facility] 

)

 

           Body mass index (BMI) [Ratio] 30.7 kg/m2                       30.7 k

g/m2 [facility] ([facility]

 

           Body mass index (BMI) [Ratio] 35.7 kg/m2                       35.7 k

g/m2 [facility] ([facility]

 

           Body temperature 96.1 [degF]                       96.1 [degF] [facility]

 ([facility] 

)

 

           Body height 64.5 [in_i]                       64.5 [in_i] [facility] ([facility] )

 

                                        5'4.50" 

 

           Body weight 211.50 [lb_av]                       211.50 [lb_av] [facility]

T ([facility] 

)

 

           Systolic blood pressure 142 mm[Hg]                       142 mm[Hg] M

EDENT ([facility])

 

           Body weight 222.00 [lb_av]                       222.00 [lb_av] [facility]

T ([facility])

 

           Systolic blood pressure 154 mm[Hg]                       154 mm[facility]Hg] [facility]

 

                                        RT Arm 

 

           Diastolic blood pressure 78 mm[Hg]                        78 mm[Hg]  

[facility] ([facility])

 

                                        RT Arm 

 

           Diastolic blood pressure 80 mm[Hg]                        80 mm[Hg]  

[facility] ([facility])

 

           Heart rate 96 /min                          96 /min    [facility] ([facility])

 

           Body height 66 [in_i]                        66 [in_i]  [facility]

 

                                        5'6" 

 

           Body mass index (BMI) [Ratio] 35.8 kg/m2                       35.8 k

g/m[facility]

 

           Systolic blood pressure 164 mm[Hg]                       164 mm[Hg] [facility] ([facility])

 

           Diastolic blood pressure 90 mm[Hg]                        90 mm[Hg]  

[facility] ([facility])

 

           Body height 66 [in_i]                        66 [in_i]  [facility] ([facility])

 

                                        5'6" 

 

           Body weight 225.00 [lb_av]                       225.00 [lb_av] [facility]

T ([facility])

 

           Body mass index (BMI) [Ratio] 36.3 kg/m2                       36.3 k

g/m2 [facility] ([facility])

 

           Ideal body weight 130 [lb_av]                       130 [lb_av] [facility]

T ([facility])

 

           Body weight 102.060 kg                       102.060 kg [facility] ([facility])

 

           Body surface area Derived from formula 2.10 m2                       

   2.10 m2    [facility] ([facility])

 

           Body weight 220.00 [lb_av]                       220.00 [lb_av] MED[facility]

T ([facility])

 

           Body height 65 [in_i]                        65 [in_i]  [facility] ([facility]

 

                                        5'5" 

 

           Body mass index (BMI) [Ratio] 36.6 kg/m2                       36.6 k

g/m2 [facility] ([facility])

 

           Heart rate 65 /min                          65 /min    [facility] ([facility]

 

           Systolic blood pressure--sitting 136 mm[Hg]                       136

 mm[Hg] [facility] ([facility]

 

                                        Ra, large cuff 

 

           Diastolic blood pressure--sitting 84 mm[Hg]                        84

 mm[Hg]  [facility] ([facility]

 

                                        Ra, large cuff 

 

           Body height 66 [in_i]                        66 [in_i]  [facility] ([facility])

 

                                        5'6" 

 

           Body weight 221.00 [lb_av]                       221.00 [lb_av] [facility]

T ([facility])

 

           Body mass index (BMI) [Ratio] 35.7 kg/m2                       35.7 k

g/m2 [facility] ([facility]

 

           Diastolic blood pressure 70 mm[Hg]                        70 mm[Hg]  

[facility] ([facility])

 

                                        RT Arm 

 

           Heart rate 60 /min                          60 /min    [facility] ([facility])

 

           Body height 66 [in_i]                        66 [in_i]  [facility]

 

                                        5'6" 

 

           Body mass index (BMI) [Ratio] 35.7 kg/m2                       35.7 k

g/m2 [facility] ([facility]

 

           Systolic blood pressure 148 mm[Hg]                       148 mm[Hg] M

[facility] ([facility])

 

                                        RT Arm 

 

           Body weight 221.00 [lb_av]                       221.00 [lb_av] [facility]

T ([facility])

 

           Body weight 226.00 [lb_av]                       226.00 [lb_av] [facility]

T ([facility])

 

           Body mass index (BMI) [Ratio] 36.5 kg/m2                       36.5 k

g/m2 [facility] ([facility]

 

           Body weight 226.00 [lb_av]                       226.00 [lb_av] [facility]

T ([facility])

 

           Systolic blood pressure 138 mm[Hg]                       138 mm[Hg] M

EDENT ([facility])

 

           Diastolic blood pressure 90 mm[Hg]                        90 mm[Hg]  

[facility] ([facility])

 

           Body height 66 [in_i]                        66 [in_i]  [facility]

 

                                        5'6" 

 

           Body weight 217.00 [lb_av]                       217.00 [lb_av] [facility]

T ([facility])

## 2021-12-02 NOTE — CCD
Continuity of Care Document (CCD)

                             Created on: 2021



Juany Becerril

External Reference #: [id number]

: 1955

Sex: Female



Demographics





                          Address                   [address] [facility]

[place]  [address]

 

                          Home Phone                [phone]

 

                          Preferred Language        Unknown

 

                          Marital Status            Unknown

 

                          Christian Affiliation     Unknown

 

                          Race                      White

 

                          Ethnic Group              Not  or 





Author





                          Author                    Juany Regalado M.D.

 

                          Organization              Unknown

 

                          Address                   [address] [facility] [address]

[place]  [address]



 

                          Phone                     [phone]







Care Team Providers





                    Care Team Member Name Role                Phone

 

                    Yuri Maier MD       AUTM                [phone]

 

                    [facility] Hea  AUTM                [phone]

 

                    John Lutz MD AUTM                Unavailable

 

                    [facility] Hematology/Oncology AUTM                [phone]

 

                    Hansa Henrandez FNP  AUTM                [phone]







Problems





                    Active Problems     Provider            Date

 

                    Chronic atrial fibrillation Protime             Onset: 

 

                    Essential hypertension Juany Vincent FNP Onset: 2017

 

                    Deep venous thrombosis of lower extremity Juany Vincent FN P Onset: 2017

 

                    Pure hypercholesterolemia Juany Vincent FNP Onset: 

017

 

                    Anxiety state       Juany Vincent FNP Onset: 2017

 

                    Chronic pulmonary embolism Juany Vincent FNP Onset: 2017

 

                    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 

2017

 

                    Gastroesophageal reflux disease Juany Vincent FNP Onset: 2017

 

                    Scoliosis deformity of spine Juany Vincent FNP Onset: 2017

 

                    Chronic tension-type headache Juany Vincent FNP Onset: 2017

 

                    Varicose veins of lower extremity Juany Vincent FNP Onset:

 2017

 

                    Tobacco user        Juany Vincent FNP Onset: 2017

 

                    Hypercalcemia       Juany Vincent FNP Onset: 2017







Social History





                Type            Date            Description     Comments

 

                Birth Sex                       Unknown          

 

                ETOH Use                        Consumes 3 glasses of wine per w

[place]  

 

                Tobacco Use     Start: Unknown  Patient is a current smoker, smo

kes every day STARTED

AGE 30 1-3 CIGARETTES A DAY 







Allergies and adverse reactions





             Active Allergies Criticality  Reaction | Severity Comments     Date

 

             Ibuprofen    Unable to assess criticality STOMACH UPSET HEADACHE mo

david       10/17/2017

 

             Latex        Unable to assess criticality rash, itches | Mild      

        10/17/2018







Medications





           Active Medications SIG        Qnty       Indications Ordering Provide

r Date

 

                          Claritin                     10mg Tablets             

      1 by mouth every 

night at bedtime seasonally                                 ABELARDO Saleem 2021

 

                          Fluoxetine HCL                     20mg Capsules      

             1 by mouth 

every day       90caps                          Charmaine Regalado M.D. 20

21

 

                          Excedrin Migraine                     [id number] Tab

lets                   1 

as needed h/a as needed mdd=1                                 Charmaine Regalado M.D. 2021

 

                          Famotidine                     20mg Tablets           

        1 by mouth every 

day             90tabs                          Charmaine Regalado M.D. 20

21

 

                    Furosemide                     20mg Tablets                 

  1 by mouth qd       

180tabs                                 Charmaine Regalado M.D. 2021

 

                                        Bupropion Hydrochloride ER (XL)         

            150mg Tablets ER 24HR       

                take 1 tablet by mouth in the morning 90tabs                    

      Charmaine Regalado M.D.                                    2021

 

                          Eliquis                     5mg Tablets               

    take one tablet by 

mouth twice a day 180tabs                         Charmaine Regalado M.D. 2020

 

                          Methimazole                     5mg Tablets           

        2 every day by 

mouth           180tabs                         Charmaine Regalado M.D. 20

20

 

                          Shingrix                     50mcg/0.5ML Suspension Re

c                   

administer at pharmacy 1units                          Juany Vincent FNP 

 

                          Voltaren                     1% Gel                   

apply up to 4 grams three 

times a day to affected knee as needed 100gm                           Charmaine Regalado M.D. 

2018

 

                          Aspirin Adult Low Dose                     81mg Tablet

s DR                   1 

by mouth every day                                 Juany Vincent FNP 10/17/201

7

 

                                        Acetaminophen Extra Strength            

         500mg Tablets                  

             take 2 tabs every 6 hours as needed                           Juany Fung FNP 10/17/2017

 

                                        Womens 50+ Multi Vitamin & Mineral Formu

la                      Tablets         

             1 every day PO Vit [id number] VC=666                           Juany Vincent FNP 10/17/2017

 

                          Metoprolol Tartrate                     25mg Tablets  

                 Take One 

Tablet By Mouth Twice A Day 180tabs                         ABELARDO Saleem 10/17/2017

 

                          Atorvastatin Calcium                     20mg Tablets 

                  take one

tablet by mouth every day 90tabs                          JANE Saleem 10/17/2017

 

                          Flecainide Acetate                     100mg Tablets  

                 take one 

tablet by mouth twice a day 180tabs                         ABELARDO Saleem 10/17/2017

 

                          Digoxin                     250mcg Tablets            

       1 by mouth every 

day             90tabs                          Charmaine Regalado M.D. 

 

                                        History Medications

 

                          Cephalexin                     500mg Capsules         

          take one tablet 

by mouth 3 times daily x 7 days 21caps                          AIXA Laurent JR 10/06/2021 - 

2021







Medications Administered in Office





           Medication SIG        Qnty       Indications Ordering Provider Date

 

                          Administration Of Flu Vaccine                      Inj

ection                    

                                                AIXA Hoffmann JR 10/06/2

021

 

                          Administration Of Flu Vaccine                      Inj

diyaion                    

                                                Charmaine Regalado M.D. 10/15/20

20

 

                          Administration Of Flu Vaccine                      Inj

[facility]                    

                                                Juany Vincent FNP 10/17/2019

 

                          Administration Of Flu Vaccine                      Inj

ection                    

                                                Juany Vincent FNP 10/17/2018

 

                          Administration Of Flu Vaccine                      Inj

[facility]                    

                                                Juany Vincent FNP 10/17/2017







Immunizations





             CPT Code     Status       Date         Vaccine      Lot #

 

                [id number]           Given           10/06/2021      Influenza Vaccin

e Quadrivalent Preser/Antibiotic Free Im 

Use                                     698377

 

                [id number]           Given           10/15/2020      Influenza Vaccin

e Quadrivalent Preser/Antibiotic Free Im 

Use                                     [id number]

 

                [id number]           Given           2020      Shingrix Zoster 

Vaccine (HZV), Recombinant, Subunit, 

Adjuvanted                               

 

                21112           Given           10/17/2019      Influenza Vaccin

e Quadrivalent Preser/Antibiotic Free Im 

Use                                     [id number]

 

                94277           Given           10/17/2018      Influenza Virus 

Vaccine, Quadrivalent (Cciiv4), Derived 

From Cell                               [id number]

 

             55985        Given        2018   Pneumovax 23 [id number]

 

                18043           Given           10/17/2017      Influenza Vaccin

e Quadrivalent Preser/Antibiotic Free Im 

Use                                     [id number]







Vital Signs





                Date            Vital           Result          Comment

 

                2021 11:38am BP Systolic     112 mmHg        RT Arm

 

                    BP Diastolic        70 mmHg             RT Arm

 

                    Heart Rate          68 /min              

 

                    Height              66 inches           5'6"

 

                    Weight              215.12 lb            

 

                    O2 % BldC Oximetry  96 %                 

 

                    BMI (Body Mass Index) 34.7 kg/m2           

 

                10/19/2021  3:12pm BP Systolic     122 mmHg         

 

                    BP Diastolic        76 mmHg              

 

                    Heart Rate          86 /min              

 

                    Height              66 inches           5'6"

 

                    Weight              223.00 lb            

 

                    BMI (Body Mass Index) 36.0 kg/m2           







Results





        Test    Acquired Date Facility Test    Result  H/L     Range   Note

 

                    Laboratory test finding 2021          [facility]

pia meza

: Dr Simon Hoyt

[place], [place] [id number] [phone] Magnesium  2.0 mg/dL             1.8 - 2.4   

 

                    Comprehensive Chem Profile 2021          [facility]

[facility]

: Dr Simon Hoyt

[place], [place] [id number] [phone] Glucose    113 mg/dL  High       74 - 99    1

 

             BUN          14 mg/dL                  7 - 18        

 

             Creatinine   1.0 mg/dL                 0.6 - 1.3     

 

             Sodium       145 mEq/L                 136 - 145     

 

             Potassium    4.1 mEq/L                 3.5 - 5.1     

 

             Chloride     105 mEq/L                 98 - 107      

 

             Carbon Dioxide 31 mEq/L                  21 - 32       

 

             Calcium      10.5 mg/dL   High         8.5 - 10.1   2

 

             Alk. Phosphatase 110 mg/dL                 46 - 116      

 

             Total Bilirubin 0.4 mg/dL                 0.2 - 1.0     

 

             Ast (Sgot)   22 U/L                    15 - 37       

 

             Alt (SGPT)   27 U/L                    12 - 78       

 

             Albumin      2.8 g/dL     Low          3.4 - 5.0    3

 

             Total Protein 8.3 g/dL     High         6.4 - 8.2     

 

             A/G Ratio    0.51 CALC    Low          1.00 - 1.90   

 

             GFR  55 mL/min    Low          >60           

 

             GFR African American >= 60 mL/min              >60          4

 

                    Laboratory test finding 2021          [facility]

                                        [address] [place] [address] [phone] Digoxin Level 0.1 NG/ML  Low        0.5-2.0    5

 

                    Complete Blood Count 2021          [facility]

s, pc

: Dr Simon Hoyt

[place] [id number] [phone] WBC        5.6 x10*3/UL            4.1 - 10.9  

 

             RBC          4.36 x10*6/UL              4.20 - 6.30   

 

             Hemoglobin   14.0 g/dL                 12.0 - 18.0   

 

             Hematocrit   40.9 %                    37.0 - 51.0   

 

             MCV          93.9 fL                   80.0 - 97.0   

 

             MCH          32.0 pg                   26.0 - 32.0   

 

             MCHC         34.1 g/dL                 31.0 - 38.0   

 

             RDW          14.0 %       High         11.6 - 13.7   

 

             PLT          233 x10*3/UL              140 - 440     

 

             MPV          8.9 FL                    7.8 - 11.0    

 

             Lymph %      20.4 %                    10.0 - 58.5   

 

             Mid %        5.2 %                     1.7 - 9.3     

 

             Neut %       74.4 %                    37.0 - 92.0   

 

             Lymph #      1.1 x10*3/UL              0.6 - 4.1     

 

             Mid #        0.3 x10*3/UL              0.1 - 0.6     

 

             Neut #       4.2 x10*3/UL              2.0 - 7.8     

 

                    Laboratory test finding 10/25/2021          [address] [address] [phone] NT-Pro BNP 5817 pg/mL High       <125       6

 

             Digoxin Level 0.5 NG/ML    Normal       0.5-2.0      7

 

             Thyroxine (T4) 10.1 g/dL  Normal       4.5-12.0     8

 

             Thyroid Stimulating Hormone 0.051 uIU/ML Low          0.358-3.740  

9

 

             Flecainide   <0.10 ug/mL  Low          0.20-1.00    10

 

                    Basic Metabolic Profile 10/25/2021          [address] [address] [phone] Glucose, Fasting 106 mg/dL  High             

 

             Blood Urea Nitrogen 18 mg/dL     Normal       7-18          

 

             Creatinine For GFR 0.94 mg/dL   Normal       0.55-1.30     

 

             Glomerular Filtration Rate > 60.0       Normal       >45          1

1

 

             Sodium Level 140 mEq/L    Normal       136-145       

 

             Potassium Serum 4.6 mEq/L    Normal       3.5-5.1       

 

             Chloride Level 111 mEq/L    High                 

 

             Carbon Dioxide Level 22 mEq/L     Normal       21-32         

 

             Anion Gap    7 mEq/L      Low          8-16          

 

             Calcium Level 10.7 mg/dL   High         8.8-10.2      

 

                    Liver Profile       10/25/2021          [facility]

nter

                                        [address], [place] [id number] [phone] Ast/Sgot   26 U/L     Normal     7-37        

 

             Alt/SGPT     28 U/L       Normal       12-78         

 

             Alkaline Phosphatase 105 U/L      Normal               

 

             Bilirubin,Total 0.8 mg/dL    Normal       0.2-1.0       

 

             Bilirubin,Direct 0.4 mg/dL    High         0.0-0.2       

 

             Total Protein 7.9 GM/DL    Normal       6.4-8.2       

 

             Albumin      2.8 GM/DL    Low          3.2-5.2       

 

             Albumin/Globulin Ratio 0.5          Low          1.2-2.2       

 

                    Cardiac Marker Panel 10/25/2021          [facility]

enter

                                        [address], [place] [address] [phone] CPK Creatine Phosphokinase 26 U/L     Normal     26-19

2      

 

             CK-MB Value Mass 1.0 NG/ML    Normal       <3.6          

 

             MB/CK Relative Index 3.85         Normal       < Or =4      12

 

             Troponin I   0.02 NG/ML   Normal       < 0.10       13

 

                    CBC With Differential 10/25/2021          [facility]

                                        [address] [place] [address] [phone] White Blood Count 6.3 10     Normal     4.0-10.0    

 

             Red Blood Count 4.12 10      Normal       4.00-5.40     

 

             Hemoglobin   12.9 g/dL    Normal       12.0-15.5     

 

             Hematocrit   41.3 %       Normal       36.0-47.0     

 

             Mean Corpuscular Volume 100.2 fl     High         80.0-96.0     

 

             Mean Corpuscular Hemoglobin 31.3 pg      Normal       27.0-33.0    

 

 

             Mean Corpuscular HGB Conc 31.2 g/dL    Low          32.0-36.5     

 

             Red Cell Distribution Width 14.8 %       High         11.5-14.5    

 

 

             Platelet Count, Automated 202 10       Normal       150-450       

 

             Neutrophils % 79.5 %       High         36.0-66.0     

 

             Lymph %      13.8 %       Low          24.0-44.0     

 

             Mono %       4.8 %        Normal       2.0-8.0       

 

             Eos %        1.1 %        Normal       0.0-3.0       

 

             Baso %       0.3 %        Normal       0.0-1.0       

 

             Immature Granulocyte % 0.5 %        Normal       0-3.0         

 

             Nucleated Red Blood Cell % 0.0 %        Normal       0-0           

 

             Neutrophils # 5.0 10       Normal       1.5-8.5       

 

             Lymph #      0.9 10       Low          1.5-5.0       

 

             Mono #       0.3 10       Normal       0.0-0.8       

 

             Eos #        0.1 10       Normal       0.0-0.5       

 

             Baso #       0.0 10       Normal       0.0-0.2       

 

                    Influenza A/B RSV Covid Amp 10/25/2021          [address] [id number] [phone] Influenza A Amplification NEGATIVE   Normal     Negati

ve   14

 

             Influenza B Amplification NEGATIVE     Normal       Negative     15

 

             RSV Amplification NEGATIVE     Normal       Negative     16

 

             Sars Covid-19 Amplification NEGATIVE     Normal       Negative     

17

 

                    CBC With Differential 2021          [address] [address] [phone] White Blood Count 6.8 10     Normal     4.0-10.0    

 

             Red Blood Count 3.85 10      Low          4.00-5.40     

 

             Hemoglobin   12.7 g/dL    Normal       12.0-15.5     

 

             Hematocrit   39.4 %       Normal       36.0-47.0     

 

             Mean Corpuscular Volume 102.3 fl     High         80.0-96.0     

 

             Mean Corpuscular Hemoglobin 33.0 pg      Normal       27.0-33.0    

 

 

             Mean Corpuscular HGB Conc 32.2 g/dL    Normal       32.0-36.5     

 

             Red Cell Distribution Width 12.3 %       Normal       11.5-14.5    

 

 

             Platelet Count, Automated 187 10       Normal       150-450       

 

             Neutrophils % 72.2 %       High         36.0-66.0     

 

             Lymph %      19.9 %       Low          24.0-44.0     

 

             Mono %       6.0 %        Normal       2.0-8.0       

 

             Eos %        1.5 %        Normal       0.0-3.0       

 

             Baso %       0.3 %        Normal       0.0-1.0       

 

             Immature Granulocyte % 0.1 %        Normal       0-3.0         

 

             Nucleated Red Blood Cell % 0.0 %        Normal       0-0           

 

             Neutrophils # 4.9 10       Normal       1.5-8.5       

 

             Lymph #      1.4 10       Low          1.5-5.0       

 

             Mono #       0.4 10       Normal       0.0-0.8       

 

             Eos #        0.1 10       Normal       0.0-0.5       

 

             Baso #       0.0 10       Normal       0.0-0.2       

 

                    PT & Aptt           2021          [facility]

nter

                                        [address] [place] [address] [phone] Prothrombin Time 13.8 seconds Normal     12.5-14.3   

 

             Inr          1.04         Normal                    18

 

             Partial Thromboplastin Time 30.6 seconds Normal       24.2-38.5    

 

 

                    CBC With Differential 2021          [facility]

                                        [address] [place] [address] [phone] White Blood Count 6.2 10     Normal     4.0-10.0    

 

             Red Blood Count 3.76 10      Low          4.00-5.40     

 

             Hemoglobin   12.5 g/dL    Normal       12.0-15.5     

 

             Hematocrit   39.0 %       Normal       36.0-47.0     

 

             Mean Corpuscular Volume 103.7 fl     High         80.0-96.0     

 

             Mean Corpuscular Hemoglobin 33.2 pg      High         27.0-33.0    

 

 

             Mean Corpuscular HGB Conc 32.1 g/dL    Normal       32.0-36.5     

 

             Red Cell Distribution Width 12.9 %       Normal       11.5-14.5    

 

 

             Platelet Count, Automated 199 10       Normal       150-450       

 

             Neutrophils % 74.5 %       High         36.0-66.0     

 

             Lymph %      16.4 %       Low          24.0-44.0     

 

             Mono %       7.0 %        Normal       2.0-8.0       

 

             Eos %        1.1 %        Normal       0.0-3.0       

 

             Baso %       0.5 %        Normal       0.0-1.0       

 

             Immature Granulocyte % 0.5 %        Normal       0-3.0         

 

             Nucleated Red Blood Cell % 0.0 %        Normal       0-0           

 

             Neutrophils # 4.6 10       Normal       1.5-8.5       

 

             Lymph #      1.0 10       Low          1.5-5.0       

 

             Mono #       0.4 10       Normal       0.0-0.8       

 

             Eos #        0.1 10       Normal       0.0-0.5       

 

             Baso #       0.0 10       Normal       0.0-0.2       

 

                    Comprehensive Metabolic Profil 2021          [facility]

                                        [address], [place] [address] [phone] Glucose, Fasting 98 mg/dL   Normal           

 

             Blood Urea Nitrogen 28 mg/dL     High         7-18          

 

             Creatinine For GFR 0.90 mg/dL   Normal       0.55-1.30     

 

             Glomerular Filtration Rate > 60.0       Normal       >45          1

9

 

             Sodium Level 137 mEq/L    Normal       136-145       

 

             Potassium Serum 4.7 mEq/L    Normal       3.5-5.1       

 

             Chloride Level 107 mEq/L    Normal               

 

             Carbon Dioxide Level 27 mEq/L     Normal       21-32         

 

             Anion Gap    3 mEq/L      Low          8-16          

 

             Calcium Level 10.2 mg/dL   Normal       8.8-10.2      

 

             Ast/Sgot     23 U/L       Normal       7-37          

 

             Alt/SGPT     19 U/L       Normal       12-78         

 

             Alkaline Phosphatase 77 U/L       Normal               

 

             Bilirubin,Total 0.5 mg/dL    Normal       0.2-1.0       

 

             Total Protein 8.3 GM/DL    High         6.4-8.2       

 

             Albumin      3.0 GM/DL    Low          3.2-5.2       

 

             Albumin/Globulin Ratio 0.6          Low          1.2-2.2       

 

                    Complete Blood Count 2021          [facility]

s, pc

: Dr Simon Hoyt

[place], [place] [id number] [phone] WBC        5.9 x10*3/UL            4.1 - 10.9  

 

             RBC          4.11 x10*6/UL Low          4.20 - 6.30   

 

             Hemoglobin   13.6 g/dL                 12.0 - 18.0   

 

             Hematocrit   40.0 %                    37.0 - 51.0   

 

             MCV          97.4 fL      High         80.0 - 97.0   

 

             MCH          33.1 pg      High         26.0 - 32.0   

 

             MCHC         34.0 g/dL                 31.0 - 38.0   

 

             RDW          13.2 %                    11.6 - 13.7   

 

             PLT          209 x10*3/UL              140 - 440     

 

             MPV          8.1 FL                    7.8 - 11.0    

 

             Lymph %      25.6 %                    10.0 - 58.5   

 

             Mid %        7.5 %                     1.7 - 9.3     

 

             Neut %       66.9 %                    37.0 - 92.0   

 

             Lymph #      1.5 x10*3/UL              0.6 - 4.1     

 

             Mid #        0.5 x10*3/UL              0.1 - 0.6     

 

             Neut #       3.9 x10*3/UL              2.0 - 7.8     

 

                    Laboratory test finding 2021          [place] [facility] 

: Dr Simon Hoyt

[place], [place] [id number] [phone] Sed Rate   25 mm/hr   High       0 - 15      

 

                    Basic Metabolic Panel 2021          [place] [facility]

ts, pc

: Dr Simon Hoyt

[place], [place] [id number] [phone] Glucose    100 mg/dL  High       74 - 99    20

 

             BUN          18 mg/dL                  7 - 18        

 

             Creatinine   0.8 mg/dL                 0.6 - 1.3     

 

             Sodium       141 mEq/L                 136 - 145     

 

             Potassium    3.8 mEq/L                 3.5 - 5.1     

 

             Chloride     104 mEq/L                 98 - 107      

 

             Carbon Dioxide 29 mEq/L                  21 - 32       

 

             Calcium      10.8 mg/dL   High         8.5 - 10.1   21

 

             GFR  >= 60 mL/min              >60           

 

             GFR African American >= 60 mL/min              >60          22

 

                    Laboratory test finding 2021          [place] [facility], 

: Dr Simon Hoyt

[place] [address] [phone] Thyroid Stimulating Hormone 0.19 uIU/mL Low        0.3

6 - 3.74  







                          1                         100-125 mg/dL     PRE-DIABET

ES/FASTING

>126 mg/dL          DIABETES/FASTING



 

                          2                         NOTE:

RESULT VERIFIED.







 

                          3                         NOTE:

RESULT VERIFIED.







 

                          4                         CHRONIC KIDNEY DISEASE STAGI

NG PER NKF



STAGE I & II      GFR >= 60        NORMAL TO MILDLY DECREASED

STAGE III          GFR 30-59          MODERATELY DECREASED

STAGE IV           GFR 15-29         SEVERELY DECREASED

STAGE V            GFR <15            VERY LITTLE GFR LEFT

ESRD                 GFR <15            ON RRT



 

                          5                         note:<nlbl:demographic_chang

ed>



 

                          6                         note:<nlbl:demographic_chang

ed>



 

                          7                         note:<nlbl:demographic_chang

ed>



 

                          8                         note:<nlbl:demographic_chang

ed>



 

                          9                         note:<nlbl:demographic_chang

ed>



 

                          10                        This test was developed and 

its performance characteristics

determined by [employer]. It has not been cleared or

approved by the Food and Drug Administration.

Detection Limit = 0.10

Performed at:  [address]  [id number]

61

: Barak Archibald MD, Phone:  [phone]



 

                          11                        Units are mL/min/1.73 m2



Chronic Kidney Disease Staging per NKF:



Stage I & II   GFR >=60       Normal to Mildly Decreased

Stage III      GFR 30-59      Moderately Decreased

Stage IV       GFR 15-29      Severely Decreased

Stage V        GFR <15        Very Little GFR Left

ESRD           GFR <15 on RRT



 

                          12                        DIAGNOSIS CRITERIA

MMB ng/ml       Relative Index (RI)

NON-AMI               < or = 5               N/A

GRAY ZONE              > 5                < or = 4

AMI                    > 5                   > 4



 

                          13                        Troponin I Reference Interva

l for [employer] LOCI:



                                        99th Percentile= 0.00-0.045 ng/ml



Risk Stratification:

<= 0.10 ng/ml   Decreased Risk for Adverse Clinical

Events.

                                        0.10-1.50 ng/ml   Increased Risk for Adv

erse Clinical

Events. Evaluation of additional

criterion and/or repeat testing in 2-6

hours is suggested to rule out myocardial

damage.

>= 1.50 ng/ml   Indicative of Myocardial Injury.



 

                          14                        Negative results do not prec

lude influenza or RSV virus

infection and should not be used as the sole basis for

treatment or other patient management decisions.



 

                          15                        Negative results do not prec

lude influenza or RSV virus

infection and should not be used as the sole basis for

treatment or other patient management decisions.



 

                          16                        Negative results do not prec

lude influenza or RSV virus

infection and should not be used as the sole basis for

treatment or other patient management decisions.



 

                          17                        A false negative result may 

occur if a specimen is

improperly collected, transported or handled. False negative

results may also occur if inadequate numbers of organisms

are present in the specimen.  As with any molecular test,

mutations within the target regions of Xpert Xpress

SARS-CoV-2 could affect primer and/or probe binding

resulting in failure to detect the presence of virus.  This

test cannot rule out diseases caused by other bacterial or

viral pathogens.



DISCLAIMER:

Testing was performed using the [employer] SARS-CoV-2 test.

This test was developed and its performance characteristics

determined by [employer]. This test has not been FDA cleared or

approved. This test has been authorized by FDA under an

Emergency Use Authorization (EUA). This test is only

authorized for the duration of time the declaration that

circumstances exist justifying the authorization of the

emergency use of in vitro diagnostic tests for detection of

SARS-CoV-2 virus and/or diagnosis of COVID-19 infection

under section 564(b)(1) of the Act, 21 U.S.C.

                                        360bbb-3(b)(1), unless the authorization

 is terminated or

revoked sooner.



 

                          18                        THERAPUTIC HUMAN INR VALUES

INDICATIONS                      NORMAL RANGES

PROPHYLAXIS/TREATMENT OF:

VENOUS THROMBOSIS                2.0-3.0

PULMONARY EMBOLISM               2.0-3.0

PREVENTION OF SYSTEMIC EMBOLISM FROM:

TISSUE HEART VALVES              2.0-3.0

ACUTE MYOCARDIAL INFARCTION      2.0-3.0

VALVULAR HEART DISEASE           2.0-3.0

ATRIAL FIBRILLATION              2.0-3.0

MECHANICAL VALVES(HIGH RISK)     2.5-3.5

RECURRENT MYOCARDIAL INFARCTION  2.5-3.5



 

                          19                        Units are mL/min/1.73 m2



Chronic Kidney Disease Staging per NKF:



Stage I & II   GFR >=60       Normal to Mildly Decreased

Stage III      GFR 30-59      Moderately Decreased

Stage IV       GFR 15-29      Severely Decreased

Stage V        GFR <15        Very Little GFR Left

ESRD           GFR <15 on RRT



 

                          20                        100-125 mg/dL     PRE-DIABET

ES/FASTING

>126 mg/dL          DIABETES/FASTING



 

                          21                        NOTE:

CALCIUM,TSH VERIFIED







 

                          22                        CHRONIC KIDNEY DISEASE STAGI

NG PER NKF



STAGE I & II      GFR >= 60        NORMAL TO MILDLY DECREASED

STAGE III          GFR 30-59          MODERATELY DECREASED

STAGE IV           GFR 15-29         SEVERELY DECREASED

STAGE V            GFR <15            VERY LITTLE GFR LEFT

ESRD                 GFR <15            ON RRT









Procedures





                Date            Code            Description     Status

 

                10/06/2021      27115           Office/Outpatient Established Lo

w MDM 20-29 Min Completed

 

                2021      32494           Complex Chronic Care MGMT Servic

e Ea Addl 30 Min Completed

 

                2021      57633           Complex Chronic Care Management 

SVC 1St 60 Min Completed

 

                    2021          01900               Chronic Care MGMT 20

 Mins Clinical Staff Time Per Calendar 

Month                                   Completed

 

                2021      29924           Chronic Care Management Services

 Ea Addl 20 Min Completed

 

                2021      16994           Complex Chronic Care MGMT Servic

e Ea Addl 30 Min Completed

 

                2021      30957           Complex Chronic Care Management 

SVC 1St 60 Min Completed

 

                2021      11193           Complex Chronic Care MGMT Servic

e Ea Addl 30 Min Completed

 

                2021      80021           Complex Chronic Care Management 

SVC 1St 60 Min Completed

 

                2021      43505           Office/Outpatient Established Mo

d MDM 30-39 Min Completed

 

                2021      40542           Complex Chronic Care MGMT Servic

e Ea Addl 30 Min Completed

 

                2021      69908           Complex Chronic Care Management 

SVC 1St 60 Min Completed

 

                2021      [id number]       Bone Mineral Density Test Comple

veronika

 

                2021      [id number]        Mammogram       Completed







Medical Devices





                                        Description

 

                                        No Information Available







Encounters





           Type       Date       Location   Provider   Dx         Diagnosis

 

                Office Visit    10[facility]06/2021  [facility] 

[place]                        S81.802A                  Unspecified open wound, left

 lower leg, initial encounter

 

                          Z23                       Encounter for immunization

 

                Office Visit    2021  [facility]                      I48.20                    Chronic atrial fibrillation,

 unspecified

 

                          Z79.01                    Long term (current) use of a

nticoagulants

 

                          R60.9                     Edema, unspecified

 

                          I10                       Essential (primary) hyperten

darci

 

                          E05.90                    Thyrotoxicosis, unsp without

 thyrotoxic crisis or storm

 

                          I82.502                   Chronic embolism and thombos

 unsp deep veins of l low extrem

 

                          F17.210                   Nicotine dependence, cigaret

svitlana, uncomplicated

 

                          F32.89                    Other specified depressive e

pisodes

 

                          D47.2                     Monoclonal gammopathy

 

                          K21.9                     Gastro-esophageal reflux dis

ease without esophagitis

 

                          M19.90                    Unspecified osteoarthritis, 

unspecified site

 

                          E78.00                    Pure hypercholesterolemia, u

nspecified







Assessments





                Date            Code            Description     Provider

 

                2021      I48.20          Chronic atrial fibrillation, uns

pecified Charmaine Regalado M.D.

 

                2021      Z79.01          Long term (current) use of antic

oagulants Charmaine Regalado M.D.

 

                2021      I11.0           Hypertensive heart disease with 

heart failure Charmaine Regalado M.D.

 

                    2021          E05.00              Thyrotoxicosis with 

diffuse goiter without thyrotoxic crisis 

or storm                                Charmaine Regalado M.D.

 

                    2021          I82.502             Chronic embolism and

 thrombosis of unspecified deep veins of 

left lower extremity                    Charmaine Regalado M.D.

 

                2021      F32.89          Other specified depressive episo

emil Charmaine Regalado M.D.

 

                2021      D47.2           Monoclonal gammopathy Charmaine duron M.D.

 

                2021      K21.9           Gastro-esophageal reflux disease

 without esophagitis Charmaine Regalado M.D.

 

                2021      S81.802A        Unspecified open wound, left low

er leg, initial encounter 

Charmaine Regalado M.D.

 

                2021      G31.84          Mild cognitive impairment, so st

ated Charmaine Regalado M.D.

 

                10/06/2021      S81.802A        Unspecified open wound, left low

er leg, initial encounter 

AIXA Hoffmann JR

 

                10/06/2021      Z23             Encounter for immunization AIXA Joseph JR

 

                2021      I10             Essential (primary) hypertension

 Charmaine Regalado M.D.

 

                2021      K21.9           Gastro-esophageal reflux disease

 without esophagitis Charmaine Regalado M.D.

 

                2021      M15.9           Polyosteoarthritis, unspecified 

Charmaine Regalado M.D.

 

                2021      I10             Essential (primary) hypertension

 Charmaine Regalado M.D.

 

                2021      K21.9           Gastro-esophageal reflux disease

 without esophagitis Charmaine Regalado M.D.

 

                2021      E78.00          Pure hypercholesterolemia, unspe

cified Charmaine Regalado M.D.

 

                2021      I10             Essential (primary) hypertension

 Charmaine Regalado M.D.

 

                2021      K21.9           Gastro-esophageal reflux disease

 without esophagitis Charmaine Regalado M.D.

 

                2021      E78.00          Pure hypercholesterolemia, unspe

cified Charmaine Regalado M.D.

 

                2021      M15.9           Polyosteoarthritis, unspecified 

Charmaine Regalado M.D.

 

                2021      I10             Essential (primary) hypertension

 Charmaine Regalado M.D.

 

                2021      K21.9           Gastro-esophageal reflux disease

 without esophagitis Charmaine Regalado M.D.

 

                    2021          I82.502             Chronic embolism and

 thrombosis of unspecified deep veins of 

left lower extremity                    Charmaine Regalado M.D.

 

                2021      I48.20          Chronic atrial fibrillation, uns

pecminoo Regalado M.D.

 

                2021      Z79.01          Long term (current) use of antic

oagulants Charmaine Regalado M.D.

 

                2021      R60.9           Edema, unspecified Charmaine guido M.D.

 

                2021      I10             Essential (primary) hypertension

 Charmaine Regalado M.D.

 

                2021      E05.90          Thyrotoxicosis, unspecified with

out thyrotoxic crisis or sto 

Charmaine Regalado M.D.

 

                    2021          I82.502             Chronic embolism and

 thrombosis of unspecified deep veins of 

left lower extremity                    Charmaine Regalado M.D.

 

                2021      F17.210         Nicotine dependence, cigarettes,

 uncomplicated Charmaine Regalado M.D.

 

                2021      F32.89          Other specified depressive episo

emil Charmaine Regalado M.D.

 

                2021      D47.2           Monoclonal gammopathy Charmaine duron M.D.

 

                2021      K21.9           Gastro-esophageal reflux disease

 without esophagitis Charmaine Regalado M.D.

 

                2021      M19.90          Unspecified osteoarthritis, unsp

ecified site Charmaine Regalado M.D.

 

                2021      E78.00          Pure hypercholesterolemia, unspe

cisonia Regalado M.D.

 

                2021      I10             Essential (primary) hypertension

 Charmaine Regalado M.D.

 

                2021      K21.9           Gastro-esophageal reflux disease

 without esophagitis Charmaine Regalado M.D.

 

                2021      E78.00          Pure hypercholesterolemia, unspe

cified Charmaine Regalado M.D.







Plan of Treatment

Future Appointment(s):* 2021  3:15 pm - Charmaine Regalado M.D. at 
  [facility]





Functional Status





                                        Description

 

                                        No Information Available







Mental Status





                                        Description

 

                                        No Information Available







Referrals





                Refer to Dr     Reason for Referral Status          Appt Date

 

                Keli Baker CARDIOLOGY CONSULT FOR CHF,ATRIAL FIB  Created  

       

 

                                        [facility]

 

                                        [address] , [address]

 

                                        [place]  [address]

 

                                        [phone]

 

                                          

 

                          Stillerman,James MD       STAT CONSULT FOR OPEN WOUND 

LT LEG PLEASE LET OFFICE KNOW 

WHEN APPT IS MADE         Created                   

 

                                        [facility]

 

                                        [address] [place]  [address]

 

                                        [phone]

 

                                          

 

                Rock Werner MD CONSULT FOR PVD WITH OPEN WOUND LT LEG  Patient

 Declined 

10/20/2021

 

                                        [facility] [address] ,[address]

 

                                        [facility] [address] [phone]

## 2021-12-02 NOTE — CCD
Continuity of Care Document (CCD)

                             Created on: 2021



Juany Becerril

External Reference #: [id number]

: 1955

Sex: Female



Demographics





                          Address                   [address]

[place]  [address]

 

                          Home Phone                [phone]

 

                          Preferred Language        Unknown

 

                          Marital Status            Unknown

 

                          Alevism Affiliation     Unknown

 

                          Race                      White

 

                          Ethnic Group              Not  or 





Author





                          Author                    Juany Regalado M.D.

 

                          Organization              Unknown

 

                          Address                   [address] [facility] [address]

[place]  [address]



 

                          Phone                     [phone]







Care Team Providers





                    Care Team Member Name Role                Phone

 

                    Yuri Maier MD       AUTM                [phone]

 

                    [facility]  AUTM                [phone]

 

                    John Lutz MD AUTM                Unavailable

 

                    [facility] Hematology/Oncology AUTM                [phone]

 

                    Hansa Hernandez FNP  AUTM                [phone]







Problems





                    Active Problems     Provider            Date

 

                    Chronic atrial fibrillation Protime             Onset: 

 

                    Essential hypertension Juany Vincent FNP Onset: 2017

 

                    Deep venous thrombosis of lower extremity Juany Vincent FN P Onset: 2017

 

                    Pure hypercholesterolemia Juany Vincent FNP Onset: 

017

 

                    Anxiety state       Juany Vincent FNP Onset: 2017

 

                    Chronic pulmonary embolism Juany Vincent FNP Onset: 2017

 

                    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 

2017

 

                    Gastroesophageal reflux disease Juany Vincetn FNP Onset: 2017

 

                    Scoliosis deformity of spine Juany Vincent FNP Onset: 2017

 

                    Chronic tension-type headache Juany Vincent FNP Onset: 2017

 

                    Varicose veins of lower extremity Juany Vincent FNP Onset:

 2017

 

                    Tobacco user        Juany Vincent FNP Onset: 2017

 

                    Hypercalcemia       Juany Vincent FNP Onset: 2017







Social History





                Type            Date            Description     Comments

 

                Birth Sex                       Unknown          

 

                ETOH Use                        Consumes 3 glasses of wine per w

[place]  

 

                Tobacco Use     Start: Unknown  Patient is a current smoker, smo

kes every day STARTED

AGE 30 1-3 CIGARETTES A DAY 







Allergies and adverse reactions





             Active Allergies Criticality  Reaction | Severity Comments     Date

 

             Ibuprofen    Unable to assess criticality STOMACH UPSET HEADACHE mo

david       10/17/2017

 

             Latex        Unable to assess criticality rash, itches | Mild      

        10/17/2018







Medications





           Active Medications SIG        Qnty       Indications Ordering Provide

r Date

 

                          Cephalexin                     500mg Capsules         

          take one tablet 

by mouth 3 times daily x 7 days 21caps                          Julio kothari JR, PA 10/06/2021

 

                          Excedrin Migraine                     [phone] Tab

lets                   1 

as needed h/a as needed mdd=1                                 Charmaine Regalado M.D. 2021

 

                          Famotidine                     20mg Tablets           

        1 by mouth every 

day             90tabs                          Charmaine Regalado M.D. 20

21

 

                    Furosemide                     20mg Tablets                 

  1 by mouth qd       

180tabs                                 Charmaine Regalado M.D. 2021

 

                                        Bupropion Hydrochloride ER (XL)         

            150mg Tablets ER 24HR       

                take 1 tablet by mouth in the morning 90tabs                    

      Charmaine Regalado M.D.                                    2021

 

                          Eliquis                     5mg Tablets               

    take one tablet by 

mouth twice a day 180tabs                         Charmaine Regalado M.D. 2020

 

                          Methimazole                     5mg Tablets           

        2 every day by 

mouth           180tabs                         Charmaine Regalado M.D. 20

20

 

                          Shingrix                     50mcg/0.5ML Suspension Re

c                   

administer at pharmacy 1units                          Juany Vincent FNP 

 

                          Voltaren                     1% Gel                   

apply up to 4 grams three 

times a day to affected knee as needed 100gm                           Charmaine Regalado M.D. 

2018

 

                          Aspirin Adult Low Dose                     81mg Tablet

s DR                   1 

by mouth every day                                 Juany Vincent FNP 10/17/201

7

 

                                        Acetaminophen Extra Strength            

         500mg Tablets                  

             take 2 tabs every 6 hours as needed                           Juany Fung FNP 10/17/2017

 

                                        Womens 50+ Multi Vitamin & Mineral Formu

la                      Tablets         

             1 every day PO Vit O=1290Xh OQ=011                           Juany Vincent FNP 10/17/2017

 

                          Metoprolol Tartrate                     25mg Tablets  

                 Take One 

Tablet By Mouth Twice A Day 180tabs                         ABELARDO Saleem 10/17/2017

 

                          Atorvastatin Calcium                     20mg Tablets 

                  take one

tablet by mouth every day 90tabs                          JANE Saleem 10/17/2017

 

                          Flecainide Acetate                     100mg Tablets  

                 take one 

tablet by mouth twice a day 180tabs                         ABELARDO Saleem 10/17/2017

 

                          Digoxin                     250mcg Tablets            

       1 by mouth every 

day             90tabs                          Charmaine Regalado M.D. 







Medications Administered in Office





           Medication SIG        Qnty       Indications Ordering Provider Date

 

                          Administration Of Flu Vaccine                      Inj

ection                    

                                                AIXA Hoffmann JR 10/06/2

021

 

                          Administration Of Flu Vaccine                      Inj

diyaion                    

                                                Charmaine Regalado M.D. 10/15/20

20

 

                          Administration Of Flu Vaccine                      Inj

ection                    

                                                Juany Vincent FNP 10/17/2019

 

                          Administration Of Flu Vaccine                      Inj

ection                    

                                                Juany Vincent FNP 10/17/2018

 

                          Administration Of Flu Vaccine                      Inj

Juany Alonso FNP 10/17/2017







Immunizations





             CPT Code     Status       Date         Vaccine      Lot #

 

                [id number]           Given           10/06/2021      Influenza Vaccin

e Quadrivalent Preser/Antibiotic Free Im 

Use                                     [id number]

 

                64084           Given           10/15/2020      Influenza Vaccin

e Quadrivalent Preser/Antibiotic Free Im 

Use                                     [id number]

 

                17139           Given           2020      Shingrix Zoster 

Vaccine (HZV), Recombinant, Subunit, 

Adjuvanted                               

 

                07112           Given           10/17/2019      Influenza Vaccin

e Quadrivalent Preser/Antibiotic Free Im 

Use                                     [id number]

 

                14171           Given           10/17/2018      Influenza Virus 

Vaccine, Quadrivalent (Cciiv4), Derived 

From Cell                               [id number]

 

             84298        Given        2018   Pneumovax 23 [id number]

 

                11800           Given           10/17/2017      Influenza Vaccin

e Quadrivalent Preser/Antibiotic Free Im 

Use                                     [id number]







Vital Signs





                Date            Vital           Result          Comment

 

                10/19/2021  3:12pm BP Systolic     122 mmHg         

 

                    BP Diastolic        76 mmHg              

 

                    Heart Rate          86 /min              

 

                    Height              66 inches           5'6"

 

                    Weight              223.00 lb            

 

                    BMI (Body Mass Index) 36.0 kg/m2           

 

                10/06/2021  2:17pm BP Systolic     124 mmHg         

 

                    BP Diastolic        68 mmHg              

 

                    Heart Rate          86 /min              

 

                    Height              66 inches           5'6"

 

                    Weight              217.00 lb            

 

                    BMI (Body Mass Index) 35.0 kg/m2           







Results





        Test    Acquired Date Facility Test    Result  H/L     Range   Note

 

                    Influenza A/B RSV Covid Amp 10/25/2021          [address] [address] [phone] Influenza A Amplification NEGATIVE   Normal     Negati

ve   1

 

             Influenza B Amplification NEGATIVE     Normal       Negative     2

 

             RSV Amplification NEGATIVE     Normal       Negative     3

 

             Sars Covid-19 Amplification NEGATIVE     Normal       Negative     

4

 

                    CBC With Differential 10/25/2021          [address] [address] [phone] White Blood Count 6.3 10     Normal     4.0-10.0    

 

             Red Blood Count 4.12 10      Normal       4.00-5.40     

 

             Hemoglobin   12.9 g/dL    Normal       12.0-15.5     

 

             Hematocrit   41.3 %       Normal       36.0-47.0     

 

             Mean Corpuscular Volume 100.2 fl     High         80.0-96.0     

 

             Mean Corpuscular Hemoglobin 31.3 pg      Normal       27.0-33.0    

 

 

             Mean Corpuscular HGB Conc 31.2 g/dL    Low          32.0-36.5     

 

             Red Cell Distribution Width 14.8 %       High         11.5-14.5    

 

 

             Platelet Count, Automated 202 10       Normal       150-450       

 

             Neutrophils % 79.5 %       High         36.0-66.0     

 

             Lymph %      13.8 %       Low          24.0-44.0     

 

             Mono %       4.8 %        Normal       2.0-8.0       

 

             Eos %        1.1 %        Normal       0.0-3.0       

 

             Baso %       0.3 %        Normal       0.0-1.0       

 

             Immature Granulocyte % 0.5 %        Normal       0-3.0         

 

             Nucleated Red Blood Cell % 0.0 %        Normal       0-0           

 

             Neutrophils # 5.0 10       Normal       1.5-8.5       

 

             Lymph #      0.9 10       Low          1.5-5.0       

 

             Mono #       0.3 10       Normal       0.0-0.8       

 

             Eos #        0.1 10       Normal       0.0-0.5       

 

             Baso #       0.0 10       Normal       0.0-0.2       

 

                    Cardiac Marker Panel 10/25/2021          [facility]

enter

                                        [address] [place] [address] [phone] CPK Creatine Phosphokinase 26 U/L     Normal     26-19

2      

 

             CK-MB Value Mass 1.0 NG/ML    Normal       <3.6          

 

             MB/CK Relative Index 3.85         Normal       < Or =4      5

 

             Troponin I   0.02 NG/ML   Normal       < 0.10       6

 

                    Liver Profile       10/25/2021          [facility]

nter

                                        [address] [place] [address] [phone] Ast/Sgot   26 U/L     Normal     7-37        

 

             Alt/SGPT     28 U/L       Normal       12-78         

 

             Alkaline Phosphatase 105 U/L      Normal               

 

             Bilirubin,Total 0.8 mg/dL    Normal       0.2-1.0       

 

             Bilirubin,Direct 0.4 mg/dL    High         0.0-0.2       

 

             Total Protein 7.9 GM/DL    Normal       6.4-8.2       

 

             Albumin      2.8 GM/DL    Low          3.2-5.2       

 

             Albumin/Globulin Ratio 0.5          Low          1.2-2.2       

 

                    Basic Metabolic Profile 10/25/2021          [facility]

                                        [address] [place] [address] [phone] Glucose, Fasting 106 mg/dL  High             

 

             Blood Urea Nitrogen 18 mg/dL     Normal       7-18          

 

             Creatinine For GFR 0.94 mg/dL   Normal       0.55-1.30     

 

             Glomerular Filtration Rate > 60.0       Normal       >45          7

 

             Sodium Level 140 mEq/L    Normal       136-145       

 

             Potassium Serum 4.6 mEq/L    Normal       3.5-5.1       

 

             Chloride Level 111 mEq/L    High                 

 

             Carbon Dioxide Level 22 mEq/L     Normal       21-32         

 

             Anion Gap    7 mEq/L      Low          8-16          

 

             Calcium Level 10.7 mg/dL   High         8.8-10.2      

 

                    Laboratory test finding 10/25/2021          [facility]

                                        [address] [place] [address] [phone] NT-Pro BNP 5817 pg/mL High       <125       8

 

             Digoxin Level 0.5 NG/ML    Normal       0.5-2.0      9

 

             Thyroxine (T4) 10.1 g/dL  Normal       4.5-12.0     10

 

             Thyroid Stimulating Hormone 0.051 uIU/ML Low          0.358-3.740  

11

 

             Flecainide   <0.10 ug/mL  Low          0.20-1.00    12

 

                    CBC With Differential 2021          [facility]

                                        [address] [place] [address] [phone] White Blood Count 6.8 10     Normal     4.0-10.0    

 

             Red Blood Count 3.85 10      Low          4.00-5.40     

 

             Hemoglobin   12.7 g/dL    Normal       12.0-15.5     

 

             Hematocrit   39.4 %       Normal       36.0-47.0     

 

             Mean Corpuscular Volume 102.3 fl     High         80.0-96.0     

 

             Mean Corpuscular Hemoglobin 33.0 pg      Normal       27.0-33.0    

 

 

             Mean Corpuscular HGB Conc 32.2 g/dL    Normal       32.0-36.5     

 

             Red Cell Distribution Width 12.3 %       Normal       11.5-14.5    

 

 

             Platelet Count, Automated 187 10       Normal       150-450       

 

             Neutrophils % 72.2 %       High         36.0-66.0     

 

             Lymph %      19.9 %       Low          24.0-44.0     

 

             Mono %       6.0 %        Normal       2.0-8.0       

 

             Eos %        1.5 %        Normal       0.0-3.0       

 

             Baso %       0.3 %        Normal       0.0-1.0       

 

             Immature Granulocyte % 0.1 %        Normal       0-3.0         

 

             Nucleated Red Blood Cell % 0.0 %        Normal       0-0           

 

             Neutrophils # 4.9 10       Normal       1.5-8.5       

 

             Lymph #      1.4 10       Low          1.5-5.0       

 

             Mono #       0.4 10       Normal       0.0-0.8       

 

             Eos #        0.1 10       Normal       0.0-0.5       

 

             Baso #       0.0 10       Normal       0.0-0.2       

 

                    PT & Aptt           2021          [facility]

nter

                                        [address] [place] [address] [phone] Prothrombin Time 13.8 seconds Normal     12.5-14.3   

 

             Inr          1.04         Normal                    13

 

             Partial Thromboplastin Time 30.6 seconds Normal       24.2-38.5    

 

 

                    CBC With Differential 2021          [facility]

                                        [address] [place] [address] [phone] White Blood Count 6.2 10     Normal     4.0-10.0    

 

             Red Blood Count 3.76 10      Low          4.00-5.40     

 

             Hemoglobin   12.5 g/dL    Normal       12.0-15.5     

 

             Hematocrit   39.0 %       Normal       36.0-47.0     

 

             Mean Corpuscular Volume 103.7 fl     High         80.0-96.0     

 

             Mean Corpuscular Hemoglobin 33.2 pg      High         27.0-33.0    

 

 

             Mean Corpuscular HGB Conc 32.1 g/dL    Normal       32.0-36.5     

 

             Red Cell Distribution Width 12.9 %       Normal       11.5-14.5    

 

 

             Platelet Count, Automated 199 10       Normal       150-450       

 

             Neutrophils % 74.5 %       High         36.0-66.0     

 

             Lymph %      16.4 %       Low          24.0-44.0     

 

             Mono %       7.0 %        Normal       2.0-8.0       

 

             Eos %        1.1 %        Normal       0.0-3.0       

 

             Baso %       0.5 %        Normal       0.0-1.0       

 

             Immature Granulocyte % 0.5 %        Normal       0-3.0         

 

             Nucleated Red Blood Cell % 0.0 %        Normal       0-0           

 

             Neutrophils # 4.6 10       Normal       1.5-8.5       

 

             Lymph #      1.0 10       Low          1.5-5.0       

 

             Mono #       0.4 10       Normal       0.0-0.8       

 

             Eos #        0.1 10       Normal       0.0-0.5       

 

             Baso #       0.0 10       Normal       0.0-0.2       

 

                    Comprehensive Metabolic Profil 2021          [place] [phone] Glucose, Fasting 98 mg/dL   Normal           

 

             Blood Urea Nitrogen 28 mg/dL     High         7-18          

 

             Creatinine For GFR 0.90 mg/dL   Normal       0.55-1.30     

 

             Glomerular Filtration Rate > 60.0       Normal       >45          1

4

 

             Sodium Level 137 mEq/L    Normal       136-145       

 

             Potassium Serum 4.7 mEq/L    Normal       3.5-5.1       

 

             Chloride Level 107 mEq/L    Normal               

 

             Carbon Dioxide Level 27 mEq/L     Normal       21-32         

 

             Anion Gap    3 mEq/L      Low          8-16          

 

             Calcium Level 10.2 mg/dL   Normal       8.8-10.2      

 

             Ast/Sgot     23 U/L       Normal       7-37          

 

             Alt/SGPT     19 U/L       Normal       12-78         

 

             Alkaline Phosphatase 77 U/L       Normal               

 

             Bilirubin,Total 0.5 mg/dL    Normal       0.2-1.0       

 

             Total Protein 8.3 GM/DL    High         6.4-8.2       

 

             Albumin      3.0 GM/DL    Low          3.2-5.2       

 

             Albumin/Globulin Ratio 0.6          Low          1.2-2.2       

 

                    Complete Blood Count 2021          [facility]

s, pc

: Dr Simon Hoyt

[place] [id number] [phone] WBC        5.9 x10*3/UL            4.1 - 10.9  

 

             RBC          4.11 x10*6/UL Low          4.20 - 6.30   

 

             Hemoglobin   13.6 g/dL                 12.0 - 18.0   

 

             Hematocrit   40.0 %                    37.0 - 51.0   

 

             MCV          97.4 fL      High         80.0 - 97.0   

 

             MCH          33.1 pg      High         26.0 - 32.0   

 

             MCHC         34.0 g/dL                 31.0 - 38.0   

 

             RDW          13.2 %                    11.6 - 13.7   

 

             PLT          209 x10*3/UL              140 - 440     

 

             MPV          8.1 FL                    7.8 - 11.0    

 

             Lymph %      25.6 %                    10.0 - 58.5   

 

             Mid %        7.5 %                     1.7 - 9.3     

 

             Neut %       66.9 %                    37.0 - 92.0   

 

             Lymph #      1.5 x10*3/UL              0.6 - 4.1     

 

             Mid #        0.5 x10*3/UL              0.1 - 0.6     

 

             Neut #       3.9 x10*3/UL              2.0 - 7.8     

 

                    Laboratory test finding 2021          [facility]

[facility], [facility]

: Dr Simon Hoyt

[place] [address] [phone] Sed Rate   25 mm/hr   High       0 - 15      

 

                    Basic Metabolic Panel 2021          [facility]

ts, pc

: Dr Simon Hoyt

[place] [address] [phone] Glucose    100 mg/dL  High       74 - 99    15

 

             BUN          18 mg/dL                  7 - 18        

 

             Creatinine   0.8 mg/dL                 0.6 - 1.3     

 

             Sodium       141 mEq/L                 136 - 145     

 

             Potassium    3.8 mEq/L                 3.5 - 5.1     

 

             Chloride     104 mEq/L                 98 - 107      

 

             Carbon Dioxide 29 mEq/L                  21 - 32       

 

             Calcium      10.8 mg/dL   High         8.5 - 10.1   16

 

             GFR  >= 60 mL/min              >60           

 

             GFR African American >= 60 mL/min              >60          17

 

                    Laboratory test finding 2021          [facility]

pia meza

: Dr Simon Hoyt

[place] [address] [phone] Thyroid Stimulating Hormone 0.19 uIU/mL Low        0.3

6 - 3.74  







                          1                         Negative results do not prec

lude influenza or RSV virus

infection and should not be used as the sole basis for

treatment or other patient management decisions.



 

                          2                         Negative results do not prec

lude influenza or RSV virus

infection and should not be used as the sole basis for

treatment or other patient management decisions.



 

                          3                         Negative results do not prec

lude influenza or RSV virus

infection and should not be used as the sole basis for

treatment or other patient management decisions.



 

                          4                         A false negative result may 

occur if a specimen is

improperly collected, transported or handled. False negative

results may also occur if inadequate numbers of organisms

are present in the specimen.  As with any molecular test,

mutations within the target regions of X[employer] Xpress

SARS-CoV-2 could affect primer and/or probe binding

resulting in failure to detect the presence of virus.  This

test cannot rule out diseases caused by other bacterial or

viral pathogens.



DISCLAIMER:

Testing was performed using the link bird SARS-CoV-2 test.

This test was developed and its performance characteristics

determined by [employer]. This test has not been FDA cleared or

approved. This test has been authorized by FDA under an

Emergency Use Authorization (EUA). This test is only

authorized for the duration of time the declaration that

circumstances exist justifying the authorization of the

emergency use of in vitro diagnostic tests for detection of

SARS-CoV-2 virus and/or diagnosis of COVID-19 infection

under section 564(b)(1) of the Act, 21 U.S.C.

                                        360bbb-3(b)(1), unless the authorization

 is terminated or

revoked sooner.



 

                          5                         DIAGNOSIS CRITERIA

MMB ng/ml       Relative Index (RI)

NON-AMI               < or = 5               N/A

GRAY ZONE              > 5                < or = 4

AMI                    > 5                   > 4



 

                          6                         Troponin I Reference Interva

l for [employer] LOCI:



                                        99th Percentile= 0.00-0.045 ng/ml



Risk Stratification:

<= 0.10 ng/ml   Decreased Risk for Adverse Clinical

Events.

                                        0.10-1.50 ng/ml   Increased Risk for Adv

erse Clinical

Events. Evaluation of additional

criterion and/or repeat testing in 2-6

hours is suggested to rule out myocardial

damage.

>= 1.50 ng/ml   Indicative of Myocardial Injury.



 

                          7                         Units are mL/min/1.73 m2



Chronic Kidney Disease Staging per NKF:



Stage I & II   GFR >=60       Normal to Mildly Decreased

Stage III      GFR 30-59      Moderately Decreased

Stage IV       GFR 15-29      Severely Decreased

Stage V        GFR <15        Very Little GFR Left

ESRD           GFR <15 on RRT



 

                          8                         note:<nlbl:demographic_[facility]

ed>



 

                          9                         note:<nlbl:demographic_[facility]

ed>



 

                          10                        note:<nlbl:demographic_[facility]

ed>



 

                          11                        note:<nlbl:demographic_[facility]

ed>



 

                          12                        This test was developed and 

its performance characteristics

determined by [employer]. It has not been cleared or

approved by the Food and Drug Administration.

Detection Limit = 0.10

Performed at:  [facility]  [id number]

: Barak Archibald MD, Phone:  [phone]



 

                          13                        THERAPUTIC HUMAN INR VALUES

INDICATIONS                      NORMAL RANGES

PROPHYLAXIS/TREATMENT OF:

VENOUS THROMBOSIS                2.0-3.0

PULMONARY EMBOLISM               2.0-3.0

PREVENTION OF SYSTEMIC EMBOLISM FROM:

TISSUE HEART VALVES              2.0-3.0

ACUTE MYOCARDIAL INFARCTION      2.0-3.0

VALVULAR HEART DISEASE           2.0-3.0

ATRIAL FIBRILLATION              2.0-3.0

MECHANICAL VALVES(HIGH RISK)     2.5-3.5

RECURRENT MYOCARDIAL INFARCTION  2.5-3.5



 

                          14                        Units are mL/min/1.73 m2



Chronic Kidney Disease Staging per NKF:



Stage I & II   GFR >=60       Normal to Mildly Decreased

Stage III      GFR 30-59      Moderately Decreased

Stage IV       GFR 15-29      Severely Decreased

Stage V        GFR <15        Very Little GFR Left

ESRD           GFR <15 on RRT



 

                          15                        100-125 mg/dL     PRE-DIABET

ES/FASTING

>126 mg/dL          DIABETES/FASTING



 

                          16                        NOTE:

CALCIUM,TSH VERIFIED







 

                          17                        CHRONIC KIDNEY DISEASE STAGI

NG PER NKF



STAGE I & II      GFR >= 60        NORMAL TO MILDLY DECREASED

STAGE III          GFR 30-59          MODERATELY DECREASED

STAGE IV           GFR 15-29         SEVERELY DECREASED

STAGE V            GFR <15            VERY LITTLE GFR LEFT

ESRD                 GFR <15            ON RRT









Procedures





                Date            Code            Description     Status

 

                10/06/2021      65219           Office/Outpatient Established Lo

w MDM 20-29 Min Completed

 

                2021      51306           Complex Chronic Care MGMT Servic

e Ea Addl 30 Min Completed

 

                2021      87195           Complex Chronic Care Management 

SVC 1St 60 Min Completed

 

                    2021          82542               Chronic Care MGMT 20

 Mins Clinical Staff Time Per Calendar 

Month                                   Completed

 

                2021      09411           Chronic Care Management Services

 Ea Addl 20 Min Completed

 

                2021      53778           Complex Chronic Care MGMT Servic

e Ea Addl 30 Min Completed

 

                2021      15004           Complex Chronic Care Management 

SVC 1St 60 Min Completed

 

                2021      90578           Complex Chronic Care MGMT Servic

e Ea Addl 30 Min Completed

 

                2021      63704           Complex Chronic Care Management 

SVC 1St 60 Min Completed

 

                2021      19882           Office/Outpatient Established Mo

d MDM 30-39 Min Completed

 

                2021      93646           Complex Chronic Care MGMT Servic

e Ea Addl 30 Min Completed

 

                2021      91286           Complex Chronic Care Management 

SVC 1St 60 Min Completed

 

                2021      [id number]       Bone Mineral Density Test Comple

veronika

 

                2021      [id number]        Mammogram       Completed







Medical Devices





                                        Description

 

                                        No Information Available







Encounters





           Type       Date       Location   Provider   Dx         Diagnosis

 

                Office Visit    10/06/2021  2:20p [facility] [place] 

[place]                        S81.802A                  Unspecified open wound, left

 lower leg, initial encounter

 

                          Z23                       Encounter for immunization

 

                Office Visit    2021  1:30p [facility] Regalado M.D.                      I48.20                    Chronic atrial fibrillation,

 unspecified

 

                          Z79.01                    Long term (current) use of a

nticoagulants

 

                          R60.9                     Edema, unspecified

 

                          I10                       Essential (primary) hyperten

darci

 

                          E05.90                    Thyrotoxicosis, unsp without

 thyrotoxic crisis or storm

 

                          I82.502                   Chronic embolism and thombos

 unsp deep veins of l low extrem

 

                          F17.210                   Nicotine dependence, cigaret

svitlana, uncomplicated

 

                          F32.89                    Other specified depressive e

pisodes

 

                          D47.2                     Monoclonal gammopathy

 

                          K21.9                     Gastro-esophageal reflux dis

ease without esophagitis

 

                          M19.90                    Unspecified osteoarthritis, 

unspecified site

 

                          E78.00                    Pure hypercholesterolemia, u

nspecified







Assessments





                Date            Code            Description     Provider

 

                10/06/2021      S81.802A        Unspecified open wound, left low

er leg, initial encounter 

AIXA Hoffmann JR

 

                10/06/2021      Z23             Encounter for immunization [place]

AIXA Estrella JR

 

                2021      I10             Essential (primary) hypertension

 Charmaine Regalado M.D.

 

                2021      K21.9           Gastro-esophageal reflux disease

 without esophagitis Charmaine Regalado M.D.

 

                2021      M15.9           Polyosteoarthritis, unspecified 

Charmaine Regalado M.D.

 

                2021      I10             Essential (primary) hypertension

 Charmaine Regalado M.D.

 

                2021      K21.9           Gastro-esophageal reflux disease

 without esophagitis Charmaine Regalado M.D.

 

                2021      E78.00          Pure hypercholesterolemia, unspe

cified Charmaine Regalado M.D.

 

                2021      I10             Essential (primary) hypertension

 Charmaine Regalado M.D.

 

                2021      K21.9           Gastro-esophageal reflux disease

 without esophagitis Charmaine Regalado M.D.

 

                2021      E78.00          Pure hypercholesterolemia, unspe

cisonia Regalado M.D.

 

                2021      M15.9           Polyosteoarthritis, unspecified 

Charmaine Regalado M.D.

 

                2021      I10             Essential (primary) hypertension

 Charmaine Regalado M.D.

 

                2021      K21.9           Gastro-esophageal reflux disease

 without esophagitis Charmaine Regalado M.D.

 

                    2021          I82.502             Chronic embolism and

 thrombosis of unspecified deep veins of 

left lower extremity                    Charmaine Regalado M.D.

 

                2021      I48.20          Chronic atrial fibrillation, uns

pecminoo Regalado M.D.

 

                2021      Z79.01          Long term (current) use of antic

oagulants Charmaine Regalado M.D.

 

                2021      R60.9           Edema, unspecified Charmaine guido M.D.

 

                2021      I10             Essential (primary) hypertension

 Charmaine Regalado M.D.

 

                2021      E05.90          Thyrotoxicosis, unspecified with

out thyrotoxic crisis or sto 

Charmaine Regalado M.D.

 

                    2021          I82.502             Chronic embolism and

 thrombosis of unspecified deep veins of 

left lower extremity                    Charmaine Regalado M.D.

 

                2021      F17.210         Nicotine dependence, cigarettes,

 uncomplicated Charmaine Regalado M.D.

 

                2021      F32.89          Other specified depressive episo

emil Charmaine Regalado M.D.

 

                2021      D47.2           Monoclonal gammopathy Charmaine duron M.D.

 

                2021      K21.9           Gastro-esophageal reflux disease

 without esophagitis Charmaine Regalado M.D.

 

                2021      M19.90          Unspecified osteoarthritis, unsp

ecified site Charmaine Regalado M.D.

 

                2021      E78.00          Pure hypercholesterolemia, unspe

cified Charmaine Regalado M.D.

 

                2021      I10             Essential (primary) hypertension

 Charmaine Regalado M.D.

 

                2021      K21.9           Gastro-esophageal reflux disease

 without esophagitis Charmaine Regalado M.D.

 

                2021      E78.00          Pure hypercholesterolemia, unspe

cified Charmaine Regalado M.D.







Plan of Treatment

Future Appointment(s):* 11/15/2021  3:30 pm - Charmaine Regalado M.D. at 
  [facility]

* 2021  3:15 pm - Charmaine Regalado M.D. at [facility]





Functional Status





                                        Description

 

                                        No Information Available







Mental Status





                                        Description

 

                                        No Information Available







Referrals





                Refer to      Reason for Referral Status          Appt Date

 

                          Stillerman,James MD       STAT CONSULT FOR OPEN WOUND 

LT LEG PLEASE LET OFFICE KNOW 

WHEN APPT IS MADE         Created                   

 

                                        [facility]

 

                                        [address] [place]  [id number] [phone]

 

                                          

 

                Rock Werner MD CONSULT FOR PVD WITH OPEN WOUND LT LEG  Patient

 Declined 

10/20/2021

 

                                        [facility]

 

                                        [address] ,[address]

 

                                        [place] [id number] [phone]

## 2021-12-02 NOTE — CCD
Continuity of Care Document (CCD)

                             Created on: 2021



Juany Becerril

External Reference #: [id number]

: 1955

Sex: Female



Demographics





                          Address                   [address] [place] [address]

 

                          Home Phone                [phone]

 

                          Preferred Language        Unknown

 

                          Marital Status            

 

                          Zoroastrianism Affiliation     Unknown

 

                          Race                      White

 

                          Ethnic Group              Not  or 





Author





                          Author                    Juany CACERES PA

 

                          Organization              Unknown

 

                          Address                   [address] [place] [address], [address]

[place]  [address]



 

                          Phone                     [phone]







Care Team Providers





                    Care Team Member Name Role                Phone

 

                    Charmaine Regalado MD  AUTM                [phone]

 

                    Lupe Arredondo MD AUTM                [phone]







Problems





                    Active Problems     Provider            Date

 

                    Precordial pain     Jermaine Chahal MD  Onset: 2019

 

                    Dyspnea             Jermaine Chahal MD  Onset: 2019

 

                    Palpitations        Jermaine Chahal MD  Onset: 2019

 

                    Paroxysmal atrial fibrillation Jermaine Chahal MD  Onset: 

 

                    Electrocardiogram abnormal Jermaine Chahal MD  Onset: 2019

 

                    Cardiomegaly        Jermaine Chahal MD  Onset: 2019

 

                    Right bundle branch block Jermaine Chahal MD  Onset: 

019

 

                    Dietary management surveillance Jermaine Chahal MD  Onset: 1

2019

 

                    Obstructive sleep apnea syndrome Jermaine Chahal MD  Onset: 

2019

 

                    Syncope and collapse Jermaine Chahal MD  Onset: 2019

 

                    Chronic diastolic heart failure Jermaine Chahal MD  Onset: 0

3/09/2020

 

                    Chronic pulmonary heart disease Jermaine Chahal MD  Onset: 0

3/09/2020

 

                          Benign hypertensive heart disease with congestive card

iac failure Jermaine Chahal MD                              Onset: 2020

 

                    Mitral valve disorder Jermaine Chahal MD  Onset: 2020

 

                    Chronic cor pulmonale AIXA Dennis Onset: 20

21

 

                    Mixed hyperlipidemia AIXA Dennis Onset: 

1

 

                    Deep venous thrombosis of lower extremity AIXA Dennis Onset: 

2021

 

                    Carotid artery occlusion AIXA Dennis Onset: 







Social History





                Type            Date            Description     Comments

 

                Birth Sex                       Unknown          

 

                ETOH Use                        Occasionally consumes wine  

 

                Tobacco Use     Start: Unknown  Patient is a current smoker, smo

kes every day Started

smoking at age 27, smokes 3 cigarettes a day 

 

                Smoking Status  Reviewed: 21 Patient is a current smoker, 

smokes every day 

Started smoking at age 27, smokes 3 cigarettes a day 

 

                Exercise Type/Frequency                 Walks daily     in good 

weather  

 

                Exercise Type/Frequency                 Does housework daily  

 

                Exercise Limitations                 Back Pain        

 

                Exercise Limitations                 Orthopedic Problem Knee gary

n 

 

                Exercise Limitations                 Claudication     







Allergies, Adverse Reactions, Alerts





             Active Allergies Criticality  Reaction | Severity Comments     Date

 

             Motrin       Unable to assess criticality              GI Upset    

 2019







Medications





           Active Medications SIG        Qnty       Indications Ordering Provide

r Date

 

                          Eliquis                     5mg Tablets               

    1 by mouth twice a day

                                                Charmaine Regalado MD 2021

 

                    Digox                     250mcg Tablets                   1

 by mouth every day 

90tabs                                  Jermaine Chahal MD  2020

 

                          Famotidine                     40mg Tablets           

        1 tablet daily at 

bedtime         30tabs          R07.2           Jermaine Chahal MD 2019

 

                          Acetaminophen                     500mg Tablets       

            1-2 by mouth 

every 6 hours as needed                                 Unknown         20

19

 

                          Atorvastatin Calcium                     20mg Tablets 

                  1 by 

mouth every night at bedtime                                 Unknown         2019

 

                One Daily                      Tablets                   1 by mo

uth every day                 

                          Unknown                   2019

 

                          Methimazole                     10mg Tablets          

         1 by mouth every 

morning                                         Unknown         2019

 

                          Vitamin E Blend                     400Unit Capsules  

                 1 by 

mouth every day                                 Unknown         2019

 

                          Flecainide Acetate                     100mg Tablets  

                 take one 

tablet by mouth twice a day 180tabs                         Jermaine Chahal MD 1

2019

 

                          Aspirin 81                     81mg Tablets DR        

           1 by mouth 

every day                                       Unknown         2019

 

                          Metoprolol Tartrate                     25mg Tablets  

                 1 by 

mouth twice a day                                 Unknown         2019

 

                          Mucus Relief DM                     20-400mg Tablets  

                 1 tab po 

at bedtime                                      Unknown         2019

 

                          Furosemide                     20mg Tablets           

        1 by mouth once a 

day                                             Unknown         







Immunizations





                                        Description

 

                                        No Information Available







Vital Signs





                Date            Vital           Result          Comment

 

                2021  1:02pm Weight          220.00 lb        

 

                    Height              65 inches           5'5"

 

                    BMI (Body Mass Index) 36.6 kg/m2           

 

                    Heart Rate          65 /min              

 

                    BP Systolic Sitting 136 mmHg            Ra, large cuff

 

                    BP Diastolic Sitting 84 mmHg             Ra, large cuff

 

                2020 12:43pm Weight          229.00 lb        

 

                    Height              65 inches           5'5"

 

                    BMI (Body Mass Index) 38.1 kg/m2           

 

                    Heart Rate          67 /min              

 

                    BP Systolic Sitting 126 mmHg            large cuff, Ra

 

                    BP Diastolic Sitting 74 mmHg             large cuff, Ra







Results





        Test    Acquired Date Facility Test    Result  H/L     Range   Note

 

                    CBC without Differential 2021          [facility] - not inter

faced

           (315)-   - White Blood Count 6.8                   5.0-10.0    

 

             Red Blood Count 3.85         Low          4.00-5.40     

 

             Platelets    187                       172-450       

 

             Hemoglobin   12.7                                    

 

             Hematocrit   39.4                                    

 

                    CBC without Differential 2021          [facility] - not inter

faced

           (315)-   - White Blood Count 6.2                   5.0-10.0    

 

             Red Blood Count 3.76         Low          4.00-5.40     

 

             Platelets    199                       172-450       

 

             Hemoglobin   12.5                                    

 

             Hematocrit   39.0                                    

 

                    CMP                 2021          [facility] - not interfaced

           (315)-   - Albumin Serum/Plasma 3.0                               

 

             Alt - SGPT   19                                      

 

             Calcium Ser/Plasma Mass/Vol 10.2                                   

 

 

             Carbon Dioxide Ser/Plasm 27                                      

 

             Chloride Serum/Plasma 107                                     

 

             Alkaline Phosphatase 77                                      

 

             Potassium    4.7                                     

 

             Protein Total 8.3                                     

 

             Sodium       137                                     

 

             Ast - Sgot   23                                      

 

             BUN - Urea Nitrogen 28                                      

 

             Glucose      98                                

 

             Creatinine For GFR 0.90                                    

 

                    Complete Blood Count 2021          [facility]

rt

             WBC          8.4 x10*3/UL              4.1-10.9      

 

             RBC          4.20 x10*6/UL              4.20-6.30     

 

             Hemoglobin   14.0 g/dL                 12.0-18.0     

 

             Hematocrit   40.3 %                    37.0-51.0     

 

             MCV          95.8 fL                   80.0-97.0     

 

             MCH          33.3 pg      High         26.0-32.0     

 

             MCHC         34.7 g/dL                 31.0-38.0     

 

             RDW          13.3 %                    11.6-13.7     

 

             PLT          207 x10*3/UL              140-440       

 

             MPV          8.0 FL                    7.8-11.0      

 

             Lymph %      17.5 %                    10.0-58.5     

 

             Mid %        4.8 %                     1.7-9.3       

 

             Neut %       77.7 %                    37.0-92.0     

 

             Lymph #      1.4 x10*3/UL              0.6-4.1       

 

             Mid #        0.5 x10*3/UL              0.1-0.6       

 

             Neut #       6.5 x10*3/UL              2.0-7.8       

 

                    Basic Metabolic Panel 2021          [facility]

ort

             Glucose      70 mg/dL     Low          74-99        1

 

             BUN          23 mg/dL     High         7-18          

 

             Creatinine   0.9 mg/dL                 0.6-1.3       

 

             Sodium       144 mEq/L                 136-145       

 

             Potassium    3.8 mEq/L                 3.5-5.1       

 

             Chloride     104 mEq/L                         

 

             Carbon Dioxide 32 mEq/L                  21-32         

 

             Calcium      10.1 mg/dL                8.5-10.1      

 

             GFR  >= 60 mL/min                             

 

             GFR African American >= 60 mL/min                            2

 

                    Lipid Profile       2021          [facility]

t

             Cholesterol  132 mg/dL                 131-200       

 

             Triglycerides 72 mg/dL                          

 

             HDL Cholesterol 54 mg/dL                  35-60         

 

             LDL (Calculated) 64 CALC                           

 

                    Laboratory test finding 2021          [facility] I

mport

             Thyroid Stimulating Hormone 0.32 uIU/mL  Low          0.36-3.74    

 

 

             Digoxin Level 0.3 ng/mL    Low          0.5-2.0      3







                          1                         100-125 mg/dL     PRE-DIABET

ES/FASTING

>126 mg/dL          DIABETES/FASTING





 

                          2                         CHRONIC KIDNEY DISEASE STAGI

NG PER NKF



STAGE I & II      GFR >= 60        NORMAL TO MILDLY DECREASED

STAGE III          GFR 30-59          MODERATELY DECREASED

STAGE IV           GFR 15-29         SEVERELY DECREASED

STAGE V            GFR <15            VERY LITTLE GFR LEFT

ESRD                 GFR <15            ON RRT





 

                          3                         note:<nlbl:demographic_chang

ed>











Procedures





                Date            Code            Description     Status

 

                2021      82083           Office/Outpatient Established Mo

d MDM 30-39 Min Completed

 

                2021      77904           ECG 12-Lead     Completed

 

                2021      62520           Office/Outpatient Established Mo

d MDM 30-39 Min Completed

 

                2021      37102           ECG 12-Lead     Completed







Medical Devices





                                        Description

 

                                        No Information Available







Encounters





                                        Description

 

                                        No Information Available







Assessments





                Date            Code            Description     Provider

 

                2021      I48.0           Paroxysmal atrial fibrillation C

AIXA Pablo

 

                2021      I50.32          Chronic diastolic (congestive) h

eart failure AIXA Dennis

 

                2021      I11.0           Hypertensive heart disease with 

heart failure Hortencia Pruett, PA

 

                2021      I27.81          Cor pulmonale (chronic) Isabelr

kang Pruett, PA

 

                2021      I65.29          Occlusion and stenosis of unspec

ified carotid artery Hortencia Pruett, PA

 

                2021      I34.0           Nonrheumatic mitral (valve) insu

fficiency Hortencia Pruett, 

PA

 

                2021      E78.2           Mixed hyperlipidemia Hortencia Pruett, PA

 

                    2021          I82.503             Chronic embolism and

 thrombosis of unspecified deep veins of 

lower extremity, bilateral              Hortencia Pruett, PA

 

                2021      R94.31          Abnormal electrocardiogram [ECG]

 [EKG] Hortencia Pruett, PA

 

                2021      Z71.3           Dietary counseling and surveilla

nce Hortencia Pruett PA

 

                2021      I48.0           Paroxysmal atrial fibrillation C

rick Pruett, PA

 

                2021      I50.32          Chronic diastolic (congestive) h

eart failure Hortencia Pruett, PA

 

                2021      I11.0           Hypertensive heart disease with 

heart failure Hortencia Pruett, PA

 

                2021      I27.81          Cor pulmonale (chronic) Miguel Pruett, PA

 

                2021      I65.29          Occlusion and stenosis of unspec

ified carotid artery Hortencia Pruett, PA

 

                2021      I34.0           Nonrheumatic mitral (valve) insu

fficiency Hortencia Pruett, 

PA

 

                2021      E78.2           Mixed hyperlipidemia Hortencia Pruett, PA

 

                    2021          I82.503             Chronic embolism and

 thrombosis of unspecified deep veins of 

lower extremity, bilateral              Hortencia Pruett, PA

 

                2021      R94.31          Abnormal electrocardiogram [ECG]

 [EKG] Hortencia Pruett, PA

 

                2021      Z71.3           Dietary counseling and surveilla

AIXA Cervantes







Plan of Treatment

2021 - AIXA Dennis* I48.0 Paroxysmal atrial fibrillation* 
  Recommendations:* Continue Eliquis, digoxin, flecainide, and metoprolol at the
   current dosages Patient agreeable to contact us with more frequent episodes 
  of tachycardia or palpitations, discussed Holter monitor therapy with patient 
  at this time she would like to refrain further evaluation





* I50.32 Chronic diastolic (congestive) heart failure* Recommendations:* 
  Continue digoxin, furosemide, and metoprolol at the current dosages Please 
  alert our office with a weight gain of more than 3 pounds, onset of shortness 
  of breath, or lower extremity edema





* I11.0 Hypertensive heart disease with heart failure* Recommendations:* 
  Continue metoprolol at the current dosage Advised patient to monitor blood 
  pressures at home and to alert our office with readings >140/>90





* I27.81 Cor pulmonale (chronic)* Recommendations:* No medication changes made 
  today Patient agreeable to contact us with the onset of any shortness of 
  breath or edema





* I65.29 Occlusion and stenosis of unspecified carotid artery* Recommendations:
  * Carotid ultrasound ordered for further evaluation Advised patient to seek 
  emergency medical attention if she develops any further lateralizing 
  neurologic symptoms





* I34.0 Nonrheumatic mitral (valve) insufficiency* Recommendations:* No further 
  evaluation is needed at this time





* E78.2 Mixed hyperlipidemia* Recommendations:* Please obtain fasting labs 
  Continue atorvastatin at the current dosage





* I82.503 Chronic embolism and thrombosis of unspecified deep veins of lower 
  extremity, bilateral* Referral:* Lupe Arredondo MD, Hematology & 
  Oncology/y



* Recommendations:* Referral made to Dr. Arredondo for further evaluation of 
  patient's probable coagulopathy Please obtain labs, to patient's knowledge she
   has never been assessed for protein C or protein S deficiency or for factor V
   Leiden mutation Advised patient to seek emergency medical attention if she 
  does believe she is having another DVT  or if she develops symptoms of 
  shortness of breath, hemoptysis, or chest pain





* R94.31 Abnormal electrocardiogram [ECG] [EKG]* Recommendations:* No further 
  evaluation is needed at this time.





* Z71.3 Dietary counseling and surveillance* Recommendations:* Recommended for 
  patient to follow a more whole food diet. Advised patient to avoid overly 
  processed foods and packaged foods. Advised patient to avoid sodas, juices and
   other liquid calories. Recommended at least 30 minutes of exercise 3 days a 
  week.





* All * Follow up:* Follow up in 6 months









Functional Status





                Functional Condition Comment         Date            Status

 

                Independent with all ADL's                                 Activ

e

 

                Requires assistance with ambulating                             

    Active







Mental Status





                                        Description

 

                                        No Information Available







Referrals





                                        Description

 

                                        No Information Available

## 2021-12-02 NOTE — CCD
Summarization of Episode Note

                             Created on: 2021



Miss. JOSEPH BENTLEY

External Reference #: [id number]

: 1955

Sex: Female



Demographics





                          Address                   [address]

[place]  [address]

 

                          Home Phone                [phone]

 

                          Preferred Language        Unknown

 

                          Marital Status            Unknown

 

                          Zoroastrian Affiliation     Unknown

 

                          Race                      White

 

                          Ethnic Group              Not  or 





Author





                          Author                    [facility]

ems

 

                          Organization              [facility]

ems

 

                          Address                   Unknown

 

                          Phone                     Unavailable







Support





                Name            Relationship    Address         Phone

 

                    JOSEPH BENTLEY      GUAR                [address]

[place]  [address]                    [phone]

 

                    Abbcess, Ketty      ECON                [address]

[place]  [address]                    [phone]







Care Team Providers





                    Care Team Member Name Role                Phone

 

                    CammieRosa Maria curryRoya Unavailable         [phone]







PROBLEMS





          Type      Condition ICD9-CM Code BBS59-QR Code Onset Dates Condition S

tatus W/U 

Status              Risk                SNOMED Code         Notes

 

                          Problem                   Non-pressure chronic ulcer o

f other part of left lower leg with fat 

layer exposed         L97.822         Active  confirmed         [id number]  

 

                          Problem                   Chronic venous hypertension 

(idiopathic) with ulcer of left lower 

extremity         I87.312         Active  confirmed         [id number]  







ALLERGIES





                    Allergen (clinical drug ingredient) Drug/Non Drug Allergy do

cumented on EMR 

Reaction            Allergy Type        Onset Date          Status

 

                      Motrin     Dyspnea    Drug Allergy            Active







ENCOUNTERS from 1955 to 2021





             Encounter    Location     Date         Provider     Diagnosis

 

                    [facility]     [phone] [place] [phone]                       Roya Bonds          







IMMUNIZATIONS

No Information



SOCIAL HISTORY

Tobacco Use:



                    Social History Observation Description         Date

 

                    Details (start date - stop date) Current Smoker       



Sex Assigned At Birth:



                          Social History Observation Description

 

                          Sex Assigned At Birth     Unknown



Education:



                    Question            Answer              Notes

 

                    Level of Education: Finished High School  



Spiritism:



                    Question            Answer              Notes

 

                    Spiritism                                Buddhist



Sexual Hx:



                    Question            Answer              Notes

 

                    Had sex in the last 12 months (vaginal, oral, or anal)? No  

                 

 

                    LMP:                                  

 

                    Have you ever had an STD? No                   



Tobacco Use:



                    Question            Answer              Notes

 

                    Are you a:          current smoker       

 

                    How many cigarettes a day do you smoke?                     

2-3 CIGARETTES/DAY







REASON FOR REFERRAL

No Information



VITAL SIGNS

No information



MEDICATIONS





           Medication SIG (Take, Route, Frequency, Duration) Notes      Start Da

te End Date   

Status

 

                    Metoprolol Tartrate 100 MG 1 tablet with food Orally Twice a

 day for 30 day(s)  

                                                            Active

 

           Eliquis 5 MG as directed Orally                                  Acti

ve

 

           Cephalexin 500 MG 1 tablet Orally Four times a day for 5 day(s)      

                            Active







PROCEDURES

No Information



RESULTS

No Results



REASON FOR VISIT

R/S [facility] APT



MEDICAL (GENERAL) HISTORY





                    Type                Description         Date

 

                    Medical History     ATRIAL FIBRILLATION  

 

                    Medical History     DVT IN LEFT LEG      

 

                    Medical History     x3 PULMONARY EMBOLISM, MOST RECENT  

 

 

                    Medical History     HYPERTENSION         

 

                    Surgical History    GALLBLADDER REMOVAL  

 

                    Surgical History    STRIPPING LIGATION IN BILATERAL LEGS  

 

                    Surgical History    TONSILLECTOMY        

 

                    Surgical History    RIGHT SHOULDER SURGERY  

 

                    Hospitalization History HOSPITALIZATION AFTER SHOULDER SURGE

RY  







Goals Section

No Information



Health Concerns

No Information



MEDICAL EQUIPMENT

No Information



MENTAL STATUS

No Information



FUNCTIONAL STATUS

No Information



ASSESSMENTS

No Information



PLAN OF TREATMENT

Next Appt



                                        Details

 

                                        Provider Name:Roya Bonds,  09:30:00 AM, [address], 

[phone], [place], [phone], [phone]







Insurance Providers





             Payer Name   Payer Address Payer Phone  Insured Name Patient Relati

onship to 

Insured                   Coverage Start Date       Coverage End Date

 

                MEDICARE Part A and B [address]  [facility] [phone]

[phone]    

JOSEPH BENTLEY self                                     

 

                 FOR LIFE [address]  [facility] [address] [phone]

    JOSEPH BENTLEY              self

## 2021-12-02 NOTE — CCD
Summarization Of Episode

                             Created on: 2021



BENTLEYJOSEPH LINA

External Reference #: [id number]

: 1955

Sex: Undifferentiated



Demographics





                          Address                   [address]

[place]  [address]

 

                          Home Phone                [phone]

 

                          Preferred Language        English

 

                          Marital Status            Unknown

 

                          Scientologist Affiliation     Unknown

 

                          Race                      Unknown

 

                          Ethnic Group              Not  or 





Author





                          Author                    [facility]

 

                          Organization              [facility]

 

                          Address                   Unknown

 

                          Phone                     Unavailable







Support





                Name            Relationship    Address         Phone

 

                DREAD ESPARZA  Next Of Kin     Unknown         [phone]

 

                RE              Next Of Kin     Unknown         Unavailable

 

                RETIRED         Next Of Kin     Unknown         [phone]

 

                    ABBDREAD BETANCOURT     Next Of Kin         [address]

[place]  [address]                    [phone]

 

                    MARJAN VALLADARES     Next Of Kin         [address]

[place]  [address]                    [phone]

 

                    ELMARJAN WALLS     Next Of Kin         [address]

[place]  [address]                    [phone]

 

                DISABLED        Next Of Kin     Unknown         Unavailable

 

                    ALFRED MOSLEY    Next Of Kin         [address] [place]  [address]                    [phone]

 

                    Tamiko Nunezyla     ECON                [address] 

  [address]

[place]  [address]                    Unavailable







Care Team Providers





                    Care Team Member Name Role                Phone

 

                    JHONNY Meeks MD Unavailable         Unavailable

 

                    JHONNY Meeks MD Unavailable         Unavailable

 

                    JHONNY Meeks MD Unavailable         Unavailable

 

                    JHONNY Meeks MD Unavailable         Unavailable

 

                    JHONNY Meeks MD Unavailable         Unavailable

 

                    JHONNY Meeks MD Unavailable         Unavailable

 

                    JHONNY Meeks MD Unavailable         Unavailable

 

                    JHONNY Meeks MD Unavailable         Unavailable

 

                    JHONNY Meeks MD Unavailable         Unavailable

 

                    JHONNY Meeks MD Unavailable         Unavailable

 

                    JHONNY Meeks MD Unavailable         Unavailable

 

                    Fish, B Uzair PURCELL   Unavailable         Unavailable

 

                    Fish, B Uzair PURCELL   Unavailable         Unavailable

 

                    Fish, B Uzair PURCELL   Unavailable         Unavailable

 

                    Fish, B Uzair PURCELL   Unavailable         Unavailable

 

                    Fish, B Uzair PURCELL   Unavailable         Unavailable

 

                    Fish, B Uzair PURCELL   Unavailable         Unavailable

 

                    Fish, B Uzair PURCELL   Unavailable         Unavailable

 

                    Fish, B Uzair PURCELL   Unavailable         Unavailable

 

                    Fish, B Uzair PURCELL   Unavailable         Unavailable

 

                    Fish, B Uzair PURCELL   Unavailable         Unavailable

 

                    Fish, B Uzair PURCELL   Unavailable         Unavailable

 

                    Fish, B Uzair PURCELL   Unavailable         Unavailable

 

                    Fish, B Uzair PURCELL   Unavailable         Unavailable

 

                    Fish, B Uzair PURCELL   Unavailable         Unavailable

 

                    Fish, B Uzair PURCELL   Unavailable         Unavailable

 

                    Fish, B Uzair PURCELL   Unavailable         Unavailable

 

                    Fish, B Uzair PURCELL   Unavailable         Unavailable

 

                    Fish, B Uzair PURCELL   Unavailable         Unavailable

 

                    Fish, B Uzair PURCELL   Unavailable         Unavailable

 

                    Fish, B Uzair PURCELL   Unavailable         Unavailable

 

                    Fish, B Uzair PURCELL   Unavailable         Unavailable

 

                    Fish, B Uzair PURCELL   Unavailable         Unavailable

 

                    Fish, B Uzair PURCELL   Unavailable         Unavailable

 

                    Fish, B Uzair PURCELL   Unavailable         Unavailable

 

                    Fish, B Uzair PURCELL   Unavailable         Unavailable

 

                    Fish, B Uzair PURCELL   Unavailable         Unavailable

 

                    Fish, B Uzair PURCELL   Unavailable         Unavailable

 

                    Fish, B Uzair PURCELL   Unavailable         Unavailable

 

                    Fish, B Uzair PURCELL   Unavailable         Unavailable

 

                    Fish, B Uzair PURCELL   Unavailable         Unavailable

 

                    Fish, B Uzair PURCELL   Unavailable         Unavailable

 

                    Fish, B Uzair PURCELL   Unavailable         Unavailable

 

                    Fish, B Uzair PURCELL   Unavailable         Unavailable

 

                    Fish, B Uzair PURCELL   Unavailable         Unavailable

 

                    Fish, B Uzair PURCELL   Unavailable         Unavailable

 

                    Fish, B Uzair PURCELL   Unavailable         Unavailable

 

                    Fish, B Uzair PURCELL   Unavailable         Unavailable

 

                    Fish, B Uzair PURCELL   Unavailable         Unavailable

 

                    Fish, B Uzair PURCELL   Unavailable         Unavailable

 

                    Fish, B Uzair PURCELL   Unavailable         Unavailable

 

                    Fish, B Uzair PURCELL   Unavailable         Unavailable

 

                    Fish, B Uzair PURCELL   Unavailable         Unavailable

 

                    Fish, B Uzair PURCELL   Unavailable         Unavailable

 

                    Fish, B Uzair PURCELL   Unavailable         Unavailable

 

                    Fish, B Uzair PURCELL   Unavailable         Unavailable

 

                    Fish, B Uzair PURCELL   Unavailable         Unavailable

 

                    Fish, B Uzair PURCELL   Unavailable         Unavailable

 

                    Fish, B Uzair PURCELL   Unavailable         Unavailable

 

                    Fish, B Uzair PURCELL   Unavailable         Unavailable

 

                    Fish, B Uzair PURCELL   Unavailable         Unavailable

 

                    Fish, B Uzair PURCELL   Unavailable         Unavailable

 

                    Fish, B Uzair PURCELL   Unavailable         Unavailable

 

                    Fish, B Uzair PURCELL   Unavailable         Unavailable

 

                    Fish, B Uzair PURCELL   Unavailable         Unavailable

 

                    Fish, B Uzair PURCELL   Unavailable         Unavailable

 

                    Fish, B Uzair PURCELL   Unavailable         Unavailable

 

                    Fish, B Uzair PURCELL   Unavailable         Unavailable

 

                    Fish, Lina Josefina MPAS, PA-C Unavailable         Unavailabl

e

 

                    Fish, Lina Josefina MPAS, PA-C Unavailable         Unavailabl

e

 

                    Fish, Lina Josefina MPAS, PA-C Unavailable         Unavailabl

e

 

                    Fish, Lina Josefina MPAS, PA-C Unavailable         Unavailabl

e

 

                    Fish, Lina Josefina MPAS, PA-C Unavailable         Unavailabl

e

 

                    Fish, Lina Josefina MPAS, PA-C Unavailable         Unavailabl

e

 

                    Fish, Lina Josefina MPAS, PA-C Unavailable         Unavailabl

e

 

                    Fish, Lina Josefina MPAS, PA-C Unavailable         Unavailabl

e

 

                    Fish, Lina Josefina MPAS, PA-C Unavailable         Unavailabl

e

 

                    Fish, Lina Josefina MPAS, PA-C Unavailable         Unavailabl

e

 

                    Fish, Lina Josefina MPAS, PA-C Unavailable         Unavailabl

e

 

                    Fish, Lina Josefina MPAS, PA-C Unavailable         Unavailabl

e

 

                    Fish, Lina Josefina MPAS, PA-C Unavailable         Unavailabl

e

 

                    Fish, Lina Josefina MPAS, PA-C Unavailable         Unavailabl

e

 

                    Fish, Lina Josefina MPAS, PA-C Unavailable         Unavailabl

e

 

                    Fish, Lina Josefina MPAS, PA-C Unavailable         Unavailabl

e

 

                    Fish, Lina Josefina MPAS, PA-C Unavailable         Unavailabl

e

 

                    Fish, Lina Josefina MPAS, PA-C Unavailable         Unavailabl

e

 

                    Fish, Lina Josefina MPAS, PA-C Unavailable         Unavailabl

e

 

                    Fish, Lina Josefina MPAS, PA-C Unavailable         Unavailabl

e

 

                    Fish, Lina Josefina MPAS, PA-C Unavailable         Unavailabl

e

 

                    Fish, Lina Josefina MPAS, PA-C Unavailable         Unavailabl

e

 

                    Fish, Lina Josefina MPAS, PA-C Unavailable         Unavailabl

e

 

                    Fish, Lina Josefina MPAS, PA-C Unavailable         Unavailabl

e

 

                    Fish, Lina Josefina MPAS, PA-C Unavailable         Unavailabl

e

 

                    Fish, Lina Josefina MPAS, PA-C Unavailable         Unavailabl

e

 

                    Fish, Lina Josefina MPAS, PA-C Unavailable         Unavailabl

e

 

                    Fish, Lina Josefina MPAS, PA-C Unavailable         Unavailabl

e

 

                    Fish, Lina Josefina MPAS, PA-C Unavailable         Unavailabl

e

 

                    Fish, Lina Josefina MPAS, PA-C Unavailable         Unavailabl

e

 

                    Fish, Lina Josefina MPAS, PA-C Unavailable         Unavailabl

e

 

                    Fish, Lina Josefina MPAS, PA-C Unavailable         Unavailabl

e

 

                    Fish, Lina Josefina MPAS, PA-C Unavailable         Unavailabl

e

 

                    Fish, Lina Josefina MPAS, PA-C Unavailable         Unavailabl

e

 

                    Fish, Lina Josefina MPAS, PA-C Unavailable         Unavailabl

e

 

                    Fish, Lina Josefina MPAS, PA-C Unavailable         Unavailabl

e

 

                    PICKJHONNY EVANS JR, PA-C Unavailable         Unavailable

 

                    PICKERAL JR, JHONNY DOMINGUEZ PA-C Unavailable         Unavailable

 

                    PICKERAL JRJHONNY PA-C Unavailable         Unavailable

 

                    PICKERAL JRJHONNY PA-C Unavailable         Unavailable

 

                    PICKERAL JRJHONNY PA-C Unavailable         Unavailable

 

                    PICKERAL JRJHONNY PA-C Unavailable         Unavailable

 

                    PICKERAL JR, J ALBERTO PA-C Unavailable         Unavailable

 

                    PICKERAL JR, J ALBERTO PA-C Unavailable         Unavailable

 

                    PICKERAL JR, J ALBERTO PA-C Unavailable         Unavailable

 

                    PICKERAL JR, J ALBERTO PA-C Unavailable         Unavailable

 

                    PICKERAL JR, J ALBERTO PA-C Unavailable         Unavailable

 

                    PICKERAL JR, J ALBERTO PA-C Unavailable         Unavailable

 

                    PICKERAL JR, J ALBERTO PA-C Unavailable         Unavailable

 

                    PICKERAL JR, J ALBERTO PA-C Unavailable         Unavailable

 

                    PICKERAL JR, J ALBERTO PA-C Unavailable         Unavailable

 

                    PICKERAL JR, J ALBERTO PA-C Unavailable         Unavailable

 

                    PICKERAL JR, J ALBERTO PA-C Unavailable         Unavailable

 

                    PICKERAL JR, J ALBERTO PA-C Unavailable         Unavailable

 

                    PICKERAL JR, J ALBERTO PA-C Unavailable         Unavailable

 

                    PICKERAL JR, J ALBERTO PA-C Unavailable         Unavailable

 

                    PICKERAL JR, J ALBERTO PA-C Unavailable         Unavailable

 

                    PICKERAL JR, J ALBERTO PA-C Unavailable         Unavailable

 

                    PICKERAL JR, J ALBERTO PA-C Unavailable         Unavailable

 

                    PICKERAL JR, J ALBERTO PA-C Unavailable         Unavailable

 

                    PICKERAL JR, J ALBERTO PA-C Unavailable         Unavailable

 

                    PICKERAL JR, J ALBERTO PA-C Unavailable         Unavailable

 

                    PICKERAL JR, J ALBERTO PA-C Unavailable         Unavailable

 

                    ABELARDO Regalado MD Unavailable         Unavailable

 

                    ABELARDO Regalado MD Unavailable         Unavailable

 

                    ABELARDO Regalado MD Unavailable         Unavailable

 

                    ABELARDO Regalado MD Unavailable         Unavailable

 

                    ABELARDO Regalado MD Unavailable         Unavailable

 

                    ABELARDO Regalado MD Unavailable         Unavailable

 

                    ABELARDO Regalado MD Unavailable         Unavailable

 

                    ABELARDO Regalado MD Unavailable         Unavailable

 

                    ABELARDO Regalado MD Unavailable         Unavailable

 

                    ABELARDO Regalado MD Unavailable         Unavailable

 

                    ABELARDO Regalado MD Unavailable         Unavailable

 

                    ABELARDO Regalado MD Unavailable         Unavailable

 

                    ABELARDO Regalado MD Unavailable         Unavailable

 

                    ABELARDO Regalado MD Unavailable         Unavailable

 

                    ABELARDO Regalado MD Unavailable         Unavailable

 

                    ABELARDO Regalado MD Unavailable         Unavailable

 

                    ABELARDO Regalado MD Unavailable         Unavailable

 

                    ABELARDO Regalado MD Unavailable         Unavailable

 

                    ABELARDO Regalado MD Unavailable         Unavailable

 

                    ABELARDO Regalado MD Unavailable         Unavailable

 

                    ABELARDO Regalado MD Unavailable         Unavailable

 

                    ABELARDO Regalado MD Unavailable         Unavailable

 

                    ABELARDO Regalado MD Unavailable         Unavailable

 

                    ABELARDO Regalado MD Unavailable         Unavailable

 

                    ABELARDO Regalado MD Unavailable         Unavailable

 

                    ABELARDO Regalado MD Unavailable         Unavailable

 

                    ABELARDO eRgalado MD Unavailable         Unavailable

 

                    ABELARDO Regalado MD Unavailable         Unavailable

 

                    SabineABELARDO MD Unavailable         Unavailable

 

                    SabineABELARDO fuchs MD Unavailable         Unavailable

 

                    SabineABELARDO MD Unavailable         Unavailable

 

                    SabineABELARDO MD Unavailable         Unavailable

 

                    SabineABELARDO MD Unavailable         Unavailable

 

                    SabineABELARDO MD Unavailable         Unavailable

 

                    SabineABELARDO MD Unavailable         Unavailable

 

                    SabineABELARDO MD Unavailable         Unavailable

 

                    SabineABELARDO MD Unavailable         Unavailable

 

                    SabineABELARDO xavier MD Unavailable         Unavailable

 

                    SabineABELARDO MD Unavailable         Unavailable

 

                    SabineABELARDO MD Unavailable         Unavailable

 

                    SabineABELARDO MD Unavailable         Unavailable

 

                    SabineABELARDO fuchs MD Unavailable         Unavailable

 

                    SabineABELARDO MD Unavailable         Unavailable

 

                    SabineABELARDO MD Unavailable         Unavailable

 

                    SabineABELARDO MD Unavailable         Unavailable

 

                    SabineABELARDO xavier MD Unavailable         Unavailable

 

                    SabineABELARDO MD Unavailable         Unavailable

 

                    ABELARDO Regalado MD Unavailable         Unavailable

 

                    SabineABELARDO fuchs MD Unavailable         Unavailable

 

                    ABELARDO Regalado MD Unavailable         Unavailable

 

                    ABELARDO Regalado MD Unavailable         Unavailable

 

                    ABELARDO Regalado MD Unavailable         Unavailable

 

                    ABELARDO Regalado MD Unavailable         Unavailable

 

                    ABELARDO Regalado MD Unavailable         Unavailable

 

                    ABELARDO Regalado MD Unavailable         Unavailable

 

                    ABELARDO Regalado MD Unavailable         Unavailable

 

                    ABELARDO Regalado MD Unavailable         Unavailable

 

                    ABELARDO Regalado MD Unavailable         Unavailable

 

                    ABELARDO Regalado MD Unavailable         Unavailable

 

                    ABELARDO Regalado MD Unavailable         Unavailable

 

                    ABELARDO Regalado MD Unavailable         Unavailable

 

                    ABELARDO Regalado MD Unavailable         Unavailable

 

                    ABELARDO Regalado MD Unavailable         Unavailable

 

                    ABELARDO Regalado MD Unavailable         Unavailable

 

                    ABELARDO Regalado MD Unavailable         Unavailable

 

                    ABELARDO Regalado MD Unavailable         Unavailable

 

                    ABELARDO Regalado MD Unavailable         Unavailable

 

                    ABELARDO Regalado MD Unavailable         Unavailable

 

                    ABELARDO Regalado MD Unavailable         Unavailable

 

                    ABELARDO Regalado MD Unavailable         Unavailable

 

                    ABELARDO Regalado MD Unavailable         Unavailable

 

                    ABELARDO Regalado MD Unavailable         Unavailable

 

                    ABELARDO Regalado MD Unavailable         Unavailable

 

                    ABELARDO Regalado MD Unavailable         Unavailable

 

                    ABELARDO Regalado MD Unavailable         Unavailable

 

                    ABELARDO Regalado MD Unavailable         Unavailable

 

                    ABELARDO Regalado MD Unavailable         Unavailable

 

                    ABELARDO Regalado MD Unavailable         Unavailable

 

                    Sabine, ABELARDO Montano MD Unavailable         Unavailable

 

                    Sabine, ABELARDO Montano MD Unavailable         Unavailable

 

                    Sabine, ABELARDO Montano MD Unavailable         Unavailable

 

                    Sabine, ABELARDO Montano MD Unavailable         Unavailable

 

                    Sabine, ABELARDO Montano MD Unavailable         Unavailable

 

                    Sabine, ABELARDO Montano MD Unavailable         Unavailable

 

                    Sabine, ABELARDO Montano MD Unavailable         Unavailable

 

                    AFSHAN, L ERICK PA Unavailable         Unavailable

 

                    AFSHAN, L ERICK PA Unavailable         Unavailable

 

                    AFSHAN, L ERICK PA Unavailable         Unavailable

 

                    AFSHAN, L ERICK PA Unavailable         Unavailable

 

                    AFSHAN, L ERICK PA Unavailable         Unavailable

 

                    AFSHAN, L ERICK PA Unavailable         Unavailable

 

                    AFSHAN, L ERICK PA Unavailable         Unavailable

 

                    AFSHAN, L ERICK PA Unavailable         Unavailable

 

                    AFSHAN, L ERICK PA Unavailable         Unavailable

 

                    AFSHAN, L ERICK PA Unavailable         Unavailable

 

                    AFSHAN, L ERICK PA Unavailable         Unavailable

 

                    AFSHAN, L ERICK PA Unavailable         Unavailable

 

                    AFSHAN, L ERICK PA Unavailable         Unavailable

 

                    AFSHAN, L ERICK PA Unavailable         Unavailable

 

                    AFSHAN, L ERICK PA Unavailable         Unavailable

 

                    AFSHAN, L ERICK PA Unavailable         Unavailable

 

                    ADJAPONG,  ROSA    Unavailable         Unavailable

 

                    Jimenez, L Kanchan RPA Unavailable         Unavailable

 

                    Jimenez, L Kanchan RPA Unavailable         Unavailable

 

                    Jimenez, L Kanchan RPA Unavailable         Unavailable

 

                    Jimenez, L Kanchan RPA Unavailable         Unavailable

 

                    Jimenez, L Kanchan RPA Unavailable         Unavailable

 

                    Jimenez, L Kanchan RPA Unavailable         Unavailable

 

                    Jimenez, L Kanchan RPA Unavailable         Unavailable

 

                    Jimenez, L Kanchan RPA Unavailable         Unavailable

 

                    Jimenez, L Kanchan RPA Unavailable         Unavailable

 

                    Jimenez, L Kanchan RPA Unavailable         Unavailable

 

                    Jimenez, L Kanchan RPA Unavailable         Unavailable

 

                    Jimenez, L Kanchan RPA Unavailable         Unavailable

 

                    Jimenez, L Kanchan RPA Unavailable         Unavailable

 

                    Jimenez, L Kanchan RPA Unavailable         Unavailable

 

                    Jimenez, L Kanchan RPA Unavailable         Unavailable

 

                    Jimenez, L Kanchan RPA Unavailable         Unavailable

 

                    Jimenez, L Kanchan RPA Unavailable         Unavailable

 

                    Jimenez, L Kanchan RPA Unavailable         Unavailable

 

                    Jimenez, L Kanchan RPA Unavailable         Unavailable

 

                    Jimenez, L Kanchan RPA Unavailable         Unavailable

 

                    Jimenez, L Kanchan RPA Unavailable         Unavailable

 

                    Jimenez, L Kanchan RPA Unavailable         Unavailable

 

                    Jimenez, L Kanchan RPA Unavailable         Unavailable

 

                    Jimenez, L Kanchan RPA Unavailable         Unavailable

 

                    Jimenez, L Kanchan RPA Unavailable         Unavailable

 

                    Jimenez, L Kanchan RPA Unavailable         Unavailable

 

                    Jimenez, L Kanchan RPA Unavailable         Unavailable

 

                    Jimenez, L Kanchan RPA Unavailable         Unavailable

 

                    Jimenez, L Kanchan RPA Unavailable         Unavailable

 

                    Jimenez, L Kanchan RPA Unavailable         Unavailable

 

                    Jimenez, L Kanchan RPA Unavailable         Unavailable

 

                    Jimenez, L Kanchan RPA Unavailable         Unavailable



                                  



Re-disclosure Warning

          The records that you are about to access may contain information from 
federally-assisted alcohol or drug abuse programs. If such information is 
present, then the following federally mandated warning applies: This information
has been disclosed to you from records protected by federal confidentiality 
rules (42 CFR part 2). The federal rules prohibit you from making any further 
disclosure of this information unless further disclosure is expressly permitted 
by the written consent of the person to whom it pertains or as otherwise 
permitted by 42 CFR part 2. A general authorization for the release of medical 
or other information is NOT sufficient for this purpose. The Federal rules 
restrict any use of the information to criminally investigate or prosecute any 
alcohol or drug abuse patient.The records that you are about to access may 
contain highly sensitive health information, the redisclosure of which is 
protected by Article 27-F of the [facility] Public Health law. If you 
continue you may have access to information: Regarding HIV / AIDS; Provided by 
facilities licensed or operated by the [facility] Office of Mental Health; 
or Provided by the [facility] Office for People With Developmental 
Disabilities. If such information is present, then the following [place] 
mandated warning applies: This information has been disclosed to you from 
confidential records which are protected by state law. State law prohibits you 
from making any further disclosure of this information without the specific 
written consent of the person to whom it pertains, or as otherwise permitted by 
law. Any unauthorized further disclosure in violation of state law may result in
a fine or USP sentence or both. A general authorization for the release of 
medical or other information is NOT sufficient authorization for further disc
losure.                                                                         
    



Allergies and Adverse Reactions

          



           Type       Description Substance  Reaction   Status     Data Source(s

)

 

           Drug Allergy Drug Allergy NKDA                             [facility] ([facility])



                                                                                
       



Family History

          



             Family Member Name Family Member Gender Family Member Status Date o

f Status 

Description                             Data Source(s)

 

           Unknown    Male       Problem                          [facility] ([facility])

 

           Unknown    Male       Problem                          [facility] ([facility])



                                                                                
                 



Encounters

          



           Encounter  Providers  Location   Date       Indications Data Source(s

)

 

           O          Attender: Charmaine Regalado MD            2021 12:00:00

 AM EST            NDOC ([facility])

 

                                        Patient admitted. 

 

                Unknown                         [address]

[place] [address] 2021 12:00:00 AM 

EST                                                 eCW1 ([facility]

[facility])

 

                Unknown                         [address]

[place] [address] 2021 12:00:00 AM 

EST                                                 eCW1 ([facility]

[facility])

 

                Unknown                         [address]

[place] [address] 2021 12:00:00 AM 

EST                                                 eCW1 ([facility]

[facility])

 

                Unknown                         [address]

[place] [address] 2021 12:00:00 AM 

EDT                                                 eCW1 ([facility]

[facility])

 

                Outpatient                      [address] [facility]

[place] [address] 10/26/2021 12:00:00 AM

EDT                                                 eCW1 ([facility]

[facility])

 

                (WND ) New Patient 120 Min                 [address] [place] [address] 

10/21/2021 12:00:00 AM EDT                           eCW1 ([facility])

 

                Unknown                         [address], 

[place] [address] 10/21/2021 12:00:00 AM 

EDT                                                 eCW1 ([facility]

[facility])

 

                Outpatient      Attender: ALBERTO Melchor 1

 03:20:00 PM

EDT                                                 MEDENT ([facility]

)

 

                Outpatient      Attender: ALBERTO Melchor 1

 02:20:00 PM

EDT                                                 [facility] ([facility]

)

 

                Outpatient      Admitter: ROSA ROCHAReferrer: ROSA ROCHA

                 2021 

12:00:00 AM EDT           Multiple myeloma not having achieved remission [facility]

 

                                        Multiple myeloma not having achieved rem

ission 

 

                Outpatient      Attender: Kanchan Bennett/Shahnaz/Ravindra/EDWARD lockett 2021 

10:15:00 AM EDT                                     [facility] ([facility]

[facility])

 

                Outpatient      Attender: Charmaine Mlechor  01:30:00 PM 

EDT                                                 MED[facility] ([facility]

)

 

                Outpatient      Attender: Kanchan Jimenez RPA Sabrina/[place]/Ravindra/R

eindl 06/10/2021 

10:45:00 AM EDT                                     MED[facility] ([facility]

actice, )

 

                Outpatient      Attender: Charmaine Melchor  01:30:00 PM 

EDT                                                 [facility] ([facility]

)

 

                Outpatient      Attender: Ra Meeks MD Physical Therapy  10:30:00 AM 

EDT                                                 [facility] ([facility]

[facility] )

 

                Office Visit    Attender: Josefina VIGIL PA-C Physical Therapy

 2021 

01:15:00 PM EDT                                     [facility] ([facility]

[facility] )

 

             Outpatient   Attender: Uzair Huitron MD Physical Therapy 2021 1

2:45:00 PM EDT 

                                        [facility] ([facility] )

 

                Outpatient      Attender: Josefina VIGIL PA-C Physical Therapy

 2021 11:15:00 

AM EDT                                              [facility]

 

                Outpatient      Attender: Charmaine Melchor  02:30:00 PM 

EDT                                                 MED[facility] ([facility]

)

 

                Outpatient      Attender: Kanchan Zamudioang/[place]/Ravindra/R

eindl 2021 

09:15:00 AM EST                                     MEDENT ([facility]

actice, )

 

             Outpatient   Attender: ERICK KRUSE Main Office  2021 1

1:45:00 AM EST  

                                        [facility] ([facility])

 

                Outpatient      Attender: Charmaine Melchor  12:00:00 PM 

EST                                                 [facility] ([facility]

)

 

                Outpatient      Attender: Charmaine Melchor 10

/ 10:30:00 AM 

EDT                                                 [facility] ([facility]

)



                                                                                
                                                                                
                                                                                
                                                                           



Immunizations

          



             Vaccine      Date         Status       Description  Data Source(s)

 

                          Influenza, injectable, MDCK, preservative free, bruno

valent 10/06/2021 02:31:00

PM EDT              completed                               [facility])

 

                          Influenza, injectable, MDCK, preservative free, bruno

valent 10/15/2020 12:10:00

PM EDT              completed                               [facility] ([facility])



                                                                                
                 



Medications

          



          Medication Brand Name Start Date Product Form Dose      Route     Admi

nistrative 

Instructions Pharmacy Instructions Status     Indications Reaction   Description

 Data 

Source(s)

 

           Fluoxetine 20 MG Oral Capsule Fluoxetine HCL 2021 12:00:00 AM E

ST                       ORAL

                              active                                  MED[facility] ([facility])

 

           Loratadine 10 MG Oral Tablet [Claritin] Claritin   2021 12:00:0

0 AM EST                       

ORAL                          active                                  [facility] ([facility])

 

           Cephalexin 500 MG Oral Capsule CEPHALEXIN 10/20/2021 12:00:00 AM EDT 

capsule    21         

                          TAKE ONE CAPSULE BY MOUTH THREE TIMES A DAY FOR 7 DAYS

 TAKE ONE CAPSULE BY 

MOUTH THREE TIMES A DAY FOR 7 DAYS SOLD: 10/20/2021                             

           [facility]

 

           Cephalexin 500 MG Oral Capsule CEPHALEXIN 10/06/2021 12:00:00 AM EDT 

capsule    21         

                          TAKE ONE CAPSULE BY MOUTH THREE TIMES A DAY FOR 7 DAYS

 TAKE ONE CAPSULE BY 

MOUTH THREE TIMES A DAY FOR 7 DAYS SOLD: 10/06/2021                             

           [facility]

 

        Cephalexin 500 MG Oral Capsule Cephalexin 10/06/2021 12:00:00 AM EDT    

             ORAL            

             completed                                           [facility] ([facility])

 

       Administration Of Flu Vaccine        10/06/2021 12:00:00 AM EDT          

                          completed  

                                                            [facility] ([facility])

 

                                        Medication administered onsite 

 

                    2 ML Sodium Hyaluronate 10 MG/ML Prefilled Syringe [Euflexxa

] Euflexxa            

2021 12:00:00 AM EDT                                    active            

          [facility] ([facility]

 

                atorvastatin 20 MG Oral Tablet ATORVASTATIN CALCIUM 2021 1

2:00:00 AM EDT 

tablet          90                              TAKE ONE TABLET BY MOUTH EVERY D

AY TAKE ONE TABLET BY MOUTH EVERY 

DAY          SOLD: 2021                                        [facility]

s

 

          25 mg               2021 12:00:00 AM EDT tablet    180          

       TAKE ONE TABLET BY MOUTH TWICE A

 DAY       TAKE ONE TABLET BY MOUTH TWICE A DAY SOLD: 10/22/2021                

                  [facility]

 

                atorvastatin 20 MG Oral Tablet ATORVASTATIN CALCIUM 2021 1

2:00:00 AM EDT 

tablet          90                              TAKE ONE TABLET BY MOUTH EVERY D

AY TAKE ONE TABLET BY MOUTH EVERY 

DAY          SOLD: 10/22/2021                                        [facility]

s

 

          25 mg               2021 12:00:00 AM EDT tablet    180          

       TAKE ONE TABLET BY MOUTH TWICE A

 DAY       TAKE ONE TABLET BY MOUTH TWICE A DAY SOLD: 2021                

                  [facility]

 

          100 mg              2021 12:00:00 AM EDT tablet    180          

       TAKE ONE TABLET BY MOUTH TWICE 

A DAY      TAKE ONE TABLET BY MOUTH TWICE A DAY SOLD: 2021                

                  [facility]

 

          Famotidine 20 MG Oral Tablet FAMOTIDINE 2021 12:00:00 AM EDT tab

let    90                  

TAKE ONE TABLET BY MOUTH EVERY DAY TAKE ONE TABLET BY MOUTH EVERY DAY SOLD: 

10/22/2021                                                      [facility]

 

                          Acetaminophen 250 MG / Aspirin 250 MG / Caffeine 65 MG

 Oral Tablet [Excedrin] 

Excedrin Migraine 2021 12:00:00 AM EDT                               activ

e                   MEDENT 

([facility])

 

          20 mg               2021 12:00:00 AM EDT tablet    180          

       TAKE ONE TABLET BY MOUTH TWICE A

 DAY       TAKE ONE TABLET BY MOUTH TWICE A DAY SOLD: 10/22/2021                

                  [facility]

 

          20 mg               2021 12:00:00 AM EDT tablet    90           

       TAKE ONE TABLET BY MOUTH EVERY 

DAY        TAKE ONE TABLET BY MOUTH EVERY DAY SOLD: 2021                  

                [facility]

 

          20 mg               2021 12:00:00 AM EDT tablet    180          

       TAKE ONE TABLET BY MOUTH TWICE A

 DAY       TAKE ONE TABLET BY MOUTH TWICE A DAY SOLD: 2021                

                  [facility]

 

          1 %                 2021 12:00:00 AM EDT gel       100          

       APPLY UP TO 4 GRAMS THREE TIMES A DAY

 TO AFFECTED KNEE AS NEEDED             APPLY UP TO 4 GRAMS THREE TIMES A DAY TO

 AFFECTED 

KNEE AS NEEDED SOLD: 2021                                        [facility]

 

        Famotidine 20 MG Oral Tablet Famotidine 2021 12:00:00 AM EDT      

           ORAL                    

active                                                          [facility]

 

        Furosemide 20 MG Oral Tablet Furosemide 2021 12:00:00 AM EDT      

           ORAL                    

active                                                          [facility] ([facility]

[facility])

 

          5 mg                03/15/2021 12:00:00 AM EDT tablet    40           

       TAKE ONE TABLET BY MOUTH EVERY 

DAY, EXCEPT TAKE 2 TABLETS ON MONDAY, WEDNESDAY AND FRIDAY TAKE ONE TABLET BY 

MOUTH EVERY DAY, EXCEPT TAKE 2 TABLETS ON MONDAY, WEDNESDAY AND FRIDAY SOLD: 

2021                                                      [facility]

 

          apixaban 5 MG Oral Tablet [Eliquis] Eliquis   2021 12:00:00 AM E

ST                     ORAL      

                      active                                      [facility] ([facility]

 

                    24 HR Bupropion Hydrochloride 150 MG Extended Release Oral T

ablet BUPROPION HCL       

2021 12:00:00 AM EST tablet extended release 24 hr 30                     

         TAKE ONE TABLET BY

 MOUTH EVERY MORNING TAKE ONE TABLET BY MOUTH EVERY MORNING SOLD: 2021    

              

                                                    [facility]

 

          5 mg                2021 12:00:00 AM EST tablet    60           

       TAKE ONE TABLET BY MOUTH TWICE A 

DAY        TAKE ONE TABLET BY MOUTH TWICE A DAY SOLD: 2021                

                  [facility]

 

                    24 HR Bupropion Hydrochloride 150 MG Extended Release Oral T

ablet BUPROPION HCL       

2021 12:00:00 AM EST tablet extended release 24 hr 30                     

         TAKE ONE TABLET BY

 MOUTH EVERY MORNING TAKE ONE TABLET BY MOUTH EVERY MORNING SOLD: 2021    

              

                                                    [facility]

 

          5 mg                2021 12:00:00 AM EST tablet    60           

       TAKE ONE TABLET BY MOUTH TWICE A 

DAY        TAKE ONE TABLET BY MOUTH TWICE A DAY SOLD: 2021                

                  [facility]

 

                          24 HR Bupropion Hydrochloride 150 MG Extended Release 

Oral Tablet Bupropion 

Hydrochloride ER (XL) 2021 12:00:00 AM EST             ORAL              a

ctive                   

MED[facility] ([facility])

 

                          24 HR Bupropion Hydrochloride 150 MG Extended Release 

Oral Tablet Bupropion 

Hydrochloride ER (XL) 2021 12:00:00 AM EST             ORAL              a

ctive                   

MED[facility] ([facility])

 

          5 mg                2020 12:00:00 AM EST tablet    30           

       TAKE ONE TABLET BY MOUTH TWICE A 

DAY        TAKE ONE TABLET BY MOUTH TWICE A DAY SOLD: 2020                

                  [facility]

 

          5 mg                2020 12:00:00 AM EST tablet    30           

       TAKE ONE TABLET BY MOUTH TWICE A 

DAY        TAKE ONE TABLET BY MOUTH TWICE A DAY SOLD: 2021                

                  [facility]

 

           Cephalexin 500 MG Oral Capsule CEPHALEXIN 2020 12:00:00 AM EST 

capsule    30         

                          TAKE ONE CAPSULE BY MOUTH THREE TIMES A DAY TAKE ONE C

APSULE BY MOUTH THREE 

TIMES A DAY  SOLD: 2020                                        [facility]

s

 

          apixaban 5 MG Oral Tablet [Eliquis] Eliquis   2020 12:00:00 AM E

ST                     ORAL      

                      active                                      [facility] ([facility])

 

          apixaban 5 MG Oral Tablet [Eliquis] Eliquis   2020 12:00:00 AM E

ST                     ORAL      

                      active                                      [facility] ([facility]

 

       Administration Of Flu Vaccine        10/15/2020 12:00:00 AM EDT          

                          completed  

                                                            [facility]

 

                                        Medication administered onsite 

 

          20 mg               2020 12:00:00 AM EDT tablet    90           

       TAKE ONE TABLET BY MOUTH EVERY 

MORNING    TAKE ONE TABLET BY MOUTH EVERY MORNING SOLD: 2021              

                    [facility]

 

          5 mg                2020 12:00:00 AM EDT tablet    30           

       TAKE 1/2 TABLET BY MOUTH ONCE 

DAILY      TAKE 1/2 TABLET BY MOUTH ONCE DAILY SOLD: 10/24/2020                 

                 [facility]

 

          40 mg               2020 12:00:00 AM EDT tablet    30           

       TAKE ONE TABLET BY MOUTH AT 

BEDTIME    TAKE ONE TABLET BY MOUTH AT BEDTIME SOLD: 10/24/2020                 

                 [facility]

 

          40 mg               2020 12:00:00 AM EDT tablet    30           

       TAKE ONE TABLET BY MOUTH AT 

BEDTIME    TAKE ONE TABLET BY MOUTH AT BEDTIME SOLD: 2021                 

                 [facility]

 

          Famotidine 40 MG Oral Tablet FAMOTIDINE 2020 12:00:00 AM EDT tab

let    30                  

TAKE ONE TABLET BY MOUTH AT BEDTIME TAKE ONE TABLET BY MOUTH AT BEDTIME SOLD: 

2021                                                      [facility]

 

                    Digoxin 0.25 MG Oral Tablet 250 mcg (0.25 mg) DIGOXIN       

      2020 12:00:00 AM EDT

             tablet       90                        TAKE ONE TABLET BY MOUTH JAMES

 DAY TAKE ONE TABLET BY MOUTH EVERY 

DAY          SOLD: 2021                                        [facility]

[facility]

 

          10 mg               2019 12:00:00 AM EST tablet    90           

       TAKE ONE TABLET BY MOUTH EVERY 

DAY        TAKE ONE TABLET BY MOUTH EVERY DAY SOLD: 10/24/2020                  

                [facility]

 

          100 mg              2019 12:00:00 AM EST tablet    180          

       TAKE ONE TABLET BY MOUTH TWICE 

A DAY      TAKE ONE TABLET BY MOUTH TWICE A DAY SOLD: 10/24/2020                

                  [facility]

 

          25 mg               2019 12:00:00 AM EST tablet    180          

       TAKE ONE TABLET BY MOUTH TWICE A

 DAY       TAKE ONE TABLET BY MOUTH TWICE A DAY SOLD: 10/24/2020                

                  [facility]



                                                                                
                                                                                
                                                                                
                                                                                
                                                                                
                                                                                
        



Insurance Providers

          



             Payer name   Policy type / Coverage type Policy ID    Covered party

 ID Covered 

party's relationship to morataya Policy [place]             Plan Information

 

                [place] [employer] [facility] Part B  [id number]       

[id number] Family Dependent                        

[id number]

 

                [place] [employer] [facility] Part B  [id number]       

[id number] Family Dependent                        

[id number]

 

                [place] [employer] [facility] Part B  [id number]       

[id number] Family Dependent                        

[id number]

 

          MEDICARE A         [id number]           Self                [id number]

[id number]

 

                Medicare [employer] Medicare Primary [id number]     

[id number] Self                              

      [id number]

 

                [employer]are [employer] Medicare Primary [id number]     

[id number] Self                                    

[id number]

 

                [employer]are [employer] Medicare Primary [id number]     

[id number] Self                                    

[id number]

 

                Medicare [employer] Medicare Primary [id number]     

[id number] Self                                    

[id number]

 

                Medicare [employer] Medicare Primary [id number]     

[id number] Self                                    

[id number]

 

                Medicare [employer] Medicare Primary [id number]     

[id number] Self                                    

[id number]

 

                Medicare [employer] Medicare Primary [id number]     

[id number] Self                                    

[id number]

 

                Medicare [employer] Medicare Primary [id number]     

[id number] Self                                    

[id number]

 

                Medicare [employer] Medicare Primary [id number]     

[id number] Self                                    

[id number]

 

                Medicare [employer] Medicare Primary [id number]     

[id number] Self                                    

[id number]

 

                Medicare [facility] [id number]     

[id number] Self                                    

[id number]

 

                Medicare [employer] Medicare Primary [id number]      

[id number] Self                                    

[id number]

 

                Medicare [employer] Medicare Primary [id number]      

[id number] Self                                    

[id number]

 

                Medicare Natl [facility] Primary [id number]      

[id number] Self                                    

[id number]

 

                Medicare [employer] Medicare Primary [id number]      

[id number] Self                                    

[id number]

 

                Medicare Nat[facility] Primary [id number]      

[id number] Self                                    

[id number]

 

                                                                       

 

           FOR LIFE U         [id number]           Self                063

[id number]

 

                [employer]  [employer] Life Medigap Part B  [id number]       

[id number] Family Dependent                        

[id number]

 

          [employer]         [id number] S              

     [id number]

 

                [employer]  [employer] Medig[employer] Part B  [id number]       

[id number] Family Dependent                        

[id number]

 

                [employer]PS  [employer] Life Medigap Part B  [id number]       

[id number] Family Dependent                        

[id number]

 

                [employer]PS  [employer] Life Medigap Part B  [id number]       

[id number] Family Dependent                        

[id number]

 

          MEDICARE            [id number]           SP                  [id number]

R49

 

          MEDICARE  C         [id number] S                   [id number]

R49

 

           FOR LIFE O         [id number] S                   063

[id number]

 

                [employer]PS  [employer] Life Medigap Part B  [id number]       

[id number] Family Dependent                  

      [id number]

 

                [employer]PS  [employer] Life Medigap Part B  [id number]       

[id number] Family Dependent                        

[id number]

 

          MEDICARE            [id number]           SP                  [id number]

R49

 

                WPS  For Life Medigap Part B  [id number]       

[id number] Family Dependent                        

[id number]

 

                WPS  [employer] Life Medigap Part B  [id number]       

[id number] Family Dependent                        

[id number]

 

                WPS  For Life Medigap Part B  [id number]       

[id number] Family Dependent                        

[id number]

 

                WPS  For Life Medigap Part B  [id number]       

[id number] Family Dependent                        

[id number]

 

           FOR LIFE           [id number]           [facility]                 063

[id number]

 

                WPS  For Life Medigap Part B  [id number]       

[id number] Family Dependent                        

[id number]

 

                WPS  For Life Medigap Part B  [id number]       

[id number] Family Dependent                        

[id number]

 

                WPS  For Life Medigap Part B  [id number]       

[id number] Family Dependent                        

[id number]

 

          MEDICARE            [id number]           SP                  [id number]

A

 

          MEDICARE  C         [id number] S                   [id number]

A



                                                                                
                                                                                
                                                                                
                                                                                
                                                                                
                                                                                
                            



Problems, Conditions, and Diagnoses

          



           Code       Display Name Description Problem Type Effective Dates Data

 Source(s)

 

                    Z86.711             Personal history of pulmonary embolism P

ersonal history of pulmonary 

embolism            Diagnosis           2021 12:00:00 AM EST NDOC ([facility]

[facility])

 

                    Z79.01              Long term (current) use of anticoagulant

s Long term (current) use of 

anticoagulants      Diagnosis           2021 12:00:00 AM EST NDOC ([facility])

 

             E78.5        Hyperlipidemia, unspecified Hyperlipidemia, unspecifie

d Diagnosis    

2021 12:00:00 AM EST              NDOC ([facility])

 

                    K21.9               Gastro-esophageal reflux disease without

 esophagitis Gastro-esophageal 

reflux disease without esophagitis Diagnosis           2021 12:00:00 AM ES

T NDOC 

([facility])

 

             D47.2        Monoclonal gammopathy Monoclonal gammopathy Diagnosis 

   2021 12:00:00 

AM EST                                  NDOC ([facility])

 

                    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

ut thyrotoxic crisis or storm 

Thyrotoxicosis with diffuse goiter without thyrotoxic crisis or storm Diagnosis 

                

2021 12:00:00 AM EST              NDOC ([facility])

 

                F32.89          Other specified depressive episodes Other specif

ied depressive episodes 

Diagnosis                 2021 12:00:00 AM EST NDOC ([facility]

)

 

                N17.9           Acute kidney failure, unspecified Acute kidney f

ailure, unspecified 

Diagnosis                 2021 12:00:00 AM EST NDOC ([facility]

)

 

                    G31.84              Mild cognitive impairment, so stated Mil

d cognitive impairment, so stated

                    Diagnosis           2021 12:00:00 AM EST NDOC ([facility])

 

                          I25.10                    Atherosclerotic heart diseas

e of native coronary artery without angina 

pectoris                                Atherosclerotic heart disease of native 

coronary artery without angina 

pectoris            Diagnosis           2021 12:00:00 AM EST NDOC ([facility])

 

             I48.20       I48.20       Chronic atrial fibrillation, unspecified 

Diagnosis    2021 

12:00:00 AM EST                         NDOC ([facility])

 

                          I82.502                   Chronic embolism and thrombo

sis of unspecified deep veins of left lower 

extremity                               Chronic embolism and thrombosis of unspe

cified deep veins of left 

lower extremity     Diagnosis           2021 12:00:00 AM EST NDOC ([facility])

 

                    S81.802D            Unspecified open wound, left lower leg, 

subsequent encounter 

Unspecified open wound, left lower leg, subsequent encounter Diagnosis          

       

2021 12:00:00 AM EST              NDOC ([facility])

 

                    I50.23              Acute on chronic systolic (congestive) h

eart failure Acute on chronic 

systolic (congestive) heart failure Diagnosis           2021 12:00:00 AM E

ST NDOC 

([facility])

 

                    I11.0               Hypertensive heart disease with heart fa

ilure Hypertensive heart disease 

with heart failure  Diagnosis           2021 12:00:00 AM EST NDOC ([facility])

 

                    C90.00              Multiple myeloma not having achieved rem

ission Multiple myeloma not 

having achieved remission Diagnosis           2021 01:43:00 PM EDT [facility]

 

                    I87.312             [id number]     Chronic venous hyper

tension (idiopathic) with ulcer of 

left lower extremity Problem             10/21/2021 12:00:00 AM EDT eCW1 ([facility])

 

                    L97.822             [id number]            Non-pressure chronic

 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

 fat layer exposed  Problem             10/21/2021 12:00:00 AM EDT eCW1 ([facility])

 

             I27.81       Chronic cor pulmonale Chronic cor pulmonale Problem   

   2021 12:00:00 

AM EDT                                  MEDENT ([facility])

 

             I65.29       Carotid artery occlusion Carotid artery occlusion Prob

sam      2021 

12:00:00 AM EST                         MEDENT ([facility])

 

                    I82.503             Deep venous thrombosis of lower extremit

y Deep venous thrombosis of 

lower extremity     Problem             2021 12:00:00 AM EST [facility] ([facility]

 

             E78.2        Mixed hyperlipidemia Mixed hyperlipidemia Problem     

 2021 12:00:00 AM 

EST                                     MEDENT ([facility])



                                                                                
                                                                                
                                                                                
                                                                    



Surgeries/Procedures

          



             Procedure    Description  Date         Indications  Data Source(s)

 

             FINE NEEDLE ASPIRATION W/O IMAGING GUIDANCE              10/26/2021

 12:00:00 AM EDT              eCW 

([facility])

 

             FINE NEEDLE ASPIRATION W/O IMAGING GUIDANCE              10/21/2021

 12:00:00 AM EDT              eC 

([facility])

 

             OFFICE OUTPATIENT VISIT 15 MINUTES              10/19/2021 12:00:00

 AM EDT              MEDENT 

([facility])

 

             OFFICE OUTPATIENT VISIT 15 MINUTES              10/06/2021 12:00:00

 AM EDT              MEDENT 

([facility])

 

             Complex Chronic Care Management SVC 1St 60 Min              

021 12:00:00 AM EDT              

MEDENT ([facility])

 

             Complex Chronic Care MGMT Service Ea Addl 30 Min               12:00:00 AM EDT              

MEDENT ([facility])

 

             Complex Chronic Care Management SVC 1St 60 Min              

021 12:00:00 AM EDT              

MEDENT ([facility])

 

             Complex Chronic Care MGMT Service Ea Addl 30 Min               12:00:00 AM EDT              

MEDENT ([facility])

 

             ECG ROUTINE ECG W/LEAST 12 LDS W/I&R              2021 12:00:

00 AM EDT              MEDENT 

([facility])

 

             OFFICE OUTPATIENT VISIT 25 MINUTES              2021 12:00:00

 AM EDT              MEDENT 

([facility])

 

             ECG ROUTINE ECG W/LEAST 12 LDS W/I&R              2021 12:00:

00 AM EDT              MEDENT 

([facility])

 

             OFFICE OUTPATIENT VISIT 25 MINUTES              2021 12:00:00

 AM EDT              MEDREBECCA 

([facility])

 

             Chronic Care Management Services Ea Addl 20 Min              2021 12:00:00 AM EDT              

MEDENT ([facility])

 

                    Chronic Care MGMT 20 Mins Clinical Staff Time Per Calendar M

[facility]                     2021 

12:00:00 AM EDT                                     MEDENT ([facility]

)

 

             Complex Chronic Care Management SVC 1St 60 Min              

021 12:00:00 AM EDT              

MEDENT ([facility])

 

             Complex Chronic Care MGMT Service Ea Addl 30 Min               12:00:00 AM EDT              

MEDENT ([facility])

 

             OFFICE OUTPATIENT VISIT 25 MINUTES              2021 12:00:00

 AM EDT              MEDENT 

([facility] )

 

             Complex Chronic Care Management SVC 1St 60 Min              

021 12:00:00 AM EDT              

MEDENT ([facility])

 

             Complex Chronic Care MGMT Service Ea Addl 30 Min               12:00:00 AM EDT              

MEDENT ([facility])

 

             OFFICE OUTPATIENT VISIT 25 MINUTES              2021 12:00:00

 AM EDT              MEDENT 

([facility])

 

             OFFICE OUTPATIENT VISIT 25 MINUTES              06/10/2021 12:00:00

 AM EDT              MEDENT 

([facility], )

 

             Complex Chronic Care Management SVC 1St 60 Min              

021 12:00:00 AM EDT              

MEDENT ([facility])

 

             Complex Chronic Care MGMT Service Ea Addl 30 Min               12:00:00 AM EDT              

MEDENT ([facility])

 

             Complex Chronic Care Management SVC 1St 60 Min              

021 12:00:00 AM EDT              

MEDENT ([facility])

 

             Complex Chronic Care MGMT Service Ea Addl 30 Min               12:00:00 AM EDT              

MEDENT ([facility])

 

             OFFICE OUTPATIENT VISIT 25 MINUTES              2021 12:00:00

 AM EDT              MEDENT 

([facility])

 

             ARTHROCENTESIS ASPIR&/INJECTION MAJOR JT/BURSA              

021 12:00:00 AM EDT              

MEDENT ([facility] )

 

             RADEX SHOULDER COMPLETE MINIMUM 2 VIEWS              2021 12:

00:00 AM EDT              MEDENT 

([facility] )

 

             ARTHROCENTESIS ASPIR&/INJECTION MAJOR JT/BURSA              

021 12:00:00 AM EDT              

MEDENT ([facility] )

 

             Complex Chronic Care Management SVC 1St 60 Min              

021 12:00:00 AM EDT              

MEDENT ([facility])

 

             Complex Chronic Care MGMT Service Ea Addl 30 Min               12:00:00 AM EDT              

MEDENT ([facility])

 

             INJECTION 1 TENDON SHEATH/LIGAMENT APONEUROSIS              

021 12:00:00 AM EDT              

MEDENT ([facility] )

 

             ARTHROCENTESIS ASPIR&/INJECTION MAJOR JT/BURSA              

021 12:00:00 AM EDT              

MEDENT ([facility] )

 

             RADIOLOGIC EXAM KNEE COMPLETE 4/MORE VIEWS              2021 

12:00:00 AM EDT              MEDENT

 ([facility] )

 

             RADIOLOGIC EXAM KNEE COMPLETE 4/MORE VIEWS              2021 

12:00:00 AM EDT              MEDENT

 ([facility])

 

             Mammogram                 2021 12:00:00 AM EDT              M

CYNTHIA ([facility])

 

             Bone Mineral Density Test              2021 12:00:00 AM EDT  

            MEDENT ([facility])

 

                    Brief Emotional/Behav Assessment W/ Scoring Doc Per Standard

 Inst                     2021 

12:00:00 AM EDFAROOQ DALE ([facility]

)

 

             OFFICE OUTPATIENT VISIT 25 MINUTES              2021 12:00:00

 AM EDT              MEDENT 

([facility])

 

             OFFICE OUTPATIENT NEW 45 MINUTES              2021 12:00:00 A

M EST              MEDENT 

([facility], )

 

             ECG ROUTINE ECG W/LEAST 12 LDS W/I&R              2021 12:00:

00 AM EST              MEDENT 

([facility])

 

             OFFICE OUTPATIENT VISIT 15 MINUTES              2021 12:00:00

 AM EST              MEDENT 

([facility])

 

             Complex Chronic Care Management SVC 1St 60 Min              

021 12:00:00 AM EST              

MEDENT ([facility])

 

             Complex Chronic Care MGMT Service Ea Addl 30 Min               12:00:00 AM EST              

[facility]

 

             OFFICE OUTPATIENT VISIT 25 MINUTES              2021 12:00:00

 AM EST              [facility] 

([facility]

 

             Complex Chronic Care Management SVC 1St 60 Min              

020 12:00:00 AM EST              

[facility] ([facility])

 

             Complex Chronic Care MGMT Service Ea Addl 30 Min               12:00:00 AM EST              

[facility] ([facility])

 

                    Brief Emotional/Behav Assessment W/ Scoring Doc Per Standard

 Inst                     10/29/2020 

12:00:00 AM EDT                                     [facility]

)



                                                                                
                                                                                
                                                                                
                                                                                
                                                                                
                                                                                
                                                                              



Results

          



                    ID                  Date                Data Source

 

                    [id number]          2021 06:14:00 PM EST [facility] ([facility])









          Name      Value     Range     Interpretation Code Description Data Milly

rce(s) Supporting 

Document(s)

 

           Laboratory test finding (navigational concept) 0.03 ng/mL 0.00-0.08  

                      [facility] 

([facility]                   

 

             Troponin I.cardiac [Mass/volume] in Serum or Plasma Laboratory test

 result                            

                          [facility] ([facility]  









                    ID                  Date                Data Source

 

                    [id number]          2021 06:00:00 PM EST [facility]









          Name      Value     Range     Interpretation Code Description Data Milly

rce(s) Supporting 

Document(s)

 

           Laboratory test finding (navigational concept) 98 mg/dL        

                      MED[facility] 

([facility])                   

 

           Laboratory test finding (navigational concept) 41.0 %     38.0-51.0  

                      [facility] 

([facility])                   

 

           Laboratory test finding (navigational concept) 143 meq/L  136-145    

                      [facility] 

([facility])                   

 

           Laboratory test finding (navigational concept) 4.2 meq/L  3.5-5.1    

                      [facility] 

([facility])                   

 

           Laboratory test finding (navigational concept) 5.1 mg/dL  4.5-5.3    

                      [facility] 

([facility])                   

 

           Laboratory test finding (navigational concept) 108 meq/L       

                      [facility] 

([facility])                   

 

           Laboratory test finding (navigational concept) 24.0 MM/L  23.0-27.0  

                      [facility] 

([facility])                   

 

           Laboratory test finding (navigational concept) 25 mg/dL   8-26       

                      [facility] 

([facility])                   

 

           Laboratory test finding (navigational concept) 0.9 mg/dL  0.6-1.3    

                      [facility] 

([facility])                   









                    ID                  Date                Data Source

 

                    [id number]          2021 04:53:00 PM EST [facility] ([facility])









          Name      Value     Range     Interpretation Code Description Data Milly

rce(s) Supporting 

Document(s)

 

           Magnesium [Moles/volume] in Serum or Plasma 2.3 mg/dL  1.8-2.4       

                   [facility] 

([facility])                   

 

                          Natriuretic peptide.B prohormone N-Terminal [Mass/volu

me] in Serum or Plasma 

4465 pg/mL                                          MEDENT ([facility]

)  









                    ID                  Date                Data Source

 

                    [id number]          2021 04:53:00 PM EST MED[facility] ([facility])









          Name      Value     Range     Interpretation Code Description Data Milly

rce(s) Supporting 

Document(s)

 

          White Blood Count 4.9 10    4.0-10.0                      MEDENT ([facility])  

 

          Red Blood Count 4.17 10   4.00-5.40                     MED[facility] ([facility])  

 

          Hemoglobin 13.2 g/dL 12.0-15.5                     MEDENT ([facility]

nternis)  

 

          Mean Corpuscular Volume 102.2 fl  80.0-96.0                     MED[facility]

 ([facility])  

 

          Hematocrit 42.6 %    36.0-47.0                     MEDENT ([facility]

nternists)  

 

          Mean Corpuscular HGB Conc 31.0 g/dL 32.0-36.5                     MEDE

NT ([facility]) 

 

 

          Mean Corpuscular Hemoglobin 31.7 pg   27.0-33.0                     ME

DENT ([facility]) 

 

 

          Red Cell Distribution Width 15.2 %    11.5-14.5                     ME

DENT ([facility])  

 

          Neutrophils % 61.9 %    36.0-66.0                     MEDENT ([facility])  

 

          Platelet Count, Automated 226 10    150-450                       MEDE

NT ([facility])  

 

          Lymph %   27.8 %    24.0-44.0                     [facility]  

 

          Mono %    7.9 %     2.0-8.0                       [facility]

[facility])  

 

          Baso %    0.4 %     0.0-1.0                       [facility]  

 

          Eos %     1.8 %     0.0-3.0                       [facility] ([facility]

[facility])  

 

          Immature Granulocyte % 0.2 %     0-3.0                         [facility] 

([facility])  

 

          Nucleated Red Blood Cell % 0.0 %     0-0                           MED

ENT ([facility])  

 

          Neutrophils # 3.1 10    1.5-8.5                       MEDENT ([facility])  

 

          Lymph #   1.4 10    1.5-5.0                       [facility]  

 

          Eos #     0.1 10    0.0-0.5                       [facility]  

 

          Mono #    0.4 10    0.0-0.8                       [facility] ([facility]

[facility])  

 

          Baso #    0.0 10    0.0-0.2                       [facility]  









                    ID                  Date                Data Source

 

                    [id number]          2021 12:23:00 PM EST [facility] ([facility])









          Name      Value     Range     Interpretation Code Description Data Milly

rce(s) Supporting 

Document(s)

 

           Digoxin [Mass/volume] in Serum or Plasma 0.1 ng/mL  0.5-2.0          

                [facility] ([facility])                             

 

                                        <content>note:<nlbl:demographic_changed>

</content><br/><content></content> 









                    ID                  Date                Data Source

 

                    [id number]          2021 12:23:00 PM EST [facility] ([facility])









          Name      Value     Range     Interpretation Code Description Data Milly

rce(s) Supporting 

Document(s)

 

           Glucose [Mass/volume] in Serum or Plasma 113 mg/dL  74-99            

                [facility] ([facility])                              

 

                                        100-125 mg/dL     PRE-DIABETES/FASTING

>126 mg/dL          DIABETES/FASTING

 

 

           Sodium [Moles/volume] in Serum or Plasma 145 meq/L  136-145          

                [facility] ([facility])                             

 

          Creatinine 1.0 mg/dL 0.6-1.3                       [facility] ([facility]

[facility])  

 

           Urea nitrogen [Mass/volume] in Serum or Plasma 14 mg/dL   7-18       

                      [facility] 

([facility])                   

 

           Chloride [Moles/volume] in Serum or Plasma 105 meq/L           

                  [facility] 

([facility])                   

 

           Potassium [Moles/volume] in Serum or Plasma 4.1 meq/L  3.5-5.1       

                   [facility] 

([facility])                   

 

           Calcium [Mass/volume] in Serum or Plasma 10.5 mg/dL 8.5-10.1         

                [facility] 

([facility])                   

 

                                        NOTE:

RESULT VERIFIED.





 

 

           Carbon dioxide, total [Moles/volume] in Serum or Plasma 31 meq/L   21

-32                            

[facility] ([facility])            

 

          Total Bilirubin 0.4 mg/dL 0.2-1.0                       [facility] ([facility])  

 

                    Alkaline phosphatase isoenzyme [Units/volume] in Serum or Pl

asma 110 mg/dL           

                                          [facility] ([facility])  

 

                          Aspartate aminotransferase [Enzymatic activity/volume]

 in Serum or Plasma 22 U/L

             15-37                                  [facility] ([facility]

)  

 

                    Alanine aminotransferase [Enzymatic activity/volume] in Seru

m or Plasma 27 U/L              

12-78                                           [facility] ([facility])  

 

           Proteinase 3 Ab [Units/volume] in Serum 8.3 g/dL   6.4-8.2           

               [facility] ([facility]                              

 

           Albumin [Mass/volume] in Serum or Plasma 2.8 g/dL   3.4-5.0          

                [facility] ([facility]                              

 

                                        NOTE:

RESULT VERIFIED.





 

 

                                        Glomerular filtration rate/1.73 sq M pre

dicted among non-blacks [Volume 

Rate/Area] in Serum or Plasma by Creatinine-based formula (MDRD) 55 mL/min      

                                  

                          [facility] ([facility])  

 

          A/G Ratio 0.51 CALC 1.00-1.90                     [facility]  

 

                                        Glomerular filtration rate/1.73 sq M pre

dicted among blacks [Volume Rate/Area] 

in Serum or Plasma by Creatinine-based formula (MDRD) Laboratory test result    

                  

                                        [facility]  

 

                                        <content>CHRONIC KIDNEY DISEASE STAGING 

PER 

NKF</content><br/><content></content><br/><content>STAGE I & II      GFR >= 60  
     NORMAL TO MILDLY DECREASED</content><br/><content>STAGE III          GFR 
30-59          MODERATELY DECREASED</content><br/><content>STAGE IV           
GFR 15-29         SEVERELY DECREASED</content><br/><content>STAGE V            
GFR <15            VERY LITTLE GFR LEFT</content><br/><content>ESRD             
   GFR <15            ON RRT</content><br/><content></content> 









                    ID                  Date                Data Source

 

                    [id number]          2021 12:23:00 PM EST [facility] ([facility])









          Name      Value     Range     Interpretation Code Description Data Milly

rce(s) Supporting 

Document(s)

 

          Magnesium 2.0 mg/dL 1.8-2.4                       [facility] ([facility])  









                    ID                  Date                Data Source

 

                    [id number]          2021 12:23:00 PM EST [facility] ([facility])









          Name      Value     Range     Interpretation Code Description Data Milly

rce(s) Supporting 

Document(s)

 

           Leukocytes [#/volume] in Blood by Automated count 5.6 x10*3/UL 4.1-10

.9                         

[facility] ([facility]            

 

           Erythrocytes [#/volume] in Blood by Automated count 4.36 x10*6/UL 4.2

0-6.30                        

[facility] ([facility]            

 

           Hematocrit [Volume Fraction] of Blood by Automated count 40.9 %     3

7.0-51.0                        

[facility] ([facility]            

 

          MCV       93.9 fL   80.0-97.0                     [facility] ([facility])  

 

           Hemoglobin [Mass/volume] in Blood 14.0 g/dL  12.0-18.0               

         [facility]                              

 

          MCHC      34.1 g/dL 31.0-38.0                     [facility]  

 

          MCH       32.0 pg   26.0-32.0                     [facility])  

 

           Platelets [#/volume] in Blood by Automated count 233 x10*3/-440

                          [facility]

 ([facility])                  

 

           Erythrocyte distribution width [Ratio] by Automated count 14.0 %     

11.6-13.7                        

[facility] ([facility])            

 

          MPV       8.9 FL    7.8-11.0                      [facility])  

 

          Lymph %   20.4 %    10.0-58.5                     [facility])  

 

          Neut %    74.4 %    37.0-92.0                     [facility] ([facility])  

 

          Lymph #   1.1 x10*3/UL 0.6-4.1                       [facility] ([facility])  

 

          Mid %     5.2 %     1.7-9.3                       [facility]

[facility])  

 

          Mid #     0.3 x10*3/UL 0.1-0.6                       [facility] ([facility])  

 

          Neut #    4.2 x10*3/UL 2.0-7.8                       [facility] ([facility])  









                    ID                  Date                Data Source

 

                    [id number]            10/26/2021 04:20:00 PM EDT [facility]









          Name      Value     Range     Interpretation Code Description Data Milly

rce(s) Supporting 

Document(s)

 

          SARS-CoV-2 (COVID 19) NEGATIVE - SARS-CoV-2 (COVID19)                 

              [facility]     

 

                                        This lab was ordered by [facility] a

nd reported by [facility].

 









                    ID                  Date                Data Source

 

                    [id number]          10/25/2021 03:51:00 PM EDT [facility]









          Name      Value     Range     Interpretation Code Description Data Milly

rce(s) Supporting 

Document(s)

 

           Influenza A Amplification Laboratory test result                     

             [facility]                              

 

                                        Negative results do not preclude influen

za or RSV virus

infection and should not be used as the sole basis for

treatment or other patient management decisions.

 

 

           Influenza B Amplification Laboratory test result                     

             [facility] ([facility]                              

 

                                        Negative results do not preclude influen

za or RSV virus

infection and should not be used as the sole basis for

treatment or other patient management decisions.

 

 

           Laboratory test finding (navigational concept) Laboratory test result

                                  

[facility] ([facility])            

 

                                        A false negative result may occur if a s

pecimen is

improperly collected, transported or handled. False negative

results may also occur if inadequate numbers of organisms

are present in the specimen.  As with any molecular test,

mutations within the target regions of Xpert Xpress

SARS-CoV-2 could affect primer and/or probe binding

resulting in failure to detect the presence of virus.  This

test cannot rule out diseases caused by other bacterial or

viral pathogens.



DISCLAIMER:

Testing was performed using the [employer] SARS-CoV-2 test.

This test was developed and its performance characteristics

determined by [employer]. This test has not been FDA cleared or

approved. This test has been authorized by FDA under an

Emergency Use Authorization (EUA). This test is only

authorized for the duration of time the declaration that

circumstances exist justifying the authorization of the

emergency use of in vitro diagnostic tests for detection of

SARS-CoV-2 virus and/or diagnosis of COVID-19 infection

under section 564(b)(1) of the Act, 21 U.S.C.

                                        360bbb-3(b)(1), unless the authorization

 is terminated or

revoked sooner.

 

 

          RSV Amplification Laboratory test result                              

 [facility] ([facility])  

 

                                        Negative results do not preclude influen

za or RSV virus

infection and should not be used as the sole basis for

treatment or other patient management decisions.

 









                    ID                  Date                Data Source

 

                    [id number]            10/25/2021 03:51:00 PM EDT [facility]









          Name      Value     Range     Interpretation Code Description Data Milly

rce(s) Supporting 

Document(s)

 

                                        SARS coronavirus 2 RNA [Presence] in Res

piratory specimen by OR[facility]IDEA with probe 

detection  NEGATIVE                                    [facility]      

 

                                        This lab was ordered by [facility] a

nd reported by [facility].

 









                    ID                  Date                Data Source

 

                    [id number]          10/25/2021 03:01:00 PM EDT [facility] ([facility])









          Name      Value     Range     Interpretation Code Description Data Milly

rce(s) Supporting 

Document(s)

 

          White Blood Count 6.3 10    4.0-10.0                      [facility] ([facility])  

 

          Hemoglobin 12.9 g/dL 12.0-15.5                     [facility]  

 

          Red Blood Count 4.12 10   4.00-5.40                     [facility] ([facility])  

 

          Hematocrit 41.3 %    36.0-47.0                     [facility]  

 

          Mean Corpuscular Hemoglobin 31.3 pg   27.0-33.0                     [facility]

[facility] ([facility]) 

 

 

          Mean Corpuscular Volume 100.2 fl  80.0-96.0                     [facility]

 ([facility])  

 

          Red Cell Distribution Width 14.8 %    11.5-14.5                     [facility]

[facility] ([facility])  

 

          Mean Corpuscular HGB Conc 31.2 g/dL 32.0-36.5                     [facility]

[facility] ([facility]) 

 

 

          Neutrophils % 79.5 %    36.0-66.0                     [facility] ([facility])  

 

          Platelet Count, Automated 202 10    150-450                       [facility]

[facility] ([facility])  

 

          Lymph %   13.8 %    24.0-44.0                     [facility]  

 

          Mono %    4.8 %     2.0-8.0                       [facility]  

 

          Eos %     1.1 %     0.0-3.0                       [facility]  

 

          Immature Granulocyte % 0.5 %     0-3.0                         [facility] 

([facility])  

 

          Baso %    0.3 %     0.0-1.0                       [facility]  

 

          Neutrophils # 5.0 10    1.5-8.5                       [facility] ([facility])  

 

          Nucleated Red Blood Cell % 0.0 %     0-0                           [facility]

[facility] ([facility])  

 

          Mono #    0.3 10    0.0-0.8                       [facility]  

 

          Lymph #   0.9 10    1.5-5.0                       [facility]  

 

          Eos #     0.1 10    0.0-0.5                       [facility]  

 

          Baso #    0.0 10    0.0-0.2                       [facility]  









                    ID                  Date                Data Source

 

                    [id number]          10/25/2021 03:01:00 PM EDT [facility] ([facility])









          Name      Value     Range     Interpretation Code Description Data Milly

rce(s) Supporting 

Document(s)

 

          CPK Creatine Phosphokinase 26 U/L                            [facility]

[facility] ([facility])  

 

          CK-MB Value Mass 1.0 ng/mL                               [facility] ([facility]  

 

          MB/CK Relative Index 3.85                                    [facility] ([facility]

[facility])  

 

                                        <content>DIAGNOSIS CRITERIA</content><br

/><content>MMB ng/ml       Relative 

Index (RI)</content><br/><content>NON-AMI               < or = 5               
N/A</content><br/><content>GRAY ZONE              > 5                < or = 
4</content><br/><content>AMI                    > 5                   > 
4</content><br/><content></content> 

 

          Troponin I 0.02 ng/mL                               [facility]  

 

                                        <content>Troponin I Reference Interval f

or Siemens [place]deen 

LOCI:</content><br/><content></content><br/><content>99th Percentile= 0.00-0.045
 ng/ml</content><br/><content></content><br/><content>Risk 
Stratification:</content><br/><content><= 0.10 ng/ml   Decreased Risk for 
Adverse Clinical</content><br/><content>Events.</content><br/><content>0.10-1.50
 ng/ml   Increased Risk for Adverse Clinical</content><br/><content>Events. 
Evaluation of additional</content><br/><content>criterion and/or repeat testing 
in 2-6</content><br/><content>hours is suggested to rule out 
myocardial</content><br/><content>damage.</content><br/><content>>= 1.50 ng/ml  
 Indicative of Myocardial Injury.</content><br/><content></content> 









                    ID                  Date                Data Source

 

                    [id number]          10/25/2021 03:01:00 PM [facility]









          Name      Value     Range     Interpretation Code Description Data Milly

rce(s) Supporting 

Document(s)

 

          Glucose, Fasting 106 mg/dL                         [facility] ([facility])  

 

          Blood Urea Nitrogen 18 mg/dL  7-18                          [facility] ([facility])  

 

          Creatinine For GFR 0.94 mg/dL 0.55-1.30                     [facility] ([facility])  

 

           Glomerular Filtration Rate Laboratory test result                    

              [facility] ([facility])                              

 

                                        <content>Units are mL/min/1.73 

m2</content><br/><content></content><br/><content>Chronic Kidney Disease Staging
 per NKF:</content><br/><content></content><br/><content>Stage I & II   GFR >=60
       Normal to Mildly Decreased</content><br/><content>Stage III      GFR 30-
59      Moderately Decreased</content><br/><content>Stage IV       GFR 15-29    
  Severely Decreased</content><br/><content>Stage V        GFR <15        Very 
Little GFR Left</content><br/><content>ESRD           GFR <15 on 
RRT</content><br/><content></content> 

 

          Sodium Level 140 meq/L 136-145                       [facility]  

 

          Chloride Level 111 meq/L                         [facility] ([facility])  

 

          Potassium Serum 4.6 meq/L 3.5-5.1                       [facility] ([facility])  

 

          Anion Gap 7 meq/L   8-16                          [facility]

[facility])  

 

          Carbon Dioxide Level 22 meq/L  21-32                         [facility] ([facility]  

 

          Calcium Level 10.7 mg/dL 8.8-10.2                      [facility] ([facility])  









                    ID                  Date                Data Source

 

                    [id number]          10/25/2021 03:01:00 PM EDT [facility] ([facility])









          Name      Value     Range     Interpretation Code Description Data Milly

rce(s) Supporting 

Document(s)

 

           Digoxin [Mass/volume] in Serum or Plasma 0.5 ng/mL  0.5-2.0          

                [facility] ([facility]                             

 

                                        <content>note:<nlbl:demographic_changed>

</content><br/><content></content> 

 

                          Natriuretic peptide.B prohormone N-Terminal [Mass/volu

me] in Serum or Plasma 

5817 pg/mL                                          [facility] ([facility]

)  

 

                                        <content>note:<nlbl:demographic_changed>

</content><br/><content></content> 

 

           Thyroxine (T4) Ab [Units/volume] in Serum 10.1 ug/dL 4.5-12.0        

                 [facility] 

([facility])                   

 

                                        <content>note:<nlbl:demographic_changed>

</content><br/><content></content> 

 

             Flecainide [Mass/volume] in Serum or Plasma Laboratory test result 

0.20-1.00                  

                          [facility] ([facility])  

 

                                        This test was developed and its performa

nce characteristics

determined by [facility]. It has not been cleared or

approved by the Food and Drug Administration.

Detection Limit = 0.10

Performed at:  [facility]  [id number]

[id number]

: Barak Archibald MD, Phone:  [phone]

 

 

                          Thyrotropin [Units/volume] in Serum or Plasma by Detec

tion limit <= 0.05 mIU/L 

0.051 uIU/ML 0.358-3.740                            [facility] ([facility]

)  

 

                                        <content>note:<nlbl:demographic_changed>

</content><br/><content></content> 









                    ID                  Date                Data Source

 

                    [id number]          10/25/2021 03:01:00 PM EDT [facility] ([facility])









          Name      Value     Range     Interpretation Code Description Data Milly

rce(s) Supporting 

Document(s)

 

          Ast/Sgot  26 U/L    7-37                          [facility] ([facility]

[facility])  

 

          Alt/SGPT  28 U/L    12-78                         [facility] ([facility])  

 

          Alkaline Phosphatase 105 U/L                           [facility] ([facility])  

 

          Bilirubin,Direct 0.4 mg/dL 0.0-0.2                       [facility]  

 

          Bilirubin,Total 0.8 mg/dL 0.2-1.0                       [facility] ([facility])  

 

          Total Protein 7.9 GM/DL 6.4-8.2                       [facility] ([facility])  

 

          Albumin   2.8 GM/DL 3.2-5.2                       [facility]  

 

          Albumin/Globulin Ratio 0.5       1.2-2.2                       [facility] 

([facility])  









                    ID                  Date                Data Source

 

                    [id number]          10/06/2021 02:57:00 PM EDT [facility] ([facility])









          Name      Value     Range     Interpretation Code Description Data Milly

rce(s) Supporting 

Document(s)

 

                Bacteria identified in Wound shallow by [facility] culture Laborator

y test result                 

                                        [facility] ([facility])  









                    ID                  Date                Data Source

 

                    [id number]            2021 12:29:00 PM EDT [facility] ([facility])









          Name      Value     Range     Interpretation Code Description Data Milly

rce(s) Supporting 

Document(s)

 

          White Blood Count 6.8       5.0-10.0                      [facility] ([facility])  

 

          Platelets 187       172-450                       [facility] ([facility]  

 

          Red Blood Count 3.85      4.00-5.40                     [facility] ([facility])  

 

          Hemoglobin 12.7                                    [facility] ([facility]  

 

          Hematocrit 39.4                                    [facility] ([facility]  









                    ID                  Date                Data Source

 

                    [id number]          2021 12:21:00 PM EDT [facility] ([facility])









          Name      Value     Range     Interpretation Code Description Data Milly

rce(s) Supporting 

Document(s)

 

          Red Blood Count 3.85 10   4.00-5.40                     [facility] ([facility])  

 

          White Blood Count 6.8 10    4.0-10.0                      [facility] ([facility])  

 

          Hemoglobin 12.7 g/dL 12.0-15.5                     [facility]  

 

          Hematocrit 39.4 %    36.0-47.0                     [facility]  

 

          Mean Corpuscular Volume 102.3 fl  80.0-96.0                     [facility]

 ([facility])  

 

          Mean Corpuscular Hemoglobin 33.0 pg   27.0-33.0                     [facility]

[facility] ([facility]) 

 

 

          Red Cell Distribution Width 12.3 %    11.5-14.5                     [facility]

[facility] ([facility])  

 

          Mean Corpuscular HGB Conc 32.2 g/dL 32.0-36.5                     [facility]

[facility] ([facility]) 

 

 

          Neutrophils % 72.2 %    36.0-66.0                     [facility] ([place]

[facility]  

 

          Platelet Count, Automated 187 10    150-450                       [facility]

[facility] ([facility])  

 

          Mono %    6.0 %     2.0-8.0                       [facility]  

 

          Lymph %   19.9 %    24.0-44.0                     [facility]  

 

          Eos %     1.5 %     0.0-3.0                       [facility]  

 

          Baso %    0.3 %     0.0-1.0                       [facility]  

 

          Immature Granulocyte % 0.1 %     0-3.0                         [facility] 

([facility])  

 

          Nucleated Red Blood Cell % 0.0 %     0-0                           [facility]

[facility] ([facility])  

 

          Neutrophils # 4.9 10    1.5-8.5                       [facility]

[facility]  

 

          Lymph #   1.4 10    1.5-5.0                       [facility] [facility]  

 

          Mono #    0.4 10    0.0-0.8                       [facility] [facility]  

 

          Baso #    0.0 10    0.0-0.2                       [facility] [facility]  

 

          Eos #     0.1 10    0.0-0.5                       [facility] [facility]  









                    ID                  Date                Data Source

 

                    [id number]           2021 05:25:00 PM EDT [facility]

 

                                        Hematopathology Report See Addendum [facility]

wName: BENTLEY JOSEPH L.MRN: 

[id number] Number: [id number] Date: 2021 00:00Received Date: 
7/15/2021 13:57Physician(s): ROSA ROCHA MD ADJAPONG[facility] 
[facility]) ReceivedA: Bone Marrow, Flow Cytometry; 
RECEIVED 1 GREEN TOP BM, 2 ASP AND 1 PBSMEAR (1 EXTRA EDTA BM SENT TO 
MOLECULAR)Clinical HistoryMultiple myeloma.TEST REQUESTED/PERFORMED: Flow 
cytometry analysis DiagnosisFlow cytometry of bone marrow: Dilute specimen with 
small population oflambda restricted plasma cells (1% on bone marrow aspirate), 
consistentwith plasma cell neoplasm. Correlation with full bone marrow biopsy 
isrecommended. FISH study is pending. Halie Gentile M.D.;Resident 
PathologistElectronically Signed By ROSALINDA MARIE M.D. Attending Pathologist  
117:25:03The attending pathologist named above attests that he/she has 
personallyreviewed the relevant preparation(s) for the specimen(s) and rendered 
thefinal diagnosis. Addendum     2021     FISH identified gain of 1q, 
trisomy 5, and monosomy 13 in a plasmacell-enriched population. FISH was 
negative for gain/loss of chromosome 7and 9, deletion of IgH and TP53, and an 
IgH rearrangement. See [id number] of 1q and deletion of 13q are associated 
with progression. Pzbkyzsl15w is generally associated with an intermediate risk 
and 1q gain isassociated with high risk.     Addendum Electronically Signed By: 
         Nadege Eddy M.D.          2021 11:16  ProceduresFlow Cytometry 
    Date Ordered:7/15/2021     Status:   Signed Out2021 
InterpretationPERIPHERAL BLOOD: CBC performed at [facility] 
(21)WBC           6.8     K/uLRBC          *3.85     M/uLHgb           12.7
     g/dLHct           39.4     %MCV          *102.3     fLMCH           33.0   
  pgMCHC           32.2     g/dLRDW           12.3     %Platelets      187     
K/ulDifferential Count (automated):72.2  % Neutrophils 1.5  % Eosinophils 0.3  %
 Dwnyatzum04.9  % Lymphocytes 0.1  % Atypical Lymphocytes 6.0  % 
Monocytes-------100.0 %     A peripheral blood film is reviewed.  BONE MARROW 
ASPIRATE:Differential Count (100 cells): 6  % Erythroid Precursors 2  % N. 
Myelocytes 1  % N. Metamyelocytes and Band Forms80  % Neutrophils 4  % 
Eosinophils 1  % Basophils 5  % Lymphocytes 1  % Plasma Cells--------100 %  
Morphology: Dilute sample.Lymphoid Panel: Jone Harrington 21 1829 [id number] 
following markers were assayed: CD45 (gate), CD2, CD3, CD4, CD5, CD7,CD8, CD10, 
CD19, CD20, CD33, CD34, CD38, CD56, CD57, CD64, , ,HLA-DR, Kappa, and 
Lambda.#events: [id number]: 98%Flow Cytometry Differential 
(CD45/SSC)Lymphocyte Sorento: 15%CD45 dim Sorento: 1%Monocyte Sorento: 4%Granulocyte 
Sorento: 73%Nucleated/Erythroid Sorento: 2%The lymphocyte gate showsB-cells (CD19): 
14%T-cells (CD3): 75%NK-cells (CD3-/CD56+): 9%Kappa/Lambda Ratio: 2.9CD4/CD8 
Ratio: 1.8Results: (expressed as % of lymphocyte gate)T-cell Markers: CD2 = 80, 
CD3 = 75, CD3/CD4 = 48, CD3/CD8 = 27, CD5 = 74,CD7 = 75, CD3/57 = 8B-cell yelena
ers: Kappa = 9, Lambda = 3, CD19 = 14, CD20 = 16, CD19/10 = 1,CD19/CD5 = 3, 
CD38/CD20 = 15Light chain as % of B-Cells: CD19/Kappa = 54, CD19/Lambda = 
17CD19/CD5/Kappa = 4, CD19/CD5/Lambda = 5CD19/CD10/Kappa = 7, CD19/CD10/Lambda =
 1NK cell Markers: CD56 = 12, CD57 = 13Other Markers: CD10 = 1, CD38 = 
63Results: (expressed as % of CD45 dim gate)T-cell Markers: CD2 = 27, CD3 = 8, 
CD3/CD4 = 2, CD3/CD8 = 4, CD5 = 10, CD7= 26, CD3/57 = 2B-cell markers: Kappa = 
29, Lambda = 0, CD19 = 16, CD20 = 14, CD19/10 =12, CD19/CD5 = 3, CD38/CD20 = 
11Light chain as % of B-Cells: CD19/Kappa = 0, CD19/Lambda = 0CD19/CD10/Kappa = 
6, CD19/CD10/Lambda = 2NK cell Markers: CD56 = 16, CD57 = 13Basophil Markers: 
 (HLA-DR-) = 19Other Markers: CD10 = 25, CD38 = 85, CD33 = 47, CD34 = 23, 
CD64 = 20, = 5,  = 56, HLA-DR = 57Myeloma Panel for Jone Harrington MM 
 [id number] following markers were assayed: CD45 (cell gate),  
(plasma cellgate), CD19, CD20, CD38, CD56, cytoplasmic Kappa, and cytoplasmic La
mbda.(Please note, that Cytoplasmic Kappa and Cytoplasmic Lambda are used 
todetect plasma cells, while surface Kappa and Lambda are for 
lymphocytes).#events: [id number]: Routinely a minimum of 500,000 events are 
collected in each paneltube. Due to sample cellularity and/or processing this 
number was notachievable for this sample.Flow Cytometry Differential:Lymphocyte 
Sorento: 13%CD45 dim Sorento: 2%Monocyte Sorento: 4%Granulocyte Sorento: 74%NRBC Sorento: 
3% Plasma Cell Sorento: 0%Results: (expressed as % of lymphocyte gate)B-Cells 
(CD19): 14% CD19 = 14, CD20 = 14Light chain as % of B-Cells: Cytoplasmic Kappa/
CD20 = 49, CytoplasmicLambda/CD20 = 26Results: (expressed as % of total + 
plasma cell gate)CD38 = 64, CD56 = 40, CD38/56 = 37, CD19 = 12, CD20 = 
5Cytoplasmic Kappa/ = 0, Cytoplasmic Lambda/ = 67    Results-
CommentsLymphocytes consist predominantly of T cells with normal expression of 
panT-cell markers and a normal CD4/CD8 ratio, normal proportions of NK 
andcytotoxic T cells, and polyclonal B cells.CD34+ blasts comprise fewer than 1%
 of cells studied. Blasts, monocytes,and granulocytes show no definitive 
immunophenotypic aberrancies.Plasma cell-associated markers show very small 
population of lambdarestricted plasma cells comprising less than 1% of cells, 
mostly negativefor CD19 and positive for CD56. Procedure Electronically Signed 
By:ROSALINDA MARIE M.D.2021 This report may include one or more 
immunohistochemical stain/fluorochromeconjugated monoclonal antibody results 
that use analyte specific reagents.All positive and negative controls have been 
reviewed by the attendingpathologist and are satisfactory. The tests were 
developed and theirperformance characteristics determined by [facility] 
department.They have not been cleared or approved by the US Food and DrugAdminis
tration. The FDA has determined that such clearance or approval isnot necessary.
 









          Name      Value     Range     Interpretation Code Description Data Milly

rce(s) Supporting 

Document(s)

 

                                                                       









                    ID                  Date                Data Source

 

                    [id number]            2021 01:04:00 PM [facility]

 

                                        Cytogenetics ReportName: JOSEPH BENTLEYMRN: [id number] Number: [id number]

[id number] Date: 2021 00:00Received Date: 7/15/2021 13:50Physician(s): 
ROSA ROCHA MD ADJAPONG, OPOKU, MDSpecimen(s) ReceivedA: Bone Marrow - Karyo 
and Multiple Myel  FISHClinical History66-year-old patient with multiple 
myeloma.TEST REQUESTED/PERFORMED:  Chromosome analysis and fluorescence in 
situhybridization (FISH)  DiagnosisFISH identified 11% of nuclei with gain of 
1q; 11% with trisomy 5; and 10%with monosomy 13 in this plasma cell-enriched 
population of cells. FISHwas negative for gains or losses of chromosomes 7 and 
9; deletions qo45b33.3 (IgH), and 17p13 (TP53); and an IgH rearrangement.Gains 
of chromosome 1q in plasma cell dyscrasia/multiple myeloma occur inup to 40% of 
cases, are secondary aberrations and are associated withprogression. 13q 
deletions are detected in approximately 50% of MM and arealso secondary events, 
although their presence in monoclonal gammopathy ofuncertain significance (MGUS)
 suggests they occur early in diseaseprogression. While deletion 13q is 
generally associated with anintermediate risk, 1q gain is often characterized as
 high risk (1-3).Please correlate with the concurrent Hematopathology report, 
[id number]. References:1.          Kailee GALLEGOS. 1q rearrangements in multiple 
myeloma. AtlasGenet Cytogenet Oncol Haematol. 
1998;2(3):86-87.http://AtlasGeneticsOncology.org/[email or web address].html. 
Lastvisited .2.     Maryam & Blake. Mature B- and T-cell neoplasms 
and Hodgkinlymphoma. In Cancer Cytogenetics: Chromosomal and Molecular 
GeneticAberrations of Tumor Cells, 4th Edition. Nataliya & Ty, eds. [employer] 
[employer]. 2015. 3.     Wilda LOVE. Multiple myeloma: 2020 update on 
diagnosis,risk-stratification and management. Am J Hematol. 2020 95(11):1444.  
Electronically Signed By Lroenzo Robles, PhD Attending Pathologist 2021 
13:04:45Gross DescriptionFluorescence in situ Hybridization (FISH)multiple 
myeloma FISH panel:nucish(NPQY1Aj1,CKS1Bx
3)[],(5p15.31,EGR1)x3[],(J27J923,13q34)x1[10/100],(IgHx2)[],(T

P53,D17Z1)x2[]     CEP 7/E8C288 (7q31):nuc maggie(D7Z1,R6B299)x2[] CEP 
9:nuc maggie(D9Z1x2)[]DescriptionA plasma cell-enriched population evaluated 
by FISH: gain of 1q (11%);trisomy 5 (11%); monosomy 13 (10%); negative for gains
 or losses ofchromosomes 7 and 9; deletions IgH, and TP53; and IgH 
rearrangement.         
________________________________________________________________________________

_____________Test Data:Fluorescence in situ Hybridization (FISH):  Enriched 
Plasma Cells     Probe Normal Cut-off Nuclei  Analyzed FISH SIGNAL PATTERNS     
    Normal Abnormal NKCS      *LSI CDKN2C (1p22.3) G  LSI CKS1B (1q21.3) O 3% 
100 2G2O:  84% 2G3O:  11% 5%       *LSI 5p15.31 G  LSI EGR1 (5q31.2) R 3% 100 
2G2R:  89% 3G3R:  11% 0%  *CEP 7 (D7Z1) G  LSI O4E666 (7q31) R 3% 100 2G2R:  98%
 0% 2%       **CEP 9 (D9Z1) G 3%100   2% 0% 2%  *LSI M04Z737 (13q14.2) O  
     LSI 13q34 G 3% 100     2O2% 1O1G:   10% 3%       *LSI IgH(14q32) Y   
     BA 3% 100    2Y:  97% 0% 3%       *LSI TP53 (17p13.1) O  CEP 17 (D17Z1) G 
3% 100 2O2% 0% 2% Vendor:  *[employer]  Probes:  LSI: Locus Specific 
Probe;  CEP: Centromeric Probe;  BA: BreakApartFluorochromes/ Signals:  G - 
Green;  O  orange;  R  Red;  Y - yellow(fusion) NKCS:  Signals with No Known 
Clinical SignificanceDisclaimer: The plasma cells evaluated in this study were 
isolated fromthe bone marrow mixed cell population utilizing immunomagnetic 
cellseparation. This technology significantly enriches the test cellpopulation 
for plasma cells, thereby improving FISH detection of anomaliesassociated with 
plasma cell myeloma. However, as this is a selectivepopulation, the true in vivo
 frequencies of the anomalies identifiedcannot be determined. The FISH test was 
developed and its performance wasvalidated by the Cytogenetics section of the 
Department of ClinicalPathology. This test has not been cleared or approved by 
the U. S. Foodand Drug Administration (FDA). The FDA has determined that such 
approvalis not necessary. However, the procedure is considered 
investigational,and should not be used as the sole criteria for diagnosis.   









          Name      Value     Range     Interpretation Code Description Data Milly

rce(s) Supporting 

Document(s)

 

                                                                       









                    ID                  Date                Data Source

 

                    [id number]            2021 12:28:00 PM EDT [facility]









          Name      Value     Range     Interpretation Code Description Data Milly

rce(s) Supporting 

Document(s)

 

           Albumin [Mass/volume] in Serum or Plasma 3.0                         

                [facility]                       

 

           Calcium [Mass/volume] in Serum or Plasma 10.2                        

                [facility]                       

 

             Alanine aminotransferase [Enzymatic activity/volume] in Serum or Pl

asma 19                                     

                          [facility]  

 

           Carbon dioxide, total [Moles/volume] in Serum or Plasma 27           

                               [facility] 

([facility])           

 

           Chloride [Moles/volume] in Serum or Plasma 107                       

                  [facility]                       

 

           Potassium [Moles/volume] in Serum or Plasma 4.7                      

                   [facility]                       

 

           Alkaline phosphatase [Enzymatic activity/volume] in Serum or Plasma 7

7                                          

[facility] ([facility])    

 

          Sodium    137                                     [facility] ([facility])  

 

           Protein [Mass/volume] in Serum or Plasma 8.3                         

                [facility]                       

 

                Aspartate aminotransferase [Enzymatic activity/volume] in Serum 

or Plasma 23                              

                                        [facility]  

 

           Urea nitrogen [Mass/volume] in Serum or Plasma 28                    

                      [facility]                       

 

          Creatinine For GFR 0.90                                    [facility] ([facility]

[facility])  

 

          Glucose   98                                [facility])  









                    ID                  Date                Data Source

 

                    [id number]            2021 12:28:00 PM EDT [facility] ([facility])









          Name      Value     Range     Interpretation Code Description Data Milly

rce(s) Supporting 

Document(s)

 

          White Blood Count 6.2       5.0-10.0                      [facility] ([facility])  

 

          Red Blood Count 3.76      4.00-5.40                     [facility] ([facility])  

 

          Hemoglobin 12.5                                    [facility]  

 

          Platelets 199       172-450                       [facility]  

 

          Hematocrit 39.0                                    [facility])  









                    ID                  Date                Data Source

 

                    [id number]          2021 12:03:00 PM EDT [facility] ([facility])









          Name      Value     Range     Interpretation Code Description Data Milly

rce(s) Supporting 

Document(s)

 

          Prothrombin Time 13.8 s    12.5-14.3                     [facility]

[place] [facility]  

 

          Inr       1.04                                    [facility] ([place] [facility]

[facility])  

 

                                        THERAPUTIC HUMAN INR VALUES

INDICATIONS                      NORMAL RANGES

PROPHYLAXIS/TREATMENT OF:

VENOUS THROMBOSIS                2.0-3.0

PULMONARY EMBOLISM               2.0-3.0

PREVENTION OF SYSTEMIC EMBOLISM FROM:

TISSUE HEART VALVES              2.0-3.0

ACUTE MYOCARDIAL INFARCTION      2.0-3.0

VALVULAR HEART DISEASE           2.0-3.0

ATRIAL FIBRILLATION              2.0-3.0

MECHANICAL VALVES(HIGH RISK)     2.5-3.5

RECURRENT MYOCARDIAL INFARCTION  2.5-3.5

 

 

          Partial Thromboplastin Time 30.6 s    24.2-38.5                     [facility] ([facility])  









                    ID                  Date                Data Source

 

                    [id number]          2021 10:38:00 AM EDT [facility] ([facility])









          Name      Value     Range     Interpretation Code Description Data Milly

rce(s) Supporting 

Document(s)

 

          Glucose, Fasting 98 mg/dL                          [facility]

[facility]  

 

          Blood Urea Nitrogen 28 mg/dL  7-18                          [facility] ([facility])  

 

          Creatinine For GFR 0.90 mg/dL 0.55-1.30                     [facility] ([facility])  

 

          Sodium Level 137 meq/L 136-145                       [facility] ([place]

 [facility])  

 

           Glomerular Filtration Rate Laboratory test result                    

              [facility] 

[facility]                              

 

                                        <content>Units are mL/min/1.73 

m2</content><br/><content></content><br/><content>Chronic Kidney Disease Staging
 per NKF:</content><br/><content></content><br/><content>Stage I & II   GFR >=60
       Normal to Mildly Decreased</content><br/><content>Stage III      GFR 30-
59      Moderately Decreased</content><br/><content>Stage IV       GFR 15-29    
  Severely Decreased</content><br/><content>Stage V        GFR <15        Very 
Little GFR Left</content><br/><content>ESRD           GFR <15 on 
RRT</content><br/><content></content> 

 

          Chloride Level 107 meq/L                         [facility] ([facility])  

 

          Potassium Serum 4.7 meq/L 3.5-5.1                       [facility] ([facility])  

 

          Anion Gap 3 meq/L   8-16                          [facility] ([place] [facility]

[facility])  

 

          Carbon Dioxide Level 27 meq/L  21-32                         [facility] ([facility])  

 

          Ast/Sgot  23 U/L    7-37                          [facility] ([facility])  

 

          Calcium Level 10.2 mg/dL 8.8-10.2                      [facility] ([facility])  

 

          Alkaline Phosphatase 77 U/L                            [facility] ([facility])  

 

          Alt/SGPT  19 U/L    12-78                         [facility] ([place] [facility]

[facility])  

 

          Bilirubin,Total 0.5 mg/dL 0.2-1.0                       [facility] ([facility])  

 

          Total Protein 8.3 GM/DL 6.4-8.2                       [facility] ([facility])  

 

          Albumin   3.0 GM/DL 3.2-5.2                       [facility] [facility]  

 

          Albumin/Globulin Ratio 0.6       1.2-2.2                       [facility] 

([facility])  









                    ID                  Date                Data Source

 

                    [id number]          2021 10:38:00 AM EDT [facility] ([facility])









          Name      Value     Range     Interpretation Code Description Data Milly

rce(s) Supporting 

Document(s)

 

          White Blood Count 6.2 10    4.0-10.0                      [facility] ([facility])  

 

          Red Blood Count 3.76 10   4.00-5.40                     [facility] ([facility])  

 

          Hemoglobin 12.5 g/dL 12.0-15.5                     [facility] ([place] [facility]

[facility])  

 

          Mean Corpuscular Volume 103.7 fl  80.0-96.0                     [facility]

 ([facility])  

 

          Hematocrit 39.0 %    36.0-47.0                     [facility] [facility]  

 

          Mean Corpuscular Hemoglobin 33.2 pg   27.0-33.0                     [facility]

[facility] ([facility]) 

 

 

          Mean Corpuscular HGB Conc 32.1 g/dL 32.0-36.5                     [facility]

[facility] ([facility]) 

 

 

          Red Cell Distribution Width 12.9 %    11.5-14.5                     [facility]

[facility] ([facility])  

 

          Neutrophils % 74.5 %    36.0-66.0                     [facility] ([facility])  

 

          Platelet Count, Automated 199 10    150-450                       [facility]

[facility] ([facility])  

 

          Lymph %   16.4 %    24.0-44.0                     [facility] [facility]  

 

          Mono %    7.0 %     2.0-8.0                       [facility] [facility]  

 

          Eos %     1.1 %     0.0-3.0                       [facility] [facility]  

 

          Immature Granulocyte % 0.5 %     0-3.0                         [facility] 

([facility])  

 

          Baso %    0.5 %     0.0-1.0                       [facility] ([place] [facility]

[facility])  

 

          Neutrophils # 4.6 10    1.5-8.5                       [facility] ([facility])  

 

          Nucleated Red Blood Cell % 0.0 %     0-0                           MED

[facility] ([facility])  

 

          Lymph #   1.0 10    1.5-5.0                       [facility] [facility]  

 

          Mono #    0.4 10    0.0-0.8                       [facility] [facility]  

 

          Eos #     0.1 10    0.0-0.5                       [facility] [facility]  

 

          Baso #    0.0 10    0.0-0.2                       [facility] ([place]

[facility])  









                    ID                  Date                Data Source

 

                    [id number]          2021 02:06:00 PM EDT [facility] ([facility])









          Name      Value     Range     Interpretation Code Description Data Milly

rce(s) Supporting 

Document(s)

 

                          Thyrotropin [Units/volume] in Serum or Plasma by Detec

tion limit <= 0.05 mIU/L 

0.19 uIU/mL  0.36-3.74                              [facility] ([facility]

)  









                    ID                  Date                Data Source

 

                    [id number]          2021 02:06:00 PM EDT [facility] ([facility])









          Name      Value     Range     Interpretation Code Description Data Milly

rce(s) Supporting 

Document(s)

 

           Urea nitrogen [Mass/volume] in Serum or Plasma 18 mg/dL   7-18       

                      [facility] 

([facility])                   

 

           Glucose [Mass/volume] in Serum or Plasma 100 mg/dL  74-99            

                [facility] ([place] 

[facility])                              

 

                                        100-125 mg/dL     PRE-DIABETES/FASTING

>126 mg/dL          DIABETES/FASTING

 

 

           Sodium [Moles/volume] in Serum or Plasma 141 meq/L  136-145          

                [facility] ([place]

 [facility])                             

 

          Creatinine 0.8 mg/dL 0.6-1.3                       [facility] ([facility])  

 

           Carbon dioxide, total [Moles/volume] in Serum or Plasma 29 meq/L   21

-32                            

[facility] ([facility])            

 

           Potassium [Moles/volume] in Serum or Plasma 3.8 meq/L  3.5-5.1       

                   [facility] 

([facility])                   

 

           Chloride [Moles/volume] in Serum or Plasma 104 meq/L           

                  [facility] 

([facility]                   

 

                                        Glomerular filtration rate/1.73 sq M pre

dicted among non-blacks [Volume 

Rate/Area] in Serum or Plasma by Creatinine-based formula (MDRD) Laboratory test

result                                              [facility] ([facility]

)  

 

           Calcium [Mass/volume] in Serum or Plasma 10.8 mg/dL 8.5-10.1         

                [facility]                   

 

                                        NOTE:

CALCIUM,TSH VERIFIED





 

 

                                        Glomerular filtration rate/1.73 sq M pre

dicted among blacks [Volume Rate/Area] 

in Serum or Plasma by Creatinine-based formula (MDRD) Laboratory test result    

                  

                                        [facility] ([facility])  

 

                                        <content>CHRONIC KIDNEY DISEASE STAGING 

PER 

NKF</content><br/><content></content><br/><content>STAGE I & II      GFR >= 60  
     NORMAL TO MILDLY DECREASED</content><br/><content>STAGE III          GFR 
30-59          MODERATELY DECREASED</content><br/><content>STAGE IV           
GFR 15-29         SEVERELY DECREASED</content><br/><content>STAGE V            
GFR <15            VERY LITTLE GFR LEFT</content><br/><content>ESRD             
   GFR <15            ON RRT</content><br/><content></content> 









                    ID                  Date                Data Source

 

                    [id number]          2021 02:06:00 PM EDT [facility] ([facility]









          Name      Value     Range     Interpretation Code Description Data Milly

rce(s) Supporting 

Document(s)

 

           Erythrocyte sedimentation rate by Westergren method 25 mm/hr   0-15  

                           [facility] 

([facility])                   









                    ID                  Date                Data Source

 

                    [id number]          2021 02:06:00 PM EDT [facility] ([facility])









          Name      Value     Range     Interpretation Code Description Data Milly

rce(s) Supporting 

Document(s)

 

           Erythrocytes [#/volume] in Blood by Automated count 4.11 x10*6/UL 4.2

0-6.30                        

[facility] ([facility]            

 

           Leukocytes [#/volume] in Blood by Automated count 5.9 x10*3/UL 4.1-10

.9                         

[facility] ([facility])            

 

           Hematocrit [Volume Fraction] of Blood by Automated count 40.0 %     3

7.0-51.0                        

[facility] ([facility])            

 

           Hemoglobin [Mass/volume] in Blood 13.6 g/dL  12.0-18.0               

         [facility] ([facility])                              

 

          MCV       97.4 fL   80.0-97.0                     [facility])  

 

          MCH       33.1 pg   26.0-32.0                     [facility])  

 

          MCHC      34.0 g/dL 31.0-38.0                     [facility])  

 

           Platelets [#/volume] in Blood by Automated count 209 x10*3/-440

                          [facility]

 ([facility])                  

 

           Erythrocyte distribution width [Ratio] by Automated count 13.2 %     

11.6-13.7                        

[facility] ([facility])            

 

          MPV       8.1 FL    7.8-11.0                      [facility]

[facility])  

 

          Lymph %   25.6 %    10.0-58.5                     [facility]

[facility])  

 

          Mid %     7.5 %     1.7-9.3                       [facility]

[facility])  

 

          Neut %    66.9 %    37.0-92.0                     [facility]

[facility])  

 

          Lymph #   1.5 x10*3/UL 0.6-4.1                       [facility] ([facility])  

 

          Neut #    3.9 x10*3/UL 2.0-7.8                       [facility] ([facility])  

 

          Mid #     0.5 x10*3/UL 0.1-0.6                       [facility] ([facility])  









                    ID                  Date                Data Source

 

                    [id number]          2021 01:37:00 PM EDT [facility] ([facility])









          Name      Value     Range     Interpretation Code Description Data Milly

rce(s) Supporting 

Document(s)

 

           Digoxin [Mass/volume] in Serum or Plasma 0.3 ng/mL  0.5-2.0          

                [facility] ([facility])                             

 

                                        <content>note:<nlbl:demographic_changed>

</content><br/><content></content> 









                    ID                  Date                Data Source

 

                    [id number]          2021 01:37:00 PM EDT [facility] ([facility])









          Name      Value     Range     Interpretation Code Description Data Milly

rce(s) Supporting 

Document(s)

 

                          Thyrotropin [Units/volume] in Serum or Plasma by Detec

tion limit <= 0.05 mIU/L 

0.32 uIU/mL  0.36-3.74                              [facility] ([facility]

)  









                    ID                  Date                Data Source

 

                    [id number]          2021 01:37:00 PM EDT [facility] ([facility])









          Name      Value     Range     Interpretation Code Description Data Milly

rce(s) Supporting 

Document(s)

 

           Cholesterol [Mass/volume] in Serum or Plasma 132 mg/dL  131-200      

                    [facility] 

([facility])                   

 

           Cholesterol in HDL [Mass/volume] in Serum or Plasma 54 mg/dL   35-60 

                           [facility] 

([facility])                   

 

                    Cholesterol in LDL [Mass/volume] in Serum or Plasma by calcu

lation 64 CALC             

                                          [facility] ([facility])  

 

           Triglyceride [Mass/volume] in Serum or Plasma 72 mg/dL         

                     [facility] 

([facility])                   









                    ID                  Date                Data Source

 

                    [id number]          2021 01:37:00 PM EDT [facility] ([facility])









          Name      Value     Range     Interpretation Code Description Data Milly

rce(s) Supporting 

Document(s)

 

           Glucose [Mass/volume] in Serum or Plasma 70 mg/dL   74-99            

                [facility] ([facility]                              

 

                                        100-125 mg/dL     PRE-DIABETES/FASTING

>126 mg/dL          DIABETES/FASTING

 

 

           Urea nitrogen [Mass/volume] in Serum or Plasma 23 mg/dL   7-18       

                      [facility] 

([facility])                   

 

           Sodium [Moles/volume] in Serum or Plasma 144 meq/L  136-145          

                [facility] ([facility]                             

 

          Creatinine 0.9 mg/dL 0.6-1.3                       [facility]  

 

           Carbon dioxide, total [Moles/volume] in Serum or Plasma 32 meq/L   21

-32                            

[facility] ([facility])            

 

           Potassium [Moles/volume] in Serum or Plasma 3.8 meq/L  3.5-5.1       

                   [facility] 

([facility])                   

 

           Chloride [Moles/volume] in Serum or Plasma 104 meq/L           

                  [facility] 

[facility]                   

 

                                        Glomerular filtration rate/1.73 sq M pre

dicted among blacks [Volume Rate/Area] 

in Serum or Plasma by Creatinine-based formula (MDRD) Laboratory test result    

                  

                                        [facility]  

 

                                        <content>CHRONIC KIDNEY DISEASE STAGING 

PER 

NKF</content><br/><content></content><br/><content>STAGE I & II      GFR >= 60  
     NORMAL TO MILDLY DECREASED</content><br/><content>STAGE III          GFR 
30-59          MODERATELY DECREASED</content><br/><content>STAGE IV           
GFR 15-29         SEVERELY DECREASED</content><br/><content>STAGE V            
GFR <15            VERY LITTLE GFR LEFT</content><br/><content>ESRD             
   GFR <15            ON RRT</content><br/><content></content> 

 

                                        Glomerular filtration rate/1.73 sq M pre

dicted among non-blacks [Volume 

Rate/Area] in Serum or Plasma by Creatinine-based formula (MDRD) Laboratory test

result                                              [facility]

)  

 

           Calcium [Mass/volume] in Serum or Plasma 10.1 mg/dL 8.5-10.1         

                [facility] 

([facility]                   









                    ID                  Date                Data Source

 

                    [id number]          2021 01:37:00 PM [facility]









          Name      Value     Range     Interpretation Code Description Data Milly

rce(s) Supporting 

Document(s)

 

           Erythrocyte sedimentation rate by Westergren method 45 mm/hr   0-15  

                           [facility] 

([facility])                   

 

          Magnesium 1.8 mg/dL 1.8-2.4                       [facility]  









                    ID                  Date                Data Source

 

                    [id number]          2021 01:37:00 PM EDT [facility]









          Name      Value     Range     Interpretation Code Description Data Milly

rce(s) Supporting 

Document(s)

 

           Leukocytes [#/volume] in Blood by Automated count 8.4 x10*3/UL 4.1-10

.9                         

[facility] ([facility]            

 

           Erythrocytes [#/volume] in Blood by Automated count 4.20 x10*6/UL 4.2

0-6.30                        

[facility] ([facility])            

 

           Hemoglobin [Mass/volume] in Blood 14.0 g/dL  12.0-18.0               

         [facility] ([facility]                              

 

           Hematocrit [Volume Fraction] of Blood by Automated count 40.3 %     3

7.0-51.0                        

[facility] ([facility])            

 

          MCV       95.8 fL   80.0-97.0                     [facility]

[facility])  

 

          MCH       33.3 pg   26.0-32.0                     [facility]

[facility])  

 

           Erythrocyte distribution width [Ratio] by Automated count 13.3 %     

11.6-13.7                        

[facility] ([facility])            

 

          MCHC      34.7 g/dL 31.0-38.0                     [facility]

[facility])  

 

          Lymph %   17.5 %    10.0-58.5                     [facility]

[facility])  

 

           Platelets [#/volume] in Blood by Automated count 207 x10*3/-440

                          [facility]

 ([facility])                  

 

          MPV       8.0 FL    7.8-11.0                      [facility])  

 

          Neut %    77.7 %    37.0-92.0                     [facility])  

 

          Mid %     4.8 %     1.7-9.3                       [facility])  

 

          Lymph #   1.4 x10*3/UL 0.6-4.1                       [facility] ([facility])  

 

          Mid #     0.5 x10*3/UL 0.1-0.6                       [facility] ([facility]  

 

          Neut #    6.5 x10*3/UL 2.0-7.8                       [facility] ([facility]  









                    ID                  Date                Data Source

 

                    [id number]            2021 01:37:00 PM EDT [facility] ([facility])









          Name      Value     Range     Interpretation Code Description Data Milly

rce(s) Supporting 

Document(s)

 

                          Thyrotropin [Units/volume] in Serum or Plasma by Detec

tion limit <= 0.05 mIU/L 

0.32 uIU/mL  0.36-3.74                              [facility] ([facility]  

 

           Digoxin [Mass/volume] in Serum or Plasma 0.3 ng/mL  0.5-2.0          

                [facility] 

([facility])           

 

                                        

<content>note:<nlbl:demographic_changed></content><br/><content></content><br/><

content></content> 









                    ID                  Date                Data Source

 

                    [id number]            2021 01:37:00 PM EDT [facility] ([facility]









          Name      Value     Range     Interpretation Code Description Data Milly

rce(s) Supporting 

Document(s)

 

           Cholesterol [Mass/volume] in Serum or Plasma 132 mg/dL  131-200      

                    [facility] 

([facility])           

 

           Triglyceride [Mass/volume] in Serum or Plasma 72 mg/dL         

                     [facility] 

([facility])           

 

           Cholesterol in HDL [Mass/volume] in Serum or Plasma 54 mg/dL   35-60 

                           [facility] 

([facility])           

 

                    Cholesterol in LDL [Mass/volume] in Serum or Plasma by calcu

lation 64 CALC             

                                          [facility] ([facility])  









                    ID                  Date                Data Source

 

                    [id number]            2021 01:37:00 PM EDT [facility] ([facility]









          Name      Value     Range     Interpretation Code Description Data Milly

rce(s) Supporting 

Document(s)

 

           Glucose [Mass/volume] in Serum or Plasma 70 mg/dL   74-99            

                [facility] ([facility])                       

 

                                        100-125 mg/dL     PRE-DIABETES/FASTING

>126 mg/dL          DIABETES/FASTING



 

 

           Urea nitrogen [Mass/volume] in Serum or Plasma 23 mg/dL   7-18       

                      [facility] 

([facility])           

 

          Creatinine 0.9 mg/dL 0.6-1.3                       [facility]  

 

           Sodium [Moles/volume] in Serum or Plasma 144 meq/L  136-145          

                [facility] 

([facility])           

 

           Potassium [Moles/volume] in Serum or Plasma 3.8 meq/L  3.5-5.1       

                   [facility] 

([facility])           

 

           Carbon dioxide, total [Moles/volume] in Serum or Plasma 32 meq/L   21

-32                            

[facility] ([facility])    

 

           Chloride [Moles/volume] in Serum or Plasma 104 meq/L           

                  [facility] 

([facility])           

 

           Calcium [Mass/volume] in Serum or Plasma 10.1 mg/dL 8.5-10.1         

                [facility] 

([facility])           

 

                                        Glomerular filtration rate/1.73 sq M pre

dicted among non-blacks [Volume 

Rate/Area] in Serum or Plasma by Creatinine-based formula (MDRD) Laboratory test

result                                              [facility] ([facility])  

 

                                        Glomerular filtration rate/1.73 sq M pre

dicted among blacks [Volume Rate/Area] 

in Serum or Plasma by Creatinine-based formula (MDRD) Laboratory test result    

                  

                                        [facility] ([facility])  

 

                                        <content>CHRONIC KIDNEY DISEASE STAGING 

PER 

NKF</content><br/><content></content><br/><content>STAGE I & II      GFR >= 60  
     NORMAL TO MILDLY DECREASED</content><br/><content>STAGE III          GFR 
30-59          MODERATELY DECREASED</content><br/><content>STAGE IV           
GFR 15-29         SEVERELY DECREASED</content><br/><content>STAGE V            
GFR <15            VERY LITTLE GFR LEFT</content><br/><content>ESRD             
   GFR <15            ON 
RRT</content><br/><content></content><br/><content></content> 









                    ID                  Date                Data Source

 

                    [id number]            2021 01:37:00 PM [facility]









          Name      Value     Range     Interpretation Code Description Data Milly

rce(s) Supporting 

Document(s)

 

           Leukocytes [#/volume] in Blood by Automated count 8.4 x10*3/UL 4.1-10

.9                         

[facility] ([facility])    

 

           Erythrocytes [#/volume] in Blood by Automated count 4.20 x10*6/UL 4.2

0-6.30                        

[facility] ([facility])    

 

           Hemoglobin [Mass/volume] in Blood 14.0 g/dL  12.0-18.0               

         [facility] ([facility])                       

 

           Hematocrit [Volume Fraction] of Blood by Automated count 40.3 %     3

7.0-51.0                        

[facility] ([facility])    

 

          MCV       95.8 fL   80.0-97.0                     [facility] ([facility]

[facility])  

 

          MCH       33.3 pg   26.0-32.0                     [facility]  

 

           Erythrocyte distribution width [Ratio] by Automated count 13.3 %     

11.6-13.7                        

[facility] ([facility])    

 

          MCHC      34.7 g/dL 31.0-38.0                     [facility] ([facility])  

 

           Platelets [#/volume] in Blood by Automated count 207 x10*3/-440

                          [facility]

 ([facility])          

 

             Platelet mean volume [Entitic volume] in Blood by Archie 8.0 FL

       7.8-11.0                   

                          [facility] ([facility])  

 

           Lymphocytes/100 leukocytes in Blood by Automated count 17.5 %     10.

0-58.5                        

[facility] ([facility])    

 

          Mid %     4.8 %     1.7-9.3                       [facility] ([facility]

[facility])  

 

          Lymph #   1.4 x10*3/UL 0.6-4.1                       [facility] ([facility]

[facility])  

 

          Neut %    77.7 %    37.0-92.0                     [facility] ([facility]

[facility])  

 

           Neutrophils [#/volume] in Semen by Manual count 6.5 x10*3/UL 2.0-7.8 

                         [facility] 

([facility])           

 

          Mid #     0.5 x10*3/UL 0.1-0.6                       [facility] ([facility]

[facility])  









                    ID                  Date                Data Source

 

                    [id number]          2021 02:59:00 PM EDT [facility]









          Name      Value     Range     Interpretation Code Description Data Milly

rce(s) Supporting 

Document(s)

 

           Free Lambda Light Chains Serum 517.2 mg/L 5.7-26.3                   

      [facility] ([facility]                              

 

           Free Kappa Light Chains Serum 6.4 mg/L   3.3-19.4                    

     [facility] ([facility]                              

 

          Kappa/Lambda Ratio Serum 0.01      0.26-1.65                     [facility]  









                    ID                  Date                Data Source

 

                    [id number]          2021 02:59:00 PM EDT [facility]









          Name      Value     Range     Interpretation Code Description Data Milly

rce(s) Supporting 

Document(s)

 

           Ferritin [Mass/volume] in Serum or Plasma 128 ng/mL  8-252           

                 [facility]                              

 

                                        <content>note:<nlbl:demographic_changed>

</content><br/><content></content> 

 

           Beta-2-Microglobulin [Mass/volume] in Serum or Plasma 3.7 mg/L   0.6-

2.4                          

[facility] ([facility])            

 

                                        Siemens Immulite 2000 Immunochemiluminom

etric assay (ICMA)

                                        .

Values obtained with different assay methods or kits cannot

be used interchangeably. Results cannot be interpreted as

absolute evidence of the presence or absence of malignant

disease.

Performed at:   - [facility] [address]  [id number]

: Araceli B Reyes MD, Phone:  [phone]

Performed at:   - [facility]  [id number]

[id number]

: Barak Archibald MD, Phone:  [phone]

 









                    ID                  Date                Data Source

 

                    [id number]          2021 02:59:00 PM EDT [facility]









          Name      Value     Range     Interpretation Code Description Data Milly

rce(s) Supporting 

Document(s)

 

           Immunotyping Serum Iga Laboratory test result                        

          [facility] ([facility]                              

 

           Immunotyping Serum Lambda Laboratory test result                     

             [facility] ([facility]                              

 

           Laboratory test finding (navigational concept) Laboratory test result

                                  

[facility] ([facility])            

 

                                        REV'D BY O ADJAPONG

 

 

           It Serum Interpretation Laboratory test result                       

           [facility] ([facility]                              

 

                                        MONOCLONAL IGA,LAMBDA

 









                    ID                  Date                Data Source

 

                    [id number]          2021 02:59:00 PM EDT [facility] ([facility])









          Name      Value     Range     Interpretation Code Description Data Milly

rce(s) Supporting 

Document(s)

 

          Immunoglobulin G 510 mg/dL [phone]                      [facility] ([facility]  

 

          Immunoglobulin A 2640.0 mg/dL                         [facility]  

 

           Immunoglobulin M Laboratory test result                        

    [facility] ([facility])

                                         









                    ID                  Date                Data Source

 

                    [id number]          2021 02:59:00 PM EDT [facility] ([facility])









          Name      Value     Range     Interpretation Code Description Data Milly

rce(s) Supporting 

Document(s)

 

          Iron (Fe) 131 ug/dL                         [facility] [facility]  

 

          Total Iron Binding Capacity 305 ug/dL 250-450                       [facility]

[facility] ([facility]) 

 

 

          Percent Saturation 43.0 %    13.2-45.0                     [facility]  









                    ID                  Date                Data Source

 

                    [id number]          2021 02:59:00 PM EDT [facility] ([facility])









          Name      Value     Range     Interpretation Code Description Data Milly

rce(s) Supporting 

Document(s)

 

          Glucose, Fasting 97 mg/dL                          [facility] ([place]

[place] [facility])  

 

          Blood Urea Nitrogen 24 mg/dL  7-18                          [facility] ([facility])  

 

          Creatinine For GFR 0.85 mg/dL 0.55-1.30                     [facility] ([facility])  

 

           Glomerular Filtration Rate Laboratory test result                    

              [facility] ([place] 

[facility])                              

 

                                        <content>Units are mL/min/1.73 

m2</content><br/><content></content><br/><content>Chronic Kidney Disease Staging
 per NKF:</content><br/><content></content><br/><content>Stage I & II   GFR >=60
       Normal to Mildly Decreased</content><br/><content>Stage III      GFR 30-
59      Moderately Decreased</content><br/><content>Stage IV       GFR 15-29    
  Severely Decreased</content><br/><content>Stage V        GFR <15        Very 
Little GFR Left</content><br/><content>ESRD           GFR <15 on 
RRT</content><br/><content></content> 

 

          Sodium Level 139 meq/L 136-145                       [facility] ([place]

 [facility])  

 

          Carbon Dioxide Level 26 meq/L  21-32                         [facility] ([facility])  

 

          Potassium Serum 4.6 meq/L 3.5-5.1                       [facility] ([facility])  

 

          Chloride Level 106 meq/L                         [facility] ([facility])  

 

          Calcium Level 11.8 mg/dL 8.8-10.2                      [facility] ([facility])  

 

          Anion Gap 7 meq/L   8-16                          [facility] ([place]

[facility])  

 

          Alt/SGPT  44 U/L    12-78                         [facility] ([place]

[facility])  

 

          Alkaline Phosphatase 116 U/L                           [facility] ([facility])  

 

          Ast/Sgot  33 U/L    7-37                          [facility] ([place]

[facility])  

 

          Bilirubin,Total 0.6 mg/dL 0.2-1.0                       [facility] ([facility])  

 

          Total Protein 8.9 GM/DL 6.4-8.2                       [facility] ([facility])  

 

          Albumin   3.2 GM/DL 3.2-5.2                       [facility] ([place]

[facility])  

 

          Albumin/Globulin Ratio 0.6       1.2-2.2                       [facility] 

([facility])  









                    ID                  Date                Data Source

 

                    [id number]          2021 02:59:00 PM EDT [facility] ([facility])









          Name      Value     Range     Interpretation Code Description Data Milly

rce(s) Supporting 

Document(s)

 

           Erythrocyte sedimentation rate by Westergren method 68 mm/hr   0-30  

                           [facility] 

([facility])                   









                    ID                  Date                Data Source

 

                    [id number]          2021 02:59:00 PM EDT [facility] ([facility])









          Name      Value     Range     Interpretation Code Description Data Milly

rce(s) Supporting 

Document(s)

 

          White Blood Count 7.6 10    4.0-10.0                      [facility] ([facility])  

 

          Red Blood Count 4.29 10   4.00-5.40                     [facility] ([facility])  

 

          Hemoglobin 13.9 g/dL 12.0-15.5                     [facility] ([place]

[facility])  

 

          Hematocrit 43.3 %    36.0-47.0                     [facility] ([facility])  

 

          Mean Corpuscular Hemoglobin 32.4 pg   27.0-33.0                     ME

D[facility] ([facility]) 

 

 

          Mean Corpuscular HGB Conc 32.1 g/dL 32.0-36.5                     [facility]

NT ([facility]) 

 

 

          Mean Corpuscular Volume 100.9 fl  80.0-96.0                     [facility]

 ([facility])  

 

          Red Cell Distribution Width 13.1 %    11.5-14.5                     [facility]

[facility] ([facility])  

 

          Platelet Count, Automated 175 10    150-450                       [facility]

[facility] ([facility] [facility])  

 

          Neutrophils % 73.1 %    36.0-66.0                     [facility] ([facility])  

 

          Lymph %   18.1 %    24.0-44.0                     [facility] ([place]

[facility])  

 

          Eos %     1.3 %     0.0-3.0                       [facility]

[facility]  

 

          Mono %    6.8 %     2.0-8.0                       [facility] ([place]

[facility])  

 

          Baso %    0.3 %     0.0-1.0                       [facility] ([place]

[facility])  

 

          Immature Granulocyte % 0.4 %     0-3.0                         [facility] 

([facility])  

 

          Nucleated Red Blood Cell % 0.0 %     0-0                           MED

ENT ([facility])  

 

          Neutrophils # 5.6 10    1.5-8.5                       [facility] ([facility])  

 

          Lymph #   1.4 10    1.5-5.0                       [facility] ([place]

[facility])  

 

          Mono #    0.5 10    0.0-0.8                       [facility] ([place]

[facility])  

 

          Eos #     0.1 10    0.0-0.5                       [facility] ([place]

[facility])  

 

          Baso #    0.0 10    0.0-0.2                       [facility] ([place]

[facility])  









                    ID                  Date                Data Source

 

                    [id number]          2021 02:59:00 PM EDT [facility] ([facility])









          Name      Value     Range     Interpretation Code Description Data Milly

rce(s) Supporting 

Document(s)

 

          Appearance, Urine Laboratory test result                              

 [facility] ([facility])  

 

          PH,Urine  5.0 units 5.0-9.0                       [facility] ([place]

[facility])  

 

          Color, Urine Laboratory test result                               [facility]

[facility] ([facility])  

 

          Protein, Urine Auto Laboratory test result                            

   [facility] ([facility])  

 

           Specific Gravity Urine Auto 1.028      1.002-1.035                   

    [facility] ([facility])

                                         

 

          Ketone, Urine Auto Laboratory test result                             

  [facility] ([facility])  

 

           Glucose, Urine (Ua) Auto Laboratory test result                      

            [facility] ([place] 

[facility])                              

 

          Urobilinogen, Urine Auto 4.0 mg/dL 0.0-2.0                       [facility]

T ([place] [facility])  

 

           Leukocyte Esterase, Urine Auto Laboratory test result                

                  [facility] ([place] 

[facility])                              

 

          Nitrite, Urine Auto Laboratory test result                            

   [facility] ([facility])  

 

           Bilirubin, Urine Auto Laboratory test result                         

         [facility] ([facility])

                                         

 

          Blood, Urine Blood Laboratory test result                             

  [facility] ([facility])  

 

          WBC, Urine Auto 2 /HPF    0-3                           [facility] ([facility])  

 

          Squamous Epithelial Cell Ur AU 0 /HPF    0-6                          

 [facility] ([facility])  

 

          RBC, Urine Auto 2 /HPF    0-3                           [facility] ([facility])  

 

          Bacteria, Urine Auto Laboratory test result                           

    [facility] ([facility]) 

 

 

          Mucus, Urine Laboratory test result                               [facility]

NT ([facility])  

 

          Hyaline Cast, Urine Auto 0 /LPF    0-1                           [facility]

T ([facility])  









                    ID                  Date                Data Source

 

                    [id number]              2021 02:43:00 PM EDT [facility] ([facility])









          Name      Value     Range     Interpretation Code Description Data Milly

rce(s) Supporting 

Document(s)

 

           3D Bi-Lateral Mammogram Laboratory test result                       

           [facility] ([place] 

[facility])                              

 

          Dexa/Bone Density Laboratory test result                              

 [facility] ([facility])  









                    ID                  Date                Data Source

 

                    [id number]          2021 02:26:00 PM EDT [facility] ([facility])









          Name      Value     Range     Interpretation Code Description Data Milly

rce(s) Supporting 

Document(s)

 

          Albumin % 41.3 %    55.8-66.1                     [facility] ([place] [facility]

[facility])  

 

          Alpha-1-Globulin % 3.8 %     2.9-4.9                       [facility] ([facility])  

 

          Alpha-2-Globulins % 8.9 %     7.1-11.8                      [facility] ([facility])  

 

          Beta-1-Globulins % 5.8 %     4.7-7.2                       [facility] ([facility])  

 

          Beta-2-Globulins % 35.2 %    3.2-6.5                       [facility] ([facility])  

 

          Gamma Globulin % 5.0 %     11.1-18.8                     [facility] ([place]

[place] [facility])  

 

          Albumin   3.39 GM/DL 3.29-5.55                     [facility] ([place] [facility]

[facility])  

 

          Alpha-1-Globulins 0.31 GM/DL 0.17-0.41                     [facility] ([facility])  

 

          Alpha-2-Globulins 0.73 GM/DL 0.42-0.99                     [facility] ([facility])  

 

          Beta-1-Globulins 0.48 GM/DL 0.28-0.60                     [facility] ([facility])  

 

          Beta-2-Globulins 2.89 GM/DL 0.19-0.55                     [facility] ([facility])  

 

          Total Protein 8.2 GM/DL 6.4-8.2                       [facility] ([facility])  

 

          Gamma Globulins 0.41 GM/DL 0.65-1.58                     [facility] ([place]

[place] [facility]  

 

          Spep Interpretation Laboratory test result                            

   [facility] ([facility])  

 

                                        M-SPIKE NOTED IN BETA 2 REGION.

CONCENTRATION =   2.85    GM/DL

SUGGEST SERUM AND URINE IMMUNOTYPING.

 

 

           Laboratory test finding (navigational concept) Laboratory test result

                                  

[facility] ([facility])            

 

                                        REV'D BY CHARLES ROCHA

 









                    ID                  Date                Data Source

 

                    [id number]          2021 02:26:00 PM EDT [facility] ([facility])









          Name      Value     Range     Interpretation Code Description Data Milly

rce(s) Supporting 

Document(s)

 

           Immunotyping Serum Iga Laboratory test result                        

          [facility] ([place] 

[facility]                              

 

           Immunotyping Serum Lambda Laboratory test result                     

             [facility] ([place] 

[facility]                              

 

           It Serum Interpretation Laboratory test result                       

           [facility] 

[facility]                              

 

                                        MONOCLONAL IGA LAMBDA

 

 

           Laboratory test finding (navigational concept) Laboratory test result

                                  

[facility] ([facility])            









                    ID                  Date                Data Source

 

                    [id number]          2021 02:25:00 PM [facility]









          Name      Value     Range     Interpretation Code Description Data Milly

rce(s) Supporting 

Document(s)

 

           Glucose [Mass/volume] in Serum or Plasma 124 mg/dL  74-99            

                [facility] ([place] 

[facility])                              

 

                                        100-125 mg/dL     PRE-DIABETES/FASTING

>126 mg/dL          DIABETES/FASTING

 

 

           Urea nitrogen [Mass/volume] in Serum or Plasma 19 mg/dL   7-18       

                      [facility] 

([facility])                   

 

          Creatinine 0.7 mg/dL 0.6-1.3                       [facility] [facility]

[facility])  

 

           Sodium [Moles/volume] in Serum or Plasma 143 meq/L  136-145          

                [facility] ([place]

 [facility])                             

 

           Chloride [Moles/volume] in Serum or Plasma 103 meq/L           

                  [facility] 

([facility])                   

 

           Potassium [Moles/volume] in Serum or Plasma 3.8 meq/L  3.5-5.1       

                   [facility] 

([facility])                   

 

           Carbon dioxide, total [Moles/volume] in Serum or Plasma 29 meq/L   21

-32                            

[facility] ([facility])            

 

           Calcium [Mass/volume] in Serum or Plasma 10.2 mg/dL 8.5-10.1         

                [facility] 

([facility])                   

 

                                        NOTE:

RESULT VERIFIED.





 

 

                                        Glomerular filtration rate/1.73 sq M pre

dicted among non-blacks [Volume 

Rate/Area] in Serum or Plasma by Creatinine-based formula (MDRD) Laboratory test

result                                              [facility] ([facility]

)  

 

                                        Glomerular filtration rate/1.73 sq M pre

dicted among blacks [Volume Rate/Area] 

in Serum or Plasma by Creatinine-based formula (MDRD) Laboratory test result    

                  

                                        [facility] ([facility])  

 

                                        <content>CHRONIC KIDNEY DISEASE STAGING 

PER 

NKF</content><br/><content></content><br/><content>STAGE I & II      GFR >= 60  
     NORMAL TO MILDLY DECREASED</content><br/><content>STAGE III          GFR 
30-59          MODERATELY DECREASED</content><br/><content>STAGE IV           
GFR 15-29         SEVERELY DECREASED</content><br/><content>STAGE V            
GFR <15            VERY LITTLE GFR LEFT</content><br/><content>ESRD             
   GFR <15            ON RRT</content><br/><content></content> 









                    ID                  Date                Data Source

 

                    [id number]          2021 11:17:00 AM EST [facility]









          Name      Value     Range     Interpretation Code Description Data Milly

rce(s) Supporting 

Document(s)

 

           Erythrocyte sedimentation rate by Westergren method 108 mm/hr  0-30  

                           [facility] 

([facility]                   









                    ID                  Date                Data Source

 

                    [id number]          2021 11:17:00 AM EST [facility]









          Name      Value     Range     Interpretation Code Description Data Milly

rce(s) Supporting 

Document(s)

 

          White Blood Count 4.3 10    4.0-10.0                      [facility] ([facility])  

 

          Hematocrit 39.3 %    36.0-47.0                     [facility] [facility]  

 

          Hemoglobin 12.4 g/dL 12.0-15.5                     [facility] [facility]  

 

          Red Blood Count 3.96 10   4.00-5.40                     [facility] ([facility])  

 

          Mean Corpuscular Hemoglobin 31.3 pg   27.0-33.0                     [facility]

[facility] ([facility]) 



 

          Mean Corpuscular Volume 99.2 fl   80.0-96.0                     [facility]

 ([facility])  

 

          Mean Corpuscular HGB Conc 31.6 g/dL 32.0-36.5                     [facility]

[facility] ([facility] [facility]) 



 

          Red Cell Distribution Width 13.2 %    11.5-14.5                     [facility]

[facility] ([facility])  

 

          Platelet Count, Automated 193 10    150-450                       [facility]

[facility] ([facility])  

 

          Neutrophils % 62.7 %    36.0-66.0                     [facility]

[facility]  

 

          Mono %    7.4 %     2.0-8.0                       [facility] [facility])  

 

          Lymph %   27.8 %    24.0-44.0                     [facility] [facility]  

 

          Baso %    0.5 %     0.0-1.0                       [facility]

[facility])  

 

          Eos %     1.4 %     0.0-3.0                       [facility] [facility]  

 

          Neutrophils # 2.7 10    1.5-8.5                       [facility]

[place] [facility]  

 

          Nucleated Red Blood Cell % 0.0 %     0-0                           MED

[facility] ([facility])  

 

          Immature Granulocyte % 0.2 %     0-3.0                         [facility] 

([facility])  

 

          Mono #    0.3 10    0.0-0.8                       [facility] [facility]  

 

          Lymph #   1.2 10    1.5-5.0                       [facility] [facility]  

 

          Baso #    0.0 10    0.0-0.2                       [facility] [facility]  

 

          Eos #     0.1 10    0.0-0.5                       [facility] ([facility])  









                    ID                  Date                Data Source

 

                    [id number]          2021 11:17:00 AM EST [facility] ([facility])









          Name      Value     Range     Interpretation Code Description Data Milly

rce(s) Supporting 

Document(s)

 

                          C reactive protein [Mass/volume] in Serum or Plasma by

 High sensitivity method 

0.52 mg/dL   0.00-0.30                              [facility] ([facility]

)  









                    ID                  Date                Data Source

 

                    [id number]          2021 11:17:00 AM EST [facility] ([facility])









          Name      Value     Range     Interpretation Code Description Data Milly

rce(s) Supporting 

Document(s)

 

          Glucose, Fasting 99 mg/dL                          [facility]

[place] [facility]  

 

          Creatinine For GFR 0.92 mg/dL 0.55-1.30                     [facility] ([facility])  

 

          Blood Urea Nitrogen 29 mg/dL  7-18                          [facility] ([facility])  

 

          Sodium Level 139 meq/L 136-145                       [facility] ([place]

 [facility]  

 

           Glomerular Filtration Rate Laboratory test result                    

              [facility] ([facility])                              

 

                                        <content>Units are mL/min/1.73 

m2</content><br/><content></content><br/><content>Chronic Kidney Disease Staging
 per NKF:</content><br/><content></content><br/><content>Stage I & II   GFR >=60
       Normal to Mildly Decreased</content><br/><content>Stage III      GFR 30-
59      Moderately Decreased</content><br/><content>Stage IV       GFR 15-29    
  Severely Decreased</content><br/><content>Stage V        GFR <15        Very 
Little GFR Left</content><br/><content>ESRD           GFR <15 on 
RRT</content><br/><content></content> 

 

          Potassium Serum 4.9 meq/L 3.5-5.1                       [facility] ([facility])  

 

          Chloride Level 105 meq/L                         [facility] ([facility])  

 

          Carbon Dioxide Level 26 meq/L  21-32                         [facility] ([facility])  

 

          Anion Gap 8 meq/L   8-16                          [facility] ([facility])  

 

          Calcium Level 10.8 mg/dL 8.8-10.2                      [facility]  









                    ID                  Date                Data Source

 

                    [id number]          2021 11:17:00 AM EST [facility]









          Name      Value     Range     Interpretation Code Description Data Milly

rce(s) Supporting 

Document(s)

 

           aPTT in Blood by Coagulation assay 35.2 s     24.2-38.5              

          [facility] 

[facility]                              









                    ID                  Date                Data Source

 

                    [id number]          2021 11:17:00 AM EST [facility]









          Name      Value     Range     Interpretation Code Description Data Milly

rce(s) Supporting 

Document(s)

 

          Inr       1.05                                    [facility] ([facility])  

 

                                        THERAPUTIC HUMAN INR VALUES

INDICATIONS                      NORMAL RANGES

PROPHYLAXIS/TREATMENT OF:

VENOUS THROMBOSIS                2.0-3.0

PULMONARY EMBOLISM               2.0-3.0

PREVENTION OF SYSTEMIC EMBOLISM FROM:

TISSUE HEART VALVES              2.0-3.0

ACUTE MYOCARDIAL INFARCTION      2.0-3.0

VALVULAR HEART DISEASE           2.0-3.0

ATRIAL FIBRILLATION              2.0-3.0

MECHANICAL VALVES(HIGH RISK)     2.5-3.5

RECURRENT MYOCARDIAL INFARCTION  2.5-3.5

 

 

          Prothrombin Time 13.9 s    12.5-14.3                     [facility]

[place] [facility]  









                    ID                  Date                Data Source

 

                    [id number]          2021 01:39:00 PM EST [facility] ([facility])









          Name      Value     Range     Interpretation Code Description Data Milly

rce(s) Supporting 

Document(s)

 

           Parathyrin.intact [Mass/volume] in Serum or Plasma 26.3 pg/mL 18.5-88

.0                        

[facility] ([facility])            









                    ID                  Date                Data Source

 

                    [id number]          2021 01:39:00 PM EST [facility] ([facility])









          Name      Value     Range     Interpretation Code Description Data Milly

rce(s) Supporting 

Document(s)

 

           Calcidiol [Mass/volume] in Serum or Plasma 32.7       24.0-80.0      

                  [facility] ([facility])                             

 

                                        This test was performed using [employer] I

P Vitamin D immunoassay kit.  Values 

obtained with different assay methods should not be used interchangeably.

 









                    ID                  Date                Data Source

 

                    [id number]          2021 01:39:00 PM EST [facility] ([facility])









          Name      Value     Range     Interpretation Code Description Data Milly

rce(s) Supporting 

Document(s)

 

                          Thyrotropin [Units/volume] in Serum or Plasma by Detec

tion limit <= 0.05 mIU/L 

0.54 uIU/mL  0.36-3.74                              [facility] ([facility]

)  









                    ID                  Date                Data Source

 

                    [id number]          2021 01:39:00 PM EST [facility] ([facility])









          Name      Value     Range     Interpretation Code Description Data Milly

rce(s) Supporting 

Document(s)

 

           Glucose [Mass/volume] in Serum or Plasma 98 mg/dL   74-99            

                [facility] ([facility])                              

 

                                        100-125 mg/dL     PRE-DIABETES/FASTING

>126 mg/dL          DIABETES/FASTING

 

 

           Urea nitrogen [Mass/volume] in Serum or Plasma 49 mg/dL   7-18       

                      [facility] 

([facility])                   

 

                                        NOTE:

BUN,CALCIUM VERIFIED





 

 

           Potassium [Moles/volume] in Serum or Plasma 4.5 meq/L  3.5-5.1       

                   [facility] 

([facility])                   

 

          Creatinine 1.2 mg/dL 0.6-1.3                       [facility]  

 

           Sodium [Moles/volume] in Serum or Plasma 142 meq/L  136-145          

                [facility] ([facility])                             

 

           Chloride [Moles/volume] in Serum or Plasma 104 meq/L           

                  [facility] 

([facility])                   

 

           Carbon dioxide, total [Moles/volume] in Serum or Plasma 23 meq/L   21

-32                            

[facility] ([facility])            

 

                                        Glomerular filtration rate/1.73 sq M pre

dicted among non-blacks [Volume 

Rate/Area] in Serum or Plasma by Creatinine-based formula (MDRD) 45 mL/min      

                                  

                          [facility] ([facility])  

 

           Calcium [Mass/volume] in Serum or Plasma 10.6 mg/dL 8.5-10.1         

                [facility] 

([facility])                   

 

                                        Glomerular filtration rate/1.73 sq M pre

dicted among blacks [Volume Rate/Area] 

in Serum or Plasma by Creatinine-based formula (MDRD) 55 mL/min                 

                          [facility] 

([facility])                   

 

                                        <content>CHRONIC KIDNEY DISEASE STAGING 

PER 

NKF</content><br/><content></content><br/><content>STAGE I & II      GFR >= 60  
     NORMAL TO MILDLY DECREASED</content><br/><content>STAGE III          GFR 
30-59          MODERATELY DECREASED</content><br/><content>STAGE IV           
GFR 15-29         SEVERELY DECREASED</content><br/><content>STAGE V            
GFR <15            VERY LITTLE GFR LEFT</content><br/><content>ESRD             
   GFR <15            ON RRT</content><br/><content></content> 









                    ID                  Date                Data Source

 

                    [id number]          2021 01:39:00 PM EST [facility] ([facility])









          Name      Value     Range     Interpretation Code Description Data Milly

rce(s) Supporting 

Document(s)

 

           Hemoglobin [Mass/volume] in Blood 13.1 g/dL  12.0-18.0               

         [facility] ([facility]                              

 

           Leukocytes [#/volume] in Blood by Automated count 5.8 x10*3/UL 4.1-10

.9                         

[facility] ([facility])            

 

           Erythrocytes [#/volume] in Blood by Automated count 4.01 x10*6/UL 4.2

0-6.30                        

[facility] ([facility])            

 

           Hematocrit [Volume Fraction] of Blood by Automated count 37.5 %     3

7.0-51.0                        

[facility] ([facility])            

 

          MCV       93.4 fL   80.0-97.0                     [facility])  

 

          MCH       32.6 pg   26.0-32.0                     [facility])  

 

          MCHC      34.9 g/dL 31.0-38.0                     [facility] ([facility])  

 

           Platelets [#/volume] in Blood by Automated count 191 x10*3/-440

                          [facility]

 ([facility])                  

 

          MPV       8.6 FL    7.8-11.0                      [facility] ([facility])  

 

           Erythrocyte distribution width [Ratio] by Automated count 13.8 %     

11.6-13.7                        

[facility] ([facility])            

 

          Mid %     6.7 %     1.7-9.3                       [facility])  

 

          Lymph %   24.4 %    10.0-58.5                     [facility] ([facility])  

 

          Mid #     0.4 x10*3/UL 0.1-0.6                       [facility] ([facility])  

 

          Neut %    68.9 %    37.0-92.0                     [facility] ([facility])  

 

          Lymph #   1.4 x10*3/UL 0.6-4.1                       [facility] ([facility])  

 

          Neut #    4.0 x10*3/UL 2.0-7.8                       [facility] ([facility])  









                    ID                  Date                Data Source

 

                    [id number]          2020 11:57:00 AM EST [facility] ([facility])









          Name      Value     Range     Interpretation Code Description Data Milly

rce(s) Supporting 

Document(s)

 

          Inr       6.49                Above upper panic limits           [facility]

[facility]  

 

                                        THERAPUTIC HUMAN INR VALUES

INDICATIONS                      NORMAL RANGES

PROPHYLAXIS/TREATMENT OF:

VENOUS THROMBOSIS                2.0-3.0

PULMONARY EMBOLISM               2.0-3.0

PREVENTION OF SYSTEMIC EMBOLISM FROM:

TISSUE HEART VALVES              2.0-3.0

ACUTE MYOCARDIAL INFARCTION      2.0-3.0

VALVULAR HEART DISEASE           2.0-3.0

ATRIAL FIBRILLATION              2.0-3.0

MECHANICAL VALVES(HIGH RISK)     2.5-3.5

RECURRENT MYOCARDIAL INFARCTION  2.5-3.5

 

 

          Prothrombin Time 58.4 s    12.5-14.3                     [facility] ([facility])  









                    ID                  Date                Data Source

 

                    [id number]          2020 11:56:00 AM EST [facility] ([facility])









          Name      Value     Range     Interpretation Code Description Data Milly

rce(s) Supporting 

Document(s)

 

           Leukocytes [#/volume] in Blood by Automated count 6.0 x10*3/UL 4.1-10

.9                         

[facility] ([facility])            

 

           Hemoglobin [Mass/volume] in Blood 13.0 g/dL  12.0-18.0               

         [facility] ([facility]                              

 

           Erythrocytes [#/volume] in Blood by Automated count 4.11 x10*6/UL 4.2

0-6.30                        

[facility] ([facility])            

 

          MCV       91.1 fL   80.0-97.0                     [facility] ([facility])  

 

           Hematocrit [Volume Fraction] of Blood by Automated count 37.5 %     3

7.0-51.0                        

[facility] ([facility])            

 

          MCH       31.6 pg   26.0-32.0                     [facility] ([facility])  

 

          MCHC      34.7 g/dL 31.0-38.0                     [facility] ([facility])  

 

           Erythrocyte distribution width [Ratio] by Automated count 14.2 %     

11.6-13.7                        

[facility] ([facility]            

 

           Platelets [#/volume] in Blood by Automated count 243 x10*3/-440

                          [facility]

 ([facility]                  

 

          MPV       8.3 FL    7.8-11.0                      [facility] ([facility])  

 

          Lymph %   21.2 %    10.0-58.5                     [facility] ([facility])  

 

          Mid %     5.8 %     1.7-9.3                       [facility] ([facility])  

 

          Lymph #   1.2 x10*3/UL 0.6-4.1                       [facility] ([facility])  

 

          Neut %    73.0 %    37.0-92.0                     [facility] ([facility])  

 

          Neut #    4.4 x10*3/UL 2.0-7.8                       [facility] ([facility])  

 

          Mid #     0.4 x10*3/UL 0.1-0.6                       [facility] ([facility])  









                    ID                  Date                Data Source

 

                    [id number]          2020 11:29:00 AM EST [facility] ([facility])









          Name      Value     Range     Interpretation Code Description Data Milly

rce(s) Supporting 

Document(s)

 

           INR in Platelet poor plasma by Coagulation assay 7.9                 

                        [facility]                              









                    ID                  Date                Data Source

 

                    [id number]          2020 10:58:00 AM EST [facility] ([facility])









          Name      Value     Range     Interpretation Code Description Data Milly

rce(s) Supporting 

Document(s)

 

           Glucose [Mass/volume] in Serum or Plasma 100 mg/dL  74-99            

                [facility] ([facility]                              

 

                                        100-125 mg/dL     PRE-DIABETES/FASTING

>126 mg/dL          DIABETES/FASTING

 

 

           Urea nitrogen [Mass/volume] in Serum or Plasma 17 mg/dL   7-18       

                      [facility] 

([facility])                   

 

          Creatinine 0.7 mg/dL 0.6-1.3                       [facility] ([facility]

[facility])  

 

           Sodium [Moles/volume] in Serum or Plasma 141 meq/L  136-145          

                [facility] ([facility])                             

 

           Potassium [Moles/volume] in Serum or Plasma 4.4 meq/L  3.5-5.1       

                   [facility] 

([facility])                   

 

           Chloride [Moles/volume] in Serum or Plasma 103 meq/L           

                  [facility] 

([facility])                   

 

           Carbon dioxide, total [Moles/volume] in Serum or Plasma 24 meq/L   21

-32                            

[facility] ([facility])            

 

           Calcium [Mass/volume] in Serum or Plasma 10.2 mg/dL 8.5-10.1         

                [facility] 

([facility])                   

 

                                        NOTE:

RESULT VERIFIED.





 

 

                                        Glomerular filtration rate/1.73 sq M pre

dicted among non-blacks [Volume 

Rate/Area] in Serum or Plasma by Creatinine-based formula (MDRD) Laboratory test

result                                              [facility]

)  

 

                                        Glomerular filtration rate/1.73 sq M pre

dicted among blacks [Volume Rate/Area] 

in Serum or Plasma by Creatinine-based formula (MDRD) Laboratory test result    

                  

                                        [facility]  

 

                                        <content>CHRONIC KIDNEY DISEASE STAGING 

PER 

NKF</content><br/><content></content><br/><content>STAGE I & II      GFR >= 60  
     NORMAL TO MILDLY DECREASED</content><br/><content>STAGE III          GFR 
30-59          MODERATELY DECREASED</content><br/><content>STAGE IV           
GFR 15-29         SEVERELY DECREASED</content><br/><content>STAGE V            
GFR <15            VERY LITTLE GFR LEFT</content><br/><content>ESRD             
   GFR <15            ON RRT</content><br/><content></content> 









                    ID                  Date                Data Source

 

                    [id number]          2020 11:20:00 AM EST [facility] ([facility])









          Name      Value     Range     Interpretation Code Description Data Milly

rce(s) Supporting 

Document(s)

 

           INR in Platelet poor plasma by Coagulation assay 3.7                 

                        [facility]                              









                    ID                  Date                Data Source

 

                    [id number]          10/29/2020 01:07:00 PM EDT [facility] ([facility])









          Name      Value     Range     Interpretation Code Description Data Milly

rce(s) Supporting 

Document(s)

 

           INR in Platelet poor plasma by Coagulation assay 2.7                 

                        [facility] ([facility]                              









                    ID                  Date                Data Source

 

                    [id number]          10/29/2020 11:26:00 AM EDT [facility] ([facility])









          Name      Value     Range     Interpretation Code Description Data Milly

rce(s) Supporting 

Document(s)

 

           Digoxin [Mass/volume] in Serum or Plasma 0.6 ng/mL  0.5-2.0          

                [facility] ([facility])                             









                    ID                  Date                Data Source

 

                    [id number]            10/29/2020 11:26:00 AM EDT [facility] ([facility]









          Name      Value     Range     Interpretation Code Description Data Milly

rce(s) Supporting 

Document(s)

 

           Digoxin [Mass/volume] in Serum or Plasma 0.6 ng/mL  0.5-2.0          

                [facility] 

([facility])           









                    ID                  Date                Data Source

 

                    [id number]          10/29/2020 11:25:00 AM EDT [facility] ([facility])









          Name      Value     Range     Interpretation Code Description Data Milly

rce(s) Supporting 

Document(s)

 

          Urine PH  6.5 units 5.0-9.0                       [facility]  

 

          Urine Appearance Laboratory test result                               

[facility] ([facility])  

 

          Urine Color Laboratory test result                               [facility]

T ([facility])  

 

          Urine Leukocytes Laboratory test result                               

[facility] ([facility])  

 

          Specific gravity of Urine 1.010     1.005-1.030                     ME

[facility] ([facility])  

 

          Urine Protein Laboratory test result 0-0                           MED

ENT ([facility])  

 

          Urine Blood Laboratory test result                               [facility]

T ([facility])  

 

          Urine Nitrite Laboratory test result                               MED

ENT ([facility])  

 

           Glucose [Presence] in Urine Laboratory test result                   

               [facility] ([facility]                              

 

          Urine Ketone Laboratory test result                               [facility]

NT ([facility])  

 

          Urine Urobilinogen 0.2 mg/dL 0.2-1.0                       [facility] ([facility])  

 

           Bilirubin.total [Mass/volume] in Serum or Plasma Laboratory test resu

lt                                  

[facility] ([facility])            









                    ID                  Date                Data Source

 

                    [id number]          10/29/2020 11:25:00 AM EDT [facility] ([facility])









          Name      Value     Range     Interpretation Code Description Data Milly

rce(s) Supporting 

Document(s)

 

                          Thyrotropin [Units/volume] in Serum or Plasma by Detec

tion limit <= 0.05 mIU/L 

0.16 uIU/mL  0.36-3.74                              [facility] ([facility]

)  









                    ID                  Date                Data Source

 

                    [id number]          10/29/2020 11:25:00 AM EDT [facility] ([facility])









          Name      Value     Range     Interpretation Code Description Data Milly

rce(s) Supporting 

Document(s)

 

           Urea nitrogen [Mass/volume] in Serum or Plasma 17 mg/dL   7-18       

                      [facility] 

([facility])                   

 

           Glucose [Mass/volume] in Serum or Plasma 101 mg/dL  74-99            

                [facility] ([facility]                              

 

                                        100-125 mg/dL     PRE-DIABETES/FASTING

>126 mg/dL          DIABETES/FASTING

 

 

          Creatinine 0.7 mg/dL 0.6-1.3                       [facility]  

 

           Sodium [Moles/volume] in Serum or Plasma 143 meq/L  136-145          

                [facility] ([facility]                             

 

           Chloride [Moles/volume] in Serum or Plasma 104 meq/L           

                  [facility] 

([facility])                   

 

           Potassium [Moles/volume] in Serum or Plasma 4.0 meq/L  3.5-5.1       

                   [facility] 

([facility])                   

 

           Carbon dioxide, total [Moles/volume] in Serum or Plasma 30 meq/L   21

-32                            

[facility] ([facility])            

 

           Calcium [Mass/volume] in Serum or Plasma 10.7 mg/dL 8.5-10.1         

                [facility] 

([facility])                   

 

                                        NOTE:

RESULT VERIFIED.





 

 

                                        Glomerular filtration rate/1.73 sq M pre

dicted among non-blacks [Volume 

Rate/Area] in Serum or Plasma by Creatinine-based formula (MDRD) Laboratory test

result                                              [facility]

)  

 

                                        Glomerular filtration rate/1.73 sq M pre

dicted among blacks [Volume Rate/Area] 

in Serum or Plasma by Creatinine-based formula (MDRD) Laboratory test result    

                  

                                        [facility] ([facility])  

 

                                        <content>CHRONIC KIDNEY DISEASE STAGING 

PER 

NKF</content><br/><content></content><br/><content>STAGE I & II      GFR >= 60  
     NORMAL TO MILDLY DECREASED</content><br/><content>STAGE III          GFR 
30-59          MODERATELY DECREASED</content><br/><content>STAGE IV           
GFR 15-29         SEVERELY DECREASED</content><br/><content>STAGE V            
GFR <15            VERY LITTLE GFR LEFT</content><br/><content>ESRD             
   GFR <15            ON RRT</content><br/><content></content> 









                    ID                  Date                Data Source

 

                    [id number]          10/29/2020 11:25:00 AM EDT [facility] ([facility])









          Name      Value     Range     Interpretation Code Description Data Milly

rce(s) Supporting 

Document(s)

 

          Magnesium 2.1 mg/dL 1.8-2.4                       [facility] ([facility])  









                    ID                  Date                Data Source

 

                    [id number]          10/29/2020 11:25:00 AM EDT [facility] ([facility])









          Name      Value     Range     Interpretation Code Description Data Milly

rce(s) Supporting 

Document(s)

 

           Erythrocytes [#/volume] in Blood by Automated count 4.36 x10*6/UL 4.2

0-6.30                        

[facility] ([facility])            

 

           Leukocytes [#/volume] in Blood by Automated count 6.2 x10*3/UL 4.1-10

.9                         

[facility] ([facility])            

 

           Hemoglobin [Mass/volume] in Blood 13.8 g/dL  12.0-18.0               

         [facility] ([facility]                              

 

           Hematocrit [Volume Fraction] of Blood by Automated count 40.7 %     3

7.0-51.0                        

[facility] ([facility])            

 

          MCH       31.6 pg   26.0-32.0                     [facility]  

 

          MCV       93.2 fL   80.0-97.0                     [facility]

[facility])  

 

          MCHC      34.0 g/dL 31.0-38.0                     [facility])  

 

           Platelets [#/volume] in Blood by Automated count 236 x10*3/-440

                          [facility]

 ([facility])                  

 

           Erythrocyte distribution width [Ratio] by Automated count 14.5 %     

11.6-13.7                        

[facility] ([facility])            

 

          Lymph %   19.8 %    10.0-58.5                     [facility]  

 

          MPV       7.8 FL    7.8-11.0                      [facility]  

 

          Mid %     5.7 %     1.7-9.3                       [facility]  

 

          Neut %    74.5 %    37.0-92.0                     [facility]  

 

          Lymph #   1.2 x10*3/UL 0.6-4.1                       [facility] ([facility]  

 

          Neut #    4.6 x10*3/UL 2.0-7.8                       [facility] ([facility])  

 

          Mid #     0.4 x10*3/UL 0.1-0.6                       [facility] ([facility]  









                    ID                  Date                Data Source

 

                    [id number]            10/29/2020 11:25:00 AM EDT [facility] ([facility])









          Name      Value     Range     Interpretation Code Description Data Milly

rce(s) Supporting 

Document(s)

 

           Leukocytes [#/volume] in Blood by Automated count 6.2 x10*3/UL 4.1-10

.9                         

[facility] ([facility])    

 

           Hematocrit [Volume Fraction] of Blood by Automated count 40.7 %     3

7.0-51.0                        

[facility] ([facility])    

 

           Erythrocytes [#/volume] in Blood by Automated count 4.36 x10*6/UL 4.2

0-6.30                        

[facility] ([facility])    

 

           Hemoglobin [Mass/volume] in Blood 13.8 g/dL  12.0-18.0               

         [facility] ([facility])                       

 

          MCHC      34.0 g/dL 31.0-38.0                     [facility] ([facility])  

 

          MCV       93.2 fL   80.0-97.0                     [facility] ([facility])  

 

          MCH       31.6 pg   26.0-32.0                     [facility] ([facility]

[facility])  

 

           Erythrocyte distribution width [Ratio] by Automated count 14.5 %     

11.6-13.7                        

[facility] ([facility])    

 

           Platelets [#/volume] in Blood by Automated count 236 x10*3/-440

                          [facility]

 ([facility])          

 

             Platelet mean volume [Entitic volume] in Blood by [place] 7.8 FL

       7.8-11.0                   

                          [facility] ([facility])  

 

          Neut %    74.5 %    37.0-92.0                     [facility]  

 

          Mid %     5.7 %     1.7-9.3                       [facility]  

 

           Lymphocytes/100 leukocytes in Blood by Automated count 19.8 %     10.

0-58.5                        

[facility] ([facility])    

 

           Neutrophils [#/volume] in [place] by Manual count 4.6 x10*3/UL 2.0-7.8 

                         [facility] 

([facility])           

 

          Mid #     0.4 x10*3/UL 0.1-0.6                       [facility]  

 

          Lymph #   1.2 x10*3/UL 0.6-4.1                       [facility]  









                    ID                  Date                Data Source

 

                    [id number]          10/29/2020 11:24:00 AM EDT [facility] ([facility])









          Name      Value     Range     Interpretation Code Description Data Milly

rce(s) Supporting 

Document(s)

 

           Thyroxine (T4) free [Mass/volume] in Serum or Plasma 1.33 ng/dL 0.76-

1.46                        

[facility] ([facility])            









                    ID                  Date                Data Source

 

                    [id number]          10/26/2020 11:44:00 AM EDT [facility] ([facility])









          Name      Value     Range     Interpretation Code Description Data Milly

rce(s) Supporting 

Document(s)

 

           INR in Platelet poor plasma by Coagulation assay 1.2                 

                        [facility] ([facility])                              









                    ID                  Date                Data Source

 

                    [id number]          10/22/2020 12:43:00 PM EDT [facility] ([facility])









          Name      Value     Range     Interpretation Code Description Data Milly

rce(s) Supporting 

Document(s)

 

           INR in Platelet poor plasma by Coagulation assay 7.2                 

                        [facility] ([facility])                              









                    ID                  Date                Data Source

 

                    [id number]          10/22/2020 11:33:00 AM EDT [facility] ([facility])









          Name      Value     Range     Interpretation Code Description Data Milly

rce(s) Supporting 

Document(s)

 

          Prothrombin Time 49.4 s    12.5-14.3                     [facility] ([facility])  

 

          Inr       5.24                Above upper panic limits           [facility]

T ([facility])  

 

                                        THERAPUTIC HUMAN INR VALUES

INDICATIONS                      NORMAL RANGES

PROPHYLAXIS/TREATMENT OF:

VENOUS THROMBOSIS                2.0-3.0

PULMONARY EMBOLISM               2.0-3.0

PREVENTION OF SYSTEMIC EMBOLISM FROM:

TISSUE HEART VALVES              2.0-3.0

ACUTE MYOCARDIAL INFARCTION      2.0-3.0

VALVULAR HEART DISEASE           2.0-3.0

ATRIAL FIBRILLATION              2.0-3.0

MECHANICAL VALVES(HIGH RISK)     2.5-3.5

RECURRENT MYOCARDIAL INFARCTION  2.5-3.5

 









                    ID                  Date                Data Source

 

                    [id number]          10/22/2020 11:33:00 AM EDT [facility] ([facility])









          Name      Value     Range     Interpretation Code Description Data Milly

rce(s) Supporting 

Document(s)

 

           Erythrocytes [#/volume] in Blood by Automated count 4.37 x10*6/UL 4.2

0-6.30                        

[facility] ([facility])            

 

           Leukocytes [#/volume] in Blood by Automated count 6.6 x10*3/UL 4.1-10

.9                         

[facility] ([facility])            

 

           Hemoglobin [Mass/volume] in Blood 13.7 g/dL  12.0-18.0               

         [facility] ([facility])                              

 

           Hematocrit [Volume Fraction] of Blood by Automated count 40.2 %     3

7.0-51.0                        

[facility] ([facility])            

 

          MCV       91.9 fL   80.0-97.0                     [facility] ([facility])  

 

          MCH       31.4 pg   26.0-32.0                     [facility] ([facility])  

 

          MCHC      34.2 g/dL 31.0-38.0                     [facility] ([facility])  

 

           Erythrocyte distribution width [Ratio] by Automated count 13.7 %     

11.6-13.7                        

[facility] ([facility])            

 

           Platelets [#/volume] in Blood by Automated count 251 x10*3/-440

                          [facility]

 ([facility])                  

 

          MPV       8.3 FL    7.8-11.0                      [facility]  

 

          Lymph %   16.8 %    10.0-58.5                     [facility]  

 

          Mid %     4.7 %     1.7-9.3                       [facility]  

 

          Lymph #   1.1 x10*3/UL 0.6-4.1                       [facility] ([facility])  

 

          Neut %    78.5 %    37.0-92.0                     [facility]  

 

          Mid #     0.3 x10*3/UL 0.1-0.6                       [facility] ([facility])  

 

          Neut #    5.2 x10*3/UL 2.0-7.8                       [facility] ([facility])  









                    ID                  Date                Data Source

 

                    [id number]          10/15/2020 12:07:00 PM EDT [facility] ([facility])









          Name      Value     Range     Interpretation Code Description Data Milly

rce(s) Supporting 

Document(s)

 

           INR in Platelet poor plasma by Coagulation assay 1.3                 

                        [facility] ([facility])                              







                                        Procedure

 

                                          



                                                                                
                                                                                
                                                                                
                                                                                
                                                                                
                                                                                
                                                                                
                                                                                
                                                                                
                                                                                
                                                                                
                                                



Social History

          



           Code       Duration   Value      Status     Description Data Source(s

)

 

           Smoking    10/26/2021 12:00:00 AM EDT Current Smoker completed  Curre

nt Smoker eCW1 

([facility])

 

           Smoking    10/26/2021 12:00:00 AM EDT Current Smoker completed  Curre

nt Smoker eCW1 

([facility])

 

           Smoking    10/26/2021 12:00:00 AM EDT Current Smoker completed  [facility]re

nt Smoker eCW1 

([facility])

 

           Smoking    10/26/2021 12:00:00 AM EDT Current Smoker completed  Curre

nt Smoker eCW1 

([facility])

 

           Smoking    10/26/2021 12:00:00 AM EDT Current Smoker completed  Curre

nt Smoker eCW1 

([facility])

 

           Smoking    10/21/2021 12:00:00 AM EDT Current Smoker completed  Curre

nt Smoker eCW1 

([facility])

 

           Smoking    10/21/2021 12:00:00 AM EDT Current Smoker completed  Curre

nt Smoker eCW1 

([facility])



                                                                                
                                                                              



Vital Signs

          



                    ID                  Date                Data Source

 

                    UNK                                      









           Name       Value      Range      Interpretation Code Description Data

 Source(s)

 

           Systolic blood pressure 124 mm[Hg]                       124 mm[Hg] M

[facility]

 

           Diastolic blood pressure 76 mm[Hg]                        76 mm[Hg]  

[facility]

 

           Heart rate 88 /min                          88 /min    [facility]

 

           Body height 66 [in_i]                        66 [in_i]  [facility] ([facility])

 

                                        5'6" 

 

           Body weight 220.00 [lb_av]                       220.00 [lb_av] [facility]

T ([facility])

 

           Oxygen saturation in Arterial blood by Pulse oximetry 97 %           

                  97 %       [facility])

 

           Body mass index (BMI) [Ratio] 35.5 kg/m2                       35.5 k

g/m2 [facility]

 

           Systolic blood pressure 112 mm[Hg]                       112 mm[Hg] M

[facility])

 

                                        RT Arm 

 

           Heart rate 68 /min                          68 /min    [facility]

 

           Diastolic blood pressure 70 mm[Hg]                        70 mm[Hg]  

[facility])

 

                                        RT Arm 

 

           Body height 66 [in_i]                        66 [in_i]  [facility]

 

                                        5'6" 

 

           Body weight 215.12 [lb_av]                       215.12 [lb_av] [facility]

T ([facility])

 

           Oxygen saturation in Arterial blood by Pulse oximetry 96 %           

                  96 %       [facility] 

([facility])

 

           Body mass index (BMI) [Ratio] 34.7 kg/m2                       34.7 k

g/m2 [facility] ([facility])

 

           Body weight 240 [lb_av]                       240 [lb_av] eCW1 ([facility])

 

           Body weight 108.86 kg                        108.86 kg  eCW1 ([facility])

 

           Body height 65 [in_i]                        65 [in_i]  eCW1 ([facility])

 

           Body mass index (BMI) [Ratio] 39.93 kg/m2                       39.93

 kg/m2 W1 ([facility])

 

           Heart rate 70 /min                          70 /min    eCW1 ([facility])

 

           Respiratory rate 18 /min                          18 /min    eCW1 ([facility])

 

           Body temperature 95.2 [degF]                       95.2 [degF] eCW1 (

[facility])

 

           Systolic blood pressure 121 mm[Hg]                       121 mm[Hg] e

CW1 ([facility])

 

           Diastolic blood pressure 84 mm[Hg]                        84 mm[Hg]  

eCW1 ([facility])

 

           Body weight 240 [lb_av]                       240 [lb_av] eCW1 ([facility])

 

           Body weight 108.86 kg                        108.86 kg  eCW1 ([facility])

 

           Body height 65 [in_i]                        65 [in_i]  eCW1 ([facility])

 

           Body mass index (BMI) [Ratio] 39.93 kg/m2                       39.93

 kg/m2 eCW1 ([facility])

 

           Heart rate 153 /min                         153 /min   eCW1 ([facility])

 

           Respiratory rate 18 /min                          18 /min    eCW1 ([facility])

 

           Body temperature 98.2 [degF]                       98.2 [degF] eCW1 (

[facility])

 

           Systolic blood pressure 142 mm[Hg]                       142 mm[Hg] e

CW1 ([facility])

 

           Diastolic blood pressure 96 mm[Hg]                        96 mm[Hg]  

eCW1 ([facility])

 

           Body mass index (BMI) [Ratio] 36.0 kg/m2                       36.0 k

g/m2 [facility] ([facility])

 

           Systolic blood pressure 122 mm[Hg]                       122 mm[Hg] M

[facility] ([facility])

 

           Diastolic blood pressure 76 mm[Hg]                        76 mm[Hg]  

[facility] ([facility])

 

           Heart rate 86 /min                          86 /min    [facility] ([facility])

 

           Body height 66 [in_i]                        66 [in_i]  [facility] ([facility]

 

                                        5'6" 

 

           Body weight 223.00 [lb_av]                       223.00 [lb_av] [facility]

T ([facility])

 

           Body mass index (BMI) [Ratio] 35.0 kg/m2                       35.0 k

g/m2 [facility] ([facility])

 

           Systolic blood pressure 124 mm[Hg]                       124 mm[Hg] M

[facility] ([facility])

 

           Body height 66 [in_i]                        66 [in_i]  [facility]

 

                                        5'6" 

 

           Body weight 217.00 [lb_av]                       217.00 [lb_av] [facility]

T ([facility])

 

           Diastolic blood pressure 68 mm[Hg]                        68 mm[Hg]  

[facility] ([facility])

 

           Heart rate 86 /min                          86 /min    [facility] ([facility])

 

           Systolic blood pressure 114 mm[Hg]                       114 mm[Hg] [facility] ([facility])

 

           Diastolic blood pressure 76 mm[Hg]                        76 mm[Hg]  

[facility] ([facility])

 

           Body height 66 [in_i]                        66 [in_i]  [facility] ([facility])

 

                                        5'6" 

 

           Body weight 221.50 [lb_av]                       221.50 [lb_av] [facility]

T ([facility])

 

           Body mass index (BMI) [Ratio] 35.7 kg/m2                       35.7 k

g/m2 [facility] ([facility])

 

           Ideal body weight 130 [lb_av]                       130 [lb_av] [facility]

T ([facility])

 

           Body weight 100.472 kg                       100.472 kg [facility] ([facility])

 

           Body surface area Derived from formula 2.09 m2                       

   2.09 m2    [facility] ([facility])

 

           Diastolic blood pressure 88 mm[Hg]                        88 mm[Hg]  

[facility] ([facility])

 

                                        RT Arm 

 

           Systolic blood pressure 136 mm[Hg]                       136 mm[Hg] M

[facility]

 

                                        RT Arm 

 

           Heart rate 120 /min                         120 /min   [facility] ([facility])

 

           Body height 66 [in_i]                        66 [in_i]  [facility] ([facility]

 

                                        5'6" 

 

           Body weight 223.50 [lb_av]                       223.50 [lb_av] [facility]

T ([facility])

 

           Body mass index (BMI) [Ratio] 36.1 kg/m2                       36.1 k

g/m2 [facility] ([facility])

 

           Ideal body weight 130 [lb_av]                       130 [lb_av] [facility]

T ([facility])

 

           Body weight 102.060 kg                       102.060 kg [facility] ([facility])

 

           Body height 66 [in_i]                        66 [in_i]  [facility] ([facility])

 

                                        5'6" 

 

           Body mass index (BMI) [Ratio] 36.3 kg/m2                       36.3 k

g/m2 [facility] ([facility])

 

           Body surface area Derived from formula 2.10 m2                       

   2.10 m2    [facility] ([facility])

 

           Diastolic blood pressure 77 mm[Hg]                        77 mm[Hg]  

[facility] ([facility])

 

           Body weight 225.00 [lb_av]                       225.00 [lb_av] [facility]

T ([facility])

 

           Systolic blood pressure 145 mm[Hg]                       145 mm[Hg] M

[facility] ([facility])

 

           Systolic blood pressure 148 mm[Hg]                       148 mm[Hg] M

[facility]

 

                                        RT Arm 

 

           Diastolic blood pressure 72 mm[Hg]                        72 mm[Hg]  

[facility] ([facility])

 

                                        RT Arm 

 

           Systolic blood pressure 140 mm[Hg]                       140 mm[Hg] M

[facility] ([facility])

 

           Diastolic blood pressure 70 mm[Hg]                        70 mm[Hg]  

[facility] ([facility])

 

           Heart rate 80 /min                          80 /min    [facility] ([facility])

 

           Body height 66 [in_i]                        66 [in_i]  [facility]

 

                                        5'6" 

 

           Body weight 219.00 [lb_av]                       219.00 [lb_av] [facility]

T ([facility])

 

           Body mass index (BMI) [Ratio] 35.3 kg/m2                       35.3 k

g/m2 [facility] ([facility])

 

           Body height 66 [in_i]                        66 [in_i]  [facility] ([facility])

 

                                        5'6" 

 

           Body weight 190.00 [lb_av]                       190.00 [lb_av] [facility]

T ([facility] 

)

 

           Body mass index (BMI) [Ratio] 30.7 kg/m2                       30.7 k

g/m2 [facility]

 

           Body mass index (BMI) [Ratio] 35.7 kg/m2                       35.7 k

g/m2 [facility] ([facility]

 

           Body temperature 96.1 [degF]                       96.1 [degF] [facility]

 ([facility] 

)

 

           Body height 64.5 [in_i]                       64.5 [in_i] [facility] ([facility] )

 

                                        5'4.50" 

 

           Body weight 211.50 [lb_av]                       211.50 [lb_av] [facility]

T ([facility] 

)

 

           Systolic blood pressure 142 mm[Hg]                       142 mm[Hg] M

[facility] ([facility])

 

           Body weight 222.00 [lb_av]                       222.00 [lb_av] [facility]

T ([facility])

 

           Systolic blood pressure 154 mm[Hg]                       154 mm[Hg] M

[facility] ([facility])

 

                                        RT Arm 

 

           Diastolic blood pressure 78 mm[Hg]                        78 mm[Hg]  

[facility] ([facility])

 

                                        RT Arm 

 

           Diastolic blood pressure 80 mm[Hg]                        80 mm[Hg]  

[facility] ([facility])

 

           Heart rate 96 /min                          96 /min    [facility] ([facility])

 

           Body height 66 [in_i]                        66 [in_i]  [facility]

 

                                        5'6" 

 

           Body mass index (BMI) [Ratio] 35.8 kg/m2                       35.8 k

g/m2 [facility] ([facility]

 

           Systolic blood pressure 164 mm[Hg]                       164 mm[Hg] M

[facility] )

 

           Diastolic blood pressure 90 mm[Hg]                        90 mm[Hg]  

[facility] ([facility], )

 

           Body height 66 [in_i]                        66 [in_i]  [facility] ([facility], )

 

                                        5'6" 

 

           Body weight 225.00 [lb_av]                       225.00 [lb_av] [facility]

T ([facility], )

 

           Body mass index (BMI) [Ratio] 36.3 kg/m2                       36.3 k

g/m2 [facility] ([facility])

 

           Ideal body weight 130 [lb_av]                       130 [lb_av] [facility]

T ([facility])

 

           Body weight 102.060 kg                       102.060 kg [facility] ([facility])

 

           Body surface area Derived from formula 2.10 m2                       

   2.10 m2    [facility] ([facility], )

 

           Body weight 220.00 [lb_av]                       220.00 [lb_av] [facility]

T ([facility])

 

           Body height 65 [in_i]                        65 [in_i]  [facility] ([facility])

 

                                        5'5" 

 

           Body mass index (BMI) [Ratio] 36.6 kg/m2                       36.6 k

g/m2 [facility] ([facility])

 

           Heart rate 65 /min                          65 /min    [facility] ([facility]

[facility])

 

           Systolic blood pressure--sitting 136 mm[Hg]                       136

 mm[Hg] [facility] ([facility])

 

                                        Ra, large cuff 

 

           Diastolic blood pressure--sitting 84 mm[Hg]                        84

 mm[Hg]  [facility] ([facility])

 

                                        Ra, large cuff 

 

           Body height 66 [in_i]                        66 [in_i]  [facility] ([facility] )

 

                                        5'6" 

 

           Body mass index (BMI) [Ratio] 35.7 kg/m2                       35.7 k

g/m2 [facility] ([facility] )

 

           Body weight 221.00 [lb_av]                       221.00 [lb_av] MEDEN

T ([facility] 

)

 

           Diastolic blood pressure 70 mm[Hg]                        70 mm[Hg]  

MEDENT ([facility])

 

                                        RT Arm 

 

           Heart rate 60 /min                          60 /min    [facility] ([facility])

 

           Body height 66 [in_i]                        66 [in_i]  [facility] ([facility])

 

                                        5'6" 

 

           Body mass index (BMI) [Ratio] 35.7 kg/m2                       35.7 k

g/m2 [facility] ([facility])

 

           Systolic blood pressure 148 mm[Hg]                       148 mm[Hg] M

EDENT ([facility])

 

                                        RT Arm 

 

           Body weight 221.00 [lb_av]                       221.00 [lb_av] MEDEN

T ([facility])

 

           Body weight 226.00 [lb_av]                       226.00 [lb_av] MEDEN

T ([facility])

 

           Body mass index (BMI) [Ratio] 36.5 kg/m2                       36.5 k

g/m2 [facility] ([facility])

 

           Body weight 226.00 [lb_av]                       226.00 [lb_av] MEDEN

T ([facility])

 

           Systolic blood pressure 138 mm[Hg]                       138 mm[Hg] M

CYNTHIA ([facility])

 

           Diastolic blood pressure 90 mm[Hg]                        90 mm[Hg]  

SUYAPA ([facility])

 

           Body height 66 [in_i]                        66 [in_i]  SUYAPA ([facility])

 

                                        5'6" 

 

           Body weight 217.00 [lb_av]                       217.00 [lb_av] JOE TRIVEDI ([facility])

## 2021-12-02 NOTE — CCD
Continuity of Care Document (CCD)

                             Created on: 10/19/2021



Juany Becerril

External Reference #: [id number]

: 1955

Sex: Female



Demographics





                          Address                   [address] [facility] [address]

[place]  [address]

 

                          Home Phone                [phone]

 

                          Preferred Language        Unknown

 

                          Marital Status            Unknown

 

                          Church Affiliation     Unknown

 

                          Race                      White

 

                          Ethnic Group              Not  or 





Author





                          Author                    Juany ESQUEDA PA

 

                          Organization              Unknown

 

                          Address                   [address] [facility] [address]

[place]  [address]



 

                          Phone                     [phone]







Care Team Providers





                    Care Team Member Name Role                Phone

 

                    Yuri Maier MD       AUTM                [phone]

 

                    [facility]  AUTM                [phone]

 

                    John Lutz MD AUTM                Unavailable

 

                    [facility] AUTM                [phone]

 

                    Hansa Hernandez FNP  AUTM                [phone]







Problems





                    Active Problems     Provider            Date

 

                    Chronic atrial fibrillation Protime             Onset: 

 

                    Essential hypertension Juany Vincent FNP Onset: 2017

 

                    Deep venous thrombosis of lower extremity Juany Vincent FN P Onset: 2017

 

                    Pure hypercholesterolemia Juany Vincent FNP Onset: 

017

 

                    Anxiety state       Juany Vincent FNP Onset: 2017

 

                    Chronic pulmonary embolism Juany Vincent FNP Onset: 2017

 

                    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 

2017

 

                    Gastroesophageal reflux disease Juany Vincent FNP Onset: 1

2017

 

                    Scoliosis deformity of spine Juany Vincent FNP Onset: 2017

 

                    Chronic tension-type headache Juany Vincent FNP Onset: 2017

 

                    Varicose veins of lower extremity Juany Vincent FNP Onset:

 2017

 

                    Tobacco user        Juany Vincent FNP Onset: 2017

 

                    Hypercalcemia       Juany Vincent FNP Onset: 2017







Social History





                Type            Date            Description     Comments

 

                Birth Sex                       Unknown          

 

                ETOH Use                        Consumes 3 glasses of wine per w

[place]  

 

                Tobacco Use     Start: Unknown  Patient is a current smoker, smo

kes every day STARTED

AGE 30 1-3 CIGARETTES A DAY 







Allergies and adverse reactions





             Active Allergies Criticality  Reaction | Severity Comments     Date

 

             Ibuprofen    Unable to assess criticality STOMACH UPSET HEADACHE mo

david       10/17/2017

 

             Latex        Unable to assess criticality rash, itches | Mild      

        10/17/2018







Medications





           Active Medications SIG        Qnty       Indications Ordering Provide

r Date

 

                          Cephalexin                     500mg Capsules         

          take one tablet 

by mouth 3 times daily x 7 days 21caps                          Julio kothari JR PA 10/06/2021

 

                          Excedrin Migraine                     [id number] Tab

lets                   1 

as needed h/a as needed mdd=1                                 Charmaine Regalado M.D. 2021

 

                          Famotidine                     20mg Tablets           

        1 by mouth every 

day             90tabs                          Charmaine Regalado M.D. 20

21

 

                          Furosemide                     20mg Tablets           

        1 by mouth twice a

day             180tabs                         Charmaine Regalado M.D. 20

21

 

                                        Bupropion Hydrochloride ER (XL)         

            150mg Tablets ER 24HR       

                take 1 tablet by mouth in the morning 90tabs                    

      Charmaine Regalado M.D.                                    2021

 

                          Eliquis                     5mg Tablets               

    take one tablet by 

mouth twice a day 180tabs                         Charmaine Regalado M.D. 2020

 

                          Methimazole                     5mg Tablets           

        2 every day by 

mouth           180tabs                         Charmaine Regalado M.D. 20

20

 

                          Shingrix                     50mcg/0.5ML Suspension Re

c                   

administer at pharmacy 1units                          Juany Vincent FNP 

 

                          Voltaren                     1% Gel                   

apply up to 4 grams three 

times a day to affected knee as needed 100gm                           Charmaine Regalado M.D. 

2018

 

                          Aspirin Adult Low Dose                     81mg Tablet

s DR                   1 

by mouth every day                                 Juany Vincent FNP 10/17/201

7

 

                                        Acetaminophen Extra Strength            

         500mg Tablets                  

             take 2 tabs every 6 hours as needed                           Juany Fung FNP 10/17/2017

 

                                        Womens 50+ Multi Vitamin & Mineral Formu

la                      Tablets         

             1 every day PO Vit L=9832Ld MD=187                           Juany Vincent FNP 10/17/2017

 

                          Metoprolol Tartrate                     25mg Tablets  

                 Take One 

Tablet By Mouth Twice A Day 180tabs                         ABELARDO Saleem 10/17/2017

 

                          Atorvastatin Calcium                     20mg Tablets 

                  take one

tablet by mouth every day 90tabs                          JANE Saleem 10/17/2017

 

                          Flecainide Acetate                     100mg Tablets  

                 take one 

tablet by mouth twice a day 180tabs                         ABELARDO Saleem 10/17/2017

 

                          Digoxin                     250mcg Tablets            

       1 by mouth every 

day             90tabs                          Charmaine Regalado M.D. 







Medications Administered in Office





           Medication SIG        Qnty       Indications Ordering Provider Date

 

                          Administration Of Flu Vaccine                      Inj

ection                    

                                                AIXA Hoffmann JR 10/06/2

021

 

                          Administration Of Flu Vaccine                      Inj

karen Regalado M.D. 10/15/20

20

 

                          Administration Of Flu Vaccine                      Inj

diyaion                    

                                                Juany Vincent FNP 10/17/2019

 

                          Administration Of Flu Vaccine                      Inj

ection                    

                                                Juany Vincent FNP 10/17/2018

 

                          Administration Of Flu Vaccine                      Inj

Juany Alonso FNP 10/17/2017







Immunizations





             CPT Code     Status       Date         Vaccine      Lot #

 

                [id number]           Given           10/06/2021      Influenza Vaccin

e Quadrivalent Preser/Antibiotic Free Im 

Use                                     [id number]

 

                [id number]           Given           10/15/2020      Influenza Vaccin

e Quadrivalent Preser/Antibiotic Free Im 

Use                                     [id number]

 

                [id number]           Given           2020      Shingrix Zoster 

Vaccine (HZV), Recombinant, Subunit, 

Adjuvanted                               

 

                90567           Given           10/17/2019      Influenza Vaccin

e Quadrivalent Preser/Antibiotic Free Im 

Use                                     [id number]

 

                [id number]           Given           10/17/2018      Influenza Virus 

Vaccine, Quadrivalent (Cciiv4), Derived 

From Cell                               [id number]

 

             01468        Given        2018   Pneumovax 23 [id number]

 

                [id number]           Given           10/17/2017      Influenza Vaccin

e Quadrivalent Preser/Antibiotic Free Im 

Use                                     [id number]







Vital Signs





                Date            Vital           Result          Comment

 

                10/06/2021  2:17pm BP Systolic     124 mmHg         

 

                    BP Diastolic        68 mmHg              

 

                    Heart Rate          86 /min              

 

                    Height              66 inches           5'6"

 

                    Weight              217.00 lb            

 

                    BMI (Body Mass Index) 35.0 kg/m2           

 

                2021  1:31pm BP Systolic     136 mmHg        RT Arm

 

                    BP Diastolic        88 mmHg             RT Arm

 

                    Heart Rate          120 /min             

 

                    Height              66 inches           5'6"

 

                    Weight              223.50 lb            

 

                    BMI (Body Mass Index) 36.1 kg/m2           







Results





        Test    Acquired Date Facility Test    Result  H/L     Range   Note

 

                    Laboratory test finding 10/06/2021          [facility]

                                        [address] [place] [address] [phone] Wound Culture <pending>                         

 

                    CBC With Differential 2021          [facility]

                                        [address] [place] [address] [phone] White Blood Count 6.8 10     Normal     4.0-10.0    

 

             Red Blood Count 3.85 10      Low          4.00-5.40     

 

             Hemoglobin   12.7 g/dL    Normal       12.0-15.5     

 

             Hematocrit   39.4 %       Normal       36.0-47.0     

 

             Mean Corpuscular Volume 102.3 fl     High         80.0-96.0     

 

             Mean Corpuscular Hemoglobin 33.0 pg      Normal       27.0-33.0    

 

 

             Mean Corpuscular HGB Conc 32.2 g/dL    Normal       32.0-36.5     

 

             Red Cell Distribution Width 12.3 %       Normal       11.5-14.5    

 

 

             Platelet Count, Automated 187 10       Normal       150-450       

 

             Neutrophils % 72.2 %       High         36.0-66.0     

 

             Lymph %      19.9 %       Low          24.0-44.0     

 

             Mono %       6.0 %        Normal       2.0-8.0       

 

             Eos %        1.5 %        Normal       0.0-3.0       

 

             Baso %       0.3 %        Normal       0.0-1.0       

 

             Immature Granulocyte % 0.1 %        Normal       0-3.0         

 

             Nucleated Red Blood Cell % 0.0 %        Normal       0-0           

 

             Neutrophils # 4.9 10       Normal       1.5-8.5       

 

             Lymph #      1.4 10       Low          1.5-5.0       

 

             Mono #       0.4 10       Normal       0.0-0.8       

 

             Eos #        0.1 10       Normal       0.0-0.5       

 

             Baso #       0.0 10       Normal       0.0-0.2       

 

                    PT & Aptt           2021          [facility]

nter

                                        [address] [place] [address] [phone] Prothrombin Time 13.8 seconds Normal     12.5-14.3   

 

             Inr          1.04         Normal                    1

 

             Partial Thromboplastin Time 30.6 seconds Normal       24.2-38.5    

 

 

                    CBC With Differential 2021          [address] [address] [phone] White Blood Count 6.2 10     Normal     4.0-10.0    

 

             Red Blood Count 3.76 10      Low          4.00-5.40     

 

             Hemoglobin   12.5 g/dL    Normal       12.0-15.5     

 

             Hematocrit   39.0 %       Normal       36.0-47.0     

 

             Mean Corpuscular Volume 103.7 fl     High         80.0-96.0     

 

             Mean Corpuscular Hemoglobin 33.2 pg      High         27.0-33.0    

 

 

             Mean Corpuscular HGB Conc 32.1 g/dL    Normal       32.0-36.5     

 

             Red Cell Distribution Width 12.9 %       Normal       11.5-14.5    

 

 

             Platelet Count, Automated 199 10       Normal       150-450       

 

             Neutrophils % 74.5 %       High         36.0-66.0     

 

             Lymph %      16.4 %       Low          24.0-44.0     

 

             Mono %       7.0 %        Normal       2.0-8.0       

 

             Eos %        1.1 %        Normal       0.0-3.0       

 

             Baso %       0.5 %        Normal       0.0-1.0       

 

             Immature Granulocyte % 0.5 %        Normal       0-3.0         

 

             Nucleated Red Blood Cell % 0.0 %        Normal       0-0           

 

             Neutrophils # 4.6 10       Normal       1.5-8.5       

 

             Lymph #      1.0 10       Low          1.5-5.0       

 

             Mono #       0.4 10       Normal       0.0-0.8       

 

             Eos #        0.1 10       Normal       0.0-0.5       

 

             Baso #       0.0 10       Normal       0.0-0.2       

 

                    Comprehensive Metabolic Profil 2021          [address] [address] [phone] Glucose, Fasting 98 mg/dL   Normal           

 

             Blood Urea Nitrogen 28 mg/dL     High         7-18          

 

             Creatinine For GFR 0.90 mg/dL   Normal       0.55-1.30     

 

             Glomerular Filtration Rate > 60.0       Normal       >45          2

 

             Sodium Level 137 mEq/L    Normal       136-145       

 

             Potassium Serum 4.7 mEq/L    Normal       3.5-5.1       

 

             Chloride Level 107 mEq/L    Normal               

 

             Carbon Dioxide Level 27 mEq/L     Normal       21-32         

 

             Anion Gap    3 mEq/L      Low          8-16          

 

             Calcium Level 10.2 mg/dL   Normal       8.8-10.2      

 

             Ast/Sgot     23 U/L       Normal       7-37          

 

             Alt/SGPT     19 U/L       Normal       12-78         

 

             Alkaline Phosphatase 77 U/L       Normal               

 

             Bilirubin,Total 0.5 mg/dL    Normal       0.2-1.0       

 

             Total Protein 8.3 GM/DL    High         6.4-8.2       

 

             Albumin      3.0 GM/DL    Low          3.2-5.2       

 

             Albumin/Globulin Ratio 0.6          Low          1.2-2.2       

 

                    Complete Blood Count 2021          [facility]

[facility], [facility]

: Dr Simon Hoyt

[place] [address] [phone] WBC        5.9 x10*3/UL            4.1 - 10.9  

 

             RBC          4.11 x10*6/UL Low          4.20 - 6.30   

 

             Hemoglobin   13.6 g/dL                 12.0 - 18.0   

 

             Hematocrit   40.0 %                    37.0 - 51.0   

 

             MCV          97.4 fL      High         80.0 - 97.0   

 

             MCH          33.1 pg      High         26.0 - 32.0   

 

             MCHC         34.0 g/dL                 31.0 - 38.0   

 

             RDW          13.2 %                    11.6 - 13.7   

 

             PLT          209 x10*3/UL              140 - 440     

 

             MPV          8.1 FL                    7.8 - 11.0    

 

             Lymph %      25.6 %                    10.0 - 58.5   

 

             Mid %        7.5 %                     1.7 - 9.3     

 

             Neut %       66.9 %                    37.0 - 92.0   

 

             Lymph #      1.5 x10*3/UL              0.6 - 4.1     

 

             Mid #        0.5 x10*3/UL              0.1 - 0.6     

 

             Neut #       3.9 x10*3/UL              2.0 - 7.8     

 

                    Laboratory test finding 2021          [facility]

: Dr Simon Hoyt

[place] [address] [phone] Sed Rate   25 mm/hr   High       0 - 15      

 

                    Basic Metabolic Panel 2021          [facility]

[facility]

: Dr Simon Hoyt

[place] [address] [phone] Glucose    100 mg/dL  High       74 - 99    3

 

             BUN          18 mg/dL                  7 - 18        

 

             Creatinine   0.8 mg/dL                 0.6 - 1.3     

 

             Sodium       141 mEq/L                 136 - 145     

 

             Potassium    3.8 mEq/L                 3.5 - 5.1     

 

             Chloride     104 mEq/L                 98 - 107      

 

             Carbon Dioxide 29 mEq/L                  21 - 32       

 

             Calcium      10.8 mg/dL   High         8.5 - 10.1   4

 

             GFR  >= 60 mL/min              >60           

 

             GFR African American >= 60 mL/min              >60          5

 

                    Laboratory test finding 2021          [facility]

[facility]

: Dr Simon Hoyt

[place] [address] [phone] Thyroid Stimulating Hormone 0.19 uIU/mL Low        0.3

6 - 3.74  

 

                    Complete Blood Count 2021          [facility]

[facility]

: Dr Simon Hoyt

[place] [id number] [phone] WBC        8.4 x10*3/UL            4.1 - 10.9  

 

             RBC          4.20 x10*6/UL              4.20 - 6.30   

 

             Hemoglobin   14.0 g/dL                 12.0 - 18.0   

 

             Hematocrit   40.3 %                    37.0 - 51.0   

 

             MCV          95.8 fL                   80.0 - 97.0   

 

             MCH          33.3 pg      High         26.0 - 32.0   

 

             MCHC         34.7 g/dL                 31.0 - 38.0   

 

             RDW          13.3 %                    11.6 - 13.7   

 

             PLT          207 x10*3/UL              140 - 440     

 

             MPV          8.0 FL                    7.8 - 11.0    

 

             Lymph %      17.5 %                    10.0 - 58.5   

 

             Mid %        4.8 %                     1.7 - 9.3     

 

             Neut %       77.7 %                    37.0 - 92.0   

 

             Lymph #      1.4 x10*3/UL              0.6 - 4.1     

 

             Mid #        0.5 x10*3/UL              0.1 - 0.6     

 

             Neut #       6.5 x10*3/UL              2.0 - 7.8     

 

                    Laboratory test finding 2021          [facility]

: Dr Smion Hoyt

[place] [address] [phone] Sed Rate   45 mm/hr   High       0 - 15      

 

             Magnesium    1.8 mg/dL                 1.8 - 2.4     

 

                    Basic Metabolic Panel 2021          [facility]

[facility]

: Dr Simon Hoyt

[place] [address] [phone] Glucose    70 mg/dL   Low        74 - 99    6

 

             BUN          23 mg/dL     High         7 - 18        

 

             Creatinine   0.9 mg/dL                 0.6 - 1.3     

 

             Sodium       144 mEq/L                 136 - 145     

 

             Potassium    3.8 mEq/L                 3.5 - 5.1     

 

             Chloride     104 mEq/L                 98 - 107      

 

             Carbon Dioxide 32 mEq/L                  21 - 32       

 

             Calcium      10.1 mg/dL                8.5 - 10.1    

 

             GFR  >= 60 mL/min              >60           

 

             GFR African American >= 60 mL/min              >60          7

 

                    Lipid Profile       2021          [facility]

, pc

: Dr Simon Hoyt

[place] [address] [phone] Cholesterol 132 mg/dL             131 - 200   

 

             Triglycerides 72 mg/dL                  30 - 150      

 

             HDL Cholesterol 54 mg/dL                  35 - 60       

 

             LDL (Calculated) 64 CALC                   50 - 159      

 

                    Laboratory test finding 2021          [facility]

: Dr Simon Hoyt

[place] [address] [phone] Thyroid Stimulating Hormone 0.32 uIU/mL Low        0.3

6 - 3.74  

 

                    Laboratory test finding 2021          [facility]

                                        [address] [place] [address] [phone] Digoxin Level 0.3 NG/ML  Low        0.5-2.0    8







                          1                         THERAPUTIC HUMAN INR VALUES

INDICATIONS                      NORMAL RANGES

PROPHYLAXIS/TREATMENT OF:

VENOUS THROMBOSIS                2.0-3.0

PULMONARY EMBOLISM               2.0-3.0

PREVENTION OF SYSTEMIC EMBOLISM FROM:

TISSUE HEART VALVES              2.0-3.0

ACUTE MYOCARDIAL INFARCTION      2.0-3.0

VALVULAR HEART DISEASE           2.0-3.0

ATRIAL FIBRILLATION              2.0-3.0

MECHANICAL VALVES(HIGH RISK)     2.5-3.5

RECURRENT MYOCARDIAL INFARCTION  2.5-3.5



 

                          2                         Units are mL/min/1.73 m2



Chronic Kidney Disease Staging per NKF:



Stage I & II   GFR >=60       Normal to Mildly Decreased

Stage III      GFR 30-59      Moderately Decreased

Stage IV       GFR 15-29      Severely Decreased

Stage V        GFR <15        Very Little GFR Left

ESRD           GFR <15 on RRT



 

                          3                         100-125 mg/dL     PRE-DIABET

ES/FASTING

>126 mg/dL          DIABETES/FASTING



 

                          4                         NOTE:

CALCIUM,TSH VERIFIED







 

                          5                         CHRONIC KIDNEY DISEASE STAGI

NG PER NKF



STAGE I & II      GFR >= 60        NORMAL TO MILDLY DECREASED

STAGE III          GFR 30-59          MODERATELY DECREASED

STAGE IV           GFR 15-29         SEVERELY DECREASED

STAGE V            GFR <15            VERY LITTLE GFR LEFT

ESRD                 GFR <15            ON RRT



 

                          6                         100-125 mg/dL     PRE-DIABET

ES/FASTING

>126 mg/dL          DIABETES/FASTING



 

                          7                         CHRONIC KIDNEY DISEASE STAGI

NG PER NKF



STAGE I & II      GFR >= 60        NORMAL TO MILDLY DECREASED

STAGE III          GFR 30-59          MODERATELY DECREASED

STAGE IV           GFR 15-29         SEVERELY DECREASED

STAGE V            GFR <15            VERY LITTLE GFR LEFT

ESRD                 GFR <15            ON RRT



 

                          8                         note:<nlbl:[email or web address]

ed>









Procedures





                Date            Code            Description     Status

 

                10/06/2021      24731           Office/Outpatient Established Lo

w MDM 20-29 Min Completed

 

                    2021          21051               Chronic Care MGMT 20

 Mins Clinical Staff Time Per Calendar 

Month                                   Completed

 

                2021      72819           Chronic Care Management Services

 Ea Addl 20 Min Completed

 

                2021      01518           Complex Chronic Care MGMT Servic

e Ea Addl 30 Min Completed

 

                2021      71919           Complex Chronic Care Management 

SVC 1St 60 Min Completed

 

                2021      56132           Complex Chronic Care MGMT Servic

e Ea Addl 30 Min Completed

 

                2021      95713           [facility] 

SVC 1St 60 Min Completed

 

                2021      76429           Office/Outpatient Established Mo

d MDM 30-39 Min Completed

 

                2021      24622           [facility] MGMT Servic

e Ea Addl 30 Min Completed

 

                2021      19907           [facility] Management 

SVC 1St 60 Min Completed

 

                2021      70267           [facility] MGMT Servic

e Ea Addl 30 Min Completed

 

                2021      56196           [facility] Management 

SVC 1St 60 Min Completed

 

                2021      58246           Office/Outpatient Established Mo

d MDM 30-39 Min Completed

 

                2021      [id number]       Bone Mineral Density Test Comple

veronika

 

                2021      [id number]        Mammogram       Completed







Medical Devices





                                        Description

 

                                        No Information Available







Encounters





           Type       Date       Location   Provider   Dx         Diagnosis

 

                Office Visit    [facility]  [facility]                        S81.802A                  Unspecified open wound, left

 lower leg, initial encounter

 

                          Z23                       Encounter for immunization

 

                Office Visit    2021  1[facility]                      I48.20                    Chronic atrial fibrillation,

 unspecified

 

                          Z79.01                    Long term (current) use of a

nticoagulants

 

                          R60.9                     Edema, unspecified

 

                          I10                       Essential (primary) hyperten

darci

 

                          E05.90                    Thyrotoxicosis, unsp without

 thyrotoxic crisis or storm

 

                          I82.502                   Chronic embolism and thombos

 unsp deep veins of l low extrem

 

                          F17.210                   Nicotine dependence, cigaret

svitlana, uncomplicated

 

                          F32.89                    Other specified depressive e

pisodes

 

                          D47.2                     Monoclonal gammopathy

 

                          K21.9                     Gastro-esophageal reflux dis

ease without esophagitis

 

                          M19.90                    Unspecified osteoarthritis, 

unspecified site

 

                          E78.00                    Pure hypercholesterolemia, u

nspecified

 

                Office Visit    2021  [facility]                      I48.20                    Chronic atrial fibrillation,

 unspecified

 

                          I10                       Essential (primary) hyperten

darci

 

                          E05.90                    Thyrotoxicosis, unsp without

 thyrotoxic crisis or storm

 

                          I82.502                   Chronic embolism and thombos

 unsp deep veins of l low extrem

 

                          Z79.01                    Long term (current) use of a

nticoagulants

 

                          F17.210                   Nicotine dependence, cigaret

svitlana, uncomplicated

 

                          F32.89                    Other specified depressive e

pisodes

 

                          E83.52                    Hypercalcemia

 

                          D47.2                     Monoclonal gammopathy

 

                          K21.9                     Gastro-esophageal reflux dis

ease without esophagitis

 

                          M15.9                     Polyosteoarthritis, unspecif

ied

 

                          E78.00                    Pure hypercholesterolemia, u

nspecified







Assessments





                Date            Code            Description     Provider

 

                10/06/2021      S81.802A        Unspecified open wound, left low

er leg, initial encounter 

AIXA Hoffmann JR

 

                10/06/2021      Z23             Encounter for immunization AIXA Joseph JR

 

                2021      I10             Essential (primary) hypertension

 Charmaine Regalado M.D.

 

                2021      K21.9           Gastro-esophageal reflux disease

 without esophagitis Charmaine Regalado M.D.

 

                2021      E78.00          Pure hypercholesterolemia, unspe

cified Charmaine Regalado M.D.

 

                2021      I10             Essential (primary) hypertension

 Charmaine Regalado M.D.

 

                2021      K21.9           Gastro-esophageal reflux disease

 without esophagitis Charmaine Regalado M.D.

 

                2021      E78.00          Pure hypercholesterolemia, unspe

cified Charmaine Regalado M.D.

 

                2021      M15.9           Polyosteoarthritis, unspecified 

Charmaine Regalado M.D.

 

                2021      I10             Essential (primary) hypertension

 Charmaine Regalado M.D.

 

                2021      K21.9           Gastro-esophageal reflux disease

 without esophagitis Charmaine Regalado M.D.

 

                    2021          I82.502             Chronic embolism and

 thrombosis of unspecified deep veins of 

left lower extremity                    Charmaine Regalado M.D.

 

                2021      I48.20          Chronic atrial fibrillation, uns

pecified Charmaine Regalado M.D.

 

                2021      Z79.01          Long term (current) use of antic

oagulants Charmaine Regalado M.D.

 

                2021      R60.9           Edema, unspecified Charmaine guido M.D.

 

                2021      I10             Essential (primary) hypertension

 Charmaine Regalado M.D.

 

                2021      E05.90          Thyrotoxicosis, unspecified with

out thyrotoxic crisis or sto 

Charmaine Regalado M.D.

 

                    2021          I82.502             Chronic embolism and

 thrombosis of unspecified deep veins of 

left lower extremity                    Charmaine Regalado M.D.

 

                2021      F17.210         Nicotine dependence, cigarettes,

 uncomplicated Charmaine Regalado M.D.

 

                2021      F32.89          Other specified depressive episo

emil Charmaine Regalado M.D.

 

                2021      D47.2           Monoclonal gammopathy Charmaine duron M.D.

 

                2021      K21.9           Gastro-esophageal reflux disease

 without esophagitis Charmaine Regalado M.D.

 

                2021      M19.90          Unspecified osteoarthritis, unsp

ecified site Charmaine Regalado M.D.

 

                2021      E78.00          Pure hypercholesterolemia, unspe

cified Charmaine Regalado M.D.

 

                2021      I10             Essential (primary) hypertension

 Charmaine Regalado M.D.

 

                2021      K21.9           Gastro-esophageal reflux disease

 without esophagitis Charmaine Regalado M.D.

 

                2021      E78.00          Pure hypercholesterolemia, unspe

cisonia Regalado M.D.

 

                2021      I48.20          Chronic atrial fibrillation, uns

pecified Charmaine Regalado M.D.

 

                2021      F17.210         Nicotine dependence, cigarettes,

 uncomplicated Charmaine Regalado M.D.

 

                2021      K21.9           Gastro-esophageal reflux disease

 without esophagitis Charmaine Regalado M.D.

 

                2021      E78.00          Pure hypercholesterolemia, unspe

cisonia Regalado M.D.

 

                2021      I48.20          Chronic atrial fibrillation, uns

pecminoo Regalado M.D.

 

                2021      I10             Essential (primary) hypertension

 Charmaine Regalado M.D.

 

                2021      E05.90          Thyrotoxicosis, unspecified with

out thyrotoxic crisis or sto 

Charmaine Regalado M.D.

 

                    2021          I82.502             Chronic embolism and

 thrombosis of unspecified deep veins of 

left lower extremity                    Charmaine Regalado M.D.

 

                2021      Z79.01          Long term (current) use of antic

oagulants Charmaine Regalado M.D.

 

                2021      F17.210         Nicotine dependence, cigarettes,

 uncomplicated Charmaine Regalado M.D.

 

                2021      F32.89          Other specified depressive episo

emil Charmaine Regalado M.D.

 

                2021      E83.52          Hypercalcemia   Charmaine xavier M.D.

 

                2021      D47.2           Monoclonal gammopathy Charmaine duron M.D.

 

                2021      K21.9           Gastro-esophageal reflux disease

 without esophagitis Charmaine Regalado M.D.

 

                2021      M15.9           Polyosteoarthritis, unspecified 

Charmaine Regalado M.D.

 

                2021      E78.00          Pure hypercholesterolemia, unspe

cified Charmaine Regalado M.D.







Plan of Treatment

Future Appointment(s):* 2021  3:15 pm - [facility]

10/06/2021 - AIXA Hoffmann JR* S81.802A Unspecified open wound, left 
  lower leg, initial encounter

* Z23 Encounter for immunization

* All * New Medication:* Cephalexin 500 mg - take one tablet by mouth 3 times 
  daily x 7 days



* Comments:* Total time spent was 43 minutes. Most of this time was spent 
  counseling the patient and dressing the wound.  She verbalized understanding 
  and agreement, She was accompanied by a friend who also verbalized 
  understanding and agreement.Flu shot was given.









Functional Status





                                        Description

 

                                        No Information Available







Mental Status





                                        Description

 

                                        No Information Available







Referrals





                Refer to Dr     Reason for Referral Status          Appt Date

 

                          Stillerman,James MD       STAT CONSULT FOR OPEN WOUND 

LT LEG PLEASE LET OFFICE KNOW 

WHEN APPT IS MADE         Created                   

 

                                        [facility]

 

                                        [address] [place]  [address]

 

                                        [phone]

 

                                          

 

                Rock Werner MD CONSULT FOR PVD WITH OPEN WOUND LT LEG  Patient

 Declined 

10/20/2021

 

                                        [facility]

 

                                        [address] [facility] ,[address] [place] [address]

 

                                        [phone]

## 2021-12-02 NOTE — CCD
Summarization of Episode Note

                             Created on: 2021



Miss. JOSEPH BENTLEY

External Reference #: [id number]

: 1955

Sex: Female



Demographics





                          Address                   [address]

[place]  [address]

 

                          Home Phone                [phone]

 

                          Preferred Language        Unknown

 

                          Marital Status            Unknown

 

                          Religion Affiliation     Unknown

 

                          Race                      White

 

                          Ethnic Group              Not  or 





Author





                          Author                    [facility]

ems

 

                          Organization              [facility]

ems

 

                          Address                   Unknown

 

                          Phone                     Unavailable







Support





                Name            Relationship    Address         Phone

 

                    JOSEPH BENTLEY      GUAR                [address]

[place]  [address]                    [phone]

 

                    Abbcess, Ketty      ECON                [address]

[place]  [address]                    [phone]







Care Team Providers





                    Care Team Member Name Role                Phone

 

                    CammieRosa Maria curryRoya Unavailable         [phone]







PROBLEMS





          Type      Condition ICD9-CM Code VAI49-WJ Code Onset Dates Condition S

tatus W/U 

Status              Risk                SNOMED Code         Notes

 

                          Problem                   Non-pressure chronic ulcer o

f other part of left lower leg with fat 

layer exposed         L97.822         Active  confirmed         [id number]  

 

                          Problem                   Chronic venous hypertension 

(idiopathic) with ulcer of left lower 

extremity         I87.312         Active  confirmed         [id number]  







ALLERGIES





                    Allergen (clinical drug ingredient) Drug/Non Drug Allergy do

cumented on EMR 

Reaction            Allergy Type        Onset Date          Status

 

                      Motrin     Dyspnea    Drug Allergy            Active







ENCOUNTERS from 1955 to 2021





             Encounter    Location     Date         Provider     Diagnosis

 

                    [facility]     [address] [facility] [phone] [place] [address]                       Roya Bonds          







IMMUNIZATIONS

No Information



SOCIAL HISTORY

Tobacco Use:



                    Social History Observation Description         Date

 

                    Details (start date - stop date) Current Smoker       



Sex Assigned At Birth:



                          Social History Observation Description

 

                          Sex Assigned At Birth     Unknown



Education:



                    Question            Answer              Notes

 

                    Level of Education: Finished High School  



Cheondoism:



                    Question            Answer              Notes

 

                    Cheondoism                                Denominational



Sexual Hx:



                    Question            Answer              Notes

 

                    Had sex in the last 12 months (vaginal, oral, or anal)? No  

                 

 

                    LMP:                                  

 

                    Have you ever had an STD? No                   



Tobacco Use:



                    Question            Answer              Notes

 

                    Are you a:          current smoker       

 

                    How many cigarettes a day do you smoke?                     

2-3 CIGARETTES/DAY







REASON FOR REFERRAL

No Information



VITAL SIGNS

No information



MEDICATIONS





           Medication SIG (Take, Route, Frequency, Duration) Notes      Start Da

te End Date   

Status

 

                    Metoprolol Tartrate 100 MG 1 tablet with food Orally Twice a

 day for 30 day(s)  

                                                            Active

 

           Eliquis 5 MG as directed Orally                                  Acti

ve

 

           Cephalexin 500 MG 1 tablet Orally Four times a day for 5 day(s)      

                            Active







PROCEDURES

No Information



RESULTS

No Results



REASON FOR VISIT

R/s [facility] [address] 



MEDICAL (GENERAL) HISTORY





                    Type                Description         Date

 

                    Medical History     ATRIAL FIBRILLATION  

 

                    Medical History     DVT IN LEFT LEG      

 

                    Medical History     x3 PULMONARY EMBOLISM, MOST RECENT  

 

 

                    Medical History     HYPERTENSION         

 

                    Surgical History    GALLBLADDER REMOVAL  

 

                    Surgical History    STRIPPING LIGATION IN BILATERAL LEGS  

 

                    Surgical History    TONSILLECTOMY        

 

                    Surgical History    RIGHT SHOULDER SURGERY  

 

                    Hospitalization History HOSPITALIZATION AFTER SHOULDER SURGE

RY  







Goals Section

No Information



Health Concerns

No Information



MEDICAL EQUIPMENT

No Information



MENTAL STATUS

No Information



FUNCTIONAL STATUS

No Information



ASSESSMENTS

No Information



PLAN OF TREATMENT

Next Appt



                                        Details

 

                                        Provider Name:Roya Bonds,  09:45:00 AM, [address], 

[phone], [place], [address], [phone]







Insurance Providers





             Payer Name   Payer Address Payer Phone  Insured Name Patient Relati

onship to 

Insured                   Coverage Start Date       Coverage End Date

 

                MEDICARE Part A and B [address]  [facility] [id number] 87

[phone]    

JOSEPH BENTLEY                                     

 

                 FOR LIFE [address]  [facility] [address] [phone]

    JOSEPH BENTLEY              self

## 2021-12-02 NOTE — CCD
Continuity of Care Document (CCD)

                             Created on: 10/06/2021



Juany Becerril

External Reference #: [id number]

: 1955

Sex: Female



Demographics





                          Address                   [address] [facility] [address]

[place]  [address]

 

                          Home Phone                [phone]

 

                          Preferred Language        Unknown

 

                          Marital Status            Unknown

 

                          Amish Affiliation     Unknown

 

                          Race                      White

 

                          Ethnic Group              Not  or 





Author





                          Author                    Juany ESQUEDA PA

 

                          Organization              Unknown

 

                          Address                   [address] [facility] [address]

[place]  [address]



 

                          Phone                     [phone]







Care Team Providers





                    Care Team Member Name Role                Phone

 

                    Yuri Maier MD       AUTM                [phone]

 

                    [facility]  AUTM                [phone]

 

                    John Lutz MD AUTM                Unavailable

 

                    [facility]ematology/On[facility] AUTM                [phone]

 

                    Hansa Hernandez FNP  AUTM                [phone]







Problems





                    Active Problems     Provider            Date

 

                    Chronic atrial fibrillation Protime             Onset: 

 

                    Essential hypertension Juany Vincent FNP Onset: 2017

 

                    Deep venous thrombosis of lower extremity Juany Vincent FN P Onset: 2017

 

                    Pure hypercholesterolemia Juany Vincent FNP Onset: 

017

 

                    Anxiety state       Juany Vincent FNP Onset: 2017

 

                    Chronic pulmonary embolism Juany Vincent FNP Onset: 2017

 

                    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 

2017

 

                    Gastroesophageal reflux disease Juany Vincent FNP Onset: 1

2017

 

                    Scoliosis deformity of spine Juany Vincent FNP Onset: 2017

 

                    Chronic tension-type headache Juany Vincent FNP Onset: 2017

 

                    Varicose veins of lower extremity Juany Vincent FNP Onset:

 2017

 

                    Tobacco user        Juany Vincent FNP Onset: 2017

 

                    Hypercalcemia       Juany Vincent FNP Onset: 2017







Social History





                Type            Date            Description     Comments

 

                Birth Sex                       Unknown          

 

                ETOH Use                        Consumes 3 glasses of wine per w

[employer]  

 

                Tobacco Use     Start: Unknown  Patient is a current smoker, smo

kes every day STARTED

AGE 30 1-3 CIGARETTES A DAY 







Allergies and adverse reactions





             Active Allergies Criticality  Reaction | Severity Comments     Date

 

             Ibuprofen    Unable to assess criticality STOMACH UPSET HEADACHE mo

david       10/17/2017

 

             Latex        Unable to assess criticality rash, itches | Mild      

        10/17/2018







Medications





           Active Medications SIG        Qnty       Indications Ordering Provide

r Date

 

                          Cephalexin                     500mg Capsules         

          take one tablet 

by mouth 3 times daily x 7 days 21caps                          Julio kothari JR PA 10/06/2021

 

                          Excedrin Migraine                     [id number] Tab

lets                   1 

as needed h/a as needed mdd=1                                 Charmaine Regalado M.D. 2021

 

                          Famotidine                     20mg Tablets           

        1 by mouth every 

day             90tabs                          Charmaine Regalado M.D. 20

21

 

                          Furosemide                     20mg Tablets           

        1 by mouth twice a

day             180tabs                         Charmaine Regalado M.D. 20

21

 

                                        Bupropion Hydrochloride ER (XL)         

            150mg Tablets ER 24HR       

                take 1 tablet by mouth in the morning 90tabs                    

      Charmaine Regalado M.D.                                    2021

 

                          Eliquis                     5mg Tablets               

    take one tablet by 

mouth twice a day 180tabs                         Charmaine Regalado M.D. 2020

 

                          Methimazole                     5mg Tablets           

        2 every day by 

mouth           180tabs                         Charmaine Regalado M.D. 20

20

 

                          Shingrix                     50mcg/0.5ML Suspension Re

c                   

administer at pharmacy 1units                          Juany Vincent FNP 

 

                          Voltaren                     1% Gel                   

apply up to 4 grams three 

times a day to affected knee as needed 100gm                           Charmaine Regalado M.D. 

2018

 

                          Aspirin Adult Low Dose                     81mg Tablet

s DR                   1 

by mouth every day                                 Juany Vincent FNP 10/17/201

7

 

                                        Acetaminophen Extra Strength            

         500mg Tablets                  

             take 2 tabs every 6 hours as needed                           Juany Fung FNP 10/17/2017

 

                                        Womens 50+ Multi Vitamin & Mineral Formu

la                      Tablets         

             1 every day PO Vit V=6714Og IR=728                           Juany Vincent FNP 10/17/2017

 

                          Metoprolol Tartrate                     25mg Tablets  

                 Take One 

Tablet By Mouth Twice A Day 180tabs                         ABELARDO Saleem 10/17/2017

 

                          Atorvastatin Calcium                     20mg Tablets 

                  take one

tablet by mouth every day 90tabs                          JANE Saleem 10/17/2017

 

                          Flecainide Acetate                     100mg Tablets  

                 take one 

tablet by mouth twice a day 180tabs                         ABELARDO Saleem 10/17/2017

 

                          Digoxin                     250mcg Tablets            

       1 by mouth every 

day             90tabs                          Charmaine Regalado M.D. 







Medications Administered in Office





           Medication SIG        Qnty       Indications Ordering Provider Date

 

                          Administration Of Flu Vaccine                      Inj

diyaion                    

                                                Charmaine Regalado M.D. 10/15/20

20

 

                          Administration Of Flu Vaccine                      Inj

ection                    

                                                Juany Vincent FNP 10/17/2019

 

                          Administration Of Flu Vaccine                      Inj

ection                    

                                                Juany Vincent FNP 10/17/2018

 

                          Administration Of Flu Vaccine                      Inj

ection                    

                                                Juany Vincent FNP 10/17/2017







Immunizations





             CPT Code     Status       Date         Vaccine      Lot #

 

                [id number]           Given           10/06/2021      Influenza Vaccin

e Quadrivalent Preser/Antibiotic Free Im 

Use                                     [id number]

 

                [id number]           Given           10/15/2020      Influenza Vaccin

e Quadrivalent Preser/Antibiotic Free Im 

Use                                     [id number]

 

                06234           Given           2020      Shingrix Zoster 

Vaccine (HZV), Recombinant, Subunit, 

Adjuvanted                               

 

                60459           Given           10/17/2019      Influenza Vaccin

e Quadrivalent Preser/Antibiotic Free Im 

Use                                     [id number]

 

                40488           Given           10/17/2018      Influenza Virus 

Vaccine, Quadrivalent (Cciiv4), Derived 

From Cell                               [id number]

 

             01750        Given        2018   Pneumovax 23 [id number]

 

                84193           Given           10/17/2017      Influenza Vaccin

e Quadrivalent Preser/Antibiotic Free Im 

Use                                     [id number]







Vital Signs





                Date            Vital           Result          Comment

 

                10/06/2021  2:17pm BP Systolic     124 mmHg         

 

                    BP Diastolic        68 mmHg              

 

                    Heart Rate          86 /min              

 

                    Height              66 inches           5'6"

 

                    Weight              217.00 lb            

 

                    BMI (Body Mass Index) 35.0 kg/m2           

 

                2021  1:31pm BP Systolic     136 mmHg        RT Arm

 

                    BP Diastolic        88 mmHg             RT Arm

 

                    Heart Rate          120 /min             

 

                    Height              66 inches           5'6"

 

                    Weight              223.50 lb            

 

                    BMI (Body Mass Index) 36.1 kg/m2           







Results





        Test    Acquired Date Facility Test    Result  H/L     Range   Note

 

                    Laboratory test finding 10/06/2021          [facility]

                                        [address] [place] [address] [phone] Wound Culture <pending>                         

 

                    CBC With Differential 2021          [facility]

                                        [address] [place] [address] [phone] White Blood Count 6.8 10     Normal     4.0-10.0    

 

             Red Blood Count 3.85 10      Low          4.00-5.40     

 

             Hemoglobin   12.7 g/dL    Normal       12.0-15.5     

 

             Hematocrit   39.4 %       Normal       36.0-47.0     

 

             Mean Corpuscular Volume 102.3 fl     High         80.0-96.0     

 

             Mean Corpuscular Hemoglobin 33.0 pg      Normal       27.0-33.0    

 

 

             Mean Corpuscular HGB Conc 32.2 g/dL    Normal       32.0-36.5     

 

             Red Cell Distribution Width 12.3 %       Normal       11.5-14.5    

 

 

             Platelet Count, Automated 187 10       Normal       150-450       

 

             Neutrophils % 72.2 %       High         36.0-66.0     

 

             Lymph %      19.9 %       Low          24.0-44.0     

 

             Mono %       6.0 %        Normal       2.0-8.0       

 

             Eos %        1.5 %        Normal       0.0-3.0       

 

             Baso %       0.3 %        Normal       0.0-1.0       

 

             Immature Granulocyte % 0.1 %        Normal       0-3.0         

 

             Nucleated Red Blood Cell % 0.0 %        Normal       0-0           

 

             Neutrophils # 4.9 10       Normal       1.5-8.5       

 

             Lymph #      1.4 10       Low          1.5-5.0       

 

             Mono #       0.4 10       Normal       0.0-0.8       

 

             Eos #        0.1 10       Normal       0.0-0.5       

 

             Baso #       0.0 10       Normal       0.0-0.2       

 

                    PT & Aptt           2021          [facility]

nter

                                        [address] [place] [address] [phone] Prothrombin Time 13.8 seconds Normal     12.5-14.3   

 

             Inr          1.04         Normal                    1

 

             Partial Thromboplastin Time 30.6 seconds Normal       24.2-38.5    

 

 

                    CBC With Differential 2021          [address] [address] [phone] White Blood Count 6.2 10     Normal     4.0-10.0    

 

             Red Blood Count 3.76 10      Low          4.00-5.40     

 

             Hemoglobin   12.5 g/dL    Normal       12.0-15.5     

 

             Hematocrit   39.0 %       Normal       36.0-47.0     

 

             Mean Corpuscular Volume 103.7 fl     High         80.0-96.0     

 

             Mean Corpuscular Hemoglobin 33.2 pg      High         27.0-33.0    

 

 

             Mean Corpuscular HGB Conc 32.1 g/dL    Normal       32.0-36.5     

 

             Red Cell Distribution Width 12.9 %       Normal       11.5-14.5    

 

 

             Platelet Count, Automated 199 10       Normal       150-450       

 

             Neutrophils % 74.5 %       High         36.0-66.0     

 

             Lymph %      16.4 %       Low          24.0-44.0     

 

             Mono %       7.0 %        Normal       2.0-8.0       

 

             Eos %        1.1 %        Normal       0.0-3.0       

 

             Baso %       0.5 %        Normal       0.0-1.0       

 

             Immature Granulocyte % 0.5 %        Normal       0-3.0         

 

             Nucleated Red Blood Cell % 0.0 %        Normal       0-0           

 

             Neutrophils # 4.6 10       Normal       1.5-8.5       

 

             Lymph #      1.0 10       Low          1.5-5.0       

 

             Mono #       0.4 10       Normal       0.0-0.8       

 

             Eos #        0.1 10       Normal       0.0-0.5       

 

             Baso #       0.0 10       Normal       0.0-0.2       

 

                    Comprehensive Metabolic Profil 2021          [address] [address] [phone] Glucose, Fasting 98 mg/dL   Normal           

 

             Blood Urea Nitrogen 28 mg/dL     High         7-18          

 

             Creatinine For GFR 0.90 mg/dL   Normal       0.55-1.30     

 

             Glomerular Filtration Rate > 60.0       Normal       >45          2

 

             Sodium Level 137 mEq/L    Normal       136-145       

 

             Potassium Serum 4.7 mEq/L    Normal       3.5-5.1       

 

             Chloride Level 107 mEq/L    Normal               

 

             Carbon Dioxide Level 27 mEq/L     Normal       21-32         

 

             Anion Gap    3 mEq/L      Low          8-16          

 

             Calcium Level 10.2 mg/dL   Normal       8.8-10.2      

 

             Ast/Sgot     23 U/L       Normal       7-37          

 

             Alt/SGPT     19 U/L       Normal       12-78         

 

             Alkaline Phosphatase 77 U/L       Normal               

 

             Bilirubin,Total 0.5 mg/dL    Normal       0.2-1.0       

 

             Total Protein 8.3 GM/DL    High         6.4-8.2       

 

             Albumin      3.0 GM/DL    Low          3.2-5.2       

 

             Albumin/Globulin Ratio 0.6          Low          1.2-2.2       

 

                    Complete Blood Count 2021          [facility]

s, pc

: Dr Simon Hoyt

[place] [address] [phone] WBC        5.9 x10*3/UL            4.1 - 10.9  

 

             RBC          4.11 x10*6/UL Low          4.20 - 6.30   

 

             Hemoglobin   13.6 g/dL                 12.0 - 18.0   

 

             Hematocrit   40.0 %                    37.0 - 51.0   

 

             MCV          97.4 fL      High         80.0 - 97.0   

 

             MCH          33.1 pg      High         26.0 - 32.0   

 

             MCHC         34.0 g/dL                 31.0 - 38.0   

 

             RDW          13.2 %                    11.6 - 13.7   

 

             PLT          209 x10*3/UL              140 - 440     

 

             MPV          8.1 FL                    7.8 - 11.0    

 

             Lymph %      25.6 %                    10.0 - 58.5   

 

             Mid %        7.5 %                     1.7 - 9.3     

 

             Neut %       66.9 %                    37.0 - 92.0   

 

             Lymph #      1.5 x10*3/UL              0.6 - 4.1     

 

             Mid #        0.5 x10*3/UL              0.1 - 0.6     

 

             Neut #       3.9 x10*3/UL              2.0 - 7.8     

 

                    Laboratory test finding 2021          [facility]

[facility], [facility]

: Dr Simon Hoyt

[place] [address] [phone] Sed Rate   25 mm/hr   High       0 - 15      

 

                    Basic Metabolic Panel 2021          [facility]

ts, pc

: Dr Simon Hoyt

[place] [address] [phone] Glucose    100 mg/dL  High       74 - 99    3

 

             BUN          18 mg/dL                  7 - 18        

 

             Creatinine   0.8 mg/dL                 0.6 - 1.3     

 

             Sodium       141 mEq/L                 136 - 145     

 

             Potassium    3.8 mEq/L                 3.5 - 5.1     

 

             Chloride     104 mEq/L                 98 - 107      

 

             Carbon Dioxide 29 mEq/L                  21 - 32       

 

             Calcium      10.8 mg/dL   High         8.5 - 10.1   4

 

             GFR  >= 60 mL/min              >60           

 

             GFR African American >= 60 mL/min              >60          5

 

                    Laboratory test finding 2021          [facility]

pia meza

: Dr Simon Hoyt

[place] [address]

           [phone] Thyroid Stimulating Hormone 0.19 uIU/mL Low        0.3

6 - 3.74  

 

                    Lipid Profile       2021          [facility]

, [facility]

: Dr Simon Hoyt

[place] [address]

           [phone] Cholesterol 132 mg/dL             131 - 200   

 

             Triglycerides 72 mg/dL                  30 - 150      

 

             HDL Cholesterol 54 mg/dL                  35 - 60       

 

             LDL (Calculated) 64 CALC                   50 - 159      

 

                    Laboratory test finding 2021          [address] [id number] [phone] Digoxin Level 0.3 NG/ML  Low        0.5-2.0    6

 

                    Laboratory test finding 2021          [facility]

pia meza

: Dr Simon Hoyt

[place], [place] [id number] [phone] Thyroid Stimulating Hormone 0.32 uIU/mL Low        0.3

6 - 3.74  

 

                    Basic Metabolic Panel 2021          [facility]

ts, pc

: Dr Simon Hoyt

[place], [place] [id number] [phone] Glucose    70 mg/dL   Low        74 - 99    7

 

             BUN          23 mg/dL     High         7 - 18        

 

             Creatinine   0.9 mg/dL                 0.6 - 1.3     

 

             Sodium       144 mEq/L                 136 - 145     

 

             Potassium    3.8 mEq/L                 3.5 - 5.1     

 

             Chloride     104 mEq/L                 98 - 107      

 

             Carbon Dioxide 32 mEq/L                  21 - 32       

 

             Calcium      10.1 mg/dL                8.5 - 10.1    

 

             GFR  >= 60 mL/min              >60           

 

             GFR African American >= 60 mL/min              >60          8

 

                    Laboratory test finding 2021          [facility]

pia [facility]

: Dr Simon Hoyt

[place] [address] [phone] Sed Rate   45 mm/hr   High       0 - 15      

 

             Magnesium    1.8 mg/dL                 1.8 - 2.4     

 

                    Complete Blood Count 2021          [facility]

: Dr Simon Hoyt

[place] [address] [phone] WBC        8.4 x10*3/UL            4.1 - 10.9  

 

             RBC          4.20 x10*6/UL              4.20 - 6.30   

 

             Hemoglobin   14.0 g/dL                 12.0 - 18.0   

 

             Hematocrit   40.3 %                    37.0 - 51.0   

 

             MCV          95.8 fL                   80.0 - 97.0   

 

             MCH          33.3 pg      High         26.0 - 32.0   

 

             MCHC         34.7 g/dL                 31.0 - 38.0   

 

             RDW          13.3 %                    11.6 - 13.7   

 

             PLT          207 x10*3/UL              140 - 440     

 

             MPV          8.0 FL                    7.8 - 11.0    

 

             Lymph %      17.5 %                    10.0 - 58.5   

 

             Mid %        4.8 %                     1.7 - 9.3     

 

             Neut %       77.7 %                    37.0 - 92.0   

 

             Lymph #      1.4 x10*3/UL              0.6 - 4.1     

 

             Mid #        0.5 x10*3/UL              0.1 - 0.6     

 

             Neut #       6.5 x10*3/UL              2.0 - 7.8     

 

                    Ua Routine          2021          [facility]

nter

                                        [address] [place] [address] [phone] Appearance, Urine CLEAR      Normal     Clear       

 

             Color, Urine ABHAY        Normal       Yellow        

 

             PH,Urine     5.0 units    Normal       5.0-9.0       

 

             Specific Gravity Urine Auto 1.028        Normal       1.002-1.035  

 

 

             Protein, Urine Auto 2+ mg/dL     High         Negative      

 

             Glucose, Urine (Ua) Auto NEGATIVE mg/dL Normal       Negative      

 

             Ketone, Urine Auto TRACE mg/dL  High         Negative      

 

             Urobilinogen, Urine Auto 4.0 mg/dL    High         0.0-2.0       

 

             Bilirubin, Urine Auto NEGATIVE     Normal       Negative      

 

             Nitrite, Urine Auto NEGATIVE     Normal       Negative      

 

             Leukocyte Esterase, Urine Auto NEGATIVE     Normal       Negative  

    

 

             Blood, Urine Blood NEGATIVE     Normal       Negative      

 

             WBC, Urine Auto 2 /HPF       Normal       0-3           

 

             RBC, Urine Auto 2 /HPF       Normal       0-3           

 

             Bacteria, Urine Auto 1+           High         Negative      

 

             Squamous Epithelial Cell Ur AU 0 /HPF       Normal       0-6       

    

 

             Mucus, Urine SMALL        Normal       Negative      

 

             Hyaline Cast, Urine Auto 0 /LPF       Normal       0-1           

 

                    CBC With Differential 2021          [address] [address] [phone] White Blood Count 7.6 10     Normal     4.0-10.0    

 

             Red Blood Count 4.29 10      Normal       4.00-5.40     

 

             Hemoglobin   13.9 g/dL    Normal       12.0-15.5     

 

             Hematocrit   43.3 %       Normal       36.0-47.0     

 

             Mean Corpuscular Volume 100.9 fl     High         80.0-96.0     

 

             Mean Corpuscular Hemoglobin 32.4 pg      Normal       27.0-33.0    

 

 

             Mean Corpuscular HGB Conc 32.1 g/dL    Normal       32.0-36.5     

 

             Red Cell Distribution Width 13.1 %       Normal       11.5-14.5    

 

 

             Platelet Count, Automated 175 10       Normal       150-450       

 

             Neutrophils % 73.1 %       High         36.0-66.0     

 

             Lymph %      18.1 %       Low          24.0-44.0     

 

             Mono %       6.8 %        Normal       2.0-8.0       

 

             Eos %        1.3 %        Normal       0.0-3.0       

 

             Baso %       0.3 %        Normal       0.0-1.0       

 

             Immature Granulocyte % 0.4 %        Normal       0-3.0         

 

             Nucleated Red Blood Cell % 0.0 %        Normal       0-0           

 

             Neutrophils # 5.6 10       Normal       1.5-8.5       

 

             Lymph #      1.4 10       Low          1.5-5.0       

 

             Mono #       0.5 10       Normal       0.0-0.8       

 

             Eos #        0.1 10       Normal       0.0-0.5       

 

             Baso #       0.0 10       Normal       0.0-0.2       

 

                    Laboratory test finding 2021          [facility]

                                        [address] [place] [address] [phone] Erythrocyte Sedimentation Rate 68 mm/hr   High       0

-30        

 

                    Comprehensive Metabolic Profil 2021          [address] [place] [address] [phone] Glucose, Fasting 97 mg/dL   Normal           

 

             Blood Urea Nitrogen 24 mg/dL     High         7-18          

 

             Creatinine For GFR 0.85 mg/dL   Normal       0.55-1.30     

 

             Glomerular Filtration Rate > 60.0       Normal       >45          9

 

             Sodium Level 139 mEq/L    Normal       136-145       

 

             Potassium Serum 4.6 mEq/L    Normal       3.5-5.1       

 

             Chloride Level 106 mEq/L    Normal               

 

             Carbon Dioxide Level 26 mEq/L     Normal       21-32         

 

             Anion Gap    7 mEq/L      Low          8-16          

 

             Calcium Level 11.8 mg/dL   High         8.8-10.2      

 

             Ast/Sgot     33 U/L       Normal       7-37          

 

             Alt/SGPT     44 U/L       Normal       12-78         

 

             Alkaline Phosphatase 116 U/L      Normal               

 

             Bilirubin,Total 0.6 mg/dL    Normal       0.2-1.0       

 

             Total Protein 8.9 GM/DL    High         6.4-8.2       

 

             Albumin      3.2 GM/DL    Normal       3.2-5.2       

 

             Albumin/Globulin Ratio 0.6          Low          1.2-2.2       

 

                    Total Iron Binding Capacit 2021          [place] [id number] [phone] Iron (Fe)  131 g/dL Normal           

 

             Total Iron Binding Capacity 305 g/dL   Normal       250-450      

 

 

             Percent Saturation 43.0 %       Normal       13.2-45.0     

 

                    Immunoglobulin G,A,M 2021          [address] [address] [phone] Immunoglobulin A 2640.0 mg/dL High             

 

             Immunoglobulin G 510 mg/dL    Low          681-1648      

 

             Immunoglobulin M < 5.3 mg/dL  Low                  

 

                    Immunotyping (Immunofixation) Serum  (If 2021         

 [address] [address] [phone] Immunotyping Serum Iga ABNORMAL   High       Normal   

   

 

             Immunotyping Serum Lambda ABNORMAL     High         Normal        

 

             It Serum Interpretation SEE COMMENT  Normal                    10

 

             Its Pathologist Review REV'D BY O ADJAP <SEE NOTE>  Normal         

           11

 

                    Laboratory test finding 2021          [address] [address] [phone] Ferritin   128 NG/ML  Normal     8-252      12

 

             Beta 2 Microglobulin 3.7 mg/L     High         0.6-2.4      13

 

                    Free Kappa & Lambda LT Chains 2021          [address] [place] [id number] [phone] Free [place] Light Chains Serum 6.4 mg/L   Normal     3.

3-19.4    

 

             Free Lambda Light Chains Serum 517.2 mg/L   High         5.7-26.3  

    

 

             Kappa/Lambda Ratio Serum 0.01         Low          0.26-1.65     







                          1                         THERAPUTIC HUMAN INR VALUES

INDICATIONS                      NORMAL RANGES

PROPHYLAXIS/TREATMENT OF:

VENOUS THROMBOSIS                2.0-3.0

PULMONARY EMBOLISM               2.0-3.0

PREVENTION OF SYSTEMIC EMBOLISM FROM:

TISSUE HEART VALVES              2.0-3.0

ACUTE MYOCARDIAL INFARCTION      2.0-3.0

VALVULAR HEART DISEASE           2.0-3.0

ATRIAL FIBRILLATION              2.0-3.0

MECHANICAL VALVES(HIGH RISK)     2.5-3.5

RECURRENT MYOCARDIAL INFARCTION  2.5-3.5



 

                          2                         Units are mL/min/1.73 m2



Chronic Kidney Disease Staging per NKF:



Stage I & II   GFR >=60       Normal to Mildly Decreased

Stage III      GFR 30-59      Moderately Decreased

Stage IV       GFR 15-29      Severely Decreased

Stage V        GFR <15        Very Little GFR Left

ESRD           GFR <15 on RRT



 

                          3                         100-125 mg/dL     PRE-DIABET

ES/FASTING

>126 mg/dL          DIABETES/FASTING



 

                          4                         NOTE:

CALCIUM,TSH VERIFIED







 

                          5                         CHRONIC KIDNEY DISEASE STAGI

NG PER NKF



STAGE I & II      GFR >= 60        NORMAL TO MILDLY DECREASED

STAGE III          GFR 30-59          MODERATELY DECREASED

STAGE IV           GFR 15-29         SEVERELY DECREASED

STAGE V            GFR <15            VERY LITTLE GFR LEFT

ESRD                 GFR <15            ON RRT



 

                          6                         note:<nlbl:demographic_chang

ed>



 

                          7                         100-125 mg/dL     PRE-DIABET

ES/FASTING

>126 mg/dL          DIABETES/FASTING



 

                          8                         CHRONIC KIDNEY DISEASE STAGI

NG PER NKF



STAGE I & II      GFR >= 60        NORMAL TO MILDLY DECREASED

STAGE III          GFR 30-59          MODERATELY DECREASED

STAGE IV           GFR 15-29         SEVERELY DECREASED

STAGE V            GFR <15            VERY LITTLE GFR LEFT

ESRD                 GFR <15            ON RRT



 

                          9                         Units are mL/min/1.73 m2



Chronic Kidney Disease Staging per NKF:



Stage I & II   GFR >=60       Normal to Mildly Decreased

Stage III      GFR 30-59      Moderately Decreased

Stage IV       GFR 15-29      Severely Decreased

Stage V        GFR <15        Very Little GFR Left

ESRD           GFR <15 on RRT



 

                          10                        MONOCLONAL IGA,LAMBDA



 

                          11                        REV'D BY CAHRLES ROCHA



 

                          12                        note:<nlbl:demographic_chang

ed>



 

                          13                        [employer] Immulite 2000 Immuno

chemiluminometric assay (ICMA)

                                        .

Values obtained with different assay methods or kits cannot

be used interchangeably. Results cannot be interpreted as

absolute evidence of the presence or absence of malignant

disease.

Performed at:   - [facility] [address]  [id number]

: Araceli B Reyes MD, Phone:  [phone]

Performed at:   - [facility] [address]  [id number]

61

: Barak Archibald MD, Phone:  [phone]









Procedures





                Date            Code            Description     Status

 

                    2021          35458               Chronic Care MGMT 20

 Mins Clinical Staff Time Per Calendar 

Month                                   Completed

 

                2021      83176           Chronic Care Management Services

 Ea Addl 20 Min Completed

 

                2021      36181           Complex Chronic Care MGMT Servic

e Ea Addl 30 Min Completed

 

                2021      22636           Complex Chronic Care Management 

SVC [address] 60 Min Completed

 

                2021      07556           Complex Chronic Care MGMT Servic

e Ea Addl 30 Min Completed

 

                2021      73527           Complex Chronic Care Management 

SVC [address] 60 Min Completed

 

                2021      98439           Office/Outpatient Established Mo

d MDM 30-39 Min Completed

 

                2021      13123           Complex Chronic Care MGMT Servic

e Ea Addl 30 Min Completed

 

                2021      74370           Complex Chronic Care Management 

SVC [address] 60 Min Completed

 

                2021      87500           Complex Chronic Care MGMT Servic

e Ea Addl 30 Min Completed

 

                2021      79929           Complex Chronic Care Management 

SVC [address] 60 Min Completed

 

                2021      07468           Office/Outpatient Established Mo

d MDM 30-39 Min Completed

 

                2021      [id number]       Bone Mineral Density Test Comple

veronika

 

                2021      [id number]        Mammogram       Completed







Medical Devices





                                        Description

 

                                        No Information Available







Encounters





           Type       Date       Location   Provider   Dx         Diagnosis

 

                Office Visit    2021  1:30p [facility] Reg[facility] M.[facility].                      I48.20                    Chronic atrial fibrillation,

 unspecified

 

                          Z79.01                    Long term (current) use of a

nticoagulants

 

                          R60.9                     Edema, unspecified

 

                          I10                       Essential (primary) hyperten

darci

 

                          E05.90                    Thyrotoxicosis, unsp without

 thyrotoxic crisis or storm

 

                          I82.502                   Chronic embolism and thombos

 unsp deep veins of l low extrem

 

                          F17.210                   Nicotine dependence, cigaret

svitlana, uncomplicated

 

                          F32.89                    Other specified depressive e

pisodes

 

                          D47.2                     Monoclonal gammopathy

 

                          K21.9                     Gastro-esophageal reflux dis

ease without esophagitis

 

                          M19.90                    Unspecified osteoarthritis, 

unspecified site

 

                          E78.00                    Pure hypercholesterolemia, u

nspecified

 

                Office Visit    2021  1:30p [facility] Neal Regalado M.D.                      I48.20                    Chronic atrial fibrillation,

 unspecified

 

                          I10                       Essential (primary) hyperten

darci

 

                          E05.90                    Thyrotoxicosis, unsp without

 thyrotoxic crisis or storm

 

                          I82.502                   Chronic embolism and thombos

 unsp deep veins of l low extrem

 

                          Z79.01                    Long term (current) use of a

nticoagulants

 

                          F17.210                   Nicotine dependence, cigaret

svitlana, uncomplicated

 

                          F32.89                    Other specified depressive e

pisodes

 

                          E83.52                    Hypercalcemia

 

                          D47.2                     Monoclonal gammopathy

 

                          K21.9                     Gastro-esophageal reflux dis

ease without esophagitis

 

                          M15.9                     Polyosteoarthritis, unspecif

ied

 

                          E78.00                    Pure hypercholesterolemia, u

nspecified







Assessments





                Date            Code            Description     Provider

 

                10/06/2021      S81.802A        Unspecified open wound, left low

er leg, initial encounter 

AIXA Hoffmann JR

 

                2021      I10             Essential (primary) hypertension

 Charmaine Regalado M.D.

 

                2021      K21.9           Gastro-esophageal reflux disease

 without esophagitis Charmaine Regalado M.D.

 

                2021      E78.00          Pure hypercholesterolemia, unspe

cified Charmaine Regalado M.D.

 

                2021      I10             Essential (primary) hypertension

 Charmaine Regalado M.D.

 

                2021      K21.9           Gastro-esophageal reflux disease

 without esophagitis Charmaine Regalado M.D.

 

                2021      E78.00          Pure hypercholesterolemia, unspe

cified Charmaine Regalado M.D.

 

                2021      M15.9           Polyosteoarthritis, unspecified 

Charmaine Regalado M.D.

 

                2021      I10             Essential (primary) hypertension

 Charmaine Regalado M.D.

 

                2021      K21.9           Gastro-esophageal reflux disease

 without esophagitis Charmaine Regalado M.D.

 

                    2021          I82.502             Chronic embolism and

 thrombosis of unspecified deep veins of 

left lower extremity                    Charmaine Regalado M.D.

 

                2021      I48.20          Chronic atrial fibrillation, uns

pecified Charmaine Regalado M.D.

 

                2021      Z79.01          Long term (current) use of antic

oagulants Charmaine Regalado M.D.

 

                2021      R60.9           Edema, unspecified Charmaine guido M.D.

 

                2021      I10             Essential (primary) hypertension

 Charmaine Regalado M.D.

 

                2021      E05.90          Thyrotoxicosis, unspecified with

out thyrotoxic crisis or sto 

Charmaine Regalado M.D.

 

                    2021          I82.502             Chronic embolism and

 thrombosis of unspecified deep veins of 

left lower extremity                    Charmaine Regalado M.D.

 

                2021      F17.210         Nicotine dependence, cigarettes,

 uncomplicated Charmaine Regalado M.D.

 

                2021      F32.89          Other specified depressive episo

emil Charmaine Regalado M.D.

 

                2021      D47.2           Monoclonal gammopathy Charmaine duron M.D.

 

                2021      K21.9           Gastro-esophageal reflux disease

 without esophagitis Charmaine Regalado M.D.

 

                2021      M19.90          Unspecified osteoarthritis, unsp

ecified site Charmaine Regalado M.D.

 

                2021      E78.00          Pure hypercholesterolemia, unspe

cified Charmaine Regalado M.D.

 

                2021      I10             Essential (primary) hypertension

 Charmaine Regalado M.D.

 

                2021      K21.9           Gastro-esophageal reflux disease

 without esophagitis Charmaine Regalado M.D.

 

                2021      E78.00          Pure hypercholesterolemia, unspe

cified Charmaine Regalado M.D.

 

                2021      I48.20          Chronic atrial fibrillation, uns

pecminoo Regalado M.D.

 

                2021      F17.210         Nicotine dependence, cigarettes,

 uncomplicated Charmaine Regalado M.D.

 

                2021      K21.9           Gastro-esophageal reflux disease

 without esophagitis Charmaine Regalado M.D.

 

                2021      E78.00          Pure hypercholesterolemia, unspe

cisonia Regalado M.D.

 

                2021      I48.20          Chronic atrial fibrillation, uns

el Regalado M.D.

 

                2021      I10             Essential (primary) hypertension

 Charmaine Regalado M.D.

 

                2021      E05.90          Thyrotoxicosis, unspecified with

out thyrotoxic crisis or sto 

Charmaine Regalado M.D.

 

                    2021          I82.502             Chronic embolism and

 thrombosis of unspecified deep veins of 

left lower extremity                    Charmaine Regalado M.D.

 

                2021      Z79.01          Long term (current) use of antic

oagulants Charmaine Regalado M.D.

 

                2021      F17.210         Nicotine dependence, cigarettes,

 uncomplicated Charmaine Regalado M.D.

 

                2021      F32.89          Other specified depressive episo

emil Charmaine Regalado M.D.

 

                2021      E83.52          Hypercalcemia   Charmaine xavier M.D.

 

                2021      D47.2           Monoclonal gammopathy Charmaine duron M.D.

 

                2021      K21.9           Gastro-esophageal reflux disease

 without esophagitis Charmaine Regalado M.D.

 

                2021      M15.9           Polyosteoarthritis, unspecified 

Charmaine Regalado M.D.

 

                2021      E78.00          Pure hypercholesterolemia, unspe

cified Charmaine Regalado M.D.







Plan of Treatment

Future Appointment(s):* 2021  3:15 pm - [facility]. at 
  [facility]

10/06/2021 - AIXA Hoffmann JR* S81.802A Unspecified open wound, left 
  lower leg, initial encounter

* All * New Medication:* Cephalexin 500 mg - take one tablet by mouth 3 times 
  daily x 7 days









Functional Status





                                        Description

 

                                        No Information Available







Mental Status





                                        Description

 

                                        No Information Available







Referrals





                                        Description

 

                                        No Information Available

## 2021-12-02 NOTE — CCD
Continuity of Care Document (CCD)

                             Created on: 10/25/2021



Juany Becerril

External Reference #: [id number]

: 1955

Sex: Female



Demographics





                          Address                   [address] [facility] [address]

[place]  [address]

 

                          Home Phone                [phone]

 

                          Preferred Language        Unknown

 

                          Marital Status            Unknown

 

                          Sabianism Affiliation     Unknown

 

                          Race                      White

 

                          Ethnic Group              Not  or 





Author





                          Author                    Juany Regalado M.D.

 

                          Organization              Unknown

 

                          Address                   [address] [facility] [address]

[place]  [address]



 

                          Phone                     [phone]







Care Team Providers





                    Care Team Member Name Role                Phone

 

                    Yuri Maier MD       AUTM                [phone]

 

                    [facility] Hea  AUTM                [phone]

 

                    John Lutz MD AUTM                Unavailable

 

                    [facility] Hematology/Oncology AUTM                [phone]

 

                    Hansa Hernandez FNP  AUTM                [phone]







Problems





                    Active Problems     Provider            Date

 

                    Chronic atrial fibrillation Protime             Onset: 

 

                    Essential hypertension Juany Vincent FNP Onset: 2017

 

                    Deep venous thrombosis of lower extremity Juany Vincent FN P Onset: 2017

 

                    Pure hypercholesterolemia Juany Vincent FNP Onset: 

017

 

                    Anxiety state       Juany Vincent FNP Onset: 2017

 

                    Chronic pulmonary embolism Juany Vincent FNP Onset: 2017

 

                    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 

2017

 

                    Gastroesophageal reflux disease Juany Vincent FNP Onset: 2017

 

                    Scoliosis deformity of spine Juany Vincent FNP Onset: 2017

 

                    Chronic tension-type headache Juany Vincent FNP Onset: 2017

 

                    Varicose veins of lower extremity Juany Vincent FNP Onset:

 2017

 

                    Tobacco user        Juany Vincent FNP Onset: 2017

 

                    Hypercalcemia       Juany Vincent FNP Onset: 2017







Social History





                Type            Date            Description     Comments

 

                Birth Sex                       Unknown          

 

                ETOH Use                        Consumes 3 glasses of wine per w

[place]  

 

                Tobacco Use     Start: Unknown  Patient is a current smoker, smo

kes every day STARTED

AGE 30 1-3 CIGARETTES A DAY 







Allergies and adverse reactions





             Active Allergies Criticality  Reaction | Severity Comments     Date

 

             Ibuprofen    Unable to assess criticality STOMACH UPSET HEADACHE mo

david       10/17/2017

 

             Latex        Unable to assess criticality rash, itches | Mild      

        10/17/2018







Medications





           Active Medications SIG        Qnty       Indications Ordering Provide

r Date

 

                          Cephalexin                     500mg Capsules         

          take one tablet 

by mouth 3 times daily x 7 days 21caps                          Julio kothari JR, PA 10/06/2021

 

                          Excedrin Migraine                     [id number] Tab

lets                   1 

as needed h/a as needed mdd=1                                 Charmaine Regalado M.D. 2021

 

                          Famotidine                     20mg Tablets           

        1 by mouth every 

day             90tabs                          Charmaine Regalado M.D. 20

21

 

                          Furosemide                     20mg Tablets           

        1 by mouth twice a

day             180tabs                         Charmaine Regalado M.D. 20

21

 

                                        Bupropion Hydrochloride ER (XL)         

            150mg Tablets ER 24HR       

                take 1 tablet by mouth in the morning 90tabs                    

      Charmaine Regalado M.D.                                    2021

 

                          Eliquis                     5mg Tablets               

    take one tablet by 

mouth twice a day 180tabs                         Charmaine Regalado M.D. 2020

 

                          Methimazole                     5mg Tablets           

        2 every day by 

mouth           180tabs                         Charmaine Regalado M.D. 20

20

 

                          Shingrix                     50mcg/0.5ML Suspension Re

c                   

administer at pharmacy 1units                          Juany Vincent FNP 

 

                          Voltaren                     1% Gel                   

apply up to 4 grams three 

times a day to affected knee as needed 100gm                           Charmaine Regalado M.D. 

2018

 

                          Aspirin Adult Low Dose                     81mg Tablet

s DR                   1 

by mouth every day                                 Juany Vincent FNP 10/17/201

7

 

                                        Acetaminophen Extra Strength            

         500mg Tablets                  

             take 2 tabs every 6 hours as needed                           Juany Fung FNP 10/17/2017

 

                                        Womens 50+ Multi Vitamin & Mineral Formu

la                      Tablets         

             1 every day PO Vit [id number]=[id number] TE=197                           Juany Vincent FNP 10/17/2017

 

                          Metoprolol Tartrate                     25mg Tablets  

                 Take One 

Tablet By Mouth Twice A Day 180tabs                         ABELARDO Saleem 10/17/2017

 

                          Atorvastatin Calcium                     20mg Tablets 

                  take one

tablet by mouth every day 90tabs                          JANE Saleem 10/17/2017

 

                          Flecainide Acetate                     100mg Tablets  

                 take one 

tablet by mouth twice a day 180tabs                         ABELARDO Saleem 10/17/2017

 

                          Digoxin                     250mcg Tablets            

       1 by mouth every 

day             90tabs                          Charmaine Regalado M.D. 







Medications Administered in Office





           Medication SIG        Qnty       Indications Ordering Provider Date

 

                          Administration Of Flu Vaccine                      Inj

ection                    

                                                AIXA Hoffmann JR 10/06/2

021

 

                          Administration Of Flu Vaccine                      Inj

diyaion                    

                                                Charmaine Regalado M.D. 10/15/20

20

 

                          Administration Of Flu Vaccine                      Inj

ection                    

                                                Juany Vincent FNP 10/17/2019

 

                          Administration Of Flu Vaccine                      Inj

ection                    

                                                Juany Vincent FNP 10/17/2018

 

                          Administration Of Flu Vaccine                      Inj

Juany Alonso FNP 10/17/2017







Immunizations





             CPT Code     Status       Date         Vaccine      Lot #

 

                [id number]           Given           10/06/2021      Influenza Vaccin

e Quadrivalent Preser/Antibiotic Free Im 

Use                                     [id number]

 

                [id number]           Given           10/15/2020      Influenza Vaccin

e Quadrivalent Preser/Antibiotic Free Im 

Use                                     [id number]

 

                49092           Given           2020      Shingrix Zoster 

Vaccine (HZV), Recombinant, Subunit, 

Adjuvanted                               

 

                [id number]           Given           10/17/2019      Influenza Vaccin

e Quadrivalent Preser/Antibiotic Free Im 

Use                                     [id number]

 

                32154           Given           10/17/2018      Influenza Virus 

Vaccine, Quadrivalent (Cciiv4), Derived 

From Cell                               [id number]

 

             35816        Given        2018   Pneumovax 23 [id number]

 

                94882           Given           10/17/2017      Influenza Vaccin

e Quadrivalent Preser/Antibiotic Free Im 

Use                                     [id number]







Vital Signs





                Date            Vital           Result          Comment

 

                10/19/2021  3:12pm BP Systolic     122 mmHg         

 

                    BP Diastolic        76 mmHg              

 

                    Heart Rate          86 /min              

 

                    Height              66 inches           5'6"

 

                    Weight              223.00 lb            

 

                    BMI (Body Mass Index) 36.0 kg/m2           

 

                10/06/2021  2:17pm BP Systolic     124 mmHg         

 

                    BP Diastolic        68 mmHg              

 

                    Heart Rate          86 /min              

 

                    Height              66 inches           5'6"

 

                    Weight              217.00 lb            

 

                    BMI (Body Mass Index) 35.0 kg/m2           







Results





        Test    Acquired Date Facility Test    Result  H/L     Range   Note

 

                    Influenza A/B RSV Covid Amp 10/25/2021          [address] [address] [phone] Influenza A Amplification NEGATIVE   Normal     Negati

ve   1

 

             Influenza B Amplification NEGATIVE     Normal       Negative     2

 

             RSV Amplification NEGATIVE     Normal       Negative     3

 

             Sars Covid-19 Amplification NEGATIVE     Normal       Negative     

4

 

                    CBC With Differential 10/25/2021          [address] [address] [phone] White Blood Count 6.3 10     Normal     4.0-10.0    

 

             Red Blood Count 4.12 10      Normal       4.00-5.40     

 

             Hemoglobin   12.9 g/dL    Normal       12.0-15.5     

 

             Hematocrit   41.3 %       Normal       36.0-47.0     

 

             Mean Corpuscular Volume 100.2 fl     High         80.0-96.0     

 

             Mean Corpuscular Hemoglobin 31.3 pg      Normal       27.0-33.0    

 

 

             Mean Corpuscular HGB Conc 31.2 g/dL    Low          32.0-36.5     

 

             Red Cell Distribution Width 14.8 %       High         11.5-14.5    

 

 

             Platelet Count, Automated 202 10       Normal       150-450       

 

             Neutrophils % 79.5 %       High         36.0-66.0     

 

             Lymph %      13.8 %       Low          24.0-44.0     

 

             Mono %       4.8 %        Normal       2.0-8.0       

 

             Eos %        1.1 %        Normal       0.0-3.0       

 

             Baso %       0.3 %        Normal       0.0-1.0       

 

             Immature Granulocyte % 0.5 %        Normal       0-3.0         

 

             Nucleated Red Blood Cell % 0.0 %        Normal       0-0           

 

             Neutrophils # 5.0 10       Normal       1.5-8.5       

 

             Lymph #      0.9 10       Low          1.5-5.0       

 

             Mono #       0.3 10       Normal       0.0-0.8       

 

             Eos #        0.1 10       Normal       0.0-0.5       

 

             Baso #       0.0 10       Normal       0.0-0.2       

 

                    Cardiac Marker Panel 10/25/2021          [facility]

enter

                                        [address] [place] [address] [phone] CPK Creatine Phosphokinase 26 U/L     Normal     26-19

2      

 

             CK-MB Value Mass 1.0 NG/ML    Normal       <3.6          

 

             MB/CK Relative Index 3.85         Normal       < Or =4      5

 

             Troponin I   0.02 NG/ML   Normal       < 0.10       6

 

                    Liver Profile       10/25/2021          [facility]

nter

                                        [address] [place] [address] [phone] Ast/Sgot   26 U/L     Normal     7-37        

 

             Alt/SGPT     28 U/L       Normal       12-78         

 

             Alkaline Phosphatase 105 U/L      Normal               

 

             Bilirubin,Total 0.8 mg/dL    Normal       0.2-1.0       

 

             Bilirubin,Direct 0.4 mg/dL    High         0.0-0.2       

 

             Total Protein 7.9 GM/DL    Normal       6.4-8.2       

 

             Albumin      2.8 GM/DL    Low          3.2-5.2       

 

             Albumin/Globulin Ratio 0.5          Low          1.2-2.2       

 

                    Basic Metabolic Profile 10/25/2021          [facility]

                                        [address] [place] [address] [phone] Glucose, Fasting 106 mg/dL  High             

 

             Blood Urea Nitrogen 18 mg/dL     Normal       7-18          

 

             Creatinine For GFR 0.94 mg/dL   Normal       0.55-1.30     

 

             Glomerular Filtration Rate > 60.0       Normal       >45          7

 

             Sodium Level 140 mEq/L    Normal       136-145       

 

             Potassium Serum 4.6 mEq/L    Normal       3.5-5.1       

 

             Chloride Level 111 mEq/L    High                 

 

             Carbon Dioxide Level 22 mEq/L     Normal       21-32         

 

             Anion Gap    7 mEq/L      Low          8-16          

 

             Calcium Level 10.7 mg/dL   High         8.8-10.2      

 

                    Laboratory test finding 10/25/2021          [facility]

                                        [address] [place] [address] [phone] NT-Pro BNP 5817 pg/mL High       <125       8

 

             Digoxin Level 0.5 NG/ML    Normal       0.5-2.0      9

 

             Thyroxine (T4) 10.1 g/dL  Normal       4.5-12.0     10

 

             Thyroid Stimulating Hormone 0.051 uIU/ML Low          0.358-3.740  

11

 

                    CBC With Differential 2021          [facility]

                                        [address] [place] [address] [phone] White Blood Count 6.8 10     Normal     4.0-10.0    

 

             Red Blood Count 3.85 10      Low          4.00-5.40     

 

             Hemoglobin   12.7 g/dL    Normal       12.0-15.5     

 

             Hematocrit   39.4 %       Normal       36.0-47.0     

 

             Mean Corpuscular Volume 102.3 fl     High         80.0-96.0     

 

             Mean Corpuscular Hemoglobin 33.0 pg      Normal       27.0-33.0    

 

 

             Mean Corpuscular HGB Conc 32.2 g/dL    Normal       32.0-36.5     

 

             Red Cell Distribution Width 12.3 %       Normal       11.5-14.5    

 

 

             Platelet Count, Automated 187 10       Normal       150-450       

 

             Neutrophils % 72.2 %       High         36.0-66.0     

 

             Lymph %      19.9 %       Low          24.0-44.0     

 

             Mono %       6.0 %        Normal       2.0-8.0       

 

             Eos %        1.5 %        Normal       0.0-3.0       

 

             Baso %       0.3 %        Normal       0.0-1.0       

 

             Immature Granulocyte % 0.1 %        Normal       0-3.0         

 

             Nucleated Red Blood Cell % 0.0 %        Normal       0-0           

 

             Neutrophils # 4.9 10       Normal       1.5-8.5       

 

             Lymph #      1.4 10       Low          1.5-5.0       

 

             Mono #       0.4 10       Normal       0.0-0.8       

 

             Eos #        0.1 10       Normal       0.0-0.5       

 

             Baso #       0.0 10       Normal       0.0-0.2       

 

                    PT & Aptt           2021          [facility]

nter

                                        [address] [place] [address] [phone] Prothrombin Time 13.8 seconds Normal     12.5-14.3   

 

             Inr          1.04         Normal                    12

 

             Partial Thromboplastin Time 30.6 seconds Normal       24.2-38.5    

 

 

                    CBC With Differential 2021          [facility]

                                        [address] [place] [address] [phone] White Blood Count 6.2 10     Normal     4.0-10.0    

 

             Red Blood Count 3.76 10      Low          4.00-5.40     

 

             Hemoglobin   12.5 g/dL    Normal       12.0-15.5     

 

             Hematocrit   39.0 %       Normal       36.0-47.0     

 

             Mean Corpuscular Volume 103.7 fl     High         80.0-96.0     

 

             Mean Corpuscular Hemoglobin 33.2 pg      High         27.0-33.0    

 

 

             Mean Corpuscular HGB Conc 32.1 g/dL    Normal       32.0-36.5     

 

             Red Cell Distribution Width 12.9 %       Normal       11.5-14.5    

 

 

             Platelet Count, Automated 199 10       Normal       150-450       

 

             Neutrophils % 74.5 %       High         36.0-66.0     

 

             Lymph %      16.4 %       Low          24.0-44.0     

 

             Mono %       7.0 %        Normal       2.0-8.0       

 

             Eos %        1.1 %        Normal       0.0-3.0       

 

             Baso %       0.5 %        Normal       0.0-1.0       

 

             Immature Granulocyte % 0.5 %        Normal       0-3.0         

 

             Nucleated Red Blood Cell % 0.0 %        Normal       0-0           

 

             Neutrophils # 4.6 10       Normal       1.5-8.5       

 

             Lymph #      1.0 10       Low          1.5-5.0       

 

             Mono #       0.4 10       Normal       0.0-0.8       

 

             Eos #        0.1 10       Normal       0.0-0.5       

 

             Baso #       0.0 10       Normal       0.0-0.2       

 

                    Comprehensive Metabolic Profil 2021          [address] [address] [phone] Glucose, Fasting 98 mg/dL   Normal           

 

             Blood Urea Nitrogen 28 mg/dL     High         7-18          

 

             Creatinine For GFR 0.90 mg/dL   Normal       0.55-1.30     

 

             Glomerular Filtration Rate > 60.0       Normal       >45          1

3

 

             Sodium Level 137 mEq/L    Normal       136-145       

 

             Potassium Serum 4.7 mEq/L    Normal       3.5-5.1       

 

             Chloride Level 107 mEq/L    Normal               

 

             Carbon Dioxide Level 27 mEq/L     Normal       21-32         

 

             Anion Gap    3 mEq/L      Low          8-16          

 

             Calcium Level 10.2 mg/dL   Normal       8.8-10.2      

 

             Ast/Sgot     23 U/L       Normal       7-37          

 

             Alt/SGPT     19 U/L       Normal       12-78         

 

             Alkaline Phosphatase 77 U/L       Normal               

 

             Bilirubin,Total 0.5 mg/dL    Normal       0.2-1.0       

 

             Total Protein 8.3 GM/DL    High         6.4-8.2       

 

             Albumin      3.0 GM/DL    Low          3.2-5.2       

 

             Albumin/Globulin Ratio 0.6          Low          1.2-2.2       

 

                    Complete Blood Count 2021          [facility]

s, pc

: Dr Simon Hoyt

[place] [address] [phone] WBC        5.9 x10*3/UL            4.1 - 10.9  

 

             RBC          4.11 x10*6/UL Low          4.20 - 6.30   

 

             Hemoglobin   13.6 g/dL                 12.0 - 18.0   

 

             Hematocrit   40.0 %                    37.0 - 51.0   

 

             MCV          97.4 fL      High         80.0 - 97.0   

 

             MCH          33.1 pg      High         26.0 - 32.0   

 

             MCHC         34.0 g/dL                 31.0 - 38.0   

 

             RDW          13.2 %                    11.6 - 13.7   

 

             PLT          209 x10*3/UL              140 - 440     

 

             MPV          8.1 FL                    7.8 - 11.0    

 

             Lymph %      25.6 %                    10.0 - 58.5   

 

             Mid %        7.5 %                     1.7 - 9.3     

 

             Neut %       66.9 %                    37.0 - 92.0   

 

             Lymph #      1.5 x10*3/UL              0.6 - 4.1     

 

             Mid #        0.5 x10*3/UL              0.1 - 0.6     

 

             Neut #       3.9 x10*3/UL              2.0 - 7.8     

 

                    Laboratory test finding 2021          [facility]

[place]

: Dr Simon Hoyt

[place] [address] [phone] Sed Rate   25 mm/hr   High       0 - 15      

 

                    Basic Metabolic Panel 2021          [facility]

[facility], pc

: Dr Simon Hoyt

[place] [address] [phone] Glucose    100 mg/dL  High       74 - 99    14

 

             BUN          18 mg/dL                  7 - 18        

 

             Creatinine   0.8 mg/dL                 0.6 - 1.3     

 

             Sodium       141 mEq/L                 136 - 145     

 

             Potassium    3.8 mEq/L                 3.5 - 5.1     

 

             Chloride     104 mEq/L                 98 - 107      

 

             Carbon Dioxide 29 mEq/L                  21 - 32       

 

             Calcium      10.8 mg/dL   High         8.5 - 10.1   15

 

             GFR  >= 60 mL/min              >60           

 

             GFR African American >= 60 mL/min              >60          16

 

                    Laboratory test finding 2021          [facility]

pia meza

: Dr Simon Arellanologg

[place] [address] [phone] Thyroid Stimulating Hormone 0.19 uIU/mL Low        0.3

6 - 3.74  







                          1                         Negative results do not prec

lude influenza or RSV virus

infection and should not be used as the sole basis for

treatment or other patient management decisions.



 

                          2                         Negative results do not prec

lude influenza or RSV virus

infection and should not be used as the sole basis for

treatment or other patient management decisions.



 

                          3                         Negative results do not prec

lude influenza or RSV virus

infection and should not be used as the sole basis for

treatment or other patient management decisions.



 

                          4                         A false negative result may 

occur if a specimen is

improperly collected, transported or handled. False negative

results may also occur if inadequate numbers of organisms

are present in the specimen.  As with any molecular test,

mutations within the target regions of [employer]

SARS-CoV-2 could affect primer and/or probe binding

resulting in failure to detect the presence of virus.  This

test cannot rule out diseases caused by other bacterial or

viral pathogens.



DISCLAIMER:

Testing was performed using the [employer] SARS-CoV-2 test.

This test was developed and its performance characteristics

determined by [employer]. This test has not been FDA cleared or

approved. This test has been authorized by FDA under an

Emergency Use Authorization (EUA). This test is only

authorized for the duration of time the declaration that

circumstances exist justifying the authorization of the

emergency use of in vitro diagnostic tests for detection of

SARS-CoV-2 virus and/or diagnosis of COVID-19 infection

under section 564(b)(1) of the Act, 21 U.S.C.

                                        360bbb-3(b)(1), unless the authorization

 is terminated or

revoked sooner.



 

                          5                         DIAGNOSIS CRITERIA

MMB ng/ml       Relative Index (RI)

NON-AMI               < or = 5               N/A

GRAY ZONE              > 5                < or = 4

AMI                    > 5                   > 4



 

                          6                         Troponin I Reference Interva

l for Siemens Vista LOCI:



                                        99th Percentile= 0.00-0.045 ng/ml



Risk Stratification:

<= 0.10 ng/ml   Decreased Risk for Adverse Clinical

Events.

                                        0.10-1.50 ng/ml   Increased Risk for Adv

erse Clinical

Events. Evaluation of additional

criterion and/or repeat testing in 2-6

hours is suggested to rule out myocardial

damage.

>= 1.50 ng/ml   Indicative of Myocardial Injury.



 

                          7                         Units are mL/min/1.73 m2



Chronic Kidney Disease Staging per NKF:



Stage I & II   GFR >=60       Normal to Mildly Decreased

Stage III      GFR 30-59      Moderately Decreased

Stage IV       GFR 15-29      Severely Decreased

Stage V        GFR <15        Very Little GFR Left

ESRD           GFR <15 on RRT



 

                          8                         note:<nlbl:demographic_[facility]

ed>



 

                          9                         note:<nlbl:demographic_[facility]

ed>



 

                          10                        note:<nlbl:demographic_[facility]

ed>



 

                          11                        note:<nlbl:[facility]

ed>



 

                          12                        THERAPUTIC HUMAN INR VALUES

INDICATIONS                      NORMAL RANGES

PROPHYLAXIS/TREATMENT OF:

VENOUS THROMBOSIS                2.0-3.0

PULMONARY EMBOLISM               2.0-3.0

PREVENTION OF SYSTEMIC EMBOLISM FROM:

TISSUE HEART VALVES              2.0-3.0

ACUTE MYOCARDIAL INFARCTION      2.0-3.0

VALVULAR HEART DISEASE           2.0-3.0

ATRIAL FIBRILLATION              2.0-3.0

MECHANICAL VALVES(HIGH RISK)     2.5-3.5

RECURRENT MYOCARDIAL INFARCTION  2.5-3.5



 

                          13                        Units are mL/min/1.73 m2



Chronic Kidney Disease Staging per NKF:



Stage I & II   GFR >=60       Normal to Mildly Decreased

Stage III      GFR 30-59      Moderately Decreased

Stage IV       GFR 15-29      Severely Decreased

Stage V        GFR <15        Very Little GFR Left

ESRD           GFR <15 on RRT



 

                          14                        100-125 mg/dL     PRE-DIABET

ES/FASTING

>126 mg/dL          DIABETES/FASTING



 

                          15                        NOTE:

CALCIUM,TSH VERIFIED







 

                          16                        CHRONIC KIDNEY DISEASE STAGI

NG PER NKF



STAGE I & II      GFR >= 60        NORMAL TO MILDLY DECREASED

STAGE III          GFR 30-59          MODERATELY DECREASED

STAGE IV           GFR 15-29         SEVERELY DECREASED

STAGE V            GFR <15            VERY LITTLE GFR LEFT

ESRD                 GFR <15            ON RRT









Procedures





                Date            Code            Description     Status

 

                10/06/2021      28678           Office/Outpatient Established Lo

w MDM 20-29 Min Completed

 

                2021      01998           Complex Chronic Care MGMT Servic

e Ea Addl 30 Min Completed

 

                2021      66668           Complex Chronic Care Management 

SVC 1St 60 Min Completed

 

                    2021          19540               Chronic Care MGMT 20

 Mins Clinical Staff Time Per Calendar 

Month                                   Completed

 

                2021      59671           Chronic Care Management Services

 Ea Addl 20 Min Completed

 

                2021      38366           Complex Chronic Care MGMT Servic

e Ea Addl 30 Min Completed

 

                2021      13974           Complex Chronic Care Management 

SVC 1St 60 Min Completed

 

                2021      43590           Complex Chronic Care MGMT Servic

e Ea Addl 30 Min Completed

 

                2021      08167           Complex Chronic Care Management 

SVC 1St 60 Min Completed

 

                2021      29774           Office/Outpatient Established Mo

d MDM 30-39 Min Completed

 

                2021      49036           Complex Chronic Care MGMT Servic

e Ea Addl 30 Min Completed

 

                2021      40204           Complex Chronic Care Management 

SVC 1St 60 Min Completed

 

                2021      [id number]       Bone Mineral Density Test Comple

veronika

 

                2021      [id number]        Mammogram       Completed







Medical Devices





                                        Description

 

                                        No Information Available







Encounters





           Type       Date       Location   Provider   Dx         Diagnosis

 

                Office Visit    10/06/2021  2:20p [facility]ark[facility]                        S81.802A                  Unspecified open wound, left

 lower leg, initial encounter

 

                          Z23                       Encounter for immunization

 

                Office Visit    2021  1:[facility]p [facility] Regalado M.[facility]                      I48.20                    Chronic atrial fibrillation,

 unspecified

 

                          Z79.01                    Long term (current) use of a

nticoagulants

 

                          R60.9                     Edema, unspecified

 

                          I10                       Essential (primary) hyperten

darci

 

                          E05.90                    Thyrotoxicosis, unsp without

 thyrotoxic crisis or storm

 

                          I82.502                   Chronic embolism and thombos

 unsp deep veins of l low extrem

 

                          F17.210                   Nicotine dependence, cigaret

svitlana, uncomplicated

 

                          F32.89                    Other specified depressive e

pisodes

 

                          D47.2                     Monoclonal gammopathy

 

                          K21.9                     Gastro-esophageal reflux dis

ease without esophagitis

 

                          M19.90                    Unspecified osteoarthritis, 

unspecified site

 

                          E78.00                    Pure hypercholesterolemia, u

nspecified







Assessments





                Date            Code            Description     Provider

 

                10/06/2021      S81.802A        Unspecified open wound, left low

er leg, initial encounter 

AIXA Hoffmann JR

 

                10/06/2021      Z23             Encounter for immunization AIXA Joseph JR

 

                2021      I10             Essential (primary) hypertension

 Charmaine Regalado M.D.

 

                2021      K21.9           Gastro-esophageal reflux disease

 without esophagitis Charmaine Regalado M.D.

 

                2021      M15.9           Polyosteoarthritis, unspecified 

Charmaine Regalado M.D.

 

                2021      I10             Essential (primary) hypertension

 Charmaine Regalado M.D.

 

                2021      K21.9           Gastro-esophageal reflux disease

 without esophagitis Charmaine Regalado M.D.

 

                2021      E78.00          Pure hypercholesterolemia, unspe

cified Charmaine Regalado M.D.

 

                2021      I10             Essential (primary) hypertension

 Charmaine Regalado M.D.

 

                2021      K21.9           Gastro-esophageal reflux disease

 without esophagitis Charmaine Regalado M.D.

 

                2021      E78.00          Pure hypercholesterolemia, unspe

cisonia Regalado M.D.

 

                2021      M15.9           Polyosteoarthritis, unspecified 

Charmaine Regalado M.D.

 

                2021      I10             Essential (primary) hypertension

 Charmaine Regalado M.D.

 

                2021      K21.9           Gastro-esophageal reflux disease

 without esophagitis Charmaine Regalado M.D.

 

                    2021          I82.502             Chronic embolism and

 thrombosis of unspecified deep veins of 

left lower extremity                    Charmaine Regalado M.D.

 

                2021      I48.20          Chronic atrial fibrillation, uns

pecminoo Regalado M.D.

 

                2021      Z79.01          Long term (current) use of antic

oagulants Charmaine Regalado M.D.

 

                2021      R60.9           Edema, unspecified Charmaine guido M.D.

 

                2021      I10             Essential (primary) hypertension

 Charmaine Regalado M.D.

 

                2021      E05.90          Thyrotoxicosis, unspecified with

out thyrotoxic crisis or sto 

Charmaine Regalado M.D.

 

                    2021          I82.502             Chronic embolism and

 thrombosis of unspecified deep veins of 

left lower extremity                    Charmaine Regalado M.D.

 

                2021      F17.210         Nicotine dependence, cigarettes,

 uncomplicated Charmaine Regalado M.D.

 

                2021      F32.89          Other specified depressive episo

emil Charmaine Regalado M.D.

 

                2021      D47.2           Monoclonal gammopathy Charmaine duron M.D.

 

                2021      K21.9           Gastro-esophageal reflux disease

 without esophagitis Charmaine Regalado M.D.

 

                2021      M19.90          Unspecified osteoarthritis, unsp

ecified site Charmaine Regalado M.D.

 

                2021      E78.00          Pure hypercholesterolemia, unspe

cified Charmaine Regalado M.D.

 

                2021      I10             Essential (primary) hypertension

 Charmaine Regalado M.D.

 

                2021      K21.9           Gastro-esophageal reflux disease

 without esophagitis Charmaine Regalado M.D.

 

                2021      E78.00          Pure hypercholesterolemia, unspe

cified Charmaine Regalado M.D.







Plan of Treatment

Future Appointment(s):* 2021  3:15 pm - Charmaine Regalado M.D. at 
  [facility]





Functional Status





                                        Description

 

                                        No Information Available







Mental Status





                                        Description

 

                                        No Information Available







Referrals





                Refer to Dr     Reason for Referral Status          Appt Date

 

                          Stillerman,James MD       STAT CONSULT FOR OPEN WOUND 

LT LEG PLEASE LET OFFICE KNOW 

WHEN APPT IS MADE         Created                   

 

                                        [facility]

 

                                        [address] [place]  [address]

 

                                        [phone]

 

                                          

 

                Rock Werner MD CONSULT FOR PVD WITH OPEN WOUND LT LEG  Patient

 Declined 

10/20/2021

 

                                        [facility] [address] ,[address] [place] [address]

 

                                        [phone]

## 2021-12-02 NOTE — CCD
Summarization of Episode Note

                             Created on: 10/28/2021



Miss. JOSEPH BENTLEY

External Reference #: [id number]

: 1955

Sex: Female



Demographics





                          Address                   [address]

[place]  [address]

 

                          Home Phone                [phone]

 

                          Preferred Language        Unknown

 

                          Marital Status            Unknown

 

                          Worship Affiliation     Unknown

 

                          Race                      White

 

                          Ethnic Group              Not  or 





Author





                          Author                    [facility]

ems

 

                          Organization              [facility]

ems

 

                          Address                   Unknown

 

                          Phone                     Unavailable







Support





                Name            Relationship    Address         Phone

 

                    JOSEPH BENTLEY      GUAR                [address]

[place]  [address]                    [phone]

 

                    Abbcess, Ketty      ECON                [address] [place]

[place]  [address]                    [phone]







Care Team Providers





                    Care Team Member Name Role                Phone

 

                    Roya Bonds Unavailable         [phone]







PROBLEMS





          Type      Condition ICD9-CM Code GYX76-IW Code Onset Dates Condition S

tatus W/U 

Status              Risk                SNOMED Code         Notes

 

                          Problem                   Non-pressure chronic ulcer o

f other part of left lower leg with fat 

layer exposed         L97.822         Active  confirmed         [id number]  

 

                          Problem                   Chronic venous hypertension 

(idiopathic) with ulcer of left lower 

extremity         I87.312         Active  confirmed         [id number]  







ALLERGIES





                    Allergen (clinical drug ingredient) Drug/Non Drug Allergy do

cumented on EMR 

Reaction            Allergy Type        Onset Date          Status

 

                      Motrin     Dyspnea    Drug Allergy            Active







ENCOUNTERS from 1955 to 2021-10-28





             Encounter    Location     Date         Provider     Diagnosis

 

                    [facility]     [address] [place] [address] [phone] [place] [address] 26 Oct, 

2021                      Roya Bonds         Chronic venous hypertension 

(idiopathic) with ulcer of 

left lower extremity I87.312 and Non-pressure chronic ulcer of other part of 
left lower leg with fat layer exposed L97.822







IMMUNIZATIONS

No Information



SOCIAL HISTORY

Tobacco Use:



                    Social History Observation Description         Date

 

                    Details (start date - stop date) Current Smoker       



Sex Assigned At Birth:



                          Social History Observation Description

 

                          Sex Assigned At Birth     Unknown



Education:



                    Question            Answer              Notes

 

                    Level of Education: Finished High School  



Adventist:



                    Question            Answer              Notes

 

                    Adventist                                [employer]



Sexual Hx:



                    Question            Answer              Notes

 

                    Had sex in the last 12 months (vaginal, oral, or anal)? No  

                 

 

                    LMP:                                  

 

                    Have you ever had an STD? No                   



Tobacco Use:



                    Question            Answer              Notes

 

                    Are you a:          current smoker       

 

                    How many cigarettes a day do you smoke?                     

2-3 CIGARETTES/DAY







REASON FOR REFERRAL

No Information



VITAL SIGNS





                    Weight              240 lbs             26 Oct, 2021

 

                    Weight-kg           108.86 kg           26 Oct, 2021

 

                    Height              65 in               26 Oct, 2021

 

                    BMI                 39.93 kg/m2         26 Oct, 2021

 

                    Heart Rate          70 /min             26 Oct, 2021

 

                    Respiratory Rate    18 /min             26 Oct, 2021

 

                    Temperature         95.2 degrees Fahrenheit 26 Oct, 2021

 

                    Oximetry            93                  26 Oct, 2021

 

                    Blood pressure systolic 121 mm Hg           26 Oct, 2021

 

                    Blood pressure diastolic 84 mm Hg            26 Oct, 2021







MEDICATIONS





           Medication SIG (Take, Route, Frequency, Duration) Notes      Start Da

te End Date   

Status

 

                    Metoprolol Tartrate 100 MG 1 tablet with food Orally Twice a

 day for 30 day(s)  

                                                            Active

 

           Eliquis 5 MG as directed Orally                                  Acti

ve

 

           Cephalexin 500 MG 1 tablet Orally Four times a day for 5 day(s)      

                            Active







PROCEDURES from 1955 to 2021-10-28





                Procedure       Date Ordered    Result          Body Site

 

                Medication: 4% Lidocaine topical cream (Anecream) 5gm 2021-10-26

      N/A              







RESULTS

No Results



REASON FOR VISIT

LLE Wound



MEDICAL (GENERAL) HISTORY





                    Type                Description         Date

 

                    Medical History     ATRIAL FIBRILLATION  

 

                    Medical History     DVT IN LEFT LEG      

 

                    Medical History     x3 PULMONARY EMBOLISM, MOST RECENT  

 

 

                    Medical History     HYPERTENSION         

 

                    Surgical History    GALLBLADDER REMOVAL  

 

                    Surgical History    STRIPPING LIGATION IN BILATERAL LEGS  

 

                    Surgical History    TONSILLECTOMY        

 

                    Surgical History    RIGHT SHOULDER SURGERY  

 

                    Hospitalization History HOSPITALIZATION AFTER SHOULDER SURGE

RY  







Goals Section

No Information



Health Concerns

No Information



MEDICAL EQUIPMENT

No Information



MENTAL STATUS

No Information



FUNCTIONAL STATUS

No Information



ASSESSMENTS





             Encounter Date Diagnosis    Assessment Notes Treatment Notes Treatm

ent Clinical 

Notes

 

                          26 Oct, 2021              Chronic venous hypertension 

(idiopathic) with ulcer of left lower 

extremity (ICD-10 - I87.312)                           



Dressing changes 3x a week. The dressing should follow those documented in the 
procedure note                          





 

                          26 Oct, 2021              Non-pressure chronic ulcer o

f other part of left lower leg with fat

layer exposed (ICD-10 - L97.822)                           



                                        











PLAN OF TREATMENT

Treatment Notes



                    Assessment          Notes               Clinical Notes

 

                          Chronic venous hypertension (idiopathic) with ulcer of

 left lower extremity 

Dressing changes 3x a week. The dressing should follow those documented in the 
procedure note                           



Next Appt



                                        Details

 

                                        1 Week Reason:

 

                                        Provider Name:Roya Bonds,  09:30:00 AM, [address], 

[phone], [place], [address], [phone]







Insurance Providers





             Payer Name   Payer Address Payer Phone  Insured Name Patient Relati

onship to 

Insured                   Coverage Start Date       Coverage End Date

 

                MEDICARE Part A and B [address]  [facility] [phone]

[phone]    

JOSEPH BENTLEY self                                     

 

                 FOR LIFE [address]  [facility] [address] [phone]

    OJSEPH BENTLEY              self

## 2021-12-02 NOTE — CCD
Continuity of Care Document (CCD)

                             Created on: 10/06/2021



Juany Becerril

External Reference #: [id number]

: 1955

Sex: Female



Demographics





                          Address                   [address]

[place]  [address]

 

                          Home Phone                [phone]

 

                          Preferred Language        Unknown

 

                          Marital Status            Unknown

 

                          Latter-day Affiliation     Unknown

 

                          Race                      White

 

                          Ethnic Group              Not  or 





Author





                          Author                    Juany ESQUEDA PA

 

                          Organization              Unknown

 

                          Address                   [address]

[place]  [address]



 

                          Phone                     [phone]







Care Team Providers





                    Care Team Member Name Role                Phone

 

                    Yuri Maier MD       AUTM                [phone]

 

                    [facility]  AUTM                [phone]

 

                    John Lutz MD AUTM                Unavailable

 

                    [facility] AUTM                [phone]

 

                    Hansa Hernandze FNP  AUTM                [phone]







Problems





                    Active Problems     Provider            Date

 

                    Chronic atrial fibrillation Protime             Onset: 

 

                    Essential hypertension Juany Vincent FNP Onset: 2017

 

                    Deep venous thrombosis of lower extremity Juany Vincent FN P Onset: 2017

 

                    Pure hypercholesterolemia Juany Vincent FNP Onset: 

017

 

                    Anxiety state       Juany Vincent FNP Onset: 2017

 

                    Chronic pulmonary embolism Juany Vincent FNP Onset: 2017

 

                    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 

2017

 

                    Gastroesophageal reflux disease Juany Vincent FNP Onset: 1

2017

 

                    Scoliosis deformity of spine Juany Vincent FNP Onset: 2017

 

                    Chronic tension-type headache Juany Vincent FNP Onset: 2017

 

                    Varicose veins of lower extremity Juany Vincent FNP Onset:

 2017

 

                    Tobacco user        Juany Vincent FNP Onset: 2017

 

                    Hypercalcemia       Juany Vincent FNP Onset: 2017







Social History





                Type            Date            Description     Comments

 

                Birth Sex                       Unknown          

 

                ETOH Use                        Consumes 3 glasses of wine per w

Sycuan  

 

                Tobacco Use     Start: Unknown  Patient is a current smoker, smo

kes every day STARTED

AGE 30 1-3 CIGARETTES A DAY 







Allergies and adverse reactions





             Active Allergies Criticality  Reaction | Severity Comments     Date

 

             Ibuprofen    Unable to assess criticality STOMACH UPSET HEADACHE mo

david       10/17/2017

 

             Latex        Unable to assess criticality rash, itches | Mild      

        10/17/2018







Medications





           Active Medications SIG        Qnty       Indications Ordering Provide

r Date

 

                          Cephalexin                     500mg Capsules         

          take one tablet 

by mouth 3 times daily x 7 days 21caps                          Julio kothari JR PA 10/06/2021

 

                          Excedrin Migraine                     [id number] Tab

lets                   1 

as needed h/a as needed mdd=1                                 Charmaine Regalado M.D. 2021

 

                          Famotidine                     20mg Tablets           

        1 by mouth every 

day             90tabs                          Charmaine Regalado M.D. 20

21

 

                          Furosemide                     20mg Tablets           

        1 by mouth twice a

day             180tabs                         Charmaine Regalado M.D. 20

21

 

                                        Bupropion Hydrochloride ER (XL)         

            150mg Tablets ER 24HR       

                take 1 tablet by mouth in the morning 90tabs                    

      Charmaine Regalado M.D.                                    2021

 

                          Eliquis                     5mg Tablets               

    take one tablet by 

mouth twice a day 180tabs                         Charmaine Regalado M.D. 2020

 

                          Methimazole                     5mg Tablets           

        2 every day by 

mouth           180tabs                         Charmaine Regalado M.D. 20

20

 

                          Shingrix                     50mcg/0.5ML Suspension Re

c                   

administer at pharmacy 1units                          Juany Vincent FNP 

 

                          Voltaren                     1% Gel                   

apply up to 4 grams three 

times a day to affected knee as needed 100gm                           Charmaine Regalado M.D. 

2018

 

                          Aspirin Adult Low Dose                     81mg Tablet

s DR                   1 

by mouth every day                                 Juany Vincent FNP 10/17/201

7

 

                                        Acetaminophen Extra Strength            

         500mg Tablets                  

             take 2 tabs every 6 hours as needed                           Juany Fung FNP 10/17/2017

 

                                        Womens 50+ Multi Vitamin & Mineral Formu

la                      Tablets         

             1 every day PO Vit [id number] BO=975                           Juany Vincent FNP 10/17/2017

 

                          Metoprolol Tartrate                     25mg Tablets  

                 Take One 

Tablet By Mouth Twice A Day 180tabs                         ABELARDO Saleem 10/17/2017

 

                          Atorvastatin Calcium                     20mg Tablets 

                  take one

tablet by mouth every day 90tabs                          JANE Saleem 10/17/2017

 

                          Flecainide Acetate                     100mg Tablets  

                 take one 

tablet by mouth twice a day 180tabs                         ABELARDO Saleem 10/17/2017

 

                          Digoxin                     250mcg Tablets            

       1 by mouth every 

day             90tabs                          Charmaine Regalado M.D. 







Medications Administered in Office





           Medication SIG        Qnty       Indications Ordering Provider Date

 

                          Administration Of Flu Vaccine                      Inj

diyaion                    

                                                Charmaine Regalado M.D. 10/15/20

20

 

                          Administration Of Flu Vaccine                      Inj

ection                    

                                                Juany Vincent FNP 10/17/2019

 

                          Administration Of Flu Vaccine                      Inj

ection                    

                                                Juany Vincent FNP 10/17/2018

 

                          Administration Of Flu Vaccine                      Inj

ection                    

                                                Juany Vincent FNP 10/17/2017







Immunizations





             CPT Code     Status       Date         Vaccine      Lot #

 

                [id number]           Given           10/06/2021      Influenza Vaccin

e Quadrivalent Preser/Antibiotic Free Im 

Use                                     [id number]

 

                [id number]           Given           10/15/2020      Influenza Vaccin

e Quadrivalent Preser/Antibiotic Free Im 

Use                                     [id number]

 

                [id number]           Given           2020      Shingrix Zoster 

Vaccine (HZV), Recombinant, Subunit, 

Adjuvanted                               

 

                [id number]           Given           10/17/2019      Influenza Vaccin

e Quadrivalent Preser/Antibiotic Free Im 

Use                                     [id number]

 

                11647           Given           10/17/2018      Influenza Virus 

Vaccine, Quadrivalent (Cciiv4), Derived 

From Cell                               [id number]

 

             37883        Given        2018   Pneumovax 23 [id number]

 

                41165           Given           10/17/2017      Influenza Vaccin

e Quadrivalent Preser/Antibiotic Free Im 

Use                                     [id number]







Vital Signs





                Date            Vital           Result          Comment

 

                10/06/2021  2:17pm BP Systolic     124 mmHg         

 

                    BP Diastolic        68 mmHg              

 

                    Heart Rate          86 /min              

 

                    Height              66 inches           5'6"

 

                    Weight              217.00 lb            

 

                    BMI (Body Mass Index) 35.0 kg/m2           

 

                2021  1:31pm BP Systolic     136 mmHg        RT Arm

 

                    BP Diastolic        88 mmHg             RT Arm

 

                    Heart Rate          120 /min             

 

                    Height              66 inches           5'6"

 

                    Weight              223.50 lb            

 

                    BMI (Body Mass Index) 36.1 kg/m2           







Results





        Test    Acquired Date Facility Test    Result  H/L     Range   Note

 

                    Laboratory test finding 10/06/2021          [facility]

                                        [address] [place] [address] [phone] Wound Culture <pending>                         

 

                    CBC With Differential 2021          [facility]

                                        [address] [place] [address] [phone] White Blood Count 6.8 10     Normal     4.0-10.0    

 

             Red Blood Count 3.85 10      Low          4.00-5.40     

 

             Hemoglobin   12.7 g/dL    Normal       12.0-15.5     

 

             Hematocrit   39.4 %       Normal       36.0-47.0     

 

             Mean Corpuscular Volume 102.3 fl     High         80.0-96.0     

 

             Mean Corpuscular Hemoglobin 33.0 pg      Normal       27.0-33.0    

 

 

             Mean Corpuscular HGB Conc 32.2 g/dL    Normal       32.0-36.5     

 

             Red Cell Distribution Width 12.3 %       Normal       11.5-14.5    

 

 

             Platelet Count, Automated 187 10       Normal       150-450       

 

             Neutrophils % 72.2 %       High         36.0-66.0     

 

             Lymph %      19.9 %       Low          24.0-44.0     

 

             Mono %       6.0 %        Normal       2.0-8.0       

 

             Eos %        1.5 %        Normal       0.0-3.0       

 

             Baso %       0.3 %        Normal       0.0-1.0       

 

             Immature Granulocyte % 0.1 %        Normal       0-3.0         

 

             Nucleated Red Blood Cell % 0.0 %        Normal       0-0           

 

             Neutrophils # 4.9 10       Normal       1.5-8.5       

 

             Lymph #      1.4 10       Low          1.5-5.0       

 

             Mono #       0.4 10       Normal       0.0-0.8       

 

             Eos #        0.1 10       Normal       0.0-0.5       

 

             Baso #       0.0 10       Normal       0.0-0.2       

 

                    PT & Aptt           2021          [facility]

nter

                                        [address] [place] [address] [phone] Prothrombin Time 13.8 seconds Normal     12.5-14.3   

 

             Inr          1.04         Normal                    1

 

             Partial Thromboplastin Time 30.6 seconds Normal       24.2-38.5    

 

 

                    CBC With Differential 2021          [address] [address] [phone] White Blood Count 6.2 10     Normal     4.0-10.0    

 

             Red Blood Count 3.76 10      Low          4.00-5.40     

 

             Hemoglobin   12.5 g/dL    Normal       12.0-15.5     

 

             Hematocrit   39.0 %       Normal       36.0-47.0     

 

             Mean Corpuscular Volume 103.7 fl     High         80.0-96.0     

 

             Mean Corpuscular Hemoglobin 33.2 pg      High         27.0-33.0    

 

 

             Mean Corpuscular HGB Conc 32.1 g/dL    Normal       32.0-36.5     

 

             Red Cell Distribution Width 12.9 %       Normal       11.5-14.5    

 

 

             Platelet Count, Automated 199 10       Normal       150-450       

 

             Neutrophils % 74.5 %       High         36.0-66.0     

 

             Lymph %      16.4 %       Low          24.0-44.0     

 

             Mono %       7.0 %        Normal       2.0-8.0       

 

             Eos %        1.1 %        Normal       0.0-3.0       

 

             Baso %       0.5 %        Normal       0.0-1.0       

 

             Immature Granulocyte % 0.5 %        Normal       0-3.0         

 

             Nucleated Red Blood Cell % 0.0 %        Normal       0-0           

 

             Neutrophils # 4.6 10       Normal       1.5-8.5       

 

             Lymph #      1.0 10       Low          1.5-5.0       

 

             Mono #       0.4 10       Normal       0.0-0.8       

 

             Eos #        0.1 10       Normal       0.0-0.5       

 

             Baso #       0.0 10       Normal       0.0-0.2       

 

                    Comprehensive Metabolic Profil 2021          [address] [address] [phone] Glucose, Fasting 98 mg/dL   Normal           

 

             Blood Urea Nitrogen 28 mg/dL     High         7-18          

 

             Creatinine For GFR 0.90 mg/dL   Normal       0.55-1.30     

 

             Glomerular Filtration Rate > 60.0       Normal       >45          2

 

             Sodium Level 137 mEq/L    Normal       136-145       

 

             Potassium Serum 4.7 mEq/L    Normal       3.5-5.1       

 

             Chloride Level 107 mEq/L    Normal               

 

             Carbon Dioxide Level 27 mEq/L     Normal       21-32         

 

             Anion Gap    3 mEq/L      Low          8-16          

 

             Calcium Level 10.2 mg/dL   Normal       8.8-10.2      

 

             Ast/Sgot     23 U/L       Normal       7-37          

 

             Alt/SGPT     19 U/L       Normal       12-78         

 

             Alkaline Phosphatase 77 U/L       Normal               

 

             Bilirubin,Total 0.5 mg/dL    Normal       0.2-1.0       

 

             Total Protein 8.3 GM/DL    High         6.4-8.2       

 

             Albumin      3.0 GM/DL    Low          3.2-5.2       

 

             Albumin/Globulin Ratio 0.6          Low          1.2-2.2       

 

                    Complete Blood Count 2021          [facility]

s, pc

: Dr Simon Hoyt

[place] [address] [phone] WBC        5.9 x10*3/UL            4.1 - 10.9  

 

             RBC          4.11 x10*6/UL Low          4.20 - 6.30   

 

             Hemoglobin   13.6 g/dL                 12.0 - 18.0   

 

             Hematocrit   40.0 %                    37.0 - 51.0   

 

             MCV          97.4 fL      High         80.0 - 97.0   

 

             MCH          33.1 pg      High         26.0 - 32.0   

 

             MCHC         34.0 g/dL                 31.0 - 38.0   

 

             RDW          13.2 %                    11.6 - 13.7   

 

             PLT          209 x10*3/UL              140 - 440     

 

             MPV          8.1 FL                    7.8 - 11.0    

 

             Lymph %      25.6 %                    10.0 - 58.5   

 

             Mid %        7.5 %                     1.7 - 9.3     

 

             Neut %       66.9 %                    37.0 - 92.0   

 

             Lymph #      1.5 x10*3/UL              0.6 - 4.1     

 

             Mid #        0.5 x10*3/UL              0.1 - 0.6     

 

             Neut #       3.9 x10*3/UL              2.0 - 7.8     

 

                    Laboratory test finding 2021          [facility] [facility]

[facility], [facility]

: Dr Simon Hoyt

[place] [address] [phone] Sed Rate   25 mm/hr   High       0 - 15      

 

                    Basic Metabolic Panel 2021          [facility] [facility]

ts, pc

: Dr Simon Hoyt

[place] [address] [phone] Glucose    100 mg/dL  High       74 - 99    3

 

             BUN          18 mg/dL                  7 - 18        

 

             Creatinine   0.8 mg/dL                 0.6 - 1.3     

 

             Sodium       141 mEq/L                 136 - 145     

 

             Potassium    3.8 mEq/L                 3.5 - 5.1     

 

             Chloride     104 mEq/L                 98 - 107      

 

             Carbon Dioxide 29 mEq/L                  21 - 32       

 

             Calcium      10.8 mg/dL   High         8.5 - 10.1   4

 

             GFR  >= 60 mL/min              >60           

 

             GFR African American >= 60 mL/min              >60          5

 

                    Laboratory test finding 2021          [facility] [facility]

pia [facility]

: Dr [place] o[place]yt

[place] [address]

           [phone] Thyroid Stimulating Hormone 0.19 uIU/mL Low        0.3

6 - 3.74  

 

                    Lipid Profile       2021          [facility]

: Dr Simon Hoyt

[place] [address]

           [phone] Cholesterol 132 mg/dL             131 - 200   

 

             Triglycerides 72 mg/dL                  30 - 150      

 

             HDL Cholesterol 54 mg/dL                  35 - 60       

 

             LDL (Calculated) 64 CALC                   50 - 159      

 

                    Laboratory test finding 2021          [address] [id number] [phone] Digoxin Level 0.3 NG/ML  Low        0.5-2.0    6

 

                    Laboratory test finding 2021          [facility] [facility]

[facility]

: Dr Simon Hoyt

[place], [place] [id number] [phone] Thyroid Stimulating Hormone 0.32 uIU/mL Low        0.3

6 - 3.74  

 

                    Basic Metabolic Panel 2021          [facility] [facility]

ts, pc

: Dr Simon Hoyt

[place], [place] [id number] [phone] Glucose    70 mg/dL   Low        74 - 99    7

 

             BUN          23 mg/dL     High         7 - 18        

 

             Creatinine   0.9 mg/dL                 0.6 - 1.3     

 

             Sodium       144 mEq/L                 136 - 145     

 

             Potassium    3.8 mEq/L                 3.5 - 5.1     

 

             Chloride     104 mEq/L                 98 - 107      

 

             Carbon Dioxide 32 mEq/L                  21 - 32       

 

             Calcium      10.1 mg/dL                8.5 - 10.1    

 

             GFR  >= 60 mL/min              >60           

 

             GFR African American >= 60 mL/min              >60          8

 

                    Laboratory test finding 2021          [facility] [facility]

[facility]

: Dr Simon Hoyt

[place] [address] [phone] Sed Rate   45 mm/hr   High       0 - 15      

 

             Magnesium    1.8 mg/dL                 1.8 - 2.4     

 

                    Complete Blood Count 2021          [facility] [facility]

: Dr Simon Hoyt

[place] [address] [phone] WBC        8.4 x10*3/UL            4.1 - 10.9  

 

             RBC          4.20 x10*6/UL              4.20 - 6.30   

 

             Hemoglobin   14.0 g/dL                 12.0 - 18.0   

 

             Hematocrit   40.3 %                    37.0 - 51.0   

 

             MCV          95.8 fL                   80.0 - 97.0   

 

             MCH          33.3 pg      High         26.0 - 32.0   

 

             MCHC         34.7 g/dL                 31.0 - 38.0   

 

             RDW          13.3 %                    11.6 - 13.7   

 

             PLT          207 x10*3/UL              140 - 440     

 

             MPV          8.0 FL                    7.8 - 11.0    

 

             Lymph %      17.5 %                    10.0 - 58.5   

 

             Mid %        4.8 %                     1.7 - 9.3     

 

             Neut %       77.7 %                    37.0 - 92.0   

 

             Lymph #      1.4 x10*3/UL              0.6 - 4.1     

 

             Mid #        0.5 x10*3/UL              0.1 - 0.6     

 

             Neut #       6.5 x10*3/UL              2.0 - 7.8     

 

                    Ua Routine          2021          [facility]

nter

                                        [address] [place] [address] [phone] Appearance, Urine CLEAR      Normal     Clear       

 

             Color, Urine ABHAY        Normal       Yellow        

 

             PH,Urine     5.0 units    Normal       5.0-9.0       

 

             Specific Gravity Urine Auto 1.028        Normal       1.002-1.035  

 

 

             Protein, Urine Auto 2+ mg/dL     High         Negative      

 

             Glucose, Urine (Ua) Auto NEGATIVE mg/dL Normal       Negative      

 

             Ketone, Urine Auto TRACE mg/dL  High         Negative      

 

             Urobilinogen, Urine Auto 4.0 mg/dL    High         0.0-2.0       

 

             Bilirubin, Urine Auto NEGATIVE     Normal       Negative      

 

             Nitrite, Urine Auto NEGATIVE     Normal       Negative      

 

             Leukocyte Esterase, Urine Auto NEGATIVE     Normal       Negative  

    

 

             Blood, Urine Blood NEGATIVE     Normal       Negative      

 

             WBC, Urine Auto 2 /HPF       Normal       0-3           

 

             RBC, Urine Auto 2 /HPF       Normal       0-3           

 

             Bacteria, Urine Auto 1+           High         Negative      

 

             Squamous Epithelial Cell Ur AU 0 /HPF       Normal       0-6       

    

 

             Mucus, Urine SMALL        Normal       Negative      

 

             Hyaline Cast, Urine Auto 0 /LPF       Normal       0-1           

 

                    CBC With Differential 2021          [address] [address] [phone] White Blood Count 7.6 10     Normal     4.0-10.0    

 

             Red Blood Count 4.29 10      Normal       4.00-5.40     

 

             Hemoglobin   13.9 g/dL    Normal       12.0-15.5     

 

             Hematocrit   43.3 %       Normal       36.0-47.0     

 

             Mean Corpuscular Volume 100.9 fl     High         80.0-96.0     

 

             Mean Corpuscular Hemoglobin 32.4 pg      Normal       27.0-33.0    

 

 

             Mean Corpuscular HGB Conc 32.1 g/dL    Normal       32.0-36.5     

 

             Red Cell Distribution Width 13.1 %       Normal       11.5-14.5    

 

 

             Platelet Count, Automated 175 10       Normal       150-450       

 

             Neutrophils % 73.1 %       High         36.0-66.0     

 

             Lymph %      18.1 %       Low          24.0-44.0     

 

             Mono %       6.8 %        Normal       2.0-8.0       

 

             Eos %        1.3 %        Normal       0.0-3.0       

 

             Baso %       0.3 %        Normal       0.0-1.0       

 

             Immature Granulocyte % 0.4 %        Normal       0-3.0         

 

             Nucleated Red Blood Cell % 0.0 %        Normal       0-0           

 

             Neutrophils # 5.6 10       Normal       1.5-8.5       

 

             Lymph #      1.4 10       Low          1.5-5.0       

 

             Mono #       0.5 10       Normal       0.0-0.8       

 

             Eos #        0.1 10       Normal       0.0-0.5       

 

             Baso #       0.0 10       Normal       0.0-0.2       

 

                    Laboratory test finding 2021          [facility]

                                        [address] [place] [address] [phone] Erythrocyte Sedimentation Rate 68 mm/hr   High       0

-30        

 

                    Comprehensive Metabolic Profil 2021          [address] [place] [address] [phone] Glucose, Fasting 97 mg/dL   Normal           

 

             Blood Urea Nitrogen 24 mg/dL     High         7-18          

 

             Creatinine For GFR 0.85 mg/dL   Normal       0.55-1.30     

 

             Glomerular Filtration Rate > 60.0       Normal       >45          9

 

             Sodium Level 139 mEq/L    Normal       136-145       

 

             Potassium Serum 4.6 mEq/L    Normal       3.5-5.1       

 

             Chloride Level 106 mEq/L    Normal               

 

             Carbon Dioxide Level 26 mEq/L     Normal       21-32         

 

             Anion Gap    7 mEq/L      Low          8-16          

 

             Calcium Level 11.8 mg/dL   High         8.8-10.2      

 

             Ast/Sgot     33 U/L       Normal       7-37          

 

             Alt/SGPT     44 U/L       Normal       12-78         

 

             Alkaline Phosphatase 116 U/L      Normal               

 

             Bilirubin,Total 0.6 mg/dL    Normal       0.2-1.0       

 

             Total Protein 8.9 GM/DL    High         6.4-8.2       

 

             Albumin      3.2 GM/DL    Normal       3.2-5.2       

 

             Albumin/Globulin Ratio 0.6          Low          1.2-2.2       

 

                    Total Iron Binding Capacit 2021          [id number] [phone] Iron (Fe)  131 g/dL Normal           

 

             Total Iron Binding Capacity 305 g/dL   Normal       250-450      

 

 

             Percent Saturation 43.0 %       Normal       13.2-45.0     

 

                    Immunoglobulin G,A,M 2021          [address] [address] [phone] Immunoglobulin A 2640.0 mg/dL High             

 

             Immunoglobulin G 510 mg/dL    Low          681-1648      

 

             Immunoglobulin M < 5.3 mg/dL  Low                  

 

                    Immunotyping (Immunofixation) Serum  (If 2021         

 [address] [address] [phone] Immunotyping Serum Iga ABNORMAL   High       Normal   

   

 

             Immunotyping Serum Lambda ABNORMAL     High         Normal        

 

             It Serum Interpretation SEE COMMENT  Normal                    10

 

             Its Pathologist Review REV'D BY O ADJAP <SEE NOTE>  Normal         

           11

 

                    Laboratory test finding 2021          [address] [address] [phone] Ferritin   128 NG/ML  Normal     8-252      12

 

             Beta 2 Microglobulin 3.7 mg/L     High         0.6-2.4      13

 

                    Free Kappa & Lambda LT Chains 2021          [address] [place] [id number] [phone] Free [place] Light Chains Serum 6.4 mg/L   Normal     3.

3-19.4    

 

             Free Lambda Light Chains Serum 517.2 mg/L   High         5.7-26.3  

    

 

             Kappa/Lambda Ratio Serum 0.01         Low          0.26-1.65     







                          1                         THERAPUTIC HUMAN INR VALUES

INDICATIONS                      NORMAL RANGES

PROPHYLAXIS/TREATMENT OF:

VENOUS THROMBOSIS                2.0-3.0

PULMONARY EMBOLISM               2.0-3.0

PREVENTION OF SYSTEMIC EMBOLISM FROM:

TISSUE HEART VALVES              2.0-3.0

ACUTE MYOCARDIAL INFARCTION      2.0-3.0

VALVULAR HEART DISEASE           2.0-3.0

ATRIAL FIBRILLATION              2.0-3.0

MECHANICAL VALVES(HIGH RISK)     2.5-3.5

RECURRENT MYOCARDIAL INFARCTION  2.5-3.5



 

                          2                         Units are mL/min/1.73 m2



Chronic Kidney Disease Staging per NKF:



Stage I & II   GFR >=60       Normal to Mildly Decreased

Stage III      GFR 30-59      Moderately Decreased

Stage IV       GFR 15-29      Severely Decreased

Stage V        GFR <15        Very Little GFR Left

ESRD           GFR <15 on RRT



 

                          3                         100-125 mg/dL     PRE-DIABET

ES/FASTING

>126 mg/dL          DIABETES/FASTING



 

                          4                         NOTE:

CALCIUM,TSH VERIFIED







 

                          5                         CHRONIC KIDNEY DISEASE STAGI

NG PER NKF



STAGE I & II      GFR >= 60        NORMAL TO MILDLY DECREASED

STAGE III          GFR 30-59          MODERATELY DECREASED

STAGE IV           GFR 15-29         SEVERELY DECREASED

STAGE V            GFR <15            VERY LITTLE GFR LEFT

ESRD                 GFR <15            ON RRT



 

                          6                         note:<nlbl:demographic_chang

ed>



 

                          7                         100-125 mg/dL     PRE-DIABET

ES/FASTING

>126 mg/dL          DIABETES/FASTING



 

                          8                         CHRONIC KIDNEY DISEASE STAGI

NG PER NKF



STAGE I & II      GFR >= 60        NORMAL TO MILDLY DECREASED

STAGE III          GFR 30-59          MODERATELY DECREASED

STAGE IV           GFR 15-29         SEVERELY DECREASED

STAGE V            GFR <15            VERY LITTLE GFR LEFT

ESRD                 GFR <15            ON RRT



 

                          9                         Units are mL/min/1.73 m2



Chronic Kidney Disease Staging per NKF:



Stage I & II   GFR >=60       Normal to Mildly Decreased

Stage III      GFR 30-59      Moderately Decreased

Stage IV       GFR 15-29      Severely Decreased

Stage V        GFR <15        Very Little GFR Left

ESRD           GFR <15 on RRT



 

                          10                        MONOCLONAL IGA,LAMBDA



 

                          11                        REV'D BY CHARLES ROCHA



 

                          12                        note:<nlbl:demographic_chang

ed>



 

                          13                        [employer] Immulite 2000 Immuno

chemiluminometric assay (ICMA)

                                        .

Values obtained with different assay methods or kits cannot

be used interchangeably. Results cannot be interpreted as

absolute evidence of the presence or absence of malignant

disease.

Performed at:   - [facility] [address]  [id number]

: Araceli B Reyes MD, Phone:  [phone]

Performed at:   - [facility] [address]  [id number]

61

: Barak Archibald MD, Phone:  [phone]









Procedures





                Date            Code            Description     Status

 

                    2021          39763               Chronic Care MGMT 20

 Mins Clinical Staff Time Per Calendar 

Month                                   Completed

 

                2021      14839           Chronic Care Management Services

 Ea Addl 20 Min Completed

 

                2021      78036           Complex Chronic Care MGMT Servic

e Ea Addl 30 Min Completed

 

                2021      71602           Complex Chronic Care Management 

SVC 1[address] 60 Min Completed

 

                2021      74294           Complex Chronic Care MGMT Servic

e Ea Addl 30 Min Completed

 

                2021      98264           Complex Chronic Care Management 

SVC [address] 60 Min Completed

 

                2021      66349           Office/Outpatient Established Mo

d MDM 30-39 Min Completed

 

                2021      13998           Complex Chronic Care MGMT Servic

e Ea Addl 30 Min Completed

 

                2021      16013           Complex Chronic Care Management 

SVC [address] 60 Min Completed

 

                2021      51753           Complex Chronic Care MGMT Servic

e Ea Addl 30 Min Completed

 

                2021      64178           Complex Chronic Care Management 

SVC [address] 60 Min Completed

 

                2021      85873           Office/Outpatient Established Mo

d MDM 30-39 Min Completed

 

                2021      [id number]       Bone Mineral Density Test Comple

veornika

 

                2021      [id number]        Mammogram       Completed







[employer]





                                        Description

 

                                        No Information Available







Encounters





           Type       Date       Location   Provider   Dx         Diagnosis

 

                Office Visit    2021  1:30p [facility] Regalado M.D.                      I48.20                    Chronic atrial fibrillation,

 unspecified

 

                          Z79.01                    Long term (current) use of a

nticoagulants

 

                          R60.9                     Edema, unspecified

 

                          I10                       Essential (primary) hyperten

darci

 

                          E05.90                    Thyrotoxicosis, unsp without

 thyrotoxic crisis or storm

 

                          I82.502                   Chronic embolism and thombos

 unsp deep veins of l low extrem

 

                          F17.210                   Nicotine dependence, cigaret

svitlana, uncomplicated

 

                          F32.89                    Other specified depressive e

pisodes

 

                          D47.2                     Monoclonal gammopathy

 

                          K21.9                     Gastro-esophageal reflux dis

ease without esophagitis

 

                          M19.90                    Unspecified osteoarthritis, 

unspecified site

 

                          E78.00                    Pure hypercholesterolemia, u

nspecified

 

                Office Visit    2021  1:30p [facility] Neal Regalado M.D.                      I48.20                    Chronic atrial fibrillation,

 unspecified

 

                          I10                       Essential (primary) hyperten

darci

 

                          E05.90                    Thyrotoxicosis, unsp without

 thyrotoxic crisis or storm

 

                          I82.502                   Chronic embolism and thombos

 unsp deep veins of l low extrem

 

                          Z79.01                    Long term (current) use of a

nticoagulants

 

                          F17.210                   Nicotine dependence, cigaret

svitlana, uncomplicated

 

                          F32.89                    Other specified depressive e

pisodes

 

                          E83.52                    Hypercalcemia

 

                          D47.2                     Monoclonal gammopathy

 

                          K21.9                     Gastro-esophageal reflux dis

ease without esophagitis

 

                          M15.9                     Polyosteoarthritis, unspecif

ied

 

                          E78.00                    Pure hypercholesterolemia, u

nspecified







Assessments





                Date            Code            Description     Provider

 

                10/06/2021      S81.802A        Unspecified open wound, left low

er leg, initial encounter 

IAXA Hoffmann JR

 

                2021      I10             Essential (primary) hypertension

 Charmaine Regalado M.D.

 

                2021      K21.9           Gastro-esophageal reflux disease

 without esophagitis Charmaine Regalado M.D.

 

                2021      E78.00          Pure hypercholesterolemia, unspe

cified Charmaine Regalado M.D.

 

                2021      I10             Essential (primary) hypertension

 Charmaine Regalado M.D.

 

                2021      K21.9           Gastro-esophageal reflux disease

 without esophagitis Charmaine Regalado M.D.

 

                2021      E78.00          Pure hypercholesterolemia, unspe

cified Charmaine Regalado M.D.

 

                2021      M15.9           Polyosteoarthritis, unspecified 

Charmaine Regalado M.D.

 

                2021      I10             Essential (primary) hypertension

 Charmaine Regalado M.D.

 

                2021      K21.9           Gastro-esophageal reflux disease

 without esophagitis Charmaine Regalado M.D.

 

                    2021          I82.502             Chronic embolism and

 thrombosis of unspecified deep veins of 

left lower extremity                    Charmaine Regalado M.D.

 

                2021      I48.20          Chronic atrial fibrillation, uns

pecified Charmaine Regalado M.D.

 

                2021      Z79.01          Long term (current) use of antic

oagulants Charmaine Regalado M.D.

 

                2021      R60.9           Edema, unspecified Charmaine guido M.D.

 

                2021      I10             Essential (primary) hypertension

 Charmaine Regalado M.D.

 

                2021      E05.90          Thyrotoxicosis, unspecified with

out thyrotoxic crisis or sto 

Charmaine Regalado M.D.

 

                    2021          I82.502             Chronic embolism and

 thrombosis of unspecified deep veins of 

left lower extremity                    Charmaine Regalado M.D.

 

                2021      F17.210         Nicotine dependence, cigarettes,

 uncomplicated Charmaine Regalado M.D.

 

                2021      F32.89          Other specified depressive episo

emil Charmaine Regalado M.D.

 

                2021      D47.2           Monoclonal gammopathy Charmaine duron M.D.

 

                2021      K21.9           Gastro-esophageal reflux disease

 without esophagitis Charmaine Regalado M.D.

 

                2021      M19.90          Unspecified osteoarthritis, unsp

ecified site Charmaine Regalado M.D.

 

                2021      E78.00          Pure hypercholesterolemia, unspe

cified Charmaine Regalado M.D.

 

                2021      I10             Essential (primary) hypertension

 Charmaine Regalado M.D.

 

                2021      K21.9           Gastro-esophageal reflux disease

 without esophagitis Charmaine Regalado M.D.

 

                2021      E78.00          Pure hypercholesterolemia, unspe

cified Charmaine Regalado M.D.

 

                2021      I48.20          Chronic atrial fibrillation, uns

pecminoo Regalado M.D.

 

                2021      F17.210         Nicotine dependence, cigarettes,

 uncomplicated Charmaine Regalado M.D.

 

                2021      K21.9           Gastro-esophageal reflux disease

 without esophagitis Charmaine Regalado M.D.

 

                2021      E78.00          Pure hypercholesterolemia, unspe

cisonia Regalado M.D.

 

                2021      I48.20          Chronic atrial fibrillation, uns

el Regalado M.D.

 

                2021      I10             Essential (primary) hypertension

 Charmaine Regalado M.D.

 

                2021      E05.90          Thyrotoxicosis, unspecified with

out thyrotoxic crisis or sto 

Charmaine Regalado M.D.

 

                    2021          I82.502             Chronic embolism and

 thrombosis of unspecified deep veins of 

left lower extremity                    Charmaine Regalado M.D.

 

                2021      Z79.01          Long term (current) use of antic

oagulants Charmaine Regalado M.D.

 

                2021      F17.210         Nicotine dependence, cigarettes,

 uncomplicated Charmaine Regalado M.D.

 

                2021      F32.89          Other specified depressive episo

emil Charmaine Regalado M.D.

 

                2021      E83.52          Hypercalcemia   Charmaine xavier M.D.

 

                2021      D47.2           Monoclonal gammopathy Charmaine duron M.D.

 

                2021      K21.9           Gastro-esophageal reflux disease

 without esophagitis Charmaine Regalado M.D.

 

                2021      M15.9           Polyosteoarthritis, unspecified 

Charmaine Regalado M.D.

 

                2021      E78.00          Pure hypercholesterolemia, unspe

cified Charmaine Regalado M.D.







Plan of Treatment

Future Appointment(s):* 2021  3:15 pm - [facility]

10/06/2021 - AIXA Hoffmann JR* S81.802A Unspecified open wound, left 
  lower leg, initial encounter

* All * New Medication:* Cephalexin 500 mg - take one tablet by mouth 3 times 
  daily x 7 days









Functional Status





                                        Description

 

                                        No Information Available







Mental Status





                                        Description

 

                                        No Information Available







Referrals





                                        Description

 

                                        No Information Available

## 2021-12-02 NOTE — CCD
Summarization of Episode Note

                             Created on: 10/26/2021



Miss. JOSEPH BENTLEY

External Reference #: [id number]

: 1955

Sex: Female



Demographics





                          Address                   [address]

[place]  [address]

 

                          Home Phone                [phone]

 

                          Preferred Language        Unknown

 

                          Marital Status            Unknown

 

                          Holiness Affiliation     Unknown

 

                          Race                      White

 

                          Ethnic Group              Not  or 





Author





                          Author                    [facility]

ems

 

                          Organization              [facility]

ems

 

                          Address                   Unknown

 

                          Phone                     Unavailable







Support





                Name            Relationship    Address         Phone

 

                    JOSEPH BENTLEY      GUAR                [address]

[place]  [address]                    [phone]

 

                    Abbcess, Ketty      ECON                [address]

[place]  [address]                    [phone]







Care Team Providers





                    Care Team Member Name Role                Phone

 

                    Stillerman, James  Unavailable         [phone]







PROBLEMS





          Type      Condition ICD9-CM Code HKT05-OG Code Onset Dates Condition S

tatus W/U 

Status              Risk                SNOMED Code         Notes

 

                          Problem                   Non-pressure chronic ulcer o

f other part of left lower leg with fat 

layer exposed         L97.822         Active  confirmed         [id number]  

 

                          Problem                   Chronic venous hypertension 

(idiopathic) with ulcer of left lower 

extremity         I87.312         Active  confirmed         [id number]  







ALLERGIES





                    Allergen (clinical drug ingredient) Drug/Non Drug Allergy do

cumented on EMR 

Reaction            Allergy Type        Onset Date          Status

 

                      Motrin     Dyspnea    Drug Allergy            Active







ENCOUNTERS from 1955 to 2021-10-25





             Encounter    Location     Date         Provider     Diagnosis

 

                    [facility]     [phone] [place] [address] 21 Oct, 

2021                      James Stillerman          Chronic venous hypertension 

(idiopathic) with ulcer of 

left lower extremity I87.312 and Non-pressure chronic ulcer of other part of 
left lower leg with fat layer exposed L97.822







IMMUNIZATIONS

No Information



SOCIAL HISTORY

Tobacco Use:



                    Social History Observation Description         Date

 

                    Details (start date - stop date) Current Smoker       



Sex Assigned At Birth:



                          Social History Observation Description

 

                          Sex Assigned At Birth     Unknown



Education:



                    Question            Answer              Notes

 

                    Level of Education: Finished High School  



Jewish:



                    Question            Answer              Notes

 

                    Jewish                                Latter-day



Sexual Hx:



                    Question            Answer              Notes

 

                    Had sex in the last 12 months (vaginal, oral, or anal)? No  

                 

 

                    LMP:                                  

 

                    Have you ever had an STD? No                   



Tobacco Use:



                    Question            Answer              Notes

 

                    Are you a:          current smoker       

 

                    How many cigarettes a day do you smoke?                     

2-3 CIGARETTES/DAY







REASON FOR REFERRAL

No Information



VITAL SIGNS





                    Weight              240 lbs             21 Oct, 2021

 

                    Weight-kg           108.86 kg           21 Oct, 2021

 

                    Height              65 in               21 Oct, 2021

 

                    BMI                 39.93 kg/m2         21 Oct, 2021

 

                    Heart Rate          153 /min            21 Oct, 2021

 

                    Respiratory Rate    18 /min             21 Oct, 2021

 

                    Temperature         98.2 degrees Fahrenheit 21 Oct, 2021

 

                    Oximetry            99 ON ROOM AIR      21 Oct, 2021

 

                    Blood pressure systolic 142 mm Hg           21 Oct, 2021

 

                    Blood pressure diastolic 96 mm Hg            21 Oct, 2021







MEDICATIONS





           Medication SIG (Take, Route, Frequency, Duration) Notes      Start Da

te End Date   

Status

 

                    Metoprolol Tartrate 100 MG 1 tablet with food Orally Twice a

 day for 30 day(s)  

                                                            Active

 

           Cephalexin 500 MG 1 tablet Orally Four times a day for 5 day(s)      

                            Active

 

           Eliquis 5 MG as directed Orally                                  Acti

ve







PROCEDURES from 1955 to 2021-10-25





                Procedure       Date Ordered    Result          Body Site

 

                Medication: 4% Lidocaine topical cream (Anecream) 5gm 2021-10-21

      N/A              







RESULTS

No Results



REASON FOR VISIT

LLE Wound



MEDICAL (GENERAL) HISTORY





                    Type                Description         Date

 

                    Medical History     ATRIAL FIBRILLATION  

 

                    Medical History     DVT IN LEFT LEG      

 

                    Medical History     x3 PULMONARY EMBOLISM, MOST RECENT  

 

 

                    Medical History     HYPERTENSION         

 

                    Surgical History    GALLBLADDER REMOVAL  

 

                    Surgical History    STRIPPING LIGATION IN BILATERAL LEGS  

 

                    Surgical History    TONSILLECTOMY        

 

                    Surgical History    RIGHT SHOULDER SURGERY  

 

                    Hospitalization History HOSPITALIZATION AFTER SHOULDER SURGE

RY  







Goals Section

No Information



Health Concerns

No Information



MEDICAL EQUIPMENT

No Information



MENTAL STATUS

No Information



FUNCTIONAL STATUS

No Information



ASSESSMENTS





             Encounter Date Diagnosis    Assessment Notes Treatment Notes Treatm

ent Clinical 

Notes

 

                          21 Oct, 2021              Chronic venous hypertension 

(idiopathic) with ulcer of left lower 

extremity (ICD-10 - I87.312)                           



                                        





 

                          21 Oct, 2021              Non-pressure chronic ulcer o

f other part of left lower leg with fat

layer exposed (ICD-10 - L97.822)                           



                                        





 

                21 Oct, 2021    Other                           Chronic venous i

nsufficiency material was printed,Quiting 

smoking material was printed,Debridement of a wound, infection, or burn material
was printed                              







PLAN OF TREATMENT

Next Appt



                                        Details

 

                                        1 Week Reason:

 

                                        Provider Name:Roya Bonds, 2021-

10-28 09:45:00 AM, [facility] [address], 

[phone], [place], [address], [phone]







Insurance Providers





             Payer Name   Payer Address Payer Phone  Insured Name Patient Relati

onship to 

Insured                   Coverage Start Date       Coverage End Date

 

                MEDICARE Part A and B [address]  [facility] [id number]

[phone]    

JOSEPH BENTLEY self                                     

 

                 [employer] [address]  [facility] [id number] [phone]

    JOSEPH BENTLEY              self

## 2021-12-03 VITALS — SYSTOLIC BLOOD PRESSURE: 129 MMHG | DIASTOLIC BLOOD PRESSURE: 93 MMHG

## 2021-12-03 VITALS — DIASTOLIC BLOOD PRESSURE: 68 MMHG | SYSTOLIC BLOOD PRESSURE: 118 MMHG | OXYGEN SATURATION: 95 %

## 2021-12-03 VITALS — SYSTOLIC BLOOD PRESSURE: 130 MMHG | DIASTOLIC BLOOD PRESSURE: 92 MMHG

## 2021-12-03 LAB
APTT BLD: 38.6 SECONDS (ref 25.9–37)
BASOPHILS # BLD AUTO: 0 10^3/UL (ref 0–0.2)
BASOPHILS NFR BLD AUTO: 0.7 % (ref 0–1)
BUN SERPL-MCNC: 21 MG/DL (ref 7–18)
CALCIUM SERPL-MCNC: 10.6 MG/DL (ref 8.8–10.2)
CHLORIDE SERPL-SCNC: 109 MEQ/L (ref 98–107)
CO2 SERPL-SCNC: 23 MEQ/L (ref 21–32)
CREAT SERPL-MCNC: 0.8 MG/DL (ref 0.55–1.3)
EOSINOPHIL # BLD AUTO: 0.1 10^3/UL (ref 0–0.5)
EOSINOPHIL NFR BLD AUTO: 2.4 % (ref 0–3)
GFR SERPL CREATININE-BSD FRML MDRD: > 60 ML/MIN/{1.73_M2} (ref 45–?)
GLUCOSE SERPL-MCNC: 98 MG/DL (ref 70–100)
HCT VFR BLD AUTO: 39.1 % (ref 36–47)
HGB BLD-MCNC: 12.3 G/DL (ref 12–15.5)
INR PPP: 1.21
LYMPHOCYTES # BLD AUTO: 1 10^3/UL (ref 1.5–5)
LYMPHOCYTES NFR BLD AUTO: 24.4 % (ref 24–44)
MAGNESIUM SERPL-MCNC: 2.2 MG/DL (ref 1.8–2.4)
MCH RBC QN AUTO: 31.3 PG (ref 27–33)
MCHC RBC AUTO-ENTMCNC: 31.5 G/DL (ref 32–36.5)
MCV RBC AUTO: 99.5 FL (ref 80–96)
MONOCYTES # BLD AUTO: 0.4 10^3/UL (ref 0–0.8)
MONOCYTES NFR BLD AUTO: 9 % (ref 2–8)
NEUTROPHILS # BLD AUTO: 2.6 10^3/UL (ref 1.5–8.5)
NEUTROPHILS NFR BLD AUTO: 63.3 % (ref 36–66)
PLATELET # BLD AUTO: 192 10^3/UL (ref 150–450)
POTASSIUM SERPL-SCNC: 3.9 MEQ/L (ref 3.5–5.1)
PROTHROMBIN TIME: 15.7 SECONDS (ref 12.7–14.5)
RBC # BLD AUTO: 3.93 10^6/UL (ref 4–5.4)
SODIUM SERPL-SCNC: 142 MEQ/L (ref 136–145)
T4 FREE SERPL-MCNC: 1.51 NG/DL (ref 0.76–1.46)
TSH SERPL DL<=0.005 MIU/L-ACNC: 0.02 UIU/ML (ref 0.36–3.74)
WBC # BLD AUTO: 4.1 10^3/UL (ref 4–10)

## 2021-12-03 RX ADMIN — METOPROLOL TARTRATE SCH MG: 25 TABLET, FILM COATED ORAL at 20:16

## 2021-12-03 RX ADMIN — ASPIRIN SCH MG: 81 TABLET ORAL at 11:51

## 2021-12-03 RX ADMIN — MULTIPLE VITAMINS W/ MINERALS TAB SCH TAB: TAB at 11:53

## 2021-12-03 RX ADMIN — METOPROLOL TARTRATE SCH MG: 25 TABLET, FILM COATED ORAL at 11:52

## 2021-12-03 RX ADMIN — APIXABAN SCH MG: 5 TABLET, FILM COATED ORAL at 20:16

## 2021-12-03 RX ADMIN — FUROSEMIDE SCH MG: 10 INJECTION, SOLUTION INTRAMUSCULAR; INTRAVENOUS at 18:01

## 2021-12-03 RX ADMIN — APIXABAN SCH MG: 5 TABLET, FILM COATED ORAL at 11:52

## 2021-12-03 RX ADMIN — ATORVASTATIN CALCIUM SCH MG: 20 TABLET, FILM COATED ORAL at 20:16

## 2021-12-03 RX ADMIN — FUROSEMIDE SCH MG: 10 INJECTION, SOLUTION INTRAMUSCULAR; INTRAVENOUS at 11:54

## 2021-12-03 RX ADMIN — ACETAMINOPHEN PRN MG: 325 TABLET ORAL at 14:14

## 2021-12-03 RX ADMIN — BUPROPION HYDROCHLORIDE SCH MG: 150 TABLET, FILM COATED, EXTENDED RELEASE ORAL at 11:51

## 2021-12-03 RX ADMIN — FAMOTIDINE SCH MG: 20 TABLET, FILM COATED ORAL at 20:15

## 2021-12-03 RX ADMIN — Medication SCH DOSE: at 20:16

## 2021-12-03 NOTE — IPNPDOC
Subjective


Date Seen


The patient was seen on 12/3/21.





Subjective


Chief Complaint/HPI


Feels better, denies any SOB. reports that her legs are less swollen today. No 

palpitation





Objective


Physical Examination


General Exam:  Positive: Alert, Cooperative, No Acute Distress


Eye Exam:  Positive: PERRLA, Conjunctiva & lids normal, EOMI; 


   Negative: Sclera icteric


ENT Exam:  Positive: Atraumatic, Mucous membr. moist/pink, Pharynx Normal, Other

ENT (+ Goiter)


Neck Exam:  Positive: Supple; 


   Negative: JVD, thyromegaly


Chest Exam:  Positive: Clear to auscultation, Normal air movement


Heart Exam:  Positive: Rate Normal, Irregular Rhythm, Normal S1, Normal S2; 


   Negative: Murmurs, Rubs


Abdomen Exam:  Positive: Normal bowel sounds, Soft; 


   Negative: Tenderness


Extremity Exam:  Positive: Edema (1+ BLE), Normal pulses; 


   Negative: Clubbing, Cyanosis


Skin Exam:  Positive: Nl turgor and temperature, Other skin issue (Left leg 

chronic venous stasis ulcer. )


Neuro Exam:  Positive: Normal Speech, Strength at 5/5 X4 ext, Normal Tone


Psych Exam:  Positive: Memory Intact, Oriented x 3





Assessment /Plan


Assessment


66-year-old female with significant medical history of A. fib, hypertension, 

bilateral lower extremity venous stasis ulcerations, depression, and obesity 

presented with complaints of tachycardia from PCP office.  Patient reportedly w

as at PCP office and had heart rate in the 150s. HR rate upon arrival into the 

ED was in the 120s. EKG showed  A. fib with  RVR and cardiology was consulted.  

Patient was given atenolol and heart rate improved.  There was a consideration 

for whether or not she could be sent home however when she was ambulated in the 

ED she was noted to desat in the 80s.  Patient did have some peripheral edema 

and there is concern regarding new onset CHF.  CTA chest completed negative for 

PE, no pleural effusion or infiltrates. She was admitted for likely new onset 

CHF 





Exertional hypoxia 


presumed to be due to new onset CHF: BNP 4465


No h/o COPD but has been a life long smoker so may may developed COPD which may 

also be causing her exertional dyspnea. 


No known history of CHF; No echo in EMR. However cardiology had been consulted 

in ED and was able to review their records reportedly confirm no history of CHF.


Monitor fluid balance, I's and O's, urinary output


Fluid restriction 2L 


Lasix dose increased. 


No hypoxia at rest. 


Will continue with diuresis and again ambulate her prior to discharge to see if 

she remains hypoxic.





Chronic A. fib with  RVR on presentation


rate now controlled with metoprolol


continue eliquis. 





Mediastinal and Hilar lymphadenopathy


Patient notified regarding the size of lymph nodes and suggested continued 

monitoring with PCP


encouraged to quit smoking. 





Chronic bilateral venous stasis Left worse than right.


with lymphedema and venous stasis ulcer on the left. 


Ulcer is not infection.


Patient had venous stripping and ligation done for varicose veins in 1980s


wound consult appreciated. Will continue wound dressing as per wound care. 





HTN


metoprolol





Nodular Goiter: 


As noted on CT chest.  Patient denies history of goiter or thyroid disease.  


decreased TSH with marginally elevated T4


May have subclinical hyperthyroidism.


Follow up with PMD





Depression 


Patient remarks that she has had a depressive episode for the past month.  She 

reports that she is not taking anything for depression and she is hesitant to 

take mood related medications or opiates given patient's mother had a dependence

on such medications.  Patient reports that she has made some personal 

adjustments and this has helped improve her mood.  She denies suicidal ideation 

and considers that she is no longer presently depressed.


Monitor patient mood and encourage expression





Chronic Back pain: 


Symptom management/ supportive care. pt reports she typically takes Tylenol for 

this.  





Obesity


complicates care





Plan/VTE


VTE Prophylaxis Ordered?:  Yes





VS, I&O, 24H, Fishbone


Vital Signs/I&O





Vital Signs








  Date Time  Temp Pulse Resp B/P (MAP) Pulse Ox O2 Delivery O2 Flow Rate FiO2


 


12/3/21 12:15 97.4 80 18 129/93 (105) 97 Room Air  











Laboratory Data


24H LABS


Laboratory Tests 2


12/2/21 16:53: 


Immature Granulocyte % (Auto) 0.2, Neutrophils (%) (Auto) 61.9, Lymphocytes (%) 

(Auto) 27.8, Monocytes (%) (Auto) 7.9, Eosinophils (%) (Auto) 1.8, Basophils (%)

(Auto) 0.4, Neutrophils # (Auto) 3.1, Lymphocytes # (Auto) 1.4L, Monocytes # 

(Auto) 0.4, Eosinophils # (Auto) 0.1, Basophils # (Auto) 0.0, Nucleated Red 

Blood Cells % (auto) 0.0, Magnesium Level 2.3, NT-Pro-B-Type Natriuretic Peptide

4465H, Thyroid Stimulating Hormone (TSH) 0.023L, Free Thyroxine 1.51H


12/2/21 18:00: 


POC Glucose (Misc Panel) 98, POC Sodium (Misc Panel) 143, POC Potassium (Misc 

Panel) 4.2, POC Chloride (Misc Panel) 108, POC Total CO2 (Misc Panel) 24.0, POC 

Blood Urea Nitrogen (Misc Panel 25, POC Ionized Calcium (Misc Panel) 5.1, POC 

Creatinine (Misc Panel) 0.9, POC Hematocrit (Misc Panel) 41.0


12/2/21 18:14: POC Troponin I (Misc) 0.03


12/2/21 22:29: 


Coronavirus (COVID-19)(PCR) NEGATIVE, Influenza Type A (RT-PCR) NEGATIVE, 

Influenza Type B (RT-PCR) NEGATIVE, Respiratory Syncytial Virus (PCR) NEGATIVE


12/2/21 23:41: 


Prothrombin Time 15.7H, Prothromb Time International Ratio 1.21, Activated 

Partial Thromboplast Time 38.6H


12/3/21 07:13: 


Immature Granulocyte % (Auto) 0.2, Neutrophils (%) (Auto) 63.3, Lymphocytes (%) 

(Auto) 24.4, Monocytes (%) (Auto) 9.0H, Eosinophils (%) (Auto) 2.4, Basophils 

(%) (Auto) 0.7, Neutrophils # (Auto) 2.6, Lymphocytes # (Auto) 1.0L, Monocytes #

(Auto) 0.4, Eosinophils # (Auto) 0.1, Basophils # (Auto) 0.0, Nucleated Red B

lood Cells % (auto) 0.0, Anion Gap 10, Glomerular Filtration Rate > 60.0, 

Calcium Level 10.6H, Magnesium Level 2.2, Thyroid Peroxidase Antibodies < 28.0


CBC/BMP


Laboratory Tests


12/2/21 16:53








12/3/21 07:13

















Julianne Hutchinson MD                    Dec 3, 2021 14:27

## 2021-12-03 NOTE — ECGEPIP
[facility] - ED

                                       

                                       Test Date:    2021

Pat Name:     JOSEPH BENTLEY            Department:   

Patient ID:   [id number]                 Room:         -

Gender:       Female                   Technician:   RS

:          1955               Requested By: TA CH

Order Number: [id number]     Reading MD:   Michele Nava

                                 Measurements

Intervals                              Axis          

Rate:         92                       P:            

SD:                                    QRS:          101

QRSD:         146                      T:            -14

QT:           384                                    

QTc:          474                                    

                           Interpretive Statements

Atrial flutter with variable AV block

Right bundle branch block

NO PRIORS FOR COMPARISON

Electronically Signed on 12-3-2021 1:04:38 EST by Michele Nava

## 2021-12-04 VITALS — SYSTOLIC BLOOD PRESSURE: 132 MMHG | DIASTOLIC BLOOD PRESSURE: 96 MMHG

## 2021-12-04 VITALS — SYSTOLIC BLOOD PRESSURE: 150 MMHG | DIASTOLIC BLOOD PRESSURE: 79 MMHG

## 2021-12-04 VITALS — OXYGEN SATURATION: 95 %

## 2021-12-04 VITALS — OXYGEN SATURATION: 97 %

## 2021-12-04 LAB
BASOPHILS # BLD AUTO: 0 10^3/UL (ref 0–0.2)
BASOPHILS NFR BLD AUTO: 0.7 % (ref 0–1)
BUN SERPL-MCNC: 21 MG/DL (ref 7–18)
CALCIUM SERPL-MCNC: 10.5 MG/DL (ref 8.8–10.2)
CHLORIDE SERPL-SCNC: 107 MEQ/L (ref 98–107)
CO2 SERPL-SCNC: 26 MEQ/L (ref 21–32)
CREAT SERPL-MCNC: 0.76 MG/DL (ref 0.55–1.3)
EOSINOPHIL # BLD AUTO: 0.1 10^3/UL (ref 0–0.5)
EOSINOPHIL NFR BLD AUTO: 2.7 % (ref 0–3)
GFR SERPL CREATININE-BSD FRML MDRD: > 60 ML/MIN/{1.73_M2} (ref 45–?)
GLUCOSE SERPL-MCNC: 92 MG/DL (ref 70–100)
HCT VFR BLD AUTO: 38.4 % (ref 36–47)
HGB BLD-MCNC: 12.2 G/DL (ref 12–15.5)
LYMPHOCYTES # BLD AUTO: 1.1 10^3/UL (ref 1.5–5)
LYMPHOCYTES NFR BLD AUTO: 28.3 % (ref 24–44)
MCH RBC QN AUTO: 31.4 PG (ref 27–33)
MCHC RBC AUTO-ENTMCNC: 31.8 G/DL (ref 32–36.5)
MCV RBC AUTO: 98.7 FL (ref 80–96)
MONOCYTES # BLD AUTO: 0.5 10^3/UL (ref 0–0.8)
MONOCYTES NFR BLD AUTO: 11.9 % (ref 2–8)
NEUTROPHILS # BLD AUTO: 2.3 10^3/UL (ref 1.5–8.5)
NEUTROPHILS NFR BLD AUTO: 56.2 % (ref 36–66)
PLATELET # BLD AUTO: 175 10^3/UL (ref 150–450)
POTASSIUM SERPL-SCNC: 3.8 MEQ/L (ref 3.5–5.1)
RBC # BLD AUTO: 3.89 10^6/UL (ref 4–5.4)
SODIUM SERPL-SCNC: 141 MEQ/L (ref 136–145)
WBC # BLD AUTO: 4 10^3/UL (ref 4–10)

## 2021-12-04 RX ADMIN — APIXABAN SCH MG: 5 TABLET, FILM COATED ORAL at 20:07

## 2021-12-04 RX ADMIN — FAMOTIDINE SCH MG: 20 TABLET, FILM COATED ORAL at 20:08

## 2021-12-04 RX ADMIN — Medication SCH DOSE: at 20:08

## 2021-12-04 RX ADMIN — VALSARTAN SCH MG: 40 TABLET, FILM COATED ORAL at 21:00

## 2021-12-04 RX ADMIN — ACETAMINOPHEN PRN MG: 325 TABLET ORAL at 20:07

## 2021-12-04 RX ADMIN — FUROSEMIDE SCH MG: 10 INJECTION, SOLUTION INTRAMUSCULAR; INTRAVENOUS at 11:23

## 2021-12-04 RX ADMIN — ASPIRIN SCH MG: 81 TABLET ORAL at 11:14

## 2021-12-04 RX ADMIN — APIXABAN SCH MG: 5 TABLET, FILM COATED ORAL at 11:14

## 2021-12-04 RX ADMIN — BUPROPION HYDROCHLORIDE SCH MG: 150 TABLET, FILM COATED, EXTENDED RELEASE ORAL at 11:13

## 2021-12-04 RX ADMIN — ATORVASTATIN CALCIUM SCH MG: 20 TABLET, FILM COATED ORAL at 20:07

## 2021-12-04 RX ADMIN — MULTIPLE VITAMINS W/ MINERALS TAB SCH TAB: TAB at 11:13

## 2021-12-04 RX ADMIN — FUROSEMIDE SCH MG: 10 INJECTION, SOLUTION INTRAMUSCULAR; INTRAVENOUS at 18:02

## 2021-12-04 RX ADMIN — Medication SCH DOSE: at 11:23

## 2021-12-04 NOTE — ECHO
ECHOCARDIOGRAM



DATE OF PROCEDURE: 12/03/2021



Age: 66

Gender: 

Height: 168 cm

Weight: 100 kg



PATIENT LOCATION: Room 4230



REFERRING PHYSICIAN: Audra Castañeda NP



INDICATION: Edema.



2D MEASUREMENTS:

     IVS 1.1 cm

     LV 5.8 cm

     LVPW 1.0 cm

     LA 4.8 cm

     Aorta 3.3 cm



DOPPLER MEASUREMENTS: 

     Peak velocity across the aortic valve 1.2 m/s

     Peak velocity across the LVOT 0.79 m/s

     Mitral E 1.0

     Maximum tricuspid valve velocity 2.4 m/s



2D COMMENTS:

1. Mildly dilated left ventricular with normal left ventricular wall thickness

but a mildly depressed global left ventricular systolic function. The estimated

left ventricular systolic ejection fraction is 45%-50%.



2. Mildly dilated left atrium. Subjectively, the right atrium and right

ventricle appeared to be minimally enlarged. 



3. Normal aortic root.



4. No pericardial effusion seen.



5. Mildly calcified aortic valve with normal leaflet excursion. Mildly

calcified mitral annulus with normal anterior mitral valve leaflet motion.

Normal tricuspid valve and pulmonic valve. The proximal pulmonary artery

branches were not well visualized.



6. The inferior vena cava was normal in size. Central venous pressure is most

likely normal.



DOPPLER: It detects trace aortic regurgitation, moderate eccentric posteriorly

detected mitral regurgitation, mild tricuspid regurgitation, and trace pulmonic

regurgitation. The calculated pulmonary artery systolic pressure varies between

30-40 mmHg. Assessment of the left ventricular diastolic function was limited.



IMPRESSION: 

1. Mild global left ventricular systolic dysfunction with mild eccentric left

ventricular hypertrophy/dilated left ventricle. There was global hypokinesis. 



2. Assessment of the left ventricular diastolic function was not conclusive.



3. Aortic valve sclerosis with trace aortic regurgitation but no aortic

stenosis.



4. Mitral annulus calcification with moderate mitral regurgitation and a mildly

enlarged left atrium. 



5. Mild tricuspid regurgitation with mild pulmonary hypertension.

## 2021-12-04 NOTE — IPNPDOC
Subjective


Date Seen


The patient was seen on 12/4/21.





Subjective


Chief Complaint/HPI


Denies any SOB or cough or phlegm. Her heart rate is still not well controlled. 

Pulse rate going up to 140s when ambulating.





Objective


Physical Examination


General Exam:  Positive: Alert, Cooperative, No Acute Distress


Eye Exam:  Positive: PERRLA, Conjunctiva & lids normal, EOMI; 


   Negative: Sclera icteric


ENT Exam:  Positive: Atraumatic, Mucous membr. moist/pink, Pharynx Normal, Other

ENT (+ Goiter)


Neck Exam:  Positive: Supple; 


   Negative: JVD, thyromegaly


Chest Exam:  Positive: Rhonchi (at the right base), Diminished


Heart Exam:  Positive: Rate Normal, Irregular Rhythm, Normal S1, Normal S2; 


   Negative: Murmurs, Rubs


Abdomen Exam:  Positive: Normal bowel sounds, Soft; 


   Negative: Tenderness


Extremity Exam:  Positive: Edema (1+ BLE); 


   Negative: Clubbing, Cyanosis


Skin Exam:  Positive: Nl turgor and temperature, Other skin issue (Left leg 

chronic venous stasis ulcer. )


Neuro Exam:  Positive: Normal Speech, Strength at 5/5 X4 ext, Normal Tone


Psych Exam:  Positive: Memory Intact, Oriented x 3





Assessment /Plan


Assessment


66-year-old female with significant medical history of A. fib, hypertension, 

bilateral lower extremity venous stasis ulcerations, depression, and obesity 

presented with complaints of tachycardia from PCP office.  Patient reportedly 

was at PCP office and had heart rate in the 150s. HR rate upon arrival into the 

ED was in the 120s. EKG showed  A. fib with  RVR and cardiology was consulted.  

Patient was given atenolol and heart rate improved.  There was a consideration 

for whether or not she could be sent home however when she was ambulated in the 

ED she was noted to desat in the 80s.  Patient did have some peripheral edema 

and there is concern regarding new onset CHF.  CTA chest completed negative for 

PE, no pleural effusion or infiltrates. She was admitted for likely new onset 

CHF 





Exertional hypoxia 


presumed to be due to new onset CHF: BNP 4465


No h/o COPD but has been a life long smoker so may may developed COPD which may 

also be causing her exertional dyspnea. 


No echo in EMR. However cardiology had been consulted in ED and was able to 

review their records reportedly confirm no history of CHF.


Monitor fluid balance, I's and O's, urinary output


Fluid restriction 2L 


Lasix dose increased. 


No hypoxia at rest. 


Will continue with diuresis and again ambulate her prior to discharge to see if 

she remains hypoxic.





Chronic A. fib with RVR 


pulse rate going up to 140s to 160s during ambulation. 


metoprolol is changed to bisoprolol for better rate controlled


continue eliquis. 





Mediastinal and Hilar lymphadenopathy


Patient notified regarding the size of lymph nodes and suggested continued 

monitoring with PCP


encouraged to quit smoking. 





Chronic bilateral venous stasis Left worse than right.


with lymphedema and venous stasis ulcer on the left. 


Ulcer is not infection.


Patient had venous stripping and ligation done for varicose veins in 1980s


wound consult appreciated. Will continue wound dressing as per wound care. 





HTN


metoprolol





Nodular Goiter: 


As noted on CT chest.  Patient denies history of goiter or thyroid disease.  


decreased TSH with marginally elevated T4


May have subclinical hyperthyroidism.


Follow up with PMD





Depression 


Patient remarks that she has had a depressive episode for the past month.  She 

reports that she is not taking anything for depression and she is hesitant to 

take mood related medications or opiates given patient's mother had a dependence

on such medications.  Patient reports that she has made some personal 

adjustments and this has helped improve her mood.  She denies suicidal ideation 

and considers that she is no longer presently depressed.


Monitor patient mood and encourage expression





Chronic Back pain: 


Symptom management/ supportive care. pt reports she typically takes Tylenol for 

this.  





Obesity


complicates care





Plan/VTE


VTE Prophylaxis Ordered?:  Yes





VS, I&O, 24H, Fishbone


Vital Signs/I&O





Vital Signs








  Date Time  Temp Pulse Resp B/P (MAP) Pulse Ox O2 Delivery O2 Flow Rate FiO2


 


12/3/21 22:00     95 Room Air  


 


12/3/21 22:00 96.2 74 19 110/70 (83)    














I&O- Last 24 Hours up to 6 AM 


 


 12/4/21





 05:59


 


Intake Total 1020 ml


 


Output Total 0 ml


 


Balance 1020 ml











Laboratory Data


24H LABS


Laboratory Tests 2


12/4/21 05:57: 


Immature Granulocyte % (Auto) 0.2, Neutrophils (%) (Auto) 56.2, Lymphocytes (%) 

(Auto) 28.3, Monocytes (%) (Auto) 11.9H, Eosinophils (%) (Auto) 2.7, Basophils 

(%) (Auto) 0.7, Neutrophils # (Auto) 2.3, Lymphocytes # (Auto) 1.1L, Monocytes #

(Auto) 0.5, Eosinophils # (Auto) 0.1, Basophils # (Auto) 0.0, Nucleated Red 

Blood Cells % (auto) 0.0, Anion Gap 8, Glomerular Filtration Rate > 60.0, 

Calcium Level 10.5H


CBC/BMP


Laboratory Tests


12/4/21 05:57

















Julianne Hutchinson MD                    Dec 4, 2021 11:03

## 2021-12-05 VITALS — DIASTOLIC BLOOD PRESSURE: 86 MMHG | SYSTOLIC BLOOD PRESSURE: 116 MMHG

## 2021-12-05 VITALS — DIASTOLIC BLOOD PRESSURE: 99 MMHG | SYSTOLIC BLOOD PRESSURE: 136 MMHG

## 2021-12-05 VITALS — DIASTOLIC BLOOD PRESSURE: 73 MMHG | SYSTOLIC BLOOD PRESSURE: 125 MMHG

## 2021-12-05 VITALS — DIASTOLIC BLOOD PRESSURE: 76 MMHG | SYSTOLIC BLOOD PRESSURE: 125 MMHG

## 2021-12-05 LAB
BASOPHILS # BLD AUTO: 0 10^3/UL (ref 0–0.2)
BASOPHILS NFR BLD AUTO: 0.7 % (ref 0–1)
BUN SERPL-MCNC: 20 MG/DL (ref 7–18)
CALCIUM SERPL-MCNC: 10.4 MG/DL (ref 8.8–10.2)
CHLORIDE SERPL-SCNC: 107 MEQ/L (ref 98–107)
CO2 SERPL-SCNC: 24 MEQ/L (ref 21–32)
CREAT SERPL-MCNC: 0.82 MG/DL (ref 0.55–1.3)
EOSINOPHIL # BLD AUTO: 0.1 10^3/UL (ref 0–0.5)
EOSINOPHIL NFR BLD AUTO: 2 % (ref 0–3)
GFR SERPL CREATININE-BSD FRML MDRD: > 60 ML/MIN/{1.73_M2} (ref 45–?)
GLUCOSE SERPL-MCNC: 103 MG/DL (ref 70–100)
HCT VFR BLD AUTO: 39.3 % (ref 36–47)
HGB BLD-MCNC: 12.6 G/DL (ref 12–15.5)
LYMPHOCYTES # BLD AUTO: 1.1 10^3/UL (ref 1.5–5)
LYMPHOCYTES NFR BLD AUTO: 24.1 % (ref 24–44)
MCH RBC QN AUTO: 31.4 PG (ref 27–33)
MCHC RBC AUTO-ENTMCNC: 32.1 G/DL (ref 32–36.5)
MCV RBC AUTO: 98 FL (ref 80–96)
MONOCYTES # BLD AUTO: 0.4 10^3/UL (ref 0–0.8)
MONOCYTES NFR BLD AUTO: 9 % (ref 2–8)
NEUTROPHILS # BLD AUTO: 2.8 10^3/UL (ref 1.5–8.5)
NEUTROPHILS NFR BLD AUTO: 64 % (ref 36–66)
PLATELET # BLD AUTO: 196 10^3/UL (ref 150–450)
POTASSIUM SERPL-SCNC: 4 MEQ/L (ref 3.5–5.1)
RBC # BLD AUTO: 4.01 10^6/UL (ref 4–5.4)
SODIUM SERPL-SCNC: 140 MEQ/L (ref 136–145)
WBC # BLD AUTO: 4.4 10^3/UL (ref 4–10)

## 2021-12-05 RX ADMIN — VALSARTAN SCH MG: 40 TABLET, FILM COATED ORAL at 09:26

## 2021-12-05 RX ADMIN — VALSARTAN SCH MG: 40 TABLET, FILM COATED ORAL at 20:56

## 2021-12-05 RX ADMIN — DIGOXIN SCH MG: 250 TABLET ORAL at 12:24

## 2021-12-05 RX ADMIN — ASPIRIN SCH MG: 81 TABLET ORAL at 09:27

## 2021-12-05 RX ADMIN — Medication SCH DOSE: at 09:27

## 2021-12-05 RX ADMIN — Medication SCH DOSE: at 20:56

## 2021-12-05 RX ADMIN — ATORVASTATIN CALCIUM SCH MG: 20 TABLET, FILM COATED ORAL at 20:56

## 2021-12-05 RX ADMIN — MULTIPLE VITAMINS W/ MINERALS TAB SCH TAB: TAB at 09:26

## 2021-12-05 RX ADMIN — APIXABAN SCH MG: 5 TABLET, FILM COATED ORAL at 09:26

## 2021-12-05 RX ADMIN — FAMOTIDINE SCH MG: 20 TABLET, FILM COATED ORAL at 20:54

## 2021-12-05 RX ADMIN — BUPROPION HYDROCHLORIDE SCH MG: 150 TABLET, FILM COATED, EXTENDED RELEASE ORAL at 09:26

## 2021-12-05 RX ADMIN — FUROSEMIDE SCH MG: 10 INJECTION, SOLUTION INTRAMUSCULAR; INTRAVENOUS at 17:24

## 2021-12-05 RX ADMIN — APIXABAN SCH MG: 5 TABLET, FILM COATED ORAL at 20:56

## 2021-12-05 RX ADMIN — FUROSEMIDE SCH MG: 10 INJECTION, SOLUTION INTRAMUSCULAR; INTRAVENOUS at 09:27

## 2021-12-05 RX ADMIN — DIGOXIN SCH MG: 250 TABLET ORAL at 17:16

## 2021-12-05 NOTE — IPNPDOC
Subjective


Date Seen


The patient was seen on 12/5/21.





Subjective


Chief Complaint/HPI


Patient overall feels better.  She is eager to go home however understands that 

she cannot be discharged until her heart rate is better controlled.  She 

continues to have pulse rate in 130s to 160s on minimal ambulation to the 

bathroom.





Objective


Physical Examination


General Exam:  Positive: Alert, Cooperative, No Acute Distress


Eye Exam:  Positive: PERRLA, Conjunctiva & lids normal, EOMI; 


   Negative: Sclera icteric


ENT Exam:  Positive: Atraumatic, Mucous membr. moist/pink, Pharynx Normal, Other

ENT (+ Goiter)


Neck Exam:  Positive: Supple; 


   Negative: JVD, thyromegaly


Chest Exam:  Positive: Rhonchi (at the right base), Diminished


Heart Exam:  Positive: Rate Normal, Irregular Rhythm, Normal S1, Normal S2; 


   Negative: Murmurs, Rubs


Abdomen Exam:  Positive: Normal bowel sounds, Soft; 


   Negative: Tenderness


Extremity Exam:  Positive: Edema (1+ BLE); 


   Negative: Clubbing, Cyanosis


Skin Exam:  Positive: Nl turgor and temperature, Other skin issue (Left leg 

chronic venous stasis ulcer. )


Neuro Exam:  Positive: Normal Speech, Strength at 5/5 X4 ext, Normal Tone


Psych Exam:  Positive: Memory Intact, Oriented x 3





Assessment /Plan


Assessment


66-year-old female with significant medical history of A. fib, hypertension, 

bilateral lower extremity venous stasis ulcerations, depression, and obesity 

presented with complaints of tachycardia from PCP office.  Patient reportedly 

was at PCP office and had heart rate in the 150s. HR rate upon arrival into the 

ED was in the 120s. EKG showed  A. fib with  RVR and cardiology was consulted.  

Patient was given atenolol and heart rate improved.  There was a consideration 

for whether or not she could be sent home however when she was ambulated in the 

ED she was noted to desat in the 80s.  Patient did have some peripheral edema 

and there is concern regarding new onset CHF.  CTA chest completed negative for 

PE, no pleural effusion or infiltrates. She was admitted for likely new onset 

CHF 





Acute on chronic Systolic CHF


EF of 45% to 50% with moderate mitral regurgitation. 


Monitor fluid balance, I's and O's, urinary output


Fluid restriction 2L 


continue IV lasix. 


no hypoxia at rest or with ambulation at this time. 


Appreciate cardiology consultation. 


Started on valsartan





Chronic A. fib with RVR 


pulse rate going up to 140s to 160s during ambulation. 


Continue bisoprolol


Started on dig loading p.o.


continue eliquis. 





Mediastinal and Hilar lymphadenopathy


Patient notified regarding the size of lymph nodes and suggested continued 

monitoring with PCP


encouraged to quit smoking. 





Chronic bilateral venous stasis Left worse than right.


with lymphedema and venous stasis ulcer on the left. 


Ulcer is not infection.


Patient had venous stripping and ligation done for varicose veins in 1980s


wound consult appreciated. Will continue wound dressing as per wound care. 





HTN


Bisoprolol and now on valsartan





Nodular Goiter: 


As noted on CT chest.  Patient denies history of goiter or thyroid disease.  


decreased TSH with marginally elevated T4


May have subclinical hyperthyroidism.


Follow up with PMD





Depression 


Patient remarks that she has had a depressive episode for the past month.  She 

reports that she is not taking anything for depression and she is hesitant to 

take mood related medications or opiates given patient's mother had a dependence

on such medications.  Patient reports that she has made some personal 

adjustments and this has helped improve her mood.  She denies suicidal ideation 

and considers that she is no longer presently depressed.


Monitor patient mood and encourage expression





Chronic Back pain: 


Symptom management/ supportive care. pt reports she typically takes Tylenol for 

this.  





Obesity


complicates care





Plan/VTE


VTE Prophylaxis Ordered?:  Yes





VS, I&O, 24H, Gina


Vital Signs/I&O





Vital Signs








  Date Time  Temp Pulse Resp B/P (MAP) Pulse Ox O2 Delivery O2 Flow Rate FiO2


 


12/5/21 06:00 97.3 93 17 136/99 (111) 95 Room Air  














I&O- Last 24 Hours up to 6 AM 


 


 12/5/21





 06:00


 


Intake Total 2760 ml


 


Output Total 0 ml


 


Balance 2760 ml











Laboratory Data


24H LABS


Laboratory Tests 2


12/5/21 05:46: 


Immature Granulocyte % (Auto) 0.2, Neutrophils (%) (Auto) 64.0, Lymphocytes (%) 

(Auto) 24.1, Monocytes (%) (Auto) 9.0H, Eosinophils (%) (Auto) 2.0, Basophils 

(%) (Auto) 0.7, Neutrophils # (Auto) 2.8, Lymphocytes # (Auto) 1.1L, Monocytes #

(Auto) 0.4, Eosinophils # (Auto) 0.1, Basophils # (Auto) 0.0, Nucleated Red 

Blood Cells % (auto) 0.0, Anion Gap 9, Glomerular Filtration Rate > 60.0, 

Calcium Level 10.4H


CBC/BMP


Laboratory Tests


12/5/21 05:46

















Julianne Hutchinson MD                    Dec 5, 2021 07:08

## 2021-12-06 VITALS — SYSTOLIC BLOOD PRESSURE: 124 MMHG | DIASTOLIC BLOOD PRESSURE: 77 MMHG

## 2021-12-06 VITALS — SYSTOLIC BLOOD PRESSURE: 123 MMHG | DIASTOLIC BLOOD PRESSURE: 87 MMHG

## 2021-12-06 VITALS — DIASTOLIC BLOOD PRESSURE: 87 MMHG | SYSTOLIC BLOOD PRESSURE: 123 MMHG

## 2021-12-06 LAB
BASOPHILS # BLD AUTO: 0 10^3/UL (ref 0–0.2)
BASOPHILS NFR BLD AUTO: 0.5 % (ref 0–1)
BUN SERPL-MCNC: 25 MG/DL (ref 7–18)
CALCIUM SERPL-MCNC: 10.3 MG/DL (ref 8.8–10.2)
CHLORIDE SERPL-SCNC: 105 MEQ/L (ref 98–107)
CO2 SERPL-SCNC: 27 MEQ/L (ref 21–32)
CREAT SERPL-MCNC: 0.94 MG/DL (ref 0.55–1.3)
EOSINOPHIL # BLD AUTO: 0.1 10^3/UL (ref 0–0.5)
EOSINOPHIL NFR BLD AUTO: 2.8 % (ref 0–3)
GFR SERPL CREATININE-BSD FRML MDRD: > 60 ML/MIN/{1.73_M2} (ref 45–?)
GLUCOSE SERPL-MCNC: 96 MG/DL (ref 70–100)
HCT VFR BLD AUTO: 38.2 % (ref 36–47)
HGB BLD-MCNC: 12.1 G/DL (ref 12–15.5)
LYMPHOCYTES # BLD AUTO: 0.9 10^3/UL (ref 1.5–5)
LYMPHOCYTES NFR BLD AUTO: 23.4 % (ref 24–44)
MCH RBC QN AUTO: 31.3 PG (ref 27–33)
MCHC RBC AUTO-ENTMCNC: 31.7 G/DL (ref 32–36.5)
MCV RBC AUTO: 98.7 FL (ref 80–96)
MONOCYTES # BLD AUTO: 0.5 10^3/UL (ref 0–0.8)
MONOCYTES NFR BLD AUTO: 11.4 % (ref 2–8)
NEUTROPHILS # BLD AUTO: 2.4 10^3/UL (ref 1.5–8.5)
NEUTROPHILS NFR BLD AUTO: 61.6 % (ref 36–66)
PLATELET # BLD AUTO: 190 10^3/UL (ref 150–450)
POTASSIUM SERPL-SCNC: 4.2 MEQ/L (ref 3.5–5.1)
PTH-INTACT SERPL-MCNC: 34 PG/ML (ref 18.5–88)
RBC # BLD AUTO: 3.87 10^6/UL (ref 4–5.4)
SODIUM SERPL-SCNC: 139 MEQ/L (ref 136–145)
WBC # BLD AUTO: 3.9 10^3/UL (ref 4–10)

## 2021-12-06 RX ADMIN — MULTIPLE VITAMINS W/ MINERALS TAB SCH TAB: TAB at 11:30

## 2021-12-06 RX ADMIN — DIGOXIN SCH MG: 250 TABLET ORAL at 00:21

## 2021-12-06 RX ADMIN — DIGOXIN SCH MG: 250 TABLET ORAL at 05:51

## 2021-12-06 RX ADMIN — BUPROPION HYDROCHLORIDE SCH MG: 150 TABLET, FILM COATED, EXTENDED RELEASE ORAL at 11:30

## 2021-12-06 RX ADMIN — VALSARTAN SCH MG: 40 TABLET, FILM COATED ORAL at 11:30

## 2021-12-06 RX ADMIN — Medication SCH DOSE: at 11:33

## 2021-12-06 RX ADMIN — APIXABAN SCH MG: 5 TABLET, FILM COATED ORAL at 11:30

## 2021-12-06 RX ADMIN — FUROSEMIDE SCH MG: 10 INJECTION, SOLUTION INTRAMUSCULAR; INTRAVENOUS at 11:31

## 2021-12-06 RX ADMIN — ASPIRIN SCH MG: 81 TABLET ORAL at 11:31

## 2021-12-06 NOTE — DS.PDOC
Discharge Summary


General


Date of Admission


Dec 2, 2021 at 14:00


Date of Discharge


12/6/21





Discharge Summary


PROCEDURES PERFORMED DURING STAY: [None].





DISCHARGE DIAGNOSES:


Acute on chronic systolic CHF with EF of 45%


A. fib with RVR 


Mediastinal and hilar lymphadenopathy


Hypercalcemia


Very large Nodular goiter noted in CT, mildly abnormal thyroid function tests


Chronic venous stasis with venous stasis ulcer on the left lower leg


Hypertension


Obesity


Depression


Chronic back pain. 





COMPLICATIONS/CHIEF COMPLAINT: Hypoxia.





HOSPITAL COURSE: 66-year-old female with significant medical history of A. fib, 

hypertension, bilateral lower extremity venous stasis ulcerations, depression, 

and obesity presented with complaints of tachycardia from PCP office.  Patient 

reportedly was at PCP office and had heart rate in the 150s. HR rate upon 

arrival into the ED was in the 120s. EKG showed  A. fib with  RVR and cardiology

 was consulted.  Patient was given atenolol and heart rate improved.  There was 

a consideration for whether or not she could be sent home however when she was 

ambulated in the ED she was noted to desat in the 80s.  Patient did have some 

peripheral edema and there is concern regarding new onset CHF.  CTA chest 

completed negative for PE, no pleural effusion or infiltrates. She was admitted 

for likely new onset CHF 





Acute on chronic Systolic CHF


EF of 45% to 50% with moderate mitral regurgitation. 


Fluid restriction 2L 


continue IV lasix. 


no hypoxia at rest or with ambulation at this time. 


Started on valsartan





Chronic A. fib with RVR 


Loaded with digoxin and started on 0.125 mg of digoxin per day


Bisoprolol 5 mg twice daily


Rate now better controlled with pulse rate going up to only 110 on ambulation


continue eliquis. 


Follow-up with Dr. Maier





Mediastinal and Hilar lymphadenopathy


Patient notified regarding the size of lymph nodes and suggested continued 

monitoring with PCP


encouraged to quit smoking. 





Chronic bilateral venous stasis Left worse than right.


with lymphedema and venous stasis ulcer on the left. 


Ulcer is not infected


Patient had venous stripping and ligation done for varicose veins in 1980s





HTN


Bisoprolol and now on valsartan





Nodular Goiter: 


As noted on CT chest.  Patient denies history of goiter or thyroid disease.  


decreased TSH with marginally elevated T4


May have subclinical hyperthyroidism.


Follow up with PMD





Depression 


Patient remarks that she has had a depressive episode for the past month.  She 

reports that she is not taking anything for depression and she is hesitant to 

take mood related medications or opiates given patient's mother had a dependence

 on such medications.  Patient reports that she has made some personal adjustmen

ts and this has helped improve her mood.  She denies suicidal ideation and 

considers that she is no longer presently depressed.


Monitor patient mood and encourage expression





Chronic Back pain: 


Symptom management/ supportive care. pt reports she typically takes Tylenol for 

this.  





Obesity


complicates care





DISCHARGE MEDICATIONS: Please see below.


 


ALLERGIES: Please see below.





PHYSICAL EXAMINATION ON DISCHARGE:


VITAL SIGNS: Please see below.


General Exam:  Positive: Alert, Cooperative, No Acute Distress


Eye Exam:  Positive: PERRLA, Conjunctiva & lids normal, EOMI; 


   Negative: Sclera icteric


ENT Exam:  Positive: Atraumatic, Mucous membr. moist/pink, Pharynx Normal, Other

 ENT (+ Goiter)


Neck Exam:  Positive: Supple; 


   Negative: JVD, thyromegaly


Chest Exam:  Positive: Rhonchi (at the right base), Diminished


Heart Exam:  Positive: Rate Normal, Irregular Rhythm, Normal S1, Normal S2; 


   Negative: Murmurs, Rubs


Abdomen Exam:  Positive: Normal bowel sounds, Soft; 


   Negative: Tenderness


Extremity Exam:  Positive: Edema (1+ BLE); 


   Negative: Clubbing, Cyanosis


Skin Exam:  Positive: Nl turgor and temperature, Other skin issue (Left leg 

chronic venous stasis ulcer. )


Neuro Exam:  Positive: Normal Speech, Strength at 5/5 X4 ext, Normal Tone


Psych Exam:  Positive: Memory Intact, Oriented x 3





LABORATORY DATA: Please see below.





IMAGING: 


CT angio of Chest:





FINDINGS: 


Pulmonary arteries: There is opacification of the pulmonary arteries with no 


evidence of pulmonary embolus. 


Aorta: Unremarkable. No aortic aneurysm. No aortic dissection. 





Thyroid: There is severe diffuse enlargement of the thyroid probably a multi 


lead nodular goiter. 


Lungs: Discoid atelectasis is noted in the left suprahilar region. 


Pleural spaces: There is no evidence of pneumothorax or pleural effusion. 


Heart: There is moderate cardiomegaly. 


Lymph nodes: There are several small and moderate-sized lymph nodes in the 


mediastinum. Moderate-sized lymph nodes are noted at the right hilum. 





Bones/joints: There is prominent anterior osteophyte formation of the thoracic 


spine. 


Soft tissues: Unremarkable. 


IMPRESSION: 


1. No evidence of pulmonary embolus. 


2. Moderate size mediastinal lymph nodes and right hilar lymph nodes. 


3. Very large goiter





ACTIVITY: [As tolerated].





DIET: 2 g sodium with 2 L fluid restriction





DISCHARGE PLAN: Home





DISCHARGE INSTRUCTIONS:


Follow with PMD in 1 week


Follow-up with the Dr. Maier in 2-week


Needs dig level in 1 week


Follow-up PTH level





DISCHARGE CONDITION: [Stable].





TIME SPENT ON DISCHARGE: 40 minutes.





Vital Signs/I&Os





Vital Signs








  Date Time  Temp Pulse Resp B/P (MAP) Pulse Ox O2 Delivery O2 Flow Rate FiO2


 


12/6/21 11:30    123/87    


 


12/6/21 06:00 97.9 58 17  97 Room Air  














I&O- Last 24 Hours up to 6 AM 


 


 12/6/21





 06:00


 


Intake Total 2670 ml


 


Output Total 0 ml


 


Balance 2670 ml











Laboratory Data


Labs 24H


Laboratory Tests 2


12/6/21 06:57: 


Immature Granulocyte % (Auto) 0.3, Neutrophils (%) (Auto) 61.6, Lymphocytes (%) 

(Auto) 23.4L, Monocytes (%) (Auto) 11.4H, Eosinophils (%) (Auto) 2.8, Basophils 

(%) (Auto) 0.5, Neutrophils # (Auto) 2.4, Lymphocytes # (Auto) 0.9L, Monocytes #

(Auto) 0.5, Eosinophils # (Auto) 0.1, Basophils # (Auto) 0.0, Nucleated Red 

Blood Cells % (auto) 0.0, Anion Gap 7L, Glomerular Filtration Rate > 60.0, 

Calcium Level 10.3H


CBC/BMP


Laboratory Tests


12/6/21 06:57











Discharge Medications


Scheduled


Apixaban (Eliquis) 5 Mg Tablet, 5 MG PO BID, (Reported)


Aspirin (Aspirin EC) 81 Mg Tablet.dr, 81 MG PO DAILY, (Reported)


Atorvastatin Calcium (Atorvastatin Calcium) 20 Mg Tablet, 20 MG PO QHS, 

(Reported)


Bisoprolol Fumarate (Bisoprolol Fumarate) 5 Mg Tablet, 5 MG PO BID


Bupropion Hcl (Bupropion Xl) 150 Mg Tab.er.24h, 150 MG PO DAILY, (Reported)


Digoxin (Digoxin) 125 Mcg Tablet, 1 TAB PO DAILY


Famotidine (Famotidine) 20 Mg Tablet, 20 MG PO QHS, (Reported)


Furosemide (Lasix) 40 Mg Tablet, 1 TAB PO DAILY


Multivitamins (Thera M Plus Tablet) 1 Each Tablet, 1 TAB PO DAILY, (Reported)


Valsartan (Diovan) 40 Mg Tablet, 40 MG PO BID





Scheduled PRN


Diclofenac Sodium (Diclofenac Sodium) 1% 100GM Gel..gram., 4 GM TOP TID PRN for 

MILD PAIN (PS 1-4), (Reported)


   APPLY TO AFFECTED KNEE(S) 





Allergies


Coded Allergies:  


     ibuprofen (Verified  Allergy, Unknown, 12/2/21)











Julianne Hutchinson MD                    Dec 6, 2021 13:43

## 2021-12-08 LAB
IODINE SERPL-MCNC: 34.5 UG/L (ref 40–92)
TSH RECEP AB SER-ACNC: <1.1 IU/L (ref 0–1.75)

## 2021-12-27 ENCOUNTER — HOSPITAL ENCOUNTER (EMERGENCY)
Dept: HOSPITAL 53 - M ED | Age: 66
LOS: 1 days | Discharge: HOME | End: 2021-12-28
Payer: MEDICARE

## 2021-12-27 VITALS — BODY MASS INDEX: 35.11 KG/M2 | WEIGHT: 218.46 LBS | HEIGHT: 66 IN

## 2021-12-27 DIAGNOSIS — Z86.711: ICD-10-CM

## 2021-12-27 DIAGNOSIS — I45.10: ICD-10-CM

## 2021-12-27 DIAGNOSIS — R07.89: Primary | ICD-10-CM

## 2021-12-27 DIAGNOSIS — I10: ICD-10-CM

## 2021-12-27 DIAGNOSIS — F17.200: ICD-10-CM

## 2021-12-27 DIAGNOSIS — I44.7: ICD-10-CM

## 2021-12-27 DIAGNOSIS — I51.7: ICD-10-CM

## 2021-12-27 DIAGNOSIS — Z82.49: ICD-10-CM

## 2021-12-27 DIAGNOSIS — Z79.82: ICD-10-CM

## 2021-12-27 DIAGNOSIS — Z79.01: ICD-10-CM

## 2021-12-27 DIAGNOSIS — Z86.718: ICD-10-CM

## 2021-12-27 DIAGNOSIS — I45.2: ICD-10-CM

## 2021-12-27 DIAGNOSIS — Z79.899: ICD-10-CM

## 2021-12-27 DIAGNOSIS — I48.91: ICD-10-CM

## 2021-12-27 DIAGNOSIS — Z88.6: ICD-10-CM

## 2021-12-27 LAB
APTT BLD: 37.2 SECONDS (ref 25.9–37)
BASOPHILS # BLD AUTO: 0 10^3/UL (ref 0–0.2)
BASOPHILS NFR BLD AUTO: 0.3 % (ref 0–1)
BUN SERPL-MCNC: 24 MG/DL (ref 7–18)
CALCIUM SERPL-MCNC: 11.1 MG/DL (ref 8.8–10.2)
CHLORIDE SERPL-SCNC: 107 MEQ/L (ref 98–107)
CK MB CFR.DF SERPL CALC: 3.12
CK MB CFR.DF SERPL CALC: 3.33
CK MB CFR.DF SERPL CALC: 3.93
CK MB SERPL-MCNC: 1.1 NG/ML (ref ?–3.6)
CK MB SERPL-MCNC: < 1 NG/ML (ref ?–3.6)
CK MB SERPL-MCNC: < 1 NG/ML (ref ?–3.6)
CK SERPL-CCNC: 28 U/L (ref 26–192)
CK SERPL-CCNC: 30 U/L (ref 26–192)
CK SERPL-CCNC: 32 U/L (ref 26–192)
CO2 SERPL-SCNC: 27 MEQ/L (ref 21–32)
CREAT SERPL-MCNC: 1.04 MG/DL (ref 0.55–1.3)
EOSINOPHIL # BLD AUTO: 0.1 10^3/UL (ref 0–0.5)
EOSINOPHIL NFR BLD AUTO: 1.2 % (ref 0–3)
GFR SERPL CREATININE-BSD FRML MDRD: 56.4 ML/MIN/{1.73_M2} (ref 45–?)
GLUCOSE SERPL-MCNC: 113 MG/DL (ref 70–100)
HCT VFR BLD AUTO: 37.1 % (ref 36–47)
HGB BLD-MCNC: 11.6 G/DL (ref 12–15.5)
INR PPP: 1.28
LYMPHOCYTES # BLD AUTO: 1.3 10^3/UL (ref 1.5–5)
LYMPHOCYTES NFR BLD AUTO: 21.8 % (ref 24–44)
MCH RBC QN AUTO: 31.5 PG (ref 27–33)
MCHC RBC AUTO-ENTMCNC: 31.3 G/DL (ref 32–36.5)
MCV RBC AUTO: 100.8 FL (ref 80–96)
MONOCYTES # BLD AUTO: 0.4 10^3/UL (ref 0–0.8)
MONOCYTES NFR BLD AUTO: 7.1 % (ref 2–8)
NEUTROPHILS # BLD AUTO: 4.2 10^3/UL (ref 1.5–8.5)
NEUTROPHILS NFR BLD AUTO: 69.3 % (ref 36–66)
PLATELET # BLD AUTO: 198 10^3/UL (ref 150–450)
POTASSIUM SERPL-SCNC: 4.6 MEQ/L (ref 3.5–5.1)
PROTHROMBIN TIME: 16.4 SECONDS (ref 12.7–14.5)
RBC # BLD AUTO: 3.68 10^6/UL (ref 4–5.4)
SODIUM SERPL-SCNC: 141 MEQ/L (ref 136–145)
WBC # BLD AUTO: 6.1 10^3/UL (ref 4–10)

## 2021-12-27 PROCEDURE — 82550 ASSAY OF CK (CPK): CPT

## 2021-12-27 PROCEDURE — 83880 ASSAY OF NATRIURETIC PEPTIDE: CPT

## 2021-12-27 PROCEDURE — 93005 ELECTROCARDIOGRAM TRACING: CPT

## 2021-12-27 PROCEDURE — 93041 RHYTHM ECG TRACING: CPT

## 2021-12-27 PROCEDURE — 94760 N-INVAS EAR/PLS OXIMETRY 1: CPT

## 2021-12-27 PROCEDURE — 85025 COMPLETE CBC W/AUTO DIFF WBC: CPT

## 2021-12-27 PROCEDURE — 82553 CREATINE MB FRACTION: CPT

## 2021-12-27 PROCEDURE — 80048 BASIC METABOLIC PNL TOTAL CA: CPT

## 2021-12-27 PROCEDURE — 85610 PROTHROMBIN TIME: CPT

## 2021-12-27 PROCEDURE — 87798 DETECT AGENT NOS DNA AMP: CPT

## 2021-12-27 PROCEDURE — 71045 X-RAY EXAM CHEST 1 VIEW: CPT

## 2021-12-27 PROCEDURE — 85730 THROMBOPLASTIN TIME PARTIAL: CPT

## 2021-12-27 PROCEDURE — 99285 EMERGENCY DEPT VISIT HI MDM: CPT

## 2021-12-27 PROCEDURE — 84484 ASSAY OF TROPONIN QUANT: CPT

## 2021-12-27 PROCEDURE — 71275 CT ANGIOGRAPHY CHEST: CPT

## 2021-12-27 PROCEDURE — 93970 EXTREMITY STUDY: CPT

## 2021-12-28 VITALS — DIASTOLIC BLOOD PRESSURE: 95 MMHG | SYSTOLIC BLOOD PRESSURE: 125 MMHG

## 2022-02-02 ENCOUNTER — HOSPITAL ENCOUNTER (OUTPATIENT)
Dept: HOSPITAL 53 - M RAD | Age: 67
End: 2022-02-02
Attending: SURGERY
Payer: MEDICARE

## 2022-02-02 DIAGNOSIS — I70.248: Primary | ICD-10-CM

## 2022-02-02 DIAGNOSIS — L97.822: ICD-10-CM

## 2022-02-08 ENCOUNTER — HOSPITAL ENCOUNTER (INPATIENT)
Dept: HOSPITAL 53 - M LAB | Age: 67
LOS: 62 days | Discharge: NURSING, REHAB OR LONG-TERM CARE | DRG: 308 | End: 2022-04-11
Attending: INTERNAL MEDICINE | Admitting: FAMILY MEDICINE
Payer: MEDICARE

## 2022-02-08 VITALS — HEIGHT: 63 IN | WEIGHT: 185.63 LBS | BODY MASS INDEX: 32.89 KG/M2

## 2022-02-08 DIAGNOSIS — I48.91: Primary | ICD-10-CM

## 2022-02-08 DIAGNOSIS — I63.9: ICD-10-CM

## 2022-02-08 DIAGNOSIS — I50.33: ICD-10-CM

## 2022-02-08 DIAGNOSIS — E87.2: ICD-10-CM

## 2022-02-08 DIAGNOSIS — Z79.01: ICD-10-CM

## 2022-02-08 DIAGNOSIS — D61.818: ICD-10-CM

## 2022-02-08 DIAGNOSIS — I11.0: ICD-10-CM

## 2022-02-08 DIAGNOSIS — G30.9: ICD-10-CM

## 2022-02-08 DIAGNOSIS — N39.0: ICD-10-CM

## 2022-02-08 DIAGNOSIS — Z88.8: ICD-10-CM

## 2022-02-08 DIAGNOSIS — I87.312: ICD-10-CM

## 2022-02-08 DIAGNOSIS — R74.01: ICD-10-CM

## 2022-02-08 DIAGNOSIS — F32.A: ICD-10-CM

## 2022-02-08 DIAGNOSIS — D47.2: ICD-10-CM

## 2022-02-08 DIAGNOSIS — I45.4: ICD-10-CM

## 2022-02-08 DIAGNOSIS — K21.9: ICD-10-CM

## 2022-02-08 DIAGNOSIS — Z79.899: ICD-10-CM

## 2022-02-08 DIAGNOSIS — Z88.6: ICD-10-CM

## 2022-02-08 DIAGNOSIS — Z86.718: ICD-10-CM

## 2022-02-08 DIAGNOSIS — Z86.711: ICD-10-CM

## 2022-02-08 DIAGNOSIS — Z87.891: ICD-10-CM

## 2022-02-08 DIAGNOSIS — E05.90: ICD-10-CM

## 2022-02-08 DIAGNOSIS — I25.10: ICD-10-CM

## 2022-02-08 DIAGNOSIS — G93.40: ICD-10-CM

## 2022-02-08 DIAGNOSIS — I73.9: ICD-10-CM

## 2022-02-08 DIAGNOSIS — F01.50: ICD-10-CM

## 2022-02-08 DIAGNOSIS — E66.9: ICD-10-CM

## 2022-02-08 DIAGNOSIS — E83.52: ICD-10-CM

## 2022-02-08 DIAGNOSIS — Z20.822: ICD-10-CM

## 2022-02-08 DIAGNOSIS — Z91.14: ICD-10-CM

## 2022-02-08 LAB
ALBUMIN SERPL BCG-MCNC: 2.8 GM/DL (ref 3.2–5.2)
ALT SERPL W P-5'-P-CCNC: 17 U/L (ref 12–78)
APTT BLD: 38.1 SECONDS (ref 25.9–37)
BASOPHILS # BLD AUTO: 0 10^3/UL (ref 0–0.2)
BASOPHILS NFR BLD AUTO: 0.5 % (ref 0–1)
BILIRUB CONJ SERPL-MCNC: 0.7 MG/DL (ref 0–0.2)
BILIRUB SERPL-MCNC: 1.4 MG/DL (ref 0.2–1)
BUN SERPL-MCNC: 19 MG/DL (ref 7–18)
CALCIUM SERPL-MCNC: 10.4 MG/DL (ref 8.8–10.2)
CHLORIDE SERPL-SCNC: 109 MEQ/L (ref 98–107)
CK MB CFR.DF SERPL CALC: 3.02
CK MB CFR.DF SERPL CALC: 3.12
CK MB CFR.DF SERPL CALC: 3.68
CK MB SERPL-MCNC: 1.3 NG/ML (ref ?–3.6)
CK MB SERPL-MCNC: 1.4 NG/ML (ref ?–3.6)
CK MB SERPL-MCNC: < 1 NG/ML (ref ?–3.6)
CK SERPL-CCNC: 32 U/L (ref 26–192)
CK SERPL-CCNC: 38 U/L (ref 26–192)
CK SERPL-CCNC: 43 U/L (ref 26–192)
CO2 SERPL-SCNC: 19 MEQ/L (ref 21–32)
CREAT SERPL-MCNC: 0.95 MG/DL (ref 0.55–1.3)
CRP SERPL-MCNC: 1.7 MG/DL (ref 0–0.3)
DIGOXIN SERPL-MCNC: 0.5 NG/ML (ref 0.5–2)
EOSINOPHIL # BLD AUTO: 0 10^3/UL (ref 0–0.5)
EOSINOPHIL NFR BLD AUTO: 0.8 % (ref 0–3)
ERYTHROCYTE [SEDIMENTATION RATE] IN BLOOD BY WESTERGREN METHOD: 70 MM/HR (ref 0–30)
GFR SERPL CREATININE-BSD FRML MDRD: > 60 ML/MIN/{1.73_M2} (ref 45–?)
GLUCOSE SERPL-MCNC: 91 MG/DL (ref 70–100)
HCT VFR BLD AUTO: 39.9 % (ref 36–47)
HGB BLD-MCNC: 12.5 G/DL (ref 12–15.5)
INR PPP: 1.33
LYMPHOCYTES # BLD AUTO: 0.9 10^3/UL (ref 1.5–5)
LYMPHOCYTES NFR BLD AUTO: 24.4 % (ref 24–44)
MCH RBC QN AUTO: 31.3 PG (ref 27–33)
MCHC RBC AUTO-ENTMCNC: 31.3 G/DL (ref 32–36.5)
MCV RBC AUTO: 100 FL (ref 80–96)
MONOCYTES # BLD AUTO: 0.3 10^3/UL (ref 0–0.8)
MONOCYTES NFR BLD AUTO: 8.5 % (ref 2–8)
NEUTROPHILS # BLD AUTO: 2.5 10^3/UL (ref 1.5–8.5)
NEUTROPHILS NFR BLD AUTO: 65.5 % (ref 36–66)
NT-PRO BNP: (no result) PG/ML (ref ?–125)
PLATELET # BLD AUTO: 107 10^3/UL (ref 150–450)
POTASSIUM SERPL-SCNC: 4 MEQ/L (ref 3.5–5.1)
PROT SERPL-MCNC: 7.4 GM/DL (ref 6.4–8.2)
PROTHROMBIN TIME: 16.9 SECONDS (ref 12.7–14.5)
RBC # BLD AUTO: 3.99 10^6/UL (ref 4–5.4)
RSV RNA NPH QL NAA+PROBE: NEGATIVE
SODIUM SERPL-SCNC: 139 MEQ/L (ref 136–145)
T4 FREE SERPL-MCNC: 2.04 NG/DL (ref 0.76–1.46)
TSH SERPL DL<=0.005 MIU/L-ACNC: 0.12 UIU/ML (ref 0.36–3.74)
WBC # BLD AUTO: 3.8 10^3/UL (ref 4–10)

## 2022-02-08 RX ADMIN — METOPROLOL TARTRATE SCH MG: 5 INJECTION INTRAVENOUS at 16:00

## 2022-02-08 RX ADMIN — APIXABAN SCH MG: 5 TABLET, FILM COATED ORAL at 22:10

## 2022-02-08 RX ADMIN — METOPROLOL TARTRATE SCH MG: 5 INJECTION INTRAVENOUS at 16:28

## 2022-02-08 RX ADMIN — DIGOXIN SCH MG: 125 TABLET ORAL at 19:15

## 2022-02-08 RX ADMIN — METOPROLOL TARTRATE SCH MG: 5 INJECTION INTRAVENOUS at 17:30

## 2022-02-08 RX ADMIN — VALSARTAN SCH MG: 80 TABLET ORAL at 23:23

## 2022-02-08 RX ADMIN — ATORVASTATIN CALCIUM SCH MG: 20 TABLET, FILM COATED ORAL at 22:11

## 2022-02-08 RX ADMIN — FUROSEMIDE SCH MG: 10 INJECTION, SOLUTION INTRAMUSCULAR; INTRAVENOUS at 22:12

## 2022-02-08 RX ADMIN — FAMOTIDINE SCH MG: 20 TABLET, FILM COATED ORAL at 22:11

## 2022-02-09 VITALS — SYSTOLIC BLOOD PRESSURE: 134 MMHG | DIASTOLIC BLOOD PRESSURE: 66 MMHG

## 2022-02-09 VITALS — DIASTOLIC BLOOD PRESSURE: 63 MMHG | SYSTOLIC BLOOD PRESSURE: 131 MMHG

## 2022-02-09 VITALS — DIASTOLIC BLOOD PRESSURE: 80 MMHG | SYSTOLIC BLOOD PRESSURE: 126 MMHG

## 2022-02-09 VITALS — DIASTOLIC BLOOD PRESSURE: 85 MMHG | SYSTOLIC BLOOD PRESSURE: 125 MMHG

## 2022-02-09 VITALS — DIASTOLIC BLOOD PRESSURE: 84 MMHG | SYSTOLIC BLOOD PRESSURE: 120 MMHG

## 2022-02-09 LAB
25(OH)D3 SERPL-MCNC: 31.8 NG/ML (ref 30–100)
APPEARANCE UR: CLEAR
BACTERIA UR QL AUTO: (no result)
BASOPHILS # BLD AUTO: 0 10^3/UL (ref 0–0.2)
BASOPHILS NFR BLD AUTO: 0.9 % (ref 0–1)
BILIRUB UR QL STRIP.AUTO: NEGATIVE
BUN SERPL-MCNC: 22 MG/DL (ref 7–18)
CALCIUM SERPL-MCNC: 9.8 MG/DL (ref 8.8–10.2)
CHLORIDE SERPL-SCNC: 112 MEQ/L (ref 98–107)
CHOLEST SERPL-MCNC: 68 MG/DL (ref ?–200)
CHOLEST/HDLC SERPL: 2.62 {RATIO} (ref ?–5)
CK MB CFR.DF SERPL CALC: 2.13
CK MB CFR.DF SERPL CALC: 3.7
CK MB CFR.DF SERPL CALC: 5.26
CK MB SERPL-MCNC: 1.7 NG/ML (ref ?–3.6)
CK MB SERPL-MCNC: 2 NG/ML (ref ?–3.6)
CK MB SERPL-MCNC: < 1 NG/ML (ref ?–3.6)
CK SERPL-CCNC: 38 U/L (ref 26–192)
CK SERPL-CCNC: 46 U/L (ref 26–192)
CK SERPL-CCNC: 47 U/L (ref 26–192)
CO2 SERPL-SCNC: 22 MEQ/L (ref 21–32)
CREAT SERPL-MCNC: 1.12 MG/DL (ref 0.55–1.3)
EOSINOPHIL # BLD AUTO: 0 10^3/UL (ref 0–0.5)
EOSINOPHIL NFR BLD AUTO: 0.9 % (ref 0–3)
GFR SERPL CREATININE-BSD FRML MDRD: 51.8 ML/MIN/{1.73_M2} (ref 45–?)
GLUCOSE SERPL-MCNC: 103 MG/DL (ref 70–100)
GLUCOSE UR QL STRIP.AUTO: NEGATIVE MG/DL
HCT VFR BLD AUTO: 39.2 % (ref 36–47)
HDLC SERPL-MCNC: 26 MG/DL (ref 40–?)
HGB BLD-MCNC: 11.8 G/DL (ref 12–15.5)
HGB UR QL STRIP.AUTO: NEGATIVE
KETONES UR QL STRIP.AUTO: NEGATIVE MG/DL
LDLC SERPL CALC-MCNC: 19 MG/DL (ref ?–100)
LEUKOCYTE ESTERASE UR QL STRIP.AUTO: NEGATIVE
LYMPHOCYTES # BLD AUTO: 0.9 10^3/UL (ref 1.5–5)
LYMPHOCYTES NFR BLD AUTO: 26.3 % (ref 24–44)
MAGNESIUM SERPL-MCNC: 1.6 MG/DL (ref 1.7–2.2)
MAGNESIUM SERPL-MCNC: 1.6 MG/DL (ref 1.7–2.2)
MCH RBC QN AUTO: 30.2 PG (ref 27–33)
MCHC RBC AUTO-ENTMCNC: 30.1 G/DL (ref 32–36.5)
MCV RBC AUTO: 100.3 FL (ref 80–96)
MONOCYTES # BLD AUTO: 0.4 10^3/UL (ref 0–0.8)
MONOCYTES NFR BLD AUTO: 10.9 % (ref 2–8)
MUCOUS THREADS URNS QL MICRO: (no result)
NEUTROPHILS # BLD AUTO: 2.1 10^3/UL (ref 1.5–8.5)
NEUTROPHILS NFR BLD AUTO: 60.7 % (ref 36–66)
NITRITE UR QL STRIP.AUTO: NEGATIVE
NONHDLC SERPL-MCNC: 42 MG/DL
PH UR STRIP.AUTO: 5 UNITS (ref 5–9)
PLATELET # BLD AUTO: 101 10^3/UL (ref 150–450)
POTASSIUM SERPL-SCNC: 4 MEQ/L (ref 3.5–5.1)
PROT SERPL-MCNC: 7.6 GM/DL (ref 6.4–8.2)
PROT UR QL STRIP.AUTO: NEGATIVE MG/DL
PTH-INTACT SERPL-MCNC: 55.9 PG/ML (ref 18.5–88)
RBC # BLD AUTO: 3.91 10^6/UL (ref 4–5.4)
RBC # UR AUTO: 0 /HPF (ref 0–3)
SODIUM SERPL-SCNC: 142 MEQ/L (ref 136–145)
SP GR UR STRIP.AUTO: 1.01 (ref 1–1.03)
SQUAMOUS #/AREA URNS AUTO: 1 /HPF (ref 0–6)
T4 FREE SERPL-MCNC: 1.88 NG/DL (ref 0.76–1.46)
TRIGL SERPL-MCNC: 113 MG/DL (ref ?–150)
TSH SERPL DL<=0.005 MIU/L-ACNC: 0.08 UIU/ML (ref 0.36–3.74)
UROBILINOGEN UR QL STRIP.AUTO: 0.2 MG/DL (ref 0–2)
WBC # BLD AUTO: 3.5 10^3/UL (ref 4–10)
WBC #/AREA URNS AUTO: 1 /HPF (ref 0–3)

## 2022-02-09 RX ADMIN — FAMOTIDINE SCH MG: 20 TABLET, FILM COATED ORAL at 20:49

## 2022-02-09 RX ADMIN — APIXABAN SCH MG: 5 TABLET, FILM COATED ORAL at 20:49

## 2022-02-09 RX ADMIN — NYSTATIN SCH DOSE: 100000 POWDER TOPICAL at 18:36

## 2022-02-09 RX ADMIN — FUROSEMIDE SCH MG: 10 INJECTION, SOLUTION INTRAMUSCULAR; INTRAVENOUS at 10:25

## 2022-02-09 RX ADMIN — FLECAINIDE ACETATE SCH MG: 50 TABLET ORAL at 15:08

## 2022-02-09 RX ADMIN — MULTIPLE VITAMINS W/ MINERALS TAB SCH TAB: TAB at 10:22

## 2022-02-09 RX ADMIN — ATORVASTATIN CALCIUM SCH MG: 20 TABLET, FILM COATED ORAL at 20:48

## 2022-02-09 RX ADMIN — DIGOXIN SCH MG: 125 TABLET ORAL at 10:24

## 2022-02-09 RX ADMIN — BUPROPION HYDROCHLORIDE SCH MG: 150 TABLET, FILM COATED, EXTENDED RELEASE ORAL at 10:23

## 2022-02-09 RX ADMIN — VALSARTAN SCH MG: 80 TABLET ORAL at 20:49

## 2022-02-09 RX ADMIN — MAGNESIUM SULFATE IN DEXTROSE SCH MLS/HR: 10 INJECTION, SOLUTION INTRAVENOUS at 17:01

## 2022-02-09 RX ADMIN — APIXABAN SCH MG: 5 TABLET, FILM COATED ORAL at 10:24

## 2022-02-09 RX ADMIN — NYSTATIN SCH DOSE: 100000 POWDER TOPICAL at 16:00

## 2022-02-09 RX ADMIN — FLECAINIDE ACETATE SCH MG: 50 TABLET ORAL at 20:49

## 2022-02-09 RX ADMIN — FAMOTIDINE SCH MG: 20 TABLET, FILM COATED ORAL at 10:23

## 2022-02-09 RX ADMIN — NYSTATIN SCH DOSE: 100000 POWDER TOPICAL at 20:51

## 2022-02-09 RX ADMIN — MAGNESIUM SULFATE IN DEXTROSE SCH MLS/HR: 10 INJECTION, SOLUTION INTRAVENOUS at 17:56

## 2022-02-10 VITALS — SYSTOLIC BLOOD PRESSURE: 130 MMHG | DIASTOLIC BLOOD PRESSURE: 72 MMHG

## 2022-02-10 VITALS — SYSTOLIC BLOOD PRESSURE: 117 MMHG | DIASTOLIC BLOOD PRESSURE: 74 MMHG

## 2022-02-10 VITALS — DIASTOLIC BLOOD PRESSURE: 76 MMHG | SYSTOLIC BLOOD PRESSURE: 130 MMHG

## 2022-02-10 VITALS — SYSTOLIC BLOOD PRESSURE: 141 MMHG | DIASTOLIC BLOOD PRESSURE: 70 MMHG

## 2022-02-10 VITALS — SYSTOLIC BLOOD PRESSURE: 108 MMHG | DIASTOLIC BLOOD PRESSURE: 75 MMHG

## 2022-02-10 VITALS — DIASTOLIC BLOOD PRESSURE: 69 MMHG | SYSTOLIC BLOOD PRESSURE: 132 MMHG

## 2022-02-10 VITALS — SYSTOLIC BLOOD PRESSURE: 127 MMHG | DIASTOLIC BLOOD PRESSURE: 61 MMHG

## 2022-02-10 VITALS — SYSTOLIC BLOOD PRESSURE: 110 MMHG | DIASTOLIC BLOOD PRESSURE: 56 MMHG

## 2022-02-10 LAB
ALBUMIN MFR UR ELPH: 41.2 % (ref 55.8–66.1)
ALBUMIN SERPL-MCNC: 3.13 GM/DL (ref 3.29–5.55)
ALPHA1 GLOB MFR SERPL ELPH: 4.7 % (ref 2.9–4.9)
ALPHA1 GLOB SERPL ELPH-MCNC: 0.36 GM/DL (ref 0.17–0.41)
ALPHA2 GLOB SERPL ELPH-MCNC: 0.64 GM/DL (ref 0.42–0.99)
ALPHA2 GLOB SERPL ELPH-MCNC: 8.4 % (ref 7.1–11.8)
B-GLOBULIN SERPL ELPH-MCNC: 0.41 GM/DL (ref 0.28–0.6)
BASOPHILS # BLD AUTO: 0 10^3/UL (ref 0–0.2)
BASOPHILS NFR BLD AUTO: 0.4 % (ref 0–1)
BETA1 GLOB MFR SERPL ELPH: 5.4 % (ref 4.7–7.2)
BETA2 GLOB MFR SERPL ELPH: 35 % (ref 3.2–6.5)
BETA2 GLOB SERPL ELPH-MCNC: 2.66 GM/DL (ref 0.19–0.55)
BUN SERPL-MCNC: 23 MG/DL (ref 7–18)
CALCIUM SERPL-MCNC: 9.8 MG/DL (ref 8.8–10.2)
CHLORIDE SERPL-SCNC: 112 MEQ/L (ref 98–107)
CO2 SERPL-SCNC: 21 MEQ/L (ref 21–32)
CREAT SERPL-MCNC: 1.16 MG/DL (ref 0.55–1.3)
EOSINOPHIL # BLD AUTO: 0 10^3/UL (ref 0–0.5)
EOSINOPHIL NFR BLD AUTO: 0.6 % (ref 0–3)
GAMMA GLOB 24H MFR UR ELPH: 5.3 % (ref 11.1–18.8)
GAMMA GLOB SERPL ELPH-MCNC: 0.4 GM/DL (ref 0.65–1.58)
GFR SERPL CREATININE-BSD FRML MDRD: 49.8 ML/MIN/{1.73_M2} (ref 45–?)
GLUCOSE SERPL-MCNC: 109 MG/DL (ref 70–100)
HCT VFR BLD AUTO: 37.7 % (ref 36–47)
HGB BLD-MCNC: 11.5 G/DL (ref 12–15.5)
LYMPHOCYTES # BLD AUTO: 0.8 10^3/UL (ref 1.5–5)
LYMPHOCYTES NFR BLD AUTO: 15.9 % (ref 24–44)
MAGNESIUM SERPL-MCNC: 1.9 MG/DL (ref 1.7–2.2)
MCH RBC QN AUTO: 30.7 PG (ref 27–33)
MCHC RBC AUTO-ENTMCNC: 30.5 G/DL (ref 32–36.5)
MCV RBC AUTO: 100.5 FL (ref 80–96)
MONOCYTES # BLD AUTO: 0.5 10^3/UL (ref 0–0.8)
MONOCYTES NFR BLD AUTO: 11.3 % (ref 2–8)
NEUTROPHILS # BLD AUTO: 3.4 10^3/UL (ref 1.5–8.5)
NEUTROPHILS NFR BLD AUTO: 71.4 % (ref 36–66)
PLATELET # BLD AUTO: 106 10^3/UL (ref 150–450)
POTASSIUM SERPL-SCNC: 3.9 MEQ/L (ref 3.5–5.1)
PROT PATTERN SERPL ELPH-IMP: (no result)
RBC # BLD AUTO: 3.75 10^6/UL (ref 4–5.4)
SODIUM SERPL-SCNC: 142 MEQ/L (ref 136–145)
WBC # BLD AUTO: 4.7 10^3/UL (ref 4–10)

## 2022-02-10 RX ADMIN — MULTIPLE VITAMINS W/ MINERALS TAB SCH TAB: TAB at 09:12

## 2022-02-10 RX ADMIN — NYSTATIN SCH DOSE: 100000 POWDER TOPICAL at 15:10

## 2022-02-10 RX ADMIN — ATORVASTATIN CALCIUM SCH MG: 20 TABLET, FILM COATED ORAL at 20:48

## 2022-02-10 RX ADMIN — NYSTATIN SCH DOSE: 100000 POWDER TOPICAL at 09:13

## 2022-02-10 RX ADMIN — FAMOTIDINE SCH MG: 20 TABLET, FILM COATED ORAL at 09:12

## 2022-02-10 RX ADMIN — FLECAINIDE ACETATE SCH MG: 50 TABLET ORAL at 20:48

## 2022-02-10 RX ADMIN — FAMOTIDINE SCH MG: 20 TABLET, FILM COATED ORAL at 20:48

## 2022-02-10 RX ADMIN — APIXABAN SCH MG: 5 TABLET, FILM COATED ORAL at 20:48

## 2022-02-10 RX ADMIN — VALSARTAN SCH MG: 80 TABLET ORAL at 20:47

## 2022-02-10 RX ADMIN — DIGOXIN SCH MG: 125 TABLET ORAL at 09:15

## 2022-02-10 RX ADMIN — BUPROPION HYDROCHLORIDE SCH MG: 150 TABLET, FILM COATED, EXTENDED RELEASE ORAL at 09:12

## 2022-02-10 RX ADMIN — ACETAMINOPHEN PRN MG: 325 TABLET ORAL at 15:19

## 2022-02-10 RX ADMIN — APIXABAN SCH MG: 5 TABLET, FILM COATED ORAL at 09:12

## 2022-02-10 RX ADMIN — FLECAINIDE ACETATE SCH MG: 50 TABLET ORAL at 09:12

## 2022-02-10 RX ADMIN — NYSTATIN SCH DOSE: 100000 POWDER TOPICAL at 20:44

## 2022-02-11 VITALS — DIASTOLIC BLOOD PRESSURE: 63 MMHG | SYSTOLIC BLOOD PRESSURE: 107 MMHG

## 2022-02-11 VITALS — DIASTOLIC BLOOD PRESSURE: 63 MMHG | SYSTOLIC BLOOD PRESSURE: 108 MMHG

## 2022-02-11 VITALS — DIASTOLIC BLOOD PRESSURE: 58 MMHG | SYSTOLIC BLOOD PRESSURE: 113 MMHG

## 2022-02-11 VITALS — DIASTOLIC BLOOD PRESSURE: 62 MMHG | SYSTOLIC BLOOD PRESSURE: 111 MMHG

## 2022-02-11 VITALS — SYSTOLIC BLOOD PRESSURE: 127 MMHG | DIASTOLIC BLOOD PRESSURE: 73 MMHG

## 2022-02-11 LAB
BASOPHILS # BLD AUTO: 0 10^3/UL (ref 0–0.2)
BASOPHILS NFR BLD AUTO: 0.2 % (ref 0–1)
BUN SERPL-MCNC: 30 MG/DL (ref 7–18)
CALCIUM SERPL-MCNC: 9.6 MG/DL (ref 8.8–10.2)
CHLORIDE SERPL-SCNC: 112 MEQ/L (ref 98–107)
CO2 SERPL-SCNC: 22 MEQ/L (ref 21–32)
CREAT SERPL-MCNC: 1.38 MG/DL (ref 0.55–1.3)
EOSINOPHIL # BLD AUTO: 0 10^3/UL (ref 0–0.5)
EOSINOPHIL NFR BLD AUTO: 0.9 % (ref 0–3)
GFR SERPL CREATININE-BSD FRML MDRD: 40.7 ML/MIN/{1.73_M2} (ref 45–?)
GLUCOSE SERPL-MCNC: 112 MG/DL (ref 70–100)
HCT VFR BLD AUTO: 37.4 % (ref 36–47)
HGB BLD-MCNC: 11.3 G/DL (ref 12–15.5)
LYMPHOCYTES # BLD AUTO: 0.7 10^3/UL (ref 1.5–5)
LYMPHOCYTES NFR BLD AUTO: 15.2 % (ref 24–44)
MAGNESIUM SERPL-MCNC: 2.1 MG/DL (ref 1.8–2.4)
MCH RBC QN AUTO: 31 PG (ref 27–33)
MCHC RBC AUTO-ENTMCNC: 30.2 G/DL (ref 32–36.5)
MCV RBC AUTO: 102.5 FL (ref 80–96)
MONOCYTES # BLD AUTO: 0.3 10^3/UL (ref 0–0.8)
MONOCYTES NFR BLD AUTO: 6.7 % (ref 2–8)
NEUTROPHILS # BLD AUTO: 3.6 10^3/UL (ref 1.5–8.5)
NEUTROPHILS NFR BLD AUTO: 76.8 % (ref 36–66)
PLATELET # BLD AUTO: 106 10^3/UL (ref 150–450)
POTASSIUM SERPL-SCNC: 4.3 MEQ/L (ref 3.5–5.1)
RBC # BLD AUTO: 3.65 10^6/UL (ref 4–5.4)
SODIUM SERPL-SCNC: 144 MEQ/L (ref 136–145)
WBC # BLD AUTO: 4.6 10^3/UL (ref 4–10)

## 2022-02-11 RX ADMIN — FLECAINIDE ACETATE SCH MG: 50 TABLET ORAL at 08:29

## 2022-02-11 RX ADMIN — BUPROPION HYDROCHLORIDE SCH MG: 150 TABLET, FILM COATED, EXTENDED RELEASE ORAL at 08:29

## 2022-02-11 RX ADMIN — SODIUM CHLORIDE SCH MLS/HR: 9 INJECTION, SOLUTION INTRAVENOUS at 20:25

## 2022-02-11 RX ADMIN — APIXABAN SCH MG: 5 TABLET, FILM COATED ORAL at 20:25

## 2022-02-11 RX ADMIN — NYSTATIN SCH DOSE: 100000 POWDER TOPICAL at 08:29

## 2022-02-11 RX ADMIN — SODIUM CHLORIDE SCH MLS/HR: 9 INJECTION, SOLUTION INTRAVENOUS at 09:02

## 2022-02-11 RX ADMIN — FAMOTIDINE SCH MG: 20 TABLET, FILM COATED ORAL at 20:25

## 2022-02-11 RX ADMIN — FAMOTIDINE SCH MG: 20 TABLET, FILM COATED ORAL at 08:28

## 2022-02-11 RX ADMIN — NYSTATIN SCH DOSE: 100000 POWDER TOPICAL at 15:04

## 2022-02-11 RX ADMIN — FLECAINIDE ACETATE SCH MG: 50 TABLET ORAL at 20:24

## 2022-02-11 RX ADMIN — ATORVASTATIN CALCIUM SCH MG: 20 TABLET, FILM COATED ORAL at 20:24

## 2022-02-11 RX ADMIN — NYSTATIN SCH DOSE: 100000 POWDER TOPICAL at 20:24

## 2022-02-11 RX ADMIN — DIGOXIN SCH MG: 125 TABLET ORAL at 08:29

## 2022-02-11 RX ADMIN — MULTIPLE VITAMINS W/ MINERALS TAB SCH TAB: TAB at 08:22

## 2022-02-11 RX ADMIN — APIXABAN SCH MG: 5 TABLET, FILM COATED ORAL at 08:28

## 2022-02-12 VITALS — DIASTOLIC BLOOD PRESSURE: 81 MMHG | SYSTOLIC BLOOD PRESSURE: 132 MMHG

## 2022-02-12 VITALS — DIASTOLIC BLOOD PRESSURE: 66 MMHG | SYSTOLIC BLOOD PRESSURE: 119 MMHG

## 2022-02-12 VITALS — DIASTOLIC BLOOD PRESSURE: 86 MMHG | SYSTOLIC BLOOD PRESSURE: 133 MMHG

## 2022-02-12 VITALS — SYSTOLIC BLOOD PRESSURE: 125 MMHG | DIASTOLIC BLOOD PRESSURE: 66 MMHG

## 2022-02-12 LAB
BASOPHILS # BLD AUTO: 0 10^3/UL (ref 0–0.2)
BASOPHILS NFR BLD AUTO: 0.5 % (ref 0–1)
BUN SERPL-MCNC: 33 MG/DL (ref 7–18)
CALCIUM SERPL-MCNC: 9.7 MG/DL (ref 8.8–10.2)
CHLORIDE SERPL-SCNC: 112 MEQ/L (ref 98–107)
CO2 SERPL-SCNC: 17 MEQ/L (ref 21–32)
CREAT SERPL-MCNC: 1.19 MG/DL (ref 0.55–1.3)
EOSINOPHIL # BLD AUTO: 0 10^3/UL (ref 0–0.5)
EOSINOPHIL NFR BLD AUTO: 0.9 % (ref 0–3)
GFR SERPL CREATININE-BSD FRML MDRD: 48.3 ML/MIN/{1.73_M2} (ref 45–?)
GLUCOSE SERPL-MCNC: 113 MG/DL (ref 70–100)
HCT VFR BLD AUTO: 38.4 % (ref 36–47)
HCT VFR BLD AUTO: 39.1 % (ref 36–47)
HGB BLD-MCNC: 11.9 G/DL (ref 12–15.5)
HGB BLD-MCNC: 11.9 G/DL (ref 12–15.5)
LYMPHOCYTES # BLD AUTO: 0.8 10^3/UL (ref 1.5–5)
LYMPHOCYTES NFR BLD AUTO: 19.4 % (ref 24–44)
MCH RBC QN AUTO: 30.7 PG (ref 27–33)
MCHC RBC AUTO-ENTMCNC: 30.4 G/DL (ref 32–36.5)
MCV RBC AUTO: 101 FL (ref 80–96)
MONOCYTES # BLD AUTO: 0.3 10^3/UL (ref 0–0.8)
MONOCYTES NFR BLD AUTO: 7.6 % (ref 2–8)
NEUTROPHILS # BLD AUTO: 3 10^3/UL (ref 1.5–8.5)
NEUTROPHILS NFR BLD AUTO: 71.1 % (ref 36–66)
PLATELET # BLD AUTO: 105 10^3/UL (ref 150–450)
POTASSIUM SERPL-SCNC: 4.3 MEQ/L (ref 3.5–5.1)
RBC # BLD AUTO: 3.87 10^6/UL (ref 4–5.4)
SODIUM SERPL-SCNC: 141 MEQ/L (ref 136–145)
WBC # BLD AUTO: 4.2 10^3/UL (ref 4–10)

## 2022-02-12 RX ADMIN — FLECAINIDE ACETATE SCH MG: 50 TABLET ORAL at 20:01

## 2022-02-12 RX ADMIN — ATORVASTATIN CALCIUM SCH MG: 20 TABLET, FILM COATED ORAL at 20:01

## 2022-02-12 RX ADMIN — NYSTATIN SCH DOSE: 100000 POWDER TOPICAL at 09:05

## 2022-02-12 RX ADMIN — CAPSAICIN SCH DOSE: 0.25 CREAM TOPICAL at 20:00

## 2022-02-12 RX ADMIN — MULTIPLE VITAMINS W/ MINERALS TAB SCH TAB: TAB at 09:03

## 2022-02-12 RX ADMIN — FLECAINIDE ACETATE SCH MG: 50 TABLET ORAL at 09:04

## 2022-02-12 RX ADMIN — NYSTATIN SCH DOSE: 100000 POWDER TOPICAL at 16:39

## 2022-02-12 RX ADMIN — APIXABAN SCH MG: 5 TABLET, FILM COATED ORAL at 09:04

## 2022-02-12 RX ADMIN — CAPSAICIN SCH DOSE: 0.25 CREAM TOPICAL at 16:00

## 2022-02-12 RX ADMIN — FAMOTIDINE SCH MG: 20 TABLET, FILM COATED ORAL at 09:03

## 2022-02-12 RX ADMIN — BUPROPION HYDROCHLORIDE SCH MG: 150 TABLET, FILM COATED, EXTENDED RELEASE ORAL at 09:04

## 2022-02-12 RX ADMIN — APIXABAN SCH MG: 5 TABLET, FILM COATED ORAL at 20:01

## 2022-02-12 RX ADMIN — DIGOXIN SCH MG: 125 TABLET ORAL at 09:04

## 2022-02-12 RX ADMIN — NYSTATIN SCH DOSE: 100000 POWDER TOPICAL at 20:01

## 2022-02-12 RX ADMIN — FAMOTIDINE SCH MG: 20 TABLET, FILM COATED ORAL at 20:01

## 2022-02-13 VITALS — SYSTOLIC BLOOD PRESSURE: 138 MMHG | DIASTOLIC BLOOD PRESSURE: 65 MMHG

## 2022-02-13 VITALS — SYSTOLIC BLOOD PRESSURE: 143 MMHG | DIASTOLIC BLOOD PRESSURE: 74 MMHG

## 2022-02-13 VITALS — DIASTOLIC BLOOD PRESSURE: 70 MMHG | SYSTOLIC BLOOD PRESSURE: 134 MMHG

## 2022-02-13 VITALS — SYSTOLIC BLOOD PRESSURE: 135 MMHG | DIASTOLIC BLOOD PRESSURE: 75 MMHG

## 2022-02-13 VITALS — SYSTOLIC BLOOD PRESSURE: 135 MMHG | DIASTOLIC BLOOD PRESSURE: 71 MMHG

## 2022-02-13 LAB
APPEARANCE UR: (no result)
BACTERIA UR QL AUTO: (no result)
BASE EXCESS BLDV CALC-SCNC: -6.9 MMOL/L (ref -2–2)
BASOPHILS # BLD AUTO: 0 10^3/UL (ref 0–0.2)
BASOPHILS NFR BLD AUTO: 0.5 % (ref 0–1)
BILIRUB UR QL STRIP.AUTO: NEGATIVE
BUN SERPL-MCNC: 31 MG/DL (ref 7–18)
CALCIUM SERPL-MCNC: 9.6 MG/DL (ref 8.8–10.2)
CHLORIDE SERPL-SCNC: 115 MEQ/L (ref 98–107)
CHLORIDE UR-SCNC: 102 MEQ/L
CO2 BLDV CALC-SCNC: 21.2 MEQ/L (ref 24–28)
CO2 SERPL-SCNC: 18 MEQ/L (ref 21–32)
CREAT SERPL-MCNC: 0.92 MG/DL (ref 0.55–1.3)
CREAT UR-MCNC: 120 MG/DL
EOSINOPHIL # BLD AUTO: 0 10^3/UL (ref 0–0.5)
EOSINOPHIL NFR BLD AUTO: 0.7 % (ref 0–3)
GFR SERPL CREATININE-BSD FRML MDRD: > 60 ML/MIN/{1.73_M2} (ref 45–?)
GLUCOSE SERPL-MCNC: 90 MG/DL (ref 70–100)
GLUCOSE UR QL STRIP.AUTO: NEGATIVE MG/DL
HCO3 BLDV-SCNC: 19.8 MEQ/L (ref 23–27)
HCO3 STD BLDV-SCNC: 18.1 MEQ/L
HCT VFR BLD AUTO: 35.4 % (ref 36–47)
HGB BLD-MCNC: 10.8 G/DL (ref 12–15.5)
HGB UR QL STRIP.AUTO: NEGATIVE
KETONES UR QL STRIP.AUTO: NEGATIVE MG/DL
LEUKOCYTE ESTERASE UR QL STRIP.AUTO: (no result)
LYMPHOCYTES # BLD AUTO: 0.8 10^3/UL (ref 1.5–5)
LYMPHOCYTES NFR BLD AUTO: 18.7 % (ref 24–44)
MCH RBC QN AUTO: 30.7 PG (ref 27–33)
MCHC RBC AUTO-ENTMCNC: 30.5 G/DL (ref 32–36.5)
MCV RBC AUTO: 100.6 FL (ref 80–96)
MONOCYTES # BLD AUTO: 0.3 10^3/UL (ref 0–0.8)
MONOCYTES NFR BLD AUTO: 7.6 % (ref 2–8)
NEUTROPHILS # BLD AUTO: 3.1 10^3/UL (ref 1.5–8.5)
NEUTROPHILS NFR BLD AUTO: 72 % (ref 36–66)
NITRITE UR QL STRIP.AUTO: POSITIVE
OSMOLALITY UR: 784 MOSM/KG (ref 50–1400)
PCO2 BLDV: 44.3 MMHG (ref 38–50)
PH BLDV: 7.27 UNITS (ref 7.33–7.43)
PH UR STRIP.AUTO: 5 UNITS (ref 5–9)
PLATELET # BLD AUTO: 100 10^3/UL (ref 150–450)
PO2 BLDV: 36.6 MMHG (ref 30–50)
POTASSIUM 24H UR-SCNC: 41.3 MEQ/L
POTASSIUM SERPL-SCNC: 4.2 MEQ/L (ref 3.5–5.1)
PROT UR QL STRIP.AUTO: (no result) MG/DL
RBC # BLD AUTO: 3.52 10^6/UL (ref 4–5.4)
RBC # UR AUTO: 4 /HPF (ref 0–3)
SAO2 % BLDV: 61.7 % (ref 60–80)
SODIUM SERPL-SCNC: 141 MEQ/L (ref 136–145)
SODIUM UR-SCNC: 79 MEQ/L
SP GR UR STRIP.AUTO: 1.02 (ref 1–1.03)
SQUAMOUS #/AREA URNS AUTO: 2 /HPF (ref 0–6)
UROBILINOGEN UR QL STRIP.AUTO: 2 MG/DL (ref 0–2)
WBC # BLD AUTO: 4.3 10^3/UL (ref 4–10)
WBC #/AREA URNS AUTO: 47 /HPF (ref 0–3)

## 2022-02-13 RX ADMIN — APIXABAN SCH MG: 5 TABLET, FILM COATED ORAL at 20:39

## 2022-02-13 RX ADMIN — FAMOTIDINE SCH MG: 20 TABLET, FILM COATED ORAL at 08:34

## 2022-02-13 RX ADMIN — SODIUM BICARBONATE SCH MG: 325 TABLET ORAL at 20:39

## 2022-02-13 RX ADMIN — CAPSAICIN SCH DOSE: 0.25 CREAM TOPICAL at 08:35

## 2022-02-13 RX ADMIN — FLECAINIDE ACETATE SCH MG: 50 TABLET ORAL at 20:39

## 2022-02-13 RX ADMIN — FLECAINIDE ACETATE SCH MG: 50 TABLET ORAL at 08:33

## 2022-02-13 RX ADMIN — ATORVASTATIN CALCIUM SCH MG: 20 TABLET, FILM COATED ORAL at 20:39

## 2022-02-13 RX ADMIN — CEFTRIAXONE SCH MLS/HR: 1 INJECTION, POWDER, FOR SOLUTION INTRAMUSCULAR; INTRAVENOUS at 13:54

## 2022-02-13 RX ADMIN — ACETAMINOPHEN PRN MG: 325 TABLET ORAL at 03:52

## 2022-02-13 RX ADMIN — APIXABAN SCH MG: 5 TABLET, FILM COATED ORAL at 08:33

## 2022-02-13 RX ADMIN — NYSTATIN SCH DOSE: 100000 POWDER TOPICAL at 20:40

## 2022-02-13 RX ADMIN — NYSTATIN SCH DOSE: 100000 POWDER TOPICAL at 15:19

## 2022-02-13 RX ADMIN — FAMOTIDINE SCH MG: 20 TABLET, FILM COATED ORAL at 20:39

## 2022-02-13 RX ADMIN — SODIUM BICARBONATE SCH MG: 325 TABLET ORAL at 13:54

## 2022-02-13 RX ADMIN — BUPROPION HYDROCHLORIDE SCH MG: 150 TABLET, FILM COATED, EXTENDED RELEASE ORAL at 08:34

## 2022-02-13 RX ADMIN — MULTIPLE VITAMINS W/ MINERALS TAB SCH TAB: TAB at 08:33

## 2022-02-13 RX ADMIN — ACETAMINOPHEN PRN MG: 325 TABLET ORAL at 20:54

## 2022-02-13 RX ADMIN — CAPSAICIN SCH DOSE: 0.25 CREAM TOPICAL at 15:20

## 2022-02-13 RX ADMIN — CAPSAICIN SCH DOSE: 0.25 CREAM TOPICAL at 20:40

## 2022-02-13 RX ADMIN — SODIUM BICARBONATE SCH MG: 325 TABLET ORAL at 16:26

## 2022-02-13 RX ADMIN — DIGOXIN SCH MG: 125 TABLET ORAL at 08:34

## 2022-02-13 RX ADMIN — NYSTATIN SCH DOSE: 100000 POWDER TOPICAL at 08:35

## 2022-02-14 VITALS — SYSTOLIC BLOOD PRESSURE: 126 MMHG | DIASTOLIC BLOOD PRESSURE: 80 MMHG

## 2022-02-14 VITALS — DIASTOLIC BLOOD PRESSURE: 87 MMHG | SYSTOLIC BLOOD PRESSURE: 145 MMHG

## 2022-02-14 VITALS — SYSTOLIC BLOOD PRESSURE: 135 MMHG | DIASTOLIC BLOOD PRESSURE: 60 MMHG

## 2022-02-14 VITALS — DIASTOLIC BLOOD PRESSURE: 61 MMHG | SYSTOLIC BLOOD PRESSURE: 133 MMHG

## 2022-02-14 VITALS — SYSTOLIC BLOOD PRESSURE: 160 MMHG | DIASTOLIC BLOOD PRESSURE: 86 MMHG

## 2022-02-14 VITALS — DIASTOLIC BLOOD PRESSURE: 67 MMHG | SYSTOLIC BLOOD PRESSURE: 132 MMHG

## 2022-02-14 LAB
BASOPHILS # BLD AUTO: 0 10^3/UL (ref 0–0.2)
BASOPHILS NFR BLD AUTO: 0.4 % (ref 0–1)
BUN SERPL-MCNC: 29 MG/DL (ref 7–18)
CALCIUM SERPL-MCNC: 9.7 MG/DL (ref 8.8–10.2)
CHLORIDE SERPL-SCNC: 114 MEQ/L (ref 98–107)
CO2 SERPL-SCNC: 18 MEQ/L (ref 21–32)
CREAT SERPL-MCNC: 0.94 MG/DL (ref 0.55–1.3)
EOSINOPHIL # BLD AUTO: 0.1 10^3/UL (ref 0–0.5)
EOSINOPHIL NFR BLD AUTO: 1.3 % (ref 0–3)
GFR SERPL CREATININE-BSD FRML MDRD: > 60 ML/MIN/{1.73_M2} (ref 45–?)
GLUCOSE SERPL-MCNC: 100 MG/DL (ref 70–100)
HCT VFR BLD AUTO: 36.7 % (ref 36–47)
HGB BLD-MCNC: 11.3 G/DL (ref 12–15.5)
LYMPHOCYTES # BLD AUTO: 0.8 10^3/UL (ref 1.5–5)
LYMPHOCYTES NFR BLD AUTO: 18.4 % (ref 24–44)
MCH RBC QN AUTO: 31 PG (ref 27–33)
MCHC RBC AUTO-ENTMCNC: 30.8 G/DL (ref 32–36.5)
MCV RBC AUTO: 100.5 FL (ref 80–96)
MONOCYTES # BLD AUTO: 0.4 10^3/UL (ref 0–0.8)
MONOCYTES NFR BLD AUTO: 8.5 % (ref 2–8)
NEUTROPHILS # BLD AUTO: 3.2 10^3/UL (ref 1.5–8.5)
NEUTROPHILS NFR BLD AUTO: 71.2 % (ref 36–66)
PLATELET # BLD AUTO: 94 10^3/UL (ref 150–450)
POTASSIUM SERPL-SCNC: 4.4 MEQ/L (ref 3.5–5.1)
RBC # BLD AUTO: 3.65 10^6/UL (ref 4–5.4)
SODIUM SERPL-SCNC: 141 MEQ/L (ref 136–145)
WBC # BLD AUTO: 4.5 10^3/UL (ref 4–10)

## 2022-02-14 RX ADMIN — ACETAMINOPHEN PRN MG: 325 TABLET ORAL at 18:20

## 2022-02-14 RX ADMIN — SODIUM BICARBONATE SCH MG: 325 TABLET ORAL at 18:20

## 2022-02-14 RX ADMIN — NYSTATIN SCH DOSE: 100000 POWDER TOPICAL at 08:30

## 2022-02-14 RX ADMIN — ACETAMINOPHEN PRN MG: 325 TABLET ORAL at 08:30

## 2022-02-14 RX ADMIN — FAMOTIDINE SCH MG: 20 TABLET, FILM COATED ORAL at 08:32

## 2022-02-14 RX ADMIN — SODIUM BICARBONATE SCH MG: 325 TABLET ORAL at 08:32

## 2022-02-14 RX ADMIN — BUPROPION HYDROCHLORIDE SCH MG: 150 TABLET, FILM COATED, EXTENDED RELEASE ORAL at 08:32

## 2022-02-14 RX ADMIN — CAPSAICIN SCH DOSE: 0.25 CREAM TOPICAL at 18:20

## 2022-02-14 RX ADMIN — CAPSAICIN SCH DOSE: 0.25 CREAM TOPICAL at 18:21

## 2022-02-14 RX ADMIN — CAPSAICIN SCH DOSE: 0.25 CREAM TOPICAL at 08:30

## 2022-02-14 RX ADMIN — ATORVASTATIN CALCIUM SCH MG: 20 TABLET, FILM COATED ORAL at 20:30

## 2022-02-14 RX ADMIN — FAMOTIDINE SCH MG: 20 TABLET, FILM COATED ORAL at 20:30

## 2022-02-14 RX ADMIN — NYSTATIN SCH DOSE: 100000 POWDER TOPICAL at 18:21

## 2022-02-14 RX ADMIN — FLECAINIDE ACETATE SCH MG: 50 TABLET ORAL at 20:29

## 2022-02-14 RX ADMIN — MULTIPLE VITAMINS W/ MINERALS TAB SCH TAB: TAB at 08:32

## 2022-02-14 RX ADMIN — FLECAINIDE ACETATE SCH MG: 50 TABLET ORAL at 08:33

## 2022-02-14 RX ADMIN — SODIUM BICARBONATE SCH MG: 325 TABLET ORAL at 20:28

## 2022-02-14 RX ADMIN — APIXABAN SCH MG: 5 TABLET, FILM COATED ORAL at 08:33

## 2022-02-14 RX ADMIN — DIGOXIN SCH MG: 125 TABLET ORAL at 08:34

## 2022-02-14 RX ADMIN — APIXABAN SCH MG: 5 TABLET, FILM COATED ORAL at 20:30

## 2022-02-14 RX ADMIN — CEFTRIAXONE SCH MLS/HR: 1 INJECTION, POWDER, FOR SOLUTION INTRAMUSCULAR; INTRAVENOUS at 14:57

## 2022-02-14 RX ADMIN — SODIUM BICARBONATE SCH MG: 325 TABLET ORAL at 15:00

## 2022-02-14 RX ADMIN — NYSTATIN SCH DOSE: 100000 POWDER TOPICAL at 20:45

## 2022-02-15 VITALS — SYSTOLIC BLOOD PRESSURE: 135 MMHG | DIASTOLIC BLOOD PRESSURE: 63 MMHG

## 2022-02-15 VITALS — SYSTOLIC BLOOD PRESSURE: 164 MMHG | DIASTOLIC BLOOD PRESSURE: 86 MMHG

## 2022-02-15 VITALS — SYSTOLIC BLOOD PRESSURE: 150 MMHG | DIASTOLIC BLOOD PRESSURE: 56 MMHG

## 2022-02-15 LAB
BASOPHILS # BLD AUTO: 0 10^3/UL (ref 0–0.2)
BASOPHILS NFR BLD AUTO: 0.5 % (ref 0–1)
BUN SERPL-MCNC: 22 MG/DL (ref 7–18)
CALCIUM SERPL-MCNC: 9.9 MG/DL (ref 8.8–10.2)
CHLORIDE SERPL-SCNC: 112 MEQ/L (ref 98–107)
CO2 SERPL-SCNC: 22 MEQ/L (ref 21–32)
CREAT SERPL-MCNC: 0.79 MG/DL (ref 0.55–1.3)
EOSINOPHIL # BLD AUTO: 0.1 10^3/UL (ref 0–0.5)
EOSINOPHIL NFR BLD AUTO: 2.5 % (ref 0–3)
GFR SERPL CREATININE-BSD FRML MDRD: > 60 ML/MIN/{1.73_M2} (ref 45–?)
GLUCOSE SERPL-MCNC: 82 MG/DL (ref 70–100)
HCT VFR BLD AUTO: 35.4 % (ref 36–47)
HGB BLD-MCNC: 10.9 G/DL (ref 12–15.5)
LYMPHOCYTES # BLD AUTO: 0.8 10^3/UL (ref 1.5–5)
LYMPHOCYTES NFR BLD AUTO: 23 % (ref 24–44)
MCH RBC QN AUTO: 30.7 PG (ref 27–33)
MCHC RBC AUTO-ENTMCNC: 30.8 G/DL (ref 32–36.5)
MCV RBC AUTO: 99.7 FL (ref 80–96)
MONOCYTES # BLD AUTO: 0.3 10^3/UL (ref 0–0.8)
MONOCYTES NFR BLD AUTO: 9.3 % (ref 2–8)
NEUTROPHILS # BLD AUTO: 2.4 10^3/UL (ref 1.5–8.5)
NEUTROPHILS NFR BLD AUTO: 64.2 % (ref 36–66)
PLATELET # BLD AUTO: 88 10^3/UL (ref 150–450)
POTASSIUM SERPL-SCNC: 4.3 MEQ/L (ref 3.5–5.1)
RBC # BLD AUTO: 3.55 10^6/UL (ref 4–5.4)
SODIUM SERPL-SCNC: 142 MEQ/L (ref 136–145)
WBC # BLD AUTO: 3.7 10^3/UL (ref 4–10)

## 2022-02-15 RX ADMIN — FAMOTIDINE SCH MG: 20 TABLET, FILM COATED ORAL at 09:17

## 2022-02-15 RX ADMIN — FAMOTIDINE SCH MG: 20 TABLET, FILM COATED ORAL at 20:24

## 2022-02-15 RX ADMIN — FLECAINIDE ACETATE SCH MG: 50 TABLET ORAL at 12:47

## 2022-02-15 RX ADMIN — ACETAMINOPHEN PRN MG: 325 TABLET ORAL at 09:20

## 2022-02-15 RX ADMIN — NYSTATIN SCH DOSE: 100000 POWDER TOPICAL at 18:46

## 2022-02-15 RX ADMIN — DIGOXIN SCH MG: 125 TABLET ORAL at 09:18

## 2022-02-15 RX ADMIN — BUPROPION HYDROCHLORIDE SCH MG: 150 TABLET, FILM COATED, EXTENDED RELEASE ORAL at 09:17

## 2022-02-15 RX ADMIN — ACETAMINOPHEN PRN MG: 325 TABLET ORAL at 20:29

## 2022-02-15 RX ADMIN — CAPSAICIN SCH DOSE: 0.25 CREAM TOPICAL at 12:47

## 2022-02-15 RX ADMIN — NYSTATIN SCH DOSE: 100000 POWDER TOPICAL at 12:48

## 2022-02-15 RX ADMIN — CAPSAICIN SCH DOSE: 0.25 CREAM TOPICAL at 16:00

## 2022-02-15 RX ADMIN — APIXABAN SCH MG: 5 TABLET, FILM COATED ORAL at 09:19

## 2022-02-15 RX ADMIN — ATORVASTATIN CALCIUM SCH MG: 20 TABLET, FILM COATED ORAL at 20:24

## 2022-02-15 RX ADMIN — SODIUM BICARBONATE SCH MG: 325 TABLET ORAL at 20:24

## 2022-02-15 RX ADMIN — CEFTRIAXONE SCH MLS/HR: 1 INJECTION, POWDER, FOR SOLUTION INTRAMUSCULAR; INTRAVENOUS at 12:48

## 2022-02-15 RX ADMIN — APIXABAN SCH MG: 5 TABLET, FILM COATED ORAL at 20:24

## 2022-02-15 RX ADMIN — CAPSAICIN SCH DOSE: 0.25 CREAM TOPICAL at 20:28

## 2022-02-15 RX ADMIN — NYSTATIN SCH DOSE: 100000 POWDER TOPICAL at 20:27

## 2022-02-15 RX ADMIN — SUCRALFATE SCH GM: 1 SUSPENSION ORAL at 20:24

## 2022-02-15 RX ADMIN — SODIUM BICARBONATE SCH MG: 325 TABLET ORAL at 18:44

## 2022-02-15 RX ADMIN — SODIUM BICARBONATE SCH MG: 325 TABLET ORAL at 09:18

## 2022-02-15 RX ADMIN — MULTIPLE VITAMINS W/ MINERALS TAB SCH TAB: TAB at 09:20

## 2022-02-15 RX ADMIN — FLECAINIDE ACETATE SCH MG: 50 TABLET ORAL at 20:24

## 2022-02-15 RX ADMIN — SODIUM BICARBONATE SCH MG: 325 TABLET ORAL at 12:47

## 2022-02-16 VITALS — DIASTOLIC BLOOD PRESSURE: 70 MMHG | SYSTOLIC BLOOD PRESSURE: 105 MMHG

## 2022-02-16 VITALS — DIASTOLIC BLOOD PRESSURE: 76 MMHG | SYSTOLIC BLOOD PRESSURE: 147 MMHG

## 2022-02-16 VITALS — DIASTOLIC BLOOD PRESSURE: 79 MMHG | SYSTOLIC BLOOD PRESSURE: 150 MMHG

## 2022-02-16 VITALS — SYSTOLIC BLOOD PRESSURE: 146 MMHG | DIASTOLIC BLOOD PRESSURE: 83 MMHG

## 2022-02-16 LAB
BASOPHILS # BLD AUTO: 0 10^3/UL (ref 0–0.2)
BASOPHILS NFR BLD AUTO: 0.5 % (ref 0–1)
BUN SERPL-MCNC: 18 MG/DL (ref 7–18)
CALCIUM SERPL-MCNC: 10 MG/DL (ref 8.8–10.2)
CHLORIDE SERPL-SCNC: 110 MEQ/L (ref 98–107)
CO2 SERPL-SCNC: 23 MEQ/L (ref 21–32)
CREAT SERPL-MCNC: 0.8 MG/DL (ref 0.55–1.3)
EOSINOPHIL # BLD AUTO: 0.1 10^3/UL (ref 0–0.5)
EOSINOPHIL NFR BLD AUTO: 2.6 % (ref 0–3)
GFR SERPL CREATININE-BSD FRML MDRD: > 60 ML/MIN/{1.73_M2} (ref 45–?)
GLUCOSE SERPL-MCNC: 95 MG/DL (ref 70–100)
HCT VFR BLD AUTO: 36.9 % (ref 36–47)
HGB BLD-MCNC: 11.6 G/DL (ref 12–15.5)
LYMPHOCYTES # BLD AUTO: 0.7 10^3/UL (ref 1.5–5)
LYMPHOCYTES NFR BLD AUTO: 16.7 % (ref 24–44)
MCH RBC QN AUTO: 31.2 PG (ref 27–33)
MCHC RBC AUTO-ENTMCNC: 31.4 G/DL (ref 32–36.5)
MCV RBC AUTO: 99.2 FL (ref 80–96)
MONOCYTES # BLD AUTO: 0.4 10^3/UL (ref 0–0.8)
MONOCYTES NFR BLD AUTO: 9.8 % (ref 2–8)
NEUTROPHILS # BLD AUTO: 2.9 10^3/UL (ref 1.5–8.5)
NEUTROPHILS NFR BLD AUTO: 69.9 % (ref 36–66)
PLATELET # BLD AUTO: 98 10^3/UL (ref 150–450)
POTASSIUM SERPL-SCNC: 4.4 MEQ/L (ref 3.5–5.1)
RBC # BLD AUTO: 3.72 10^6/UL (ref 4–5.4)
SODIUM SERPL-SCNC: 141 MEQ/L (ref 136–145)
WBC # BLD AUTO: 4.2 10^3/UL (ref 4–10)

## 2022-02-16 RX ADMIN — SODIUM BICARBONATE SCH MG: 325 TABLET ORAL at 09:18

## 2022-02-16 RX ADMIN — SODIUM BICARBONATE SCH MG: 325 TABLET ORAL at 20:02

## 2022-02-16 RX ADMIN — FAMOTIDINE SCH MG: 20 TABLET, FILM COATED ORAL at 09:20

## 2022-02-16 RX ADMIN — MULTIPLE VITAMINS W/ MINERALS TAB SCH TAB: TAB at 09:18

## 2022-02-16 RX ADMIN — NYSTATIN SCH DOSE: 100000 POWDER TOPICAL at 16:00

## 2022-02-16 RX ADMIN — PROBIOTIC PRODUCT - TAB SCH EA: TAB at 20:02

## 2022-02-16 RX ADMIN — CAPSAICIN SCH DOSE: 0.25 CREAM TOPICAL at 21:00

## 2022-02-16 RX ADMIN — FAMOTIDINE SCH MG: 20 TABLET, FILM COATED ORAL at 20:03

## 2022-02-16 RX ADMIN — DIGOXIN SCH MG: 125 TABLET ORAL at 09:00

## 2022-02-16 RX ADMIN — ATORVASTATIN CALCIUM SCH MG: 20 TABLET, FILM COATED ORAL at 20:03

## 2022-02-16 RX ADMIN — CAPSAICIN SCH DOSE: 0.25 CREAM TOPICAL at 16:00

## 2022-02-16 RX ADMIN — CAPSAICIN SCH DOSE: 0.25 CREAM TOPICAL at 09:00

## 2022-02-16 RX ADMIN — FLECAINIDE ACETATE SCH MG: 50 TABLET ORAL at 20:03

## 2022-02-16 RX ADMIN — ASPIRIN 81 MG CHEWABLE TABLET SCH MG: 81 TABLET CHEWABLE at 09:19

## 2022-02-16 RX ADMIN — APIXABAN SCH MG: 5 TABLET, FILM COATED ORAL at 20:03

## 2022-02-16 RX ADMIN — PROBIOTIC PRODUCT - TAB SCH EA: TAB at 12:24

## 2022-02-16 RX ADMIN — SUCRALFATE SCH GM: 1 SUSPENSION ORAL at 20:02

## 2022-02-16 RX ADMIN — FLECAINIDE ACETATE SCH MG: 50 TABLET ORAL at 09:17

## 2022-02-16 RX ADMIN — PROBIOTIC PRODUCT - TAB SCH EA: TAB at 17:40

## 2022-02-16 RX ADMIN — SODIUM BICARBONATE SCH MG: 325 TABLET ORAL at 12:24

## 2022-02-16 RX ADMIN — NYSTATIN SCH DOSE: 100000 POWDER TOPICAL at 21:00

## 2022-02-16 RX ADMIN — AMOXICILLIN AND CLAVULANATE POTASSIUM SCH MG: 875; 125 TABLET, FILM COATED ORAL at 20:02

## 2022-02-16 RX ADMIN — SODIUM BICARBONATE SCH MG: 325 TABLET ORAL at 17:40

## 2022-02-16 RX ADMIN — BUPROPION HYDROCHLORIDE SCH MG: 150 TABLET, FILM COATED, EXTENDED RELEASE ORAL at 09:19

## 2022-02-16 RX ADMIN — AMOXICILLIN AND CLAVULANATE POTASSIUM SCH MG: 875; 125 TABLET, FILM COATED ORAL at 12:24

## 2022-02-16 RX ADMIN — SUCRALFATE SCH GM: 1 SUSPENSION ORAL at 09:15

## 2022-02-16 RX ADMIN — APIXABAN SCH MG: 5 TABLET, FILM COATED ORAL at 09:19

## 2022-02-16 RX ADMIN — NYSTATIN SCH DOSE: 100000 POWDER TOPICAL at 09:00

## 2022-02-17 VITALS — SYSTOLIC BLOOD PRESSURE: 154 MMHG | DIASTOLIC BLOOD PRESSURE: 81 MMHG

## 2022-02-17 VITALS — DIASTOLIC BLOOD PRESSURE: 60 MMHG | SYSTOLIC BLOOD PRESSURE: 138 MMHG

## 2022-02-17 VITALS — SYSTOLIC BLOOD PRESSURE: 105 MMHG | DIASTOLIC BLOOD PRESSURE: 72 MMHG

## 2022-02-17 LAB
1,25(OH)2D3 SERPL-MCNC: 35.1 PG/ML (ref 19.9–79.3)
BASOPHILS # BLD AUTO: 0 10^3/UL (ref 0–0.2)
BASOPHILS NFR BLD AUTO: 0.9 % (ref 0–1)
BUN SERPL-MCNC: 18 MG/DL (ref 7–18)
CALCIUM SERPL-MCNC: 9.9 MG/DL (ref 8.8–10.2)
CHLORIDE SERPL-SCNC: 110 MEQ/L (ref 98–107)
CO2 SERPL-SCNC: 23 MEQ/L (ref 21–32)
CREAT SERPL-MCNC: 0.84 MG/DL (ref 0.55–1.3)
EOSINOPHIL # BLD AUTO: 0.1 10^3/UL (ref 0–0.5)
EOSINOPHIL NFR BLD AUTO: 3.6 % (ref 0–3)
GFR SERPL CREATININE-BSD FRML MDRD: > 60 ML/MIN/{1.73_M2} (ref 45–?)
GLUCOSE SERPL-MCNC: 79 MG/DL (ref 70–100)
HCT VFR BLD AUTO: 36.9 % (ref 36–47)
HGB BLD-MCNC: 11.3 G/DL (ref 12–15.5)
LYMPHOCYTES # BLD AUTO: 0.9 10^3/UL (ref 1.5–5)
LYMPHOCYTES NFR BLD AUTO: 27.3 % (ref 24–44)
MCH RBC QN AUTO: 30.5 PG (ref 27–33)
MCHC RBC AUTO-ENTMCNC: 30.6 G/DL (ref 32–36.5)
MCV RBC AUTO: 99.5 FL (ref 80–96)
MONOCYTES # BLD AUTO: 0.4 10^3/UL (ref 0–0.8)
MONOCYTES NFR BLD AUTO: 11.5 % (ref 2–8)
NEUTROPHILS # BLD AUTO: 1.9 10^3/UL (ref 1.5–8.5)
NEUTROPHILS NFR BLD AUTO: 56.4 % (ref 36–66)
PLATELET # BLD AUTO: 100 10^3/UL (ref 150–450)
POTASSIUM SERPL-SCNC: 5.6 MEQ/L (ref 3.5–5.1)
PTH RELATED PROT SERPL-SCNC: < 2 PMOL/L
RBC # BLD AUTO: 3.71 10^6/UL (ref 4–5.4)
SODIUM SERPL-SCNC: 139 MEQ/L (ref 136–145)
WBC # BLD AUTO: 3.3 10^3/UL (ref 4–10)

## 2022-02-17 RX ADMIN — SODIUM BICARBONATE SCH MG: 325 TABLET ORAL at 15:55

## 2022-02-17 RX ADMIN — FLECAINIDE ACETATE SCH MG: 50 TABLET ORAL at 09:36

## 2022-02-17 RX ADMIN — FAMOTIDINE SCH MG: 20 TABLET, FILM COATED ORAL at 09:37

## 2022-02-17 RX ADMIN — PROBIOTIC PRODUCT - TAB SCH EA: TAB at 22:14

## 2022-02-17 RX ADMIN — SUCRALFATE SCH GM: 1 SUSPENSION ORAL at 09:35

## 2022-02-17 RX ADMIN — ASPIRIN 81 MG CHEWABLE TABLET SCH MG: 81 TABLET CHEWABLE at 09:35

## 2022-02-17 RX ADMIN — DIGOXIN SCH MG: 125 TABLET ORAL at 09:38

## 2022-02-17 RX ADMIN — CAPSAICIN SCH DOSE: 0.25 CREAM TOPICAL at 09:39

## 2022-02-17 RX ADMIN — SUCRALFATE SCH GM: 1 SUSPENSION ORAL at 22:14

## 2022-02-17 RX ADMIN — SODIUM BICARBONATE SCH MG: 325 TABLET ORAL at 13:03

## 2022-02-17 RX ADMIN — APIXABAN SCH MG: 5 TABLET, FILM COATED ORAL at 22:16

## 2022-02-17 RX ADMIN — PROBIOTIC PRODUCT - TAB SCH EA: TAB at 09:35

## 2022-02-17 RX ADMIN — AMOXICILLIN AND CLAVULANATE POTASSIUM SCH MG: 875; 125 TABLET, FILM COATED ORAL at 09:35

## 2022-02-17 RX ADMIN — FLECAINIDE ACETATE SCH MG: 50 TABLET ORAL at 22:16

## 2022-02-17 RX ADMIN — NYSTATIN SCH DOSE: 100000 POWDER TOPICAL at 15:57

## 2022-02-17 RX ADMIN — CAPSAICIN SCH DOSE: 0.25 CREAM TOPICAL at 15:57

## 2022-02-17 RX ADMIN — AMOXICILLIN AND CLAVULANATE POTASSIUM SCH MG: 875; 125 TABLET, FILM COATED ORAL at 22:14

## 2022-02-17 RX ADMIN — FAMOTIDINE SCH MG: 20 TABLET, FILM COATED ORAL at 22:16

## 2022-02-17 RX ADMIN — SODIUM BICARBONATE SCH MG: 325 TABLET ORAL at 09:35

## 2022-02-17 RX ADMIN — APIXABAN SCH MG: 5 TABLET, FILM COATED ORAL at 09:37

## 2022-02-17 RX ADMIN — PROBIOTIC PRODUCT - TAB SCH EA: TAB at 13:03

## 2022-02-17 RX ADMIN — CAPSAICIN SCH DOSE: 0.25 CREAM TOPICAL at 22:19

## 2022-02-17 RX ADMIN — PROBIOTIC PRODUCT - TAB SCH EA: TAB at 15:57

## 2022-02-17 RX ADMIN — NYSTATIN SCH DOSE: 100000 POWDER TOPICAL at 09:39

## 2022-02-17 RX ADMIN — BUPROPION HYDROCHLORIDE SCH MG: 150 TABLET, FILM COATED, EXTENDED RELEASE ORAL at 09:37

## 2022-02-17 RX ADMIN — MULTIPLE VITAMINS W/ MINERALS TAB SCH TAB: TAB at 09:36

## 2022-02-17 RX ADMIN — DIGOXIN SCH MG: 125 TABLET ORAL at 09:00

## 2022-02-17 RX ADMIN — SODIUM BICARBONATE SCH MG: 325 TABLET ORAL at 22:14

## 2022-02-17 RX ADMIN — ATORVASTATIN CALCIUM SCH MG: 20 TABLET, FILM COATED ORAL at 22:16

## 2022-02-17 RX ADMIN — NYSTATIN SCH DOSE: 100000 POWDER TOPICAL at 22:20

## 2022-02-18 VITALS — SYSTOLIC BLOOD PRESSURE: 111 MMHG | DIASTOLIC BLOOD PRESSURE: 78 MMHG

## 2022-02-18 VITALS — SYSTOLIC BLOOD PRESSURE: 158 MMHG | DIASTOLIC BLOOD PRESSURE: 89 MMHG

## 2022-02-18 LAB
ANISOCYTOSIS BLD QL SMEAR: (no result)
BUN SERPL-MCNC: 18 MG/DL (ref 7–18)
CALCIUM SERPL-MCNC: 9.5 MG/DL (ref 8.8–10.2)
CHLORIDE SERPL-SCNC: 109 MEQ/L (ref 98–107)
CO2 SERPL-SCNC: 25 MEQ/L (ref 21–32)
CREAT SERPL-MCNC: 0.7 MG/DL (ref 0.55–1.3)
EOSINOPHIL NFR BLD MANUAL: 3 % (ref 0–3)
GFR SERPL CREATININE-BSD FRML MDRD: > 60 ML/MIN/{1.73_M2} (ref 45–?)
GLUCOSE SERPL-MCNC: 89 MG/DL (ref 70–100)
HCT VFR BLD AUTO: 33.5 % (ref 36–47)
HGB BLD-MCNC: 10.4 G/DL (ref 12–15.5)
LYMPHOCYTES NFR BLD MANUAL: 27 % (ref 16–44)
MCH RBC QN AUTO: 30.8 PG (ref 27–33)
MCHC RBC AUTO-ENTMCNC: 31 G/DL (ref 32–36.5)
MCV RBC AUTO: 99.1 FL (ref 80–96)
MONOCYTES NFR BLD MANUAL: 10 % (ref 0–5)
NEUTROPHILS NFR BLD MANUAL: 58 % (ref 28–66)
PLATELET # BLD AUTO: 123 10^3/UL (ref 150–450)
PLATELET BLD QL SMEAR: (no result)
POTASSIUM SERPL-SCNC: 4.1 MEQ/L (ref 3.5–5.1)
RBC # BLD AUTO: 3.38 10^6/UL (ref 4–5.4)
SODIUM SERPL-SCNC: 142 MEQ/L (ref 136–145)
VARIANT LYMPHS NFR BLD MANUAL: 2 % (ref 0–5)
WBC # BLD AUTO: 3.3 10^3/UL (ref 4–10)

## 2022-02-18 RX ADMIN — ASPIRIN 81 MG CHEWABLE TABLET SCH MG: 81 TABLET CHEWABLE at 08:24

## 2022-02-18 RX ADMIN — NYSTATIN SCH DOSE: 100000 POWDER TOPICAL at 17:02

## 2022-02-18 RX ADMIN — CAPSAICIN SCH DOSE: 0.25 CREAM TOPICAL at 20:51

## 2022-02-18 RX ADMIN — PROBIOTIC PRODUCT - TAB SCH EA: TAB at 17:01

## 2022-02-18 RX ADMIN — FLECAINIDE ACETATE SCH MG: 50 TABLET ORAL at 08:24

## 2022-02-18 RX ADMIN — CAPSAICIN SCH DOSE: 0.25 CREAM TOPICAL at 17:02

## 2022-02-18 RX ADMIN — ATORVASTATIN CALCIUM SCH MG: 20 TABLET, FILM COATED ORAL at 20:52

## 2022-02-18 RX ADMIN — SODIUM BICARBONATE SCH MG: 325 TABLET ORAL at 08:23

## 2022-02-18 RX ADMIN — NYSTATIN SCH DOSE: 100000 POWDER TOPICAL at 20:52

## 2022-02-18 RX ADMIN — FAMOTIDINE SCH MG: 20 TABLET, FILM COATED ORAL at 20:51

## 2022-02-18 RX ADMIN — PROBIOTIC PRODUCT - TAB SCH EA: TAB at 20:51

## 2022-02-18 RX ADMIN — CAPSAICIN SCH DOSE: 0.25 CREAM TOPICAL at 08:28

## 2022-02-18 RX ADMIN — PROBIOTIC PRODUCT - TAB SCH EA: TAB at 08:24

## 2022-02-18 RX ADMIN — PROBIOTIC PRODUCT - TAB SCH EA: TAB at 11:39

## 2022-02-18 RX ADMIN — SODIUM BICARBONATE SCH MG: 325 TABLET ORAL at 17:02

## 2022-02-18 RX ADMIN — SUCRALFATE SCH GM: 1 SUSPENSION ORAL at 20:51

## 2022-02-18 RX ADMIN — FLECAINIDE ACETATE SCH MG: 50 TABLET ORAL at 20:49

## 2022-02-18 RX ADMIN — APIXABAN SCH MG: 5 TABLET, FILM COATED ORAL at 20:50

## 2022-02-18 RX ADMIN — MULTIPLE VITAMINS W/ MINERALS TAB SCH TAB: TAB at 08:23

## 2022-02-18 RX ADMIN — FAMOTIDINE SCH MG: 20 TABLET, FILM COATED ORAL at 08:27

## 2022-02-18 RX ADMIN — BUPROPION HYDROCHLORIDE SCH MG: 150 TABLET, FILM COATED, EXTENDED RELEASE ORAL at 08:24

## 2022-02-18 RX ADMIN — APIXABAN SCH MG: 5 TABLET, FILM COATED ORAL at 08:25

## 2022-02-18 RX ADMIN — SODIUM BICARBONATE SCH MG: 325 TABLET ORAL at 11:39

## 2022-02-18 RX ADMIN — SODIUM BICARBONATE SCH MG: 325 TABLET ORAL at 20:51

## 2022-02-18 RX ADMIN — SUCRALFATE SCH GM: 1 SUSPENSION ORAL at 08:24

## 2022-02-18 RX ADMIN — NYSTATIN SCH DOSE: 100000 POWDER TOPICAL at 08:28

## 2022-02-18 RX ADMIN — DIGOXIN SCH MG: 125 TABLET ORAL at 08:26

## 2022-02-19 LAB
BASOPHILS # BLD AUTO: 0 10^3/UL (ref 0–0.2)
BASOPHILS NFR BLD AUTO: 0.6 % (ref 0–1)
BUN SERPL-MCNC: 16 MG/DL (ref 7–18)
CALCIUM SERPL-MCNC: 10.1 MG/DL (ref 8.8–10.2)
CHLORIDE SERPL-SCNC: 106 MEQ/L (ref 98–107)
CO2 SERPL-SCNC: 24 MEQ/L (ref 21–32)
CREAT SERPL-MCNC: 0.73 MG/DL (ref 0.55–1.3)
EOSINOPHIL # BLD AUTO: 0.1 10^3/UL (ref 0–0.5)
EOSINOPHIL NFR BLD AUTO: 2.4 % (ref 0–3)
GFR SERPL CREATININE-BSD FRML MDRD: > 60 ML/MIN/{1.73_M2} (ref 45–?)
GLUCOSE SERPL-MCNC: 94 MG/DL (ref 70–100)
HCT VFR BLD AUTO: 36.2 % (ref 36–47)
HGB BLD-MCNC: 11.2 G/DL (ref 12–15.5)
LYMPHOCYTES # BLD AUTO: 0.8 10^3/UL (ref 1.5–5)
LYMPHOCYTES NFR BLD AUTO: 24.1 % (ref 24–44)
MCH RBC QN AUTO: 30.4 PG (ref 27–33)
MCHC RBC AUTO-ENTMCNC: 30.9 G/DL (ref 32–36.5)
MCV RBC AUTO: 98.1 FL (ref 80–96)
MONOCYTES # BLD AUTO: 0.3 10^3/UL (ref 0–0.8)
MONOCYTES NFR BLD AUTO: 10 % (ref 2–8)
NEUTROPHILS # BLD AUTO: 2.1 10^3/UL (ref 1.5–8.5)
NEUTROPHILS NFR BLD AUTO: 62.6 % (ref 36–66)
PLATELET # BLD AUTO: 148 10^3/UL (ref 150–450)
POTASSIUM SERPL-SCNC: 4 MEQ/L (ref 3.5–5.1)
RBC # BLD AUTO: 3.69 10^6/UL (ref 4–5.4)
SODIUM SERPL-SCNC: 140 MEQ/L (ref 136–145)
WBC # BLD AUTO: 3.4 10^3/UL (ref 4–10)

## 2022-02-19 RX ADMIN — SODIUM BICARBONATE SCH MG: 325 TABLET ORAL at 13:25

## 2022-02-19 RX ADMIN — PROBIOTIC PRODUCT - TAB SCH EA: TAB at 20:47

## 2022-02-19 RX ADMIN — FAMOTIDINE SCH MG: 20 TABLET, FILM COATED ORAL at 20:50

## 2022-02-19 RX ADMIN — SODIUM BICARBONATE SCH MG: 325 TABLET ORAL at 17:16

## 2022-02-19 RX ADMIN — SUCRALFATE SCH GM: 1 SUSPENSION ORAL at 08:16

## 2022-02-19 RX ADMIN — SODIUM BICARBONATE SCH MG: 325 TABLET ORAL at 08:15

## 2022-02-19 RX ADMIN — PROBIOTIC PRODUCT - TAB SCH EA: TAB at 17:16

## 2022-02-19 RX ADMIN — FAMOTIDINE SCH MG: 20 TABLET, FILM COATED ORAL at 08:15

## 2022-02-19 RX ADMIN — PROBIOTIC PRODUCT - TAB SCH EA: TAB at 08:15

## 2022-02-19 RX ADMIN — CAPSAICIN SCH DOSE: 0.25 CREAM TOPICAL at 20:51

## 2022-02-19 RX ADMIN — NYSTATIN SCH DOSE: 100000 POWDER TOPICAL at 15:47

## 2022-02-19 RX ADMIN — PROBIOTIC PRODUCT - TAB SCH EA: TAB at 13:26

## 2022-02-19 RX ADMIN — BUPROPION HYDROCHLORIDE SCH MG: 150 TABLET, FILM COATED, EXTENDED RELEASE ORAL at 08:16

## 2022-02-19 RX ADMIN — DIGOXIN SCH MG: 125 TABLET ORAL at 08:17

## 2022-02-19 RX ADMIN — SODIUM BICARBONATE SCH MG: 325 TABLET ORAL at 20:48

## 2022-02-19 RX ADMIN — CAPSAICIN SCH DOSE: 0.25 CREAM TOPICAL at 08:18

## 2022-02-19 RX ADMIN — FLECAINIDE ACETATE SCH MG: 50 TABLET ORAL at 08:15

## 2022-02-19 RX ADMIN — APIXABAN SCH MG: 5 TABLET, FILM COATED ORAL at 08:15

## 2022-02-19 RX ADMIN — ASPIRIN 81 MG CHEWABLE TABLET SCH MG: 81 TABLET CHEWABLE at 08:16

## 2022-02-19 RX ADMIN — SUCRALFATE SCH GM: 1 SUSPENSION ORAL at 20:47

## 2022-02-19 RX ADMIN — ACETAMINOPHEN PRN MG: 325 TABLET ORAL at 20:49

## 2022-02-19 RX ADMIN — MULTIPLE VITAMINS W/ MINERALS TAB SCH TAB: TAB at 08:16

## 2022-02-19 RX ADMIN — FLECAINIDE ACETATE SCH MG: 50 TABLET ORAL at 20:47

## 2022-02-19 RX ADMIN — APIXABAN SCH MG: 5 TABLET, FILM COATED ORAL at 20:50

## 2022-02-19 RX ADMIN — ATORVASTATIN CALCIUM SCH MG: 20 TABLET, FILM COATED ORAL at 20:50

## 2022-02-19 RX ADMIN — NYSTATIN SCH DOSE: 100000 POWDER TOPICAL at 20:51

## 2022-02-19 RX ADMIN — CAPSAICIN SCH DOSE: 0.25 CREAM TOPICAL at 15:47

## 2022-02-19 RX ADMIN — NYSTATIN SCH DOSE: 100000 POWDER TOPICAL at 08:17

## 2022-02-20 VITALS — DIASTOLIC BLOOD PRESSURE: 83 MMHG | SYSTOLIC BLOOD PRESSURE: 161 MMHG

## 2022-02-20 RX ADMIN — NYSTATIN SCH DOSE: 100000 POWDER TOPICAL at 21:08

## 2022-02-20 RX ADMIN — FAMOTIDINE SCH MG: 20 TABLET, FILM COATED ORAL at 21:07

## 2022-02-20 RX ADMIN — PROBIOTIC PRODUCT - TAB SCH EA: TAB at 16:16

## 2022-02-20 RX ADMIN — CAPSAICIN SCH DOSE: 0.25 CREAM TOPICAL at 16:16

## 2022-02-20 RX ADMIN — SODIUM BICARBONATE SCH MG: 325 TABLET ORAL at 16:16

## 2022-02-20 RX ADMIN — FLECAINIDE ACETATE SCH MG: 50 TABLET ORAL at 09:23

## 2022-02-20 RX ADMIN — SUCRALFATE SCH GM: 1 SUSPENSION ORAL at 09:17

## 2022-02-20 RX ADMIN — ATORVASTATIN CALCIUM SCH MG: 20 TABLET, FILM COATED ORAL at 21:07

## 2022-02-20 RX ADMIN — PROBIOTIC PRODUCT - TAB SCH EA: TAB at 21:07

## 2022-02-20 RX ADMIN — SODIUM BICARBONATE SCH MG: 325 TABLET ORAL at 09:18

## 2022-02-20 RX ADMIN — ASPIRIN 81 MG CHEWABLE TABLET SCH MG: 81 TABLET CHEWABLE at 09:19

## 2022-02-20 RX ADMIN — SODIUM BICARBONATE SCH MG: 325 TABLET ORAL at 21:07

## 2022-02-20 RX ADMIN — CAPSAICIN SCH DOSE: 0.25 CREAM TOPICAL at 21:08

## 2022-02-20 RX ADMIN — PROBIOTIC PRODUCT - TAB SCH EA: TAB at 12:50

## 2022-02-20 RX ADMIN — BUPROPION HYDROCHLORIDE SCH MG: 150 TABLET, FILM COATED, EXTENDED RELEASE ORAL at 09:19

## 2022-02-20 RX ADMIN — SUCRALFATE SCH GM: 1 SUSPENSION ORAL at 21:06

## 2022-02-20 RX ADMIN — SODIUM BICARBONATE SCH MG: 325 TABLET ORAL at 12:50

## 2022-02-20 RX ADMIN — NYSTATIN SCH DOSE: 100000 POWDER TOPICAL at 09:19

## 2022-02-20 RX ADMIN — FLECAINIDE ACETATE SCH MG: 50 TABLET ORAL at 21:07

## 2022-02-20 RX ADMIN — NYSTATIN SCH DOSE: 100000 POWDER TOPICAL at 16:16

## 2022-02-20 RX ADMIN — FAMOTIDINE SCH MG: 20 TABLET, FILM COATED ORAL at 09:18

## 2022-02-20 RX ADMIN — CAPSAICIN SCH DOSE: 0.25 CREAM TOPICAL at 09:20

## 2022-02-20 RX ADMIN — APIXABAN SCH MG: 5 TABLET, FILM COATED ORAL at 09:18

## 2022-02-20 RX ADMIN — DIGOXIN SCH MG: 125 TABLET ORAL at 09:18

## 2022-02-20 RX ADMIN — MULTIPLE VITAMINS W/ MINERALS TAB SCH TAB: TAB at 09:18

## 2022-02-20 RX ADMIN — PROBIOTIC PRODUCT - TAB SCH EA: TAB at 09:18

## 2022-02-20 RX ADMIN — APIXABAN SCH MG: 5 TABLET, FILM COATED ORAL at 21:07

## 2022-02-21 VITALS — SYSTOLIC BLOOD PRESSURE: 127 MMHG | DIASTOLIC BLOOD PRESSURE: 51 MMHG

## 2022-02-21 RX ADMIN — SUCRALFATE SCH GM: 1 SUSPENSION ORAL at 19:43

## 2022-02-21 RX ADMIN — FLECAINIDE ACETATE SCH MG: 50 TABLET ORAL at 19:44

## 2022-02-21 RX ADMIN — ASPIRIN 81 MG CHEWABLE TABLET SCH MG: 81 TABLET CHEWABLE at 09:54

## 2022-02-21 RX ADMIN — NYSTATIN SCH DOSE: 100000 POWDER TOPICAL at 17:11

## 2022-02-21 RX ADMIN — PROBIOTIC PRODUCT - TAB SCH EA: TAB at 19:44

## 2022-02-21 RX ADMIN — APIXABAN SCH MG: 5 TABLET, FILM COATED ORAL at 09:54

## 2022-02-21 RX ADMIN — FAMOTIDINE SCH MG: 20 TABLET, FILM COATED ORAL at 19:44

## 2022-02-21 RX ADMIN — CAPSAICIN SCH DOSE: 0.25 CREAM TOPICAL at 09:55

## 2022-02-21 RX ADMIN — FLECAINIDE ACETATE SCH MG: 50 TABLET ORAL at 09:55

## 2022-02-21 RX ADMIN — ATORVASTATIN CALCIUM SCH MG: 20 TABLET, FILM COATED ORAL at 19:44

## 2022-02-21 RX ADMIN — CAPSAICIN SCH DOSE: 0.25 CREAM TOPICAL at 16:00

## 2022-02-21 RX ADMIN — SODIUM BICARBONATE SCH MG: 325 TABLET ORAL at 13:07

## 2022-02-21 RX ADMIN — SUCRALFATE SCH GM: 1 SUSPENSION ORAL at 09:54

## 2022-02-21 RX ADMIN — PROBIOTIC PRODUCT - TAB SCH EA: TAB at 09:54

## 2022-02-21 RX ADMIN — DIGOXIN SCH MG: 125 TABLET ORAL at 09:00

## 2022-02-21 RX ADMIN — NYSTATIN SCH DOSE: 100000 POWDER TOPICAL at 09:55

## 2022-02-21 RX ADMIN — SODIUM BICARBONATE SCH MG: 325 TABLET ORAL at 09:54

## 2022-02-21 RX ADMIN — APIXABAN SCH MG: 5 TABLET, FILM COATED ORAL at 19:44

## 2022-02-21 RX ADMIN — FAMOTIDINE SCH MG: 20 TABLET, FILM COATED ORAL at 09:52

## 2022-02-21 RX ADMIN — SODIUM BICARBONATE SCH MG: 325 TABLET ORAL at 17:11

## 2022-02-21 RX ADMIN — PROBIOTIC PRODUCT - TAB SCH EA: TAB at 13:07

## 2022-02-21 RX ADMIN — PROBIOTIC PRODUCT - TAB SCH EA: TAB at 17:11

## 2022-02-21 RX ADMIN — NYSTATIN SCH DOSE: 100000 POWDER TOPICAL at 19:46

## 2022-02-21 RX ADMIN — SODIUM BICARBONATE SCH MG: 325 TABLET ORAL at 19:44

## 2022-02-21 RX ADMIN — CAPSAICIN SCH DOSE: 0.25 CREAM TOPICAL at 19:46

## 2022-02-21 RX ADMIN — BUPROPION HYDROCHLORIDE SCH MG: 150 TABLET, FILM COATED, EXTENDED RELEASE ORAL at 09:53

## 2022-02-21 RX ADMIN — MULTIPLE VITAMINS W/ MINERALS TAB SCH TAB: TAB at 09:54

## 2022-02-22 VITALS — SYSTOLIC BLOOD PRESSURE: 148 MMHG | DIASTOLIC BLOOD PRESSURE: 68 MMHG

## 2022-02-22 RX ADMIN — SODIUM BICARBONATE SCH MG: 325 TABLET ORAL at 14:33

## 2022-02-22 RX ADMIN — ATORVASTATIN CALCIUM SCH MG: 20 TABLET, FILM COATED ORAL at 20:43

## 2022-02-22 RX ADMIN — CAPSAICIN SCH DOSE: 0.25 CREAM TOPICAL at 16:00

## 2022-02-22 RX ADMIN — SODIUM BICARBONATE SCH MG: 325 TABLET ORAL at 17:05

## 2022-02-22 RX ADMIN — NYSTATIN SCH DOSE: 100000 POWDER TOPICAL at 16:00

## 2022-02-22 RX ADMIN — PROBIOTIC PRODUCT - TAB SCH EA: TAB at 17:05

## 2022-02-22 RX ADMIN — APIXABAN SCH MG: 5 TABLET, FILM COATED ORAL at 20:43

## 2022-02-22 RX ADMIN — FAMOTIDINE SCH MG: 20 TABLET, FILM COATED ORAL at 08:27

## 2022-02-22 RX ADMIN — FLECAINIDE ACETATE SCH MG: 50 TABLET ORAL at 20:43

## 2022-02-22 RX ADMIN — FLECAINIDE ACETATE SCH MG: 50 TABLET ORAL at 08:26

## 2022-02-22 RX ADMIN — SUCRALFATE SCH GM: 1 SUSPENSION ORAL at 20:43

## 2022-02-22 RX ADMIN — NYSTATIN SCH DOSE: 100000 POWDER TOPICAL at 20:46

## 2022-02-22 RX ADMIN — PROBIOTIC PRODUCT - TAB SCH EA: TAB at 20:44

## 2022-02-22 RX ADMIN — APIXABAN SCH MG: 5 TABLET, FILM COATED ORAL at 08:26

## 2022-02-22 RX ADMIN — MULTIPLE VITAMINS W/ MINERALS TAB SCH TAB: TAB at 08:26

## 2022-02-22 RX ADMIN — BUPROPION HYDROCHLORIDE SCH MG: 150 TABLET, FILM COATED, EXTENDED RELEASE ORAL at 08:26

## 2022-02-22 RX ADMIN — SODIUM BICARBONATE SCH MG: 325 TABLET ORAL at 08:26

## 2022-02-22 RX ADMIN — ASPIRIN 81 MG CHEWABLE TABLET SCH MG: 81 TABLET CHEWABLE at 08:26

## 2022-02-22 RX ADMIN — SODIUM BICARBONATE SCH MG: 325 TABLET ORAL at 20:44

## 2022-02-22 RX ADMIN — PROBIOTIC PRODUCT - TAB SCH EA: TAB at 08:26

## 2022-02-22 RX ADMIN — CAPSAICIN SCH DOSE: 0.25 CREAM TOPICAL at 20:45

## 2022-02-22 RX ADMIN — DIGOXIN SCH MG: 125 TABLET ORAL at 08:23

## 2022-02-22 RX ADMIN — NYSTATIN SCH DOSE: 100000 POWDER TOPICAL at 08:27

## 2022-02-22 RX ADMIN — PROBIOTIC PRODUCT - TAB SCH EA: TAB at 14:33

## 2022-02-22 RX ADMIN — ACETAMINOPHEN PRN MG: 325 TABLET ORAL at 20:44

## 2022-02-22 RX ADMIN — CAPSAICIN SCH DOSE: 0.25 CREAM TOPICAL at 08:27

## 2022-02-22 RX ADMIN — FAMOTIDINE SCH MG: 20 TABLET, FILM COATED ORAL at 20:45

## 2022-02-22 RX ADMIN — SUCRALFATE SCH GM: 1 SUSPENSION ORAL at 08:26

## 2022-02-23 RX ADMIN — FLECAINIDE ACETATE SCH MG: 50 TABLET ORAL at 10:46

## 2022-02-23 RX ADMIN — FLECAINIDE ACETATE SCH MG: 50 TABLET ORAL at 21:09

## 2022-02-23 RX ADMIN — FAMOTIDINE SCH MG: 20 TABLET, FILM COATED ORAL at 10:48

## 2022-02-23 RX ADMIN — MULTIPLE VITAMINS W/ MINERALS TAB SCH TAB: TAB at 10:47

## 2022-02-23 RX ADMIN — ACETAMINOPHEN PRN MG: 325 TABLET ORAL at 16:59

## 2022-02-23 RX ADMIN — NYSTATIN SCH DOSE: 100000 POWDER TOPICAL at 21:11

## 2022-02-23 RX ADMIN — SUCRALFATE SCH GM: 1 SUSPENSION ORAL at 21:11

## 2022-02-23 RX ADMIN — PROBIOTIC PRODUCT - TAB SCH EA: TAB at 10:47

## 2022-02-23 RX ADMIN — ASPIRIN 81 MG CHEWABLE TABLET SCH MG: 81 TABLET CHEWABLE at 10:46

## 2022-02-23 RX ADMIN — SODIUM BICARBONATE SCH MG: 325 TABLET ORAL at 14:55

## 2022-02-23 RX ADMIN — APIXABAN SCH MG: 5 TABLET, FILM COATED ORAL at 21:11

## 2022-02-23 RX ADMIN — NYSTATIN SCH DOSE: 100000 POWDER TOPICAL at 10:48

## 2022-02-23 RX ADMIN — SODIUM BICARBONATE SCH MG: 325 TABLET ORAL at 16:58

## 2022-02-23 RX ADMIN — PROBIOTIC PRODUCT - TAB SCH EA: TAB at 21:11

## 2022-02-23 RX ADMIN — SODIUM BICARBONATE SCH MG: 325 TABLET ORAL at 10:47

## 2022-02-23 RX ADMIN — NYSTATIN SCH DOSE: 100000 POWDER TOPICAL at 14:58

## 2022-02-23 RX ADMIN — SUCRALFATE SCH GM: 1 SUSPENSION ORAL at 10:47

## 2022-02-23 RX ADMIN — BUPROPION HYDROCHLORIDE SCH MG: 150 TABLET, FILM COATED, EXTENDED RELEASE ORAL at 10:48

## 2022-02-23 RX ADMIN — ATORVASTATIN CALCIUM SCH MG: 20 TABLET, FILM COATED ORAL at 21:11

## 2022-02-23 RX ADMIN — APIXABAN SCH MG: 5 TABLET, FILM COATED ORAL at 10:47

## 2022-02-23 RX ADMIN — FAMOTIDINE SCH MG: 20 TABLET, FILM COATED ORAL at 21:11

## 2022-02-23 RX ADMIN — PROBIOTIC PRODUCT - TAB SCH EA: TAB at 14:55

## 2022-02-23 RX ADMIN — SODIUM BICARBONATE SCH MG: 325 TABLET ORAL at 21:09

## 2022-02-23 RX ADMIN — CAPSAICIN SCH DOSE: 0.25 CREAM TOPICAL at 21:12

## 2022-02-23 RX ADMIN — ACETAMINOPHEN PRN MG: 325 TABLET ORAL at 10:47

## 2022-02-23 RX ADMIN — DIGOXIN SCH MG: 125 TABLET ORAL at 09:00

## 2022-02-23 RX ADMIN — PROBIOTIC PRODUCT - TAB SCH EA: TAB at 16:58

## 2022-02-23 RX ADMIN — CAPSAICIN SCH DOSE: 0.25 CREAM TOPICAL at 14:58

## 2022-02-23 RX ADMIN — CAPSAICIN SCH DOSE: 0.25 CREAM TOPICAL at 10:49

## 2022-02-24 RX ADMIN — PROBIOTIC PRODUCT - TAB SCH EA: TAB at 22:26

## 2022-02-24 RX ADMIN — APIXABAN SCH MG: 5 TABLET, FILM COATED ORAL at 22:25

## 2022-02-24 RX ADMIN — CAPSAICIN SCH DOSE: 0.25 CREAM TOPICAL at 09:45

## 2022-02-24 RX ADMIN — CAPSAICIN SCH DOSE: 0.25 CREAM TOPICAL at 22:27

## 2022-02-24 RX ADMIN — MULTIPLE VITAMINS W/ MINERALS TAB SCH TAB: TAB at 09:40

## 2022-02-24 RX ADMIN — APIXABAN SCH MG: 5 TABLET, FILM COATED ORAL at 09:41

## 2022-02-24 RX ADMIN — SODIUM BICARBONATE SCH MG: 325 TABLET ORAL at 17:41

## 2022-02-24 RX ADMIN — ATORVASTATIN CALCIUM SCH MG: 20 TABLET, FILM COATED ORAL at 22:26

## 2022-02-24 RX ADMIN — SUCRALFATE SCH GM: 1 SUSPENSION ORAL at 22:22

## 2022-02-24 RX ADMIN — PROBIOTIC PRODUCT - TAB SCH EA: TAB at 13:48

## 2022-02-24 RX ADMIN — FAMOTIDINE SCH MG: 20 TABLET, FILM COATED ORAL at 22:26

## 2022-02-24 RX ADMIN — SODIUM BICARBONATE SCH MG: 325 TABLET ORAL at 13:48

## 2022-02-24 RX ADMIN — FAMOTIDINE SCH MG: 20 TABLET, FILM COATED ORAL at 09:44

## 2022-02-24 RX ADMIN — SUCRALFATE SCH GM: 1 SUSPENSION ORAL at 09:45

## 2022-02-24 RX ADMIN — NYSTATIN SCH DOSE: 100000 POWDER TOPICAL at 22:27

## 2022-02-24 RX ADMIN — FLECAINIDE ACETATE SCH MG: 50 TABLET ORAL at 09:41

## 2022-02-24 RX ADMIN — ASPIRIN 81 MG CHEWABLE TABLET SCH MG: 81 TABLET CHEWABLE at 09:40

## 2022-02-24 RX ADMIN — BUPROPION HYDROCHLORIDE SCH MG: 150 TABLET, FILM COATED, EXTENDED RELEASE ORAL at 09:46

## 2022-02-24 RX ADMIN — PROBIOTIC PRODUCT - TAB SCH EA: TAB at 17:41

## 2022-02-24 RX ADMIN — CAPSAICIN SCH DOSE: 0.25 CREAM TOPICAL at 17:42

## 2022-02-24 RX ADMIN — NYSTATIN SCH DOSE: 100000 POWDER TOPICAL at 17:42

## 2022-02-24 RX ADMIN — SODIUM BICARBONATE SCH MG: 325 TABLET ORAL at 22:22

## 2022-02-24 RX ADMIN — DIGOXIN SCH MG: 125 TABLET ORAL at 09:00

## 2022-02-24 RX ADMIN — SODIUM BICARBONATE SCH MG: 325 TABLET ORAL at 09:40

## 2022-02-24 RX ADMIN — NYSTATIN SCH DOSE: 100000 POWDER TOPICAL at 09:45

## 2022-02-24 RX ADMIN — PROBIOTIC PRODUCT - TAB SCH EA: TAB at 09:40

## 2022-02-24 RX ADMIN — FLECAINIDE ACETATE SCH MG: 50 TABLET ORAL at 22:22

## 2022-02-25 VITALS — DIASTOLIC BLOOD PRESSURE: 72 MMHG | SYSTOLIC BLOOD PRESSURE: 148 MMHG

## 2022-02-25 RX ADMIN — CAPSAICIN SCH DOSE: 0.25 CREAM TOPICAL at 08:24

## 2022-02-25 RX ADMIN — SUCRALFATE SCH GM: 1 SUSPENSION ORAL at 20:31

## 2022-02-25 RX ADMIN — ATORVASTATIN CALCIUM SCH MG: 20 TABLET, FILM COATED ORAL at 20:35

## 2022-02-25 RX ADMIN — FAMOTIDINE SCH MG: 20 TABLET, FILM COATED ORAL at 08:21

## 2022-02-25 RX ADMIN — CAPSAICIN SCH DOSE: 0.25 CREAM TOPICAL at 17:15

## 2022-02-25 RX ADMIN — NYSTATIN SCH DOSE: 100000 POWDER TOPICAL at 08:24

## 2022-02-25 RX ADMIN — SODIUM BICARBONATE SCH MG: 325 TABLET ORAL at 13:28

## 2022-02-25 RX ADMIN — PROBIOTIC PRODUCT - TAB SCH EA: TAB at 17:13

## 2022-02-25 RX ADMIN — ASPIRIN 81 MG CHEWABLE TABLET SCH MG: 81 TABLET CHEWABLE at 08:19

## 2022-02-25 RX ADMIN — DIGOXIN SCH MG: 125 TABLET ORAL at 08:21

## 2022-02-25 RX ADMIN — NYSTATIN SCH DOSE: 100000 POWDER TOPICAL at 20:35

## 2022-02-25 RX ADMIN — MULTIPLE VITAMINS W/ MINERALS TAB SCH TAB: TAB at 08:24

## 2022-02-25 RX ADMIN — PROBIOTIC PRODUCT - TAB SCH EA: TAB at 13:28

## 2022-02-25 RX ADMIN — SUCRALFATE SCH GM: 1 SUSPENSION ORAL at 08:24

## 2022-02-25 RX ADMIN — SODIUM BICARBONATE SCH MG: 325 TABLET ORAL at 17:14

## 2022-02-25 RX ADMIN — PROBIOTIC PRODUCT - TAB SCH EA: TAB at 08:19

## 2022-02-25 RX ADMIN — APIXABAN SCH MG: 5 TABLET, FILM COATED ORAL at 20:35

## 2022-02-25 RX ADMIN — PROBIOTIC PRODUCT - TAB SCH EA: TAB at 20:51

## 2022-02-25 RX ADMIN — FLECAINIDE ACETATE SCH MG: 50 TABLET ORAL at 08:19

## 2022-02-25 RX ADMIN — BUPROPION HYDROCHLORIDE SCH MG: 150 TABLET, FILM COATED, EXTENDED RELEASE ORAL at 08:18

## 2022-02-25 RX ADMIN — APIXABAN SCH MG: 5 TABLET, FILM COATED ORAL at 08:20

## 2022-02-25 RX ADMIN — NYSTATIN SCH DOSE: 100000 POWDER TOPICAL at 17:14

## 2022-02-25 RX ADMIN — FLECAINIDE ACETATE SCH MG: 50 TABLET ORAL at 20:34

## 2022-02-25 RX ADMIN — SODIUM BICARBONATE SCH MG: 325 TABLET ORAL at 20:31

## 2022-02-25 RX ADMIN — SODIUM BICARBONATE SCH MG: 325 TABLET ORAL at 08:24

## 2022-02-25 RX ADMIN — CAPSAICIN SCH DOSE: 0.25 CREAM TOPICAL at 20:36

## 2022-02-25 RX ADMIN — FAMOTIDINE SCH MG: 20 TABLET, FILM COATED ORAL at 20:34

## 2022-02-26 VITALS — DIASTOLIC BLOOD PRESSURE: 75 MMHG | SYSTOLIC BLOOD PRESSURE: 161 MMHG

## 2022-02-26 RX ADMIN — PROBIOTIC PRODUCT - TAB SCH EA: TAB at 21:43

## 2022-02-26 RX ADMIN — FLECAINIDE ACETATE SCH MG: 50 TABLET ORAL at 12:28

## 2022-02-26 RX ADMIN — CAPSAICIN SCH DOSE: 0.25 CREAM TOPICAL at 21:45

## 2022-02-26 RX ADMIN — FLECAINIDE ACETATE SCH MG: 50 TABLET ORAL at 21:43

## 2022-02-26 RX ADMIN — CAPSAICIN SCH DOSE: 0.25 CREAM TOPICAL at 09:00

## 2022-02-26 RX ADMIN — SODIUM BICARBONATE SCH MG: 325 TABLET ORAL at 12:27

## 2022-02-26 RX ADMIN — SODIUM BICARBONATE SCH MG: 325 TABLET ORAL at 21:48

## 2022-02-26 RX ADMIN — SUCRALFATE SCH GM: 1 SUSPENSION ORAL at 12:25

## 2022-02-26 RX ADMIN — FAMOTIDINE SCH MG: 20 TABLET, FILM COATED ORAL at 12:30

## 2022-02-26 RX ADMIN — PROBIOTIC PRODUCT - TAB SCH EA: TAB at 08:00

## 2022-02-26 RX ADMIN — ATORVASTATIN CALCIUM SCH MG: 20 TABLET, FILM COATED ORAL at 21:43

## 2022-02-26 RX ADMIN — ASPIRIN 81 MG CHEWABLE TABLET SCH MG: 81 TABLET CHEWABLE at 12:25

## 2022-02-26 RX ADMIN — SODIUM BICARBONATE SCH MG: 325 TABLET ORAL at 09:00

## 2022-02-26 RX ADMIN — BUPROPION HYDROCHLORIDE SCH MG: 150 TABLET, FILM COATED, EXTENDED RELEASE ORAL at 12:28

## 2022-02-26 RX ADMIN — NYSTATIN SCH DOSE: 100000 POWDER TOPICAL at 09:00

## 2022-02-26 RX ADMIN — CAPSAICIN SCH DOSE: 0.25 CREAM TOPICAL at 17:51

## 2022-02-26 RX ADMIN — PROBIOTIC PRODUCT - TAB SCH EA: TAB at 17:50

## 2022-02-26 RX ADMIN — DIGOXIN SCH MG: 125 TABLET ORAL at 12:29

## 2022-02-26 RX ADMIN — APIXABAN SCH MG: 5 TABLET, FILM COATED ORAL at 12:30

## 2022-02-26 RX ADMIN — SUCRALFATE SCH GM: 1 SUSPENSION ORAL at 21:42

## 2022-02-26 RX ADMIN — NYSTATIN SCH DOSE: 100000 POWDER TOPICAL at 17:50

## 2022-02-26 RX ADMIN — APIXABAN SCH MG: 5 TABLET, FILM COATED ORAL at 21:43

## 2022-02-26 RX ADMIN — NYSTATIN SCH DOSE: 100000 POWDER TOPICAL at 21:44

## 2022-02-26 RX ADMIN — FAMOTIDINE SCH MG: 20 TABLET, FILM COATED ORAL at 21:44

## 2022-02-26 RX ADMIN — PROBIOTIC PRODUCT - TAB SCH EA: TAB at 12:30

## 2022-02-26 RX ADMIN — MULTIPLE VITAMINS W/ MINERALS TAB SCH TAB: TAB at 12:28

## 2022-02-26 RX ADMIN — SODIUM BICARBONATE SCH MG: 325 TABLET ORAL at 17:50

## 2022-02-27 VITALS — SYSTOLIC BLOOD PRESSURE: 135 MMHG | DIASTOLIC BLOOD PRESSURE: 77 MMHG

## 2022-02-27 LAB
BASOPHILS # BLD AUTO: 0 10^3/UL (ref 0–0.2)
BASOPHILS NFR BLD AUTO: 0.7 % (ref 0–1)
BUN SERPL-MCNC: 22 MG/DL (ref 7–18)
CALCIUM SERPL-MCNC: 10.5 MG/DL (ref 8.8–10.2)
CHLORIDE SERPL-SCNC: 108 MEQ/L (ref 98–107)
CO2 SERPL-SCNC: 28 MEQ/L (ref 21–32)
CREAT SERPL-MCNC: 0.81 MG/DL (ref 0.55–1.3)
EOSINOPHIL # BLD AUTO: 0.1 10^3/UL (ref 0–0.5)
EOSINOPHIL NFR BLD AUTO: 2.7 % (ref 0–3)
GFR SERPL CREATININE-BSD FRML MDRD: > 60 ML/MIN/{1.73_M2} (ref 45–?)
GLUCOSE SERPL-MCNC: 100 MG/DL (ref 70–100)
HCT VFR BLD AUTO: 33.6 % (ref 36–47)
HGB BLD-MCNC: 10.6 G/DL (ref 12–15.5)
LYMPHOCYTES # BLD AUTO: 0.9 10^3/UL (ref 1.5–5)
LYMPHOCYTES NFR BLD AUTO: 20.5 % (ref 24–44)
MCH RBC QN AUTO: 30.7 PG (ref 27–33)
MCHC RBC AUTO-ENTMCNC: 31.5 G/DL (ref 32–36.5)
MCV RBC AUTO: 97.4 FL (ref 80–96)
MONOCYTES # BLD AUTO: 0.4 10^3/UL (ref 0–0.8)
MONOCYTES NFR BLD AUTO: 8.9 % (ref 2–8)
NEUTROPHILS # BLD AUTO: 2.8 10^3/UL (ref 1.5–8.5)
NEUTROPHILS NFR BLD AUTO: 67 % (ref 36–66)
PLATELET # BLD AUTO: 194 10^3/UL (ref 150–450)
POTASSIUM SERPL-SCNC: 4.1 MEQ/L (ref 3.5–5.1)
RBC # BLD AUTO: 3.45 10^6/UL (ref 4–5.4)
SODIUM SERPL-SCNC: 142 MEQ/L (ref 136–145)
WBC # BLD AUTO: 4.2 10^3/UL (ref 4–10)

## 2022-02-27 RX ADMIN — CAPSAICIN SCH DOSE: 0.25 CREAM TOPICAL at 15:48

## 2022-02-27 RX ADMIN — SODIUM BICARBONATE SCH MG: 325 TABLET ORAL at 21:39

## 2022-02-27 RX ADMIN — FAMOTIDINE SCH MG: 20 TABLET, FILM COATED ORAL at 09:08

## 2022-02-27 RX ADMIN — APIXABAN SCH MG: 5 TABLET, FILM COATED ORAL at 21:39

## 2022-02-27 RX ADMIN — ACETAMINOPHEN PRN MG: 325 TABLET ORAL at 18:14

## 2022-02-27 RX ADMIN — FLECAINIDE ACETATE SCH MG: 50 TABLET ORAL at 09:08

## 2022-02-27 RX ADMIN — ASPIRIN 81 MG CHEWABLE TABLET SCH MG: 81 TABLET CHEWABLE at 09:08

## 2022-02-27 RX ADMIN — PROBIOTIC PRODUCT - TAB SCH EA: TAB at 21:39

## 2022-02-27 RX ADMIN — SUCRALFATE SCH GM: 1 SUSPENSION ORAL at 09:07

## 2022-02-27 RX ADMIN — FLECAINIDE ACETATE SCH MG: 50 TABLET ORAL at 21:39

## 2022-02-27 RX ADMIN — ATORVASTATIN CALCIUM SCH MG: 20 TABLET, FILM COATED ORAL at 21:39

## 2022-02-27 RX ADMIN — SODIUM BICARBONATE SCH MG: 325 TABLET ORAL at 12:42

## 2022-02-27 RX ADMIN — PROBIOTIC PRODUCT - TAB SCH EA: TAB at 18:12

## 2022-02-27 RX ADMIN — NYSTATIN SCH DOSE: 100000 POWDER TOPICAL at 15:48

## 2022-02-27 RX ADMIN — APIXABAN SCH MG: 5 TABLET, FILM COATED ORAL at 09:08

## 2022-02-27 RX ADMIN — SODIUM BICARBONATE SCH MG: 325 TABLET ORAL at 18:12

## 2022-02-27 RX ADMIN — BUPROPION HYDROCHLORIDE SCH MG: 150 TABLET, FILM COATED, EXTENDED RELEASE ORAL at 09:08

## 2022-02-27 RX ADMIN — CAPSAICIN SCH DOSE: 0.25 CREAM TOPICAL at 21:41

## 2022-02-27 RX ADMIN — MULTIPLE VITAMINS W/ MINERALS TAB SCH TAB: TAB at 09:08

## 2022-02-27 RX ADMIN — NYSTATIN SCH DOSE: 100000 POWDER TOPICAL at 09:10

## 2022-02-27 RX ADMIN — SUCRALFATE SCH GM: 1 SUSPENSION ORAL at 21:40

## 2022-02-27 RX ADMIN — NYSTATIN SCH DOSE: 100000 POWDER TOPICAL at 21:40

## 2022-02-27 RX ADMIN — DIGOXIN SCH MG: 125 TABLET ORAL at 09:00

## 2022-02-27 RX ADMIN — CAPSAICIN SCH DOSE: 0.25 CREAM TOPICAL at 09:11

## 2022-02-27 RX ADMIN — PROBIOTIC PRODUCT - TAB SCH EA: TAB at 09:08

## 2022-02-27 RX ADMIN — SODIUM BICARBONATE SCH MG: 325 TABLET ORAL at 09:08

## 2022-02-27 RX ADMIN — FAMOTIDINE SCH MG: 20 TABLET, FILM COATED ORAL at 21:39

## 2022-02-27 RX ADMIN — PROBIOTIC PRODUCT - TAB SCH EA: TAB at 12:42

## 2022-02-28 VITALS — SYSTOLIC BLOOD PRESSURE: 147 MMHG | DIASTOLIC BLOOD PRESSURE: 75 MMHG

## 2022-02-28 RX ADMIN — PROBIOTIC PRODUCT - TAB SCH EA: TAB at 12:34

## 2022-02-28 RX ADMIN — ACETAMINOPHEN PRN MG: 325 TABLET ORAL at 15:22

## 2022-02-28 RX ADMIN — NYSTATIN SCH DOSE: 100000 POWDER TOPICAL at 21:42

## 2022-02-28 RX ADMIN — NYSTATIN SCH DOSE: 100000 POWDER TOPICAL at 08:34

## 2022-02-28 RX ADMIN — APIXABAN SCH MG: 5 TABLET, FILM COATED ORAL at 21:47

## 2022-02-28 RX ADMIN — FLECAINIDE ACETATE SCH MG: 50 TABLET ORAL at 08:33

## 2022-02-28 RX ADMIN — FLECAINIDE ACETATE SCH MG: 50 TABLET ORAL at 21:42

## 2022-02-28 RX ADMIN — CAPSAICIN SCH DOSE: 0.25 CREAM TOPICAL at 08:34

## 2022-02-28 RX ADMIN — SODIUM BICARBONATE SCH MG: 325 TABLET ORAL at 15:21

## 2022-02-28 RX ADMIN — MULTIPLE VITAMINS W/ MINERALS TAB SCH TAB: TAB at 08:32

## 2022-02-28 RX ADMIN — PROBIOTIC PRODUCT - TAB SCH EA: TAB at 08:32

## 2022-02-28 RX ADMIN — FAMOTIDINE SCH MG: 20 TABLET, FILM COATED ORAL at 08:33

## 2022-02-28 RX ADMIN — SODIUM BICARBONATE SCH MG: 325 TABLET ORAL at 12:34

## 2022-02-28 RX ADMIN — FAMOTIDINE SCH MG: 20 TABLET, FILM COATED ORAL at 21:42

## 2022-02-28 RX ADMIN — BUPROPION HYDROCHLORIDE SCH MG: 150 TABLET, FILM COATED, EXTENDED RELEASE ORAL at 08:31

## 2022-02-28 RX ADMIN — SUCRALFATE SCH GM: 1 SUSPENSION ORAL at 21:42

## 2022-02-28 RX ADMIN — DIGOXIN SCH MG: 125 TABLET ORAL at 08:33

## 2022-02-28 RX ADMIN — PROBIOTIC PRODUCT - TAB SCH EA: TAB at 18:00

## 2022-02-28 RX ADMIN — APIXABAN SCH MG: 5 TABLET, FILM COATED ORAL at 08:31

## 2022-02-28 RX ADMIN — SUCRALFATE SCH GM: 1 SUSPENSION ORAL at 08:31

## 2022-02-28 RX ADMIN — CAPSAICIN SCH DOSE: 0.25 CREAM TOPICAL at 21:42

## 2022-02-28 RX ADMIN — CAPSAICIN SCH DOSE: 0.25 CREAM TOPICAL at 15:22

## 2022-02-28 RX ADMIN — NYSTATIN SCH DOSE: 100000 POWDER TOPICAL at 15:22

## 2022-02-28 RX ADMIN — SODIUM BICARBONATE SCH MG: 325 TABLET ORAL at 08:32

## 2022-02-28 RX ADMIN — ATORVASTATIN CALCIUM SCH MG: 20 TABLET, FILM COATED ORAL at 21:42

## 2022-02-28 RX ADMIN — PROBIOTIC PRODUCT - TAB SCH EA: TAB at 21:42

## 2022-02-28 RX ADMIN — ASPIRIN 81 MG CHEWABLE TABLET SCH MG: 81 TABLET CHEWABLE at 08:32

## 2022-02-28 RX ADMIN — SODIUM BICARBONATE SCH MG: 325 TABLET ORAL at 21:44

## 2022-03-01 RX ADMIN — FLECAINIDE ACETATE SCH MG: 50 TABLET ORAL at 21:09

## 2022-03-01 RX ADMIN — NYSTATIN SCH DOSE: 100000 POWDER TOPICAL at 10:20

## 2022-03-01 RX ADMIN — PROBIOTIC PRODUCT - TAB SCH EA: TAB at 21:08

## 2022-03-01 RX ADMIN — SODIUM BICARBONATE SCH MG: 325 TABLET ORAL at 16:53

## 2022-03-01 RX ADMIN — PROBIOTIC PRODUCT - TAB SCH EA: TAB at 16:52

## 2022-03-01 RX ADMIN — PROBIOTIC PRODUCT - TAB SCH EA: TAB at 14:09

## 2022-03-01 RX ADMIN — SODIUM BICARBONATE SCH MG: 325 TABLET ORAL at 14:09

## 2022-03-01 RX ADMIN — CAPSAICIN SCH DOSE: 0.25 CREAM TOPICAL at 10:20

## 2022-03-01 RX ADMIN — PROBIOTIC PRODUCT - TAB SCH EA: TAB at 10:44

## 2022-03-01 RX ADMIN — SODIUM BICARBONATE SCH MG: 325 TABLET ORAL at 10:21

## 2022-03-01 RX ADMIN — DIGOXIN SCH MG: 125 TABLET ORAL at 10:20

## 2022-03-01 RX ADMIN — BUPROPION HYDROCHLORIDE SCH MG: 150 TABLET, FILM COATED, EXTENDED RELEASE ORAL at 10:21

## 2022-03-01 RX ADMIN — SODIUM BICARBONATE SCH MG: 325 TABLET ORAL at 21:08

## 2022-03-01 RX ADMIN — SUCRALFATE SCH GM: 1 SUSPENSION ORAL at 10:20

## 2022-03-01 RX ADMIN — APIXABAN SCH MG: 5 TABLET, FILM COATED ORAL at 21:08

## 2022-03-01 RX ADMIN — SUCRALFATE SCH GM: 1 SUSPENSION ORAL at 21:08

## 2022-03-01 RX ADMIN — CAPSAICIN SCH DOSE: 0.25 CREAM TOPICAL at 16:52

## 2022-03-01 RX ADMIN — MULTIPLE VITAMINS W/ MINERALS TAB SCH TAB: TAB at 10:21

## 2022-03-01 RX ADMIN — FLECAINIDE ACETATE SCH MG: 50 TABLET ORAL at 10:23

## 2022-03-01 RX ADMIN — FAMOTIDINE SCH MG: 20 TABLET, FILM COATED ORAL at 21:09

## 2022-03-01 RX ADMIN — ATORVASTATIN CALCIUM SCH MG: 20 TABLET, FILM COATED ORAL at 21:08

## 2022-03-01 RX ADMIN — NYSTATIN SCH DOSE: 100000 POWDER TOPICAL at 21:10

## 2022-03-01 RX ADMIN — FAMOTIDINE SCH MG: 20 TABLET, FILM COATED ORAL at 10:23

## 2022-03-01 RX ADMIN — NYSTATIN SCH DOSE: 100000 POWDER TOPICAL at 16:52

## 2022-03-01 RX ADMIN — APIXABAN SCH MG: 5 TABLET, FILM COATED ORAL at 10:23

## 2022-03-01 RX ADMIN — CAPSAICIN SCH DOSE: 0.25 CREAM TOPICAL at 21:10

## 2022-03-01 RX ADMIN — ASPIRIN 81 MG CHEWABLE TABLET SCH MG: 81 TABLET CHEWABLE at 10:21

## 2022-03-02 RX ADMIN — DIGOXIN SCH MG: 125 TABLET ORAL at 09:14

## 2022-03-02 RX ADMIN — SUCRALFATE SCH GM: 1 SUSPENSION ORAL at 21:07

## 2022-03-02 RX ADMIN — APIXABAN SCH MG: 5 TABLET, FILM COATED ORAL at 21:08

## 2022-03-02 RX ADMIN — PROBIOTIC PRODUCT - TAB SCH EA: TAB at 09:09

## 2022-03-02 RX ADMIN — FAMOTIDINE SCH MG: 20 TABLET, FILM COATED ORAL at 21:08

## 2022-03-02 RX ADMIN — NYSTATIN SCH DOSE: 100000 POWDER TOPICAL at 17:32

## 2022-03-02 RX ADMIN — MULTIPLE VITAMINS W/ MINERALS TAB SCH TAB: TAB at 09:14

## 2022-03-02 RX ADMIN — FLECAINIDE ACETATE SCH MG: 50 TABLET ORAL at 21:08

## 2022-03-02 RX ADMIN — CAPSAICIN SCH DOSE: 0.25 CREAM TOPICAL at 09:15

## 2022-03-02 RX ADMIN — SODIUM BICARBONATE SCH MG: 325 TABLET ORAL at 21:07

## 2022-03-02 RX ADMIN — FAMOTIDINE SCH MG: 20 TABLET, FILM COATED ORAL at 09:09

## 2022-03-02 RX ADMIN — PROBIOTIC PRODUCT - TAB SCH EA: TAB at 12:50

## 2022-03-02 RX ADMIN — SODIUM BICARBONATE SCH MG: 325 TABLET ORAL at 09:09

## 2022-03-02 RX ADMIN — ASPIRIN 81 MG CHEWABLE TABLET SCH MG: 81 TABLET CHEWABLE at 09:14

## 2022-03-02 RX ADMIN — SODIUM BICARBONATE SCH MG: 325 TABLET ORAL at 12:50

## 2022-03-02 RX ADMIN — SODIUM BICARBONATE SCH MG: 325 TABLET ORAL at 17:31

## 2022-03-02 RX ADMIN — SUCRALFATE SCH GM: 1 SUSPENSION ORAL at 09:08

## 2022-03-02 RX ADMIN — CAPSAICIN SCH DOSE: 0.25 CREAM TOPICAL at 21:09

## 2022-03-02 RX ADMIN — APIXABAN SCH MG: 5 TABLET, FILM COATED ORAL at 09:13

## 2022-03-02 RX ADMIN — NYSTATIN SCH DOSE: 100000 POWDER TOPICAL at 21:09

## 2022-03-02 RX ADMIN — FLECAINIDE ACETATE SCH MG: 50 TABLET ORAL at 09:09

## 2022-03-02 RX ADMIN — ATORVASTATIN CALCIUM SCH MG: 20 TABLET, FILM COATED ORAL at 21:08

## 2022-03-02 RX ADMIN — NYSTATIN SCH DOSE: 100000 POWDER TOPICAL at 09:15

## 2022-03-02 RX ADMIN — PROBIOTIC PRODUCT - TAB SCH EA: TAB at 21:08

## 2022-03-02 RX ADMIN — CAPSAICIN SCH DOSE: 0.25 CREAM TOPICAL at 17:33

## 2022-03-02 RX ADMIN — PROBIOTIC PRODUCT - TAB SCH EA: TAB at 17:31

## 2022-03-02 RX ADMIN — BUPROPION HYDROCHLORIDE SCH MG: 150 TABLET, FILM COATED, EXTENDED RELEASE ORAL at 09:09

## 2022-03-03 RX ADMIN — FLECAINIDE ACETATE SCH MG: 50 TABLET ORAL at 09:00

## 2022-03-03 RX ADMIN — CAPSAICIN SCH DOSE: 0.25 CREAM TOPICAL at 16:51

## 2022-03-03 RX ADMIN — SODIUM BICARBONATE SCH MG: 325 TABLET ORAL at 08:59

## 2022-03-03 RX ADMIN — CAPSAICIN SCH DOSE: 0.25 CREAM TOPICAL at 09:08

## 2022-03-03 RX ADMIN — NYSTATIN SCH DOSE: 100000 POWDER TOPICAL at 09:07

## 2022-03-03 RX ADMIN — PROBIOTIC PRODUCT - TAB SCH EA: TAB at 20:52

## 2022-03-03 RX ADMIN — PROBIOTIC PRODUCT - TAB SCH EA: TAB at 14:53

## 2022-03-03 RX ADMIN — FLECAINIDE ACETATE SCH MG: 50 TABLET ORAL at 20:53

## 2022-03-03 RX ADMIN — NYSTATIN SCH DOSE: 100000 POWDER TOPICAL at 16:51

## 2022-03-03 RX ADMIN — BUPROPION HYDROCHLORIDE SCH MG: 150 TABLET, FILM COATED, EXTENDED RELEASE ORAL at 08:59

## 2022-03-03 RX ADMIN — SODIUM BICARBONATE SCH MG: 325 TABLET ORAL at 13:00

## 2022-03-03 RX ADMIN — FAMOTIDINE SCH MG: 20 TABLET, FILM COATED ORAL at 20:53

## 2022-03-03 RX ADMIN — SUCRALFATE SCH GM: 1 SUSPENSION ORAL at 20:53

## 2022-03-03 RX ADMIN — APIXABAN SCH MG: 5 TABLET, FILM COATED ORAL at 09:00

## 2022-03-03 RX ADMIN — PROBIOTIC PRODUCT - TAB SCH EA: TAB at 16:50

## 2022-03-03 RX ADMIN — NYSTATIN SCH DOSE: 100000 POWDER TOPICAL at 20:54

## 2022-03-03 RX ADMIN — ATORVASTATIN CALCIUM SCH MG: 20 TABLET, FILM COATED ORAL at 20:53

## 2022-03-03 RX ADMIN — FAMOTIDINE SCH MG: 20 TABLET, FILM COATED ORAL at 09:00

## 2022-03-03 RX ADMIN — CAPSAICIN SCH DOSE: 0.25 CREAM TOPICAL at 20:54

## 2022-03-03 RX ADMIN — DIGOXIN SCH MG: 125 TABLET ORAL at 09:00

## 2022-03-03 RX ADMIN — APIXABAN SCH MG: 5 TABLET, FILM COATED ORAL at 20:53

## 2022-03-03 RX ADMIN — PROBIOTIC PRODUCT - TAB SCH EA: TAB at 08:58

## 2022-03-03 RX ADMIN — SODIUM BICARBONATE SCH MG: 325 TABLET ORAL at 16:50

## 2022-03-03 RX ADMIN — SUCRALFATE SCH GM: 1 SUSPENSION ORAL at 08:58

## 2022-03-03 RX ADMIN — ASPIRIN 81 MG CHEWABLE TABLET SCH MG: 81 TABLET CHEWABLE at 08:59

## 2022-03-03 RX ADMIN — SODIUM BICARBONATE SCH MG: 325 TABLET ORAL at 20:53

## 2022-03-03 RX ADMIN — MULTIPLE VITAMINS W/ MINERALS TAB SCH TAB: TAB at 08:58

## 2022-03-04 VITALS — SYSTOLIC BLOOD PRESSURE: 146 MMHG | DIASTOLIC BLOOD PRESSURE: 67 MMHG

## 2022-03-04 RX ADMIN — NYSTATIN SCH DOSE: 100000 POWDER TOPICAL at 21:22

## 2022-03-04 RX ADMIN — NYSTATIN SCH DOSE: 100000 POWDER TOPICAL at 15:40

## 2022-03-04 RX ADMIN — ASPIRIN 81 MG CHEWABLE TABLET SCH MG: 81 TABLET CHEWABLE at 08:08

## 2022-03-04 RX ADMIN — MULTIPLE VITAMINS W/ MINERALS TAB SCH TAB: TAB at 08:14

## 2022-03-04 RX ADMIN — FLECAINIDE ACETATE SCH MG: 50 TABLET ORAL at 21:20

## 2022-03-04 RX ADMIN — SODIUM BICARBONATE SCH MG: 325 TABLET ORAL at 08:15

## 2022-03-04 RX ADMIN — ATORVASTATIN CALCIUM SCH MG: 20 TABLET, FILM COATED ORAL at 21:20

## 2022-03-04 RX ADMIN — BUPROPION HYDROCHLORIDE SCH MG: 150 TABLET, FILM COATED, EXTENDED RELEASE ORAL at 08:12

## 2022-03-04 RX ADMIN — FAMOTIDINE SCH MG: 20 TABLET, FILM COATED ORAL at 21:20

## 2022-03-04 RX ADMIN — PROBIOTIC PRODUCT - TAB SCH EA: TAB at 12:46

## 2022-03-04 RX ADMIN — SODIUM BICARBONATE SCH MG: 325 TABLET ORAL at 15:40

## 2022-03-04 RX ADMIN — ACETAMINOPHEN PRN MG: 325 TABLET ORAL at 12:46

## 2022-03-04 RX ADMIN — CAPSAICIN SCH DOSE: 0.25 CREAM TOPICAL at 21:22

## 2022-03-04 RX ADMIN — PROBIOTIC PRODUCT - TAB SCH EA: TAB at 17:22

## 2022-03-04 RX ADMIN — PROBIOTIC PRODUCT - TAB SCH EA: TAB at 21:20

## 2022-03-04 RX ADMIN — APIXABAN SCH MG: 5 TABLET, FILM COATED ORAL at 21:20

## 2022-03-04 RX ADMIN — DIGOXIN SCH MG: 125 TABLET ORAL at 08:15

## 2022-03-04 RX ADMIN — SUCRALFATE SCH GM: 1 SUSPENSION ORAL at 21:20

## 2022-03-04 RX ADMIN — CAPSAICIN SCH DOSE: 0.25 CREAM TOPICAL at 08:14

## 2022-03-04 RX ADMIN — SUCRALFATE SCH GM: 1 SUSPENSION ORAL at 08:08

## 2022-03-04 RX ADMIN — PROBIOTIC PRODUCT - TAB SCH EA: TAB at 08:08

## 2022-03-04 RX ADMIN — CAPSAICIN SCH DOSE: 0.25 CREAM TOPICAL at 15:40

## 2022-03-04 RX ADMIN — APIXABAN SCH MG: 5 TABLET, FILM COATED ORAL at 08:14

## 2022-03-04 RX ADMIN — SODIUM BICARBONATE SCH MG: 325 TABLET ORAL at 12:46

## 2022-03-04 RX ADMIN — NYSTATIN SCH DOSE: 100000 POWDER TOPICAL at 08:13

## 2022-03-04 RX ADMIN — FLECAINIDE ACETATE SCH MG: 50 TABLET ORAL at 08:15

## 2022-03-04 RX ADMIN — SODIUM BICARBONATE SCH MG: 325 TABLET ORAL at 21:21

## 2022-03-04 RX ADMIN — FAMOTIDINE SCH MG: 20 TABLET, FILM COATED ORAL at 08:12

## 2022-03-05 VITALS — DIASTOLIC BLOOD PRESSURE: 74 MMHG | SYSTOLIC BLOOD PRESSURE: 140 MMHG

## 2022-03-05 RX ADMIN — FAMOTIDINE SCH MG: 20 TABLET, FILM COATED ORAL at 08:40

## 2022-03-05 RX ADMIN — SODIUM BICARBONATE SCH MG: 325 TABLET ORAL at 08:39

## 2022-03-05 RX ADMIN — DIGOXIN SCH MG: 125 TABLET ORAL at 08:40

## 2022-03-05 RX ADMIN — APIXABAN SCH MG: 5 TABLET, FILM COATED ORAL at 08:39

## 2022-03-05 RX ADMIN — PROBIOTIC PRODUCT - TAB SCH EA: TAB at 13:09

## 2022-03-05 RX ADMIN — PROBIOTIC PRODUCT - TAB SCH EA: TAB at 08:39

## 2022-03-05 RX ADMIN — CAPSAICIN SCH DOSE: 0.25 CREAM TOPICAL at 15:25

## 2022-03-05 RX ADMIN — NYSTATIN SCH DOSE: 100000 POWDER TOPICAL at 15:25

## 2022-03-05 RX ADMIN — NYSTATIN SCH DOSE: 100000 POWDER TOPICAL at 08:41

## 2022-03-05 RX ADMIN — SODIUM BICARBONATE SCH MG: 325 TABLET ORAL at 21:15

## 2022-03-05 RX ADMIN — FUROSEMIDE SCH MG: 40 TABLET ORAL at 13:08

## 2022-03-05 RX ADMIN — SODIUM BICARBONATE SCH MG: 325 TABLET ORAL at 13:08

## 2022-03-05 RX ADMIN — CAPSAICIN SCH DOSE: 0.25 CREAM TOPICAL at 21:17

## 2022-03-05 RX ADMIN — PROBIOTIC PRODUCT - TAB SCH EA: TAB at 21:15

## 2022-03-05 RX ADMIN — SUCRALFATE SCH GM: 1 SUSPENSION ORAL at 21:14

## 2022-03-05 RX ADMIN — FLECAINIDE ACETATE SCH MG: 50 TABLET ORAL at 08:40

## 2022-03-05 RX ADMIN — FAMOTIDINE SCH MG: 20 TABLET, FILM COATED ORAL at 21:15

## 2022-03-05 RX ADMIN — MULTIPLE VITAMINS W/ MINERALS TAB SCH TAB: TAB at 08:39

## 2022-03-05 RX ADMIN — PROBIOTIC PRODUCT - TAB SCH EA: TAB at 18:00

## 2022-03-05 RX ADMIN — NYSTATIN SCH DOSE: 100000 POWDER TOPICAL at 21:18

## 2022-03-05 RX ADMIN — BUPROPION HYDROCHLORIDE SCH MG: 150 TABLET, FILM COATED, EXTENDED RELEASE ORAL at 08:39

## 2022-03-05 RX ADMIN — ATORVASTATIN CALCIUM SCH MG: 20 TABLET, FILM COATED ORAL at 21:15

## 2022-03-05 RX ADMIN — ASPIRIN 81 MG CHEWABLE TABLET SCH MG: 81 TABLET CHEWABLE at 08:39

## 2022-03-05 RX ADMIN — SUCRALFATE SCH GM: 1 SUSPENSION ORAL at 08:39

## 2022-03-05 RX ADMIN — FLECAINIDE ACETATE SCH MG: 50 TABLET ORAL at 21:15

## 2022-03-05 RX ADMIN — SODIUM BICARBONATE SCH MG: 325 TABLET ORAL at 15:25

## 2022-03-05 RX ADMIN — APIXABAN SCH MG: 5 TABLET, FILM COATED ORAL at 21:15

## 2022-03-05 RX ADMIN — CAPSAICIN SCH DOSE: 0.25 CREAM TOPICAL at 08:39

## 2022-03-06 VITALS — DIASTOLIC BLOOD PRESSURE: 77 MMHG | SYSTOLIC BLOOD PRESSURE: 133 MMHG

## 2022-03-06 LAB
BASOPHILS # BLD AUTO: 0 10^3/UL (ref 0–0.2)
BASOPHILS NFR BLD AUTO: 0.4 % (ref 0–1)
BUN SERPL-MCNC: 13 MG/DL (ref 7–18)
CALCIUM SERPL-MCNC: 10.9 MG/DL (ref 8.8–10.2)
CHLORIDE SERPL-SCNC: 104 MEQ/L (ref 98–107)
CO2 SERPL-SCNC: 28 MEQ/L (ref 21–32)
CREAT SERPL-MCNC: 0.73 MG/DL (ref 0.55–1.3)
EOSINOPHIL # BLD AUTO: 0.1 10^3/UL (ref 0–0.5)
EOSINOPHIL NFR BLD AUTO: 1.9 % (ref 0–3)
GFR SERPL CREATININE-BSD FRML MDRD: > 60 ML/MIN/{1.73_M2} (ref 45–?)
GLUCOSE SERPL-MCNC: 93 MG/DL (ref 70–100)
HCT VFR BLD AUTO: 34.9 % (ref 36–47)
HGB BLD-MCNC: 11.1 G/DL (ref 12–15.5)
LYMPHOCYTES # BLD AUTO: 0.8 10^3/UL (ref 1.5–5)
LYMPHOCYTES NFR BLD AUTO: 16.8 % (ref 24–44)
MCH RBC QN AUTO: 30.9 PG (ref 27–33)
MCHC RBC AUTO-ENTMCNC: 31.8 G/DL (ref 32–36.5)
MCV RBC AUTO: 97.2 FL (ref 80–96)
MONOCYTES # BLD AUTO: 0.5 10^3/UL (ref 0–0.8)
MONOCYTES NFR BLD AUTO: 10.1 % (ref 2–8)
NEUTROPHILS # BLD AUTO: 3.3 10^3/UL (ref 1.5–8.5)
NEUTROPHILS NFR BLD AUTO: 70.6 % (ref 36–66)
PLATELET # BLD AUTO: 195 10^3/UL (ref 150–450)
POTASSIUM SERPL-SCNC: 3.7 MEQ/L (ref 3.5–5.1)
RBC # BLD AUTO: 3.59 10^6/UL (ref 4–5.4)
SODIUM SERPL-SCNC: 140 MEQ/L (ref 136–145)
TSH SERPL DL<=0.005 MIU/L-ACNC: 0.39 UIU/ML (ref 0.36–3.74)
WBC # BLD AUTO: 4.7 10^3/UL (ref 4–10)

## 2022-03-06 RX ADMIN — APIXABAN SCH MG: 5 TABLET, FILM COATED ORAL at 10:30

## 2022-03-06 RX ADMIN — ASPIRIN 81 MG CHEWABLE TABLET SCH MG: 81 TABLET CHEWABLE at 10:32

## 2022-03-06 RX ADMIN — CAPSAICIN SCH DOSE: 0.25 CREAM TOPICAL at 21:00

## 2022-03-06 RX ADMIN — SODIUM BICARBONATE SCH MG: 325 TABLET ORAL at 10:30

## 2022-03-06 RX ADMIN — FLECAINIDE ACETATE SCH MG: 50 TABLET ORAL at 20:20

## 2022-03-06 RX ADMIN — FAMOTIDINE SCH MG: 20 TABLET, FILM COATED ORAL at 20:19

## 2022-03-06 RX ADMIN — APIXABAN SCH MG: 5 TABLET, FILM COATED ORAL at 20:20

## 2022-03-06 RX ADMIN — NYSTATIN SCH DOSE: 100000 POWDER TOPICAL at 21:00

## 2022-03-06 RX ADMIN — SUCRALFATE SCH GM: 1 SUSPENSION ORAL at 20:18

## 2022-03-06 RX ADMIN — SODIUM BICARBONATE SCH MG: 325 TABLET ORAL at 20:18

## 2022-03-06 RX ADMIN — SODIUM BICARBONATE SCH MG: 325 TABLET ORAL at 16:59

## 2022-03-06 RX ADMIN — FLECAINIDE ACETATE SCH MG: 50 TABLET ORAL at 10:32

## 2022-03-06 RX ADMIN — CAPSAICIN SCH DOSE: 0.25 CREAM TOPICAL at 10:34

## 2022-03-06 RX ADMIN — MULTIPLE VITAMINS W/ MINERALS TAB SCH TAB: TAB at 10:30

## 2022-03-06 RX ADMIN — PROBIOTIC PRODUCT - TAB SCH EA: TAB at 20:19

## 2022-03-06 RX ADMIN — PROBIOTIC PRODUCT - TAB SCH EA: TAB at 10:32

## 2022-03-06 RX ADMIN — PROBIOTIC PRODUCT - TAB SCH EA: TAB at 16:59

## 2022-03-06 RX ADMIN — ATORVASTATIN CALCIUM SCH MG: 20 TABLET, FILM COATED ORAL at 20:19

## 2022-03-06 RX ADMIN — BUPROPION HYDROCHLORIDE SCH MG: 150 TABLET, FILM COATED, EXTENDED RELEASE ORAL at 10:30

## 2022-03-06 RX ADMIN — SUCRALFATE SCH GM: 1 SUSPENSION ORAL at 10:30

## 2022-03-06 RX ADMIN — PROBIOTIC PRODUCT - TAB SCH EA: TAB at 14:05

## 2022-03-06 RX ADMIN — FAMOTIDINE SCH MG: 20 TABLET, FILM COATED ORAL at 10:32

## 2022-03-06 RX ADMIN — NYSTATIN SCH DOSE: 100000 POWDER TOPICAL at 10:34

## 2022-03-06 RX ADMIN — CAPSAICIN SCH DOSE: 0.25 CREAM TOPICAL at 17:00

## 2022-03-06 RX ADMIN — FUROSEMIDE SCH MG: 40 TABLET ORAL at 10:30

## 2022-03-06 RX ADMIN — NYSTATIN SCH DOSE: 100000 POWDER TOPICAL at 17:00

## 2022-03-06 RX ADMIN — SODIUM BICARBONATE SCH MG: 325 TABLET ORAL at 14:05

## 2022-03-06 RX ADMIN — DIGOXIN SCH MG: 125 TABLET ORAL at 10:31

## 2022-03-07 VITALS — SYSTOLIC BLOOD PRESSURE: 144 MMHG | DIASTOLIC BLOOD PRESSURE: 76 MMHG

## 2022-03-07 VITALS — SYSTOLIC BLOOD PRESSURE: 148 MMHG | DIASTOLIC BLOOD PRESSURE: 77 MMHG

## 2022-03-07 RX ADMIN — FLECAINIDE ACETATE SCH MG: 50 TABLET ORAL at 10:52

## 2022-03-07 RX ADMIN — FAMOTIDINE SCH MG: 20 TABLET, FILM COATED ORAL at 21:50

## 2022-03-07 RX ADMIN — CAPSAICIN SCH DOSE: 0.25 CREAM TOPICAL at 21:51

## 2022-03-07 RX ADMIN — SODIUM BICARBONATE SCH MG: 325 TABLET ORAL at 10:41

## 2022-03-07 RX ADMIN — SUCRALFATE SCH GM: 1 SUSPENSION ORAL at 21:51

## 2022-03-07 RX ADMIN — MULTIPLE VITAMINS W/ MINERALS TAB SCH TAB: TAB at 10:41

## 2022-03-07 RX ADMIN — NYSTATIN SCH DOSE: 100000 POWDER TOPICAL at 21:00

## 2022-03-07 RX ADMIN — NYSTATIN SCH DOSE: 100000 POWDER TOPICAL at 11:00

## 2022-03-07 RX ADMIN — ASPIRIN 81 MG CHEWABLE TABLET SCH MG: 81 TABLET CHEWABLE at 10:40

## 2022-03-07 RX ADMIN — PROBIOTIC PRODUCT - TAB SCH EA: TAB at 10:49

## 2022-03-07 RX ADMIN — CAPSAICIN SCH DOSE: 0.25 CREAM TOPICAL at 17:27

## 2022-03-07 RX ADMIN — APIXABAN SCH MG: 5 TABLET, FILM COATED ORAL at 21:50

## 2022-03-07 RX ADMIN — CAPSAICIN SCH DOSE: 0.25 CREAM TOPICAL at 10:58

## 2022-03-07 RX ADMIN — SODIUM BICARBONATE SCH MG: 325 TABLET ORAL at 13:09

## 2022-03-07 RX ADMIN — APIXABAN SCH MG: 5 TABLET, FILM COATED ORAL at 10:43

## 2022-03-07 RX ADMIN — PROBIOTIC PRODUCT - TAB SCH EA: TAB at 17:26

## 2022-03-07 RX ADMIN — BUPROPION HYDROCHLORIDE SCH MG: 150 TABLET, FILM COATED, EXTENDED RELEASE ORAL at 10:41

## 2022-03-07 RX ADMIN — FAMOTIDINE SCH MG: 20 TABLET, FILM COATED ORAL at 10:42

## 2022-03-07 RX ADMIN — FUROSEMIDE SCH MG: 40 TABLET ORAL at 10:42

## 2022-03-07 RX ADMIN — DIGOXIN SCH MG: 125 TABLET ORAL at 10:50

## 2022-03-07 RX ADMIN — FLECAINIDE ACETATE SCH MG: 50 TABLET ORAL at 21:50

## 2022-03-07 RX ADMIN — PROBIOTIC PRODUCT - TAB SCH EA: TAB at 13:09

## 2022-03-07 RX ADMIN — SODIUM BICARBONATE SCH MG: 325 TABLET ORAL at 17:26

## 2022-03-07 RX ADMIN — PROBIOTIC PRODUCT - TAB SCH EA: TAB at 21:50

## 2022-03-07 RX ADMIN — NYSTATIN SCH DOSE: 100000 POWDER TOPICAL at 17:27

## 2022-03-07 RX ADMIN — SODIUM BICARBONATE SCH MG: 325 TABLET ORAL at 21:50

## 2022-03-07 RX ADMIN — SUCRALFATE SCH GM: 1 SUSPENSION ORAL at 10:40

## 2022-03-07 RX ADMIN — ATORVASTATIN CALCIUM SCH MG: 20 TABLET, FILM COATED ORAL at 21:50

## 2022-03-08 VITALS — DIASTOLIC BLOOD PRESSURE: 73 MMHG | SYSTOLIC BLOOD PRESSURE: 161 MMHG

## 2022-03-08 RX ADMIN — FUROSEMIDE SCH MG: 40 TABLET ORAL at 08:51

## 2022-03-08 RX ADMIN — FAMOTIDINE SCH MG: 20 TABLET, FILM COATED ORAL at 21:36

## 2022-03-08 RX ADMIN — CAPSAICIN SCH DOSE: 0.25 CREAM TOPICAL at 16:16

## 2022-03-08 RX ADMIN — ACETAMINOPHEN PRN MG: 325 TABLET ORAL at 09:26

## 2022-03-08 RX ADMIN — APIXABAN SCH MG: 5 TABLET, FILM COATED ORAL at 21:36

## 2022-03-08 RX ADMIN — PROBIOTIC PRODUCT - TAB SCH EA: TAB at 18:05

## 2022-03-08 RX ADMIN — NYSTATIN SCH DOSE: 100000 POWDER TOPICAL at 08:51

## 2022-03-08 RX ADMIN — NYSTATIN SCH DOSE: 100000 POWDER TOPICAL at 16:17

## 2022-03-08 RX ADMIN — PROBIOTIC PRODUCT - TAB SCH EA: TAB at 13:51

## 2022-03-08 RX ADMIN — CAPSAICIN SCH DOSE: 0.25 CREAM TOPICAL at 21:37

## 2022-03-08 RX ADMIN — CAPSAICIN SCH DOSE: 0.25 CREAM TOPICAL at 08:51

## 2022-03-08 RX ADMIN — FLECAINIDE ACETATE SCH MG: 50 TABLET ORAL at 21:36

## 2022-03-08 RX ADMIN — FLECAINIDE ACETATE SCH MG: 50 TABLET ORAL at 08:50

## 2022-03-08 RX ADMIN — SODIUM BICARBONATE SCH MG: 325 TABLET ORAL at 16:16

## 2022-03-08 RX ADMIN — NYSTATIN SCH DOSE: 100000 POWDER TOPICAL at 21:37

## 2022-03-08 RX ADMIN — PROBIOTIC PRODUCT - TAB SCH EA: TAB at 08:49

## 2022-03-08 RX ADMIN — FAMOTIDINE SCH MG: 20 TABLET, FILM COATED ORAL at 08:50

## 2022-03-08 RX ADMIN — SODIUM BICARBONATE SCH MG: 325 TABLET ORAL at 08:50

## 2022-03-08 RX ADMIN — SODIUM BICARBONATE SCH MG: 325 TABLET ORAL at 21:35

## 2022-03-08 RX ADMIN — ASPIRIN 81 MG CHEWABLE TABLET SCH MG: 81 TABLET CHEWABLE at 08:49

## 2022-03-08 RX ADMIN — DIGOXIN SCH MG: 125 TABLET ORAL at 08:50

## 2022-03-08 RX ADMIN — APIXABAN SCH MG: 5 TABLET, FILM COATED ORAL at 08:50

## 2022-03-08 RX ADMIN — ATORVASTATIN CALCIUM SCH MG: 20 TABLET, FILM COATED ORAL at 21:37

## 2022-03-08 RX ADMIN — PROBIOTIC PRODUCT - TAB SCH EA: TAB at 21:35

## 2022-03-08 RX ADMIN — SUCRALFATE SCH GM: 1 SUSPENSION ORAL at 21:35

## 2022-03-08 RX ADMIN — MULTIPLE VITAMINS W/ MINERALS TAB SCH TAB: TAB at 08:49

## 2022-03-08 RX ADMIN — BUPROPION HYDROCHLORIDE SCH MG: 150 TABLET, FILM COATED, EXTENDED RELEASE ORAL at 08:49

## 2022-03-08 RX ADMIN — SODIUM BICARBONATE SCH MG: 325 TABLET ORAL at 13:50

## 2022-03-08 RX ADMIN — SUCRALFATE SCH GM: 1 SUSPENSION ORAL at 08:49

## 2022-03-09 VITALS — SYSTOLIC BLOOD PRESSURE: 106 MMHG | DIASTOLIC BLOOD PRESSURE: 59 MMHG

## 2022-03-09 RX ADMIN — FAMOTIDINE SCH MG: 20 TABLET, FILM COATED ORAL at 21:40

## 2022-03-09 RX ADMIN — DIGOXIN SCH MG: 125 TABLET ORAL at 09:00

## 2022-03-09 RX ADMIN — APIXABAN SCH MG: 5 TABLET, FILM COATED ORAL at 21:40

## 2022-03-09 RX ADMIN — SUCRALFATE SCH GM: 1 SUSPENSION ORAL at 09:00

## 2022-03-09 RX ADMIN — PROBIOTIC PRODUCT - TAB SCH EA: TAB at 17:23

## 2022-03-09 RX ADMIN — SODIUM BICARBONATE SCH MG: 325 TABLET ORAL at 21:39

## 2022-03-09 RX ADMIN — SODIUM BICARBONATE SCH MG: 325 TABLET ORAL at 10:27

## 2022-03-09 RX ADMIN — FUROSEMIDE SCH MG: 40 TABLET ORAL at 10:28

## 2022-03-09 RX ADMIN — APIXABAN SCH MG: 5 TABLET, FILM COATED ORAL at 10:27

## 2022-03-09 RX ADMIN — SODIUM BICARBONATE SCH MG: 325 TABLET ORAL at 17:24

## 2022-03-09 RX ADMIN — FLECAINIDE ACETATE SCH MG: 50 TABLET ORAL at 21:40

## 2022-03-09 RX ADMIN — CAPSAICIN SCH DOSE: 0.25 CREAM TOPICAL at 21:42

## 2022-03-09 RX ADMIN — BUPROPION HYDROCHLORIDE SCH MG: 150 TABLET, FILM COATED, EXTENDED RELEASE ORAL at 10:30

## 2022-03-09 RX ADMIN — FAMOTIDINE SCH MG: 20 TABLET, FILM COATED ORAL at 10:29

## 2022-03-09 RX ADMIN — MULTIPLE VITAMINS W/ MINERALS TAB SCH TAB: TAB at 10:26

## 2022-03-09 RX ADMIN — CAPSAICIN SCH DOSE: 0.25 CREAM TOPICAL at 17:23

## 2022-03-09 RX ADMIN — SUCRALFATE SCH GM: 1 SUSPENSION ORAL at 21:39

## 2022-03-09 RX ADMIN — NYSTATIN SCH DOSE: 100000 POWDER TOPICAL at 17:23

## 2022-03-09 RX ADMIN — PROBIOTIC PRODUCT - TAB SCH EA: TAB at 10:41

## 2022-03-09 RX ADMIN — CAPSAICIN SCH DOSE: 0.25 CREAM TOPICAL at 10:30

## 2022-03-09 RX ADMIN — PROBIOTIC PRODUCT - TAB SCH EA: TAB at 21:39

## 2022-03-09 RX ADMIN — PROBIOTIC PRODUCT - TAB SCH EA: TAB at 12:44

## 2022-03-09 RX ADMIN — NYSTATIN SCH DOSE: 100000 POWDER TOPICAL at 21:42

## 2022-03-09 RX ADMIN — ATORVASTATIN CALCIUM SCH MG: 20 TABLET, FILM COATED ORAL at 21:39

## 2022-03-09 RX ADMIN — FLECAINIDE ACETATE SCH MG: 50 TABLET ORAL at 10:29

## 2022-03-09 RX ADMIN — ASPIRIN 81 MG CHEWABLE TABLET SCH MG: 81 TABLET CHEWABLE at 10:27

## 2022-03-09 RX ADMIN — SODIUM BICARBONATE SCH MG: 325 TABLET ORAL at 12:44

## 2022-03-09 RX ADMIN — NYSTATIN SCH DOSE: 100000 POWDER TOPICAL at 10:30

## 2022-03-10 RX ADMIN — SODIUM BICARBONATE SCH MG: 325 TABLET ORAL at 20:49

## 2022-03-10 RX ADMIN — SODIUM BICARBONATE SCH MG: 325 TABLET ORAL at 13:03

## 2022-03-10 RX ADMIN — NYSTATIN SCH DOSE: 100000 POWDER TOPICAL at 09:31

## 2022-03-10 RX ADMIN — FLECAINIDE ACETATE SCH MG: 50 TABLET ORAL at 08:26

## 2022-03-10 RX ADMIN — FAMOTIDINE SCH MG: 20 TABLET, FILM COATED ORAL at 08:28

## 2022-03-10 RX ADMIN — FUROSEMIDE SCH MG: 40 TABLET ORAL at 08:27

## 2022-03-10 RX ADMIN — MULTIPLE VITAMINS W/ MINERALS TAB SCH TAB: TAB at 08:28

## 2022-03-10 RX ADMIN — CAPSAICIN SCH DOSE: 0.25 CREAM TOPICAL at 09:31

## 2022-03-10 RX ADMIN — BUPROPION HYDROCHLORIDE SCH MG: 150 TABLET, FILM COATED, EXTENDED RELEASE ORAL at 08:28

## 2022-03-10 RX ADMIN — PROBIOTIC PRODUCT - TAB SCH EA: TAB at 17:42

## 2022-03-10 RX ADMIN — SUCRALFATE SCH GM: 1 SUSPENSION ORAL at 20:48

## 2022-03-10 RX ADMIN — PROBIOTIC PRODUCT - TAB SCH EA: TAB at 08:26

## 2022-03-10 RX ADMIN — APIXABAN SCH MG: 5 TABLET, FILM COATED ORAL at 08:26

## 2022-03-10 RX ADMIN — FAMOTIDINE SCH MG: 20 TABLET, FILM COATED ORAL at 20:48

## 2022-03-10 RX ADMIN — APIXABAN SCH MG: 5 TABLET, FILM COATED ORAL at 20:49

## 2022-03-10 RX ADMIN — SODIUM BICARBONATE SCH MG: 325 TABLET ORAL at 08:26

## 2022-03-10 RX ADMIN — PROBIOTIC PRODUCT - TAB SCH EA: TAB at 20:49

## 2022-03-10 RX ADMIN — SUCRALFATE SCH GM: 1 SUSPENSION ORAL at 08:28

## 2022-03-10 RX ADMIN — PROBIOTIC PRODUCT - TAB SCH EA: TAB at 13:03

## 2022-03-10 RX ADMIN — SODIUM BICARBONATE SCH MG: 325 TABLET ORAL at 17:42

## 2022-03-10 RX ADMIN — NYSTATIN SCH DOSE: 100000 POWDER TOPICAL at 20:48

## 2022-03-10 RX ADMIN — FLECAINIDE ACETATE SCH MG: 50 TABLET ORAL at 20:50

## 2022-03-10 RX ADMIN — ASPIRIN 81 MG CHEWABLE TABLET SCH MG: 81 TABLET CHEWABLE at 08:27

## 2022-03-10 RX ADMIN — NYSTATIN SCH DOSE: 100000 POWDER TOPICAL at 17:43

## 2022-03-10 RX ADMIN — DIGOXIN SCH MG: 125 TABLET ORAL at 08:28

## 2022-03-10 RX ADMIN — CAPSAICIN SCH DOSE: 0.25 CREAM TOPICAL at 20:50

## 2022-03-10 RX ADMIN — CAPSAICIN SCH DOSE: 0.25 CREAM TOPICAL at 17:42

## 2022-03-10 RX ADMIN — ATORVASTATIN CALCIUM SCH MG: 20 TABLET, FILM COATED ORAL at 20:49

## 2022-03-11 VITALS — SYSTOLIC BLOOD PRESSURE: 155 MMHG | DIASTOLIC BLOOD PRESSURE: 81 MMHG

## 2022-03-11 RX ADMIN — PROBIOTIC PRODUCT - TAB SCH EA: TAB at 12:43

## 2022-03-11 RX ADMIN — NYSTATIN SCH DOSE: 100000 POWDER TOPICAL at 19:54

## 2022-03-11 RX ADMIN — CAPSAICIN SCH DOSE: 0.25 CREAM TOPICAL at 19:54

## 2022-03-11 RX ADMIN — SUCRALFATE SCH GM: 1 SUSPENSION ORAL at 09:21

## 2022-03-11 RX ADMIN — PROBIOTIC PRODUCT - TAB SCH EA: TAB at 09:21

## 2022-03-11 RX ADMIN — FUROSEMIDE SCH MG: 40 TABLET ORAL at 09:22

## 2022-03-11 RX ADMIN — ATORVASTATIN CALCIUM SCH MG: 20 TABLET, FILM COATED ORAL at 19:51

## 2022-03-11 RX ADMIN — NYSTATIN SCH DOSE: 100000 POWDER TOPICAL at 09:23

## 2022-03-11 RX ADMIN — BUPROPION HYDROCHLORIDE SCH MG: 150 TABLET, FILM COATED, EXTENDED RELEASE ORAL at 09:21

## 2022-03-11 RX ADMIN — FLECAINIDE ACETATE SCH MG: 50 TABLET ORAL at 19:51

## 2022-03-11 RX ADMIN — PROBIOTIC PRODUCT - TAB SCH EA: TAB at 16:26

## 2022-03-11 RX ADMIN — FAMOTIDINE SCH MG: 20 TABLET, FILM COATED ORAL at 19:50

## 2022-03-11 RX ADMIN — ACETAMINOPHEN PRN MG: 325 TABLET ORAL at 19:52

## 2022-03-11 RX ADMIN — PROBIOTIC PRODUCT - TAB SCH EA: TAB at 19:51

## 2022-03-11 RX ADMIN — SODIUM BICARBONATE SCH MG: 325 TABLET ORAL at 19:49

## 2022-03-11 RX ADMIN — FAMOTIDINE SCH MG: 20 TABLET, FILM COATED ORAL at 09:22

## 2022-03-11 RX ADMIN — FLECAINIDE ACETATE SCH MG: 50 TABLET ORAL at 09:21

## 2022-03-11 RX ADMIN — CAPSAICIN SCH DOSE: 0.25 CREAM TOPICAL at 16:23

## 2022-03-11 RX ADMIN — MULTIPLE VITAMINS W/ MINERALS TAB SCH TAB: TAB at 09:21

## 2022-03-11 RX ADMIN — APIXABAN SCH MG: 5 TABLET, FILM COATED ORAL at 19:50

## 2022-03-11 RX ADMIN — SUCRALFATE SCH GM: 1 SUSPENSION ORAL at 19:51

## 2022-03-11 RX ADMIN — SODIUM BICARBONATE SCH MG: 325 TABLET ORAL at 12:43

## 2022-03-11 RX ADMIN — CAPSAICIN SCH DOSE: 0.25 CREAM TOPICAL at 09:23

## 2022-03-11 RX ADMIN — APIXABAN SCH MG: 5 TABLET, FILM COATED ORAL at 09:21

## 2022-03-11 RX ADMIN — SODIUM BICARBONATE SCH MG: 325 TABLET ORAL at 09:21

## 2022-03-11 RX ADMIN — DIGOXIN SCH MG: 125 TABLET ORAL at 09:22

## 2022-03-11 RX ADMIN — SODIUM BICARBONATE SCH MG: 325 TABLET ORAL at 16:26

## 2022-03-11 RX ADMIN — ASPIRIN 81 MG CHEWABLE TABLET SCH MG: 81 TABLET CHEWABLE at 09:21

## 2022-03-11 RX ADMIN — NYSTATIN SCH DOSE: 100000 POWDER TOPICAL at 16:23

## 2022-03-12 VITALS — SYSTOLIC BLOOD PRESSURE: 153 MMHG | DIASTOLIC BLOOD PRESSURE: 76 MMHG

## 2022-03-12 RX ADMIN — ACETAMINOPHEN PRN MG: 325 TABLET ORAL at 14:35

## 2022-03-12 RX ADMIN — NYSTATIN SCH DOSE: 100000 POWDER TOPICAL at 17:03

## 2022-03-12 RX ADMIN — DIGOXIN SCH MG: 125 TABLET ORAL at 09:55

## 2022-03-12 RX ADMIN — BUPROPION HYDROCHLORIDE SCH MG: 150 TABLET, FILM COATED, EXTENDED RELEASE ORAL at 09:55

## 2022-03-12 RX ADMIN — SODIUM BICARBONATE SCH MG: 325 TABLET ORAL at 09:51

## 2022-03-12 RX ADMIN — ASPIRIN 81 MG CHEWABLE TABLET SCH MG: 81 TABLET CHEWABLE at 09:51

## 2022-03-12 RX ADMIN — SUCRALFATE SCH GM: 1 SUSPENSION ORAL at 09:55

## 2022-03-12 RX ADMIN — FLECAINIDE ACETATE SCH MG: 50 TABLET ORAL at 09:51

## 2022-03-12 RX ADMIN — FAMOTIDINE SCH MG: 20 TABLET, FILM COATED ORAL at 09:55

## 2022-03-12 RX ADMIN — FUROSEMIDE SCH MG: 40 TABLET ORAL at 09:55

## 2022-03-12 RX ADMIN — NYSTATIN SCH DOSE: 100000 POWDER TOPICAL at 09:57

## 2022-03-12 RX ADMIN — PROBIOTIC PRODUCT - TAB SCH EA: TAB at 17:02

## 2022-03-12 RX ADMIN — SODIUM BICARBONATE SCH MG: 325 TABLET ORAL at 22:13

## 2022-03-12 RX ADMIN — CAPSAICIN SCH DOSE: 0.25 CREAM TOPICAL at 09:57

## 2022-03-12 RX ADMIN — CAPSAICIN SCH DOSE: 0.25 CREAM TOPICAL at 22:16

## 2022-03-12 RX ADMIN — APIXABAN SCH MG: 5 TABLET, FILM COATED ORAL at 09:52

## 2022-03-12 RX ADMIN — APIXABAN SCH MG: 5 TABLET, FILM COATED ORAL at 22:15

## 2022-03-12 RX ADMIN — PROBIOTIC PRODUCT - TAB SCH EA: TAB at 09:51

## 2022-03-12 RX ADMIN — CAPSAICIN SCH DOSE: 0.25 CREAM TOPICAL at 17:02

## 2022-03-12 RX ADMIN — FLECAINIDE ACETATE SCH MG: 50 TABLET ORAL at 22:14

## 2022-03-12 RX ADMIN — SODIUM BICARBONATE SCH MG: 325 TABLET ORAL at 17:02

## 2022-03-12 RX ADMIN — ATORVASTATIN CALCIUM SCH MG: 20 TABLET, FILM COATED ORAL at 22:15

## 2022-03-12 RX ADMIN — PROBIOTIC PRODUCT - TAB SCH EA: TAB at 12:21

## 2022-03-12 RX ADMIN — MULTIPLE VITAMINS W/ MINERALS TAB SCH TAB: TAB at 09:51

## 2022-03-12 RX ADMIN — SODIUM BICARBONATE SCH MG: 325 TABLET ORAL at 12:21

## 2022-03-12 RX ADMIN — SUCRALFATE SCH GM: 1 SUSPENSION ORAL at 22:13

## 2022-03-12 RX ADMIN — NYSTATIN SCH DOSE: 100000 POWDER TOPICAL at 22:16

## 2022-03-12 RX ADMIN — ACETAMINOPHEN PRN MG: 325 TABLET ORAL at 03:22

## 2022-03-12 RX ADMIN — FAMOTIDINE SCH MG: 20 TABLET, FILM COATED ORAL at 22:15

## 2022-03-12 RX ADMIN — PROBIOTIC PRODUCT - TAB SCH EA: TAB at 22:13

## 2022-03-13 VITALS — DIASTOLIC BLOOD PRESSURE: 65 MMHG | SYSTOLIC BLOOD PRESSURE: 143 MMHG

## 2022-03-13 RX ADMIN — PROBIOTIC PRODUCT - TAB SCH EA: TAB at 20:46

## 2022-03-13 RX ADMIN — FUROSEMIDE SCH MG: 40 TABLET ORAL at 12:36

## 2022-03-13 RX ADMIN — PROBIOTIC PRODUCT - TAB SCH EA: TAB at 17:11

## 2022-03-13 RX ADMIN — SUCRALFATE SCH GM: 1 SUSPENSION ORAL at 12:37

## 2022-03-13 RX ADMIN — FLECAINIDE ACETATE SCH MG: 50 TABLET ORAL at 12:37

## 2022-03-13 RX ADMIN — DIGOXIN SCH MG: 125 TABLET ORAL at 12:44

## 2022-03-13 RX ADMIN — SODIUM BICARBONATE SCH MG: 325 TABLET ORAL at 09:00

## 2022-03-13 RX ADMIN — SUCRALFATE SCH GM: 1 SUSPENSION ORAL at 20:45

## 2022-03-13 RX ADMIN — CAPSAICIN SCH DOSE: 0.25 CREAM TOPICAL at 20:46

## 2022-03-13 RX ADMIN — NYSTATIN SCH DOSE: 100000 POWDER TOPICAL at 09:00

## 2022-03-13 RX ADMIN — NYSTATIN SCH DOSE: 100000 POWDER TOPICAL at 20:46

## 2022-03-13 RX ADMIN — FLECAINIDE ACETATE SCH MG: 50 TABLET ORAL at 20:45

## 2022-03-13 RX ADMIN — CAPSAICIN SCH DOSE: 0.25 CREAM TOPICAL at 09:00

## 2022-03-13 RX ADMIN — APIXABAN SCH MG: 5 TABLET, FILM COATED ORAL at 12:38

## 2022-03-13 RX ADMIN — NYSTATIN SCH DOSE: 100000 POWDER TOPICAL at 17:11

## 2022-03-13 RX ADMIN — CAPSAICIN SCH DOSE: 0.25 CREAM TOPICAL at 17:11

## 2022-03-13 RX ADMIN — BUPROPION HYDROCHLORIDE SCH MG: 150 TABLET, FILM COATED, EXTENDED RELEASE ORAL at 12:37

## 2022-03-13 RX ADMIN — MULTIPLE VITAMINS W/ MINERALS TAB SCH TAB: TAB at 12:38

## 2022-03-13 RX ADMIN — ATORVASTATIN CALCIUM SCH MG: 20 TABLET, FILM COATED ORAL at 20:45

## 2022-03-13 RX ADMIN — FAMOTIDINE SCH MG: 20 TABLET, FILM COATED ORAL at 12:36

## 2022-03-13 RX ADMIN — PROBIOTIC PRODUCT - TAB SCH EA: TAB at 08:00

## 2022-03-13 RX ADMIN — SODIUM BICARBONATE SCH MG: 325 TABLET ORAL at 20:46

## 2022-03-13 RX ADMIN — ASPIRIN 81 MG CHEWABLE TABLET SCH MG: 81 TABLET CHEWABLE at 12:37

## 2022-03-13 RX ADMIN — APIXABAN SCH MG: 5 TABLET, FILM COATED ORAL at 20:46

## 2022-03-13 RX ADMIN — SODIUM BICARBONATE SCH MG: 325 TABLET ORAL at 12:38

## 2022-03-13 RX ADMIN — SODIUM BICARBONATE SCH MG: 325 TABLET ORAL at 17:11

## 2022-03-13 RX ADMIN — ACETAMINOPHEN PRN MG: 325 TABLET ORAL at 12:48

## 2022-03-13 RX ADMIN — FAMOTIDINE SCH MG: 20 TABLET, FILM COATED ORAL at 20:45

## 2022-03-13 RX ADMIN — PROBIOTIC PRODUCT - TAB SCH EA: TAB at 12:37

## 2022-03-14 VITALS — SYSTOLIC BLOOD PRESSURE: 146 MMHG | DIASTOLIC BLOOD PRESSURE: 67 MMHG

## 2022-03-14 VITALS — DIASTOLIC BLOOD PRESSURE: 72 MMHG | SYSTOLIC BLOOD PRESSURE: 152 MMHG

## 2022-03-14 LAB
BUN SERPL-MCNC: 24 MG/DL (ref 7–18)
CALCIUM SERPL-MCNC: 10.7 MG/DL (ref 8.8–10.2)
CHLORIDE SERPL-SCNC: 106 MEQ/L (ref 98–107)
CO2 SERPL-SCNC: 30 MEQ/L (ref 21–32)
CREAT SERPL-MCNC: 0.99 MG/DL (ref 0.55–1.3)
GFR SERPL CREATININE-BSD FRML MDRD: 59.7 ML/MIN/{1.73_M2} (ref 45–?)
GLUCOSE SERPL-MCNC: 114 MG/DL (ref 70–100)
HCT VFR BLD AUTO: 32.8 % (ref 36–47)
HGB BLD-MCNC: 10.5 G/DL (ref 12–15.5)
MAGNESIUM SERPL-MCNC: 2.3 MG/DL (ref 1.8–2.4)
MCH RBC QN AUTO: 31.2 PG (ref 27–33)
MCHC RBC AUTO-ENTMCNC: 32 G/DL (ref 32–36.5)
MCV RBC AUTO: 97.3 FL (ref 80–96)
PLATELET # BLD AUTO: 226 10^3/UL (ref 150–450)
POTASSIUM SERPL-SCNC: 4.2 MEQ/L (ref 3.5–5.1)
RBC # BLD AUTO: 3.37 10^6/UL (ref 4–5.4)
SODIUM SERPL-SCNC: 142 MEQ/L (ref 136–145)
WBC # BLD AUTO: 4.6 10^3/UL (ref 4–10)

## 2022-03-14 RX ADMIN — APIXABAN SCH MG: 5 TABLET, FILM COATED ORAL at 21:04

## 2022-03-14 RX ADMIN — CAPSAICIN SCH DOSE: 0.25 CREAM TOPICAL at 21:05

## 2022-03-14 RX ADMIN — PROBIOTIC PRODUCT - TAB SCH EA: TAB at 09:23

## 2022-03-14 RX ADMIN — PROBIOTIC PRODUCT - TAB SCH EA: TAB at 21:03

## 2022-03-14 RX ADMIN — SODIUM BICARBONATE SCH MG: 325 TABLET ORAL at 21:03

## 2022-03-14 RX ADMIN — SUCRALFATE SCH GM: 1 SUSPENSION ORAL at 09:23

## 2022-03-14 RX ADMIN — FUROSEMIDE SCH MG: 40 TABLET ORAL at 09:24

## 2022-03-14 RX ADMIN — CAPSAICIN SCH DOSE: 0.25 CREAM TOPICAL at 09:28

## 2022-03-14 RX ADMIN — BUPROPION HYDROCHLORIDE SCH MG: 150 TABLET, FILM COATED, EXTENDED RELEASE ORAL at 09:24

## 2022-03-14 RX ADMIN — FAMOTIDINE SCH MG: 20 TABLET, FILM COATED ORAL at 09:24

## 2022-03-14 RX ADMIN — SUCRALFATE SCH GM: 1 SUSPENSION ORAL at 21:03

## 2022-03-14 RX ADMIN — CAPSAICIN SCH DOSE: 0.25 CREAM TOPICAL at 15:17

## 2022-03-14 RX ADMIN — FLECAINIDE ACETATE SCH MG: 50 TABLET ORAL at 09:23

## 2022-03-14 RX ADMIN — NYSTATIN SCH DOSE: 100000 POWDER TOPICAL at 15:17

## 2022-03-14 RX ADMIN — ASPIRIN 81 MG CHEWABLE TABLET SCH MG: 81 TABLET CHEWABLE at 09:23

## 2022-03-14 RX ADMIN — NYSTATIN SCH DOSE: 100000 POWDER TOPICAL at 21:05

## 2022-03-14 RX ADMIN — FLECAINIDE ACETATE SCH MG: 50 TABLET ORAL at 21:04

## 2022-03-14 RX ADMIN — SODIUM BICARBONATE SCH MG: 325 TABLET ORAL at 12:03

## 2022-03-14 RX ADMIN — ATORVASTATIN CALCIUM SCH MG: 20 TABLET, FILM COATED ORAL at 21:04

## 2022-03-14 RX ADMIN — MULTIPLE VITAMINS W/ MINERALS TAB SCH TAB: TAB at 09:24

## 2022-03-14 RX ADMIN — PROBIOTIC PRODUCT - TAB SCH EA: TAB at 17:17

## 2022-03-14 RX ADMIN — FAMOTIDINE SCH MG: 20 TABLET, FILM COATED ORAL at 21:04

## 2022-03-14 RX ADMIN — SODIUM BICARBONATE SCH MG: 325 TABLET ORAL at 09:23

## 2022-03-14 RX ADMIN — NYSTATIN SCH DOSE: 100000 POWDER TOPICAL at 09:28

## 2022-03-14 RX ADMIN — APIXABAN SCH MG: 5 TABLET, FILM COATED ORAL at 09:23

## 2022-03-14 RX ADMIN — SODIUM BICARBONATE SCH MG: 325 TABLET ORAL at 16:29

## 2022-03-14 RX ADMIN — PROBIOTIC PRODUCT - TAB SCH EA: TAB at 12:03

## 2022-03-14 RX ADMIN — DIGOXIN SCH MG: 125 TABLET ORAL at 09:00

## 2022-03-15 VITALS — SYSTOLIC BLOOD PRESSURE: 167 MMHG | DIASTOLIC BLOOD PRESSURE: 74 MMHG

## 2022-03-15 LAB
BUN SERPL-MCNC: 26 MG/DL (ref 7–18)
CALCIUM SERPL-MCNC: 11.5 MG/DL (ref 8.8–10.2)
CHLORIDE SERPL-SCNC: 105 MEQ/L (ref 98–107)
CO2 SERPL-SCNC: 30 MEQ/L (ref 21–32)
CREAT SERPL-MCNC: 0.9 MG/DL (ref 0.55–1.3)
GFR SERPL CREATININE-BSD FRML MDRD: > 60 ML/MIN/{1.73_M2} (ref 45–?)
GLUCOSE SERPL-MCNC: 115 MG/DL (ref 70–100)
HCT VFR BLD AUTO: 34.1 % (ref 36–47)
HGB BLD-MCNC: 10.8 G/DL (ref 12–15.5)
MCH RBC QN AUTO: 30.5 PG (ref 27–33)
MCHC RBC AUTO-ENTMCNC: 31.7 G/DL (ref 32–36.5)
MCV RBC AUTO: 96.3 FL (ref 80–96)
PLATELET # BLD AUTO: 214 10^3/UL (ref 150–450)
POTASSIUM SERPL-SCNC: 4.3 MEQ/L (ref 3.5–5.1)
RBC # BLD AUTO: 3.54 10^6/UL (ref 4–5.4)
SODIUM SERPL-SCNC: 140 MEQ/L (ref 136–145)
WBC # BLD AUTO: 5 10^3/UL (ref 4–10)

## 2022-03-15 RX ADMIN — NYSTATIN SCH DOSE: 100000 POWDER TOPICAL at 16:18

## 2022-03-15 RX ADMIN — DIGOXIN SCH MG: 125 TABLET ORAL at 10:22

## 2022-03-15 RX ADMIN — SUCRALFATE SCH GM: 1 SUSPENSION ORAL at 10:21

## 2022-03-15 RX ADMIN — CAPSAICIN SCH DOSE: 0.25 CREAM TOPICAL at 10:25

## 2022-03-15 RX ADMIN — FAMOTIDINE SCH MG: 20 TABLET, FILM COATED ORAL at 20:41

## 2022-03-15 RX ADMIN — PROBIOTIC PRODUCT - TAB SCH EA: TAB at 12:44

## 2022-03-15 RX ADMIN — SUCRALFATE SCH GM: 1 SUSPENSION ORAL at 20:40

## 2022-03-15 RX ADMIN — FLECAINIDE ACETATE SCH MG: 50 TABLET ORAL at 10:19

## 2022-03-15 RX ADMIN — ASPIRIN 81 MG CHEWABLE TABLET SCH MG: 81 TABLET CHEWABLE at 10:21

## 2022-03-15 RX ADMIN — PROBIOTIC PRODUCT - TAB SCH EA: TAB at 10:24

## 2022-03-15 RX ADMIN — SODIUM BICARBONATE SCH MG: 325 TABLET ORAL at 20:41

## 2022-03-15 RX ADMIN — SODIUM BICARBONATE SCH MG: 325 TABLET ORAL at 12:43

## 2022-03-15 RX ADMIN — SODIUM BICARBONATE SCH MG: 325 TABLET ORAL at 16:18

## 2022-03-15 RX ADMIN — MULTIPLE VITAMINS W/ MINERALS TAB SCH TAB: TAB at 10:20

## 2022-03-15 RX ADMIN — APIXABAN SCH MG: 5 TABLET, FILM COATED ORAL at 20:41

## 2022-03-15 RX ADMIN — FLECAINIDE ACETATE SCH MG: 50 TABLET ORAL at 20:41

## 2022-03-15 RX ADMIN — BUPROPION HYDROCHLORIDE SCH MG: 150 TABLET, FILM COATED, EXTENDED RELEASE ORAL at 10:20

## 2022-03-15 RX ADMIN — SODIUM BICARBONATE SCH MG: 325 TABLET ORAL at 10:20

## 2022-03-15 RX ADMIN — PROBIOTIC PRODUCT - TAB SCH EA: TAB at 20:41

## 2022-03-15 RX ADMIN — CAPSAICIN SCH DOSE: 0.25 CREAM TOPICAL at 20:42

## 2022-03-15 RX ADMIN — CAPSAICIN SCH DOSE: 0.25 CREAM TOPICAL at 16:18

## 2022-03-15 RX ADMIN — NYSTATIN SCH DOSE: 100000 POWDER TOPICAL at 20:42

## 2022-03-15 RX ADMIN — APIXABAN SCH MG: 5 TABLET, FILM COATED ORAL at 10:20

## 2022-03-15 RX ADMIN — ATORVASTATIN CALCIUM SCH MG: 20 TABLET, FILM COATED ORAL at 20:41

## 2022-03-15 RX ADMIN — FUROSEMIDE SCH MG: 40 TABLET ORAL at 10:22

## 2022-03-15 RX ADMIN — NYSTATIN SCH DOSE: 100000 POWDER TOPICAL at 10:24

## 2022-03-15 RX ADMIN — FAMOTIDINE SCH MG: 20 TABLET, FILM COATED ORAL at 10:20

## 2022-03-15 RX ADMIN — PROBIOTIC PRODUCT - TAB SCH EA: TAB at 18:02

## 2022-03-16 VITALS — DIASTOLIC BLOOD PRESSURE: 57 MMHG | SYSTOLIC BLOOD PRESSURE: 118 MMHG

## 2022-03-16 RX ADMIN — FAMOTIDINE SCH MG: 20 TABLET, FILM COATED ORAL at 09:31

## 2022-03-16 RX ADMIN — PROBIOTIC PRODUCT - TAB SCH EA: TAB at 17:35

## 2022-03-16 RX ADMIN — BUPROPION HYDROCHLORIDE SCH MG: 150 TABLET, FILM COATED, EXTENDED RELEASE ORAL at 09:27

## 2022-03-16 RX ADMIN — FLECAINIDE ACETATE SCH MG: 50 TABLET ORAL at 10:07

## 2022-03-16 RX ADMIN — APIXABAN SCH MG: 5 TABLET, FILM COATED ORAL at 21:42

## 2022-03-16 RX ADMIN — FAMOTIDINE SCH MG: 20 TABLET, FILM COATED ORAL at 21:42

## 2022-03-16 RX ADMIN — MULTIPLE VITAMINS W/ MINERALS TAB SCH TAB: TAB at 09:29

## 2022-03-16 RX ADMIN — CAPSAICIN SCH DOSE: 0.25 CREAM TOPICAL at 21:44

## 2022-03-16 RX ADMIN — SODIUM BICARBONATE SCH MG: 325 TABLET ORAL at 09:31

## 2022-03-16 RX ADMIN — NYSTATIN SCH DOSE: 100000 POWDER TOPICAL at 09:34

## 2022-03-16 RX ADMIN — NYSTATIN SCH DOSE: 100000 POWDER TOPICAL at 15:54

## 2022-03-16 RX ADMIN — SODIUM BICARBONATE SCH MG: 325 TABLET ORAL at 12:50

## 2022-03-16 RX ADMIN — PROBIOTIC PRODUCT - TAB SCH EA: TAB at 12:50

## 2022-03-16 RX ADMIN — FLECAINIDE ACETATE SCH MG: 50 TABLET ORAL at 21:44

## 2022-03-16 RX ADMIN — SODIUM BICARBONATE SCH MG: 325 TABLET ORAL at 21:43

## 2022-03-16 RX ADMIN — SUCRALFATE SCH GM: 1 SUSPENSION ORAL at 09:27

## 2022-03-16 RX ADMIN — NYSTATIN SCH DOSE: 100000 POWDER TOPICAL at 21:44

## 2022-03-16 RX ADMIN — PROBIOTIC PRODUCT - TAB SCH EA: TAB at 21:42

## 2022-03-16 RX ADMIN — FUROSEMIDE SCH MG: 40 TABLET ORAL at 09:31

## 2022-03-16 RX ADMIN — PROBIOTIC PRODUCT - TAB SCH EA: TAB at 09:29

## 2022-03-16 RX ADMIN — SODIUM BICARBONATE SCH MG: 325 TABLET ORAL at 15:54

## 2022-03-16 RX ADMIN — CAPSAICIN SCH DOSE: 0.25 CREAM TOPICAL at 15:55

## 2022-03-16 RX ADMIN — ATORVASTATIN CALCIUM SCH MG: 20 TABLET, FILM COATED ORAL at 21:42

## 2022-03-16 RX ADMIN — ASPIRIN 81 MG CHEWABLE TABLET SCH MG: 81 TABLET CHEWABLE at 09:27

## 2022-03-16 RX ADMIN — APIXABAN SCH MG: 5 TABLET, FILM COATED ORAL at 09:31

## 2022-03-16 RX ADMIN — DIGOXIN SCH MG: 125 TABLET ORAL at 09:28

## 2022-03-16 RX ADMIN — SUCRALFATE SCH GM: 1 SUSPENSION ORAL at 21:42

## 2022-03-16 RX ADMIN — CAPSAICIN SCH DOSE: 0.25 CREAM TOPICAL at 09:40

## 2022-03-17 VITALS — DIASTOLIC BLOOD PRESSURE: 57 MMHG | SYSTOLIC BLOOD PRESSURE: 118 MMHG

## 2022-03-17 RX ADMIN — SODIUM BICARBONATE SCH MG: 325 TABLET ORAL at 09:55

## 2022-03-17 RX ADMIN — ACETAMINOPHEN PRN MG: 325 TABLET ORAL at 11:39

## 2022-03-17 RX ADMIN — NYSTATIN SCH DOSE: 100000 POWDER TOPICAL at 15:43

## 2022-03-17 RX ADMIN — SUCRALFATE SCH GM: 1 SUSPENSION ORAL at 09:52

## 2022-03-17 RX ADMIN — ATORVASTATIN CALCIUM SCH MG: 20 TABLET, FILM COATED ORAL at 20:38

## 2022-03-17 RX ADMIN — PROBIOTIC PRODUCT - TAB SCH EA: TAB at 09:55

## 2022-03-17 RX ADMIN — FUROSEMIDE SCH MG: 40 TABLET ORAL at 09:55

## 2022-03-17 RX ADMIN — APIXABAN SCH MG: 5 TABLET, FILM COATED ORAL at 09:53

## 2022-03-17 RX ADMIN — CAPSAICIN SCH DOSE: 0.25 CREAM TOPICAL at 09:52

## 2022-03-17 RX ADMIN — PROBIOTIC PRODUCT - TAB SCH EA: TAB at 20:38

## 2022-03-17 RX ADMIN — DIGOXIN SCH MG: 125 TABLET ORAL at 09:54

## 2022-03-17 RX ADMIN — BUPROPION HYDROCHLORIDE SCH MG: 150 TABLET, FILM COATED, EXTENDED RELEASE ORAL at 09:57

## 2022-03-17 RX ADMIN — SODIUM BICARBONATE SCH MG: 325 TABLET ORAL at 13:17

## 2022-03-17 RX ADMIN — NYSTATIN SCH DOSE: 100000 POWDER TOPICAL at 09:51

## 2022-03-17 RX ADMIN — PROBIOTIC PRODUCT - TAB SCH EA: TAB at 11:39

## 2022-03-17 RX ADMIN — FAMOTIDINE SCH MG: 20 TABLET, FILM COATED ORAL at 09:53

## 2022-03-17 RX ADMIN — APIXABAN SCH MG: 5 TABLET, FILM COATED ORAL at 20:38

## 2022-03-17 RX ADMIN — ASPIRIN 81 MG CHEWABLE TABLET SCH MG: 81 TABLET CHEWABLE at 09:52

## 2022-03-17 RX ADMIN — FLECAINIDE ACETATE SCH MG: 50 TABLET ORAL at 20:38

## 2022-03-17 RX ADMIN — SUCRALFATE SCH GM: 1 SUSPENSION ORAL at 20:38

## 2022-03-17 RX ADMIN — MULTIPLE VITAMINS W/ MINERALS TAB SCH TAB: TAB at 09:55

## 2022-03-17 RX ADMIN — CAPSAICIN SCH DOSE: 0.25 CREAM TOPICAL at 15:43

## 2022-03-17 RX ADMIN — FLECAINIDE ACETATE SCH MG: 50 TABLET ORAL at 09:53

## 2022-03-17 RX ADMIN — SODIUM BICARBONATE SCH MG: 325 TABLET ORAL at 20:38

## 2022-03-17 RX ADMIN — FAMOTIDINE SCH MG: 20 TABLET, FILM COATED ORAL at 20:39

## 2022-03-17 RX ADMIN — NYSTATIN SCH DOSE: 100000 POWDER TOPICAL at 20:40

## 2022-03-17 RX ADMIN — CAPSAICIN SCH DOSE: 0.25 CREAM TOPICAL at 20:40

## 2022-03-17 RX ADMIN — PROBIOTIC PRODUCT - TAB SCH EA: TAB at 18:16

## 2022-03-17 RX ADMIN — SODIUM BICARBONATE SCH MG: 325 TABLET ORAL at 15:42

## 2022-03-18 RX ADMIN — APIXABAN SCH MG: 5 TABLET, FILM COATED ORAL at 10:15

## 2022-03-18 RX ADMIN — FLECAINIDE ACETATE SCH MG: 50 TABLET ORAL at 08:12

## 2022-03-18 RX ADMIN — FAMOTIDINE SCH MG: 20 TABLET, FILM COATED ORAL at 19:52

## 2022-03-18 RX ADMIN — CAPSAICIN SCH DOSE: 0.25 CREAM TOPICAL at 17:25

## 2022-03-18 RX ADMIN — SUCRALFATE SCH GM: 1 SUSPENSION ORAL at 08:12

## 2022-03-18 RX ADMIN — NYSTATIN SCH DOSE: 100000 POWDER TOPICAL at 17:24

## 2022-03-18 RX ADMIN — SODIUM BICARBONATE SCH MG: 325 TABLET ORAL at 12:35

## 2022-03-18 RX ADMIN — APIXABAN SCH MG: 5 TABLET, FILM COATED ORAL at 19:51

## 2022-03-18 RX ADMIN — PROBIOTIC PRODUCT - TAB SCH EA: TAB at 08:13

## 2022-03-18 RX ADMIN — FAMOTIDINE SCH MG: 20 TABLET, FILM COATED ORAL at 08:14

## 2022-03-18 RX ADMIN — MULTIPLE VITAMINS W/ MINERALS TAB SCH TAB: TAB at 08:12

## 2022-03-18 RX ADMIN — ATORVASTATIN CALCIUM SCH MG: 20 TABLET, FILM COATED ORAL at 19:50

## 2022-03-18 RX ADMIN — BUPROPION HYDROCHLORIDE SCH MG: 150 TABLET, FILM COATED, EXTENDED RELEASE ORAL at 08:14

## 2022-03-18 RX ADMIN — NYSTATIN SCH DOSE: 100000 POWDER TOPICAL at 19:53

## 2022-03-18 RX ADMIN — SODIUM BICARBONATE SCH MG: 325 TABLET ORAL at 17:25

## 2022-03-18 RX ADMIN — SODIUM BICARBONATE SCH MG: 325 TABLET ORAL at 19:51

## 2022-03-18 RX ADMIN — FLECAINIDE ACETATE SCH MG: 50 TABLET ORAL at 19:52

## 2022-03-18 RX ADMIN — CAPSAICIN SCH DOSE: 0.25 CREAM TOPICAL at 08:15

## 2022-03-18 RX ADMIN — ACETAMINOPHEN PRN MG: 325 TABLET ORAL at 19:53

## 2022-03-18 RX ADMIN — SUCRALFATE SCH GM: 1 SUSPENSION ORAL at 19:49

## 2022-03-18 RX ADMIN — SODIUM BICARBONATE SCH MG: 325 TABLET ORAL at 08:12

## 2022-03-18 RX ADMIN — PROBIOTIC PRODUCT - TAB SCH EA: TAB at 12:35

## 2022-03-18 RX ADMIN — PROBIOTIC PRODUCT - TAB SCH EA: TAB at 17:25

## 2022-03-18 RX ADMIN — FUROSEMIDE SCH MG: 40 TABLET ORAL at 08:12

## 2022-03-18 RX ADMIN — NYSTATIN SCH DOSE: 100000 POWDER TOPICAL at 08:14

## 2022-03-18 RX ADMIN — DIGOXIN SCH MG: 125 TABLET ORAL at 08:12

## 2022-03-18 RX ADMIN — ASPIRIN 81 MG CHEWABLE TABLET SCH MG: 81 TABLET CHEWABLE at 08:13

## 2022-03-18 RX ADMIN — PROBIOTIC PRODUCT - TAB SCH EA: TAB at 19:52

## 2022-03-18 RX ADMIN — CAPSAICIN SCH DOSE: 0.25 CREAM TOPICAL at 19:54

## 2022-03-19 VITALS — SYSTOLIC BLOOD PRESSURE: 136 MMHG | DIASTOLIC BLOOD PRESSURE: 87 MMHG

## 2022-03-19 RX ADMIN — SODIUM BICARBONATE SCH MG: 325 TABLET ORAL at 19:41

## 2022-03-19 RX ADMIN — SODIUM BICARBONATE SCH MG: 325 TABLET ORAL at 12:15

## 2022-03-19 RX ADMIN — FAMOTIDINE SCH MG: 20 TABLET, FILM COATED ORAL at 19:42

## 2022-03-19 RX ADMIN — SODIUM BICARBONATE SCH MG: 325 TABLET ORAL at 08:46

## 2022-03-19 RX ADMIN — FAMOTIDINE SCH MG: 20 TABLET, FILM COATED ORAL at 08:45

## 2022-03-19 RX ADMIN — FLECAINIDE ACETATE SCH MG: 50 TABLET ORAL at 08:47

## 2022-03-19 RX ADMIN — PROBIOTIC PRODUCT - TAB SCH EA: TAB at 19:41

## 2022-03-19 RX ADMIN — ASPIRIN 81 MG CHEWABLE TABLET SCH MG: 81 TABLET CHEWABLE at 08:43

## 2022-03-19 RX ADMIN — CAPSAICIN SCH DOSE: 0.25 CREAM TOPICAL at 15:58

## 2022-03-19 RX ADMIN — NYSTATIN SCH DOSE: 100000 POWDER TOPICAL at 19:45

## 2022-03-19 RX ADMIN — FUROSEMIDE SCH MG: 40 TABLET ORAL at 08:46

## 2022-03-19 RX ADMIN — MULTIPLE VITAMINS W/ MINERALS TAB SCH TAB: TAB at 08:47

## 2022-03-19 RX ADMIN — APIXABAN SCH MG: 5 TABLET, FILM COATED ORAL at 19:41

## 2022-03-19 RX ADMIN — ATORVASTATIN CALCIUM SCH MG: 20 TABLET, FILM COATED ORAL at 19:42

## 2022-03-19 RX ADMIN — SUCRALFATE SCH GM: 1 SUSPENSION ORAL at 19:41

## 2022-03-19 RX ADMIN — PROBIOTIC PRODUCT - TAB SCH EA: TAB at 17:01

## 2022-03-19 RX ADMIN — PROBIOTIC PRODUCT - TAB SCH EA: TAB at 08:46

## 2022-03-19 RX ADMIN — FLECAINIDE ACETATE SCH MG: 50 TABLET ORAL at 19:42

## 2022-03-19 RX ADMIN — BUPROPION HYDROCHLORIDE SCH MG: 150 TABLET, FILM COATED, EXTENDED RELEASE ORAL at 08:46

## 2022-03-19 RX ADMIN — CAPSAICIN SCH DOSE: 0.25 CREAM TOPICAL at 19:45

## 2022-03-19 RX ADMIN — CAPSAICIN SCH DOSE: 0.25 CREAM TOPICAL at 08:50

## 2022-03-19 RX ADMIN — NYSTATIN SCH DOSE: 100000 POWDER TOPICAL at 15:57

## 2022-03-19 RX ADMIN — APIXABAN SCH MG: 5 TABLET, FILM COATED ORAL at 08:45

## 2022-03-19 RX ADMIN — PROBIOTIC PRODUCT - TAB SCH EA: TAB at 12:15

## 2022-03-19 RX ADMIN — DIGOXIN SCH MG: 125 TABLET ORAL at 08:49

## 2022-03-19 RX ADMIN — NYSTATIN SCH DOSE: 100000 POWDER TOPICAL at 08:47

## 2022-03-19 RX ADMIN — SODIUM BICARBONATE SCH MG: 325 TABLET ORAL at 16:00

## 2022-03-19 RX ADMIN — SUCRALFATE SCH GM: 1 SUSPENSION ORAL at 08:43

## 2022-03-20 RX ADMIN — FLECAINIDE ACETATE SCH MG: 50 TABLET ORAL at 20:08

## 2022-03-20 RX ADMIN — ATORVASTATIN CALCIUM SCH MG: 20 TABLET, FILM COATED ORAL at 20:08

## 2022-03-20 RX ADMIN — NYSTATIN SCH DOSE: 100000 POWDER TOPICAL at 09:47

## 2022-03-20 RX ADMIN — CAPSAICIN SCH DOSE: 0.25 CREAM TOPICAL at 09:47

## 2022-03-20 RX ADMIN — SODIUM BICARBONATE SCH MG: 325 TABLET ORAL at 13:08

## 2022-03-20 RX ADMIN — FUROSEMIDE SCH MG: 40 TABLET ORAL at 09:42

## 2022-03-20 RX ADMIN — PROBIOTIC PRODUCT - TAB SCH EA: TAB at 16:52

## 2022-03-20 RX ADMIN — SODIUM BICARBONATE SCH MG: 325 TABLET ORAL at 20:09

## 2022-03-20 RX ADMIN — ACETAMINOPHEN PRN MG: 325 TABLET ORAL at 20:09

## 2022-03-20 RX ADMIN — DIGOXIN SCH MG: 125 TABLET ORAL at 09:43

## 2022-03-20 RX ADMIN — FAMOTIDINE SCH MG: 20 TABLET, FILM COATED ORAL at 09:42

## 2022-03-20 RX ADMIN — SUCRALFATE SCH GM: 1 SUSPENSION ORAL at 20:08

## 2022-03-20 RX ADMIN — APIXABAN SCH MG: 5 TABLET, FILM COATED ORAL at 20:08

## 2022-03-20 RX ADMIN — NYSTATIN SCH DOSE: 100000 POWDER TOPICAL at 16:52

## 2022-03-20 RX ADMIN — PROBIOTIC PRODUCT - TAB SCH EA: TAB at 13:08

## 2022-03-20 RX ADMIN — SODIUM BICARBONATE SCH MG: 325 TABLET ORAL at 09:47

## 2022-03-20 RX ADMIN — FAMOTIDINE SCH MG: 20 TABLET, FILM COATED ORAL at 20:08

## 2022-03-20 RX ADMIN — BUPROPION HYDROCHLORIDE SCH MG: 150 TABLET, FILM COATED, EXTENDED RELEASE ORAL at 09:43

## 2022-03-20 RX ADMIN — FLECAINIDE ACETATE SCH MG: 50 TABLET ORAL at 09:46

## 2022-03-20 RX ADMIN — MULTIPLE VITAMINS W/ MINERALS TAB SCH TAB: TAB at 09:47

## 2022-03-20 RX ADMIN — CAPSAICIN SCH DOSE: 0.25 CREAM TOPICAL at 20:10

## 2022-03-20 RX ADMIN — APIXABAN SCH MG: 5 TABLET, FILM COATED ORAL at 09:42

## 2022-03-20 RX ADMIN — SUCRALFATE SCH GM: 1 SUSPENSION ORAL at 09:42

## 2022-03-20 RX ADMIN — CAPSAICIN SCH DOSE: 0.25 CREAM TOPICAL at 16:52

## 2022-03-20 RX ADMIN — ASPIRIN 81 MG CHEWABLE TABLET SCH MG: 81 TABLET CHEWABLE at 09:46

## 2022-03-20 RX ADMIN — PROBIOTIC PRODUCT - TAB SCH EA: TAB at 09:47

## 2022-03-20 RX ADMIN — NYSTATIN SCH DOSE: 100000 POWDER TOPICAL at 20:10

## 2022-03-20 RX ADMIN — SODIUM BICARBONATE SCH MG: 325 TABLET ORAL at 16:52

## 2022-03-20 RX ADMIN — PROBIOTIC PRODUCT - TAB SCH EA: TAB at 20:09

## 2022-03-21 VITALS — DIASTOLIC BLOOD PRESSURE: 64 MMHG | SYSTOLIC BLOOD PRESSURE: 146 MMHG

## 2022-03-21 RX ADMIN — FLECAINIDE ACETATE SCH MG: 50 TABLET ORAL at 20:16

## 2022-03-21 RX ADMIN — PHENAZOPYRIDINE HYDROCHLORIDE SCH MG: 100 TABLET ORAL at 20:15

## 2022-03-21 RX ADMIN — PROBIOTIC PRODUCT - TAB SCH EA: TAB at 16:58

## 2022-03-21 RX ADMIN — CAPSAICIN SCH DOSE: 0.25 CREAM TOPICAL at 20:17

## 2022-03-21 RX ADMIN — SODIUM BICARBONATE SCH MG: 325 TABLET ORAL at 16:58

## 2022-03-21 RX ADMIN — FAMOTIDINE SCH MG: 20 TABLET, FILM COATED ORAL at 20:16

## 2022-03-21 RX ADMIN — FUROSEMIDE SCH MG: 40 TABLET ORAL at 09:53

## 2022-03-21 RX ADMIN — BUPROPION HYDROCHLORIDE SCH MG: 150 TABLET, FILM COATED, EXTENDED RELEASE ORAL at 09:52

## 2022-03-21 RX ADMIN — PROBIOTIC PRODUCT - TAB SCH EA: TAB at 13:03

## 2022-03-21 RX ADMIN — PHENAZOPYRIDINE HYDROCHLORIDE SCH MG: 100 TABLET ORAL at 09:54

## 2022-03-21 RX ADMIN — MULTIPLE VITAMINS W/ MINERALS TAB SCH TAB: TAB at 09:52

## 2022-03-21 RX ADMIN — DIGOXIN SCH MG: 125 TABLET ORAL at 09:54

## 2022-03-21 RX ADMIN — NYSTATIN SCH DOSE: 100000 POWDER TOPICAL at 09:55

## 2022-03-21 RX ADMIN — NYSTATIN SCH DOSE: 100000 POWDER TOPICAL at 20:18

## 2022-03-21 RX ADMIN — SUCRALFATE SCH GM: 1 SUSPENSION ORAL at 09:52

## 2022-03-21 RX ADMIN — ACETAMINOPHEN PRN MG: 325 TABLET ORAL at 20:16

## 2022-03-21 RX ADMIN — SUCRALFATE SCH GM: 1 SUSPENSION ORAL at 20:16

## 2022-03-21 RX ADMIN — NYSTATIN SCH DOSE: 100000 POWDER TOPICAL at 16:58

## 2022-03-21 RX ADMIN — PHENAZOPYRIDINE HYDROCHLORIDE SCH MG: 100 TABLET ORAL at 16:58

## 2022-03-21 RX ADMIN — SODIUM BICARBONATE SCH MG: 325 TABLET ORAL at 20:15

## 2022-03-21 RX ADMIN — PROBIOTIC PRODUCT - TAB SCH EA: TAB at 20:15

## 2022-03-21 RX ADMIN — APIXABAN SCH MG: 5 TABLET, FILM COATED ORAL at 20:15

## 2022-03-21 RX ADMIN — SODIUM BICARBONATE SCH MG: 325 TABLET ORAL at 13:03

## 2022-03-21 RX ADMIN — APIXABAN SCH MG: 5 TABLET, FILM COATED ORAL at 09:52

## 2022-03-21 RX ADMIN — FLECAINIDE ACETATE SCH MG: 50 TABLET ORAL at 09:54

## 2022-03-21 RX ADMIN — ATORVASTATIN CALCIUM SCH MG: 20 TABLET, FILM COATED ORAL at 20:15

## 2022-03-21 RX ADMIN — CAPSAICIN SCH DOSE: 0.25 CREAM TOPICAL at 09:55

## 2022-03-21 RX ADMIN — CAPSAICIN SCH DOSE: 0.25 CREAM TOPICAL at 16:59

## 2022-03-21 RX ADMIN — ASPIRIN 81 MG CHEWABLE TABLET SCH MG: 81 TABLET CHEWABLE at 09:52

## 2022-03-21 RX ADMIN — ACETAMINOPHEN PRN MG: 325 TABLET ORAL at 04:20

## 2022-03-21 RX ADMIN — PROBIOTIC PRODUCT - TAB SCH EA: TAB at 09:53

## 2022-03-21 RX ADMIN — FAMOTIDINE SCH MG: 20 TABLET, FILM COATED ORAL at 09:52

## 2022-03-21 RX ADMIN — SODIUM BICARBONATE SCH MG: 325 TABLET ORAL at 09:52

## 2022-03-22 VITALS — SYSTOLIC BLOOD PRESSURE: 134 MMHG | DIASTOLIC BLOOD PRESSURE: 73 MMHG

## 2022-03-22 RX ADMIN — PHENAZOPYRIDINE HYDROCHLORIDE SCH MG: 100 TABLET ORAL at 19:51

## 2022-03-22 RX ADMIN — PROBIOTIC PRODUCT - TAB SCH EA: TAB at 19:52

## 2022-03-22 RX ADMIN — ACETAMINOPHEN PRN MG: 325 TABLET ORAL at 10:52

## 2022-03-22 RX ADMIN — SODIUM BICARBONATE SCH MG: 325 TABLET ORAL at 09:02

## 2022-03-22 RX ADMIN — FUROSEMIDE SCH MG: 40 TABLET ORAL at 09:03

## 2022-03-22 RX ADMIN — CAPSAICIN SCH DOSE: 0.25 CREAM TOPICAL at 16:00

## 2022-03-22 RX ADMIN — APIXABAN SCH MG: 5 TABLET, FILM COATED ORAL at 09:02

## 2022-03-22 RX ADMIN — APIXABAN SCH MG: 5 TABLET, FILM COATED ORAL at 19:51

## 2022-03-22 RX ADMIN — FAMOTIDINE SCH MG: 20 TABLET, FILM COATED ORAL at 09:04

## 2022-03-22 RX ADMIN — PROBIOTIC PRODUCT - TAB SCH EA: TAB at 09:02

## 2022-03-22 RX ADMIN — NYSTATIN SCH DOSE: 100000 POWDER TOPICAL at 09:05

## 2022-03-22 RX ADMIN — BUPROPION HYDROCHLORIDE SCH MG: 150 TABLET, FILM COATED, EXTENDED RELEASE ORAL at 09:03

## 2022-03-22 RX ADMIN — DIGOXIN SCH MG: 125 TABLET ORAL at 09:00

## 2022-03-22 RX ADMIN — SODIUM BICARBONATE SCH MG: 325 TABLET ORAL at 16:57

## 2022-03-22 RX ADMIN — PHENAZOPYRIDINE HYDROCHLORIDE SCH MG: 100 TABLET ORAL at 16:57

## 2022-03-22 RX ADMIN — PHENAZOPYRIDINE HYDROCHLORIDE SCH MG: 100 TABLET ORAL at 09:04

## 2022-03-22 RX ADMIN — FLECAINIDE ACETATE SCH MG: 50 TABLET ORAL at 19:52

## 2022-03-22 RX ADMIN — PROBIOTIC PRODUCT - TAB SCH EA: TAB at 12:25

## 2022-03-22 RX ADMIN — CAPSAICIN SCH DOSE: 0.25 CREAM TOPICAL at 09:05

## 2022-03-22 RX ADMIN — NYSTATIN SCH DOSE: 100000 POWDER TOPICAL at 19:56

## 2022-03-22 RX ADMIN — MULTIPLE VITAMINS W/ MINERALS TAB SCH TAB: TAB at 09:02

## 2022-03-22 RX ADMIN — SODIUM BICARBONATE SCH MG: 325 TABLET ORAL at 19:52

## 2022-03-22 RX ADMIN — SUCRALFATE SCH GM: 1 SUSPENSION ORAL at 19:51

## 2022-03-22 RX ADMIN — SUCRALFATE SCH GM: 1 SUSPENSION ORAL at 09:01

## 2022-03-22 RX ADMIN — FAMOTIDINE SCH MG: 20 TABLET, FILM COATED ORAL at 19:51

## 2022-03-22 RX ADMIN — NYSTATIN SCH DOSE: 100000 POWDER TOPICAL at 16:00

## 2022-03-22 RX ADMIN — FLECAINIDE ACETATE SCH MG: 50 TABLET ORAL at 09:02

## 2022-03-22 RX ADMIN — SODIUM BICARBONATE SCH MG: 325 TABLET ORAL at 12:25

## 2022-03-22 RX ADMIN — ASPIRIN 81 MG CHEWABLE TABLET SCH MG: 81 TABLET CHEWABLE at 09:01

## 2022-03-22 RX ADMIN — ATORVASTATIN CALCIUM SCH MG: 20 TABLET, FILM COATED ORAL at 19:51

## 2022-03-22 RX ADMIN — PROBIOTIC PRODUCT - TAB SCH EA: TAB at 16:58

## 2022-03-22 RX ADMIN — CAPSAICIN SCH DOSE: 0.25 CREAM TOPICAL at 19:56

## 2022-03-23 VITALS — DIASTOLIC BLOOD PRESSURE: 71 MMHG | SYSTOLIC BLOOD PRESSURE: 148 MMHG

## 2022-03-23 LAB
BASOPHILS # BLD AUTO: 0 10^3/UL (ref 0–0.2)
BASOPHILS NFR BLD AUTO: 0.7 % (ref 0–1)
BUN SERPL-MCNC: 35 MG/DL (ref 7–18)
CALCIUM SERPL-MCNC: 11.7 MG/DL (ref 8.8–10.2)
CHLORIDE SERPL-SCNC: 107 MEQ/L (ref 98–107)
CO2 SERPL-SCNC: 27 MEQ/L (ref 21–32)
CREAT SERPL-MCNC: 0.97 MG/DL (ref 0.55–1.3)
EOSINOPHIL # BLD AUTO: 0.2 10^3/UL (ref 0–0.5)
EOSINOPHIL NFR BLD AUTO: 3.5 % (ref 0–3)
GFR SERPL CREATININE-BSD FRML MDRD: > 60 ML/MIN/{1.73_M2} (ref 45–?)
GLUCOSE SERPL-MCNC: 102 MG/DL (ref 70–100)
HCT VFR BLD AUTO: 36.4 % (ref 36–47)
HGB BLD-MCNC: 11.5 G/DL (ref 12–15.5)
LYMPHOCYTES # BLD AUTO: 1.1 10^3/UL (ref 1.5–5)
LYMPHOCYTES NFR BLD AUTO: 24.3 % (ref 24–44)
MCH RBC QN AUTO: 31 PG (ref 27–33)
MCHC RBC AUTO-ENTMCNC: 31.6 G/DL (ref 32–36.5)
MCV RBC AUTO: 98.1 FL (ref 80–96)
MONOCYTES # BLD AUTO: 0.4 10^3/UL (ref 0–0.8)
MONOCYTES NFR BLD AUTO: 8 % (ref 2–8)
NEUTROPHILS # BLD AUTO: 2.9 10^3/UL (ref 1.5–8.5)
NEUTROPHILS NFR BLD AUTO: 63.3 % (ref 36–66)
PLATELET # BLD AUTO: 242 10^3/UL (ref 150–450)
POTASSIUM SERPL-SCNC: 4.9 MEQ/L (ref 3.5–5.1)
RBC # BLD AUTO: 3.71 10^6/UL (ref 4–5.4)
SODIUM SERPL-SCNC: 139 MEQ/L (ref 136–145)
WBC # BLD AUTO: 4.6 10^3/UL (ref 4–10)

## 2022-03-23 RX ADMIN — PHENAZOPYRIDINE HYDROCHLORIDE SCH MG: 100 TABLET ORAL at 09:20

## 2022-03-23 RX ADMIN — ACETAMINOPHEN PRN MG: 325 TABLET ORAL at 00:19

## 2022-03-23 RX ADMIN — PHENAZOPYRIDINE HYDROCHLORIDE SCH MG: 100 TABLET ORAL at 20:38

## 2022-03-23 RX ADMIN — FUROSEMIDE SCH MG: 40 TABLET ORAL at 09:20

## 2022-03-23 RX ADMIN — SODIUM BICARBONATE SCH MG: 325 TABLET ORAL at 09:20

## 2022-03-23 RX ADMIN — MULTIPLE VITAMINS W/ MINERALS TAB SCH TAB: TAB at 09:20

## 2022-03-23 RX ADMIN — NYSTATIN SCH DOSE: 100000 POWDER TOPICAL at 20:39

## 2022-03-23 RX ADMIN — NYSTATIN SCH DOSE: 100000 POWDER TOPICAL at 09:00

## 2022-03-23 RX ADMIN — FAMOTIDINE SCH MG: 20 TABLET, FILM COATED ORAL at 09:20

## 2022-03-23 RX ADMIN — PROBIOTIC PRODUCT - TAB SCH EA: TAB at 09:20

## 2022-03-23 RX ADMIN — SODIUM BICARBONATE SCH MG: 325 TABLET ORAL at 20:38

## 2022-03-23 RX ADMIN — SUCRALFATE SCH GM: 1 SUSPENSION ORAL at 09:19

## 2022-03-23 RX ADMIN — FLECAINIDE ACETATE SCH MG: 50 TABLET ORAL at 09:19

## 2022-03-23 RX ADMIN — FAMOTIDINE SCH MG: 20 TABLET, FILM COATED ORAL at 20:37

## 2022-03-23 RX ADMIN — SODIUM BICARBONATE SCH MG: 325 TABLET ORAL at 16:53

## 2022-03-23 RX ADMIN — PROBIOTIC PRODUCT - TAB SCH EA: TAB at 16:53

## 2022-03-23 RX ADMIN — FLECAINIDE ACETATE SCH MG: 50 TABLET ORAL at 20:37

## 2022-03-23 RX ADMIN — ASPIRIN 81 MG CHEWABLE TABLET SCH MG: 81 TABLET CHEWABLE at 09:20

## 2022-03-23 RX ADMIN — SODIUM BICARBONATE SCH MG: 325 TABLET ORAL at 13:19

## 2022-03-23 RX ADMIN — BUPROPION HYDROCHLORIDE SCH MG: 150 TABLET, FILM COATED, EXTENDED RELEASE ORAL at 09:19

## 2022-03-23 RX ADMIN — PROBIOTIC PRODUCT - TAB SCH EA: TAB at 13:19

## 2022-03-23 RX ADMIN — CAPSAICIN SCH DOSE: 0.25 CREAM TOPICAL at 20:39

## 2022-03-23 RX ADMIN — SUCRALFATE SCH GM: 1 SUSPENSION ORAL at 20:36

## 2022-03-23 RX ADMIN — APIXABAN SCH MG: 5 TABLET, FILM COATED ORAL at 20:38

## 2022-03-23 RX ADMIN — CEFDINIR SCH MG: 300 CAPSULE ORAL at 09:19

## 2022-03-23 RX ADMIN — NYSTATIN SCH DOSE: 100000 POWDER TOPICAL at 16:00

## 2022-03-23 RX ADMIN — CEFDINIR SCH MG: 300 CAPSULE ORAL at 20:36

## 2022-03-23 RX ADMIN — PROBIOTIC PRODUCT - TAB SCH EA: TAB at 20:37

## 2022-03-23 RX ADMIN — ATORVASTATIN CALCIUM SCH MG: 20 TABLET, FILM COATED ORAL at 20:37

## 2022-03-23 RX ADMIN — PHENAZOPYRIDINE HYDROCHLORIDE SCH MG: 100 TABLET ORAL at 16:53

## 2022-03-23 RX ADMIN — APIXABAN SCH MG: 5 TABLET, FILM COATED ORAL at 09:19

## 2022-03-23 RX ADMIN — CAPSAICIN SCH DOSE: 0.25 CREAM TOPICAL at 09:00

## 2022-03-23 RX ADMIN — CAPSAICIN SCH DOSE: 0.25 CREAM TOPICAL at 16:00

## 2022-03-23 RX ADMIN — DIGOXIN SCH MG: 125 TABLET ORAL at 09:20

## 2022-03-23 RX ADMIN — CEFDINIR SCH MG: 300 CAPSULE ORAL at 01:33

## 2022-03-24 VITALS — DIASTOLIC BLOOD PRESSURE: 77 MMHG | SYSTOLIC BLOOD PRESSURE: 151 MMHG

## 2022-03-24 RX ADMIN — BUPROPION HYDROCHLORIDE SCH MG: 150 TABLET, FILM COATED, EXTENDED RELEASE ORAL at 08:30

## 2022-03-24 RX ADMIN — FAMOTIDINE SCH MG: 20 TABLET, FILM COATED ORAL at 20:37

## 2022-03-24 RX ADMIN — CAPSAICIN SCH DOSE: 0.25 CREAM TOPICAL at 20:37

## 2022-03-24 RX ADMIN — PROBIOTIC PRODUCT - TAB SCH EA: TAB at 20:35

## 2022-03-24 RX ADMIN — FUROSEMIDE SCH MG: 40 TABLET ORAL at 08:30

## 2022-03-24 RX ADMIN — PROBIOTIC PRODUCT - TAB SCH EA: TAB at 08:33

## 2022-03-24 RX ADMIN — PROBIOTIC PRODUCT - TAB SCH EA: TAB at 12:43

## 2022-03-24 RX ADMIN — SUCRALFATE SCH GM: 1 SUSPENSION ORAL at 20:35

## 2022-03-24 RX ADMIN — MULTIPLE VITAMINS W/ MINERALS TAB SCH TAB: TAB at 08:32

## 2022-03-24 RX ADMIN — NYSTATIN SCH DOSE: 100000 POWDER TOPICAL at 15:12

## 2022-03-24 RX ADMIN — SODIUM BICARBONATE SCH MG: 325 TABLET ORAL at 08:33

## 2022-03-24 RX ADMIN — SODIUM BICARBONATE SCH MG: 325 TABLET ORAL at 17:37

## 2022-03-24 RX ADMIN — FLECAINIDE ACETATE SCH MG: 50 TABLET ORAL at 20:36

## 2022-03-24 RX ADMIN — FLECAINIDE ACETATE SCH MG: 50 TABLET ORAL at 08:32

## 2022-03-24 RX ADMIN — ASPIRIN 81 MG CHEWABLE TABLET SCH MG: 81 TABLET CHEWABLE at 08:33

## 2022-03-24 RX ADMIN — APIXABAN SCH MG: 5 TABLET, FILM COATED ORAL at 20:37

## 2022-03-24 RX ADMIN — APIXABAN SCH MG: 5 TABLET, FILM COATED ORAL at 08:33

## 2022-03-24 RX ADMIN — CAPSAICIN SCH DOSE: 0.25 CREAM TOPICAL at 15:12

## 2022-03-24 RX ADMIN — NYSTATIN SCH DOSE: 100000 POWDER TOPICAL at 08:34

## 2022-03-24 RX ADMIN — ATORVASTATIN CALCIUM SCH MG: 20 TABLET, FILM COATED ORAL at 20:36

## 2022-03-24 RX ADMIN — SODIUM BICARBONATE SCH MG: 325 TABLET ORAL at 20:35

## 2022-03-24 RX ADMIN — CAPSAICIN SCH DOSE: 0.25 CREAM TOPICAL at 08:35

## 2022-03-24 RX ADMIN — SUCRALFATE SCH GM: 1 SUSPENSION ORAL at 08:33

## 2022-03-24 RX ADMIN — DIGOXIN SCH MG: 125 TABLET ORAL at 08:31

## 2022-03-24 RX ADMIN — SODIUM BICARBONATE SCH MG: 325 TABLET ORAL at 12:43

## 2022-03-24 RX ADMIN — CEFDINIR SCH MG: 300 CAPSULE ORAL at 08:32

## 2022-03-24 RX ADMIN — CEFDINIR SCH MG: 300 CAPSULE ORAL at 20:35

## 2022-03-24 RX ADMIN — NYSTATIN SCH DOSE: 100000 POWDER TOPICAL at 20:38

## 2022-03-24 RX ADMIN — PROBIOTIC PRODUCT - TAB SCH EA: TAB at 17:38

## 2022-03-24 RX ADMIN — FAMOTIDINE SCH MG: 20 TABLET, FILM COATED ORAL at 08:30

## 2022-03-25 VITALS — DIASTOLIC BLOOD PRESSURE: 72 MMHG | SYSTOLIC BLOOD PRESSURE: 157 MMHG

## 2022-03-25 VITALS — SYSTOLIC BLOOD PRESSURE: 120 MMHG | DIASTOLIC BLOOD PRESSURE: 62 MMHG

## 2022-03-25 RX ADMIN — SODIUM BICARBONATE SCH MG: 325 TABLET ORAL at 12:53

## 2022-03-25 RX ADMIN — SUCRALFATE SCH GM: 1 SUSPENSION ORAL at 09:19

## 2022-03-25 RX ADMIN — SODIUM BICARBONATE SCH MG: 325 TABLET ORAL at 09:21

## 2022-03-25 RX ADMIN — APIXABAN SCH MG: 5 TABLET, FILM COATED ORAL at 09:22

## 2022-03-25 RX ADMIN — MULTIPLE VITAMINS W/ MINERALS TAB SCH TAB: TAB at 09:21

## 2022-03-25 RX ADMIN — FAMOTIDINE SCH MG: 20 TABLET, FILM COATED ORAL at 09:19

## 2022-03-25 RX ADMIN — APIXABAN SCH MG: 5 TABLET, FILM COATED ORAL at 20:24

## 2022-03-25 RX ADMIN — CAPSAICIN SCH DOSE: 0.25 CREAM TOPICAL at 16:00

## 2022-03-25 RX ADMIN — CAPSAICIN SCH DOSE: 0.25 CREAM TOPICAL at 20:22

## 2022-03-25 RX ADMIN — FAMOTIDINE SCH MG: 20 TABLET, FILM COATED ORAL at 20:24

## 2022-03-25 RX ADMIN — PROBIOTIC PRODUCT - TAB SCH EA: TAB at 09:21

## 2022-03-25 RX ADMIN — CAPSAICIN SCH DOSE: 0.25 CREAM TOPICAL at 09:23

## 2022-03-25 RX ADMIN — PROBIOTIC PRODUCT - TAB SCH EA: TAB at 20:23

## 2022-03-25 RX ADMIN — NYSTATIN SCH DOSE: 100000 POWDER TOPICAL at 20:23

## 2022-03-25 RX ADMIN — PROBIOTIC PRODUCT - TAB SCH EA: TAB at 12:54

## 2022-03-25 RX ADMIN — FLECAINIDE ACETATE SCH MG: 50 TABLET ORAL at 09:20

## 2022-03-25 RX ADMIN — DIGOXIN SCH MG: 125 TABLET ORAL at 09:22

## 2022-03-25 RX ADMIN — BUPROPION HYDROCHLORIDE SCH MG: 150 TABLET, FILM COATED, EXTENDED RELEASE ORAL at 09:19

## 2022-03-25 RX ADMIN — PROBIOTIC PRODUCT - TAB SCH EA: TAB at 17:12

## 2022-03-25 RX ADMIN — NYSTATIN SCH DOSE: 100000 POWDER TOPICAL at 09:22

## 2022-03-25 RX ADMIN — CEFDINIR SCH MG: 300 CAPSULE ORAL at 20:23

## 2022-03-25 RX ADMIN — ATORVASTATIN CALCIUM SCH MG: 20 TABLET, FILM COATED ORAL at 20:24

## 2022-03-25 RX ADMIN — ASPIRIN 81 MG CHEWABLE TABLET SCH MG: 81 TABLET CHEWABLE at 09:21

## 2022-03-25 RX ADMIN — SODIUM BICARBONATE SCH MG: 325 TABLET ORAL at 17:12

## 2022-03-25 RX ADMIN — NYSTATIN SCH DOSE: 100000 POWDER TOPICAL at 16:00

## 2022-03-25 RX ADMIN — SODIUM BICARBONATE SCH MG: 325 TABLET ORAL at 20:23

## 2022-03-25 RX ADMIN — SUCRALFATE SCH GM: 1 SUSPENSION ORAL at 20:23

## 2022-03-25 RX ADMIN — FLECAINIDE ACETATE SCH MG: 50 TABLET ORAL at 20:23

## 2022-03-25 RX ADMIN — CEFDINIR SCH MG: 300 CAPSULE ORAL at 09:21

## 2022-03-25 RX ADMIN — FUROSEMIDE SCH MG: 40 TABLET ORAL at 09:19

## 2022-03-26 VITALS — SYSTOLIC BLOOD PRESSURE: 140 MMHG | DIASTOLIC BLOOD PRESSURE: 71 MMHG

## 2022-03-26 RX ADMIN — PROBIOTIC PRODUCT - TAB SCH EA: TAB at 19:37

## 2022-03-26 RX ADMIN — FAMOTIDINE SCH MG: 20 TABLET, FILM COATED ORAL at 08:19

## 2022-03-26 RX ADMIN — SODIUM BICARBONATE SCH MG: 325 TABLET ORAL at 12:17

## 2022-03-26 RX ADMIN — PROBIOTIC PRODUCT - TAB SCH EA: TAB at 17:10

## 2022-03-26 RX ADMIN — ACETAMINOPHEN PRN MG: 325 TABLET ORAL at 19:38

## 2022-03-26 RX ADMIN — FUROSEMIDE SCH MG: 40 TABLET ORAL at 08:18

## 2022-03-26 RX ADMIN — PROBIOTIC PRODUCT - TAB SCH EA: TAB at 12:17

## 2022-03-26 RX ADMIN — SODIUM BICARBONATE SCH MG: 325 TABLET ORAL at 08:19

## 2022-03-26 RX ADMIN — ASPIRIN 81 MG CHEWABLE TABLET SCH MG: 81 TABLET CHEWABLE at 08:18

## 2022-03-26 RX ADMIN — CAPSAICIN SCH DOSE: 0.25 CREAM TOPICAL at 16:00

## 2022-03-26 RX ADMIN — CEFDINIR SCH MG: 300 CAPSULE ORAL at 08:19

## 2022-03-26 RX ADMIN — CAPSAICIN SCH DOSE: 0.25 CREAM TOPICAL at 08:21

## 2022-03-26 RX ADMIN — APIXABAN SCH MG: 5 TABLET, FILM COATED ORAL at 19:36

## 2022-03-26 RX ADMIN — DIGOXIN SCH MG: 125 TABLET ORAL at 08:20

## 2022-03-26 RX ADMIN — SODIUM BICARBONATE SCH MG: 325 TABLET ORAL at 19:36

## 2022-03-26 RX ADMIN — NYSTATIN SCH DOSE: 100000 POWDER TOPICAL at 19:38

## 2022-03-26 RX ADMIN — NYSTATIN SCH DOSE: 100000 POWDER TOPICAL at 08:21

## 2022-03-26 RX ADMIN — BUPROPION HYDROCHLORIDE SCH MG: 150 TABLET, FILM COATED, EXTENDED RELEASE ORAL at 08:19

## 2022-03-26 RX ADMIN — CAPSAICIN SCH DOSE: 0.25 CREAM TOPICAL at 19:38

## 2022-03-26 RX ADMIN — SUCRALFATE SCH GM: 1 SUSPENSION ORAL at 08:18

## 2022-03-26 RX ADMIN — ATORVASTATIN CALCIUM SCH MG: 20 TABLET, FILM COATED ORAL at 19:36

## 2022-03-26 RX ADMIN — SUCRALFATE SCH GM: 1 SUSPENSION ORAL at 19:37

## 2022-03-26 RX ADMIN — FAMOTIDINE SCH MG: 20 TABLET, FILM COATED ORAL at 19:36

## 2022-03-26 RX ADMIN — FLECAINIDE ACETATE SCH MG: 50 TABLET ORAL at 08:19

## 2022-03-26 RX ADMIN — NYSTATIN SCH DOSE: 100000 POWDER TOPICAL at 16:00

## 2022-03-26 RX ADMIN — MULTIPLE VITAMINS W/ MINERALS TAB SCH TAB: TAB at 08:19

## 2022-03-26 RX ADMIN — SODIUM BICARBONATE SCH MG: 325 TABLET ORAL at 17:10

## 2022-03-26 RX ADMIN — APIXABAN SCH MG: 5 TABLET, FILM COATED ORAL at 08:19

## 2022-03-26 RX ADMIN — PROBIOTIC PRODUCT - TAB SCH EA: TAB at 08:18

## 2022-03-26 RX ADMIN — CEFDINIR SCH MG: 300 CAPSULE ORAL at 19:36

## 2022-03-26 RX ADMIN — FLECAINIDE ACETATE SCH MG: 50 TABLET ORAL at 19:37

## 2022-03-27 VITALS — SYSTOLIC BLOOD PRESSURE: 167 MMHG | DIASTOLIC BLOOD PRESSURE: 74 MMHG

## 2022-03-27 RX ADMIN — FAMOTIDINE SCH MG: 20 TABLET, FILM COATED ORAL at 20:13

## 2022-03-27 RX ADMIN — PROBIOTIC PRODUCT - TAB SCH EA: TAB at 09:26

## 2022-03-27 RX ADMIN — APIXABAN SCH MG: 5 TABLET, FILM COATED ORAL at 09:27

## 2022-03-27 RX ADMIN — CEFDINIR SCH MG: 300 CAPSULE ORAL at 20:12

## 2022-03-27 RX ADMIN — FLECAINIDE ACETATE SCH MG: 50 TABLET ORAL at 20:13

## 2022-03-27 RX ADMIN — FLECAINIDE ACETATE SCH MG: 50 TABLET ORAL at 09:27

## 2022-03-27 RX ADMIN — CAPSAICIN SCH DOSE: 0.25 CREAM TOPICAL at 20:14

## 2022-03-27 RX ADMIN — FUROSEMIDE SCH MG: 40 TABLET ORAL at 09:26

## 2022-03-27 RX ADMIN — PROBIOTIC PRODUCT - TAB SCH EA: TAB at 20:12

## 2022-03-27 RX ADMIN — NYSTATIN SCH DOSE: 100000 POWDER TOPICAL at 16:00

## 2022-03-27 RX ADMIN — CEFDINIR SCH MG: 300 CAPSULE ORAL at 09:26

## 2022-03-27 RX ADMIN — ASPIRIN 81 MG CHEWABLE TABLET SCH MG: 81 TABLET CHEWABLE at 09:26

## 2022-03-27 RX ADMIN — MULTIPLE VITAMINS W/ MINERALS TAB SCH TAB: TAB at 09:26

## 2022-03-27 RX ADMIN — APIXABAN SCH MG: 5 TABLET, FILM COATED ORAL at 20:13

## 2022-03-27 RX ADMIN — FAMOTIDINE SCH MG: 20 TABLET, FILM COATED ORAL at 09:26

## 2022-03-27 RX ADMIN — SUCRALFATE SCH GM: 1 SUSPENSION ORAL at 20:12

## 2022-03-27 RX ADMIN — CAPSAICIN SCH DOSE: 0.25 CREAM TOPICAL at 16:00

## 2022-03-27 RX ADMIN — SODIUM BICARBONATE SCH MG: 325 TABLET ORAL at 16:54

## 2022-03-27 RX ADMIN — ACETAMINOPHEN PRN MG: 325 TABLET ORAL at 09:28

## 2022-03-27 RX ADMIN — CAPSAICIN SCH DOSE: 0.25 CREAM TOPICAL at 09:28

## 2022-03-27 RX ADMIN — SODIUM BICARBONATE SCH MG: 325 TABLET ORAL at 20:12

## 2022-03-27 RX ADMIN — SODIUM BICARBONATE SCH MG: 325 TABLET ORAL at 12:10

## 2022-03-27 RX ADMIN — NYSTATIN SCH DOSE: 100000 POWDER TOPICAL at 20:14

## 2022-03-27 RX ADMIN — DIGOXIN SCH MG: 125 TABLET ORAL at 09:27

## 2022-03-27 RX ADMIN — ATORVASTATIN CALCIUM SCH MG: 20 TABLET, FILM COATED ORAL at 20:13

## 2022-03-27 RX ADMIN — BUPROPION HYDROCHLORIDE SCH MG: 150 TABLET, FILM COATED, EXTENDED RELEASE ORAL at 09:27

## 2022-03-27 RX ADMIN — PROBIOTIC PRODUCT - TAB SCH EA: TAB at 16:55

## 2022-03-27 RX ADMIN — SODIUM BICARBONATE SCH MG: 325 TABLET ORAL at 09:26

## 2022-03-27 RX ADMIN — PROBIOTIC PRODUCT - TAB SCH EA: TAB at 12:10

## 2022-03-27 RX ADMIN — SUCRALFATE SCH GM: 1 SUSPENSION ORAL at 09:26

## 2022-03-27 RX ADMIN — NYSTATIN SCH DOSE: 100000 POWDER TOPICAL at 09:28

## 2022-03-28 VITALS — DIASTOLIC BLOOD PRESSURE: 91 MMHG | SYSTOLIC BLOOD PRESSURE: 154 MMHG

## 2022-03-28 RX ADMIN — CAPSAICIN SCH DOSE: 0.25 CREAM TOPICAL at 17:04

## 2022-03-28 RX ADMIN — SODIUM BICARBONATE SCH MG: 325 TABLET ORAL at 12:24

## 2022-03-28 RX ADMIN — ASPIRIN 81 MG CHEWABLE TABLET SCH MG: 81 TABLET CHEWABLE at 08:36

## 2022-03-28 RX ADMIN — FLECAINIDE ACETATE SCH MG: 50 TABLET ORAL at 08:37

## 2022-03-28 RX ADMIN — ATORVASTATIN CALCIUM SCH MG: 20 TABLET, FILM COATED ORAL at 21:30

## 2022-03-28 RX ADMIN — PROBIOTIC PRODUCT - TAB SCH EA: TAB at 08:37

## 2022-03-28 RX ADMIN — FAMOTIDINE SCH MG: 20 TABLET, FILM COATED ORAL at 08:38

## 2022-03-28 RX ADMIN — FAMOTIDINE SCH MG: 20 TABLET, FILM COATED ORAL at 21:30

## 2022-03-28 RX ADMIN — SODIUM BICARBONATE SCH MG: 325 TABLET ORAL at 08:37

## 2022-03-28 RX ADMIN — FLECAINIDE ACETATE SCH MG: 50 TABLET ORAL at 21:30

## 2022-03-28 RX ADMIN — SODIUM BICARBONATE SCH MG: 325 TABLET ORAL at 21:29

## 2022-03-28 RX ADMIN — BUPROPION HYDROCHLORIDE SCH MG: 150 TABLET, FILM COATED, EXTENDED RELEASE ORAL at 08:38

## 2022-03-28 RX ADMIN — SUCRALFATE SCH GM: 1 SUSPENSION ORAL at 21:29

## 2022-03-28 RX ADMIN — CEFDINIR SCH MG: 300 CAPSULE ORAL at 08:38

## 2022-03-28 RX ADMIN — APIXABAN SCH MG: 5 TABLET, FILM COATED ORAL at 08:37

## 2022-03-28 RX ADMIN — NYSTATIN SCH DOSE: 100000 POWDER TOPICAL at 21:30

## 2022-03-28 RX ADMIN — CAPSAICIN SCH DOSE: 0.25 CREAM TOPICAL at 21:31

## 2022-03-28 RX ADMIN — APIXABAN SCH MG: 5 TABLET, FILM COATED ORAL at 21:29

## 2022-03-28 RX ADMIN — PROBIOTIC PRODUCT - TAB SCH EA: TAB at 21:29

## 2022-03-28 RX ADMIN — MULTIPLE VITAMINS W/ MINERALS TAB SCH TAB: TAB at 08:36

## 2022-03-28 RX ADMIN — NYSTATIN SCH DOSE: 100000 POWDER TOPICAL at 17:04

## 2022-03-28 RX ADMIN — NYSTATIN SCH DOSE: 100000 POWDER TOPICAL at 08:39

## 2022-03-28 RX ADMIN — FUROSEMIDE SCH MG: 40 TABLET ORAL at 08:38

## 2022-03-28 RX ADMIN — SODIUM BICARBONATE SCH MG: 325 TABLET ORAL at 17:03

## 2022-03-28 RX ADMIN — PROBIOTIC PRODUCT - TAB SCH EA: TAB at 17:03

## 2022-03-28 RX ADMIN — DIGOXIN SCH MG: 125 TABLET ORAL at 08:38

## 2022-03-28 RX ADMIN — PROBIOTIC PRODUCT - TAB SCH EA: TAB at 12:24

## 2022-03-28 RX ADMIN — SUCRALFATE SCH GM: 1 SUSPENSION ORAL at 08:38

## 2022-03-28 RX ADMIN — CAPSAICIN SCH DOSE: 0.25 CREAM TOPICAL at 08:39

## 2022-03-28 RX ADMIN — CEFDINIR SCH MG: 300 CAPSULE ORAL at 21:29

## 2022-03-29 VITALS — SYSTOLIC BLOOD PRESSURE: 147 MMHG | DIASTOLIC BLOOD PRESSURE: 87 MMHG

## 2022-03-29 VITALS — DIASTOLIC BLOOD PRESSURE: 89 MMHG | SYSTOLIC BLOOD PRESSURE: 148 MMHG

## 2022-03-29 RX ADMIN — DIGOXIN SCH MG: 125 TABLET ORAL at 07:51

## 2022-03-29 RX ADMIN — FAMOTIDINE SCH MG: 20 TABLET, FILM COATED ORAL at 07:49

## 2022-03-29 RX ADMIN — SODIUM BICARBONATE SCH MG: 325 TABLET ORAL at 15:17

## 2022-03-29 RX ADMIN — SODIUM BICARBONATE SCH MG: 325 TABLET ORAL at 12:16

## 2022-03-29 RX ADMIN — SODIUM BICARBONATE SCH MG: 325 TABLET ORAL at 21:37

## 2022-03-29 RX ADMIN — NYSTATIN SCH DOSE: 100000 POWDER TOPICAL at 21:39

## 2022-03-29 RX ADMIN — ATORVASTATIN CALCIUM SCH MG: 20 TABLET, FILM COATED ORAL at 21:37

## 2022-03-29 RX ADMIN — SUCRALFATE SCH GM: 1 SUSPENSION ORAL at 21:38

## 2022-03-29 RX ADMIN — FAMOTIDINE SCH MG: 20 TABLET, FILM COATED ORAL at 21:37

## 2022-03-29 RX ADMIN — CAPSAICIN SCH DOSE: 0.25 CREAM TOPICAL at 15:17

## 2022-03-29 RX ADMIN — FUROSEMIDE SCH MG: 40 TABLET ORAL at 07:50

## 2022-03-29 RX ADMIN — MULTIPLE VITAMINS W/ MINERALS TAB SCH TAB: TAB at 07:48

## 2022-03-29 RX ADMIN — PROBIOTIC PRODUCT - TAB SCH EA: TAB at 07:48

## 2022-03-29 RX ADMIN — PROBIOTIC PRODUCT - TAB SCH EA: TAB at 12:16

## 2022-03-29 RX ADMIN — NYSTATIN SCH DOSE: 100000 POWDER TOPICAL at 15:16

## 2022-03-29 RX ADMIN — FLECAINIDE ACETATE SCH MG: 50 TABLET ORAL at 07:51

## 2022-03-29 RX ADMIN — NYSTATIN SCH DOSE: 100000 POWDER TOPICAL at 07:45

## 2022-03-29 RX ADMIN — SUCRALFATE SCH GM: 1 SUSPENSION ORAL at 07:50

## 2022-03-29 RX ADMIN — CAPSAICIN SCH DOSE: 0.25 CREAM TOPICAL at 21:39

## 2022-03-29 RX ADMIN — APIXABAN SCH MG: 5 TABLET, FILM COATED ORAL at 07:50

## 2022-03-29 RX ADMIN — CEFDINIR SCH MG: 300 CAPSULE ORAL at 07:50

## 2022-03-29 RX ADMIN — PROBIOTIC PRODUCT - TAB SCH EA: TAB at 16:23

## 2022-03-29 RX ADMIN — BUPROPION HYDROCHLORIDE SCH MG: 150 TABLET, FILM COATED, EXTENDED RELEASE ORAL at 07:51

## 2022-03-29 RX ADMIN — CAPSAICIN SCH DOSE: 0.25 CREAM TOPICAL at 07:45

## 2022-03-29 RX ADMIN — SODIUM BICARBONATE SCH MG: 325 TABLET ORAL at 07:46

## 2022-03-29 RX ADMIN — FLECAINIDE ACETATE SCH MG: 50 TABLET ORAL at 21:38

## 2022-03-29 RX ADMIN — APIXABAN SCH MG: 5 TABLET, FILM COATED ORAL at 21:37

## 2022-03-29 RX ADMIN — PROBIOTIC PRODUCT - TAB SCH EA: TAB at 21:37

## 2022-03-29 RX ADMIN — ASPIRIN 81 MG CHEWABLE TABLET SCH MG: 81 TABLET CHEWABLE at 07:50

## 2022-03-30 VITALS — SYSTOLIC BLOOD PRESSURE: 122 MMHG | DIASTOLIC BLOOD PRESSURE: 60 MMHG

## 2022-03-30 RX ADMIN — FAMOTIDINE SCH MG: 20 TABLET, FILM COATED ORAL at 09:42

## 2022-03-30 RX ADMIN — SODIUM BICARBONATE SCH MG: 325 TABLET ORAL at 09:41

## 2022-03-30 RX ADMIN — ASPIRIN 81 MG CHEWABLE TABLET SCH MG: 81 TABLET CHEWABLE at 09:41

## 2022-03-30 RX ADMIN — CAPSAICIN SCH DOSE: 0.25 CREAM TOPICAL at 09:44

## 2022-03-30 RX ADMIN — FUROSEMIDE SCH MG: 40 TABLET ORAL at 09:41

## 2022-03-30 RX ADMIN — PROBIOTIC PRODUCT - TAB SCH EA: TAB at 22:14

## 2022-03-30 RX ADMIN — MULTIPLE VITAMINS W/ MINERALS TAB SCH TAB: TAB at 09:41

## 2022-03-30 RX ADMIN — SUCRALFATE SCH GM: 1 SUSPENSION ORAL at 22:15

## 2022-03-30 RX ADMIN — CAPSAICIN SCH DOSE: 0.25 CREAM TOPICAL at 15:43

## 2022-03-30 RX ADMIN — DIGOXIN SCH MG: 125 TABLET ORAL at 09:40

## 2022-03-30 RX ADMIN — SODIUM BICARBONATE SCH MG: 325 TABLET ORAL at 22:14

## 2022-03-30 RX ADMIN — ATORVASTATIN CALCIUM SCH MG: 20 TABLET, FILM COATED ORAL at 22:14

## 2022-03-30 RX ADMIN — SODIUM BICARBONATE SCH MG: 325 TABLET ORAL at 13:12

## 2022-03-30 RX ADMIN — NYSTATIN SCH DOSE: 100000 POWDER TOPICAL at 22:16

## 2022-03-30 RX ADMIN — SODIUM BICARBONATE SCH MG: 325 TABLET ORAL at 17:22

## 2022-03-30 RX ADMIN — FLECAINIDE ACETATE SCH MG: 50 TABLET ORAL at 09:40

## 2022-03-30 RX ADMIN — PROBIOTIC PRODUCT - TAB SCH EA: TAB at 13:12

## 2022-03-30 RX ADMIN — PROBIOTIC PRODUCT - TAB SCH EA: TAB at 17:22

## 2022-03-30 RX ADMIN — APIXABAN SCH MG: 5 TABLET, FILM COATED ORAL at 09:42

## 2022-03-30 RX ADMIN — NYSTATIN SCH DOSE: 100000 POWDER TOPICAL at 09:44

## 2022-03-30 RX ADMIN — SUCRALFATE SCH GM: 1 SUSPENSION ORAL at 09:42

## 2022-03-30 RX ADMIN — BUPROPION HYDROCHLORIDE SCH MG: 150 TABLET, FILM COATED, EXTENDED RELEASE ORAL at 09:42

## 2022-03-30 RX ADMIN — FAMOTIDINE SCH MG: 20 TABLET, FILM COATED ORAL at 22:15

## 2022-03-30 RX ADMIN — FLECAINIDE ACETATE SCH MG: 50 TABLET ORAL at 22:14

## 2022-03-30 RX ADMIN — CAPSAICIN SCH DOSE: 0.25 CREAM TOPICAL at 22:16

## 2022-03-30 RX ADMIN — NYSTATIN SCH DOSE: 100000 POWDER TOPICAL at 15:43

## 2022-03-30 RX ADMIN — APIXABAN SCH MG: 5 TABLET, FILM COATED ORAL at 22:15

## 2022-03-30 RX ADMIN — PROBIOTIC PRODUCT - TAB SCH EA: TAB at 09:41

## 2022-03-31 VITALS — DIASTOLIC BLOOD PRESSURE: 81 MMHG | SYSTOLIC BLOOD PRESSURE: 156 MMHG

## 2022-03-31 RX ADMIN — NYSTATIN SCH DOSE: 100000 POWDER TOPICAL at 20:31

## 2022-03-31 RX ADMIN — DIGOXIN SCH MG: 125 TABLET ORAL at 08:10

## 2022-03-31 RX ADMIN — CAPSAICIN SCH DOSE: 0.25 CREAM TOPICAL at 17:12

## 2022-03-31 RX ADMIN — ATORVASTATIN CALCIUM SCH MG: 20 TABLET, FILM COATED ORAL at 20:31

## 2022-03-31 RX ADMIN — PROBIOTIC PRODUCT - TAB SCH EA: TAB at 08:09

## 2022-03-31 RX ADMIN — CAPSAICIN SCH DOSE: 0.25 CREAM TOPICAL at 20:30

## 2022-03-31 RX ADMIN — PROBIOTIC PRODUCT - TAB SCH EA: TAB at 20:32

## 2022-03-31 RX ADMIN — NYSTATIN SCH DOSE: 100000 POWDER TOPICAL at 17:12

## 2022-03-31 RX ADMIN — PROBIOTIC PRODUCT - TAB SCH EA: TAB at 17:11

## 2022-03-31 RX ADMIN — ASPIRIN 81 MG CHEWABLE TABLET SCH MG: 81 TABLET CHEWABLE at 08:08

## 2022-03-31 RX ADMIN — SODIUM BICARBONATE SCH MG: 325 TABLET ORAL at 12:35

## 2022-03-31 RX ADMIN — APIXABAN SCH MG: 5 TABLET, FILM COATED ORAL at 20:31

## 2022-03-31 RX ADMIN — FAMOTIDINE SCH MG: 20 TABLET, FILM COATED ORAL at 20:31

## 2022-03-31 RX ADMIN — SUCRALFATE SCH GM: 1 SUSPENSION ORAL at 20:31

## 2022-03-31 RX ADMIN — FLECAINIDE ACETATE SCH MG: 50 TABLET ORAL at 20:32

## 2022-03-31 RX ADMIN — FAMOTIDINE SCH MG: 20 TABLET, FILM COATED ORAL at 08:10

## 2022-03-31 RX ADMIN — BUPROPION HYDROCHLORIDE SCH MG: 150 TABLET, FILM COATED, EXTENDED RELEASE ORAL at 08:09

## 2022-03-31 RX ADMIN — FLECAINIDE ACETATE SCH MG: 50 TABLET ORAL at 08:09

## 2022-03-31 RX ADMIN — SODIUM BICARBONATE SCH MG: 325 TABLET ORAL at 20:31

## 2022-03-31 RX ADMIN — CAPSAICIN SCH DOSE: 0.25 CREAM TOPICAL at 08:11

## 2022-03-31 RX ADMIN — NYSTATIN SCH DOSE: 100000 POWDER TOPICAL at 08:10

## 2022-03-31 RX ADMIN — MULTIPLE VITAMINS W/ MINERALS TAB SCH TAB: TAB at 08:08

## 2022-03-31 RX ADMIN — SUCRALFATE SCH GM: 1 SUSPENSION ORAL at 08:08

## 2022-03-31 RX ADMIN — APIXABAN SCH MG: 5 TABLET, FILM COATED ORAL at 08:09

## 2022-03-31 RX ADMIN — SODIUM BICARBONATE SCH MG: 325 TABLET ORAL at 08:09

## 2022-03-31 RX ADMIN — PROBIOTIC PRODUCT - TAB SCH EA: TAB at 12:35

## 2022-03-31 RX ADMIN — SODIUM BICARBONATE SCH MG: 325 TABLET ORAL at 17:11

## 2022-03-31 RX ADMIN — FUROSEMIDE SCH MG: 40 TABLET ORAL at 08:10

## 2022-04-01 RX ADMIN — ASPIRIN 81 MG CHEWABLE TABLET SCH MG: 81 TABLET CHEWABLE at 11:44

## 2022-04-01 RX ADMIN — SODIUM BICARBONATE SCH MG: 325 TABLET ORAL at 18:45

## 2022-04-01 RX ADMIN — SODIUM BICARBONATE SCH MG: 325 TABLET ORAL at 11:43

## 2022-04-01 RX ADMIN — PROBIOTIC PRODUCT - TAB SCH EA: TAB at 18:45

## 2022-04-01 RX ADMIN — ACETAMINOPHEN PRN MG: 325 TABLET ORAL at 20:34

## 2022-04-01 RX ADMIN — ATORVASTATIN CALCIUM SCH MG: 20 TABLET, FILM COATED ORAL at 20:34

## 2022-04-01 RX ADMIN — CAPSAICIN SCH DOSE: 0.25 CREAM TOPICAL at 18:46

## 2022-04-01 RX ADMIN — FAMOTIDINE SCH MG: 20 TABLET, FILM COATED ORAL at 11:43

## 2022-04-01 RX ADMIN — CAPSAICIN SCH DOSE: 0.25 CREAM TOPICAL at 20:34

## 2022-04-01 RX ADMIN — FLECAINIDE ACETATE SCH MG: 50 TABLET ORAL at 11:48

## 2022-04-01 RX ADMIN — MULTIPLE VITAMINS W/ MINERALS TAB SCH TAB: TAB at 11:49

## 2022-04-01 RX ADMIN — SUCRALFATE SCH GM: 1 SUSPENSION ORAL at 20:33

## 2022-04-01 RX ADMIN — SODIUM BICARBONATE SCH MG: 325 TABLET ORAL at 13:00

## 2022-04-01 RX ADMIN — SUCRALFATE SCH GM: 1 SUSPENSION ORAL at 11:44

## 2022-04-01 RX ADMIN — FUROSEMIDE SCH MG: 40 TABLET ORAL at 11:49

## 2022-04-01 RX ADMIN — PROBIOTIC PRODUCT - TAB SCH EA: TAB at 12:30

## 2022-04-01 RX ADMIN — BUPROPION HYDROCHLORIDE SCH MG: 150 TABLET, FILM COATED, EXTENDED RELEASE ORAL at 11:48

## 2022-04-01 RX ADMIN — FAMOTIDINE SCH MG: 20 TABLET, FILM COATED ORAL at 20:34

## 2022-04-01 RX ADMIN — FLECAINIDE ACETATE SCH MG: 50 TABLET ORAL at 20:33

## 2022-04-01 RX ADMIN — PROBIOTIC PRODUCT - TAB SCH EA: TAB at 20:33

## 2022-04-01 RX ADMIN — NYSTATIN SCH DOSE: 100000 POWDER TOPICAL at 20:34

## 2022-04-01 RX ADMIN — APIXABAN SCH MG: 5 TABLET, FILM COATED ORAL at 20:34

## 2022-04-01 RX ADMIN — DIGOXIN SCH MG: 125 TABLET ORAL at 11:42

## 2022-04-01 RX ADMIN — NYSTATIN SCH DOSE: 100000 POWDER TOPICAL at 18:45

## 2022-04-01 RX ADMIN — CAPSAICIN SCH DOSE: 0.25 CREAM TOPICAL at 11:50

## 2022-04-01 RX ADMIN — PROBIOTIC PRODUCT - TAB SCH EA: TAB at 11:47

## 2022-04-01 RX ADMIN — SODIUM BICARBONATE SCH MG: 325 TABLET ORAL at 20:33

## 2022-04-01 RX ADMIN — APIXABAN SCH MG: 5 TABLET, FILM COATED ORAL at 11:43

## 2022-04-01 RX ADMIN — NYSTATIN SCH DOSE: 100000 POWDER TOPICAL at 11:50

## 2022-04-02 VITALS — SYSTOLIC BLOOD PRESSURE: 112 MMHG | DIASTOLIC BLOOD PRESSURE: 65 MMHG

## 2022-04-02 RX ADMIN — SODIUM BICARBONATE SCH MG: 325 TABLET ORAL at 19:54

## 2022-04-02 RX ADMIN — ASPIRIN 81 MG CHEWABLE TABLET SCH MG: 81 TABLET CHEWABLE at 09:00

## 2022-04-02 RX ADMIN — APIXABAN SCH MG: 5 TABLET, FILM COATED ORAL at 09:01

## 2022-04-02 RX ADMIN — CAPSAICIN SCH DOSE: 0.25 CREAM TOPICAL at 19:56

## 2022-04-02 RX ADMIN — PROBIOTIC PRODUCT - TAB SCH EA: TAB at 09:00

## 2022-04-02 RX ADMIN — APIXABAN SCH MG: 5 TABLET, FILM COATED ORAL at 19:54

## 2022-04-02 RX ADMIN — FAMOTIDINE SCH MG: 20 TABLET, FILM COATED ORAL at 19:54

## 2022-04-02 RX ADMIN — PROBIOTIC PRODUCT - TAB SCH EA: TAB at 19:54

## 2022-04-02 RX ADMIN — NYSTATIN SCH DOSE: 100000 POWDER TOPICAL at 19:56

## 2022-04-02 RX ADMIN — SUCRALFATE SCH GM: 1 SUSPENSION ORAL at 19:54

## 2022-04-02 RX ADMIN — NYSTATIN SCH DOSE: 100000 POWDER TOPICAL at 09:04

## 2022-04-02 RX ADMIN — SUCRALFATE SCH GM: 1 SUSPENSION ORAL at 09:00

## 2022-04-02 RX ADMIN — ATORVASTATIN CALCIUM SCH MG: 20 TABLET, FILM COATED ORAL at 19:54

## 2022-04-02 RX ADMIN — SODIUM BICARBONATE SCH MG: 325 TABLET ORAL at 16:06

## 2022-04-02 RX ADMIN — FUROSEMIDE SCH MG: 40 TABLET ORAL at 09:01

## 2022-04-02 RX ADMIN — DIGOXIN SCH MG: 125 TABLET ORAL at 09:01

## 2022-04-02 RX ADMIN — SODIUM BICARBONATE SCH MG: 325 TABLET ORAL at 09:00

## 2022-04-02 RX ADMIN — NYSTATIN SCH DOSE: 100000 POWDER TOPICAL at 16:00

## 2022-04-02 RX ADMIN — FAMOTIDINE SCH MG: 20 TABLET, FILM COATED ORAL at 09:01

## 2022-04-02 RX ADMIN — PROBIOTIC PRODUCT - TAB SCH EA: TAB at 13:17

## 2022-04-02 RX ADMIN — BUPROPION HYDROCHLORIDE SCH MG: 150 TABLET, FILM COATED, EXTENDED RELEASE ORAL at 09:00

## 2022-04-02 RX ADMIN — PROBIOTIC PRODUCT - TAB SCH EA: TAB at 18:15

## 2022-04-02 RX ADMIN — CAPSAICIN SCH DOSE: 0.25 CREAM TOPICAL at 09:04

## 2022-04-02 RX ADMIN — SODIUM BICARBONATE SCH MG: 325 TABLET ORAL at 13:17

## 2022-04-02 RX ADMIN — CAPSAICIN SCH DOSE: 0.25 CREAM TOPICAL at 16:08

## 2022-04-02 RX ADMIN — FLECAINIDE ACETATE SCH MG: 50 TABLET ORAL at 09:00

## 2022-04-02 RX ADMIN — FLECAINIDE ACETATE SCH MG: 50 TABLET ORAL at 19:56

## 2022-04-02 RX ADMIN — MULTIPLE VITAMINS W/ MINERALS TAB SCH TAB: TAB at 09:00

## 2022-04-03 VITALS — SYSTOLIC BLOOD PRESSURE: 120 MMHG | DIASTOLIC BLOOD PRESSURE: 56 MMHG

## 2022-04-03 VITALS — SYSTOLIC BLOOD PRESSURE: 148 MMHG | DIASTOLIC BLOOD PRESSURE: 75 MMHG

## 2022-04-03 RX ADMIN — SODIUM BICARBONATE SCH MG: 325 TABLET ORAL at 08:49

## 2022-04-03 RX ADMIN — FLECAINIDE ACETATE SCH MG: 50 TABLET ORAL at 19:49

## 2022-04-03 RX ADMIN — CAPSAICIN SCH DOSE: 0.25 CREAM TOPICAL at 16:19

## 2022-04-03 RX ADMIN — SUCRALFATE SCH GM: 1 SUSPENSION ORAL at 19:48

## 2022-04-03 RX ADMIN — FLECAINIDE ACETATE SCH MG: 50 TABLET ORAL at 08:49

## 2022-04-03 RX ADMIN — FAMOTIDINE SCH MG: 20 TABLET, FILM COATED ORAL at 19:49

## 2022-04-03 RX ADMIN — SODIUM BICARBONATE SCH MG: 325 TABLET ORAL at 16:19

## 2022-04-03 RX ADMIN — FUROSEMIDE SCH MG: 40 TABLET ORAL at 08:50

## 2022-04-03 RX ADMIN — DIGOXIN SCH MG: 125 TABLET ORAL at 08:49

## 2022-04-03 RX ADMIN — SUCRALFATE SCH GM: 1 SUSPENSION ORAL at 08:50

## 2022-04-03 RX ADMIN — FAMOTIDINE SCH MG: 20 TABLET, FILM COATED ORAL at 08:49

## 2022-04-03 RX ADMIN — ASPIRIN 81 MG CHEWABLE TABLET SCH MG: 81 TABLET CHEWABLE at 08:49

## 2022-04-03 RX ADMIN — ATORVASTATIN CALCIUM SCH MG: 20 TABLET, FILM COATED ORAL at 19:49

## 2022-04-03 RX ADMIN — NYSTATIN SCH DOSE: 100000 POWDER TOPICAL at 16:20

## 2022-04-03 RX ADMIN — PROBIOTIC PRODUCT - TAB SCH EA: TAB at 08:49

## 2022-04-03 RX ADMIN — NYSTATIN SCH DOSE: 100000 POWDER TOPICAL at 19:50

## 2022-04-03 RX ADMIN — SODIUM BICARBONATE SCH MG: 325 TABLET ORAL at 13:36

## 2022-04-03 RX ADMIN — APIXABAN SCH MG: 5 TABLET, FILM COATED ORAL at 08:49

## 2022-04-03 RX ADMIN — SODIUM BICARBONATE SCH MG: 325 TABLET ORAL at 19:48

## 2022-04-03 RX ADMIN — BUPROPION HYDROCHLORIDE SCH MG: 150 TABLET, FILM COATED, EXTENDED RELEASE ORAL at 08:50

## 2022-04-03 RX ADMIN — APIXABAN SCH MG: 5 TABLET, FILM COATED ORAL at 19:49

## 2022-04-03 RX ADMIN — PROBIOTIC PRODUCT - TAB SCH EA: TAB at 19:48

## 2022-04-03 RX ADMIN — NYSTATIN SCH DOSE: 100000 POWDER TOPICAL at 08:52

## 2022-04-03 RX ADMIN — CAPSAICIN SCH DOSE: 0.25 CREAM TOPICAL at 19:48

## 2022-04-03 RX ADMIN — PROBIOTIC PRODUCT - TAB SCH EA: TAB at 13:36

## 2022-04-03 RX ADMIN — PROBIOTIC PRODUCT - TAB SCH EA: TAB at 16:19

## 2022-04-03 RX ADMIN — MULTIPLE VITAMINS W/ MINERALS TAB SCH TAB: TAB at 08:49

## 2022-04-03 RX ADMIN — CAPSAICIN SCH DOSE: 0.25 CREAM TOPICAL at 08:52

## 2022-04-04 VITALS — SYSTOLIC BLOOD PRESSURE: 140 MMHG | DIASTOLIC BLOOD PRESSURE: 62 MMHG

## 2022-04-04 RX ADMIN — PROBIOTIC PRODUCT - TAB SCH EA: TAB at 12:11

## 2022-04-04 RX ADMIN — NYSTATIN SCH DOSE: 100000 POWDER TOPICAL at 08:06

## 2022-04-04 RX ADMIN — FUROSEMIDE SCH MG: 40 TABLET ORAL at 08:03

## 2022-04-04 RX ADMIN — SUCRALFATE SCH GM: 1 SUSPENSION ORAL at 19:50

## 2022-04-04 RX ADMIN — PROBIOTIC PRODUCT - TAB SCH EA: TAB at 19:48

## 2022-04-04 RX ADMIN — SODIUM BICARBONATE SCH MG: 325 TABLET ORAL at 12:11

## 2022-04-04 RX ADMIN — NYSTATIN SCH DOSE: 100000 POWDER TOPICAL at 16:00

## 2022-04-04 RX ADMIN — SODIUM BICARBONATE SCH MG: 325 TABLET ORAL at 19:48

## 2022-04-04 RX ADMIN — DIGOXIN SCH MG: 125 TABLET ORAL at 08:03

## 2022-04-04 RX ADMIN — CAPSAICIN SCH DOSE: 0.25 CREAM TOPICAL at 19:49

## 2022-04-04 RX ADMIN — APIXABAN SCH MG: 5 TABLET, FILM COATED ORAL at 08:03

## 2022-04-04 RX ADMIN — FAMOTIDINE SCH MG: 20 TABLET, FILM COATED ORAL at 08:03

## 2022-04-04 RX ADMIN — SODIUM BICARBONATE SCH MG: 325 TABLET ORAL at 08:03

## 2022-04-04 RX ADMIN — CAPSAICIN SCH DOSE: 0.25 CREAM TOPICAL at 16:00

## 2022-04-04 RX ADMIN — SODIUM BICARBONATE SCH MG: 325 TABLET ORAL at 16:13

## 2022-04-04 RX ADMIN — FLECAINIDE ACETATE SCH MG: 50 TABLET ORAL at 19:49

## 2022-04-04 RX ADMIN — ATORVASTATIN CALCIUM SCH MG: 20 TABLET, FILM COATED ORAL at 19:47

## 2022-04-04 RX ADMIN — MULTIPLE VITAMINS W/ MINERALS TAB SCH TAB: TAB at 08:03

## 2022-04-04 RX ADMIN — ASPIRIN 81 MG CHEWABLE TABLET SCH MG: 81 TABLET CHEWABLE at 08:03

## 2022-04-04 RX ADMIN — PROBIOTIC PRODUCT - TAB SCH EA: TAB at 17:20

## 2022-04-04 RX ADMIN — PROBIOTIC PRODUCT - TAB SCH EA: TAB at 08:03

## 2022-04-04 RX ADMIN — FLECAINIDE ACETATE SCH MG: 50 TABLET ORAL at 08:03

## 2022-04-04 RX ADMIN — APIXABAN SCH MG: 5 TABLET, FILM COATED ORAL at 19:48

## 2022-04-04 RX ADMIN — SUCRALFATE SCH GM: 1 SUSPENSION ORAL at 08:04

## 2022-04-04 RX ADMIN — CAPSAICIN SCH DOSE: 0.25 CREAM TOPICAL at 08:05

## 2022-04-04 RX ADMIN — NYSTATIN SCH DOSE: 100000 POWDER TOPICAL at 19:49

## 2022-04-04 RX ADMIN — FAMOTIDINE SCH MG: 20 TABLET, FILM COATED ORAL at 19:48

## 2022-04-04 RX ADMIN — BUPROPION HYDROCHLORIDE SCH MG: 150 TABLET, FILM COATED, EXTENDED RELEASE ORAL at 08:03

## 2022-04-05 VITALS — DIASTOLIC BLOOD PRESSURE: 79 MMHG | SYSTOLIC BLOOD PRESSURE: 170 MMHG

## 2022-04-05 LAB
BASOPHILS # BLD AUTO: 0 10^3/UL (ref 0–0.2)
BASOPHILS NFR BLD AUTO: 0.6 % (ref 0–1)
BUN SERPL-MCNC: 30 MG/DL (ref 7–18)
CALCIUM SERPL-MCNC: 11.1 MG/DL (ref 8.8–10.2)
CHLORIDE SERPL-SCNC: 105 MEQ/L (ref 98–107)
CO2 SERPL-SCNC: 28 MEQ/L (ref 21–32)
CREAT SERPL-MCNC: 1.09 MG/DL (ref 0.55–1.3)
EOSINOPHIL # BLD AUTO: 0.3 10^3/UL (ref 0–0.5)
EOSINOPHIL NFR BLD AUTO: 6.5 % (ref 0–3)
GFR SERPL CREATININE-BSD FRML MDRD: 53.5 ML/MIN/{1.73_M2} (ref 45–?)
GLUCOSE SERPL-MCNC: 91 MG/DL (ref 70–100)
HCT VFR BLD AUTO: 32 % (ref 36–47)
HGB BLD-MCNC: 10.3 G/DL (ref 12–15.5)
LYMPHOCYTES # BLD AUTO: 1.2 10^3/UL (ref 1.5–5)
LYMPHOCYTES NFR BLD AUTO: 25 % (ref 24–44)
MAGNESIUM SERPL-MCNC: 2.3 MG/DL (ref 1.8–2.4)
MCH RBC QN AUTO: 31.3 PG (ref 27–33)
MCHC RBC AUTO-ENTMCNC: 32.2 G/DL (ref 32–36.5)
MCV RBC AUTO: 97.3 FL (ref 80–96)
MONOCYTES # BLD AUTO: 0.4 10^3/UL (ref 0–0.8)
MONOCYTES NFR BLD AUTO: 7.8 % (ref 2–8)
NEUTROPHILS # BLD AUTO: 2.8 10^3/UL (ref 1.5–8.5)
NEUTROPHILS NFR BLD AUTO: 59.9 % (ref 36–66)
PLATELET # BLD AUTO: 223 10^3/UL (ref 150–450)
POTASSIUM SERPL-SCNC: 4.4 MEQ/L (ref 3.5–5.1)
RBC # BLD AUTO: 3.29 10^6/UL (ref 4–5.4)
SODIUM SERPL-SCNC: 137 MEQ/L (ref 136–145)
WBC # BLD AUTO: 4.6 10^3/UL (ref 4–10)

## 2022-04-05 RX ADMIN — SUCRALFATE SCH GM: 1 SUSPENSION ORAL at 09:29

## 2022-04-05 RX ADMIN — PROBIOTIC PRODUCT - TAB SCH EA: TAB at 13:47

## 2022-04-05 RX ADMIN — PROBIOTIC PRODUCT - TAB SCH EA: TAB at 09:30

## 2022-04-05 RX ADMIN — FLECAINIDE ACETATE SCH MG: 50 TABLET ORAL at 09:30

## 2022-04-05 RX ADMIN — DIGOXIN SCH MG: 125 TABLET ORAL at 09:31

## 2022-04-05 RX ADMIN — NYSTATIN SCH DOSE: 100000 POWDER TOPICAL at 20:34

## 2022-04-05 RX ADMIN — SODIUM BICARBONATE SCH MG: 325 TABLET ORAL at 13:47

## 2022-04-05 RX ADMIN — PROBIOTIC PRODUCT - TAB SCH EA: TAB at 20:32

## 2022-04-05 RX ADMIN — FLECAINIDE ACETATE SCH MG: 50 TABLET ORAL at 20:34

## 2022-04-05 RX ADMIN — ACETAMINOPHEN PRN MG: 325 TABLET ORAL at 20:33

## 2022-04-05 RX ADMIN — MULTIPLE VITAMINS W/ MINERALS TAB SCH TAB: TAB at 09:30

## 2022-04-05 RX ADMIN — ASPIRIN 81 MG CHEWABLE TABLET SCH MG: 81 TABLET CHEWABLE at 09:30

## 2022-04-05 RX ADMIN — CAPSAICIN SCH DOSE: 0.25 CREAM TOPICAL at 09:31

## 2022-04-05 RX ADMIN — SUCRALFATE SCH GM: 1 SUSPENSION ORAL at 20:32

## 2022-04-05 RX ADMIN — APIXABAN SCH MG: 5 TABLET, FILM COATED ORAL at 09:30

## 2022-04-05 RX ADMIN — CAPSAICIN SCH DOSE: 0.25 CREAM TOPICAL at 17:42

## 2022-04-05 RX ADMIN — SODIUM BICARBONATE SCH MG: 325 TABLET ORAL at 09:30

## 2022-04-05 RX ADMIN — ATORVASTATIN CALCIUM SCH MG: 20 TABLET, FILM COATED ORAL at 20:32

## 2022-04-05 RX ADMIN — BUPROPION HYDROCHLORIDE SCH MG: 150 TABLET, FILM COATED, EXTENDED RELEASE ORAL at 09:30

## 2022-04-05 RX ADMIN — FUROSEMIDE SCH MG: 40 TABLET ORAL at 09:29

## 2022-04-05 RX ADMIN — NYSTATIN SCH DOSE: 100000 POWDER TOPICAL at 09:31

## 2022-04-05 RX ADMIN — SODIUM BICARBONATE SCH MG: 325 TABLET ORAL at 20:32

## 2022-04-05 RX ADMIN — PROBIOTIC PRODUCT - TAB SCH EA: TAB at 17:42

## 2022-04-05 RX ADMIN — CAPSAICIN SCH DOSE: 0.25 CREAM TOPICAL at 20:32

## 2022-04-05 RX ADMIN — APIXABAN SCH MG: 5 TABLET, FILM COATED ORAL at 20:32

## 2022-04-05 RX ADMIN — ACETAMINOPHEN PRN MG: 325 TABLET ORAL at 09:30

## 2022-04-05 RX ADMIN — FAMOTIDINE SCH MG: 20 TABLET, FILM COATED ORAL at 09:29

## 2022-04-05 RX ADMIN — SODIUM BICARBONATE SCH MG: 325 TABLET ORAL at 17:42

## 2022-04-05 RX ADMIN — FAMOTIDINE SCH MG: 20 TABLET, FILM COATED ORAL at 20:32

## 2022-04-05 RX ADMIN — NYSTATIN SCH DOSE: 100000 POWDER TOPICAL at 17:42

## 2022-04-06 VITALS — DIASTOLIC BLOOD PRESSURE: 76 MMHG | SYSTOLIC BLOOD PRESSURE: 148 MMHG

## 2022-04-06 RX ADMIN — PROBIOTIC PRODUCT - TAB SCH EA: TAB at 10:50

## 2022-04-06 RX ADMIN — APIXABAN SCH MG: 5 TABLET, FILM COATED ORAL at 10:52

## 2022-04-06 RX ADMIN — SODIUM BICARBONATE SCH MG: 325 TABLET ORAL at 10:49

## 2022-04-06 RX ADMIN — FAMOTIDINE SCH MG: 20 TABLET, FILM COATED ORAL at 10:51

## 2022-04-06 RX ADMIN — NYSTATIN SCH DOSE: 100000 POWDER TOPICAL at 10:52

## 2022-04-06 RX ADMIN — SODIUM BICARBONATE SCH MG: 325 TABLET ORAL at 11:57

## 2022-04-06 RX ADMIN — FLECAINIDE ACETATE SCH MG: 50 TABLET ORAL at 20:53

## 2022-04-06 RX ADMIN — NYSTATIN SCH DOSE: 100000 POWDER TOPICAL at 17:34

## 2022-04-06 RX ADMIN — CAPSAICIN SCH DOSE: 0.25 CREAM TOPICAL at 10:52

## 2022-04-06 RX ADMIN — SUCRALFATE SCH GM: 1 SUSPENSION ORAL at 10:55

## 2022-04-06 RX ADMIN — PROBIOTIC PRODUCT - TAB SCH EA: TAB at 17:34

## 2022-04-06 RX ADMIN — BUPROPION HYDROCHLORIDE SCH MG: 150 TABLET, FILM COATED, EXTENDED RELEASE ORAL at 10:51

## 2022-04-06 RX ADMIN — PROBIOTIC PRODUCT - TAB SCH EA: TAB at 20:53

## 2022-04-06 RX ADMIN — NYSTATIN SCH DOSE: 100000 POWDER TOPICAL at 20:54

## 2022-04-06 RX ADMIN — APIXABAN SCH MG: 5 TABLET, FILM COATED ORAL at 20:54

## 2022-04-06 RX ADMIN — ATORVASTATIN CALCIUM SCH MG: 20 TABLET, FILM COATED ORAL at 20:54

## 2022-04-06 RX ADMIN — DIGOXIN SCH MG: 125 TABLET ORAL at 10:51

## 2022-04-06 RX ADMIN — SODIUM BICARBONATE SCH MG: 325 TABLET ORAL at 20:53

## 2022-04-06 RX ADMIN — CAPSAICIN SCH DOSE: 0.25 CREAM TOPICAL at 17:33

## 2022-04-06 RX ADMIN — ASPIRIN 81 MG CHEWABLE TABLET SCH MG: 81 TABLET CHEWABLE at 10:50

## 2022-04-06 RX ADMIN — SUCRALFATE SCH GM: 1 SUSPENSION ORAL at 20:52

## 2022-04-06 RX ADMIN — PROBIOTIC PRODUCT - TAB SCH EA: TAB at 11:57

## 2022-04-06 RX ADMIN — FAMOTIDINE SCH MG: 20 TABLET, FILM COATED ORAL at 20:54

## 2022-04-06 RX ADMIN — CAPSAICIN SCH DOSE: 0.25 CREAM TOPICAL at 20:55

## 2022-04-06 RX ADMIN — SODIUM BICARBONATE SCH MG: 325 TABLET ORAL at 17:34

## 2022-04-06 RX ADMIN — FLECAINIDE ACETATE SCH MG: 50 TABLET ORAL at 10:50

## 2022-04-07 VITALS — DIASTOLIC BLOOD PRESSURE: 70 MMHG | SYSTOLIC BLOOD PRESSURE: 112 MMHG

## 2022-04-07 LAB
ALBUMIN SERPL BCG-MCNC: 2.7 GM/DL (ref 3.2–5.2)
ALT SERPL W P-5'-P-CCNC: 34 U/L (ref 12–78)
BASOPHILS # BLD AUTO: 0 10^3/UL (ref 0–0.2)
BASOPHILS NFR BLD AUTO: 0.4 % (ref 0–1)
BILIRUB SERPL-MCNC: 0.4 MG/DL (ref 0.2–1)
BUN SERPL-MCNC: 30 MG/DL (ref 7–18)
CALCIUM SERPL-MCNC: 11.2 MG/DL (ref 8.8–10.2)
CHLORIDE SERPL-SCNC: 108 MEQ/L (ref 98–107)
CO2 SERPL-SCNC: 27 MEQ/L (ref 21–32)
CREAT SERPL-MCNC: 1.09 MG/DL (ref 0.55–1.3)
EOSINOPHIL # BLD AUTO: 0.2 10^3/UL (ref 0–0.5)
EOSINOPHIL NFR BLD AUTO: 3.9 % (ref 0–3)
GFR SERPL CREATININE-BSD FRML MDRD: 53.5 ML/MIN/{1.73_M2} (ref 45–?)
GLUCOSE SERPL-MCNC: 107 MG/DL (ref 70–100)
HCT VFR BLD AUTO: 32.3 % (ref 36–47)
HGB BLD-MCNC: 10.3 G/DL (ref 12–15.5)
LYMPHOCYTES # BLD AUTO: 1.2 10^3/UL (ref 1.5–5)
LYMPHOCYTES NFR BLD AUTO: 25.4 % (ref 24–44)
MAGNESIUM SERPL-MCNC: 2.4 MG/DL (ref 1.8–2.4)
MCH RBC QN AUTO: 31.4 PG (ref 27–33)
MCHC RBC AUTO-ENTMCNC: 31.9 G/DL (ref 32–36.5)
MCV RBC AUTO: 98.5 FL (ref 80–96)
MONOCYTES # BLD AUTO: 0.3 10^3/UL (ref 0–0.8)
MONOCYTES NFR BLD AUTO: 6.3 % (ref 2–8)
NEUTROPHILS # BLD AUTO: 2.9 10^3/UL (ref 1.5–8.5)
NEUTROPHILS NFR BLD AUTO: 63.6 % (ref 36–66)
PLATELET # BLD AUTO: 230 10^3/UL (ref 150–450)
POTASSIUM SERPL-SCNC: 4.7 MEQ/L (ref 3.5–5.1)
PROT SERPL-MCNC: 8.2 GM/DL (ref 6.4–8.2)
RBC # BLD AUTO: 3.28 10^6/UL (ref 4–5.4)
SODIUM SERPL-SCNC: 141 MEQ/L (ref 136–145)
WBC # BLD AUTO: 4.6 10^3/UL (ref 4–10)

## 2022-04-07 RX ADMIN — NYSTATIN SCH DOSE: 100000 POWDER TOPICAL at 09:38

## 2022-04-07 RX ADMIN — DIGOXIN SCH MG: 125 TABLET ORAL at 09:37

## 2022-04-07 RX ADMIN — NYSTATIN SCH DOSE: 100000 POWDER TOPICAL at 16:29

## 2022-04-07 RX ADMIN — SODIUM BICARBONATE SCH MG: 325 TABLET ORAL at 12:35

## 2022-04-07 RX ADMIN — SUCRALFATE SCH GM: 1 SUSPENSION ORAL at 20:56

## 2022-04-07 RX ADMIN — CAPSAICIN SCH DOSE: 0.25 CREAM TOPICAL at 16:30

## 2022-04-07 RX ADMIN — NYSTATIN SCH DOSE: 100000 POWDER TOPICAL at 21:02

## 2022-04-07 RX ADMIN — CAPSAICIN SCH DOSE: 0.25 CREAM TOPICAL at 21:02

## 2022-04-07 RX ADMIN — SODIUM BICARBONATE SCH MG: 325 TABLET ORAL at 09:39

## 2022-04-07 RX ADMIN — PROBIOTIC PRODUCT - TAB SCH EA: TAB at 12:35

## 2022-04-07 RX ADMIN — BUPROPION HYDROCHLORIDE SCH MG: 150 TABLET, FILM COATED, EXTENDED RELEASE ORAL at 09:40

## 2022-04-07 RX ADMIN — FUROSEMIDE SCH MG: 40 TABLET ORAL at 09:37

## 2022-04-07 RX ADMIN — PROBIOTIC PRODUCT - TAB SCH EA: TAB at 21:01

## 2022-04-07 RX ADMIN — ACETAMINOPHEN PRN MG: 325 TABLET ORAL at 21:01

## 2022-04-07 RX ADMIN — ATORVASTATIN CALCIUM SCH MG: 20 TABLET, FILM COATED ORAL at 21:00

## 2022-04-07 RX ADMIN — ASPIRIN 81 MG CHEWABLE TABLET SCH MG: 81 TABLET CHEWABLE at 09:36

## 2022-04-07 RX ADMIN — PROBIOTIC PRODUCT - TAB SCH EA: TAB at 09:29

## 2022-04-07 RX ADMIN — SUCRALFATE SCH GM: 1 SUSPENSION ORAL at 09:36

## 2022-04-07 RX ADMIN — FAMOTIDINE SCH MG: 20 TABLET, FILM COATED ORAL at 09:39

## 2022-04-07 RX ADMIN — SODIUM BICARBONATE SCH MG: 325 TABLET ORAL at 21:00

## 2022-04-07 RX ADMIN — SODIUM BICARBONATE SCH MG: 325 TABLET ORAL at 16:30

## 2022-04-07 RX ADMIN — CAPSAICIN SCH DOSE: 0.25 CREAM TOPICAL at 09:39

## 2022-04-07 RX ADMIN — APIXABAN SCH MG: 5 TABLET, FILM COATED ORAL at 09:36

## 2022-04-07 RX ADMIN — FLECAINIDE ACETATE SCH MG: 50 TABLET ORAL at 21:01

## 2022-04-07 RX ADMIN — PROBIOTIC PRODUCT - TAB SCH EA: TAB at 17:02

## 2022-04-07 RX ADMIN — APIXABAN SCH MG: 5 TABLET, FILM COATED ORAL at 21:01

## 2022-04-07 RX ADMIN — FLECAINIDE ACETATE SCH MG: 50 TABLET ORAL at 09:37

## 2022-04-07 RX ADMIN — FAMOTIDINE SCH MG: 20 TABLET, FILM COATED ORAL at 21:00

## 2022-04-08 VITALS — SYSTOLIC BLOOD PRESSURE: 148 MMHG | DIASTOLIC BLOOD PRESSURE: 70 MMHG

## 2022-04-08 RX ADMIN — SODIUM BICARBONATE SCH MG: 325 TABLET ORAL at 08:31

## 2022-04-08 RX ADMIN — SUCRALFATE SCH GM: 1 SUSPENSION ORAL at 20:58

## 2022-04-08 RX ADMIN — FAMOTIDINE SCH MG: 20 TABLET, FILM COATED ORAL at 08:32

## 2022-04-08 RX ADMIN — NYSTATIN SCH DOSE: 100000 POWDER TOPICAL at 21:00

## 2022-04-08 RX ADMIN — APIXABAN SCH MG: 5 TABLET, FILM COATED ORAL at 08:32

## 2022-04-08 RX ADMIN — DIGOXIN SCH MG: 125 TABLET ORAL at 08:31

## 2022-04-08 RX ADMIN — CAPSAICIN SCH DOSE: 0.25 CREAM TOPICAL at 16:00

## 2022-04-08 RX ADMIN — PROBIOTIC PRODUCT - TAB SCH EA: TAB at 08:33

## 2022-04-08 RX ADMIN — PROBIOTIC PRODUCT - TAB SCH EA: TAB at 16:56

## 2022-04-08 RX ADMIN — SODIUM BICARBONATE SCH MG: 325 TABLET ORAL at 21:00

## 2022-04-08 RX ADMIN — PROBIOTIC PRODUCT - TAB SCH EA: TAB at 12:20

## 2022-04-08 RX ADMIN — FAMOTIDINE SCH MG: 20 TABLET, FILM COATED ORAL at 21:00

## 2022-04-08 RX ADMIN — APIXABAN SCH MG: 5 TABLET, FILM COATED ORAL at 21:00

## 2022-04-08 RX ADMIN — NYSTATIN SCH DOSE: 100000 POWDER TOPICAL at 08:37

## 2022-04-08 RX ADMIN — FLECAINIDE ACETATE SCH MG: 50 TABLET ORAL at 08:33

## 2022-04-08 RX ADMIN — FUROSEMIDE SCH MG: 40 TABLET ORAL at 08:32

## 2022-04-08 RX ADMIN — SUCRALFATE SCH GM: 1 SUSPENSION ORAL at 08:30

## 2022-04-08 RX ADMIN — ACETAMINOPHEN PRN MG: 325 TABLET ORAL at 20:58

## 2022-04-08 RX ADMIN — SODIUM BICARBONATE SCH MG: 325 TABLET ORAL at 12:20

## 2022-04-08 RX ADMIN — BUPROPION HYDROCHLORIDE SCH MG: 150 TABLET, FILM COATED, EXTENDED RELEASE ORAL at 08:30

## 2022-04-08 RX ADMIN — FLECAINIDE ACETATE SCH MG: 50 TABLET ORAL at 21:00

## 2022-04-08 RX ADMIN — PROBIOTIC PRODUCT - TAB SCH EA: TAB at 21:00

## 2022-04-08 RX ADMIN — ASPIRIN 81 MG CHEWABLE TABLET SCH MG: 81 TABLET CHEWABLE at 08:33

## 2022-04-08 RX ADMIN — CAPSAICIN SCH DOSE: 0.25 CREAM TOPICAL at 08:37

## 2022-04-08 RX ADMIN — ATORVASTATIN CALCIUM SCH MG: 20 TABLET, FILM COATED ORAL at 20:58

## 2022-04-08 RX ADMIN — NYSTATIN SCH DOSE: 100000 POWDER TOPICAL at 16:00

## 2022-04-08 RX ADMIN — SODIUM BICARBONATE SCH MG: 325 TABLET ORAL at 16:56

## 2022-04-08 RX ADMIN — CAPSAICIN SCH DOSE: 0.25 CREAM TOPICAL at 21:01

## 2022-04-09 VITALS — SYSTOLIC BLOOD PRESSURE: 126 MMHG | DIASTOLIC BLOOD PRESSURE: 62 MMHG

## 2022-04-09 RX ADMIN — PROBIOTIC PRODUCT - TAB SCH EA: TAB at 17:33

## 2022-04-09 RX ADMIN — SODIUM BICARBONATE SCH MG: 325 TABLET ORAL at 17:33

## 2022-04-09 RX ADMIN — FUROSEMIDE SCH MG: 40 TABLET ORAL at 10:36

## 2022-04-09 RX ADMIN — PROBIOTIC PRODUCT - TAB SCH EA: TAB at 13:04

## 2022-04-09 RX ADMIN — SUCRALFATE SCH GM: 1 SUSPENSION ORAL at 19:50

## 2022-04-09 RX ADMIN — SODIUM BICARBONATE SCH MG: 325 TABLET ORAL at 10:37

## 2022-04-09 RX ADMIN — NYSTATIN SCH DOSE: 100000 POWDER TOPICAL at 10:38

## 2022-04-09 RX ADMIN — APIXABAN SCH MG: 5 TABLET, FILM COATED ORAL at 10:37

## 2022-04-09 RX ADMIN — BUPROPION HYDROCHLORIDE SCH MG: 150 TABLET, FILM COATED, EXTENDED RELEASE ORAL at 10:36

## 2022-04-09 RX ADMIN — FLECAINIDE ACETATE SCH MG: 50 TABLET ORAL at 19:51

## 2022-04-09 RX ADMIN — NYSTATIN SCH DOSE: 100000 POWDER TOPICAL at 16:00

## 2022-04-09 RX ADMIN — CAPSAICIN SCH DOSE: 0.25 CREAM TOPICAL at 19:52

## 2022-04-09 RX ADMIN — FAMOTIDINE SCH MG: 20 TABLET, FILM COATED ORAL at 19:50

## 2022-04-09 RX ADMIN — CAPSAICIN SCH DOSE: 0.25 CREAM TOPICAL at 10:39

## 2022-04-09 RX ADMIN — PROBIOTIC PRODUCT - TAB SCH EA: TAB at 19:51

## 2022-04-09 RX ADMIN — CAPSAICIN SCH DOSE: 0.25 CREAM TOPICAL at 16:00

## 2022-04-09 RX ADMIN — SUCRALFATE SCH GM: 1 SUSPENSION ORAL at 10:36

## 2022-04-09 RX ADMIN — ASPIRIN 81 MG CHEWABLE TABLET SCH MG: 81 TABLET CHEWABLE at 10:36

## 2022-04-09 RX ADMIN — APIXABAN SCH MG: 5 TABLET, FILM COATED ORAL at 19:50

## 2022-04-09 RX ADMIN — PROBIOTIC PRODUCT - TAB SCH EA: TAB at 10:37

## 2022-04-09 RX ADMIN — DIGOXIN SCH MG: 125 TABLET ORAL at 10:38

## 2022-04-09 RX ADMIN — FAMOTIDINE SCH MG: 20 TABLET, FILM COATED ORAL at 10:36

## 2022-04-09 RX ADMIN — SODIUM BICARBONATE SCH MG: 325 TABLET ORAL at 19:51

## 2022-04-09 RX ADMIN — NYSTATIN SCH DOSE: 100000 POWDER TOPICAL at 19:51

## 2022-04-09 RX ADMIN — FLECAINIDE ACETATE SCH MG: 50 TABLET ORAL at 10:37

## 2022-04-09 RX ADMIN — ATORVASTATIN CALCIUM SCH MG: 20 TABLET, FILM COATED ORAL at 19:50

## 2022-04-09 RX ADMIN — SODIUM BICARBONATE SCH MG: 325 TABLET ORAL at 13:04

## 2022-04-10 VITALS — SYSTOLIC BLOOD PRESSURE: 151 MMHG | DIASTOLIC BLOOD PRESSURE: 64 MMHG

## 2022-04-10 RX ADMIN — APIXABAN SCH MG: 5 TABLET, FILM COATED ORAL at 19:40

## 2022-04-10 RX ADMIN — ACETAMINOPHEN PRN MG: 325 TABLET ORAL at 01:09

## 2022-04-10 RX ADMIN — SODIUM BICARBONATE SCH MG: 325 TABLET ORAL at 12:57

## 2022-04-10 RX ADMIN — CAPSAICIN SCH DOSE: 0.25 CREAM TOPICAL at 17:20

## 2022-04-10 RX ADMIN — ASPIRIN 81 MG CHEWABLE TABLET SCH MG: 81 TABLET CHEWABLE at 08:09

## 2022-04-10 RX ADMIN — SODIUM BICARBONATE SCH MG: 325 TABLET ORAL at 17:21

## 2022-04-10 RX ADMIN — SODIUM BICARBONATE SCH MG: 325 TABLET ORAL at 08:09

## 2022-04-10 RX ADMIN — SODIUM BICARBONATE SCH MG: 325 TABLET ORAL at 19:39

## 2022-04-10 RX ADMIN — CAPSAICIN SCH DOSE: 0.25 CREAM TOPICAL at 08:13

## 2022-04-10 RX ADMIN — NYSTATIN SCH DOSE: 100000 POWDER TOPICAL at 17:20

## 2022-04-10 RX ADMIN — NYSTATIN SCH DOSE: 100000 POWDER TOPICAL at 08:13

## 2022-04-10 RX ADMIN — PROBIOTIC PRODUCT - TAB SCH EA: TAB at 08:09

## 2022-04-10 RX ADMIN — ACETAMINOPHEN PRN MG: 325 TABLET ORAL at 19:40

## 2022-04-10 RX ADMIN — CAPSAICIN SCH DOSE: 0.25 CREAM TOPICAL at 19:41

## 2022-04-10 RX ADMIN — FLECAINIDE ACETATE SCH MG: 50 TABLET ORAL at 08:09

## 2022-04-10 RX ADMIN — FLECAINIDE ACETATE SCH MG: 50 TABLET ORAL at 19:38

## 2022-04-10 RX ADMIN — PROBIOTIC PRODUCT - TAB SCH EA: TAB at 19:39

## 2022-04-10 RX ADMIN — FUROSEMIDE SCH MG: 40 TABLET ORAL at 08:09

## 2022-04-10 RX ADMIN — NYSTATIN SCH DOSE: 100000 POWDER TOPICAL at 19:41

## 2022-04-10 RX ADMIN — ATORVASTATIN CALCIUM SCH MG: 20 TABLET, FILM COATED ORAL at 19:39

## 2022-04-10 RX ADMIN — SUCRALFATE SCH GM: 1 SUSPENSION ORAL at 08:09

## 2022-04-10 RX ADMIN — DIGOXIN SCH MG: 125 TABLET ORAL at 08:13

## 2022-04-10 RX ADMIN — PROBIOTIC PRODUCT - TAB SCH EA: TAB at 12:58

## 2022-04-10 RX ADMIN — FAMOTIDINE SCH MG: 20 TABLET, FILM COATED ORAL at 19:39

## 2022-04-10 RX ADMIN — APIXABAN SCH MG: 5 TABLET, FILM COATED ORAL at 08:09

## 2022-04-10 RX ADMIN — BUPROPION HYDROCHLORIDE SCH MG: 150 TABLET, FILM COATED, EXTENDED RELEASE ORAL at 08:09

## 2022-04-10 RX ADMIN — SUCRALFATE SCH GM: 1 SUSPENSION ORAL at 19:40

## 2022-04-10 RX ADMIN — PROBIOTIC PRODUCT - TAB SCH EA: TAB at 17:22

## 2022-04-10 RX ADMIN — FAMOTIDINE SCH MG: 20 TABLET, FILM COATED ORAL at 08:09

## 2022-04-11 VITALS — DIASTOLIC BLOOD PRESSURE: 68 MMHG | SYSTOLIC BLOOD PRESSURE: 106 MMHG

## 2022-04-11 RX ADMIN — APIXABAN SCH MG: 5 TABLET, FILM COATED ORAL at 08:11

## 2022-04-11 RX ADMIN — FLECAINIDE ACETATE SCH MG: 50 TABLET ORAL at 08:11

## 2022-04-11 RX ADMIN — DIGOXIN SCH MG: 125 TABLET ORAL at 08:14

## 2022-04-11 RX ADMIN — BUPROPION HYDROCHLORIDE SCH MG: 150 TABLET, FILM COATED, EXTENDED RELEASE ORAL at 08:10

## 2022-04-11 RX ADMIN — SUCRALFATE SCH GM: 1 SUSPENSION ORAL at 08:09

## 2022-04-11 RX ADMIN — PROBIOTIC PRODUCT - TAB SCH EA: TAB at 08:11

## 2022-04-11 RX ADMIN — SODIUM BICARBONATE SCH MG: 325 TABLET ORAL at 12:25

## 2022-04-11 RX ADMIN — ASPIRIN 81 MG CHEWABLE TABLET SCH MG: 81 TABLET CHEWABLE at 08:11

## 2022-04-11 RX ADMIN — FAMOTIDINE SCH MG: 20 TABLET, FILM COATED ORAL at 08:11

## 2022-04-11 RX ADMIN — SODIUM BICARBONATE SCH MG: 325 TABLET ORAL at 08:10

## 2022-04-11 RX ADMIN — NYSTATIN SCH DOSE: 100000 POWDER TOPICAL at 09:18

## 2022-04-11 RX ADMIN — CAPSAICIN SCH DOSE: 0.25 CREAM TOPICAL at 09:18

## 2022-04-11 RX ADMIN — FUROSEMIDE SCH MG: 40 TABLET ORAL at 08:15

## 2022-04-11 RX ADMIN — PROBIOTIC PRODUCT - TAB SCH EA: TAB at 12:25

## 2022-04-19 ENCOUNTER — HOSPITAL ENCOUNTER (OUTPATIENT)
Dept: HOSPITAL 53 - SKLAB6 | Age: 67
End: 2022-04-19
Attending: INTERNAL MEDICINE
Payer: MEDICARE

## 2022-04-19 DIAGNOSIS — E05.90: Primary | ICD-10-CM

## 2022-04-19 DIAGNOSIS — D64.9: ICD-10-CM

## 2022-04-19 LAB
BUN SERPL-MCNC: 18 MG/DL (ref 7–18)
CALCIUM SERPL-MCNC: 11.1 MG/DL (ref 8.8–10.2)
CHLORIDE SERPL-SCNC: 108 MEQ/L (ref 98–107)
CO2 SERPL-SCNC: 29 MEQ/L (ref 21–32)
CREAT SERPL-MCNC: 0.91 MG/DL (ref 0.55–1.3)
GFR SERPL CREATININE-BSD FRML MDRD: > 60 ML/MIN/{1.73_M2} (ref 45–?)
GLUCOSE SERPL-MCNC: 118 MG/DL (ref 70–100)
HCT VFR BLD AUTO: 33.6 % (ref 36–47)
HGB BLD-MCNC: 10.7 G/DL (ref 12–15.5)
MCH RBC QN AUTO: 32.3 PG (ref 27–33)
MCHC RBC AUTO-ENTMCNC: 31.8 G/DL (ref 32–36.5)
MCV RBC AUTO: 101.5 FL (ref 80–96)
PLATELET # BLD AUTO: 209 10^3/UL (ref 150–450)
POTASSIUM SERPL-SCNC: 4.7 MEQ/L (ref 3.5–5.1)
RBC # BLD AUTO: 3.31 10^6/UL (ref 4–5.4)
SODIUM SERPL-SCNC: 143 MEQ/L (ref 136–145)
T4 FREE SERPL-MCNC: 0.96 NG/DL (ref 0.76–1.46)
TSH SERPL DL<=0.005 MIU/L-ACNC: 1.03 UIU/ML (ref 0.36–3.74)
WBC # BLD AUTO: 4.5 10^3/UL (ref 4–10)

## 2022-05-03 ENCOUNTER — HOSPITAL ENCOUNTER (OUTPATIENT)
Dept: HOSPITAL 53 - SKLAB6 | Age: 67
End: 2022-05-03
Attending: INTERNAL MEDICINE
Payer: MEDICARE

## 2022-05-03 DIAGNOSIS — R60.9: Primary | ICD-10-CM

## 2022-05-03 LAB
BUN SERPL-MCNC: 31 MG/DL (ref 7–18)
CALCIUM SERPL-MCNC: 10.2 MG/DL (ref 8.8–10.2)
CHLORIDE SERPL-SCNC: 109 MEQ/L (ref 98–107)
CO2 SERPL-SCNC: 29 MEQ/L (ref 21–32)
CREAT SERPL-MCNC: 1.14 MG/DL (ref 0.55–1.3)
GFR SERPL CREATININE-BSD FRML MDRD: 50.8 ML/MIN/{1.73_M2} (ref 45–?)
GLUCOSE SERPL-MCNC: 112 MG/DL (ref 70–100)
POTASSIUM SERPL-SCNC: 4.5 MEQ/L (ref 3.5–5.1)
SODIUM SERPL-SCNC: 140 MEQ/L (ref 136–145)

## 2022-05-05 ENCOUNTER — HOSPITAL ENCOUNTER (OUTPATIENT)
Dept: HOSPITAL 53 - SKLAB6 | Age: 67
End: 2022-05-05
Attending: INTERNAL MEDICINE
Payer: MEDICARE

## 2022-05-05 DIAGNOSIS — I10: Primary | ICD-10-CM

## 2022-05-05 LAB
BUN SERPL-MCNC: 33 MG/DL (ref 7–18)
CALCIUM SERPL-MCNC: 11 MG/DL (ref 8.8–10.2)
CHLORIDE SERPL-SCNC: 109 MEQ/L (ref 98–107)
CO2 SERPL-SCNC: 29 MEQ/L (ref 21–32)
CREAT SERPL-MCNC: 0.98 MG/DL (ref 0.55–1.3)
GFR SERPL CREATININE-BSD FRML MDRD: > 60 ML/MIN/{1.73_M2} (ref 45–?)
GLUCOSE SERPL-MCNC: 103 MG/DL (ref 70–100)
POTASSIUM SERPL-SCNC: 4.5 MEQ/L (ref 3.5–5.1)
SODIUM SERPL-SCNC: 141 MEQ/L (ref 136–145)

## 2022-05-09 ENCOUNTER — HOSPITAL ENCOUNTER (OUTPATIENT)
Dept: HOSPITAL 53 - SKLAB6 | Age: 67
End: 2022-05-09
Attending: INTERNAL MEDICINE
Payer: MEDICARE

## 2022-05-09 DIAGNOSIS — R60.9: Primary | ICD-10-CM

## 2022-05-25 ENCOUNTER — HOSPITAL ENCOUNTER (OUTPATIENT)
Dept: HOSPITAL 53 - SKLAB6 | Age: 67
End: 2022-05-25
Attending: INTERNAL MEDICINE
Payer: MEDICARE

## 2022-05-25 DIAGNOSIS — I51.7: ICD-10-CM

## 2022-05-25 DIAGNOSIS — Z79.899: ICD-10-CM

## 2022-05-25 DIAGNOSIS — F03.90: Primary | ICD-10-CM

## 2022-05-25 DIAGNOSIS — R06.02: Primary | ICD-10-CM

## 2022-05-25 LAB
APPEARANCE UR: CLEAR
BACTERIA UR QL AUTO: (no result)
BILIRUB UR QL STRIP.AUTO: NEGATIVE
BUN SERPL-MCNC: 29 MG/DL (ref 7–18)
CALCIUM SERPL-MCNC: 10 MG/DL (ref 8.8–10.2)
CHLORIDE SERPL-SCNC: 109 MEQ/L (ref 98–107)
CO2 SERPL-SCNC: 26 MEQ/L (ref 21–32)
CREAT SERPL-MCNC: 1.03 MG/DL (ref 0.55–1.3)
GFR SERPL CREATININE-BSD FRML MDRD: 57.1 ML/MIN/{1.73_M2} (ref 45–?)
GLUCOSE SERPL-MCNC: 98 MG/DL (ref 70–100)
GLUCOSE UR QL STRIP.AUTO: NEGATIVE MG/DL
HCT VFR BLD AUTO: 28.5 % (ref 36–47)
HGB BLD-MCNC: 9.3 G/DL (ref 12–15.5)
HGB UR QL STRIP.AUTO: NEGATIVE
KETONES UR QL STRIP.AUTO: NEGATIVE MG/DL
LEUKOCYTE ESTERASE UR QL STRIP.AUTO: NEGATIVE
MCH RBC QN AUTO: 32.2 PG (ref 27–33)
MCHC RBC AUTO-ENTMCNC: 32.6 G/DL (ref 32–36.5)
MCV RBC AUTO: 98.6 FL (ref 80–96)
NITRITE UR QL STRIP.AUTO: NEGATIVE
PH UR STRIP.AUTO: 5 UNITS (ref 5–9)
PLATELET # BLD AUTO: 209 10^3/UL (ref 150–450)
POTASSIUM SERPL-SCNC: 4.3 MEQ/L (ref 3.5–5.1)
PROT UR QL STRIP.AUTO: NEGATIVE MG/DL
RBC # BLD AUTO: 2.89 10^6/UL (ref 4–5.4)
RBC # UR AUTO: 0 /HPF (ref 0–3)
SODIUM SERPL-SCNC: 141 MEQ/L (ref 136–145)
SP GR UR STRIP.AUTO: 1.01 (ref 1–1.03)
SQUAMOUS #/AREA URNS AUTO: 1 /HPF (ref 0–6)
UROBILINOGEN UR QL STRIP.AUTO: 0.2 MG/DL (ref 0–2)
WBC # BLD AUTO: 4.9 10^3/UL (ref 4–10)
WBC #/AREA URNS AUTO: 0 /HPF (ref 0–3)

## 2022-06-27 ENCOUNTER — HOSPITAL ENCOUNTER (OUTPATIENT)
Dept: HOSPITAL 53 - SKLAB6 | Age: 67
End: 2022-06-27
Attending: INTERNAL MEDICINE
Payer: MEDICARE

## 2022-06-27 DIAGNOSIS — E05.90: Primary | ICD-10-CM

## 2022-06-27 LAB
T4 FREE SERPL-MCNC: 1.03 NG/DL (ref 0.76–1.46)
TSH SERPL DL<=0.005 MIU/L-ACNC: 0.27 UIU/ML (ref 0.36–3.74)

## 2022-07-07 ENCOUNTER — HOSPITAL ENCOUNTER (OUTPATIENT)
Dept: HOSPITAL 53 - SKLAB6 | Age: 67
End: 2022-07-07
Attending: INTERNAL MEDICINE
Payer: MEDICARE

## 2022-07-07 DIAGNOSIS — D64.9: Primary | ICD-10-CM

## 2022-07-07 LAB
BASOPHILS # BLD AUTO: 0 10^3/UL (ref 0–0.2)
BASOPHILS NFR BLD AUTO: 0.6 % (ref 0–1)
EOSINOPHIL # BLD AUTO: 0.2 10^3/UL (ref 0–0.5)
EOSINOPHIL NFR BLD AUTO: 3.7 % (ref 0–3)
HCT VFR BLD AUTO: 33 % (ref 36–47)
HGB BLD-MCNC: 10.6 G/DL (ref 12–15.5)
LYMPHOCYTES # BLD AUTO: 1 10^3/UL (ref 1.5–5)
LYMPHOCYTES NFR BLD AUTO: 21.2 % (ref 24–44)
MCH RBC QN AUTO: 32.9 PG (ref 27–33)
MCHC RBC AUTO-ENTMCNC: 32.1 G/DL (ref 32–36.5)
MCV RBC AUTO: 102.5 FL (ref 80–96)
MONOCYTES # BLD AUTO: 0.4 10^3/UL (ref 0–0.8)
MONOCYTES NFR BLD AUTO: 8 % (ref 2–8)
NEUTROPHILS # BLD AUTO: 3.1 10^3/UL (ref 1.5–8.5)
NEUTROPHILS NFR BLD AUTO: 66.3 % (ref 36–66)
PLATELET # BLD AUTO: 205 10^3/UL (ref 150–450)
RBC # BLD AUTO: 3.22 10^6/UL (ref 4–5.4)
WBC # BLD AUTO: 4.6 10^3/UL (ref 4–10)

## 2022-07-14 ENCOUNTER — HOSPITAL ENCOUNTER (OUTPATIENT)
Dept: HOSPITAL 53 - SKLAB6 | Age: 67
End: 2022-07-14
Attending: INTERNAL MEDICINE
Payer: MEDICARE

## 2022-07-14 DIAGNOSIS — D47.2: Primary | ICD-10-CM

## 2022-07-14 LAB
IGG SERPL-MCNC: 476 MG/DL (ref 681–1648)
IGM SERPL-MCNC: 19.5 MG/DL (ref 40–230)
PROT SERPL-MCNC: 7.6 GM/DL (ref 6.4–8.2)

## 2022-07-19 LAB
ALBUMIN MFR UR ELPH: 38.6 % (ref 55.8–66.1)
ALBUMIN SERPL-MCNC: 2.93 GM/DL (ref 3.29–5.55)
ALPHA1 GLOB MFR SERPL ELPH: 3.1 % (ref 2.9–4.9)
ALPHA1 GLOB SERPL ELPH-MCNC: 0.24 GM/DL (ref 0.17–0.41)
ALPHA2 GLOB SERPL ELPH-MCNC: 0.59 GM/DL (ref 0.42–0.99)
ALPHA2 GLOB SERPL ELPH-MCNC: 7.7 % (ref 7.1–11.8)
B-GLOBULIN SERPL ELPH-MCNC: 0.31 GM/DL (ref 0.28–0.6)
BETA1 GLOB MFR SERPL ELPH: 4.1 % (ref 4.7–7.2)
BETA2 GLOB MFR SERPL ELPH: 40.3 % (ref 3.2–6.5)
BETA2 GLOB SERPL ELPH-MCNC: 3.06 GM/DL (ref 0.19–0.55)
GAMMA GLOB 24H MFR UR ELPH: 6.2 % (ref 11.1–18.8)
GAMMA GLOB SERPL ELPH-MCNC: 0.47 GM/DL (ref 0.65–1.58)
PROT PATTERN SERPL ELPH-IMP: (no result)

## 2022-09-15 ENCOUNTER — HOSPITAL ENCOUNTER (OUTPATIENT)
Dept: HOSPITAL 53 - SKLAB6 | Age: 67
End: 2022-09-15
Attending: INTERNAL MEDICINE
Payer: MEDICARE

## 2022-09-15 DIAGNOSIS — E05.90: Primary | ICD-10-CM

## 2022-09-15 LAB
BASOPHILS # BLD AUTO: 0 10^3/UL (ref 0–0.2)
BASOPHILS NFR BLD AUTO: 0.3 % (ref 0–1)
EOSINOPHIL # BLD AUTO: 0.1 10^3/UL (ref 0–0.5)
EOSINOPHIL NFR BLD AUTO: 1.9 % (ref 0–3)
HCT VFR BLD AUTO: 34.2 % (ref 36–47)
HGB BLD-MCNC: 10.9 G/DL (ref 12–15.5)
LYMPHOCYTES # BLD AUTO: 1.2 10^3/UL (ref 1.5–5)
LYMPHOCYTES NFR BLD AUTO: 20.6 % (ref 24–44)
MCH RBC QN AUTO: 32.2 PG (ref 27–33)
MCHC RBC AUTO-ENTMCNC: 31.9 G/DL (ref 32–36.5)
MCV RBC AUTO: 100.9 FL (ref 80–96)
MONOCYTES # BLD AUTO: 0.4 10^3/UL (ref 0–0.8)
MONOCYTES NFR BLD AUTO: 6.6 % (ref 2–8)
NEUTROPHILS # BLD AUTO: 4.1 10^3/UL (ref 1.5–8.5)
NEUTROPHILS NFR BLD AUTO: 70.3 % (ref 36–66)
PLATELET # BLD AUTO: 212 10^3/UL (ref 150–450)
RBC # BLD AUTO: 3.39 10^6/UL (ref 4–5.4)
T4 FREE SERPL-MCNC: 1.07 NG/DL (ref 0.76–1.46)
TSH SERPL DL<=0.005 MIU/L-ACNC: 0.87 UIU/ML (ref 0.36–3.74)
WBC # BLD AUTO: 5.8 10^3/UL (ref 4–10)

## 2022-10-13 ENCOUNTER — HOSPITAL ENCOUNTER (OUTPATIENT)
Dept: HOSPITAL 53 - SKLAB6 | Age: 67
End: 2022-10-13
Attending: INTERNAL MEDICINE
Payer: MEDICARE

## 2022-10-13 DIAGNOSIS — E78.5: ICD-10-CM

## 2022-10-13 DIAGNOSIS — Z79.899: Primary | ICD-10-CM

## 2022-10-13 LAB
ALBUMIN SERPL BCG-MCNC: 2.8 GM/DL (ref 3.2–5.2)
ALT SERPL W P-5'-P-CCNC: 21 U/L (ref 12–78)
BILIRUB SERPL-MCNC: 0.4 MG/DL (ref 0.2–1)
BUN SERPL-MCNC: 35 MG/DL (ref 7–18)
CALCIUM SERPL-MCNC: 10.7 MG/DL (ref 8.8–10.2)
CHLORIDE SERPL-SCNC: 106 MEQ/L (ref 98–107)
CHOLEST SERPL-MCNC: 103 MG/DL (ref ?–200)
CHOLEST/HDLC SERPL: 2.15 {RATIO} (ref ?–5)
CO2 SERPL-SCNC: 27 MEQ/L (ref 21–32)
CREAT SERPL-MCNC: 1.1 MG/DL (ref 0.55–1.3)
DIGOXIN SERPL-MCNC: 0.9 NG/ML (ref 0.5–2)
GFR SERPL CREATININE-BSD FRML MDRD: 52.7 ML/MIN/{1.73_M2} (ref 45–?)
GLUCOSE SERPL-MCNC: 109 MG/DL (ref 70–100)
HDLC SERPL-MCNC: 48 MG/DL (ref 40–?)
LDLC SERPL CALC-MCNC: 43 MG/DL (ref ?–100)
NONHDLC SERPL-MCNC: 55 MG/DL
POTASSIUM SERPL-SCNC: 4.4 MEQ/L (ref 3.5–5.1)
PROT SERPL-MCNC: 8.1 GM/DL (ref 6.4–8.2)
SODIUM SERPL-SCNC: 138 MEQ/L (ref 136–145)
TRIGL SERPL-MCNC: 60 MG/DL (ref ?–150)

## 2022-10-17 ENCOUNTER — HOSPITAL ENCOUNTER (OUTPATIENT)
Dept: HOSPITAL 53 - SKLAB6 | Age: 67
End: 2022-10-17
Attending: INTERNAL MEDICINE
Payer: MEDICARE

## 2022-10-17 DIAGNOSIS — D47.2: Primary | ICD-10-CM

## 2022-10-17 LAB
BASOPHILS # BLD AUTO: 0 10^3/UL (ref 0–0.2)
BASOPHILS NFR BLD AUTO: 0.7 % (ref 0–1)
EOSINOPHIL # BLD AUTO: 0.1 10^3/UL (ref 0–0.5)
EOSINOPHIL NFR BLD AUTO: 2.4 % (ref 0–3)
HCT VFR BLD AUTO: 33.8 % (ref 36–47)
HGB BLD-MCNC: 10.6 G/DL (ref 12–15.5)
IGA SERPL-MCNC: 2570 MG/DL (ref 70–400)
IGG SERPL-MCNC: 562 MG/DL (ref 681–1648)
IGM SERPL-MCNC: 8.2 MG/DL (ref 40–230)
LYMPHOCYTES # BLD AUTO: 1.2 10^3/UL (ref 1.5–5)
LYMPHOCYTES NFR BLD AUTO: 25.8 % (ref 24–44)
MCH RBC QN AUTO: 32.4 PG (ref 27–33)
MCHC RBC AUTO-ENTMCNC: 31.4 G/DL (ref 32–36.5)
MCV RBC AUTO: 103.4 FL (ref 80–96)
MONOCYTES # BLD AUTO: 0.3 10^3/UL (ref 0–0.8)
MONOCYTES NFR BLD AUTO: 6.6 % (ref 2–8)
NEUTROPHILS # BLD AUTO: 2.9 10^3/UL (ref 1.5–8.5)
NEUTROPHILS NFR BLD AUTO: 64.3 % (ref 36–66)
PLATELET # BLD AUTO: 217 10^3/UL (ref 150–450)
PROT SERPL-MCNC: 8.5 GM/DL (ref 6.4–8.2)
RBC # BLD AUTO: 3.27 10^6/UL (ref 4–5.4)
WBC # BLD AUTO: 4.5 10^3/UL (ref 4–10)

## 2022-10-18 LAB
ALBUMIN MFR UR ELPH: 39.3 % (ref 55.8–66.1)
ALBUMIN SERPL-MCNC: 3.34 GM/DL (ref 3.29–5.55)
ALPHA1 GLOB MFR SERPL ELPH: 3.3 % (ref 2.9–4.9)
ALPHA1 GLOB SERPL ELPH-MCNC: 0.28 GM/DL (ref 0.17–0.41)
ALPHA2 GLOB SERPL ELPH-MCNC: 0.72 GM/DL (ref 0.42–0.99)
ALPHA2 GLOB SERPL ELPH-MCNC: 8.5 % (ref 7.1–11.8)
B-GLOBULIN SERPL ELPH-MCNC: 0.39 GM/DL (ref 0.28–0.6)
BETA1 GLOB MFR SERPL ELPH: 4.6 % (ref 4.7–7.2)
BETA2 GLOB MFR SERPL ELPH: 37.8 % (ref 3.2–6.5)
BETA2 GLOB SERPL ELPH-MCNC: 3.21 GM/DL (ref 0.19–0.55)
GAMMA GLOB 24H MFR UR ELPH: 6.5 % (ref 11.1–18.8)
GAMMA GLOB SERPL ELPH-MCNC: 0.55 GM/DL (ref 0.65–1.58)
PROT PATTERN SERPL ELPH-IMP: (no result)

## 2022-10-19 ENCOUNTER — HOSPITAL ENCOUNTER (OUTPATIENT)
Dept: HOSPITAL 53 - SKLAB6 | Age: 67
End: 2022-10-19
Attending: INTERNAL MEDICINE
Payer: MEDICARE

## 2022-10-19 ENCOUNTER — HOSPITAL ENCOUNTER (OUTPATIENT)
Dept: HOSPITAL 53 - M LAB REF | Age: 67
End: 2022-10-19
Attending: INTERNAL MEDICINE
Payer: MEDICARE

## 2022-10-19 DIAGNOSIS — D47.2: Primary | ICD-10-CM

## 2022-10-20 LAB
KAPPA LC FREE SER-MCNC: 11.2 MG/L (ref 3.3–19.4)
KAPPA LC/LAMBDA SER: 0.02 {RATIO} (ref 0.26–1.65)
LAMBDA LC FREE SERPL-MCNC: 571.2 MG/L (ref 5.7–26.3)

## 2022-10-22 LAB
B2 GLYCOPROT1 IGA SER-ACNC: 10 (ref 0–25)
B2 GLYCOPROT1 IGG SER-ACNC: <9 (ref 0–20)
B2 GLYCOPROT1 IGM SER-ACNC: <9 (ref 0–32)
KAPPA LC FREE SER-MCNC: 11.7 MG/L (ref 3.3–19.4)
KAPPA LC/LAMBDA SER: 0.02 {RATIO} (ref 0.26–1.65)
LAMBDA LC FREE SERPL-MCNC: 686.8 MG/L (ref 5.7–26.3)
VISC SER: 2.2 REL.SALINE (ref 1.4–2.1)

## 2022-11-10 ENCOUNTER — HOSPITAL ENCOUNTER (OUTPATIENT)
Dept: HOSPITAL 53 - SKLAB6 | Age: 67
End: 2022-11-10
Attending: INTERNAL MEDICINE
Payer: MEDICARE

## 2022-11-10 DIAGNOSIS — D64.9: Primary | ICD-10-CM

## 2022-11-10 LAB
BASOPHILS # BLD AUTO: 0 10^3/UL (ref 0–0.2)
BASOPHILS NFR BLD AUTO: 0.5 % (ref 0–1)
EOSINOPHIL # BLD AUTO: 0.1 10^3/UL (ref 0–0.5)
EOSINOPHIL NFR BLD AUTO: 2.5 % (ref 0–3)
HCT VFR BLD AUTO: 32.7 % (ref 36–47)
HGB BLD-MCNC: 10.6 G/DL (ref 12–15.5)
LYMPHOCYTES # BLD AUTO: 0.9 10^3/UL (ref 1.5–5)
LYMPHOCYTES NFR BLD AUTO: 21.1 % (ref 24–44)
MCH RBC QN AUTO: 32.7 PG (ref 27–33)
MCHC RBC AUTO-ENTMCNC: 32.4 G/DL (ref 32–36.5)
MCV RBC AUTO: 100.9 FL (ref 80–96)
MONOCYTES # BLD AUTO: 0.3 10^3/UL (ref 0–0.8)
MONOCYTES NFR BLD AUTO: 6.4 % (ref 2–8)
NEUTROPHILS # BLD AUTO: 3 10^3/UL (ref 1.5–8.5)
NEUTROPHILS NFR BLD AUTO: 69.3 % (ref 36–66)
PLATELET # BLD AUTO: 198 10^3/UL (ref 150–450)
RBC # BLD AUTO: 3.24 10^6/UL (ref 4–5.4)
WBC # BLD AUTO: 4.4 10^3/UL (ref 4–10)

## 2022-11-15 ENCOUNTER — HOSPITAL ENCOUNTER (OUTPATIENT)
Dept: HOSPITAL 53 - M RAD | Age: 67
End: 2022-11-15
Attending: INTERNAL MEDICINE
Payer: MEDICARE

## 2022-11-15 DIAGNOSIS — J47.9: ICD-10-CM

## 2022-11-15 DIAGNOSIS — I25.10: ICD-10-CM

## 2022-11-15 DIAGNOSIS — M47.818: ICD-10-CM

## 2022-11-15 DIAGNOSIS — R92.1: ICD-10-CM

## 2022-11-15 DIAGNOSIS — Z90.49: ICD-10-CM

## 2022-11-15 DIAGNOSIS — E05.90: ICD-10-CM

## 2022-11-15 DIAGNOSIS — I70.0: ICD-10-CM

## 2022-11-15 DIAGNOSIS — D35.01: ICD-10-CM

## 2022-11-15 DIAGNOSIS — D35.02: ICD-10-CM

## 2022-11-15 DIAGNOSIS — Z87.81: ICD-10-CM

## 2022-11-15 DIAGNOSIS — I31.39: ICD-10-CM

## 2022-11-15 DIAGNOSIS — D47.2: Primary | ICD-10-CM

## 2022-11-16 ENCOUNTER — HOSPITAL ENCOUNTER (OUTPATIENT)
Dept: HOSPITAL 53 - SKLAB6 | Age: 67
End: 2022-11-16
Attending: INTERNAL MEDICINE
Payer: MEDICARE

## 2022-11-16 DIAGNOSIS — C80.1: Primary | ICD-10-CM

## 2022-11-16 LAB
ALBUMIN SERPL BCG-MCNC: 3.1 G/DL (ref 3.2–5.2)
ALT SERPL W P-5'-P-CCNC: 17 U/L (ref 7–40)
BASOPHILS # BLD AUTO: 0 10^3/UL (ref 0–0.2)
BASOPHILS NFR BLD AUTO: 0.3 % (ref 0–1)
BILIRUB SERPL-MCNC: 0.4 MG/DL (ref 0.3–1.2)
BUN SERPL-MCNC: 32 MG/DL (ref 9–23)
CALCIUM SERPL-MCNC: 10.5 MG/DL (ref 8.3–10.6)
CHLORIDE SERPL-SCNC: 104 MMOL/L (ref 98–107)
CO2 SERPL-SCNC: 27 MMOL/L (ref 20–31)
CREAT SERPL-MCNC: 0.87 MG/DL (ref 0.55–1.3)
EOSINOPHIL # BLD AUTO: 0.1 10^3/UL (ref 0–0.5)
EOSINOPHIL NFR BLD AUTO: 1.7 % (ref 0–3)
GFR SERPL CREATININE-BSD FRML MDRD: > 60 ML/MIN/{1.73_M2} (ref 45–?)
GLUCOSE SERPL-MCNC: 97 MG/DL (ref 74–106)
HCT VFR BLD AUTO: 31.4 % (ref 36–47)
HGB BLD-MCNC: 10.1 G/DL (ref 12–15.5)
IGA SERPL-MCNC: 2659.2 MG/DL (ref 40–350)
IGG SERPL-MCNC: 573 MG/DL (ref 650–1600)
IGM SERPL-MCNC: 21 MG/DL (ref 50–300)
LYMPHOCYTES # BLD AUTO: 1.2 10^3/UL (ref 1.5–5)
LYMPHOCYTES NFR BLD AUTO: 20.9 % (ref 24–44)
MCH RBC QN AUTO: 32.8 PG (ref 27–33)
MCHC RBC AUTO-ENTMCNC: 32.2 G/DL (ref 32–36.5)
MCV RBC AUTO: 101.9 FL (ref 80–96)
MONOCYTES # BLD AUTO: 0.5 10^3/UL (ref 0–0.8)
MONOCYTES NFR BLD AUTO: 7.9 % (ref 2–8)
NEUTROPHILS # BLD AUTO: 4 10^3/UL (ref 1.5–8.5)
NEUTROPHILS NFR BLD AUTO: 69 % (ref 36–66)
PLATELET # BLD AUTO: 188 10^3/UL (ref 150–450)
POTASSIUM SERPL-SCNC: 4.6 MMOL/L (ref 3.5–5.1)
PROT SERPL-MCNC: 7.7 G/DL (ref 5.7–8.2)
RBC # BLD AUTO: 3.08 10^6/UL (ref 4–5.4)
SODIUM SERPL-SCNC: 141 MMOL/L (ref 136–145)
WBC # BLD AUTO: 5.8 10^3/UL (ref 4–10)

## 2022-11-17 ENCOUNTER — HOSPITAL ENCOUNTER (OUTPATIENT)
Dept: HOSPITAL 53 - M IRPRO | Age: 67
End: 2022-11-17
Attending: INTERNAL MEDICINE
Payer: MEDICARE

## 2022-11-17 VITALS — DIASTOLIC BLOOD PRESSURE: 63 MMHG | SYSTOLIC BLOOD PRESSURE: 135 MMHG

## 2022-11-17 DIAGNOSIS — D47.2: Primary | ICD-10-CM

## 2022-11-17 LAB
BASOPHILS # BLD AUTO: 0 10^3/UL (ref 0–0.2)
BASOPHILS NFR BLD AUTO: 0.4 % (ref 0–1)
EOSINOPHIL # BLD AUTO: 0.1 10^3/UL (ref 0–0.5)
EOSINOPHIL NFR BLD AUTO: 1.9 % (ref 0–3)
HCT VFR BLD AUTO: 31.2 % (ref 36–47)
HGB BLD-MCNC: 9.9 G/DL (ref 12–15.5)
LYMPHOCYTES # BLD AUTO: 1.1 10^3/UL (ref 1.5–5)
LYMPHOCYTES NFR BLD AUTO: 21.1 % (ref 24–44)
MCH RBC QN AUTO: 32 PG (ref 27–33)
MCHC RBC AUTO-ENTMCNC: 31.7 G/DL (ref 32–36.5)
MCV RBC AUTO: 101 FL (ref 80–96)
MONOCYTES # BLD AUTO: 0.4 10^3/UL (ref 0–0.8)
MONOCYTES NFR BLD AUTO: 8.4 % (ref 2–8)
NEUTROPHILS # BLD AUTO: 3.5 10^3/UL (ref 1.5–8.5)
NEUTROPHILS NFR BLD AUTO: 68 % (ref 36–66)
PLATELET # BLD AUTO: 182 10^3/UL (ref 150–450)
RBC # BLD AUTO: 3.09 10^6/UL (ref 4–5.4)
WBC # BLD AUTO: 5.2 10^3/UL (ref 4–10)

## 2022-11-18 LAB
B2 GLYCOPROT1 IGA SER-ACNC: 11 (ref 0–25)
B2 GLYCOPROT1 IGG SER-ACNC: <9 (ref 0–20)
B2 GLYCOPROT1 IGM SER-ACNC: <9 (ref 0–32)

## 2022-11-23 ENCOUNTER — HOSPITAL ENCOUNTER (OUTPATIENT)
Dept: HOSPITAL 53 - SKLAB6 | Age: 67
End: 2022-11-23
Attending: INTERNAL MEDICINE
Payer: MEDICARE

## 2022-11-23 DIAGNOSIS — D47.2: Primary | ICD-10-CM

## 2022-12-08 ENCOUNTER — HOSPITAL ENCOUNTER (OUTPATIENT)
Dept: HOSPITAL 53 - SKLAB6 | Age: 67
End: 2022-12-08
Attending: INTERNAL MEDICINE
Payer: MEDICARE

## 2022-12-08 DIAGNOSIS — E05.90: Primary | ICD-10-CM

## 2022-12-08 LAB
T4 FREE SERPL-MCNC: 1.26 NG/DL (ref 0.89–1.76)
TSH SERPL DL<=0.005 MIU/L-ACNC: 1.05 UIU/ML (ref 0.55–4.78)

## 2022-12-20 ENCOUNTER — HOSPITAL ENCOUNTER (OUTPATIENT)
Dept: HOSPITAL 53 - SKLAB6 | Age: 67
End: 2022-12-20
Attending: NEUROMUSCULOSKELETAL MEDICINE & OMM

## 2022-12-20 DIAGNOSIS — U07.1: Primary | ICD-10-CM

## 2022-12-20 LAB
ALBUMIN SERPL BCG-MCNC: 2.9 G/DL (ref 3.2–5.2)
ALP SERPL-CCNC: 86 U/L (ref 46–116)
ALT SERPL W P-5'-P-CCNC: 28 U/L (ref 7–40)
AST SERPL-CCNC: 29 U/L (ref ?–34)
BILIRUB SERPL-MCNC: 0.3 MG/DL (ref 0.3–1.2)
BUN SERPL-MCNC: 28 MG/DL (ref 9–23)
CALCIUM SERPL-MCNC: 9.6 MG/DL (ref 8.3–10.6)
CHLORIDE SERPL-SCNC: 106 MMOL/L (ref 98–107)
CO2 SERPL-SCNC: 22 MMOL/L (ref 20–31)
CREAT SERPL-MCNC: 0.94 MG/DL (ref 0.55–1.3)
GFR SERPL CREATININE-BSD FRML MDRD: > 60 ML/MIN/{1.73_M2} (ref 45–?)
GLUCOSE SERPL-MCNC: 165 MG/DL (ref 74–106)
HCT VFR BLD AUTO: 30.6 % (ref 36–47)
HGB BLD-MCNC: 10.1 G/DL (ref 12–15.5)
MCH RBC QN AUTO: 32.6 PG (ref 27–33)
MCHC RBC AUTO-ENTMCNC: 33 G/DL (ref 32–36.5)
MCV RBC AUTO: 98.7 FL (ref 80–96)
PLATELET # BLD AUTO: 146 10^3/UL (ref 150–450)
POTASSIUM SERPL-SCNC: 3.7 MMOL/L (ref 3.5–5.1)
PROT SERPL-MCNC: 7.5 G/DL (ref 5.7–8.2)
RBC # BLD AUTO: 3.1 10^6/UL (ref 4–5.4)
SODIUM SERPL-SCNC: 140 MMOL/L (ref 136–145)
WBC # BLD AUTO: 3.3 10^3/UL (ref 4–10)

## 2022-12-22 ENCOUNTER — HOSPITAL ENCOUNTER (OUTPATIENT)
Dept: HOSPITAL 53 - SKLAB8 | Age: 67
End: 2022-12-22
Attending: NURSE PRACTITIONER
Payer: MEDICARE

## 2022-12-22 DIAGNOSIS — Z79.899: ICD-10-CM

## 2022-12-22 DIAGNOSIS — U07.1: Primary | ICD-10-CM

## 2022-12-22 LAB
ALBUMIN SERPL BCG-MCNC: 2.8 G/DL (ref 3.2–5.2)
ALP SERPL-CCNC: 85 U/L (ref 46–116)
ALT SERPL W P-5'-P-CCNC: 37 U/L (ref 7–40)
AST SERPL-CCNC: 35 U/L (ref ?–34)
BILIRUB SERPL-MCNC: 0.3 MG/DL (ref 0.3–1.2)
BUN SERPL-MCNC: 15 MG/DL (ref 9–23)
CALCIUM SERPL-MCNC: 10 MG/DL (ref 8.3–10.6)
CHLORIDE SERPL-SCNC: 105 MMOL/L (ref 98–107)
CO2 SERPL-SCNC: 26 MMOL/L (ref 20–31)
CREAT SERPL-MCNC: 0.75 MG/DL (ref 0.55–1.3)
GFR SERPL CREATININE-BSD FRML MDRD: > 60 ML/MIN/{1.73_M2} (ref 45–?)
GLUCOSE SERPL-MCNC: 98 MG/DL (ref 74–106)
HCT VFR BLD AUTO: 34.3 % (ref 36–47)
HGB BLD-MCNC: 11.2 G/DL (ref 12–15.5)
MCH RBC QN AUTO: 32.5 PG (ref 27–33)
MCHC RBC AUTO-ENTMCNC: 32.7 G/DL (ref 32–36.5)
MCV RBC AUTO: 99.4 FL (ref 80–96)
PLATELET # BLD AUTO: 137 10^3/UL (ref 150–450)
POTASSIUM SERPL-SCNC: 3.8 MMOL/L (ref 3.5–5.1)
PROT SERPL-MCNC: 7.6 G/DL (ref 5.7–8.2)
RBC # BLD AUTO: 3.45 10^6/UL (ref 4–5.4)
SODIUM SERPL-SCNC: 141 MMOL/L (ref 136–145)
WBC # BLD AUTO: 3.3 10^3/UL (ref 4–10)

## 2022-12-26 LAB
ALBUMIN SERPL BCG-MCNC: 3 G/DL (ref 3.2–5.2)
ALP SERPL-CCNC: 79 U/L (ref 46–116)
ALT SERPL W P-5'-P-CCNC: 28 U/L (ref 7–40)
AST SERPL-CCNC: 25 U/L (ref ?–34)
BILIRUB SERPL-MCNC: 0.2 MG/DL (ref 0.3–1.2)
BUN SERPL-MCNC: 33 MG/DL (ref 9–23)
CALCIUM SERPL-MCNC: 9.8 MG/DL (ref 8.3–10.6)
CHLORIDE SERPL-SCNC: 108 MMOL/L (ref 98–107)
CO2 SERPL-SCNC: 23 MMOL/L (ref 20–31)
CREAT SERPL-MCNC: 0.86 MG/DL (ref 0.55–1.3)
GFR SERPL CREATININE-BSD FRML MDRD: > 60 ML/MIN/{1.73_M2} (ref 45–?)
GLUCOSE SERPL-MCNC: 92 MG/DL (ref 74–106)
HCT VFR BLD AUTO: 32.9 % (ref 36–47)
HGB BLD-MCNC: 10.4 G/DL (ref 12–15.5)
MCH RBC QN AUTO: 32.2 PG (ref 27–33)
MCHC RBC AUTO-ENTMCNC: 31.6 G/DL (ref 32–36.5)
MCV RBC AUTO: 101.9 FL (ref 80–96)
PLATELET # BLD AUTO: 142 10^3/UL (ref 150–450)
POTASSIUM SERPL-SCNC: 4.2 MMOL/L (ref 3.5–5.1)
PROT SERPL-MCNC: 7.8 G/DL (ref 5.7–8.2)
RBC # BLD AUTO: 3.23 10^6/UL (ref 4–5.4)
SODIUM SERPL-SCNC: 143 MMOL/L (ref 136–145)
WBC # BLD AUTO: 2.8 10^3/UL (ref 4–10)

## 2022-12-27 ENCOUNTER — HOSPITAL ENCOUNTER (OUTPATIENT)
Dept: HOSPITAL 53 - SKLAB8 | Age: 67
End: 2022-12-27
Attending: NURSE PRACTITIONER
Payer: MEDICARE

## 2022-12-27 DIAGNOSIS — U07.1: Primary | ICD-10-CM

## 2022-12-27 DIAGNOSIS — Z79.899: ICD-10-CM

## 2022-12-29 ENCOUNTER — HOSPITAL ENCOUNTER (OUTPATIENT)
Dept: HOSPITAL 53 - SKLAB8 | Age: 67
End: 2022-12-29
Attending: NURSE PRACTITIONER
Payer: MEDICARE

## 2022-12-29 DIAGNOSIS — U07.1: Primary | ICD-10-CM

## 2022-12-29 DIAGNOSIS — Z79.899: ICD-10-CM

## 2022-12-29 LAB
ALBUMIN SERPL BCG-MCNC: 2.8 G/DL (ref 3.2–5.2)
ALP SERPL-CCNC: 85 U/L (ref 46–116)
ALT SERPL W P-5'-P-CCNC: 35 U/L (ref 7–40)
AST SERPL-CCNC: 31 U/L (ref ?–34)
BILIRUB SERPL-MCNC: 0.4 MG/DL (ref 0.3–1.2)
BUN SERPL-MCNC: 26 MG/DL (ref 9–23)
CALCIUM SERPL-MCNC: 10.2 MG/DL (ref 8.3–10.6)
CHLORIDE SERPL-SCNC: 105 MMOL/L (ref 98–107)
CO2 SERPL-SCNC: 26 MMOL/L (ref 20–31)
CREAT SERPL-MCNC: 0.92 MG/DL (ref 0.55–1.3)
GFR SERPL CREATININE-BSD FRML MDRD: > 60 ML/MIN/{1.73_M2} (ref 45–?)
GLUCOSE SERPL-MCNC: 119 MG/DL (ref 74–106)
HCT VFR BLD AUTO: 32.1 % (ref 36–47)
HGB BLD-MCNC: 10.3 G/DL (ref 12–15.5)
MCH RBC QN AUTO: 32.3 PG (ref 27–33)
MCHC RBC AUTO-ENTMCNC: 32.1 G/DL (ref 32–36.5)
MCV RBC AUTO: 100.6 FL (ref 80–96)
PLATELET # BLD AUTO: 182 10^3/UL (ref 150–450)
POTASSIUM SERPL-SCNC: 4.6 MMOL/L (ref 3.5–5.1)
PROT SERPL-MCNC: 7.7 G/DL (ref 5.7–8.2)
RBC # BLD AUTO: 3.19 10^6/UL (ref 4–5.4)
SODIUM SERPL-SCNC: 141 MMOL/L (ref 136–145)
WBC # BLD AUTO: 4.6 10^3/UL (ref 4–10)

## 2023-01-12 ENCOUNTER — HOSPITAL ENCOUNTER (OUTPATIENT)
Dept: HOSPITAL 53 - SKLAB6 | Age: 68
End: 2023-01-12
Attending: INTERNAL MEDICINE
Payer: MEDICARE

## 2023-01-12 DIAGNOSIS — I48.91: Primary | ICD-10-CM

## 2023-01-12 LAB
BASOPHILS # BLD AUTO: 0 10^3/UL (ref 0–0.2)
BASOPHILS NFR BLD AUTO: 0.4 % (ref 0–1)
EOSINOPHIL # BLD AUTO: 0.1 10^3/UL (ref 0–0.5)
EOSINOPHIL NFR BLD AUTO: 2 % (ref 0–3)
HCT VFR BLD AUTO: 30.7 % (ref 36–47)
HGB BLD-MCNC: 9.9 G/DL (ref 12–15.5)
LYMPHOCYTES # BLD AUTO: 1 10^3/UL (ref 1.5–5)
LYMPHOCYTES NFR BLD AUTO: 20.7 % (ref 24–44)
MCH RBC QN AUTO: 32.4 PG (ref 27–33)
MCHC RBC AUTO-ENTMCNC: 32.2 G/DL (ref 32–36.5)
MCV RBC AUTO: 100.3 FL (ref 80–96)
MONOCYTES # BLD AUTO: 0.5 10^3/UL (ref 0–0.8)
MONOCYTES NFR BLD AUTO: 10 % (ref 2–8)
NEUTROPHILS # BLD AUTO: 3.3 10^3/UL (ref 1.5–8.5)
NEUTROPHILS NFR BLD AUTO: 66.7 % (ref 36–66)
PLATELET # BLD AUTO: 224 10^3/UL (ref 150–450)
RBC # BLD AUTO: 3.06 10^6/UL (ref 4–5.4)
WBC # BLD AUTO: 4.9 10^3/UL (ref 4–10)

## 2023-03-09 ENCOUNTER — HOSPITAL ENCOUNTER (OUTPATIENT)
Dept: HOSPITAL 53 - SKLAB6 | Age: 68
End: 2023-03-09
Attending: INTERNAL MEDICINE
Payer: MEDICARE

## 2023-03-09 DIAGNOSIS — E05.90: Primary | ICD-10-CM

## 2023-03-09 LAB
BASOPHILS # BLD AUTO: 0 10^3/UL (ref 0–0.2)
BASOPHILS NFR BLD AUTO: 0.5 % (ref 0–1)
EOSINOPHIL # BLD AUTO: 0.1 10^3/UL (ref 0–0.5)
EOSINOPHIL NFR BLD AUTO: 2.3 % (ref 0–3)
HCT VFR BLD AUTO: 30 % (ref 36–47)
HGB BLD-MCNC: 9.5 G/DL (ref 12–15.5)
LYMPHOCYTES # BLD AUTO: 0.9 10^3/UL (ref 1.5–5)
LYMPHOCYTES NFR BLD AUTO: 20.4 % (ref 24–44)
MCH RBC QN AUTO: 31.9 PG (ref 27–33)
MCHC RBC AUTO-ENTMCNC: 31.7 G/DL (ref 32–36.5)
MCV RBC AUTO: 100.7 FL (ref 80–96)
MONOCYTES # BLD AUTO: 0.4 10^3/UL (ref 0–0.8)
MONOCYTES NFR BLD AUTO: 10.1 % (ref 2–8)
NEUTROPHILS # BLD AUTO: 2.8 10^3/UL (ref 1.5–8.5)
NEUTROPHILS NFR BLD AUTO: 66.2 % (ref 36–66)
PLATELET # BLD AUTO: 172 10^3/UL (ref 150–450)
RBC # BLD AUTO: 2.98 10^6/UL (ref 4–5.4)
T4 FREE SERPL-MCNC: 1.18 NG/DL (ref 0.89–1.76)
TSH SERPL DL<=0.005 MIU/L-ACNC: 1.07 UIU/ML (ref 0.55–4.78)
WBC # BLD AUTO: 4.3 10^3/UL (ref 4–10)

## 2023-04-06 ENCOUNTER — HOSPITAL ENCOUNTER (OUTPATIENT)
Dept: HOSPITAL 53 - M IRPRO | Age: 68
End: 2023-04-06
Attending: INTERNAL MEDICINE
Payer: OTHER GOVERNMENT

## 2023-04-06 VITALS — DIASTOLIC BLOOD PRESSURE: 77 MMHG | SYSTOLIC BLOOD PRESSURE: 164 MMHG

## 2023-04-06 DIAGNOSIS — D47.2: Primary | ICD-10-CM

## 2023-04-06 LAB
BASOPHILS # BLD AUTO: 0 10^3/UL (ref 0–0.2)
BASOPHILS NFR BLD AUTO: 0.5 % (ref 0–1)
EOSINOPHIL # BLD AUTO: 0.2 10^3/UL (ref 0–0.5)
EOSINOPHIL NFR BLD AUTO: 2.4 % (ref 0–3)
HCT VFR BLD AUTO: 32.7 % (ref 36–47)
HGB BLD-MCNC: 10.5 G/DL (ref 12–15.5)
LYMPHOCYTES # BLD AUTO: 1 10^3/UL (ref 1.5–5)
LYMPHOCYTES NFR BLD AUTO: 15.7 % (ref 24–44)
MCH RBC QN AUTO: 31.9 PG (ref 27–33)
MCHC RBC AUTO-ENTMCNC: 32.1 G/DL (ref 32–36.5)
MCV RBC AUTO: 99.4 FL (ref 80–96)
MONOCYTES # BLD AUTO: 0.4 10^3/UL (ref 0–0.8)
MONOCYTES NFR BLD AUTO: 6.9 % (ref 2–8)
NEUTROPHILS # BLD AUTO: 4.7 10^3/UL (ref 1.5–8.5)
NEUTROPHILS NFR BLD AUTO: 74.2 % (ref 36–66)
PLATELET # BLD AUTO: 211 10^3/UL (ref 150–450)
RBC # BLD AUTO: 3.29 10^6/UL (ref 4–5.4)
WBC # BLD AUTO: 6.4 10^3/UL (ref 4–10)

## 2023-04-13 ENCOUNTER — HOSPITAL ENCOUNTER (OUTPATIENT)
Dept: HOSPITAL 53 - SKLAB6 | Age: 68
End: 2023-04-13
Attending: INTERNAL MEDICINE
Payer: MEDICARE

## 2023-04-13 DIAGNOSIS — E78.5: ICD-10-CM

## 2023-04-13 DIAGNOSIS — Z51.81: Primary | ICD-10-CM

## 2023-04-13 DIAGNOSIS — I48.91: ICD-10-CM

## 2023-04-13 DIAGNOSIS — Z13.29: ICD-10-CM

## 2023-04-13 DIAGNOSIS — Z79.899: ICD-10-CM

## 2023-04-13 LAB
25(OH)D3 SERPL-MCNC: 54.3 NG/ML (ref 20–100)
ALBUMIN SERPL BCG-MCNC: 2.6 G/DL (ref 3.2–5.2)
ALP SERPL-CCNC: 72 U/L (ref 46–116)
ALT SERPL W P-5'-P-CCNC: 12 U/L (ref 7–40)
AST SERPL-CCNC: 17 U/L (ref ?–34)
BILIRUB SERPL-MCNC: 0.4 MG/DL (ref 0.3–1.2)
BUN SERPL-MCNC: 32 MG/DL (ref 9–23)
CALCIUM SERPL-MCNC: 9.8 MG/DL (ref 8.3–10.6)
CHLORIDE SERPL-SCNC: 109 MMOL/L (ref 98–107)
CHOLEST SERPL-MCNC: 92 MG/DL (ref ?–200)
CHOLEST/HDLC SERPL: 2.68 {RATIO} (ref ?–5)
CO2 SERPL-SCNC: 26 MMOL/L (ref 20–31)
CREAT SERPL-MCNC: 0.92 MG/DL (ref 0.55–1.3)
DIGOXIN SERPL-MCNC: 0.6 NG/ML (ref 0.8–2)
GFR SERPL CREATININE-BSD FRML MDRD: > 60 ML/MIN/{1.73_M2} (ref 45–?)
GLUCOSE SERPL-MCNC: 88 MG/DL (ref 74–106)
HDLC SERPL-MCNC: 34.3 MG/DL (ref 40–?)
LDLC SERPL CALC-MCNC: 41.3 MG/DL (ref ?–100)
NONHDLC SERPL-MCNC: 57.7 MG/DL
PHOSPHATE SERPL-MCNC: 3.5 MG/DL (ref 2.4–5.1)
POTASSIUM SERPL-SCNC: 4.1 MMOL/L (ref 3.5–5.1)
PROT SERPL-MCNC: 6.9 G/DL (ref 5.7–8.2)
PTH-INTACT SERPL-MCNC: 37.8 PG/ML (ref 18.5–88)
SODIUM SERPL-SCNC: 140 MMOL/L (ref 136–145)
TRIGL SERPL-MCNC: 82 MG/DL (ref ?–150)

## 2023-04-19 ENCOUNTER — HOSPITAL ENCOUNTER (OUTPATIENT)
Dept: HOSPITAL 53 - M RAD | Age: 68
End: 2023-04-19
Attending: NURSE PRACTITIONER
Payer: MEDICARE

## 2023-04-19 ENCOUNTER — HOSPITAL ENCOUNTER (OUTPATIENT)
Dept: HOSPITAL 53 - SKLAB6 | Age: 68
End: 2023-04-19
Attending: INTERNAL MEDICINE
Payer: MEDICARE

## 2023-04-19 DIAGNOSIS — M79.605: Primary | ICD-10-CM

## 2023-04-19 DIAGNOSIS — Z53.8: Primary | ICD-10-CM

## 2023-05-11 ENCOUNTER — HOSPITAL ENCOUNTER (OUTPATIENT)
Dept: HOSPITAL 53 - SKLAB6 | Age: 68
End: 2023-05-11
Attending: INTERNAL MEDICINE
Payer: MEDICARE

## 2023-05-11 DIAGNOSIS — D64.9: Primary | ICD-10-CM

## 2023-05-11 LAB
BASOPHILS # BLD AUTO: 0 10^3/UL (ref 0–0.2)
BASOPHILS NFR BLD AUTO: 0.4 % (ref 0–1)
EOSINOPHIL # BLD AUTO: 0.1 10^3/UL (ref 0–0.5)
EOSINOPHIL NFR BLD AUTO: 2.2 % (ref 0–3)
HCT VFR BLD AUTO: 31.7 % (ref 36–47)
HGB BLD-MCNC: 10.1 G/DL (ref 12–15.5)
LYMPHOCYTES # BLD AUTO: 0.9 10^3/UL (ref 1.5–5)
LYMPHOCYTES NFR BLD AUTO: 15.9 % (ref 24–44)
MCH RBC QN AUTO: 32.1 PG (ref 27–33)
MCHC RBC AUTO-ENTMCNC: 31.9 G/DL (ref 32–36.5)
MCV RBC AUTO: 100.6 FL (ref 80–96)
MONOCYTES # BLD AUTO: 0.4 10^3/UL (ref 0–0.8)
MONOCYTES NFR BLD AUTO: 7.8 % (ref 2–8)
NEUTROPHILS # BLD AUTO: 4.1 10^3/UL (ref 1.5–8.5)
NEUTROPHILS NFR BLD AUTO: 73.3 % (ref 36–66)
PLATELET # BLD AUTO: 193 10^3/UL (ref 150–450)
RBC # BLD AUTO: 3.15 10^6/UL (ref 4–5.4)
WBC # BLD AUTO: 5.5 10^3/UL (ref 4–10)

## 2023-05-23 ENCOUNTER — HOSPITAL ENCOUNTER (OUTPATIENT)
Dept: HOSPITAL 53 - SKLAB6 | Age: 68
End: 2023-05-23
Attending: INTERNAL MEDICINE
Payer: MEDICARE

## 2023-05-23 DIAGNOSIS — Z79.899: ICD-10-CM

## 2023-05-23 DIAGNOSIS — Z13.29: Primary | ICD-10-CM

## 2023-05-23 LAB
25(OH)D3 SERPL-MCNC: 43.2 NG/ML (ref 20–100)
PHOSPHATE SERPL-MCNC: 3.8 MG/DL (ref 2.4–5.1)
PTH-INTACT SERPL-MCNC: 41.8 PG/ML (ref 18.5–88)

## 2023-05-26 ENCOUNTER — HOSPITAL ENCOUNTER (OUTPATIENT)
Dept: HOSPITAL 53 - M RAD | Age: 68
End: 2023-05-26
Attending: INTERNAL MEDICINE
Payer: MEDICARE

## 2023-05-26 ENCOUNTER — HOSPITAL ENCOUNTER (OUTPATIENT)
Dept: HOSPITAL 53 - SKLAB3 | Age: 68
End: 2023-05-26
Attending: NURSE PRACTITIONER
Payer: MEDICARE

## 2023-05-26 DIAGNOSIS — Z53.8: ICD-10-CM

## 2023-05-26 DIAGNOSIS — M25.552: Primary | ICD-10-CM

## 2023-05-26 DIAGNOSIS — M16.12: ICD-10-CM

## 2023-05-26 DIAGNOSIS — M25.752: ICD-10-CM

## 2023-06-07 ENCOUNTER — HOSPITAL ENCOUNTER (OUTPATIENT)
Dept: HOSPITAL 53 - M WHC | Age: 68
End: 2023-06-07
Attending: NURSE PRACTITIONER
Payer: MEDICARE

## 2023-06-07 DIAGNOSIS — Z12.31: Primary | ICD-10-CM

## 2023-07-27 ENCOUNTER — HOSPITAL ENCOUNTER (OUTPATIENT)
Dept: HOSPITAL 53 - SKLAB6 | Age: 68
End: 2023-07-27
Attending: INTERNAL MEDICINE
Payer: MEDICARE

## 2023-07-27 DIAGNOSIS — D64.9: Primary | ICD-10-CM

## 2023-07-27 LAB
HCT VFR BLD AUTO: 28.7 % (ref 36–47)
HGB BLD-MCNC: 9.1 G/DL (ref 12–15.5)
MCH RBC QN AUTO: 31.6 PG (ref 27–33)
MCHC RBC AUTO-ENTMCNC: 31.7 G/DL (ref 32–36.5)
MCV RBC AUTO: 99.7 FL (ref 80–96)
PLATELET # BLD AUTO: 194 10^3/UL (ref 150–450)
RBC # BLD AUTO: 2.88 10^6/UL (ref 4–5.4)
WBC # BLD AUTO: 4.2 10^3/UL (ref 4–10)

## 2023-08-04 ENCOUNTER — HOSPITAL ENCOUNTER (OUTPATIENT)
Dept: HOSPITAL 53 - SKLAB6 | Age: 68
End: 2023-08-04
Attending: INTERNAL MEDICINE

## 2023-08-04 ENCOUNTER — HOSPITAL ENCOUNTER (OUTPATIENT)
Dept: HOSPITAL 53 - SKLAB6 | Age: 68
End: 2023-08-04
Attending: INTERNAL MEDICINE
Payer: MEDICARE

## 2023-08-04 DIAGNOSIS — R41.82: Primary | ICD-10-CM

## 2023-08-04 DIAGNOSIS — Z53.8: ICD-10-CM

## 2023-08-04 LAB
APPEARANCE UR: (no result)
BACTERIA UR QL AUTO: (no result)
BILIRUB UR QL STRIP.AUTO: NEGATIVE
BUN SERPL-MCNC: 30 MG/DL (ref 9–23)
CALCIUM SERPL-MCNC: 10.3 MG/DL (ref 8.3–10.6)
CHLORIDE SERPL-SCNC: 106 MMOL/L (ref 98–107)
CO2 SERPL-SCNC: 24 MMOL/L (ref 20–31)
CREAT SERPL-MCNC: 0.88 MG/DL (ref 0.55–1.3)
GFR SERPL CREATININE-BSD FRML MDRD: > 60 ML/MIN/{1.73_M2} (ref 45–?)
GLUCOSE SERPL-MCNC: 129 MG/DL (ref 74–106)
GLUCOSE UR QL STRIP.AUTO: NEGATIVE MG/DL
HCT VFR BLD AUTO: 31.1 % (ref 36–47)
HGB BLD-MCNC: 9.9 G/DL (ref 12–15.5)
HGB UR QL STRIP.AUTO: NEGATIVE
KETONES UR QL STRIP.AUTO: NEGATIVE MG/DL
LEUKOCYTE ESTERASE UR QL STRIP.AUTO: (no result)
MCH RBC QN AUTO: 31.1 PG (ref 27–33)
MCHC RBC AUTO-ENTMCNC: 31.8 G/DL (ref 32–36.5)
MCV RBC AUTO: 97.8 FL (ref 80–96)
NITRITE UR QL STRIP.AUTO: POSITIVE
PH UR STRIP.AUTO: 5 UNITS (ref 5–9)
PLATELET # BLD AUTO: 237 10^3/UL (ref 150–450)
POTASSIUM SERPL-SCNC: 4.5 MMOL/L (ref 3.5–5.1)
PROT UR QL STRIP.AUTO: NEGATIVE MG/DL
RBC # BLD AUTO: 3.18 10^6/UL (ref 4–5.4)
RBC # UR AUTO: 1 /HPF (ref 0–3)
SODIUM SERPL-SCNC: 141 MMOL/L (ref 136–145)
SP GR UR STRIP.AUTO: 1.01 (ref 1–1.03)
SQUAMOUS #/AREA URNS AUTO: 3 /HPF (ref 0–6)
UROBILINOGEN UR QL STRIP.AUTO: 0.2 MG/DL (ref 0–2)
WBC # BLD AUTO: 7.5 10^3/UL (ref 4–10)
WBC #/AREA URNS AUTO: 12 /HPF (ref 0–3)

## 2023-08-22 ENCOUNTER — HOSPITAL ENCOUNTER (EMERGENCY)
Dept: HOSPITAL 53 - M ED | Age: 68
Discharge: HOME | End: 2023-08-22
Payer: MEDICARE

## 2023-08-22 VITALS — TEMPERATURE: 98.1 F | SYSTOLIC BLOOD PRESSURE: 117 MMHG | DIASTOLIC BLOOD PRESSURE: 58 MMHG | OXYGEN SATURATION: 100 %

## 2023-08-22 DIAGNOSIS — I10: ICD-10-CM

## 2023-08-22 DIAGNOSIS — Z86.718: ICD-10-CM

## 2023-08-22 DIAGNOSIS — F03.90: ICD-10-CM

## 2023-08-22 DIAGNOSIS — Z79.01: ICD-10-CM

## 2023-08-22 DIAGNOSIS — M79.609: Primary | ICD-10-CM

## 2023-08-22 DIAGNOSIS — Z79.899: ICD-10-CM

## 2023-08-22 DIAGNOSIS — Z87.891: ICD-10-CM

## 2023-08-22 DIAGNOSIS — Z86.711: ICD-10-CM

## 2023-08-22 DIAGNOSIS — Z88.6: ICD-10-CM

## 2023-08-22 LAB
BASOPHILS # BLD AUTO: 0 10^3/UL (ref 0–0.2)
BASOPHILS NFR BLD AUTO: 0.3 % (ref 0–1)
BUN SERPL-MCNC: 34 MG/DL (ref 9–23)
CALCIUM SERPL-MCNC: 9.6 MG/DL (ref 8.3–10.6)
CHLORIDE SERPL-SCNC: 105 MMOL/L (ref 98–107)
CO2 SERPL-SCNC: 25 MMOL/L (ref 20–31)
CREAT SERPL-MCNC: 1 MG/DL (ref 0.55–1.3)
CRP SERPL-MCNC: 0.5 MG/DL (ref ?–1)
EOSINOPHIL # BLD AUTO: 0.1 10^3/UL (ref 0–0.5)
EOSINOPHIL NFR BLD AUTO: 2.2 % (ref 0–3)
ERYTHROCYTE [SEDIMENTATION RATE] IN BLOOD BY WESTERGREN METHOD: 85 MM/HR (ref 0–30)
GFR SERPL CREATININE-BSD FRML MDRD: 58.7 ML/MIN/{1.73_M2} (ref 45–?)
GLUCOSE SERPL-MCNC: 92 MG/DL (ref 74–106)
HCT VFR BLD AUTO: 30.8 % (ref 36–47)
HGB BLD-MCNC: 9.8 G/DL (ref 12–15.5)
LYMPHOCYTES # BLD AUTO: 1.2 10^3/UL (ref 1.5–5)
LYMPHOCYTES NFR BLD AUTO: 20.7 % (ref 24–44)
MCH RBC QN AUTO: 31.7 PG (ref 27–33)
MCHC RBC AUTO-ENTMCNC: 31.8 G/DL (ref 32–36.5)
MCV RBC AUTO: 99.7 FL (ref 80–96)
MONOCYTES # BLD AUTO: 0.5 10^3/UL (ref 0–0.8)
MONOCYTES NFR BLD AUTO: 8.4 % (ref 2–8)
NEUTROPHILS # BLD AUTO: 4 10^3/UL (ref 1.5–8.5)
NEUTROPHILS NFR BLD AUTO: 68.2 % (ref 36–66)
PLATELET # BLD AUTO: 222 10^3/UL (ref 150–450)
POTASSIUM SERPL-SCNC: 4.5 MMOL/L (ref 3.5–5.1)
RBC # BLD AUTO: 3.09 10^6/UL (ref 4–5.4)
SODIUM SERPL-SCNC: 139 MMOL/L (ref 136–145)
WBC # BLD AUTO: 5.8 10^3/UL (ref 4–10)

## 2023-09-07 ENCOUNTER — HOSPITAL ENCOUNTER (OUTPATIENT)
Dept: HOSPITAL 53 - SKLAB6 | Age: 68
End: 2023-09-07
Attending: INTERNAL MEDICINE
Payer: MEDICARE

## 2023-09-07 DIAGNOSIS — E05.90: Primary | ICD-10-CM

## 2023-09-07 LAB
BASOPHILS # BLD AUTO: 0 10^3/UL (ref 0–0.2)
BASOPHILS NFR BLD AUTO: 0.5 % (ref 0–1)
EOSINOPHIL # BLD AUTO: 0.2 10^3/UL (ref 0–0.5)
EOSINOPHIL NFR BLD AUTO: 2.7 % (ref 0–3)
HCT VFR BLD AUTO: 30.9 % (ref 36–47)
HGB BLD-MCNC: 9.8 G/DL (ref 12–15.5)
LYMPHOCYTES # BLD AUTO: 1.1 10^3/UL (ref 1.5–5)
LYMPHOCYTES NFR BLD AUTO: 18.6 % (ref 24–44)
MCH RBC QN AUTO: 31.6 PG (ref 27–33)
MCHC RBC AUTO-ENTMCNC: 31.7 G/DL (ref 32–36.5)
MCV RBC AUTO: 99.7 FL (ref 80–96)
MONOCYTES # BLD AUTO: 0.5 10^3/UL (ref 0–0.8)
MONOCYTES NFR BLD AUTO: 9.1 % (ref 2–8)
NEUTROPHILS # BLD AUTO: 4 10^3/UL (ref 1.5–8.5)
NEUTROPHILS NFR BLD AUTO: 68.9 % (ref 36–66)
PLATELET # BLD AUTO: 208 10^3/UL (ref 150–450)
RBC # BLD AUTO: 3.1 10^6/UL (ref 4–5.4)
T4 FREE SERPL-MCNC: 1.14 NG/DL (ref 0.89–1.76)
TSH SERPL DL<=0.005 MIU/L-ACNC: 0.65 UIU/ML (ref 0.55–4.78)
WBC # BLD AUTO: 5.8 10^3/UL (ref 4–10)

## 2023-09-14 ENCOUNTER — HOSPITAL ENCOUNTER (OUTPATIENT)
Dept: HOSPITAL 53 - SKLAB6 | Age: 68
End: 2023-09-14
Attending: INTERNAL MEDICINE
Payer: MEDICARE

## 2023-09-14 DIAGNOSIS — R07.9: ICD-10-CM

## 2023-09-14 DIAGNOSIS — J98.11: Primary | ICD-10-CM

## 2023-09-14 LAB
BUN SERPL-MCNC: 26 MG/DL (ref 9–23)
CALCIUM SERPL-MCNC: 9.7 MG/DL (ref 8.3–10.6)
CHLORIDE SERPL-SCNC: 108 MMOL/L (ref 98–107)
CO2 SERPL-SCNC: 25 MMOL/L (ref 20–31)
CREAT SERPL-MCNC: 0.93 MG/DL (ref 0.55–1.3)
GFR SERPL CREATININE-BSD FRML MDRD: > 60 ML/MIN/{1.73_M2} (ref 45–?)
GLUCOSE SERPL-MCNC: 96 MG/DL (ref 74–106)
HCT VFR BLD AUTO: 30.9 % (ref 36–47)
HGB BLD-MCNC: 9.9 G/DL (ref 12–15.5)
MCH RBC QN AUTO: 31.8 PG (ref 27–33)
MCHC RBC AUTO-ENTMCNC: 32 G/DL (ref 32–36.5)
MCV RBC AUTO: 99.4 FL (ref 80–96)
PLATELET # BLD AUTO: 214 10^3/UL (ref 150–450)
POTASSIUM SERPL-SCNC: 4.3 MMOL/L (ref 3.5–5.1)
RBC # BLD AUTO: 3.11 10^6/UL (ref 4–5.4)
SODIUM SERPL-SCNC: 142 MMOL/L (ref 136–145)
WBC # BLD AUTO: 5.2 10^3/UL (ref 4–10)

## 2023-10-04 ENCOUNTER — HOSPITAL ENCOUNTER (OUTPATIENT)
Dept: HOSPITAL 53 - SKLAB6 | Age: 68
End: 2023-10-04
Attending: INTERNAL MEDICINE
Payer: MEDICARE

## 2023-10-04 ENCOUNTER — HOSPITAL ENCOUNTER (OUTPATIENT)
Dept: HOSPITAL 53 - M RAD | Age: 68
End: 2023-10-04
Attending: INTERNAL MEDICINE
Payer: MEDICARE

## 2023-10-04 DIAGNOSIS — J98.11: ICD-10-CM

## 2023-10-04 DIAGNOSIS — R53.81: ICD-10-CM

## 2023-10-04 DIAGNOSIS — R05.9: Primary | ICD-10-CM

## 2023-10-04 DIAGNOSIS — R53.83: ICD-10-CM

## 2023-10-04 LAB
BUN SERPL-MCNC: 27 MG/DL (ref 9–23)
CALCIUM SERPL-MCNC: 9.9 MG/DL (ref 8.3–10.6)
CHLORIDE SERPL-SCNC: 112 MMOL/L (ref 98–107)
CO2 SERPL-SCNC: 24 MMOL/L (ref 20–31)
CREAT SERPL-MCNC: 0.86 MG/DL (ref 0.55–1.3)
GFR SERPL CREATININE-BSD FRML MDRD: > 60 ML/MIN/{1.73_M2} (ref 45–?)
GLUCOSE SERPL-MCNC: 97 MG/DL (ref 74–106)
HCT VFR BLD AUTO: 30 % (ref 36–47)
HGB BLD-MCNC: 9.6 G/DL (ref 12–15.5)
MCH RBC QN AUTO: 31.3 PG (ref 27–33)
MCHC RBC AUTO-ENTMCNC: 32 G/DL (ref 32–36.5)
MCV RBC AUTO: 97.7 FL (ref 80–96)
PLATELET # BLD AUTO: 223 10^3/UL (ref 150–450)
POTASSIUM SERPL-SCNC: 4.3 MMOL/L (ref 3.5–5.1)
RBC # BLD AUTO: 3.07 10^6/UL (ref 4–5.4)
SODIUM SERPL-SCNC: 143 MMOL/L (ref 136–145)
WBC # BLD AUTO: 4.7 10^3/UL (ref 4–10)

## 2023-10-12 ENCOUNTER — HOSPITAL ENCOUNTER (OUTPATIENT)
Dept: HOSPITAL 53 - SKLAB6 | Age: 68
End: 2023-10-12
Attending: INTERNAL MEDICINE
Payer: MEDICARE

## 2023-10-12 DIAGNOSIS — Z51.81: Primary | ICD-10-CM

## 2023-10-12 DIAGNOSIS — E78.5: ICD-10-CM

## 2023-10-12 DIAGNOSIS — I48.91: ICD-10-CM

## 2023-10-12 LAB
ALBUMIN SERPL BCG-MCNC: 3 G/DL (ref 3.2–5.2)
ALP SERPL-CCNC: 68 U/L (ref 46–116)
ALT SERPL W P-5'-P-CCNC: 17 U/L (ref 7–40)
AST SERPL-CCNC: 16 U/L (ref ?–34)
BILIRUB SERPL-MCNC: 0.4 MG/DL (ref 0.3–1.2)
BUN SERPL-MCNC: 29 MG/DL (ref 9–23)
CALCIUM SERPL-MCNC: 10.4 MG/DL (ref 8.3–10.6)
CHLORIDE SERPL-SCNC: 106 MMOL/L (ref 98–107)
CHOLEST SERPL-MCNC: 114 MG/DL (ref ?–200)
CHOLEST/HDLC SERPL: 2.72 {RATIO} (ref ?–5)
CO2 SERPL-SCNC: 26 MMOL/L (ref 20–31)
CREAT SERPL-MCNC: 0.91 MG/DL (ref 0.55–1.3)
DIGOXIN SERPL-MCNC: 0.4 NG/ML (ref 0.8–2)
GFR SERPL CREATININE-BSD FRML MDRD: > 60 ML/MIN/{1.73_M2} (ref 45–?)
GLUCOSE SERPL-MCNC: 80 MG/DL (ref 74–106)
HDLC SERPL-MCNC: 41.8 MG/DL (ref 40–?)
LDLC SERPL CALC-MCNC: 47.2 MG/DL (ref ?–100)
NONHDLC SERPL-MCNC: 72.2 MG/DL
POTASSIUM SERPL-SCNC: 4.8 MMOL/L (ref 3.5–5.1)
PROT SERPL-MCNC: 7.7 G/DL (ref 5.7–8.2)
SODIUM SERPL-SCNC: 142 MMOL/L (ref 136–145)
TRIGL SERPL-MCNC: 125 MG/DL (ref ?–150)

## 2023-10-30 ENCOUNTER — HOSPITAL ENCOUNTER (OUTPATIENT)
Dept: HOSPITAL 53 - SKLAB6 | Age: 68
End: 2023-10-30
Attending: NURSE PRACTITIONER
Payer: MEDICARE

## 2023-10-30 DIAGNOSIS — R07.89: Primary | ICD-10-CM

## 2023-10-30 LAB
BUN SERPL-MCNC: 24 MG/DL (ref 9–23)
CALCIUM SERPL-MCNC: 9.8 MG/DL (ref 8.3–10.6)
CHLORIDE SERPL-SCNC: 108 MMOL/L (ref 98–107)
CO2 SERPL-SCNC: 24 MMOL/L (ref 20–31)
CREAT SERPL-MCNC: 0.88 MG/DL (ref 0.55–1.3)
GFR SERPL CREATININE-BSD FRML MDRD: > 60 ML/MIN/{1.73_M2} (ref 45–?)
GLUCOSE SERPL-MCNC: 96 MG/DL (ref 74–106)
HCT VFR BLD AUTO: 29.8 % (ref 36–47)
HGB BLD-MCNC: 9.5 G/DL (ref 12–15.5)
MCH RBC QN AUTO: 30.8 PG (ref 27–33)
MCHC RBC AUTO-ENTMCNC: 31.9 G/DL (ref 32–36.5)
MCV RBC AUTO: 96.8 FL (ref 80–96)
PLATELET # BLD AUTO: 192 10^3/UL (ref 150–450)
POTASSIUM SERPL-SCNC: 4 MMOL/L (ref 3.5–5.1)
RBC # BLD AUTO: 3.08 10^6/UL (ref 4–5.4)
SODIUM SERPL-SCNC: 142 MMOL/L (ref 136–145)
WBC # BLD AUTO: 5.4 10^3/UL (ref 4–10)

## 2023-12-14 ENCOUNTER — HOSPITAL ENCOUNTER (OUTPATIENT)
Dept: HOSPITAL 53 - SKLAB6 | Age: 68
End: 2023-12-14
Attending: INTERNAL MEDICINE
Payer: MEDICARE

## 2023-12-14 DIAGNOSIS — E05.90: Primary | ICD-10-CM

## 2023-12-14 LAB
T4 FREE SERPL-MCNC: 1.25 NG/DL (ref 0.89–1.76)
TSH SERPL DL<=0.005 MIU/L-ACNC: 0.04 UIU/ML (ref 0.55–4.78)

## 2023-12-15 ENCOUNTER — HOSPITAL ENCOUNTER (OUTPATIENT)
Dept: HOSPITAL 53 - SKLAB5 | Age: 68
End: 2023-12-15
Attending: INTERNAL MEDICINE
Payer: MEDICARE

## 2023-12-15 DIAGNOSIS — I48.91: Primary | ICD-10-CM

## 2023-12-15 LAB
BASOPHILS # BLD AUTO: 0 10^3/UL (ref 0–0.2)
BASOPHILS NFR BLD AUTO: 0.5 % (ref 0–1)
EOSINOPHIL # BLD AUTO: 0.2 10^3/UL (ref 0–0.5)
EOSINOPHIL NFR BLD AUTO: 3 % (ref 0–3)
HCT VFR BLD AUTO: 29.4 % (ref 36–47)
HGB BLD-MCNC: 9.2 G/DL (ref 12–15.5)
LYMPHOCYTES # BLD AUTO: 1.3 10^3/UL (ref 1.5–5)
LYMPHOCYTES NFR BLD AUTO: 23.8 % (ref 24–44)
MCH RBC QN AUTO: 30.8 PG (ref 27–33)
MCHC RBC AUTO-ENTMCNC: 31.3 G/DL (ref 32–36.5)
MCV RBC AUTO: 98.3 FL (ref 80–96)
MONOCYTES # BLD AUTO: 0.4 10^3/UL (ref 0–0.8)
MONOCYTES NFR BLD AUTO: 7.7 % (ref 2–8)
NEUTROPHILS # BLD AUTO: 3.6 10^3/UL (ref 1.5–8.5)
NEUTROPHILS NFR BLD AUTO: 64.8 % (ref 36–66)
PLATELET # BLD AUTO: 195 10^3/UL (ref 150–450)
RBC # BLD AUTO: 2.99 10^6/UL (ref 4–5.4)
WBC # BLD AUTO: 5.6 10^3/UL (ref 4–10)

## 2024-01-17 ENCOUNTER — HOSPITAL ENCOUNTER (OUTPATIENT)
Dept: HOSPITAL 53 - SKLAB6 | Age: 69
End: 2024-01-17
Attending: INTERNAL MEDICINE
Payer: MEDICARE

## 2024-01-17 DIAGNOSIS — J98.4: ICD-10-CM

## 2024-01-17 DIAGNOSIS — J98.11: Primary | ICD-10-CM

## 2024-02-22 ENCOUNTER — HOSPITAL ENCOUNTER (OUTPATIENT)
Dept: HOSPITAL 53 - SKLAB6 | Age: 69
End: 2024-02-22
Attending: NURSE PRACTITIONER
Payer: MEDICARE

## 2024-02-22 DIAGNOSIS — R63.4: Primary | ICD-10-CM

## 2024-02-22 LAB
BUN SERPL-MCNC: 30 MG/DL (ref 9–23)
CALCIUM SERPL-MCNC: 10.2 MG/DL (ref 8.3–10.6)
CHLORIDE SERPL-SCNC: 110 MMOL/L (ref 98–107)
CO2 SERPL-SCNC: 24 MMOL/L (ref 20–31)
CREAT SERPL-MCNC: 0.98 MG/DL (ref 0.55–1.3)
GFR SERPL CREATININE-BSD FRML MDRD: > 60 ML/MIN/{1.73_M2} (ref 45–?)
GLUCOSE SERPL-MCNC: 124 MG/DL (ref 74–106)
HCT VFR BLD AUTO: 33.7 % (ref 36–47)
HGB BLD-MCNC: 10.8 G/DL (ref 12–15.5)
MCH RBC QN AUTO: 30.6 PG (ref 27–33)
MCHC RBC AUTO-ENTMCNC: 32 G/DL (ref 32–36.5)
MCV RBC AUTO: 95.5 FL (ref 80–96)
PLATELET # BLD AUTO: 257 10^3/UL (ref 150–450)
POTASSIUM SERPL-SCNC: 4.5 MMOL/L (ref 3.5–5.1)
RBC # BLD AUTO: 3.53 10^6/UL (ref 4–5.4)
SODIUM SERPL-SCNC: 142 MMOL/L (ref 136–145)
WBC # BLD AUTO: 7.1 10^3/UL (ref 4–10)

## 2024-03-14 ENCOUNTER — HOSPITAL ENCOUNTER (OUTPATIENT)
Dept: HOSPITAL 53 - SKLAB6 | Age: 69
End: 2024-03-14
Attending: INTERNAL MEDICINE
Payer: MEDICARE

## 2024-03-14 DIAGNOSIS — E05.90: Primary | ICD-10-CM

## 2024-03-14 LAB
T4 FREE SERPL-MCNC: 1.63 NG/DL (ref 0.89–1.76)
TSH SERPL DL<=0.005 MIU/L-ACNC: 0.01 UIU/ML (ref 0.55–4.78)

## 2024-03-20 ENCOUNTER — HOSPITAL ENCOUNTER (OUTPATIENT)
Dept: HOSPITAL 53 - SKLAB6 | Age: 69
End: 2024-03-20
Attending: INTERNAL MEDICINE
Payer: MEDICARE

## 2024-03-20 DIAGNOSIS — E05.90: Primary | ICD-10-CM

## 2024-03-23 LAB
TSH RECEP AB SER-ACNC: <1.1 IU/L (ref 0–1.75)
TSI ACT/NOR SER: <0.1 IU/L (ref 0–0.55)

## 2024-04-05 ENCOUNTER — HOSPITAL ENCOUNTER (OUTPATIENT)
Dept: HOSPITAL 53 - SKLAB6 | Age: 69
End: 2024-04-05
Attending: INTERNAL MEDICINE
Payer: MEDICARE

## 2024-04-05 DIAGNOSIS — Z79.899: Primary | ICD-10-CM

## 2024-04-22 ENCOUNTER — HOSPITAL ENCOUNTER (OUTPATIENT)
Dept: HOSPITAL 53 - SKLAB6 | Age: 69
End: 2024-04-22
Attending: INTERNAL MEDICINE
Payer: MEDICARE

## 2024-04-22 DIAGNOSIS — R35.0: Primary | ICD-10-CM

## 2024-04-22 LAB
APPEARANCE UR: (no result)
BACTERIA UR QL AUTO: (no result)
BILIRUB UR QL STRIP.AUTO: NEGATIVE
GLUCOSE UR QL STRIP.AUTO: NEGATIVE MG/DL
HGB UR QL STRIP.AUTO: (no result)
KETONES UR QL STRIP.AUTO: NEGATIVE MG/DL
LEUKOCYTE ESTERASE UR QL STRIP.AUTO: (no result)
NITRITE UR QL STRIP.AUTO: POSITIVE
PH UR STRIP.AUTO: 5 UNITS (ref 5–9)
PROT UR QL STRIP.AUTO: NEGATIVE MG/DL
RBC # UR AUTO: 1 /HPF (ref 0–3)
SP GR UR STRIP.AUTO: 1.01 (ref 1–1.03)
SQUAMOUS #/AREA URNS AUTO: 3 /HPF (ref 0–6)
UROBILINOGEN UR QL STRIP.AUTO: 0.2 MG/DL (ref 0–2)
WBC #/AREA URNS AUTO: 13 /HPF (ref 0–3)

## 2024-06-07 ENCOUNTER — HOSPITAL ENCOUNTER (OUTPATIENT)
Dept: HOSPITAL 53 - M RAD | Age: 69
End: 2024-06-07
Attending: NURSE PRACTITIONER
Payer: MEDICARE

## 2024-06-07 ENCOUNTER — HOSPITAL ENCOUNTER (OUTPATIENT)
Dept: HOSPITAL 53 - SKLAB6 | Age: 69
End: 2024-06-07
Attending: INTERNAL MEDICINE
Payer: MEDICARE

## 2024-06-07 DIAGNOSIS — R10.9: Primary | ICD-10-CM

## 2024-06-07 LAB
ALBUMIN SERPL BCG-MCNC: 2.4 G/DL (ref 3.2–5.2)
ALP SERPL-CCNC: 85 U/L (ref 46–116)
ALT SERPL W P-5'-P-CCNC: 42 U/L (ref 7–40)
AST SERPL-CCNC: 29 U/L (ref ?–34)
BILIRUB SERPL-MCNC: 0.3 MG/DL (ref 0.3–1.2)
BUN SERPL-MCNC: 43 MG/DL (ref 9–23)
CALCIUM SERPL-MCNC: 10.8 MG/DL (ref 8.3–10.6)
CHLORIDE SERPL-SCNC: 108 MMOL/L (ref 98–107)
CO2 SERPL-SCNC: 23 MMOL/L (ref 20–31)
CREAT SERPL-MCNC: 1.2 MG/DL (ref 0.55–1.3)
GFR SERPL CREATININE-BSD FRML MDRD: 47.6 ML/MIN/{1.73_M2} (ref 45–?)
GLUCOSE SERPL-MCNC: 123 MG/DL (ref 74–106)
HCT VFR BLD AUTO: 31.5 % (ref 36–47)
HGB BLD-MCNC: 10.2 G/DL (ref 12–15.5)
MCH RBC QN AUTO: 30.1 PG (ref 27–33)
MCHC RBC AUTO-ENTMCNC: 32.4 G/DL (ref 32–36.5)
MCV RBC AUTO: 92.9 FL (ref 80–96)
PLATELET # BLD AUTO: 174 10^3/UL (ref 150–450)
POTASSIUM SERPL-SCNC: 4.2 MMOL/L (ref 3.5–5.1)
PROT SERPL-MCNC: 7.4 G/DL (ref 5.7–8.2)
RBC # BLD AUTO: 3.39 10^6/UL (ref 4–5.4)
SODIUM SERPL-SCNC: 142 MMOL/L (ref 136–145)
WBC # BLD AUTO: 4.6 10^3/UL (ref 4–10)

## 2024-06-07 PROCEDURE — 85027 COMPLETE CBC AUTOMATED: CPT

## 2024-06-07 PROCEDURE — 36415 COLL VENOUS BLD VENIPUNCTURE: CPT

## 2024-06-07 PROCEDURE — 80053 COMPREHEN METABOLIC PANEL: CPT

## 2024-06-07 PROCEDURE — 74177 CT ABD & PELVIS W/CONTRAST: CPT

## 2024-06-10 ENCOUNTER — HOSPITAL ENCOUNTER (OUTPATIENT)
Dept: HOSPITAL 53 - SKLAB6 | Age: 69
End: 2024-06-10
Attending: INTERNAL MEDICINE
Payer: MEDICARE

## 2024-06-10 DIAGNOSIS — R53.83: Primary | ICD-10-CM

## 2024-06-10 DIAGNOSIS — J18.9: ICD-10-CM

## 2024-06-10 LAB
ALBUMIN SERPL BCG-MCNC: 2.1 G/DL (ref 3.2–5.2)
ALP SERPL-CCNC: 69 U/L (ref 46–116)
ALT SERPL W P-5'-P-CCNC: 27 U/L (ref 7–40)
APPEARANCE UR: (no result)
AST SERPL-CCNC: 17 U/L (ref ?–34)
BACTERIA UR QL AUTO: (no result)
BILIRUB SERPL-MCNC: 0.3 MG/DL (ref 0.3–1.2)
BILIRUB UR QL STRIP.AUTO: NEGATIVE
BUN SERPL-MCNC: 41 MG/DL (ref 9–23)
CALCIUM SERPL-MCNC: 10.1 MG/DL (ref 8.3–10.6)
CHLORIDE SERPL-SCNC: 108 MMOL/L (ref 98–107)
CO2 SERPL-SCNC: 24 MMOL/L (ref 20–31)
CREAT SERPL-MCNC: 1.43 MG/DL (ref 0.55–1.3)
GFR SERPL CREATININE-BSD FRML MDRD: 38.8 ML/MIN/{1.73_M2} (ref 45–?)
GLUCOSE SERPL-MCNC: 95 MG/DL (ref 74–106)
GLUCOSE UR QL STRIP.AUTO: NEGATIVE MG/DL
HCT VFR BLD AUTO: 29.1 % (ref 36–47)
HGB BLD-MCNC: 9.5 G/DL (ref 12–15.5)
HGB UR QL STRIP.AUTO: NEGATIVE
KETONES UR QL STRIP.AUTO: NEGATIVE MG/DL
LEUKOCYTE ESTERASE UR QL STRIP.AUTO: NEGATIVE
MCH RBC QN AUTO: 30.5 PG (ref 27–33)
MCHC RBC AUTO-ENTMCNC: 32.6 G/DL (ref 32–36.5)
MCV RBC AUTO: 93.6 FL (ref 80–96)
MUCOUS THREADS URNS QL MICRO: (no result)
NITRITE UR QL STRIP.AUTO: NEGATIVE
PH UR STRIP.AUTO: 5 UNITS (ref 5–9)
PLATELET # BLD AUTO: 144 10^3/UL (ref 150–450)
POTASSIUM SERPL-SCNC: 3.8 MMOL/L (ref 3.5–5.1)
PROT SERPL-MCNC: 6.6 G/DL (ref 5.7–8.2)
PROT UR QL STRIP.AUTO: NEGATIVE MG/DL
RBC # BLD AUTO: 3.11 10^6/UL (ref 4–5.4)
RBC # UR AUTO: 1 /HPF (ref 0–3)
SODIUM SERPL-SCNC: 141 MMOL/L (ref 136–145)
SP GR UR STRIP.AUTO: 1.02 (ref 1–1.03)
SQUAMOUS #/AREA URNS AUTO: 4 /HPF (ref 0–6)
UROBILINOGEN UR QL STRIP.AUTO: 0.2 MG/DL (ref 0–2)
WBC # BLD AUTO: 4.8 10^3/UL (ref 4–10)
WBC #/AREA URNS AUTO: 2 /HPF (ref 0–3)

## 2024-06-11 ENCOUNTER — HOSPITAL ENCOUNTER (OUTPATIENT)
Dept: HOSPITAL 53 - SKLAB6 | Age: 69
End: 2024-06-11
Attending: INTERNAL MEDICINE
Payer: MEDICARE

## 2024-06-11 DIAGNOSIS — J18.9: ICD-10-CM

## 2024-06-11 DIAGNOSIS — R53.83: Primary | ICD-10-CM

## 2024-06-11 LAB
BUN SERPL-MCNC: 30 MG/DL (ref 9–23)
CALCIUM SERPL-MCNC: 10.5 MG/DL (ref 8.3–10.6)
CHLORIDE SERPL-SCNC: 108 MMOL/L (ref 98–107)
CO2 SERPL-SCNC: 24 MMOL/L (ref 20–31)
CREAT SERPL-MCNC: 1.01 MG/DL (ref 0.55–1.3)
GFR SERPL CREATININE-BSD FRML MDRD: 58 ML/MIN/{1.73_M2} (ref 45–?)
GLUCOSE SERPL-MCNC: 107 MG/DL (ref 74–106)
HCT VFR BLD AUTO: 30.7 % (ref 36–47)
HGB BLD-MCNC: 9.8 G/DL (ref 12–15.5)
MCH RBC QN AUTO: 30.2 PG (ref 27–33)
MCHC RBC AUTO-ENTMCNC: 31.9 G/DL (ref 32–36.5)
MCV RBC AUTO: 94.8 FL (ref 80–96)
PLATELET # BLD AUTO: 171 10^3/UL (ref 150–450)
POTASSIUM SERPL-SCNC: 4.1 MMOL/L (ref 3.5–5.1)
RBC # BLD AUTO: 3.24 10^6/UL (ref 4–5.4)
SODIUM SERPL-SCNC: 141 MMOL/L (ref 136–145)
WBC # BLD AUTO: 5.4 10^3/UL (ref 4–10)

## 2024-06-13 ENCOUNTER — HOSPITAL ENCOUNTER (OUTPATIENT)
Dept: HOSPITAL 53 - SKLAB6 | Age: 69
End: 2024-06-13
Attending: INTERNAL MEDICINE
Payer: MEDICARE

## 2024-06-13 DIAGNOSIS — E05.90: Primary | ICD-10-CM

## 2024-06-13 LAB
T4 FREE SERPL-MCNC: 1.48 NG/DL (ref 0.89–1.76)
TSH SERPL DL<=0.005 MIU/L-ACNC: 0.01 UIU/ML (ref 0.55–4.78)

## 2024-07-11 ENCOUNTER — HOSPITAL ENCOUNTER (OUTPATIENT)
Dept: HOSPITAL 53 - SKLAB6 | Age: 69
End: 2024-07-11
Attending: INTERNAL MEDICINE
Payer: MEDICARE

## 2024-07-11 DIAGNOSIS — Z79.899: Primary | ICD-10-CM

## 2024-08-16 ENCOUNTER — HOSPITAL ENCOUNTER (OUTPATIENT)
Dept: HOSPITAL 53 - M RAD | Age: 69
End: 2024-08-16
Attending: NURSE PRACTITIONER
Payer: MEDICARE

## 2024-08-16 ENCOUNTER — HOSPITAL ENCOUNTER (OUTPATIENT)
Dept: HOSPITAL 53 - SKLAB6 | Age: 69
End: 2024-08-16
Attending: INTERNAL MEDICINE
Payer: MEDICARE

## 2024-08-16 DIAGNOSIS — R41.82: Primary | ICD-10-CM

## 2024-08-16 DIAGNOSIS — R53.83: ICD-10-CM

## 2024-08-16 LAB
APPEARANCE UR: (no result)
BACTERIA UR QL AUTO: (no result)
BILIRUB UR QL STRIP.AUTO: NEGATIVE
BUN SERPL-MCNC: 45 MG/DL (ref 9–23)
CALCIUM SERPL-MCNC: 11.1 MG/DL (ref 8.3–10.6)
CHLORIDE SERPL-SCNC: 111 MMOL/L (ref 98–107)
CO2 SERPL-SCNC: 24 MMOL/L (ref 20–31)
CREAT SERPL-MCNC: 1.37 MG/DL (ref 0.55–1.3)
GFR SERPL CREATININE-BSD FRML MDRD: 40.7 ML/MIN/{1.73_M2} (ref 45–?)
GLUCOSE SERPL-MCNC: 137 MG/DL (ref 74–106)
GLUCOSE UR QL STRIP.AUTO: NEGATIVE MG/DL
HCT VFR BLD AUTO: 34 % (ref 36–47)
HGB BLD-MCNC: 11 G/DL (ref 12–15.5)
HGB UR QL STRIP.AUTO: NEGATIVE
KETONES UR QL STRIP.AUTO: NEGATIVE MG/DL
LEUKOCYTE ESTERASE UR QL STRIP.AUTO: (no result)
MCH RBC QN AUTO: 31.3 PG (ref 27–33)
MCHC RBC AUTO-ENTMCNC: 32.4 G/DL (ref 32–36.5)
MCV RBC AUTO: 96.9 FL (ref 80–96)
MUCOUS THREADS URNS QL MICRO: (no result)
NITRITE UR QL STRIP.AUTO: POSITIVE
PH UR STRIP.AUTO: 5 UNITS (ref 5–9)
PLATELET # BLD AUTO: 185 10^3/UL (ref 150–450)
POTASSIUM SERPL-SCNC: 4.1 MMOL/L (ref 3.5–5.1)
PROT UR QL STRIP.AUTO: (no result) MG/DL
RBC # BLD AUTO: 3.51 10^6/UL (ref 4–5.4)
RBC # UR AUTO: 3 /HPF (ref 0–3)
SODIUM SERPL-SCNC: 145 MMOL/L (ref 136–145)
SP GR UR STRIP.AUTO: 1.01 (ref 1–1.03)
SQUAMOUS #/AREA URNS AUTO: 2 /HPF (ref 0–6)
UROBILINOGEN UR QL STRIP.AUTO: 0.2 MG/DL (ref 0–2)
WBC # BLD AUTO: 8.3 10^3/UL (ref 4–10)
WBC #/AREA URNS AUTO: (no result) /HPF (ref 0–3)

## 2024-08-19 ENCOUNTER — HOSPITAL ENCOUNTER (OUTPATIENT)
Dept: HOSPITAL 53 - SKLAB6 | Age: 69
End: 2024-08-19
Attending: INTERNAL MEDICINE
Payer: MEDICARE

## 2024-08-19 DIAGNOSIS — N39.0: Primary | ICD-10-CM

## 2024-08-19 LAB
BASOPHILS # BLD AUTO: 0 10^3/UL (ref 0–0.2)
BASOPHILS NFR BLD AUTO: 0.3 % (ref 0–1)
BUN SERPL-MCNC: 50 MG/DL (ref 9–23)
CALCIUM SERPL-MCNC: 11 MG/DL (ref 8.3–10.6)
CHLORIDE SERPL-SCNC: 118 MMOL/L (ref 98–107)
CO2 SERPL-SCNC: 27 MMOL/L (ref 20–31)
CREAT SERPL-MCNC: 1.32 MG/DL (ref 0.55–1.3)
EOSINOPHIL # BLD AUTO: 0 10^3/UL (ref 0–0.5)
EOSINOPHIL NFR BLD AUTO: 0.3 % (ref 0–3)
GFR SERPL CREATININE-BSD FRML MDRD: 42.5 ML/MIN/{1.73_M2} (ref 45–?)
GLUCOSE SERPL-MCNC: 157 MG/DL (ref 74–106)
HCT VFR BLD AUTO: 35 % (ref 36–47)
HGB BLD-MCNC: 10.8 G/DL (ref 12–15.5)
LYMPHOCYTES # BLD AUTO: 1.3 10^3/UL (ref 1.5–5)
LYMPHOCYTES NFR BLD AUTO: 19.3 % (ref 24–44)
MCH RBC QN AUTO: 30.9 PG (ref 27–33)
MCHC RBC AUTO-ENTMCNC: 30.9 G/DL (ref 32–36.5)
MCV RBC AUTO: 100.3 FL (ref 80–96)
MONOCYTES # BLD AUTO: 0.6 10^3/UL (ref 0–0.8)
MONOCYTES NFR BLD AUTO: 8.2 % (ref 2–8)
NEUTROPHILS # BLD AUTO: 5 10^3/UL (ref 1.5–8.5)
NEUTROPHILS NFR BLD AUTO: 71.6 % (ref 36–66)
PLATELET # BLD AUTO: 168 10^3/UL (ref 150–450)
POTASSIUM SERPL-SCNC: 3.7 MMOL/L (ref 3.5–5.1)
RBC # BLD AUTO: 3.49 10^6/UL (ref 4–5.4)
SODIUM SERPL-SCNC: 153 MMOL/L (ref 136–145)
WBC # BLD AUTO: 7 10^3/UL (ref 4–10)

## 2024-08-20 ENCOUNTER — HOSPITAL ENCOUNTER (OUTPATIENT)
Dept: HOSPITAL 53 - SKLAB6 | Age: 69
End: 2024-08-20
Attending: INTERNAL MEDICINE
Payer: MEDICARE

## 2024-08-20 DIAGNOSIS — E86.0: Primary | ICD-10-CM

## 2024-08-20 LAB
BUN SERPL-MCNC: 47 MG/DL (ref 9–23)
CALCIUM SERPL-MCNC: 11 MG/DL (ref 8.3–10.6)
CHLORIDE SERPL-SCNC: 117 MMOL/L (ref 98–107)
CO2 SERPL-SCNC: 25 MMOL/L (ref 20–31)
CREAT SERPL-MCNC: 1.16 MG/DL (ref 0.55–1.3)
GFR SERPL CREATININE-BSD FRML MDRD: 49.3 ML/MIN/{1.73_M2} (ref 45–?)
GLUCOSE SERPL-MCNC: 160 MG/DL (ref 74–106)
HCT VFR BLD AUTO: 35.6 % (ref 36–47)
HGB BLD-MCNC: 11 G/DL (ref 12–15.5)
MCH RBC QN AUTO: 31 PG (ref 27–33)
MCHC RBC AUTO-ENTMCNC: 30.9 G/DL (ref 32–36.5)
MCV RBC AUTO: 100.3 FL (ref 80–96)
PLATELET # BLD AUTO: 170 10^3/UL (ref 150–450)
POTASSIUM SERPL-SCNC: 3.7 MMOL/L (ref 3.5–5.1)
RBC # BLD AUTO: 3.55 10^6/UL (ref 4–5.4)
SODIUM SERPL-SCNC: 151 MMOL/L (ref 136–145)
WBC # BLD AUTO: 8.2 10^3/UL (ref 4–10)

## 2024-08-23 ENCOUNTER — HOSPITAL ENCOUNTER (OUTPATIENT)
Dept: HOSPITAL 53 - SKLAB6 | Age: 69
End: 2024-08-23
Attending: INTERNAL MEDICINE
Payer: MEDICARE

## 2024-08-23 DIAGNOSIS — Z53.8: Primary | ICD-10-CM

## 2024-08-23 DIAGNOSIS — N18.9: Primary | ICD-10-CM

## 2024-08-23 LAB
BUN SERPL-MCNC: 46 MG/DL (ref 9–23)
CALCIUM SERPL-MCNC: 9.9 MG/DL (ref 8.3–10.6)
CHLORIDE SERPL-SCNC: 109 MMOL/L (ref 98–107)
CO2 SERPL-SCNC: 27 MMOL/L (ref 20–31)
CREAT SERPL-MCNC: 0.99 MG/DL (ref 0.55–1.3)
GFR SERPL CREATININE-BSD FRML MDRD: 59.2 ML/MIN/{1.73_M2} (ref 45–?)
GLUCOSE SERPL-MCNC: 90 MG/DL (ref 74–106)
HCT VFR BLD AUTO: 27.8 % (ref 36–47)
HGB BLD-MCNC: 8.9 G/DL (ref 12–15.5)
MCH RBC QN AUTO: 30.7 PG (ref 27–33)
MCHC RBC AUTO-ENTMCNC: 32 G/DL (ref 32–36.5)
MCV RBC AUTO: 95.9 FL (ref 80–96)
PLATELET # BLD AUTO: 125 10^3/UL (ref 150–450)
POTASSIUM SERPL-SCNC: 4.3 MMOL/L (ref 3.5–5.1)
RBC # BLD AUTO: 2.9 10^6/UL (ref 4–5.4)
SODIUM SERPL-SCNC: 141 MMOL/L (ref 136–145)
WBC # BLD AUTO: 5.9 10^3/UL (ref 4–10)

## 2024-08-26 ENCOUNTER — HOSPITAL ENCOUNTER (OUTPATIENT)
Dept: HOSPITAL 53 - SKLAB6 | Age: 69
End: 2024-08-26
Attending: INTERNAL MEDICINE
Payer: MEDICARE

## 2024-08-26 DIAGNOSIS — D64.9: Primary | ICD-10-CM

## 2024-08-30 ENCOUNTER — HOSPITAL ENCOUNTER (OUTPATIENT)
Dept: HOSPITAL 53 - SKLAB6 | Age: 69
End: 2024-08-30
Attending: INTERNAL MEDICINE
Payer: MEDICARE

## 2024-08-30 DIAGNOSIS — D64.9: Primary | ICD-10-CM

## 2024-09-04 ENCOUNTER — HOSPITAL ENCOUNTER (OUTPATIENT)
Dept: HOSPITAL 53 - SKLAB6 | Age: 69
End: 2024-09-04
Attending: INTERNAL MEDICINE
Payer: MEDICARE

## 2024-09-04 DIAGNOSIS — Z53.8: Primary | ICD-10-CM

## 2024-09-12 ENCOUNTER — HOSPITAL ENCOUNTER (OUTPATIENT)
Dept: HOSPITAL 53 - SKLAB6 | Age: 69
End: 2024-09-12
Attending: INTERNAL MEDICINE
Payer: MEDICARE

## 2024-09-12 DIAGNOSIS — E07.9: Primary | ICD-10-CM

## 2024-09-12 LAB
T4 FREE SERPL-MCNC: 2.11 NG/DL (ref 0.89–1.76)
TSH SERPL DL<=0.005 MIU/L-ACNC: 0.01 UIU/ML (ref 0.55–4.78)

## 2024-10-10 ENCOUNTER — HOSPITAL ENCOUNTER (OUTPATIENT)
Dept: HOSPITAL 53 - SKLAB6 | Age: 69
End: 2024-10-10
Attending: INTERNAL MEDICINE
Payer: MEDICARE

## 2024-10-10 DIAGNOSIS — Z79.899: ICD-10-CM

## 2024-10-10 DIAGNOSIS — Z51.81: Primary | ICD-10-CM

## 2024-10-10 LAB
ALBUMIN SERPL BCG-MCNC: 2.3 G/DL (ref 3.2–5.2)
ALP SERPL-CCNC: 70 U/L (ref 46–116)
ALT SERPL W P-5'-P-CCNC: 15 U/L (ref 7–40)
AST SERPL-CCNC: 13 U/L (ref ?–34)
BILIRUB SERPL-MCNC: 0.3 MG/DL (ref 0.3–1.2)
BUN SERPL-MCNC: 55 MG/DL (ref 9–23)
CALCIUM SERPL-MCNC: 11.3 MG/DL (ref 8.3–10.6)
CHLORIDE SERPL-SCNC: 106 MMOL/L (ref 98–107)
CO2 SERPL-SCNC: 26 MMOL/L (ref 20–31)
CREAT SERPL-MCNC: 0.97 MG/DL (ref 0.55–1.3)
DIGOXIN SERPL-MCNC: 0.7 NG/ML (ref 0.8–2)
GFR SERPL CREATININE-BSD FRML MDRD: > 60 ML/MIN/{1.73_M2} (ref 45–?)
GLUCOSE SERPL-MCNC: 89 MG/DL (ref 74–106)
POTASSIUM SERPL-SCNC: 4.6 MMOL/L (ref 3.5–5.1)
PROT SERPL-MCNC: 7.5 G/DL (ref 5.7–8.2)
SODIUM SERPL-SCNC: 141 MMOL/L (ref 136–145)

## 2024-12-12 ENCOUNTER — HOSPITAL ENCOUNTER (OUTPATIENT)
Dept: HOSPITAL 53 - SKLAB6 | Age: 69
End: 2024-12-12
Attending: INTERNAL MEDICINE
Payer: MEDICARE

## 2024-12-12 DIAGNOSIS — E05.90: Primary | ICD-10-CM

## 2024-12-12 LAB
T4 FREE SERPL-MCNC: 1.26 NG/DL (ref 0.89–1.76)
TSH SERPL DL<=0.005 MIU/L-ACNC: 0.02 UIU/ML (ref 0.55–4.78)

## 2025-01-16 ENCOUNTER — HOSPITAL ENCOUNTER (OUTPATIENT)
Dept: HOSPITAL 53 - SKLAB6 | Age: 70
End: 2025-01-16
Attending: INTERNAL MEDICINE
Payer: MEDICARE

## 2025-01-16 DIAGNOSIS — Z51.81: Primary | ICD-10-CM

## 2025-01-16 DIAGNOSIS — Z79.899: ICD-10-CM

## 2025-01-30 ENCOUNTER — HOSPITAL ENCOUNTER (OUTPATIENT)
Dept: HOSPITAL 53 - SKLAB6 | Age: 70
End: 2025-01-30
Attending: INTERNAL MEDICINE
Payer: MEDICARE

## 2025-01-30 ENCOUNTER — HOSPITAL ENCOUNTER (OUTPATIENT)
Dept: HOSPITAL 53 - M RAD | Age: 70
End: 2025-01-30
Attending: NURSE PRACTITIONER
Payer: MEDICARE

## 2025-01-30 DIAGNOSIS — Z90.49: ICD-10-CM

## 2025-01-30 DIAGNOSIS — N28.89: ICD-10-CM

## 2025-01-30 DIAGNOSIS — R10.32: Primary | ICD-10-CM

## 2025-01-30 DIAGNOSIS — M16.12: ICD-10-CM

## 2025-01-30 DIAGNOSIS — R10.9: Primary | ICD-10-CM

## 2025-01-30 DIAGNOSIS — K59.00: ICD-10-CM

## 2025-01-30 LAB
APPEARANCE UR: CLEAR
BACTERIA URNS QL MICRO: (no result)
BILIRUB UR QL STRIP: NEGATIVE
BUN SERPL-MCNC: 77 MG/DL (ref 9–23)
CALCIUM SERPL-MCNC: 10.5 MG/DL (ref 8.3–10.6)
CHLORIDE SERPL-SCNC: 107 MMOL/L (ref 98–107)
CO2 SERPL-SCNC: 27 MMOL/L (ref 20–31)
COLOR UR: YELLOW
CREAT SERPL-MCNC: 1.17 MG/DL (ref 0.55–1.3)
GFR SERPL CREATININE-BSD FRML MDRD: 48.8 ML/MIN/{1.73_M2} (ref 45–?)
GLUCOSE SERPL-MCNC: 113 MG/DL (ref 74–106)
GLUCOSE UR STRIP-MCNC: NEGATIVE MG/DL
HCT VFR BLD AUTO: 32 % (ref 36–47)
HGB BLD-MCNC: 10 G/DL (ref 12–15.5)
HGB UR QL STRIP: NEGATIVE
HYALINE CASTS URNS QL MICRO: (no result) /LPF (ref 0–1)
KETONES UR QL STRIP: NEGATIVE MG/DL
LEUKOCYTE ESTERASE UR QL STRIP: (no result)
MCH RBC QN AUTO: 29.9 PG (ref 27–33)
MCHC RBC AUTO-ENTMCNC: 31.3 G/DL (ref 32–36.5)
MCV RBC AUTO: 95.8 FL (ref 80–96)
NITRITE UR QL STRIP: POSITIVE
PH UR STRIP: 5 UNITS (ref 5–7)
PLATELET # BLD AUTO: 191 10^3/UL (ref 150–450)
POTASSIUM SERPL-SCNC: 4.4 MMOL/L (ref 3.5–5.1)
PROT UR STRIP-MCNC: NEGATIVE MG/DL
RBC # BLD AUTO: 3.34 10^6/UL (ref 4–5.4)
RBC #/AREA URNS HPF: (no result) /HPF (ref 0–3)
SODIUM SERPL-SCNC: 145 MMOL/L (ref 136–145)
SP GR UR STRIP: 1.01 (ref 1–1.03)
SQUAMOUS URNS QL MICRO: (no result) /HPF
TRANS CELLS #/AREA URNS HPF: (no result) /HPF
UROBILINOGEN UR QL STRIP: NORMAL MG/DL
WBC # BLD AUTO: 6.8 10^3/UL (ref 4–10)
WBC #/AREA URNS HPF: (no result) /HPF (ref 0–3)

## 2025-02-05 ENCOUNTER — HOSPITAL ENCOUNTER (OUTPATIENT)
Dept: HOSPITAL 53 - SKLAB6 | Age: 70
End: 2025-02-05
Attending: INTERNAL MEDICINE
Payer: MEDICARE

## 2025-02-05 DIAGNOSIS — N18.9: Primary | ICD-10-CM

## 2025-02-05 LAB
BUN SERPL-MCNC: 50 MG/DL (ref 9–23)
CALCIUM SERPL-MCNC: 10.5 MG/DL (ref 8.3–10.6)
CHLORIDE SERPL-SCNC: 112 MMOL/L (ref 98–107)
CO2 SERPL-SCNC: 26 MMOL/L (ref 20–31)
CREAT SERPL-MCNC: 1.12 MG/DL (ref 0.55–1.3)
GFR SERPL CREATININE-BSD FRML MDRD: 51.3 ML/MIN/{1.73_M2} (ref 45–?)
GLUCOSE SERPL-MCNC: 100 MG/DL (ref 74–106)
HCT VFR BLD AUTO: 30.4 % (ref 36–47)
HGB BLD-MCNC: 9.7 G/DL (ref 12–15.5)
MCH RBC QN AUTO: 30.4 PG (ref 27–33)
MCHC RBC AUTO-ENTMCNC: 31.9 G/DL (ref 32–36.5)
MCV RBC AUTO: 95.3 FL (ref 80–96)
PLATELET # BLD AUTO: 199 10^3/UL (ref 150–450)
POTASSIUM SERPL-SCNC: 4.3 MMOL/L (ref 3.5–5.1)
RBC # BLD AUTO: 3.19 10^6/UL (ref 4–5.4)
SODIUM SERPL-SCNC: 148 MMOL/L (ref 136–145)
WBC # BLD AUTO: 6.1 10^3/UL (ref 4–10)

## 2025-03-13 ENCOUNTER — HOSPITAL ENCOUNTER (OUTPATIENT)
Dept: HOSPITAL 53 - SKLAB6 | Age: 70
End: 2025-03-13
Attending: INTERNAL MEDICINE
Payer: MEDICARE

## 2025-03-13 DIAGNOSIS — E05.90: Primary | ICD-10-CM

## 2025-03-13 LAB
T4 FREE SERPL-MCNC: 0.94 NG/DL (ref 0.89–1.76)
TSH SERPL DL<=0.005 MIU/L-ACNC: 0.78 UIU/ML (ref 0.55–4.78)

## 2025-04-10 ENCOUNTER — HOSPITAL ENCOUNTER (OUTPATIENT)
Dept: HOSPITAL 53 - SKLAB6 | Age: 70
End: 2025-04-10
Attending: INTERNAL MEDICINE
Payer: MEDICARE

## 2025-04-10 DIAGNOSIS — Z51.81: ICD-10-CM

## 2025-04-10 DIAGNOSIS — Z79.899: ICD-10-CM

## 2025-04-10 DIAGNOSIS — D64.9: Primary | ICD-10-CM

## 2025-04-10 LAB
ALBUMIN SERPL BCG-MCNC: 2.3 G/DL (ref 3.2–5.2)
BILIRUB SERPL-MCNC: 0.2 MG/DL (ref 0.3–1.2)
CALCIUM SERPL-MCNC: 9.7 MG/DL (ref 8.3–10.6)
CHOLEST/HDLC SERPL: 4.83 {RATIO} (ref ?–5)
CREAT SERPL-MCNC: 1.2 MG/DL (ref 0.55–1.3)
DIGOXIN SERPL-MCNC: 0.7 NG/ML (ref 0.8–2)
HDLC SERPL-MCNC: 23.8 MG/DL (ref 40–?)
LDLC SERPL CALC-MCNC: 69.4 MG/DL (ref ?–100)
NONHDLC SERPL-MCNC: 91.2 MG/DL
POTASSIUM SERPL-SCNC: 4.5 MMOL/L (ref 3.5–5.1)
PROT SERPL-MCNC: 6.7 G/DL (ref 5.7–8.2)

## 2025-05-18 ENCOUNTER — HOSPITAL ENCOUNTER (EMERGENCY)
Dept: HOSPITAL 53 - M ED | Age: 70
Discharge: HOME | End: 2025-05-18
Payer: MEDICARE

## 2025-05-18 VITALS — DIASTOLIC BLOOD PRESSURE: 55 MMHG | SYSTOLIC BLOOD PRESSURE: 108 MMHG

## 2025-05-18 VITALS — TEMPERATURE: 98 F

## 2025-05-18 VITALS — OXYGEN SATURATION: 95 %

## 2025-05-18 DIAGNOSIS — F32.A: ICD-10-CM

## 2025-05-18 DIAGNOSIS — E03.9: ICD-10-CM

## 2025-05-18 DIAGNOSIS — E07.89: ICD-10-CM

## 2025-05-18 DIAGNOSIS — Z79.899: ICD-10-CM

## 2025-05-18 DIAGNOSIS — R07.9: Primary | ICD-10-CM

## 2025-05-18 DIAGNOSIS — Z79.01: ICD-10-CM

## 2025-05-18 DIAGNOSIS — Z86.718: ICD-10-CM

## 2025-05-18 DIAGNOSIS — R94.31: ICD-10-CM

## 2025-05-18 DIAGNOSIS — I48.91: ICD-10-CM

## 2025-05-18 DIAGNOSIS — E66.9: ICD-10-CM

## 2025-05-18 DIAGNOSIS — Z88.6: ICD-10-CM

## 2025-05-18 DIAGNOSIS — I50.9: ICD-10-CM

## 2025-05-18 DIAGNOSIS — I11.0: ICD-10-CM

## 2025-05-18 DIAGNOSIS — I73.9: ICD-10-CM

## 2025-05-18 DIAGNOSIS — Z87.891: ICD-10-CM

## 2025-05-18 LAB
ALBUMIN SERPL BCG-MCNC: 2.9 G/DL (ref 3.2–5.2)
ALP SERPL-CCNC: 64 U/L (ref 35–104)
ALT SERPL W P-5'-P-CCNC: 21 U/L (ref 7–40)
AST SERPL-CCNC: 15 U/L (ref ?–34)
BASOPHILS NFR BLD AUTO: 0.5 % (ref 0–1)
BILIRUB CONJ SERPL-MCNC: 0.1 MG/DL (ref ?–0.4)
BILIRUB SERPL-MCNC: 0.3 MG/DL (ref 0.3–1.2)
BUN SERPL-MCNC: 63 MG/DL (ref 9–23)
CALCIUM SERPL-MCNC: 10.3 MG/DL (ref 8.3–10.6)
CHLORIDE SERPL-SCNC: 106 MMOL/L (ref 98–107)
CK MB CFR.DF SERPL CALC: 3.22
CK MB CFR.DF SERPL CALC: 3.57
CK MB CFR.DF SERPL CALC: 4.54
CK MB SERPL-MCNC: < 1 NG/ML (ref ?–3.6)
CK SERPL-CCNC: 22 U/L (ref 34–145)
CK SERPL-CCNC: 28 U/L (ref 34–145)
CK SERPL-CCNC: 31 U/L (ref 34–145)
CO2 SERPL-SCNC: 28 MMOL/L (ref 20–31)
CREAT SERPL-MCNC: 1.31 MG/DL (ref 0.55–1.3)
EOSINOPHIL # BLD AUTO: 0.2 10^3/UL (ref 0–0.5)
EOSINOPHIL NFR BLD AUTO: 2.3 % (ref 0–3)
GFR SERPL CREATININE-BSD FRML MDRD: 44.1 ML/MIN/{1.73_M2} (ref 45–?)
GLUCOSE SERPL-MCNC: 87 MG/DL (ref 74–106)
HCT VFR BLD AUTO: 30.7 % (ref 36–47)
HGB BLD-MCNC: 9.6 G/DL (ref 12–15.5)
LIPASE SERPL-CCNC: 42 U/L (ref 12–53)
LYMPHOCYTES # BLD AUTO: 1.5 10^3/UL (ref 1.5–5)
LYMPHOCYTES NFR BLD AUTO: 23.2 % (ref 24–44)
MCH RBC QN AUTO: 30.6 PG (ref 27–33)
MCHC RBC AUTO-ENTMCNC: 31.3 G/DL (ref 32–36.5)
MCV RBC AUTO: 97.8 FL (ref 80–96)
MONOCYTES # BLD AUTO: 0.6 10^3/UL (ref 0–0.8)
MONOCYTES NFR BLD AUTO: 8.6 % (ref 2–8)
NEUTROPHILS # BLD AUTO: 4.3 10^3/UL (ref 1.5–8.5)
NEUTROPHILS NFR BLD AUTO: 65.2 % (ref 36–66)
PLATELET # BLD AUTO: 195 10^3/UL (ref 150–450)
POTASSIUM SERPL-SCNC: 4.3 MMOL/L (ref 3.5–5.1)
RBC # BLD AUTO: 3.14 10^6/UL (ref 4–5.4)
SODIUM SERPL-SCNC: 144 MMOL/L (ref 136–145)
WBC # BLD AUTO: 6.6 10^3/UL (ref 4–10)

## 2025-05-18 PROCEDURE — 83690 ASSAY OF LIPASE: CPT

## 2025-05-18 PROCEDURE — 84484 ASSAY OF TROPONIN QUANT: CPT

## 2025-05-18 PROCEDURE — 82553 CREATINE MB FRACTION: CPT

## 2025-05-18 PROCEDURE — 71275 CT ANGIOGRAPHY CHEST: CPT

## 2025-05-18 PROCEDURE — 71045 X-RAY EXAM CHEST 1 VIEW: CPT

## 2025-05-18 PROCEDURE — 36415 COLL VENOUS BLD VENIPUNCTURE: CPT

## 2025-05-18 PROCEDURE — 85025 COMPLETE CBC W/AUTO DIFF WBC: CPT

## 2025-05-18 PROCEDURE — 82550 ASSAY OF CK (CPK): CPT

## 2025-05-18 PROCEDURE — 80076 HEPATIC FUNCTION PANEL: CPT

## 2025-05-18 PROCEDURE — 93005 ELECTROCARDIOGRAM TRACING: CPT

## 2025-05-18 PROCEDURE — 94760 N-INVAS EAR/PLS OXIMETRY 1: CPT

## 2025-05-18 PROCEDURE — 93041 RHYTHM ECG TRACING: CPT

## 2025-05-18 PROCEDURE — 99285 EMERGENCY DEPT VISIT HI MDM: CPT

## 2025-05-18 PROCEDURE — 80048 BASIC METABOLIC PNL TOTAL CA: CPT
